# Patient Record
Sex: MALE | Race: BLACK OR AFRICAN AMERICAN | Employment: OTHER | URBAN - METROPOLITAN AREA
[De-identification: names, ages, dates, MRNs, and addresses within clinical notes are randomized per-mention and may not be internally consistent; named-entity substitution may affect disease eponyms.]

---

## 2017-03-07 ENCOUNTER — HOSPITAL ENCOUNTER (EMERGENCY)
Age: 72
Discharge: HOME | End: 2017-03-07
Attending: EMERGENCY MEDICINE
Payer: MEDICARE

## 2017-03-07 VITALS
RESPIRATION RATE: 16 BRPM | TEMPERATURE: 97.9 F | BODY MASS INDEX: 33.33 KG/M2 | WEIGHT: 225 LBS | OXYGEN SATURATION: 95 % | SYSTOLIC BLOOD PRESSURE: 181 MMHG | HEART RATE: 82 BPM | DIASTOLIC BLOOD PRESSURE: 108 MMHG | HEIGHT: 69 IN

## 2017-03-07 DIAGNOSIS — G89.29 CHRONIC PAIN OF RIGHT LOWER EXTREMITY: Primary | ICD-10-CM

## 2017-03-07 DIAGNOSIS — M79.604 CHRONIC PAIN OF RIGHT LOWER EXTREMITY: Primary | ICD-10-CM

## 2017-03-07 PROCEDURE — 99282 EMERGENCY DEPT VISIT SF MDM: CPT | Performed by: PHYSICIAN ASSISTANT

## 2017-03-07 RX ORDER — HYDROCODONE BITARTRATE AND ACETAMINOPHEN 7.5; 325 MG/1; MG/1
1 TABLET ORAL
Qty: 12 TAB | Refills: 0 | Status: SHIPPED | OUTPATIENT
Start: 2017-03-07 | End: 2017-04-04

## 2017-03-08 NOTE — ED PROVIDER NOTES
HPI Comments: 74year-old [facility] American male with long-standing right upper leg pain presents to the ER complaining of the same right upper leg pain that he has had for the last 18 months. In reviewing his office note from his primary care provider Dr. Madison Titus, on 2/16/2017 he was seen and it was documented that he had this chronic right upper leg and thigh pain for 18 months. Patient has had imaging studies of his right femur which did not reveal any bony abnormality but did reveal some arthritis and his right knee. Patient has also had duplex Doppler ultrasounds of both extremities which were negative for DVT. The ultrasound was performed in December 2016. Patient denies any new injury or trauma. He is a poor historian and here with a caregiver who states that patient has dementia and does not give an accurate story and was insistent on coming to the ER to have something done about his chronic right leg pain. Patient states that the pain has not changed any over the last 18 months. Patient is a 74 y.o. male presenting with leg pain. The history is provided by the patient. Leg Pain    This is a chronic problem. The current episode started more than 1 week ago (19 MONTHS AGO). The problem occurs daily. The problem has not changed since onset. The pain is present in the right upper leg. The quality of the pain is described as sharp. The pain is at a severity of 10/10. Associated symptoms include stiffness. Pertinent negatives include no numbness, full range of motion, no tingling and no back pain. The symptoms are aggravated by activity and movement. He has tried nothing for the symptoms. There has been no history of extremity trauma.         Past Medical History:   Diagnosis Date    Acute CHF (congestive heart failure) ([address] 4/25/2015    Acute combined systolic and diastolic congestive heart failure ([address] 4/28/2015    Chronic obstructive pulmonary disease (HCC)     De Quervain's tenosynovitis, right 4/28/2015    Dyspnea on exertion 6/28/2015    Elevated serum creatinine 4/25/2015    Elevated troponin 6/28/2015    History of coronary artery disease     MI at 27 yo    History of right knee surgery     cartilage removal    History of shingles     Malignant hypertension 4/25/2015    MI [facility])        Past Surgical History:   Procedure Laterality Date    HX HEART CATHETERIZATION  6/29/2015    no intervention    HX KNEE ARTHROSCOPY Right     removal of cartilage         Family History:   Problem Relation Age of Onset    Hypertension Mother     No Known Problems Father        Social History     Social History    Marital status:      Spouse name: N/A    Number of children: N/A    Years of education: N/A     Occupational History    retired      Social History Main Topics    Smoking status: Former Smoker     Packs/day: 0.25     Years: 20.00     Types: Cigarettes     Quit date: 4/5/2015    Smokeless tobacco: Never Used    Alcohol use No    Drug use: No    Sexual activity: Not on file     Other Topics Concern     Service No    Blood Transfusions No     no issues with receiving    Caffeine Concern No    Special Diet No    Exercise No    Seat Belt Yes     Social History Narrative    Lives with his wife         ALLERGIES: Pcn [penicillins]    Review of Systems   Constitutional: Negative for chills and fever. Respiratory: Negative for chest tightness and shortness of breath. Cardiovascular: Negative for chest pain, palpitations and leg swelling. Gastrointestinal: Negative for nausea and vomiting. Genitourinary: Negative for decreased urine volume, difficulty urinating, discharge, frequency, penile pain, scrotal swelling, testicular pain and urgency. Musculoskeletal: Positive for arthralgias, myalgias and stiffness. Negative for back pain and joint swelling. Neurological: Negative for dizziness, tingling, light-headedness and numbness.        Vitals: 03/07/17 1853   BP: (!) 181/108   Pulse: 82   Resp: 16   Temp: 97.9 °F (36.6 °C)   SpO2: 95%   Weight: 102.1 kg (225 lb)   Height: 5' 9\" (1.753 m)            Physical Exam   Constitutional: He is oriented to person, place, and time. Non-toxic appearance. He does not appear ill. No distress. Patient is somewhat disheveled. He is observed to have an elevated blood pressure of 181/108. Patient's caregiver states he is compliant with taking his blood pressure medication. Cardiovascular: Normal rate, regular rhythm and normal heart sounds. Exam reveals no gallop and no friction rub. No murmur heard. Pulses:       Femoral pulses are 2+ on the right side, and 2+ on the left side. Pulmonary/Chest: Effort normal and breath sounds normal. No accessory muscle usage. No respiratory distress. He has no decreased breath sounds. He has no wheezes. He has no rhonchi. Abdominal: Soft. Bowel sounds are normal. He exhibits no distension. There is no tenderness. Musculoskeletal:        Right hip: Normal.        Right knee: Normal. He exhibits normal range of motion. No tenderness found. Lumbar back: He exhibits tenderness. Back:         Right upper leg: Normal.   No obvious abnormality or deformity of right thigh or hip. Neurological: He is alert and oriented to person, place, and time. He has normal strength. No sensory deficit. Patient is able to actively raise right leg without difficulty. There is no obvious strength or sensory deficit. Skin: Skin is warm and dry. Psychiatric: He has a normal mood and affect. His speech is normal and behavior is normal.   Nursing note and vitals reviewed.        MDM  Number of Diagnoses or Management Options  Chronic pain of right lower extremity: new and does not require workup  Diagnosis management comments: Patient with documented chronic pain to right thigh and hip area for the last 18 months with plain imaging as well as ultrasound imaging having been performed without any acute abnormalities. I discussed with patient that he would need to contact his primary care provider the following day to make them aware that he is still experiencing pain and perhaps request a referral to either an orthopedist or pain specialist.  Patient states he does not have any pain medication at home. Patient is prescribed Norco. No further diagnostics are indicated at this point in time.        Amount and/or Complexity of Data Reviewed  Review and summarize past medical records: yes    Risk of Complications, Morbidity, and/or Mortality  Presenting problems: low  Management options: low    Patient Progress  Patient progress: stable    ED Course       Procedures

## 2017-03-08 NOTE — DISCHARGE INSTRUCTIONS
You will need to contact your primary care provider Dr. Marlene Dubose tomorrow and let him know this chronic problem is still causing you a problem. Chronic Pain: Care Instructions  Your Care Instructions  Chronic pain is pain that lasts a long time (months or even years) and may or may not have a clear cause. It is different from acute pain, which usually does have a clear cause--like an injury or illness--and gets better over time. Chronic pain:  · Lasts over time but may vary from day to day. · Does not go away despite efforts to end it. · May disrupt your sleep and lead to fatigue. · May cause depression or anxiety. · May make your muscles tense, causing more pain. · Can disrupt your work, hobbies, home life, and relationships with friends and family. Chronic pain is a very real condition. It is not just in your head. Treatment can help and usually includes several methods used together, such as medicines, physical therapy, exercise, and other treatments. Learning how to relax and changing negative thought patterns can also help you cope. Chronic pain is complex. Taking an active role in your treatment will help you better manage your pain. Tell your doctor if you have trouble dealing with your pain. You may have to try several things before you find what works best for you. Follow-up care is a key part of your treatment and safety. Be sure to make and go to all appointments, and call your doctor if you are having problems. Its also a good idea to know your test results and keep a list of the medicines you take. How can you care for yourself at home? · Pace yourself. Break up large jobs into smaller tasks. Save harder tasks for days when you have less pain, or go back and forth between hard tasks and easier ones. Take rest breaks. · Relax, and reduce stress. Relaxation techniques such as deep breathing or meditation can help. · Keep moving.  Gentle, daily exercise can help reduce pain over the long run. Try low- or no-impact exercises such as walking, swimming, and stationary biking. Do stretches to stay flexible. · Try heat, cold packs, and massage. · Get enough sleep. Chronic pain can make you tired and drain your energy. Talk with your doctor if you have trouble sleeping because of pain. · Think positive. Your thoughts can affect your pain level. Do things that you enjoy to distract yourself when you have pain instead of focusing on the pain. See a movie, read a book, listen to music, or spend time with a friend. · If you think you are depressed, talk to your doctor about treatment. · Keep a daily pain diary. Record how your moods, thoughts, sleep patterns, activities, and medicine affect your pain. You may find that your pain is worse during or after certain activities or when you are feeling a certain emotion. Having a record of your pain can help you and your doctor find the best ways to treat your pain. · Take pain medicines exactly as directed. ¨ If the doctor gave you a prescription medicine for pain, take it as prescribed. ¨ If you are not taking a prescription pain medicine, ask your doctor if you can take an over-the-counter medicine. Reducing constipation caused by pain medicine  · Include fruits, vegetables, beans, and whole grains in your diet each day. These foods are high in fiber. · Drink plenty of fluids, enough so that your urine is light yellow or clear like water. If you have kidney, heart, or liver disease and have to limit fluids, talk with your doctor before you increase the amount of fluids you drink. · If your doctor recommends it, get more exercise. Walking is a good choice. Bit by bit, increase the amount you walk every day. Try for at least 30 minutes on most days of the week. · Schedule time each day for a bowel movement. A daily routine may help. Take your time and do not strain when having a bowel movement. When should you call for help?   Call your doctor now or seek immediate medical care if:  · Your pain gets worse or is out of control. · You feel down or blue, or you do not enjoy things like you once did. You may be depressed, which is common in people with chronic pain. Depression can be treated. · You have vomiting or cramps for more than 2 hours. Watch closely for changes in your health, and be sure to contact your doctor if:  · You cannot sleep because of pain. · You are very worried or anxious about your pain. · You have trouble taking your pain medicine. · You have any concerns about your pain medicine. · You have trouble with bowel movements, such as:  ¨ No bowel movement in 3 days. ¨ Blood in the anal area, in your stool, or on the toilet paper. ¨ Diarrhea for more than 24 hours. Where can you learn more? Go to http://jaime-jarrell.info/. Enter N004 in the search box to learn more about \"Chronic Pain: Care Instructions. \"  Current as of: February 19, 2016  Content Version: 11.1  © 7395-8606 [employer]. Care instructions adapted under license by [employer] (which disclaims liability or warranty for this information). If you have questions about a medical condition or this instruction, always ask your healthcare professional. [address] any warranty or liability for your use of this information. Leg Pain: Care Instructions  Your Care Instructions  Many things can cause leg pain. Too much exercise or overuse can cause a muscle cramp (or charley horse). You can get leg cramps from not eating a balanced diet that has enough potassium, calcium, and other minerals. If you do not drink enough fluids or are taking certain medicines, you may develop leg cramps. Other causes of leg pain include injuries, blood flow problems, nerve damage, and twisted and enlarged veins (varicose veins). You can usually ease pain with self-care.  Your doctor may recommend that you rest your leg and keep it elevated. Follow-up care is a key part of your treatment and safety. Be sure to make and go to all appointments, and call your doctor if you are having problems. Its also a good idea to know your test results and keep a list of the medicines you take. How can you care for yourself at home? · Take pain medicines exactly as directed. ¨ If the doctor gave you a prescription medicine for pain, take it as prescribed. ¨ If you are not taking a prescription pain medicine, ask your doctor if you can take an over-the-counter medicine. · Take any other medicines exactly as prescribed. Call your doctor if you think you are having a problem with your medicine. · Rest your leg while you have pain, and avoid standing for long periods of time. · Prop up your leg at or above the level of your heart when possible. · Make sure you are eating a balanced diet that is rich in calcium, potassium, and magnesium, especially if you are pregnant. · If directed by your doctor, put ice or a cold pack on the area for 10 to 20 minutes at a time. Put a thin cloth between the ice and your skin. · Your leg may be in a splint, a brace, or an elastic bandage, and you may have crutches to help you walk. Follow your doctor's directions about how long to wear supports and how to use the crutches. When should you call for help? Call 911 anytime you think you may need emergency care. For example, call if:  · You have sudden chest pain and shortness of breath, or you cough up blood. · Your leg is cool or pale or changes color. Call your doctor now or seek immediate medical care if:  · You have increasing or severe pain. · Your leg suddenly feels weak and you cannot move it. · You have signs of a blood clot, such as:  ¨ Pain in your calf, back of the knee, thigh, or groin. ¨ Redness and swelling in your leg or groin. · You have signs of infection, such as:  ¨ Increased pain, swelling, warmth, or redness.   ¨ Red streaks leading from the sore area. ¨ Pus draining from a place on your leg. ¨ A fever. · You cannot bear weight on your leg. Watch closely for changes in your health, and be sure to contact your doctor if:  · You do not get better as expected. Where can you learn more? Go to http://jaime-jarrell.info/. Enter Z850 in the search box to learn more about \"Leg Pain: Care Instructions. \"  Current as of: May 27, 2016  Content Version: 11.1  © 0399-6709 [employer]. Care instructions adapted under license by [employer] (which disclaims liability or warranty for this information). If you have questions about a medical condition or this instruction, always ask your healthcare professional. [address] any warranty or liability for your use of this information.

## 2017-03-30 ENCOUNTER — APPOINTMENT (OUTPATIENT)
Dept: GENERAL RADIOLOGY | Age: 72
End: 2017-03-30
Attending: EMERGENCY MEDICINE
Payer: MEDICARE

## 2017-03-30 ENCOUNTER — HOSPITAL ENCOUNTER (EMERGENCY)
Age: 72
Discharge: HOME | End: 2017-03-30
Attending: EMERGENCY MEDICINE
Payer: MEDICARE

## 2017-03-30 VITALS
SYSTOLIC BLOOD PRESSURE: 165 MMHG | BODY MASS INDEX: 31.5 KG/M2 | RESPIRATION RATE: 16 BRPM | OXYGEN SATURATION: 99 % | TEMPERATURE: 98 F | WEIGHT: 220 LBS | DIASTOLIC BLOOD PRESSURE: 106 MMHG | HEIGHT: 70 IN | HEART RATE: 91 BPM

## 2017-03-30 DIAGNOSIS — R60.0 BILATERAL EDEMA OF LOWER EXTREMITY: Primary | ICD-10-CM

## 2017-03-30 LAB
ALBUMIN SERPL BCP-MCNC: 3.4 G/DL (ref 3.2–4.6)
ALBUMIN/GLOB SERPL: 0.8 {RATIO} (ref 1.2–3.5)
ALP SERPL-CCNC: 86 U/L (ref 50–136)
ALT SERPL-CCNC: 24 U/L (ref 12–65)
ANION GAP BLD CALC-SCNC: 13 MMOL/L (ref 7–16)
AST SERPL W P-5'-P-CCNC: 25 U/L (ref 15–37)
BASOPHILS # BLD AUTO: 0 K/UL (ref 0–0.2)
BASOPHILS # BLD: 0 % (ref 0–2)
BILIRUB SERPL-MCNC: 1.2 MG/DL (ref 0.2–1.1)
BNP SERPL-MCNC: 1115 PG/ML
BUN SERPL-MCNC: 21 MG/DL (ref 8–23)
CALCIUM SERPL-MCNC: 8.7 MG/DL (ref 8.3–10.4)
CHLORIDE SERPL-SCNC: 105 MMOL/L (ref 98–107)
CO2 SERPL-SCNC: 21 MMOL/L (ref 21–32)
CREAT SERPL-MCNC: 1.96 MG/DL (ref 0.8–1.5)
DIFFERENTIAL METHOD BLD: ABNORMAL
EOSINOPHIL # BLD: 0.1 K/UL (ref 0–0.8)
EOSINOPHIL NFR BLD: 1 % (ref 0.5–7.8)
ERYTHROCYTE [DISTWIDTH] IN BLOOD BY AUTOMATED COUNT: 15.8 % (ref 11.9–14.6)
GLOBULIN SER CALC-MCNC: 4.4 G/DL (ref 2.3–3.5)
GLUCOSE SERPL-MCNC: 92 MG/DL (ref 65–100)
HCT VFR BLD AUTO: 48.6 % (ref 41.1–50.3)
HGB BLD-MCNC: 16.1 G/DL (ref 13.6–17.2)
IMM GRANULOCYTES # BLD: 0 K/UL (ref 0–0.5)
IMM GRANULOCYTES NFR BLD AUTO: 0.4 % (ref 0–5)
LYMPHOCYTES # BLD AUTO: 34 % (ref 13–44)
LYMPHOCYTES # BLD: 2.6 K/UL (ref 0.5–4.6)
MCH RBC QN AUTO: 28.2 PG (ref 26.1–32.9)
MCHC RBC AUTO-ENTMCNC: 33.1 G/DL (ref 31.4–35)
MCV RBC AUTO: 85.1 FL (ref 79.6–97.8)
MONOCYTES # BLD: 0.6 K/UL (ref 0.1–1.3)
MONOCYTES NFR BLD AUTO: 8 % (ref 4–12)
NEUTS SEG # BLD: 4.3 K/UL (ref 1.7–8.2)
NEUTS SEG NFR BLD AUTO: 57 % (ref 43–78)
PLATELET # BLD AUTO: 212 K/UL (ref 150–450)
PMV BLD AUTO: 10.7 FL (ref 10.8–14.1)
POTASSIUM SERPL-SCNC: 4.1 MMOL/L (ref 3.5–5.1)
PROT SERPL-MCNC: 7.8 G/DL (ref 6.3–8.2)
RBC # BLD AUTO: 5.71 M/UL (ref 4.23–5.67)
SODIUM SERPL-SCNC: 139 MMOL/L (ref 136–145)
URATE SERPL-MCNC: 9.8 MG/DL (ref 2.6–6)
WBC # BLD AUTO: 7.6 K/UL (ref 4.3–11.1)

## 2017-03-30 PROCEDURE — 96374 THER/PROPH/DIAG INJ IV PUSH: CPT | Performed by: EMERGENCY MEDICINE

## 2017-03-30 PROCEDURE — 84550 ASSAY OF BLOOD/URIC ACID: CPT | Performed by: EMERGENCY MEDICINE

## 2017-03-30 PROCEDURE — 83880 ASSAY OF NATRIURETIC PEPTIDE: CPT | Performed by: EMERGENCY MEDICINE

## 2017-03-30 PROCEDURE — 80053 COMPREHEN METABOLIC PANEL: CPT | Performed by: EMERGENCY MEDICINE

## 2017-03-30 PROCEDURE — 74011250636 HC RX REV CODE- 250/636: Performed by: EMERGENCY MEDICINE

## 2017-03-30 PROCEDURE — 71020 XR CHEST PA LAT: CPT

## 2017-03-30 PROCEDURE — 85025 COMPLETE CBC W/AUTO DIFF WBC: CPT | Performed by: EMERGENCY MEDICINE

## 2017-03-30 PROCEDURE — 99284 EMERGENCY DEPT VISIT MOD MDM: CPT | Performed by: EMERGENCY MEDICINE

## 2017-03-30 RX ORDER — FUROSEMIDE 10 MG/ML
60 INJECTION INTRAMUSCULAR; INTRAVENOUS
Status: COMPLETED | OUTPATIENT
Start: 2017-03-30 | End: 2017-03-30

## 2017-03-30 RX ORDER — SODIUM CHLORIDE 0.9 % (FLUSH) 0.9 %
5-10 SYRINGE (ML) INJECTION EVERY 8 HOURS
Status: DISCONTINUED | OUTPATIENT
Start: 2017-03-30 | End: 2017-03-30 | Stop reason: HOSPADM

## 2017-03-30 RX ORDER — SODIUM CHLORIDE 0.9 % (FLUSH) 0.9 %
5-10 SYRINGE (ML) INJECTION AS NEEDED
Status: DISCONTINUED | OUTPATIENT
Start: 2017-03-30 | End: 2017-03-30 | Stop reason: HOSPADM

## 2017-03-30 RX ADMIN — FUROSEMIDE 60 MG: 10 INJECTION, SOLUTION INTRAMUSCULAR; INTRAVENOUS at 17:16

## 2017-03-30 NOTE — DISCHARGE INSTRUCTIONS
Return with any worsening swelling, if the breathing, chest pain, or additional concerns. As we discussed, it is very important for you to take her medication as prescribed. Try to eliminate as much sodium from your diet as possible. Follow-up with your primary care doctor for further evaluation.

## 2017-03-30 NOTE — PROGRESS NOTES
Visited with patient. Wife Shirley Neither is at the bedside. Verified demographics on face sheet. Patient states he takes Lasix as needed and admits that it is possible that he may shy away from taking it because he has to get up so many times in the middle of the night. Discussed the use of a urinal so he did not have to get out of bed. Patient states that he has a urinal but does not use it consistently and that it is hard to break normal habits. Encouraged patient to try and make the use of the urinal part of his daily routine especially when he is taking the Lasix. Patient states sometimes he sleeps at his cousins or his grandson's and I have provided him urinals to take to those houses and use at night. Patient states that he would feel comfortable using a urinal at their houses.

## 2017-03-30 NOTE — ED TRIAGE NOTES
Pain with swelling to bilateral feet for about 1.5 weeks. Pt has a hx of gout and CHF. Denies increased SOB.

## 2017-03-30 NOTE — ED PROVIDER NOTES
HPI Comments: 64-year-old gentleman with a history of congestive heart failure who presents with concerns about bilateral lower leg pain and swelling. Patient says he does not like to take his medication, specifically his Lasix, because it makes him urinate in the middle of the night. Patient says he has had some pain that is worse in his right ankle compared to his left. He notes that he always has some baseline shortness of breath but says that that is not any worse than usual.  Overall he rates his pain as a 9 out of 10. Elements of this note were created using speech recognition software. As such, there may be errors of speech recognition present. Patient is a 74 y.o. male presenting with foot pain. The history is provided by the patient.    Foot Pain           Past Medical History:   Diagnosis Date    Acute [address] (congestive [facility]) [address] 4/25/2015    Acute combined systolic and diastolic congestive heart failure ([facility] 4/28/2015    Chronic obstructive pulmonary disease (HCC)     De Quervain's tenosynovitis, right 4/28/2015    Dyspnea on exertion 6/28/2015    Elevated serum creatinine 4/25/2015    Elevated troponin 6/28/2015    History of coronary artery disease     MI at 25 yo    History of right knee surgery     cartilage removal    History of shingles     Malignant hypertension 4/25/2015    MI [facility])        Past Surgical History:   Procedure Laterality Date    HX HEART CATHETERIZATION  6/29/2015    no intervention    HX KNEE ARTHROSCOPY Right     removal of cartilage         Family History:   Problem Relation Age of Onset    Hypertension Mother     No Known Problems Father        Social History     Social History    Marital status:      Spouse name: N/A    Number of children: N/A    Years of education: N/A     Occupational History    retired      Social History Main Topics    Smoking status: Former Smoker     Packs/day: 0.25     Years: 20.00 Types: Cigarettes     Quit date: 4/5/2015    Smokeless tobacco: Never Used    Alcohol use No    Drug use: No    Sexual activity: Not on file     Other Topics Concern     Service No    Blood Transfusions No     no issues with receiving    Caffeine Concern No    Special Diet No    Exercise No    Seat Belt Yes     Social History Narrative    Lives with his wife         ALLERGIES: Pcn [penicillins]    Review of Systems   Constitutional: Negative for chills, diaphoresis and fever. HENT: Negative for congestion, rhinorrhea and sore throat. Eyes: Negative for redness and visual disturbance. Respiratory: Negative for cough, chest tightness, shortness of breath and wheezing. Cardiovascular: Positive for leg swelling. Negative for chest pain and palpitations. Gastrointestinal: Negative for abdominal pain, blood in stool, diarrhea, nausea and vomiting. Endocrine: Negative for polydipsia and polyuria. Genitourinary: Negative for dysuria and hematuria. Musculoskeletal: Negative for arthralgias, myalgias and neck stiffness. Skin: Negative for rash. Allergic/Immunologic: Negative for environmental allergies and food allergies. Neurological: Negative for dizziness, weakness and headaches. Hematological: Negative for adenopathy. Does not bruise/bleed easily. Psychiatric/Behavioral: Negative for confusion and sleep disturbance. The patient is not nervous/anxious. Vitals:    03/30/17 1357   BP: (!) 169/111   Pulse: 91   Resp: 16   Temp: 98 °F (36.7 °C)   SpO2: 97%   Weight: 99.8 kg (220 lb)   Height: 5' 10\" (1.778 m)            Physical Exam   Constitutional: He is oriented to person, place, and time. He appears well-developed and well-nourished. HENT:   Head: Normocephalic and atraumatic. Eyes: Conjunctivae and EOM are normal. Pupils are equal, round, and reactive to light. Neck: Normal range of motion. Cardiovascular: Normal rate and regular rhythm.     Pulmonary/Chest: Effort normal and breath sounds normal. No respiratory distress. He has no wheezes. He has no rales. He exhibits no tenderness. Abdominal: Soft. Bowel sounds are normal. There is no rebound and no guarding. Musculoskeletal: Normal range of motion. He exhibits edema. He exhibits no tenderness. 4 plus pitting edema to both knees   Lymphadenopathy:     He has no cervical adenopathy. Neurological: He is alert and oriented to person, place, and time. Skin: Skin is warm and dry. Psychiatric: He has a normal mood and affect. Nursing note and vitals reviewed. MDM  Number of Diagnoses or Management Options  Diagnosis management comments: I'll give the patient a dose of some IV Lasix here I did discuss with the patient and his daughter the importance of taking his medication. The daughter is very aware that it is important that the patient himself so that he is stubborn and is hard to convince to do the right thing. Patient has urinated here. I once again reinforced the need to take his medicines. I will plan to discharge him home.     ED Course       Procedures

## 2017-04-04 ENCOUNTER — HOSPITAL ENCOUNTER (EMERGENCY)
Age: 72
Discharge: HOME | End: 2017-04-04
Attending: EMERGENCY MEDICINE
Payer: MEDICARE

## 2017-04-04 ENCOUNTER — APPOINTMENT (OUTPATIENT)
Dept: GENERAL RADIOLOGY | Age: 72
End: 2017-04-04
Attending: EMERGENCY MEDICINE
Payer: MEDICARE

## 2017-04-04 ENCOUNTER — APPOINTMENT (OUTPATIENT)
Dept: ULTRASOUND IMAGING | Age: 72
End: 2017-04-04
Attending: EMERGENCY MEDICINE
Payer: MEDICARE

## 2017-04-04 VITALS
RESPIRATION RATE: 18 BRPM | SYSTOLIC BLOOD PRESSURE: 131 MMHG | TEMPERATURE: 98.7 F | HEART RATE: 71 BPM | DIASTOLIC BLOOD PRESSURE: 83 MMHG | HEIGHT: 70 IN | BODY MASS INDEX: 31.5 KG/M2 | OXYGEN SATURATION: 96 % | WEIGHT: 220 LBS

## 2017-04-04 DIAGNOSIS — R60.9 PERIPHERAL EDEMA: Primary | ICD-10-CM

## 2017-04-04 DIAGNOSIS — M10.9 ACUTE GOUTY ARTHRITIS: ICD-10-CM

## 2017-04-04 DIAGNOSIS — Z74.09 DECREASED AMBULATION STATUS: ICD-10-CM

## 2017-04-04 LAB
ALBUMIN SERPL BCP-MCNC: 3.4 G/DL (ref 3.2–4.6)
ALBUMIN/GLOB SERPL: 0.7 {RATIO} (ref 1.2–3.5)
ALP SERPL-CCNC: 89 U/L (ref 50–136)
ALT SERPL-CCNC: 25 U/L (ref 12–65)
ANION GAP BLD CALC-SCNC: 10 MMOL/L (ref 7–16)
AST SERPL W P-5'-P-CCNC: 34 U/L (ref 15–37)
BASOPHILS # BLD AUTO: 0 K/UL (ref 0–0.2)
BASOPHILS # BLD: 0 % (ref 0–2)
BILIRUB SERPL-MCNC: 0.9 MG/DL (ref 0.2–1.1)
BNP SERPL-MCNC: 312 PG/ML
BUN SERPL-MCNC: 12 MG/DL (ref 8–23)
CALCIUM SERPL-MCNC: 8.9 MG/DL (ref 8.3–10.4)
CHLORIDE SERPL-SCNC: 104 MMOL/L (ref 98–107)
CO2 SERPL-SCNC: 28 MMOL/L (ref 21–32)
CREAT SERPL-MCNC: 1.71 MG/DL (ref 0.8–1.5)
DIFFERENTIAL METHOD BLD: ABNORMAL
EOSINOPHIL # BLD: 0.1 K/UL (ref 0–0.8)
EOSINOPHIL NFR BLD: 2 % (ref 0.5–7.8)
ERYTHROCYTE [DISTWIDTH] IN BLOOD BY AUTOMATED COUNT: 15.3 % (ref 11.9–14.6)
GLOBULIN SER CALC-MCNC: 4.9 G/DL (ref 2.3–3.5)
GLUCOSE SERPL-MCNC: 100 MG/DL (ref 65–100)
HCT VFR BLD AUTO: 49.5 % (ref 41.1–50.3)
HGB BLD-MCNC: 16.3 G/DL (ref 13.6–17.2)
IMM GRANULOCYTES # BLD: 0 K/UL (ref 0–0.5)
IMM GRANULOCYTES NFR BLD AUTO: 0.1 % (ref 0–5)
LYMPHOCYTES # BLD AUTO: 35 % (ref 13–44)
LYMPHOCYTES # BLD: 2.5 K/UL (ref 0.5–4.6)
MCH RBC QN AUTO: 28.2 PG (ref 26.1–32.9)
MCHC RBC AUTO-ENTMCNC: 32.9 G/DL (ref 31.4–35)
MCV RBC AUTO: 85.6 FL (ref 79.6–97.8)
MONOCYTES # BLD: 0.7 K/UL (ref 0.1–1.3)
MONOCYTES NFR BLD AUTO: 10 % (ref 4–12)
NEUTS SEG # BLD: 3.7 K/UL (ref 1.7–8.2)
NEUTS SEG NFR BLD AUTO: 53 % (ref 43–78)
PLATELET # BLD AUTO: 167 K/UL (ref 150–450)
PMV BLD AUTO: 10.8 FL (ref 10.8–14.1)
POTASSIUM SERPL-SCNC: 4.3 MMOL/L (ref 3.5–5.1)
PROT SERPL-MCNC: 8.3 G/DL (ref 6.3–8.2)
RBC # BLD AUTO: 5.78 M/UL (ref 4.23–5.67)
SODIUM SERPL-SCNC: 142 MMOL/L (ref 136–145)
WBC # BLD AUTO: 7 K/UL (ref 4.3–11.1)

## 2017-04-04 PROCEDURE — 83880 ASSAY OF NATRIURETIC PEPTIDE: CPT | Performed by: EMERGENCY MEDICINE

## 2017-04-04 PROCEDURE — 85025 COMPLETE CBC W/AUTO DIFF WBC: CPT | Performed by: EMERGENCY MEDICINE

## 2017-04-04 PROCEDURE — 74011250637 HC RX REV CODE- 250/637: Performed by: EMERGENCY MEDICINE

## 2017-04-04 PROCEDURE — 71010 XR CHEST PORT: CPT

## 2017-04-04 PROCEDURE — 99285 EMERGENCY DEPT VISIT HI MDM: CPT | Performed by: EMERGENCY MEDICINE

## 2017-04-04 PROCEDURE — 96374 THER/PROPH/DIAG INJ IV PUSH: CPT | Performed by: EMERGENCY MEDICINE

## 2017-04-04 PROCEDURE — 80053 COMPREHEN METABOLIC PANEL: CPT | Performed by: EMERGENCY MEDICINE

## 2017-04-04 PROCEDURE — 74011250636 HC RX REV CODE- 250/636: Performed by: EMERGENCY MEDICINE

## 2017-04-04 PROCEDURE — 93970 EXTREMITY STUDY: CPT

## 2017-04-04 RX ORDER — FUROSEMIDE 10 MG/ML
40 INJECTION INTRAMUSCULAR; INTRAVENOUS
Status: COMPLETED | OUTPATIENT
Start: 2017-04-04 | End: 2017-04-04

## 2017-04-04 RX ORDER — COLCHICINE 0.6 MG/1
0.6 CAPSULE ORAL DAILY
Qty: 30 CAP | Refills: 0 | Status: SHIPPED | OUTPATIENT
Start: 2017-04-04 | End: 2017-06-20

## 2017-04-04 RX ORDER — PREDNISONE 10 MG/1
TABLET ORAL
Qty: 45 TAB | Refills: 0 | Status: SHIPPED | OUTPATIENT
Start: 2017-04-04 | End: 2017-04-19 | Stop reason: SDUPTHER

## 2017-04-04 RX ORDER — HYDROCODONE BITARTRATE AND ACETAMINOPHEN 7.5; 325 MG/1; MG/1
1 TABLET ORAL
Status: COMPLETED | OUTPATIENT
Start: 2017-04-04 | End: 2017-04-04

## 2017-04-04 RX ORDER — HYDROCODONE BITARTRATE AND ACETAMINOPHEN 7.5; 325 MG/1; MG/1
1 TABLET ORAL
Qty: 12 TAB | Refills: 0 | Status: SHIPPED | OUTPATIENT
Start: 2017-04-04 | End: 2017-06-20

## 2017-04-04 RX ADMIN — HYDROCODONE BITARTRATE AND ACETAMINOPHEN 1 TABLET: 7.5; 325 TABLET ORAL at 17:00

## 2017-04-04 RX ADMIN — FUROSEMIDE 40 MG: 10 INJECTION, SOLUTION INTRAMUSCULAR; INTRAVENOUS at 12:14

## 2017-04-04 NOTE — ED NOTES
I have reviewed medications, follow up provider options, and discharge instructions with the patient. The patient verbalized understanding. Copy of discharge information given to patient upon discharge. Prescription(s) given to patient. Patient discharged in no distress. Patient instructed not to drive while under influence of pain medications.

## 2017-04-04 NOTE — CONSULTS
HOSPITALIST CONSULT    NAME:  Sidney Wang   Age:  74 y.o.  :   1945   MRN:   [id number]  PCP: Yehuda Hadley MD  Requesting physician: Dr. Robert Soto  Reason for consultation: LE edema and pain    Subjective:     Patient is a 74 y.o. male here with LE edema and pain in his ankles. Pt was here several days ago and told to take more lasix at home. He has been noncompliant with medications in the past and his wife is in the room today stating that pt is stubborn and difficult to get him to take certain medications. Pt has had gout in his ankles in the past and pain at that time was much worse than it is now according to him. Past Medical History:   Diagnosis Date    Acute CHF [facility]congestive heart failure) ([facility] [address] 2015    Acute combined systolic and diastolic congestive heart failure ([facility] [address] 2015    Chronic obstructive pulmonary disease (HCC)     De Quervain's tenosynovitis, right 2015    Dyspnea on exertion 2015    Elevated serum creatinine 2015    Elevated troponin 2015    History of coronary artery disease     MI at 25 yo    History of right knee surgery     cartilage removal    History of shingles     Malignant hypertension 2015    MI ([facility])       Past Surgical History:   Procedure Laterality Date    HX HEART CATHETERIZATION  2015    no intervention    HX KNEE ARTHROSCOPY Right     removal of cartilage      No current facility-administered medications for this encounter. Current Outpatient Prescriptions   Medication Sig    HYDROcodone-acetaminophen (NORCO) 7.5-325 mg per tablet Take 1 Tab by mouth every six (6) hours as needed for Pain. Max Daily Amount: 4 Tabs.  predniSONE (DELTASONE) 10 mg tablet Take 1 Tab by mouth daily. 6 tabs po for1 days, 5 tabs po for 1days, 4 tab po for  Days, 3 tabs for 1 day, 2 tabs for 1 day the 1 tab and stop    carvedilol (COREG) 25 mg tablet Take 25 mg by mouth two (2) times daily (with meals).  albuterol (VENTOLIN HFA) 90 mcg/actuation inhaler Take 2 Puffs by inhalation four (4) times daily.  furosemide (LASIX) 40 mg tablet Take 2 Tabs by mouth two (2) times a day. (Patient taking differently: Take 80 mg by mouth two (2) times daily as needed.)    losartan (COZAAR) 50 mg tablet Take 1 Tab by mouth daily.  atorvastatin (LIPITOR) 20 mg tablet Take 1 Tab by mouth nightly.  pantoprazole (PROTONIX) 40 mg tablet Take 1 Tab by mouth Daily (before breakfast).  albuterol-ipratropium (DUONEB) 2.5 mg-0.5 mg/3 ml nebulizer solution 3 mL by Nebulization route four (4) times daily. Diagnosis: COPD J44.9    dietary supplement cap Take  by mouth daily. Plant-based [employer] Plus GLA ULTIMATE EFA's    aspirin delayed-release 81 mg tablet Take 1 Tab by mouth daily. Allergies   Allergen Reactions    Pcn [Penicillins] Other (comments)     \"makes my heart stop\"      Social History   Substance Use Topics    Smoking status: Former Smoker     Packs/day: 0.25     Years: 20.00     Types: Cigarettes     Quit date: 4/5/2015    Smokeless tobacco: Never Used    Alcohol use No        Review of Systems  A comprehensive 12 point review of systems is negative other than what is listed above. Objective:     Patient Vitals for the past 24 hrs:   BP Temp Pulse Resp SpO2 Height Weight   04/04/17 1331 (!) 140/95 - - - 96 % - -   04/04/17 1305 (!) 130/92 - - - 97 % - -   04/04/17 1303 163/90 - - - 91 % - -   04/04/17 1301 (!) 160/94 - - - 94 % - -   04/04/17 1233 (!) 147/92 - - - 96 % - -   04/04/17 1212 - - - - 96 % - -   04/04/17 1211 150/86 - - - - - -   04/04/17 1132 133/84 97.3 °F (36.3 °C) 71 17 92 % 5' 10\" (1.778 m) 99.8 kg (220 lb)       Exam:  General: awake, alert, no apparent distress  Eyes: anicteric  Neck: Trachea midline  Lungs: Clear to auscultation bilaterally. No rales, wheezes, or rhonchi  Heart: Regular rate and rhythm. No appreciable murmur. Abdomen: Soft, nontender, nondistended.   Bowel sounds normal.  Extremities:  Bilateral foot and ankle edema with discomfort to palpation. Skin: no LE erythema or wounds  Neurologic: AOx3. Data Review (Labs):   Recent Results (from the past 24 hour(s))   CBC WITH AUTOMATED DIFF    Collection Time: 04/04/17 12:04 PM   Result Value Ref Range    WBC 7.0 4.3 - 11.1 K/uL    RBC 5.78 (H) 4.23 - 5.67 M/uL    HGB 16.3 13.6 - 17.2 g/dL    HCT 49.5 41.1 - 50.3 %    MCV 85.6 79.6 - 97.8 FL    MCH 28.2 26.1 - 32.9 PG    MCHC 32.9 31.4 - 35.0 g/dL    RDW 15.3 (H) 11.9 - 14.6 %    PLATELET 466 682 - 469 K/uL    MPV 10.8 10.8 - 14.1 FL    DF AUTOMATED      NEUTROPHILS 53 43 - 78 %    LYMPHOCYTES 35 13 - 44 %    MONOCYTES 10 4.0 - 12.0 %    EOSINOPHILS 2 0.5 - 7.8 %    BASOPHILS 0 0.0 - 2.0 %    IMMATURE GRANULOCYTES 0.1 0.0 - 5.0 %    ABS. NEUTROPHILS 3.7 1.7 - 8.2 K/UL    ABS. LYMPHOCYTES 2.5 0.5 - 4.6 K/UL    ABS. MONOCYTES 0.7 0.1 - 1.3 K/UL    ABS. EOSINOPHILS 0.1 0.0 - 0.8 K/UL    ABS. BASOPHILS 0.0 0.0 - 0.2 K/UL    ABS. IMM. GRANS. 0.0 0.0 - 0.5 K/UL   METABOLIC PANEL, COMPREHENSIVE    Collection Time: 04/04/17 12:04 PM   Result Value Ref Range    Sodium 142 136 - 145 mmol/L    Potassium 4.3 3.5 - 5.1 mmol/L    Chloride 104 98 - 107 mmol/L    CO2 28 21 - 32 mmol/L    Anion gap 10 7 - 16 mmol/L    Glucose 100 65 - 100 mg/dL    BUN 12 8 - 23 MG/DL    Creatinine 1.71 (H) 0.8 - 1.5 MG/DL    GFR est AA 51 (L) >60 ml/min/1.73m2    GFR est non-AA 42 (L) >60 ml/min/1.73m2    Calcium 8.9 8.3 - 10.4 MG/DL    Bilirubin, total 0.9 0.2 - 1.1 MG/DL    ALT (SGPT) 25 12 - 65 U/L    AST (SGOT) 34 15 - 37 U/L    Alk. phosphatase 89 50 - 136 U/L    Protein, total 8.3 (H) 6.3 - 8.2 g/dL    Albumin 3.4 3.2 - 4.6 g/dL    Globulin 4.9 (H) 2.3 - 3.5 g/dL    A-G Ratio 0.7 (L) 1.2 - 3.5     BNP    Collection Time: 04/04/17 12:04 PM   Result Value Ref Range     pg/mL       Assessment:   Bilateral LE edema  Gout    Plan: We presumptively treat this as gout.   Not a good idea to give NSAID due to renal function so will go with colchicine and prednisone.   Will give 3 day prescription for Norco.    Plan of care discussed with: pt/wife/Dr.Brown James Torres, DO

## 2017-04-04 NOTE — ED TRIAGE NOTES
Per EMS pt was seen here on the 30th for edema in bilateral lower extremities and CHF. Pt has had continued swelling. Pt denies chest pain or shortness of breath.        Maria Dolores Caballero RN

## 2017-04-04 NOTE — ED NOTES
Patient seen and evaluated by the hospitalist.  Patient at this time is amenable further outpatient treatment. We'll treat for peripheral edema as well as gouty arthritis.

## 2017-06-20 ENCOUNTER — HOSPITAL ENCOUNTER (EMERGENCY)
Age: 72
Discharge: HOME | End: 2017-06-20
Attending: EMERGENCY MEDICINE
Payer: MEDICARE

## 2017-06-20 VITALS
DIASTOLIC BLOOD PRESSURE: 86 MMHG | SYSTOLIC BLOOD PRESSURE: 125 MMHG | HEART RATE: 75 BPM | TEMPERATURE: 98 F | WEIGHT: 220 LBS | BODY MASS INDEX: 31.5 KG/M2 | RESPIRATION RATE: 17 BRPM | OXYGEN SATURATION: 98 % | HEIGHT: 70 IN

## 2017-06-20 DIAGNOSIS — M10.9 GOUTY ARTHRITIS: Primary | ICD-10-CM

## 2017-06-20 PROCEDURE — 99284 EMERGENCY DEPT VISIT MOD MDM: CPT | Performed by: EMERGENCY MEDICINE

## 2017-06-20 PROCEDURE — 74011250637 HC RX REV CODE- 250/637: Performed by: EMERGENCY MEDICINE

## 2017-06-20 PROCEDURE — 74011636637 HC RX REV CODE- 636/637: Performed by: EMERGENCY MEDICINE

## 2017-06-20 RX ORDER — INDOMETHACIN 50 MG/1
50 CAPSULE ORAL
Status: COMPLETED | OUTPATIENT
Start: 2017-06-20 | End: 2017-06-20

## 2017-06-20 RX ORDER — PREDNISONE 20 MG/1
60 TABLET ORAL DAILY
Qty: 15 TAB | Refills: 0 | Status: SHIPPED | OUTPATIENT
Start: 2017-06-20 | End: 2017-06-25

## 2017-06-20 RX ORDER — OXYCODONE HYDROCHLORIDE 5 MG/1
5 TABLET ORAL
Qty: 11 TAB | Refills: 0 | Status: SHIPPED | OUTPATIENT
Start: 2017-06-20 | End: 2018-01-12

## 2017-06-20 RX ORDER — PREDNISONE 20 MG/1
60 TABLET ORAL
Status: COMPLETED | OUTPATIENT
Start: 2017-06-20 | End: 2017-06-20

## 2017-06-20 RX ORDER — OXYCODONE HYDROCHLORIDE 5 MG/1
5 TABLET ORAL
Status: COMPLETED | OUTPATIENT
Start: 2017-06-20 | End: 2017-06-20

## 2017-06-20 RX ADMIN — INDOMETHACIN 50 MG: 50 CAPSULE ORAL at 09:14

## 2017-06-20 RX ADMIN — OXYCODONE HYDROCHLORIDE 5 MG: 5 TABLET ORAL at 09:14

## 2017-06-20 RX ADMIN — PREDNISONE 60 MG: 20 TABLET ORAL at 09:14

## 2017-06-20 NOTE — ED TRIAGE NOTES
Pt in via [facility] c/o bilateral foot pain and swelling since Friday. States history of gout. States he does not take allopurinol or colchicine.

## 2017-06-20 NOTE — ED PROVIDER NOTES
HPI     CDNotes Templates                               Emergency Department     Chief Complaint:  Left foot pain  HPI:  78-year-old male with several days of left foot pain and swelling. Symptoms were first noticed several days ago  Patient describes left foot  Severity of pain is moderate  Onset of symptoms was gradual  Historian:   patient  Review of Systems:  Include pertinent positives and negatives. CONST:  Denies: fever  MUSC: Left foot pain some left knee pain and right foot pain  SKIN:  No rash or lesions  NEURO: Denies: numbness, tingling  Past Medical History:  Past Medical History:   Diagnosis Date    Acute CHF (congestive heart failure) ([facility] [address] 4/25/2015    Acute combined systolic and diastolic congestive heart failure ([facility] [address] 4/28/2015    Chronic obstructive pulmonary disease (HCC)     De Quervain's tenosynovitis, right 4/28/2015    Dyspnea on exertion 6/28/2015    Elevated serum creatinine 4/25/2015    Elevated troponin 6/28/2015    History of coronary artery disease     MI at 27 yo    History of right knee surgery     cartilage removal    History of shingles     Malignant hypertension 4/25/2015    [facility])      Past Surgical History:   Procedure Laterality Date    HX HEART CATHETERIZATION  6/29/2015    no intervention    HX KNEE ARTHROSCOPY Right     removal of cartilage     Social History   Substance Use Topics    Smoking status: Former Smoker     Packs/day: 0.25     Years: 20.00     Types: Cigarettes     Quit date: 4/5/2015    Smokeless tobacco: Never Used    Alcohol use No     Family History   Problem Relation Age of Onset    Hypertension Mother     No Known Problems Father      Previous Medications    ALBUTEROL (VENTOLIN HFA) 90 MCG/ACTUATION INHALER    Take 2 Puffs by inhalation four (4) times daily. ALBUTEROL-IPRATROPIUM (DUO-NEB) 2.5 MG-0.5 MG/3 ML NEBU    USE 3ML VIAL BY NEBULIZATION ROUTE FOUR (4) TIMES DAILY.  DIAGNOSIS: COPD J44.9    ASPIRIN DELAYED-RELEASE 81 MG TABLET    Take 1 Tab by mouth daily. ATORVASTATIN (LIPITOR) 20 MG TABLET    Take 1 Tab by mouth nightly. CARVEDILOL (COREG) 25 MG TABLET    Take 25 mg by mouth two (2) times daily (with meals). FUROSEMIDE (LASIX) 40 MG TABLET    Take 2 Tabs by mouth two (2) times a day. LOSARTAN (COZAAR) 50 MG TABLET    Take 1 Tab by mouth daily. PANTOPRAZOLE (PROTONIX) 40 MG TABLET    Take 1 Tab by mouth Daily (before breakfast). Allergies as of 06/20/2017 - Review Complete 06/20/2017   Allergen Reaction Noted    Pcn [penicillins] Other (comments) 08/11/2008       Physical Exam:    Vital signs:   Visit Vitals    /76    Pulse 77    Temp 97.9 °F (36.6 °C)    Resp 17    Ht 5' 10\" (1.778 m)    Wt 99.8 kg (220 lb)    SpO2 99%    BMI 31.57 kg/m2         General Appear: no distress  Cardiovascular:        RRR, no murmur; brisk  Musculoskeletal: The left foot is swollen, warm over the ankle and the medial foot. Some swelling is noted. Skin:   dry  Neurologic:  alert    _________________________________________________________________  Progress:    Patient given steroids and pain medication. Much improved.   We'll discharge home on steroids and have him follow-up with his primary care physician this week.    _______________________________________________________________________  Assessment/Plan:    77-year-old male with history of gout presents to the emergency department with left foot pain.  _______________________________________________________________________  Condition:  improved  Disposition:  home  Diagnosis:  Left ankle pain      Shaq Flores M.D.      Hero Bland; version 2.0; revised April, 2016      Review of Systems    Vitals:    06/20/17 0825 06/20/17 0830   BP: 128/74 124/76   Pulse: 77    Resp: 17    Temp: 97.9 °F (36.6 °C)    SpO2: 95% 99%   Weight: 99.8 kg (220 lb)    Height: 5' 10\" (1.778 m)             Physical Exam     [facility]  ED Course       Procedures  goo

## 2017-06-20 NOTE — DISCHARGE INSTRUCTIONS

## 2017-09-05 ENCOUNTER — HOSPITAL ENCOUNTER (EMERGENCY)
Age: 72
Discharge: HOME | End: 2017-09-05
Attending: EMERGENCY MEDICINE
Payer: MEDICARE

## 2017-09-05 ENCOUNTER — APPOINTMENT (OUTPATIENT)
Dept: GENERAL RADIOLOGY | Age: 72
End: 2017-09-05
Attending: NURSE PRACTITIONER
Payer: MEDICARE

## 2017-09-05 VITALS
HEIGHT: 70 IN | TEMPERATURE: 98.5 F | DIASTOLIC BLOOD PRESSURE: 91 MMHG | HEART RATE: 72 BPM | RESPIRATION RATE: 16 BRPM | WEIGHT: 215 LBS | OXYGEN SATURATION: 99 % | SYSTOLIC BLOOD PRESSURE: 146 MMHG | BODY MASS INDEX: 30.78 KG/M2

## 2017-09-05 DIAGNOSIS — S39.012A BACK STRAIN, INITIAL ENCOUNTER: ICD-10-CM

## 2017-09-05 DIAGNOSIS — S16.1XXA CERVICAL STRAIN, INITIAL ENCOUNTER: Primary | ICD-10-CM

## 2017-09-05 PROCEDURE — 72040 X-RAY EXAM NECK SPINE 2-3 VW: CPT

## 2017-09-05 PROCEDURE — 99283 EMERGENCY DEPT VISIT LOW MDM: CPT | Performed by: EMERGENCY MEDICINE

## 2017-09-05 PROCEDURE — 72070 X-RAY EXAM THORAC SPINE 2VWS: CPT

## 2017-09-05 RX ORDER — DICLOFENAC SODIUM 75 MG/1
75 TABLET, DELAYED RELEASE ORAL 2 TIMES DAILY
Qty: 10 TAB | Refills: 0 | Status: SHIPPED | OUTPATIENT
Start: 2017-09-05 | End: 2017-10-13

## 2017-09-05 RX ORDER — CYCLOBENZAPRINE HCL 10 MG
10 TABLET ORAL 2 TIMES DAILY
Qty: 8 TAB | Refills: 0 | Status: SHIPPED | OUTPATIENT
Start: 2017-09-05 | End: 2017-09-27

## 2017-09-05 NOTE — ED NOTES
I have reviewed discharge instructions with the patient. The patient verbalized understanding. The patient is wheeled out by family member and is in no acute distress. The patient has discharge instructions and prescriptions x2 in hand. The patient has been provided nebulizer device by respiratory therapist, per request by MD.  The patient and family do not have any questions at this time.
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
## 2017-09-05 NOTE — DISCHARGE INSTRUCTIONS
Neck Strain: Care Instructions  Your Care Instructions  You have strained the muscles and ligaments in your neck. A sudden, awkward movement can strain the neck. This often occurs with falls or car accidents or during certain sports. Everyday activities like working on a computer or sleeping can also cause neck strain if they force you to hold your neck in an awkward position for a long time. It is common for neck pain to get worse for a day or two after an injury, but it should start to feel better after that. You may have more pain and stiffness for several days before it gets better. This is expected. It may take a few weeks or longer for it to heal completely. Good home treatment can help you get better faster and avoid future neck problems. Follow-up care is a key part of your treatment and safety. Be sure to make and go to all appointments, and call your doctor if you are having problems. It's also a good idea to know your test results and keep a list of the medicines you take. How can you care for yourself at home? · If you were given a neck brace (cervical collar) to limit neck motion, wear it as instructed for as many days as your doctor tells you to. Do not wear it longer than you were told to. Wearing a brace for too long can make neck stiffness worse and weaken the neck muscles. · You can try using heat or ice to see if it helps. ¨ Try using a heating pad on a low or medium setting for 15 to 20 minutes every 2 to 3 hours. Try a warm shower in place of one session with the heating pad. You can also buy single-use heat wraps that last up to 8 hours. ¨ You can also try an ice pack for 10 to 15 minutes every 2 to 3 hours. · Take pain medicines exactly as directed. ¨ If the doctor gave you a prescription medicine for pain, take it as prescribed. ¨ If you are not taking a prescription pain medicine, ask your doctor if you can take an over-the-counter medicine.   · Gently rub the area to relieve pain and help with blood flow. Do not massage the area if it hurts to do so. · Do not do anything that makes the pain worse. Take it easy for a couple of days. You can do your usual activities if they do not hurt your neck or put it at risk for more stress or injury. · Try sleeping on a special neck pillow. Place it under your neck, not under your head. Placing a tightly rolled-up towel under your neck while you sleep will also work. If you use a neck pillow or rolled towel, do not use your regular pillow at the same time. · To prevent future neck pain, do exercises to stretch and strengthen your neck and back. Learn how to use good posture, safe lifting techniques, and proper body mechanics. When should you call for help? Call 911 anytime you think you may need emergency care. For example, call if:  · You are unable to move an arm or a leg at all. Call your doctor now or seek immediate medical care if:  · You have new or worse symptoms in your arms, legs, chest, belly, or buttocks. Symptoms may include:  ¨ Numbness or tingling. ¨ Weakness. ¨ Pain. · You lose bladder or bowel control. Watch closely for changes in your health, and be sure to contact your doctor if:  · You are not getting better as expected. Where can you learn more? Go to http://jaime-jarrell.info/. Enter M253 in the search box to learn more about \"Neck Strain: Care Instructions. \"  Current as of: March 21, 2017  Content Version: 11.3  © 5543-0789 [employer]. Care instructions adapted under license by [employer] (which disclaims liability or warranty for this information). If you have questions about a medical condition or this instruction, always ask your healthcare professional. [place] [address] any warranty or liability for your use of this information.          Back Strain: Care Instructions  Your Care Instructions    Back strain happens when you overstretch, or pull, a muscle in your back. You may hurt your back in an accident or when you exercise or lift something. Most back pain will get better with rest and time. You can take care of yourself at home to help your back heal.  Follow-up care is a key part of your treatment and safety. Be sure to make and go to all appointments, and call your doctor if you are having problems. It's also a good idea to know your test results and keep a list of the medicines you take. How can you care for yourself at home? · Try to stay as active as you can, but stop or reduce any activity that causes pain. · Put ice or a cold pack on the sore muscle for 10 to 20 minutes at a time to stop swelling. Try this every 1 to 2 hours for 3 days (when you are awake) or until the swelling goes down. Put a thin cloth between the ice pack and your skin. · After 2 or 3 days, apply a heating pad on low or a warm cloth to your back. Some doctors suggest that you go back and forth between hot and cold treatments. · Take pain medicines exactly as directed. ¨ If the doctor gave you a prescription medicine for pain, take it as prescribed. ¨ If you are not taking a prescription pain medicine, ask your doctor if you can take an over-the-counter medicine. · Try sleeping on your side with a pillow between your legs. Or put a pillow under your knees when you lie on your back. These measures can ease pain in your lower back. · Return to your usual level of activity slowly. When should you call for help? Call 911 anytime you think you may need emergency care. For example, call if:  · You are unable to move a leg at all. Call your doctor now or seek immediate medical care if:  · You have new or worse symptoms in your legs, belly, or buttocks. Symptoms may include:  ¨ Numbness or tingling. ¨ Weakness. ¨ Pain. · You lose bladder or bowel control.   Watch closely for changes in your health, and be sure to contact your doctor if you are not getting better as expected. Where can you learn more? Go to http://jaime-jarrell.info/. Enter R924 in the search box to learn more about \"Back Strain: Care Instructions. \"  Current as of: March 21, 2017  Content Version: 11.3  © 6876-9275 [employer]. Care instructions adapted under license by [employer] (which disclaims liability or warranty for this information). If you have questions about a medical condition or this instruction, always ask your healthcare professional. [address] any warranty or liability for your use of this information.
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
## 2017-09-27 ENCOUNTER — APPOINTMENT (OUTPATIENT)
Dept: GENERAL RADIOLOGY | Age: 72
DRG: 291 | End: 2017-09-27
Attending: EMERGENCY MEDICINE
Payer: MEDICARE

## 2017-09-27 ENCOUNTER — HOSPITAL ENCOUNTER (OUTPATIENT)
Age: 72
Setting detail: OBSERVATION
Discharge: OTHER INSTITUTION | DRG: 291 | End: 2017-09-29
Attending: EMERGENCY MEDICINE | Admitting: INTERNAL MEDICINE
Payer: MEDICARE

## 2017-09-27 DIAGNOSIS — R06.02 SOB (SHORTNESS OF BREATH): ICD-10-CM

## 2017-09-27 DIAGNOSIS — I50.9 ACUTE ON CHRONIC CONGESTIVE HEART FAILURE, UNSPECIFIED CONGESTIVE HEART FAILURE TYPE: Primary | ICD-10-CM

## 2017-09-27 DIAGNOSIS — N17.9 AKI (ACUTE KIDNEY INJURY) (HCC): ICD-10-CM

## 2017-09-27 LAB
ALBUMIN SERPL-MCNC: 3.3 G/DL (ref 3.2–4.6)
ALBUMIN/GLOB SERPL: 0.8 {RATIO} (ref 1.2–3.5)
ALP SERPL-CCNC: 104 U/L (ref 50–136)
ALT SERPL-CCNC: 68 U/L (ref 12–65)
ANION GAP SERPL CALC-SCNC: 7 MMOL/L (ref 7–16)
AST SERPL-CCNC: 56 U/L (ref 15–37)
ATRIAL RATE: 66 BPM
BASOPHILS # BLD: 0 K/UL (ref 0–0.2)
BASOPHILS NFR BLD: 0 % (ref 0–2)
BILIRUB SERPL-MCNC: 1.2 MG/DL (ref 0.2–1.1)
BNP SERPL-MCNC: 1576 PG/ML
BUN SERPL-MCNC: 25 MG/DL (ref 8–23)
CALCIUM SERPL-MCNC: 8.8 MG/DL (ref 8.3–10.4)
CALCULATED P AXIS, ECG09: 54 DEGREES
CALCULATED R AXIS, ECG10: -63 DEGREES
CALCULATED T AXIS, ECG11: 86 DEGREES
CHLORIDE SERPL-SCNC: 106 MMOL/L (ref 98–107)
CK MB CFR SERPL CALC: 1.6 %
CK MB SERPL-MCNC: 2.1 NG/ML (ref 0.5–3.6)
CK SERPL-CCNC: 129 U/L (ref 21–215)
CO2 SERPL-SCNC: 27 MMOL/L (ref 21–32)
CREAT SERPL-MCNC: 2.25 MG/DL (ref 0.8–1.5)
DIAGNOSIS, 93000: NORMAL
DIFFERENTIAL METHOD BLD: ABNORMAL
EOSINOPHIL # BLD: 0.1 K/UL (ref 0–0.8)
EOSINOPHIL NFR BLD: 1 % (ref 0.5–7.8)
ERYTHROCYTE [DISTWIDTH] IN BLOOD BY AUTOMATED COUNT: 15.2 % (ref 11.9–14.6)
GLOBULIN SER CALC-MCNC: 4.1 G/DL (ref 2.3–3.5)
GLUCOSE SERPL-MCNC: 97 MG/DL (ref 65–100)
HCT VFR BLD AUTO: 45.2 % (ref 41.1–50.3)
HGB BLD-MCNC: 15 G/DL (ref 13.6–17.2)
IMM GRANULOCYTES # BLD: 0 K/UL (ref 0–0.5)
IMM GRANULOCYTES NFR BLD: 0.4 % (ref 0–5)
LYMPHOCYTES # BLD: 2.4 K/UL (ref 0.5–4.6)
LYMPHOCYTES NFR BLD: 31 % (ref 13–44)
MCH RBC QN AUTO: 28.6 PG (ref 26.1–32.9)
MCHC RBC AUTO-ENTMCNC: 33.2 G/DL (ref 31.4–35)
MCV RBC AUTO: 86.1 FL (ref 79.6–97.8)
MONOCYTES # BLD: 0.9 K/UL (ref 0.1–1.3)
MONOCYTES NFR BLD: 12 % (ref 4–12)
NEUTS SEG # BLD: 4.4 K/UL (ref 1.7–8.2)
NEUTS SEG NFR BLD: 56 % (ref 43–78)
P-R INTERVAL, ECG05: 192 MS
PLATELET # BLD AUTO: 158 K/UL (ref 150–450)
PMV BLD AUTO: 10 FL (ref 10.8–14.1)
POTASSIUM SERPL-SCNC: 3.7 MMOL/L (ref 3.5–5.1)
PROT SERPL-MCNC: 7.4 G/DL (ref 6.3–8.2)
Q-T INTERVAL, ECG07: 540 MS
QRS DURATION, ECG06: 120 MS
QTC CALCULATION (BEZET), ECG08: 566 MS
RBC # BLD AUTO: 5.25 M/UL (ref 4.23–5.67)
SODIUM SERPL-SCNC: 140 MMOL/L (ref 136–145)
TROPONIN I SERPL-MCNC: 0.23 NG/ML (ref 0.02–0.05)
VENTRICULAR RATE, ECG03: 66 BPM
WBC # BLD AUTO: 7.9 K/UL (ref 4.3–11.1)

## 2017-09-27 RX ORDER — SODIUM CHLORIDE 0.9 % (FLUSH) 0.9 %
5-10 SYRINGE (ML) INJECTION AS NEEDED
Status: DISCONTINUED | OUTPATIENT
Start: 2017-09-27 | End: 2017-09-29 | Stop reason: HOSPADM

## 2017-09-27 RX ORDER — ATORVASTATIN CALCIUM 10 MG/1
20 TABLET, FILM COATED ORAL
Status: DISCONTINUED | OUTPATIENT
Start: 2017-09-27 | End: 2017-09-29 | Stop reason: HOSPADM

## 2017-09-27 RX ORDER — IPRATROPIUM BROMIDE AND ALBUTEROL SULFATE 2.5; .5 MG/3ML; MG/3ML
3 SOLUTION RESPIRATORY (INHALATION)
Status: DISCONTINUED | OUTPATIENT
Start: 2017-09-28 | End: 2017-09-27 | Stop reason: SDUPTHER

## 2017-09-27 RX ORDER — FUROSEMIDE 10 MG/ML
40 INJECTION INTRAMUSCULAR; INTRAVENOUS 2 TIMES DAILY
Status: DISCONTINUED | OUTPATIENT
Start: 2017-09-28 | End: 2017-09-28

## 2017-09-27 RX ORDER — SODIUM CHLORIDE 0.9 % (FLUSH) 0.9 %
5-10 SYRINGE (ML) INJECTION EVERY 8 HOURS
Status: DISCONTINUED | OUTPATIENT
Start: 2017-09-27 | End: 2017-09-29 | Stop reason: HOSPADM

## 2017-09-27 RX ORDER — ALBUTEROL SULFATE 90 UG/1
2 AEROSOL, METERED RESPIRATORY (INHALATION)
Status: DISCONTINUED | OUTPATIENT
Start: 2017-09-28 | End: 2017-09-28

## 2017-09-27 RX ORDER — ONDANSETRON 2 MG/ML
4 INJECTION INTRAMUSCULAR; INTRAVENOUS
Status: COMPLETED | OUTPATIENT
Start: 2017-09-27 | End: 2017-09-27

## 2017-09-27 RX ORDER — ASPIRIN 81 MG/1
81 TABLET ORAL DAILY
Status: DISCONTINUED | OUTPATIENT
Start: 2017-09-28 | End: 2017-09-29 | Stop reason: HOSPADM

## 2017-09-27 RX ORDER — FUROSEMIDE 10 MG/ML
80 INJECTION INTRAMUSCULAR; INTRAVENOUS
Status: COMPLETED | OUTPATIENT
Start: 2017-09-27 | End: 2017-09-27

## 2017-09-27 RX ORDER — CARVEDILOL 25 MG/1
25 TABLET ORAL 2 TIMES DAILY WITH MEALS
Status: DISCONTINUED | OUTPATIENT
Start: 2017-09-28 | End: 2017-09-29 | Stop reason: HOSPADM

## 2017-09-27 RX ORDER — GUAIFENESIN 100 MG/5ML
324 LIQUID (ML) ORAL
Status: COMPLETED | OUTPATIENT
Start: 2017-09-27 | End: 2017-09-27

## 2017-09-27 RX ORDER — PANTOPRAZOLE SODIUM 40 MG/1
40 TABLET, DELAYED RELEASE ORAL
Status: DISCONTINUED | OUTPATIENT
Start: 2017-09-28 | End: 2017-09-29 | Stop reason: HOSPADM

## 2017-09-27 RX ORDER — HEPARIN SODIUM 5000 [USP'U]/ML
5000 INJECTION, SOLUTION INTRAVENOUS; SUBCUTANEOUS EVERY 12 HOURS
Status: DISCONTINUED | OUTPATIENT
Start: 2017-09-27 | End: 2017-09-29 | Stop reason: HOSPADM

## 2017-09-27 RX ORDER — ALBUTEROL SULFATE 90 UG/1
2 AEROSOL, METERED RESPIRATORY (INHALATION) 4 TIMES DAILY
Status: DISCONTINUED | OUTPATIENT
Start: 2017-09-27 | End: 2017-09-27

## 2017-09-27 RX ADMIN — NITROGLYCERIN 0.5 INCH: 20 OINTMENT TOPICAL at 20:59

## 2017-09-27 RX ADMIN — FUROSEMIDE 80 MG: 10 INJECTION, SOLUTION INTRAMUSCULAR; INTRAVENOUS at 20:58

## 2017-09-27 RX ADMIN — ONDANSETRON 4 MG: 2 INJECTION INTRAMUSCULAR; INTRAVENOUS at 22:40

## 2017-09-27 RX ADMIN — ATORVASTATIN CALCIUM 20 MG: 10 TABLET, FILM COATED ORAL at 22:40

## 2017-09-27 RX ADMIN — HEPARIN SODIUM 5000 UNITS: 5000 INJECTION, SOLUTION INTRAVENOUS; SUBCUTANEOUS at 22:40

## 2017-09-27 RX ADMIN — ASPIRIN 81 MG 324 MG: 81 TABLET ORAL at 20:59

## 2017-09-27 NOTE — ED TRIAGE NOTES
Pt.c/o shortness of breath X 3 days;EMS states pt. Will have moments of regular breathing, then moments where he will breathe 30 times/min. Pt.denies chest pain or hx ashtma. Pt. Has CHF/COPD and bilateral swelling in his feet.

## 2017-09-27 NOTE — ED PROVIDER NOTES
HPI Comments: 68-year-old male with history of COPD, hypertension, CAD with history of previous MI, TAZ, and CHF who presents w/ complaint of worsening shortness of breath over the past 4 days. Patient reports increased swelling to bilateral lower extremities over the past 5 days. Patient denies chest pain, nausea, vomiting, diaphoresis, dizziness, weakness, fever, chills. Patient is a 74 y.o. male presenting with shortness of breath. The history is provided by the patient. No  was used. Shortness of Breath   This is a new problem. The average episode lasts 4 days. The problem occurs continuously. Associated symptoms include orthopnea. Pertinent negatives include no fever, no headaches, no coryza, no rhinorrhea, no sore throat, no swollen glands, no ear pain, no neck pain, no cough, no sputum production, no hemoptysis, no wheezing, no chest pain, no syncope, no vomiting, no abdominal pain and no leg swelling. The treatment provided no relief. Associated medical issues include COPD and CAD.         Past Medical History:   Diagnosis Date    Acute CHF (congestive heart failure) ([address] 4/25/2015    Acute combined systolic and diastolic congestive heart failure ([address] 4/28/2015    Chronic obstructive pulmonary disease (HCC)     De Quervain's tenosynovitis, right 4/28/2015    Dyspnea on exertion 6/28/2015    Elevated serum creatinine 4/25/2015    Elevated troponin 6/28/2015    History of coronary artery disease     MI at 25 yo    History of right knee surgery     cartilage removal    History of shingles     Malignant hypertension 4/25/2015    MI ([facility] infarction[facility])        Past Surgical History:   Procedure Laterality Date    HX HEART CATHETERIZATION  6/29/2015    no intervention    HX KNEE ARTHROSCOPY Right     removal of cartilage         Family History:   Problem Relation Age of Onset    Hypertension Mother     No Known Problems Father        Social History     Social History    Marital status:      Spouse name: N/A    Number of children: N/A    Years of education: N/A     Occupational History    retired      Social History Main Topics    Smoking status: Former Smoker     Packs/day: 0.25     Years: 20.00     Types: Cigarettes     Quit date: 4/5/2015    Smokeless tobacco: Never Used    Alcohol use No    Drug use: No    Sexual activity: Not on file     Other Topics Concern     Service No    Blood Transfusions No     no issues with receiving    Caffeine Concern No    Special Diet No    Exercise No    Seat Belt Yes     Social History Narrative    Lives with his wife         ALLERGIES: Pcn [penicillins]    Review of Systems   Constitutional: Negative for chills and fever. HENT: Negative for congestion, ear pain, facial swelling, rhinorrhea and sore throat. Eyes: Negative for pain, redness and visual disturbance. Respiratory: Positive for shortness of breath. Negative for cough, hemoptysis, sputum production and wheezing. Cardiovascular: Positive for orthopnea. Negative for chest pain, palpitations, leg swelling and syncope. Gastrointestinal: Negative for abdominal pain, constipation, nausea and vomiting. Endocrine: Negative for polydipsia and polyphagia. Genitourinary: Negative for dysuria and hematuria. Musculoskeletal: Negative for back pain, joint swelling, myalgias and neck pain. Skin: Negative for pallor and wound. Neurological: Negative for dizziness, weakness, numbness and headaches. Psychiatric/Behavioral: Negative for agitation and confusion. Vitals:    09/27/17 1911 09/27/17 2058   BP: (!) 147/95 133/82   Pulse: 63 67   Resp: 20    Temp: 97.5 °F (36.4 °C)    SpO2: 93%    Weight: 99.8 kg (220 lb)    Height: 5' 10\" (1.778 m)             Physical Exam   Constitutional: He is oriented to person, place, and time. He appears well-developed and well-nourished. HENT:   Head: Normocephalic.    Mouth/Throat: Oropharynx is clear and moist. No oropharyngeal exudate. Eyes: Conjunctivae and EOM are normal. Pupils are equal, round, and reactive to light. Neck: Normal range of motion. No JVD present. No tracheal deviation present. Cardiovascular: Normal rate, regular rhythm and intact distal pulses. No murmur heard. Radial pulses 2+ and equal bilaterally   Pulmonary/Chest: Effort normal and breath sounds normal. No respiratory distress. He has no wheezes. He exhibits no tenderness. Coarse breath sounds present to bilateral lung bases    Abdominal: Soft. Bowel sounds are normal. He exhibits no distension. There is no tenderness. There is no rebound. Musculoskeletal: Normal range of motion. He exhibits no edema, tenderness or deformity. 2+ pitting edema to bilateral  LEs   Neurological: He is alert and oriented to person, place, and time. No cranial nerve deficit. Coordination normal.   Skin: Skin is warm and dry. Nursing note and vitals reviewed. MDM  Number of Diagnoses or Management Options  Acute on chronic congestive heart failure, unspecified congestive heart failure type [facility]): new and requires workup  NINO (acute kidney injury) [facility]): new and requires workup  SOB (shortness of breath): new and requires workup  Diagnosis management comments:  EF 40-50%  Patient with evidence of volume overload. Patient given Lasix 80 mg IV and 1 inch Nitropaste. Additionally given aspirin 324mg po. EKG with no acute findings. Cards consulted. Recommend hospitalist admission.        Amount and/or Complexity of Data Reviewed  Clinical lab tests: ordered and reviewed  Tests in the radiology section of CPT®: ordered and reviewed  Tests in the medicine section of CPT®: ordered and reviewed  Review and summarize past medical records: yes  Independent visualization of images, tracings, or specimens: yes    Risk of Complications, Morbidity, and/or Mortality  Presenting problems: moderate  Diagnostic procedures: moderate  Management options: moderate    Patient Progress  Patient progress: stable    ED Course   Comment By Time   CXR IMPRESSION: No consolidation. Morro Luciano MD 09/27 2036   Cards consulted. Requests Hospitalist admission.  Jeremy Valdivia MD 09/27 2102       EKG  Date/Time: 9/27/2017 8:09 PM  Performed by: Gm Ramires, [address] [place] [address]  Authorized by: Steive Zhang     ECG reviewed by ED Physician in the absence of a cardiologist: yes    Rate:     ECG rate:  66    ECG rate assessment: normal    Rhythm:     Rhythm: sinus rhythm    QRS:     QRS axis:  Left  ST segments:     ST segments:  Normal  T waves:     T waves: normal    Other findings:     Other findings: LAE

## 2017-09-28 LAB
ANION GAP SERPL CALC-SCNC: 7 MMOL/L (ref 7–16)
ARTERIAL PATENCY WRIST A: POSITIVE
BASE DEFICIT BLDA-SCNC: 2.9 MMOL/L (ref 0–2)
BDY SITE: ABNORMAL
BUN SERPL-MCNC: 34 MG/DL (ref 8–23)
CALCIUM SERPL-MCNC: 8.7 MG/DL (ref 8.3–10.4)
CHLORIDE SERPL-SCNC: 107 MMOL/L (ref 98–107)
CO2 SERPL-SCNC: 27 MMOL/L (ref 21–32)
CREAT SERPL-MCNC: 3.28 MG/DL (ref 0.8–1.5)
GAS FLOW.O2 O2 DELIVERY SYS: 2 L/MIN
GLUCOSE BLD STRIP.AUTO-MCNC: 87 MG/DL (ref 65–100)
GLUCOSE SERPL-MCNC: 70 MG/DL (ref 65–100)
HCO3 BLDA-SCNC: 22 MMOL/L (ref 22–26)
PCO2 BLDA: 37 MMHG (ref 35–45)
PH BLDA: 7.39 [PH] (ref 7.35–7.45)
PO2 BLDA: 86 MMHG (ref 80–105)
POTASSIUM SERPL-SCNC: 3.7 MMOL/L (ref 3.5–5.1)
SERVICE CMNT-IMP: ABNORMAL
SODIUM SERPL-SCNC: 141 MMOL/L (ref 136–145)
TROPONIN I SERPL-MCNC: 0.18 NG/ML (ref 0.02–0.05)
VENTILATION MODE VENT: ABNORMAL

## 2017-09-28 RX ORDER — FUROSEMIDE 10 MG/ML
40 INJECTION INTRAMUSCULAR; INTRAVENOUS 2 TIMES DAILY
Status: DISCONTINUED | OUTPATIENT
Start: 2017-09-28 | End: 2017-09-28

## 2017-09-28 RX ORDER — ONDANSETRON 2 MG/ML
4 INJECTION INTRAMUSCULAR; INTRAVENOUS
Status: DISCONTINUED | OUTPATIENT
Start: 2017-09-28 | End: 2017-09-29 | Stop reason: HOSPADM

## 2017-09-28 RX ADMIN — ONDANSETRON 4 MG: 2 INJECTION INTRAMUSCULAR; INTRAVENOUS at 05:12

## 2017-09-28 RX ADMIN — Medication 10 ML: at 21:59

## 2017-09-28 RX ADMIN — CARVEDILOL 25 MG: 25 TABLET, FILM COATED ORAL at 08:52

## 2017-09-28 RX ADMIN — FUROSEMIDE 40 MG: 10 INJECTION, SOLUTION INTRAMUSCULAR; INTRAVENOUS at 09:08

## 2017-09-28 RX ADMIN — ATORVASTATIN CALCIUM 20 MG: 10 TABLET, FILM COATED ORAL at 21:54

## 2017-09-28 RX ADMIN — FUROSEMIDE 40 MG: 10 INJECTION, SOLUTION INTRAMUSCULAR; INTRAVENOUS at 17:26

## 2017-09-28 RX ADMIN — Medication 10 ML: at 05:12

## 2017-09-28 RX ADMIN — HEPARIN SODIUM 5000 UNITS: 5000 INJECTION, SOLUTION INTRAVENOUS; SUBCUTANEOUS at 21:54

## 2017-09-28 RX ADMIN — HEPARIN SODIUM 5000 UNITS: 5000 INJECTION, SOLUTION INTRAVENOUS; SUBCUTANEOUS at 08:53

## 2017-09-28 RX ADMIN — NITROGLYCERIN 1 INCH: 20 OINTMENT TOPICAL at 08:50

## 2017-09-28 RX ADMIN — PERFLUTREN 1 ML: 6.52 INJECTION, SUSPENSION INTRAVENOUS at 16:18

## 2017-09-28 RX ADMIN — ASPIRIN 81 MG: 81 TABLET, COATED ORAL at 08:52

## 2017-09-28 RX ADMIN — PANTOPRAZOLE SODIUM 40 MG: 40 TABLET, DELAYED RELEASE ORAL at 08:52

## 2017-09-28 NOTE — PROGRESS NOTES
MD came to see patient. She says he looks pretty good right now and that he says he is not short of breath. She ordered an ABG. I asked her if this respiratory rate would be normal for him and she stated that until he got the excess fluid removed that it probably would. I will continue to monitor patient.

## 2017-09-28 NOTE — PROGRESS NOTES
Patient received from ED. Assessment completed and documented. Skin assessment completed. MD orders reviewed. Patient provided with a food tray and is currently resting comfortably with family at bedside. No complaints of pain. Will continue to monitor.

## 2017-09-28 NOTE — PROGRESS NOTES
Hospitalist Progress Note    2017  Admit Date: 2017  7:21 PM   NAME: Renee Chen   :  1945   MRN:  [id number]   Attending: Deni Barrera MD  PCP:  Aron Hussein MD    SUBJECTIVE:     Pt is a 75 yo male with chronic systolic and diastolic HF who presented to ER  with progressive CLAUDIO. Last known EF 40-50% and pt follows with [facility], reports compliance with meds and for the most part compliance with lifestyle modifications. Noted to have BNP > 1500 in ER. Pt started on IV Lasix. Also noted with NINO. This afternoon pt reports already feeling somewhat improved. He denies CP but continues to endorse some CLAUDIO. He is on 1.5 L NC and denies any previous need for 02. Wife at bedside. Review of Systems negative with exception of pertinent positives noted above  PHYSICAL EXAM     Visit Vitals    BP (!) 82/57 (BP 1 Location: Right arm, BP Patient Position: At rest)  Comment: RN notified    Pulse 61    Temp 96 °F (35.6 °C)    Resp 24    Ht 5' 10\" (1.778 m)    Wt 99.8 kg (220 lb)    SpO2 97%    BMI 31.57 kg/m2      Temp (24hrs), Av.2 °F (36.2 °C), Min:96 °F (35.6 °C), Max:97.9 °F (36.6 °C)    Oxygen Therapy  O2 Sat (%): 97 % (17 1159)  Pulse via Oximetry: 72 beats per minute (17 0034)  O2 Device: Room air (17 2231)  O2 Flow Rate (L/min): 2 l/min (17 1911)    Intake/Output Summary (Last 24 hours) at 17 1354  Last data filed at 17 0852   Gross per 24 hour   Intake              350 ml   Output                0 ml   Net              350 ml      General: No acute distress    Lungs:  CTA Bilaterally.    Heart:  Regular rate and rhythm,  No murmur, rub, or gallop  Abdomen: Soft, obese, Non distended, Non tender, Positive bowel sounds  Extremities: No cyanosis, clubbing 1+ pitting edema bilat LEs  Neurologic:  No focal deficits    ASSESSMENT      Active Hospital Problems    Diagnosis Date Noted    CHF (congestive heart failure) ([facility] 2017  COPD (chronic obstructive pulmonary disease) ([facility] 12/27/2015     Complete PFTs: 7/20/15:  Spirometry is consistent with a moderate obstructive/restrictive defect. . The residual volume is increased relative to other lung volumes suggesting air-trapping. The diffusion capacity corrected for alveolar volume was normal suggesting no loss of alveolo-capillary units.  Hypertension     Chronic systolic heart failure (HCC) 12/04/2015     EF 40%      CKD (chronic kidney disease) stage 3, GFR 30-59 ml/min 04/26/2015     A/P:    CHF exacerbation- Cont 40 mg IV Lasix BID, strict I&Os with fluid and salt restriction, daily weights. Wean 02 for sats > 90. Consult CHF educator. Repeat echo (last available to view > 9 months ago). Acute on chronic kidney disease- Cr 2.25, baseline 1.70. Monitor closely. Hypotension- Hold Losartan, monitor. Elevated trop- No CP, EKG without acute findings. Trend x 3.     DC planning- [facility] tomorrow with transition to PO Lasix.       DVT Prophylaxis: Heparin     Signed By: Richard Richardson NP     September 28, 2017      Attending:  Reassess Volume status  Bun/Cr worsening Cr now 3  Hold nephrotoxic agent  DC Lasix for now  Consider

## 2017-09-28 NOTE — PROGRESS NOTES
Patient's breathing noted to be labored and still at 30/min. MD paged to let them know. Dr. Jose Medina said she will come assess the patient and the charge nurse is aware.

## 2017-09-28 NOTE — PROGRESS NOTES
TRANSFER - IN REPORT:    Verbal report received from [facility] on [employer]  being received from ER (unit) for routine progression of care      Report consisted of patients Situation, Background, Assessment and   Recommendations(SBAR). Information from the following report(s) SBAR, Kardex, [facility] and Recent Results was reviewed with the receiving nurse. Opportunity for questions and clarification was provided. Assessment will be completed upon patients arrival to unit and care will be assumed.

## 2017-09-28 NOTE — PROGRESS NOTES
Patient with a MEWS of 3. Patient resting in bed, oxygen in place at 2 LPM via NC. Respirations labored, RR 30. Primary RN to notify MD and monitor.

## 2017-09-28 NOTE — DISCHARGE INSTRUCTIONS
Heart Failure: Care Instructions  Your Care Instructions    Heart failure occurs when your heart does not pump as much blood as the body needs. Failure does not mean that the heart has stopped pumping but rather that it is not pumping as well as it should. Over time, this causes fluid buildup in your lungs and other parts of your body. Fluid buildup can cause shortness of breath, fatigue, swollen ankles, and other problems. By taking medicines regularly, reducing sodium (salt) in your diet, checking your weight every day, and making lifestyle changes, you can feel better and live longer. Follow-up care is a key part of your treatment and safety. Be sure to make and go to all appointments, and call your doctor if you are having problems. It's also a good idea to know your test results and keep a list of the medicines you take. How can you care for yourself at home? Medicines  · Be safe with medicines. Take your medicines exactly as prescribed. Call your doctor if you think you are having a problem with your medicine. · Do not take any vitamins, over-the-counter medicine, or herbal products without talking to your doctor first. Dilshad Alatorre not take ibuprofen (Advil or Motrin) and naproxen (Aleve) without talking to your doctor first. They could make your heart failure worse. · You may be taking some of the following medicine. ¨ Beta-blockers can slow heart rate, decrease blood pressure, and improve your condition. Taking a beta-blocker may lower your chance of needing to be hospitalized. ¨ Angiotensin-converting enzyme inhibitors (ACEIs) reduce the heart's workload, lower blood pressure, and reduce swelling. Taking an ACEI may lower your chance of needing to be hospitalized again. ¨ Angiotensin II receptor blockers (ARBs) work like ACEIs. Your doctor may prescribe them instead of ACEIs. ¨ Diuretics, also called water pills, reduce swelling.   ¨ Potassium supplements replace this important mineral, which is sometimes lost with diuretics. ¨ Aspirin and other blood thinners prevent blood clots, which can cause a stroke or heart attack. You will get more details on the specific medicines your doctor prescribes. Diet  · Your doctor may suggest that you limit sodium to 2,000 milligrams (mg) a day or less. That is less than 1 teaspoon of salt a day, including all the salt you eat in cooking or in packaged foods. People get most of their sodium from processed foods. Fast food and restaurant meals also tend to be very high in sodium. · Ask your doctor how much liquid you can drink each day. You may have to limit liquids. Weight  · Weigh yourself without clothing at the same time each day. Record your weight. Call your doctor if you have a sudden weight gain, such as more than 2 to 3 pounds in a day or 5 pounds in a week. (Your doctor may suggest a different range of weight gain.) A sudden weight gain may mean that your heart failure is getting worse. Activity level  · Start light exercise (if your doctor says it is okay). Even if you can only do a small amount, exercise will help you get stronger, have more energy, and manage your weight and your stress. Walking is an easy way to get exercise. Start out by walking a little more than you did before. Bit by bit, increase the amount you walk. · When you exercise, watch for signs that your heart is working too hard. You are pushing yourself too hard if you cannot talk while you are exercising. If you become short of breath or dizzy or have chest pain, stop, sit down, and rest.  · If you feel \"wiped out\" the day after you exercise, walk slower or for a shorter distance until you can work up to a better pace. · Get enough rest at night. Sleeping with 1 or 2 pillows under your upper body and head may help you breathe easier. Lifestyle changes  · Do not smoke. Smoking can make a heart condition worse.  If you need help quitting, talk to your doctor about stop-smoking programs and medicines. These can increase your chances of quitting for good. Quitting smoking may be the most important step you can take to protect your heart. · Limit alcohol to 2 drinks a day for men and 1 drink a day for women. Too much alcohol can cause health problems. · Avoid getting sick from colds and the flu. Get a pneumococcal vaccine shot. If you have had one before, ask your doctor whether you need another dose. Get a flu shot each year. If you must be around people with colds or the flu, wash your hands often. When should you call for help? Call 911 if you have symptoms of sudden heart failure such as:  · You have severe trouble breathing. · You cough up pink, foamy mucus. · You have a new irregular or rapid heartbeat. Call your doctor now or seek immediate medical care if:  · You have new or increased shortness of breath. · You are dizzy or lightheaded, or you feel like you may faint. · You have sudden weight gain, such as more than 2 to 3 pounds in a day or 5 pounds in a week. (Your doctor may suggest a different range of weight gain.)  · You have increased swelling in your legs, ankles, or feet. · You are suddenly so tired or weak that you cannot do your usual activities. Watch closely for changes in your health, and be sure to contact your doctor if:  · You develop new symptoms. Where can you learn more? Go to http://jaime-jarrell.info/. Enter T658 in the search box to learn more about \"Heart Failure: Care Instructions. \"  Current as of: April 3, 2017  Content Version: 11.3  © 7405-2190 [employer]. Care instructions adapted under license by [employer] (which disclaims liability or warranty for this information). If you have questions about a medical condition or this instruction, always ask your healthcare professional. [address] any warranty or liability for your use of this information.

## 2017-09-28 NOTE — PROGRESS NOTES
Problem: Patient Education: Go to Patient Education Activity  Goal: Patient/Family Education  Nutrition  Reason for assessment: Consult received for CHF nutrition education (9/28)   Assessment:   Diet order(s): Cardiac, 2 L fluid restriction/day  Food/Nutrition Patient History:  Pt presented with dyspnea. Past medical history notable for CHF, COPD, HTN and CKD. Pt states he does not follow any dietary restrictions at home; generally skips breakfast and eats when he wants; eats at fast foods ~ 5 X week. Wife does meal preparation and does not cook with salt. Noted patient and wife had received CHF dietary education on 4/2015. Anthropometrics:Height: 5' 10\" (177.8 cm),  Weight: 99.8 kg (220 lb), Weight Source: Estimated, Body mass index is 31.57 kg/(m^2). BMI class of overweight. Extremities:  Clubbing 1+ pitting edema to bilateral LE's. On lasix  Macronutrient needs:  EER:  6575-7658 kcal /day (18-22 kcal/kg BW)  EPR:  ~ 60 grams protein/day (0.8 grams/kg IBW) GFR 24  Intake/Comparative Standards: Per RD meal rounds: lunch meal ~ 50%-still eating. No recorded intake; insufficient data to determine % estimated kcal and protein needs met. Nutrition Diagnosis: Food and nutrition related knowledge deficit r/t possible lack of understanding of previous diet education as evidenced by verbalizes inaccurate information; follows no dietary modifications at home. Intervention:  1. Meals and snacks: Continue current diet. 2.  Education:  Provided patient and wife verbal and written information \"Heart Failure and Sodium\", \"Heart Failure and Fluids\",  \"Acceptable Seasonings\" and \"Dash Diet Meal Plan\". Discussed food purchasing, food preparation, and avoidance of convenience items high in sodium. Encouraged patient to eat 3 meals / day; discouraged eating at fast food restaurants and reviewed how to count fluids. Wife verbalized understanding; pt states he's not sure he likes what I'm saying.   Anticipate fair compliance.   3.  Coordination of Nutrition Care:  Interdisciplinary Rounds     Meghana Guajardo, [address] [place] [address], [address] [place] [address], MPH  [phone]

## 2017-09-28 NOTE — ED NOTES
Pt.has 2+ pitting edema in bilateral feet. Pt. C/o needing pain medication to\" put him to sleep. \"Pt. Advised that we do not give medication, to sedate one to sleep.

## 2017-09-28 NOTE — PROGRESS NOTES
Problem: Falls - Risk of  Goal: *Absence of Falls  Document Sue Fall Risk and appropriate interventions in the flowsheet.   Outcome: Progressing Towards Goal  Fall Risk Interventions:  Mobility Interventions: Patient to call before getting OOB                 Elimination Interventions: Call light in reach     History of Falls Interventions: Bed/chair exit alarm

## 2017-09-28 NOTE — ED NOTES
Verbal report given to Tai Paige RN for continuation of care. Opportunity for questions and clarifications given. Pt in no acute distress. VS stable. Family at bedside.

## 2017-09-28 NOTE — H&P
Hospitalist H&P Note     Admit Date:  2017  7:21 PM   Name:  Nader Langston   Age:  74 y.o.  :  1945   MRN:  [id number]   PCP:  Madison Herrera MD  Treatment Team: Attending Provider: Bhavin Colvin MD; Primary Nurse: Lindsey Jacques RN    HPI:     Chandan Denson is a 75 yo male who presented with dyspnea on a background of systolic and diastolic CHF and HTN. He reports a 4 day history of dyspnea (present on both rest and exertion). He denies any chest pain. He reports a cough (dry cough). He has a history of heart failure. He denies missing any of his home BP medications. He does not weigh himself daily at home (wife suspects weight gain of 5-10 pounds). He has fast food about 5 times/week. He denies the following: fever, chills, nausea or vomiting. 10 systems reviewed and negative except as noted in HPI.   Past Medical History:   Diagnosis Date    Acute CHF (congestive heart failure) ([address] 2015    Acute combined systolic and diastolic congestive heart failure ([facility] 2015    Chronic obstructive pulmonary disease (HCC)     De Quervain's tenosynovitis, right 2015    Dyspnea on exertion 2015    Elevated serum creatinine 2015    Elevated troponin 2015    History of coronary artery disease     MI at 27 yo    History of right knee surgery     cartilage removal    History of shingles     Malignant hypertension 2015    MI [facility] infarction[facility])       Past Surgical History:   Procedure Laterality Date    HX HEART CATHETERIZATION  2015    no intervention    HX KNEE ARTHROSCOPY Right     removal of cartilage      Allergies   Allergen Reactions    Pcn [Penicillins] Other (comments)     \"makes my heart stop\"      Social History   Substance Use Topics    Smoking status: Former Smoker     Packs/day: 0.25     Years: 20.00     Types: Cigarettes     Quit date: 2015    Smokeless tobacco: Never Used    Alcohol use No      Family History Problem Relation Age of Onset    Hypertension Mother     No Known Problems Father       Immunization History   Administered Date(s) Administered    Influenza Vaccine 09/28/2016    Pneumococcal Polysaccharide (PPSV-23) 09/28/2016     PTA Medications:  Prior to Admission Medications   Prescriptions Last Dose Informant Patient Reported? Taking? albuterol (VENTOLIN HFA) 90 mcg/actuation inhaler   No No   Sig: Take 2 Puffs by inhalation four (4) times daily. albuterol-ipratropium (DUO-NEB) 2.5 mg-0.5 mg/3 ml nebu   No No   Sig: 3 mL by Nebulization route four (4) times daily. allopurinol (ZYLOPRIM) 100 mg tablet   No No   Sig: Take 1 Tab by mouth daily. aspirin delayed-release 81 mg tablet   No No   Sig: Take 1 Tab by mouth daily. atorvastatin (LIPITOR) 20 mg tablet   No No   Sig: Take 1 Tab by mouth nightly. carvedilol (COREG) 25 mg tablet   Yes No   Sig: Take 25 mg by mouth two (2) times daily (with meals). diclofenac EC (VOLTAREN) 75 mg EC tablet   No No   Sig: Take 1 Tab by mouth two (2) times a day. furosemide (LASIX) 40 mg tablet   No No   Sig: Take 2 Tabs by mouth two (2) times a day. Patient taking differently: Take 80 mg by mouth two (2) times daily as needed. losartan (COZAAR) 50 mg tablet   No No   Sig: Take 1 Tab by mouth daily. oxyCODONE IR (ROXICODONE) 5 mg immediate release tablet   No No   Sig: Take 1 Tab by mouth every six (6) hours as needed for Pain. Max Daily Amount: 20 mg.   pantoprazole (PROTONIX) 40 mg tablet   No No   Sig: Take 1 Tab by mouth Daily (before breakfast).       Facility-Administered Medications: None       Objective:   Patient Vitals for the past 24 hrs:   Temp Pulse Resp BP SpO2   09/27/17 2058 - 67 - 133/82 -   09/27/17 1911 97.5 °F (36.4 °C) 63 20 (!) 147/95 93 %     Oxygen Therapy  O2 Sat (%): 93 % (09/27/17 1911)  O2 Device: Nasal cannula (09/27/17 1911)  O2 Flow Rate (L/min): 2 l/min (09/27/17 1911)  No intake or output data in the 24 hours ending 09/27/17 2145    Physical Exam:  General:    Well nourished. Alert. Eyes:   Normal sclera. Extraocular movements intact. ENT:  Normocephalic, atraumatic. Moist mucous membranes  CV:   RRR. No murmur, rub, or gallop. Lungs:  CTAB. No wheezing, rhonchi, or rales. Abdomen: Soft, nontender, nondistended. Bowel sounds normal.   Extremities: Warm and dry. No cyanosis, +1 pitting edema bilaterally  Neurologic: CN II-XII grossly intact. Sensation intact. Skin:     No rashes or jaundice. Psych:  Normal mood and affect. I reviewed the labs, imaging, EKGs, telemetry, and other studies done this admission. Data Review:   Recent Results (from the past 24 hour(s))   CK-MB,QT    Collection Time: 09/27/17  7:19 PM   Result Value Ref Range    CK - MB 2.1 0.5 - 3.6 ng/ml    CK-MB Index 1.6 %   CK    Collection Time: 09/27/17  7:19 PM   Result Value Ref Range     21 - 215 U/L   CBC WITH AUTOMATED DIFF    Collection Time: 09/27/17  7:20 PM   Result Value Ref Range    WBC 7.9 4.3 - 11.1 K/uL    RBC 5.25 4.23 - 5.67 M/uL    HGB 15.0 13.6 - 17.2 g/dL    HCT 45.2 41.1 - 50.3 %    MCV 86.1 79.6 - 97.8 FL    MCH 28.6 26.1 - 32.9 PG    MCHC 33.2 31.4 - 35.0 g/dL    RDW 15.2 (H) 11.9 - 14.6 %    PLATELET 745 868 - 172 K/uL    MPV 10.0 (L) 10.8 - 14.1 FL    DF AUTOMATED      NEUTROPHILS 56 43 - 78 %    LYMPHOCYTES 31 13 - 44 %    MONOCYTES 12 4.0 - 12.0 %    EOSINOPHILS 1 0.5 - 7.8 %    BASOPHILS 0 0.0 - 2.0 %    IMMATURE GRANULOCYTES 0.4 0.0 - 5.0 %    ABS. NEUTROPHILS 4.4 1.7 - 8.2 K/UL    ABS. LYMPHOCYTES 2.4 0.5 - 4.6 K/UL    ABS. MONOCYTES 0.9 0.1 - 1.3 K/UL    ABS. EOSINOPHILS 0.1 0.0 - 0.8 K/UL    ABS. BASOPHILS 0.0 0.0 - 0.2 K/UL    ABS. IMM.  GRANS. 0.0 0.0 - 0.5 K/UL   METABOLIC PANEL, COMPREHENSIVE    Collection Time: 09/27/17  7:20 PM   Result Value Ref Range    Sodium 140 136 - 145 mmol/L    Potassium 3.7 3.5 - 5.1 mmol/L    Chloride 106 98 - 107 mmol/L    CO2 27 21 - 32 mmol/L    Anion gap 7 7 - 16 mmol/L    Glucose 97 65 - 100 mg/dL    BUN 25 (H) 8 - 23 MG/DL    Creatinine 2.25 (H) 0.8 - 1.5 MG/DL    GFR est AA 37 (L) >60 ml/min/1.73m2    GFR est non-AA 30 (L) >60 ml/min/1.73m2    Calcium 8.8 8.3 - 10.4 MG/DL    Bilirubin, total 1.2 (H) 0.2 - 1.1 MG/DL    ALT (SGPT) 68 (H) 12 - 65 U/L    AST (SGOT) 56 (H) 15 - 37 U/L    Alk. phosphatase 104 50 - 136 U/L    Protein, total 7.4 6.3 - 8.2 g/dL    Albumin 3.3 3.2 - 4.6 g/dL    Globulin 4.1 (H) 2.3 - 3.5 g/dL    A-G Ratio 0.8 (L) 1.2 - 3.5     TROPONIN I    Collection Time: 09/27/17  7:20 PM   Result Value Ref Range    Troponin-I, Qt. 0.23 (HH) 0.02 - 0.05 NG/ML   BNP    Collection Time: 09/27/17  7:20 PM   Result Value Ref Range    BNP 1576 pg/mL   EKG, 12 LEAD, INITIAL    Collection Time: 09/27/17  8:03 PM   Result Value Ref Range    Ventricular Rate 66 BPM    Atrial Rate 66 BPM    P-R Interval 192 ms    QRS Duration 120 ms    Q-T Interval 540 ms    QTC Calculation (Bezet) 566 ms    Calculated P Axis 54 degrees    Calculated R Axis -63 degrees    Calculated T Axis 86 degrees    Diagnosis       Normal sinus rhythm  Possible Left atrial enlargement  Left anterior fascicular block  Non-specific intra-ventricular conduction delay  Abnormal ECG  When compared with ECG of 28-DEC-2016 16:38,  Questionable change in QRS duration  Confirmed by STEPHANIE NELSON (), Alexis Marques ([id number]) on 9/27/2017 8:48:08 PM         All Micro Results     None          Other Studies:  Xr Chest Port    Result Date: 9/27/2017  PORTABLE CHEST X-RAY HISTORY: Shortness of breath x4 days. COPD. COMPARISON: April 4, 2017 FINDINGS: EKG leads are present. The cardiac silhouette is prominent. There is no lobar consolidation or large pleural effusions. IMPRESSION: No consolidation.       Assessment and Plan:     Hospital Problems as of 9/27/2017  Date Reviewed: 7/11/2017    None          PLAN:  Acute CHF exacerbation  ECHO: EF 40-50%  BNP 1576  Plan  Continue lasix 80 IV BID  Daily weights  Dietary consult    HTN  Home regimen: coreg 25 bid, losartan 50mg daily  Plan  Continue home regimen     DVT ppx:  heparin  Anticipated DC needs:  Home in 48 hours  Code status:  Full  Estimated LOS:  Greater than 2 midnights  Risk:  high    Signed:  Brain Rounds, MD

## 2017-09-28 NOTE — ED NOTES
Report received from Erica Torres, [address] [place] [address] for continuation of care. Assumed pt care at this time.

## 2017-09-28 NOTE — PROGRESS NOTES
AM Assessment. Crackles and diminished breath sounds noted. Respirations even and unlabored. Pt. Reports dyspneic with exertion. Wife at bedside, call light within reach.

## 2017-09-28 NOTE — PROGRESS NOTES
Primary Nurse Aleisha Baig RN and Sandrita Marcus RN performed a dual skin assessment on this patient Impairment noted- see assessment.   Jg score is 21

## 2017-09-29 ENCOUNTER — APPOINTMENT (OUTPATIENT)
Dept: ULTRASOUND IMAGING | Age: 72
DRG: 291 | End: 2017-09-29
Payer: MEDICARE

## 2017-09-29 ENCOUNTER — HOSPITAL ENCOUNTER (INPATIENT)
Age: 72
LOS: 4 days | Discharge: HOME HEALTH | DRG: 291 | End: 2017-10-03
Attending: INTERNAL MEDICINE | Admitting: INTERNAL MEDICINE
Payer: MEDICARE

## 2017-09-29 VITALS
HEIGHT: 70 IN | OXYGEN SATURATION: 98 % | RESPIRATION RATE: 18 BRPM | SYSTOLIC BLOOD PRESSURE: 114 MMHG | HEART RATE: 62 BPM | TEMPERATURE: 97.6 F | DIASTOLIC BLOOD PRESSURE: 75 MMHG | WEIGHT: 225.2 LBS | BODY MASS INDEX: 32.24 KG/M2

## 2017-09-29 DIAGNOSIS — J44.9 CHRONIC OBSTRUCTIVE PULMONARY DISEASE, UNSPECIFIED COPD TYPE (HCC): ICD-10-CM

## 2017-09-29 DIAGNOSIS — Z72.0 TOBACCO ABUSE: Chronic | ICD-10-CM

## 2017-09-29 DIAGNOSIS — G47.33 OSA (OBSTRUCTIVE SLEEP APNEA): ICD-10-CM

## 2017-09-29 DIAGNOSIS — I50.23 ACUTE ON CHRONIC SYSTOLIC CONGESTIVE HEART FAILURE (HCC): ICD-10-CM

## 2017-09-29 DIAGNOSIS — N17.9 AKI (ACUTE KIDNEY INJURY) (HCC): ICD-10-CM

## 2017-09-29 PROBLEM — R11.10 VOMITING: Status: ACTIVE | Noted: 2017-09-29

## 2017-09-29 LAB
ANION GAP SERPL CALC-SCNC: 10 MMOL/L (ref 7–16)
BNP SERPL-MCNC: 1283 PG/ML
BUN SERPL-MCNC: 34 MG/DL (ref 8–23)
CALCIUM SERPL-MCNC: 8.4 MG/DL (ref 8.3–10.4)
CHLORIDE SERPL-SCNC: 106 MMOL/L (ref 98–107)
CO2 SERPL-SCNC: 26 MMOL/L (ref 21–32)
CREAT SERPL-MCNC: 3.51 MG/DL (ref 0.8–1.5)
GLUCOSE SERPL-MCNC: 80 MG/DL (ref 65–100)
MAGNESIUM SERPL-MCNC: 1.8 MG/DL (ref 1.8–2.4)
POTASSIUM SERPL-SCNC: 3.4 MMOL/L (ref 3.5–5.1)
SODIUM SERPL-SCNC: 142 MMOL/L (ref 136–145)

## 2017-09-29 PROCEDURE — 94760 N-INVAS EAR/PLS OXIMETRY 1: CPT

## 2017-09-29 PROCEDURE — 74011250636 HC RX REV CODE- 250/636: Performed by: NURSE PRACTITIONER

## 2017-09-29 PROCEDURE — 94640 AIRWAY INHALATION TREATMENT: CPT

## 2017-09-29 PROCEDURE — 74011250637 HC RX REV CODE- 250/637: Performed by: PHYSICIAN ASSISTANT

## 2017-09-29 PROCEDURE — 36415 COLL VENOUS BLD VENIPUNCTURE: CPT | Performed by: INTERNAL MEDICINE

## 2017-09-29 PROCEDURE — 74011000250 HC RX REV CODE- 250: Performed by: PHYSICIAN ASSISTANT

## 2017-09-29 PROCEDURE — 83883 ASSAY NEPHELOMETRY NOT SPEC: CPT | Performed by: INTERNAL MEDICINE

## 2017-09-29 PROCEDURE — 65660000000 HC RM CCU STEPDOWN

## 2017-09-29 PROCEDURE — 84165 PROTEIN E-PHORESIS SERUM: CPT | Performed by: INTERNAL MEDICINE

## 2017-09-29 PROCEDURE — 76775 US EXAM ABDO BACK WALL LIM: CPT

## 2017-09-29 PROCEDURE — 86334 IMMUNOFIX E-PHORESIS SERUM: CPT | Performed by: INTERNAL MEDICINE

## 2017-09-29 RX ORDER — ATORVASTATIN CALCIUM 10 MG/1
20 TABLET, FILM COATED ORAL
Status: DISCONTINUED | OUTPATIENT
Start: 2017-09-29 | End: 2017-10-03 | Stop reason: HOSPADM

## 2017-09-29 RX ORDER — ALLOPURINOL 100 MG/1
100 TABLET ORAL DAILY
Status: DISCONTINUED | OUTPATIENT
Start: 2017-09-30 | End: 2017-10-03 | Stop reason: HOSPADM

## 2017-09-29 RX ORDER — ONDANSETRON 2 MG/ML
4 INJECTION INTRAMUSCULAR; INTRAVENOUS
Status: CANCELLED | OUTPATIENT
Start: 2017-09-29

## 2017-09-29 RX ORDER — HEPARIN SODIUM 5000 [USP'U]/ML
5000 INJECTION, SOLUTION INTRAVENOUS; SUBCUTANEOUS EVERY 12 HOURS
Status: CANCELLED | OUTPATIENT
Start: 2017-09-29

## 2017-09-29 RX ORDER — PANTOPRAZOLE SODIUM 40 MG/1
40 TABLET, DELAYED RELEASE ORAL
Status: DISCONTINUED | OUTPATIENT
Start: 2017-09-30 | End: 2017-10-03 | Stop reason: HOSPADM

## 2017-09-29 RX ORDER — HYDROCODONE BITARTRATE AND ACETAMINOPHEN 5; 325 MG/1; MG/1
1 TABLET ORAL
Status: DISCONTINUED | OUTPATIENT
Start: 2017-09-29 | End: 2017-10-03 | Stop reason: HOSPADM

## 2017-09-29 RX ORDER — PANTOPRAZOLE SODIUM 40 MG/1
40 TABLET, DELAYED RELEASE ORAL
Status: CANCELLED | OUTPATIENT
Start: 2017-09-30

## 2017-09-29 RX ORDER — SODIUM CHLORIDE 0.9 % (FLUSH) 0.9 %
5-10 SYRINGE (ML) INJECTION AS NEEDED
Status: CANCELLED | OUTPATIENT
Start: 2017-09-29

## 2017-09-29 RX ORDER — ASPIRIN 81 MG/1
81 TABLET ORAL DAILY
Status: DISCONTINUED | OUTPATIENT
Start: 2017-09-30 | End: 2017-10-03 | Stop reason: HOSPADM

## 2017-09-29 RX ORDER — SODIUM CHLORIDE 0.9 % (FLUSH) 0.9 %
5-10 SYRINGE (ML) INJECTION EVERY 8 HOURS
Status: CANCELLED | OUTPATIENT
Start: 2017-09-29

## 2017-09-29 RX ORDER — MORPHINE SULFATE 2 MG/ML
2 INJECTION, SOLUTION INTRAMUSCULAR; INTRAVENOUS
Status: DISCONTINUED | OUTPATIENT
Start: 2017-09-29 | End: 2017-10-03 | Stop reason: HOSPADM

## 2017-09-29 RX ORDER — IPRATROPIUM BROMIDE AND ALBUTEROL SULFATE 2.5; .5 MG/3ML; MG/3ML
3 SOLUTION RESPIRATORY (INHALATION)
Status: DISCONTINUED | OUTPATIENT
Start: 2017-09-29 | End: 2017-10-01

## 2017-09-29 RX ORDER — CARVEDILOL 12.5 MG/1
12.5 TABLET ORAL 2 TIMES DAILY WITH MEALS
Status: DISCONTINUED | OUTPATIENT
Start: 2017-09-29 | End: 2017-10-03 | Stop reason: HOSPADM

## 2017-09-29 RX ORDER — ATORVASTATIN CALCIUM 10 MG/1
20 TABLET, FILM COATED ORAL
Status: CANCELLED | OUTPATIENT
Start: 2017-09-29

## 2017-09-29 RX ORDER — CARVEDILOL 25 MG/1
25 TABLET ORAL 2 TIMES DAILY WITH MEALS
Status: CANCELLED | OUTPATIENT
Start: 2017-09-29

## 2017-09-29 RX ORDER — ONDANSETRON 2 MG/ML
4 INJECTION INTRAMUSCULAR; INTRAVENOUS
Status: DISCONTINUED | OUTPATIENT
Start: 2017-09-29 | End: 2017-10-03 | Stop reason: HOSPADM

## 2017-09-29 RX ORDER — ACETAMINOPHEN 325 MG/1
650 TABLET ORAL
Status: DISCONTINUED | OUTPATIENT
Start: 2017-09-29 | End: 2017-10-03 | Stop reason: HOSPADM

## 2017-09-29 RX ORDER — ALBUTEROL SULFATE 90 UG/1
2 AEROSOL, METERED RESPIRATORY (INHALATION)
Status: DISCONTINUED | OUTPATIENT
Start: 2017-09-29 | End: 2017-10-03 | Stop reason: HOSPADM

## 2017-09-29 RX ORDER — NITROGLYCERIN 0.4 MG/1
0.4 TABLET SUBLINGUAL
Status: DISCONTINUED | OUTPATIENT
Start: 2017-09-29 | End: 2017-10-03 | Stop reason: HOSPADM

## 2017-09-29 RX ORDER — ASPIRIN 81 MG/1
81 TABLET ORAL DAILY
Status: CANCELLED | OUTPATIENT
Start: 2017-09-30

## 2017-09-29 RX ORDER — FUROSEMIDE 10 MG/ML
40 INJECTION INTRAMUSCULAR; INTRAVENOUS 2 TIMES DAILY
Status: DISCONTINUED | OUTPATIENT
Start: 2017-09-29 | End: 2017-10-01

## 2017-09-29 RX ADMIN — FUROSEMIDE 40 MG: 10 INJECTION, SOLUTION INTRAMUSCULAR; INTRAVENOUS at 19:13

## 2017-09-29 RX ADMIN — IPRATROPIUM BROMIDE AND ALBUTEROL SULFATE 3 ML: .5; 3 SOLUTION RESPIRATORY (INHALATION) at 15:52

## 2017-09-29 RX ADMIN — ATORVASTATIN CALCIUM 20 MG: 10 TABLET, FILM COATED ORAL at 21:00

## 2017-09-29 RX ADMIN — ASPIRIN 81 MG: 81 TABLET, COATED ORAL at 08:58

## 2017-09-29 RX ADMIN — CARVEDILOL 12.5 MG: 12.5 TABLET, FILM COATED ORAL at 16:28

## 2017-09-29 RX ADMIN — IPRATROPIUM BROMIDE AND ALBUTEROL SULFATE 3 ML: .5; 3 SOLUTION RESPIRATORY (INHALATION) at 22:45

## 2017-09-29 RX ADMIN — Medication 10 ML: at 05:42

## 2017-09-29 RX ADMIN — CARVEDILOL 25 MG: 25 TABLET, FILM COATED ORAL at 08:58

## 2017-09-29 RX ADMIN — HEPARIN SODIUM 5000 UNITS: 5000 INJECTION, SOLUTION INTRAVENOUS; SUBCUTANEOUS at 09:00

## 2017-09-29 RX ADMIN — PANTOPRAZOLE SODIUM 40 MG: 40 TABLET, DELAYED RELEASE ORAL at 08:58

## 2017-09-29 RX ADMIN — ONDANSETRON 4 MG: 2 INJECTION INTRAMUSCULAR; INTRAVENOUS at 08:56

## 2017-09-29 NOTE — IP AVS SNAPSHOT
[address] 
[phone] Patient: Miriam Molina MRN: [id number] SACHI:[id number] Current Discharge Medication List  
  
START taking these medications Dose & Instructions Dispensing Information Comments Morning Noon Evening Bedtime  
 fluticasone-salmeterol 250-50 mcg/dose diskus inhaler Commonly known as:  ADVAIR Notes to Patient:  As directed Dose:  1 Puff Take 1 Puff by inhalation every twelve (12) hours. Quantity:  1 Inhaler Refills:  3  
     
   
   
   
  
 isosorbide-hydrALAZINE 20-37.5 mg per tablet Commonly known as:  BIDIL Your next dose is: Today at dinner Dose:  1 Tab Take 1 Tab by mouth two (2) times a day. Quantity:  60 Tab Refills:  6  
     
   
   
   
  
 tiotropium 18 mcg inhalation capsule Commonly known as:  Luis Nettles Notes to Patient:  As directed Dose:  1 Cap Take 1 Cap by inhalation daily. Quantity:  30 Cap Refills:  6 CONTINUE these medications which have CHANGED Dose & Instructions Dispensing Information Comments Morning Noon Evening Bedtime  
 furosemide 40 mg tablet Commonly known as:  LASIX What changed:   
- how much to take - when to take this Your next dose is: Today at dinner Dose:  40 mg Take 1 Tab by mouth Before breakfast and dinner. Quantity:  60 Tab Refills:  3 CONTINUE these medications which have NOT CHANGED Dose & Instructions Dispensing Information Comments Morning Noon Evening Bedtime  
 albuterol 90 mcg/actuation inhaler Commonly known as:  VENTOLIN HFA Notes to Patient:  As directed Dose:  2 Puff Take 2 Puffs by inhalation four (4) times daily. Quantity:  1 Inhaler Refills:  11  
     
   
   
   
  
 albuterol-ipratropium 2.5 mg-0.5 mg/3 ml Nebu Commonly known as:  Bouchra Abbasi Notes to Patient:  As directed Dose:  3 mL  
3 mL by Nebulization route four (4) times daily. Quantity:  360 mL Refills:  0 Patient must make follow appointment with our office if they want further refills. allopurinol 100 mg tablet Commonly known as:  Imtiaz Alexander Your next dose is: Wednesday morning Dose:  100 mg Take 1 Tab by mouth daily. Quantity:  30 Tab Refills:  11  
     
   
   
   
  
 aspirin delayed-release 81 mg tablet Your next dose is: Wednesday morning Dose:  81 mg Take 1 Tab by mouth daily. Quantity:  30 Tab Refills:  0  
     
   
   
   
  
 atorvastatin 20 mg tablet Commonly known as:  LIPITOR Your next dose is: Tonight before bedtime Dose:  20 mg Take 1 Tab by mouth nightly. Quantity:  90 Tab Refills:  3  
     
   
   
   
  
  
 carvedilol 25 mg tablet Commonly known as:  Amanda Asher Your next dose is: Tonight at dinner Dose:  25 mg Take 25 mg by mouth two (2) times daily (with meals). Refills:  0  
     
   
   
  
   
  
 diclofenac EC 75 mg EC tablet Commonly known as:  VOLTAREN Your next dose is: Today at dinner Dose:  75 mg Take 1 Tab by mouth two (2) times a day. Quantity:  10 Tab Refills:  0  
     
   
   
  
   
  
 oxyCODONE IR 5 mg immediate release tablet Commonly known as:  Lisandra Weaver Notes to Patient:  As needed for pain Dose:  5 mg Take 1 Tab by mouth every six (6) hours as needed for Pain. Max Daily Amount: 20 mg.  
 Quantity:  11 Tab Refills:  0  
     
   
   
   
  
 pantoprazole 40 mg tablet Commonly known as:  PROTONIX Your next dose is: Wednesday morning Dose:  40 mg Take 1 Tab by mouth Daily (before breakfast). Quantity:  90 Tab Refills:  3 STOP taking these medications   
 losartan 50 mg tablet Commonly known as:  COZAAR Notes to Patient:  STOP TAKING THIS MEDICATION ! ! !  
   
  
  
  
Where to Get Your Medications Information on where to get these meds will be given to you by the nurse or doctor. ! Ask your nurse or doctor about these medications  
  fluticasone-salmeterol 250-50 mcg/dose diskus inhaler  
 furosemide 40 mg tablet  
 isosorbide-hydrALAZINE 20-37.5 mg per tablet  
 tiotropium 18 mcg inhalation capsule

## 2017-09-29 NOTE — PROGRESS NOTES
Bedside and Verbal shift change report given to self (oncoming nurse) by Vipul Aleman RN (offgoing nurse). Report included the following information SBAR, Kardex, MAR and Recent Results.

## 2017-09-29 NOTE — PROGRESS NOTES
AM Assessment. Pt. A/O X4. Crackles and diminished breath sounds noted. Respirations even and unlabored. Pt. Reports dyspneic with exertion. Wife at bedside, call light within reach.

## 2017-09-29 NOTE — PROGRESS NOTES
Patient admitted to floor. Assessed. Placed on monitor. Discussed POC. Verbalized understanding. Oriented to room. Call light within reach. Instructed to call for assist.  Family at bedside. Dual admission skin assessment completed. Small healing cut noted to left big toe. Sacrum intact. No other abnormalities noted.

## 2017-09-29 NOTE — CONSULTS
[facility] CONSULT [facility]    We were asked to see the patient at the request of Dr. Betty Aaron for evaluation of NINO on CKD    Admission Date:  9/29/2017    Admission Diagnosis:  chronic systolic and diastolic heart failure, nino  Acute on chronic systolic (congestive) heart failure (HCC)    History of Present Illness:    Patient is a 74year old male patient admitted with shortness of breath. He has known CHF with EF around 40% and was admitted to [facility]OS[facility] [address] on 9/27 for diuresis. He received IV Lasix and Cr increased from 2.25 on admission to 3.5 today. He reports great UOP. Currently, he denies CP or SOB. He is on room air. He reports knowledge of CKD, but no prior visit with Nephrology. He reports being told that his CKD was due to his heart function. No urine studies or prior renal ultrasound. He denies NSAID use, but BID Diclofenac is listed on his PTA medications. He was on ARB PTA. He became hypotensive at HOSP[facility] [address] and his ARB held. Currently, he denies complaints.     Past Medical History:   Diagnosis Date    Acute CHF (congestive heart failure) ([address] 4/25/2015    Acute combined systolic and diastolic congestive heart failure ([address] 4/28/2015    Chronic obstructive pulmonary disease (HCC)     De Quervain's tenosynovitis, right 4/28/2015    Dyspnea on exertion 6/28/2015    Elevated serum creatinine 4/25/2015    Elevated troponin 6/28/2015    History of coronary artery disease     MI at 25 yo    History of right knee surgery     cartilage removal    History of shingles     Malignant hypertension 4/25/2015    [facility])       Past Surgical History:   Procedure Laterality Date    HX HEART CATHETERIZATION  6/29/2015    no intervention    HX KNEE ARTHROSCOPY Right     removal of cartilage      Current Facility-Administered Medications   Medication Dose Route Frequency    albuterol (PROVENTIL HFA, VENTOLIN HFA, PROAIR HFA) inhaler 2 Puff  2 Puff Inhalation Q6H PRN    [START ON 9/30/2017] allopurinol (ZYLOPRIM) tablet 100 mg  100 mg Oral DAILY    [START ON 9/30/2017] aspirin delayed-release tablet 81 mg  81 mg Oral DAILY    atorvastatin (LIPITOR) tablet 20 mg  20 mg Oral QHS    carvedilol (COREG) tablet 12.5 mg  12.5 mg Oral BID WITH MEALS    [START ON 9/30/2017] pantoprazole (PROTONIX) tablet 40 mg  40 mg Oral ACB    albuterol-ipratropium (DUO-NEB) 2.5 MG-0.5 MG/3 ML  3 mL Nebulization Q6H RT    nitroglycerin (NITROSTAT) tablet 0.4 mg  0.4 mg SubLINGual Q5MIN PRN    HYDROcodone-acetaminophen (NORCO) 5-325 mg per tablet 1 Tab  1 Tab Oral Q6H PRN    morphine injection 2 mg  2 mg IntraVENous Q4H PRN    acetaminophen (TYLENOL) tablet 650 mg  650 mg Oral Q4H PRN    ondansetron (ZOFRAN) injection 4 mg  4 mg IntraVENous Q4H PRN    furosemide (LASIX) injection 40 mg  40 mg IntraVENous BID     Allergies   Allergen Reactions    Pcn [Penicillins] Other (comments)     \"makes my heart stop\"      Social History   Substance Use Topics    Smoking status: Former Smoker     Packs/day: 0.25     Years: 20.00     Types: Cigarettes     Quit date: 4/5/2015    Smokeless tobacco: Never Used    Alcohol use No      Family History   Problem Relation Age of Onset    Hypertension Mother     No Known Problems Father         Review of Systems:  ROS as outlined in HPI. Improved SOB. No CP. Minimal edema. Good UOP.     Objective:  Vitals:    09/29/17 1532 09/29/17 1557   BP: 154/75    Pulse: 61    Resp: 18    Temp: 97.1 °F (36.2 °C)    SpO2: 100% 96%   Weight: 102.1 kg (225 lb)    Height: 5' 10\" (1.778 m)      No intake or output data in the 24 hours ending 09/29/17 1808  Wt Readings from Last 3 Encounters:   09/29/17 102.1 kg (225 lb)   09/29/17 102.2 kg (225 lb 3.2 oz)   09/05/17 97.5 kg (215 lb)       GEN - in no distress, alert and oriented  Neck - no JVD  CV - S1, S2, no rub  Lung - coarse  Chest wall - normal appearance  Abd - soft, nontender  Ext - 1+BLE edema  Neurologic - nonfocal; poor historian  Genitourinary - bladder nonpalpable      Data Review:     Recent Labs      09/27/17 1920   WBC  7.9   HGB  15.0   HCT  45.2   PLT  158        Recent Labs      09/29/17   0559  09/28/17   1442  09/27/17 1920   NA  142  141  140   K  3.4*  3.7  3.7   CL  106  107  106   CO2  26  27  27   BUN  34*  34*  25*   CREA  3.51*  3.28*  2.25*   CA  8.4  8.7  8.8   GLU  80  70  97   MG  1.8   --    --          Assessment/Plan:     Principal Problem:    Acute on chronic systolic (congestive) heart failure (HCC) (9/29/2017)    Active Problems:    CKD (chronic kidney disease) stage 3, GFR 30-59 ml/min (4/26/2015)      Coronary atherosclerosis of native coronary vessel (12/4/2015)      Hypertension ()      COPD (chronic obstructive pulmonary disease) ([facility] (12/27/2015)      Overview: Complete PFTs: 7/20/15:      Spirometry is consistent with a moderate obstructive/restrictive defect. .       The residual volume is increased relative to other lung volumes suggesting       air-trapping. The diffusion capacity corrected for alveolar volume was       normal suggesting no loss of alveolo-capillary units. High triglycerides (1/19/2016)      Gastroesophageal reflux disease without esophagitis (8/4/2016)      Mixed hyperlipidemia (9/28/2016)      Vomiting (9/29/2017)      NINO (acute kidney injury) ([facility] [facility] (9/29/2017)      74year old male patient with CHF, COPD, CAD, HTN, and likely CKD presents with shortness of breath and now with NINO. -- NINO on CKD: Acute component likely 2/2 pre-renal/ dehydration. Agree with reduced Lasix (40mg IV BID). Continue to hold ARB. His CKD has not been worked up: so will check renal ultrasound, UA, UCPR, SPEP, light chains. Consider expanding work up pending findings.    -- CHF: per Cardiology-- he seems to have poor insight with his diet  -- HTN  -- COPD    [address] [facility], NP-c

## 2017-09-29 NOTE — H&P
[facility] Cardiology History & Physical      Date of  Admission: 9/29/2017  3:29 PM     Primary Care Physician: Dr. Nena Roberson  Primary Cardiologist: Dr. Riley Meade  Admitting Physician: Dr. Billy Shook    CC: Acute on chronic systolic HF    HPI:  Renee Chen is a 74 y.o.  male who is accepted in transfer from [facility] due to acute systolic heart failure and NINO. He presented to [facility] on 9/27/17 with reports of severe SOB and BLE edema (wt up approx 5-10lb) worsening over the previous 4 days. He was also having chanell urine. He was admitted by the Hospitalist and started on IV lasix. BNP was 1576 and Cr 2.25. Cr has continued to increase -- now 3.51 and his IV lasix was discontinued. Nephrology has been consulted to see him here at [facility]. He reports some vomiting today. He reports that he has been compliant with all medications. He states that he is not on a \"special diet\" but reports that his sodium intake has been high to the point that he was very thirsty and began to consume [place] lot\" of water. He reports that he often takes additional lasix for a day or so when he feels SOB or noted increased edema. He reports that he did not take any additional lasix during this time. He is apparently followed by Dr. Riley Meade -- but last visit with Dr. Riley Meade 9/2016. He was seen by Dr. Carlo Boyer in 12/2016 by Dr. Carlo Boyer for LLE pain that was felt to be cellulitis or gout related. He was instructed to continue Coreg and Hydralazine -- not on ACE due to CKD (baseline 1.76). Previous ECHO 12/2016 with EF 40-50%.       Past Medical History:   Diagnosis Date    Acute CHF (congestive heart failure) ([facility] [address] 4/25/2015    Acute combined systolic and diastolic congestive heart failure ([facility] [address] 4/28/2015    Chronic obstructive pulmonary disease (HCC)     De Quervain's tenosynovitis, right 4/28/2015    Dyspnea on exertion 6/28/2015    Elevated serum creatinine 4/25/2015    Elevated troponin 6/28/2015    History of coronary artery disease     MI at 27 yo    History of right knee surgery     cartilage removal    History of shingles     Malignant hypertension 4/25/2015    MI [facility]myocardial infarction[facility])       Past Surgical History:   Procedure Laterality Date    HX HEART CATHETERIZATION  6/29/2015    no intervention    HX KNEE ARTHROSCOPY Right     removal of cartilage       Allergies   Allergen Reactions    Pcn [Penicillins] Other (comments)     \"makes my heart stop\"      Social History     Social History    Marital status:      Spouse name: N/A    Number of children: N/A    Years of education: N/A     Occupational History    retired      Social History Main Topics    Smoking status: Former Smoker     Packs/day: 0.25     Years: 20.00     Types: Cigarettes     Quit date: 4/5/2015    Smokeless tobacco: Never Used    Alcohol use No    Drug use: No    Sexual activity: Not on file     Other Topics Concern     Service No    Blood Transfusions No     no issues with receiving    Caffeine Concern No    Special Diet No    Exercise No    Seat Belt Yes     Social History Narrative    Lives with his wife     Family History   Problem Relation Age of Onset    Hypertension Mother     No Known Problems Father         Current Facility-Administered Medications   Medication Dose Route Frequency    albuterol (PROVENTIL HFA, VENTOLIN HFA, PROAIR HFA) inhaler 2 Puff  2 Puff Inhalation Q6H PRN    [START ON 9/30/2017] allopurinol (ZYLOPRIM) tablet 100 mg  100 mg Oral DAILY    [START ON 9/30/2017] aspirin delayed-release tablet 81 mg  81 mg Oral DAILY    atorvastatin (LIPITOR) tablet 20 mg  20 mg Oral QHS    carvedilol (COREG) tablet 12.5 mg  12.5 mg Oral BID WITH MEALS    [START ON 9/30/2017] pantoprazole (PROTONIX) tablet 40 mg  40 mg Oral ACB    albuterol-ipratropium (DUO-NEB) 2.5 MG-0.5 MG/3 ML  3 mL Nebulization Q6H RT    nitroglycerin (NITROSTAT) tablet 0.4 mg  0.4 mg SubLINGual Q5MIN PRN    HYDROcodone-acetaminophen (NORCO) 5-325 mg per tablet 1 Tab  1 Tab Oral Q6H PRN    morphine injection 2 mg  2 mg IntraVENous Q4H PRN    acetaminophen (TYLENOL) tablet 650 mg  650 mg Oral Q4H PRN    ondansetron (ZOFRAN) injection 4 mg  4 mg IntraVENous Q4H PRN       Review of Systems    Review of Systems   Constitution: Positive for malaise/fatigue and weight gain. Negative for chills, diaphoresis, fever and weakness. HENT: Negative. Eyes: Negative for blurred vision, double vision and visual disturbance. Cardiovascular: Positive for dyspnea on exertion and leg swelling. Negative for chest pain, irregular heartbeat, near-syncope, orthopnea, palpitations, paroxysmal nocturnal dyspnea and syncope. Respiratory: Positive for shortness of breath. Negative for cough, hemoptysis, sputum production and wheezing. Endocrine: Positive for polydipsia. Negative for polyuria. Hematologic/Lymphatic: Negative. Skin: Negative. Musculoskeletal: Negative. Gastrointestinal: Positive for nausea and vomiting. Negative for abdominal pain, change in bowel habit, constipation and diarrhea. Genitourinary: Negative for dysuria, frequency and urgency. Neurological: Negative. Negative for dizziness, headaches, light-headedness, loss of balance, numbness, paresthesias and tremors. Psychiatric/Behavioral: Negative. Subjective:     Visit Vitals    /75 (BP 1 Location: Left arm, BP Patient Position: At rest)    Pulse 61    Temp 97.1 °F (36.2 °C)    Resp 18    Ht 5' 10\" (1.778 m)    Wt 102.1 kg (225 lb)    SpO2 100%    BMI 32.28 kg/m2     Physical Exam   Constitutional: He is oriented to person, place, and time and well-developed, well-nourished, and in no distress. HENT:   Head: Normocephalic and atraumatic. Mouth/Throat: Oropharynx is clear and moist.   Eyes: Conjunctivae and EOM are normal. Pupils are equal, round, and reactive to light. No scleral icterus. Neck: Normal range of motion. Neck supple. No JVD present. No tracheal deviation present. Cardiovascular: Normal rate, regular rhythm, S1 normal, S2 normal and intact distal pulses. Exam reveals distant heart sounds. Exam reveals no gallop and no friction rub. No murmur heard. Pulmonary/Chest: Effort normal and breath sounds normal. No stridor. No respiratory distress. He has no wheezes. He has no rales. Overall coarse, on RA   Abdominal: Soft. Bowel sounds are normal. He exhibits no distension. There is no tenderness. Musculoskeletal: Normal range of motion. He exhibits edema. BLE edema   Neurological: He is alert and oriented to person, place, and time. No cranial nerve deficit. Skin: Skin is warm and dry. No rash noted. No erythema. Psychiatric: Mood, memory, affect and judgment normal.       Cardiographics  Telemetry:   ECG:   Echocardiogram: 9/57/24 -- LV systolic function moderately reduced -- EF 30-35%, moderate diffuse hypokinesis, severe concentric hypertrophy, RV mildly dilated with peak pressure 30-35, LA and RA mildly dilated, IVC mildly dilated, mild MR, mild -> mod TR.      Labs:   Recent Results (from the past 24 hour(s))   GLUCOSE, POC    Collection Time: 09/28/17 10:18 PM   Result Value Ref Range    Glucose (POC) 87 65 - 720 mg/dL   METABOLIC PANEL, BASIC    Collection Time: 09/29/17  5:59 AM   Result Value Ref Range    Sodium 142 136 - 145 mmol/L    Potassium 3.4 (L) 3.5 - 5.1 mmol/L    Chloride 106 98 - 107 mmol/L    CO2 26 21 - 32 mmol/L    Anion gap 10 7 - 16 mmol/L    Glucose 80 65 - 100 mg/dL    BUN 34 (H) 8 - 23 MG/DL    Creatinine 3.51 (H) 0.8 - 1.5 MG/DL    GFR est AA 22 (L) >60 ml/min/1.73m2    GFR est non-AA 18 (L) >60 ml/min/1.73m2    Calcium 8.4 8.3 - 10.4 MG/DL   MAGNESIUM    Collection Time: 09/29/17  5:59 AM   Result Value Ref Range    Magnesium 1.8 1.8 - 2.4 mg/dL   BNP    Collection Time: 09/29/17  5:59 AM   Result Value Ref Range    BNP 1283 pg/mL       Patient has been seen and examined by  Dominik and he agrees with the following assessment and plan:     Assessment/Plan:       Principal Problem:    Acute on chronic systolic (congestive) heart failure -- cont IV lasix for now, cont Coreg, holding losartan in setting of NINO, Nephro consult pending, strict I&O, cardiac diet and FR, daily weights    Active Problems:    CKD (chronic kidney disease) stage 3, GFR 30-59 ml/min -- renal consult pending, monitor I&O      Coronary atherosclerosis of native coronary vessel -- cont BB, statin and ASA      Hypertension -- some hypotension at ES, monitor BP closely, adjust meds as tolerated      COPD (chronic obstructive pulmonary disease) -- cont nebs       Gastroesophageal reflux disease without esophagitis  -- cont PPI      Mixed hyperlipidemia -- on statin      Vomiting -- unsure etiology, follow labs/lytes, antiemetics PRN      NINO (acute kidney injury) -- Nephro consult pending, follow daily labs, watch I&O        NICHOLE Gifford  9/29/2017 3:49 PM    ATTENDING ADDENDUM:    Patient seen and examined by me. Agree with above note by physician extender. Key findings are:  No CP or CHF/SOB at rest in bed, lying nearly flat. Lungs clear, no JVD at 60 deg, and comfortable on room air with climbing creatinine and BUN today after IV lasix past couple of days. May be getting dry. Still CLAUDIO with ambulating in room but otherwise comfortable at rest.  EF down compared to prior echo but no current CP. CV- RRR without murmur, no JVD at 60 deg  Lungs- Clear bilaterally, mildly decreased bibasilar but no crackles or wheezing  Abd- soft, nontender, nondistended  Ext- trace LE edema    Plan: As above.   Recheck labs in AM, continue lasix one more day, await recs from renal.      Bernard Thomas MD  [facility] Cardiology  Pager [phone]

## 2017-09-29 NOTE — PROGRESS NOTES
Shift assessment complete. Patient alert and oriented. Respirations diminished bilaterally with crackles. Bowel sounds active in all 4 quadrants. Abdomen soft and non-tender. Denies pain at this time. Bed in low position, wheels locked in place, gripper socks on, and family at the bedside. Will monitor.

## 2017-09-29 NOTE — PROGRESS NOTES
TRANSFER - OUT REPORT:    Verbal report given to MCKENZIE Blank(name) on Zhou Costa  being transferred to 3rd floor [place](unit) for routine progression of care       Report consisted of patients Situation, Background, Assessment and   Recommendations(SBAR). Information from the following report(s) SBAR, Kardex, Procedure Summary, Intake/Output and MAR was reviewed with the receiving nurse. Lines:   Peripheral IV 09/27/17 Left Antecubital (Active)   Site Assessment Clean, dry, & intact 9/29/2017  8:58 AM   Phlebitis Assessment 0 9/29/2017  8:58 AM   Infiltration Assessment 0 9/29/2017  8:58 AM   Dressing Status Clean, dry, & intact 9/29/2017  8:58 AM   Dressing Type Transparent 9/29/2017  8:58 AM   Hub Color/Line Status Pink;Patent 9/29/2017  8:58 AM        Opportunity for questions and clarification was provided.       Patient transported with:   O2 @ 2 liters

## 2017-09-29 NOTE — IP AVS SNAPSHOT
[address] 
[phone] Patient: Karie Current MRN: [id number] You are allergic to the following Allergen Reactions Pcn (Penicillins) Other (comments) \"makes my heart stop\" Recent Documentation Height Weight BMI Smoking Status 1.778 m 98.1 kg 31.04 kg/m2 Former Smoker Emergency Contacts Name Discharge Info Relation Home Work Mobile Carolyn Metz  Daughter [21]   [phone] Kamila Tran  Spouse [3] [phone] Capo Nassar  Brother [24] [phone] About your hospitalization You were admitted on:  September 29, 2017 You last received care in the:  [facility] 3 TELEMETRY You were discharged on:  October 3, 2017 Unit phone number:  [phone] Why you were hospitalized Your primary diagnosis was:  Acute On Chronic Systolic (Congestive) Heart Failure Your diagnoses also included:  Mixed Hyperlipidemia, Hypertension, High Triglycerides, Gastroesophageal Reflux Disease Without Esophagitis, Coronary Atherosclerosis Of Native Coronary Vessel, Copd (Chronic Obstructive Pulmonary Disease) ([facility]), Ckd (Chronic Kidney Disease) Stage 3, Gfr 30-59 Ml/Min, Vomiting, Adam (Acute Kidney Injury) ([facility]), Raul (Obstructive Sleep Apnea) Providers Seen During Your Hospitalizations Provider Role Specialty Primary office phone Wally Rocha MD Attending Provider Cardiology [phone] Your Primary Care Physician (PCP) Primary Care Physician Office Phone Office Fax Aba Nelson [phone] Follow-up Information Follow up With Details Comments Contact Info Julisa Mathis MD   [address]  
[address] [facility] [address] 
[phone] Salo Mcneill NP On 10/17/2017 at 1:30pm Moustapha Chairez [address] [facility] [address] 
[phone] April On 10/13/2017 Follow up with Dr. Gabriel Singh on October 13th at 8:30am (Chanda Lopez 587)  [address] 
[phone] Marlen Huynh NP On 10/12/2017 at 3:45pm [facility] 315 [facility], [address] 
[phone] Your Appointments Friday October 13, 2017  8:30 AM EDT TRANSITIONAL CARE MANAGEMENT with Elli Officer, MD  
1516 [facility] ([facility]) [address]  
[phone] Tuesday October 17, 2017  2:00 PM EDT  
(Arrive by 1:30 PM) HOSPITAL with [facility] ([facility]) [address] [facility] [address] [place] [address] [facility]  
[phone] Current Discharge Medication List  
  
START taking these medications Dose & Instructions Dispensing Information Comments Morning Noon Evening Bedtime  
 fluticasone-salmeterol 250-50 mcg/dose diskus inhaler Commonly known as:  ADVAIR Notes to Patient:  As directed Dose:  1 Puff Take 1 Puff by inhalation every twelve (12) hours. Quantity:  1 Inhaler Refills:  3  
     
   
   
   
  
 isosorbide-hydrALAZINE 20-37.5 mg per tablet Commonly known as:  BIDIL Your next dose is: Today at dinner Dose:  1 Tab Take 1 Tab by mouth two (2) times a day. Quantity:  60 Tab Refills:  6  
     
   
   
   
  
 tiotropium 18 mcg inhalation capsule Commonly known as:  Alka Perkins Notes to Patient:  As directed Dose:  1 Cap Take 1 Cap by inhalation daily. Quantity:  30 Cap Refills:  6 CONTINUE these medications which have CHANGED Dose & Instructions Dispensing Information Comments Morning Noon Evening Bedtime  
 furosemide 40 mg tablet Commonly known as:  LASIX What changed:   
- how much to take - when to take this Your next dose is: Today at dinner Dose:  40 mg Take 1 Tab by mouth Before breakfast and dinner. Quantity:  60 Tab Refills:  3 CONTINUE these medications which have NOT CHANGED Dose & Instructions Dispensing Information Comments Morning Noon Evening Bedtime  
 albuterol 90 mcg/actuation inhaler Commonly known as:  VENTOLIN HFA Notes to Patient:  As directed Dose:  2 Puff Take 2 Puffs by inhalation four (4) times daily. Quantity:  1 Inhaler Refills:  11  
     
   
   
   
  
 albuterol-ipratropium 2.5 mg-0.5 mg/3 ml Nebu Commonly known as:  Isaiah Anand Notes to Patient:  As directed Dose:  3 mL  
3 mL by Nebulization route four (4) times daily. Quantity:  360 mL Refills:  0 Patient must make follow appointment with our office if they want further refills. allopurinol 100 mg tablet Commonly known as:  Tawny Davis Your next dose is: Wednesday morning Dose:  100 mg Take 1 Tab by mouth daily. Quantity:  30 Tab Refills:  11  
     
   
   
   
  
 aspirin delayed-release 81 mg tablet Your next dose is: Wednesday morning Dose:  81 mg Take 1 Tab by mouth daily. Quantity:  30 Tab Refills:  0  
     
   
   
   
  
 atorvastatin 20 mg tablet Commonly known as:  LIPITOR Your next dose is: Tonight before bedtime Dose:  20 mg Take 1 Tab by mouth nightly. Quantity:  90 Tab Refills:  3  
     
   
   
   
  
  
 carvedilol 25 mg tablet Commonly known as:  Corazon Dome Your next dose is: Tonight at dinner Dose:  25 mg Take 25 mg by mouth two (2) times daily (with meals). Refills:  0  
     
   
   
  
   
  
 diclofenac EC 75 mg EC tablet Commonly known as:  VOLTAREN Your next dose is: Today at dinner Dose:  75 mg Take 1 Tab by mouth two (2) times a day. Quantity:  10 Tab Refills:  0 oxyCODONE IR 5 mg immediate release tablet Commonly known as:  Carla uGaman Notes to Patient:  As needed for pain Dose:  5 mg Take 1 Tab by mouth every six (6) hours as needed for Pain. Max Daily Amount: 20 mg.  
 Quantity:  11 Tab Refills:  0  
     
   
   
   
  
 pantoprazole 40 mg tablet Commonly known as:  PROTONIX Your next dose is: Wednesday morning Dose:  40 mg Take 1 Tab by mouth Daily (before breakfast). Quantity:  90 Tab Refills:  3 STOP taking these medications   
 losartan 50 mg tablet Commonly known as:  COZAAR Notes to Patient:  STOP TAKING THIS MEDICATION ! ! !  
   
  
  
  
Where to Get Your Medications Information on where to get these meds will be given to you by the nurse or doctor. ! Ask your nurse or doctor about these medications  
  fluticasone-salmeterol 250-50 mcg/dose diskus inhaler  
 furosemide 40 mg tablet  
 isosorbide-hydrALAZINE 20-37.5 mg per tablet  
 tiotropium 18 mcg inhalation capsule Discharge Instructions Avoiding Triggers With Heart Failure: Care Instructions Your Care Instructions Triggers are anything that make your heart failure flare up. A flare-up is also called \"sudden heart failure\" or \"acute heart failure. \" When you have a flare-up, fluid builds up in your lungs, and you have problems breathing. You might need to go to the hospital. By watching for changes in your condition and avoiding triggers, you can prevent heart failure flare-ups. Follow-up care is a key part of your treatment and safety. Be sure to make and go to all appointments, and call your doctor if you are having problems. It's also a good idea to know your test results and keep a list of the medicines you take. How can you care for yourself at home? Watch for changes in your weight and condition · Weigh yourself without clothing at the same time each day.  Record your weight. Call your doctor if you have sudden weight gain, such as more than 2 to 3 pounds in a day or 5 pounds in a week. (Your doctor may suggest a different range of weight gain.) A sudden weight gain may mean that your heart failure is getting worse. · Keep a daily record of your symptoms. Write down any changes in how you feel, such as new shortness of breath, cough, or problems eating. Also record if your ankles are more swollen than usual and if you feel more tired than usual. Note anything that you ate or did that could have triggered these changes. Limit sodium Sodium causes your body to hold on to extra water. This may cause your heart failure symptoms to get worse. People get most of their sodium from processed foods. Fast food and restaurant meals also tend to be very high in sodium. · Your doctor may suggest that you limit sodium to 2,000 milligrams (mg) a day or less. That is less than 1 teaspoon of salt a day, including all the salt you eat in cooking or in packaged foods. · Read food labels on cans and food packages. They tell you how much sodium you get in one serving. Check the serving size. If you eat more than one serving, you are getting more sodium. · Be aware that sodium can come in forms other than salt, including monosodium glutamate (MSG), sodium citrate, and sodium bicarbonate (baking soda). MSG is often added to Asian food. You can sometimes ask for food without MSG or salt. · Slowly reducing salt will help you adjust to the taste. Take the salt shaker off the table. · Flavor your food with garlic, lemon juice, onion, vinegar, herbs, and spices instead of salt. Do not use soy sauce, steak sauce, onion salt, garlic salt, mustard, or ketchup on your food, unless it is labeled \"low-sodium\" or \"low-salt. \" 
· Make your own salad dressings, sauces, and ketchup without adding salt.  
· Use fresh or frozen ingredients, instead of canned ones, whenever you can. Choose low-sodium canned goods. · Eat less processed food and food from restaurants, including fast food. Exercise as directed Moderate, regular exercise is very good for your heart. It improves your blood flow and helps control your weight. But too much exercise can stress your heart and cause a heart failure flare-up. · Check with your doctor before you start an exercise program. 
· Walking is an easy way to get exercise. Start out slowly. Gradually increase the length and pace of your walk. Swimming, riding a bike, and using a treadmill are also good forms of exercise. · When you exercise, watch for signs that your heart is working too hard. You are pushing yourself too hard if you cannot talk while you are exercising. If you become short of breath or dizzy or have chest pain, stop, sit down, and rest. 
· Do not exercise when you do not feel well. Take medicines correctly · Take your medicines exactly as prescribed. Call your doctor if you think you are having a problem with your medicine. · Make a list of all the medicines you take. Include those prescribed to you by other doctors and any over-the-counter medicines, vitamins, or supplements you take. Take this list with you when you go to any doctor. · Take your medicines at the same time every day. It may help you to post a list of all the medicines you take every day and what time of day you take them. · Make taking your medicine as simple as you can. Plan times to take your medicines when you are doing other things, such as eating a meal or getting ready for bed. This will make it easier to remember to take your medicines. · Get organized. Use helpful tools, such as daily or weekly pill containers. When should you call for help? Call 911 if you have symptoms of sudden heart failure such as: 
· You have severe trouble breathing. · You cough up pink, foamy mucus. · You have a new irregular or rapid heartbeat. Call your doctor now or seek immediate medical care if: 
· You have new or increased shortness of breath. · You are dizzy or lightheaded, or you feel like you may faint. · You have sudden weight gain, such as more than 2 to 3 pounds in a day or 5 pounds in a week. (Your doctor may suggest a different range of weight gain.) · You have increased swelling in your legs, ankles, or feet. · You are suddenly so tired or weak that you cannot do your usual activities. Watch closely for changes in your health, and be sure to contact your doctor if you develop new symptoms. Where can you learn more? Go to http://jaime-jarrell.info/. Enter Q843 in the search box to learn more about \"Avoiding Triggers With Heart Failure: Care Instructions. \" Current as of: February 23, 2017 Content Version: 11.3 © 7483-8236 [employer]. Care instructions adapted under license by [employer] (which disclaims liability or warranty for this information). If you have questions about a medical condition or this instruction, always ask your healthcare professional. [place] [address] any warranty or liability for your use of this information. Learning About Chronic Kidney Disease What is chronic kidney disease? Chronic kidney disease means your kidneys have not worked right for a while. Your kidneys have an important job. They remove waste and extra fluid from your blood. This waste and fluid goes out of your body in your urine. When your kidneys don't work as they should, wastes build up in your blood. This makes you sick. High blood pressure and diabetes can cause kidney damage. Other causes include kidney infections and some medicines. Chronic kidney disease is also called chronic renal failure. Or it may be called chronic renal insufficiency. How is chronic kidney disease diagnosed? · Your doctor will do blood and urine tests to check your kidney function. This will help your doctor see how well your kidneys filter your blood. · Your doctor will ask you about past kidney problems. He or she will ask if you have a family history of kidney disease. Your doctor will also want to know what medicines you take. This includes prescription and over-the-counter medicines. · You may have a test, such as an ultrasound or CT scan. These tests let your doctor look at a picture of your kidneys. This can help your doctor measure the size of your kidneys and see if anything is blocking your urine flow. · In some cases, your doctor may take a tiny sample of kidney tissue. This is called a biopsy. It helps the doctor find out what caused the kidney disease. What are the symptoms? You may not have symptoms if your disease is mild. If you lose more kidney function, you may: 
· Have swelling and weight gain. This is from the extra fluid in your tissues. It is called edema (say \"ih-NATHALIE-muh\"). · Often feel sick to your stomach (nauseated) or vomit. · Have trouble sleeping. · Urinate less than normal. 
· Have trouble thinking clearly. · Feel very tired. What can you expect when you have chronic kidney disease? · In the early stages of the disease, your kidneys are still able to regulate the fluids, salts, and waste products in your body. But if you keep losing kidney function, you may start to have problems, or complications. · How long it takes for the kidney disease to get worse depends on your condition. Sometimes it gets worse very slowly over many years. Or it may get worse more quickly. · When kidney function falls below a certain point, it is called kidney failure. Kidney failure affects your whole body. It can cause serious heart, bone, and brain problems and make you feel very ill. Untreated kidney failure can cause death. How is it treated? Manage your health problems · If you have diabetes, it's important to control your blood sugar level with diet, exercise, and medicines. If your blood sugar level is too high for too long, it can damage your kidneys. · If you have high blood pressure, it's important to control it with diet, exercise, and any medicines your doctor prescribes. · Avoid long-term use of medicines that can damage the kidneys. These medicines include nonsteroidal anti-inflammatory drugs. Examples of these are ibuprofen (Advil) and celecoxib (Celebrex). Treat kidney complications · If your doctor put you on a special diet, stay on the diet. · If you have kidney failure, you'll probably have two treatment choices. Your doctor may recommend that you start kidney dialysis to filter wastes and extra fluid from your blood. Or it may be better to get a new kidney (transplant). Both treatments have risks and benefits. Talk with your doctor to decide which is best for you. Practice healthy habits · If your doctor recommends it, get more exercise. Walking is a good choice. Bit by bit, increase the amount you walk every day. Try for at least 30 minutes on most days of the week. · Don't smoke. Smoking can make chronic kidney disease worse. If you need help quitting, talk to your doctor about stop-smoking programs and medicines. These can increase your chances of quitting for good. · Don't drink alcohol. Follow-up care is a key part of your treatment and safety. Be sure to make and go to all appointments, and call your doctor if you are having problems. It's also a good idea to know your test results and keep a list of the medicines you take. Where can you learn more? Go to http://jaime-jarrell.info/. Enter 0650 [id number] in the search box to learn more about \"Learning About Chronic Kidney Disease. \" Current as of: April 3, 2017 Content Version: 11.3 © 6220-2194 [employer], [employer].  Care instructions adapted under license by [employer] (which disclaims liability or warranty for this information). If you have questions about a medical condition or this instruction, always ask your healthcare professional. [place] [address] any warranty or liability for your use of this information. Discharge Orders Procedure Order Date Status Priority Quantity Spec Type Associated Dx METABOLIC PANEL, BASIC 30/65/28 1104 Future Routine 1 Blood METABOLIC PANEL, BASIC 78/29/06 1115 Future Routine 1 Blood [employer] Announcement We are excited to announce that we are making your provider's discharge notes available to you in ShopKeep POS. You will see these notes when they are completed and signed by the physician that discharged you from your recent hospital stay. If you have any questions or concerns about any information you see in ShopKeep POS, please call the Health Information Department where you were seen or reach out to your Primary Care Provider for more information about your plan of care. Introducing [facility]! Gian De Los Santos introduces ShopKeep POS patient portal. Now you can access parts of your medical record, email your doctor's office, and request medication refills online. 1. In your internet browser, go to [email or web address] 2. Click on the First Time User? Click Here link in the Sign In box. You will see the New Member Sign Up page. 3. Enter your ShopKeep POS Access Code exactly as it appears below. You will not need to use this code after youve completed the sign-up process. If you do not sign up before the expiration date, you must request a new code. · ShopKeep POS Access Code: [email or web address] Expires: 12/27/2017  6:55 AM 
 
4. Enter the last four digits of your Social Security Number (xxxx) and Date of Birth (mm/dd/yyyy) as indicated and click Submit. You will be taken to the next sign-up page. 5. Create a ShopKeep POS ID.  This will be your ShopKeep POS login ID and cannot be changed, so think of one that is secure and easy to remember. 6. Create a DecisionView password. You can change your password at any time. 7. Enter your Password Reset Question and Answer. This can be used at a later time if you forget your password. 8. Enter your e-mail address. You will receive e-mail notification when new information is available in [address] 9. Click Sign Up. You can now view and download portions of your medical record. 10. Click the Download Summary menu link to download a portable copy of your medical information. If you have questions, please visit the Frequently Asked Questions section of the DecisionView website. Remember, DecisionView is NOT to be used for urgent needs. For medical emergencies, dial 911. Now available from your iPhone and Android! General Information Please provide this summary of care documentation to your next provider. Patient Signature:  ____________________________________________________________ Date:  ____________________________________________________________  
  
Elliott Mems Provider Signature:  ____________________________________________________________ Date:  ____________________________________________________________

## 2017-09-29 NOTE — PROGRESS NOTES
Oxygen Qualifier       Room air: SpO2 with O2 and liter flow   Resting SpO2  100% R/A   Ambulating SpO2  97% R/A     Patient's sat while resting was 100% RA before ambulation, while ambulating patient's sat was 97% on RA. Patient denied SOB, and no distress noted while ambulating.      Completed by:    Jared Mayo RT

## 2017-09-29 NOTE — DISCHARGE SUMMARY
Hospitalist Discharge Summary     Patient ID:  Abby Ellsworth  [id number]  89 y.o.  1945  Admit date: 9/27/2017  7:21 PM  Discharge date and time: 9/29/2017  Attending: Kat Houston MD  PCP:  Sveta Haney MD  Treatment Team: Attending Provider: Kat Houston MD; Utilization Review: Hilary Zhang RN; Consulting Provider: Nicole Walden MD    Principal Diagnosis CHF (congestive heart failure) [facility])   Principal Problem:    CHF (congestive heart failure) ([facility] (9/27/2017)    Active Problems:    CKD (chronic kidney disease) stage 3, GFR 30-59 ml/min (4/26/2015)      Chronic systolic heart failure ([facility] (12/4/2015)      Overview: EF 40%      Hypertension ()      COPD (chronic obstructive pulmonary disease) ([facility] (12/27/2015)      Overview: Complete PFTs: 7/20/15:      Spirometry is consistent with a moderate obstructive/restrictive defect. .       The residual volume is increased relative to other lung volumes suggesting       air-trapping. The diffusion capacity corrected for alveolar volume was       normal suggesting no loss of alveolo-capillary units. HPI: Erick Jiménez is a 73 yo male who presented with dyspnea on a background of systolic and diastolic CHF and HTN. He reports a 4 day history of dyspnea (present on both rest and exertion). He denies any chest pain. He reports a cough (dry cough). He has a history of heart failure. He denies missing any of his home BP medications. He does not weigh himself daily at home (wife suspects weight gain of 5-10 pounds). He has fast food about 5 times/week. He denies the following: fever, chills, nausea or vomiting. Hospital Course:  Please refer to the admission H&P for details of presentation. In summary, the patient presented with progressive dyspnea with rest and exertion over 4 days related to acute systolic heart failure. Started on lasix 40 mg iv bid on admission. Echo noted EF of 30-35% which is decreased from 40-50% on 12/2016.   Dyspnea at rest has improved however pt still symptomatic with ambulation. Lasix stopped today with NINO. Discussed case with cardiology. Will transfer to [facility] on cardio service for further care        Labs: Results:       Chemistry Recent Labs      09/29/17   0559  09/28/17   1442  09/27/17 1920   GLU  80  70  97   NA  142  141  140   K  3.4*  3.7  3.7   CL  106  107  106   CO2  26  27  27   BUN  34*  34*  25*   CREA  3.51*  3.28*  2.25*   CA  8.4  8.7  8.8   AGAP  10  7  7   AP   --    --   104   TP   --    --   7.4   ALB   --    --   3.3   GLOB   --    --   4.1*   AGRAT   --    --   0.8*      CBC w/Diff Recent Labs      09/27/17 1920   WBC  7.9   RBC  5.25   HGB  15.0   HCT  45.2   PLT  158   GRANS  56   LYMPH  31   EOS  1      Cardiac Enzymes Recent Labs      09/27/17 1919   CPK  129   CKND1  1.6      Coagulation No results for input(s): PTP, INR, APTT in the last 72 hours. No lab exists for component: INREXT    Lipid Panel Lab Results   Component Value Date/Time    Cholesterol, total 113 12/29/2016 03:45 AM    HDL Cholesterol 22 12/29/2016 03:45 AM    LDL,Direct 94 06/29/2015 06:00 AM    LDL, calculated 22.8 12/29/2016 03:45 AM    VLDL, calculated 68.2 12/29/2016 03:45 AM    Triglyceride 341 12/29/2016 03:45 AM    CHOL/HDL Ratio 5.1 12/29/2016 03:45 AM      BNP No results for input(s): BNPP in the last 72 hours.    Liver Enzymes Recent Labs      09/27/17 1920   TP  7.4   ALB  3.3   AP  104   SGOT  56*      Thyroid Studies Lab Results   Component Value Date/Time    TSH 1.165 04/26/2015 04:11 AM            Discharge Exam:  Visit Vitals    /79 (BP 1 Location: Left arm, BP Patient Position: At rest)    Pulse 74    Temp 97.6 °F (36.4 °C)    Resp 20    Ht 5' 10\" (1.778 m)    Wt 102.2 kg (225 lb 3.2 oz)    SpO2 100%    BMI 32.31 kg/m2     General appearance: alert, cooperative, no distress, appears stated age  Lungs: clear to auscultation bilaterally  Heart: regular rate and rhythm, S1, S2 normal, no murmur, click, rub or gallop  Abdomen: soft, non-tender.  Bowel sounds normal. No masses,  no organomegaly  Extremities: 2+ edema  Neurologic: Grossly normal    [facility]  Discharge Condition: stable  Patient Instructions:   Current Discharge Medication List            Follow-up  ·   With [place] facility physicians  Time spent to discharge patient 35 minutes  Signed:  Amado Mejia MD  9/29/2017  10:45 AM

## 2017-09-30 LAB
ANION GAP SERPL CALC-SCNC: 10 MMOL/L (ref 7–16)
BUN SERPL-MCNC: 37 MG/DL (ref 8–23)
CALCIUM SERPL-MCNC: 8.4 MG/DL (ref 8.3–10.4)
CHLORIDE SERPL-SCNC: 108 MMOL/L (ref 98–107)
CO2 SERPL-SCNC: 25 MMOL/L (ref 21–32)
CREAT SERPL-MCNC: 3.26 MG/DL (ref 0.8–1.5)
ERYTHROCYTE [DISTWIDTH] IN BLOOD BY AUTOMATED COUNT: 15.2 % (ref 11.9–14.6)
GLUCOSE SERPL-MCNC: 83 MG/DL (ref 65–100)
HCT VFR BLD AUTO: 40.7 % (ref 41.1–50.3)
HGB BLD-MCNC: 13.4 G/DL (ref 13.6–17.2)
MCH RBC QN AUTO: 27.8 PG (ref 26.1–32.9)
MCHC RBC AUTO-ENTMCNC: 32.9 G/DL (ref 31.4–35)
MCV RBC AUTO: 84.4 FL (ref 79.6–97.8)
PLATELET # BLD AUTO: 168 K/UL (ref 150–450)
PMV BLD AUTO: 10.7 FL (ref 10.8–14.1)
POTASSIUM SERPL-SCNC: 3.7 MMOL/L (ref 3.5–5.1)
RBC # BLD AUTO: 4.82 M/UL (ref 4.23–5.67)
SODIUM SERPL-SCNC: 143 MMOL/L (ref 136–145)
WBC # BLD AUTO: 6.7 K/UL (ref 4.3–11.1)

## 2017-09-30 PROCEDURE — 65660000000 HC RM CCU STEPDOWN

## 2017-09-30 PROCEDURE — 80048 BASIC METABOLIC PNL TOTAL CA: CPT | Performed by: PHYSICIAN ASSISTANT

## 2017-09-30 PROCEDURE — 36415 COLL VENOUS BLD VENIPUNCTURE: CPT | Performed by: PHYSICIAN ASSISTANT

## 2017-09-30 PROCEDURE — 94640 AIRWAY INHALATION TREATMENT: CPT

## 2017-09-30 PROCEDURE — 77010033678 HC OXYGEN DAILY

## 2017-09-30 PROCEDURE — 74011000250 HC RX REV CODE- 250: Performed by: PHYSICIAN ASSISTANT

## 2017-09-30 PROCEDURE — 74011250636 HC RX REV CODE- 250/636: Performed by: NURSE PRACTITIONER

## 2017-09-30 PROCEDURE — 94760 N-INVAS EAR/PLS OXIMETRY 1: CPT

## 2017-09-30 PROCEDURE — 85027 COMPLETE CBC AUTOMATED: CPT | Performed by: PHYSICIAN ASSISTANT

## 2017-09-30 PROCEDURE — 74011250637 HC RX REV CODE- 250/637: Performed by: PHYSICIAN ASSISTANT

## 2017-09-30 RX ADMIN — ALLOPURINOL 100 MG: 100 TABLET ORAL at 08:42

## 2017-09-30 RX ADMIN — IPRATROPIUM BROMIDE AND ALBUTEROL SULFATE 3 ML: .5; 3 SOLUTION RESPIRATORY (INHALATION) at 13:22

## 2017-09-30 RX ADMIN — ASPIRIN 81 MG: 81 TABLET, COATED ORAL at 08:42

## 2017-09-30 RX ADMIN — CARVEDILOL 12.5 MG: 12.5 TABLET, FILM COATED ORAL at 08:41

## 2017-09-30 RX ADMIN — IPRATROPIUM BROMIDE AND ALBUTEROL SULFATE 3 ML: .5; 3 SOLUTION RESPIRATORY (INHALATION) at 19:31

## 2017-09-30 RX ADMIN — IPRATROPIUM BROMIDE AND ALBUTEROL SULFATE 3 ML: .5; 3 SOLUTION RESPIRATORY (INHALATION) at 07:48

## 2017-09-30 RX ADMIN — FUROSEMIDE 40 MG: 10 INJECTION, SOLUTION INTRAMUSCULAR; INTRAVENOUS at 17:56

## 2017-09-30 RX ADMIN — PANTOPRAZOLE SODIUM 40 MG: 40 TABLET, DELAYED RELEASE ORAL at 08:41

## 2017-09-30 RX ADMIN — ATORVASTATIN CALCIUM 20 MG: 10 TABLET, FILM COATED ORAL at 20:43

## 2017-09-30 RX ADMIN — CARVEDILOL 12.5 MG: 12.5 TABLET, FILM COATED ORAL at 17:56

## 2017-09-30 NOTE — PROGRESS NOTES
Bedside and Verbal shift change report given to self (oncoming nurse) by Kelvin Ellsworth RN (offgoing nurse). Report included the following information SBAR, Kardex, MAR and Recent Results.

## 2017-09-30 NOTE — ROUTINE PROCESS
CHF teaching started post introduction to pt/family; aware of diagnosis. Planner/scale @ BS and will follow. Smoking/ ETOH/Illicit drug use cessation covered. Pt/family aware that I can not prescribe nor adjust  medications: 15mins  Start 2 L/ D FR  Palliative Care score; RATT, ACP on file  CHF teaching continues to pt/family. Emphasis on taking prescription meds as ordered, to keep F/U appts and to call MD STAT if any of the following occur:   If you gain 2 lbs in one day or 5 lbs in a week, and short of breath.  If you can not lay flat without developing short of breath or rapid breathing at night; or if it wakes you up. Develop a cough or wheezing.     Will follow

## 2017-09-30 NOTE — PROGRESS NOTES
[facility] PROGRESS NOTE           9/30/2017 10:32 AM    Admit Date: 9/29/2017      Subjective:   Patient feels as if breathing and selling have improved continues to make urine     Review of Systems   Constitutional: Negative for fever. HENT: Negative for hearing loss. Respiratory: Negative for cough. Cardiovascular: Negative for chest pain. Genitourinary: Negative for dysuria. Musculoskeletal: Negative for myalgias. Skin: Negative for rash. Neurological: Negative for dizziness. Psychiatric/Behavioral: Negative for depression. Objective:      Vitals:    09/30/17 0526 09/30/17 0530 09/30/17 0751 09/30/17 0944   BP: 113/70   145/88   Pulse: (!) 50   62   Resp: 20   20   Temp: 97.6 °F (36.4 °C)   97 °F (36.1 °C)   SpO2:   98% 95%   Weight:  102 kg (224 lb 12.8 oz)     Height:             Physical Exam   Constitutional: He is oriented to person, place, and time. He appears well-developed. HENT:   Head: Normocephalic and atraumatic. Eyes: Pupils are equal, round, and reactive to light. Neck: Normal range of motion. Cardiovascular: Normal rate. Pulmonary/Chest: Effort normal.   Abdominal: Soft. He exhibits distension. There is no tenderness. Musculoskeletal: He exhibits no edema. Neurological: He is alert and oriented to person, place, and time. No cranial nerve deficit. Skin: Skin is warm and dry. No rash noted. No erythema. Psychiatric: He has a normal mood and affect.  His behavior is normal.       Data Review:   Recent Labs      09/30/17   0601  09/29/17   0559  09/28/17   1442  09/27/17   1920   NA  143  142  141  140   K  3.7  3.4*  3.7  3.7   MG   --   1.8   --    --    BUN  37*  34*  34*  25*   CREA  3.26*  3.51*  3.28*  2.25*   GLU  83  80  70  97   WBC  6.7   --    --   7.9   HGB  13.4*   --    --   15.0   HCT  40.7*   --    --   45.2   PLT  168   --    --   158   TROIQ   --    --   0.18*  0.23*         Intake/Output Summary (Last 24 hours) at 09/30/17 1032  Last data filed at 09/30/17 0529   Gross per 24 hour   Intake              640 ml   Output             1600 ml   Net             -960 ml     Current Facility-Administered Medications   Medication Dose Route Frequency    albuterol (PROVENTIL HFA, VENTOLIN HFA, PROAIR HFA) inhaler 2 Puff  2 Puff Inhalation Q6H PRN    allopurinol (ZYLOPRIM) tablet 100 mg  100 mg Oral DAILY    aspirin delayed-release tablet 81 mg  81 mg Oral DAILY    atorvastatin (LIPITOR) tablet 20 mg  20 mg Oral QHS    carvedilol (COREG) tablet 12.5 mg  12.5 mg Oral BID WITH MEALS    pantoprazole (PROTONIX) tablet 40 mg  40 mg Oral ACB    albuterol-ipratropium (DUO-NEB) 2.5 MG-0.5 MG/3 ML  3 mL Nebulization Q6H RT    nitroglycerin (NITROSTAT) tablet 0.4 mg  0.4 mg SubLINGual Q5MIN PRN    HYDROcodone-acetaminophen (NORCO) 5-325 mg per tablet 1 Tab  1 Tab Oral Q6H PRN    morphine injection 2 mg  2 mg IntraVENous Q4H PRN    acetaminophen (TYLENOL) tablet 650 mg  650 mg Oral Q4H PRN    ondansetron (ZOFRAN) injection 4 mg  4 mg IntraVENous Q4H PRN    furosemide (LASIX) injection 40 mg  40 mg IntraVENous BID           Assessment/Plan:     1. Acute on chronic systolic heart failure worsening left ventricular systolic function previous cardiac catheterization with no obstructive coronary disease due to renal dysfunction and ischemic evaluation to be delayed. 2. Demand ischemia elevated troponin not in a pattern of plaque rupture treatment with heparin 48 hour period aspirin Plavix. 3. Atherosclerotic coronary disease previous cardiac catheterization report reviewed nonobstructive disease due to worsening cardiomyopathy disease may progress ischemic evaluation deferred  4. Acute kidney injury most likely due to overdiuresis non-oliguric renal failure creatinine improving renal workup for intrinsic renal disease initiated. Diuretic dose decreased will hold a.m. Lasix dose 9/30/2017 resume Lasix 9/30/2017 40 mg every 12.  Consider inoproic supprot if low cardaic output state is suspected.        Arely Louis MD  9/30/2017 10:32 AM

## 2017-09-30 NOTE — PROGRESS NOTES
Reviewed notes of patient for spiritual concerns.       Juan Hadley,  Staff   C: [phone] /  [email or web address]

## 2017-09-30 NOTE — PROGRESS NOTES
Problem: Falls - Risk of  Goal: *Absence of Falls  Document Sue Fall Risk and appropriate interventions in the flowsheet. Outcome: Progressing Towards Goal  Fall Risk Interventions:  Mobility Interventions: Bed/chair exit alarm, Communicate number of staff needed for ambulation/transfer, Patient to call before getting OOB           Medication Interventions: Bed/chair exit alarm, Patient to call before getting OOB, Teach patient to arise slowly     Elimination Interventions: Bed/chair exit alarm, Call light in reach, Toilet paper/wipes in reach, Patient to call for help with toileting needs     History of Falls Interventions: Door open when patient unattended     Pt progressing towards goal. No falls since admission. Bed low and locked. Call light within reach. Side rails x 2. Gripper socks applied. Personal belongings within reach. Pt verbalizes understanding to call for assistance.    Bed alarm on

## 2017-09-30 NOTE — PROGRESS NOTES
[facility]    Follow-Up on: NINO on CKD    HPI: Pt with CLAUDIO and SOB with  Minimal activity    ROS:  Denies CP, SOB.     Current Facility-Administered Medications   Medication Dose Route Frequency    albuterol (PROVENTIL HFA, VENTOLIN HFA, PROAIR HFA) inhaler 2 Puff  2 Puff Inhalation Q6H PRN    allopurinol (ZYLOPRIM) tablet 100 mg  100 mg Oral DAILY    aspirin delayed-release tablet 81 mg  81 mg Oral DAILY    atorvastatin (LIPITOR) tablet 20 mg  20 mg Oral QHS    carvedilol (COREG) tablet 12.5 mg  12.5 mg Oral BID WITH MEALS    pantoprazole (PROTONIX) tablet 40 mg  40 mg Oral ACB    albuterol-ipratropium (DUO-NEB) 2.5 MG-0.5 MG/3 ML  3 mL Nebulization Q6H RT    nitroglycerin (NITROSTAT) tablet 0.4 mg  0.4 mg SubLINGual Q5MIN PRN    HYDROcodone-acetaminophen (NORCO) 5-325 mg per tablet 1 Tab  1 Tab Oral Q6H PRN    morphine injection 2 mg  2 mg IntraVENous Q4H PRN    acetaminophen (TYLENOL) tablet 650 mg  650 mg Oral Q4H PRN    ondansetron (ZOFRAN) injection 4 mg  4 mg IntraVENous Q4H PRN    furosemide (LASIX) injection 40 mg  40 mg IntraVENous BID       Exam:  Vitals:    09/30/17 0526 09/30/17 0530 09/30/17 0751 09/30/17 0944   BP: 113/70   145/88   Pulse: (!) 50   62   Resp: 20   20   Temp: 97.6 °F (36.4 °C)   97 °F (36.1 °C)   SpO2:   98% 95%   Weight:  102 kg (224 lb 12.8 oz)     Height:             Intake/Output Summary (Last 24 hours) at 09/30/17 1124  Last data filed at 09/30/17 0529   Gross per 24 hour   Intake              640 ml   Output             1600 ml   Net             -960 ml     PE:  GEN - in no distress  CV - regular, no murmur, no rub  Lung - clear bilaterally  Abd - soft, nontender  Ext - no edema    Labs  Recent Labs      09/30/17   0601  09/27/17   1920   WBC  6.7  7.9   HGB  13.4*  15.0   HCT  40.7*  45.2   PLT  168  158     Recent Labs      09/30/17   0601  09/29/17   0559  09/28/17   1442   NA  143  142  141   K  3.7  3.4*  3.7   CL  108*  106  107   CO2  25  26  27   BUN 37*  34*  34*   CREA  3.26*  3.51*  3.28*   GLU  83  80  70   CA  8.4  8.4  8.7   MG   --   1.8   --      Recent Labs      09/28/17   0435   PH  7.39   PCO2  37   PO2  86       Problem List:  Patient Active Problem List    Diagnosis Date Noted    Acute on chronic systolic (congestive) heart failure (HCC) 09/29/2017    Vomiting 09/29/2017    NINO (acute kidney injury) ([facility] 09/29/2017    CHF (congestive heart failure) ([facility] 09/27/2017    Mixed hyperlipidemia 09/28/2016    Essential hypertension 09/28/2016    Chronic obstructive pulmonary disease ([facility] 08/04/2016    Gastroesophageal reflux disease without esophagitis 08/04/2016    High triglycerides 01/19/2016    COPD (chronic obstructive pulmonary disease) ([facility] 12/27/2015    Arthritis     Hypertension     Chronic systolic heart failure ([facility] 12/04/2015    Coronary atherosclerosis of native coronary vessel 12/04/2015    Tobacco abuse 07/02/2015    Dyspnea on exertion 06/28/2015    CKD (chronic kidney disease) stage 3, GFR 30-59 ml/min 04/26/2015       Issues Addressed By Nephrology:    Plan:  1. CHF exacerbation  2. COPD exacerbation  3.  NINO on CKD 2/2 cardiorenal syndrome  Needs Pulmonary toilet and may need home O2  At this point lungs clear and no edema agree with decreased dose lasix 40 mg bid  TANA normal (not c/w chronic NSAID use)  From renal stand point can follow up in office for results of tests ordered  Case d/w Dr. Lucy Richards

## 2017-09-30 NOTE — PROGRESS NOTES
Bedside and Verbal shift change report given to Berwyn Severs, RN (oncoming nurse) by self Sherrell Valencia nurse). Report included the following information SBAR, Kardex, MAR and Recent Results.

## 2017-09-30 NOTE — ROUTINE PROCESS
CHF teaching started post introduction to pt.; aware of diagnosis. Planner/scale(home) @ BS and will follow. Smoking/ ETOH/Illicit drug use cessation covered. Pt. aware that I can not prescribe nor adjust  medications: 15mins  Start 2 L/ D FR  Palliative Care score;ACP on file  CHF teaching continues to pt. . Emphasis on taking prescription meds as ordered, to keep F/U appts and to call MD STAT I.      CHF teaching completed to pt/ spouse, verbalize emphasis on monitoring self and report to MD:  · If you gain 2 lbs in one day or 5 lbs in a week, and short of breath. · If you can not lay flat without developing short of breath or rapid breathing at night; or if it wakes you up. Develop a cough or wheezing. · If you notice swollen hands/feet/ankles or stomach with a bloated/ full feeling. · If you become confused or mentally fuzzy or dizzy. · If you notice a rapid or change in your heart rate. · If you become more exhausted all the time and unable to do the same level of activity without stopping to catch your breath. Drink no more than 8 cups a day in 8 oz. cups. Limit Cola Drinks. Your Heart can not handle any more. Stay away from salt (limit anything with salt or sodium in it). Limit to 250mg per serving. Exercise needs to be started with your Doctors approval.  Reduce stress  Pass post test will make self available post DC ,if an questions arise. Diabetic teaching completed.  Planner/scale(home) @ BS: 60 mins total

## 2017-10-01 LAB
ANION GAP SERPL CALC-SCNC: 9 MMOL/L (ref 7–16)
BUN SERPL-MCNC: 32 MG/DL (ref 8–23)
CALCIUM SERPL-MCNC: 8.4 MG/DL (ref 8.3–10.4)
CHLORIDE SERPL-SCNC: 108 MMOL/L (ref 98–107)
CO2 SERPL-SCNC: 28 MMOL/L (ref 21–32)
CREAT SERPL-MCNC: 2.8 MG/DL (ref 0.8–1.5)
GLUCOSE SERPL-MCNC: 89 MG/DL (ref 65–100)
POTASSIUM SERPL-SCNC: 3.6 MMOL/L (ref 3.5–5.1)
SODIUM SERPL-SCNC: 145 MMOL/L (ref 136–145)

## 2017-10-01 PROCEDURE — 65660000000 HC RM CCU STEPDOWN

## 2017-10-01 PROCEDURE — 94760 N-INVAS EAR/PLS OXIMETRY 1: CPT

## 2017-10-01 PROCEDURE — 74011250637 HC RX REV CODE- 250/637: Performed by: INTERNAL MEDICINE

## 2017-10-01 PROCEDURE — 94640 AIRWAY INHALATION TREATMENT: CPT

## 2017-10-01 PROCEDURE — 80048 BASIC METABOLIC PNL TOTAL CA: CPT | Performed by: PHYSICIAN ASSISTANT

## 2017-10-01 PROCEDURE — 74011250637 HC RX REV CODE- 250/637: Performed by: PHYSICIAN ASSISTANT

## 2017-10-01 PROCEDURE — 74011250636 HC RX REV CODE- 250/636: Performed by: NURSE PRACTITIONER

## 2017-10-01 PROCEDURE — 36415 COLL VENOUS BLD VENIPUNCTURE: CPT | Performed by: PHYSICIAN ASSISTANT

## 2017-10-01 PROCEDURE — 74011000250 HC RX REV CODE- 250: Performed by: PHYSICIAN ASSISTANT

## 2017-10-01 RX ORDER — FUROSEMIDE 40 MG/1
40 TABLET ORAL 2 TIMES DAILY
Status: DISCONTINUED | OUTPATIENT
Start: 2017-10-01 | End: 2017-10-02

## 2017-10-01 RX ORDER — ALBUTEROL SULFATE 90 UG/1
1 AEROSOL, METERED RESPIRATORY (INHALATION)
Status: DISCONTINUED | OUTPATIENT
Start: 2017-10-01 | End: 2017-10-03 | Stop reason: HOSPADM

## 2017-10-01 RX ORDER — FUROSEMIDE 40 MG/1
40 TABLET ORAL
Qty: 60 TAB | Refills: 3 | Status: SHIPPED | OUTPATIENT
Start: 2017-10-01 | End: 2017-10-31

## 2017-10-01 RX ORDER — CLOPIDOGREL BISULFATE 75 MG/1
75 TABLET ORAL DAILY
Status: DISCONTINUED | OUTPATIENT
Start: 2017-10-02 | End: 2017-10-03 | Stop reason: HOSPADM

## 2017-10-01 RX ORDER — FLUTICASONE PROPIONATE AND SALMETEROL 250; 50 UG/1; UG/1
1 POWDER RESPIRATORY (INHALATION) EVERY 12 HOURS
Qty: 1 INHALER | Refills: 3 | Status: SHIPPED | OUTPATIENT
Start: 2017-10-01 | End: 2017-12-26

## 2017-10-01 RX ORDER — POTASSIUM CHLORIDE 20 MEQ/1
40 TABLET, EXTENDED RELEASE ORAL
Status: COMPLETED | OUTPATIENT
Start: 2017-10-01 | End: 2017-10-01

## 2017-10-01 RX ADMIN — ALBUTEROL SULFATE 1 PUFF: 90 AEROSOL, METERED RESPIRATORY (INHALATION) at 23:11

## 2017-10-01 RX ADMIN — ATORVASTATIN CALCIUM 20 MG: 10 TABLET, FILM COATED ORAL at 21:32

## 2017-10-01 RX ADMIN — FUROSEMIDE 40 MG: 10 INJECTION, SOLUTION INTRAMUSCULAR; INTRAVENOUS at 08:29

## 2017-10-01 RX ADMIN — IPRATROPIUM BROMIDE AND ALBUTEROL SULFATE 3 ML: .5; 3 SOLUTION RESPIRATORY (INHALATION) at 02:19

## 2017-10-01 RX ADMIN — FUROSEMIDE 40 MG: 40 TABLET ORAL at 17:42

## 2017-10-01 RX ADMIN — ALBUTEROL SULFATE 1 PUFF: 90 AEROSOL, METERED RESPIRATORY (INHALATION) at 19:44

## 2017-10-01 RX ADMIN — TIOTROPIUM BROMIDE 18 MCG: 18 CAPSULE ORAL; RESPIRATORY (INHALATION) at 11:00

## 2017-10-01 RX ADMIN — PANTOPRAZOLE SODIUM 40 MG: 40 TABLET, DELAYED RELEASE ORAL at 08:28

## 2017-10-01 RX ADMIN — CARVEDILOL 12.5 MG: 12.5 TABLET, FILM COATED ORAL at 08:28

## 2017-10-01 RX ADMIN — ASPIRIN 81 MG: 81 TABLET, COATED ORAL at 08:28

## 2017-10-01 RX ADMIN — POTASSIUM CHLORIDE 40 MEQ: 20 TABLET, EXTENDED RELEASE ORAL at 17:41

## 2017-10-01 RX ADMIN — ALBUTEROL SULFATE 1 PUFF: 90 AEROSOL, METERED RESPIRATORY (INHALATION) at 11:00

## 2017-10-01 RX ADMIN — ALLOPURINOL 100 MG: 100 TABLET ORAL at 08:28

## 2017-10-01 RX ADMIN — CARVEDILOL 12.5 MG: 12.5 TABLET, FILM COATED ORAL at 17:41

## 2017-10-01 RX ADMIN — IPRATROPIUM BROMIDE AND ALBUTEROL SULFATE 3 ML: .5; 3 SOLUTION RESPIRATORY (INHALATION) at 07:14

## 2017-10-01 NOTE — PROGRESS NOTES
Pt's heart rate decreased to 37 for a moment and then sustained in the 40s-50s. Pt asymptomatic. Heart monitor strip placed on chart. Will continue to monitor.

## 2017-10-01 NOTE — DISCHARGE SUMMARY
[address] Cardiology Discharge Summary     Patient ID:  Aline Humphrey  [id number]  89 y.o.  1945    Admit date: 9/29/2017    Discharge date:  10/3/2017    Admitting Physician: Norman Ny MD     Discharge Physician: KWESI Bajwa/Dr. Damien Rios    Admission Diagnoses: chronic systolic and diastolic heart failure, nino  Acute on chronic systolic (congestive) heart failure [facility])    Discharge Diagnoses:   Patient Active Problem List    Diagnosis Date Noted    Acute on chronic systolic (congestive) [facility] failure ([facility] 09/29/2017    Vomiting 09/29/2017    NINO (acute kidney injury) [facility] 09/29/2017    CHF (congestive heart failure) ([facility] 09/27/2017    Mixed hyperlipidemia 09/28/2016    Essential hypertension 09/28/2016    Chronic obstructive pulmonary disease ([facility] 08/04/2016    Gastroesophageal reflux disease without esophagitis 08/04/2016    High triglycerides 01/19/2016    COPD (chronic obstructive pulmonary disease) ([facility] 12/27/2015    Arthritis     Hypertension     Chronic systolic heart failure ([facility] 12/04/2015    Coronary atherosclerosis of native coronary vessel 12/04/2015    Tobacco abuse 07/02/2015    Dyspnea on exertion 06/28/2015    CKD (chronic kidney disease) stage 3, GFR 30-59 ml/min 04/26/2015       Cardiology Procedures this admission:  EchoCardiogram  Consults: Cardiology, Nephrology, Pulmonary     Hospital Course: Patient [facility] with complaints of severe shortness of breath, bilateral LE edema, and weight gain. Symptoms had been worse for 4 days. He noted dark urine as well. He was admitted by the hospitalist and was started on IV lasix. His renal function worsened with diuresis. He was transferred to [facility] on cardiology service for further monitoring. Lasix was decreased. He was seen by nephrology in consultation. Renal ultrasound was unremarkable. Some of his dyspnea was felt due to bronchospasms.  He continued to complain of CLAUDIO with ambulating a few steps to the bathroom. He was given more IV lasix on 10/2 and monitored overnight. He diuresed another 2.6 liters with 7.9 liters of output since transfer from [address] [place] [address]. He was seen by pulmonary with recommendations to continue Albuterol and Spiriva. The morning of 10/3/17, he continued to feel weak but was feeling better with less dyspnea. He was determined stable and ready for discharge. Patient was instructed on the importance of medication compliance, low sodium diet, 2 liter per day fluid restriction and daily weights. For maximized medical therapy of congestive heart failure, patient will continue use of BB and Bidil. ARB was stopped. The patient will have close transitional care follow up with [address] [place] [address] [address] Dr. Valerie De La Garza on October 13th at 8:30am ([facility] He will follow up with [facility] [address] [facility] as directed. **He is discharged with a [employer].     DISPOSITION: The patient is being discharged home in stable condition on a low saturated fat, low cholesterol and low salt diet. The patient is instructed to advance activities as tolerated to the limit of fatigue or shortness of breath. The patient is informed to monitor daily weights and maintain a 2 liter per day fluid restriction. The patient is instructed to call the office for any shortness of breath, weight gain, or increased peripheral edema. Discharge Exam:   Visit Vitals    /73    Pulse 61    Temp 97.8 °F (36.6 °C)    Resp 20    Ht 5' 10\" (1.778 m)    Wt 98.1 kg (216 lb 4.8 oz)    SpO2 100%    BMI 31.04 kg/m2       Patient has been seen by Dr. Valerie De La Garza: see his progress note for exam details.     Recent Results (from the past 24 hour(s))   METABOLIC PANEL, BASIC    Collection Time: 10/03/17  6:12 AM   Result Value Ref Range    Sodium 144 136 - 145 mmol/L    Potassium 3.4 (L) 3.5 - 5.1 mmol/L    Chloride 104 98 - 107 mmol/L    CO2 31 21 - 32 mmol/L    Anion gap 9 7 - 16 mmol/L    Glucose 97 65 - 100 mg/dL    BUN 27 (H) 8 - 23 MG/DL    Creatinine 2.10 (H) 0.8 - 1.5 MG/DL    GFR est AA 40 (L) >60 ml/min/1.73m2    GFR est non-AA 33 (L) >60 ml/min/1.73m2    Calcium 8.6 8.3 - 10.4 MG/DL         Patient Instructions:   Current Discharge Medication List      START taking these medications    Details   isosorbide-hydrALAZINE (BIDIL) 20-37.5 mg per tablet Take 1 Tab by mouth two (2) times a day. Qty: 60 Tab, Refills: 6      tiotropium (SPIRIVA) 18 mcg inhalation capsule Take 1 Cap by inhalation daily. Qty: 30 Cap, Refills: 6      fluticasone-salmeterol (ADVAIR) 250-50 mcg/dose diskus inhaler Take 1 Puff by inhalation every twelve (12) hours. Qty: 1 Inhaler, Refills: 3         CONTINUE these medications which have CHANGED    Details   furosemide (LASIX) 40 mg tablet Take 1 Tab by mouth Before breakfast and dinner. Qty: 60 Tab, Refills: 3         CONTINUE these medications which have NOT CHANGED    Details   diclofenac EC (VOLTAREN) 75 mg EC tablet Take 1 Tab by mouth two (2) times a day. Qty: 10 Tab, Refills: 0      albuterol-ipratropium (DUO-NEB) 2.5 mg-0.5 mg/3 ml nebu 3 mL by Nebulization route four (4) times daily. Qty: 360 mL, Refills: 0    Comments: Patient must make follow appointment with our office if they want further refills. allopurinol (ZYLOPRIM) 100 mg tablet Take 1 Tab by mouth daily. Qty: 30 Tab, Refills: 11      oxyCODONE IR (ROXICODONE) 5 mg immediate release tablet Take 1 Tab by mouth every six (6) hours as needed for Pain. Max Daily Amount: 20 mg.  Qty: 11 Tab, Refills: 0      carvedilol (COREG) 25 mg tablet Take 25 mg by mouth two (2) times daily (with meals). albuterol (VENTOLIN HFA) 90 mcg/actuation inhaler Take 2 Puffs by inhalation four (4) times daily. Qty: 1 Inhaler, Refills: 11      atorvastatin (LIPITOR) 20 mg tablet Take 1 Tab by mouth nightly.   Qty: 90 Tab, Refills: 3    Associated Diagnoses: High triglycerides pantoprazole (PROTONIX) 40 mg tablet Take 1 Tab by mouth Daily (before breakfast). Qty: 90 Tab, Refills: 3    Associated Diagnoses: Gastroesophageal reflux disease without esophagitis      aspirin delayed-release 81 mg tablet Take 1 Tab by mouth daily. Qty: 30 Tab, Refills: 0         STOP taking these medications       losartan (COZAAR) 50 mg tablet Comments:   Reason for Stopping:                 Signed:  Reyes Gomes PA-C  10/3/2017  11:17 AM    Agree with above note. Patient seen and examined.   May go home with early OP f/U.      Judy Pena MD

## 2017-10-01 NOTE — PROGRESS NOTES
[facility]    Follow-Up on: NINO on CKD    HPI: Pt with CLAUDIO and SOB with  Minimal activity    ROS:  Denies CP, SOB.     Current Facility-Administered Medications   Medication Dose Route Frequency    tiotropium (SPIRIVA) inhalation capsule 18 mcg  1 Cap Inhalation DAILY    albuterol (PROVENTIL HFA, VENTOLIN HFA, PROAIR HFA) inhaler 1 Puff  1 Puff Inhalation Q4H RT    albuterol (PROVENTIL HFA, VENTOLIN HFA, PROAIR HFA) inhaler 2 Puff  2 Puff Inhalation Q6H PRN    allopurinol (ZYLOPRIM) tablet 100 mg  100 mg Oral DAILY    aspirin delayed-release tablet 81 mg  81 mg Oral DAILY    atorvastatin (LIPITOR) tablet 20 mg  20 mg Oral QHS    carvedilol (COREG) tablet 12.5 mg  12.5 mg Oral BID WITH MEALS    pantoprazole (PROTONIX) tablet 40 mg  40 mg Oral ACB    nitroglycerin (NITROSTAT) tablet 0.4 mg  0.4 mg SubLINGual Q5MIN PRN    HYDROcodone-acetaminophen (NORCO) 5-325 mg per tablet 1 Tab  1 Tab Oral Q6H PRN    morphine injection 2 mg  2 mg IntraVENous Q4H PRN    acetaminophen (TYLENOL) tablet 650 mg  650 mg Oral Q4H PRN    ondansetron (ZOFRAN) injection 4 mg  4 mg IntraVENous Q4H PRN    furosemide (LASIX) injection 40 mg  40 mg IntraVENous BID       Exam:  Vitals:    10/01/17 0115 10/01/17 0219 10/01/17 0448 10/01/17 0714   BP: 115/90  120/87    Pulse: 61  64    Resp: 20  18    Temp: 97.3 °F (36.3 °C)  97.5 °F (36.4 °C)    SpO2: 99% 97% 97% 95%   Weight:   99.6 kg (219 lb 9.6 oz)    Height:             Intake/Output Summary (Last 24 hours) at 10/01/17 0940  Last data filed at 10/01/17 0403   Gross per 24 hour   Intake              740 ml   Output             1775 ml   Net            -1035 ml     PE:  GEN - in no distress  CV - regular, no murmur, no rub  Lung - clear bilaterally  Abd - soft, nontender  Ext - no edema    Labs  Recent Labs      09/30/17   0601   WBC  6.7   HGB  13.4*   HCT  40.7*   PLT  168     Recent Labs      10/01/17   0557  09/30/17   0601  09/29/17   0559   NA  145  143  142   K  3.6 3.7  3.4*   CL  108*  108*  106   CO2  28  25  26   BUN  32*  37*  34*   CREA  2.80*  3.26*  3.51*   GLU  89  83  80   CA  8.4  8.4  8.4   MG   --    --   1.8     No results for input(s): PH, PCO2, PO2, PCO2 in the last 72 hours. Problem List:  Patient Active Problem List    Diagnosis Date Noted    Acute on chronic systolic [facility]congestive[facility] heart failure [facility] 09/29/2017    Vomiting 09/29/2017    NINO (acute kidney injury) ([facility] 09/29/2017    CHF (congestive heart failure) ([facility] 09/27/2017    Mixed hyperlipidemia 09/28/2016    Essential hypertension 09/28/2016    Chronic obstructive pulmonary disease ([facility] 08/04/2016    Gastroesophageal reflux disease without esophagitis 08/04/2016    High triglycerides 01/19/2016    COPD (chronic obstructive pulmonary disease) ([facility] 12/27/2015    Arthritis     Hypertension     Chronic systolic heart failure ([facility] 12/04/2015    Coronary atherosclerosis of native coronary vessel 12/04/2015    Tobacco abuse 07/02/2015    Dyspnea on exertion 06/28/2015    CKD (chronic kidney disease) stage 3, GFR 30-59 ml/min 04/26/2015       Issues Addressed By Nephrology:    Plan:  1. CHF exacerbation  2. COPD exacerbation  3.  NINO on CKD 2/2 cardiorenal syndrome  Needs Pulmonary toilet and may need home O2  Dose lasix 40 mg po bid  TANA normal (not c/w chronic NSAID use)  From renal stand point can follow up in office for results of tests ordered

## 2017-10-01 NOTE — PROGRESS NOTES
Bedside and Verbal shift change report given to Monica Alexander RN (oncoming nurse) by self Olga cao). Report included the following information SBAR, Kardex, MAR and Recent Results.

## 2017-10-01 NOTE — DISCHARGE INSTRUCTIONS
Avoiding Triggers With Heart Failure: Care Instructions  Your Care Instructions  Triggers are anything that make your heart failure flare up. A flare-up is also called \"sudden heart failure\" or \"acute heart failure. \" When you have a flare-up, fluid builds up in your lungs, and you have problems breathing. You might need to go to the hospital. By watching for changes in your condition and avoiding triggers, you can prevent heart failure flare-ups. Follow-up care is a key part of your treatment and safety. Be sure to make and go to all appointments, and call your doctor if you are having problems. It's also a good idea to know your test results and keep a list of the medicines you take. How can you care for yourself at home? Watch for changes in your weight and condition  · Weigh yourself without clothing at the same time each day. Record your weight. Call your doctor if you have sudden weight gain, such as more than 2 to 3 pounds in a day or 5 pounds in a week. (Your doctor may suggest a different range of weight gain.) A sudden weight gain may mean that your heart failure is getting worse. · Keep a daily record of your symptoms. Write down any changes in how you feel, such as new shortness of breath, cough, or problems eating. Also record if your ankles are more swollen than usual and if you feel more tired than usual. Note anything that you ate or did that could have triggered these changes. Limit sodium  Sodium causes your body to hold on to extra water. This may cause your heart failure symptoms to get worse. People get most of their sodium from processed foods. Fast food and restaurant meals also tend to be very high in sodium. · Your doctor may suggest that you limit sodium to 2,000 milligrams (mg) a day or less. That is less than 1 teaspoon of salt a day, including all the salt you eat in cooking or in packaged foods. · Read food labels on cans and food packages.  They tell you how much sodium you get in one serving. Check the serving size. If you eat more than one serving, you are getting more sodium. · Be aware that sodium can come in forms other than salt, including monosodium glutamate (MSG), sodium citrate, and sodium bicarbonate (baking soda). MSG is often added to Asian food. You can sometimes ask for food without MSG or salt. · Slowly reducing salt will help you adjust to the taste. Take the salt shaker off the table. · Flavor your food with garlic, lemon juice, onion, vinegar, herbs, and spices instead of salt. Do not use soy sauce, steak sauce, onion salt, garlic salt, mustard, or ketchup on your food, unless it is labeled \"low-sodium\" or \"low-salt. \"  · Make your own salad dressings, sauces, and ketchup without adding salt. · Use fresh or frozen ingredients, instead of canned ones, whenever you can. Choose low-sodium canned goods. · Eat less processed food and food from restaurants, including fast food. Exercise as directed  Moderate, regular exercise is very good for your heart. It improves your blood flow and helps control your weight. But too much exercise can stress your heart and cause a heart failure flare-up. · Check with your doctor before you start an exercise program.  · Walking is an easy way to get exercise. Start out slowly. Gradually increase the length and pace of your walk. Swimming, riding a bike, and using a treadmill are also good forms of exercise. · When you exercise, watch for signs that your heart is working too hard. You are pushing yourself too hard if you cannot talk while you are exercising. If you become short of breath or dizzy or have chest pain, stop, sit down, and rest.  · Do not exercise when you do not feel well. Take medicines correctly  · Take your medicines exactly as prescribed. Call your doctor if you think you are having a problem with your medicine. · Make a list of all the medicines you take.  Include those prescribed to you by other doctors and any over-the-counter medicines, vitamins, or supplements you take. Take this list with you when you go to any doctor. · Take your medicines at the same time every day. It may help you to post a list of all the medicines you take every day and what time of day you take them. · Make taking your medicine as simple as you can. Plan times to take your medicines when you are doing other things, such as eating a meal or getting ready for bed. This will make it easier to remember to take your medicines. · Get organized. Use helpful tools, such as daily or weekly pill containers. When should you call for help? Call 911 if you have symptoms of sudden heart failure such as:  · You have severe trouble breathing. · You cough up pink, foamy mucus. · You have a new irregular or rapid heartbeat. Call your doctor now or seek immediate medical care if:  · You have new or increased shortness of breath. · You are dizzy or lightheaded, or you feel like you may faint. · You have sudden weight gain, such as more than 2 to 3 pounds in a day or 5 pounds in a week. (Your doctor may suggest a different range of weight gain.)  · You have increased swelling in your legs, ankles, or feet. · You are suddenly so tired or weak that you cannot do your usual activities. Watch closely for changes in your health, and be sure to contact your doctor if you develop new symptoms. Where can you learn more? Go to http://jaime-jarrell.info/. Enter Q234 in the search box to learn more about \"Avoiding Triggers With Heart Failure: Care Instructions. \"  Current as of: February 23, 2017  Content Version: 11.3  © 7242-4774 [employer]. Care instructions adapted under license by [employer] (which disclaims liability or warranty for this information).  If you have questions about a medical condition or this instruction, always ask your healthcare professional. Ying Sánchez disclaims any warranty or liability for your use of this information. Learning About Chronic Kidney Disease  What is chronic kidney disease? Chronic kidney disease means your kidneys have not worked right for a while. Your kidneys have an important job. They remove waste and extra fluid from your blood. This waste and fluid goes out of your body in your urine. When your kidneys don't work as they should, wastes build up in your blood. This makes you sick. High blood pressure and diabetes can cause kidney damage. Other causes include kidney infections and some medicines. Chronic kidney disease is also called chronic renal failure. Or it may be called chronic renal insufficiency. How is chronic kidney disease diagnosed? · Your doctor will do blood and urine tests to check your kidney function. This will help your doctor see how well your kidneys filter your blood. · Your doctor will ask you about past kidney problems. He or she will ask if you have a family history of kidney disease. Your doctor will also want to know what medicines you take. This includes prescription and over-the-counter medicines. · You may have a test, such as an ultrasound or CT scan. These tests let your doctor look at a picture of your kidneys. This can help your doctor measure the size of your kidneys and see if anything is blocking your urine flow. · In some cases, your doctor may take a tiny sample of kidney tissue. This is called a biopsy. It helps the doctor find out what caused the kidney disease. What are the symptoms? You may not have symptoms if your disease is mild. If you lose more kidney function, you may:  · Have swelling and weight gain. This is from the extra fluid in your tissues. It is called edema (say \"ih-NATHALIE-muh\"). · Often feel sick to your stomach (nauseated) or vomit. · Have trouble sleeping. · Urinate less than normal.  · Have trouble thinking clearly. · Feel very tired.   What can you expect when you have chronic kidney disease? · In the early stages of the disease, your kidneys are still able to regulate the fluids, salts, and waste products in your body. But if you keep losing kidney function, you may start to have problems, or complications. · How long it takes for the kidney disease to get worse depends on your condition. Sometimes it gets worse very slowly over many years. Or it may get worse more quickly. · When kidney function falls below a certain point, it is called kidney failure. Kidney failure affects your whole body. It can cause serious heart, bone, and brain problems and make you feel very ill. Untreated kidney failure can cause death. How is it treated? Manage your health problems  · If you have diabetes, it's important to control your blood sugar level with diet, exercise, and medicines. If your blood sugar level is too high for too long, it can damage your kidneys. · If you have high blood pressure, it's important to control it with diet, exercise, and any medicines your doctor prescribes. · Avoid long-term use of medicines that can damage the kidneys. These medicines include nonsteroidal anti-inflammatory drugs. Examples of these are ibuprofen (Advil) and celecoxib (Celebrex). Treat kidney complications  · If your doctor put you on a special diet, stay on the diet. · If you have kidney failure, you'll probably have two treatment choices. Your doctor may recommend that you start kidney dialysis to filter wastes and extra fluid from your blood. Or it may be better to get a new kidney (transplant). Both treatments have risks and benefits. Talk with your doctor to decide which is best for you. Practice healthy habits  · If your doctor recommends it, get more exercise. Walking is a good choice. Bit by bit, increase the amount you walk every day. Try for at least 30 minutes on most days of the week. · Don't smoke. Smoking can make chronic kidney disease worse.  If you need help quitting, talk to your doctor about stop-smoking programs and medicines. These can increase your chances of quitting for good. · Don't drink alcohol. Follow-up care is a key part of your treatment and safety. Be sure to make and go to all appointments, and call your doctor if you are having problems. It's also a good idea to know your test results and keep a list of the medicines you take. Where can you learn more? Go to http://jaime-jarrell.info/. Enter 0650 [id number] in the search box to learn more about \"Learning About Chronic Kidney Disease. \"  Current as of: April 3, 2017  Content Version: 11.3  © 5932-1702 [employer]. Care instructions adapted under license by [employer] (which disclaims liability or warranty for this information). If you have questions about a medical condition or this instruction, always ask your healthcare professional. [address] any warranty or liability for your use of this information.

## 2017-10-01 NOTE — PROGRESS NOTES
Problem: Falls - Risk of  Goal: *Absence of Falls  Document Sue Fall Risk and appropriate interventions in the flowsheet. Outcome: Progressing Towards Goal  Fall Risk Interventions:  Mobility Interventions: Bed/chair exit alarm, Communicate number of staff needed for ambulation/transfer, Patient to call before getting OOB           Medication Interventions: Bed/chair exit alarm, Patient to call before getting OOB, Teach patient to arise slowly     Elimination Interventions: Bed/chair exit alarm     History of Falls Interventions: Bed/chair exit alarm     Pt progressing towards goal. No falls since admission. Bed low and locked. Call light within reach. Side rails x 2. Gripper socks applied. Personal belongings within reach. Pt verbalizes understanding to call for assistance.

## 2017-10-01 NOTE — PROGRESS NOTES
Pt with continued CLAUDIO and noted desaturations with ambulation. Discharge is postponed.  He will be reassessed in AM. He may need home Javon Purdy PA-C

## 2017-10-01 NOTE — PROGRESS NOTES
[facility] CARDIOLOGY PROGRESS NOTE           10/1/2017 10:32 AM    Admit Date: 9/29/2017      Subjective:       Breathing improved but some issues. Review of Systems   Constitutional: Negative for fever. HENT: Negative for hearing loss. Respiratory: Negative for cough. Cardiovascular: Negative for chest pain. Genitourinary: Negative for dysuria. Musculoskeletal: Negative for myalgias. Skin: Negative for rash. Neurological: Negative for dizziness. Psychiatric/Behavioral: Negative for depression. Objective:      Vitals:    10/01/17 0714 10/01/17 0920 10/01/17 1100 10/01/17 1339   BP:  117/78  134/76   Pulse:  64  90   Resp:  18  18   Temp:  96.8 °F (36 °C)  96.8 °F (36 °C)   SpO2: 95% 96% 97% 99%   Weight:       Height:             Physical Exam   Constitutional: He is oriented to person, place, and time. He appears well-developed. HENT:   Head: Normocephalic and atraumatic. Eyes: Pupils are equal, round, and reactive to light. Neck: Normal range of motion. Cardiovascular: Normal rate. Pulmonary/Chest: Effort normal.   Abdominal: Soft. He exhibits distension. There is no tenderness. Musculoskeletal: He exhibits no edema. Neurological: He is alert and oriented to person, place, and time. No cranial nerve deficit. Skin: Skin is warm and dry. No rash noted. No erythema. Psychiatric: He has a normal mood and affect.  His behavior is normal.       Data Review:   Recent Labs      10/01/17   0557  09/30/17   0601  09/29/17   0559   NA  145  143  142   K  3.6  3.7  3.4*   MG   --    --   1.8   BUN  32*  37*  34*   CREA  2.80*  3.26*  3.51*   GLU  89  83  80   WBC   --   6.7   --    HGB   --   13.4*   --    HCT   --   40.7*   --    PLT   --   168   --          Intake/Output Summary (Last 24 hours) at 10/01/17 1547  Last data filed at 10/01/17 1138   Gross per 24 hour   Intake              740 ml   Output             3675 ml   Net            -2935 ml     Current Facility-Administered Medications   Medication Dose Route Frequency    tiotropium (SPIRIVA) inhalation capsule 18 mcg  1 Cap Inhalation DAILY    albuterol (PROVENTIL HFA, VENTOLIN HFA, PROAIR HFA) inhaler 1 Puff  1 Puff Inhalation Q4H RT    furosemide (LASIX) tablet 40 mg  40 mg Oral BID    albuterol (PROVENTIL HFA, VENTOLIN HFA, PROAIR HFA) inhaler 2 Puff  2 Puff Inhalation Q6H PRN    allopurinol (ZYLOPRIM) tablet 100 mg  100 mg Oral DAILY    aspirin delayed-release tablet 81 mg  81 mg Oral DAILY    atorvastatin (LIPITOR) tablet 20 mg  20 mg Oral QHS    carvedilol (COREG) tablet 12.5 mg  12.5 mg Oral BID WITH MEALS    pantoprazole (PROTONIX) tablet 40 mg  40 mg Oral ACB    nitroglycerin (NITROSTAT) tablet 0.4 mg  0.4 mg SubLINGual Q5MIN PRN    HYDROcodone-acetaminophen (NORCO) 5-325 mg per tablet 1 Tab  1 Tab Oral Q6H PRN    morphine injection 2 mg  2 mg IntraVENous Q4H PRN    acetaminophen (TYLENOL) tablet 650 mg  650 mg Oral Q4H PRN    ondansetron (ZOFRAN) injection 4 mg  4 mg IntraVENous Q4H PRN           Assessment/Plan:     1. Acute on chronic systolic heart failure worsening left ventricular systolic function previous cardiac catheterization with no obstructive coronary disease due to renal dysfunction and ischemic evaluation to be delayed. 2. Demand ischemia elevated troponin not in a pattern of plaque rupture treatment with heparin 48 hour period aspirin Plavix. 3. Atherosclerotic coronary disease previous cardiac catheterization report reviewed nonobstructive disease. 4. Acute kidney injury most likely due to overdiuresis non-oliguric renal failure creatinine improving renal workup for intrinsic renal disease initiated. Diuretics held now on oral therapy. Renal fnction improving. Hypokalemia will replete. 5. COPD ? undiagnosed pulm meds started patient having issues and will obtain pulm consult tomorrow.        Ezio Andrade MD  10/1/2017 10:32 AM

## 2017-10-01 NOTE — PROGRESS NOTES
O2 saturation sitting on RA 96%.     Ambulatory Saturation on RA 82% -88%  Post Ambulation Saturation sitting on RA 95%

## 2017-10-02 ENCOUNTER — APPOINTMENT (OUTPATIENT)
Dept: GENERAL RADIOLOGY | Age: 72
DRG: 291 | End: 2017-10-02
Attending: INTERNAL MEDICINE
Payer: MEDICARE

## 2017-10-02 PROBLEM — E78.1 HIGH TRIGLYCERIDES: Chronic | Status: ACTIVE | Noted: 2017-09-29

## 2017-10-02 PROBLEM — N18.30 CKD (CHRONIC KIDNEY DISEASE) STAGE 3, GFR 30-59 ML/MIN (HCC): Chronic | Status: ACTIVE | Noted: 2017-09-29

## 2017-10-02 PROBLEM — I10 HYPERTENSION: Chronic | Status: ACTIVE | Noted: 2017-09-29

## 2017-10-02 PROBLEM — J44.9 COPD (CHRONIC OBSTRUCTIVE PULMONARY DISEASE) (HCC): Chronic | Status: ACTIVE | Noted: 2017-09-29

## 2017-10-02 PROBLEM — I25.10 CORONARY ATHEROSCLEROSIS OF NATIVE CORONARY VESSEL: Chronic | Status: ACTIVE | Noted: 2017-09-29

## 2017-10-02 PROBLEM — K21.9 GASTROESOPHAGEAL REFLUX DISEASE WITHOUT ESOPHAGITIS: Chronic | Status: ACTIVE | Noted: 2017-09-29

## 2017-10-02 PROBLEM — G47.33 OSA (OBSTRUCTIVE SLEEP APNEA): Status: ACTIVE | Noted: 2017-10-02

## 2017-10-02 LAB
ALBUMIN SERPL ELPH-MCNC: 3.32 G/DL (ref 3.2–5.6)
ALBUMIN/GLOB SERPL: 1 {RATIO}
ALPHA1 GLOB SERPL ELPH-MCNC: 0.25 G/DL (ref 0.1–0.4)
ALPHA2 GLOB SERPL ELPH-MCNC: 0.73 G/DL (ref 0.4–1.2)
ANION GAP SERPL CALC-SCNC: 8 MMOL/L (ref 7–16)
B-GLOBULIN SERPL QL ELPH: 1.01 G/DL (ref 0.6–1.3)
BNP SERPL-MCNC: 1073 PG/ML
BUN SERPL-MCNC: 28 MG/DL (ref 8–23)
CALCIUM SERPL-MCNC: 8.5 MG/DL (ref 8.3–10.4)
CHLORIDE SERPL-SCNC: 110 MMOL/L (ref 98–107)
CO2 SERPL-SCNC: 27 MMOL/L (ref 21–32)
CREAT SERPL-MCNC: 2.22 MG/DL (ref 0.8–1.5)
GAMMA GLOB MFR SERPL ELPH: 1.19 G/DL (ref 0.5–1.6)
GLUCOSE SERPL-MCNC: 124 MG/DL (ref 65–100)
IGA SERPL-MCNC: 369 MG/DL (ref 85–499)
IGG SERPL-MCNC: 1124 MG/DL (ref 610–1616)
IGM SERPL-MCNC: 63 MG/DL (ref 35–242)
KAPPA LC FREE SER-MCNC: 55.13 MG/L (ref 3.3–19.4)
KAPPA LC FREE/LAMBDA FREE SER: 1.69 {RATIO} (ref 0.26–1.65)
LAMBDA LC FREE SERPL-MCNC: 32.7 MG/L (ref 5.71–26.3)
M PROTEIN SERPL ELPH-MCNC: NORMAL G/DL
POTASSIUM SERPL-SCNC: 3.9 MMOL/L (ref 3.5–5.1)
PROT PATTERN SERPL ELPH-IMP: NORMAL
PROT PATTERN SPEC IFE-IMP: NORMAL
PROT SERPL-MCNC: 6.5 G/DL (ref 6.3–8.2)
SODIUM SERPL-SCNC: 145 MMOL/L (ref 136–145)

## 2017-10-02 PROCEDURE — 74011250637 HC RX REV CODE- 250/637: Performed by: INTERNAL MEDICINE

## 2017-10-02 PROCEDURE — 99223 1ST HOSP IP/OBS HIGH 75: CPT | Performed by: INTERNAL MEDICINE

## 2017-10-02 PROCEDURE — 94640 AIRWAY INHALATION TREATMENT: CPT

## 2017-10-02 PROCEDURE — 94760 N-INVAS EAR/PLS OXIMETRY 1: CPT

## 2017-10-02 PROCEDURE — 65660000000 HC RM CCU STEPDOWN

## 2017-10-02 PROCEDURE — 80048 BASIC METABOLIC PNL TOTAL CA: CPT | Performed by: PHYSICIAN ASSISTANT

## 2017-10-02 PROCEDURE — 74011250637 HC RX REV CODE- 250/637: Performed by: PHYSICIAN ASSISTANT

## 2017-10-02 PROCEDURE — 74011250636 HC RX REV CODE- 250/636: Performed by: PHYSICIAN ASSISTANT

## 2017-10-02 PROCEDURE — 71020 XR CHEST PA LAT: CPT

## 2017-10-02 PROCEDURE — 36415 COLL VENOUS BLD VENIPUNCTURE: CPT | Performed by: PHYSICIAN ASSISTANT

## 2017-10-02 PROCEDURE — 83880 ASSAY OF NATRIURETIC PEPTIDE: CPT | Performed by: INTERNAL MEDICINE

## 2017-10-02 RX ORDER — HYDRALAZINE HYDROCHLORIDE 25 MG/1
25 TABLET, FILM COATED ORAL 3 TIMES DAILY
Status: DISCONTINUED | OUTPATIENT
Start: 2017-10-02 | End: 2017-10-02

## 2017-10-02 RX ORDER — FUROSEMIDE 10 MG/ML
40 INJECTION INTRAMUSCULAR; INTRAVENOUS 2 TIMES DAILY
Status: COMPLETED | OUTPATIENT
Start: 2017-10-02 | End: 2017-10-02

## 2017-10-02 RX ORDER — FUROSEMIDE 40 MG/1
40 TABLET ORAL 2 TIMES DAILY
Status: DISCONTINUED | OUTPATIENT
Start: 2017-10-03 | End: 2017-10-03 | Stop reason: HOSPADM

## 2017-10-02 RX ADMIN — ATORVASTATIN CALCIUM 20 MG: 10 TABLET, FILM COATED ORAL at 21:53

## 2017-10-02 RX ADMIN — CARVEDILOL 12.5 MG: 12.5 TABLET, FILM COATED ORAL at 07:33

## 2017-10-02 RX ADMIN — ONDANSETRON 4 MG: 2 INJECTION INTRAMUSCULAR; INTRAVENOUS at 11:26

## 2017-10-02 RX ADMIN — FUROSEMIDE 40 MG: 10 INJECTION, SOLUTION INTRAMUSCULAR; INTRAVENOUS at 11:26

## 2017-10-02 RX ADMIN — CLOPIDOGREL BISULFATE 75 MG: 75 TABLET ORAL at 08:59

## 2017-10-02 RX ADMIN — TIOTROPIUM BROMIDE 18 MCG: 18 CAPSULE ORAL; RESPIRATORY (INHALATION) at 07:14

## 2017-10-02 RX ADMIN — ALBUTEROL SULFATE 1 PUFF: 90 AEROSOL, METERED RESPIRATORY (INHALATION) at 07:13

## 2017-10-02 RX ADMIN — FUROSEMIDE 40 MG: 10 INJECTION, SOLUTION INTRAMUSCULAR; INTRAVENOUS at 18:05

## 2017-10-02 RX ADMIN — FUROSEMIDE 40 MG: 40 TABLET ORAL at 08:59

## 2017-10-02 RX ADMIN — ALBUTEROL SULFATE 1 PUFF: 90 AEROSOL, METERED RESPIRATORY (INHALATION) at 15:20

## 2017-10-02 RX ADMIN — ASPIRIN 81 MG: 81 TABLET, COATED ORAL at 08:59

## 2017-10-02 RX ADMIN — PANTOPRAZOLE SODIUM 40 MG: 40 TABLET, DELAYED RELEASE ORAL at 07:33

## 2017-10-02 RX ADMIN — ACETAMINOPHEN 650 MG: 325 TABLET ORAL at 21:53

## 2017-10-02 RX ADMIN — CARVEDILOL 12.5 MG: 12.5 TABLET, FILM COATED ORAL at 16:59

## 2017-10-02 RX ADMIN — ALBUTEROL SULFATE 1 PUFF: 90 AEROSOL, METERED RESPIRATORY (INHALATION) at 11:06

## 2017-10-02 RX ADMIN — ALLOPURINOL 100 MG: 100 TABLET ORAL at 08:59

## 2017-10-02 RX ADMIN — ALBUTEROL SULFATE 1 PUFF: 90 AEROSOL, METERED RESPIRATORY (INHALATION) at 19:27

## 2017-10-02 NOTE — CONSULTS
CONSULT NOTE    Alize Corey    10/2/2017    Date of Admission:  9/29/2017    The patient's chart is reviewed and the patient is discussed with the staff. Subjective:     Patient is a 74 y.o.  male seen and evaluated at the request of Dr. Priyank Herrera, cardiology. He presented to [facility] 9/27, admitted by hospitalist service and transferred downtown for acute systolic heart failure with BNP 1576 and creatinine 2.25. He had shortness of breath and peripheral edema. Was given Lasix for diuresis by creatinine went up to 3.51. Nephrology consulted for worsening renal function. We have been asked to see for COPD and TAZ. Sleep study 10/10/16 with severe TAZ and CPAP 9 cm recommended but he states he has never received a machine. He is followed by PCP and prescribed Duoneb that he uses 3-4 time daily. Has rare wheezing and no significant sputum production. He smoked for about 8-10 years less than 1/2 PPD. Was last seen in our office 9/30/2016. Has medical problems:  HLD, HTN, COPD, TAZ, systolic heart failure, CAD/MI, CKD.     Review of Systems  A comprehensive review of systems was negative except for: Constitutional: positive for fatigue and malaise  Respiratory: positive for wheezing or dyspnea on exertion  Cardiovascular: positive for fatigue, lower extremity edema, dyspnea on exertion  Gastrointestinal: positive for reflux symptoms    Patient Active Problem List   Diagnosis Code    CKD (chronic kidney disease) stage 3, GFR 30-59 ml/min N18.3    Dyspnea on exertion R06.09    Tobacco abuse Z72.0    Chronic systolic heart failure (HCC) I50.22    Coronary atherosclerosis of native coronary vessel I25.10    Arthritis M19.90    Hypertension I10    COPD (chronic obstructive pulmonary disease) (HCC) J44.9    High triglycerides E78.1    Chronic obstructive pulmonary disease (HCC) J44.9    Gastroesophageal reflux disease without esophagitis K21.9    Mixed hyperlipidemia E78.2    Essential hypertension I10    CHF (congestive heart failure) ([facility]) I50.9    Acute on chronic systolic (congestive) heart failure I50.23    Vomiting R11.10    NINO (acute kidney injury) ([facility] N17.9       Home DME company none. Prior to Admission Medications   Prescriptions Last Dose Informant Patient Reported? Taking? albuterol (VENTOLIN HFA) 90 mcg/actuation inhaler   No No   Sig: Take 2 Puffs by inhalation four (4) times daily. albuterol-ipratropium (DUO-NEB) 2.5 mg-0.5 mg/3 ml nebu   No No   Sig: 3 mL by Nebulization route four (4) times daily. allopurinol (ZYLOPRIM) 100 mg tablet   No No   Sig: Take 1 Tab by mouth daily. aspirin delayed-release 81 mg tablet   No No   Sig: Take 1 Tab by mouth daily. atorvastatin (LIPITOR) 20 mg tablet   No No   Sig: Take 1 Tab by mouth nightly. carvedilol (COREG) 25 mg tablet   Yes No   Sig: Take 25 mg by mouth two (2) times daily (with meals). diclofenac EC (VOLTAREN) 75 mg EC tablet   No No   Sig: Take 1 Tab by mouth two (2) times a day. losartan (COZAAR) 50 mg tablet   No No   Sig: Take 1 Tab by mouth daily. oxyCODONE IR (ROXICODONE) 5 mg immediate release tablet   No No   Sig: Take 1 Tab by mouth every six (6) hours as needed for Pain. Max Daily Amount: 20 mg.   pantoprazole (PROTONIX) 40 mg tablet   No No   Sig: Take 1 Tab by mouth Daily (before breakfast).       Facility-Administered Medications: None       Past Medical History:   Diagnosis Date    [facility] 4/25/2015    Acute combined systolic and diastolic congestive heart failure ([facility] 4/28/2015    Chronic obstructive pulmonary disease (HCC)     De Quervain's tenosynovitis, right 4/28/2015    Dyspnea on exertion 6/28/2015    Elevated serum creatinine 4/25/2015    Elevated troponin 6/28/2015    History of coronary artery disease     MI at 27 yo    History of right knee surgery     cartilage removal    History of shingles     Malignant hypertension 4/25/2015    MI ([facility])      Past Surgical History:   Procedure Laterality Date    HX HEART CATHETERIZATION  6/29/2015    no intervention    HX KNEE ARTHROSCOPY Right     removal of cartilage     Social History     Social History    Marital status:      Spouse name: N/A    Number of children: N/A    Years of education: N/A     Occupational History    retired      Social History Main Topics    Smoking status: Former Smoker     Packs/day: 0.25     Years: 20.00     Types: Cigarettes     Quit date: 4/5/2015    Smokeless tobacco: Never Used    Alcohol use No    Drug use: No    Sexual activity: Not on file     Other Topics Concern     Service No    Blood Transfusions No     no issues with receiving    Caffeine Concern No    Special Diet No    Exercise No    Seat Belt Yes     Social History Narrative    Lives with his wife     Family History   Problem Relation Age of Onset    Hypertension Mother     No Known Problems Father      Allergies   Allergen Reactions    Pcn [Penicillins] Other (comments)     \"makes my heart stop\"       Current Facility-Administered Medications   Medication Dose Route Frequency    [START ON 10/3/2017] furosemide (LASIX) tablet 40 mg  40 mg Oral BID    furosemide (LASIX) injection 40 mg  40 mg IntraVENous BID    tiotropium (SPIRIVA) inhalation capsule 18 mcg  1 Cap Inhalation DAILY    albuterol (PROVENTIL HFA, VENTOLIN HFA, PROAIR HFA) inhaler 1 Puff  1 Puff Inhalation Q4H RT    clopidogrel (PLAVIX) tablet 75 mg  75 mg Oral DAILY    albuterol (PROVENTIL HFA, VENTOLIN HFA, PROAIR HFA) inhaler 2 Puff  2 Puff Inhalation Q6H PRN    allopurinol (ZYLOPRIM) tablet 100 mg  100 mg Oral DAILY    aspirin delayed-release tablet 81 mg  81 mg Oral DAILY    atorvastatin (LIPITOR) tablet 20 mg  20 mg Oral QHS    carvedilol (COREG) tablet 12.5 mg  12.5 mg Oral BID WITH MEALS    pantoprazole (PROTONIX) tablet 40 mg  40 mg Oral ACB    nitroglycerin (NITROSTAT) tablet 0.4 mg  0.4 mg SubLINGual Q5MIN PRN    HYDROcodone-acetaminophen (NORCO) 5-325 mg per tablet 1 Tab  1 Tab Oral Q6H PRN    morphine injection 2 mg  2 mg IntraVENous Q4H PRN    acetaminophen (TYLENOL) tablet 650 mg  650 mg Oral Q4H PRN    ondansetron (ZOFRAN) injection 4 mg  4 mg IntraVENous Q4H PRN         Objective:     Vitals:    10/02/17 0110 10/02/17 0545 10/02/17 0714 10/02/17 1057   BP: 122/77 115/78  129/85   Pulse: 66 67  69   Resp: 18 18  18   Temp: 97.3 °F (36.3 °C) 97.6 °F (36.4 °C)  98.3 °F (36.8 °C)   SpO2: 100% 100% 97% 99%   Weight:  220 lb 4.8 oz (99.9 kg)     Height:           PHYSICAL EXAM     Constitutional:  the patient is well developed and in no acute distress, NC 2l, sat 94%  EENMT:  Sclera clear, pupils equal, oral mucosa moist  Respiratory: clear anterior and posterior, no wheezing   Cardiovascular:  RRR without M,G,R  Gastrointestinal: soft and non-tender; with positive bowel sounds. Musculoskeletal: warm without cyanosis. There is trace lower leg edema. Skin:  no jaundice or rashes, no wounds   Neurologic: no gross neuro deficits     Psychiatric:  alert and oriented x 3    ECHO:    -  Left ventricle: Systolic function was moderately reduced. EF 30 % to 35 %. There was moderate diffuse hypokinesis. There was severe concentric hypertrophy. -  Right ventricle: The ventricle was mildly dilated. Systolic function was reduced. -  Left atrium: The atrium was moderately dilated. -  Right atrium: The atrium was moderately dilated. -  Inferior vena cava, hepatic veins: The inferior vena cava was mildly dilated. -  Mitral valve: There was mild regurgitation. -  Tricuspid valve: There was mild to moderate regurgitation.     CXR 10/2/17:        Recent Labs      09/30/17   0601   WBC  6.7   HGB  13.4*   HCT  40.7*   PLT  168     Recent Labs      10/02/17   0516  10/01/17   0557  09/30/17   0601   NA  145  145  143   K  3.9  3.6  3.7   CL  110*  108*  108*   GLU  124*  89  83 CO2  27  28  25   BUN  28*  32*  37*   CREA  2.22*  2.80*  3.26*   CA  8.5  8.4  8.4     No results for input(s): PH, PCO2, PO2, HCO3 in the last 72 hours. No results for input(s): LCAD, LAC in the last 72 hours. Assessment:  (Medical Decision Making)     Hospital Problems  Date Reviewed: 10/2/2017          Codes Class Noted POA    CKD (chronic kidney disease) stage 3, GFR 30-59 ml/min (Chronic) ICD-10-CM: N18.3  ICD-9-CM: 585.3  9/29/2017 Yes    chronic    Coronary atherosclerosis of native coronary vessel (Chronic) ICD-10-CM: I25.10  ICD-9-CM: 414.01  9/29/2017 Yes    Chronic--per cardiology    Hypertension (Chronic) ICD-10-CM: I10  ICD-9-CM: 401.9  9/29/2017 Yes    controlled     COPD (chronic obstructive pulmonary disease) ([facility] (Chronic) ICD-10-CM: J44.9  ICD-9-CM: 496  9/29/2017 Yes     Complete PFTs: 7/20/15:  Spirometry is consistent with a moderate obstructive/restrictive defect. . The residual volume is increased relative to other lung volumes suggesting air-trapping. The diffusion capacity corrected for alveolar volume was normal suggesting no loss of alveolo-capillary units.           chronic    High triglycerides (Chronic) ICD-10-CM: E78.1  ICD-9-CM: 272.1  9/29/2017 Yes    chronic    Gastroesophageal reflux disease without esophagitis (Chronic) ICD-10-CM: K21.9  ICD-9-CM: 530.81  9/29/2017 Yes    Chronic-No complaints    * (Principal)Acute on chronic systolic (congestive) heart failure ICD-10-CM: I50.23  ICD-9-CM: 428.23, 428.0  9/29/2017 Yes    Per cardiology    Vomiting ICD-10-CM: R11.10  ICD-9-CM: 787.03  9/29/2017 Yes    Nausea today but no voimiting    NINO (acute kidney injury) ([facility] ICD-10-CM: N17.9  ICD-9-CM: 584.9  9/29/2017 Yes    Per nephrology    Mixed hyperlipidemia (Chronic) ICD-10-CM: C12.6  ICD-9-CM: 272.2  9/28/2016 Yes    chronic          Plan:  (Medical Decision Making)     --Has never gotten a CPAP machine--contacting sleep lab  --Albuterol, Spiriva--continue  --Lasix 40mg IV BID--diuresing  --Nephrology following--creatinine 2.22  --Will need to determine O2 requirements prior to discharge     More than 50% of the time documented was spent in face-to-face contact with the patient and in the care of the patient on the floor/unit where the patient is located. Thank you very much for this referral.  We appreciate the opportunity to participate in this patient's care. Will follow along with above stated plan. Neisha Perdomo NP  Lungs:  clear  Heart:  RRR with no Murmur/Rubs/Gallops    Additional Comments:  pfts from 2015 appear more restrictive to me but he has had wheezing when seen in the office  Sleep study 2016 showed severe yolanda but he has never had  cpap machine-will try to arrange    I have spoken with and examined the patient. I agree with the above assessment and plan as documented.     Katrin Pena MD

## 2017-10-02 NOTE — PROGRESS NOTES
Patient assessed for sleep disordered breathing. Patient had sleep study in October 2016 AHI 36.7. CPAP of 9cmH2O was ordered through [employer] on 10/12/2016. Per patient and family in room patient never received CPAP, never began treatment and did not have follow-up due to cost.  Rhys has [employer]. Might want to try [facility] [employer] due to insurance.

## 2017-10-02 NOTE — PROGRESS NOTES
Problem: Falls - Risk of  Goal: *Absence of Falls  Document Sue Fall Risk and appropriate interventions in the flowsheet.    Outcome: Progressing Towards Goal  Fall Risk Interventions:  Mobility Interventions: Patient to call before getting OOB           Medication Interventions: Teach patient to arise slowly     Elimination Interventions: Call light in reach     History of Falls Interventions: Bed/chair exit alarm

## 2017-10-02 NOTE — PROGRESS NOTES
[facility] CARDIOLOGY PROGRESS NOTE           10/2/2017 7:21 AM    Admit Date: 9/29/2017      Subjective:   He complains of intermittent dyspnea. ROS:  GEN:  No fever or chills  Cardiovascular:  As noted above:no CP or palpitations. Pulmonary:  As noted above  Neuro:  No new focal motor or sensory loss    Objective:      Vitals:    10/01/17 2313 10/02/17 0110 10/02/17 0545 10/02/17 0714   BP:  122/77 115/78    Pulse:  66 67    Resp:  18 18    Temp:  97.3 °F (36.3 °C) 97.6 °F (36.4 °C)    SpO2: 94% 100% 100% 97%   Weight:   99.9 kg (220 lb 4.8 oz)    Height:           Physical Exam:  General-no distress  Neck- supple, no JVD  CV- regular rate and rhythm no MRG  Lung- minimal crackles at bases bilaterally  Abd- soft, nontender, nondistended  Ext- no edema bilaterally. Skin- warm and dry  Psychiatric:  Normal mood and affect. Neurologic:  Alert and oriented X 3      Data Review:   Recent Labs      10/02/17   0516  10/01/17   0557  09/30/17   0601   NA  145  145  143   K  3.9  3.6  3.7   BUN  28*  32*  37*   CREA  2.22*  2.80*  3.26*   GLU  124*  89  83   WBC   --    --   6.7   HGB   --    --   13.4*   HCT   --    --   40.7*   PLT   --    --   168       TELEMETRY:  NSR    Assessment/Plan:     Principal Problem:    Acute on chronic systolic (congestive) heart failure (9/29/2017):He continues to complain of intermittent dyspnea. Renal failure is improving. Avoiding ACEI,ARB,Entresto due to renal failure. Continue po Lasix and Coreg. Check BNP and CXR today. ?Future ICD . Life vest at discharge. Active Problems:    CKD (chronic kidney disease) stage 3, GFR 30-59 ml/min (4/26/2015): Improving. Continue po Lasix. Coronary atherosclerosis of native coronary vessel (12/4/2015):Continue ASA ans Plavix. Hx of mild nonobstructive CAD. Consider an outpatient [place] [facility] if additional ischemic work up is required. Hypertension ():Continuje Coreg.       COPD (chronic obstructive pulmonary disease) ([facility] (12/27/2015)      Overview: Complete PFTs: 7/20/15:      Spirometry is consistent with a moderate obstructive/restrictive defect. .       The residual volume is increased relative to other lung volumes suggesting       air-trapping. The diffusion capacity corrected for alveolar volume was       normal suggesting no loss of alveolo-capillary units. Gastroesophageal reflux disease without esophagitis (8/4/2016)      Mixed hyperlipidemia (9/28/2016)      Vomiting (9/29/2017):resolved. NINO (acute kidney injury) ([facility] (9/29/2017): Improving. BMP in am.                Chelita Sebastian MD  10/2/2017 7:21 AM

## 2017-10-02 NOTE — PROGRESS NOTES
Subjective:   Daily Progress Note: 10/2/2017 12:34 PM    No complaints    Current Facility-Administered Medications   Medication Dose Route Frequency    [START ON 10/3/2017] furosemide (LASIX) tablet 40 mg  40 mg Oral BID    furosemide (LASIX) injection 40 mg  40 mg IntraVENous BID    tiotropium (SPIRIVA) inhalation capsule 18 mcg  1 Cap Inhalation DAILY    albuterol (PROVENTIL HFA, VENTOLIN HFA, PROAIR HFA) inhaler 1 Puff  1 Puff Inhalation Q4H RT    clopidogrel (PLAVIX) tablet 75 mg  75 mg Oral DAILY    albuterol (PROVENTIL HFA, VENTOLIN HFA, PROAIR HFA) inhaler 2 Puff  2 Puff Inhalation Q6H PRN    allopurinol (ZYLOPRIM) tablet 100 mg  100 mg Oral DAILY    aspirin delayed-release tablet 81 mg  81 mg Oral DAILY    atorvastatin (LIPITOR) tablet 20 mg  20 mg Oral QHS    carvedilol (COREG) tablet 12.5 mg  12.5 mg Oral BID WITH MEALS    pantoprazole (PROTONIX) tablet 40 mg  40 mg Oral ACB    nitroglycerin (NITROSTAT) tablet 0.4 mg  0.4 mg SubLINGual Q5MIN PRN    HYDROcodone-acetaminophen (NORCO) 5-325 mg per tablet 1 Tab  1 Tab Oral Q6H PRN    morphine injection 2 mg  2 mg IntraVENous Q4H PRN    acetaminophen (TYLENOL) tablet 650 mg  650 mg Oral Q4H PRN    ondansetron (ZOFRAN) injection 4 mg  4 mg IntraVENous Q4H PRN               Objective:     Visit Vitals    /85    Pulse 69    Temp 98.3 °F (36.8 °C)    Resp 18    Ht 5' 10\" (1.778 m)    Wt 99.9 kg (220 lb 4.8 oz)    SpO2 99%    BMI 31.61 kg/m2    O2 Flow Rate (L/min): 0 l/min O2 Device: Room air    Temp (24hrs), Av.5 °F (36.4 °C), Min:96.7 °F (35.9 °C), Max:98.3 °F (36.8 °C)      10/02 0701 - 10/02 190  In: -   Out: 700 [Urine:700]  1901 - 10/02 0700  In: 740 [P.O.:740]  Out: 4775 [Urine:4775]      Visit Vitals    /85    Pulse 69    Temp 98.3 °F (36.8 °C)    Resp 18    Ht 5' 10\" (1.778 m)    Wt 99.9 kg (220 lb 4.8 oz)    SpO2 99%    BMI 31.61 kg/m2     Head: Normocephalic, without obvious abnormality  Neck: no JVD  Lungs: clear to auscultation bilaterally  Heart: regular rate and rhythm  Abdomen: soft, non-tender.   Extremities: no edema          Data Review    Recent Results (from the past 48 hour(s))   METABOLIC PANEL, BASIC    Collection Time: 10/01/17  5:57 AM   Result Value Ref Range    Sodium 145 136 - 145 mmol/L    Potassium 3.6 3.5 - 5.1 mmol/L    Chloride 108 (H) 98 - 107 mmol/L    CO2 28 21 - 32 mmol/L    Anion gap 9 7 - 16 mmol/L    Glucose 89 65 - 100 mg/dL    BUN 32 (H) 8 - 23 MG/DL    Creatinine 2.80 (H) 0.8 - 1.5 MG/DL    GFR est AA 29 (L) >60 ml/min/1.73m2    GFR est non-AA 24 (L) >60 ml/min/1.73m2    Calcium 8.4 8.3 - 34.8 MG/DL   METABOLIC PANEL, BASIC    Collection Time: 10/02/17  5:16 AM   Result Value Ref Range    Sodium 145 136 - 145 mmol/L    Potassium 3.9 3.5 - 5.1 mmol/L    Chloride 110 (H) 98 - 107 mmol/L    CO2 27 21 - 32 mmol/L    Anion gap 8 7 - 16 mmol/L    Glucose 124 (H) 65 - 100 mg/dL    BUN 28 (H) 8 - 23 MG/DL    Creatinine 2.22 (H) 0.8 - 1.5 MG/DL    GFR est AA 37 (L) >60 ml/min/1.73m2    GFR est non-AA 31 (L) >60 ml/min/1.73m2    Calcium 8.5 8.3 - 10.4 MG/DL   BNP    Collection Time: 10/02/17  5:16 AM   Result Value Ref Range    BNP 1073 pg/mL       Assessment     Patient Active Problem List    Diagnosis Date Noted    TAZ (obstructive sleep apnea) 10/02/2017    CKD (chronic kidney disease) stage 3, GFR 30-59 ml/min 09/29/2017    Coronary atherosclerosis of native coronary vessel 09/29/2017    Hypertension 09/29/2017    COPD (chronic obstructive pulmonary disease) (HCC) 09/29/2017    High triglycerides 09/29/2017    Gastroesophageal reflux disease without esophagitis 09/29/2017    Acute on chronic systolic (congestive) heart failure 09/29/2017    Vomiting 09/29/2017    NINO (acute kidney injury) ([facility] 09/29/2017    CH[facility]est[facility]) [facility] 09/27/2017    Mixed hyperlipidemia 09/28/2016    Essential hypertension 09/28/2016    Chronic obstructive pulmonary disease ([facility] 08/04/2016    Arthritis     Chronic systolic heart failure ([facility] 12/04/2015    Tobacco abuse 07/02/2015    Dyspnea on exertion 06/28/2015           Problems Addressed by Nephrology     CKD 3  NINO - resolving    Plan     Renal function continues to improve. Will schedule outpatient f/u in 10-14 days. Will s/o. Please recall as needed, thanks.

## 2017-10-03 ENCOUNTER — HOME HEALTH ADMISSION (OUTPATIENT)
Dept: HOME HEALTH SERVICES | Facility: HOME HEALTH | Age: 72
End: 2017-10-03
Payer: MEDICARE

## 2017-10-03 VITALS
WEIGHT: 216.3 LBS | RESPIRATION RATE: 18 BRPM | TEMPERATURE: 97.5 F | HEART RATE: 54 BPM | BODY MASS INDEX: 30.97 KG/M2 | DIASTOLIC BLOOD PRESSURE: 76 MMHG | SYSTOLIC BLOOD PRESSURE: 125 MMHG | OXYGEN SATURATION: 96 % | HEIGHT: 70 IN

## 2017-10-03 LAB
ANION GAP SERPL CALC-SCNC: 9 MMOL/L (ref 7–16)
BUN SERPL-MCNC: 27 MG/DL (ref 8–23)
CALCIUM SERPL-MCNC: 8.6 MG/DL (ref 8.3–10.4)
CHLORIDE SERPL-SCNC: 104 MMOL/L (ref 98–107)
CO2 SERPL-SCNC: 31 MMOL/L (ref 21–32)
CREAT SERPL-MCNC: 2.1 MG/DL (ref 0.8–1.5)
GLUCOSE SERPL-MCNC: 97 MG/DL (ref 65–100)
POTASSIUM SERPL-SCNC: 3.4 MMOL/L (ref 3.5–5.1)
SODIUM SERPL-SCNC: 144 MMOL/L (ref 136–145)

## 2017-10-03 PROCEDURE — 99232 SBSQ HOSP IP/OBS MODERATE 35: CPT | Performed by: INTERNAL MEDICINE

## 2017-10-03 PROCEDURE — 74011250637 HC RX REV CODE- 250/637: Performed by: PHYSICIAN ASSISTANT

## 2017-10-03 PROCEDURE — 97110 THERAPEUTIC EXERCISES: CPT

## 2017-10-03 PROCEDURE — 94640 AIRWAY INHALATION TREATMENT: CPT

## 2017-10-03 PROCEDURE — 97161 PT EVAL LOW COMPLEX 20 MIN: CPT

## 2017-10-03 PROCEDURE — 36415 COLL VENOUS BLD VENIPUNCTURE: CPT | Performed by: PHYSICIAN ASSISTANT

## 2017-10-03 PROCEDURE — 74011250637 HC RX REV CODE- 250/637: Performed by: INTERNAL MEDICINE

## 2017-10-03 PROCEDURE — 80048 BASIC METABOLIC PNL TOTAL CA: CPT | Performed by: PHYSICIAN ASSISTANT

## 2017-10-03 PROCEDURE — 94760 N-INVAS EAR/PLS OXIMETRY 1: CPT

## 2017-10-03 RX ORDER — ISOSORBIDE DINITRATE AND HYDRALAZINE HYDROCHLORIDE 37.5; 2 MG/1; MG/1
1 TABLET ORAL 2 TIMES DAILY
Qty: 60 TAB | Refills: 6 | Status: SHIPPED | OUTPATIENT
Start: 2017-10-03 | End: 2017-10-13

## 2017-10-03 RX ORDER — ISOSORBIDE DINITRATE AND HYDRALAZINE HYDROCHLORIDE 37.5; 2 MG/1; MG/1
1 TABLET ORAL 2 TIMES DAILY
Status: DISCONTINUED | OUTPATIENT
Start: 2017-10-03 | End: 2017-10-03 | Stop reason: HOSPADM

## 2017-10-03 RX ORDER — POTASSIUM CHLORIDE 20 MEQ/1
40 TABLET, EXTENDED RELEASE ORAL
Status: COMPLETED | OUTPATIENT
Start: 2017-10-03 | End: 2017-10-03

## 2017-10-03 RX ADMIN — ALBUTEROL SULFATE 1 PUFF: 90 AEROSOL, METERED RESPIRATORY (INHALATION) at 07:29

## 2017-10-03 RX ADMIN — CARVEDILOL 12.5 MG: 12.5 TABLET, FILM COATED ORAL at 07:48

## 2017-10-03 RX ADMIN — CLOPIDOGREL BISULFATE 75 MG: 75 TABLET ORAL at 09:56

## 2017-10-03 RX ADMIN — POTASSIUM CHLORIDE 40 MEQ: 20 TABLET, EXTENDED RELEASE ORAL at 09:56

## 2017-10-03 RX ADMIN — HYDRALAZINE HYDROCHLORIDE AND ISOSORBIDE DINITRATE 1 TABLET: 37.5; 2 TABLET, FILM COATED ORAL at 09:56

## 2017-10-03 RX ADMIN — TIOTROPIUM BROMIDE 18 MCG: 18 CAPSULE ORAL; RESPIRATORY (INHALATION) at 07:30

## 2017-10-03 RX ADMIN — ASPIRIN 81 MG: 81 TABLET, COATED ORAL at 09:56

## 2017-10-03 RX ADMIN — ALLOPURINOL 100 MG: 100 TABLET ORAL at 09:56

## 2017-10-03 RX ADMIN — ALBUTEROL SULFATE 1 PUFF: 90 AEROSOL, METERED RESPIRATORY (INHALATION) at 12:00

## 2017-10-03 RX ADMIN — FUROSEMIDE 40 MG: 40 TABLET ORAL at 09:56

## 2017-10-03 RX ADMIN — PANTOPRAZOLE SODIUM 40 MG: 40 TABLET, DELAYED RELEASE ORAL at 07:48

## 2017-10-03 NOTE — PROGRESS NOTES
Nader Langston  Admission Date: 9/29/2017             Daily Progress Note: 10/3/2017    The patient's chart is reviewed and the patient is discussed with the staff. 74 y.o. AAM seen and evaluated at the request of Dr. Isha Homl, cardiology. He presented to [facility] 9/27, admitted by hospitalist and transferred to [facility] for acute systolic heart failure with BNP 1576 and creatinine 2.25. Has had shortness of breath and peripheral edema. Was given Lasix for diuresis but creatinine went up to 3.51 and nephrology consulted. We were asked to see for COPD and TZA. Sleep study 10/10/16 with severe TAZ and CPAP 9 cm recommended but he states he has never received a machine. He is followed by PCP and prescribed Duoneb that he uses 3-4 time daily. Has rare wheezing and no significant sputum production. He smoked for about 8-10 years less than 1/2 PPD.     Was last seen in our office 9/30/2016. Has medical problems:  HLD, HTN, COPD, TAZ, systolic heart failure, CAD/MI, CKD. Subjective:     Resting in bed and states [facility] did not bring home CPAP machine. Has previously qualified for home CPAP but could not afford equipment. Is now motivated and will to proceed with DME equipment.     Current Facility-Administered Medications   Medication Dose Route Frequency    isosorbide-hydrALAZINE (BIDIL) 20-37.5 mg tablet 1 Tab  1 Tab Oral BID    furosemide (LASIX) tablet 40 mg  40 mg Oral BID    tiotropium (SPIRIVA) inhalation capsule 18 mcg  1 Cap Inhalation DAILY    albuterol (PROVENTIL HFA, VENTOLIN HFA, PROAIR HFA) inhaler 1 Puff  1 Puff Inhalation Q4H RT    clopidogrel (PLAVIX) tablet 75 mg  75 mg Oral DAILY    albuterol (PROVENTIL HFA, VENTOLIN HFA, PROAIR HFA) inhaler 2 Puff  2 Puff Inhalation Q6H PRN    allopurinol (ZYLOPRIM) tablet 100 mg  100 mg Oral DAILY    aspirin delayed-release tablet 81 mg  81 mg Oral DAILY    atorvastatin (LIPITOR) tablet 20 mg  20 mg Oral QHS    carvedilol (COREG) tablet 12.5 mg  12.5 mg Oral BID WITH MEALS    pantoprazole (PROTONIX) tablet 40 mg  40 mg Oral ACB    nitroglycerin (NITROSTAT) tablet 0.4 mg  0.4 mg SubLINGual Q5MIN PRN    HYDROcodone-acetaminophen (NORCO) 5-325 mg per tablet 1 Tab  1 Tab Oral Q6H PRN    morphine injection 2 mg  2 mg IntraVENous Q4H PRN    acetaminophen (TYLENOL) tablet 650 mg  650 mg Oral Q4H PRN    ondansetron (ZOFRAN) injection 4 mg  4 mg IntraVENous Q4H PRN       Review of Systems  Constitutional: negative for fever, chills, sweats  Cardiovascular: negative for chest pain, palpitations, syncope, edema  Gastrointestinal:  negative for dysphagia, reflux, vomiting, diarrhea, abdominal pain, or melena  Neurologic:  negative for focal weakness, numbness, headache    Objective:     Vitals:    10/02/17 2045 10/03/17 0115 10/03/17 0513 10/03/17 0730   BP: 137/75 119/73 125/73    Pulse: 69 72 61    Resp: 20 20 20    Temp: 97.5 °F (36.4 °C) 98.4 °F (36.9 °C) 97.8 °F (36.6 °C)    SpO2: 97% 100% 99% 100%   Weight:   216 lb 4.8 oz (98.1 kg)    Height:         Intake and Output:   10/01 1901 - 10/03 0700  In: 1080 [P.O.:1080]  Out: 4800 [Urine:4800]  10/03 0701 - 10/03 1900  In: -   Out: 300 [Urine:300]    Physical Exam:   Constitution:  the patient is well developed and in no acute distress, room air  EENMT:  Sclera clear, pupils equal, oral mucosa moist  Respiratory: few basilar crackles, no wheezing  Cardiovascular:  RRR without M,G,R  Gastrointestinal: soft and non-tender; with positive bowel sounds. Musculoskeletal: warm without cyanosis. There is trace lower leg edema. Skin:  no jaundice or rashes, no wounds   Neurologic: no gross neuro deficits     Psychiatric:  alert and oriented x 3    CXR: none today      LAB  No results for input(s): GLUCPOC in the last 72 hours.     No lab exists for component: GLPOC   No results for input(s): WBC, HGB, HCT, PLT, INR, HGBEXT, HCTEXT, PLTEXT, HGBEXT, HCTEXT, PLTEXT in the last 72 hours.    No lab exists for component: Jacqueline Valente  Recent Labs      10/03/17   0612  10/02/17   0516  10/01/17   0557   NA  144  145  145   K  3.4*  3.9  3.6   CL  104  110*  108*   CO2  31  27  28   GLU  97  124*  89   BUN  27*  28*  32*   CREA  2.10*  2.22*  2.80*   CA  8.6  8.5  8.4     No results for input(s): PH, PCO2, PO2, HCO3 in the last 72 hours. No results for input(s): LCAD, LAC in the last 72 hours. Assessment:  (Medical Decision Making)     Hospital Problems  Date Reviewed: 10/3/2017          Codes Class Noted POA    TAZ (obstructive sleep apnea) ICD-10-CM: P44.48  ICD-9-CM: 327.23  10/2/2017 Yes    obtaining CPAP    CKD (chronic kidney disease) stage 3, GFR 30-59 ml/min (Chronic) ICD-10-CM: N18.3  ICD-9-CM: 585.3  9/29/2017 Yes    Creatinine down to 2.1    Coronary atherosclerosis of native coronary vessel (Chronic) ICD-10-CM: I25.10  ICD-9-CM: 414.01  9/29/2017 Yes    Chronic--per cardiology    Hypertension (Chronic) ICD-10-CM: I10  ICD-9-CM: 401.9  9/29/2017 Yes    controlled    COPD (chronic obstructive pulmonary disease) [facility] (Chronic) ICD-10-CM: J44.9  ICD-9-CM: 496  9/29/2017 Yes     Complete PFTs: 7/20/15:  Spirometry is consistent with a moderate obstructive/restrictive defect. . The residual volume is increased relative to other lung volumes suggesting air-trapping. The diffusion capacity corrected for alveolar volume was normal suggesting no loss of alveolo-capillary units.           chronic    High triglycerides (Chronic) ICD-10-CM: E78.1  ICD-9-CM: 272.1  9/29/2017 Yes    chronic    Gastroesophageal reflux disease without esophagitis (Chronic) ICD-10-CM: K21.9  ICD-9-CM: 530.81  9/29/2017 Yes    No complaints    * (Principal)Acute on chronic systolic (congestive) heart failure ICD-10-CM: I50.23  ICD-9-CM: 428.23, 428.0  9/29/2017 Yes    Chronic--continuing diuresis    Vomiting ICD-10-CM: R11.10  ICD-9-CM: 787.03  9/29/2017 Yes    resolved    NINO (acute kidney injury) ([facility] ICD-10-CM: N17.9  ICD-9-CM: 584.9  9/29/2017 Yes    Creatinine trending down    Mixed hyperlipidemia (Chronic) ICD-10-CM: O08.5  ICD-9-CM: 272.2  9/28/2016 Yes    chronic          Plan:  (Medical Decision Making)     --Arranging CPAP machine with [employer]. Has had a previous sleep study and needs CPAP 9. Had not been able to afford machine in the past but is now agreeable to proceed with arranging home equipment. --Albuterol, Spiriva--continue  --Lasix 40mg po BID--diuresing, 3700ml urine output last 24 hours  --Nephrology signed off--creatinine down to 2.10--outpt follow up   --Consult respiratory to determine O2 requirements   --Will need follow up in our office    More than 50% of the time documented was spent in face-to-face contact with the patient and in the care of the patient on the floor/unit where the patient is located. Eb Macdonald NP    Lungs:  clear  Heart:  RRR with no Murmur/Rubs/Gallops    Additional Comments:  Home today, cpap being arranged    I have spoken with and examined the patient. I agree with the above assessment and plan as documented.     Esteban Mario MD

## 2017-10-03 NOTE — PROGRESS NOTES
Discharge instructions reviewed with patient, no questions at this time. Peripheral IV's removed, cardiac monitor returned to nurses station. Patient ready for discharge at home.

## 2017-10-03 NOTE — PROGRESS NOTES
Problem: Mobility Impaired (Adult and Pediatric)  Goal: *Acute Goals and Plan of Care (Insert Text)  LTG:  (1.)Mr. Darryl Tena will move from supine to sit and sit to supine , scoot up and down and roll side to side with INDEPENDENT within 7 day(s). (2.)Mr. Darryl Tena will transfer from bed to chair and chair to bed with MODIFIED INDEPENDENT using the least restrictive device within 7 day(s). (3.)Mr. Darryl Tena will ambulate with MODIFIED INDEPENDENCE for 50 feet with the least restrictive device within 7 day(s), O2 stats >90%. (4.)Mr. Darryl Tena will perform 5 steps with HR and MODIFIED INDEPENDENCE within 7 days for safety ascending and descending stairs for home.   _____________________________________________________________________________________________      PHYSICAL THERAPY: INITIAL ASSESSMENT, AM 10/3/2017  INPATIENT: Hospital Day: 5  Payor: Rita Das / Plan: [address] [place] [address] HMO / Product Type: [employer] Medicare /      NAME/AGE/GENDER: Vaishnavi Sultana is a 74 y.o. male         PRIMARY DIAGNOSIS: chronic systolic and diastolic heart failure, justice  Acute on chronic systolic (congestive) heart failure (HCC) Acute on chronic systolic (congestive) heart failure Acute on chronic systolic (congestive) heart failure        ICD-10: Treatment Diagnosis:       · Difficulty in walking, Not elsewhere classified (R26.2)   Precaution/Allergies:  Pcn [penicillins]       ASSESSMENT:      Mr. Darryl Tena presents with decreased transfers, balance, ambulation, activity tolerance and overall general functional mobility s/p hospital admission with CHF. Pt on room air, O2 stats 96%. Pt A & O x 4, spouse present during assessment. Pt states uses RW for ambulation, at baseline only able to ambulate 50' or so, dyspneic with activity at home. Pt relates requires some assist with ADLs due to fatigue/SOB; has been performing sponge baths at home recently. Pt today required CGA for transfers and ambulation.  Pt with SOB during activity, frequent rest breaks required. Pt B knees buckling slightly with ambulation. Pt performed there ex in standing to assess O2 levels, oxygen on room air >95% with activity, pt did required frequent seated breaks. PT to follow for acute care needs. Pt may benefit from [facility] for home due to decreased ambulation, dyspnea with activity. This section established at most recent assessment   PROBLEM LIST (Impairments causing functional limitations):  1. Decreased ADL/Functional Activities  2. Decreased Transfer Abilities  3. Decreased Ambulation Ability/Technique  4. Decreased Balance  5. Decreased Activity Tolerance  6. Increased Fatigue  7. Increased Shortness of Breath    INTERVENTIONS PLANNED: (Benefits and precautions of physical therapy have been discussed with the patient.)  1. Balance Exercise  2. Bed Mobility  3. Family Education  4. Gait Training  5. Home Exercise Program (HEP)  6. Therapeutic Activites  7. Therapeutic Exercise/Strengthening  8. Transfer Training  9. Group Therapy      TREATMENT PLAN: Frequency/Duration: 3 times a week for duration of hospital stay  Rehabilitation Potential For Stated Goals: GOOD      RECOMMENDED REHABILITATION/EQUIPMENT: (at time of discharge pending progress): Due to the probability of continued deficits (see above) this patient will likely need continued skilled physical therapy after discharge. Equipment:   · [facility] [place]                   HISTORY:   History of Present Injury/Illness (Reason for Referral): Tomas Cortez is a 74 y.o.  male who is accepted in transfer from [facility] due to acute systolic heart failure and NINO. He presented to [facility] on 9/27/17 with reports of severe SOB and BLE edema (wt up approx 5-10lb) worsening over the previous 4 days. He was also having chanell urine. He was admitted by the Hospitalist and started on IV lasix. BNP was 1576 and Cr 2.25. Cr has continued to increase -- now 3.51 and his IV lasix was discontinued.  Nephrology has been consulted to see him here at [facility]. He reports some vomiting today. He reports that he has been compliant with all medications. He states that he is not on a \"special diet\" but reports that his sodium intake has been high to the point that he was very thirsty and began to consume [place] lot\" of water. He reports that he often takes additional lasix for a day or so when he feels SOB or noted increased edema. He reports that he did not take any additional lasix during this time. He is apparently followed by Dr. Shelly Wen -- but last visit with Dr. Shelly Wen 9/2016. He was seen by Dr. Ulysses Waite in 12/2016 by Dr. Ulysses Waite for LLE pain that was felt to be cellulitis or gout related. He was instructed to continue Coreg and Hydralazine -- not on ACE due to CKD (baseline 1.76). Previous ECHO 12/2016 with EF 40-50%. Past Medical History/Comorbidities:   Mr. Marily Shannon  has a past medical history of Acute CHF (congestive heart failure) ([address] (4/25/2015); Acute combined systolic and diastolic congestive heart failure ([address] (4/28/2015); Chronic obstructive pulmonary disease ([address] De Quervain's tenosynovitis, right (4/28/2015); Dyspnea on exertion (6/28/2015); Elevated serum creatinine (4/25/2015); Elevated troponin (6/28/2015); History of coronary artery disease; History of right knee surgery; History of shingles; Malignant hypertension (4/25/2015); and MI (myocardial infarction) ([address] Mr. Marily Shannon  has a past surgical history that includes knee arthroscopy (Right) and heart catheterization (6/29/2015).   Social History/Living Environment:   Home Environment: Private residence  One/Two Story Residence: One story  Living Alone: No  Support Systems: Family member(s), Spouse/Significant Other/Partner  Patient Expects to be Discharged to[de-identified] Private residence  Current DME Used/Available at Home: Rexene Arreaga, straight, Walker, rollator, Walker, rolling  Tub or Shower Type: Tub/Shower combination  Prior Level of Function/Work/Activity:  Lives with spouse and multiple other family members; uses rollator walker for ambulation; additional time and some assist for ADLs, recently sponge bathing  Personal Factors:          Sex:  male        Age:  74 y.o. Overall Behavior:  Agreeable to PT assessment   Number of Personal Factors/Comorbidities that affect the Plan of Care:  CHF, COPD 1-2: MODERATE COMPLEXITY   EXAMINATION:   Most Recent Physical Functioning:   Gross Assessment:  AROM: Within functional limits (B LE)  Strength: Within functional limits (B LE grossly 5/5)  Coordination: Generally decreased, functional  Sensation: Intact (B LE intact to light touch)               Posture:  Posture (WDL): Exceptions to WDL  Posture Assessment: Rounded shoulders  Balance:  Sitting: Intact  Standing: Impaired  Standing - Static: Constant support; Fair  Standing - Dynamic : Fair Bed Mobility:  Supine to Sit:  (up with RT in room on arrival)  Sit to Supine:  (left up in chair)  Scooting: Independent  Wheelchair Mobility:     Transfers:  Sit to Stand: Contact guard assistance  Stand to Sit: Contact guard assistance  Bed to Chair: Contact guard assistance  Gait:     Base of Support: Widened  Speed/Lu: Slow  Step Length: Left shortened;Right shortened  Swing Pattern: Left symmetrical;Right symmetrical  Gait Abnormalities: Decreased step clearance  Distance (ft): 40 Feet (ft) (room air, stats 98%; unsteady at times)  Assistive Device: Gait belt;Walker, rolling  Ambulation - Level of Assistance: Contact guard assistance  Interventions: Safety awareness training;Verbal cues       Body Structures Involved:  1. Lungs  2. Muscles Body Functions Affected:  1. Cardio  2. Respiratory  3. Movement Related Activities and Participation Affected:  1. General Tasks and Demands  2. Mobility  3.  Self Care   Number of elements that affect the Plan of Care: 4+: HIGH COMPLEXITY   CLINICAL PRESENTATION:   Presentation: Evolving clinical presentation with changing clinical characteristics: MODERATE COMPLEXITY   CLINICAL DECISION MAKIN[address] [place] [id number] AM-PAC 6 Clicks   Basic Mobility Inpatient Short Form  How much difficulty does the patient currently have. .. Unable A Lot A Little None   1. Turning over in bed (including adjusting bedclothes, sheets and blankets)? [ ] 1   [ ] 2   [ ] 3   [X] 4   2. Sitting down on and standing up from a chair with arms ( e.g., wheelchair, bedside commode, etc.)   [ ] 1   [ ] 2   [X] 3   [ ] 4   3. Moving from lying on back to sitting on the side of the bed? [ ] 1   [ ] 2   [ ] 3   [X] 4   How much help from another person does the patient currently need. .. Total A Lot A Little None   4. Moving to and from a bed to a chair (including a wheelchair)? [ ] 1   [ ] 2   [X] 3   [ ] 4   5. Need to walk in hospital room? [ ] 1   [ ] 2   [X] 3   [ ] 4   6. Climbing 3-5 steps with a railing? [ ] 1   [ ] 2   [X] 3   [ ] 4   © , Trustees of [address] [place] [id number], under license to [employer]. All rights reserved    Score:  Initial: 20 Most Recent: X (Date: -- )     Interpretation of Tool:  Represents activities that are increasingly more difficult (i.e. Bed mobility, Transfers, Gait). Score 24 23 22-20 19-15 14-10 9-7 6       Modifier CH CI CJ CK CL CM CN         · Mobility - Walking and Moving Around:               - CURRENT STATUS:    CJ - 20%-39% impaired, limited or restricted               - GOAL STATUS:           CI - 1%-19% impaired, limited or restricted               - D/C STATUS:                       ---------------To be determined---------------  Payor: Stephenie Barnes / Plan: [address] [place] [address] HMO / Product Type: [email or web address] Medicare /       Medical Necessity:     · Patient is expected to demonstrate progress in strength, range of motion, balance and coordination to decrease assistance required with overall functional mobility, transfers, ambulation.   · Patient demonstrates good rehab potential due to higher previous functional level. Reason for Services/Other Comments:  · Patient continues to require present interventions due to patient's inability to perform transfers and ambulation safely and effectively. Use of outcome tool(s) and clinical judgement create a POC that gives a: Clear prediction of patient's progress: LOW COMPLEXITY                 TREATMENT:   (In addition to Assessment/Re-Assessment sessions the following treatments were rendered)   Pre-treatment Symptoms/Complaints:  \"I just can't catch my breath\"  Pain: Initial:   Pain Intensity 1: 0  Post Session:  0/10 post activity      Therapeutic Exercise: (  10 min):  Exercises per grid below to improve mobility, strength, balance and coordination. Required minimal verbal cues to promote proper body alignment, promote proper body posture and promote proper body mechanics. Progressed repetitions as indicated. O2 stats >95% with activity, HR 70-75. Date:  10/3/17 Date:    Date:      Activity/Exercise Parameters Parameters Parameters   Standing marching X 30 sec       Standing hip abd X 15 B (rest break required)       Standing toe raise X 15 B                                                  Braces/Orthotics/Lines/Etc:   · O2 Device: Room air  Treatment/Session Assessment:    · Response to Treatment:  Tolerated well; O2 stats >90% throughout activity; dyspneic with activity  · Interdisciplinary Collaboration:  · Physical Therapist  · Registered Nurse  · After treatment position/precautions:  · Up in chair  · Bed/Chair-wheels locked  · Bed in low position  · Call light within reach  · RN notified  · Family at bedside  · Compliance with Program/Exercises: Will assess as treatment progresses. · Recommendations/Intent for next treatment session: \"Next visit will focus on advancements to more challenging activities\".   Total Treatment Duration:  PT Patient Time In/Time Out  Time In: 1116  Time Out: Niall 77, PT

## 2017-10-03 NOTE — PROGRESS NOTES
Oxygen Qualifier       Room air: SpO2 with O2 and liter flow   Resting SpO2  100%     Ambulating SpO2  98%        Completed by:    Cayden Esposito, RT

## 2017-10-03 NOTE — PROGRESS NOTES
[facility] - INPATIENT  Face to Face Encounter    Patients Name: Tomas Cortez    YOB: 1945    Primary Diagnosis: CHF and NINO    Date of Face to Face:   10/3/2017                                   Face to Face Encounter findings are related to primary reason for home care:   yes    1. I certify that the patient needs intermittent skilled nursing care, physical therapy and/or speech therapy. I will be following this patient in the Community and will be responsible for reviewing and signing the [address] 2. Initial Orders for Care: Must be completed only if Face to Face MD will not be signing the [address] Skilled Nursing and Physical Therapy    3. I certify that this patient is homebound for the following reason(s): Only leaves the home for medical reasons or [employer] services and are infrequent and of short duration for other reasons     4. I certify that this patient is under my care and that I, or a nurse practitioner or [phone] working with me, had a Face-to-Face Encounter that meets the physician Face-to-Face Encounter requirements.   Document the physical findings from the Face-to-Face Encounter that support the need for skilled services: Has new medications that requires skilled nursing teaching and monitoring for understanding and compliance     See Discharge Summary     Karie Perez RN  10/3/2017

## 2017-10-03 NOTE — PROGRESS NOTES
Care Management Interventions  PCP Verified by CM: Yes  Mode of Transport at Discharge: Other (see comment) (wife will transport home)  Transition of Care Consult (CM Consult): [address]: Yes  Current Support Network: Lives with Spouse  Confirm Follow Up Transport: Family  Plan discussed with Pt/Family/Caregiver: Yes  Freedom of Choice Offered: Yes  Discharge Location  Discharge Placement: Home with home health  Met with patient for discharge planning. Patient alert and oriented and wife present in the room. Patient lives with his wife, independent of ADL's and if requires assistance wife helps. Patient has walker with a seat and is interested in bedside commode and hospital bed(difficuly with laying flat in the bed). Spoke with Ryaa OROSCO about DME and HHC and she stated to please set up. Family would like to go with [address] [place] [address] and I notified Chip the liason of the referral. Sent referral for DME to [facility] and they will see if equipment is covered by patients insurance. Patient is to have a life vest which was ordered yesterday and Christina Zapien is coming today to fit patient prior to discharge. Patient has a PCP Dr. Caroline Pickett that he follows and states is able to obtain medication with his prescription plan with [facility]. Discharge plan is home with wife and [facility] [facility] and nursing.

## 2017-10-03 NOTE — PROGRESS NOTES
Problem: Falls - Risk of  Goal: *Absence of Falls  Document Sue Fall Risk and appropriate interventions in the flowsheet.    Outcome: Progressing Towards Goal  Fall Risk Interventions:  Mobility Interventions: Bed/chair exit alarm, Patient to call before getting OOB           Medication Interventions: Bed/chair exit alarm, Patient to call before getting OOB     Elimination Interventions: Bed/chair exit alarm, Patient to call for help with toileting needs     History of Falls Interventions: Bed/chair exit alarm, Evaluate medications/consider consulting pharmacy

## 2017-10-03 NOTE — PROGRESS NOTES
Oxygen Qualifier       Room air: SpO2 with O2 and liter flow   Resting SpO2  100%    Ambulating SpO2  98% Patient did not need oxygen for ambulation         Completed by:    Arcadio Kohli, RT

## 2017-10-05 ENCOUNTER — HOME CARE VISIT (OUTPATIENT)
Dept: SCHEDULING | Facility: HOME HEALTH | Age: 72
End: 2017-10-05
Payer: MEDICARE

## 2017-10-05 VITALS
DIASTOLIC BLOOD PRESSURE: 80 MMHG | HEART RATE: 89 BPM | BODY MASS INDEX: 30.35 KG/M2 | WEIGHT: 212 LBS | RESPIRATION RATE: 22 BRPM | SYSTOLIC BLOOD PRESSURE: 142 MMHG | HEIGHT: 70 IN | OXYGEN SATURATION: 98 % | TEMPERATURE: 97.1 F

## 2017-10-05 PROCEDURE — 3331090002 HH PPS REVENUE DEBIT

## 2017-10-05 PROCEDURE — G0299 HHS/HOSPICE OF RN EA 15 MIN: HCPCS

## 2017-10-05 PROCEDURE — 3331090001 HH PPS REVENUE CREDIT

## 2017-10-05 PROCEDURE — 400013 HH SOC

## 2017-10-06 ENCOUNTER — HOME CARE VISIT (OUTPATIENT)
Dept: HOME HEALTH SERVICES | Facility: HOME HEALTH | Age: 72
End: 2017-10-06
Payer: MEDICARE

## 2017-10-06 PROCEDURE — 3331090002 HH PPS REVENUE DEBIT

## 2017-10-06 PROCEDURE — 3331090001 HH PPS REVENUE CREDIT

## 2017-10-07 PROCEDURE — 3331090002 HH PPS REVENUE DEBIT

## 2017-10-07 PROCEDURE — 3331090001 HH PPS REVENUE CREDIT

## 2017-10-08 PROCEDURE — 3331090002 HH PPS REVENUE DEBIT

## 2017-10-08 PROCEDURE — 3331090001 HH PPS REVENUE CREDIT

## 2017-10-09 ENCOUNTER — HOSPITAL ENCOUNTER (OUTPATIENT)
Dept: LAB | Age: 72
Discharge: HOME | End: 2017-10-09
Payer: MEDICARE

## 2017-10-09 ENCOUNTER — HOME CARE VISIT (OUTPATIENT)
Dept: SCHEDULING | Facility: HOME HEALTH | Age: 72
End: 2017-10-09
Payer: MEDICARE

## 2017-10-09 VITALS
HEART RATE: 64 BPM | RESPIRATION RATE: 18 BRPM | DIASTOLIC BLOOD PRESSURE: 78 MMHG | TEMPERATURE: 95.6 F | OXYGEN SATURATION: 92 % | SYSTOLIC BLOOD PRESSURE: 114 MMHG

## 2017-10-09 LAB
ANION GAP SERPL CALC-SCNC: 10 MMOL/L
BUN SERPL-MCNC: 27 MG/DL (ref 8–23)
CALCIUM SERPL-MCNC: 9.2 MG/DL (ref 8.3–10.4)
CHLORIDE SERPL-SCNC: 104 MMOL/L (ref 98–107)
CO2 SERPL-SCNC: 27 MMOL/L (ref 21–32)
CREAT SERPL-MCNC: 1.9 MG/DL (ref 0.8–1.5)
GLUCOSE SERPL-MCNC: 105 MG/DL (ref 65–100)
POTASSIUM SERPL-SCNC: 4.5 MMOL/L (ref 3.5–5.1)
SODIUM SERPL-SCNC: 141 MMOL/L (ref 136–145)

## 2017-10-09 PROCEDURE — 3331090001 HH PPS REVENUE CREDIT

## 2017-10-09 PROCEDURE — 3331090002 HH PPS REVENUE DEBIT

## 2017-10-09 PROCEDURE — 80048 BASIC METABOLIC PNL TOTAL CA: CPT | Performed by: PHYSICIAN ASSISTANT

## 2017-10-09 PROCEDURE — 36415 COLL VENOUS BLD VENIPUNCTURE: CPT | Performed by: PHYSICIAN ASSISTANT

## 2017-10-09 PROCEDURE — G0151 HHCP-SERV OF PT,EA 15 MIN: HCPCS

## 2017-10-10 ENCOUNTER — HOME CARE VISIT (OUTPATIENT)
Dept: SCHEDULING | Facility: HOME HEALTH | Age: 72
End: 2017-10-10
Payer: MEDICARE

## 2017-10-10 VITALS
OXYGEN SATURATION: 99 % | RESPIRATION RATE: 19 BRPM | SYSTOLIC BLOOD PRESSURE: 122 MMHG | HEART RATE: 87 BPM | DIASTOLIC BLOOD PRESSURE: 72 MMHG | TEMPERATURE: 96.6 F | WEIGHT: 209 LBS | BODY MASS INDEX: 29.99 KG/M2

## 2017-10-10 PROCEDURE — 3331090001 HH PPS REVENUE CREDIT

## 2017-10-10 PROCEDURE — G0299 HHS/HOSPICE OF RN EA 15 MIN: HCPCS

## 2017-10-10 PROCEDURE — 3331090002 HH PPS REVENUE DEBIT

## 2017-10-11 PROCEDURE — 3331090001 HH PPS REVENUE CREDIT

## 2017-10-11 PROCEDURE — 3331090002 HH PPS REVENUE DEBIT

## 2017-10-12 ENCOUNTER — HOME CARE VISIT (OUTPATIENT)
Dept: SCHEDULING | Facility: HOME HEALTH | Age: 72
End: 2017-10-12
Payer: MEDICARE

## 2017-10-12 VITALS
HEART RATE: 80 BPM | DIASTOLIC BLOOD PRESSURE: 78 MMHG | BODY MASS INDEX: 30.42 KG/M2 | WEIGHT: 212 LBS | OXYGEN SATURATION: 97 % | SYSTOLIC BLOOD PRESSURE: 132 MMHG | RESPIRATION RATE: 17 BRPM | TEMPERATURE: 97.5 F

## 2017-10-12 PROCEDURE — 3331090002 HH PPS REVENUE DEBIT

## 2017-10-12 PROCEDURE — 3331090001 HH PPS REVENUE CREDIT

## 2017-10-12 PROCEDURE — G0299 HHS/HOSPICE OF RN EA 15 MIN: HCPCS

## 2017-10-13 PROBLEM — I25.118 ATHEROSCLEROSIS OF NATIVE CORONARY ARTERY OF NATIVE HEART WITH STABLE ANGINA PECTORIS (HCC): Status: ACTIVE | Noted: 2017-10-13

## 2017-10-13 PROCEDURE — 3331090002 HH PPS REVENUE DEBIT

## 2017-10-13 PROCEDURE — 3331090001 HH PPS REVENUE CREDIT

## 2017-10-14 PROCEDURE — 3331090002 HH PPS REVENUE DEBIT

## 2017-10-14 PROCEDURE — 3331090001 HH PPS REVENUE CREDIT

## 2017-10-15 PROCEDURE — 3331090002 HH PPS REVENUE DEBIT

## 2017-10-15 PROCEDURE — 3331090001 HH PPS REVENUE CREDIT

## 2017-10-16 ENCOUNTER — HOME CARE VISIT (OUTPATIENT)
Dept: SCHEDULING | Facility: HOME HEALTH | Age: 72
End: 2017-10-16
Payer: MEDICARE

## 2017-10-16 VITALS
RESPIRATION RATE: 18 BRPM | TEMPERATURE: 94.7 F | OXYGEN SATURATION: 98 % | DIASTOLIC BLOOD PRESSURE: 80 MMHG | HEART RATE: 84 BPM | SYSTOLIC BLOOD PRESSURE: 126 MMHG

## 2017-10-16 PROCEDURE — 3331090002 HH PPS REVENUE DEBIT

## 2017-10-16 PROCEDURE — G0151 HHCP-SERV OF PT,EA 15 MIN: HCPCS

## 2017-10-16 PROCEDURE — 3331090001 HH PPS REVENUE CREDIT

## 2017-10-17 PROCEDURE — 3331090001 HH PPS REVENUE CREDIT

## 2017-10-17 PROCEDURE — 3331090002 HH PPS REVENUE DEBIT

## 2017-10-18 ENCOUNTER — HOME CARE VISIT (OUTPATIENT)
Dept: SCHEDULING | Facility: HOME HEALTH | Age: 72
End: 2017-10-18
Payer: MEDICARE

## 2017-10-18 VITALS
TEMPERATURE: 95.9 F | RESPIRATION RATE: 19 BRPM | DIASTOLIC BLOOD PRESSURE: 88 MMHG | OXYGEN SATURATION: 96 % | SYSTOLIC BLOOD PRESSURE: 148 MMHG | HEART RATE: 80 BPM

## 2017-10-18 PROCEDURE — 3331090002 HH PPS REVENUE DEBIT

## 2017-10-18 PROCEDURE — 3331090001 HH PPS REVENUE CREDIT

## 2017-10-18 PROCEDURE — G0151 HHCP-SERV OF PT,EA 15 MIN: HCPCS

## 2017-10-19 ENCOUNTER — HOME CARE VISIT (OUTPATIENT)
Dept: SCHEDULING | Facility: HOME HEALTH | Age: 72
End: 2017-10-19
Payer: MEDICARE

## 2017-10-19 ENCOUNTER — HOME CARE VISIT (OUTPATIENT)
Dept: HOME HEALTH SERVICES | Facility: HOME HEALTH | Age: 72
End: 2017-10-19
Payer: MEDICARE

## 2017-10-19 VITALS
SYSTOLIC BLOOD PRESSURE: 114 MMHG | WEIGHT: 211 LBS | TEMPERATURE: 96.2 F | OXYGEN SATURATION: 96 % | HEART RATE: 68 BPM | BODY MASS INDEX: 30.28 KG/M2 | RESPIRATION RATE: 15 BRPM | DIASTOLIC BLOOD PRESSURE: 64 MMHG

## 2017-10-19 PROCEDURE — 3331090001 HH PPS REVENUE CREDIT

## 2017-10-19 PROCEDURE — G0299 HHS/HOSPICE OF RN EA 15 MIN: HCPCS

## 2017-10-19 PROCEDURE — 3331090002 HH PPS REVENUE DEBIT

## 2017-10-20 PROCEDURE — 3331090002 HH PPS REVENUE DEBIT

## 2017-10-20 PROCEDURE — 3331090001 HH PPS REVENUE CREDIT

## 2017-10-21 PROCEDURE — 3331090001 HH PPS REVENUE CREDIT

## 2017-10-21 PROCEDURE — 3331090002 HH PPS REVENUE DEBIT

## 2017-10-22 PROCEDURE — 3331090002 HH PPS REVENUE DEBIT

## 2017-10-22 PROCEDURE — 3331090001 HH PPS REVENUE CREDIT

## 2017-10-23 PROCEDURE — 3331090001 HH PPS REVENUE CREDIT

## 2017-10-23 PROCEDURE — 3331090002 HH PPS REVENUE DEBIT

## 2017-10-24 ENCOUNTER — HOME CARE VISIT (OUTPATIENT)
Dept: SCHEDULING | Facility: HOME HEALTH | Age: 72
End: 2017-10-24
Payer: MEDICARE

## 2017-10-24 VITALS
HEART RATE: 80 BPM | TEMPERATURE: 96.6 F | RESPIRATION RATE: 19 BRPM | SYSTOLIC BLOOD PRESSURE: 120 MMHG | OXYGEN SATURATION: 95 % | DIASTOLIC BLOOD PRESSURE: 68 MMHG

## 2017-10-24 PROCEDURE — G0151 HHCP-SERV OF PT,EA 15 MIN: HCPCS

## 2017-10-24 PROCEDURE — 3331090002 HH PPS REVENUE DEBIT

## 2017-10-24 PROCEDURE — 3331090001 HH PPS REVENUE CREDIT

## 2017-10-25 ENCOUNTER — HOME CARE VISIT (OUTPATIENT)
Dept: SCHEDULING | Facility: HOME HEALTH | Age: 72
End: 2017-10-25
Payer: MEDICARE

## 2017-10-25 ENCOUNTER — PATIENT OUTREACH (OUTPATIENT)
Dept: CASE MANAGEMENT | Age: 72
End: 2017-10-25

## 2017-10-25 VITALS
HEART RATE: 80 BPM | SYSTOLIC BLOOD PRESSURE: 132 MMHG | DIASTOLIC BLOOD PRESSURE: 88 MMHG | TEMPERATURE: 96.3 F | OXYGEN SATURATION: 98 % | RESPIRATION RATE: 18 BRPM

## 2017-10-25 PROCEDURE — G0151 HHCP-SERV OF PT,EA 15 MIN: HCPCS

## 2017-10-25 PROCEDURE — 3331090002 HH PPS REVENUE DEBIT

## 2017-10-25 PROCEDURE — 3331090001 HH PPS REVENUE CREDIT

## 2017-10-25 NOTE — PROGRESS NOTES
10/25/2017 POC discussed with dtr. Pt lives with her. Currently refusing to use Pill box. Is accustome to taking medication right out of the bottle, nurse encourage pill box. Pt cannot afford spiriva, nurse give Presription life line Phone # and encourage to call and see if they qualify for help, understanding verbalized. will make Dr Bc Vazquez. 24hr on call # given, nurse encourage to call when unable to contact pcp or CC. call 911 in case of emergency. nurse will continue to monitor and provide care as needed. Encourage to contact  immediately with s/sx of CHF. Encourage timely administration of meds.

## 2017-10-26 ENCOUNTER — HOME CARE VISIT (OUTPATIENT)
Dept: SCHEDULING | Facility: HOME HEALTH | Age: 72
End: 2017-10-26
Payer: MEDICARE

## 2017-10-26 VITALS
HEART RATE: 67 BPM | OXYGEN SATURATION: 95 % | RESPIRATION RATE: 15 BRPM | WEIGHT: 212 LBS | TEMPERATURE: 96.2 F | BODY MASS INDEX: 30.42 KG/M2 | SYSTOLIC BLOOD PRESSURE: 128 MMHG | DIASTOLIC BLOOD PRESSURE: 68 MMHG

## 2017-10-26 PROCEDURE — G0299 HHS/HOSPICE OF RN EA 15 MIN: HCPCS

## 2017-10-26 PROCEDURE — 3331090002 HH PPS REVENUE DEBIT

## 2017-10-26 PROCEDURE — 3331090001 HH PPS REVENUE CREDIT

## 2017-10-27 PROCEDURE — 3331090001 HH PPS REVENUE CREDIT

## 2017-10-27 PROCEDURE — 3331090002 HH PPS REVENUE DEBIT

## 2017-10-28 PROCEDURE — 3331090002 HH PPS REVENUE DEBIT

## 2017-10-28 PROCEDURE — 3331090001 HH PPS REVENUE CREDIT

## 2017-10-29 PROCEDURE — 3331090002 HH PPS REVENUE DEBIT

## 2017-10-29 PROCEDURE — 3331090001 HH PPS REVENUE CREDIT

## 2017-10-30 ENCOUNTER — APPOINTMENT (OUTPATIENT)
Dept: GENERAL RADIOLOGY | Age: 72
End: 2017-10-30
Attending: EMERGENCY MEDICINE
Payer: MEDICARE

## 2017-10-30 ENCOUNTER — HOSPITAL ENCOUNTER (EMERGENCY)
Age: 72
Discharge: HOME | End: 2017-10-31
Payer: MEDICARE

## 2017-10-30 DIAGNOSIS — J20.9 ACUTE BRONCHITIS, UNSPECIFIED ORGANISM: Primary | ICD-10-CM

## 2017-10-30 DIAGNOSIS — I50.9 CONGESTIVE HEART FAILURE, UNSPECIFIED CONGESTIVE HEART FAILURE CHRONICITY, UNSPECIFIED CONGESTIVE HEART FAILURE TYPE: ICD-10-CM

## 2017-10-30 LAB
ALBUMIN SERPL-MCNC: 3 G/DL (ref 3.2–4.6)
ALBUMIN/GLOB SERPL: 0.6 {RATIO} (ref 1.2–3.5)
ALP SERPL-CCNC: 119 U/L (ref 50–136)
ALT SERPL-CCNC: 36 U/L (ref 12–65)
ANION GAP SERPL CALC-SCNC: 10 MMOL/L (ref 7–16)
AST SERPL-CCNC: 46 U/L (ref 15–37)
BASOPHILS # BLD: 0 K/UL (ref 0–0.2)
BASOPHILS NFR BLD: 0 % (ref 0–2)
BILIRUB SERPL-MCNC: 0.6 MG/DL (ref 0.2–1.1)
BUN SERPL-MCNC: 16 MG/DL (ref 8–23)
CALCIUM SERPL-MCNC: 8.8 MG/DL (ref 8.3–10.4)
CHLORIDE SERPL-SCNC: 106 MMOL/L (ref 98–107)
CO2 SERPL-SCNC: 26 MMOL/L (ref 21–32)
CREAT SERPL-MCNC: 1.43 MG/DL (ref 0.8–1.5)
DIFFERENTIAL METHOD BLD: ABNORMAL
EOSINOPHIL # BLD: 0.1 K/UL (ref 0–0.8)
EOSINOPHIL NFR BLD: 2 % (ref 0.5–7.8)
ERYTHROCYTE [DISTWIDTH] IN BLOOD BY AUTOMATED COUNT: 14.4 % (ref 11.9–14.6)
GLOBULIN SER CALC-MCNC: 4.8 G/DL (ref 2.3–3.5)
GLUCOSE SERPL-MCNC: 90 MG/DL (ref 65–100)
HCT VFR BLD AUTO: 44.9 % (ref 41.1–50.3)
HGB BLD-MCNC: 15.2 G/DL (ref 13.6–17.2)
IMM GRANULOCYTES # BLD: 0 K/UL (ref 0–0.5)
IMM GRANULOCYTES NFR BLD: 0 % (ref 0–5)
LACTATE BLD-SCNC: 1 MMOL/L (ref 0.5–1.9)
LYMPHOCYTES # BLD: 2.3 K/UL (ref 0.5–4.6)
LYMPHOCYTES NFR BLD: 27 % (ref 13–44)
MCH RBC QN AUTO: 27.9 PG (ref 26.1–32.9)
MCHC RBC AUTO-ENTMCNC: 33.9 G/DL (ref 31.4–35)
MCV RBC AUTO: 82.4 FL (ref 79.6–97.8)
MONOCYTES # BLD: 0.5 K/UL (ref 0.1–1.3)
MONOCYTES NFR BLD: 6 % (ref 4–12)
NEUTS SEG # BLD: 5.5 K/UL (ref 1.7–8.2)
NEUTS SEG NFR BLD: 65 % (ref 43–78)
PLATELET # BLD AUTO: 193 K/UL (ref 150–450)
PMV BLD AUTO: 10.5 FL (ref 10.8–14.1)
POTASSIUM SERPL-SCNC: 4.5 MMOL/L (ref 3.5–5.1)
PROT SERPL-MCNC: 7.8 G/DL (ref 6.3–8.2)
RBC # BLD AUTO: 5.45 M/UL (ref 4.23–5.67)
SODIUM SERPL-SCNC: 142 MMOL/L (ref 136–145)
TROPONIN I BLD-MCNC: 0.01 NG/ML (ref 0.02–0.05)
WBC # BLD AUTO: 8.4 K/UL (ref 4.3–11.1)

## 2017-10-30 PROCEDURE — 83605 ASSAY OF LACTIC ACID: CPT

## 2017-10-30 PROCEDURE — 71010 XR CHEST PORT: CPT

## 2017-10-30 PROCEDURE — 80053 COMPREHEN METABOLIC PANEL: CPT | Performed by: EMERGENCY MEDICINE

## 2017-10-30 PROCEDURE — 96374 THER/PROPH/DIAG INJ IV PUSH: CPT

## 2017-10-30 PROCEDURE — 99285 EMERGENCY DEPT VISIT HI MDM: CPT

## 2017-10-30 PROCEDURE — 85025 COMPLETE CBC W/AUTO DIFF WBC: CPT | Performed by: EMERGENCY MEDICINE

## 2017-10-30 PROCEDURE — 3331090001 HH PPS REVENUE CREDIT

## 2017-10-30 PROCEDURE — 93005 ELECTROCARDIOGRAM TRACING: CPT | Performed by: EMERGENCY MEDICINE

## 2017-10-30 PROCEDURE — 84484 ASSAY OF TROPONIN QUANT: CPT

## 2017-10-30 PROCEDURE — 96375 TX/PRO/DX INJ NEW DRUG ADDON: CPT

## 2017-10-30 PROCEDURE — 3331090002 HH PPS REVENUE DEBIT

## 2017-10-31 VITALS
SYSTOLIC BLOOD PRESSURE: 138 MMHG | TEMPERATURE: 98 F | DIASTOLIC BLOOD PRESSURE: 98 MMHG | RESPIRATION RATE: 20 BRPM | WEIGHT: 215 LBS | HEIGHT: 70 IN | BODY MASS INDEX: 30.78 KG/M2 | HEART RATE: 85 BPM | OXYGEN SATURATION: 95 %

## 2017-10-31 LAB
ATRIAL RATE: 93 BPM
CALCULATED P AXIS, ECG09: 62 DEGREES
CALCULATED R AXIS, ECG10: -80 DEGREES
CALCULATED T AXIS, ECG11: 63 DEGREES
DIAGNOSIS, 93000: NORMAL
P-R INTERVAL, ECG05: 212 MS
Q-T INTERVAL, ECG07: 398 MS
QRS DURATION, ECG06: 116 MS
QTC CALCULATION (BEZET), ECG08: 494 MS
VENTRICULAR RATE, ECG03: 93 BPM

## 2017-10-31 PROCEDURE — 3331090001 HH PPS REVENUE CREDIT

## 2017-10-31 PROCEDURE — 96374 THER/PROPH/DIAG INJ IV PUSH: CPT

## 2017-10-31 PROCEDURE — 74011250637 HC RX REV CODE- 250/637

## 2017-10-31 PROCEDURE — 3331090002 HH PPS REVENUE DEBIT

## 2017-10-31 PROCEDURE — 74011250636 HC RX REV CODE- 250/636

## 2017-10-31 PROCEDURE — 96375 TX/PRO/DX INJ NEW DRUG ADDON: CPT

## 2017-10-31 RX ORDER — FUROSEMIDE 40 MG/1
40 TABLET ORAL
Qty: 60 TAB | Refills: 3 | Status: SHIPPED | OUTPATIENT
Start: 2017-10-31 | End: 2018-01-12

## 2017-10-31 RX ORDER — HYDROCODONE BITARTRATE AND HOMATROPINE METHYLBROMIDE 1.5; 5 MG/5ML; MG/5ML
5 SYRUP ORAL
Status: DISCONTINUED | OUTPATIENT
Start: 2017-10-31 | End: 2017-10-31 | Stop reason: HOSPADM

## 2017-10-31 RX ORDER — PREDNISONE 50 MG/1
50 TABLET ORAL DAILY
Qty: 3 TAB | Refills: 0 | Status: SHIPPED | OUTPATIENT
Start: 2017-10-31 | End: 2017-11-03

## 2017-10-31 RX ORDER — FUROSEMIDE 10 MG/ML
40 INJECTION INTRAMUSCULAR; INTRAVENOUS ONCE
Status: COMPLETED | OUTPATIENT
Start: 2017-10-31 | End: 2017-10-31

## 2017-10-31 RX ADMIN — FUROSEMIDE 40 MG: 10 INJECTION, SOLUTION INTRAMUSCULAR; INTRAVENOUS at 01:43

## 2017-10-31 RX ADMIN — HYDROCODONE BITARTRATE AND HOMATROPINE METHYLBROMIDE 5 ML: 5; 1.5 SOLUTION ORAL at 01:41

## 2017-10-31 RX ADMIN — METHYLPREDNISOLONE SODIUM SUCCINATE 125 MG: 125 INJECTION, POWDER, FOR SOLUTION INTRAMUSCULAR; INTRAVENOUS at 01:42

## 2017-10-31 NOTE — ED PROVIDER NOTES
HPI Comments: Cough congestion brown sputum production. Onset several days ago. No orthopnea. He does states he been retaining more fluid in his ankles and legs. He says history of CHF. Patient states he is compliant with low salt diet. Patient has not missed any medications yet. Patient is a 74 y.o. male presenting with respiratory complaint. The history is provided by the patient. Breathing Problem   This is a recurrent problem. The problem occurs continuously. The current episode started more than 2 days ago. The problem has been gradually improving. Pertinent negatives include no fever and no abdominal pain.         Past Medical History:   Diagnosis Date    Acute CHF (congestive heart failure) ([facility] [address] 4/25/2015    Acute combined systolic and diastolic congestive heart failure ([facility] [address] 4/28/2015    Chronic obstructive pulmonary disease (HCC)     De Quervain's tenosynovitis, right 4/28/2015    Dyspnea on exertion 6/28/2015    Elevated serum creatinine 4/25/2015    Elevated troponin 6/28/2015    History of coronary artery disease     MI at 25 yo    History of right knee surgery     cartilage removal    History of shingles     Malignant hypertension 4/25/2015    MI (myocardial infarction)        Past Surgical History:   Procedure Laterality Date    HX HEART CATHETERIZATION  6/29/2015    no intervention    HX KNEE ARTHROSCOPY Right     removal of cartilage         Family History:   Problem Relation Age of Onset    Hypertension Mother     No Known Problems Father        Social History     Social History    Marital status:      Spouse name: N/A    Number of children: N/A    Years of education: N/A     Occupational History    retired      Social History Main Topics    Smoking status: Former Smoker     Packs/day: 0.25     Years: 20.00     Types: Cigarettes     Quit date: 4/5/2015    Smokeless tobacco: Never Used    Alcohol use No    Drug use: No    Sexual activity: Not on file Other Topics Concern     Service No    Blood Transfusions No     no issues with receiving    Caffeine Concern No    Special Diet No    Exercise No    Seat Belt Yes     Social History Narrative    Lives with his wife         ALLERGIES: Pcn [penicillins]    Review of Systems   Constitutional: Negative for fever. Gastrointestinal: Negative for abdominal pain. Vitals:    10/30/17 2235 10/30/17 2246   BP: (!) 150/96    Pulse: 93    Resp: 18    Temp: 98.2 °F (36.8 °C)    SpO2: 97% 97%   Weight: 97.5 kg (215 lb)    Height: 5' 10\" (1.778 m)             Physical Exam     MDM  Number of Diagnoses or Management Options  Acute bronchitis, unspecified organism:   Congestive heart failure, unspecified congestive heart failure chronicity, unspecified congestive heart failure type [facility]):   Diagnosis management comments: Patient has some peripheral edema but is not orthopneic    Assessment upper respiratory illness and CHF. Plan we'll start antibiotics and increase Lasix dosage.        Amount and/or Complexity of Data Reviewed  Clinical lab tests: ordered and reviewed  Tests in the radiology section of CPT®: ordered and reviewed  Tests in the medicine section of CPT®: ordered and reviewed      ED Course       Procedures

## 2017-10-31 NOTE — ED TRIAGE NOTES
PT arrived to ED c/o SOB. PT states he has a Hx of breathing problems. PT states he has had a productive cough, coughing up white mucus.

## 2017-10-31 NOTE — DISCHARGE INSTRUCTIONS
Bronchitis: Care Instructions  Your Care Instructions    Bronchitis is inflammation of the bronchial tubes, which carry air to the lungs. The tubes swell and produce mucus, or phlegm. The mucus and inflamed bronchial tubes make you cough. You may have trouble breathing. Most cases of bronchitis are caused by viruses like those that cause colds. Antibiotics usually do not help and they may be harmful. Bronchitis usually develops rapidly and lasts about 2 to 3 weeks in otherwise healthy people. Follow-up care is a key part of your treatment and safety. Be sure to make and go to all appointments, and call your doctor if you are having problems. It's also a good idea to know your test results and keep a list of the medicines you take. How can you care for yourself at home? · Take all medicines exactly as prescribed. Call your doctor if you think you are having a problem with your medicine. · Get some extra rest.  · Take an over-the-counter pain medicine, such as acetaminophen (Tylenol), ibuprofen (Advil, Motrin), or naproxen (Aleve) to reduce fever and relieve body aches. Read and follow all instructions on the label. · Do not take two or more pain medicines at the same time unless the doctor told you to. Many pain medicines have acetaminophen, which is Tylenol. Too much acetaminophen (Tylenol) can be harmful. · Take an over-the-counter cough medicine that contains dextromethorphan to help quiet a dry, hacking cough so that you can sleep. Avoid cough medicines that have more than one active ingredient. Read and follow all instructions on the label. · Breathe moist air from a humidifier, hot shower, or sink filled with hot water. The heat and moisture will thin mucus so you can cough it out. · Do not smoke. Smoking can make bronchitis worse. If you need help quitting, talk to your doctor about stop-smoking programs and medicines. These can increase your chances of quitting for good.   When should you call for help? Call 911 anytime you think you may need emergency care. For example, call if:  ? · You have severe trouble breathing. ?Call your doctor now or seek immediate medical care if:  ? · You have new or worse trouble breathing. ? · You cough up dark brown or bloody mucus (sputum). ? · You have a new or higher fever. ? · You have a new rash. ? Watch closely for changes in your health, and be sure to contact your doctor if:  ? · You cough more deeply or more often, especially if you notice more mucus or a change in the color of your mucus. ? · You are not getting better as expected. Where can you learn more? Go to http://jaime-jrarell.info/. Enter H333 in the search box to learn more about \"Bronchitis: Care Instructions. \"  Current as of: May 12, 2017  Content Version: 11.4  © 4918-3286 [employer]. Care instructions adapted under license by [employer] (which disclaims liability or warranty for this information). If you have questions about a medical condition or this instruction, always ask your healthcare professional. [place] [address] any warranty or liability for your use of this information. Heart Failure: Care Instructions  Your Care Instructions    Heart failure occurs when your heart does not pump as much blood as the body needs. Failure does not mean that the heart has stopped pumping but rather that it is not pumping as well as it should. Over time, this causes fluid buildup in your lungs and other parts of your body. Fluid buildup can cause shortness of breath, fatigue, swollen ankles, and other problems. By taking medicines regularly, reducing sodium (salt) in your diet, checking your weight every day, and making lifestyle changes, you can feel better and live longer. Follow-up care is a key part of your treatment and safety. Be sure to make and go to all appointments, and call your doctor if you are having problems. It's also a good idea to know your test results and keep a list of the medicines you take. How can you care for yourself at home? Medicines  ? · Be safe with medicines. Take your medicines exactly as prescribed. Call your doctor if you think you are having a problem with your medicine. ? · Do not take any vitamins, over-the-counter medicine, or herbal products without talking to your doctor first. Samantha Pun not take ibuprofen (Advil or Motrin) and naproxen (Aleve) without talking to your doctor first. They could make your heart failure worse. ? · You may be taking some of the following medicine. ¨ Beta-blockers can slow heart rate, decrease blood pressure, and improve your condition. Taking a beta-blocker may lower your chance of needing to be hospitalized. ¨ Angiotensin-converting enzyme inhibitors (ACEIs) reduce the heart's workload, lower blood pressure, and reduce swelling. Taking an ACEI may lower your chance of needing to be hospitalized again. ¨ Angiotensin II receptor blockers (ARBs) work like ACEIs. Your doctor may prescribe them instead of ACEIs. ¨ Diuretics, also called water pills, reduce swelling. ¨ Potassium supplements replace this important mineral, which is sometimes lost with diuretics. ¨ Aspirin and other blood thinners prevent blood clots, which can cause a stroke or heart attack. ? You will get more details on the specific medicines your doctor prescribes. Diet  ? · Your doctor may suggest that you limit sodium to 2,000 milligrams (mg) a day or less. That is less than 1 teaspoon of salt a day, including all the salt you eat in cooking or in packaged foods. People get most of their sodium from processed foods. Fast food and restaurant meals also tend to be very high in sodium. ? · Ask your doctor how much liquid you can drink each day. You may have to limit liquids. ?Weight  ? · Weigh yourself without clothing at the same time each day. Record your weight.  Call your doctor if you have a sudden weight gain, such as more than 2 to 3 pounds in a day or 5 pounds in a week. (Your doctor may suggest a different range of weight gain.) A sudden weight gain may mean that your heart failure is getting worse. ? Activity level  ? · Start light exercise (if your doctor says it is okay). Even if you can only do a small amount, exercise will help you get stronger, have more energy, and manage your weight and your stress. Walking is an easy way to get exercise. Start out by walking a little more than you did before. Bit by bit, increase the amount you walk. ? · When you exercise, watch for signs that your heart is working too hard. You are pushing yourself too hard if you cannot talk while you are exercising. If you become short of breath or dizzy or have chest pain, stop, sit down, and rest.   ? · If you feel \"wiped out\" the day after you exercise, walk slower or for a shorter distance until you can work up to a better pace. ? · Get enough rest at night. Sleeping with 1 or 2 pillows under your upper body and head may help you breathe easier. ? Lifestyle changes  ? · Do not smoke. Smoking can make a heart condition worse. If you need help quitting, talk to your doctor about stop-smoking programs and medicines. These can increase your chances of quitting for good. Quitting smoking may be the most important step you can take to protect your heart. ? · Limit alcohol to 2 drinks a day for men and 1 drink a day for women. Too much alcohol can cause health problems. ? · Avoid getting sick from colds and the flu. Get a pneumococcal vaccine shot. If you have had one before, ask your doctor whether you need another dose. Get a flu shot each year. If you must be around people with colds or the flu, wash your hands often. When should you call for help? Call 911 if you have symptoms of sudden heart failure such as:  ? · You have severe trouble breathing. ? · You cough up pink, foamy mucus.    ? · You have a new irregular or rapid heartbeat. ?Call your doctor now or seek immediate medical care if:  ? · You have new or increased shortness of breath. ? · You are dizzy or lightheaded, or you feel like you may faint. ? · You have sudden weight gain, such as more than 2 to 3 pounds in a day or 5 pounds in a week. (Your doctor may suggest a different range of weight gain.)   ? · You have increased swelling in your legs, ankles, or feet. ? · You are suddenly so tired or weak that you cannot do your usual activities. ? Watch closely for changes in your health, and be sure to contact your doctor if you develop new symptoms. Where can you learn more? Go to http://jaime-jarrell.info/. Enter Q018 in the search box to learn more about \"Heart Failure: Care Instructions. \"  Current as of: September 21, 2016  Content Version: 11.4  © 0219-5008 [employer]. Care instructions adapted under license by [employer] (which disclaims liability or warranty for this information). If you have questions about a medical condition or this instruction, always ask your healthcare professional. [address] any warranty or liability for your use of this information. Learning About Heart Failure Zones  What are heart failure zones? Heart failure zones give you an easy way to see changes in your heart failure symptoms. They also tell you when you need to get help. Check every day to see which zone you are in. Green zone. You are doing well. This is where you want to be. · Your weight is stable. This means it is not going up or down. · You breathe easily. · You are sleeping well. You are able to lie flat without shortness of breath. · You can do your usual activities. Yellow zone. Be careful. Your symptoms are changing. Call your doctor. · You have new or increased shortness of breath. · You are dizzy or lightheaded, or you feel like you may faint.   · You have sudden weight gain, such as more than 2 to 3 pounds in a day or 5 pounds in a week. (Your doctor may suggest a different range of weight gain.)  · You have increased swelling in your legs, ankles, or feet. · You are so tired or weak that you cannot do your usual activities. · You are not sleeping well. Shortness of breath wakes you up at night. You need extra pillows. Your doctor's name: ____________________________________________________________  Your doctor's contact information: _________________________________________________  Red zone. This is an emergency. Call 911. You have symptoms of sudden heart failure, such as:  · You have severe trouble breathing. · You cough up pink, foamy mucus. · You have a new irregular or fast heartbeat. You have symptoms of a heart attack. These may include:  · Chest pain or pressure, or a strange feeling in the chest.  · Sweating. · Shortness of breath. · Nausea or vomiting. · Pain, pressure, or a strange feeling in the back, neck, jaw, or upper belly or in one or both shoulders or arms. · Lightheadedness or sudden weakness. · A fast or irregular heartbeat. If you have symptoms of a heart attack: After you call 911, the  may tell you to chew 1 adult-strength or 2 to 4 low-dose aspirin. Wait for an ambulance. Do not try to drive yourself. Follow-up care is a key part of your treatment and safety. Be sure to make and go to all appointments, and call your doctor if you are having problems. It's also a good idea to know your test results and keep a list of the medicines you take. Where can you learn more? Go to http://jaime-jarrell.info/. Enter T174 in the search box to learn more about \"Learning About Heart Failure Zones. \"  Current as of: September 21, 2016  Content Version: 11.4  © 0634-1953 [employer].  Care instructions adapted under license by [employer] (which disclaims liability or warranty for this information). If you have questions about a medical condition or this instruction, always ask your healthcare professional. [place] [address] any warranty or liability for your use of this information.

## 2017-11-01 PROCEDURE — 3331090002 HH PPS REVENUE DEBIT

## 2017-11-01 PROCEDURE — 3331090001 HH PPS REVENUE CREDIT

## 2017-11-02 ENCOUNTER — HOME CARE VISIT (OUTPATIENT)
Dept: SCHEDULING | Facility: HOME HEALTH | Age: 72
End: 2017-11-02
Payer: MEDICARE

## 2017-11-02 VITALS
TEMPERATURE: 96.2 F | OXYGEN SATURATION: 93 % | WEIGHT: 212 LBS | RESPIRATION RATE: 18 BRPM | HEART RATE: 80 BPM | BODY MASS INDEX: 30.42 KG/M2 | DIASTOLIC BLOOD PRESSURE: 82 MMHG | SYSTOLIC BLOOD PRESSURE: 118 MMHG

## 2017-11-02 VITALS
OXYGEN SATURATION: 93 % | DIASTOLIC BLOOD PRESSURE: 82 MMHG | HEART RATE: 80 BPM | RESPIRATION RATE: 18 BRPM | TEMPERATURE: 96.2 F | SYSTOLIC BLOOD PRESSURE: 118 MMHG

## 2017-11-02 PROCEDURE — 3331090002 HH PPS REVENUE DEBIT

## 2017-11-02 PROCEDURE — 3331090001 HH PPS REVENUE CREDIT

## 2017-11-02 PROCEDURE — G0151 HHCP-SERV OF PT,EA 15 MIN: HCPCS

## 2017-11-02 PROCEDURE — G0299 HHS/HOSPICE OF RN EA 15 MIN: HCPCS

## 2017-11-03 PROCEDURE — 3331090002 HH PPS REVENUE DEBIT

## 2017-11-03 PROCEDURE — 3331090001 HH PPS REVENUE CREDIT

## 2017-11-04 PROCEDURE — 3331090002 HH PPS REVENUE DEBIT

## 2017-11-04 PROCEDURE — 3331090001 HH PPS REVENUE CREDIT

## 2017-11-05 PROCEDURE — 3331090001 HH PPS REVENUE CREDIT

## 2017-11-05 PROCEDURE — 3331090002 HH PPS REVENUE DEBIT

## 2017-11-06 PROCEDURE — 3331090001 HH PPS REVENUE CREDIT

## 2017-11-06 PROCEDURE — 3331090002 HH PPS REVENUE DEBIT

## 2017-11-07 ENCOUNTER — HOME CARE VISIT (OUTPATIENT)
Dept: SCHEDULING | Facility: HOME HEALTH | Age: 72
End: 2017-11-07
Payer: MEDICARE

## 2017-11-07 VITALS
SYSTOLIC BLOOD PRESSURE: 122 MMHG | WEIGHT: 211 LBS | DIASTOLIC BLOOD PRESSURE: 82 MMHG | OXYGEN SATURATION: 99 % | TEMPERATURE: 96.8 F | HEART RATE: 80 BPM | RESPIRATION RATE: 18 BRPM | BODY MASS INDEX: 30.28 KG/M2

## 2017-11-07 PROCEDURE — 3331090002 HH PPS REVENUE DEBIT

## 2017-11-07 PROCEDURE — 3331090003 HH PPS REVENUE ADJ

## 2017-11-07 PROCEDURE — 3331090001 HH PPS REVENUE CREDIT

## 2017-11-07 PROCEDURE — G0299 HHS/HOSPICE OF RN EA 15 MIN: HCPCS

## 2017-11-08 ENCOUNTER — PATIENT OUTREACH (OUTPATIENT)
Dept: CASE MANAGEMENT | Age: 72
End: 2017-11-08

## 2017-11-08 PROCEDURE — 3331090001 HH PPS REVENUE CREDIT

## 2017-11-08 PROCEDURE — 3331090002 HH PPS REVENUE DEBIT

## 2017-11-09 PROCEDURE — 3331090001 HH PPS REVENUE CREDIT

## 2017-11-09 PROCEDURE — 3331090002 HH PPS REVENUE DEBIT

## 2017-11-10 PROCEDURE — 3331090001 HH PPS REVENUE CREDIT

## 2017-11-10 PROCEDURE — 3331090002 HH PPS REVENUE DEBIT

## 2017-11-11 PROCEDURE — 3331090002 HH PPS REVENUE DEBIT

## 2017-11-11 PROCEDURE — 3331090001 HH PPS REVENUE CREDIT

## 2017-11-12 PROCEDURE — 3331090002 HH PPS REVENUE DEBIT

## 2017-11-12 PROCEDURE — 3331090001 HH PPS REVENUE CREDIT

## 2017-11-13 PROCEDURE — 3331090001 HH PPS REVENUE CREDIT

## 2017-11-13 PROCEDURE — 3331090002 HH PPS REVENUE DEBIT

## 2017-11-14 PROCEDURE — 3331090001 HH PPS REVENUE CREDIT

## 2017-11-14 PROCEDURE — 3331090002 HH PPS REVENUE DEBIT

## 2017-11-15 PROCEDURE — 3331090001 HH PPS REVENUE CREDIT

## 2017-11-15 PROCEDURE — 3331090002 HH PPS REVENUE DEBIT

## 2017-11-16 PROCEDURE — 3331090001 HH PPS REVENUE CREDIT

## 2017-11-16 PROCEDURE — 3331090002 HH PPS REVENUE DEBIT

## 2017-11-17 PROCEDURE — 3331090002 HH PPS REVENUE DEBIT

## 2017-11-17 PROCEDURE — 3331090001 HH PPS REVENUE CREDIT

## 2017-12-13 PROBLEM — J44.9 COPD, MODERATE (HCC): Status: ACTIVE | Noted: 2017-09-29

## 2017-12-13 PROBLEM — I50.23 ACUTE ON CHRONIC SYSTOLIC (CONGESTIVE) HEART FAILURE (HCC): Status: RESOLVED | Noted: 2017-09-29 | Resolved: 2017-12-13

## 2017-12-26 ENCOUNTER — APPOINTMENT (OUTPATIENT)
Dept: GENERAL RADIOLOGY | Age: 72
DRG: 286 | End: 2017-12-26
Attending: EMERGENCY MEDICINE
Payer: MEDICARE

## 2017-12-26 ENCOUNTER — HOSPITAL ENCOUNTER (INPATIENT)
Age: 72
LOS: 10 days | Discharge: NURSING, REHAB OR LONG-TERM CARE | DRG: 286 | End: 2018-01-12
Attending: EMERGENCY MEDICINE | Admitting: INTERNAL MEDICINE
Payer: MEDICARE

## 2017-12-26 DIAGNOSIS — N17.9 AKI (ACUTE KIDNEY INJURY) (HCC): ICD-10-CM

## 2017-12-26 DIAGNOSIS — R07.9 CHEST PAIN, UNSPECIFIED TYPE: ICD-10-CM

## 2017-12-26 DIAGNOSIS — Z72.0 TOBACCO ABUSE: Chronic | ICD-10-CM

## 2017-12-26 DIAGNOSIS — I50.21 ACUTE SYSTOLIC CHF (CONGESTIVE HEART FAILURE) (HCC): Primary | ICD-10-CM

## 2017-12-26 DIAGNOSIS — R76.8 ELEVATED SERUM IMMUNOGLOBULIN FREE LIGHT CHAINS: ICD-10-CM

## 2017-12-26 DIAGNOSIS — G47.33 OSA (OBSTRUCTIVE SLEEP APNEA): ICD-10-CM

## 2017-12-26 DIAGNOSIS — I25.118 ATHEROSCLEROSIS OF NATIVE CORONARY ARTERY OF NATIVE HEART WITH STABLE ANGINA PECTORIS (HCC): ICD-10-CM

## 2017-12-26 DIAGNOSIS — I50.22 CHRONIC SYSTOLIC HEART FAILURE (HCC): Chronic | ICD-10-CM

## 2017-12-26 DIAGNOSIS — J44.9 COPD, MODERATE (HCC): ICD-10-CM

## 2017-12-26 DIAGNOSIS — R06.09 DYSPNEA ON EXERTION: Chronic | ICD-10-CM

## 2017-12-26 DIAGNOSIS — J81.0 ACUTE PULMONARY EDEMA (HCC): ICD-10-CM

## 2017-12-26 DIAGNOSIS — R06.00 DYSPNEA, UNSPECIFIED TYPE: ICD-10-CM

## 2017-12-26 LAB
ALBUMIN SERPL-MCNC: 3.3 G/DL (ref 3.2–4.6)
ALBUMIN/GLOB SERPL: 0.8 {RATIO} (ref 1.2–3.5)
ALP SERPL-CCNC: 112 U/L (ref 50–136)
ALT SERPL-CCNC: 20 U/L (ref 12–65)
ANION GAP SERPL CALC-SCNC: 8 MMOL/L (ref 7–16)
ARTERIAL PATENCY WRIST A: POSITIVE
AST SERPL-CCNC: 22 U/L (ref 15–37)
ATRIAL RATE: 75 BPM
ATRIAL RATE: 78 BPM
BASE DEFICIT BLDA-SCNC: 2.2 MMOL/L (ref 0–2)
BASOPHILS # BLD: 0 K/UL (ref 0–0.2)
BASOPHILS NFR BLD: 0 % (ref 0–2)
BDY SITE: ABNORMAL
BILIRUB SERPL-MCNC: 0.8 MG/DL (ref 0.2–1.1)
BNP SERPL-MCNC: 1688 PG/ML
BUN SERPL-MCNC: 18 MG/DL (ref 8–23)
CALCIUM SERPL-MCNC: 8.8 MG/DL (ref 8.3–10.4)
CALCULATED P AXIS, ECG09: 60 DEGREES
CALCULATED P AXIS, ECG09: 60 DEGREES
CALCULATED R AXIS, ECG10: -73 DEGREES
CALCULATED R AXIS, ECG10: -74 DEGREES
CALCULATED T AXIS, ECG11: 59 DEGREES
CALCULATED T AXIS, ECG11: 80 DEGREES
CHLORIDE SERPL-SCNC: 109 MMOL/L (ref 98–107)
CO2 SERPL-SCNC: 26 MMOL/L (ref 21–32)
COHGB MFR BLD: 0.3 % (ref 0.5–1.5)
CREAT SERPL-MCNC: 1.52 MG/DL (ref 0.8–1.5)
D DIMER PPP FEU-MCNC: 0.5 UG/ML(FEU)
DIAGNOSIS, 93000: NORMAL
DIAGNOSIS, 93000: NORMAL
DIFFERENTIAL METHOD BLD: ABNORMAL
DO-HGB BLD-MCNC: 2 % (ref 0–5)
EOSINOPHIL # BLD: 0.1 K/UL (ref 0–0.8)
EOSINOPHIL NFR BLD: 2 % (ref 0.5–7.8)
ERYTHROCYTE [DISTWIDTH] IN BLOOD BY AUTOMATED COUNT: 18 % (ref 11.9–14.6)
FLUAV AG NPH QL IA: NEGATIVE
FLUBV AG NPH QL IA: NEGATIVE
GAS FLOW.O2 O2 DELIVERY SYS: 2 L/MIN
GLOBULIN SER CALC-MCNC: 4.2 G/DL (ref 2.3–3.5)
GLUCOSE SERPL-MCNC: 109 MG/DL (ref 65–100)
HCO3 BLDA-SCNC: 21 MMOL/L (ref 22–26)
HCT VFR BLD AUTO: 41.9 % (ref 41.1–50.3)
HGB BLD-MCNC: 13.8 G/DL (ref 13.6–17.2)
HGB BLDMV-MCNC: 13.7 GM/DL (ref 11.7–15)
IMM GRANULOCYTES # BLD: 0 K/UL (ref 0–0.5)
IMM GRANULOCYTES NFR BLD AUTO: 0 % (ref 0–5)
LACTATE BLD-SCNC: 1.1 MMOL/L (ref 0.5–1.9)
LYMPHOCYTES # BLD: 1.5 K/UL (ref 0.5–4.6)
LYMPHOCYTES NFR BLD: 22 % (ref 13–44)
MCH RBC QN AUTO: 27.2 PG (ref 26.1–32.9)
MCHC RBC AUTO-ENTMCNC: 32.9 G/DL (ref 31.4–35)
MCV RBC AUTO: 82.5 FL (ref 79.6–97.8)
METHGB MFR BLD: 0.2 % (ref 0–1.5)
MONOCYTES # BLD: 0.5 K/UL (ref 0.1–1.3)
MONOCYTES NFR BLD: 8 % (ref 4–12)
NEUTS SEG # BLD: 4.7 K/UL (ref 1.7–8.2)
NEUTS SEG NFR BLD: 68 % (ref 43–78)
OXYHGB MFR BLDA: 98 % (ref 94–97)
P-R INTERVAL, ECG05: 200 MS
P-R INTERVAL, ECG05: 204 MS
PCO2 BLDA: 33 MMHG (ref 35–45)
PH BLDA: 7.43 [PH] (ref 7.35–7.45)
PLATELET # BLD AUTO: 156 K/UL (ref 150–450)
PMV BLD AUTO: 10.6 FL (ref 10.8–14.1)
PO2 BLDA: 159 MMHG (ref 80–105)
POTASSIUM SERPL-SCNC: 4.1 MMOL/L (ref 3.5–5.1)
PROT SERPL-MCNC: 7.5 G/DL (ref 6.3–8.2)
Q-T INTERVAL, ECG07: 396 MS
Q-T INTERVAL, ECG07: 460 MS
QRS DURATION, ECG06: 118 MS
QRS DURATION, ECG06: 120 MS
QTC CALCULATION (BEZET), ECG08: 451 MS
QTC CALCULATION (BEZET), ECG08: 513 MS
RBC # BLD AUTO: 5.08 M/UL (ref 4.23–5.67)
SAO2 % BLD: 99 % (ref 92–98.5)
SODIUM SERPL-SCNC: 143 MMOL/L (ref 136–145)
TROPONIN I BLD-MCNC: 0.01 NG/ML (ref 0.02–0.05)
TROPONIN I SERPL-MCNC: 0.44 NG/ML (ref 0.02–0.05)
VENTILATION MODE VENT: ABNORMAL
VENTRICULAR RATE, ECG03: 75 BPM
VENTRICULAR RATE, ECG03: 78 BPM
WBC # BLD AUTO: 6.9 K/UL (ref 4.3–11.1)

## 2017-12-26 PROCEDURE — 84484 ASSAY OF TROPONIN QUANT: CPT

## 2017-12-26 PROCEDURE — 74011250637 HC RX REV CODE- 250/637: Performed by: PHYSICIAN ASSISTANT

## 2017-12-26 PROCEDURE — 74011000250 HC RX REV CODE- 250: Performed by: EMERGENCY MEDICINE

## 2017-12-26 PROCEDURE — 36600 WITHDRAWAL OF ARTERIAL BLOOD: CPT

## 2017-12-26 PROCEDURE — 94640 AIRWAY INHALATION TREATMENT: CPT

## 2017-12-26 PROCEDURE — 71020 XR CHEST PA LAT: CPT

## 2017-12-26 PROCEDURE — 96374 THER/PROPH/DIAG INJ IV PUSH: CPT | Performed by: EMERGENCY MEDICINE

## 2017-12-26 PROCEDURE — 83605 ASSAY OF LACTIC ACID: CPT

## 2017-12-26 PROCEDURE — 82803 BLOOD GASES ANY COMBINATION: CPT

## 2017-12-26 PROCEDURE — 85025 COMPLETE CBC W/AUTO DIFF WBC: CPT | Performed by: EMERGENCY MEDICINE

## 2017-12-26 PROCEDURE — 94760 N-INVAS EAR/PLS OXIMETRY 1: CPT

## 2017-12-26 PROCEDURE — 99285 EMERGENCY DEPT VISIT HI MDM: CPT | Performed by: EMERGENCY MEDICINE

## 2017-12-26 PROCEDURE — 99223 1ST HOSP IP/OBS HIGH 75: CPT | Performed by: INTERNAL MEDICINE

## 2017-12-26 PROCEDURE — 74011000250 HC RX REV CODE- 250: Performed by: PHYSICIAN ASSISTANT

## 2017-12-26 PROCEDURE — 93005 ELECTROCARDIOGRAM TRACING: CPT | Performed by: EMERGENCY MEDICINE

## 2017-12-26 PROCEDURE — 99218 HC RM OBSERVATION: CPT

## 2017-12-26 PROCEDURE — 74011250636 HC RX REV CODE- 250/636: Performed by: EMERGENCY MEDICINE

## 2017-12-26 PROCEDURE — 74011250636 HC RX REV CODE- 250/636: Performed by: PHYSICIAN ASSISTANT

## 2017-12-26 PROCEDURE — 36415 COLL VENOUS BLD VENIPUNCTURE: CPT | Performed by: PHYSICIAN ASSISTANT

## 2017-12-26 PROCEDURE — 83880 ASSAY OF NATRIURETIC PEPTIDE: CPT | Performed by: EMERGENCY MEDICINE

## 2017-12-26 PROCEDURE — 74011250636 HC RX REV CODE- 250/636: Performed by: INTERNAL MEDICINE

## 2017-12-26 PROCEDURE — 87804 INFLUENZA ASSAY W/OPTIC: CPT | Performed by: EMERGENCY MEDICINE

## 2017-12-26 PROCEDURE — 85379 FIBRIN DEGRADATION QUANT: CPT | Performed by: EMERGENCY MEDICINE

## 2017-12-26 PROCEDURE — 74011250637 HC RX REV CODE- 250/637: Performed by: EMERGENCY MEDICINE

## 2017-12-26 PROCEDURE — 80053 COMPREHEN METABOLIC PANEL: CPT | Performed by: EMERGENCY MEDICINE

## 2017-12-26 PROCEDURE — 96375 TX/PRO/DX INJ NEW DRUG ADDON: CPT | Performed by: EMERGENCY MEDICINE

## 2017-12-26 RX ORDER — IPRATROPIUM BROMIDE AND ALBUTEROL SULFATE 2.5; .5 MG/3ML; MG/3ML
3 SOLUTION RESPIRATORY (INHALATION)
Status: DISCONTINUED | OUTPATIENT
Start: 2017-12-26 | End: 2017-12-29

## 2017-12-26 RX ORDER — ALLOPURINOL 100 MG/1
100 TABLET ORAL DAILY
Status: DISCONTINUED | OUTPATIENT
Start: 2017-12-27 | End: 2018-01-12 | Stop reason: HOSPADM

## 2017-12-26 RX ORDER — ATORVASTATIN CALCIUM 10 MG/1
20 TABLET, FILM COATED ORAL
Status: DISCONTINUED | OUTPATIENT
Start: 2017-12-26 | End: 2018-01-12 | Stop reason: HOSPADM

## 2017-12-26 RX ORDER — PANTOPRAZOLE SODIUM 40 MG/1
40 TABLET, DELAYED RELEASE ORAL
Status: DISCONTINUED | OUTPATIENT
Start: 2017-12-27 | End: 2017-12-29

## 2017-12-26 RX ORDER — HYDRALAZINE HYDROCHLORIDE 50 MG/1
50 TABLET, FILM COATED ORAL 2 TIMES DAILY
Status: DISCONTINUED | OUTPATIENT
Start: 2017-12-26 | End: 2017-12-27

## 2017-12-26 RX ORDER — ALBUTEROL SULFATE 0.83 MG/ML
5 SOLUTION RESPIRATORY (INHALATION)
Status: COMPLETED | OUTPATIENT
Start: 2017-12-26 | End: 2017-12-26

## 2017-12-26 RX ORDER — FUROSEMIDE 10 MG/ML
40 INJECTION INTRAMUSCULAR; INTRAVENOUS
Status: COMPLETED | OUTPATIENT
Start: 2017-12-26 | End: 2017-12-26

## 2017-12-26 RX ORDER — OXYCODONE HYDROCHLORIDE 5 MG/1
5 TABLET ORAL
Status: DISCONTINUED | OUTPATIENT
Start: 2017-12-26 | End: 2018-01-12 | Stop reason: HOSPADM

## 2017-12-26 RX ORDER — ISOSORBIDE MONONITRATE 30 MG/1
30 TABLET, EXTENDED RELEASE ORAL
Status: DISCONTINUED | OUTPATIENT
Start: 2017-12-27 | End: 2018-01-03

## 2017-12-26 RX ORDER — IPRATROPIUM BROMIDE AND ALBUTEROL SULFATE 2.5; .5 MG/3ML; MG/3ML
3 SOLUTION RESPIRATORY (INHALATION)
Status: COMPLETED | OUTPATIENT
Start: 2017-12-26 | End: 2017-12-26

## 2017-12-26 RX ORDER — CARVEDILOL 25 MG/1
25 TABLET ORAL 2 TIMES DAILY WITH MEALS
Status: DISCONTINUED | OUTPATIENT
Start: 2017-12-26 | End: 2018-01-12 | Stop reason: HOSPADM

## 2017-12-26 RX ORDER — PROMETHAZINE HYDROCHLORIDE 25 MG/1
25 TABLET ORAL
Status: DISCONTINUED | OUTPATIENT
Start: 2017-12-26 | End: 2018-01-12 | Stop reason: HOSPADM

## 2017-12-26 RX ORDER — SODIUM CHLORIDE 0.9 % (FLUSH) 0.9 %
5-10 SYRINGE (ML) INJECTION AS NEEDED
Status: DISCONTINUED | OUTPATIENT
Start: 2017-12-26 | End: 2018-01-12 | Stop reason: HOSPADM

## 2017-12-26 RX ORDER — ASPIRIN 81 MG/1
81 TABLET ORAL DAILY
Status: DISCONTINUED | OUTPATIENT
Start: 2017-12-27 | End: 2018-01-12 | Stop reason: HOSPADM

## 2017-12-26 RX ORDER — GUAIFENESIN 100 MG/5ML
324 LIQUID (ML) ORAL
Status: COMPLETED | OUTPATIENT
Start: 2017-12-26 | End: 2017-12-26

## 2017-12-26 RX ORDER — MORPHINE SULFATE 2 MG/ML
2 INJECTION, SOLUTION INTRAMUSCULAR; INTRAVENOUS
Status: COMPLETED | OUTPATIENT
Start: 2017-12-26 | End: 2017-12-26

## 2017-12-26 RX ORDER — FUROSEMIDE 10 MG/ML
40 INJECTION INTRAMUSCULAR; INTRAVENOUS 2 TIMES DAILY
Status: DISCONTINUED | OUTPATIENT
Start: 2017-12-26 | End: 2017-12-30

## 2017-12-26 RX ORDER — HEPARIN SODIUM 5000 [USP'U]/ML
4000 INJECTION, SOLUTION INTRAVENOUS; SUBCUTANEOUS ONCE
Status: COMPLETED | OUTPATIENT
Start: 2017-12-26 | End: 2017-12-26

## 2017-12-26 RX ORDER — HEPARIN SODIUM 5000 [USP'U]/100ML
12-25 INJECTION, SOLUTION INTRAVENOUS
Status: DISCONTINUED | OUTPATIENT
Start: 2017-12-26 | End: 2017-12-28

## 2017-12-26 RX ORDER — ACETAMINOPHEN 325 MG/1
650 TABLET ORAL
Status: DISCONTINUED | OUTPATIENT
Start: 2017-12-26 | End: 2018-01-12 | Stop reason: HOSPADM

## 2017-12-26 RX ORDER — ALBUTEROL SULFATE 90 UG/1
2 AEROSOL, METERED RESPIRATORY (INHALATION)
Status: DISCONTINUED | OUTPATIENT
Start: 2017-12-26 | End: 2018-01-12 | Stop reason: HOSPADM

## 2017-12-26 RX ORDER — AMLODIPINE BESYLATE 5 MG/1
5 TABLET ORAL DAILY
Status: DISCONTINUED | OUTPATIENT
Start: 2017-12-27 | End: 2017-12-28

## 2017-12-26 RX ORDER — NITROGLYCERIN 0.4 MG/1
0.4 TABLET SUBLINGUAL
Status: DISCONTINUED | OUTPATIENT
Start: 2017-12-26 | End: 2018-01-12 | Stop reason: HOSPADM

## 2017-12-26 RX ADMIN — ATORVASTATIN CALCIUM 20 MG: 10 TABLET, FILM COATED ORAL at 21:45

## 2017-12-26 RX ADMIN — ALBUTEROL SULFATE 5 MG: 2.5 SOLUTION RESPIRATORY (INHALATION) at 12:37

## 2017-12-26 RX ADMIN — IPRATROPIUM BROMIDE AND ALBUTEROL SULFATE 3 ML: .5; 3 SOLUTION RESPIRATORY (INHALATION) at 16:50

## 2017-12-26 RX ADMIN — IPRATROPIUM BROMIDE AND ALBUTEROL SULFATE 3 ML: .5; 3 SOLUTION RESPIRATORY (INHALATION) at 11:27

## 2017-12-26 RX ADMIN — ASPIRIN 81 MG 324 MG: 81 TABLET ORAL at 13:08

## 2017-12-26 RX ADMIN — FUROSEMIDE 40 MG: 10 INJECTION, SOLUTION INTRAMUSCULAR; INTRAVENOUS at 20:13

## 2017-12-26 RX ADMIN — HEPARIN SODIUM AND DEXTROSE 12 UNITS/KG/HR: 5000; 5 INJECTION INTRAVENOUS at 20:10

## 2017-12-26 RX ADMIN — IPRATROPIUM BROMIDE AND ALBUTEROL SULFATE 3 ML: .5; 3 SOLUTION RESPIRATORY (INHALATION) at 20:00

## 2017-12-26 RX ADMIN — MORPHINE SULFATE 2 MG: 2 INJECTION, SOLUTION INTRAMUSCULAR; INTRAVENOUS at 13:09

## 2017-12-26 RX ADMIN — NITROGLYCERIN 1 INCH: 20 OINTMENT TOPICAL at 13:09

## 2017-12-26 RX ADMIN — CARVEDILOL 25 MG: 25 TABLET, FILM COATED ORAL at 17:59

## 2017-12-26 RX ADMIN — HYDRALAZINE HYDROCHLORIDE 50 MG: 50 TABLET, FILM COATED ORAL at 21:45

## 2017-12-26 RX ADMIN — IPRATROPIUM BROMIDE AND ALBUTEROL SULFATE 3 ML: .5; 3 SOLUTION RESPIRATORY (INHALATION) at 23:47

## 2017-12-26 RX ADMIN — HEPARIN SODIUM 4000 UNITS: 5000 INJECTION, SOLUTION INTRAVENOUS; SUBCUTANEOUS at 20:11

## 2017-12-26 RX ADMIN — FUROSEMIDE 40 MG: 10 INJECTION, SOLUTION INTRAMUSCULAR; INTRAVENOUS at 13:09

## 2017-12-26 NOTE — PROGRESS NOTES
TRANSFER - IN REPORT:    Verbal report received from Ghassan Walden RN (name) on Tiffani Bruno  being received from ER (unit) for routine progression of care      Report consisted of patients Situation, Background, Assessment and   Recommendations(SBAR). Information from the following report(s) SBAR, Kardex, ED Summary and MAR was reviewed with the receiving nurse. Opportunity for questions and clarification was provided. Assessment completed upon patients arrival to unit and care assumed.

## 2017-12-26 NOTE — ED NOTES
Pt states pressure across chest pressure. Pt tachypnic and having more trouble breathing, despite oxygen levels being within normal range. Pt states he did not take his BP meds today. Dr. Kari Garza notified.

## 2017-12-26 NOTE — ED NOTES
After deliberation, pt and family states that pt did take BP medication. Pt states chest pain feels like pressure that comes and goes in waves. Spoke with cardiology after her consult with the pt and family and she states to hold morphine.

## 2017-12-26 NOTE — H&P
[address] Cardiology H&P                Date of  Admission: 12/26/2017 11:27 AM     Primary Care Physician: Dr Jerardo Hassan  Primary Cardiologist: Dr Julien Wiggins  Referring Physician: Dr Rhiannon Bell  Admitting Physician: Dr Marcus Nino    CC/Reason for consult: CHF       Anastacio Holliday is a 74 y.o. male admitted for dyspnea. He has a h/o CAD w LHC 6-2015 showing mild-mod CAD, sHF w echo 9-2017 showing EF 30-35% with KATIE, mild MR, mild-mod TR. He had a life vest but did not wear it and gave it back. He has COPD (PFTs w moderate obstructive/restrictive defect) but can't afford his new medications he was given by pulmonary, severe TAZ and does not use CPAP (does not have cord), and CKD. He has had increased SOB x 2 days with a sore throat and productive cough w yellow sputum. He has not had fevers but several family members who have been sick with flu like symptoms. He has used his albuterol that he has at home (not new medicine that was prescribed by pulmonary) which gave him temporary relief of SOB. His SOB got gradually worse until he had dyspnea at rest and came to the ER. In ER he was given one albuterol treatment but was still extremely SOB, more labored breathing, and developed acute chest tightness, severe, epigastric region radiating to L breast without nausea or diaphoresis. CP lasted about 10 minutes. Breathing improved with second albuterol treatment. BP elevated 157/108 and he was given nitro and ASA and symptoms improved. At this point his breathing is better, chest pain eased off. In ER troponin negative, WBC 6.9, hgb 13.8, , K 4.1, BUn 18, cr 1.52, BNP 1688, CXR no acute process, EKG NSR w rate 75 without ST/T wave changes. SOB is much improved. LE edema is unchanged, weight is stable, he adheres to a low NA diet. No dizziness, palpitations or syncope. He has been compliant with his cardiac meds.      Patient Active Problem List   Diagnosis Code    CKD (chronic kidney disease) stage 3, GFR 30-59 ml/min N18.3    Dyspnea on exertion R06.09    Tobacco abuse Z72.0    Chronic systolic heart failure ([facility]) I50.22    Coronary atherosclerosis of native coronary vessel I25.10    Arthritis M19.90    Hypertension I10    COPD, moderate ([facility]) J44.9    High triglycerides E78.1    Gastroesophageal reflux disease without esophagitis K21.9    Mixed hyperlipidemia E78.2    Essential hypertension I10    CHF (congestive heart failure) ([facility]) I50.9    Vomiting R11.10    NINO (acute kidney injury) ([facility]) N17.9    TAZ (obstructive sleep apnea) G47.33    Atherosclerosis of native coronary artery of native heart with stable angina pectoris ([facility]) I25.118       Past Medical History:   Diagnosis Date    Acute CHF (congestive heart failure) ([facility]) 4/25/2015    Acute combined systolic and diastolic congestive heart failure ([facility]) 4/28/2015    Chronic obstructive pulmonary disease ([facility])     De Quervain's tenosynovitis, right 4/28/2015    Dyspnea on exertion 6/28/2015    Elevated serum creatinine 4/25/2015    Elevated troponin 6/28/2015    History of coronary artery disease     MI at 25 yo    History of right knee surgery     cartilage removal    History of shingles     Malignant hypertension 4/25/2015    MI (myocardial infarction)       Past Surgical History:   Procedure Laterality Date    HX HEART CATHETERIZATION  6/29/2015    no intervention    HX KNEE ARTHROSCOPY Right     removal of cartilage     Allergies   Allergen Reactions    Pcn [Penicillins] Other (comments)     \"makes my heart stop\"      Family History   Problem Relation Age of Onset    Hypertension Mother     No Known Problems Father       Social History   Substance Use Topics    Smoking status: Former Smoker     Packs/day: 0.25     Years: 20.00     Types: Cigarettes     Quit date: 4/5/2015    Smokeless tobacco: Never Used    Alcohol use No        No current facility-administered medications for this encounter.       Current Outpatient Prescriptions   Medication Sig    albuterol-ipratropium (DUO-NEB) 2.5 mg-0.5 mg/3 ml nebu 3 mL by Nebulization route four (4) times daily. Diaignosis--J44.9    furosemide (LASIX) 40 mg tablet Take 1 Tab by mouth Before breakfast and dinner.  hydrALAZINE (APRESOLINE) 50 mg tablet Take 1 Tab by mouth two (2) times a day.  isosorbide mononitrate ER (IMDUR) 30 mg tablet Take 1 Tab by mouth every morning.  fluticasone-salmeterol (ADVAIR) 250-50 mcg/dose diskus inhaler Take 1 Puff by inhalation every twelve (12) hours.  allopurinol (ZYLOPRIM) 100 mg tablet Take 1 Tab by mouth daily.  oxyCODONE IR (ROXICODONE) 5 mg immediate release tablet Take 1 Tab by mouth every six (6) hours as needed for Pain. Max Daily Amount: 20 mg.    carvedilol (COREG) 25 mg tablet Take 25 mg by mouth two (2) times daily (with meals).  albuterol (VENTOLIN HFA) 90 mcg/actuation inhaler Take 2 Puffs by inhalation four (4) times daily.  atorvastatin (LIPITOR) 20 mg tablet Take 1 Tab by mouth nightly.  pantoprazole (PROTONIX) 40 mg tablet Take 1 Tab by mouth Daily (before breakfast).  aspirin delayed-release 81 mg tablet Take 1 Tab by mouth daily.        Review of Symptoms:  General: no weight change,  no weakness, fever or chills  Skin: no rashes, lumps, or other skin changes  HEENT: no headache, + sore throat  Neck: no swollen glands, goiter, pain or stiffness  Respiratory: + cough, sputum, hemoptysis, + dyspnea, wheezing  Cardiovascular: + as per HPI  Gastrointestinal: no GERD, constipation, diarrhea, liver problems, or h/o GI bleed  Urinary: no frequency, urgency , hematuria, burning/pain with urination, recent flank pain, polyuria, nocturia, or difficulty urinating  Peripheral Vascular: no claudication, leg cramps, prior DVTs, swelling of calves, legs, or feet, color change, or swelling with redness or tenderness  Musculoskeletal: + gout  Psychiatric: no depression or excessive stress  Neurological: no sensory or motor loss, seizures, syncope, tremors, numbness, no dementia  Hematologic: no anemia, easy bruising or bleeding  Endocrine: no thyroid problems, heat or cold intolerance, excessive sweating, polyuria, polydipsia, no diabetes. Physical Exam  Vitals:    12/26/17 1215 12/26/17 1234 12/26/17 1237 12/26/17 1247   BP: (!) 143/105 (!) 157/108     Pulse: 73 75     Resp:  24     Temp:       SpO2: 96% 100% 100% 96%       Physical Exam:  General: Well Developed, Well Nourished, No Acute Distress  HEENT: pupils equal and round, no abnormalities noted  Neck: supple, no JVD, no carotid bruits  Heart: S1S2 with RRR without murmurs or gallops  Lungs: Few distant wheezes, few crackles B at bases   Abd: soft, nontender, nondistended, with good bowel sounds  Ext: warm, 1+ B edema  Skin: warm and dry  Psychiatric: Normal mood and affect  Neurologic: Alert and oriented X 3      Cardiographics    Telemetry: NSR w rate around 70's       Labs:   Recent Labs      12/26/17   1132   NA  143   K  4.1   BUN  18   CREA  1.52*   GLU  109*   WBC  6.9   HGB  13.8   HCT  41.9   PLT  156        Assessment/Plan:     Assessment:   Dyspnea- Probably multifactorial secondary to COPD, untreated TAZ, possibly CHF. Will admit, diurese, ask pulmonary to consult to maximize COPD meds, needs cord for CPAP. CKD (chronic kidney disease) stage 3, GFR 30-59 ml/min- Monitor. Chronic systolic heart failure- EF 30-35%, diuresis w IV lasix, cont hydralazine/imdur, Coreg, no ACE/ARB due to CKD. CAD- Mild- mod on [facility] 6-2015. Hypertension - Hydralazine, Coreg, add norvasc. COPD, moderate- Albuterol. TAZ (obstructive sleep apnea) - Needs CPAP per pulmonary, does not have cord at this time. Thank you very much for this referral. We appreciate the opportunity to participate in this patient's care. We will follow along with above stated plan.     Branden Saunders PA-C  Consulting MD: Mary Kate Buck

## 2017-12-26 NOTE — ED NOTES
TRANSFER - OUT REPORT:    Verbal report given to Rosamaria RN(name) on Alex Falk  being transferred to 336(unit) for routine progression of care       Report consisted of patients Situation, Background, Assessment and   Recommendations(SBAR). Information from the following report(s) SBAR, Kardex, ED Summary and Recent Results was reviewed with the receiving nurse. Lines:   Peripheral IV 12/26/17 Left Antecubital (Active)   Site Assessment Clean, dry, & intact 12/26/2017 12:43 PM   Phlebitis Assessment 0 12/26/2017 12:43 PM   Infiltration Assessment 0 12/26/2017 12:43 PM   Hub Color/Line Status Green 12/26/2017 12:43 PM   Action Taken Blood drawn 12/26/2017 12:43 PM   Alcohol Cap Used No 12/26/2017 12:43 PM        Opportunity for questions and clarification was provided.       Patient transported with:   O2 @ 3 liters

## 2017-12-26 NOTE — ED PROVIDER NOTES
HPI Comments: Presents with complaint of shortness of breath, dyspnea on exertion, increased leg swelling for 2 days. He denies any chest pain. He reports some epigastric pain. He reports nausea and vomiting today. He states some minimal epigastric pain. He does not smoke but has a history of smoking for 35 years. His right leg is more swollen than his left reports it is not uncommon for him to have leg swelling greater on one side than the other but it switches back and forth. Patient is a 74 y.o. male presenting with shortness of breath. The history is provided by the patient and the spouse. Shortness of Breath   This is a new problem. The problem occurs continuously. The current episode started 2 days ago. The problem has been gradually worsening. Associated symptoms include vomiting and leg swelling. Pertinent negatives include no fever, no cough and no chest pain. He has had prior hospitalizations. He has had prior ED visits. Associated medical issues include COPD, CAD and heart failure.         Past Medical History:   Diagnosis Date    Acute CHF (congestive heart failure) ([facility] [address] 4/25/2015    Acute combined systolic and diastolic congestive heart failure ([facility] [address] 4/28/2015    Chronic obstructive pulmonary disease (HCC)     De Quervain's tenosynovitis, right 4/28/2015    Dyspnea on exertion 6/28/2015    Elevated serum creatinine 4/25/2015    Elevated troponin 6/28/2015    History of coronary artery disease     MI at 27 yo    History of right knee surgery     cartilage removal    History of shingles     Malignant hypertension 4/25/2015    MI (myocardial infarction)        Past Surgical History:   Procedure Laterality Date    HX HEART CATHETERIZATION  6/29/2015    no intervention    HX KNEE ARTHROSCOPY Right     removal of cartilage         Family History:   Problem Relation Age of Onset    Hypertension Mother     No Known Problems Father        Social History     Social History    Marital status:      Spouse name: N/A    Number of children: N/A    Years of education: N/A     Occupational History    retired      Social History Main Topics    Smoking status: Former Smoker     Packs/day: 0.25     Years: 20.00     Types: Cigarettes     Quit date: 4/5/2015    Smokeless tobacco: Never Used    Alcohol use No    Drug use: No    Sexual activity: Not on file     Other Topics Concern     Service No    Blood Transfusions No     no issues with receiving    Caffeine Concern No    Special Diet No    Exercise No    Seat Belt Yes     Social History Narrative    Lives with his wife         ALLERGIES: Pcn [penicillins]    Review of Systems   Constitutional: Negative for fever. Respiratory: Positive for shortness of breath. Negative for cough. Cardiovascular: Positive for leg swelling. Negative for chest pain. Gastrointestinal: Positive for vomiting. All other systems reviewed and are negative. Vitals:    12/26/17 1215 12/26/17 1234 12/26/17 1237 12/26/17 1247   BP: (!) 143/105 (!) 157/108     Pulse: 73 75     Resp:  24     Temp:       SpO2: 96% 100% 100% 96%            Physical Exam   Constitutional: He is oriented to person, place, and time. He appears well-developed and well-nourished. No distress. HENT:   Head: Normocephalic and atraumatic. Neck: Normal range of motion. Neck supple. Cardiovascular: Normal rate and regular rhythm. Pulmonary/Chest: He is in respiratory distress (mild, especially with exertion). He has wheezes (R>L). He has rales. Abdominal: Soft. He exhibits no distension. There is no tenderness. There is no rebound and no guarding. Musculoskeletal: He exhibits edema. Neurological: He is alert and oriented to person, place, and time. No cranial nerve deficit. Skin: Skin is warm and dry. No rash noted. He is not diaphoretic. No erythema. Nursing note and vitals reviewed.        MDM  Number of Diagnoses or Management Options  Acute systolic CHF (congestive heart failure) [facility]):   Chest pain, unspecified type:   Diagnosis management comments: Called to room for chest pain. Repeat EKG unchanged. Pt looked much worse. Tn neg and BNP elevated. EF 35%. Given asa and ntg paste. D/w cards. Pt told me after I talked to him 3rd time that his family members had \"the flu. \"  Want flu test.         Amount and/or Complexity of Data Reviewed  Clinical lab tests: ordered and reviewed  Decide to obtain previous medical records or to obtain history from someone other than the patient: yes  Review and summarize past medical records: yes  Discuss the patient with other providers: yes  Independent visualization of images, tracings, or specimens: yes (NSR no ST elevation not significantly changed from previous)    Risk of Complications, Morbidity, and/or Mortality  Presenting problems: high  Diagnostic procedures: high  Management options: high    Critical Care  Total time providing critical care: 30-74 minutes    Patient Progress  Patient progress: stable    ED Course       Procedures

## 2017-12-26 NOTE — ED TRIAGE NOTES
Pt arrives to ER complaining of shob for 3 days. Pt has hx of COPD and CHF. Pt states a productive cough with light green sputum. Pt uses CPAP at night and nebulizer at home that he states is not helping. Upon auscultation in triage, stridor on right, coarse on left lung sounds.

## 2017-12-26 NOTE — IP AVS SNAPSHOT
[address] [facility] [address] 
 
 
 [address] [facility] [address] [place] [facility] 
[phone] Patient: Darrick Jimenez MRN: [id number] YSO:[id number] About your hospitalization You were admitted on:  December 26, 2017 You last received care in the:  [facility] 3 TELEMETRY You were discharged on:  January 12, 2018 Why you were hospitalized Your primary diagnosis was:  Dyspnea Your diagnoses also included:  Chronic Systolic Heart Failure (Hcc), Copd, Moderate (Hcc), Coronary Atherosclerosis Of Native Coronary Vessel, Hypertension, Raul (Obstructive Sleep Apnea), Ckd (Chronic Kidney Disease) Stage 3, Gfr 30-59 Ml/Min Follow-up Information Follow up With Details Comments Contact Info Ron Guaman MD On 1/24/2018 145 pm in [place] office [address] [facility] [address] 
[phone] Ankur Gonzalez MD  As needed [address]  
[address] [facility] [address] 
[phone] Prateek Pitts MD  Wednesday 1/31/18 at 3:45 [place] [address] [facility] [address] 
[phone] [facility]  Tuesday 2/6/18 at 1:30pm [facility] [address] [facility] [address] [address] 
[phone] Your Scheduled Appointments Wednesday January 24, 2018  1:45 PM [facility] HOSPITAL FOLLOW-UP with Ron Guaman MD  
[address] ([address] [place] [address]) [address]  
[address] [place] [address]  
[phone] Tuesday February 06, 2018  2:00 PM [facility]  
(Arrive by 1:30 PM) HOSPITAL with Armando Eldridge NP [facility] ([facility]) [address] [place] [address] [place] [address]  
[phone] Discharge Orders None A check megan indicates which time of day the medication should be taken. My Medications START taking these medications  Instructions Each Dose to Equal  
 Morning Noon Evening Bedtime  
 guaiFENesin 1,200 mg Ta12 ER tablet Commonly known as:  Pete & Pete Your next dose is:  1-12-18 Take 1 Tab by mouth every twelve (12) hours. 1200 mg  
    
   
   
  
   
  
 lisinopril 5 mg tablet Commonly known as:  Virgen Dominguez Start taking on:  1/13/2018 Take 1 Tab by mouth daily. 5 mg  
    
  
   
   
   
  
 polyethylene glycol 17 gram packet Commonly known as:  Ju Mak Start taking on:  1/13/2018 Take 1 Packet by mouth daily. 17 g CHANGE how you take these medications Instructions Each Dose to Equal  
 Morning Noon Evening Bedtime  
 furosemide 40 mg tablet Commonly known as:  LASIX What changed:  when to take this Your next dose is:  1- Take 1 Tab by mouth daily. 40 mg CONTINUE taking these medications Instructions Each Dose to Equal  
 Morning Noon Evening Bedtime  
 albuterol 90 mcg/actuation inhaler Commonly known as:  VENTOLIN HFA Take 2 Puffs by inhalation four (4) times daily. 2 Puff  
    
   
   
   
  
 albuterol-ipratropium 2.5 mg-0.5 mg/3 ml Nebu Commonly known as:  DUO-NEB  
   
 3 mL by Nebulization route four (4) times daily. Diaignosis--J44.9  
 3 mL  
    
  
   
  
   
  
   
  
  
 allopurinol 100 mg tablet Commonly known as:  Viri Spears Your next dose is:  1-13-18 Take 1 Tab by mouth daily. 100 mg  
    
  
   
   
   
  
 aspirin delayed-release 81 mg tablet Your next dose is:  1- Take 1 Tab by mouth daily. 81 mg  
    
  
   
   
   
  
 atorvastatin 20 mg tablet Commonly known as:  LIPITOR Your next dose is:  1-12-18 Take 1 Tab by mouth nightly. 20 mg  
    
   
   
   
  
  
 carvedilol 25 mg tablet Commonly known as:  Angelica Rahman Your next dose is:  1- Take 25 mg by mouth two (2) times daily (with meals).   
 25 mg  
    
   
   
  
   
 pantoprazole 40 mg tablet Commonly known as:  PROTONIX Your next dose is:  1- Take 1 Tab by mouth Daily (before breakfast). 40 mg  
    
  
   
   
   
  
  
STOP taking these medications   
 oxyCODONE IR 5 mg immediate release tablet Commonly known as:  Edwina Jules Where to Get Your Medications Information on where to get these meds will be given to you by the nurse or doctor. ! Ask your nurse or doctor about these medications  
  atorvastatin 20 mg tablet  
 furosemide 40 mg tablet  
 guaiFENesin 1,200 mg Ta12 ER tablet  
 lisinopril 5 mg tablet  
 polyethylene glycol 17 gram packet Discharge Instructions Heart Failure: Care Instructions Your Care Instructions Heart failure occurs when your heart does not pump as much blood as the body needs. Failure does not mean that the heart has stopped pumping but rather that it is not pumping as well as it should. Over time, this causes fluid buildup in your lungs and other parts of your body. Fluid buildup can cause shortness of breath, fatigue, swollen ankles, and other problems. By taking medicines regularly, reducing sodium (salt) in your diet, checking your weight every day, and making lifestyle changes, you can feel better and live longer. Follow-up care is a key part of your treatment and safety. Be sure to make and go to all appointments, and call your doctor if you are having problems. It's also a good idea to know your test results and keep a list of the medicines you take. How can you care for yourself at home? Medicines ? · Be safe with medicines. Take your medicines exactly as prescribed. Call your doctor if you think you are having a problem with your medicine.   
? · Do not take any vitamins, over-the-counter medicine, or herbal products without talking to your doctor first. Laure Oas not take ibuprofen (Advil or Motrin) and naproxen (Aleve) without talking to your doctor first. They could make your heart failure worse. ? · You may be taking some of the following medicine. ¨ Beta-blockers can slow heart rate, decrease blood pressure, and improve your condition. Taking a beta-blocker may lower your chance of needing to be hospitalized. ¨ Angiotensin-converting enzyme inhibitors (ACEIs) reduce the heart's workload, lower blood pressure, and reduce swelling. Taking an ACEI may lower your chance of needing to be hospitalized again. ¨ Angiotensin II receptor blockers (ARBs) work like ACEIs. Your doctor may prescribe them instead of ACEIs. ¨ Diuretics, also called water pills, reduce swelling. ¨ Potassium supplements replace this important mineral, which is sometimes lost with diuretics. ¨ Aspirin and other blood thinners prevent blood clots, which can cause a stroke or heart attack. ? You will get more details on the specific medicines your doctor prescribes. Diet ? · Your doctor may suggest that you limit sodium to 2,000 milligrams (mg) a day or less. That is less than 1 teaspoon of salt a day, including all the salt you eat in cooking or in packaged foods. People get most of their sodium from processed foods. Fast food and restaurant meals also tend to be very high in sodium. ? · Ask your doctor how much liquid you can drink each day. You may have to limit liquids. ?Weight ? · Weigh yourself without clothing at the same time each day. Record your weight. Call your doctor if you have a sudden weight gain, such as more than 2 to 3 pounds in a day or 5 pounds in a week. (Your doctor may suggest a different range of weight gain.) A sudden weight gain may mean that your heart failure is getting worse. ? Activity level ? · Start light exercise (if your doctor says it is okay). Even if you can only do a small amount, exercise will help you get stronger, have more energy, and manage your weight and your stress.  Walking is an easy way to get exercise. Start out by walking a little more than you did before. Bit by bit, increase the amount you walk. ? · When you exercise, watch for signs that your heart is working too hard. You are pushing yourself too hard if you cannot talk while you are exercising. If you become short of breath or dizzy or have chest pain, stop, sit down, and rest.  
? · If you feel \"wiped out\" the day after you exercise, walk slower or for a shorter distance until you can work up to a better pace. ? · Get enough rest at night. Sleeping with 1 or 2 pillows under your upper body and head may help you breathe easier. ? Lifestyle changes ? · Do not smoke. Smoking can make a heart condition worse. If you need help quitting, talk to your doctor about stop-smoking programs and medicines. These can increase your chances of quitting for good. Quitting smoking may be the most important step you can take to protect your heart. ? · Limit alcohol to 2 drinks a day for men and 1 drink a day for women. Too much alcohol can cause health problems. ? · Avoid getting sick from colds and the flu. Get a pneumococcal vaccine shot. If you have had one before, ask your doctor whether you need another dose. Get a flu shot each year. If you must be around people with colds or the flu, wash your hands often. When should you call for help? Call 911 if you have symptoms of sudden heart failure such as: 
? · You have severe trouble breathing. ? · You cough up pink, foamy mucus. ? · You have a new irregular or rapid heartbeat. ?Call your doctor now or seek immediate medical care if: 
? · You have new or increased shortness of breath. ? · You are dizzy or lightheaded, or you feel like you may faint. ? · You have sudden weight gain, such as more than 2 to 3 pounds in a day or 5 pounds in a week. (Your doctor may suggest a different range of weight gain.) ? · You have increased swelling in your legs, ankles, or feet. ? · You are suddenly so tired or weak that you cannot do your usual activities. ? Watch closely for changes in your health, and be sure to contact your doctor if you develop new symptoms. Where can you learn more? Go to http://jaime-jarrell.info/. Enter C220 in the search box to learn more about \"Heart Failure: Care Instructions. \" Current as of: September 21, 2016 Content Version: 11.4 © 6859-8731 [employer] [place]. Care instructions adapted under license by [employer] (which disclaims liability or warranty for this information). If you have questions about a medical condition or this instruction, always ask your healthcare professional. [address] any warranty or liability for your use of this information. [employer] Announcement We are excited to announce that we are making your provider's discharge notes available to you in [employer]. You will see these notes when they are completed and signed by the physician that discharged you from your recent hospital stay. If you have any questions or concerns about any information you see in [employer], please call the Health Information Department where you were seen or reach out to your Primary Care Provider for more information about your plan of care. Introducing [facility]! [facility] introduces [employer] patient portal. Now you can access parts of your medical record, email your doctor's office, and request medication refills online. 1. In your internet browser, go to [email or web address] 2. Click on the First Time User? Click Here link in the Sign In box. You will see the New Member Sign Up page. 3. Enter your [employer] Access Code exactly as it appears below. You will not need to use this code after youve completed the sign-up process. If you do not sign up before the expiration date, you must request a new code. · [employer] Access Code: [email or web address] Expires: 3/27/2018  6:51 AM 
 
4. Enter the last four digits of your Social Security Number (xxxx) and Date of Birth (mm/dd/yyyy) as indicated and click Submit. You will be taken to the next sign-up page. 5. Create a Medabilt ID. This will be your [employer] login ID and cannot be changed, so think of one that is secure and easy to remember. 6. Create a [employer] password. You can change your password at any time. 7. Enter your Password Reset Question and Answer. This can be used at a later time if you forget your password. 8. Enter your e-mail address. You will receive e-mail notification when new information is available in [address] 9. Click Sign Up. You can now view and download portions of your medical record. 10. Click the Download Summary menu link to download a portable copy of your medical information. If you have questions, please visit the Frequently Asked Questions section of the [employer] website. Remember, [employer] is NOT to be used for urgent needs. For medical emergencies, dial 911. Now available from your iPhone and Android! Providers Seen During Your Hospitalization Provider Specialty Primary office phone Alcides Templeton MD Emergency Medicine [phone] Sanaz Powell MD Cardiology [phone] Terrie Yanes MD Cardiology [phone] Immunizations Administered for This Admission Name Date Influenza Vaccine (Quad) PF  Deferred () TB Skin Test (PPD) Intradermal 1/5/2018 Your Primary Care Physician (PCP) Primary Care Physician Office Phone Office Fax Joseakin Ty [phone] You are allergic to the following Allergen Reactions Pcn (Penicillins) Other (comments) \"makes my heart stop\" Recent Documentation Weight BMI Smoking Status 93.9 kg 30.57 kg/m2 Former Smoker Emergency Contacts Name Discharge Info Relation Home Work Mobile Ashley Brito  Daughter [21]   [phone] Cintia Ochoa  Spouse [3] [phone] Eliane Medley  Brother [24] [phone] Patient Belongings The following personal items are in your possession at time of discharge: 
  Dental Appliances: None  Visual Aid: Glasses, With patient, At bedside      Home Medications: Kept at bedside (primary nurse notified)   Jewelry: None  Clothing: At bedside    Other Valuables: Cell Phone Please provide this summary of care documentation to your next provider. Signatures-by signing, you are acknowledging that this After Visit Summary has been reviewed with you and you have received a copy. Patient Signature:  ____________________________________________________________ Date:  ____________________________________________________________  
  
[facility] Provider Signature:  ____________________________________________________________ Date:  ____________________________________________________________

## 2017-12-26 NOTE — CONSULTS
CONSULT NOTE    Melissa Lake    12/26/2017    Date of Admission:  12/26/2017    The patient's chart is reviewed and the patient is discussed with the staff. Subjective:     Patient is a 74 y.o.  male seen and evaluated at the request of Dr. Bear Dent. The patient carries a diagnosis of COPD. However his cPFTs from 7/20/15 show restrictive and no obstructive lung disease. In addition, he denies having any issues with chronic wheeze or cough. He states that yesterday he developed chest pain across his sternum associated with increased dyspnea. He states he hurts worse when he tries to take a deep breath and states he has never had pain like that before. He has some LE edema but reports it is only mildly worse than his baseline level. He denies any unilateral leg swelling, redness, or pain. He denies any increased cough, wheeze, fever, or chills. He states he used albuterol yesterday for these symptoms without improvement, though H&P mentions improvement. He was found to have BNP of 1688 and was admitted to the cardiology service. CXR was clear and he is currently on room air. He also has a history of TAZ but not using CPAP due to missing equipment. Review of Systems  A comprehensive review of systems was negative except for that written in the HPI.     Patient Active Problem List   Diagnosis Code    CKD (chronic kidney disease) stage 3, GFR 30-59 ml/min N18.3    Dyspnea on exertion R06.09    Tobacco abuse Z72.0    Chronic systolic heart failure (HCC) I50.22    Coronary atherosclerosis of native coronary vessel I25.10    Arthritis M19.90    Hypertension I10    COPD, moderate (HCC) J44.9    High triglycerides E78.1    Gastroesophageal reflux disease without esophagitis K21.9    Mixed hyperlipidemia E78.2    Essential hypertension I10    CHF (congestive heart failure) ([facility]) I50.9    Vomiting R11.10    NINO (acute kidney injury) ([facility] [address] N17.9    TAZ (obstructive sleep apnea) G47.33    Atherosclerosis of native coronary artery of native heart with stable angina pectoris ([facility] I25.118    Dyspnea R06.00         Prior to Admission Medications   Prescriptions Last Dose Informant Patient Reported? Taking? albuterol (VENTOLIN HFA) 90 mcg/actuation inhaler   No No   Sig: Take 2 Puffs by inhalation four (4) times daily. albuterol-ipratropium (DUO-NEB) 2.5 mg-0.5 mg/3 ml nebu   No No   Sig: 3 mL by Nebulization route four (4) times daily. Diaignosis--J44.9   allopurinol (ZYLOPRIM) 100 mg tablet   No No   Sig: Take 1 Tab by mouth daily. aspirin delayed-release 81 mg tablet   No No   Sig: Take 1 Tab by mouth daily. carvedilol (COREG) 25 mg tablet   Yes No   Sig: Take 25 mg by mouth two (2) times daily (with meals). furosemide (LASIX) 40 mg tablet   No No   Sig: Take 1 Tab by mouth Before breakfast and dinner. oxyCODONE IR (ROXICODONE) 5 mg immediate release tablet   No No   Sig: Take 1 Tab by mouth every six (6) hours as needed for Pain. Max Daily Amount: 20 mg.   pantoprazole (PROTONIX) 40 mg tablet   No No   Sig: Take 1 Tab by mouth Daily (before breakfast).       Facility-Administered Medications: None       Past Medical History:   Diagnosis Date    Acute CHF (congestive heart failure) ([facility] 4/25/2015    Acute combined systolic and diastolic congestive heart failure ([facility] 4/28/2015    Chronic obstructive pulmonary disease (HCC)     De Quervain's tenosynovitis, right 4/28/2015    Dyspnea on exertion 6/28/2015    Elevated serum creatinine 4/25/2015    Elevated troponin 6/28/2015    History of coronary artery disease     MI at 25 yo    History of right knee surgery     cartilage removal    History of shingles     Malignant hypertension 4/25/2015    MI (myocardial infarction)      Past Surgical History:   Procedure Laterality Date    HX HEART CATHETERIZATION  6/29/2015    no intervention    HX KNEE ARTHROSCOPY Right     removal of cartilage Social History     Social History    Marital status:      Spouse name: N/A    Number of children: N/A    Years of education: N/A     Occupational History    retired      Social History Main Topics    Smoking status: Former Smoker     Packs/day: 0.25     Years: 20.00     Types: Cigarettes     Quit date: 4/5/2015    Smokeless tobacco: Never Used    Alcohol use No    Drug use: No    Sexual activity: Not on file     Other Topics Concern     Service No    Blood Transfusions No     no issues with receiving    Caffeine Concern No    Special Diet No    Exercise No    Seat Belt Yes     Social History Narrative    Lives with his wife     Family History   Problem Relation Age of Onset    Hypertension Mother     No Known Problems Father      Allergies   Allergen Reactions    Pcn [Penicillins] Other (comments)     \"makes my heart stop\"       Current Facility-Administered Medications   Medication Dose Route Frequency    albuterol (PROVENTIL HFA, VENTOLIN HFA, PROAIR HFA) inhaler 2 Puff  2 Puff Inhalation QID PRN    albuterol-ipratropium (DUO-NEB) 2.5 MG-0.5 MG/3 ML  3 mL Nebulization Q4H RT    [START ON 12/27/2017] allopurinol (ZYLOPRIM) tablet 100 mg (Patient Supplied)  100 mg Oral DAILY    [START ON 12/27/2017] aspirin delayed-release tablet 81 mg (Patient Supplied)  81 mg Oral DAILY    atorvastatin (LIPITOR) tablet 20 mg  20 mg Oral QHS    carvedilol (COREG) tablet 25 mg (Patient Supplied)  25 mg Oral BID WITH MEALS    hydrALAZINE (APRESOLINE) tablet 50 mg  50 mg Oral BID    [START ON 12/27/2017] isosorbide mononitrate ER (IMDUR) tablet 30 mg  30 mg Oral 7am    oxyCODONE IR (ROXICODONE) tablet 5 mg  5 mg Oral Q4H PRN    [START ON 12/27/2017] pantoprazole (PROTONIX) tablet 40 mg (Patient Supplied)  40 mg Oral ACB    sodium chloride (NS) flush 5-10 mL  5-10 mL IntraVENous PRN    nitroglycerin (NITROSTAT) tablet 0.4 mg  0.4 mg SubLINGual Q5MIN PRN    acetaminophen (TYLENOL) tablet 650 mg  650 mg Oral Q4H PRN    promethazine (PHENERGAN) tablet 25 mg  25 mg Oral Q6H PRN    furosemide (LASIX) injection 40 mg  40 mg IntraVENous BID    [START ON 12/27/2017] amLODIPine (NORVASC) tablet 5 mg  5 mg Oral DAILY    influenza vaccine 2017-18 (3 yrs+)(PF) (FLUZONE QUAD/FLUARIX QUAD) injection 0.5 mL  0.5 mL IntraMUSCular PRIOR TO DISCHARGE         Objective:     Vitals:    12/26/17 1347 12/26/17 1454 12/26/17 1535 12/26/17 1650   BP: (!) 160/101 117/78 (!) 138/95    Pulse: 81 69 75    Resp: (!) 50 14 19    Temp:   97.7 °F (36.5 °C)    SpO2: 98% 100% 96% 95%   Weight:   217 lb 6.4 oz (98.6 kg)        PHYSICAL EXAM     Constitutional:  the patient is well developed and in no acute distress  EENMT:  Sclera clear, pupils equal, oral mucosa moist  Respiratory: CTA B, no w/r/r  Cardiovascular:  RRR without M,G,R  Gastrointestinal: soft and non-tender; with positive bowel sounds. Musculoskeletal: warm without cyanosis. There is trace B lower leg edema. Skin:  no jaundice or rashes, no wounds   Neurologic: no gross neuro deficits     Psychiatric:  alert and oriented x ppt    CXR:    12/26/17  No acute cardiopulmonary process. Recent Labs      12/26/17   1132   WBC  6.9   HGB  13.8   HCT  41.9   PLT  156     Recent Labs      12/26/17   1132   NA  143   K  4.1   CL  109*   GLU  109*   CO2  26   BUN  18   CREA  1.52*   CA  8.8   ALB  3.3   TBILI  0.8   ALT  20   SGOT  22     Recent Labs      12/26/17   1246   PH  7.43   PCO2  33*   PO2  159*   HCO3  21*     No results for input(s): LCAD, LAC in the last 72 hours.     Assessment:  (Medical Decision Making)     Hospital Problems  Date Reviewed: 12/13/2017          Codes Class Noted POA    * (Principal)Dyspnea ICD-10-CM: R06.00  ICD-9-CM: 786.09  12/26/2017 Unknown        TAZ (obstructive sleep apnea) ICD-10-CM: N11.49  ICD-9-CM: 327.23  10/2/2017 Yes        CKD (chronic kidney disease) stage 3, GFR 30-59 ml/min (Chronic) ICD-10-CM: N18.3  ICD-9-CM: 585.3 9/29/2017 Yes        Coronary atherosclerosis of native coronary vessel (Chronic) ICD-10-CM: I25.10  ICD-9-CM: 414.01  9/29/2017 Yes        Hypertension (Chronic) ICD-10-CM: I10  ICD-9-CM: 401.9  9/29/2017 Yes        COPD, moderate ([address] ICD-10-CM: J44.9  ICD-9-CM: 372  9/29/2017 Yes    Overview Signed 12/27/2015 12:38 PM by Jenise Herrera NP     Complete PFTs: 7/20/15:  Spirometry is consistent with a moderate obstructive/restrictive defect. . The residual volume is increased relative to other lung volumes suggesting air-trapping. The diffusion capacity corrected for alveolar volume was normal suggesting no loss of alveolo-capillary units. Chronic systolic heart failure (HCC) (Chronic) ICD-10-CM: I50.22  ICD-9-CM: 428.22  12/4/2015 Yes    Overview Signed 12/4/2015  9:28 AM by Umesh Dubois     EF 40%                 Questionable diagnosis of COPD. No obstruction on cPFT's in the past - only restrictive process. Denies typical COPD symptoms. Even if this was an accurate diagnosis he is unable to afford anything other than albuterol. TAZ - needs equipment for his CPAP. Plan:  (Medical Decision Making)     --ok to continue duoneb. -will need to repeat outpatient cPFT's to see if any signs of obstruction that his previous test did not reveal.   -consult case management to help get him missing cpap equipment. More than 50% of the time documented was spent in face-to-face contact with the patient and in the care of the patient on the floor/unit where the patient is located. Thank you very much for this referral.  We appreciate the opportunity to participate in this patient's care. Will follow along with above stated plan.     Jason Hammond MD

## 2017-12-26 NOTE — IP AVS SNAPSHOT
[address] 
[phone] Patient: Martha West MRN: [id number] IFP:6/90/1844 A check megan indicates which time of day the medication should be taken. My Medications START taking these medications Instructions Each Dose to Equal  
 Morning Noon Evening Bedtime  
 guaiFENesin 1,200 mg Ta12 ER tablet Commonly known as:  Pete & Pete Your next dose is:  1-12-18 Take 1 Tab by mouth every twelve (12) hours. 1200 mg  
    
   
   
  
   
  
 lisinopril 5 mg tablet Commonly known as:  Elmira Ohms Start taking on:  1/13/2018 Take 1 Tab by mouth daily. 5 mg  
    
  
   
   
   
  
 polyethylene glycol 17 gram packet Commonly known as:  Liddie Moron Start taking on:  1/13/2018 Take 1 Packet by mouth daily. 17 g CHANGE how you take these medications Instructions Each Dose to Equal  
 Morning Noon Evening Bedtime  
 furosemide 40 mg tablet Commonly known as:  LASIX What changed:  when to take this Your next dose is:  1- Take 1 Tab by mouth daily. 40 mg CONTINUE taking these medications Instructions Each Dose to Equal  
 Morning Noon Evening Bedtime  
 albuterol 90 mcg/actuation inhaler Commonly known as:  VENTOLIN HFA Take 2 Puffs by inhalation four (4) times daily. 2 Puff  
    
   
   
   
  
 albuterol-ipratropium 2.5 mg-0.5 mg/3 ml Nebu Commonly known as:  DUO-NEB  
   
 3 mL by Nebulization route four (4) times daily. Diaignosis--J44.9  
 3 mL  
    
  
   
  
   
  
   
  
  
 allopurinol 100 mg tablet Commonly known as:  Sallye Lye Your next dose is:  1-13-18 Take 1 Tab by mouth daily. 100 mg  
    
  
   
   
   
  
 aspirin delayed-release 81 mg tablet Your next dose is:  1- Take 1 Tab by mouth daily.   
 81 mg  
    
  
   
   
   
  
 atorvastatin 20 mg tablet Commonly known as:  LIPITOR Your next dose is:  1-12-18 Take 1 Tab by mouth nightly. 20 mg  
    
   
   
   
  
  
 carvedilol 25 mg tablet Commonly known as:  Delwin Fines Your next dose is:  1- Take 25 mg by mouth two (2) times daily (with meals). 25 mg  
    
   
   
  
   
  
 pantoprazole 40 mg tablet Commonly known as:  PROTONIX Your next dose is:  1- Take 1 Tab by mouth Daily (before breakfast). 40 mg  
    
  
   
   
   
  
  
STOP taking these medications   
 oxyCODONE IR 5 mg immediate release tablet Commonly known as:  Tristen Valencia Where to Get Your Medications Information on where to get these meds will be given to you by the nurse or doctor. ! Ask your nurse or doctor about these medications  
  atorvastatin 20 mg tablet  
 furosemide 40 mg tablet  
 guaiFENesin 1,200 mg Ta12 ER tablet  
 lisinopril 5 mg tablet  
 polyethylene glycol 17 gram packet

## 2017-12-26 NOTE — PROGRESS NOTES
Met with patient and family in the ER. Patient lives at home with wife (Silvia May) and daughter Austen Hills). Patient drives / patient is not on Home O2 but does use a nebulizer at home (notes in chart show [facility]). Patient sees Dr. Xochilt Hamilton (PCP), [facility] Pul[facility] and [facility]). Patient states he has also seen a nephrologist (?name) but is not on any dialysis. Patient uses [facility] ([address]) for medications. Patient states no recent HH and no hx of rehab.

## 2017-12-27 ENCOUNTER — APPOINTMENT (OUTPATIENT)
Dept: CT IMAGING | Age: 72
DRG: 286 | End: 2017-12-27
Attending: PHYSICIAN ASSISTANT
Payer: MEDICARE

## 2017-12-27 LAB
ANION GAP SERPL CALC-SCNC: 9 MMOL/L (ref 7–16)
APTT PPP: 67.7 SEC (ref 23.2–35.3)
APTT PPP: 68.4 SEC (ref 23.2–35.3)
APTT PPP: 72.6 SEC (ref 23.2–35.3)
BUN SERPL-MCNC: 16 MG/DL (ref 8–23)
CALCIUM SERPL-MCNC: 8.4 MG/DL (ref 8.3–10.4)
CHLORIDE SERPL-SCNC: 106 MMOL/L (ref 98–107)
CHOLEST SERPL-MCNC: 102 MG/DL
CO2 SERPL-SCNC: 26 MMOL/L (ref 21–32)
CREAT SERPL-MCNC: 1.76 MG/DL (ref 0.8–1.5)
GLUCOSE SERPL-MCNC: 112 MG/DL (ref 65–100)
HDLC SERPL-MCNC: 24 MG/DL (ref 40–60)
HDLC SERPL: 4.3 {RATIO}
LDLC SERPL CALC-MCNC: 59 MG/DL
LIPID PROFILE,FLP: ABNORMAL
MAGNESIUM SERPL-MCNC: 1.7 MG/DL (ref 1.8–2.4)
POTASSIUM SERPL-SCNC: 3.7 MMOL/L (ref 3.5–5.1)
SODIUM SERPL-SCNC: 141 MMOL/L (ref 136–145)
TRIGL SERPL-MCNC: 95 MG/DL (ref 35–150)
TROPONIN I SERPL-MCNC: 0.45 NG/ML (ref 0.02–0.05)
TROPONIN I SERPL-MCNC: 0.46 NG/ML (ref 0.02–0.05)
VLDLC SERPL CALC-MCNC: 19 MG/DL (ref 6–23)

## 2017-12-27 PROCEDURE — 94760 N-INVAS EAR/PLS OXIMETRY 1: CPT

## 2017-12-27 PROCEDURE — 94640 AIRWAY INHALATION TREATMENT: CPT

## 2017-12-27 PROCEDURE — 74011250637 HC RX REV CODE- 250/637: Performed by: PHYSICIAN ASSISTANT

## 2017-12-27 PROCEDURE — 83735 ASSAY OF MAGNESIUM: CPT | Performed by: PHYSICIAN ASSISTANT

## 2017-12-27 PROCEDURE — 77010033678 HC OXYGEN DAILY

## 2017-12-27 PROCEDURE — 74011250637 HC RX REV CODE- 250/637: Performed by: INTERNAL MEDICINE

## 2017-12-27 PROCEDURE — 36415 COLL VENOUS BLD VENIPUNCTURE: CPT | Performed by: PHYSICIAN ASSISTANT

## 2017-12-27 PROCEDURE — 70450 CT HEAD/BRAIN W/O DYE: CPT

## 2017-12-27 PROCEDURE — 74011000250 HC RX REV CODE- 250: Performed by: PHYSICIAN ASSISTANT

## 2017-12-27 PROCEDURE — 80048 BASIC METABOLIC PNL TOTAL CA: CPT | Performed by: PHYSICIAN ASSISTANT

## 2017-12-27 PROCEDURE — 99232 SBSQ HOSP IP/OBS MODERATE 35: CPT | Performed by: INTERNAL MEDICINE

## 2017-12-27 PROCEDURE — 85730 THROMBOPLASTIN TIME PARTIAL: CPT | Performed by: INTERNAL MEDICINE

## 2017-12-27 PROCEDURE — 80061 LIPID PANEL: CPT | Performed by: PHYSICIAN ASSISTANT

## 2017-12-27 PROCEDURE — 84484 ASSAY OF TROPONIN QUANT: CPT | Performed by: PHYSICIAN ASSISTANT

## 2017-12-27 PROCEDURE — 74011250636 HC RX REV CODE- 250/636: Performed by: INTERNAL MEDICINE

## 2017-12-27 PROCEDURE — 99218 HC RM OBSERVATION: CPT

## 2017-12-27 PROCEDURE — 74011250636 HC RX REV CODE- 250/636: Performed by: PHYSICIAN ASSISTANT

## 2017-12-27 RX ORDER — HEPARIN SODIUM 5000 [USP'U]/ML
35 INJECTION, SOLUTION INTRAVENOUS; SUBCUTANEOUS ONCE
Status: COMPLETED | OUTPATIENT
Start: 2017-12-27 | End: 2017-12-27

## 2017-12-27 RX ORDER — HYDRALAZINE HYDROCHLORIDE 25 MG/1
25 TABLET, FILM COATED ORAL 2 TIMES DAILY
Status: DISCONTINUED | OUTPATIENT
Start: 2017-12-27 | End: 2018-01-03

## 2017-12-27 RX ADMIN — ALLOPURINOL 100 MG: 100 TABLET ORAL at 09:18

## 2017-12-27 RX ADMIN — FUROSEMIDE 40 MG: 10 INJECTION, SOLUTION INTRAMUSCULAR; INTRAVENOUS at 17:50

## 2017-12-27 RX ADMIN — IPRATROPIUM BROMIDE AND ALBUTEROL SULFATE 3 ML: .5; 3 SOLUTION RESPIRATORY (INHALATION) at 12:16

## 2017-12-27 RX ADMIN — OXYCODONE HYDROCHLORIDE 5 MG: 5 TABLET ORAL at 20:42

## 2017-12-27 RX ADMIN — HEPARIN SODIUM 3450 UNITS: 5000 INJECTION, SOLUTION INTRAVENOUS; SUBCUTANEOUS at 05:08

## 2017-12-27 RX ADMIN — ASPIRIN 81 MG: 81 TABLET ORAL at 09:18

## 2017-12-27 RX ADMIN — HYDRALAZINE HYDROCHLORIDE 25 MG: 25 TABLET, FILM COATED ORAL at 20:45

## 2017-12-27 RX ADMIN — OXYCODONE HYDROCHLORIDE 5 MG: 5 TABLET ORAL at 11:48

## 2017-12-27 RX ADMIN — IPRATROPIUM BROMIDE AND ALBUTEROL SULFATE 3 ML: .5; 3 SOLUTION RESPIRATORY (INHALATION) at 15:55

## 2017-12-27 RX ADMIN — HEPARIN SODIUM AND DEXTROSE 16 UNITS/KG/HR: 5000; 5 INJECTION INTRAVENOUS at 17:54

## 2017-12-27 RX ADMIN — IPRATROPIUM BROMIDE AND ALBUTEROL SULFATE 3 ML: .5; 3 SOLUTION RESPIRATORY (INHALATION) at 05:07

## 2017-12-27 RX ADMIN — HYDRALAZINE HYDROCHLORIDE 25 MG: 25 TABLET, FILM COATED ORAL at 09:17

## 2017-12-27 RX ADMIN — HEPARIN SODIUM 3450 UNITS: 5000 INJECTION, SOLUTION INTRAVENOUS; SUBCUTANEOUS at 20:48

## 2017-12-27 RX ADMIN — CARVEDILOL 25 MG: 25 TABLET, FILM COATED ORAL at 16:31

## 2017-12-27 RX ADMIN — PANTOPRAZOLE SODIUM 40 MG: 40 TABLET, DELAYED RELEASE ORAL at 06:20

## 2017-12-27 RX ADMIN — IPRATROPIUM BROMIDE AND ALBUTEROL SULFATE 3 ML: .5; 3 SOLUTION RESPIRATORY (INHALATION) at 23:44

## 2017-12-27 RX ADMIN — IPRATROPIUM BROMIDE AND ALBUTEROL SULFATE 3 ML: .5; 3 SOLUTION RESPIRATORY (INHALATION) at 21:25

## 2017-12-27 RX ADMIN — HEPARIN SODIUM 3450 UNITS: 5000 INJECTION, SOLUTION INTRAVENOUS; SUBCUTANEOUS at 12:07

## 2017-12-27 RX ADMIN — FUROSEMIDE 40 MG: 10 INJECTION, SOLUTION INTRAMUSCULAR; INTRAVENOUS at 09:17

## 2017-12-27 RX ADMIN — AMLODIPINE BESYLATE 5 MG: 5 TABLET ORAL at 09:17

## 2017-12-27 RX ADMIN — IPRATROPIUM BROMIDE AND ALBUTEROL SULFATE 3 ML: .5; 3 SOLUTION RESPIRATORY (INHALATION) at 09:23

## 2017-12-27 RX ADMIN — ISOSORBIDE MONONITRATE 30 MG: 30 TABLET, EXTENDED RELEASE ORAL at 06:20

## 2017-12-27 RX ADMIN — ATORVASTATIN CALCIUM 20 MG: 10 TABLET, FILM COATED ORAL at 20:45

## 2017-12-27 RX ADMIN — CARVEDILOL 25 MG: 25 TABLET, FILM COATED ORAL at 09:17

## 2017-12-27 NOTE — PROGRESS NOTES
Called lab regarding PTT needing to be drawn. Spoke with Jared. Stated he would send someone to draw.

## 2017-12-27 NOTE — PROGRESS NOTES
CM Specialist Jennifer Camara met with patient and discussed admission status. Medicare Outpatient Observation Notice provided to the patient. Oral explanation was provided and all questions answered. Signed document placed in the medical chart. Copy provided to patient.

## 2017-12-27 NOTE — PROGRESS NOTES
Critical troponin had resulted. Called and spoke with Tiki Rodriguez  who verified result. Cardiology paged. [phone]-Spoke with Dr. Lela Bell regarding troponin 0.44 and received telephone orders with read-back to start Heparin gtt per chest pain protocol with a 4000unit Heparin IV bolus.

## 2017-12-27 NOTE — PROGRESS NOTES
Problem: Falls - Risk of  Goal: *Absence of Falls  Document Sue Fall Risk and appropriate interventions in the flowsheet. Outcome: Progressing Towards Goal  Fall Risk Interventions:            Medication Interventions: Patient to call before getting OOB       Patient progressing towards goal with no falls on current admission. Patient without confusion, agitation, or sensory perception deficits. Patient has not ambulated on current shift, but denies issues with ambulation. Personal belongings are within reach. Bed is in the low and locked position with side rails up x2. Yellow gripper socks to bilateral feet. Call light within reach and patient verbalizes understanding of use. Problem: Heart Failure: Day 1  Goal: *Oxygen saturation within defined limits  Outcome: Resolved/Met Date Met: 12/26/17  Patient's O2 saturation has been % on Room air to 2 L/min via NC. Goal: *Hemodynamically stable  Outcome: Resolved/Met Date Met: 12/26/17  Patient has been hemodynamically stable, but hypertensive with BP 120s-160s/80s-100s and HR 60s-80s. Goal: *Optimal pain control at patient's stated goal  Outcome: Progressing Towards Goal  Patient has denied pain on current shift.

## 2017-12-27 NOTE — PROGRESS NOTES
Problem: Breathing Pattern - Ineffective  Goal: *Absence of hypoxia  Outcome: Progressing Towards Goal  Pt on 2 L NC, SAT 97%

## 2017-12-27 NOTE — PROGRESS NOTES
Care Management Interventions  PCP Verified by CM: Yes (2 months ago)  Transition of Care Consult (CM Consult): Discharge Planning  Current Support Network: Lives with Spouse  Confirm Follow Up Transport: Family  Plan discussed with Pt/Family/Caregiver: Yes  Freedom of Choice Offered: Yes  Met with patient for d/c planning. Patient alert and oriented x 3, states independent of ADL's and lives with his wife. Patient has a walker and nebulizer which he obtained from [address] according to notes and he states has a CPAP machine that needs a cord but does not know who provided him with the equipment. CM called several DME companies and found that patient provider for CPAP is [facility] [employer] [phone] and spoke with Gustavo De about patient CPAP. She states that they supplied patient with supplies and mask 12/18/17. Asked about the CPAP cord and she stated that patient would need to purchase a new one and cost approximately $50.00 and will take a little while to obtain one or he could go to [facility] [employer] and order from that website for the cord. [facility] has a clinic on Saturday 12/30 that if he will call them he could bring in the CPAP machine and they will look at it and see what he needs and order needed items. Patient will need to pay for the cord as insurance will not cover. Current d/c plan is home with wife. CM following.

## 2017-12-27 NOTE — PROGRESS NOTES
Ptt was ordered as timed for 0215 on 12/27/17, but at 0400 had still not been drawn. Lab had been called at 0335, but no one answered. Called lab again at 0400 and this time spoke with John Caceres who stated she will call the tech that is on the floors to come draw the ptt. John Caceres also stated that the call at 0335 was unable to be answered as no one was available.

## 2017-12-27 NOTE — DISCHARGE INSTRUCTIONS
Heart Failure: Care Instructions  Your Care Instructions    Heart failure occurs when your heart does not pump as much blood as the body needs. Failure does not mean that the heart has stopped pumping but rather that it is not pumping as well as it should. Over time, this causes fluid buildup in your lungs and other parts of your body. Fluid buildup can cause shortness of breath, fatigue, swollen ankles, and other problems. By taking medicines regularly, reducing sodium (salt) in your diet, checking your weight every day, and making lifestyle changes, you can feel better and live longer. Follow-up care is a key part of your treatment and safety. Be sure to make and go to all appointments, and call your doctor if you are having problems. It's also a good idea to know your test results and keep a list of the medicines you take. How can you care for yourself at home? Medicines  ? · Be safe with medicines. Take your medicines exactly as prescribed. Call your doctor if you think you are having a problem with your medicine. ? · Do not take any vitamins, over-the-counter medicine, or herbal products without talking to your doctor first. Michael Platt not take ibuprofen (Advil or Motrin) and naproxen (Aleve) without talking to your doctor first. They could make your heart failure worse. ? · You may be taking some of the following medicine. ¨ Beta-blockers can slow heart rate, decrease blood pressure, and improve your condition. Taking a beta-blocker may lower your chance of needing to be hospitalized. ¨ Angiotensin-converting enzyme inhibitors (ACEIs) reduce the heart's workload, lower blood pressure, and reduce swelling. Taking an ACEI may lower your chance of needing to be hospitalized again. ¨ Angiotensin II receptor blockers (ARBs) work like ACEIs. Your doctor may prescribe them instead of ACEIs. ¨ Diuretics, also called water pills, reduce swelling.   ¨ Potassium supplements replace this important mineral, which is sometimes lost with diuretics. ¨ Aspirin and other blood thinners prevent blood clots, which can cause a stroke or heart attack. ? You will get more details on the specific medicines your doctor prescribes. Diet  ? · Your doctor may suggest that you limit sodium to 2,000 milligrams (mg) a day or less. That is less than 1 teaspoon of salt a day, including all the salt you eat in cooking or in packaged foods. People get most of their sodium from processed foods. Fast food and restaurant meals also tend to be very high in sodium. ? · Ask your doctor how much liquid you can drink each day. You may have to limit liquids. ?Weight  ? · Weigh yourself without clothing at the same time each day. Record your weight. Call your doctor if you have a sudden weight gain, such as more than 2 to 3 pounds in a day or 5 pounds in a week. (Your doctor may suggest a different range of weight gain.) A sudden weight gain may mean that your heart failure is getting worse. ? Activity level  ? · Start light exercise (if your doctor says it is okay). Even if you can only do a small amount, exercise will help you get stronger, have more energy, and manage your weight and your stress. Walking is an easy way to get exercise. Start out by walking a little more than you did before. Bit by bit, increase the amount you walk. ? · When you exercise, watch for signs that your heart is working too hard. You are pushing yourself too hard if you cannot talk while you are exercising. If you become short of breath or dizzy or have chest pain, stop, sit down, and rest.   ? · If you feel \"wiped out\" the day after you exercise, walk slower or for a shorter distance until you can work up to a better pace. ? · Get enough rest at night. Sleeping with 1 or 2 pillows under your upper body and head may help you breathe easier. ? Lifestyle changes  ? · Do not smoke. Smoking can make a heart condition worse.  If you need help quitting, talk to your doctor about stop-smoking programs and medicines. These can increase your chances of quitting for good. Quitting smoking may be the most important step you can take to protect your heart. ? · Limit alcohol to 2 drinks a day for men and 1 drink a day for women. Too much alcohol can cause health problems. ? · Avoid getting sick from colds and the flu. Get a pneumococcal vaccine shot. If you have had one before, ask your doctor whether you need another dose. Get a flu shot each year. If you must be around people with colds or the flu, wash your hands often. When should you call for help? Call 911 if you have symptoms of sudden heart failure such as:  ? · You have severe trouble breathing. ? · You cough up pink, foamy mucus. ? · You have a new irregular or rapid heartbeat. ?Call your doctor now or seek immediate medical care if:  ? · You have new or increased shortness of breath. ? · You are dizzy or lightheaded, or you feel like you may faint. ? · You have sudden weight gain, such as more than 2 to 3 pounds in a day or 5 pounds in a week. (Your doctor may suggest a different range of weight gain.)   ? · You have increased swelling in your legs, ankles, or feet. ? · You are suddenly so tired or weak that you cannot do your usual activities. ? Watch closely for changes in your health, and be sure to contact your doctor if you develop new symptoms. Where can you learn more? Go to http://jaime-jarrell.info/. Enter N168 in the search box to learn more about \"Heart Failure: Care Instructions. \"  Current as of: September 21, 2016  Content Version: 11.4  © 9304-1267 [email or web address]. Care instructions adapted under license by [employer] (which disclaims liability or warranty for this information).  If you have questions about a medical condition or this instruction, always ask your healthcare professional. [address]  any warranty or liability for your use of this information.

## 2017-12-27 NOTE — PROGRESS NOTES
Patient found on floor. Patient and family unsure of what happened. Notified KWESI Guardado of event. Head CT ordered.

## 2017-12-27 NOTE — PROGRESS NOTES
Bedside and Verbal shift change report given to self (oncoming nurse) by Tammie Ortiz RN (offgoing nurse).  Report included the following information SBAR, Kardex, ED Summary, Intake/Output, MAR, Recent Results and Cardiac Rhythm SR.

## 2017-12-27 NOTE — PROGRESS NOTES
12/27/2017 1:21 PM    Admit Date: 12/26/2017    Admit Diagnosis: Dyspnea      Subjective:   No cp- sob better      Objective:      Visit Vitals    /53 (BP 1 Location: Right arm, BP Patient Position: At rest)    Pulse 74    Temp 99.5 °F (37.5 °C)    Resp 24    Wt 98.6 kg (217 lb 6.4 oz)    SpO2 96%    BMI 32.1 kg/m2       Physical Exam:  Roma Salmeron, Well Nourished, No Acute Distress, Alert & Oriented x 3, appropriate mood. Neck- supple, no JVD  CV- regular rate and rhythm no MRG  Lung- clear bilaterally  Abd- soft, nontender, nondistended  Ext- no edema bilaterally. Skin- warm and dry        Data Review:   Recent Labs      12/27/17   0413  12/26/17   1132   NA  141  143   K  3.7  4.1   BUN  16  18   CREA  1.76*  1.52*   WBC   --   6.9   HGB   --   13.8   HCT   --   41.9   PLT   --   156   HDL  24*   --        Assessment/Plan:     Principal Problem:    Dyspnea (12/26/2017)    Active Problems:    CKD (chronic kidney disease) stage 3, GFR 30-59 ml/min (9/29/2017)     Acute Chronic systolic heart failure ([address] (12/4/2015)Improved with current therapy. Will continue medications  Continue diuresis- cr mildly elevated but still volume overloaded- will repeat bnp in am      Overview: EF 40%      Coronary atherosclerosis of native coronary vessel (9/29/2017)- elevated trop- demand ischemia- medical therapy unless recurrent angina      Hypertension (9/29/2017)      COPD, moderate ([address] (9/29/2017)      Overview: Complete PFTs: 7/20/15:      Spirometry is consistent with a moderate obstructive/restrictive defect. .       The residual volume is increased relative to other lung volumes suggesting       air-trapping. The diffusion capacity corrected for alveolar volume was       normal suggesting no loss of alveolo-capillary units.        TAZ (obstructive sleep apnea) (10/2/2017)

## 2017-12-27 NOTE — PROGRESS NOTES
Bedside and Verbal shift change report given to Kim Alarcon RN (oncoming nurse) by self Sean Munson nurse). Report included the following information SBAR, Kardex, ED Summary, MAR, Recent Results and Cardiac Rhythm SR. Heparin gtt verified on MAR at bedside with on-coming RN.

## 2017-12-27 NOTE — CONSULTS
CONSULT NOTE    Ena Corbett    12/27/2017    Date of Admission:  12/26/2017    The patient's chart is reviewed and the patient is discussed with the staff. Patient is a 74 y.o.  male seen and evaluated at the request of Dr. Lotus Valente. The patient carries a diagnosis of COPD. However his cPFTs from 7/20/15 show restrictive and no obstructive lung disease. In addition, he denies having any issues with chronic wheeze or cough. He states that yesterday he developed chest pain across his sternum associated with increased dyspnea. He states he hurts worse when he tries to take a deep breath and states he has never had pain like that before. He has some LE edema but reports it is only mildly worse than his baseline level. He denies any unilateral leg swelling, redness, or pain. He denies any increased cough, wheeze, fever, or chills. He states he used albuterol yesterday for these symptoms without improvement, though H&P mentions improvement. He was found to have BNP of 1688 and was admitted to the cardiology service. CXR was clear and he is currently on room air. He also has a history of TAZ but not using CPAP due to missing equipment. Subjective:   Staff reporting cheyne morales breathing pattern- not uncommon in heart disease. No events overnight. Review of Systems  A comprehensive review of systems was negative except for that written in the HPI.     Patient Active Problem List   Diagnosis Code    CKD (chronic kidney disease) stage 3, GFR 30-59 ml/min N18.3    Dyspnea on exertion R06.09    Tobacco abuse Z72.0    Chronic systolic heart failure (HCC) I50.22    Coronary atherosclerosis of native coronary vessel I25.10    Arthritis M19.90    Hypertension I10    COPD, moderate (HCC) J44.9    High triglycerides E78.1    Gastroesophageal reflux disease without esophagitis K21.9    Mixed hyperlipidemia E78.2    Essential hypertension I10    CHF (congestive heart failure) ([facility]) I50.9    Vomiting R11.10    [facility] N17.9    TAZ (obstructive sleep apnea) G47.33    Atherosclerosis of native coronary artery of native heart with stable angina pectoris ([facility]) I25.118    Dyspnea R06.00         Prior to Admission Medications   Prescriptions Last Dose Informant Patient Reported? Taking? albuterol (VENTOLIN HFA) 90 mcg/actuation inhaler   No No   Sig: Take 2 Puffs by inhalation four (4) times daily. albuterol-ipratropium (DUO-NEB) 2.5 mg-0.5 mg/3 ml nebu   No No   Sig: 3 mL by Nebulization route four (4) times daily. Diaignosis--J44.9   allopurinol (ZYLOPRIM) 100 mg tablet   No No   Sig: Take 1 Tab by mouth daily. aspirin delayed-release 81 mg tablet   No No   Sig: Take 1 Tab by mouth daily. carvedilol (COREG) 25 mg tablet   Yes No   Sig: Take 25 mg by mouth two (2) times daily (with meals). furosemide (LASIX) 40 mg tablet   No No   Sig: Take 1 Tab by mouth Before breakfast and dinner. oxyCODONE IR (ROXICODONE) 5 mg immediate release tablet   No No   Sig: Take 1 Tab by mouth every six (6) hours as needed for Pain. Max Daily Amount: 20 mg.   pantoprazole (PROTONIX) 40 mg tablet   No No   Sig: Take 1 Tab by mouth Daily (before breakfast).       Facility-Administered Medications: None       Past Medical History:   Diagnosis Date    Acute CHF (congestive heart failure) ([facility] 4/25/2015    Acute combined systolic and diastolic congestive heart failure ([facility] 4/28/2015    Chronic obstructive pulmonary disease (HCC)     De Quervain's tenosynovitis, right 4/28/2015    Dyspnea on exertion 6/28/2015    Elevated serum creatinine 4/25/2015    Elevated troponin 6/28/2015    History of coronary artery disease     MI at 25 yo    History of right knee surgery     cartilage removal    History of shingles     Malignant hypertension 4/25/2015    MI (myocardial infarction)      Past Surgical History:   Procedure Laterality Date    HX HEART CATHETERIZATION  6/29/2015    no intervention    HX KNEE ARTHROSCOPY Right     removal of cartilage     Social History     Social History    Marital status:      Spouse name: N/A    Number of children: N/A    Years of education: N/A     Occupational History    retired      Social History Main Topics    Smoking status: Former Smoker     Packs/day: 0.25     Years: 20.00     Types: Cigarettes     Quit date: 4/5/2015    Smokeless tobacco: Never Used    Alcohol use No    Drug use: No    Sexual activity: Not on file     Other Topics Concern     Service No    Blood Transfusions No     no issues with receiving    Caffeine Concern No    Special Diet No    Exercise No    Seat Belt Yes     Social History Narrative    Lives with his wife     Family History   Problem Relation Age of Onset    Hypertension Mother     No Known Problems Father      Allergies   Allergen Reactions    Pcn [Penicillins] Other (comments)     \"makes my heart stop\"       Current Facility-Administered Medications   Medication Dose Route Frequency    hydrALAZINE (APRESOLINE) tablet 25 mg  25 mg Oral BID    albuterol (PROVENTIL HFA, VENTOLIN HFA, PROAIR HFA) inhaler 2 Puff  2 Puff Inhalation QID PRN    albuterol-ipratropium (DUO-NEB) 2.5 MG-0.5 MG/3 ML  3 mL Nebulization Q4H RT    allopurinol (ZYLOPRIM) tablet 100 mg (Patient Supplied)  100 mg Oral DAILY    aspirin delayed-release tablet 81 mg (Patient Supplied)  81 mg Oral DAILY    atorvastatin (LIPITOR) tablet 20 mg  20 mg Oral QHS    carvedilol (COREG) tablet 25 mg (Patient Supplied)  25 mg Oral BID WITH MEALS    isosorbide mononitrate ER (IMDUR) tablet 30 mg  30 mg Oral 7am    oxyCODONE IR (ROXICODONE) tablet 5 mg  5 mg Oral Q4H PRN    pantoprazole (PROTONIX) tablet 40 mg (Patient Supplied)  40 mg Oral ACB    sodium chloride (NS) flush 5-10 mL  5-10 mL IntraVENous PRN    nitroglycerin (NITROSTAT) tablet 0.4 mg  0.4 mg SubLINGual Q5MIN PRN    acetaminophen (TYLENOL) tablet 650 mg  650 mg Oral Q4H PRN    promethazine (PHENERGAN) tablet 25 mg  25 mg Oral Q6H PRN    furosemide (LASIX) injection 40 mg  40 mg IntraVENous BID    amLODIPine (NORVASC) tablet 5 mg  5 mg Oral DAILY    influenza vaccine 2017-18 (3 yrs+)(PF) (FLUZONE QUAD/FLUARIX QUAD) injection 0.5 mL  0.5 mL IntraMUSCular PRIOR TO DISCHARGE    heparin 25,000 units in dextrose 500 mL infusion  12-25 Units/kg/hr IntraVENous TITRATE         Objective:     Vitals:    12/27/17 0605 12/27/17 0923 12/27/17 0928 12/27/17 1216   BP: 97/64  108/53    Pulse: 72  74    Resp: 22  24    Temp: 99.2 °F (37.3 °C)  99.5 °F (37.5 °C)    SpO2: 98% 97% 95% 96%   Weight: 217 lb 6.4 oz (98.6 kg)          PHYSICAL EXAM     Constitutional:  the patient is well developed and in no acute distress  EENMT:  Sclera clear, pupils equal, oral mucosa moist  Respiratory: CTA B, no w/r/r  Cardiovascular:  RRR without M,G,R  Gastrointestinal: soft and non-tender; with positive bowel sounds. Musculoskeletal: warm without cyanosis. There is trace B lower leg edema. Skin:  no jaundice or rashes, no wounds   Neurologic: no gross neuro deficits     Psychiatric:  alert and oriented x ppt    CXR:    12/26/17  No acute cardiopulmonary process.       Recent Labs      12/26/17   1132   WBC  6.9   HGB  13.8   HCT  41.9   PLT  156     Recent Labs      12/27/17   0413  12/27/17   0012  12/26/17   1820  12/26/17   1132   NA  141   --    --   143   K  3.7   --    --   4.1   CL  106   --    --   109*   GLU  112*   --    --   109*   CO2  26   --    --   26   BUN  16   --    --   18   CREA  1.76*   --    --   1.52*   MG  1.7*   --    --    --    CA  8.4   --    --   8.8   TROIQ  0.46*  0.45*  0.44*   --    ALB   --    --    --   3.3   TBILI   --    --    --   0.8   ALT   --    --    --   20   SGOT   --    --    --   22     Recent Labs      12/26/17   1246   PH  7.43   PCO2  33*   PO2  159*   HCO3  21*     No results for input(s): LCAD, LAC in the last 72 hours.    Assessment:  (Medical Decision Making)     Patient Active Problem List   Diagnosis Code    CKD (chronic kidney disease) stage 3, GFR 30-59 ml/min N18.3    Dyspnea on exertion R06.09    Tobacco abuse Z72.0    Chronic systolic heart failure (HCC) I50.22    Coronary atherosclerosis of native coronary vessel I25.10    Arthritis M19.90    Hypertension I10    COPD, moderate ([facility]) J44.9    High triglycerides E78.1    Gastroesophageal reflux disease without esophagitis K21.9    Mixed hyperlipidemia E78.2    Essential hypertension I10    CHF (congestive heart failure) ([facility]) I50.9    Vomiting R11.10    NINO (acute kidney injury) ([facility] N17.9    TAZ (obstructive sleep apnea) G47.33    Atherosclerosis of native coronary artery of native heart with stable angina pectoris ([facility]) I25.118    Dyspnea R06.00           Plan:  (Medical Decision Making)     Hospital Problems  Date Reviewed: 12/13/2017          Codes Class Noted POA    * (Principal)Dyspnea ICD-10-CM: R06.00  ICD-9-CM: 786.09  12/26/2017 Unknown        TAZ (obstructive sleep apnea) ICD-10-CM: T92.03  ICD-9-CM: 327.23  10/2/2017 Yes        CKD (chronic kidney disease) stage 3, GFR 30-59 ml/min (Chronic) ICD-10-CM: N18.3  ICD-9-CM: 585.3  9/29/2017 Yes        Coronary atherosclerosis of native coronary vessel (Chronic) ICD-10-CM: I25.10  ICD-9-CM: 414.01  9/29/2017 Yes        Hypertension (Chronic) ICD-10-CM: I10  ICD-9-CM: 401.9  9/29/2017 Yes        COPD, moderate ([facility] ICD-10-CM: J44.9  ICD-9-CM: 496  9/29/2017 Yes    Overview Signed 12/27/2015 12:38 PM by Tracie Flores NP     Complete PFTs: 7/20/15:  Spirometry is consistent with a moderate obstructive/restrictive defect. . The residual volume is increased relative to other lung volumes suggesting air-trapping. The diffusion capacity corrected for alveolar volume was normal suggesting no loss of alveolo-capillary units.           continue duoneb.   will need to repeat outpatient cPFT's to see if any signs of obstruction that his previous test did not reveal.   consult case management to help get him missing cpap equipment. Questionable diagnosis of COPD. No obstruction on cPFT's in the past - only restrictive process. Denies typical COPD symptoms. Even if this was an accurate diagnosis he is unable to afford anything other than albuterol    Chronic systolic heart failure (HCC) (Chronic) ICD-10-CM: I50.22  ICD-9-CM: 428.22  12/4/2015 Yes    Overview Signed 12/4/2015  9:28 AM by Amber Portillo     EF 40%         With Dino Winstoncher Breathing pattern. More than 50% of the time documented was spent in face-to-face contact with the patient and in the care of the patient on the floor/unit where the patient is located.         Yury Long MD

## 2017-12-28 LAB
ANION GAP SERPL CALC-SCNC: 12 MMOL/L (ref 7–16)
APTT PPP: 32.5 SEC (ref 23.2–35.3)
APTT PPP: >200 SEC (ref 23.2–35.3)
BASOPHILS # BLD: 0 K/UL (ref 0–0.2)
BASOPHILS NFR BLD: 0 % (ref 0–2)
BUN SERPL-MCNC: 20 MG/DL (ref 8–23)
CALCIUM SERPL-MCNC: 8 MG/DL (ref 8.3–10.4)
CHLORIDE SERPL-SCNC: 104 MMOL/L (ref 98–107)
CO2 SERPL-SCNC: 24 MMOL/L (ref 21–32)
CREAT SERPL-MCNC: 1.67 MG/DL (ref 0.8–1.5)
DIFFERENTIAL METHOD BLD: ABNORMAL
EOSINOPHIL # BLD: 0.1 K/UL (ref 0–0.8)
EOSINOPHIL NFR BLD: 2 % (ref 0.5–7.8)
ERYTHROCYTE [DISTWIDTH] IN BLOOD BY AUTOMATED COUNT: 18.2 % (ref 11.9–14.6)
GLUCOSE SERPL-MCNC: 114 MG/DL (ref 65–100)
HCT VFR BLD AUTO: 36.1 % (ref 41.1–50.3)
HGB BLD-MCNC: 11.8 G/DL (ref 13.6–17.2)
IMM GRANULOCYTES # BLD: 0 K/UL (ref 0–0.5)
IMM GRANULOCYTES NFR BLD AUTO: 1 % (ref 0–5)
LYMPHOCYTES # BLD: 1.1 K/UL (ref 0.5–4.6)
LYMPHOCYTES NFR BLD: 22 % (ref 13–44)
MAGNESIUM SERPL-MCNC: 1.8 MG/DL (ref 1.8–2.4)
MCH RBC QN AUTO: 26.8 PG (ref 26.1–32.9)
MCHC RBC AUTO-ENTMCNC: 32.7 G/DL (ref 31.4–35)
MCV RBC AUTO: 81.9 FL (ref 79.6–97.8)
MONOCYTES # BLD: 0.6 K/UL (ref 0.1–1.3)
MONOCYTES NFR BLD: 12 % (ref 4–12)
NEUTS SEG # BLD: 3.2 K/UL (ref 1.7–8.2)
NEUTS SEG NFR BLD: 63 % (ref 43–78)
PLATELET # BLD AUTO: 138 K/UL (ref 150–450)
PMV BLD AUTO: 11.1 FL (ref 10.8–14.1)
POTASSIUM SERPL-SCNC: 3.7 MMOL/L (ref 3.5–5.1)
RBC # BLD AUTO: 4.41 M/UL (ref 4.23–5.67)
SODIUM SERPL-SCNC: 140 MMOL/L (ref 136–145)
WBC # BLD AUTO: 5.1 K/UL (ref 4.3–11.1)

## 2017-12-28 PROCEDURE — 74011250636 HC RX REV CODE- 250/636: Performed by: PHYSICIAN ASSISTANT

## 2017-12-28 PROCEDURE — 77010033678 HC OXYGEN DAILY

## 2017-12-28 PROCEDURE — 85025 COMPLETE CBC W/AUTO DIFF WBC: CPT | Performed by: PHYSICIAN ASSISTANT

## 2017-12-28 PROCEDURE — 99218 HC RM OBSERVATION: CPT

## 2017-12-28 PROCEDURE — 36415 COLL VENOUS BLD VENIPUNCTURE: CPT | Performed by: PHYSICIAN ASSISTANT

## 2017-12-28 PROCEDURE — 94760 N-INVAS EAR/PLS OXIMETRY 1: CPT

## 2017-12-28 PROCEDURE — 94640 AIRWAY INHALATION TREATMENT: CPT

## 2017-12-28 PROCEDURE — 85730 THROMBOPLASTIN TIME PARTIAL: CPT | Performed by: INTERNAL MEDICINE

## 2017-12-28 PROCEDURE — 74011250637 HC RX REV CODE- 250/637: Performed by: INTERNAL MEDICINE

## 2017-12-28 PROCEDURE — 99232 SBSQ HOSP IP/OBS MODERATE 35: CPT | Performed by: INTERNAL MEDICINE

## 2017-12-28 PROCEDURE — 74011000250 HC RX REV CODE- 250: Performed by: PHYSICIAN ASSISTANT

## 2017-12-28 PROCEDURE — 83735 ASSAY OF MAGNESIUM: CPT | Performed by: PHYSICIAN ASSISTANT

## 2017-12-28 PROCEDURE — 80048 BASIC METABOLIC PNL TOTAL CA: CPT | Performed by: PHYSICIAN ASSISTANT

## 2017-12-28 PROCEDURE — 74011250637 HC RX REV CODE- 250/637: Performed by: PHYSICIAN ASSISTANT

## 2017-12-28 RX ORDER — AMLODIPINE BESYLATE 5 MG/1
2.5 TABLET ORAL DAILY
Status: DISCONTINUED | OUTPATIENT
Start: 2017-12-28 | End: 2017-12-28

## 2017-12-28 RX ADMIN — IPRATROPIUM BROMIDE AND ALBUTEROL SULFATE 3 ML: .5; 3 SOLUTION RESPIRATORY (INHALATION) at 20:17

## 2017-12-28 RX ADMIN — Medication 10 ML: at 21:31

## 2017-12-28 RX ADMIN — HYDRALAZINE HYDROCHLORIDE 25 MG: 25 TABLET, FILM COATED ORAL at 21:30

## 2017-12-28 RX ADMIN — CARVEDILOL 25 MG: 25 TABLET, FILM COATED ORAL at 08:44

## 2017-12-28 RX ADMIN — IPRATROPIUM BROMIDE AND ALBUTEROL SULFATE 3 ML: .5; 3 SOLUTION RESPIRATORY (INHALATION) at 16:00

## 2017-12-28 RX ADMIN — IPRATROPIUM BROMIDE AND ALBUTEROL SULFATE 3 ML: .5; 3 SOLUTION RESPIRATORY (INHALATION) at 07:54

## 2017-12-28 RX ADMIN — ALLOPURINOL 100 MG: 100 TABLET ORAL at 08:44

## 2017-12-28 RX ADMIN — IPRATROPIUM BROMIDE AND ALBUTEROL SULFATE 3 ML: .5; 3 SOLUTION RESPIRATORY (INHALATION) at 11:00

## 2017-12-28 RX ADMIN — HYDRALAZINE HYDROCHLORIDE 25 MG: 25 TABLET, FILM COATED ORAL at 08:40

## 2017-12-28 RX ADMIN — ATORVASTATIN CALCIUM 20 MG: 10 TABLET, FILM COATED ORAL at 21:30

## 2017-12-28 RX ADMIN — IPRATROPIUM BROMIDE AND ALBUTEROL SULFATE 3 ML: .5; 3 SOLUTION RESPIRATORY (INHALATION) at 04:24

## 2017-12-28 RX ADMIN — IPRATROPIUM BROMIDE AND ALBUTEROL SULFATE 3 ML: .5; 3 SOLUTION RESPIRATORY (INHALATION) at 23:09

## 2017-12-28 RX ADMIN — CARVEDILOL 25 MG: 25 TABLET, FILM COATED ORAL at 17:33

## 2017-12-28 RX ADMIN — OXYCODONE HYDROCHLORIDE 5 MG: 5 TABLET ORAL at 12:30

## 2017-12-28 RX ADMIN — ISOSORBIDE MONONITRATE 30 MG: 30 TABLET, EXTENDED RELEASE ORAL at 06:11

## 2017-12-28 RX ADMIN — OXYCODONE HYDROCHLORIDE 5 MG: 5 TABLET ORAL at 21:31

## 2017-12-28 RX ADMIN — ASPIRIN 81 MG: 81 TABLET ORAL at 08:43

## 2017-12-28 RX ADMIN — FUROSEMIDE 40 MG: 10 INJECTION, SOLUTION INTRAMUSCULAR; INTRAVENOUS at 08:40

## 2017-12-28 RX ADMIN — FUROSEMIDE 40 MG: 10 INJECTION, SOLUTION INTRAMUSCULAR; INTRAVENOUS at 17:31

## 2017-12-28 NOTE — PHYSICIAN ADVISORY
Letter of Determination:  Outpatient states receiving Observation Services    This case was reviewed, and I concur with Outpatient status receiving observation services. This determination may change depending on further medical information, condition changes of the patient, or treatment requirements.       Hanny Chavarria MD, KENNETH,   Physician Josue Horvath.

## 2017-12-28 NOTE — PROGRESS NOTES
Bedside and Verbal shift change report given to self (oncoming nurse) by Delisa Fu RN (offgoing nurse). Report included the following information SBAR, Kardex, MAR and Recent Results.  Heparin gtt verified at bedside

## 2017-12-28 NOTE — PROGRESS NOTES
Spoke to Maxim  regarding PTT still in process since 0300. States equipment problems and it is running now.

## 2017-12-28 NOTE — PROGRESS NOTES
Bedside and Verbal shift change report given to Kim Alarcon RN (oncoming nurse) by self Gracelyn Fothergill nurse). Report included the following information SBAR, Kardex, MAR and Recent Results.  Heparin gtt restarted and verified

## 2017-12-28 NOTE — PROGRESS NOTES
PROGRESS NOTE    Keyona Dimas    12/28/2017    Date of Admission:  12/26/2017    The patient's chart is reviewed and the patient is discussed with the staff. Patient is a 74 y.o.  male seen and evaluated at the request of Dr. Jessica Rothman. The patient carries a diagnosis of COPD. However his cPFTs from 7/20/15 show restrictive and no obstructive lung disease. In addition, he denies having any issues with chronic wheeze or cough. He states that yesterday he developed chest pain across his sternum associated with increased dyspnea. He states he hurts worse when he tries to take a deep breath and states he has never had pain like that before. He has some LE edema but reports it is only mildly worse than his baseline level. He denies any unilateral leg swelling, redness, or pain. He denies any increased cough, wheeze, fever, or chills. He states he used albuterol yesterday for these symptoms without improvement, though H&P mentions improvement. He was found to have BNP of 1688 and was admitted to the cardiology service. CXR was clear and he is currently on room air. He also has a history of TAZ but not using CPAP due to missing equipment. Subjective:   No events overnight, feels better    Review of Systems  A comprehensive review of systems was negative except for that written in the HPI.     Patient Active Problem List   Diagnosis Code    CKD (chronic kidney disease) stage 3, GFR 30-59 ml/min N18.3    Dyspnea on exertion R06.09    Tobacco abuse Z72.0    Chronic systolic heart failure (HCC) I50.22    Coronary atherosclerosis of native coronary vessel I25.10    Arthritis M19.90    Hypertension I10    COPD, moderate (HCC) J44.9    High triglycerides E78.1    Gastroesophageal reflux disease without esophagitis K21.9    Mixed hyperlipidemia E78.2    Essential hypertension I10    CHF (congestive heart failure) (HCC) I50.9    Vomiting R11.10    NINO (acute kidney injury) ([facility] N17.9    TAZ (obstructive sleep apnea) G47.33    Atherosclerosis of native coronary artery of native heart with stable angina pectoris ([facility] I25.118    Dyspnea R06.00         Prior to Admission Medications   Prescriptions Last Dose Informant Patient Reported? Taking? albuterol (VENTOLIN HFA) 90 mcg/actuation inhaler   No No   Sig: Take 2 Puffs by inhalation four (4) times daily. albuterol-ipratropium (DUO-NEB) 2.5 mg-0.5 mg/3 ml nebu   No No   Sig: 3 mL by Nebulization route four (4) times daily. Diaignosis--J44.9   allopurinol (ZYLOPRIM) 100 mg tablet   No No   Sig: Take 1 Tab by mouth daily. aspirin delayed-release 81 mg tablet   No No   Sig: Take 1 Tab by mouth daily. carvedilol (COREG) 25 mg tablet   Yes No   Sig: Take 25 mg by mouth two (2) times daily (with meals). furosemide (LASIX) 40 mg tablet   No No   Sig: Take 1 Tab by mouth Before breakfast and dinner. oxyCODONE IR (ROXICODONE) 5 mg immediate release tablet   No No   Sig: Take 1 Tab by mouth every six (6) hours as needed for Pain. Max Daily Amount: 20 mg.   pantoprazole (PROTONIX) 40 mg tablet   No No   Sig: Take 1 Tab by mouth Daily (before breakfast).       Facility-Administered Medications: None       Past Medical History:   Diagnosis Date    Acute CHF (congestive heart failure) ([facility] 4/25/2015    Acute combined systolic and diastolic congestive heart failure ([facility] 4/28/2015    Chronic obstructive pulmonary disease (HCC)     De Quervain's tenosynovitis, right 4/28/2015    Dyspnea on exertion 6/28/2015    Elevated serum creatinine 4/25/2015    Elevated troponin 6/28/2015    History of coronary artery disease     MI at 27 yo    History of right knee surgery     cartilage removal    History of shingles     Malignant hypertension 4/25/2015    MI (myocardial infarction)      Past Surgical History:   Procedure Laterality Date    HX HEART CATHETERIZATION  6/29/2015    no intervention    HX KNEE ARTHROSCOPY Right removal of cartilage     Social History     Social History    Marital status:      Spouse name: N/A    Number of children: N/A    Years of education: N/A     Occupational History    retired      Social History Main Topics    Smoking status: Former Smoker     Packs/day: 0.25     Years: 20.00     Types: Cigarettes     Quit date: 4/5/2015    Smokeless tobacco: Never Used    Alcohol use No    Drug use: No    Sexual activity: Not on file     Other Topics Concern     Service No    Blood Transfusions No     no issues with receiving    Caffeine Concern No    Special Diet No    Exercise No    Seat Belt Yes     Social History Narrative    Lives with his wife     Family History   Problem Relation Age of Onset    Hypertension Mother     No Known Problems Father      Allergies   Allergen Reactions    Pcn [Penicillins] Other (comments)     \"makes my heart stop\"       Current Facility-Administered Medications   Medication Dose Route Frequency    hydrALAZINE (APRESOLINE) tablet 25 mg  25 mg Oral BID    albuterol (PROVENTIL HFA, VENTOLIN HFA, PROAIR HFA) inhaler 2 Puff  2 Puff Inhalation QID PRN    albuterol-ipratropium (DUO-NEB) 2.5 MG-0.5 MG/3 ML  3 mL Nebulization Q4H RT    allopurinol (ZYLOPRIM) tablet 100 mg (Patient Supplied)  100 mg Oral DAILY    aspirin delayed-release tablet 81 mg (Patient Supplied)  81 mg Oral DAILY    atorvastatin (LIPITOR) tablet 20 mg  20 mg Oral QHS    carvedilol (COREG) tablet 25 mg (Patient Supplied)  25 mg Oral BID WITH MEALS    isosorbide mononitrate ER (IMDUR) tablet 30 mg  30 mg Oral 7am    oxyCODONE IR (ROXICODONE) tablet 5 mg  5 mg Oral Q4H PRN    pantoprazole (PROTONIX) tablet 40 mg (Patient Supplied)  40 mg Oral ACB    sodium chloride (NS) flush 5-10 mL  5-10 mL IntraVENous PRN    nitroglycerin (NITROSTAT) tablet 0.4 mg  0.4 mg SubLINGual Q5MIN PRN    acetaminophen (TYLENOL) tablet 650 mg  650 mg Oral Q4H PRN    promethazine (PHENERGAN) tablet 25 mg  25 mg Oral Q6H PRN    furosemide (LASIX) injection 40 mg  40 mg IntraVENous BID    influenza vaccine 2017-18 (3 yrs+)(PF) (FLUZONE QUAD/FLUARIX QUAD) injection 0.5 mL  0.5 mL IntraMUSCular PRIOR TO DISCHARGE         Objective:     Vitals:    12/28/17 0427 12/28/17 0550 12/28/17 0758 12/28/17 0923   BP:  101/53  112/68   Pulse:  73  77   Resp:  18  17   Temp:  98.8 °F (37.1 °C)  98.4 °F (36.9 °C)   SpO2: 94% 98% 95% 98%   Weight:  217 lb (98.4 kg)         PHYSICAL EXAM     Constitutional:  the patient is well developed and in no acute distress  EENMT:  Sclera clear, pupils equal, oral mucosa moist  Respiratory: CTA B, no w/r/r  Cardiovascular:  RRR without M,G,R  Gastrointestinal: soft and non-tender; with positive bowel sounds. Musculoskeletal: warm without cyanosis. There is trace B lower leg edema. Skin:  no jaundice or rashes, no wounds   Neurologic: no gross neuro deficits     Psychiatric:  alert and oriented x ppt    CXR:    12/26/17  No acute cardiopulmonary process. Recent Labs      12/28/17   0300  12/26/17   1132   WBC  5.1  6.9   HGB  11.8*  13.8   HCT  36.1*  41.9   PLT  138*  156     Recent Labs      12/28/17   0300  12/27/17   0413  12/27/17   0012  12/26/17   1820  12/26/17   1132   NA  140  141   --    --   143   K  3.7  3.7   --    --   4.1   CL  104  106   --    --   109*   GLU  114*  112*   --    --   109*   CO2  24  26   --    --   26   BUN  20  16   --    --   18   CREA  1.67*  1.76*   --    --   1.52*   MG  1.8  1.7*   --    --    --    CA  8.0*  8.4   --    --   8.8   TROIQ   --   0.46*  0.45*  0.44*   --    ALB   --    --    --    --   3.3   TBILI   --    --    --    --   0.8   ALT   --    --    --    --   20   SGOT   --    --    --    --   22     Recent Labs      12/26/17   1246   PH  7.43   PCO2  33*   PO2  159*   HCO3  21*     No results for input(s): LCAD, LAC in the last 72 hours.     Assessment:  (Medical Decision Making)     Patient Active Problem List   Diagnosis Code    CKD (chronic kidney disease) stage 3, GFR 30-59 ml/min N18.3    Dyspnea on exertion R06.09    Tobacco abuse Z72.0    Chronic systolic heart failure (HCC) I50.22    Coronary atherosclerosis of native coronary vessel I25.10    Arthritis M19.90    Hypertension I10    COPD, moderate [facility] J44.9    High triglycerides E78.1    Gastroesophageal reflux disease without esophagitis K21.9    Mixed hyperlipidemia E78.2    Essential hypertension I10    CHF (congestive heart failure) ([facility] I50.9    Vomiting R11.10    NINO (acute kidney injury) ([facility] N17.9    TAZ (obstructive sleep apnea) G47.33    Atherosclerosis of native coronary artery of native heart with stable angina pectoris ([facility]) I25.118    Dyspnea R06.00           Plan:  (Medical Decision Making)     Hospital Problems  Date Reviewed: 12/13/2017          Codes Class Noted POA    * (Principal)Dyspnea ICD-10-CM: R06.00  ICD-9-CM: 786.09  12/26/2017 Unknown        TAZ (obstructive sleep apnea) ICD-10-CM: K21.05  ICD-9-CM: 327.23  10/2/2017 Yes    Start autoset CPAP 5-20 cm H20 with heated humidifiew with sleep while in house    CKD (chronic kidney disease) stage 3, GFR 30-59 ml/min (Chronic) ICD-10-CM: N18.3  ICD-9-CM: 585.3  9/29/2017 Yes        Coronary atherosclerosis of native coronary vessel (Chronic) ICD-10-CM: I25.10  ICD-9-CM: 414.01  9/29/2017 Yes        Hypertension (Chronic) ICD-10-CM: I10  ICD-9-CM: 401.9  9/29/2017 Yes        COPD[facility] ([facility] ICD-10-CM: J44.9  ICD-9-CM: 496  9/29/2017 Yes    Overview Signed 12/27/2015 12:38 PM by Gian Garcia NP     Complete PFTs: 7/20/15:  Spirometry is consistent with a moderate obstructive/restrictive defect. . The residual volume is increased relative to other lung volumes suggesting air-trapping. The diffusion capacity corrected for alveolar volume was normal suggesting no loss of alveolo-capillary units.           continue duoneb.   will need to repeat outpatient cPFT's to see if any signs of obstruction that his previous test did not reveal.   consult case management to help get him missing cpap equipment. Questionable diagnosis of COPD. No obstruction on cPFT's in the past - only restrictive process. Denies typical COPD symptoms. Even if this was an accurate diagnosis he is unable to afford anything other than albuterol    Chronic systolic heart failure (HCC) (Chronic) ICD-10-CM: I50.22  ICD-9-CM: 428.22  12/4/2015 Yes    Overview Signed 12/4/2015  9:28 AM by Barbara Booth     EF 40%         With Dequ[place] Breathing pattern. More than 50% of the time documented was spent in face-to-face contact with the patient and in the care of the patient on the floor/unit where the patient is located.         Holly Vasquez MD

## 2017-12-28 NOTE — PROGRESS NOTES
Problem: Falls - Risk of  Goal: *Absence of Falls  Document Sue Fall Risk and appropriate interventions in the flowsheet. Outcome: Progressing Towards Goal  Pt progressing towards goal. Bed low and locked. Call light within reach. Side rails x 2. Gripper socks applied. Personal belongings within reach. Pt verbalizes understanding to call for assistance.      Fall Risk Interventions:  Medication Interventions: Bed/chair exit alarm, Patient to call before getting OOB    History of Falls Interventions: Bed/chair exit alarm, Investigate reason for fall    BED ALARM ON FOR SAFETY, 1 fall (12/27/17) during admission

## 2017-12-28 NOTE — PROGRESS NOTES
Spoke to Rayne Rodriguez, , regarding PTT drawn at 0300. States lab is running and will result soon.

## 2017-12-28 NOTE — PROGRESS NOTES
PROGRESS NOTE    Duncan Liriano    12/28/2017    Date of Admission:  12/26/2017    The patient's chart is reviewed and the patient is discussed with the staff. Patient is a 74 y.o.  male seen and evaluated at the request of Dr. Fifi Paredes. The patient carries a diagnosis of COPD. However his cPFTs from 7/20/15 show restrictive and no obstructive lung disease. In addition, he denies having any issues with chronic wheeze or cough. He states that yesterday he developed chest pain across his sternum associated with increased dyspnea. He states he hurts worse when he tries to take a deep breath and states he has never had pain like that before. He has some LE edema but reports it is only mildly worse than his baseline level. He denies any unilateral leg swelling, redness, or pain. He denies any increased cough, wheeze, fever, or chills. He states he used albuterol yesterday for these symptoms without improvement, though H&P mentions improvement. He was found to have BNP of 1688 and was admitted to the cardiology service. CXR was clear and he is currently on room air. He also has a history of TAZ but not using CPAP due to missing equipment. Subjective:   No events overnight, feels better    Review of Systems  A comprehensive review of systems was negative except for that written in the HPI.     Patient Active Problem List   Diagnosis Code    CKD (chronic kidney disease) stage 3, GFR 30-59 ml/min N18.3    Dyspnea on exertion R06.09    Tobacco abuse Z72.0    Chronic systolic heart failure (HCC) I50.22    Coronary atherosclerosis of native coronary vessel I25.10    Arthritis M19.90    Hypertension I10    COPD, moderate (HCC) J44.9    High triglycerides E78.1    Gastroesophageal reflux disease without esophagitis K21.9    Mixed hyperlipidemia E78.2    Essential hypertension I10    CHF (congestive heart failure) (HCC) I50.9    Vomiting R11.10    NNIO (acute kidney injury) ([facility] N17.9    TAZ (obstructive sleep apnea) G47.33    Atherosclerosis of native coronary artery of native heart with stable angina pectoris ([facility] I25.118    Dyspnea R06.00         Prior to Admission Medications   Prescriptions Last Dose Informant Patient Reported? Taking? albuterol (VENTOLIN HFA) 90 mcg/actuation inhaler   No No   Sig: Take 2 Puffs by inhalation four (4) times daily. albuterol-ipratropium (DUO-NEB) 2.5 mg-0.5 mg/3 ml nebu   No No   Sig: 3 mL by Nebulization route four (4) times daily. Diaignosis--J44.9   allopurinol (ZYLOPRIM) 100 mg tablet   No No   Sig: Take 1 Tab by mouth daily. aspirin delayed-release 81 mg tablet   No No   Sig: Take 1 Tab by mouth daily. carvedilol (COREG) 25 mg tablet   Yes No   Sig: Take 25 mg by mouth two (2) times daily (with meals). furosemide (LASIX) 40 mg tablet   No No   Sig: Take 1 Tab by mouth Before breakfast and dinner. oxyCODONE IR (ROXICODONE) 5 mg immediate release tablet   No No   Sig: Take 1 Tab by mouth every six (6) hours as needed for Pain. Max Daily Amount: 20 mg.   pantoprazole (PROTONIX) 40 mg tablet   No No   Sig: Take 1 Tab by mouth Daily (before breakfast).       Facility-Administered Medications: None       Past Medical History:   Diagnosis Date    Acute CHF (congestive heart failure) ([facility] 4/25/2015    Acute combined systolic and diastolic congestive heart failure ([facility] 4/28/2015    Chronic obstructive pulmonary disease (HCC)     De Quervain's tenosynovitis, right 4/28/2015    Dyspnea on exertion 6/28/2015    Elevated serum creatinine 4/25/2015    Elevated troponin 6/28/2015    History of coronary artery disease     MI at 25 yo    History of right knee surgery     cartilage removal    History of shingles     Malignant hypertension 4/25/2015    MI (myocardial infarction)      Past Surgical History:   Procedure Laterality Date    HX HEART CATHETERIZATION  6/29/2015    no intervention    HX KNEE ARTHROSCOPY Right removal of cartilage     Social History     Social History    Marital status:      Spouse name: N/A    Number of children: N/A    Years of education: N/A     Occupational History    retired      Social History Main Topics    Smoking status: Former Smoker     Packs/day: 0.25     Years: 20.00     Types: Cigarettes     Quit date: 4/5/2015    Smokeless tobacco: Never Used    Alcohol use No    Drug use: No    Sexual activity: Not on file     Other Topics Concern     Service No    Blood Transfusions No     no issues with receiving    Caffeine Concern No    Special Diet No    Exercise No    Seat Belt Yes     Social History Narrative    Lives with his wife     Family History   Problem Relation Age of Onset    Hypertension Mother     No Known Problems Father      Allergies   Allergen Reactions    Pcn [Penicillins] Other (comments)     \"makes my heart stop\"       Current Facility-Administered Medications   Medication Dose Route Frequency    hydrALAZINE (APRESOLINE) tablet 25 mg  25 mg Oral BID    albuterol (PROVENTIL HFA, VENTOLIN HFA, PROAIR HFA) inhaler 2 Puff  2 Puff Inhalation QID PRN    albuterol-ipratropium (DUO-NEB) 2.5 MG-0.5 MG/3 ML  3 mL Nebulization Q4H RT    allopurinol (ZYLOPRIM) tablet 100 mg (Patient Supplied)  100 mg Oral DAILY    aspirin delayed-release tablet 81 mg (Patient Supplied)  81 mg Oral DAILY    atorvastatin (LIPITOR) tablet 20 mg  20 mg Oral QHS    carvedilol (COREG) tablet 25 mg (Patient Supplied)  25 mg Oral BID WITH MEALS    isosorbide mononitrate ER (IMDUR) tablet 30 mg  30 mg Oral 7am    oxyCODONE IR (ROXICODONE) tablet 5 mg  5 mg Oral Q4H PRN    pantoprazole (PROTONIX) tablet 40 mg (Patient Supplied)  40 mg Oral ACB    sodium chloride (NS) flush 5-10 mL  5-10 mL IntraVENous PRN    nitroglycerin (NITROSTAT) tablet 0.4 mg  0.4 mg SubLINGual Q5MIN PRN    acetaminophen (TYLENOL) tablet 650 mg  650 mg Oral Q4H PRN    promethazine (PHENERGAN) tablet 25 mg  25 mg Oral Q6H PRN    furosemide (LASIX) injection 40 mg  40 mg IntraVENous BID    influenza vaccine 2017-18 (3 yrs+)(PF) (FLUZONE QUAD/FLUARIX QUAD) injection 0.5 mL  0.5 mL IntraMUSCular PRIOR TO DISCHARGE         Objective:     Vitals:    12/28/17 0427 12/28/17 0550 12/28/17 0758 12/28/17 0923   BP:  101/53  112/68   Pulse:  73  77   Resp:  18  17   Temp:  98.8 °F (37.1 °C)  98.4 °F (36.9 °C)   SpO2: 94% 98% 95% 98%   Weight:  217 lb (98.4 kg)         PHYSICAL EXAM     Constitutional:  the patient is well developed and in no acute distress  EENMT:  Sclera clear, pupils equal, oral mucosa moist  Respiratory: CTA B, no w/r/r  Cardiovascular:  RRR without M,G,R  Gastrointestinal: soft and non-tender; with positive bowel sounds. Musculoskeletal: warm without cyanosis. There is trace B lower leg edema. Skin:  no jaundice or rashes, no wounds   Neurologic: no gross neuro deficits     Psychiatric:  alert and oriented x ppt    CXR:    12/26/17  No acute cardiopulmonary process. Recent Labs      12/28/17   0300  12/26/17   1132   WBC  5.1  6.9   HGB  11.8*  13.8   HCT  36.1*  41.9   PLT  138*  156     Recent Labs      12/28/17   0300  12/27/17   0413  12/27/17   0012  12/26/17   1820  12/26/17   1132   NA  140  141   --    --   143   K  3.7  3.7   --    --   4.1   CL  104  106   --    --   109*   GLU  114*  112*   --    --   109*   CO2  24  26   --    --   26   BUN  20  16   --    --   18   CREA  1.67*  1.76*   --    --   1.52*   MG  1.8  1.7*   --    --    --    CA  8.0*  8.4   --    --   8.8   TROIQ   --   0.46*  0.45*  0.44*   --    ALB   --    --    --    --   3.3   TBILI   --    --    --    --   0.8   ALT   --    --    --    --   20   SGOT   --    --    --    --   22     Recent Labs      12/26/17   1246   PH  7.43   PCO2  33*   PO2  159*   HCO3  21*     No results for input(s): LCAD, LAC in the last 72 hours.     Assessment:  (Medical Decision Making)     Patient Active Problem List   Diagnosis Code    CKD (chronic kidney disease) stage 3, GFR 30-59 ml/min N18.3    Dyspnea on exertion R06.09    Tobacco abuse Z72.0    Chronic systolic heart failure (HCC) I50.22    Coronary atherosclerosis of native coronary vessel I25.10    Arthritis M19.90    Hypertension I10    COPD, moderate ([facility]) J44.9    High triglycerides E78.1    Gastroesophageal reflux disease without esophagitis K21.9    Mixed hyperlipidemia E78.2    Essential hypertension I10    CHF (congestive heart failure) ([facility]) I50.9    Vomiting R11.10    NINO (acute kidney injury) ([facility]) N17.9    TAZ (obstructive sleep apnea) G47.33    Atherosclerosis of native coronary artery of native heart with stable angina pectoris ([facility]) I25.118    Dyspnea R06.00           Plan:  (Medical Decision Making)     Hospital Problems  Date Reviewed: 12/13/2017          Codes Class Noted POA    * (Principal)Dyspnea ICD-10-CM: R06.00  ICD-9-CM: 786.09  12/26/2017 Unknown        TAZ (obstructive sleep apnea) ICD-10-CM: T38.89  ICD-9-CM: 327.23  10/2/2017 Yes    Start autoset CPAP 5-20 cm H20 with heated humidifiew with sleep while in house    CKD (chronic kidney disease) stage 3, GFR 30-59 ml/min (Chronic) ICD-10-CM: N18.3  ICD-9-CM: 585.3  9/29/2017 Yes        Coronary atherosclerosis of native coronary vessel (Chronic) ICD-10-CM: I25.10  ICD-9-CM: 414.01  9/29/2017 Yes        Hypertension (Chronic) ICD-10-CM: I10  ICD-9-CM: 401.9  9/29/2017 Yes        COPD, moderate ([facility] ICD-10-CM: J44.9  ICD-9-CM: 496  9/29/2017 Yes    Overview Signed 12/27/2015 12:38 PM by Lotus Dover NP     Complete PFTs: 7/20/15:  Spirometry is consistent with a moderate obstructive/restrictive defect. . The residual volume is increased relative to other lung volumes suggesting air-trapping. The diffusion capacity corrected for alveolar volume was normal suggesting no loss of alveolo-capillary units.           continue duoneb.   will need to repeat outpatient cPFT's to see if any signs of obstruction that his previous test did not reveal.   consult case management to help get him missing cpap equipment. Questionable diagnosis of COPD. No obstruction on cPFT's in the past - only restrictive process. Denies typical COPD symptoms. Even if this was an accurate diagnosis he is unable to afford anything other than albuterol    Chronic systolic heart failure (HCC) (Chronic) ICD-10-CM: I50.22  ICD-9-CM: 428.22  12/4/2015 Yes    Overview Signed 12/4/2015  9:28 AM by Louise Zee     EF 40%         With Louis Cork Breathing pattern. More than 50% of the time documented was spent in face-to-face contact with the patient and in the care of the patient on the floor/unit where the patient is located.         Tristen De Leon MD

## 2017-12-28 NOTE — PROGRESS NOTES
[facility] CARDIOLOGY PROGRESS NOTE           12/28/2017 7:33 AM    Admit Date: 12/26/2017      Subjective:   Pt denies CP, SOB improved but still with CLAUDIO when up and intermittent audible wheezing    ROS:  Cardiovascular:  As noted above    Objective:      Vitals:    12/27/17 2347 12/28/17 0119 12/28/17 0427 12/28/17 0550   BP:  97/51  101/53   Pulse:  73  73   Resp:  20  18   Temp:  98.2 °F (36.8 °C)  98.8 °F (37.1 °C)   SpO2: 98% 96% 94% 98%   Weight:    98.4 kg (217 lb)       Physical Exam:  General-No Acute Distress  Neck- supple, JVD  CV- regular rate and rhythm no MRG  Lung- less wheezing today, crackles bilaterally  Abd- soft, nontender, nondistended  Ext- +1 edema bilaterally. Skin- warm and dry    Data Review:   Recent Labs      12/28/17   0300  12/27/17   0413  12/27/17   0012   12/26/17   1132   NA  140  141   --    --   143   K  3.7  3.7   --    --   4.1   MG  1.8  1.7*   --    --    --    BUN  20  16   --    --   18   CREA  1.67*  1.76*   --    --   1.52*   GLU  114*  112*   --    --   109*   WBC   --    --    --    --   6.9   HGB   --    --    --    --   13.8   HCT   --    --    --    --   41.9   PLT   --    --    --    --   156   TROIQ   --   0.46*  0.45*   < >   --    CHOL   --   102   --    --    --    LDLC   --   59   --    --    --    HDL   --   24*   --    --    --     < > = values in this interval not displayed.        Assessment/Plan:     Dyspnea- Probably multifactorial secondary to questionable COPD, untreated TAZ, S-CHF- on IV lasix with some improvement     CKD (chronic kidney disease) stage 3, GFR 30-59 ml/min- no ACE-I/ARB, stable on IV lasix     Chronic systolic heart failure- EF 30-35%, diuresis w IV lasix, cont hydralazine/imdur, Coreg, no ACE/ARB due to CKD.      Elevated troponin- demand ischemia, on heparin will stop    CAD- Mild- mod on [facility] 6-2015.      Hypertension - controlled, Hydralazine, Coreg, SBP 97 will stop Norvasc     Questionable COPD- per pulmonary on Albuterol      TAZ (obstructive sleep apnea) - needs CPAP per pulmonary     Rosamaria Akins PA-C  12/28/2017 7:33 AM

## 2017-12-28 NOTE — PROGRESS NOTES
Spoke to [employer], , regarding ptt due at 200.  States Stefania Rice, , should be on the floor drawing labs now

## 2017-12-29 LAB
ANION GAP SERPL CALC-SCNC: 9 MMOL/L (ref 7–16)
BUN SERPL-MCNC: 26 MG/DL (ref 8–23)
CALCIUM SERPL-MCNC: 8.6 MG/DL (ref 8.3–10.4)
CHLORIDE SERPL-SCNC: 107 MMOL/L (ref 98–107)
CO2 SERPL-SCNC: 26 MMOL/L (ref 21–32)
CREAT SERPL-MCNC: 1.69 MG/DL (ref 0.8–1.5)
GLUCOSE SERPL-MCNC: 92 MG/DL (ref 65–100)
MAGNESIUM SERPL-MCNC: 1.9 MG/DL (ref 1.8–2.4)
POTASSIUM SERPL-SCNC: 4 MMOL/L (ref 3.5–5.1)
SODIUM SERPL-SCNC: 142 MMOL/L (ref 136–145)

## 2017-12-29 PROCEDURE — 83735 ASSAY OF MAGNESIUM: CPT | Performed by: PHYSICIAN ASSISTANT

## 2017-12-29 PROCEDURE — 94760 N-INVAS EAR/PLS OXIMETRY 1: CPT

## 2017-12-29 PROCEDURE — 74011250637 HC RX REV CODE- 250/637: Performed by: INTERNAL MEDICINE

## 2017-12-29 PROCEDURE — 36415 COLL VENOUS BLD VENIPUNCTURE: CPT | Performed by: PHYSICIAN ASSISTANT

## 2017-12-29 PROCEDURE — 94640 AIRWAY INHALATION TREATMENT: CPT

## 2017-12-29 PROCEDURE — 80048 BASIC METABOLIC PNL TOTAL CA: CPT | Performed by: PHYSICIAN ASSISTANT

## 2017-12-29 PROCEDURE — 74011250636 HC RX REV CODE- 250/636: Performed by: PHYSICIAN ASSISTANT

## 2017-12-29 PROCEDURE — 74011000250 HC RX REV CODE- 250: Performed by: INTERNAL MEDICINE

## 2017-12-29 PROCEDURE — 74011000250 HC RX REV CODE- 250: Performed by: PHYSICIAN ASSISTANT

## 2017-12-29 PROCEDURE — 99232 SBSQ HOSP IP/OBS MODERATE 35: CPT | Performed by: INTERNAL MEDICINE

## 2017-12-29 PROCEDURE — 74011250637 HC RX REV CODE- 250/637: Performed by: PHYSICIAN ASSISTANT

## 2017-12-29 PROCEDURE — 99218 HC RM OBSERVATION: CPT

## 2017-12-29 RX ORDER — PANTOPRAZOLE SODIUM 40 MG/1
40 TABLET, DELAYED RELEASE ORAL
Status: DISCONTINUED | OUTPATIENT
Start: 2017-12-29 | End: 2018-01-12 | Stop reason: HOSPADM

## 2017-12-29 RX ORDER — GUAIFENESIN 600 MG/1
1200 TABLET, EXTENDED RELEASE ORAL EVERY 12 HOURS
Status: DISCONTINUED | OUTPATIENT
Start: 2017-12-29 | End: 2018-01-12 | Stop reason: HOSPADM

## 2017-12-29 RX ORDER — IPRATROPIUM BROMIDE AND ALBUTEROL SULFATE 2.5; .5 MG/3ML; MG/3ML
3 SOLUTION RESPIRATORY (INHALATION)
Status: DISCONTINUED | OUTPATIENT
Start: 2017-12-29 | End: 2018-01-01

## 2017-12-29 RX ADMIN — IPRATROPIUM BROMIDE AND ALBUTEROL SULFATE 3 ML: .5; 3 SOLUTION RESPIRATORY (INHALATION) at 20:46

## 2017-12-29 RX ADMIN — GUAIFENESIN 1200 MG: 600 TABLET, EXTENDED RELEASE ORAL at 21:02

## 2017-12-29 RX ADMIN — ISOSORBIDE MONONITRATE 30 MG: 30 TABLET, EXTENDED RELEASE ORAL at 08:19

## 2017-12-29 RX ADMIN — CARVEDILOL 25 MG: 25 TABLET, FILM COATED ORAL at 08:19

## 2017-12-29 RX ADMIN — OXYCODONE HYDROCHLORIDE 5 MG: 5 TABLET ORAL at 09:49

## 2017-12-29 RX ADMIN — FUROSEMIDE 40 MG: 10 INJECTION, SOLUTION INTRAMUSCULAR; INTRAVENOUS at 18:10

## 2017-12-29 RX ADMIN — FUROSEMIDE 40 MG: 10 INJECTION, SOLUTION INTRAMUSCULAR; INTRAVENOUS at 08:20

## 2017-12-29 RX ADMIN — IPRATROPIUM BROMIDE AND ALBUTEROL SULFATE 3 ML: .5; 3 SOLUTION RESPIRATORY (INHALATION) at 09:00

## 2017-12-29 RX ADMIN — CARVEDILOL 25 MG: 25 TABLET, FILM COATED ORAL at 17:00

## 2017-12-29 RX ADMIN — ASPIRIN 81 MG: 81 TABLET ORAL at 08:18

## 2017-12-29 RX ADMIN — PANTOPRAZOLE SODIUM 40 MG: 40 TABLET, DELAYED RELEASE ORAL at 09:14

## 2017-12-29 RX ADMIN — GUAIFENESIN 1200 MG: 600 TABLET, EXTENDED RELEASE ORAL at 18:01

## 2017-12-29 RX ADMIN — HYDRALAZINE HYDROCHLORIDE 25 MG: 25 TABLET, FILM COATED ORAL at 08:19

## 2017-12-29 RX ADMIN — ATORVASTATIN CALCIUM 20 MG: 10 TABLET, FILM COATED ORAL at 21:02

## 2017-12-29 RX ADMIN — IPRATROPIUM BROMIDE AND ALBUTEROL SULFATE 3 ML: .5; 3 SOLUTION RESPIRATORY (INHALATION) at 12:32

## 2017-12-29 RX ADMIN — ALLOPURINOL 100 MG: 100 TABLET ORAL at 08:18

## 2017-12-29 RX ADMIN — HYDRALAZINE HYDROCHLORIDE 25 MG: 25 TABLET, FILM COATED ORAL at 21:02

## 2017-12-29 RX ADMIN — IPRATROPIUM BROMIDE AND ALBUTEROL SULFATE 3 ML: .5; 3 SOLUTION RESPIRATORY (INHALATION) at 17:11

## 2017-12-29 NOTE — PROGRESS NOTES
Sammi Suresh  Admission Date: 12/26/2017             Daily Progress Note: 12/29/2017    The patient's chart is reviewed and the patient is discussed with the staff. 73 yo male with a history of TAZ not compliant with CPAP, COPD (cPFTs from 7/20/15 show restrictive and no obstructive lung disease), CKD, HTN, HL, chronic sCHF (EF 30-35%), and CAD. Presented with chest pain and sob. BNP elevated at 1688 and he was admitted by Cardiology. CXR without infiltrate and not hypoxic. We were consulted for increased wheezing. He was started on auto-set CPAP while hospitalized    Subjective:     Currently on RA with o2 sat 95%. C/O minimally productive cough, ongoing wheezing, and sob. Does not feel he is getting any relief with nebulizer treatments. Denies chest pain or palpitations. He is unsure if he wore CPAP overnight (machine at bedside and straps in place around his neck).         Current Facility-Administered Medications   Medication Dose Route Frequency    hydrALAZINE (APRESOLINE) tablet 25 mg  25 mg Oral BID    albuterol (PROVENTIL HFA, VENTOLIN HFA, PROAIR HFA) inhaler 2 Puff  2 Puff Inhalation QID PRN    albuterol-ipratropium (DUO-NEB) 2.5 MG-0.5 MG/3 ML  3 mL Nebulization Q4H RT    allopurinol (ZYLOPRIM) tablet 100 mg (Patient Supplied)  100 mg Oral DAILY    aspirin delayed-release tablet 81 mg (Patient Supplied)  81 mg Oral DAILY    atorvastatin (LIPITOR) tablet 20 mg  20 mg Oral QHS    carvedilol (COREG) tablet 25 mg (Patient Supplied)  25 mg Oral BID WITH MEALS    isosorbide mononitrate ER (IMDUR) tablet 30 mg  30 mg Oral 7am    oxyCODONE IR (ROXICODONE) tablet 5 mg  5 mg Oral Q4H PRN    pantoprazole (PROTONIX) tablet 40 mg  40 mg Oral ACB    sodium chloride (NS) flush 5-10 mL  5-10 mL IntraVENous PRN    nitroglycerin (NITROSTAT) tablet 0.4 mg  0.4 mg SubLINGual Q5MIN PRN    acetaminophen (TYLENOL) tablet 650 mg  650 mg Oral Q4H PRN    promethazine (PHENERGAN) tablet 25 mg  25 mg Oral Q6H PRN    furosemide (LASIX) injection 40 mg  40 mg IntraVENous BID    influenza vaccine 2017-18 (3 yrs+)(PF) (FLUZONE QUAD/FLUARIX QUAD) injection 0.5 mL  0.5 mL IntraMUSCular PRIOR TO DISCHARGE       Review of Systems  Constitutional: negative for fever, chills, sweats  Cardiovascular: negative for chest pain, palpitations, syncope, edema  Gastrointestinal:  negative for dysphagia, reflux, vomiting, diarrhea, abdominal pain, or melena  Neurologic:  negative for focal weakness, numbness, headache    Objective:     Vitals:    12/29/17 0528 12/29/17 0818 12/29/17 0846 12/29/17 0902   BP: 112/77 111/66 110/65    Pulse: 98 76 77    Resp: 18  19    Temp: 97.8 °F (36.6 °C)  97.8 °F (36.6 °C)    SpO2: 94%  97% 95%   Weight: 216 lb 12.8 oz (98.3 kg)        Intake and Output:   12/27 1901 - 12/29 0700  In: 1370.5 [P.O.:490; I.V.:880.5]  Out: 1675 [Urine:1675]       Physical Exam:   Constitution:  the patient is well developed and in no acute distress  EENMT:  Sclera clear, pupils equal, oral mucosa moist  Respiratory: scattered wheezing,   Cardiovascular:  RRR without M,G,R  Gastrointestinal: soft and non-tender; with positive bowel sounds. Musculoskeletal: warm without cyanosis. There is 1+ lower leg edema.   Skin:  no jaundice or rashes, no open wounds   Neurologic: no gross neuro deficits     Psychiatric:  alert and oriented x 3    CXR:   12/26          LAB   Recent Labs      12/28/17   0300   WBC  5.1   HGB  11.8*   HCT  36.1*   PLT  138*     Recent Labs      12/29/17   0514  12/28/17   0300  12/27/17   0413  12/27/17   0012  12/26/17   1820   NA  142  140  141   --    --    K  4.0  3.7  3.7   --    --    CL  107  104  106   --    --    CO2  26  24  26   --    --    GLU  92  114*  112*   --    --    BUN  26*  20  16   --    --    CREA  1.69*  1.67*  1.76*   --    --    MG  1.9  1.8  1.7*   --    --    CA  8.6  8.0*  8.4   --    --    TROIQ   --    --   0.46*  0.45*  0.44*     Recent Labs 12/26/17   1246   PH  7.43   PCO2  33*   PO2  159*   HCO3  21*     No results for input(s): LCAD, LAC in the last 72 hours. Assessment:  (Medical Decision Making)     Hospital Problems  Date Reviewed: 12/29/2017          Codes Class Noted POA    * (Principal)Dyspnea ICD-10-CM: R06.00  ICD-9-CM: 786.09  12/26/2017 Unknown    Ongoing  Not improved with nebs      TAZ (obstructive sleep apnea) ICD-10-CM: Q44.82  ICD-9-CM: 327.23  10/2/2017 Yes    CPAP at bedside  Patient unsure if he wore face mask all night      CKD (chronic kidney disease) stage 3, GFR 30-59 ml/min (Chronic) ICD-10-CM: N18.3  ICD-9-CM: 585.3  9/29/2017 Yes    Creat 1.69      Coronary atherosclerosis of native coronary vessel (Chronic) ICD-10-CM: I25.10  ICD-9-CM: 414.01  9/29/2017 Yes        Hypertension (Chronic) ICD-10-CM: I10  ICD-9-CM: 401.9  9/29/2017 Yes        COPD, moderate (HCC) (Chronic) ICD-10-CM: J44.9  ICD-9-CM: 496  9/29/2017 Yes     Complete PFTs: 7/20/15:  Spirometry is consistent with a moderate obstructive/restrictive defect. . The residual volume is increased relative to other lung volumes suggesting air-trapping. The diffusion capacity corrected for alveolar volume was normal suggesting no loss of alveolo-capillary units. + wheezing - ? Cardiac wheeze - not improved with nebs  Needs outpatient follow up. Unclear if he would be able to afford medications other than albuterol as an outpatient         Chronic systolic heart failure (HCC) (Chronic) ICD-10-CM: I50.22  ICD-9-CM: 428.22  12/4/2015 Yes     EF 40%  BNP 1688 on admission  Diuresis is limited by renal function            Plan:  (Medical Decision Making)     --Duo-neb - does not feel that nebs are helping with the wheezing / sob.   ??? Cardiac wheeze  --lasix 40 mg IV BID   Diuresed 1.4 liters yesterday  --has Oxycodone for pain / dyspnea  --continue empiric CPAP      More than 50% of the time documented was spent in face-to-face contact with the patient and in the care of the patient on the floor/unit where the patient is located. Wilfrid Villanueva NP      Lungs:  Fine wheezes most prominent over glottis. Heart:  RRR with no Murmur/Rubs/Gallops    Additional Comments:  Still having significant orthopnea. Wheezes over glottis suggest possible uncontrolled GERD possibly from uncontrolled TAZ. Will try to have RT apply CPAP while awake to improve tolerance. Will also increase protonix to BID. Agree with ongoing diuresis and adding mucinex. I have spoken with and examined the patient. I agree with the above assessment and plan as documented.     Tj Presctot MD

## 2017-12-29 NOTE — PROGRESS NOTES
Verbal bedside report received from [place] [id number] [facility]. Assumed care of patient.

## 2017-12-29 NOTE — PROGRESS NOTES
Problem: Falls - Risk of  Goal: *Absence of Falls  Document Sue Fall Risk and appropriate interventions in the flowsheet. Outcome: Progressing Towards Goal  Fall Risk Interventions:  Medication Interventions: Patient to call before getting OOB, Teach patient to arise slowly    History of Falls Interventions: Bed/chair exit alarm, Room close to nurse's station        Problem: Heart Failure: Day 3  Goal: *Optimal pain control at patient's stated goal  Outcome: Resolved/Met Date Met: 12/29/17  Patient complaining of pain, 10 on a scale of 0-10 due to arthritis. Stated goal pain is 5 out of 10.

## 2017-12-29 NOTE — PROGRESS NOTES
Patient complaining of shortness of breath. Patient has expiratory wheezing in all lobes upon auscultation. Per respiratory therapist, it is too soon for patient to have another breathing treatment. O2 sat's 95%. Spoke with Danne Cheadle, NP for [facility]. NP to see patient.

## 2017-12-29 NOTE — PROGRESS NOTES
[facility] has agreed for patient to bring his CPAP machine to the office on Tuesday for them to look at and they will see what they can do to help the patient with cord or provide new machine per [facility]. Patient and wife informed of this information.

## 2017-12-29 NOTE — PROGRESS NOTES
12/29/2017 2:48 PM    Admit Date: 12/26/2017    Admit Diagnosis: Dyspnea      Subjective:   No cp- still sob      Objective:      Visit Vitals    BP 95/59 (BP Patient Position: Head of bed elevated (Comment degrees))    Pulse 65    Temp 97.8 °F (36.6 °C)    Resp 20    Wt 98.3 kg (216 lb 12.8 oz)    SpO2 96%    BMI 32.02 kg/m2       Physical Exam:  [address] [facility], Well Nourished, No Acute Distress, Alert & Oriented x 3, appropriate mood. Neck- supple, no JVD  CV- regular rate and rhythm no MRG  Lung- wheezes bilaterally  Abd- soft, nontender, nondistended  Ext- no edema bilaterally. Skin- warm and dry        Data Review:   Recent Labs      12/29/17   0514  12/28/17   0300  12/27/17   0413   NA  142  140  141   K  4.0  3.7  3.7   BUN  26*  20  16   CREA  1.69*  1.67*  1.76*   WBC   --   5.1   --    HGB   --   11.8*   --    HCT   --   36.1*   --    PLT   --   138*   --    HDL   --    --   24*       Assessment/Plan:     Principal Problem:    Dyspnea (12/26/2017)    Active Problems:    CKD (chronic kidney disease) stage 3, GFR 30-59 ml/min (9/29/2017)      Chronic systolic heart failure ([address] (12/4/2015)Stable. Continue current medical therapy. continue lasix iv as cr stable        Overview: EF 40%      Coronary atherosclerosis of native coronary vessel (9/29/2017)Stable. Continue current medical therapy. Hypertension (9/29/2017)Stable. Continue current medical therapy. COPD, moderate ([address] (9/29/2017)- still wheezing- per pulm      Overview: Complete PFTs: 7/20/15:      Spirometry is consistent with a moderate obstructive/restrictive defect. .       The residual volume is increased relative to other lung volumes suggesting       air-trapping. The diffusion capacity corrected for alveolar volume was       normal suggesting no loss of alveolo-capillary units.        TAZ (obstructive sleep apnea) (10/2/2017)

## 2017-12-29 NOTE — PROGRESS NOTES
Bedside and Verbal shift change report given to Dylan Ramos RN (oncoming nurse) by Renée Nolasco RN (offgoing nurse). Report included the following information SBAR, Kardex, Accordion and Recent Results.

## 2017-12-30 LAB — MAGNESIUM SERPL-MCNC: 2.1 MG/DL (ref 1.8–2.4)

## 2017-12-30 PROCEDURE — 99232 SBSQ HOSP IP/OBS MODERATE 35: CPT | Performed by: INTERNAL MEDICINE

## 2017-12-30 PROCEDURE — 74011250637 HC RX REV CODE- 250/637: Performed by: INTERNAL MEDICINE

## 2017-12-30 PROCEDURE — 36415 COLL VENOUS BLD VENIPUNCTURE: CPT | Performed by: PHYSICIAN ASSISTANT

## 2017-12-30 PROCEDURE — 94760 N-INVAS EAR/PLS OXIMETRY 1: CPT

## 2017-12-30 PROCEDURE — 74011250637 HC RX REV CODE- 250/637: Performed by: PHYSICIAN ASSISTANT

## 2017-12-30 PROCEDURE — 99218 HC RM OBSERVATION: CPT

## 2017-12-30 PROCEDURE — 74011000250 HC RX REV CODE- 250: Performed by: INTERNAL MEDICINE

## 2017-12-30 PROCEDURE — 74011000250 HC RX REV CODE- 250: Performed by: NURSE PRACTITIONER

## 2017-12-30 PROCEDURE — 94640 AIRWAY INHALATION TREATMENT: CPT

## 2017-12-30 PROCEDURE — 74011250636 HC RX REV CODE- 250/636: Performed by: INTERNAL MEDICINE

## 2017-12-30 PROCEDURE — 83735 ASSAY OF MAGNESIUM: CPT | Performed by: PHYSICIAN ASSISTANT

## 2017-12-30 PROCEDURE — 74011250636 HC RX REV CODE- 250/636: Performed by: PHYSICIAN ASSISTANT

## 2017-12-30 PROCEDURE — 77010033678 HC OXYGEN DAILY

## 2017-12-30 RX ORDER — FUROSEMIDE 40 MG/1
40 TABLET ORAL
Status: DISCONTINUED | OUTPATIENT
Start: 2017-12-30 | End: 2017-12-31

## 2017-12-30 RX ORDER — BUDESONIDE 0.5 MG/2ML
500 INHALANT ORAL
Status: DISCONTINUED | OUTPATIENT
Start: 2017-12-30 | End: 2018-01-12 | Stop reason: HOSPADM

## 2017-12-30 RX ADMIN — IPRATROPIUM BROMIDE AND ALBUTEROL SULFATE 3 ML: .5; 3 SOLUTION RESPIRATORY (INHALATION) at 07:44

## 2017-12-30 RX ADMIN — CARVEDILOL 25 MG: 25 TABLET, FILM COATED ORAL at 17:06

## 2017-12-30 RX ADMIN — HYDRALAZINE HYDROCHLORIDE 25 MG: 25 TABLET, FILM COATED ORAL at 22:05

## 2017-12-30 RX ADMIN — IPRATROPIUM BROMIDE AND ALBUTEROL SULFATE 3 ML: .5; 3 SOLUTION RESPIRATORY (INHALATION) at 21:58

## 2017-12-30 RX ADMIN — GUAIFENESIN 1200 MG: 600 TABLET, EXTENDED RELEASE ORAL at 22:05

## 2017-12-30 RX ADMIN — ALLOPURINOL 100 MG: 100 TABLET ORAL at 08:26

## 2017-12-30 RX ADMIN — ISOSORBIDE MONONITRATE 30 MG: 30 TABLET, EXTENDED RELEASE ORAL at 06:35

## 2017-12-30 RX ADMIN — HYDRALAZINE HYDROCHLORIDE 25 MG: 25 TABLET, FILM COATED ORAL at 08:24

## 2017-12-30 RX ADMIN — ATORVASTATIN CALCIUM 20 MG: 10 TABLET, FILM COATED ORAL at 22:05

## 2017-12-30 RX ADMIN — IPRATROPIUM BROMIDE AND ALBUTEROL SULFATE 3 ML: .5; 3 SOLUTION RESPIRATORY (INHALATION) at 15:22

## 2017-12-30 RX ADMIN — METHYLPREDNISOLONE SODIUM SUCCINATE 60 MG: 40 INJECTION, POWDER, FOR SOLUTION INTRAMUSCULAR; INTRAVENOUS at 10:24

## 2017-12-30 RX ADMIN — FUROSEMIDE 40 MG: 40 TABLET ORAL at 17:05

## 2017-12-30 RX ADMIN — PANTOPRAZOLE SODIUM 40 MG: 40 TABLET, DELAYED RELEASE ORAL at 17:05

## 2017-12-30 RX ADMIN — GUAIFENESIN 1200 MG: 600 TABLET, EXTENDED RELEASE ORAL at 08:24

## 2017-12-30 RX ADMIN — IPRATROPIUM BROMIDE AND ALBUTEROL SULFATE 3 ML: .5; 3 SOLUTION RESPIRATORY (INHALATION) at 11:35

## 2017-12-30 RX ADMIN — PANTOPRAZOLE SODIUM 40 MG: 40 TABLET, DELAYED RELEASE ORAL at 06:35

## 2017-12-30 RX ADMIN — BUDESONIDE 500 MCG: 0.5 INHALANT RESPIRATORY (INHALATION) at 21:58

## 2017-12-30 RX ADMIN — CARVEDILOL 25 MG: 25 TABLET, FILM COATED ORAL at 08:27

## 2017-12-30 RX ADMIN — ASPIRIN 81 MG: 81 TABLET ORAL at 08:26

## 2017-12-30 RX ADMIN — FUROSEMIDE 40 MG: 10 INJECTION, SOLUTION INTRAMUSCULAR; INTRAVENOUS at 08:24

## 2017-12-30 NOTE — PROGRESS NOTES
Bedside shift change report given to April Castro RN. Report included the following information SBAR, Kardex, MAR and Recent Results.

## 2017-12-30 NOTE — PROGRESS NOTES
Lula Ackerman  Admission Date: 12/26/2017             Daily Progress Note: 12/30/2017    The patient's chart is reviewed and the patient is discussed with the staff. 75 yo male with a history of TAZ not compliant with CPAP, COPD (cPFTs from 7/20/15 show restrictive and no obstructive lung disease), CKD, HTN, HL, chronic sCHF (EF 30-35%), and CAD. Presented with chest pain and sob. BNP elevated at 1688 and he was admitted by Cardiology. CXR without infiltrate and not hypoxic. We were consulted for increased wheezing. He was started on auto-set CPAP while hospitalized    Subjective:     Currently on RA with o2 sat 96%. C/O cough productive of moderate amounts of gray mucus, ongoing wheezing, and sob. Denies chest pain or palpitations. He reports he wore CPAP for about an hour and a half last night.       Current Facility-Administered Medications   Medication Dose Route Frequency    methylPREDNISolone (PF) (SOLU-MEDROL) injection 60 mg  60 mg IntraVENous Q6H    furosemide (LASIX) tablet 40 mg  40 mg Oral ACB&D    albuterol-ipratropium (DUO-NEB) 2.5 MG-0.5 MG/3 ML  3 mL Nebulization QID RT    guaiFENesin ER (MUCINEX) tablet 1,200 mg  1,200 mg Oral Q12H    pantoprazole (PROTONIX) tablet 40 mg  40 mg Oral ACB&D    hydrALAZINE (APRESOLINE) tablet 25 mg  25 mg Oral BID    albuterol (PROVENTIL HFA, VENTOLIN HFA, PROAIR HFA) inhaler 2 Puff  2 Puff Inhalation QID PRN    allopurinol (ZYLOPRIM) tablet 100 mg (Patient Supplied)  100 mg Oral DAILY    aspirin delayed-release tablet 81 mg (Patient Supplied)  81 mg Oral DAILY    atorvastatin (LIPITOR) tablet 20 mg  20 mg Oral QHS    carvedilol (COREG) tablet 25 mg (Patient Supplied)  25 mg Oral BID WITH MEALS    isosorbide mononitrate ER (IMDUR) tablet 30 mg  30 mg Oral 7am    oxyCODONE IR (ROXICODONE) tablet 5 mg  5 mg Oral Q4H PRN    sodium chloride (NS) flush 5-10 mL  5-10 mL IntraVENous PRN    nitroglycerin (NITROSTAT) tablet 0.4 mg 0.4 mg SubLINGual Q5MIN PRN    acetaminophen (TYLENOL) tablet 650 mg  650 mg Oral Q4H PRN    promethazine (PHENERGAN) tablet 25 mg  25 mg Oral Q6H PRN    influenza vaccine 2017-18 (3 yrs+)(PF) (FLUZONE QUAD/FLUARIX QUAD) injection 0.5 mL  0.5 mL IntraMUSCular PRIOR TO DISCHARGE       Review of Systems  Constitutional: negative for fever, chills, sweats  Cardiovascular: negative for chest pain, palpitations, syncope, edema  Gastrointestinal:  negative for dysphagia, reflux, vomiting, diarrhea, abdominal pain, or melena  Neurologic:  negative for focal weakness, numbness, headache    Objective:     Vitals:    12/30/17 0824 12/30/17 0925 12/30/17 1132 12/30/17 1322   BP: 130/69 108/51  123/74   Pulse: 87 80  77   Resp:  22  20   Temp:  98.3 °F (36.8 °C)  97 °F (36.1 °C)   SpO2:  94% 92% 96%   Weight:         Intake and Output:   12/28 1901 - 12/30 0700  In: 1110 [P.O.:1110]  Out: 4594 [Urine:1650]  12/30 0701 - 12/30 1900  In: 1080 [P.O.:1080]  Out: 625 [Urine:625]    Physical Exam:   Constitution:  the patient is well developed and in no acute distress  EENMT:  Sclera clear, pupils equal, oral mucosa moist  Respiratory: scattered wheezing, RA  Cardiovascular:  RRR without M,G,R  Gastrointestinal: soft and non-tender; with positive bowel sounds. Musculoskeletal: warm without cyanosis. There is 1+ lower leg edema. Skin:  no jaundice or rashes, no open wounds   Neurologic: no gross neuro deficits     Psychiatric:  alert and oriented x 3    CXR:   12/26          LAB   Recent Labs      12/28/17   0300   WBC  5.1   HGB  11.8*   HCT  36.1*   PLT  138*     Recent Labs      12/30/17   0613  12/29/17   0514  12/28/17   0300   NA   --   142  140   K   --   4.0  3.7   CL   --   107  104   CO2   --   26  24   GLU   --   92  114*   BUN   --   26*  20   CREA   --   1.69*  1.67*   MG  2.1  1.9  1.8   CA   --   8.6  8.0*     No results for input(s): PH, PCO2, PO2, HCO3 in the last 72 hours.   No results for input(s): LCAD, LAC in the last 72 hours. Assessment:  (Medical Decision Making)     Hospital Problems  Date Reviewed: 12/29/2017          Codes Class Noted POA    * (Principal)Dyspnea ICD-10-CM: R06.00  ICD-9-CM: 786.09  12/26/2017 Unknown    Ongoing  Not improved with nebs      TAZ (obstructive sleep apnea) ICD-10-CM: A62.09  ICD-9-CM: 327.23  10/2/2017 Yes    CPAP at bedside  Patient unsure if he wore face mask all night      CKD (chronic kidney disease) stage 3, GFR 30-59 ml/min (Chronic) ICD-10-CM: N18.3  ICD-9-CM: 585.3  9/29/2017 Yes    Creat 1.69      Coronary atherosclerosis of native coronary vessel (Chronic) ICD-10-CM: I25.10  ICD-9-CM: 414.01  9/29/2017 Yes        Hypertension (Chronic) ICD-10-CM: I10  ICD-9-CM: 401.9  9/29/2017 Yes        COPD, moderate (HCC) (Chronic) ICD-10-CM: J44.9  ICD-9-CM: 496  9/29/2017 Yes     Complete PFTs: 7/20/15:  Spirometry is consistent with a moderate obstructive/restrictive defect. . The residual volume is increased relative to other lung volumes suggesting air-trapping. The diffusion capacity corrected for alveolar volume was normal suggesting no loss of alveolo-capillary units. + wheezing - ? Cardiac wheeze - not improved with nebs  Needs outpatient follow up. Unclear if he would be able to afford medications other than albuterol as an outpatient         Chronic systolic heart failure (HCC) (Chronic) ICD-10-CM: I50.22  ICD-9-CM: 428.22  12/4/2015 Yes     EF 40%  BNP 1688 on admission  Diuresis is limited by renal function          Wheezes over glottis suggest possible uncontrolled GERD possibly from uncontrolled TAZ. Plan:  (Medical Decision Making)     --Duo-neb - does not feel that nebs are helping with the wheezing / sob. ??? Cardiac wheeze  --lasix 40 mg po BID   Diuresed 1.4 liters yesterday  --has Oxycodone for pain / dyspnea  --BID PPI  --mucinex  --continue empiric CPAP  --solumedrol 60 mg IV q 6 hours - added by Cardiology this am for continued wheezing.         More than 50% of the time documented was spent in face-to-face contact with the patient and in the care of the patient on the floor/unit where the patient is located. [facility]     The patient has been seen and examined by me personally, the chart,labs, and radiographic studies have been reviewed. Chest: Central airway wheezing that dissapates with pursed lip breathing   Extremities:1+ edema    I agree with the above assessment and plan.   Dynamic airway collapse steroids will not help- will stop steroids, cont BD     Nino Sevilla MD.

## 2017-12-30 NOTE — PROGRESS NOTES
12/30/2017 10:20 AM    Admit Date: 12/26/2017    Admit Diagnosis: Dyspnea      Subjective:   Still intermittently sob- no cp      Objective:      Visit Vitals    /51 (BP 1 Location: Right arm, BP Patient Position: At rest)    Pulse 80    Temp 98.3 °F (36.8 °C)    Resp 22    Wt 98 kg (216 lb)    SpO2 94%    BMI 31.9 kg/m2       Physical Exam:  Ilona Caras, Well Nourished, No Acute Distress, Alert & Oriented x 3, appropriate mood. Neck- supple, no JVD  CV- regular rate and rhythm no MRG  Lung- clear bilaterally  Abd- soft, nontender, nondistended  Ext- no edema bilaterally. Skin- warm and dry        Data Review:   Recent Labs      12/29/17   0514  12/28/17   0300   NA  142  140   K  4.0  3.7   BUN  26*  20   CREA  1.69*  1.67*   WBC   --   5.1   HGB   --   11.8*   HCT   --   36.1*   PLT   --   138*       Assessment/Plan:     Principal Problem:    Dyspnea (12/26/2017)    Active Problems:    CKD (chronic kidney disease) stage 3, GFR 30-59 ml/min (9/29/2017)      Chronic systolic heart failure ([address] (12/4/2015)Improved with current therapy. Will continue medications  Change to po lasix      Overview: EF 40%      Coronary atherosclerosis of native coronary vessel (9/29/2017)Stable. Continue current medical therapy. Hypertension (9/29/2017)      COPD, moderate ([address] (9/29/2017)- still wheezing- will start iv solumederol today      Overview: Complete PFTs: 7/20/15:      Spirometry is consistent with a moderate obstructive/restrictive defect. .       The residual volume is increased relative to other lung volumes suggesting       air-trapping. The diffusion capacity corrected for alveolar volume was       normal suggesting no loss of alveolo-capillary units.        TAZ (obstructive sleep apnea) (10/2/2017)

## 2017-12-30 NOTE — PROGRESS NOTES
Problem: Falls - Risk of  Goal: *Absence of Falls  Document Sue Fall Risk and appropriate interventions in the flowsheet.    Fall Risk Interventions:     Patient using call light to call for assistance when oob to prevent falls       Medication Interventions: Patient to call before getting OOB         History of Falls Interventions: Bed/chair exit alarm

## 2017-12-30 NOTE — PROGRESS NOTES
Verbal bedside report received from Wadell Boast, [facility]. Assumed care of patient.

## 2017-12-31 LAB
ANION GAP SERPL CALC-SCNC: 10 MMOL/L (ref 7–16)
BNP SERPL-MCNC: 1691 PG/ML
BUN SERPL-MCNC: 32 MG/DL (ref 8–23)
CALCIUM SERPL-MCNC: 8.9 MG/DL (ref 8.3–10.4)
CHLORIDE SERPL-SCNC: 106 MMOL/L (ref 98–107)
CO2 SERPL-SCNC: 24 MMOL/L (ref 21–32)
CREAT SERPL-MCNC: 1.67 MG/DL (ref 0.8–1.5)
GLUCOSE SERPL-MCNC: 138 MG/DL (ref 65–100)
MAGNESIUM SERPL-MCNC: 2.2 MG/DL (ref 1.8–2.4)
POTASSIUM SERPL-SCNC: 4 MMOL/L (ref 3.5–5.1)
SODIUM SERPL-SCNC: 140 MMOL/L (ref 136–145)

## 2017-12-31 PROCEDURE — 74011000250 HC RX REV CODE- 250: Performed by: INTERNAL MEDICINE

## 2017-12-31 PROCEDURE — 94640 AIRWAY INHALATION TREATMENT: CPT

## 2017-12-31 PROCEDURE — 94660 CPAP INITIATION&MGMT: CPT

## 2017-12-31 PROCEDURE — 99232 SBSQ HOSP IP/OBS MODERATE 35: CPT | Performed by: INTERNAL MEDICINE

## 2017-12-31 PROCEDURE — 99218 HC RM OBSERVATION: CPT

## 2017-12-31 PROCEDURE — 74011250637 HC RX REV CODE- 250/637: Performed by: INTERNAL MEDICINE

## 2017-12-31 PROCEDURE — 83735 ASSAY OF MAGNESIUM: CPT | Performed by: PHYSICIAN ASSISTANT

## 2017-12-31 PROCEDURE — 36415 COLL VENOUS BLD VENIPUNCTURE: CPT | Performed by: PHYSICIAN ASSISTANT

## 2017-12-31 PROCEDURE — 74011000258 HC RX REV CODE- 258: Performed by: INTERNAL MEDICINE

## 2017-12-31 PROCEDURE — 94760 N-INVAS EAR/PLS OXIMETRY 1: CPT

## 2017-12-31 PROCEDURE — 74011250636 HC RX REV CODE- 250/636: Performed by: INTERNAL MEDICINE

## 2017-12-31 PROCEDURE — 74011250637 HC RX REV CODE- 250/637: Performed by: PHYSICIAN ASSISTANT

## 2017-12-31 PROCEDURE — 74011000250 HC RX REV CODE- 250: Performed by: NURSE PRACTITIONER

## 2017-12-31 PROCEDURE — 83880 ASSAY OF NATRIURETIC PEPTIDE: CPT | Performed by: INTERNAL MEDICINE

## 2017-12-31 PROCEDURE — 80048 BASIC METABOLIC PNL TOTAL CA: CPT | Performed by: PHYSICIAN ASSISTANT

## 2017-12-31 RX ORDER — FUROSEMIDE 10 MG/ML
40 INJECTION INTRAMUSCULAR; INTRAVENOUS ONCE
Status: COMPLETED | OUTPATIENT
Start: 2017-12-31 | End: 2017-12-31

## 2017-12-31 RX ADMIN — FUROSEMIDE 40 MG: 10 INJECTION, SOLUTION INTRAMUSCULAR; INTRAVENOUS at 11:39

## 2017-12-31 RX ADMIN — HYDRALAZINE HYDROCHLORIDE 25 MG: 25 TABLET, FILM COATED ORAL at 21:28

## 2017-12-31 RX ADMIN — BUDESONIDE 500 MCG: 0.5 INHALANT RESPIRATORY (INHALATION) at 07:55

## 2017-12-31 RX ADMIN — GUAIFENESIN 1200 MG: 600 TABLET, EXTENDED RELEASE ORAL at 21:25

## 2017-12-31 RX ADMIN — CARVEDILOL 25 MG: 25 TABLET, FILM COATED ORAL at 17:37

## 2017-12-31 RX ADMIN — IPRATROPIUM BROMIDE AND ALBUTEROL SULFATE 3 ML: .5; 3 SOLUTION RESPIRATORY (INHALATION) at 07:55

## 2017-12-31 RX ADMIN — ATORVASTATIN CALCIUM 20 MG: 10 TABLET, FILM COATED ORAL at 21:26

## 2017-12-31 RX ADMIN — PANTOPRAZOLE SODIUM 40 MG: 40 TABLET, DELAYED RELEASE ORAL at 08:43

## 2017-12-31 RX ADMIN — IPRATROPIUM BROMIDE AND ALBUTEROL SULFATE 3 ML: .5; 3 SOLUTION RESPIRATORY (INHALATION) at 11:39

## 2017-12-31 RX ADMIN — OXYCODONE HYDROCHLORIDE 5 MG: 5 TABLET ORAL at 17:39

## 2017-12-31 RX ADMIN — IPRATROPIUM BROMIDE AND ALBUTEROL SULFATE 3 ML: .5; 3 SOLUTION RESPIRATORY (INHALATION) at 17:58

## 2017-12-31 RX ADMIN — FUROSEMIDE 40 MG: 40 TABLET ORAL at 08:44

## 2017-12-31 RX ADMIN — FUROSEMIDE 10 MG/HR: 10 INJECTION, SOLUTION INTRAMUSCULAR; INTRAVENOUS at 11:39

## 2017-12-31 RX ADMIN — CARVEDILOL 25 MG: 25 TABLET, FILM COATED ORAL at 08:47

## 2017-12-31 RX ADMIN — GUAIFENESIN 1200 MG: 600 TABLET, EXTENDED RELEASE ORAL at 08:43

## 2017-12-31 RX ADMIN — FUROSEMIDE 10 MG/HR: 10 INJECTION, SOLUTION INTRAMUSCULAR; INTRAVENOUS at 21:26

## 2017-12-31 RX ADMIN — HYDRALAZINE HYDROCHLORIDE 25 MG: 25 TABLET, FILM COATED ORAL at 08:44

## 2017-12-31 RX ADMIN — IPRATROPIUM BROMIDE AND ALBUTEROL SULFATE 3 ML: .5; 3 SOLUTION RESPIRATORY (INHALATION) at 15:44

## 2017-12-31 RX ADMIN — ALLOPURINOL 100 MG: 100 TABLET ORAL at 08:47

## 2017-12-31 RX ADMIN — ASPIRIN 81 MG: 81 TABLET ORAL at 08:47

## 2017-12-31 RX ADMIN — PANTOPRAZOLE SODIUM 40 MG: 40 TABLET, DELAYED RELEASE ORAL at 17:36

## 2017-12-31 RX ADMIN — ISOSORBIDE MONONITRATE 30 MG: 30 TABLET, EXTENDED RELEASE ORAL at 08:43

## 2017-12-31 RX ADMIN — BUDESONIDE 500 MCG: 0.5 INHALANT RESPIRATORY (INHALATION) at 17:58

## 2017-12-31 NOTE — PROGRESS NOTES
Bedside and Verbal shift change report given to Carlene ANDERSON (oncoming nurse) by self (offgoing nurse). Report included the following information SBAR, Kardex, MAR and Recent Results.

## 2017-12-31 NOTE — PROGRESS NOTES
Bedside and Verbal shift change report given to self (oncoming nurse) by Kelsie Rooney (offgoing nurse). Report included the following information SBAR, Kardex, MAR and Recent Results.

## 2017-12-31 NOTE — PROGRESS NOTES
Bedside and Verbal shift change report given to self (oncoming nurse) by Geovanny Weems RN (offgoing nurse). Report included the following information SBAR, Kardex, MAR and Recent Results.

## 2017-12-31 NOTE — PROGRESS NOTES
Problem: Falls - Risk of  Goal: *Absence of Falls  Document Sue Fall Risk and appropriate interventions in the flowsheet.    Outcome: Progressing Towards Goal  Fall Risk Interventions:            Medication Interventions: Bed/chair exit alarm, Patient to call before getting OOB, Teach patient to arise slowly         History of Falls Interventions: Bed/chair exit alarm, Door open when patient unattended, Room close to nurse's station

## 2017-12-31 NOTE — PROGRESS NOTES
12/31/2017 11:17 AM    Admit Date: 12/26/2017    Admit Diagnosis: Dyspnea      Subjective:   No cp- states he is breathing better      Objective:      Visit Vitals    /81    Pulse 78    Temp 97.5 °F (36.4 °C)    Resp 19    Wt 98 kg (216 lb)    SpO2 95%    BMI 31.9 kg/m2       Physical Exam:  Kyle Earlene, Well Nourished, No Acute Distress, Alert & Oriented x 3, appropriate mood. Neck- supple, no JVD  CV- regular rate and rhythm no MRG  Lung- wheezes bilaterally  Abd- soft, nontender, nondistended  Ext- no edema bilaterally. Skin- warm and dry        Data Review:   Recent Labs      12/31/17   0515   NA  140   K  4.0   BUN  32*   CREA  1.67*       Assessment/Plan:     Principal Problem:    Dyspnea (12/26/2017)    Active Problems:    CKD (chronic kidney disease) stage 3, GFR 30-59 ml/min (9/29/2017)    Acute on  Chronic systolic heart failure ([address] (12/4/2015)- bnp worse- change to lasix gtt and continue pulm treatments      Overview: EF 40%      Coronary atherosclerosis of native coronary vessel (9/29/2017)Stable. Continue current medical therapy. Hypertension (9/29/2017)      COPD, moderate ([address] (9/29/2017)Improved with current therapy. Will continue medications        Overview: Complete PFTs: 7/20/15:      Spirometry is consistent with a moderate obstructive/restrictive defect. .       The residual volume is increased relative to other lung volumes suggesting       air-trapping. The diffusion capacity corrected for alveolar volume was       normal suggesting no loss of alveolo-capillary units.        TAZ (obstructive sleep apnea) (10/2/2017)

## 2017-12-31 NOTE — PROGRESS NOTES
Bedside and Verbal shift change report given to Anastasia Han RN (oncoming nurse) by self Lilly cao). Report included the following information SBAR, Kardex, MAR and Recent Results.

## 2017-12-31 NOTE — PROGRESS NOTES
Bedside and Verbal shift change report given to self (oncoming nurse) by Ambar Godwin (offgoing nurse). Report included the following information SBAR, Kardex, MAR and Recent Results.

## 2017-12-31 NOTE — PROGRESS NOTES
Sammi Suresh  Admission Date: 12/26/2017             Daily Progress Note: 12/31/2017    The patient's chart is reviewed and the patient is discussed with the staff. 75 yo male with a history of TAZ not compliant with CPAP, COPD (cPFTs from 7/20/15 show restrictive and no obstructive lung disease), CKD, HTN, HL, chronic sCHF (EF 30-35%), and CAD. Presented with chest pain and sob. BNP elevated at 1688 and he was admitted by Cardiology. CXR without infiltrate and not hypoxic. We were consulted for increased wheezing. He was started on auto-set CPAP while hospitalized    Subjective:     Currently on RA with o2 sat 96%. C/O cough productive of moderate amounts of gray mucus, ongoing wheezing, and sob. Denies chest pain or palpitations. He reports he wore CPAP for about an hour and a half last night.       Current Facility-Administered Medications   Medication Dose Route Frequency    furosemide (LASIX) 100 mg in 0.9% sodium chloride 100 mL infusion  10 mg/hr IntraVENous CONTINUOUS    budesonide (PULMICORT) 500 mcg/2 ml nebulizer suspension  500 mcg Nebulization BID RT    albuterol-ipratropium (DUO-NEB) 2.5 MG-0.5 MG/3 ML  3 mL Nebulization QID RT    guaiFENesin ER (MUCINEX) tablet 1,200 mg  1,200 mg Oral Q12H    pantoprazole (PROTONIX) tablet 40 mg  40 mg Oral ACB&D    hydrALAZINE (APRESOLINE) tablet 25 mg  25 mg Oral BID    albuterol (PROVENTIL HFA, VENTOLIN HFA, PROAIR HFA) inhaler 2 Puff  2 Puff Inhalation QID PRN    allopurinol (ZYLOPRIM) tablet 100 mg (Patient Supplied)  100 mg Oral DAILY    aspirin delayed-release tablet 81 mg (Patient Supplied)  81 mg Oral DAILY    atorvastatin (LIPITOR) tablet 20 mg  20 mg Oral QHS    carvedilol (COREG) tablet 25 mg (Patient Supplied)  25 mg Oral BID WITH MEALS    isosorbide mononitrate ER (IMDUR) tablet 30 mg  30 mg Oral 7am    oxyCODONE IR (ROXICODONE) tablet 5 mg  5 mg Oral Q4H PRN    sodium chloride (NS) flush 5-10 mL  5-10 mL IntraVENous PRN    nitroglycerin (NITROSTAT) tablet 0.4 mg  0.4 mg SubLINGual Q5MIN PRN    acetaminophen (TYLENOL) tablet 650 mg  650 mg Oral Q4H PRN    promethazine (PHENERGAN) tablet 25 mg  25 mg Oral Q6H PRN    influenza vaccine 2017-18 (3 yrs+)(PF) (FLUZONE QUAD/FLUARIX QUAD) injection 0.5 mL  0.5 mL IntraMUSCular PRIOR TO DISCHARGE       Review of Systems  Constitutional: negative for fever, chills, sweats  Cardiovascular: negative for chest pain, palpitations, syncope, edema  Gastrointestinal:  negative for dysphagia, reflux, vomiting, diarrhea, abdominal pain, or melena  Neurologic:  negative for focal weakness, numbness, headache    Objective:     Vitals:    12/31/17 0755 12/31/17 0843 12/31/17 1139 12/31/17 1315   BP:  132/81  131/82   Pulse:  78  77   Resp:  19  18   Temp:  97.5 °F (36.4 °C)  97.6 °F (36.4 °C)   SpO2: 98% 95% 96% 97%   Weight:         Intake and Output:   12/29 1901 - 12/31 0700  In: 2400 [P.O.:2400]  Out: 2876 [Urine:2875]  12/31 0701 - 12/31 1900  In: 480 [P.O.:480]  Out: 900 [Urine:900]    Physical Exam:   Constitution:  the patient is well developed and in no acute distress  EENMT:  Sclera clear, pupils equal, oral mucosa moist  Respiratory: scattered central wheezing wheezing that disappears with pursed lip breathig , RA  Cardiovascular:  RRR without M,G,R  Gastrointestinal: soft and non-tender; with positive bowel sounds. Musculoskeletal: warm without cyanosis. There is 1+ lower leg edema. Skin:  no jaundice or rashes, no open wounds   Neurologic: no gross neuro deficits     Psychiatric:  alert and oriented x 3    CXR:   12/26          LAB   No results for input(s): WBC, HGB, HCT, PLT, INR, HGBEXT, HCTEXT, PLTEXT, HGBEXT, HCTEXT, PLTEXT in the last 72 hours.     No lab exists for component: Ashlyn Quinones  Recent Labs      12/31/17   0515  12/30/17   0613  12/29/17   0514   NA  140   --   142   K  4.0   --   4.0   CL  106   --   107   CO2  24   --   26   GLU  138*   --   92 BUN  32*   --   26*   CREA  1.67*   --   1.69*   MG  2.2  2.1  1.9   CA  8.9   --   8.6     No results for input(s): PH, PCO2, PO2, HCO3 in the last 72 hours. No results for input(s): LCAD, LAC in the last 72 hours. Assessment:  (Medical Decision Making)     Patient Active Problem List   Diagnosis Code    CKD (chronic kidney disease) stage 3, GFR 30-59 ml/min N18.3    Dyspnea on exertion R06.09    Tobacco abuse Z72.0    Chronic systolic heart failure (HCC) I50.22    Coronary atherosclerosis of native coronary vessel I25.10    Arthritis M19.90    Hypertension I10    COPD, moderate (HCC) J44.9    High triglycerides E78.1    Gastroesophageal reflux disease without esophagitis K21.9    Mixed hyperlipidemia E78.2    Essential hypertension I10    CHF (congestive heart failure) ([facility]) I50.9    Vomiting R11.10    NINO (acute kidney injury) ([facility]) N17.9    TAZ (obstructive sleep apnea) G47.33    Atherosclerosis of native coronary artery of native heart with stable angina pectoris ([facility]) I25.118    Dyspnea R06.00           Plan:  (Medical Decision Making)     Hospital Problems  Date Reviewed: 12/29/2017          Codes Class Noted POA    * (Principal)Dyspnea ICD-10-CM: R06.00  ICD-9-CM: 786.09  12/26/2017 Unknown    Ongoing  Not improved with nebs      TAZ (obstructive sleep apnea) ICD-10-CM: F99.77  ICD-9-CM: 327.23  10/2/2017 Yes    CPAP at bedside  Patient unsure if he wore face mask all night      CKD (chronic kidney disease) stage 3, GFR 30-59 ml/min (Chronic) ICD-10-CM: N18.3  ICD-9-CM: 585.3  9/29/2017 Yes    Creat 1.69      Coronary atherosclerosis of native coronary vessel (Chronic) ICD-10-CM: I25.10  ICD-9-CM: 414.01  9/29/2017 Yes        Hypertension (Chronic) ICD-10-CM: I10  ICD-9-CM: 401.9  9/29/2017 Yes        COPD, moderate (HCC) (Chronic) ICD-10-CM: J44.9  ICD-9-CM: 496  9/29/2017 Yes     Complete PFTs: 7/20/15:  Spirometry is consistent with a moderate obstructive/restrictive defect. . The residual volume is increased relative to other lung volumes suggesting air-trapping. The diffusion capacity corrected for alveolar volume was normal suggesting no loss of alveolo-capillary units. + wheezing - see below  Needs outpatient follow up. Unclear if he would be able to afford medications other than albuterol as an outpatient         Chronic systolic heart failure (HCC) (Chronic) ICD-10-CM: I50.22  ICD-9-CM: 428.22  12/4/2015 Yes     EF 40%  BNP 1688 on admission  Diuresis is limited by renal function          Wheezes over glottis suggest possible uncontrolled GERD possibly from uncontrolled TAZ these completely dissapate with pursed lip breathing confirming dynamic airway collapse as a most likely  cause - discussed with patient    --Duo-neb - does not feel that nebs are helping with the wheezing / sob. ??? Cardiac wheeze  --lasix 40 mg po BID   Diuresed 1.965 liters yesterday  --has Oxycodone for pain / dyspnea    --continue empiric CPAP- resume home CPAP patient responsible for buying missing equipement to make it functional      More than 50% of the time documented was spent in face-to-face contact with the patient and in the care of the patient on the floor/unit where the patient is located.       Samantha Landers MD

## 2017-12-31 NOTE — PROGRESS NOTES
Bedside shift change report given to [facility], [facility]. Report included the following information SBAR, Kardex, MAR and Recent Results.

## 2018-01-01 LAB
ANION GAP SERPL CALC-SCNC: 8 MMOL/L (ref 7–16)
BNP SERPL-MCNC: 597 PG/ML
BUN SERPL-MCNC: 36 MG/DL (ref 8–23)
CALCIUM SERPL-MCNC: 9.1 MG/DL (ref 8.3–10.4)
CHLORIDE SERPL-SCNC: 103 MMOL/L (ref 98–107)
CO2 SERPL-SCNC: 32 MMOL/L (ref 21–32)
CREAT SERPL-MCNC: 1.81 MG/DL (ref 0.8–1.5)
GLUCOSE SERPL-MCNC: 106 MG/DL (ref 65–100)
MAGNESIUM SERPL-MCNC: 2.4 MG/DL (ref 1.8–2.4)
POTASSIUM SERPL-SCNC: 3.7 MMOL/L (ref 3.5–5.1)
SODIUM SERPL-SCNC: 143 MMOL/L (ref 136–145)

## 2018-01-01 PROCEDURE — 99232 SBSQ HOSP IP/OBS MODERATE 35: CPT | Performed by: INTERNAL MEDICINE

## 2018-01-01 PROCEDURE — 74011250636 HC RX REV CODE- 250/636: Performed by: INTERNAL MEDICINE

## 2018-01-01 PROCEDURE — 74011000250 HC RX REV CODE- 250: Performed by: INTERNAL MEDICINE

## 2018-01-01 PROCEDURE — 94640 AIRWAY INHALATION TREATMENT: CPT

## 2018-01-01 PROCEDURE — 74011250637 HC RX REV CODE- 250/637: Performed by: INTERNAL MEDICINE

## 2018-01-01 PROCEDURE — 83880 ASSAY OF NATRIURETIC PEPTIDE: CPT | Performed by: INTERNAL MEDICINE

## 2018-01-01 PROCEDURE — 99218 HC RM OBSERVATION: CPT

## 2018-01-01 PROCEDURE — 94760 N-INVAS EAR/PLS OXIMETRY 1: CPT

## 2018-01-01 PROCEDURE — 80048 BASIC METABOLIC PNL TOTAL CA: CPT | Performed by: PHYSICIAN ASSISTANT

## 2018-01-01 PROCEDURE — 74011000250 HC RX REV CODE- 250: Performed by: NURSE PRACTITIONER

## 2018-01-01 PROCEDURE — 36415 COLL VENOUS BLD VENIPUNCTURE: CPT | Performed by: PHYSICIAN ASSISTANT

## 2018-01-01 PROCEDURE — 74011000258 HC RX REV CODE- 258: Performed by: INTERNAL MEDICINE

## 2018-01-01 PROCEDURE — 74011250637 HC RX REV CODE- 250/637: Performed by: PHYSICIAN ASSISTANT

## 2018-01-01 PROCEDURE — 83735 ASSAY OF MAGNESIUM: CPT | Performed by: PHYSICIAN ASSISTANT

## 2018-01-01 RX ORDER — IPRATROPIUM BROMIDE AND ALBUTEROL SULFATE 2.5; .5 MG/3ML; MG/3ML
3 SOLUTION RESPIRATORY (INHALATION)
Status: DISCONTINUED | OUTPATIENT
Start: 2018-01-01 | End: 2018-01-02

## 2018-01-01 RX ORDER — FUROSEMIDE 10 MG/ML
80 INJECTION INTRAMUSCULAR; INTRAVENOUS 2 TIMES DAILY
Status: DISCONTINUED | OUTPATIENT
Start: 2018-01-01 | End: 2018-01-02

## 2018-01-01 RX ADMIN — BUDESONIDE 500 MCG: 0.5 INHALANT RESPIRATORY (INHALATION) at 08:02

## 2018-01-01 RX ADMIN — GUAIFENESIN 1200 MG: 600 TABLET, EXTENDED RELEASE ORAL at 21:27

## 2018-01-01 RX ADMIN — GUAIFENESIN 1200 MG: 600 TABLET, EXTENDED RELEASE ORAL at 08:23

## 2018-01-01 RX ADMIN — ISOSORBIDE MONONITRATE 30 MG: 30 TABLET, EXTENDED RELEASE ORAL at 08:23

## 2018-01-01 RX ADMIN — HYDRALAZINE HYDROCHLORIDE 25 MG: 25 TABLET, FILM COATED ORAL at 21:27

## 2018-01-01 RX ADMIN — BUDESONIDE 500 MCG: 0.5 INHALANT RESPIRATORY (INHALATION) at 19:13

## 2018-01-01 RX ADMIN — CARVEDILOL 25 MG: 25 TABLET, FILM COATED ORAL at 08:24

## 2018-01-01 RX ADMIN — FUROSEMIDE 80 MG: 10 INJECTION, SOLUTION INTRAMUSCULAR; INTRAVENOUS at 17:09

## 2018-01-01 RX ADMIN — FUROSEMIDE 80 MG: 10 INJECTION, SOLUTION INTRAMUSCULAR; INTRAVENOUS at 11:32

## 2018-01-01 RX ADMIN — ASPIRIN 81 MG: 81 TABLET ORAL at 08:23

## 2018-01-01 RX ADMIN — ATORVASTATIN CALCIUM 20 MG: 10 TABLET, FILM COATED ORAL at 21:27

## 2018-01-01 RX ADMIN — OXYCODONE HYDROCHLORIDE 5 MG: 5 TABLET ORAL at 21:33

## 2018-01-01 RX ADMIN — PANTOPRAZOLE SODIUM 40 MG: 40 TABLET, DELAYED RELEASE ORAL at 17:09

## 2018-01-01 RX ADMIN — PANTOPRAZOLE SODIUM 40 MG: 40 TABLET, DELAYED RELEASE ORAL at 08:23

## 2018-01-01 RX ADMIN — IPRATROPIUM BROMIDE AND ALBUTEROL SULFATE 3 ML: .5; 3 SOLUTION RESPIRATORY (INHALATION) at 08:02

## 2018-01-01 RX ADMIN — CARVEDILOL 25 MG: 25 TABLET, FILM COATED ORAL at 17:09

## 2018-01-01 RX ADMIN — IPRATROPIUM BROMIDE AND ALBUTEROL SULFATE 3 ML: .5; 3 SOLUTION RESPIRATORY (INHALATION) at 19:13

## 2018-01-01 RX ADMIN — ALLOPURINOL 100 MG: 100 TABLET ORAL at 08:23

## 2018-01-01 RX ADMIN — HYDRALAZINE HYDROCHLORIDE 25 MG: 25 TABLET, FILM COATED ORAL at 08:23

## 2018-01-01 RX ADMIN — FUROSEMIDE 10 MG/HR: 10 INJECTION, SOLUTION INTRAMUSCULAR; INTRAVENOUS at 07:44

## 2018-01-01 NOTE — PROGRESS NOTES
Verbal bedside report received from [facility]. Assumed care of patient. Lasix IV drip verified at bedside with outgoing RN.

## 2018-01-01 NOTE — PROGRESS NOTES
Problem: Falls - Risk of  Goal: *Absence of Falls  Document Sue Fall Risk and appropriate interventions in the flowsheet.    Outcome: Progressing Towards Goal  Fall Risk Interventions:            Medication Interventions: Bed/chair exit alarm, Patient to call before getting OOB, Teach patient to arise slowly         History of Falls Interventions: Bed/chair exit alarm, Door open when patient unattended

## 2018-01-01 NOTE — PROGRESS NOTES
Martha West  Admission Date: 12/26/2017             Daily Progress Note: 1/1/2018    The patient's chart is reviewed and the patient is discussed with the staff. 75 yo male with a history of TAZ not compliant with CPAP, COPD (cPFTs from 7/20/15 show restrictive and no obstructive lung disease), CKD, HTN, HL, chronic sCHF (EF 30-35%), and CAD. Presented with chest pain and sob. BNP elevated at 1688 and he was admitted by Cardiology. CXR without infiltrate and not hypoxic. We were consulted for increased wheezing.   He was started on auto-set CPAP while hospitalized    Subjective:     Negative 4L yesterday by I/Os and he is now 99% on room air  BNP is down to 597 today from 1691 yesterday but creatinine is up to 1.81    Current Facility-Administered Medications   Medication Dose Route Frequency    furosemide (LASIX) injection 80 mg  80 mg IntraVENous BID    budesonide (PULMICORT) 500 mcg/2 ml nebulizer suspension  500 mcg Nebulization BID RT    albuterol-ipratropium (DUO-NEB) 2.5 MG-0.5 MG/3 ML  3 mL Nebulization QID RT    guaiFENesin ER (MUCINEX) tablet 1,200 mg  1,200 mg Oral Q12H    pantoprazole (PROTONIX) tablet 40 mg  40 mg Oral ACB&D    hydrALAZINE (APRESOLINE) tablet 25 mg  25 mg Oral BID    albuterol (PROVENTIL HFA, VENTOLIN HFA, PROAIR HFA) inhaler 2 Puff  2 Puff Inhalation QID PRN    allopurinol (ZYLOPRIM) tablet 100 mg (Patient Supplied)  100 mg Oral DAILY    aspirin delayed-release tablet 81 mg (Patient Supplied)  81 mg Oral DAILY    atorvastatin (LIPITOR) tablet 20 mg  20 mg Oral QHS    carvedilol (COREG) tablet 25 mg (Patient Supplied)  25 mg Oral BID WITH MEALS    isosorbide mononitrate ER (IMDUR) tablet 30 mg  30 mg Oral 7am    oxyCODONE IR (ROXICODONE) tablet 5 mg  5 mg Oral Q4H PRN    sodium chloride (NS) flush 5-10 mL  5-10 mL IntraVENous PRN    nitroglycerin (NITROSTAT) tablet 0.4 mg  0.4 mg SubLINGual Q5MIN PRN    acetaminophen (TYLENOL) tablet 650 mg 650 mg Oral Q4H PRN    promethazine (PHENERGAN) tablet 25 mg  25 mg Oral Q6H PRN    influenza vaccine 2017-18 (3 yrs+)(PF) (FLUZONE QUAD/FLUARIX QUAD) injection 0.5 mL  0.5 mL IntraMUSCular PRIOR TO DISCHARGE       Review of Systems  Constitutional: negative for fever, chills, sweats  Cardiovascular: negative for chest pain, palpitations, syncope, edema  Gastrointestinal:  negative for dysphagia, reflux, vomiting, diarrhea, abdominal pain, or melena  Neurologic:  negative for focal weakness, numbness, headache    Objective:     Vitals:    01/01/18 0804 01/01/18 0823 01/01/18 0859 01/01/18 1132   BP:  122/64 93/61 102/61   Pulse:  70 72 70   Resp:   18    Temp:   97.6 °F (36.4 °C)    SpO2: 97%  99%    Weight:         Intake and Output:   12/30 1901 - 01/01 0700  In: 840 [P.O.:840]  Out: 5650 [Urine:5650]  01/01 0701 - 01/01 1900  In: -   Out: 1000 [Urine:1000]    Physical Exam:   Constitution:  the patient is well developed and in no acute distress  EENMT:  Sclera clear, pupils equal, oral mucosa moist  Respiratory: scattered central wheezing wheezing that disappears with pursed lip breathig , RA  Cardiovascular:  RRR without M,G,R  Gastrointestinal: soft and non-tender; with positive bowel sounds. Musculoskeletal: warm without cyanosis. There is 1+ lower leg edema. Skin:  no jaundice or rashes, no open wounds   Neurologic: no gross neuro deficits     Psychiatric:  alert and oriented x 3    CXR:   12/26          LAB   No results for input(s): WBC, HGB, HCT, PLT, INR, HGBEXT, HCTEXT, PLTEXT, HGBEXT, HCTEXT, PLTEXT in the last 72 hours. No lab exists for component: Dave Saver  Recent Labs      01/01/18   0516  12/31/17   0515  12/30/17   0613   NA  143  140   --    K  3.7  4.0   --    CL  103  106   --    CO2  32  24   --    GLU  106*  138*   --    BUN  36*  32*   --    CREA  1.81*  1.67*   --    MG  2.4  2.2  2.1   CA  9.1  8.9   --      No results for input(s): PH, PCO2, PO2, HCO3 in the last 72 hours.   No results for input(s): LCAD, LAC in the last 72 hours. Assessment:  (Medical Decision Making)     Patient Active Problem List   Diagnosis Code    CKD (chronic kidney disease) stage 3, GFR 30-59 ml/min N18.3    Dyspnea on exertion R06.09    Tobacco abuse Z72.0    Chronic systolic heart failure (HCC) I50.22    Coronary atherosclerosis of native coronary vessel I25.10    Arthritis M19.90    Hypertension I10    COPD, moderate (HCC) J44.9    High triglycerides E78.1    Gastroesophageal reflux disease without esophagitis K21.9    Mixed hyperlipidemia E78.2    Essential hypertension I10    CHF (congestive heart failure) ([facility]) I50.9    Vomiting R11.10    NINO (acute kidney injury) ([facility]) N17.9    TAZ (obstructive sleep apnea) G47.33    Atherosclerosis of native coronary artery of native heart with stable angina pectoris ([facility]) I25.118    Dyspnea R06.00           Plan:  (Medical Decision Making)     Hospital Problems  Date Reviewed: 12/29/2017          Codes Class Noted POA    * (Principal)Dyspnea ICD-10-CM: R06.00  ICD-9-CM: 786.09  12/26/2017 Unknown    Ongoing  Not improved with nebs      TAZ (obstructive sleep apnea) ICD-10-CM: Q77.59  ICD-9-CM: 327.23  10/2/2017 Yes    CPAP at bedside  Patient unsure if he wore face mask all night      CKD (chronic kidney disease) stage 3, GFR 30-59 ml/min (Chronic) ICD-10-CM: N18.3  ICD-9-CM: 585.3  9/29/2017 Yes    Creat 1.69      Coronary atherosclerosis of native coronary vessel (Chronic) ICD-10-CM: I25.10  ICD-9-CM: 414.01  9/29/2017 Yes        Hypertension (Chronic) ICD-10-CM: I10  ICD-9-CM: 401.9  9/29/2017 Yes        COPD, moderate (HCC) (Chronic) ICD-10-CM: J44.9  ICD-9-CM: 496  9/29/2017 Yes     Complete PFTs: 7/20/15:  Spirometry is consistent with a moderate obstructive/restrictive defect. . The residual volume is increased relative to other lung volumes suggesting air-trapping.  The diffusion capacity corrected for alveolar volume was normal suggesting no loss of alveolo-capillary units. + wheezing - see below  Needs outpatient follow up. Unclear if he would be able to afford medications other than albuterol as an outpatient         Chronic systolic heart failure (HCC) (Chronic) ICD-10-CM: I50.22  ICD-9-CM: 428.22  12/4/2015 Yes     EF 40%  BNP 1688 on admission  Diuresis is limited by renal function            --agree with decreased but ongoing lasix  --continue bronchodilators, decrease frequency  --continue empiric CPAP- resume home CPAP patient responsible for buying missing equipment to make it functional.  His family will bring in machine today      More than 50% of the time documented was spent in face-to-face contact with the patient and in the care of the patient on the floor/unit where the patient is located.       Natasha Mascorro MD

## 2018-01-01 NOTE — PROGRESS NOTES
1/1/2018 11:08 AM    Admit Date: 12/26/2017    Admit Diagnosis: Dyspnea      Subjective:   No cp- breathing better      Objective:      Visit Vitals    BP 93/61 (BP 1 Location: Right arm, BP Patient Position: At rest)    Pulse 72    Temp 97.6 °F (36.4 °C)    Resp 18    Wt 94.3 kg (208 lb)    SpO2 99%    BMI 30.72 kg/m2       Physical Exam:  Marizol Nae, Well Nourished, No Acute Distress, Alert & Oriented x 3, appropriate mood. Neck- supple, no JVD  CV- regular rate and rhythm no MRG  Lung- wheezes bilaterally  Abd- soft, nontender, nondistended  Ext- no edema bilaterally. Skin- warm and dry        Data Review:   Recent Labs      01/01/18   0516   NA  143   K  3.7   BUN  36*   CREA  1.81*       Assessment/Plan:     Principal Problem:    Dyspnea (12/26/2017)Improved with current therapy. Will continue medications      Active Problems:    CKD (chronic kidney disease) stage 3, GFR 30-59 ml/min (9/29/2017)      Chronic systolic heart failure ([address] (12/4/2015)Improved with current therapy. Will continue medications  Change from lasix gtt to bolus lasix as cr increasing      Overview: EF 40%      Coronary atherosclerosis of native coronary vessel (9/29/2017)      Hypertension (9/29/2017)      COPD, moderate (HCC) (9/29/2017)= still wheezing in spite of increased diuresis and steroids      Overview: Complete PFTs: 7/20/15:      Spirometry is consistent with a moderate obstructive/restrictive defect. .       The residual volume is increased relative to other lung volumes suggesting       air-trapping. The diffusion capacity corrected for alveolar volume was       normal suggesting no loss of alveolo-capillary units.        TAZ (obstructive sleep apnea) (10/2/2017)

## 2018-01-01 NOTE — PROGRESS NOTES
Notified by monitor room pt had a run of [address]. Pt is asymptomatic. Will continue to monitor.

## 2018-01-01 NOTE — PROGRESS NOTES
Bedside and Verbal shift change report given to Wei Li (oncoming nurse) by self (offgoing nurse). Report included the following information SBAR, Kardex, MAR and Recent Results.

## 2018-01-02 LAB
ALBUMIN SERPL-MCNC: 3 G/DL (ref 3.2–4.6)
ALBUMIN/GLOB SERPL: 0.7 {RATIO} (ref 1.2–3.5)
ALP SERPL-CCNC: 102 U/L (ref 50–136)
ALT SERPL-CCNC: 24 U/L (ref 12–65)
ANION GAP SERPL CALC-SCNC: 7 MMOL/L (ref 7–16)
AST SERPL-CCNC: 21 U/L (ref 15–37)
BILIRUB DIRECT SERPL-MCNC: 0.2 MG/DL
BILIRUB SERPL-MCNC: 0.7 MG/DL (ref 0.2–1.1)
BNP SERPL-MCNC: 443 PG/ML
BUN SERPL-MCNC: 38 MG/DL (ref 8–23)
CALCIUM SERPL-MCNC: 7.6 MG/DL (ref 8.3–10.4)
CHLORIDE SERPL-SCNC: 104 MMOL/L (ref 98–107)
CO2 SERPL-SCNC: 31 MMOL/L (ref 21–32)
CREAT SERPL-MCNC: 1.88 MG/DL (ref 0.8–1.5)
ERYTHROCYTE [SEDIMENTATION RATE] IN BLOOD: 66 MM/HR (ref 0–20)
GLOBULIN SER CALC-MCNC: 4.3 G/DL (ref 2.3–3.5)
GLUCOSE SERPL-MCNC: 109 MG/DL (ref 65–100)
MAGNESIUM SERPL-MCNC: 2.3 MG/DL (ref 1.8–2.4)
POTASSIUM SERPL-SCNC: 3.5 MMOL/L (ref 3.5–5.1)
PROT SERPL-MCNC: 7.3 G/DL (ref 6.3–8.2)
SODIUM SERPL-SCNC: 142 MMOL/L (ref 136–145)

## 2018-01-02 PROCEDURE — 94760 N-INVAS EAR/PLS OXIMETRY 1: CPT

## 2018-01-02 PROCEDURE — 94660 CPAP INITIATION&MGMT: CPT

## 2018-01-02 PROCEDURE — 80076 HEPATIC FUNCTION PANEL: CPT | Performed by: INTERNAL MEDICINE

## 2018-01-02 PROCEDURE — 65660000000 HC RM CCU STEPDOWN

## 2018-01-02 PROCEDURE — 94640 AIRWAY INHALATION TREATMENT: CPT

## 2018-01-02 PROCEDURE — 74011250636 HC RX REV CODE- 250/636

## 2018-01-02 PROCEDURE — 80048 BASIC METABOLIC PNL TOTAL CA: CPT | Performed by: PHYSICIAN ASSISTANT

## 2018-01-02 PROCEDURE — 99232 SBSQ HOSP IP/OBS MODERATE 35: CPT | Performed by: INTERNAL MEDICINE

## 2018-01-02 PROCEDURE — 74011250637 HC RX REV CODE- 250/637: Performed by: PHYSICIAN ASSISTANT

## 2018-01-02 PROCEDURE — 36415 COLL VENOUS BLD VENIPUNCTURE: CPT | Performed by: PHYSICIAN ASSISTANT

## 2018-01-02 PROCEDURE — 86334 IMMUNOFIX E-PHORESIS SERUM: CPT | Performed by: INTERNAL MEDICINE

## 2018-01-02 PROCEDURE — C1894 INTRO/SHEATH, NON-LASER: HCPCS

## 2018-01-02 PROCEDURE — 74011250637 HC RX REV CODE- 250/637: Performed by: INTERNAL MEDICINE

## 2018-01-02 PROCEDURE — 83880 ASSAY OF NATRIURETIC PEPTIDE: CPT | Performed by: INTERNAL MEDICINE

## 2018-01-02 PROCEDURE — 93458 L HRT ARTERY/VENTRICLE ANGIO: CPT

## 2018-01-02 PROCEDURE — 77030004534 HC CATH ANGI DX INFN CARD -A

## 2018-01-02 PROCEDURE — 74011000250 HC RX REV CODE- 250: Performed by: INTERNAL MEDICINE

## 2018-01-02 PROCEDURE — 83735 ASSAY OF MAGNESIUM: CPT | Performed by: PHYSICIAN ASSISTANT

## 2018-01-02 PROCEDURE — 85652 RBC SED RATE AUTOMATED: CPT | Performed by: INTERNAL MEDICINE

## 2018-01-02 PROCEDURE — 74011250636 HC RX REV CODE- 250/636: Performed by: INTERNAL MEDICINE

## 2018-01-02 PROCEDURE — 77030019569 HC BND COMPR RAD TERU -B

## 2018-01-02 PROCEDURE — 99218 HC RM OBSERVATION: CPT

## 2018-01-02 PROCEDURE — 74011000250 HC RX REV CODE- 250: Performed by: NURSE PRACTITIONER

## 2018-01-02 PROCEDURE — 74011636320 HC RX REV CODE- 636/320: Performed by: INTERNAL MEDICINE

## 2018-01-02 PROCEDURE — C1769 GUIDE WIRE: HCPCS

## 2018-01-02 PROCEDURE — 84165 PROTEIN E-PHORESIS SERUM: CPT | Performed by: INTERNAL MEDICINE

## 2018-01-02 RX ORDER — ONDANSETRON 2 MG/ML
2-4 INJECTION INTRAMUSCULAR; INTRAVENOUS AS NEEDED
Status: DISCONTINUED | OUTPATIENT
Start: 2018-01-02 | End: 2018-01-02 | Stop reason: HOSPADM

## 2018-01-02 RX ORDER — FENTANYL CITRATE 50 UG/ML
25-200 INJECTION, SOLUTION INTRAMUSCULAR; INTRAVENOUS
Status: DISCONTINUED | OUTPATIENT
Start: 2018-01-02 | End: 2018-01-02 | Stop reason: HOSPADM

## 2018-01-02 RX ORDER — LIDOCAINE HYDROCHLORIDE 20 MG/ML
1-40 INJECTION, SOLUTION INFILTRATION; PERINEURAL
Status: DISCONTINUED | OUTPATIENT
Start: 2018-01-02 | End: 2018-01-02 | Stop reason: HOSPADM

## 2018-01-02 RX ORDER — GUAIFENESIN 100 MG/5ML
81-324 LIQUID (ML) ORAL ONCE
Status: COMPLETED | OUTPATIENT
Start: 2018-01-02 | End: 2018-01-02

## 2018-01-02 RX ORDER — ENOXAPARIN SODIUM 100 MG/ML
40 INJECTION SUBCUTANEOUS EVERY 24 HOURS
Status: DISCONTINUED | OUTPATIENT
Start: 2018-01-02 | End: 2018-01-12 | Stop reason: HOSPADM

## 2018-01-02 RX ORDER — FUROSEMIDE 40 MG/1
40 TABLET ORAL 2 TIMES DAILY
Status: DISCONTINUED | OUTPATIENT
Start: 2018-01-02 | End: 2018-01-06

## 2018-01-02 RX ORDER — HEPARIN SODIUM 200 [USP'U]/100ML
3 INJECTION, SOLUTION INTRAVENOUS CONTINUOUS
Status: DISCONTINUED | OUTPATIENT
Start: 2018-01-02 | End: 2018-01-12 | Stop reason: HOSPADM

## 2018-01-02 RX ORDER — IPRATROPIUM BROMIDE AND ALBUTEROL SULFATE 2.5; .5 MG/3ML; MG/3ML
3 SOLUTION RESPIRATORY (INHALATION)
Status: DISCONTINUED | OUTPATIENT
Start: 2018-01-02 | End: 2018-01-12 | Stop reason: HOSPADM

## 2018-01-02 RX ADMIN — CARVEDILOL 25 MG: 25 TABLET, FILM COATED ORAL at 10:36

## 2018-01-02 RX ADMIN — CARVEDILOL 25 MG: 25 TABLET, FILM COATED ORAL at 17:19

## 2018-01-02 RX ADMIN — FUROSEMIDE 40 MG: 40 TABLET ORAL at 10:00

## 2018-01-02 RX ADMIN — HEPARIN 3 ML/HR: 100 SYRINGE at 08:34

## 2018-01-02 RX ADMIN — PANTOPRAZOLE SODIUM 40 MG: 40 TABLET, DELAYED RELEASE ORAL at 05:35

## 2018-01-02 RX ADMIN — ISOSORBIDE MONONITRATE 30 MG: 30 TABLET, EXTENDED RELEASE ORAL at 05:35

## 2018-01-02 RX ADMIN — FUROSEMIDE 40 MG: 40 TABLET ORAL at 17:19

## 2018-01-02 RX ADMIN — OXYCODONE HYDROCHLORIDE 5 MG: 5 TABLET ORAL at 15:21

## 2018-01-02 RX ADMIN — BUDESONIDE 500 MCG: 0.5 INHALANT RESPIRATORY (INHALATION) at 21:01

## 2018-01-02 RX ADMIN — BUDESONIDE 500 MCG: 0.5 INHALANT RESPIRATORY (INHALATION) at 07:45

## 2018-01-02 RX ADMIN — HEPARIN SODIUM 2 ML: 10000 INJECTION, SOLUTION INTRAVENOUS; SUBCUTANEOUS at 08:35

## 2018-01-02 RX ADMIN — ONDANSETRON 4 MG: 2 INJECTION INTRAMUSCULAR; INTRAVENOUS at 09:08

## 2018-01-02 RX ADMIN — IOPAMIDOL 35 ML: 755 INJECTION, SOLUTION INTRAVENOUS at 09:08

## 2018-01-02 RX ADMIN — ISOSORBIDE MONONITRATE 30 MG: 30 TABLET, EXTENDED RELEASE ORAL at 10:35

## 2018-01-02 RX ADMIN — PANTOPRAZOLE SODIUM 40 MG: 40 TABLET, DELAYED RELEASE ORAL at 17:19

## 2018-01-02 RX ADMIN — GUAIFENESIN 1200 MG: 600 TABLET, EXTENDED RELEASE ORAL at 10:35

## 2018-01-02 RX ADMIN — IPRATROPIUM BROMIDE AND ALBUTEROL SULFATE 3 ML: .5; 3 SOLUTION RESPIRATORY (INHALATION) at 21:01

## 2018-01-02 RX ADMIN — ALLOPURINOL 100 MG: 100 TABLET ORAL at 10:36

## 2018-01-02 RX ADMIN — HYDRALAZINE HYDROCHLORIDE 25 MG: 25 TABLET, FILM COATED ORAL at 10:35

## 2018-01-02 RX ADMIN — ASPIRIN 81 MG CHEWABLE TABLET 81 MG: 81 TABLET CHEWABLE at 08:34

## 2018-01-02 RX ADMIN — IPRATROPIUM BROMIDE AND ALBUTEROL SULFATE 3 ML: .5; 3 SOLUTION RESPIRATORY (INHALATION) at 07:45

## 2018-01-02 RX ADMIN — LIDOCAINE HYDROCHLORIDE 100 MG: 20 INJECTION, SOLUTION INFILTRATION; PERINEURAL at 08:34

## 2018-01-02 RX ADMIN — ATORVASTATIN CALCIUM 20 MG: 10 TABLET, FILM COATED ORAL at 21:46

## 2018-01-02 RX ADMIN — ENOXAPARIN SODIUM 40 MG: 40 INJECTION SUBCUTANEOUS at 10:00

## 2018-01-02 RX ADMIN — Medication 5 ML: at 21:46

## 2018-01-02 RX ADMIN — GUAIFENESIN 1200 MG: 600 TABLET, EXTENDED RELEASE ORAL at 21:46

## 2018-01-02 NOTE — PROGRESS NOTES
Bre Mustafa  Admission Date: 12/26/2017             Daily Progress Note: 1/2/2018    The patient's chart is reviewed and the patient is discussed with the staff. 73 yo male with a history of TAZ not compliant with CPAP, COPD (cPFTs from 7/20/15 show restrictive and no obstructive lung disease), CKD, HTN, HL, chronic sCHF (EF 30-35%), and CAD. Presented with chest pain and sob. BNP elevated at 1688 and he was admitted by Cardiology. CXR without infiltrate and not hypoxic. We were consulted for increased wheezing.   He was started on auto-set CPAP while hospitalized    Subjective:     Remains 96% on room air  Creatinine 1.88    Current Facility-Administered Medications   Medication Dose Route Frequency    heparin (PF) 2 units/ml in NS infusion  3 mL/hr IntraVENous CONTINUOUS    furosemide (LASIX) tablet 40 mg  40 mg Oral BID    enoxaparin (LOVENOX) injection 40 mg  40 mg SubCUTAneous Q24H    albuterol-ipratropium (DUO-NEB) 2.5 MG-0.5 MG/3 ML  3 mL Nebulization BID RT    budesonide (PULMICORT) 500 mcg/2 ml nebulizer suspension  500 mcg Nebulization BID RT    guaiFENesin ER (MUCINEX) tablet 1,200 mg  1,200 mg Oral Q12H    pantoprazole (PROTONIX) tablet 40 mg  40 mg Oral ACB&D    hydrALAZINE (APRESOLINE) tablet 25 mg  25 mg Oral BID    albuterol (PROVENTIL HFA, VENTOLIN HFA, PROAIR HFA) inhaler 2 Puff  2 Puff Inhalation QID PRN    allopurinol (ZYLOPRIM) tablet 100 mg (Patient Supplied)  100 mg Oral DAILY    aspirin delayed-release tablet 81 mg (Patient Supplied)  81 mg Oral DAILY    atorvastatin (LIPITOR) tablet 20 mg  20 mg Oral QHS    carvedilol (COREG) tablet 25 mg (Patient Supplied)  25 mg Oral BID WITH MEALS    isosorbide mononitrate ER (IMDUR) tablet 30 mg  30 mg Oral 7am    oxyCODONE IR (ROXICODONE) tablet 5 mg  5 mg Oral Q4H PRN    sodium chloride (NS) flush 5-10 mL  5-10 mL IntraVENous PRN    nitroglycerin (NITROSTAT) tablet 0.4 mg  0.4 mg SubLINGual Q5MIN PRN    acetaminophen (TYLENOL) tablet 650 mg  650 mg Oral Q4H PRN    promethazine (PHENERGAN) tablet 25 mg  25 mg Oral Q6H PRN    influenza vaccine 2017-18 (3 yrs+)(PF) (FLUZONE QUAD/FLUARIX QUAD) injection 0.5 mL  0.5 mL IntraMUSCular PRIOR TO DISCHARGE       Review of Systems  Constitutional: negative for fever, chills, sweats  Cardiovascular: negative for chest pain, palpitations, syncope, edema  Gastrointestinal:  negative for dysphagia, reflux, vomiting, diarrhea, abdominal pain, or melena  Neurologic:  negative for focal weakness, numbness, headache    Objective:     Vitals:    01/02/18 0410 01/02/18 0747 01/02/18 0910 01/02/18 1032   BP: 125/66  109/54 118/60   Pulse: 66  (!) 56 62   Resp: 20  20 18   Temp: 98 °F (36.7 °C)   97.8 °F (36.6 °C)   SpO2: 93% 96% 96% 96%   Weight: 201 lb 12.8 oz (91.5 kg)        Intake and Output:   12/31 1901 - 01/02 0700  In: 600 [P.O.:600]  Out: 5475 [Urine:5475]       Physical Exam:   Constitution:  the patient is well developed and in no acute distress  EENMT:  Sclera clear, pupils equal, oral mucosa moist  Respiratory:CTA with no wheezing , RA  Cardiovascular:  RRR without M,G,R  Gastrointestinal: soft and non-tender; with positive bowel sounds. Musculoskeletal: warm without cyanosis. There is no lower leg edema. Skin:  no jaundice or rashes, no open wounds   Neurologic: no gross neuro deficits     Psychiatric:  alert and oriented x 3    CXR:   12/26          LAB   No results for input(s): WBC, HGB, HCT, PLT, INR, HGBEXT, HCTEXT, PLTEXT, HGBEXT, HCTEXT, PLTEXT in the last 72 hours. No lab exists for component: Ashlyn Quinones  Recent Labs      01/02/18   0550  01/01/18   0516  12/31/17   0515   NA  142  143  140   K  3.5  3.7  4.0   CL  104  103  106   CO2  31  32  24   GLU  109*  106*  138*   BUN  38*  36*  32*   CREA  1.88*  1.81*  1.67*   MG  2.3  2.4  2.2   CA  7.6*  9.1  8.9     No results for input(s): PH, PCO2, PO2, HCO3 in the last 72 hours.   No results for input(s): LCAD, LAC in the last 72 hours. Assessment:  (Medical Decision Making)     Patient Active Problem List   Diagnosis Code    CKD (chronic kidney disease) stage 3, GFR 30-59 ml/min N18.3    Dyspnea on exertion R06.09    Tobacco abuse Z72.0    Chronic systolic heart failure (HCC) I50.22    Coronary atherosclerosis of native coronary vessel I25.10    Arthritis M19.90    Hypertension I10    COPD, moderate (HCC) J44.9    High triglycerides E78.1    Gastroesophageal reflux disease without esophagitis K21.9    Mixed hyperlipidemia E78.2    Essential hypertension I10    CHF (congestive heart failure) ([facility]) I50.9    Vomiting R11.10    NINO (acute kidney injury) ([facility] N17.9    TAZ (obstructive sleep apnea) G47.33    Atherosclerosis of native coronary artery of native heart with stable angina pectoris ([facility]) I25.118    Dyspnea R06.00   Pulmonary edema: s/p diuretics, improved. Plan:  (Medical Decision Making)     Hospital Problems  Date Reviewed: 12/29/2017          Codes Class Noted POA    * (Principal)Dyspnea ICD-10-CM: R06.00  ICD-9-CM: 786.09  12/26/2017 Unknown    Improved s/p diuretics      TAZ (obstructive sleep apnea) ICD-10-CM: G47.33  ICD-9-CM: 327.23  10/2/2017 Yes    CPAP at bedside- needs a new power cord      CKD (chronic kidney disease) stage 3, GFR 30-59 ml/min (Chronic) ICD-10-CM: N18.3  ICD-9-CM: 585.3  9/29/2017 Yes    Creat 1.88      Coronary atherosclerosis of native coronary vessel (Chronic) ICD-10-CM: I25.10  ICD-9-CM: 414.01  9/29/2017 Yes        Hypertension (Chronic) ICD-10-CM: I10  ICD-9-CM: 401.9  9/29/2017 Yes        COPD, moderate (HCC) (Chronic) ICD-10-CM: J44.9  ICD-9-CM: 496  9/29/2017 Yes     Complete PFTs: 7/20/15:  Spirometry is consistent with a moderate obstructive/restrictive defect. . The residual volume is increased relative to other lung volumes suggesting air-trapping.  The diffusion capacity corrected for alveolar volume was normal suggesting no loss of alveolo-capillary units. + wheezing - see below  Needs outpatient follow up. Unclear if he would be able to afford medications other than albuterol as an outpatient         Chronic systolic heart failure (HCC) (Chronic) ICD-10-CM: I50.22  ICD-9-CM: 428.22  12/4/2015 Yes     EF 40%  BNP 1688 on admission  Diuresis is limited by renal function            --diuretics per cardiology; respiratory status is stable  --ambulating oximetry today. --continue bronchodilators on a prn basis as he does at home  --will work with patient on CPAP equipment, and his [employer] agreed to work on this as well. --[facility] will be available as needed. More than 50% of the time documented was spent in face-to-face contact with the patient and in the care of the patient on the floor/unit where the patient is located.       Caro Sarmiento MD

## 2018-01-02 NOTE — PROCEDURES
Brief Cardiac Procedure Note    Patient: Rema Luna MRN: [id number]  SSN: [id number]    YOB: 1945  Age: 74 y.o. Sex: male      Date of Procedure: 1/2/2018     Pre-procedure Diagnosis: Congestive Heart Failure    Post-procedure Diagnosis: Congestive Heart Failure    Procedure: coronary angio    Brief Description of Procedure: via rra    Performed By: Alonso Jenkins MD     Assistants:     Anesthesia: Moderate Sedation    Estimated Blood Loss: Less than 10 mL      Specimens: None    Implants: None    Findings:   lvedp 20 mmHg  Lm ok  Lad 50% prox  lcx ok  rca ok      Complications: None    Recommendations: Continue medical therapy.     Signed By: Alonso Jnekins MD     January 2, 2018

## 2018-01-02 NOTE — PROGRESS NOTES
Problem: Gas Exchange - Impaired  Goal: *Absence of hypoxia  Outcome: Resolved/Met Date Met: 01/02/18  No oxygen required.

## 2018-01-02 NOTE — PROGRESS NOTES
Verbal bedside report given to Clara Jennings oncoming RN. Patient's situation, background, assessment and recommendations provided. Opportunity for questions provided. Oncoming RN assumed care of patient.

## 2018-01-02 NOTE — PROGRESS NOTES
[facility] CARDIOLOGY PROGRESS NOTE           1/2/2018 7:43 AM    Admit Date: 12/26/2017      Subjective:   No cp or inc sob    ROS:  Cardiovascular:  As noted above    Objective:      Vitals:    01/01/18 2056 01/02/18 0034 01/02/18 0123 01/02/18 0410   BP: 132/73  100/67 125/66   Pulse: 73  65 66   Resp: 20  20 20   Temp: 98 °F (36.7 °C)  98 °F (36.7 °C) 98 °F (36.7 °C)   SpO2: 97% 94% 97% 93%   Weight:    91.5 kg (201 lb 12.8 oz)       Physical Exam:  General-No Acute Distress  Neck- supple, no JVD  CV- regular rate and rhythm no MRG  Lung- + wheezing w/ expiration  Abd- soft, nontender, nondistended  Ext- no edema bilaterally. Skin- warm and dry    Data Review:   Recent Labs      01/02/18   0550  01/01/18   0516   NA  142  143   K  3.5  3.7   MG  2.3  2.4   BUN  38*  36*   CREA  1.88*  1.81*   GLU  109*  106*       Assessment/Plan:     Principal Problem:    Dyspnea (12/26/2017)        Active Problems:    CKD (chronic kidney disease) stage 3, GFR 30-59 ml/min (9/29/2017)          Chronic systolic heart failure ([address] (12/4/2015)          Coronary atherosclerosis of native coronary vessel (9/29/2017)          Hypertension (9/29/2017)          COPD, moderate ([address] (9/29/2017)          TAZ (obstructive sleep apnea) (10/2/2017)      ///  With his new HFrEF, a heart cath is indicated. Pt. Understands and has consented.      Add Lasix, Lovenox 40    Check sed rate, lft's and spep    Ask renal to review          Thang Miguel MD  1/2/2018 7:43 AM

## 2018-01-02 NOTE — PROCEDURES
[facility] [address]    [facility]  MR[facility] [id number]  : 1945  ACCOUNT #: [de-identified]   DATE OF SERVICE: 2018    PROCEDURES PERFORMED:  1. Coronary angiography. 2.  Measurement of left ventricular pressure. HISTORY:  This is a 68-year-old gentleman who has systolic heart failure. He has had a new decline in left ventricular function. He has a prior history of coronary artery disease. A repeat coronary angiography is recommended. He has chronic kidney disease, so a left ventriculogram will not be performed. PROCEDURE:  Coronary angiography was performed via the right radial artery with a 5-Romansh multipurpose catheter. This catheter was then used to measure left ventricular end diastolic pressure. He tolerated the procedure well. FINDINGS:  The left ventricular end diastolic pressure is 20 mmHg. There is no gradient on pullback across the aortic valve. CORONARY ANGIOGRAPHY:  Reveals:    1. Normal left main. It divides into LAD and left circumflex. 2.  The LAD has a smooth, eccentric 50% proximal segment stenosis. The middle and distal LAD segments look normal.  3.  Left circumflex is normal.  4.  The right coronary artery has minor atherosclerotic irregularity. IMPRESSION:  1. Elevated left ventricular end diastolic pressure. 2.  Mild coronary artery disease that is unchanged angiographically from his last heart catheterization in . RECOMMENDATIONS:  Continue medical therapy.       MD KAYLA Blake / Wei Thompson  D: 2018 09:11     T: 2018 09:33  JOB #: [id number]

## 2018-01-02 NOTE — PROGRESS NOTES
TRANSFER - OUT REPORT:    Verbal report given to Jeff Franco RN(name) on Viri Waters  being transferred to [facility] with Issac Sinclair RN(unit) for routine progression of care       Report consisted of patients Situation, Background, Assessment and   Recommendations(SBAR). Information from the following report(s) SBAR and Procedure Summary was reviewed with the receiving nurse. Opportunity for questions and clarification was provided. Procedure: [facility]   Finding Summary: diagnostic(cath/pci/pacer settings)  Location: RRA    Closure Device: R band with 10 ml of air at 0911(yes/no/description)  Post Site Assessment: no oozing or hematoma, verified with Shirley Haddad     Pre Procedure Meds:(if any)    ASA: 81 mg  When Received:  9775  Antiplatelet: na    Intra Procedure Meds:    Heparin: 2000 units  4 mg Zofran IVP             Peripheral IV 12/26/17 Left Antecubital (Active)   Site Assessment Clean, dry, & intact 1/1/2018  8:15 PM   Phlebitis Assessment 0 1/1/2018  8:15 PM   Infiltration Assessment 0 1/1/2018  8:15 PM   Dressing Status Clean, dry, & intact 1/1/2018  8:15 PM   Dressing Type Tape;Transparent 1/1/2018  8:15 PM   Hub Color/Line Status Patent; Flushed 1/1/2018  8:15 PM   Action Taken Blood drawn 12/26/2017 12:43 PM   Alcohol Cap Used No 1/1/2018  8:15 PM        Post-Procedure Site Assessment (1)  Wound Type: Catheter entry/exit  Location: Radial  Orientation : Right  Closure Device:  (R band with 10 ml of air at 0911)  Site Assessment: Dry/intact                       is allergic to pcn [penicillins].     Past Medical History:   Diagnosis Date    Acute CHF (congestive heart failure) ([address] 4/25/2015    Acute combined systolic and diastolic congestive heart failure ([address] 4/28/2015    Chronic obstructive pulmonary disease (HCC)     De Quervain's tenosynovitis, right 4/28/2015    Dyspnea on exertion 6/28/2015    Elevated serum creatinine 4/25/2015    Elevated troponin 6/28/2015    History of coronary artery disease     MI at 25 yo    History of right knee surgery     cartilage removal    History of shingles     Malignant hypertension 4/25/2015    MI (myocardial infarction)      Visit Vitals    /54    Pulse (!) 56    Temp 98 °F (36.7 °C)    Resp 20    Wt 91.5 kg (201 lb 12.8 oz)    SpO2 96%    BMI 29.8 kg/m2

## 2018-01-02 NOTE — PROGRESS NOTES
Bedside and Verbal shift change report given to self (oncoming nurse) by Dagmar Sever, RN (offgoing nurse). Report included the following information SBAR, Kardex, MAR and Recent Results.

## 2018-01-02 NOTE — PROGRESS NOTES
Bedside and Verbal shift change report given to July Michel (oncoming nurse) by self (offgoing nurse). Report included the following information SBAR, Kardex, MAR and Recent Results.

## 2018-01-02 NOTE — PROGRESS NOTES
Physical Therapy Note:    PT screen received, chart reviewed. Per chart patient is experiencing decline in functional status from baseline. Patient would benefit from skilled acute therapy to increase strength, endurance, and functional mobility. Recommend PT consult when medically stable and MD agrees.     Thank you for your consideration,  Cece Nam DPT

## 2018-01-02 NOTE — PROGRESS NOTES
Oxygen Qualifier       Room air: SpO2 with O2 and liter flow   Resting SpO2  96%  no oxygen required   Ambulating SpO2  90%  no oxygen required     Patient ambulates with walker 25 yards, had to sit down and rest, oxygen saturation 89-92 while walking. Patient has requested PT to help with reconditioning.     Completed by:    Kia Wallace

## 2018-01-02 NOTE — PROGRESS NOTES
Nephrology consult    Admission Date:  12/26/2017    Admission Diagnosis  Dyspnea  Dyspnea    We are asked by     History of Present Illness:this is a pt with known CHF and CKD stage III. He has had worsening SOB, Orthopnea, CLAUDIO for the last several weeks. He feels that he is drowning. He has been watching his salt intake and limiting his fluid intake since his discharge October 2017 for CHF. His B/l creatinine 1.5-1.8 mg/dl. He has had a cardiac cath which is negative for significant CAD. He has TAZ which prior to this hospital he was not using. He also has restrictive lung disease. His renal function is now 1.8 and I have been consulted. Pt states he is still orthopneic.      Past Medical History:   Diagnosis Date    Acute CHF (congestive heart failure) ([facility] 4/25/2015    Acute combined systolic and diastolic congestive heart failure ([facility] 4/28/2015    Chronic obstructive pulmonary disease (HCC)     De Quervain's tenosynovitis, right 4/28/2015    Dyspnea on exertion 6/28/2015    Elevated serum creatinine 4/25/2015    Elevated troponin 6/28/2015    History of coronary artery disease     MI at 27 yo    History of right knee surgery     cartilage removal    History of shingles     Malignant hypertension 4/25/2015    MI (myocardial infarction)       Past Surgical History:   Procedure Laterality Date    HX HEART CATHETERIZATION  6/29/2015    no intervention    HX KNEE ARTHROSCOPY Right     removal of cartilage      Current Facility-Administered Medications   Medication Dose Route Frequency    heparin (PF) 2 units/ml in NS infusion  3 mL/hr IntraVENous CONTINUOUS    furosemide (LASIX) tablet 40 mg  40 mg Oral BID    enoxaparin (LOVENOX) injection 40 mg  40 mg SubCUTAneous Q24H    albuterol-ipratropium (DUO-NEB) 2.5 MG-0.5 MG/3 ML  3 mL Nebulization Q6H PRN    budesonide (PULMICORT) 500 mcg/2 ml nebulizer suspension  500 mcg Nebulization BID RT    guaiFENesin ER (MUCINEX) tablet 1,200 mg  1,200 mg Oral Q12H    pantoprazole (PROTONIX) tablet 40 mg  40 mg Oral ACB&D    hydrALAZINE (APRESOLINE) tablet 25 mg  25 mg Oral BID    albuterol (PROVENTIL HFA, VENTOLIN HFA, PROAIR HFA) inhaler 2 Puff  2 Puff Inhalation QID PRN    allopurinol (ZYLOPRIM) tablet 100 mg (Patient Supplied)  100 mg Oral DAILY    aspirin delayed-release tablet 81 mg (Patient Supplied)  81 mg Oral DAILY    atorvastatin (LIPITOR) tablet 20 mg  20 mg Oral QHS    carvedilol (COREG) tablet 25 mg (Patient Supplied)  25 mg Oral BID WITH MEALS    isosorbide mononitrate ER (IMDUR) tablet 30 mg  30 mg Oral 7am    oxyCODONE IR (ROXICODONE) tablet 5 mg  5 mg Oral Q4H PRN    sodium chloride (NS) flush 5-10 mL  5-10 mL IntraVENous PRN    nitroglycerin (NITROSTAT) tablet 0.4 mg  0.4 mg SubLINGual Q5MIN PRN    acetaminophen (TYLENOL) tablet 650 mg  650 mg Oral Q4H PRN    promethazine (PHENERGAN) tablet 25 mg  25 mg Oral Q6H PRN    influenza vaccine 2017-18 (3 yrs+)(PF) (FLUZONE QUAD/FLUARIX QUAD) injection 0.5 mL  0.5 mL IntraMUSCular PRIOR TO DISCHARGE     Allergies   Allergen Reactions    Pcn [Penicillins] Other (comments)     \"makes my heart stop\"      Social History   Substance Use Topics    Smoking status: Former Smoker     Packs/day: 0.25     Years: 20.00     Types: Cigarettes     Quit date: 4/5/2015    Smokeless tobacco: Never Used    Alcohol use No      Family History   Problem Relation Age of Onset    Hypertension Mother     No Known Problems Father         Review of Systems  Gen - no fever, no chills, appetite okay  HEENT - no sore throat, no decreased vision, no hearing loss  Neck - no neck mass  CV -  chest pain, no palpitation,  orthopnea  Lung -  shortness of breath, no cough, no hemoptysis  Abd - no tenderness, no nausea/vomiting, no bloody stool  Ext - no edema, no clubbing, no cyanosis  Musculoskeletal - no joint pain, no back pain  Neurologic - no headaches, no dizziness, no seizures  Psychiatric - no anxiety, no depression  Skin - no rashes, no pupura  Genitourinary - no decreased urine output, no hematuria, no foamy urine    Objective:     Vitals:    01/02/18 0747 01/02/18 0910 01/02/18 1032 01/02/18 1246   BP:  109/54 118/60 97/59   Pulse:  (!) 56 62 69   Resp:  20 18 19   Temp:   97.8 °F (36.6 °C) 97.6 °F (36.4 °C)   SpO2: 96% 96% 96% 97%   Weight:           Intake/Output Summary (Last 24 hours) at 01/02/18 1527  Last data filed at 01/01/18 2056   Gross per 24 hour   Intake              360 ml   Output             1600 ml   Net            -1240 ml       Physical Exam  GEN :in no distress, alert and oriented  HEENT: anicteric sclerae, eomi. Oropharynx without lesions. Mucous membranes are moist.  Neck - supple without JVD, no thyromegaly. No lymphadenopathy. CV - regular rate and rhythm, no murmur, no rub  Lung -  Bilaterally decreased breath sounds, lungs expand symmetrically  Chest wall - normal appearance  Abd - soft, nontender, bowel sounds present, no hepatosplenomegaly  Ext - no clubbing, no cyanosis, no edema  Neurologic - nonfocal  Genitourinary - bladder nonpalpable  Skin - no rashes, no purpura, no ecchymoses  Psychiatric: Normal mood and affect. Data Review:   No results for input(s): WBC, HGB, HCT, PLT, HGBEXT, HCTEXT, PLTEXT in the last 72 hours. Recent Labs      01/02/18   0550  01/01/18   0516  12/31/17   0515   NA  142  143  140   K  3.5  3.7  4.0   CL  104  103  106   CO2  31  32  24   BUN  38*  36*  32*   CREA  1.88*  1.81*  1.67*   GLU  109*  106*  138*   CA  7.6*  9.1  8.9   MG  2.3  2.4  2.2     No results for input(s): PH, PCO2, PO2, PCO2 in the last 72 hours.     Problem List:     Patient Active Problem List    Diagnosis Date Noted    Dyspnea 12/26/2017    Atherosclerosis of native coronary artery of native heart with stable angina pectoris ([facility] [address] 10/13/2017    TAZ (obstructive sleep apnea) 10/02/2017    CKD (chronic kidney disease) stage 3, GFR 30-59 ml/min 09/29/2017    Coronary atherosclerosis of native coronary vessel 09/29/2017    Hypertension 09/29/2017    COPD, moderate ([facility] [address] 09/29/2017    High triglycerides 09/29/2017    Gastroesophageal reflux disease without esophagitis 09/29/2017    Vomiting 09/29/2017    [facility] 09/29/2017    CHF [facility]congest[facility] failure) [facility] 09/27/2017    Mixed hyperlipidemia 09/28/2016    Essential hypertension 09/28/2016    Arthritis     Chronic systolic heart failure ([facility] 12/04/2015    Tobacco abuse 07/02/2015    Dyspnea on exertion 06/28/2015       Impression:    Plan:   1. CHF   2. NINO on CKD stage III s/p contrast from cardiac cath  3. Pulmonary edema  4. TAZ  5. Restrictive lung disease  Overall pt educated on need to adhere to recommendations of low salt, fluid restrictions, diuretics and CPAP. He probably would benefit from cardiac rehab, defer this decision to Cardiology team.  Continue current care, I agree with it. I Thank you for allowing me to evaluate this pt.

## 2018-01-03 LAB
ALBUMIN SERPL ELPH-MCNC: 3.47 G/DL (ref 3.2–5.6)
ALBUMIN/GLOB SERPL: 1 {RATIO}
ALPHA1 GLOB SERPL ELPH-MCNC: 0.25 G/DL (ref 0.1–0.4)
ALPHA2 GLOB SERPL ELPH-MCNC: 0.91 G/DL (ref 0.4–1.2)
ANION GAP SERPL CALC-SCNC: 5 MMOL/L (ref 7–16)
B-GLOBULIN SERPL QL ELPH: 1.04 G/DL (ref 0.6–1.3)
BUN SERPL-MCNC: 36 MG/DL (ref 8–23)
CALCIUM SERPL-MCNC: 8.4 MG/DL (ref 8.3–10.4)
CHLORIDE SERPL-SCNC: 105 MMOL/L (ref 98–107)
CO2 SERPL-SCNC: 30 MMOL/L (ref 21–32)
CREAT SERPL-MCNC: 1.73 MG/DL (ref 0.8–1.5)
GAMMA GLOB MFR SERPL ELPH: 1.14 G/DL (ref 0.5–1.6)
GLUCOSE SERPL-MCNC: 110 MG/DL (ref 65–100)
IGA SERPL-MCNC: 384 MG/DL (ref 85–499)
IGG SERPL-MCNC: 1241 MG/DL (ref 610–1616)
IGM SERPL-MCNC: 60 MG/DL (ref 35–242)
M PROTEIN SERPL ELPH-MCNC: NORMAL G/DL
POTASSIUM SERPL-SCNC: 3.5 MMOL/L (ref 3.5–5.1)
PROT PATTERN SERPL ELPH-IMP: NORMAL
PROT PATTERN SPEC IFE-IMP: NORMAL
PROT SERPL-MCNC: 6.8 G/DL (ref 6.3–8.2)
SODIUM SERPL-SCNC: 140 MMOL/L (ref 136–145)

## 2018-01-03 PROCEDURE — 74011000250 HC RX REV CODE- 250: Performed by: NURSE PRACTITIONER

## 2018-01-03 PROCEDURE — 36415 COLL VENOUS BLD VENIPUNCTURE: CPT | Performed by: NURSE PRACTITIONER

## 2018-01-03 PROCEDURE — 94760 N-INVAS EAR/PLS OXIMETRY 1: CPT

## 2018-01-03 PROCEDURE — 74011250637 HC RX REV CODE- 250/637: Performed by: INTERNAL MEDICINE

## 2018-01-03 PROCEDURE — 84156 ASSAY OF PROTEIN URINE: CPT | Performed by: NURSE PRACTITIONER

## 2018-01-03 PROCEDURE — 80048 BASIC METABOLIC PNL TOTAL CA: CPT | Performed by: NURSE PRACTITIONER

## 2018-01-03 PROCEDURE — 74011250637 HC RX REV CODE- 250/637: Performed by: PHYSICIAN ASSISTANT

## 2018-01-03 PROCEDURE — 65660000000 HC RM CCU STEPDOWN

## 2018-01-03 PROCEDURE — 74011250636 HC RX REV CODE- 250/636: Performed by: INTERNAL MEDICINE

## 2018-01-03 PROCEDURE — 94640 AIRWAY INHALATION TREATMENT: CPT

## 2018-01-03 PROCEDURE — 74011000250 HC RX REV CODE- 250: Performed by: INTERNAL MEDICINE

## 2018-01-03 PROCEDURE — 4A023N7 MEASUREMENT OF CARDIAC SAMPLING AND PRESSURE, LEFT HEART, PERCUTANEOUS APPROACH: ICD-10-PCS | Performed by: INTERNAL MEDICINE

## 2018-01-03 PROCEDURE — 86335 IMMUNFIX E-PHORSIS/URINE/CSF: CPT | Performed by: NURSE PRACTITIONER

## 2018-01-03 PROCEDURE — B2111ZZ FLUOROSCOPY OF MULTIPLE CORONARY ARTERIES USING LOW OSMOLAR CONTRAST: ICD-10-PCS | Performed by: INTERNAL MEDICINE

## 2018-01-03 RX ORDER — ISOSORBIDE MONONITRATE 30 MG/1
30 TABLET, EXTENDED RELEASE ORAL
Status: DISCONTINUED | OUTPATIENT
Start: 2018-01-03 | End: 2018-01-03

## 2018-01-03 RX ADMIN — BUDESONIDE 500 MCG: 0.5 INHALANT RESPIRATORY (INHALATION) at 20:10

## 2018-01-03 RX ADMIN — PANTOPRAZOLE SODIUM 40 MG: 40 TABLET, DELAYED RELEASE ORAL at 17:37

## 2018-01-03 RX ADMIN — GUAIFENESIN 1200 MG: 600 TABLET, EXTENDED RELEASE ORAL at 22:01

## 2018-01-03 RX ADMIN — FUROSEMIDE 40 MG: 40 TABLET ORAL at 17:37

## 2018-01-03 RX ADMIN — ENOXAPARIN SODIUM 40 MG: 40 INJECTION SUBCUTANEOUS at 11:55

## 2018-01-03 RX ADMIN — Medication 10 ML: at 22:01

## 2018-01-03 RX ADMIN — FUROSEMIDE 40 MG: 40 TABLET ORAL at 08:38

## 2018-01-03 RX ADMIN — ATORVASTATIN CALCIUM 20 MG: 10 TABLET, FILM COATED ORAL at 22:01

## 2018-01-03 RX ADMIN — ALLOPURINOL 100 MG: 100 TABLET ORAL at 08:50

## 2018-01-03 RX ADMIN — PANTOPRAZOLE SODIUM 40 MG: 40 TABLET, DELAYED RELEASE ORAL at 05:40

## 2018-01-03 RX ADMIN — IPRATROPIUM BROMIDE AND ALBUTEROL SULFATE 3 ML: .5; 3 SOLUTION RESPIRATORY (INHALATION) at 20:10

## 2018-01-03 RX ADMIN — BUDESONIDE 500 MCG: 0.5 INHALANT RESPIRATORY (INHALATION) at 09:13

## 2018-01-03 RX ADMIN — CARVEDILOL 25 MG: 25 TABLET, FILM COATED ORAL at 17:37

## 2018-01-03 RX ADMIN — ASPIRIN 81 MG: 81 TABLET ORAL at 08:50

## 2018-01-03 RX ADMIN — CARVEDILOL 25 MG: 25 TABLET, FILM COATED ORAL at 08:38

## 2018-01-03 RX ADMIN — GUAIFENESIN 1200 MG: 600 TABLET, EXTENDED RELEASE ORAL at 08:38

## 2018-01-03 NOTE — PROGRESS NOTES
Verbal bedside report given to Olaf Gallo oncoming RN. Patient's situation, background, assessment and recommendations provided. Opportunity for questions provided. Oncoming RN assumed care of patient.

## 2018-01-03 NOTE — CONSULTS
[facility]        Inpatient Hematology / Oncology Consult Note    Reason for Consult:  Dyspnea  Dyspnea  Referring Physician:  Nilam Madrid MD    History of Present Illness:  Mr. Meggan Gilliam is a 74 y.o. male admitted on 12/26/2017 with a primary diagnosis of The primary encounter diagnosis was Acute systolic CHF (congestive heart failure) ([address] Diagnoses of Chest pain, unspecified type, COPD, moderate ([address] TAZ (obstructive sleep apnea), Tobacco abuse, Chronic systolic heart failure ([address] Dyspnea, unspecified type, Atherosclerosis of native coronary artery of native heart with stable angina pectoris ([address] Dyspnea on exertion, NINO (acute kidney injury) ([address] and Acute pulmonary edema ([address] were also pertinent to this visit. Miracle Jernigan His PMH includes CAD, COPD, severe TAZ, and CKD. He presented to ED with c/o shortness of breath x 2 days with sore throat and productive cough with yellow sputum. While in the ED, he developed chest tightness with severe epigastric pain x 10 min. Pain resolved with nitro and ASA. Trop neg. EKG NSR. BNP 1688. CXR neg. ECHO 9/28/17 EF 30-35%. Elevated light chains on lab work noted from 9/2017 work-up. SPEP WNL, but was noted \"cannot r/o a poorly defined free lambda band\". We were consulted for evaluation of possible amyloidosis. Review of Systems:  Constitutional +fatigue. Denies fever, chills, weight loss, appetite changes, night sweats. HEENT Denies trauma, blurry vision, hearing loss, ear pain, nosebleeds, sore throat, neck pain and ear discharge. Skin Denies lesions or rashes. Lungs Denies dyspnea, cough, sputum production or hemoptysis. Cardiovascular Denies chest pain, palpitations, or lower extremity edema. Gastrointestinal Denies nausea, vomiting, changes in bowel habits, bloody or black stools, abdominal pain.  Denies dysuria, frequency or hesitancy of urination. Neuro Denies headaches, visual changes or ataxia.  Denies dizziness, tingling, tremors, sensory change, speech change, focal weakness or headaches. Hematology Denies easy bruising or bleeding, denies gingival bleeding or epistaxis. Endo Denies heat/cold intolerance, denies diabetes or thyroid abnormalities. MSK Denies back pain, arthralgias, myalgias or frequent falls. Psychiatric/Behavioral Denies depression and substance abuse. The patient is not nervous/anxious.          Allergies   Allergen Reactions    Pcn [Penicillins] Other (comments)     \"makes my heart stop\"     Past Medical History:   Diagnosis Date    Acute CHF (congestive heart failure) ([facility] [address] 4/25/2015    Acute combined systolic and diastolic congestive heart failure ([facility] [address] 4/28/2015    Chronic obstructive pulmonary disease (HCC)     De Quervain's tenosynovitis, right 4/28/2015    Dyspnea on exertion 6/28/2015    Elevated serum creatinine 4/25/2015    Elevated troponin 6/28/2015    History of coronary artery disease     MI at 25 yo    History of right knee surgery     cartilage removal    History of shingles     Malignant hypertension 4/25/2015    MI (myocardial infarction)      Past Surgical History:   Procedure Laterality Date    HX HEART CATHETERIZATION  6/29/2015    no intervention    HX KNEE ARTHROSCOPY Right     removal of cartilage     Family History   Problem Relation Age of Onset    Hypertension Mother     No Known Problems Father      Social History     Social History    Marital status:      Spouse name: N/A    Number of children: N/A    Years of education: N/A     Occupational History    retired      Social History Main Topics    Smoking status: Former Smoker     Packs/day: 0.25     Years: 20.00     Types: Cigarettes     Quit date: 4/5/2015    Smokeless tobacco: Never Used    Alcohol use No    Drug use: No    Sexual activity: Not on file     Other Topics Concern     Service No    Blood Transfusions No     no issues with receiving    Caffeine Concern No    Special Diet No    Exercise No    Seat Belt Yes     Social History Narrative    Lives with his wife     Current Facility-Administered Medications   Medication Dose Route Frequency Provider Last Rate Last Dose    heparin (PF) 2 units/ml in NS infusion  3 mL/hr IntraVENous CONTINUOUS Rudolph Dancer, MD 3 mL/hr at 01/02/18 0834 3 mL/hr at 01/02/18 0834    furosemide (LASIX) tablet 40 mg  40 mg Oral BID Rudolph Dancer, MD   40 mg at 01/03/18 5623    enoxaparin (LOVENOX) injection 40 mg  40 mg SubCUTAneous Q24H Rudolph Dancer, MD   40 mg at 01/03/18 1155    albuterol-ipratropium (DUO-NEB) 2.5 MG-0.5 MG/3 ML  3 mL Nebulization Q6H PRN Lionel Urban MD   3 mL at 01/02/18 2101    budesonide (PULMICORT) 500 mcg/2 ml nebulizer suspension  500 mcg Nebulization BID RT Hugh Barnhart NP   500 mcg at 01/03/18 0913    guaiFENesin ER (MUCINEX) tablet 1,200 mg  1,200 mg Oral Q12H Warden Naman MD   1,200 mg at 01/03/18 0838    pantoprazole (PROTONIX) tablet 40 mg  40 mg Oral ACB&D Warden Naman MD   40 mg at 01/03/18 0540    albuterol (PROVENTIL HFA, VENTOLIN HFA, PROAIR HFA) inhaler 2 Puff  2 Puff Inhalation QID PRN KWESI Reeves        allopurinol (ZYLOPRIM) tablet 100 mg (Patient Supplied)  100 mg Oral DAILY KWESI Reeves   100 mg at 01/03/18 0850    aspirin delayed-release tablet 81 mg (Patient Supplied)  81 mg Oral DAILY KWESI Reeves   81 mg at 01/03/18 0850    atorvastatin (LIPITOR) tablet 20 mg  20 mg Oral QHS KWESI Reeves   20 mg at 01/02/18 2146    carvedilol (COREG) tablet 25 mg  25 mg Oral BID WITH MEALS KWESI Reeves   25 mg at 01/03/18 2879    oxyCODONE IR (ROXICODONE) tablet 5 mg  5 mg Oral Q4H PRN KWESI Reeves   5 mg at 01/02/18 1521    sodium chloride (NS) flush 5-10 mL  5-10 mL IntraVENous PRN KWESI Reeves   5 mL at 01/02/18 2146    nitroglycerin (NITROSTAT) tablet 0.4 mg  0.4 mg SubLINGual Q5MIN PRN KWESI Reeves       [address] [place] [address] acetaminophen (TYLENOL) tablet 650 mg  650 mg Oral Q4H PRN KWESI Aguilar        promethazine (PHENERGAN) tablet 25 mg  25 mg Oral Q6H PRN KWESI Aguilar        influenza vaccine 2017- (3 yrs+)(PF) (FLUZONE QUAD/FLUARIX QUAD) injection 0.5 mL  0.5 mL IntraMUSCular PRIOR TO DISCHARGE Kelly Youssef MD           OBJECTIVE:  Patient Vitals for the past 8 hrs:   BP Temp Pulse Resp SpO2 Weight   18 0920 99/55 97.8 °F (36.6 °C) 80 18 95 % -   18 0914 - - - - 96 % -   18 0831 111/67 - 75 - - -   18 0543 97/64 98.6 °F (37 °C) 67 17 95 % 207 lb 4.8 oz (94 kg)     Temp (24hrs), Av °F (36.7 °C), Min:97.6 °F (36.4 °C), Max:98.6 °F (37 °C)     07 -  1900  In: 180 [P.O.:180]  Out: 400 [Urine:400]    Physical Exam:  Constitutional: Well developed, well nourished male in no acute distress, sitting comfortably in the hospital bed. HEENT: Normocephalic and atraumatic. Oropharynx is clear, mucous membranes are moist.  Extraocular muscles are intact. Sclerae anicteric. Neck supple without JVD. No thyromegaly present. Lymph node   Deferred   Skin Warm and dry. No bruising and no rash noted. No erythema. No pallor. Respiratory Lungs are clear to auscultation bilaterally without wheezes, rales or rhonchi, normal air exchange without accessory muscle use. CVS Normal rate, regular rhythm and normal S1 and S2. No murmurs, gallops, or rubs. Abdomen Soft, nontender and nondistended, normoactive bowel sounds. No palpable mass. No hepatosplenomegaly. Neuro Grossly nonfocal with no obvious sensory or motor deficits. MSK Normal range of motion in general.  No edema and no tenderness. Psych Appropriate mood and affect.         Labs:    Recent Results (from the past 24 hour(s))   SED RATE, AUTOMATED    Collection Time: 18  5:50 PM   Result Value Ref Range    Sed rate, automated 66 (H) 0 - 20 mm/hr   HEPATIC FUNCTION PANEL    Collection Time: 18  5:50 PM Result Value Ref Range    Protein, total 7.3 6.3 - 8.2 g/dL    Albumin 3.0 (L) 3.2 - 4.6 g/dL    Globulin 4.3 (H) 2.3 - 3.5 g/dL    A-G Ratio 0.7 (L) 1.2 - 3.5      Bilirubin, total 0.7 0.2 - 1.1 MG/DL    Bilirubin, direct 0.2 <0.4 MG/DL    Alk. phosphatase 102 50 - 136 U/L    AST (SGOT) 21 15 - 37 U/L    ALT (SGPT) 24 12 - 65 U/L   PROTEIN ELEC WITH MISA, SERUM    Collection Time: 01/02/18  5:50 PM   Result Value Ref Range    Protein, total 6.8 6.3 - 8.2 g/dL    A-G Ratio 1.0      ALBUMIN 3.47 3.20 - 5.60 g/dL    ALPHA 1 0.25 0.10 - 0.40 g/dL    ALPHA 2 0.91 0.40 - 1.20 g/dL    BETA 1.04 0.60 - 1.30 g/dL    GAMMA 1.14 0.50 - 1.60 g/dL    M-Parish Not Observed 0 g/dL    Immunoglobulin G 1241 610 - 1616 mg/dL    Immunoglobulin A 384 85 - 499 mg/dL    Immunoglobulin M 60 35 - 242 mg/dL    PEP Interpretation        No definite bands are seen, suggest repeat if clinically indicated. MISA Interpretation        No definite bands are seen, suggest repeat if clinically indicated.    METABOLIC PANEL, BASIC    Collection Time: 01/03/18 10:32 AM   Result Value Ref Range    Sodium 140 136 - 145 mmol/L    Potassium 3.5 3.5 - 5.1 mmol/L    Chloride 105 98 - 107 mmol/L    CO2 30 21 - 32 mmol/L    Anion gap 5 (L) 7 - 16 mmol/L    Glucose 110 (H) 65 - 100 mg/dL    BUN 36 (H) 8 - 23 MG/DL    Creatinine 1.73 (H) 0.8 - 1.5 MG/DL    GFR est AA 50 (L) >60 ml/min/1.73m2    GFR est non-AA 41 (L) >60 ml/min/1.73m2    Calcium 8.4 8.3 - 10.4 MG/DL       Imaging:  CT HEAD WO CONT [[id number]] Collected: 12/27/17 6091      Order Status: Completed Updated: 12/27/17 4474     Narrative:       Examination: CT scan of the brain without contrast.    History: Syncope/fainting; s/p fall heparin gtt, 74 years Male     Technique: 5 mm axial imaging of the brain from the posterior fossa to the  vertex.  Radiation dose reduction techniques were used for this study:  Our CT  scanners use one or all of the following: Automated exposure control, adjustment  of the mA and/or kVp according to patient's size, iterative reconstruction. Comparison:  CT brain December 07, 2015    Findings:  The brain parenchyma appears within normal limits for age.  The  ventricles, sulci are age-appropriate. No intracranial hemorrhage or extra-axial  collection is identified.  No evidence of acute infarct.  No mass effect or  midline shift is present. Basal cisterns are intact.  The visualized paranasal  sinuses and mastoid air cells are clear. The orbits, bones, and soft tissues are  normal in appearance.       Impression:       IMPRESSION:  Normal unenhanced CT of the brain for age.  No acute intracranial  abnormality.     CT HEAD WO CONT [[id number]]      Order Status: Canceled      XR CHEST PA LAT [[id number]] Collected: 12/26/17 1221     Order Status: Completed Updated: 12/26/17 1223     Narrative:       Frontal and lateral views of the chest     Comparison: 10/30/2017    Indication: Shortness of breath    FINDINGS: Cardiac enlargement. There is no focal pulmonary infiltrate, nodule,  effusion, or pneumothorax.  No discrete acute osseous lesion seen.       Impression:       IMPRESSION:  No acute cardiopulmonary process.            ASSESSMENT:  Problem List  Date Reviewed: 12/29/2017          Codes Class Noted    * (Principal)Dyspnea ICD-10-CM: R06.00  ICD-9-CM: 786.09  12/26/2017        Atherosclerosis of native coronary artery of native heart with stable angina pectoris ([facility] [address] ICD-10-CM: I25.118  ICD-9-CM: 414.01, 413.9  10/13/2017        TAZ (obstructive sleep apnea) ICD-10-CM: T16.39  ICD-9-CM: 327.23  10/2/2017        CKD (chronic kidney disease) stage 3, GFR 30-59 ml/min (Chronic) ICD-10-CM: N18.3  ICD-9-CM: 585.3  9/29/2017        Coronary atherosclerosis of native coronary vessel (Chronic) ICD-10-CM: I25.10  ICD-9-CM: 414.01  9/29/2017        Hypertension (Chronic) ICD-10-CM: I10  ICD-9-CM: 401.9  9/29/2017        COPD, moderate (HCC) (Chronic) ICD-10-CM: J44.9  ICD-9-CM: 496  9/29/2017    Overview Signed 12/27/2015 12:38 PM by Cindy Paul NP     Complete PFTs: 7/20/15:  Spirometry is consistent with a moderate obstructive/restrictive defect. . The residual volume is increased relative to other lung volumes suggesting air-trapping. The diffusion capacity corrected for alveolar volume was normal suggesting no loss of alveolo-capillary units. High triglycerides (Chronic) ICD-10-CM: E78.1  ICD-9-CM: 272.1  9/29/2017        Gastroesophageal reflux disease without esophagitis (Chronic) ICD-10-CM: K21.9  ICD-9-CM: 530.81  9/29/2017        Vomiting ICD-10-CM: R11.10  ICD-9-CM: 787.03  9/29/2017        NINO (acute kidney injury) ([facility] ICD-10-CM: N17.9  ICD-9-CM: 584.9  9/29/2017        CHF (congestive heart failure) ([facility] ICD-10-CM: I50.9  ICD-9-CM: 428.0  9/27/2017        Mixed hyperlipidemia (Chronic) ICD-10-CM: Y68.1  ICD-9-CM: 272.2  9/28/2016        Essential hypertension ICD-10-CM: I10  ICD-9-CM: 401.9  9/28/2016        Arthritis (Chronic) ICD-10-CM: M19.90  ICD-9-CM: 716.90  Unknown        Chronic systolic heart failure (HCC) (Chronic) ICD-10-CM: I50.22  ICD-9-CM: 428.22  12/4/2015    Overview Signed 12/4/2015  9:28 AM by Ahmet Tee     EF 40%             Tobacco abuse (Chronic) ICD-10-CM: Z72.0  ICD-9-CM: 305.1  7/2/2015        Dyspnea on exertion (Chronic) ICD-10-CM: R06.09  ICD-9-CM: 786.09  6/28/2015                RECOMMENDATIONS:  Evaluation for Amyloidosis  - Elevated light chains on lab work noted from 9/2017 work-up. SPEP was WNL, but was noted \"cannot r/o a poorly defined free lambda band\". Repeat SPEP pending.  - Check UPEP  - Can consider fat pad biopsy    CHF Exacerbation  - Cardiology managing      Lab studies and imaging studies were personally reviewed. Thank you for allowing us to participate in the care of Mr. Poon Ganesh.          Fredy Mendoza NP   [facility]  [address] [place] [address]  Office : [phone]  Fax : [phone]   Patient seen, examed and discussed with NP, agree with above history/assessment/plan. 74 y.o.male admitted from ER for increased SOB, productive coughing, CP negative workup and resolved, CRI relatively stable for at least last 2 years, consulted evaluation for amyloidosis, last year SPEP showed no M spike, low level free light chains, very unlikely to have AL amyloidosis, check UPEP to complete the evaluation, if other amyloidosis if suspected, would consult surgery for fat pad biopsy. Gustavo Chauhan M.D.   [address]  Office : [phone]  Fax : [phone]

## 2018-01-03 NOTE — PROGRESS NOTES
Subjective:   Daily Progress Note: 1/3/2018 1:12 PM    No complaints    Current Facility-Administered Medications   Medication Dose Route Frequency    heparin (PF) 2 units/ml in NS infusion  3 mL/hr IntraVENous CONTINUOUS    furosemide (LASIX) tablet 40 mg  40 mg Oral BID    enoxaparin (LOVENOX) injection 40 mg  40 mg SubCUTAneous Q24H    albuterol-ipratropium (DUO-NEB) 2.5 MG-0.5 MG/3 ML  3 mL Nebulization Q6H PRN    budesonide (PULMICORT) 500 mcg/2 ml nebulizer suspension  500 mcg Nebulization BID RT    guaiFENesin ER (MUCINEX) tablet 1,200 mg  1,200 mg Oral Q12H    pantoprazole (PROTONIX) tablet 40 mg  40 mg Oral ACB&D    albuterol (PROVENTIL HFA, VENTOLIN HFA, PROAIR HFA) inhaler 2 Puff  2 Puff Inhalation QID PRN    allopurinol (ZYLOPRIM) tablet 100 mg (Patient Supplied)  100 mg Oral DAILY    aspirin delayed-release tablet 81 mg (Patient Supplied)  81 mg Oral DAILY    atorvastatin (LIPITOR) tablet 20 mg  20 mg Oral QHS    carvedilol (COREG) tablet 25 mg  25 mg Oral BID WITH MEALS    oxyCODONE IR (ROXICODONE) tablet 5 mg  5 mg Oral Q4H PRN    sodium chloride (NS) flush 5-10 mL  5-10 mL IntraVENous PRN    nitroglycerin (NITROSTAT) tablet 0.4 mg  0.4 mg SubLINGual Q5MIN PRN    acetaminophen (TYLENOL) tablet 650 mg  650 mg Oral Q4H PRN    promethazine (PHENERGAN) tablet 25 mg  25 mg Oral Q6H PRN    influenza vaccine - (3 yrs+)(PF) (FLUZONE QUAD/FLUARIX QUAD) injection 0.5 mL  0.5 mL IntraMUSCular PRIOR TO DISCHARGE               Objective:     Visit Vitals    BP 99/55 (BP 1 Location: Left arm, BP Patient Position: At rest)    Pulse 80    Temp 97.8 °F (36.6 °C)    Resp 18    Wt 94 kg (207 lb 4.8 oz)    SpO2 95%    BMI 30.61 kg/m2    O2 Flow Rate (L/min): 0 l/min O2 Device: Room air    Temp (24hrs), Av °F (36.7 °C), Min:97.6 °F (36.4 °C), Max:98.6 °F (37 °C)      701 - 1900  In: 180 [P.O.:180]  Out: 400 [Urine:400]  1901 - 700  In: -   Out: 600 [Urine:600]      Visit Vitals    BP 99/55 (BP 1 Location: Left arm, BP Patient Position: At rest)    Pulse 80    Temp 97.8 °F (36.6 °C)    Resp 18    Wt 94 kg (207 lb 4.8 oz)    SpO2 95%    BMI 30.61 kg/m2      Lungs: clear to auscultation bilaterally  Heart: regular rate and rhythm  Abdomen: soft, non-tender. Extremities:no edema          Data Review    Recent Results (from the past 48 hour(s))   MAGNESIUM    Collection Time: 01/02/18  5:50 AM   Result Value Ref Range    Magnesium 2.3 1.8 - 2.4 mg/dL   METABOLIC PANEL, BASIC    Collection Time: 01/02/18  5:50 AM   Result Value Ref Range    Sodium 142 136 - 145 mmol/L    Potassium 3.5 3.5 - 5.1 mmol/L    Chloride 104 98 - 107 mmol/L    CO2 31 21 - 32 mmol/L    Anion gap 7 7 - 16 mmol/L    Glucose 109 (H) 65 - 100 mg/dL    BUN 38 (H) 8 - 23 MG/DL    Creatinine 1.88 (H) 0.8 - 1.5 MG/DL    GFR est AA 45 (L) >60 ml/min/1.73m2    GFR est non-AA 37 (L) >60 ml/min/1.73m2    Calcium 7.6 (L) 8.3 - 10.4 MG/DL   BNP    Collection Time: 01/02/18  5:50 AM   Result Value Ref Range     pg/mL   SED RATE, AUTOMATED    Collection Time: 01/02/18  5:50 PM   Result Value Ref Range    Sed rate, automated 66 (H) 0 - 20 mm/hr   HEPATIC FUNCTION PANEL    Collection Time: 01/02/18  5:50 PM   Result Value Ref Range    Protein, total 7.3 6.3 - 8.2 g/dL    Albumin 3.0 (L) 3.2 - 4.6 g/dL    Globulin 4.3 (H) 2.3 - 3.5 g/dL    A-G Ratio 0.7 (L) 1.2 - 3.5      Bilirubin, total 0.7 0.2 - 1.1 MG/DL    Bilirubin, direct 0.2 <0.4 MG/DL    Alk.  phosphatase 102 50 - 136 U/L    AST (SGOT) 21 15 - 37 U/L    ALT (SGPT) 24 12 - 65 U/L   PROTEIN ELEC WITH MISA, SERUM    Collection Time: 01/02/18  5:50 PM   Result Value Ref Range    Protein, total 6.8 6.3 - 8.2 g/dL    A-G Ratio 1.0      ALBUMIN 3.47 3.20 - 5.60 g/dL    ALPHA 1 0.25 0.10 - 0.40 g/dL    ALPHA 2 0.91 0.40 - 1.20 g/dL    BETA 1.04 0.60 - 1.30 g/dL    GAMMA 1.14 0.50 - 1.60 g/dL    M-Parish PENDING 0 g/dL    Immunoglobulin G 1241 610 - 1616 mg/dL    Immunoglobulin A 384 85 - 499 mg/dL    Immunoglobulin M 60 35 - 242 mg/dL    PEP Interpretation PENDING     MISA Interpretation PENDING    METABOLIC PANEL, BASIC    Collection Time: 01/03/18 10:32 AM   Result Value Ref Range    Sodium 140 136 - 145 mmol/L    Potassium 3.5 3.5 - 5.1 mmol/L    Chloride 105 98 - 107 mmol/L    CO2 30 21 - 32 mmol/L    Anion gap 5 (L) 7 - 16 mmol/L    Glucose 110 (H) 65 - 100 mg/dL    BUN 36 (H) 8 - 23 MG/DL    Creatinine 1.73 (H) 0.8 - 1.5 MG/DL    GFR est AA 50 (L) >60 ml/min/1.73m2    GFR est non-AA 41 (L) >60 ml/min/1.73m2    Calcium 8.4 8.3 - 10.4 MG/DL       Assessment     Patient Active Problem List    Diagnosis Date Noted    Dyspnea 12/26/2017    Atherosclerosis of native coronary artery of native heart with stable angina pectoris ([facility] [address] 10/13/2017    TAZ (obstructive sleep apnea) 10/02/2017    CKD (chronic kidney disease) stage 3, GFR 30-59 ml/min 09/29/2017    Coronary atherosclerosis of native coronary vessel 09/29/2017    Hypertension 09/29/2017    COPD, moderate ([address] 09/29/2017    High triglycerides 09/29/2017    Gastroesophageal reflux disease without esophagitis 09/29/2017    Vomiting 09/29/2017    NINO (acute kidney injury) ([address] 09/29/2017    CHF (congestive heart failure) ([facility] [address] 09/27/2017    Mixed hyperlipidemia 09/28/2016    Essential hypertension 09/28/2016    Arthritis     Chronic systolic heart failure ([address] 12/04/2015    Tobacco abuse 07/02/2015    Dyspnea on exertion 06/28/2015           Problems Addressed by Nephrology     CKD 3  CHF    Plan     Stable renal function. Breathing better, more comfortable. Continue current dose of diuretics. F/U in our office in 10-12 days.

## 2018-01-03 NOTE — PROGRESS NOTES
[facility] PROGRESS NOTE           1/3/2018 8:56 AM    Admit Date: 12/26/2017      Subjective:   New HFrEF and now S/P LHC showing mild nonobstructive CAD. AM labs pending. Denies a ny chest pain or SOB. No active wheezing. ROS:  Cardiovascular:  As noted above    Objective:      Vitals:    01/02/18 2132 01/03/18 0132 01/03/18 0543 01/03/18 0831   BP: 96/56 (!) 88/52 97/64 111/67   Pulse: 69 68 67 75   Resp: 18 18 17    Temp: 97.6 °F (36.4 °C) 98.1 °F (36.7 °C) 98.6 °F (37 °C)    SpO2: 95% 95% 95%    Weight:   94 kg (207 lb 4.8 oz)        Physical Exam:  General-No Acute Distress  Neck- supple, no JVD  CV- regular rate and rhythm no MRG  Lung- clear bilaterally  Abd- soft, nontender, nondistended  Ext- no edema bilaterally. Skin- warm and dry    Data Review:   Recent Labs      01/02/18   0550  01/01/18   0516   NA  142  143   K  3.5  3.7   MG  2.3  2.4   BUN  38*  36*   CREA  1.88*  1.81*   GLU  109*  106*       Assessment/Plan:     Principal Problem:    Dyspnea (12/26/2017)    Likely multifactorial including COPD, untreated TAZ, LV dysfunction. Diuresed 7.9 liters since admission on 12/26/17. Active Problems:    CKD (chronic kidney disease) stage 3, GFR 30-59 ml/min (9/29/2017)    Nephrology saw patient, am labs pending. Chronic systolic heart failure ([address] (12/4/2015)    Diuresing well on IV/po lasix. Continue hydralazine, Imdur, coreg, lasix but no ACE-I/ARB with CKD. Am labs pending.       Coronary atherosclerosis of native coronary vessel (9/29/2017)    S/p C showing mild nonobstructive CAD      Hypertension (9/29/2017)    continue meds      COPD, moderate ([address] (9/29/2017)    Per pulmonary       TAZ (obstructive sleep apnea) (10/2/2017)  Per pulmonary working with DME company to help with CPAP equipment            Michael Walton NP  1/3/2018 8:56 AM

## 2018-01-03 NOTE — PROGRESS NOTES
Problem: Falls - Risk of  Goal: *Absence of Falls  Document Sue Fall Risk and appropriate interventions in the flowsheet. Outcome: Progressing Towards Goal  Fall Risk Interventions:    Patient Alert, oriented x3,  proper use of call light, pages for assistance before getting  OOB. Nonskid socks on feet prior to getting OOB. Staff compliant with keeping bed in low, locked position; with both left and right upper       side rails raised/ elevated. Bedside table and personal items within reach.           Medication Interventions: Patient to call before getting OOB         History of Falls Interventions: Bed/chair exit alarm

## 2018-01-03 NOTE — PROGRESS NOTES
Bedside and Verbal shift change report given to Christina Oshea RN (offgoing nurse) by Self (oncoming nurse). Report included the following information Kardex, MAR, Recent Results and Cardiac Rhythm NSR. Miracle Jernigan

## 2018-01-03 NOTE — DISCHARGE SUMMARY
[facility] Discharge Summary     Patient ID:  Flavia Barnes  [id number]  31 y.o.  1945    Admit date: 12/26/2017    Discharge date:  1/12/18    Admitting Physician: Hannah Taylor MD     Discharge Physician: Alejo Bolaños NP/Dr. Hilario Mccloud    Admission Diagnoses: Dyspnea  Dyspnea    Discharge Diagnoses:   Patient Active Problem List    Diagnosis Date Noted    Dyspnea 12/26/2017    Atherosclerosis of native coronary artery of native heart with stable angina pectoris ([address] 10/13/2017    TAZ (obstructive sleep apnea) 10/02/2017    CKD (chronic kidney disease) stage 3, GFR 30-59 ml/min 09/29/2017    Coronary atherosclerosis of native coronary vessel 09/29/2017    Hypertension 09/29/2017    COPD, moderate ([address] 09/29/2017    High triglycerides 09/29/2017    Gastroesophageal reflux disease without esophagitis 09/29/2017    Vomiting 09/29/2017    NINO (acute kidney injury) ([facility] [address] 09/29/2017    CHF (congestive heart failure) ([facility] [address] 09/27/2017    Mixed hyperlipidemia 09/28/2016    Essential hypertension 09/28/2016    Arthritis     Chronic systolic heart failure ([address] 12/04/2015    Tobacco abuse 07/02/2015    Dyspnea on exertion 06/28/2015       Cardiology Procedures this admission:  Diagnostic left heart catheterization  Consults: Pulmonary/Intensive care and Nephrology    Hospital Course: Patient was seen in ED at [facility] with complaints of dyspnea. His BNP in ED was 1688. He was admitted for CHF with likely needing ischemic workup with new LV dysfunction/CHF (prior echo 9/17 with EF 30-35%). The patient had life vest after last hospital stay and opt to give it back. The patient was started on IV lasix then transitioned to po lasix and diuresed well for total of 8.2 liters during his hospital stay. [facility] was consulted for wheezing and likely cardiac wheezes. The patient will need outpatient PFTs per their notes.   The patient's CHF improved and he could lay flat and underwent [facility] on 1/2/18 with Dr. Kwasi Ricketts. Patient was found to have mild coronary artery disease that is unchanged angiographically from his last heart catheterization in 2015 but elevated left ventricular diastolic pressure. Patient tolerated the procedure well and was taken to the telemetry floor for recovery. Nephrology saw patient and no change in home med regimen. The patient continued  to improved but had elevated SPEP in past. He was seen by Hem/Onc for evaluation for amyloid. Hem/Onc noted 9/2017 SPEP was nl however poorly defined free lambda band therefore repeat SPEP and UPEP ordered and recommended fat pad biopsy. General surgery was consulted for biopsy but reported IR can do a FNA for diagnosis. IR did Biopsy on 1/5 with results non-diagnotic for amyloid. Pt had a PPD placed which was positive with >10 mm induration on 1/6. He was placed in isolation, sputum culture for AFB and ID consulted. ID saw Pt on 1/7 for positive PPD staing patient incidental converter; with PPD placed in anticipation of SNF placement. His admission CXR 12/26 reveals no evidence suggestive of TB and previous CXRs and CT scans also reveal no evidence of TB. In addition patient without any symptoms suggestive of active TB. His risk factors include travel to the [facility] 20 years ago as part of his job and his wife states she has had a positive PPD years ago but was never treated. He does not require respiratory isolation. Even given his age of 74 he would be a candidate for INH but if he is going to a SNF would get sputum if possible. He does not have active TB. ID recommended to check HIV, DC isolation, and sputum culture for AFB. His sputum culture for AFB was negative. The patient continues to refused CPAP. The afternoon of 1/12/18, the patient was  feeling better without any complaints of chest pain or shortness of breath.  Patient's right radial cath site was clean, dry and intact without hematoma or bruit. Patient's labs were WNL. Patient was seen and examined by Dr. Lissy Bee and determined stable and ready for discharge to [facility]. Patient was instructed on the importance of medication compliance and outpatient follow up  The patient will continue ASA, BB, ACE-I, and statin as well for his mild CAD. The patient will also continue BB, ACE-I and lasix for his CHF. The patient will follow up with [address] [facility] [address] Lissy Bee on 1/24/18 at 145 pm in [facility]. DISPOSITION: The patient is being discharged home in stable condition on a low saturated fat, low cholesterol and low salt diet. The patient is instructed to advance activities as tolerated to the limit of fatigue or shortness of breath. The patient is instructed to avoid all heavy lifting for 5 days. The patient is instructed to watch the cath site for bleeding/oozing; if seen, the patient is instructed to apply firm pressure with a clean cloth and call [address] [place] [address] 121 Cardiology at [phone]. The patient is instructed to watch for signs of infection which include: increasing area of redness, fever/hot to touch or purulent drainage at the catheterization site. The patient is instructed not to soak in a bathtub for 7-10 days, but is cleared to shower. The patient is instructed to call the office or return to the ER for immediate evaluation for any shortness of breath or chest pain not relieved by NTG. Discharge Exam:   Visit Vitals    BP 97/65 (BP 1 Location: Left arm, BP Patient Position: At rest)    Pulse 66    Temp 97.6 °F (36.4 °C)    Resp 18    Wt 93.9 kg (207 lb)    SpO2 97%    BMI 30.57 kg/m2     Patient has been seen by Dr. Lissy Bee: see his progress note for exam details.     Recent Results (from the past 24 hour(s))   BNP    Collection Time: 01/12/18  6:07 AM   Result Value Ref Range     pg/mL   METABOLIC PANEL, BASIC    Collection Time: 01/12/18  6:07 AM   Result Value Ref Range    Sodium 143 136 - 145 mmol/L    Potassium 4.6 3.5 - 5.1 mmol/L    Chloride 108 (H) 98 - 107 mmol/L    CO2 26 21 - 32 mmol/L    Anion gap 9 7 - 16 mmol/L    Glucose 97 65 - 100 mg/dL    BUN 28 (H) 8 - 23 MG/DL    Creatinine 1.64 (H) 0.8 - 1.5 MG/DL    GFR est AA 53 (L) >60 ml/min/1.73m2    GFR est non-AA 44 (L) >60 ml/min/1.73m2    Calcium 8.7 8.3 - 10.4 MG/DL         Patient Instructions:     Current Discharge Medication List      START taking these medications    Details   lisinopril (PRINIVIL, ZESTRIL) 5 mg tablet Take 1 Tab by mouth daily. Qty: 30 Tab, Refills: 11      guaiFENesin ER (MUCINEX) 1,200 mg Ta12 ER tablet Take 1 Tab by mouth every twelve (12) hours. Qty: 60 Tab, Refills: 0      polyethylene glycol (MIRALAX) 17 gram packet Take 1 Packet by mouth daily. Qty: 1 Packet, Refills: 11         CONTINUE these medications which have CHANGED    Details   atorvastatin (LIPITOR) 20 mg tablet Take 1 Tab by mouth nightly. Qty: 30 Tab, Refills: 11      furosemide (LASIX) 40 mg tablet Take 1 Tab by mouth daily. Qty: 30 Tab, Refills: 11         CONTINUE these medications which have NOT CHANGED    Details   albuterol-ipratropium (DUO-NEB) 2.5 mg-0.5 mg/3 ml nebu 3 mL by Nebulization route four (4) times daily. Diaignosis--J44.9  Qty: 120 Nebule, Refills: 11      allopurinol (ZYLOPRIM) 100 mg tablet Take 1 Tab by mouth daily. Qty: 30 Tab, Refills: 11      carvedilol (COREG) 25 mg tablet Take 25 mg by mouth two (2) times daily (with meals). albuterol (VENTOLIN HFA) 90 mcg/actuation inhaler Take 2 Puffs by inhalation four (4) times daily. Qty: 1 Inhaler, Refills: 11      pantoprazole (PROTONIX) 40 mg tablet Take 1 Tab by mouth Daily (before breakfast). Qty: 90 Tab, Refills: 3    Associated Diagnoses: Gastroesophageal reflux disease without esophagitis      aspirin delayed-release 81 mg tablet Take 1 Tab by mouth daily.   Qty: 30 Tab, Refills: 0         STOP taking these medications       oxyCODONE IR (ROXICODONE) 5 mg immediate release tablet Comments: Reason for Stopping:  Did not require during hospital stay                  Signed:  NICHOLE Werner  1/12/2018  9:16 AM

## 2018-01-04 PROCEDURE — 94760 N-INVAS EAR/PLS OXIMETRY 1: CPT

## 2018-01-04 PROCEDURE — 65660000000 HC RM CCU STEPDOWN

## 2018-01-04 PROCEDURE — 74011000250 HC RX REV CODE- 250: Performed by: NURSE PRACTITIONER

## 2018-01-04 PROCEDURE — 74011250637 HC RX REV CODE- 250/637: Performed by: INTERNAL MEDICINE

## 2018-01-04 PROCEDURE — 74011250637 HC RX REV CODE- 250/637: Performed by: PHYSICIAN ASSISTANT

## 2018-01-04 PROCEDURE — 74011250636 HC RX REV CODE- 250/636: Performed by: INTERNAL MEDICINE

## 2018-01-04 PROCEDURE — 84166 PROTEIN E-PHORESIS/URINE/CSF: CPT | Performed by: NURSE PRACTITIONER

## 2018-01-04 PROCEDURE — 99223 1ST HOSP IP/OBS HIGH 75: CPT | Performed by: INTERNAL MEDICINE

## 2018-01-04 PROCEDURE — 94640 AIRWAY INHALATION TREATMENT: CPT

## 2018-01-04 RX ADMIN — CARVEDILOL 25 MG: 25 TABLET, FILM COATED ORAL at 08:29

## 2018-01-04 RX ADMIN — FUROSEMIDE 40 MG: 40 TABLET ORAL at 18:00

## 2018-01-04 RX ADMIN — BUDESONIDE 500 MCG: 0.5 INHALANT RESPIRATORY (INHALATION) at 08:54

## 2018-01-04 RX ADMIN — PANTOPRAZOLE SODIUM 40 MG: 40 TABLET, DELAYED RELEASE ORAL at 16:59

## 2018-01-04 RX ADMIN — GUAIFENESIN 1200 MG: 600 TABLET, EXTENDED RELEASE ORAL at 21:17

## 2018-01-04 RX ADMIN — BUDESONIDE 500 MCG: 0.5 INHALANT RESPIRATORY (INHALATION) at 20:21

## 2018-01-04 RX ADMIN — FUROSEMIDE 40 MG: 40 TABLET ORAL at 08:29

## 2018-01-04 RX ADMIN — ENOXAPARIN SODIUM 40 MG: 40 INJECTION SUBCUTANEOUS at 12:07

## 2018-01-04 RX ADMIN — ALLOPURINOL 100 MG: 100 TABLET ORAL at 08:29

## 2018-01-04 RX ADMIN — CARVEDILOL 25 MG: 25 TABLET, FILM COATED ORAL at 16:59

## 2018-01-04 RX ADMIN — Medication 5 ML: at 21:17

## 2018-01-04 RX ADMIN — PANTOPRAZOLE SODIUM 40 MG: 40 TABLET, DELAYED RELEASE ORAL at 06:46

## 2018-01-04 RX ADMIN — Medication 10 ML: at 06:47

## 2018-01-04 RX ADMIN — ASPIRIN 81 MG: 81 TABLET ORAL at 08:29

## 2018-01-04 RX ADMIN — GUAIFENESIN 1200 MG: 600 TABLET, EXTENDED RELEASE ORAL at 08:29

## 2018-01-04 RX ADMIN — ATORVASTATIN CALCIUM 20 MG: 10 TABLET, FILM COATED ORAL at 21:17

## 2018-01-04 NOTE — PROGRESS NOTES
Problem: Heart Failure: Day 2  Goal: Activity/Safety  Outcome: Progressing Towards Goal  Pt up in room. Occasional SOB.   Unable to  when or what causes it

## 2018-01-04 NOTE — PROGRESS NOTES
Verbal bedside report received from Armand Calero RN. Assumed care of patient. Pt in room with family member.

## 2018-01-04 NOTE — PROGRESS NOTES
Shift change, with bedside reporting completed. Reinforced Safety precautions: Call for assistance prior to activity, and use of nonskid socks before getting out of bed (OOB). Verbalized understanding of safety instructions. Hand held call-light within reach. Wife at bedside.

## 2018-01-04 NOTE — PROGRESS NOTES
Subjective:   Daily Progress Note: 2018 1:12 PM    No complaints.  Denies cp, sob    Current Facility-Administered Medications   Medication Dose Route Frequency    heparin (PF) 2 units/ml in NS infusion  3 mL/hr IntraVENous CONTINUOUS    furosemide (LASIX) tablet 40 mg  40 mg Oral BID    enoxaparin (LOVENOX) injection 40 mg  40 mg SubCUTAneous Q24H    albuterol-ipratropium (DUO-NEB) 2.5 MG-0.5 MG/3 ML  3 mL Nebulization Q6H PRN    budesonide (PULMICORT) 500 mcg/2 ml nebulizer suspension  500 mcg Nebulization BID RT    guaiFENesin ER (MUCINEX) tablet 1,200 mg  1,200 mg Oral Q12H    pantoprazole (PROTONIX) tablet 40 mg  40 mg Oral ACB&D    albuterol (PROVENTIL HFA, VENTOLIN HFA, PROAIR HFA) inhaler 2 Puff  2 Puff Inhalation QID PRN    allopurinol (ZYLOPRIM) tablet 100 mg (Patient Supplied)  100 mg Oral DAILY    aspirin delayed-release tablet 81 mg (Patient Supplied)  81 mg Oral DAILY    atorvastatin (LIPITOR) tablet 20 mg  20 mg Oral QHS    carvedilol (COREG) tablet 25 mg  25 mg Oral BID WITH MEALS    oxyCODONE IR (ROXICODONE) tablet 5 mg  5 mg Oral Q4H PRN    sodium chloride (NS) flush 5-10 mL  5-10 mL IntraVENous PRN    nitroglycerin (NITROSTAT) tablet 0.4 mg  0.4 mg SubLINGual Q5MIN PRN    acetaminophen (TYLENOL) tablet 650 mg  650 mg Oral Q4H PRN    promethazine (PHENERGAN) tablet 25 mg  25 mg Oral Q6H PRN    influenza vaccine - (3 yrs+)(PF) (FLUZONE QUAD/FLUARIX QUAD) injection 0.5 mL  0.5 mL IntraMUSCular PRIOR TO DISCHARGE               Objective:     Visit Vitals    BP 97/61 (BP 1 Location: Right arm, BP Patient Position: At rest)    Pulse 62    Temp 97.9 °F (36.6 °C)    Resp 22    Wt 93.9 kg (207 lb)    SpO2 98%    BMI 30.57 kg/m2    O2 Flow Rate (L/min): 0 l/min O2 Device: Room air    Temp (24hrs), Av.9 °F (36.6 °C), Min:97.3 °F (36.3 °C), Max:98.6 °F (37 °C)          190 -  0700  In: 560 [P.O.:560]  Out: 500 [Urine:500]      Visit Vitals    BP 97/61 (BP 1 Location: Right arm, BP Patient Position: At rest)    Pulse 62    Temp 97.9 °F (36.6 °C)    Resp 22    Wt 93.9 kg (207 lb)    SpO2 98%    BMI 30.57 kg/m2      Lungs: clear to auscultation bilaterally  Heart: regular rate and rhythm  Abdomen: soft, non-tender. Extremities:no edema          Data Review    Recent Results (from the past 48 hour(s))   SED RATE, AUTOMATED    Collection Time: 01/02/18  5:50 PM   Result Value Ref Range    Sed rate, automated 66 (H) 0 - 20 mm/hr   HEPATIC FUNCTION PANEL    Collection Time: 01/02/18  5:50 PM   Result Value Ref Range    Protein, total 7.3 6.3 - 8.2 g/dL    Albumin 3.0 (L) 3.2 - 4.6 g/dL    Globulin 4.3 (H) 2.3 - 3.5 g/dL    A-G Ratio 0.7 (L) 1.2 - 3.5      Bilirubin, total 0.7 0.2 - 1.1 MG/DL    Bilirubin, direct 0.2 <0.4 MG/DL    Alk. phosphatase 102 50 - 136 U/L    AST (SGOT) 21 15 - 37 U/L    ALT (SGPT) 24 12 - 65 U/L   PROTEIN ELEC WITH MISA, SERUM    Collection Time: 01/02/18  5:50 PM   Result Value Ref Range    Protein, total 6.8 6.3 - 8.2 g/dL    A-G Ratio 1.0      ALBUMIN 3.47 3.20 - 5.60 g/dL    ALPHA 1 0.25 0.10 - 0.40 g/dL    ALPHA 2 0.91 0.40 - 1.20 g/dL    BETA 1.04 0.60 - 1.30 g/dL    GAMMA 1.14 0.50 - 1.60 g/dL    M-Parish Not Observed 0 g/dL    Immunoglobulin G 1241 610 - 1616 mg/dL    Immunoglobulin A 384 85 - 499 mg/dL    Immunoglobulin M 60 35 - 242 mg/dL    PEP Interpretation        No definite bands are seen, suggest repeat if clinically indicated. MISA Interpretation        No definite bands are seen, suggest repeat if clinically indicated.    METABOLIC PANEL, BASIC    Collection Time: 01/03/18 10:32 AM   Result Value Ref Range    Sodium 140 136 - 145 mmol/L    Potassium 3.5 3.5 - 5.1 mmol/L    Chloride 105 98 - 107 mmol/L    CO2 30 21 - 32 mmol/L    Anion gap 5 (L) 7 - 16 mmol/L    Glucose 110 (H) 65 - 100 mg/dL    BUN 36 (H) 8 - 23 MG/DL    Creatinine 1.73 (H) 0.8 - 1.5 MG/DL    GFR est AA 50 (L) >60 ml/min/1.73m2    GFR est non-AA 41 (L) >60 ml/min/1.73m2    Calcium 8.4 8.3 - 10.4 MG/DL   PROTEIN ELEC WITH MISA, URINE RANDOM    Collection Time: 01/03/18  4:00 PM   Result Value Ref Range    Protein, urine random 6 <11.9 mg/dL    A-G Ratio PENDING      Albumin, urine PENDING mg/dL    ALPHA 1 PENDING mg/dL    ALPHA 2 PENDING mg/dL    BETA PENDING mg/dL    GAMMA PENDING mg/dL    M-Parish, mg/dL PENDING 0 mg/dL    PEP Interpretation PENDING     MISA Interpretation PENDING        Assessment     Patient Active Problem List    Diagnosis Date Noted    Dyspnea 12/26/2017    Atherosclerosis of native coronary artery of native heart with stable angina pectoris ([facility] [address] 10/13/2017    TAZ (obstructive sleep apnea) 10/02/2017    CKD (chronic kidney disease) stage 3, GFR 30-59 ml/min 09/29/2017    Coronary atherosclerosis of native coronary vessel 09/29/2017    Hypertension 09/29/2017    COPD, moderate ([address] 09/29/2017    High triglycerides 09/29/2017    Gastroesophageal reflux disease without esophagitis 09/29/2017    Vomiting 09/29/2017    NINO (acute kidney injury) ([address] 09/29/2017    CHF (congestive heart failure) ([facility] [address] 09/27/2017    Mixed hyperlipidemia 09/28/2016    Essential hypertension 09/28/2016    Arthritis     Chronic systolic heart failure ([address] 12/04/2015    Tobacco abuse 07/02/2015    Dyspnea on exertion 06/28/2015           Assessment/Plan:     CKD 3 - stable renal function. Breathing better and more comfortable. Continue current dose of diuretics. F/U in our office in 10-12 days. New systolic CHF - clinically improved s/p diuresis    COPD    TAZ    Elevated light chains on labs 9/17 -.hematology following. Repeat SPEP and UPEP pending.  Surgery consulted for fat pad biopsy to evaluate for amyloid

## 2018-01-04 NOTE — PROGRESS NOTES
[facility] CARDIOLOGY PROGRESS NOTE           1/4/2018 8:56 AM    Admit Date: 12/26/2017      Subjective:   New sCHF and LHC showed mild nonobstructive CAD. Denies any chest pain or SOB. No active wheezing. ROS:  Cardiovascular:  As noted above    Objective:      Vitals:    01/03/18 2010 01/03/18 2132 01/03/18 2355 01/04/18 0641   BP:  112/64 110/60 115/72   Pulse:  72 69 72   Resp:  18 17 18   Temp:  98.6 °F (37 °C) 98.5 °F (36.9 °C) 97.7 °F (36.5 °C)   SpO2: 92% 95% 95% 96%   Weight:    93.9 kg (207 lb)       Physical Exam:  General-No Acute Distress  Neck- supple, no JVD  CV- regular rate and rhythm no MRG  Lung- clear bilaterally  Abd- soft, nontender, nondistended  Ext- no edema bilaterally. Skin- warm and dry    Data Review:   Recent Labs      01/03/18   1032  01/02/18   0550   NA  140  142   K  3.5  3.5   MG   --   2.3   BUN  36*  38*   CREA  1.73*  1.88*   GLU  110*  109*       Assessment/Plan:     Principal Problem:    Dyspnea (12/26/2017)    Likely multifactorial including COPD, untreated TAZ, LV dysfunction. Diuresed well since admission. Active Problems:    CKD (chronic kidney disease) stage 3, GFR 30-59 ml/min (9/29/2017)    Nephrology saw patient, labs are stable       Chronic systolic heart failure ([address] (12/4/2015)    Diuresing well on po lasix. Continue hydralazine, Imdur, coreg, lasix but no ACE-I/ARB with CKD. SPEP aned UPEP are pending and will ask surgery to consider fat pad biopsy for amyloidosis.       Coronary atherosclerosis of native coronary vessel (9/29/2017)    S/p LHC showing mild nonobstructive CAD      Hypertension (9/29/2017)    continue meds      COPD, moderate ([address] (9/29/2017)    Per pulmonary       TAZ (obstructive sleep apnea) (10/2/2017)  Per pulmonary working with DME company to help with CPAP equipment            Aurelia Donnelly MD  1/4/2018 8:56 AM

## 2018-01-04 NOTE — PROGRESS NOTES
Problem: Falls - Risk of  Goal: *Absence of Falls  Document Sue Fall Risk and appropriate interventions in the flowsheet. Outcome: Progressing Towards Goal  Fall Risk Interventions:    Patient Alert, oriented x3,  proper use of call light, pages for assistance before getting  OOB. Nonskid socks on feet prior to getting OOB. Staff compliant with keeping bed in low, locked position; with both left and right upper side rails raised/ elevated. Bedside table and personal items within reach.           Medication Interventions: Patient to call before getting OOB, Evaluate medications/consider consulting pharmacy, Bed/chair exit alarm         History of Falls Interventions: Door open when patient unattended, Evaluate medications/consider consulting pharmacy, Room close to nurse's station

## 2018-01-04 NOTE — PROGRESS NOTES
Patient lying on bed moaning, reports constipation. [place] Recipe for Constipation: Mix equal parts 4 oz. Each, of Apple Juice, Prune Juice and 2% Milk; warm in microwave, then served at medium temp. Patient drank 100% and tolerated well.

## 2018-01-04 NOTE — CONSULTS
H&P/Consult Note/Progress Note/Office Note:   Sathish Sosa  MRN: [id number]  :[birth year]  Age:72 y.o.    HPI: Sathish Sosa is a 74 y.o. male who came to the ER 12/26/17 with complaints of chest pain and shortness of breath. He was admitted for CHF exacerbation and was diuresed. He continues to have episodes of chest pain and shortness of breath. On lab work from 9/29/17, he was noted to have elevated light chains along with a serum protein electrophoresis that was unremarkable except for noting \"cannot rule out a poorly defined free lambda band\". Based on these results, oncology was consulted to evaluate for possible amyloidosis and recommended urine protein electrophoresis and surgical fat pad biopsy to rule out other amyloidosis. General surgery is consulted for biopsy.          Past Medical History:   Diagnosis Date    Acute CHF (congestive heart failure) ([facility] 4/25/2015    Acute combined systolic and diastolic congestive heart failure ([facility] 4/28/2015    Chronic obstructive pulmonary disease (HCC)     De Quervain's tenosynovitis, right 4/28/2015    Dyspnea on exertion 6/28/2015    Elevated serum creatinine 4/25/2015    Elevated troponin 6/28/2015    History of coronary artery disease     MI at 27 yo    History of right knee surgery     cartilage removal    History of shingles     Malignant hypertension 4/25/2015    MI (myocardial infarction)      Past Surgical History:   Procedure Laterality Date    HX HEART CATHETERIZATION  6/29/2015    no intervention    HX KNEE ARTHROSCOPY Right     removal of cartilage     Current Facility-Administered Medications   Medication Dose Route Frequency    heparin (PF) 2 units/ml in NS infusion  3 mL/hr IntraVENous CONTINUOUS    furosemide (LASIX) tablet 40 mg  40 mg Oral BID    enoxaparin (LOVENOX) injection 40 mg  40 mg SubCUTAneous Q24H    albuterol-ipratropium (DUO-NEB) 2.5 MG-0.5 MG/3 ML  3 mL Nebulization Q6H PRN    budesonide (PULMICORT) 500 mcg/2 ml nebulizer suspension  500 mcg Nebulization BID RT    guaiFENesin ER (MUCINEX) tablet 1,200 mg  1,200 mg Oral Q12H    pantoprazole (PROTONIX) tablet 40 mg  40 mg Oral ACB&D    albuterol (PROVENTIL HFA, VENTOLIN HFA, PROAIR HFA) inhaler 2 Puff  2 Puff Inhalation QID PRN    allopurinol (ZYLOPRIM) tablet 100 mg (Patient Supplied)  100 mg Oral DAILY    aspirin delayed-release tablet 81 mg (Patient Supplied)  81 mg Oral DAILY    atorvastatin (LIPITOR) tablet 20 mg  20 mg Oral QHS    carvedilol (COREG) tablet 25 mg  25 mg Oral BID WITH MEALS    oxyCODONE IR (ROXICODONE) tablet 5 mg  5 mg Oral Q4H PRN    sodium chloride (NS) flush 5-10 mL  5-10 mL IntraVENous PRN    nitroglycerin (NITROSTAT) tablet 0.4 mg  0.4 mg SubLINGual Q5MIN PRN    acetaminophen (TYLENOL) tablet 650 mg  650 mg Oral Q4H PRN    promethazine (PHENERGAN) tablet 25 mg  25 mg Oral Q6H PRN    influenza vaccine 2017-18 (3 yrs+)(PF) (FLUZONE QUAD/FLUARIX QUAD) injection 0.5 mL  0.5 mL IntraMUSCular PRIOR TO DISCHARGE     Pcn [penicillins]  Social History     Social History    Marital status:      Spouse name: N/A    Number of children: N/A    Years of education: N/A     Occupational History    retired      Social History Main Topics    Smoking status: Former Smoker     Packs/day: 0.25     Years: 20.00     Types: Cigarettes     Quit date: 4/5/2015    Smokeless tobacco: Never Used    Alcohol use No    Drug use: No    Sexual activity: Not Asked     Other Topics Concern     Service No    Blood Transfusions No     no issues with receiving    Caffeine Concern No    Special Diet No    Exercise No    Seat Belt Yes     Social History Narrative    Lives with his wife     History   Smoking Status    Former Smoker    Packs/day: 0.25    Years: 20.00    Types: Cigarettes    Quit date: 4/5/2015   Smokeless Tobacco    Never Used     Family History   Problem Relation Age of Onset    Hypertension Mother     No Known Problems Father      ROS: The patient has no difficulty with chest pain or shortness of breath. Reports last episode of chest pain was last night. No fever or chills. Comprehensive review of systems was otherwise unremarkable except as noted above. Physical Exam:   Visit Vitals    BP 97/61 (BP 1 Location: Right arm, BP Patient Position: At rest)    Pulse 62    Temp 97.9 °F (36.6 °C)    Resp 22    Wt 207 lb (93.9 kg)    SpO2 98%    BMI 30.57 kg/m2     Constitutional: Alert, oriented, cooperative patient in no acute distress; appears stated age    Eyes:Sclera are clear. EOMs intact  ENMT: no external lesions gross hearing normal; no obvious neck masses, no ear or lip lesions, nares normal  CV: RRR. Normal perfusion  Resp: No JVD. Breathing is  non-labored; no audible wheezing. Coarse BS. GI: soft and non-distended, some ecchymosis from sub-q injections noted. Musculoskeletal: unremarkable with normal function. No embolic signs or cyanosis.    Neuro:  Oriented; moves all 4; no focal deficits  Psychiatric: normal affect and mood, no memory impairment    Recent vitals (if inpt):  Patient Vitals for the past 24 hrs:   BP Temp Pulse Resp SpO2 Weight   01/04/18 0946 97/61 97.9 °F (36.6 °C) 62 22 98 % -   01/04/18 0854 - - - - 95 % -   01/04/18 0829 108/70 - 70 - - -   01/04/18 0641 115/72 97.7 °F (36.5 °C) 72 18 96 % 207 lb (93.9 kg)   01/03/18 2355 110/60 98.5 °F (36.9 °C) 69 17 95 % -   01/03/18 2132 112/64 98.6 °F (37 °C) 72 18 95 % -   01/03/18 2010 - - - - 92 % -   01/03/18 1950 - - - - 95 % -   01/03/18 1724 119/70 97.3 °F (36.3 °C) 70 20 96 % -   01/03/18 1320 100/62 97.3 °F (36.3 °C) 76 16 90 % -       Labs:  Recent Labs      01/03/18   1032  01/02/18   1750   NA  140   --    K  3.5   --    CL  105   --    CO2  30   --    BUN  36*   --    CREA  1.73*   --    GLU  110*   --    TBILI   --   0.7   CBIL   --   0.2   SGOT   --   21   ALT   --   24   AP   --   102       Lab Results   Component Value Date/Time WBC 5.1 12/28/2017 03:00 AM    HGB 11.8 12/28/2017 03:00 AM    PLATELET 470 28/81/0411 03:00 AM    Sodium 140 01/03/2018 10:32 AM    Potassium 3.5 01/03/2018 10:32 AM    Chloride 105 01/03/2018 10:32 AM    CO2 30 01/03/2018 10:32 AM    BUN 36 01/03/2018 10:32 AM    Creatinine 1.73 01/03/2018 10:32 AM    Glucose 110 01/03/2018 10:32 AM    aPTT 32.5 12/28/2017 12:16 PM    Bilirubin, total 0.7 01/02/2018 05:50 PM    Bilirubin, direct 0.2 01/02/2018 05:50 PM    AST (SGOT) 21 01/02/2018 05:50 PM    ALT (SGPT) 24 01/02/2018 05:50 PM    Alk. phosphatase 102 01/02/2018 05:50 PM    Lipase 112 12/28/2016 03:53 PM    Troponin-I, Qt. 0.46 12/27/2017 04:13 AM       I reviewed recent labs and recent radiologic studies. I independently reviewed radiology images for studies I described above or studies I have ordered. Admission date (for inpatients): 12/26/2017   * No surgery found *  * No surgery found *    ASSESSMENT/PLAN:  Problem List  Date Reviewed: 12/29/2017          Codes Class Noted    * (Principal)Dyspnea ICD-10-CM: R06.00  ICD-9-CM: 786.09  12/26/2017        Atherosclerosis of native coronary artery of native heart with stable angina pectoris ([facility] ICD-10-CM: I25.118  ICD-9-CM: 414.01, 413.9  10/13/2017        TAZ (obstructive sleep apnea) ICD-10-CM: U65.58  ICD-9-CM: 327.23  10/2/2017        CKD (chronic kidney disease) stage 3, GFR 30-59 ml/min (Chronic) ICD-10-CM: N18.3  ICD-9-CM: 585.3  9/29/2017        Coronary atherosclerosis of native coronary vessel (Chronic) ICD-10-CM: I25.10  ICD-9-CM: 414.01  9/29/2017        Hypertension (Chronic) ICD-10-CM: I10  ICD-9-CM: 401.9  9/29/2017        COPD, moderate (HCC) (Chronic) ICD-10-CM: J44.9  ICD-9-CM: 496  9/29/2017    Overview Signed 12/27/2015 12:38 PM by Tato Cesar NP     Complete PFTs: 7/20/15:  Spirometry is consistent with a moderate obstructive/restrictive defect. . The residual volume is increased relative to other lung volumes suggesting air-trapping.  The diffusion capacity corrected for alveolar volume was normal suggesting no loss of alveolo-capillary units. High triglycerides (Chronic) ICD-10-CM: E78.1  ICD-9-CM: 272.1  9/29/2017        Gastroesophageal reflux disease without esophagitis (Chronic) ICD-10-CM: K21.9  ICD-9-CM: 530.81  9/29/2017        Vomiting ICD-10-CM: R11.10  ICD-9-CM: 787.03  9/29/2017        NINO (acute kidney injury) ([facility] ICD-10-CM: N17.9  ICD-9-CM: 584.9  9/29/2017        CHF (congestive heart failure) ([facility] ICD-10-CM: I50.9  ICD-9-CM: 428.0  9/27/2017        Mixed hyperlipidemia (Chronic) ICD-10-CM: I79.4  ICD-9-CM: 272.2  9/28/2016        Essential hypertension ICD-10-CM: I10  ICD-9-CM: 401.9  9/28/2016        Arthritis (Chronic) ICD-10-CM: M19.90  ICD-9-CM: 716.90  Unknown        Chronic systolic heart failure (HCC) (Chronic) ICD-10-CM: I50.22  ICD-9-CM: 428.22  12/4/2015    Overview Signed 12/4/2015  9:28 AM by Eva Bro     EF 40%             Tobacco abuse (Chronic) ICD-10-CM: Z72.0  ICD-9-CM: 305.1  7/2/2015        Dyspnea on exertion (Chronic) ICD-10-CM: R06.09  ICD-9-CM: 786.09  6/28/2015            Principal Problem:    Dyspnea (12/26/2017)    Active Problems:    CKD (chronic kidney disease) stage 3, GFR 30-59 ml/min (9/29/2017)      Chronic systolic heart failure ([facility] [address] (12/4/2015)      Overview: EF 40%      Coronary atherosclerosis of native coronary vessel (9/29/2017)      Hypertension (9/29/2017)      COPD, moderate ([facility] (9/29/2017)      Overview: Complete PFTs: 7/20/15:      Spirometry is consistent with a moderate obstructive/restrictive defect. .       The residual volume is increased relative to other lung volumes suggesting       air-trapping. The diffusion capacity corrected for alveolar volume was       normal suggesting no loss of alveolo-capillary units.        TAZ (obstructive sleep apnea) (10/2/2017)       Plan:  Surgical decision and timing per Dr. Miguel Nicole      Signed:  Raissa Allen NP   I have seen and independently examined this patient in addition to the Nurse Practitioner and I formulated the assessment and plan and communicated the plan to her for the completion of the above note. The biopsy is usually a FNA. I would defer that to IR. He doesn't need me to make an incision for the same diagnosis that can be done with a needle. Thank you for the consult. I have discussed the plan with the patient and they are in agreement. If they have any additional questions in the interim I have asked them to notify the nurse to call me. Sammie Ng MD, FACS  General and Bariatric Surgery  [facility].

## 2018-01-04 NOTE — PROGRESS NOTES
Pt started on 24 hour urine collection at 1400 today. Collection bottle gotten from Lab. Ice in bucket and will be used to keep urine iced.

## 2018-01-04 NOTE — PROGRESS NOTES
Patient up to bathroom, reported \"relieved, feeling better\" good results, large BM, much relief. No further needs.

## 2018-01-05 ENCOUNTER — APPOINTMENT (OUTPATIENT)
Dept: INTERVENTIONAL RADIOLOGY/VASCULAR | Age: 73
DRG: 286 | End: 2018-01-05
Attending: NURSE PRACTITIONER
Payer: MEDICARE

## 2018-01-05 LAB
ALBUMIN UR ELPH-MCNC: 1.4 MG/DL
ALBUMIN/GLOB SERPL: 0.3 {RATIO}
ALPHA1 GLOB 24H UR ELPH-MCNC: 0.1 MG/DL
ALPHA2 GLOB SERPL ELPH-MCNC: 0.7 MG/DL
B-GLOBULIN UR QL ELPH: 2 MG/DL
BASOPHILS # BLD: 0 K/UL (ref 0–0.2)
BASOPHILS NFR BLD: 0 % (ref 0–2)
DIFFERENTIAL METHOD BLD: ABNORMAL
EOSINOPHIL # BLD: 0.2 K/UL (ref 0–0.8)
EOSINOPHIL NFR BLD: 2 % (ref 0.5–7.8)
ERYTHROCYTE [DISTWIDTH] IN BLOOD BY AUTOMATED COUNT: 17.6 % (ref 11.9–14.6)
GAMMA GLOB MFR UR ELPH: 1.9 MG/DL
HCT VFR BLD AUTO: 41.1 % (ref 41.1–50.3)
HGB BLD-MCNC: 13.3 G/DL (ref 13.6–17.2)
IMM GRANULOCYTES # BLD: 0.1 K/UL (ref 0–0.5)
IMM GRANULOCYTES NFR BLD AUTO: 1 % (ref 0–5)
LYMPHOCYTES # BLD: 3 K/UL (ref 0.5–4.6)
LYMPHOCYTES NFR BLD: 29 % (ref 13–44)
M PROTEIN UR-MCNC: NORMAL MG/DL
MCH RBC QN AUTO: 27 PG (ref 26.1–32.9)
MCHC RBC AUTO-ENTMCNC: 32.4 G/DL (ref 31.4–35)
MCV RBC AUTO: 83.4 FL (ref 79.6–97.8)
MONOCYTES # BLD: 0.9 K/UL (ref 0.1–1.3)
MONOCYTES NFR BLD: 9 % (ref 4–12)
NEUTS SEG # BLD: 6.1 K/UL (ref 1.7–8.2)
NEUTS SEG NFR BLD: 59 % (ref 43–78)
PLATELET # BLD AUTO: 212 K/UL (ref 150–450)
PMV BLD AUTO: 10.2 FL (ref 10.8–14.1)
PROT PATTERN SPEC IFE-IMP: NORMAL
PROT PATTERN UR ELPH-IMP: NORMAL
PROT UR-MCNC: 6 MG/DL
RBC # BLD AUTO: 4.93 M/UL (ref 4.23–5.67)
WBC # BLD AUTO: 10.2 K/UL (ref 4.3–11.1)

## 2018-01-05 PROCEDURE — 74011000302 HC RX REV CODE- 302: Performed by: INTERNAL MEDICINE

## 2018-01-05 PROCEDURE — 36415 COLL VENOUS BLD VENIPUNCTURE: CPT | Performed by: INTERNAL MEDICINE

## 2018-01-05 PROCEDURE — 74011250637 HC RX REV CODE- 250/637: Performed by: INTERNAL MEDICINE

## 2018-01-05 PROCEDURE — 74011250637 HC RX REV CODE- 250/637: Performed by: PHYSICIAN ASSISTANT

## 2018-01-05 PROCEDURE — 74011250636 HC RX REV CODE- 250/636: Performed by: PHYSICIAN ASSISTANT

## 2018-01-05 PROCEDURE — 65660000000 HC RM CCU STEPDOWN

## 2018-01-05 PROCEDURE — 86580 TB INTRADERMAL TEST: CPT | Performed by: INTERNAL MEDICINE

## 2018-01-05 PROCEDURE — 74011250636 HC RX REV CODE- 250/636

## 2018-01-05 PROCEDURE — 88313 SPECIAL STAINS GROUP 2: CPT | Performed by: INTERNAL MEDICINE

## 2018-01-05 PROCEDURE — 76942 ECHO GUIDE FOR BIOPSY: CPT

## 2018-01-05 PROCEDURE — 74011000250 HC RX REV CODE- 250: Performed by: NURSE PRACTITIONER

## 2018-01-05 PROCEDURE — 88304 TISSUE EXAM BY PATHOLOGIST: CPT | Performed by: INTERNAL MEDICINE

## 2018-01-05 PROCEDURE — 94640 AIRWAY INHALATION TREATMENT: CPT

## 2018-01-05 PROCEDURE — 85025 COMPLETE CBC W/AUTO DIFF WBC: CPT | Performed by: INTERNAL MEDICINE

## 2018-01-05 PROCEDURE — 94760 N-INVAS EAR/PLS OXIMETRY 1: CPT

## 2018-01-05 RX ORDER — MORPHINE SULFATE 4 MG/ML
4 INJECTION, SOLUTION INTRAMUSCULAR; INTRAVENOUS ONCE
Status: COMPLETED | OUTPATIENT
Start: 2018-01-05 | End: 2018-01-05

## 2018-01-05 RX ORDER — LIDOCAINE HYDROCHLORIDE 20 MG/ML
1-10 INJECTION, SOLUTION INFILTRATION; PERINEURAL ONCE
Status: ACTIVE | OUTPATIENT
Start: 2018-01-05 | End: 2018-01-05

## 2018-01-05 RX ORDER — MORPHINE SULFATE 2 MG/ML
4 INJECTION, SOLUTION INTRAMUSCULAR; INTRAVENOUS ONCE
Status: ACTIVE | OUTPATIENT
Start: 2018-01-05 | End: 2018-01-06

## 2018-01-05 RX ORDER — ONDANSETRON 2 MG/ML
4 INJECTION INTRAMUSCULAR; INTRAVENOUS ONCE
Status: COMPLETED | OUTPATIENT
Start: 2018-01-05 | End: 2018-01-05

## 2018-01-05 RX ADMIN — BUDESONIDE 500 MCG: 0.5 INHALANT RESPIRATORY (INHALATION) at 22:48

## 2018-01-05 RX ADMIN — Medication 10 ML: at 04:46

## 2018-01-05 RX ADMIN — CARVEDILOL 25 MG: 25 TABLET, FILM COATED ORAL at 17:11

## 2018-01-05 RX ADMIN — GUAIFENESIN 1200 MG: 600 TABLET, EXTENDED RELEASE ORAL at 20:01

## 2018-01-05 RX ADMIN — CARVEDILOL 25 MG: 25 TABLET, FILM COATED ORAL at 09:21

## 2018-01-05 RX ADMIN — BUDESONIDE 500 MCG: 0.5 INHALANT RESPIRATORY (INHALATION) at 09:29

## 2018-01-05 RX ADMIN — FUROSEMIDE 40 MG: 40 TABLET ORAL at 17:11

## 2018-01-05 RX ADMIN — PANTOPRAZOLE SODIUM 40 MG: 40 TABLET, DELAYED RELEASE ORAL at 09:21

## 2018-01-05 RX ADMIN — GUAIFENESIN 1200 MG: 600 TABLET, EXTENDED RELEASE ORAL at 09:21

## 2018-01-05 RX ADMIN — ALLOPURINOL 100 MG: 100 TABLET ORAL at 09:22

## 2018-01-05 RX ADMIN — FUROSEMIDE 40 MG: 40 TABLET ORAL at 09:21

## 2018-01-05 RX ADMIN — ONDANSETRON 4 MG: 2 INJECTION INTRAMUSCULAR; INTRAVENOUS at 13:04

## 2018-01-05 RX ADMIN — MORPHINE SULFATE 2 MG: 4 INJECTION, SOLUTION INTRAMUSCULAR; INTRAVENOUS at 12:55

## 2018-01-05 RX ADMIN — OXYCODONE HYDROCHLORIDE 5 MG: 5 TABLET ORAL at 20:00

## 2018-01-05 RX ADMIN — TUBERCULIN PURIFIED PROTEIN DERIVATIVE 5 UNITS: 5 INJECTION, SOLUTION INTRADERMAL at 17:12

## 2018-01-05 RX ADMIN — ATORVASTATIN CALCIUM 20 MG: 10 TABLET, FILM COATED ORAL at 20:01

## 2018-01-05 RX ADMIN — PANTOPRAZOLE SODIUM 40 MG: 40 TABLET, DELAYED RELEASE ORAL at 17:11

## 2018-01-05 NOTE — PROGRESS NOTES
TRANSFER - OUT REPORT:    Verbal report given to Faraz Souza RN on Ena Corbett  being transferred to [address] [place] [address] for routine post - op       Report consisted of patients Situation, Background, Assessment and   Recommendations(SBAR). Information from the following report(s) SBAR, Kardex, Procedure Summary and MAR was reviewed with the receiving nurse. Lines:   Peripheral IV 12/26/17 Left Antecubital (Active)   Site Assessment Clean, dry, & intact 1/5/2018  8:00 AM   Phlebitis Assessment 0 1/5/2018  8:00 AM   Infiltration Assessment 0 1/5/2018  8:00 AM   Dressing Status Clean, dry, & intact 1/5/2018  8:00 AM   Dressing Type Tape;Transparent 1/5/2018  8:00 AM   Hub Color/Line Status Flushed;Patent 1/5/2018  8:00 AM   Action Taken Blood drawn 12/26/2017 12:43 PM   Alcohol Cap Used No 1/5/2018  4:46 AM        Opportunity for questions and clarification was provided.

## 2018-01-05 NOTE — ADT AUTH CERT NOTES
Utilization Review           Cardiology [address] Day 1 (1/2/2018) by Mary Anne Gold RN        Review Status Review Entered       Completed 1/3/2018       Details              Care Day: 1 Care Date: 1/2/2018 Level of Care: Inpatient Floor       Guideline Day 1        Clinical Status       ( ) * Clinical Indications met [I]              Interventions       (X) Inpatient interventions as needed       1/3/2018 11:19 AM EST by Jayda Myers         RESP CARE CONSULT, CPAP, DAILY WGT, STRICT I/O, CARD MONITORING, CARDIAC DIET, NEPH CONSULT  DUONEB X 1, PULMICORT NEB BID, HEPARIN DRIP, ROXICODONE 5 MG PO X 1, LASIX IV CHANGED TO 40 MG BID PO, ZOFRAN 4 MG IV X 1                                          * Milestone              Additional Notes       With his new HFrEF, a heart cath is indicated.  Pt. Understands and has consented.                Add Lasix, Lovenox 40               Check sed rate, lft's and spep               Ask renal to review                       HEART CATH       PROCEDURES PERFORMED:       1.  Coronary angiography.       2.  Measurement of left ventricular pressure.               HISTORY:  This is a 29-year-old gentleman who has systolic heart failure.  He has had a new decline in left ventricular function. [facility] has a prior history of coronary artery disease.  A repeat coronary angiography is recommended.  He has chronic kidney disease, so a left ventriculogram will not be performed.       IMPRESSION:       1.  Elevated left ventricular end diastolic pressure.       2.  Mild coronary artery disease that is unchanged angiographically from his last heart catheterization in 2015.               RECOMMENDATIONS:  Continue medical therapy.                      PULM NOTE       --diuretics per cardiology; respiratory status is stable       --ambulating oximetry today.       --continue bronchodilators on a prn basis as he does at home       --will work with patient on CPAP equipment, and his DME company agreed to work on this as well. [facility] will be available as needed.                             NEPH CONSULT       1. CHF        2. NINO on CKD stage III s/p contrast from cardiac cath       3. Pulmonary edema       4. TAZ       5. Restrictive lung disease       Overall pt educated on need to adhere to recommendations of low salt, fluid restrictions, diuretics and CPAP. He probably would benefit from cardiac rehab, defer this decision to Cardiology team.                     BP 97/59, TEMP 97.6, HR 69, RR 20, O2 96 ON RA              SED RATE 66, ALB 3.0, , GLUC 109, BUN 38, CREAT 1.88, CA 7.6              12/31: BNP 1691       1/1:        1/2:               IMMUNOGLOB G, A, M: RESULT PENDING       NO IMAGING           Cardiology GRG - Clinical Indications for Admission to Inpatient Care by Tiago Jessica RN        Review Status Review Entered       Completed 1/3/2018       Details              Clinical Indications for Admission to Inpatient Care       Most Recent : Renate Leone Most Recent Date: 1/3/2018 11:18 AM EST       (X) Hospital admission is needed for appropriate care of the patient because of 1 or more of the        following  (1) (2) (3):          (X) Cardiology condition, symptom, or finding for which emergency and observation care have failed           or are not considered appropriate.  See General Criteria: Observation Care, General Admission           Criteria, or Pediatric General Admission Criteria guideline as appropriate.          1/3/2018 11:18 AM EST by Enrrique Frazier      PHYSICIAN ADVISOR (R1) RECOMMENDS INPATIENT

## 2018-01-05 NOTE — PROGRESS NOTES
Spoke with Skip Jolly from [employer]. Pt is allowed to eat this morning. Will hold Lovenox.   Pt to have Bx this morning

## 2018-01-05 NOTE — PROGRESS NOTES
Bedside and verbal report given to Georges Freeman RN by Rossy Montoya RN. Report include: SBAR/Kardex, Recent results, I/O's (24 hour urine), MAR, Rhythm (SR with PAC's).

## 2018-01-05 NOTE — PROGRESS NOTES
TRANSFER - IN REPORT:    Verbal report received from Juani Montano on [employer] being received from IR  for routine progression of care. Report consisted of patients Situation, Background, Assessment and Recommendations(SBAR). Information from the following report(s) Procedure Summary was reviewed. Opportunity for questions and clarification was provided. Assessment completed upon patients arrival to unit and care assumed. Patient received to room 336. Patient connected to monitor and assessment completed. Plan of care reviewed. Patient oriented to room and call light. Patient aware to use call light to communicate any chest pain or needs.

## 2018-01-05 NOTE — PROGRESS NOTES
Bedside and verbal shift report given to Ben Storm RN by Jason Sheppard RN. Report included: SBAR/Kardex, MAR, Recent results, I/O's (24 hour urine collection), and Cardiac rhythm (SR first degree block).

## 2018-01-05 NOTE — PROGRESS NOTES
Problem: Heart Failure: Day 4  Goal: Activity/Safety  Outcome: Progressing Towards Goal  Pt in room. Breathing easily. Expiratory wheezes.

## 2018-01-05 NOTE — PROGRESS NOTES
[facility] CARDIOLOGY PROGRESS NOTE           1/5/2018 8:56 AM    Admit Date: 12/26/2017      Subjective:   New sCHF and LHC showed mild nonobstructive CAD. Denies any chest pain or SOB. Mild active wheezing. ROS:  Cardiovascular:  As noted above    Objective:      Vitals:    01/05/18 0859 01/05/18 0930 01/05/18 1141 01/05/18 1516   BP: 127/80  113/75 106/69   Pulse: 68  68 66   Resp: 18  18 17   Temp: 97 °F (36.1 °C)   97.9 °F (36.6 °C)   SpO2: 96% 95%  99%   Weight:           Physical Exam:  General-No Acute Distress  Neck- supple, no JVD  CV- regular rate and rhythm no MRG  Lung- clear bilaterally  Abd- soft, nontender, nondistended  Ext- no edema bilaterally. Skin- warm and dry    Data Review:   Recent Labs      01/03/18   1032   NA  140   K  3.5   BUN  36*   CREA  1.73*   GLU  110*       Assessment/Plan:     Principal Problem:    Dyspnea (12/26/2017)    Likely multifactorial including COPD, untreated TAZ, LV dysfunction. Diuresed well since admission. Active Problems:    CKD (chronic kidney disease) stage 3, GFR 30-59 ml/min (9/29/2017)    Nephrology saw patient, labs are stable       Chronic systolic heart failure ([address] (12/4/2015)    Diuresing well on po lasix. Continue hydralazine, Imdur, coreg, lasix but no ACE-I/ARB with CKD. SPEP and UPEP pending. S/P fat pad biopsy today.       Coronary atherosclerosis of native coronary vessel (9/29/2017)    S/p LHC showing mild nonobstructive CAD      Hypertension (9/29/2017)    continue meds      COPD, moderate ([address] (9/29/2017)    Per pulmonary       TAZ (obstructive sleep apnea) (10/2/2017)  Per pulmonary working with DME company to help with CPAP equipment            Raya Manriquez MD  1/5/2018 8:56 AM

## 2018-01-06 LAB
ANION GAP SERPL CALC-SCNC: 9 MMOL/L (ref 7–16)
BASOPHILS # BLD: 0 K/UL (ref 0–0.2)
BASOPHILS NFR BLD: 0 % (ref 0–2)
BNP SERPL-MCNC: 595 PG/ML
BUN SERPL-MCNC: 24 MG/DL (ref 8–23)
CALCIUM SERPL-MCNC: 8 MG/DL (ref 8.3–10.4)
CHLORIDE SERPL-SCNC: 108 MMOL/L (ref 98–107)
CO2 SERPL-SCNC: 27 MMOL/L (ref 21–32)
CREAT SERPL-MCNC: 1.56 MG/DL (ref 0.8–1.5)
DIFFERENTIAL METHOD BLD: ABNORMAL
EOSINOPHIL # BLD: 0.2 K/UL (ref 0–0.8)
EOSINOPHIL NFR BLD: 2 % (ref 0.5–7.8)
ERYTHROCYTE [DISTWIDTH] IN BLOOD BY AUTOMATED COUNT: 17.5 % (ref 11.9–14.6)
GLUCOSE SERPL-MCNC: 99 MG/DL (ref 65–100)
HCT VFR BLD AUTO: 39.4 % (ref 41.1–50.3)
HGB BLD-MCNC: 12.6 G/DL (ref 13.6–17.2)
IMM GRANULOCYTES # BLD: 0.1 K/UL (ref 0–0.5)
IMM GRANULOCYTES NFR BLD AUTO: 1 % (ref 0–5)
LYMPHOCYTES # BLD: 3 K/UL (ref 0.5–4.6)
LYMPHOCYTES NFR BLD: 29 % (ref 13–44)
MAGNESIUM SERPL-MCNC: 2.2 MG/DL (ref 1.8–2.4)
MCH RBC QN AUTO: 26.8 PG (ref 26.1–32.9)
MCHC RBC AUTO-ENTMCNC: 32 G/DL (ref 31.4–35)
MCV RBC AUTO: 83.8 FL (ref 79.6–97.8)
MM INDURATION POC: NORMAL MM (ref 0–5)
MM INDURATION POC: NORMAL MM (ref 0–5)
MONOCYTES # BLD: 1.2 K/UL (ref 0.1–1.3)
MONOCYTES NFR BLD: 12 % (ref 4–12)
NEUTS SEG # BLD: 6 K/UL (ref 1.7–8.2)
NEUTS SEG NFR BLD: 56 % (ref 43–78)
PLATELET # BLD AUTO: 230 K/UL (ref 150–450)
PMV BLD AUTO: 10.4 FL (ref 10.8–14.1)
POTASSIUM SERPL-SCNC: 4.2 MMOL/L (ref 3.5–5.1)
PPD POC: POSITIVE NEGATIVE
PPD POC: POSITIVE NEGATIVE
RBC # BLD AUTO: 4.7 M/UL (ref 4.23–5.67)
SODIUM SERPL-SCNC: 144 MMOL/L (ref 136–145)
WBC # BLD AUTO: 10.5 K/UL (ref 4.3–11.1)

## 2018-01-06 PROCEDURE — 74011250637 HC RX REV CODE- 250/637: Performed by: PHYSICIAN ASSISTANT

## 2018-01-06 PROCEDURE — 74011250637 HC RX REV CODE- 250/637: Performed by: INTERNAL MEDICINE

## 2018-01-06 PROCEDURE — 83880 ASSAY OF NATRIURETIC PEPTIDE: CPT | Performed by: INTERNAL MEDICINE

## 2018-01-06 PROCEDURE — 36415 COLL VENOUS BLD VENIPUNCTURE: CPT | Performed by: INTERNAL MEDICINE

## 2018-01-06 PROCEDURE — 83735 ASSAY OF MAGNESIUM: CPT | Performed by: INTERNAL MEDICINE

## 2018-01-06 PROCEDURE — 74011250636 HC RX REV CODE- 250/636: Performed by: INTERNAL MEDICINE

## 2018-01-06 PROCEDURE — 85025 COMPLETE CBC W/AUTO DIFF WBC: CPT | Performed by: INTERNAL MEDICINE

## 2018-01-06 PROCEDURE — 65660000000 HC RM CCU STEPDOWN

## 2018-01-06 PROCEDURE — 80048 BASIC METABOLIC PNL TOTAL CA: CPT | Performed by: INTERNAL MEDICINE

## 2018-01-06 PROCEDURE — 94640 AIRWAY INHALATION TREATMENT: CPT

## 2018-01-06 PROCEDURE — 74011000250 HC RX REV CODE- 250: Performed by: NURSE PRACTITIONER

## 2018-01-06 PROCEDURE — 97161 PT EVAL LOW COMPLEX 20 MIN: CPT

## 2018-01-06 PROCEDURE — 94760 N-INVAS EAR/PLS OXIMETRY 1: CPT

## 2018-01-06 RX ADMIN — FUROSEMIDE 60 MG: 20 TABLET ORAL at 17:38

## 2018-01-06 RX ADMIN — ENOXAPARIN SODIUM 40 MG: 40 INJECTION SUBCUTANEOUS at 12:28

## 2018-01-06 RX ADMIN — PANTOPRAZOLE SODIUM 40 MG: 40 TABLET, DELAYED RELEASE ORAL at 05:27

## 2018-01-06 RX ADMIN — BUDESONIDE 500 MCG: 0.5 INHALANT RESPIRATORY (INHALATION) at 08:05

## 2018-01-06 RX ADMIN — FUROSEMIDE 60 MG: 20 TABLET ORAL at 09:06

## 2018-01-06 RX ADMIN — PANTOPRAZOLE SODIUM 40 MG: 40 TABLET, DELAYED RELEASE ORAL at 17:38

## 2018-01-06 RX ADMIN — ALLOPURINOL 100 MG: 100 TABLET ORAL at 09:05

## 2018-01-06 RX ADMIN — CARVEDILOL 25 MG: 25 TABLET, FILM COATED ORAL at 17:38

## 2018-01-06 RX ADMIN — GUAIFENESIN 1200 MG: 600 TABLET, EXTENDED RELEASE ORAL at 09:06

## 2018-01-06 RX ADMIN — ASPIRIN 81 MG: 81 TABLET ORAL at 09:05

## 2018-01-06 RX ADMIN — BUDESONIDE 500 MCG: 0.5 INHALANT RESPIRATORY (INHALATION) at 20:30

## 2018-01-06 RX ADMIN — GUAIFENESIN 1200 MG: 600 TABLET, EXTENDED RELEASE ORAL at 21:31

## 2018-01-06 RX ADMIN — CARVEDILOL 25 MG: 25 TABLET, FILM COATED ORAL at 09:05

## 2018-01-06 RX ADMIN — ATORVASTATIN CALCIUM 20 MG: 10 TABLET, FILM COATED ORAL at 21:31

## 2018-01-06 NOTE — PROGRESS NOTES
Verbal bedside report given to Terry Mar oncoming RN. Patient's situation, background, assessment and recommendations provided. Opportunity for questions provided. Oncoming RN assumed care of patient.

## 2018-01-06 NOTE — PROGRESS NOTES
Patient with PPD >10 mm induration. Granuloma on CXR. Sx of cough started today per patient but denied this earlier. No fevers.     Plan  Rule out reactivated on latent TB  Move to negative pressure room  Sputum for AFB x 3   ID consult in am

## 2018-01-06 NOTE — PROGRESS NOTES
Problem: Falls - Risk of  Goal: *Absence of Falls  Document Sue Fall Risk and appropriate interventions in the flowsheet.    Outcome: Progressing Towards Goal  Fall Risk Interventions:  Mobility Interventions: Communicate number of staff needed for ambulation/transfer, Patient to call before getting OOB         Medication Interventions: Teach patient to arise slowly, Patient to call before getting OOB         History of Falls Interventions: Bed/chair exit alarm, Door open when patient unattended, Room close to nurse's station

## 2018-01-06 NOTE — PROGRESS NOTES
Problem: Mobility Impaired (Adult and Pediatric)  Goal: *Acute Goals and Plan of Care (Insert Text)  LTG:  (1.)Mr. Derek Mcgowan will move from supine to sit and sit to supine , scoot up and down and roll side to side in bed with INDEPENDENCE within 7 day(s). (2.)Mr. Derek Mcgowan will transfer from bed to chair and chair to bed with MODIFIED INDEPENDENCE using the least restrictive device within 7 day(s). (3.)Mr. Derek Mcgowan will ambulate with MODIFIED INDEPENDENCE for >or 150 feet with the least restrictive device, appropriate gait pattern and good dynamic standing balance within 7 day(s). (4.)Mr. Derek Mcgowan will perform B LE therapeutic exercises x 20 reps with INDEPENDENCE within 7 days to increase strength for improved safety and independence in transfers and gait. (5.)Mr. Derek Mcgowan will ascend/descend 4 steps with 1-2 handrail(s) and CGA within 7 day(s) for safe entry/exit of home.      ________________________________________________________________________________________________      PHYSICAL THERAPY: Initial Assessment 1/6/2018  INPATIENT: Hospital Day: 12  Payor: [employer] / Plan: [address] [place] [address] HMO / Product Type: Managed Care Medicare /      NAME/AGE/GENDER: Bre Mustafa is a 74 y.o. male   PRIMARY DIAGNOSIS: Dyspnea  Dyspnea Dyspnea Dyspnea        ICD-10: Treatment Diagnosis:   · Difficulty in walking, Not elsewhere classified (R26.2)  · History of falling (Z91.81)   Precaution/Allergies:  Pcn [penicillins]      ASSESSMENT:     Mr. Derek Mcgowan presents supine in bed, agreeable to treatment. Very pleasant gentleman admitted for dyspnea. Pt lives with wife and was independent with all functional mobility prior to admission. Pt now requiring CGA to supervision for all mobility. Pt has decreased tolerance for physical activity and demonstrates decreased dynamic standing balance. Pt would benefit from skilled PT to maximize functional potential and reduce the risk of falls.      This section established at most recent assessment   PROBLEM LIST (Impairments causing functional limitations):  1. Decreased ADL/Functional Activities  2. Decreased Transfer Abilities  3. Decreased Ambulation Ability/Technique  4. Decreased Balance  5. Decreased Activity Tolerance  6. Decreased [place] with Home Exercise Program   INTERVENTIONS PLANNED: (Benefits and precautions of physical therapy have been discussed with the patient.)  1. Balance Exercise  2. Bed Mobility  3. Gait Training  4. Home Exercise Program (HEP)  5. Therapeutic Activites  6. Therapeutic Exercise/Strengthening  7. Transfer Training     TREATMENT PLAN: Frequency/Duration: 3 times a week for duration of hospital stay  Rehabilitation Potential For Stated Goals: Good     RECOMMENDED REHABILITATION/EQUIPMENT: (at time of discharge pending progress): Due to the probability of continued deficits (see above) this patient will likely need continued skilled physical therapy after discharge. Equipment:    to be determined              HISTORY:   History of Present Injury/Illness (Reason for Referral):  Per chart: Lissy Murphy is a 74 y.o. male admitted for dyspnea. He has a h/o CAD w LHC 6-2015 showing mild-mod CAD, sHF w echo 9-2017 showing EF 30-35% with KATIE, mild MR, mild-mod TR. He had a life vest but did not wear it and gave it back. He has COPD (PFTs w moderate obstructive/restrictive defect) but can't afford his new medications he was given by pulmonary, severe TAZ and does not use CPAP (does not have cord), and CKD. He has had increased SOB x 2 days with a sore throat and productive cough w yellow sputum. He has not had fevers but several family members who have been sick with flu like symptoms. He has used his albuterol that he has at home (not new medicine that was prescribed by pulmonary) which gave him temporary relief of SOB. His SOB got gradually worse until he had dyspnea at rest and came to the ER.   In ER he was given one albuterol treatment but was still extremely SOB, more labored breathing, and developed acute chest tightness, severe, epigastric region radiating to L breast without nausea or diaphoresis. CP lasted about 10 minutes. Breathing improved with second albuterol treatment. BP elevated 157/108 and he was given nitro and ASA and symptoms improved. At this point his breathing is better, chest pain eased off. In ER troponin negative, WBC 6.9, hgb 13.8, , K 4.1, BUn 18, cr 1.52, BNP 1688, CXR no acute process, EKG NSR w rate 75 without ST/T wave changes. SOB is much improved. LE edema is unchanged, weight is stable, he adheres to a low NA diet. No dizziness, palpitations or syncope. He has been compliant with his cardiac meds. Past Medical History/Comorbidities:   Mr. Carlos Enrique Mi  has a past medical history of Acute CHF (congestive heart failure) ([facility] [address] (4/25/2015); Acute combined systolic and diastolic congestive heart failure ([facility] [address] (4/28/2015); Chronic obstructive pulmonary disease ([facility] [address] De Quervain's tenosynovitis, right (4/28/2015); Dyspnea on exertion (6/28/2015); Elevated serum creatinine (4/25/2015); Elevated troponin (6/28/2015); History of coronary artery disease; History of right knee surgery; History of shingles; Malignant hypertension (4/25/2015); and MI (myocardial infarction). Mr. Carlos Enrique Mi  has a past surgical history that includes hx knee arthroscopy (Right) and hx heart catheterization (6/29/2015).   Social History/Living Environment:   Home Environment: Private residence  # Steps to Enter: 4  Rails to Enter: Yes  Hand Rails : Bilateral  Wheelchair Ramp: No  One/Two Story Residence: One story  Living Alone: No  Support Systems: Family member(s), Friends \ neighbors  Patient Expects to be Discharged to[de-identified] Private residence  Current DME Used/Available at Home: Walker, rolling, Walker, rollator  Tub or Shower Type: Tub/Shower combination  Prior Level of Function/Work/Activity:  Pt was independent with all functional mobility and gait without assistive device. Pt lives with wife who can assist upon discharge. One level home, 4 steps to enter. One recent fall. Drives. Does not work. Personal Factors:          Sex:  male        Age:  74 y.o. Number of Personal Factors/Comorbidities that affect the Plan of Care: 3+: HIGH COMPLEXITY   EXAMINATION:   Most Recent Physical Functioning:   Gross Assessment:  AROM: Within functional limits  Strength: Within functional limits  Sensation: Intact               Posture:  Posture (WDL): Exceptions to WDL  Posture Assessment: Rounded shoulders  Balance:  Sitting: Intact  Standing: Impaired  Standing - Static: Good  Standing - Dynamic : Fair Bed Mobility:  Rolling: Supervision  Supine to Sit: Supervision  Sit to Supine: Supervision  Scooting: Supervision  Wheelchair Mobility:     Transfers:  Sit to Stand: Contact guard assistance  Stand to Sit: Contact guard assistance  Bed to Chair: Contact guard assistance  Gait:     Speed/Lu: Pace decreased (<100 feet/min)  Step Length: Right shortened;Left shortened  Gait Abnormalities: Decreased step clearance  Distance (ft): 20 Feet (ft)  Assistive Device:  (none)  Ambulation - Level of Assistance: Contact guard assistance      Body Structures Involved:  1. Heart  2. Lungs  3. Muscles Body Functions Affected:  1. Cardio  2. Respiratory  3. Movement Related Activities and Participation Affected:  1. General Tasks and Demands  2. Mobility  3. Self Care   Number of elements that affect the Plan of Care: 4+: HIGH COMPLEXITY   CLINICAL PRESENTATION:   Presentation: Stable and uncomplicated: LOW COMPLEXITY   CLINICAL DECISION MAKIN [facility] [address] [id number] AM-PAC 6 Clicks   Basic Mobility Inpatient Short Form  How much difficulty does the patient currently have. .. Unable A Lot A Little None   1. Turning over in bed (including adjusting bedclothes, sheets and blankets)? [] 1   [] 2   [] 3   [x] 4   2.   Sitting down on and standing up from a chair with arms ( e.g., wheelchair, bedside commode, etc.)   [] 1   [] 2   [x] 3   [] 4   3. Moving from lying on back to sitting on the side of the bed? [] 1   [] 2   [] 3   [x] 4   How much help from another person does the patient currently need. .. Total A Lot A Little None   4. Moving to and from a bed to a chair (including a wheelchair)? [] 1   [] 2   [x] 3   [] 4   5. Need to walk in hospital room? [] 1   [] 2   [x] 3   [] 4   6. Climbing 3-5 steps with a railing? [] 1   [] 2   [x] 3   [] 4   © 2007, Trustees of [address] [place] [address] [address] [address], under license to [employer]. All rights reserved      Score:  Initial: 20 Most Recent: X (Date: -- )    Interpretation of Tool:  Represents activities that are increasingly more difficult (i.e. Bed mobility, Transfers, Gait). Score 24 23 22-20 19-15 14-10 9-7 6     Modifier CH CI CJ CK CL CM CN      ? Mobility - Walking and Moving Around:     - CURRENT STATUS: CJ - 20%-39% impaired, limited or restricted    - GOAL STATUS: CJ - 20%-39% impaired, limited or restricted    - D/C STATUS:  ---------------To be determined---------------  Payor: Leander Lay / Plan: [address] [place] [address] / Product Type: Managed Care Medicare /      Medical Necessity:     · Patient demonstrates good rehab potential due to higher previous functional level. Reason for Services/Other Comments:  · Patient continues to require present interventions due to patient's inability to function at baseline. Use of outcome tool(s) and clinical judgement create a POC that gives a: Clear prediction of patient's progress: LOW COMPLEXITY            TREATMENT:   (In addition to Assessment/Re-Assessment sessions the following treatments were rendered)   Pre-treatment Symptoms/Complaints:  \"Ya'll have kept in this bed like a halfway. \"  Pain: Initial:   Pain Intensity 1: 0  Post Session:  0/10     Assessment/Reassessment only, no treatment provided today    Braces/Orthotics/Lines/Etc:   · O2 Device: Room air  Treatment/Session Assessment:    · Response to Treatment:  Tolerated without difficulty. Refused further ambulation however as dinner tray arrived. · Interdisciplinary Collaboration:   o Physical Therapist  o Registered Nurse  · After treatment position/precautions:   o Up in chair  o Bed/Chair-wheels locked  o Bed in low position  o Call light within reach  o RN notified  o deroyal pad in place and alarm ON   · Compliance with Program/Exercises: Will assess as treatment progresses. · Recommendations/Intent for next treatment session: \"Next visit will focus on advancements to more challenging activities and reduction in assistance provided\".   Total Treatment Duration:  PT Patient Time In/Time Out  Time In: 9951  Time Out: [address] [address],

## 2018-01-06 NOTE — PROGRESS NOTES
Bedside and Verbal shift change report given to self (oncoming nurse) by Bryan Ma RN (offgoing nurse).  Report included the following information SBAR, Kardex, ED Summary, Procedure Summary, MAR, Recent Results and Cardiac Rhythm SR.

## 2018-01-06 NOTE — PROGRESS NOTES
[facility] CARDIOLOGY PROGRESS NOTE           1/6/2018 8:56 AM    Admit Date: 12/26/2017      Subjective:   New sCHF and LHC showed mild nonobstructive CAD. Denies any chest pain or SOB. Mild active wheezing. ROS:  Cardiovascular:  As noted above    Objective:      Vitals:    01/05/18 2107 01/05/18 2250 01/06/18 0111 01/06/18 0540   BP: 106/68  98/53 97/50   Pulse: (!) 55  60 60   Resp: 18 18 18   Temp: 97.4 °F (36.3 °C)  97.5 °F (36.4 °C) 97.6 °F (36.4 °C)   SpO2: 97% 98% 99% 92%   Weight:    93.6 kg (206 lb 6.4 oz)       Physical Exam:  General-No Acute Distress  Neck- supple, no JVD  CV- regular rate and rhythm no MRG  Lung- clear bilaterally  Abd- soft, nontender, nondistended  Ext- no edema bilaterally. Skin- warm and dry    Data Review:   Recent Labs      01/06/18   0526  01/05/18   1706  01/03/18   1032   NA  144   --   140   K  4.2   --   3.5   MG  2.2   --    --    BUN  24*   --   36*   CREA  1.56*   --   1.73*   GLU  99   --   110*   WBC  10.5  10.2   --    HGB  12.6*  13.3*   --    HCT  39.4*  41.1   --    PLT  230  212   --        Assessment/Plan:     Principal Problem:    Dyspnea (12/26/2017)    Likely multifactorial including COPD, untreated TAZ, LV dysfunction. Diuresed well since admission. Active Problems:    CKD (chronic kidney disease) stage 3, GFR 30-59 ml/min (9/29/2017)    Nephrology saw patient, labs are stable       Chronic systolic heart failure ([facility] [address] (12/4/2015)    Diuresing well on po lasix. Continue hydralazine, Imdur, coreg, lasix but no ACE-I/ARB with CKD. SPEP and UPEP inconclusive.   S/P fat pad biopsy and results are pending      Coronary atherosclerosis of native coronary vessel (9/29/2017)    S/p LHC showing mild nonobstructive CAD      Hypertension (9/29/2017)    continue meds      COPD, moderate ([facility] [address] (9/29/2017)    Per pulmonary       TAZ (obstructive sleep apnea) (10/2/2017)  Per pulmonary working with [employer] to help with CPAP equipment Patrizia Dean MD  1/6/2018 8:56 AM

## 2018-01-06 NOTE — PROGRESS NOTES
Problem: Falls - Risk of  Goal: *Absence of Falls  Document Sue Fall Risk and appropriate interventions in the flowsheet.    Outcome: Progressing Towards Goal  Fall Risk Interventions:  Mobility Interventions: Bed/chair exit alarm, Communicate number of staff needed for ambulation/transfer, Patient to call before getting OOB         Medication Interventions: Bed/chair exit alarm         History of Falls Interventions: Bed/chair exit alarm

## 2018-01-06 NOTE — PROGRESS NOTES
LMSW received D/C planning consult. Noted that PT/OT is not following pt. Will clarify with physician that pt can participate in therapies. If rehab needed pt has [employer] which will require PT/OT evals and a precert to cover any in pt rehab. Will follow up and assist further as appropriate.

## 2018-01-06 NOTE — PROGRESS NOTES
Bedside and Verbal shift change report given to Adrianna Chen RN (oncoming nurse) by self [facility] ).  Report included the following information SBAR, Kardex, ED Summary, Procedure Summary, Intake/Output, MAR, Recent Results and Cardiac Rhythm SR.

## 2018-01-07 LAB
ANION GAP SERPL CALC-SCNC: 7 MMOL/L (ref 7–16)
BASOPHILS # BLD: 0 K/UL (ref 0–0.2)
BASOPHILS NFR BLD: 0 % (ref 0–2)
BNP SERPL-MCNC: 461 PG/ML
BUN SERPL-MCNC: 26 MG/DL (ref 8–23)
CALCIUM SERPL-MCNC: 8.6 MG/DL (ref 8.3–10.4)
CHLORIDE SERPL-SCNC: 103 MMOL/L (ref 98–107)
CO2 SERPL-SCNC: 31 MMOL/L (ref 21–32)
CREAT SERPL-MCNC: 1.67 MG/DL (ref 0.8–1.5)
DIFFERENTIAL METHOD BLD: ABNORMAL
EOSINOPHIL # BLD: 0.3 K/UL (ref 0–0.8)
EOSINOPHIL NFR BLD: 3 % (ref 0.5–7.8)
ERYTHROCYTE [DISTWIDTH] IN BLOOD BY AUTOMATED COUNT: 17.5 % (ref 11.9–14.6)
GLUCOSE SERPL-MCNC: 85 MG/DL (ref 65–100)
HCT VFR BLD AUTO: 41.6 % (ref 41.1–50.3)
HGB BLD-MCNC: 13.5 G/DL (ref 13.6–17.2)
IMM GRANULOCYTES # BLD: 0.1 K/UL (ref 0–0.5)
IMM GRANULOCYTES NFR BLD AUTO: 1 % (ref 0–5)
LYMPHOCYTES # BLD: 2.9 K/UL (ref 0.5–4.6)
LYMPHOCYTES NFR BLD: 30 % (ref 13–44)
MCH RBC QN AUTO: 26.9 PG (ref 26.1–32.9)
MCHC RBC AUTO-ENTMCNC: 32.5 G/DL (ref 31.4–35)
MCV RBC AUTO: 82.9 FL (ref 79.6–97.8)
MM INDURATION POC: NORMAL MM (ref 0–5)
MONOCYTES # BLD: 0.9 K/UL (ref 0.1–1.3)
MONOCYTES NFR BLD: 9 % (ref 4–12)
NEUTS SEG # BLD: 5.5 K/UL (ref 1.7–8.2)
NEUTS SEG NFR BLD: 57 % (ref 43–78)
PLATELET # BLD AUTO: 240 K/UL (ref 150–450)
PMV BLD AUTO: 10.2 FL (ref 10.8–14.1)
POTASSIUM SERPL-SCNC: 4 MMOL/L (ref 3.5–5.1)
PPD POC: POSITIVE NEGATIVE
RBC # BLD AUTO: 5.02 M/UL (ref 4.23–5.67)
SODIUM SERPL-SCNC: 141 MMOL/L (ref 136–145)
WBC # BLD AUTO: 9.6 K/UL (ref 4.3–11.1)

## 2018-01-07 PROCEDURE — 74011250637 HC RX REV CODE- 250/637: Performed by: PHYSICIAN ASSISTANT

## 2018-01-07 PROCEDURE — 87116 MYCOBACTERIA CULTURE: CPT | Performed by: INTERNAL MEDICINE

## 2018-01-07 PROCEDURE — 65660000000 HC RM CCU STEPDOWN

## 2018-01-07 PROCEDURE — 80048 BASIC METABOLIC PNL TOTAL CA: CPT | Performed by: INTERNAL MEDICINE

## 2018-01-07 PROCEDURE — 94640 AIRWAY INHALATION TREATMENT: CPT

## 2018-01-07 PROCEDURE — 74011250637 HC RX REV CODE- 250/637: Performed by: INTERNAL MEDICINE

## 2018-01-07 PROCEDURE — 76450000000

## 2018-01-07 PROCEDURE — 85025 COMPLETE CBC W/AUTO DIFF WBC: CPT | Performed by: INTERNAL MEDICINE

## 2018-01-07 PROCEDURE — 74011000250 HC RX REV CODE- 250: Performed by: NURSE PRACTITIONER

## 2018-01-07 PROCEDURE — 99232 SBSQ HOSP IP/OBS MODERATE 35: CPT | Performed by: INTERNAL MEDICINE

## 2018-01-07 PROCEDURE — 94760 N-INVAS EAR/PLS OXIMETRY 1: CPT

## 2018-01-07 PROCEDURE — 36415 COLL VENOUS BLD VENIPUNCTURE: CPT | Performed by: INTERNAL MEDICINE

## 2018-01-07 PROCEDURE — 74011250636 HC RX REV CODE- 250/636: Performed by: INTERNAL MEDICINE

## 2018-01-07 PROCEDURE — 83880 ASSAY OF NATRIURETIC PEPTIDE: CPT | Performed by: INTERNAL MEDICINE

## 2018-01-07 RX ORDER — FUROSEMIDE 40 MG/1
40 TABLET ORAL 2 TIMES DAILY
Status: DISCONTINUED | OUTPATIENT
Start: 2018-01-07 | End: 2018-01-09

## 2018-01-07 RX ADMIN — ATORVASTATIN CALCIUM 20 MG: 10 TABLET, FILM COATED ORAL at 22:13

## 2018-01-07 RX ADMIN — FUROSEMIDE 60 MG: 20 TABLET ORAL at 08:51

## 2018-01-07 RX ADMIN — BUDESONIDE 500 MCG: 0.5 INHALANT RESPIRATORY (INHALATION) at 11:15

## 2018-01-07 RX ADMIN — BUDESONIDE 500 MCG: 0.5 INHALANT RESPIRATORY (INHALATION) at 20:49

## 2018-01-07 RX ADMIN — FUROSEMIDE 40 MG: 40 TABLET ORAL at 17:21

## 2018-01-07 RX ADMIN — GUAIFENESIN 1200 MG: 600 TABLET, EXTENDED RELEASE ORAL at 22:13

## 2018-01-07 RX ADMIN — ASPIRIN 81 MG: 81 TABLET ORAL at 08:51

## 2018-01-07 RX ADMIN — GUAIFENESIN 1200 MG: 600 TABLET, EXTENDED RELEASE ORAL at 08:51

## 2018-01-07 RX ADMIN — PANTOPRAZOLE SODIUM 40 MG: 40 TABLET, DELAYED RELEASE ORAL at 08:51

## 2018-01-07 RX ADMIN — ENOXAPARIN SODIUM 40 MG: 40 INJECTION SUBCUTANEOUS at 11:42

## 2018-01-07 RX ADMIN — Medication 10 ML: at 22:12

## 2018-01-07 RX ADMIN — PANTOPRAZOLE SODIUM 40 MG: 40 TABLET, DELAYED RELEASE ORAL at 17:21

## 2018-01-07 RX ADMIN — ALLOPURINOL 100 MG: 100 TABLET ORAL at 08:51

## 2018-01-07 RX ADMIN — CARVEDILOL 25 MG: 25 TABLET, FILM COATED ORAL at 08:51

## 2018-01-07 RX ADMIN — CARVEDILOL 25 MG: 25 TABLET, FILM COATED ORAL at 17:21

## 2018-01-07 NOTE — PROGRESS NOTES
Bedside and Verbal shift change report given to Petty Norman RN (oncoming nurse) by self [facility]). Report included the following information SBAR, Kardex and MAR.

## 2018-01-07 NOTE — PROGRESS NOTES
Problem: Falls - Risk of  Goal: *Absence of Falls  Document Sue Fall Risk and appropriate interventions in the flowsheet.    Outcome: Progressing Towards Goal  Fall Risk Interventions:  Mobility Interventions: Bed/chair exit alarm, Patient to call before getting OOB    Mentation Interventions: Bed/chair exit alarm, Evaluate medications/consider consulting pharmacy    Medication Interventions: Bed/chair exit alarm         History of Falls Interventions: Bed/chair exit alarm

## 2018-01-07 NOTE — CONSULTS
Infectious Disease Consult    Today's Date: 1/7/2018   Admit Date: 12/26/2017    Impression:   · Positive PPD: patient incidental converter; with PPD placed in anticipation of SNF placement. His admission CXR 12/26 reveals no evidence suggestive of TB and previous CXRs and CT scans also reveal no evidence of TB. In addition patient without any symptoms suggestive of active TB. His risk factors include travel to the [facility] and [place]) 20 years ago as part of his job and his wife states she has had a positive PPD years ago but was never treated. He does not require respiratory isolation. Even given his age of 74 he would be a candidate for INH but if he is going to a SNF would get sputum if possible. He does not have active TB  · HFrEF  · Restrictive Pulmonary Disease by PFTs  · CKD    Plan:   ·  Check HIV  · Discontinue airborne precautions  · Okay to try to get sputum for AFB as objective evidence if SNF requested  · Once sputum obtained; or if unable to obtain would begin INH 300mg po Q24h and B6 50mg po every day for a planned 9 months with monitoring LFTs at week 1,2,4,8,12,16 etc.      Anti-infectives:     Inpat Anti-Infectives     None          Subjective:   Date of Consultation:  January 7, 2018  Referring Physician: Renae/Cardiology    Patient is a 74 y.o. male admitted 12/26/17 with [address] who had a PPD placed in anticipation of going to a SNF and was read as positive 01/06/18. History obtained from chart review and in speaking to patient, patient's wife and nurse. Patient admitted 12/26/17 with a 2 day  History of SOB and productive cough. He has a history of TAZ, moderate restrictive/obstructive pulmonary disease by PFTs, HFrEF (TTE with EF 30-35%). On admission patient treated with albuterol nebs, developed chest tightness and then treated with ASA and nitro and another albuterol neb with improvement. He was admitted by cardiology and treated for heart failure with diuresis.  He underwent cath with the finding of nonobstructing CAD. He has gradually improved however continues to complain of some CLAUDIO. He has stage 3 CKD and was evaluated by nephrology and has been evaluated for amyloidosis and has undergone a fat pad biopsy. Patient had PPD placed earlier this week in anticipation of SNF rehab placement and was read yesterday as being positive. Patient place in airborne isolation, and sputum ordered and TB quant ordered. Patient states he's never had a PPD placed before. He never know anyone with active TB, although his wife states she also had a positive PPD many years ago but was never treated. Patient also traveled to [facility] and [facility] more then 20 years ago as part of his employment; he does not recall any exposure. He was never incarcerated. He denied history of chronic fever, night sweats, weight loss, chronic cough or hemoptysis.       Patient Active Problem List   Diagnosis Code    CKD (chronic kidney disease) stage 3, GFR 30-59 ml/min N18.3    Dyspnea on exertion R06.09    Tobacco abuse Z72.0    Chronic systolic heart failure (HCC) I50.22    Coronary atherosclerosis of native coronary vessel I25.10    Arthritis M19.90    Hypertension I10    COPD, moderate ([facility]) J44.9    High triglycerides E78.1    Gastroesophageal reflux disease without esophagitis K21.9    Mixed hyperlipidemia E78.2    Essential hypertension I10    CHF (congestive heart failure) (formerly [facility]) I50.9    Vomiting R11.10    NINO (acute kidney injury) ([address] N17.9    TAZ (obstructive sleep apnea) G47.33    Atherosclerosis of native coronary artery of native heart with stable angina pectoris (formerly [facility]) I25.118    Dyspnea R06.00     Past Medical History:   Diagnosis Date    Acute CHF (congestive heart failure) ([address] 4/25/2015    Acute combined systolic and diastolic congestive heart failure ([address] 4/28/2015    Chronic obstructive pulmonary disease ([address]     De Quervain's tenosynovitis, right 4/28/2015  Dyspnea on exertion 6/28/2015    Elevated serum creatinine 4/25/2015    Elevated troponin 6/28/2015    History of coronary artery disease     MI at 27 yo    History of right knee surgery     cartilage removal    History of shingles     Malignant hypertension 4/25/2015    MI (myocardial infarction)       Family History   Problem Relation Age of Onset    Hypertension Mother     No Known Problems Father       Social History   Substance Use Topics    Smoking status: Former Smoker     Packs/day: 0.25     Years: 20.00     Types: Cigarettes     Quit date: 4/5/2015    Smokeless tobacco: Never Used    Alcohol use No     Past Surgical History:   Procedure Laterality Date    HX HEART CATHETERIZATION  6/29/2015    no intervention    HX KNEE ARTHROSCOPY Right     removal of cartilage      Prior to Admission medications    Medication Sig Start Date End Date Taking? Authorizing Provider   albuterol-ipratropium (DUO-NEB) 2.5 mg-0.5 mg/3 ml nebu 3 mL by Nebulization route four (4) times daily. Diaignosis--J44.9 12/13/17   River Munoz NP   furosemide (LASIX) 40 mg tablet Take 1 Tab by mouth Before breakfast and dinner. 10/31/17   Titus Odom MD   allopurinol (ZYLOPRIM) 100 mg tablet Take 1 Tab by mouth daily. 7/11/17   Derick Manuel MD   oxyCODONE IR (ROXICODONE) 5 mg immediate release tablet Take 1 Tab by mouth every six (6) hours as needed for Pain. Max Daily Amount: 20 mg. 6/20/17   Wendie Smith MD   carvedilol (COREG) 25 mg tablet Take 25 mg by mouth two (2) times daily (with meals). Historical Provider   albuterol (VENTOLIN HFA) 90 mcg/actuation inhaler Take 2 Puffs by inhalation four (4) times daily. 9/30/16   Kristopher Templeton NP   pantoprazole (PROTONIX) 40 mg tablet Take 1 Tab by mouth Daily (before breakfast). 8/4/16   Derick Manuel MD   aspirin delayed-release 81 mg tablet Take 1 Tab by mouth daily.  4/29/15   Yevonne Kanner, MD       Allergies   Allergen Reactions    Pcn [Penicillins] Other (comments)     \"makes my heart stop\"        Review of Systems:  A comprehensive review of systems was negative except for that written in the History of Present Illness. Objective:     Visit Vitals    BP 97/57 (BP 1 Location: Right arm, BP Patient Position: At rest)    Pulse 65    Temp 96.6 °F (35.9 °C)    Resp 18    Wt 94.3 kg (208 lb)    SpO2 95%    BMI 30.72 kg/m2     Temp (24hrs), Av.8 °F (36 °C), Min:96.4 °F (35.8 °C), Max:97.2 °F (36.2 °C)       Lines:  Peripheral IV:            Physical Exam:    General:  Alert, cooperative, well noursished, well developed, appears stated age   Eyes:  Sclera anicteric. Pupils equally round and reactive to light. Mouth/Throat: Mucous membranes normal, oral pharynx clear, edentulous   Neck: Supple   Lungs:   Decreased BS at bases, no rales   CV:  Regular rate and rhythm,no murmur, click, rub or gallop   Abdomen:   Soft, non-tender. bowel sounds normal. non-distended   Extremities: No cyanosis or edema   Skin: Skin color, texture, turgor normal. no acute rash or lesions   Lymph nodes: Cervical and supraclavicular normal   Musculoskeletal: No swelling or deformity   Lines/Devices:  Intact, no erythema, drainage or tenderness   Psych: Alert and oriented, normal mood affect given the setting       Data Review:     CBC:  Recent Labs      18   0520  18   0526  18   1706   WBC  9.6  10.5  10.2   GRANS  57  56  59   MONOS  9  12  9   EOS  3  2  2   ANEU  5.5  6.0  6.1   ABL  2.9  3.0  3.0   HGB  13.5*  12.6*  13.3*   HCT  41.6  39.4*  41.1   PLT  240  230  212       BMP:  Recent Labs      18   0520  18   0526   CREA  1.67*  1.56*   BUN  26*  24*   NA  141  144   K  4.0  4.2   CL  103  108*   CO2  31  27   AGAP  7  9   GLU  85  99       LFTS:  No results for input(s): TBILI, ALT, SGOT, AP, TP, ALB in the last 72 hours.     Microbiology:     All Micro Results     Procedure Component Value Units Date/Time    AFB CULTURE + SMEAR W/RFLX ID FROM CULTURE [[id number]]     Order Status:  Wojciech Cavazos TB GOLD [[id number]]     Order Status:  Sent Specimen:  Whole Blood from Blood     INFLUENZA A & B AG (RAPID TEST) [[id number]] Collected:  12/26/17 1344    Order Status:  Completed Specimen:  Nasopharyngeal from Nasal washing Updated:  12/26/17 1404     Influenza A Ag NEGATIVE          NEGATIVE FOR THE PRESENCE OF INFLUENZA A ANTIGEN  INFECTION DUE TO INFLUENZA A CANNOT BE RULED OUT. BECAUSE THE ANTIGEN PRESENT IN THE SAMPLE MAY BE BELOW  THE DETECTION LIMIT OF THE TEST. A NEGATIVE TEST IS PRESUMPTIVE AND IT IS RECOMMENDED THAT THESE RESULTS BE CONFIRMED BY VIRAL CULTURE OR AN FDA-CLEARED INFLUENZA A AND B MOLECULAR ASSAY. Influenza B Ag NEGATIVE          NEGATIVE FOR THE PRESENCE OF INFLUENZA B ANTIGEN  INFECTION DUE TO INFLUENZA B CANNOT BE RULED OUT. BECAUSE THE ANTIGEN PRESENT IN THE SAMPLE MAY BE BELOW  THE DETECTION LIMIT OF THE TEST. A NEGATIVE TEST IS PRESUMPTIVE AND IT IS RECOMMENDED THAT THESE RESULTS BE CONFIRMED BY VIRAL CULTURE OR AN FDA-CLEARED INFLUENZA A AND B MOLECULAR ASSAY. Imaging:   PCXR (12/26/17): FINDINGS: Cardiac enlargement. There is no focal pulmonary infiltrate, nodule,  effusion, or pneumothorax. No discrete acute osseous lesion seen.     IMPRESSION  IMPRESSION:  No acute cardiopulmonary process.     Signed By: Hernán Allison MD     January 7, 2018

## 2018-01-07 NOTE — PROGRESS NOTES
Bedside and Verbal shift change report given to self (oncoming nurse) by Wei Li RN (offgoing nurse). Report included the following information SBAR, Kardex and MAR.

## 2018-01-07 NOTE — PROGRESS NOTES
Verbal bedside report given to Rubi Sebastian oncoming RN. Patient's situation, background, assessment and recommendations provided. Opportunity for questions provided. Oncoming RN assumed care of patient.

## 2018-01-07 NOTE — PROGRESS NOTES
Problem: Falls - Risk of  Goal: *Absence of Falls  Document Sue Fall Risk and appropriate interventions in the flowsheet.    Outcome: Progressing Towards Goal  Fall Risk Interventions:  Mobility Interventions: Bed/chair exit alarm, Communicate number of staff needed for ambulation/transfer, Patient to call before getting OOB         Medication Interventions: Bed/chair exit alarm, Evaluate medications/consider consulting pharmacy, Patient to call before getting OOB, Teach patient to arise slowly         History of Falls Interventions: Bed/chair exit alarm, Evaluate medications/consider consulting pharmacy

## 2018-01-07 NOTE — PROGRESS NOTES
[facility] CARDIOLOGY PROGRESS NOTE           1/7/2018 8:56 AM    Admit Date: 12/26/2017      Subjective:   New sCHF and LHC showed mild nonobstructive CAD. Denies any chest pain or SOB. No wheezing. Now with (+) PPD. Normal CXR    ROS:  Cardiovascular:  As noted above    Objective:      Vitals:    01/06/18 2031 01/07/18 0100 01/07/18 0547 01/07/18 0851   BP: 106/55 112/69 105/70 117/79   Pulse: 60 70 67 72   Resp: 18 18 18    Temp: 96.7 °F (35.9 °C) 96.9 °F (36.1 °C) 96.8 °F (36 °C)    SpO2: 98% 97% 95%    Weight:   94.3 kg (208 lb)        Physical Exam:  General-No Acute Distress  Neck- supple, no JVD  CV- regular rate and rhythm no MRG  Lung- clear bilaterally  Abd- soft, nontender, nondistended  Ext- no edema bilaterally. Skin- warm and dry    Data Review:   Recent Labs      01/07/18   0520  01/06/18   0526   NA  141  144   K  4.0  4.2   MG   --   2.2   BUN  26*  24*   CREA  1.67*  1.56*   GLU  85  99   WBC  9.6  10.5   HGB  13.5*  12.6*   HCT  41.6  39.4*   PLT  240  230       Assessment/Plan:     Principal Problem:    Dyspnea (12/26/2017)    Likely multifactorial including COPD, untreated TAZ, LV dysfunction. Diuresed well since admission. Active Problems:    CKD (chronic kidney disease) stage 3, GFR 30-59 ml/min (9/29/2017)    Nephrology saw patient, labs are stable       Chronic systolic heart failure ([address] (12/4/2015)    Diuresing well on po lasix. Continue hydralazine, Imdur, coreg, lasix but no ACE-I/ARB with CKD. Reduced lasix to 40mg BID. SPEP and UPEP inconclusive.   S/P fat pad biopsy and results are pending      Coronary atherosclerosis of native coronary vessel (9/29/2017)    S/p LHC showing mild nonobstructive CAD      Hypertension (9/29/2017)    continue meds      COPD, moderate ([address] (9/29/2017)    Per pulmonary       TAZ (obstructive sleep apnea) (10/2/2017)  Per pulmonary working with DME company to help with CPAP equipment            Patrizia Jermaine, MD  1/7/2018 8:56 AM

## 2018-01-07 NOTE — PROGRESS NOTES
Verbal bedside report received from [facility]. Assumed care of patient.

## 2018-01-07 NOTE — PROGRESS NOTES
[facility] Hematology & Oncology        Inpatient Hematology / Oncology Progress Note    Reason for Consult:  Dyspnea;Dyspnea  Referring Physician:  Hernandez Lovelace MD    History of Present Illness:  Mr. Simeon Jimenez is a 74 y.o. male admitted on 12/26/2017 with a primary diagnosis of The primary encounter diagnosis was Acute systolic CHF (congestive heart failure) ([address] Diagnoses of Chest pain, unspecified type, COPD, moderate ([address] TAZ (obstructive sleep apnea), Tobacco abuse, Chronic systolic heart failure ([address] Dyspnea, unspecified type, Atherosclerosis of native coronary artery of native heart with stable angina pectoris ([address] Dyspnea on exertion, NINO (acute kidney injury) ([address] and Acute pulmonary edema ([address] were also pertinent to this visit. Yue Dallas His PMH includes CAD, COPD, severe TAZ, and CKD. He presented to ED with c/o shortness of breath x 2 days with sore throat and productive cough with yellow sputum. While in the ED, he developed chest tightness with severe epigastric pain x 10 min. Pain resolved with nitro and ASA. Trop neg. EKG NSR. BNP 1688. CXR neg. ECHO 9/28/17 EF 30-35%. Elevated light chains on lab work noted from 9/2017 work-up. SPEP WNL, but was noted \"cannot r/o a poorly defined free lambda band\". We were consulted for evaluation of possible amyloidosis. Interim: negative SPEP/UPEP, s/p fat pad biopsy. Review of Systems:  Constitutional +fatigue. Denies fever, chills, weight loss, appetite changes, night sweats. HEENT Denies trauma, blurry vision, hearing loss, ear pain, nosebleeds, sore throat, neck pain and ear discharge. Skin Denies lesions or rashes. Lungs Denies dyspnea, cough, sputum production or hemoptysis. Cardiovascular Denies chest pain, palpitations, or lower extremity edema. Gastrointestinal Denies nausea, vomiting, changes in bowel habits, bloody or black stools, abdominal pain.  Denies dysuria, frequency or hesitancy of urination.    Neuro Denies headaches, visual changes or ataxia. Denies dizziness, tingling, tremors, sensory change, speech change, focal weakness or headaches. Hematology Denies easy bruising or bleeding, denies gingival bleeding or epistaxis. Endo Denies heat/cold intolerance, denies diabetes or thyroid abnormalities. MSK Denies back pain, arthralgias, myalgias or frequent falls. Psychiatric/Behavioral Denies depression and substance abuse. The patient is not nervous/anxious.          Allergies   Allergen Reactions    Pcn [Penicillins] Other (comments)     \"makes my heart stop\"     Past Medical History:   Diagnosis Date    Acute CHF (congestive heart failure) ([facility] 4/25/2015    Acute combined systolic and diastolic congestive heart failure ([facility] 4/28/2015    Chronic obstructive pulmonary disease (HCC)     De Quervain's tenosynovitis, right 4/28/2015    Dyspnea on exertion 6/28/2015    Elevated serum creatinine 4/25/2015    Elevated troponin 6/28/2015    History of coronary artery disease     MI at 25 yo    History of right knee surgery     cartilage removal    History of shingles     Malignant hypertension 4/25/2015    MI (myocardial infarction)      Past Surgical History:   Procedure Laterality Date    HX HEART CATHETERIZATION  6/29/2015    no intervention    HX KNEE ARTHROSCOPY Right     removal of cartilage     Family History   Problem Relation Age of Onset    Hypertension Mother     No Known Problems Father      Social History     Social History    Marital status:      Spouse name: N/A    Number of children: N/A    Years of education: N/A     Occupational History    retired      Social History Main Topics    Smoking status: Former Smoker     Packs/day: 0.25     Years: 20.00     Types: Cigarettes     Quit date: 4/5/2015    Smokeless tobacco: Never Used    Alcohol use No    Drug use: No    Sexual activity: Not on file     Other Topics Concern     Service No    Blood Transfusions No     no issues with receiving    Caffeine Concern No    Special Diet No    Exercise No    Seat Belt Yes     Social History Narrative    Lives with his wife     Current Facility-Administered Medications   Medication Dose Route Frequency Provider Last Rate Last Dose    furosemide (LASIX) tablet 40 mg  40 mg Oral BID Jaison Roque MD        heparin (PF) 2 units/ml in NS infusion  3 mL/hr IntraVENous CONTINUOUS Shruthi Lazar MD 3 mL/hr at 01/02/18 0834 3 mL/hr at 01/02/18 0834    enoxaparin (LOVENOX) injection 40 mg  40 mg SubCUTAneous Q24H Shruthi Lazar MD   40 mg at 01/07/18 1142    albuterol-ipratropium (DUO-NEB) 2.5 MG-0.5 MG/3 ML  3 mL Nebulization Q6H PRN Jessica Boswell MD   3 mL at 01/03/18 2010    budesonide (PULMICORT) 500 mcg/2 ml nebulizer suspension  500 mcg Nebulization BID RT Tahira Vance NP   500 mcg at 01/07/18 1115    guaiFENesin ER (MUCINEX) tablet 1,200 mg  1,200 mg Oral Q12H José Miguel Minaya MD   1,200 mg at 01/07/18 0851    pantoprazole (PROTONIX) tablet 40 mg  40 mg Oral ACB&D José Miguel Minaya MD   40 mg at 01/07/18 0851    albuterol (PROVENTIL HFA, VENTOLIN HFA, PROAIR HFA) inhaler 2 Puff  2 Puff Inhalation QID PRN KWESI Otero Junior        allopurinol (ZYLOPRIM) tablet 100 mg (Patient Supplied)  100 mg Oral DAILY KWESI Otero Junior   100 mg at 01/07/18 0231    aspirin delayed-release tablet 81 mg (Patient Supplied)  81 mg Oral DAILY KWESI Otero Junior   81 mg at 01/07/18 0851    atorvastatin (LIPITOR) tablet 20 mg  20 mg Oral QHS KWESI Otero Junior   20 mg at 01/06/18 2131    carvedilol (COREG) tablet 25 mg  25 mg Oral BID WITH MEALS KWESI Otero Junior   25 mg at 01/07/18 0851    oxyCODONE IR (ROXICODONE) tablet 5 mg  5 mg Oral Q4H PRN KWESI Otero Junior   5 mg at 01/05/18 2000    sodium chloride (NS) flush 5-10 mL  5-10 mL IntraVENous PRN KWESI Otero Junior   10 mL at 01/05/18 0446    nitroglycerin (NITROSTAT) tablet 0.4 mg  0.4 mg SubLINGual Q5MIN PRN Maria Alejandra Malik KWESI Crook        acetaminophen (TYLENOL) tablet 650 mg  650 mg Oral Q4H PRN Nolberto Favre, PA        promethazine (PHENERGAN) tablet 25 mg  25 mg Oral Q6H PRN Nolberto Favre, PA        influenza vaccine 2017- (3 yrs+)(PF) (FLUZONE QUAD/FLUARIX QUAD) injection 0.5 mL  0.5 mL IntraMUSCular PRIOR TO DISCHARGE Ester Kohli MD           OBJECTIVE:  Patient Vitals for the past 8 hrs:   BP Temp Pulse Resp SpO2 Weight   18 0915 118/77 96.4 °F (35.8 °C) 66 17 95 % -   18 0851 117/79 - 72 - - -   18 0547 105/70 96.8 °F (36 °C) 67 18 95 % 208 lb (94.3 kg)     Temp (24hrs), Av.8 °F (36 °C), Min:96.4 °F (35.8 °C), Max:97.2 °F (36.2 °C)     07 -  1900  In: 120 [P.O.:120]  Out: 400 [Urine:400]    Physical Exam:  Constitutional: Well developed, well nourished male in no acute distress, sitting comfortably in the hospital bed. HEENT: Normocephalic and atraumatic. Oropharynx is clear, mucous membranes are moist.  Extraocular muscles are intact. Sclerae anicteric. Neck supple without JVD. No thyromegaly present. Lymph node   Deferred   Skin Warm and dry. No bruising and no rash noted. No erythema. No pallor. Respiratory Lungs are clear to auscultation bilaterally without wheezes, rales or rhonchi, normal air exchange without accessory muscle use. CVS Normal rate, regular rhythm and normal S1 and S2. No murmurs, gallops, or rubs. Abdomen Soft, nontender and nondistended, normoactive bowel sounds. No palpable mass. No hepatosplenomegaly. Neuro Grossly nonfocal with no obvious sensory or motor deficits. MSK Normal range of motion in general.  No edema and no tenderness. Psych Appropriate mood and affect.         Labs:    Recent Results (from the past 24 hour(s))   PLEASE READ & DOCUMENT PPD TEST IN 24 HRS    Collection Time: 18  4:15 PM   Result Value Ref Range    PPD positive Negative    mm Induration 15mm mm   PLEASE READ & DOCUMENT PPD TEST IN 24 HRS Collection Time: 01/06/18  5:48 PM   Result Value Ref Range    PPD positive Negative    mm Induration 8.0YA mm   METABOLIC PANEL, BASIC    Collection Time: 01/07/18  5:20 AM   Result Value Ref Range    Sodium 141 136 - 145 mmol/L    Potassium 4.0 3.5 - 5.1 mmol/L    Chloride 103 98 - 107 mmol/L    CO2 31 21 - 32 mmol/L    Anion gap 7 7 - 16 mmol/L    Glucose 85 65 - 100 mg/dL    BUN 26 (H) 8 - 23 MG/DL    Creatinine 1.67 (H) 0.8 - 1.5 MG/DL    GFR est AA 52 (L) >60 ml/min/1.73m2    GFR est non-AA 43 (L) >60 ml/min/1.73m2    Calcium 8.6 8.3 - 10.4 MG/DL   BNP    Collection Time: 01/07/18  5:20 AM   Result Value Ref Range     pg/mL   CBC WITH AUTOMATED DIFF    Collection Time: 01/07/18  5:20 AM   Result Value Ref Range    WBC 9.6 4.3 - 11.1 K/uL    RBC 5.02 4.23 - 5.67 M/uL    HGB 13.5 (L) 13.6 - 17.2 g/dL    HCT 41.6 41.1 - 50.3 %    MCV 82.9 79.6 - 97.8 FL    MCH 26.9 26.1 - 32.9 PG    MCHC 32.5 31.4 - 35.0 g/dL    RDW 17.5 (H) 11.9 - 14.6 %    PLATELET 050 143 - 213 K/uL    MPV 10.2 (L) 10.8 - 14.1 FL    DF AUTOMATED      NEUTROPHILS 57 43 - 78 %    LYMPHOCYTES 30 13 - 44 %    MONOCYTES 9 4.0 - 12.0 %    EOSINOPHILS 3 0.5 - 7.8 %    BASOPHILS 0 0.0 - 2.0 %    IMMATURE GRANULOCYTES 1 0.0 - 5.0 %    ABS. NEUTROPHILS 5.5 1.7 - 8.2 K/UL    ABS. LYMPHOCYTES 2.9 0.5 - 4.6 K/UL    ABS. MONOCYTES 0.9 0.1 - 1.3 K/UL    ABS. EOSINOPHILS 0.3 0.0 - 0.8 K/UL    ABS. BASOPHILS 0.0 0.0 - 0.2 K/UL    ABS. IMM.  GRANS. 0.1 0.0 - 0.5 K/UL       Imaging:  CT HEAD WO CONT [[id number]] Collected: 12/27/17 1728      Order Status: Completed Updated: 12/27/17 1731     Narrative:       Examination: CT scan of the brain without contrast.    History: Syncope/fainting; s/p fall heparin gtt, 74 years Male     Technique: 5 mm axial imaging of the brain from the posterior fossa to the  vertex.  Radiation dose reduction techniques were used for this study:  Our CT  scanners use one or all of the following: Automated exposure control, adjustment  of the mA and/or kVp according to patient's size, iterative reconstruction. Comparison:  CT brain December 07, 2015    Findings:  The brain parenchyma appears within normal limits for age.  The  ventricles, sulci are age-appropriate. No intracranial hemorrhage or extra-axial  collection is identified.  No evidence of acute infarct.  No mass effect or  midline shift is present. Basal cisterns are intact.  The visualized paranasal  sinuses and mastoid air cells are clear. The orbits, bones, and soft tissues are  normal in appearance.       Impression:       IMPRESSION:  Normal unenhanced CT of the brain for age.  No acute intracranial  abnormality.     CT HEAD WO CONT [[id number]]      Order Status: Canceled      XR CHEST PA LAT [[id number]] Collected: 12/26/17 1221     Order Status: Completed Updated: 12/26/17 1223     Narrative:       Frontal and lateral views of the chest     Comparison: 10/30/2017    Indication: Shortness of breath    FINDINGS: Cardiac enlargement. There is no focal pulmonary infiltrate, nodule,  effusion, or pneumothorax.  No discrete acute osseous lesion seen.       Impression:       IMPRESSION:  No acute cardiopulmonary process.            ASSESSMENT:  Problem List  Date Reviewed: 12/29/2017          Codes Class Noted    * (Principal)Dyspnea ICD-10-CM: R06.00  ICD-9-CM: 786.09  12/26/2017        Atherosclerosis of native coronary artery of native heart with stable angina pectoris ([facility] ICD-10-CM: I25.118  ICD-9-CM: 414.01, 413.9  10/13/2017        TAZ (obstructive sleep apnea) ICD-10-CM: E52.82  ICD-9-CM: 327.23  10/2/2017        CKD (chronic kidney disease) stage 3, GFR 30-59 ml/min (Chronic) ICD-10-CM: N18.3  ICD-9-CM: 585.3  9/29/2017        Coronary atherosclerosis of native coronary vessel (Chronic) ICD-10-CM: I25.10  ICD-9-CM: 414.01  9/29/2017        Hypertension (Chronic) ICD-10-CM: I10  ICD-9-CM: 401.9  9/29/2017        COPD, moderate (HCC) (Chronic) ICD-10-CM: J44.9  ICD-9-CM: 496  9/29/2017    Overview Signed 12/27/2015 12:38 PM by Jannie Dolan NP     Complete PFTs: 7/20/15:  Spirometry is consistent with a moderate obstructive/restrictive defect. . The residual volume is increased relative to other lung volumes suggesting air-trapping. The diffusion capacity corrected for alveolar volume was normal suggesting no loss of alveolo-capillary units. High triglycerides (Chronic) ICD-10-CM: E78.1  ICD-9-CM: 272.1  9/29/2017        Gastroesophageal reflux disease without esophagitis (Chronic) ICD-10-CM: K21.9  ICD-9-CM: 530.81  9/29/2017        Vomiting ICD-10-CM: R11.10  ICD-9-CM: 787.03  9/29/2017        NINO (acute kidney injury) ([facility] ICD-10-CM: N17.9  ICD-9-CM: 584.9  9/29/2017        CHF (congestive heart failure) ([facility] ICD-10-CM: I50.9  ICD-9-CM: 428.0  9/27/2017        Mixed hyperlipidemia (Chronic) ICD-10-CM: Y21.8  ICD-9-CM: 272.2  9/28/2016        Essential hypertension ICD-10-CM: I10  ICD-9-CM: 401.9  9/28/2016        Arthritis (Chronic) ICD-10-CM: M19.90  ICD-9-CM: 716.90  Unknown        Chronic systolic heart failure (HCC) (Chronic) ICD-10-CM: I50.22  ICD-9-CM: 428.22  12/4/2015    Overview Signed 12/4/2015  9:28 AM by Mattie Waite     EF 40%             Tobacco abuse (Chronic) ICD-10-CM: Z72.0  ICD-9-CM: 305.1  7/2/2015        Dyspnea on exertion (Chronic) ICD-10-CM: R06.09  ICD-9-CM: 786.09  6/28/2015                RECOMMENDATIONS:  Evaluation for Amyloidosis  - Elevated light chains on lab work noted from 9/2017 work-up. SPEP was WNL, but was noted \"cannot r/o a poorly defined free lambda band\". Repeat SPEP pending.  - Check UPEP  - Can consider fat pad biopsy    CHF Exacerbation  - Cardiology managing      Patient seen, examed and discussed with NP, agree with above history/assessment/plan.  72 y.o.male admitted from ER for increased SOB, productive coughing, CP negative workup and resolved, CRI relatively stable for at least last 2 years, consulted evaluation for amyloidosis, last year SPEP showed no M spike, low level free light chains, very unlikely to have AL amyloidosis, check SPEPUPEP negative for M spike, s/p fat pad biopsy pending pathology to eval other types of amyloidosis. Wynelle Spatz, M.D.   [address]  Office : [phone]  Fax : [phone]

## 2018-01-07 NOTE — ROUTINE PROCESS
CHF teaching started post introduction to pt.; aware of diagnosis. Planner/needs scale @ BS and will follow. Smoking/ ETOH/Illicit drug use cessation and maintain a healthy weight covered. Pt/family aware that I can not prescribe nor adjust  medications: 15mins  Palliative Care score:  28, entered  Start 2L/D Fluid restriction  CHF teaching continues to pt. . Emphasis on taking prescription meds as ordered, to keep F/U appts and to call MD STAT.    reinforce

## 2018-01-07 NOTE — PROGRESS NOTES
Verbal bedside report given to [facility], oncoming RN. Patient's situation, background, assessment and recommendations provided. Opportunity for questions provided. Oncoming RN assumed care of patient.

## 2018-01-08 LAB
ANION GAP SERPL CALC-SCNC: 9 MMOL/L (ref 7–16)
BASOPHILS # BLD: 0.1 K/UL (ref 0–0.2)
BASOPHILS NFR BLD: 1 % (ref 0–2)
BNP SERPL-MCNC: 658 PG/ML
BUN SERPL-MCNC: 26 MG/DL (ref 8–23)
CALCIUM SERPL-MCNC: 9 MG/DL (ref 8.3–10.4)
CHLORIDE SERPL-SCNC: 102 MMOL/L (ref 98–107)
CO2 SERPL-SCNC: 30 MMOL/L (ref 21–32)
CREAT SERPL-MCNC: 1.64 MG/DL (ref 0.8–1.5)
DIFFERENTIAL METHOD BLD: ABNORMAL
EOSINOPHIL # BLD: 0.2 K/UL (ref 0–0.8)
EOSINOPHIL NFR BLD: 2 % (ref 0.5–7.8)
ERYTHROCYTE [DISTWIDTH] IN BLOOD BY AUTOMATED COUNT: 17.2 % (ref 11.9–14.6)
GLUCOSE SERPL-MCNC: 98 MG/DL (ref 65–100)
HCT VFR BLD AUTO: 42.3 % (ref 41.1–50.3)
HGB BLD-MCNC: 14 G/DL (ref 13.6–17.2)
IMM GRANULOCYTES # BLD: 0.1 K/UL (ref 0–0.5)
IMM GRANULOCYTES NFR BLD AUTO: 1 % (ref 0–5)
LYMPHOCYTES # BLD: 2.3 K/UL (ref 0.5–4.6)
LYMPHOCYTES NFR BLD: 22 % (ref 13–44)
MCH RBC QN AUTO: 27.3 PG (ref 26.1–32.9)
MCHC RBC AUTO-ENTMCNC: 33.1 G/DL (ref 31.4–35)
MCV RBC AUTO: 82.6 FL (ref 79.6–97.8)
MM INDURATION POC: 15 MM (ref 0–5)
MONOCYTES # BLD: 0.8 K/UL (ref 0.1–1.3)
MONOCYTES NFR BLD: 8 % (ref 4–12)
NEUTS SEG # BLD: 6.9 K/UL (ref 1.7–8.2)
NEUTS SEG NFR BLD: 66 % (ref 43–78)
PLATELET # BLD AUTO: 244 K/UL (ref 150–450)
PMV BLD AUTO: 9.9 FL (ref 10.8–14.1)
POTASSIUM SERPL-SCNC: 4.8 MMOL/L (ref 3.5–5.1)
PPD POC: POSITIVE NEGATIVE
RBC # BLD AUTO: 5.12 M/UL (ref 4.23–5.67)
SODIUM SERPL-SCNC: 141 MMOL/L (ref 136–145)
WBC # BLD AUTO: 10.2 K/UL (ref 4.3–11.1)

## 2018-01-08 PROCEDURE — 85025 COMPLETE CBC W/AUTO DIFF WBC: CPT | Performed by: INTERNAL MEDICINE

## 2018-01-08 PROCEDURE — 97165 OT EVAL LOW COMPLEX 30 MIN: CPT

## 2018-01-08 PROCEDURE — 74011000250 HC RX REV CODE- 250: Performed by: NURSE PRACTITIONER

## 2018-01-08 PROCEDURE — 74011250637 HC RX REV CODE- 250/637: Performed by: INTERNAL MEDICINE

## 2018-01-08 PROCEDURE — 80048 BASIC METABOLIC PNL TOTAL CA: CPT | Performed by: INTERNAL MEDICINE

## 2018-01-08 PROCEDURE — 86480 TB TEST CELL IMMUN MEASURE: CPT | Performed by: INTERNAL MEDICINE

## 2018-01-08 PROCEDURE — 83880 ASSAY OF NATRIURETIC PEPTIDE: CPT | Performed by: INTERNAL MEDICINE

## 2018-01-08 PROCEDURE — 36415 COLL VENOUS BLD VENIPUNCTURE: CPT | Performed by: INTERNAL MEDICINE

## 2018-01-08 PROCEDURE — 74011250637 HC RX REV CODE- 250/637: Performed by: PHYSICIAN ASSISTANT

## 2018-01-08 PROCEDURE — 94640 AIRWAY INHALATION TREATMENT: CPT

## 2018-01-08 PROCEDURE — 94760 N-INVAS EAR/PLS OXIMETRY 1: CPT

## 2018-01-08 PROCEDURE — 74011250637 HC RX REV CODE- 250/637: Performed by: NURSE PRACTITIONER

## 2018-01-08 PROCEDURE — 65660000000 HC RM CCU STEPDOWN

## 2018-01-08 PROCEDURE — 74011250636 HC RX REV CODE- 250/636: Performed by: INTERNAL MEDICINE

## 2018-01-08 RX ORDER — POLYETHYLENE GLYCOL 3350 17 G/17G
17 POWDER, FOR SOLUTION ORAL DAILY
Status: DISCONTINUED | OUTPATIENT
Start: 2018-01-08 | End: 2018-01-12 | Stop reason: HOSPADM

## 2018-01-08 RX ORDER — LISINOPRIL 5 MG/1
5 TABLET ORAL DAILY
Status: DISCONTINUED | OUTPATIENT
Start: 2018-01-08 | End: 2018-01-12 | Stop reason: HOSPADM

## 2018-01-08 RX ADMIN — CARVEDILOL 25 MG: 25 TABLET, FILM COATED ORAL at 16:12

## 2018-01-08 RX ADMIN — POLYETHYLENE GLYCOL 3350 17 G: 17 POWDER, FOR SOLUTION ORAL at 08:49

## 2018-01-08 RX ADMIN — ASPIRIN 81 MG: 81 TABLET ORAL at 08:54

## 2018-01-08 RX ADMIN — ENOXAPARIN SODIUM 40 MG: 40 INJECTION SUBCUTANEOUS at 12:23

## 2018-01-08 RX ADMIN — LISINOPRIL 5 MG: 5 TABLET ORAL at 08:48

## 2018-01-08 RX ADMIN — FUROSEMIDE 40 MG: 40 TABLET ORAL at 08:48

## 2018-01-08 RX ADMIN — FUROSEMIDE 40 MG: 40 TABLET ORAL at 17:22

## 2018-01-08 RX ADMIN — ATORVASTATIN CALCIUM 20 MG: 10 TABLET, FILM COATED ORAL at 20:57

## 2018-01-08 RX ADMIN — BUDESONIDE 500 MCG: 0.5 INHALANT RESPIRATORY (INHALATION) at 07:26

## 2018-01-08 RX ADMIN — GUAIFENESIN 1200 MG: 600 TABLET, EXTENDED RELEASE ORAL at 20:57

## 2018-01-08 RX ADMIN — ALLOPURINOL 100 MG: 100 TABLET ORAL at 08:54

## 2018-01-08 RX ADMIN — BUDESONIDE 500 MCG: 0.5 INHALANT RESPIRATORY (INHALATION) at 19:18

## 2018-01-08 RX ADMIN — Medication 5 ML: at 20:57

## 2018-01-08 RX ADMIN — PANTOPRAZOLE SODIUM 40 MG: 40 TABLET, DELAYED RELEASE ORAL at 08:48

## 2018-01-08 RX ADMIN — PANTOPRAZOLE SODIUM 40 MG: 40 TABLET, DELAYED RELEASE ORAL at 16:12

## 2018-01-08 RX ADMIN — GUAIFENESIN 1200 MG: 600 TABLET, EXTENDED RELEASE ORAL at 08:48

## 2018-01-08 RX ADMIN — CARVEDILOL 25 MG: 25 TABLET, FILM COATED ORAL at 08:48

## 2018-01-08 NOTE — PROGRESS NOTES
Shift change verbal and bedside report, both reports given to oncoming Primary nurse Destiny Hassan RN by this RN.

## 2018-01-08 NOTE — PROGRESS NOTES
Reports constipated. Served Venus Concoction: Mix together 4 oz each, Prune juice, Apple juice and 2% Milk, then warm on low temperature in Microwave, served warm. No results at this time.

## 2018-01-08 NOTE — ROUTINE PROCESS
CHF teaching started post introduction to pt/family; aware of diagnosis. Planner/scale @ BS and will follow. Smoking/ ETOH/Illicit drug use cessation and maintain a healthy weight covered. Pt/family aware that I can not prescribe nor adjust  medications: 15mins  Palliative Care score:ACP on file  Start 2L/D Fluid restriction  CHF teaching continues to pt/family. Emphasis on taking prescription meds as ordered, to keep F/U appts and to call MD STAT. CHF teaching completed, verbalize emphasis on monitoring self and report to MD:   If you gain 2 lbs in one day or 5 lbs in a week, and short of breath.  If you can not lay flat without developing short of breath or rapid breathing at night; or if it wakes you up. Develop a cough or wheezing.  If you notice swollen hands/feet/ankles or stomach with a bloated/ full feeling.  If you be  ome confused or mentally fuzzy or dizzy.  If you notice a rapid or change in your heart rate.  If you become more exhausted all the time and unable to do the same level of activity without stopping to catch your breath. Drink no more than 8 cups a day in 8 oz. cups. Limit Cola Drinks. Your Heart can not handle any more. Stay away from salt (limit anything with salt or sodium in it). Limit to 250mg per serving. Exercise needs to be started with your Doctors approval.  Reduce stress; Call myself or Provider if assistance is needed. Pass post test via teach back, will make self available post DC ,if an questions arise. Diabetic teaching completed.  Planner/scale @ BS:  60 mins total

## 2018-01-08 NOTE — PROGRESS NOTES
Bedside and Verbal shift change report given to self (oncoming nurse) by Lucia Bardales RN (offgoing nurse). Report included the following information SBAR, Kardex, Intake/Output, MAR and Recent Results.

## 2018-01-08 NOTE — PROGRESS NOTES
Problem: Falls - Risk of  Goal: *Absence of Falls  Document Sue Fall Risk and appropriate interventions in the flowsheet.    Outcome: Progressing Towards Goal  Fall Risk Interventions:  Mobility Interventions: Patient to call before getting OOB    Mentation Interventions: Bed/chair exit alarm    Medication Interventions: Patient to call before getting OOB, Teach patient to arise slowly         History of Falls Interventions: Door open when patient unattended, Investigate reason for fall, Evaluate medications/consider consulting pharmacy, Bed/chair exit alarm

## 2018-01-08 NOTE — PROGRESS NOTES
Bedside and Verbal shift change report given to Eduardo Saldivar RN (oncoming nurse) by self [place] nurse). Report included the following information SBAR, Kardex, Intake/Output, MAR and Recent Results.

## 2018-01-08 NOTE — CONSULTS
Palliative Care    Patient: Bre Mustafa MRN: [id number]  SSN: [id number]    YOB: 1945  Age: 74 y.o. Sex: male       Date of Request: 1/7/2018  Date of Consult:  1/8/2018  Reason for Consult:  CHF  Requesting Physician: Dr Shaji Sotelo     Assessment/Plan:     Principal Diagnosis:    Debility, Unspecified  R53.81    Additional Diagnoses:   · Dyspnea  R06.00  · Fatigue, Lethargy  R53.83  · Counseling, Encounter for Medical Advice  Z71.9  · Encounter for Palliative Care  Z51.5    Palliative Performance Scale (PPS):  PPS: 60    Medical Decision Making:   Reviewed and summarized notes from admission to present   Discussed case with appropriate providers- JAYNA Armando  Reviewed laboratory and x-ray data from admission to present     Pt resting in bed, no distress noted. Wife at bedside. Introduced role of PC and reviewed events of hospitalization. Pt and wife have fair understanding of pt's condition. They question whether he really has COPD. Counseled that he does have COPD, as well as the chronic and progressive nature of COPD and CHF. Pt lived at home prior to hospitalization, and was independent in ADLs/IADLs. He is receptive to STR post discharge, as well as home health support. He does not have an Advance Directive, and is receptive to completing HCPOA documents- consult placed with Spiritual Care for completion. He is going to think about his decision regarding resuscitation and tube feedings. No further PC needs identified- we will not plan to follow. Will discuss findings with members of the interdisciplinary team.      Thank you for this referral.          .    Subjective:     History obtained from:  Patient, Family, Care Provider and Chart    Chief Complaint: CHF  History of Present Illness:  Mr Derek Mcgowan is a 75 yo AA male with PMH of CHF, COPD, CAD, CKD, and HTN, who presented to the ER from home on 12/26/2017 with c/o increasing dyspnea, productive cough and sore throat for several days.   He used his albuterol at home with little relief of symptoms. Pt denied n/v/d, fevers. Work up in the ER revealed BNP of 1688. While in the ER, pt also developed chest pain. EKG without ST/T wave changes. Pt was admitted for further management. He underwent LHC on 1/2/2018, with findings of mild CAD and elevated left ventricular end diastolic pressures. There was some suspicion of amyloidosis, and he underwent a fat pad biopsy on 1/5/2018, and oncology is following. At present, pt reports he is feeling better. Advance Directive: No       Code Status:  Full Code            Health Care Power of : No - Patient does not have a [address] Past Medical History:   Diagnosis Date    Acute CHF (congestive heart failure) ([facility] 4/25/2015    Acute combined systolic and diastolic congestive heart failure ([facility] 4/28/2015    Chronic obstructive pulmonary disease (HCC)     De Quervain's tenosynovitis, right 4/28/2015    Dyspnea on exertion 6/28/2015    Elevated serum creatinine 4/25/2015    Elevated troponin 6/28/2015    History of coronary artery disease     MI at 27 yo    History of right knee surgery     cartilage removal    History of shingles     Malignant hypertension 4/25/2015    MI (myocardial infarction)       Past Surgical History:   Procedure Laterality Date    HX HEART CATHETERIZATION  6/29/2015    no intervention    HX KNEE ARTHROSCOPY Right     removal of cartilage     Family History   Problem Relation Age of Onset    Hypertension Mother     No Known Problems Father       Social History   Substance Use Topics    Smoking status: Former Smoker     Packs/day: 0.25     Years: 20.00     Types: Cigarettes     Quit date: 4/5/2015    Smokeless tobacco: Never Used    Alcohol use No     Prior to Admission medications    Medication Sig Start Date End Date Taking?  Authorizing Provider   albuterol-ipratropium (DUO-NEB) 2.5 mg-0.5 mg/3 ml nebu 3 mL by Nebulization route four (4) times daily. Diaignosis--J44.9 12/13/17   Anant Saldaña NP   furosemide (LASIX) 40 mg tablet Take 1 Tab by mouth Before breakfast and dinner. 10/31/17   Antonette Maradiaga MD   allopurinol (ZYLOPRIM) 100 mg tablet Take 1 Tab by mouth daily. 7/11/17   Aida Baxter MD   oxyCODONE IR (ROXICODONE) 5 mg immediate release tablet Take 1 Tab by mouth every six (6) hours as needed for Pain. Max Daily Amount: 20 mg. 6/20/17   Claudetta Mcgill, MD   carvedilol (COREG) 25 mg tablet Take 25 mg by mouth two (2) times daily (with meals). Historical Provider   albuterol (VENTOLIN HFA) 90 mcg/actuation inhaler Take 2 Puffs by inhalation four (4) times daily. 9/30/16   Catalino Herrera NP   pantoprazole (PROTONIX) 40 mg tablet Take 1 Tab by mouth Daily (before breakfast). 8/4/16   Aida Baxter MD   aspirin delayed-release 81 mg tablet Take 1 Tab by mouth daily. 4/29/15   Abhijit Sanches MD       Allergies   Allergen Reactions    Pcn [Penicillins] Other (comments)     \"makes my heart stop\"        Review of Systems:  A comprehensive review of systems was negative except for:   Constitutional: Positive for fatigue. Respiratory: Positive for improving dyspnea      Objective:     Visit Vitals    /66    Pulse 68    Temp 96.8 °F (36 °C)    Resp 20    Wt 95.2 kg (209 lb 12.8 oz)    SpO2 95%    BMI 30.98 kg/m2        Physical Exam:    General:  Cooperative. Debilitated. No acute distress. Eyes:  Conjunctivae/corneas clear    Nose: Nares normal. Septum midline.    Neck: Supple, symmetrical, trachea midline   Lungs:   Clear to auscultation bilaterally, unlabored   Heart:  Regular rate and rhythm   Abdomen:   Soft, non-tender, non-distended   Extremities: Normal, atraumatic, no cyanosis or edema   Skin: Skin color, texture, turgor normal.   Neurologic: Nonfocal   Psych: Alert and oriented      Assessment:     Hospital Problems  Date Reviewed: 12/29/2017          Codes Class Noted POA    * (Principal)Dyspnea ICD-10-CM: R06.00  ICD-9-CM: 786.09  12/26/2017 Unknown        TAZ (obstructive sleep apnea) ICD-10-CM: G32.39  ICD-9-CM: 327.23  10/2/2017 Yes        CKD (chronic kidney disease) stage 3, GFR 30-59 ml/min (Chronic) ICD-10-CM: N18.3  ICD-9-CM: 585.3  9/29/2017 Yes        Coronary atherosclerosis of native coronary vessel (Chronic) ICD-10-CM: I25.10  ICD-9-CM: 414.01  9/29/2017 Yes        Hypertension (Chronic) ICD-10-CM: I10  ICD-9-CM: 401.9  9/29/2017 Yes        COPD, moderate (HCC) (Chronic) ICD-10-CM: J44.9  ICD-9-CM: 496  9/29/2017 Yes    Overview Signed 12/27/2015 12:38 PM by Kenneth Carmona NP     Complete PFTs: 7/20/15:  Spirometry is consistent with a moderate obstructive/restrictive defect. . The residual volume is increased relative to other lung volumes suggesting air-trapping. The diffusion capacity corrected for alveolar volume was normal suggesting no loss of alveolo-capillary units.               Chronic systolic heart failure (HCC) (Chronic) ICD-10-CM: I50.22  ICD-9-CM: 428.22  12/4/2015 Yes    Overview Signed 12/4/2015  9:28 AM by Lauren Valdivia     EF 40%                   Signed By: Eric Nowak NP     January 8, 2018

## 2018-01-08 NOTE — PROGRESS NOTES
Problem: Self Care Deficits Care Plan (Adult)  Goal: *Acute Goals and Plan of Care (Insert Text)        OCCUPATIONAL THERAPY: Initial Assessment and Discharge 1/8/2018  INPATIENT: Hospital Day: 14  Payor: Eric Fajardo / Plan: [address] [place] [address] HMO / Product Type: Managed Care Medicare /      NAME/AGE/GENDER: Keyona Dimas is a 74 y.o. male   PRIMARY DIAGNOSIS:  Dyspnea  Dyspnea Dyspnea Dyspnea        ICD-10: Treatment Diagnosis:    · Generalized Muscle Weakness (M62.81)   Precautions/Allergies:     Pcn [penicillins]      ASSESSMENT:     Mr. Sherly Tariq presents for the above with complaints of dyspnea upon exertion currently on room air with oxygen saturations % range and HR in the 50-70's. He reports he never did before get as dysnpneic as he is now. Complaining of a smothering feeling when wearing CPAP - states he told respiratory about it. He donned socks with setup and completed functional mobility with rolling walker in room/lance with standby assistance/contact guard assistance - forward flexed posture due to chronic back problems. He essentially is at a supervision level for ADL. Briefly reviewed energy conservation and fall prevention strategies. Keyona Dimas presents with the following problems and would benefit from occupational therapy to maximize independence with self-care,instrumental activities of daily living, and functional transfers/mobility for activities of daily living/instrumental activities of daily living via the stated goals. This section established at most recent assessment   PROBLEM LIST (Impairments causing functional limitations):  1. Decreased Strength  2. Decreased Activity Tolerance   INTERVENTIONS PLANNED: (Benefits and precautions of occupational therapy have been discussed with the patient.)  1. Activities of daily living training     TREATMENT PLAN: Frequency/Duration: Follow patient for today's visit only.   Rehabilitation Potential For Stated Goals: N/A. RECOMMENDED REHABILITATION/EQUIPMENT: (at time of discharge pending progress): Due to the probability of continued deficits (see above) this patient will not likely need continued skilled occupational therapy after discharge. Equipment:    None at this time              OCCUPATIONAL PROFILE AND HISTORY:   History of Present Injury/Illness (Reason for Referral):  Per MD H& P: Gill Shah is a 74 y.o. male admitted for dyspnea. He has a h/o CAD w LHC 6-2015 showing mild-mod CAD, sHF w echo 9-2017 showing EF 30-35% with AKTIE, mild MR, mild-mod TR. He had a life vest but did not wear it and gave it back. He has COPD (PFTs w moderate obstructive/restrictive defect) but can't afford his new medications he was given by pulmonary, severe TAZ and does not use CPAP (does not have cord), and CKD. He has had increased SOB x 2 days with a sore throat and productive cough w yellow sputum. He has not had fevers but several family members who have been sick with flu like symptoms. He has used his albuterol that he has at home (not new medicine that was prescribed by pulmonary) which gave him temporary relief of SOB. His SOB got gradually worse until he had dyspnea at rest and came to the ER. In ER he was given one albuterol treatment but was still extremely SOB, more labored breathing, and developed acute chest tightness, severe, epigastric region radiating to L breast without nausea or diaphoresis. CP lasted about 10 minutes. Breathing improved with second albuterol treatment. BP elevated 157/108 and he was given nitro and ASA and symptoms improved. At this point his breathing is better, chest pain eased off. In ER troponin negative, WBC 6.9, hgb 13.8, , K 4.1, BUn 18, cr 1.52, BNP 1688, CXR no acute process, EKG NSR w rate 75 without ST/T wave changes. SOB is much improved. LE edema is unchanged, weight is stable, he adheres to a low NA diet. No dizziness, palpitations or syncope.  He has been compliant with his cardiac meds. Past Medical History/Comorbidities:   Mr. Mildred Plaza  has a past medical history of Acute CHF (congestive heart failure) ([facility] [address] (4/25/2015); Acute combined systolic and diastolic congestive heart failure ([facility] [address] (4/28/2015); Chronic obstructive pulmonary disease ([facility] [address] De Quervain's tenosynovitis, right (4/28/2015); Dyspnea on exertion (6/28/2015); Elevated serum creatinine (4/25/2015); Elevated troponin (6/28/2015); History of coronary artery disease; History of right knee surgery; History of shingles; Malignant hypertension (4/25/2015); and MI (myocardial infarction). Mr. Mildred Plaza  has a past surgical history that includes hx knee arthroscopy (Right) and hx heart catheterization (6/29/2015). Social History/Living Environment: Lives with wife. Daughter is a nurse. Home Environment: Private residence  # Steps to Enter: 4  Rails to Enter: Yes  Hand Rails : Bilateral  Wheelchair Ramp: No  One/Two Story Residence: One story  Living Alone: No  Support Systems: Family member(s), Friends \ neighbors  Patient Expects to be Discharged to[de-identified] Private residence  Current DME Used/Available at Home: [place] [employer], rolling, Walker, rollator  Tub or Shower Type: Tub/Shower combination  Prior Level of Function/Work/Activity:  Typically independent with ADL. Uses a rollator as needed. Number of Personal Factors/Comorbidities that affect the Plan of Care: Brief history (0):  LOW COMPLEXITY   ASSESSMENT OF OCCUPATIONAL PERFORMANCE[de-identified]   Activities of Daily Living:           Basic ADLs (From Assessment) Complex ADLs (From Assessment)   Basic ADL  Feeding: Supervision  Oral Facial Hygiene/Grooming: Supervision  Bathing: Stand-by assistance  Upper Body Dressing: Setup  Lower Body Dressing: Supervision  Toileting: Supervision Instrumental ADL  Meal Preparation: Moderate assistance  Homemaking:  Moderate assistance   Grooming/Bathing/Dressing Activities of Daily Living     Cognitive Retraining  Safety/Judgement: Awareness of environment                     Lower Body Dressing Assistance  Socks: Stand-by assistance Bed/Mat Mobility  Rolling: Supervision  Supine to Sit: Supervision  Sit to Supine: Supervision  Sit to Stand: Supervision  Scooting: Supervision       Most Recent Physical Functioning:   Gross Assessment:  AROM: Within functional limits  Strength: Within functional limits               Posture:  Posture (WDL): Exceptions to WDL  Posture Assessment: Rounded shoulders  Balance:  Sitting: Intact  Standing: Impaired  Standing - Static: Good  Standing - Dynamic : Fair Bed Mobility:  Rolling: Supervision  Supine to Sit: Supervision  Sit to Supine: Supervision  Scooting: Supervision  Wheelchair Mobility:     Transfers:  Sit to Stand: Supervision  Stand to Sit: Supervision                Patient Vitals for the past 6 hrs:   BP SpO2 O2 Flow Rate (L/min) Pulse   18 0729 - 97 % 0 l/min -   18 0954 113/66 95 % - 68       Mental Status  Neurologic State: Alert  Orientation Level: Oriented X4  Cognition: Appropriate decision making  Perception: Appears intact  Perseveration: No perseveration noted  Safety/Judgement: Awareness of environment                          Physical Skills Involved:  1. Range of Motion  2. Balance  3. Strength  4. Activity Tolerance Cognitive Skills Affected (resulting in the inability to perform in a timely and safe manner):  1. None noted Psychosocial Skills Affected:  1. Habits/Routines  2. Environmental Adaptation   Number of elements that affect the Plan of Care: 1-3:  LOW COMPLEXITY   CLINICAL DECISION MAKIN [facility] [address] [address] AM-PAC 6 Clicks   Daily Activity Inpatient Short Form  How much help from another person does the patient currently need. .. Total A Lot A Little None   1. Putting on and taking off regular lower body clothing? [] 1   [] 2   [x] 3   [] 4   2. Bathing (including washing, rinsing, drying)? [] 1   [] 2   [x] 3   [] 4   3.   Toileting, which includes using toilet, bedpan or urinal?   [] 1   [] 2   [x] 3   [] 4   4. Putting on and taking off regular upper body clothing? [] 1   [] 2   [x] 3   [] 4   5. Taking care of personal grooming such as brushing teeth? [] 1   [] 2   [x] 3   [] 4   6. Eating meals? [] 1   [] 2   [x] 3   [] 4   © 2007, Trustees of [address] [place] [address] [address] [id number], under license to [employer] All rights reserved      Score:  Initial: 18 Most Recent: X (Date: -- )    Interpretation of Tool:  Represents activities that are increasingly more difficult (i.e. Bed mobility, Transfers, Gait). Score 24 23 22-20 19-15 14-10 9-7 6     Modifier CH CI CJ CK CL CM CN      ? Self Care:     - CURRENT STATUS: CK - 40%-59% impaired, limited or restricted    - GOAL STATUS: CK - 40%-59% impaired, limited or restricted    - D/C STATUS:  CK - 40%-59% impaired, limited or restricted  Payor: Daiana Arguelles / Plan: [address] [place] [address] HMO / Product Type: [employer] Medicare /      Medical Necessity:     · Today's assessment only. Reason for Services/Other Comments:  · N/A. Use of outcome tool(s) and clinical judgement create a POC that gives a: LOW COMPLEXITY         TREATMENT:   (In addition to Assessment/Re-Assessment sessions the following treatments were rendered)     Pre-treatment Symptoms/Complaints:    Pain: Initial:   Pain Intensity 1: 0  Post Session:  same     Assessment/Reassessment only, no treatment provided today    Braces/Orthotics/Lines/Etc:   · IV  · O2 Device: Room air  Treatment/Session Assessment:    Response to Treatment:  No treatment rendered. Assessment only. · Interdisciplinary Collaboration:   o Occupational Therapist  o Registered Nurse  o Certified Nursing Assistant/Patient Care Technician  · After treatment position/precautions:   o Up in chair  o Bed/Chair-wheels locked  o Call light within reach  o RN notified  o Family at bedside   · Compliance with Program/Exercises:  Will assess as treatment progresses. · Recommendations/Intent for next treatment session: \"Next visit will focus on advancements to more challenging activities\".   Total Treatment Duration:  OT Patient Time In/Time Out  Time In: 1120  Time Out: [address] [place], OTD, OTR/L

## 2018-01-08 NOTE — PROGRESS NOTES
Bedside and Verbal shift change report given to Self (oncoming nurse) by Lynette Sol RN (offgoing nurse). Report included the following information Kardex and Recent Results.

## 2018-01-08 NOTE — PROGRESS NOTES
[facility] CARDIOLOGY PROGRESS NOTE           1/8/2018 8:56 AM    Admit Date: 12/26/2017      Subjective:   New sCHF and LHC showed mild nonobstructive CAD. Denies any chest pain or SOB. No wheezing. Now with (+) PPD and ID has cleared from active TB. Normal CXR    ROS:  Cardiovascular:  As noted above    Objective:      Vitals:    01/07/18 1721 01/07/18 2035 01/08/18 0034 01/08/18 0511   BP: 103/61 112/61 120/75 121/71   Pulse: 60 83 67 68   Resp:  17 18 18   Temp:  97 °F (36.1 °C) 98 °F (36.7 °C) 97.6 °F (36.4 °C)   SpO2:  98% 98% 99%   Weight:    95.2 kg (209 lb 12.8 oz)       Physical Exam:  General-No Acute Distress  Neck- supple, no JVD  CV- regular rate and rhythm no MRG  Lung- clear bilaterally  Abd- soft, nontender, nondistended  Ext- no edema bilaterally. Skin- warm and dry    Data Review:   Recent Labs      01/08/18   0500  01/07/18   0520  01/06/18   0526   NA  141  141  144   K  4.8  4.0  4.2   MG   --    --   2.2   BUN  26*  26*  24*   CREA  1.64*  1.67*  1.56*   GLU  98  85  99   WBC  10.2  9.6  10.5   HGB  14.0  13.5*  12.6*   HCT  42.3  41.6  39.4*   PLT  244  240  230       Assessment/Plan:     Principal Problem:    Dyspnea (12/26/2017)    Likely multifactorial including COPD, untreated TAZ, LV dysfunction. Diuresed well since admission. Active Problems:    CKD (chronic kidney disease) stage 3, GFR 30-59 ml/min (9/29/2017)    Nephrology saw patient, labs are stable       Chronic systolic heart failure ([address] (12/4/2015)    Diuresing well on po lasix. DC hydralazine and start lisinopril, Imdur, coreg, lasix but no ACE-I/ARB with CKD. Reduced lasix to 40mg BID. SPEP and UPEP (-).   S/P fat pad biopsy and results are pending      Coronary atherosclerosis of native coronary vessel (9/29/2017)    S/p LHC showing mild nonobstructive CAD      Hypertension (9/29/2017)    continue meds      COPD, moderate ([address] (9/29/2017)    Per pulmonary       TAZ (obstructive sleep apnea) (10/2/2017)  Per pulmonary working with DME company to help with CPAP equipment        Stable to DC home or STR if needed    Guillermo Anderson MD  1/8/2018 8:56 AM

## 2018-01-09 LAB
ANION GAP SERPL CALC-SCNC: 8 MMOL/L (ref 7–16)
BASOPHILS # BLD: 0 K/UL (ref 0–0.2)
BASOPHILS NFR BLD: 0 % (ref 0–2)
BNP SERPL-MCNC: 387 PG/ML
BUN SERPL-MCNC: 29 MG/DL (ref 8–23)
CALCIUM SERPL-MCNC: 8.3 MG/DL (ref 8.3–10.4)
CHLORIDE SERPL-SCNC: 107 MMOL/L (ref 98–107)
CO2 SERPL-SCNC: 27 MMOL/L (ref 21–32)
CREAT SERPL-MCNC: 1.72 MG/DL (ref 0.8–1.5)
DIFFERENTIAL METHOD BLD: ABNORMAL
EOSINOPHIL # BLD: 0.2 K/UL (ref 0–0.8)
EOSINOPHIL NFR BLD: 2 % (ref 0.5–7.8)
ERYTHROCYTE [DISTWIDTH] IN BLOOD BY AUTOMATED COUNT: 17.3 % (ref 11.9–14.6)
GLUCOSE SERPL-MCNC: 91 MG/DL (ref 65–100)
HCT VFR BLD AUTO: 38.3 % (ref 41.1–50.3)
HGB BLD-MCNC: 12.5 G/DL (ref 13.6–17.2)
IMM GRANULOCYTES # BLD: 0.1 K/UL (ref 0–0.5)
IMM GRANULOCYTES NFR BLD AUTO: 1 % (ref 0–5)
LYMPHOCYTES # BLD: 3.2 K/UL (ref 0.5–4.6)
LYMPHOCYTES NFR BLD: 33 % (ref 13–44)
MCH RBC QN AUTO: 27.1 PG (ref 26.1–32.9)
MCHC RBC AUTO-ENTMCNC: 32.6 G/DL (ref 31.4–35)
MCV RBC AUTO: 82.9 FL (ref 79.6–97.8)
MONOCYTES # BLD: 0.7 K/UL (ref 0.1–1.3)
MONOCYTES NFR BLD: 8 % (ref 4–12)
NEUTS SEG # BLD: 5.5 K/UL (ref 1.7–8.2)
NEUTS SEG NFR BLD: 56 % (ref 43–78)
PLATELET # BLD AUTO: 231 K/UL (ref 150–450)
PMV BLD AUTO: 10.1 FL (ref 10.8–14.1)
POTASSIUM SERPL-SCNC: 3.9 MMOL/L (ref 3.5–5.1)
RBC # BLD AUTO: 4.62 M/UL (ref 4.23–5.67)
SODIUM SERPL-SCNC: 142 MMOL/L (ref 136–145)
WBC # BLD AUTO: 9.7 K/UL (ref 4.3–11.1)

## 2018-01-09 PROCEDURE — 65660000000 HC RM CCU STEPDOWN

## 2018-01-09 PROCEDURE — 77010033678 HC OXYGEN DAILY

## 2018-01-09 PROCEDURE — 74011250637 HC RX REV CODE- 250/637: Performed by: NURSE PRACTITIONER

## 2018-01-09 PROCEDURE — 97530 THERAPEUTIC ACTIVITIES: CPT

## 2018-01-09 PROCEDURE — 83880 ASSAY OF NATRIURETIC PEPTIDE: CPT | Performed by: INTERNAL MEDICINE

## 2018-01-09 PROCEDURE — 74011000250 HC RX REV CODE- 250: Performed by: NURSE PRACTITIONER

## 2018-01-09 PROCEDURE — 85025 COMPLETE CBC W/AUTO DIFF WBC: CPT | Performed by: INTERNAL MEDICINE

## 2018-01-09 PROCEDURE — 94660 CPAP INITIATION&MGMT: CPT

## 2018-01-09 PROCEDURE — 94760 N-INVAS EAR/PLS OXIMETRY 1: CPT

## 2018-01-09 PROCEDURE — 74011250636 HC RX REV CODE- 250/636: Performed by: INTERNAL MEDICINE

## 2018-01-09 PROCEDURE — 36415 COLL VENOUS BLD VENIPUNCTURE: CPT | Performed by: INTERNAL MEDICINE

## 2018-01-09 PROCEDURE — 74011250637 HC RX REV CODE- 250/637: Performed by: INTERNAL MEDICINE

## 2018-01-09 PROCEDURE — 74011250637 HC RX REV CODE- 250/637: Performed by: PHYSICIAN ASSISTANT

## 2018-01-09 PROCEDURE — 94640 AIRWAY INHALATION TREATMENT: CPT

## 2018-01-09 PROCEDURE — 80048 BASIC METABOLIC PNL TOTAL CA: CPT | Performed by: INTERNAL MEDICINE

## 2018-01-09 RX ORDER — FUROSEMIDE 40 MG/1
40 TABLET ORAL DAILY
Status: DISCONTINUED | OUTPATIENT
Start: 2018-01-09 | End: 2018-01-12 | Stop reason: HOSPADM

## 2018-01-09 RX ADMIN — Medication 5 ML: at 20:43

## 2018-01-09 RX ADMIN — ASPIRIN 81 MG: 81 TABLET ORAL at 08:13

## 2018-01-09 RX ADMIN — FUROSEMIDE 40 MG: 40 TABLET ORAL at 08:13

## 2018-01-09 RX ADMIN — CARVEDILOL 25 MG: 25 TABLET, FILM COATED ORAL at 08:14

## 2018-01-09 RX ADMIN — PANTOPRAZOLE SODIUM 40 MG: 40 TABLET, DELAYED RELEASE ORAL at 08:13

## 2018-01-09 RX ADMIN — ATORVASTATIN CALCIUM 20 MG: 10 TABLET, FILM COATED ORAL at 20:43

## 2018-01-09 RX ADMIN — CARVEDILOL 25 MG: 25 TABLET, FILM COATED ORAL at 16:06

## 2018-01-09 RX ADMIN — GUAIFENESIN 1200 MG: 600 TABLET, EXTENDED RELEASE ORAL at 08:14

## 2018-01-09 RX ADMIN — POLYETHYLENE GLYCOL 3350 17 G: 17 POWDER, FOR SOLUTION ORAL at 08:13

## 2018-01-09 RX ADMIN — ALLOPURINOL 100 MG: 100 TABLET ORAL at 08:13

## 2018-01-09 RX ADMIN — PANTOPRAZOLE SODIUM 40 MG: 40 TABLET, DELAYED RELEASE ORAL at 16:06

## 2018-01-09 RX ADMIN — ENOXAPARIN SODIUM 40 MG: 40 INJECTION SUBCUTANEOUS at 11:17

## 2018-01-09 RX ADMIN — OXYCODONE HYDROCHLORIDE 5 MG: 5 TABLET ORAL at 02:01

## 2018-01-09 RX ADMIN — GUAIFENESIN 1200 MG: 600 TABLET, EXTENDED RELEASE ORAL at 20:43

## 2018-01-09 RX ADMIN — BUDESONIDE 500 MCG: 0.5 INHALANT RESPIRATORY (INHALATION) at 07:20

## 2018-01-09 RX ADMIN — OXYCODONE HYDROCHLORIDE 5 MG: 5 TABLET ORAL at 11:17

## 2018-01-09 RX ADMIN — Medication 5 ML: at 20:44

## 2018-01-09 RX ADMIN — LISINOPRIL 5 MG: 5 TABLET ORAL at 08:13

## 2018-01-09 RX ADMIN — BUDESONIDE 500 MCG: 0.5 INHALANT RESPIRATORY (INHALATION) at 20:52

## 2018-01-09 NOTE — PROGRESS NOTES
Care Management Interventions  PCP Verified by CM: Yes (2 months ago)  Transition of Care Consult (CM Consult): SNF  Physical Therapy Consult: Yes  Occupational Therapy Consult: Yes  Current Support Network: Lives with Spouse  Confirm Follow Up Transport: Family  Plan discussed with Pt/Family/Caregiver: Yes  Freedom of Choice Offered: Yes  Patient needs rehab bed and request private room if possible. [facility] has no bed, [facility] [place] [facility] [place] message with [facility]ba and C[facility] Hernandez see if beds available. Went ahead and [facility] and [facility] CM following for d/c.

## 2018-01-09 NOTE — PROGRESS NOTES
Problem: Falls - Risk of  Goal: *Absence of Falls  Document Sue Fall Risk and appropriate interventions in the flowsheet.    Outcome: Progressing Towards Goal  Fall Risk Interventions:  Mobility Interventions: Patient to call before getting OOB    Mentation Interventions: Bed/chair exit alarm    Medication Interventions: Patient to call before getting OOB, Teach patient to arise slowly         History of Falls Interventions: Door open when patient unattended, Bed/chair exit alarm

## 2018-01-09 NOTE — PROGRESS NOTES
Bedside and Verbal shift change report given to self (oncoming nurse) by Som Rodriguez RN (offgoing nurse). Report included the following information SBAR, Kardex, MAR and Recent Results.

## 2018-01-09 NOTE — PROGRESS NOTES
Received notice that [place], [place] declined patient. Checked with [facility] Sorto and they only have 1 bed at [address] but will not allow wife to stay with patient at the SNF and wife states needs to stay with him. Sent referrals to [facility] [place] [facility] is considering but are concerned with positive PPD even though ID notes provided. Called lab and will take 4 weeks for AFB results to return. CM following. Addendum Received call from [facility] and they have no beds and [facility] can offer a bed but wife cannot stay overnight and it is semi private. Patient has bed offer at [facility] with private room and wife can stay with patient. They will start pre-cert with Lita.

## 2018-01-09 NOTE — PROGRESS NOTES
Problem: Falls - Risk of  Goal: *Absence of Falls  Document Sue Fall Risk and appropriate interventions in the flowsheet.    Outcome: Progressing Towards Goal  Fall Risk Interventions:  Mobility Interventions: Patient to call before getting OOB    Mentation Interventions: Bed/chair exit alarm    Medication Interventions: Evaluate medications/consider consulting pharmacy         History of Falls Interventions: Bed/chair exit alarm

## 2018-01-09 NOTE — PROGRESS NOTES
Problem: Mobility Impaired (Adult and Pediatric)  Goal: *Acute Goals and Plan of Care (Insert Text)  LTG:  (1.)Mr. Zachery Wang will move from supine to sit and sit to supine , scoot up and down and roll side to side in bed with INDEPENDENCE within 7 day(s). (2.)Mr. Zachery Wang will transfer from bed to chair and chair to bed with MODIFIED INDEPENDENCE using the least restrictive device within 7 day(s). (3.)Mr. Zachery Wang will ambulate with MODIFIED INDEPENDENCE for >or 150 feet with the least restrictive device, appropriate gait pattern and good dynamic standing balance within 7 day(s). (4.)Mr. Zachery Wang will perform B LE therapeutic exercises x 20 reps with INDEPENDENCE within 7 days to increase strength for improved safety and independence in transfers and gait. (5.)Mr. Zachery Wang will ascend/descend 4 steps with 1-2 handrail(s) and CGA within 7 day(s) for safe entry/exit of home.      ________________________________________________________________________________________________      PHYSICAL THERAPY: Daily Note, Treatment Day: 1st, PM 1/9/2018  INPATIENT: Hospital Day: 15  Payor: Ling Finney / Plan: [address] [place] [address] HMO / Product Type: Managed Care Medicare /      NAME/AGE/GENDER: Fariba Winter is a 74 y.o. male   PRIMARY DIAGNOSIS: Dyspnea  Dyspnea Dyspnea Dyspnea        ICD-10: Treatment Diagnosis:   · Difficulty in walking, Not elsewhere classified (R26.2)  · History of falling (Z91.81)   Precaution/Allergies:  Pcn [penicillins]      ASSESSMENT:     Mr. Zachery Wang presents supine in bed, agreeable to treatment. Able to tolerate SUP assist with getting to EOB. Expressed main c/o is 'feeling bad' possibly about 10ft after starting to walk. Pt stood x3, 1st after 20sec, leaned back landing on bed, 2nd attempt fatigued after 2min. Ambulating, he was flexed fwd with emphasis on focusing ahead and cues to stand straight, but only lasting seconds before returning to flexed position.   Pt remarked he did not feel as bad walking when looking ahead. Improved with gait distance and decreasing risk for falls by changing posture and focus. Will cont to work with pt. Pt revealed he does have rollator, but did not express when he uses it. This section established at most recent assessment   PROBLEM LIST (Impairments causing functional limitations):  1. Decreased ADL/Functional Activities  2. Decreased Transfer Abilities  3. Decreased Ambulation Ability/Technique  4. Decreased Balance  5. Decreased Activity Tolerance  6. Decreased [place] with Home Exercise Program   INTERVENTIONS PLANNED: (Benefits and precautions of physical therapy have been discussed with the patient.)  1. Balance Exercise  2. Bed Mobility  3. Gait Training  4. Home Exercise Program (HEP)  5. Therapeutic Activites  6. Therapeutic Exercise/Strengthening  7. Transfer Training     TREATMENT PLAN: Frequency/Duration: 3 times a week for duration of hospital stay  Rehabilitation Potential For Stated Goals: Good     RECOMMENDED REHABILITATION/EQUIPMENT: (at time of discharge pending progress): Due to the probability of continued deficits (see above) this patient will likely need continued skilled physical therapy after discharge. Equipment:    to be determined              HISTORY:   History of Present Injury/Illness (Reason for Referral):  Per chart: Vidhya Acuña is a 74 y.o. male admitted for dyspnea. He has a h/o CAD w LHC 6-2015 showing mild-mod CAD, sHF w echo 9-2017 showing EF 30-35% with KATIE, mild MR, mild-mod TR. He had a life vest but did not wear it and gave it back. He has COPD (PFTs w moderate obstructive/restrictive defect) but can't afford his new medications he was given by pulmonary, severe TAZ and does not use CPAP (does not have cord), and CKD. He has had increased SOB x 2 days with a sore throat and productive cough w yellow sputum. He has not had fevers but several family members who have been sick with flu like symptoms.   He has used his albuterol that he has at home (not new medicine that was prescribed by pulmonary) which gave him temporary relief of SOB. His SOB got gradually worse until he had dyspnea at rest and came to the ER. In ER he was given one albuterol treatment but was still extremely SOB, more labored breathing, and developed acute chest tightness, severe, epigastric region radiating to L breast without nausea or diaphoresis. CP lasted about 10 minutes. Breathing improved with second albuterol treatment. BP elevated 157/108 and he was given nitro and ASA and symptoms improved. At this point his breathing is better, chest pain eased off. In ER troponin negative, WBC 6.9, hgb 13.8, , K 4.1, BUn 18, cr 1.52, BNP 1688, CXR no acute process, EKG NSR w rate 75 without ST/T wave changes. SOB is much improved. LE edema is unchanged, weight is stable, he adheres to a low NA diet. No dizziness, palpitations or syncope. He has been compliant with his cardiac meds. Past Medical History/Comorbidities:   Mr. Cornell Ambrose  has a past medical history of Acute CHF (congestive heart failure) ([facility] [address] (4/25/2015); Acute combined systolic and diastolic congestive heart failure ([facility] [address] (4/28/2015); Chronic obstructive pulmonary disease ([facility] [address] De Quervain's tenosynovitis, right (4/28/2015); Dyspnea on exertion (6/28/2015); Elevated serum creatinine (4/25/2015); Elevated troponin (6/28/2015); History of coronary artery disease; History of right knee surgery; History of shingles; Malignant hypertension (4/25/2015); and MI (myocardial infarction). Mr. Cornell Ambrose  has a past surgical history that includes hx knee arthroscopy (Right) and hx heart catheterization (6/29/2015).   Social History/Living Environment:   Home Environment: Private residence  # Steps to Enter: 4  Rails to Enter: Yes  Hand Rails : Bilateral  Wheelchair Ramp: No  One/Two Story Residence: One story  Living Alone: No  Support Systems: Family member(s), Friends \ neighbors  Patient Expects to be Discharged to[de-identified] Private residence  Current DME Used/Available at Home: Yolette Loser, rolling, Walker, rollator  Tub or Shower Type: Tub/Shower combination  Prior Level of Function/Work/Activity:  Pt was independent with all functional mobility and gait without assistive device. Pt lives with wife who can assist upon discharge. One level home, 4 steps to enter. One recent fall. Drives. Does not work. Personal Factors:          Sex:  male        Age:  74 y.o. Number of Personal Factors/Comorbidities that affect the Plan of Care: 3+: HIGH COMPLEXITY   EXAMINATION:   Most Recent Physical Functioning:   Gross Assessment:                  Posture:     Balance:  Sitting: Intact  Standing: Impaired  Standing - Static: Good  Standing - Dynamic : Fair Bed Mobility:  Rolling: Supervision  Supine to Sit: Supervision  Wheelchair Mobility:     Transfers:  Sit to Stand: Contact guard assistance  Gait:     Speed/Lu: Shuffled; Slow  Gait Abnormalities: Decreased step clearance  Distance (ft): 100 Feet (ft)  Assistive Device: Walker, rolling  Ambulation - Level of Assistance: Minimal assistance      Body Structures Involved:  1. Heart  2. Lungs  3. Muscles Body Functions Affected:  1. Cardio  2. Respiratory  3. Movement Related Activities and Participation Affected:  1. General Tasks and Demands  2. Mobility  3. Self Care   Number of elements that affect the Plan of Care: 4+: HIGH COMPLEXITY   CLINICAL PRESENTATION:   Presentation: Stable and uncomplicated: LOW COMPLEXITY   CLINICAL DECISION MAKIN [facility] [address] [id number] AM-PAC 6 Clicks   Basic Mobility Inpatient Short Form  How much difficulty does the patient currently have. .. Unable A Lot A Little None   1. Turning over in bed (including adjusting bedclothes, sheets and blankets)? [] 1   [] 2   [] 3   [x] 4   2. Sitting down on and standing up from a chair with arms ( e.g., wheelchair, bedside commode, etc.)   [] 1   [] 2   [x] 3   [] 4   3.   Moving from lying on back to sitting on the side of the bed? [] 1   [] 2   [] 3   [x] 4   How much help from another person does the patient currently need. .. Total A Lot A Little None   4. Moving to and from a bed to a chair (including a wheelchair)? [] 1   [] 2   [x] 3   [] 4   5. Need to walk in hospital room? [] 1   [] 2   [x] 3   [] 4   6. Climbing 3-5 steps with a railing? [] 1   [] 2   [x] 3   [] 4   © 2007, Trustees of [facility], under license to [employer]. All rights reserved      Score:  Initial: 20 Most Recent: X (Date: -- )    Interpretation of Tool:  Represents activities that are increasingly more difficult (i.e. Bed mobility, Transfers, Gait). Score 24 23 22-20 19-15 14-10 9-7 6     Modifier CH CI CJ CK CL CM CN      ? Mobility - Walking and Moving Around:     - CURRENT STATUS: CJ - 20%-39% impaired, limited or restricted    - GOAL STATUS: CJ - 20%-39% impaired, limited or restricted    - D/C STATUS:  ---------------To be determined---------------  Payor: Brody Lopez / Plan: [address] [place] [address] / Product Type: Managed Care Medicare /      Medical Necessity:     · Patient demonstrates good rehab potential due to higher previous functional level. Reason for Services/Other Comments:  · Patient continues to require present interventions due to patient's inability to function at baseline. Use of outcome tool(s) and clinical judgement create a POC that gives a: Clear prediction of patient's progress: LOW COMPLEXITY            TREATMENT:   (In addition to Assessment/Re-Assessment sessions the following treatments were rendered)   Pre-treatment Symptoms/Complaints:  \"Ya'll have kept in this bed like a detention. \"  Pain: Initial:   Pain Intensity 1: 0  Post Session:  0/10     Assessment/Reassessment only, no treatment provided today    Braces/Orthotics/Lines/Etc:   · O2 Device: Room air  Treatment/Session Assessment:    · Response to Treatment:  Tolerated without difficulty.  Refused further ambulation however as dinner tray arrived. · Interdisciplinary Collaboration:   o Physical Therapist  o Registered Nurse  · After treatment position/precautions:   o Up in chair  o Bed/Chair-wheels locked  o Bed in low position  o Call light within reach  o RN notified  o deroyal pad in place and alarm ON   · Compliance with Program/Exercises: Will assess as treatment progresses. · Recommendations/Intent for next treatment session: \"Next visit will focus on advancements to more challenging activities and reduction in assistance provided\".   Total Treatment Duration:  PT Patient Time In/Time Out  Time In: 1315  Time Out: [place] [address], DPT

## 2018-01-09 NOTE — PROGRESS NOTES
Change in night shift assignment. Bedside and Verbal shift change report given to Tiffany Campa RN (oncoming nurse) by Glendy Moore RN (offgoing nurse). Report included the following information Kardex and Recent Results.

## 2018-01-09 NOTE — PROGRESS NOTES
Infectious Disease Progress note    Today's Date: 2018   Admit Date: 2017    Impression:   · LTBI/Positive PPD: patient incidental converter; with PPD placed in anticipation of SNF placement. His admission CXR  reveals no evidence suggestive of TB and previous CXRs and CT scans also reveal no evidence of TB. In addition patient without any symptoms suggestive of active TB. His risk factors include travel to the [facility] and [place]) 20 years ago as part of his job and his wife states she has had a positive PPD years ago but was never treated. He does not require respiratory isolation. He does not have active TB  · Restrictive Pulmonary Disease by PFTs  · CKD    Plan:   · Screen for HIV  · Dispo: SNF is planned. Would recommend referral to [facility] TB clinic for treatment of LTBI: [phone]. · ID will sign off, please call with questions or concerns      Anti-infectives:     Inpat Anti-Infectives     None          Subjective:   Resting in bed with family at bedside. No acute issues reported. No fever, cough, chills, sweats. Allergies   Allergen Reactions    Pcn [Penicillins] Other (comments)     \"makes my heart stop\"        Review of Systems:  A comprehensive review of systems was negative except for that written in the History of Present Illness. Objective:     Visit Vitals    /60    Pulse (!) 58    Temp 97.5 °F (36.4 °C)    Resp 20    Wt 93.5 kg (206 lb 3.2 oz)    SpO2 100%    BMI 30.45 kg/m2     Temp (24hrs), Av.8 °F (36.6 °C), Min:97.5 °F (36.4 °C), Max:98.3 °F (36.8 °C)       Lines:  Peripheral IV:            Physical Exam:    General:  Alert, cooperative, well noursished, well developed, appears stated age   Eyes:  Sclera anicteric. Pupils equally round and reactive to light.    Mouth/Throat: Mucous membranes normal, oral pharynx clear, edentulous   Lungs:   Bases with decreased air movement, otherwise clear   CV:  Regular rate and rhythm,no murmur, click, rub or gallop   Abdomen:   Soft, non-tender. bowel sounds normal. non-distended   Extremities: No cyanosis or edema   Skin: Skin color, texture, turgor normal. no acute rash or lesions   Musculoskeletal: No swelling or deformity   Lines/Devices:  Intact, no erythema, drainage or tenderness   Psych: Alert and oriented, normal mood affect given the setting       Data Review:     CBC:  Recent Labs      01/09/18   0552  01/08/18   0500  01/07/18   0520   WBC  9.7  10.2  9.6   GRANS  56  66  57   MONOS  8  8  9   EOS  2  2  3   ANEU  5.5  6.9  5.5   ABL  3.2  2.3  2.9   HGB  12.5*  14.0  13.5*   HCT  38.3*  42.3  41.6   PLT  231  244  240       BMP:  Recent Labs      01/09/18   0552  01/08/18   0500  01/07/18   0520   CREA  1.72*  1.64*  1.67*   BUN  29*  26*  26*   NA  142  141  141   K  3.9  4.8  4.0   CL  107  102  103   CO2  27  30  31   AGAP  8  9  7   GLU  91  98  85       LFTS:  No results for input(s): TBILI, ALT, SGOT, AP, TP, ALB in the last 72 hours. Microbiology:     All Micro Results     Procedure Component Value Units Date/Time    QUANTIFERON TB GOLD [[id number]] Collected:  01/08/18 1511    Order Status:  Completed Specimen:  Whole Blood from Blood Updated:  01/08/18 0831    AFB CULTURE + SMEAR W/RFLX ID FROM CULTURE [[id number]] Collected:  01/07/18 1744    Order Status:  Completed Updated:  01/07/18 1833    INFLUENZA A & B AG (RAPID TEST) [[id number]] Collected:  12/26/17 1344    Order Status:  Completed Specimen:  Nasopharyngeal from Nasal washing Updated:  12/26/17 1404     Influenza A Ag NEGATIVE          NEGATIVE FOR THE PRESENCE OF INFLUENZA A ANTIGEN  INFECTION DUE TO INFLUENZA A CANNOT BE RULED OUT. BECAUSE THE ANTIGEN PRESENT IN THE SAMPLE MAY BE BELOW  THE DETECTION LIMIT OF THE TEST. A NEGATIVE TEST IS PRESUMPTIVE AND IT IS RECOMMENDED THAT THESE RESULTS BE CONFIRMED BY VIRAL CULTURE OR AN FDA-CLEARED INFLUENZA A AND B MOLECULAR ASSAY.           Influenza B Ag NEGATIVE          NEGATIVE FOR THE PRESENCE OF INFLUENZA B ANTIGEN  INFECTION DUE TO INFLUENZA B CANNOT BE RULED OUT. BECAUSE THE ANTIGEN PRESENT IN THE SAMPLE MAY BE BELOW  THE DETECTION LIMIT OF THE TEST. A NEGATIVE TEST IS PRESUMPTIVE AND IT IS RECOMMENDED THAT THESE RESULTS BE CONFIRMED BY VIRAL CULTURE OR AN FDA-CLEARED INFLUENZA A AND B MOLECULAR ASSAY. Imaging:   PCXR (12/26/17): FINDINGS: Cardiac enlargement. There is no focal pulmonary infiltrate, nodule,  effusion, or pneumothorax. No discrete acute osseous lesion seen.     IMPRESSION  IMPRESSION:  No acute cardiopulmonary process.     Signed By: Abena Corcoran NP     January 9, 2018

## 2018-01-09 NOTE — PROGRESS NOTES
Bedside and Verbal shift change report given to Allison Freire RN (oncoming nurse) by self Liza cao). Report included the following information SBAR, Kardex, MAR and Recent Results.

## 2018-01-10 LAB
ANION GAP SERPL CALC-SCNC: 9 MMOL/L (ref 7–16)
ANNOTATION COMMENT IMP: ABNORMAL
BASOPHILS # BLD: 0 K/UL (ref 0–0.2)
BASOPHILS NFR BLD: 0 % (ref 0–2)
BNP SERPL-MCNC: 411 PG/ML
BUN SERPL-MCNC: 29 MG/DL (ref 8–23)
CALCIUM SERPL-MCNC: 8.8 MG/DL (ref 8.3–10.4)
CHLORIDE SERPL-SCNC: 107 MMOL/L (ref 98–107)
CO2 SERPL-SCNC: 27 MMOL/L (ref 21–32)
CREAT SERPL-MCNC: 1.64 MG/DL (ref 0.8–1.5)
DIFFERENTIAL METHOD BLD: ABNORMAL
EOSINOPHIL # BLD: 0.2 K/UL (ref 0–0.8)
EOSINOPHIL NFR BLD: 2 % (ref 0.5–7.8)
ERYTHROCYTE [DISTWIDTH] IN BLOOD BY AUTOMATED COUNT: 17.4 % (ref 11.9–14.6)
GLUCOSE SERPL-MCNC: 88 MG/DL (ref 65–100)
HCT VFR BLD AUTO: 38.2 % (ref 41.1–50.3)
HGB BLD-MCNC: 12.5 G/DL (ref 13.6–17.2)
HIV1 P24 AG SERPL QL IA: NONREACTIVE
HIV1+2 AB SERPL QL IA: NONREACTIVE
IMM GRANULOCYTES # BLD: 0 K/UL (ref 0–0.5)
IMM GRANULOCYTES NFR BLD AUTO: 0 % (ref 0–5)
LYMPHOCYTES # BLD: 2.9 K/UL (ref 0.5–4.6)
LYMPHOCYTES NFR BLD: 36 % (ref 13–44)
M TB IFN-G CD4+ BCKGRND COR BLD-ACNC: 2.82 IU/ML
M TB IFN-G CD4+ T-CELLS BLD-ACNC: 2.85 IU/ML
M TB TUBERC IGNF/MITOGEN IGNF CONTROL: >10 IU/ML
MCH RBC QN AUTO: 27.2 PG (ref 26.1–32.9)
MCHC RBC AUTO-ENTMCNC: 32.7 G/DL (ref 31.4–35)
MCV RBC AUTO: 83 FL (ref 79.6–97.8)
MONOCYTES # BLD: 0.7 K/UL (ref 0.1–1.3)
MONOCYTES NFR BLD: 8 % (ref 4–12)
NEUTS SEG # BLD: 4.3 K/UL (ref 1.7–8.2)
NEUTS SEG NFR BLD: 54 % (ref 43–78)
PLATELET # BLD AUTO: 240 K/UL (ref 150–450)
PMV BLD AUTO: 10.2 FL (ref 10.8–14.1)
POTASSIUM SERPL-SCNC: 4.3 MMOL/L (ref 3.5–5.1)
QUANTIFERON INCUBATION: NORMAL
QUANTIFERON NIL VALUE: 0.03 IU/ML
QUANTIFERON TB GOLD, 182875: POSITIVE
RBC # BLD AUTO: 4.6 M/UL (ref 4.23–5.67)
SERVICE CMNT-IMP: ABNORMAL
SODIUM SERPL-SCNC: 143 MMOL/L (ref 136–145)
WBC # BLD AUTO: 8.1 K/UL (ref 4.3–11.1)

## 2018-01-10 PROCEDURE — 36415 COLL VENOUS BLD VENIPUNCTURE: CPT | Performed by: INTERNAL MEDICINE

## 2018-01-10 PROCEDURE — 80048 BASIC METABOLIC PNL TOTAL CA: CPT | Performed by: INTERNAL MEDICINE

## 2018-01-10 PROCEDURE — 94760 N-INVAS EAR/PLS OXIMETRY 1: CPT

## 2018-01-10 PROCEDURE — 85025 COMPLETE CBC W/AUTO DIFF WBC: CPT | Performed by: INTERNAL MEDICINE

## 2018-01-10 PROCEDURE — 99232 SBSQ HOSP IP/OBS MODERATE 35: CPT | Performed by: INTERNAL MEDICINE

## 2018-01-10 PROCEDURE — 74011250637 HC RX REV CODE- 250/637: Performed by: PHYSICIAN ASSISTANT

## 2018-01-10 PROCEDURE — 83880 ASSAY OF NATRIURETIC PEPTIDE: CPT | Performed by: INTERNAL MEDICINE

## 2018-01-10 PROCEDURE — 74011250636 HC RX REV CODE- 250/636: Performed by: INTERNAL MEDICINE

## 2018-01-10 PROCEDURE — 65660000000 HC RM CCU STEPDOWN

## 2018-01-10 PROCEDURE — 74011250637 HC RX REV CODE- 250/637: Performed by: INTERNAL MEDICINE

## 2018-01-10 PROCEDURE — 74011000250 HC RX REV CODE- 250: Performed by: NURSE PRACTITIONER

## 2018-01-10 PROCEDURE — 74011250637 HC RX REV CODE- 250/637: Performed by: NURSE PRACTITIONER

## 2018-01-10 PROCEDURE — 97530 THERAPEUTIC ACTIVITIES: CPT

## 2018-01-10 PROCEDURE — 94640 AIRWAY INHALATION TREATMENT: CPT

## 2018-01-10 PROCEDURE — 74011000250 HC RX REV CODE- 250: Performed by: INTERNAL MEDICINE

## 2018-01-10 PROCEDURE — 87389 HIV-1 AG W/HIV-1&-2 AB AG IA: CPT | Performed by: INTERNAL MEDICINE

## 2018-01-10 RX ADMIN — ATORVASTATIN CALCIUM 20 MG: 10 TABLET, FILM COATED ORAL at 21:26

## 2018-01-10 RX ADMIN — BUDESONIDE 500 MCG: 0.5 INHALANT RESPIRATORY (INHALATION) at 08:48

## 2018-01-10 RX ADMIN — GUAIFENESIN 1200 MG: 600 TABLET, EXTENDED RELEASE ORAL at 09:16

## 2018-01-10 RX ADMIN — CARVEDILOL 25 MG: 25 TABLET, FILM COATED ORAL at 09:15

## 2018-01-10 RX ADMIN — LISINOPRIL 5 MG: 5 TABLET ORAL at 09:16

## 2018-01-10 RX ADMIN — BUDESONIDE 500 MCG: 0.5 INHALANT RESPIRATORY (INHALATION) at 20:56

## 2018-01-10 RX ADMIN — FUROSEMIDE 40 MG: 40 TABLET ORAL at 09:16

## 2018-01-10 RX ADMIN — CARVEDILOL 25 MG: 25 TABLET, FILM COATED ORAL at 17:51

## 2018-01-10 RX ADMIN — ASPIRIN 81 MG: 81 TABLET ORAL at 09:18

## 2018-01-10 RX ADMIN — ENOXAPARIN SODIUM 40 MG: 40 INJECTION SUBCUTANEOUS at 11:53

## 2018-01-10 RX ADMIN — GUAIFENESIN 1200 MG: 600 TABLET, EXTENDED RELEASE ORAL at 21:26

## 2018-01-10 RX ADMIN — POLYETHYLENE GLYCOL 3350 17 G: 17 POWDER, FOR SOLUTION ORAL at 09:15

## 2018-01-10 RX ADMIN — PANTOPRAZOLE SODIUM 40 MG: 40 TABLET, DELAYED RELEASE ORAL at 17:51

## 2018-01-10 RX ADMIN — ALLOPURINOL 100 MG: 100 TABLET ORAL at 09:18

## 2018-01-10 RX ADMIN — PANTOPRAZOLE SODIUM 40 MG: 40 TABLET, DELAYED RELEASE ORAL at 09:15

## 2018-01-10 NOTE — PROGRESS NOTES
Bedside and Verbal shift change report given to Carmel Lawrence RN (oncoming nurse) by self Zuleima Rey nurse). Report included the following information SBAR, Kardex, Intake/Output, MAR and Recent Results.

## 2018-01-10 NOTE — PROGRESS NOTES
Problem: Falls - Risk of  Goal: *Absence of Falls  Document Sue Fall Risk and appropriate interventions in the flowsheet.    Outcome: Progressing Towards Goal  Fall Risk Interventions:  Mobility Interventions: Patient to call before getting OOB    Mentation Interventions: Bed/chair exit alarm    Medication Interventions: Patient to call before getting OOB         History of Falls Interventions: Room close to nurse's station

## 2018-01-10 NOTE — PROGRESS NOTES
Bedside and Verbal shift change report received from John Hinojosa RN (offgoing nurse). Report included the following information SBAR, Kardex, MAR and Recent Results.

## 2018-01-10 NOTE — PROGRESS NOTES
Problem: Mobility Impaired (Adult and Pediatric)  Goal: *Acute Goals and Plan of Care (Insert Text)  LTG:  (1.)Mr. Carmen Lyn will move from supine to sit and sit to supine , scoot up and down and roll side to side in bed with INDEPENDENCE within 7 day(s). (2.)Mr. Carmen Lyn will transfer from bed to chair and chair to bed with MODIFIED INDEPENDENCE using the least restrictive device within 7 day(s). (3.)Mr. Carmen Lyn will ambulate with MODIFIED INDEPENDENCE for >or 150 feet with the least restrictive device, appropriate gait pattern and good dynamic standing balance within 7 day(s). (4.)Mr. Carmen Lyn will perform B LE therapeutic exercises x 20 reps with INDEPENDENCE within 7 days to increase strength for improved safety and independence in transfers and gait. (5.)Mr. Carmen Lyn will ascend/descend 4 steps with 1-2 handrail(s) and CGA within 7 day(s) for safe entry/exit of home.      ________________________________________________________________________________________________      PHYSICAL THERAPY: Daily Note, Treatment Day: 2nd, AM 1/10/2018  INPATIENT: Hospital Day: 16  Payor: Camille Lam / Plan: [address] [place] [address] HMO / Product Type: Managed Care Medicare /      NAME/AGE/GENDER: Clarisa Howell is a 74 y.o. male   PRIMARY DIAGNOSIS: Dyspnea  Dyspnea Dyspnea Dyspnea        ICD-10: Treatment Diagnosis:   · Difficulty in walking, Not elsewhere classified (R26.2)  · History of falling (Z91.81)   Precaution/Allergies:  Pcn [penicillins]      ASSESSMENT:     Mr. Carmen Lyn presents supine in bed, agreeable to treatment. Able to tolerate SUP assist with getting to EOB. Expressed that he needed to go to toilet first.  Pt was able to move around bed, into bathroom, sit at toilet, wash hands and exit bathroom with SBA. Emphasis on keeping head up looking forward. Ambulating, he was flexed fwd but improved since yesterday with emphasis on focusing ahead and cues to stand straight, he did not use any AD.    Improved with gait distance, and without using AD and decreasing risk for falls by changing posture and focus. Pt improved and instructed to be slow with motions and would be safe to go home at discharge when medically cleared. This section established at most recent assessment   PROBLEM LIST (Impairments causing functional limitations):  1. Decreased ADL/Functional Activities  2. Decreased Transfer Abilities  3. Decreased Ambulation Ability/Technique  4. Decreased Balance  5. Decreased Activity Tolerance  6. Decreased [place] with Home Exercise Program   INTERVENTIONS PLANNED: (Benefits and precautions of physical therapy have been discussed with the patient.)  1. Balance Exercise  2. Bed Mobility  3. Gait Training  4. Home Exercise Program (HEP)  5. Therapeutic Activites  6. Therapeutic Exercise/Strengthening  7. Transfer Training     TREATMENT PLAN: Frequency/Duration: 3 times a week for duration of hospital stay  Rehabilitation Potential For Stated Goals: Good     RECOMMENDED REHABILITATION/EQUIPMENT: (at time of discharge pending progress): Due to the probability of continued deficits (see above) this patient will likely need continued skilled physical therapy after discharge. Equipment:    to be determined              HISTORY:   History of Present Injury/Illness (Reason for Referral):  Per chart: Rema Luna is a 74 y.o. male admitted for dyspnea. He has a h/o CAD w LHC 6-2015 showing mild-mod CAD, sHF w echo 9-2017 showing EF 30-35% with KATIE, mild MR, mild-mod TR. He had a life vest but did not wear it and gave it back. He has COPD (PFTs w moderate obstructive/restrictive defect) but can't afford his new medications he was given by pulmonary, severe TAZ and does not use CPAP (does not have cord), and CKD. He has had increased SOB x 2 days with a sore throat and productive cough w yellow sputum. He has not had fevers but several family members who have been sick with flu like symptoms.   He has used his albuterol that he has at home (not new medicine that was prescribed by pulmonary) which gave him temporary relief of SOB. His SOB got gradually worse until he had dyspnea at rest and came to the ER. In ER he was given one albuterol treatment but was still extremely SOB, more labored breathing, and developed acute chest tightness, severe, epigastric region radiating to L breast without nausea or diaphoresis. CP lasted about 10 minutes. Breathing improved with second albuterol treatment. BP elevated 157/108 and he was given nitro and ASA and symptoms improved. At this point his breathing is better, chest pain eased off. In ER troponin negative, WBC 6.9, hgb 13.8, , K 4.1, BUn 18, cr 1.52, BNP 1688, CXR no acute process, EKG NSR w rate 75 without ST/T wave changes. SOB is much improved. LE edema is unchanged, weight is stable, he adheres to a low NA diet. No dizziness, palpitations or syncope. He has been compliant with his cardiac meds. Past Medical History/Comorbidities:   Mr. Sherly Tariq  has a past medical history of Acute CHF (congestive heart failure) ([facility] [address] (4/25/2015); Acute combined systolic and diastolic congestive heart failure ([facility] [address] (4/28/2015); Chronic obstructive pulmonary disease ([facility] [address] De Quervain's tenosynovitis, right (4/28/2015); Dyspnea on exertion (6/28/2015); Elevated serum creatinine (4/25/2015); Elevated troponin (6/28/2015); History of coronary artery disease; History of right knee surgery; History of shingles; Malignant hypertension (4/25/2015); and MI (myocardial infarction). Mr. Sherly Tariq  has a past surgical history that includes hx knee arthroscopy (Right) and hx heart catheterization (6/29/2015).   Social History/Living Environment:   Home Environment: Private residence  # Steps to Enter: 4  Rails to Enter: Yes  Hand Rails : Bilateral  Wheelchair Ramp: No  One/Two Story Residence: One story  Living Alone: No  Support Systems: Family member(s), Friends \ neighbors  Patient Expects to be Discharged to[de-identified] Private residence  Current DME Used/Available at Home: Shanice Ryan, rolling, Walker, rollator  Tub or Shower Type: Tub/Shower combination  Prior Level of Function/Work/Activity:  Pt was independent with all functional mobility and gait without assistive device. Pt lives with wife who can assist upon discharge. One level home, 4 steps to enter. One recent fall. Drives. Does not work. Personal Factors:          Sex:  male        Age:  74 y.o. Number of Personal Factors/Comorbidities that affect the Plan of Care: 3+: HIGH COMPLEXITY   EXAMINATION:   Most Recent Physical Functioning:   Gross Assessment:                  Posture:     Balance:  Sitting: Intact  Standing: Impaired  Standing - Static: Good  Standing - Dynamic : Fair Bed Mobility:  Rolling: Supervision  Supine to Sit: Supervision  Scooting: Supervision  Wheelchair Mobility:     Transfers:  Sit to Stand: Stand-by asssistance  Stand to Sit: Stand-by asssistance  Gait:     Speed/Lu: Fluctuations; Shuffled  Gait Abnormalities: Decreased step clearance;Shuffling gait  Distance (ft): 100 Feet (ft)  Assistive Device:  (none)  Ambulation - Level of Assistance: Stand-by asssistance      Body Structures Involved:  1. Heart  2. Lungs  3. Muscles Body Functions Affected:  1. Cardio  2. Respiratory  3. Movement Related Activities and Participation Affected:  1. General Tasks and Demands  2. Mobility  3. Self Care   Number of elements that affect the Plan of Care: 4+: HIGH COMPLEXITY   CLINICAL PRESENTATION:   Presentation: Stable and uncomplicated: LOW COMPLEXITY   CLINICAL DECISION MAKIN [facility] [address] [id number] AM-PAC 6 Clicks   Basic Mobility Inpatient Short Form  How much difficulty does the patient currently have. .. Unable A Lot A Little None   1. Turning over in bed (including adjusting bedclothes, sheets and blankets)? [] 1   [] 2   [] 3   [x] 4   2.   Sitting down on and standing up from a chair with arms ( e.g., wheelchair, bedside commode, etc.)   [] 1   [] 2   [x] 3   [] 4   3. Moving from lying on back to sitting on the side of the bed? [] 1   [] 2   [] 3   [x] 4   How much help from another person does the patient currently need. .. Total A Lot A Little None   4. Moving to and from a bed to a chair (including a wheelchair)? [] 1   [] 2   [x] 3   [] 4   5. Need to walk in hospital room? [] 1   [] 2   [x] 3   [] 4   6. Climbing 3-5 steps with a railing? [] 1   [] 2   [x] 3   [] 4   © 2007, Trustees of [facility] [employer], under license to [employer]. All rights reserved      Score:  Initial: 20 Most Recent: X (Date: -- )    Interpretation of Tool:  Represents activities that are increasingly more difficult (i.e. Bed mobility, Transfers, Gait). Score 24 23 22-20 19-15 14-10 9-7 6     Modifier CH CI CJ CK CL CM CN      ? Mobility - Walking and Moving Around:     - CURRENT STATUS: CJ - 20%-39% impaired, limited or restricted    - GOAL STATUS: CJ - 20%-39% impaired, limited or restricted    - D/C STATUS:  ---------------To be determined---------------  Payor: Gregor Morse / Plan: [address] [place] [address] / Product Type: Managed Care Medicare /      Medical Necessity:     · Patient demonstrates good rehab potential due to higher previous functional level. Reason for Services/Other Comments:  · Patient continues to require present interventions due to patient's inability to function at baseline. Use of outcome tool(s) and clinical judgement create a POC that gives a: Clear prediction of patient's progress: LOW COMPLEXITY            TREATMENT:   (In addition to Assessment/Re-Assessment sessions the following treatments were rendered)   Pre-treatment Symptoms/Complaints:  \"Ya'll have kept in this bed like a skilled nursing. \"  Pain: Initial:   Pain Intensity 1: 0  Post Session:  0/10     Therapeutic Activity: (    25min):  Therapeutic activities including Bed transfers, Chair transfers, Toilet transfers and Ambulation on level ground to improve mobility, strength, balance and coordination. Required minimal   to promote static and dynamic balance in standing, promote coordination of bilateral, lower extremity(s) and promote motor control of bilateral, lower extremity(s). Braces/Orthotics/Lines/Etc:   · O2 Device: Room air  Treatment/Session Assessment:    · Response to Treatment:  Tolerated without difficulty. Refused further ambulation however as dinner tray arrived. · Interdisciplinary Collaboration:   o Physical Therapist  o Registered Nurse  · After treatment position/precautions:   o Up in chair  o Bed/Chair-wheels locked  o Bed in low position  o Call light within reach  o RN notified  o deroyal pad in place and alarm ON   · Compliance with Program/Exercises: Will assess as treatment progresses. · Recommendations/Intent for next treatment session: \"Next visit will focus on advancements to more challenging activities and reduction in assistance provided\".   Total Treatment Duration:  PT Patient Time In/Time Out  Time In: 3554  Time Out: 350 [facility], [facility]

## 2018-01-10 NOTE — PROGRESS NOTES
[facility] CARDIOLOGY PROGRESS NOTE           1/10/2018 8:56 AM    Admit Date: 12/26/2017      Subjective:   New sCHF and LHC showed mild nonobstructive CAD. Denies any chest pain or SOB. No wheezing. Now with (+) PPD and ID has cleared from active TB. Normal CXR    ROS:  Cardiovascular:  As noted above    Objective:      Vitals:    01/09/18 2107 01/09/18 2343 01/10/18 0102 01/10/18 0512   BP: 103/68  90/57 102/68   Pulse: 76  86 63   Resp: 18 20 18   Temp: 97.4 °F (36.3 °C)  97.9 °F (36.6 °C) 97.6 °F (36.4 °C)   SpO2: 98% 99% 91% 96%   Weight:    93.4 kg (205 lb 12.8 oz)       Physical Exam:  General-No Acute Distress  Neck- supple, no JVD  CV- regular rate and rhythm no MRG  Lung- clear bilaterally  Abd- soft, nontender, nondistended  Ext- no edema bilaterally. Skin- warm and dry    Data Review:   Recent Labs      01/10/18   0503  01/09/18   0552   NA  143  142   K  4.3  3.9   BUN  29*  29*   CREA  1.64*  1.72*   GLU  88  91   WBC  8.1  9.7   HGB  12.5*  12.5*   HCT  38.2*  38.3*   PLT  240  231       Assessment/Plan:     Principal Problem:    Dyspnea (12/26/2017)    Likely multifactorial including COPD, untreated TAZ, LV dysfunction. Diuresed well since admission. Stable on RA    Active Problems:    CKD (chronic kidney disease) stage 3, GFR 30-59 ml/min (9/29/2017)    Nephrology saw patient, labs are stable       Chronic systolic heart failure ([facility] [address] (12/4/2015)    Diuresing well on po lasix. DC hydralazine and start lisinopril, Imdur, coreg, lasix but no ACE-I/ARB with CKD. Reduced lasix to 40mg BID. SPEP and UPEP (-).   S/P fat pad biopsy (-)      Coronary atherosclerosis of native coronary vessel (9/29/2017)    S/p LHC showing mild nonobstructive CAD      Hypertension (9/29/2017)    continue meds      COPD, moderate ([address] (9/29/2017)    Per pulmonary       TAZ (obstructive sleep apnea) (10/2/2017)  Per pulmonary working with [employer] to help with CPAP equipment        Stable to DC home or STR if needed    Esteban Holman MD  1/10/2018 8:56 AM

## 2018-01-10 NOTE — PROGRESS NOTES
Bedside and Verbal shift change report given to Teri Torres RN (oncoming nurse) by self Merlene [place] ). Report included the following information SBAR, Kardex, MAR and Recent Results.

## 2018-01-10 NOTE — PROGRESS NOTES
Bedside and Verbal shift change report given to self (oncoming nurse) by Leonarda Koyanagi, RN (offgoing nurse). Report included the following information SBAR, Kardex, MAR and Recent Results.

## 2018-01-10 NOTE — PROGRESS NOTES
[facility] Hematology & Oncology        Inpatient Hematology / Oncology Progress Note    Reason for Consult:  Dyspnea  Dyspnea  Referring Physician:  Shwetha Nazario MD    History of Present Illness:  Mr. Sherly Tariq is a 74 y.o. male admitted on 12/26/2017 with a primary diagnosis of The primary encounter diagnosis was Acute systolic CHF (congestive heart failure) ([address] Diagnoses of Chest pain, unspecified type, COPD, moderate ([address] TAZ (obstructive sleep apnea), Tobacco abuse, Chronic systolic heart failure ([address] Dyspnea, unspecified type, Atherosclerosis of native coronary artery of native heart with stable angina pectoris ([address] Dyspnea on exertion, NINO (acute kidney injury) ([address] and Acute pulmonary edema ([address] were also pertinent to this visit. Celeste Jose Manuel His PMH includes CAD, COPD, severe TAZ, and CKD. He presented to ED with c/o shortness of breath x 2 days with sore throat and productive cough with yellow sputum. While in the ED, he developed chest tightness with severe epigastric pain x 10 min. Pain resolved with nitro and ASA. Trop neg. EKG NSR. BNP 1688. CXR neg. ECHO 9/28/17 EF 30-35%. Elevated light chains on lab work noted from 9/2017 work-up. SPEP WNL, but was noted \"cannot r/o a poorly defined free lambda band\". We were consulted for evaluation of possible amyloidosis. Interim: negative SPEP/UPEP, s/p fat pad biopsy - negative. Feeling well. Kervin Monteiro for discharge      Review of Systems:  Constitutional +fatigue. Denies fever, chills, weight loss, appetite changes, night sweats. HEENT Denies trauma, blurry vision, hearing loss, ear pain, nosebleeds, sore throat, neck pain    Skin Denies lesions or rashes. Lungs Denies dyspnea, cough, sputum production or hemoptysis. Cardiovascular Denies chest pain, palpitations, or lower extremity edema. Gastrointestinal Denies nausea, vomiting, changes in bowel habits, bloody or black stools, abdominal pain.     Denies dysuria, frequency or hesitancy of urination. Neuro Denies headaches, visual changes or ataxia. Denies dizziness, tingling, tremors, sensory change, speech change, focal weakness . Hematology Denies easy bruising or bleeding, denies gingival bleeding or epistaxis. Endo Denies heat/cold intolerance, denies diabetes or thyroid abnormalities. MSK Denies back pain, arthralgias, myalgias or frequent falls. Psychiatric/Behavioral Denies depression and substance abuse. The patient is not nervous/anxious.          Allergies   Allergen Reactions    Pcn [Penicillins] Other (comments)     \"makes my heart stop\"     Past Medical History:   Diagnosis Date    Acute CHF (congestive heart failure) ([facility] 4/25/2015    Acute combined systolic and diastolic congestive heart failure ([facility] 4/28/2015    Chronic obstructive pulmonary disease (HCC)     De Quervain's tenosynovitis, right 4/28/2015    Dyspnea on exertion 6/28/2015    Elevated serum creatinine 4/25/2015    Elevated troponin 6/28/2015    History of coronary artery disease     MI at 25 yo    History of right knee surgery     cartilage removal    History of shingles     Malignant hypertension 4/25/2015    MI (myocardial infarction)      Past Surgical History:   Procedure Laterality Date    HX HEART CATHETERIZATION  6/29/2015    no intervention    HX KNEE ARTHROSCOPY Right     removal of cartilage     Family History   Problem Relation Age of Onset    Hypertension Mother     No Known Problems Father      Social History     Social History    Marital status:      Spouse name: N/A    Number of children: N/A    Years of education: N/A     Occupational History    retired      Social History Main Topics    Smoking status: Former Smoker     Packs/day: 0.25     Years: 20.00     Types: Cigarettes     Quit date: 4/5/2015    Smokeless tobacco: Never Used    Alcohol use No    Drug use: No    Sexual activity: Not on file     Other Topics Concern     Service No    Blood Transfusions No     no issues with receiving    Caffeine Concern No    Special Diet No    Exercise No    Seat Belt Yes     Social History Narrative    Lives with his wife     Current Facility-Administered Medications   Medication Dose Route Frequency Provider Last Rate Last Dose    furosemide (LASIX) tablet 40 mg  40 mg Oral DAILY Pedro Rios MD   40 mg at 01/10/18 0916    polyethylene glycol (MIRALAX) packet 17 g  17 g Oral DAILY Shahram Sepulveda NP   17 g at 01/10/18 0915    lisinopril (PRINIVIL, ZESTRIL) tablet 5 mg  5 mg Oral DAILY Pedro Rios MD   5 mg at 01/10/18 0916    heparin (PF) 2 units/ml in NS infusion  3 mL/hr IntraVENous CONTINUOUS Jose M Fritz MD 3 mL/hr at 01/02/18 0834 3 mL/hr at 01/02/18 0834    enoxaparin (LOVENOX) injection 40 mg  40 mg SubCUTAneous Q24H Jose M Fritz MD   40 mg at 01/10/18 1153    albuterol-ipratropium (DUO-NEB) 2.5 MG-0.5 MG/3 ML  3 mL Nebulization Q6H PRN Mckinley Gimenez MD   3 mL at 01/03/18 2010    budesonide (PULMICORT) 500 mcg/2 ml nebulizer suspension  500 mcg Nebulization BID RT Darryl Barahona MD   500 mcg at 01/10/18 0848    guaiFENesin ER (MUCINEX) tablet 1,200 mg  1,200 mg Oral Q12H Lakisha Light MD   1,200 mg at 01/10/18 0916    pantoprazole (PROTONIX) tablet 40 mg  40 mg Oral ACB&D Lakisha Light MD   40 mg at 01/10/18 0915    albuterol (PROVENTIL HFA, VENTOLIN HFA, PROAIR HFA) inhaler 2 Puff  2 Puff Inhalation QID PRN KWESI Sky        allopurinol (ZYLOPRIM) tablet 100 mg (Patient Supplied)  100 mg Oral DAILY KWESI Sky   100 mg at 01/10/18 9537    aspirin delayed-release tablet 81 mg (Patient Supplied)  81 mg Oral DAILY KWESI Sky   81 mg at 01/10/18 9789    atorvastatin (LIPITOR) tablet 20 mg  20 mg Oral QHS KWESI Sky   20 mg at 01/09/18 2043    carvedilol (COREG) tablet 25 mg  25 mg Oral BID WITH MEALS KWESI Sky   25 mg at 01/10/18 0915    oxyCODONE IR (ROXICODONE) tablet 5 mg  5 mg Oral Q4H PRN KWESI Otero Junior   5 mg at 18 1117    sodium chloride (NS) flush 5-10 mL  5-10 mL IntraVENous PRN KWESI Otero Junior   5 mL at 184    nitroglycerin (NITROSTAT) tablet 0.4 mg  0.4 mg SubLINGual Q5MIN PRN KWESI Otero Junior        acetaminophen (TYLENOL) tablet 650 mg  650 mg Oral Q4H PRN KWESI Otero Junior        promethazine (PHENERGAN) tablet 25 mg  25 mg Oral Q6H PRN KWESI Otero Junior        influenza vaccine  (3 yrs+)(PF) (FLUZONE QUAD/FLUARIX QUAD) injection 0.5 mL  0.5 mL IntraMUSCular PRIOR TO DISCHARGE Erica Lobo MD           OBJECTIVE:  Patient Vitals for the past 8 hrs:   BP Temp Pulse Resp SpO2   01/10/18 1037 105/63 98.3 °F (36.8 °C) 69 20 95 %   01/10/18 0915 103/58 - 73 - -   01/10/18 0848 - - - - 98 %     Temp (24hrs), Av.8 °F (36.6 °C), Min:97.4 °F (36.3 °C), Max:98.3 °F (36.8 °C)    01/10 0701 - 01/10 1900  In: 120 [P.O.:120]  Out: -     Physical Exam:  Constitutional: Well developed, well nourished male in no acute distress, sitting comfortably in the hospital bed. HEENT: Normocephalic and atraumatic. Oropharynx is clear, mucous membranes are moist.  Extraocular muscles are intact. Sclerae anicteric. Neck supple without JVD. No thyromegaly present. Lymph node   Deferred   Skin Warm and dry. No bruising and no rash noted. No erythema. No pallor. Respiratory Lungs are clear to auscultation bilaterally without wheezes, rales or rhonchi, normal air exchange without accessory muscle use. CVS Normal rate, regular rhythm and normal S1 and S2. No murmurs, gallops, or rubs. Abdomen Soft, nontender and nondistended, normoactive bowel sounds. No palpable mass. No hepatosplenomegaly. Neuro Grossly nonfocal with no obvious sensory or motor deficits. MSK Normal range of motion in general.  No edema and no tenderness. Psych Appropriate mood and affect.         Labs:    Recent Results (from the past 24 hour(s))   BNP Collection Time: 01/10/18  5:03 AM   Result Value Ref Range     pg/mL   CBC WITH AUTOMATED DIFF    Collection Time: 01/10/18  5:03 AM   Result Value Ref Range    WBC 8.1 4.3 - 11.1 K/uL    RBC 4.60 4.23 - 5.67 M/uL    HGB 12.5 (L) 13.6 - 17.2 g/dL    HCT 38.2 (L) 41.1 - 50.3 %    MCV 83.0 79.6 - 97.8 FL    MCH 27.2 26.1 - 32.9 PG    MCHC 32.7 31.4 - 35.0 g/dL    RDW 17.4 (H) 11.9 - 14.6 %    PLATELET 579 100 - 686 K/uL    MPV 10.2 (L) 10.8 - 14.1 FL    DF AUTOMATED      NEUTROPHILS 54 43 - 78 %    LYMPHOCYTES 36 13 - 44 %    MONOCYTES 8 4.0 - 12.0 %    EOSINOPHILS 2 0.5 - 7.8 %    BASOPHILS 0 0.0 - 2.0 %    IMMATURE GRANULOCYTES 0 0.0 - 5.0 %    ABS. NEUTROPHILS 4.3 1.7 - 8.2 K/UL    ABS. LYMPHOCYTES 2.9 0.5 - 4.6 K/UL    ABS. MONOCYTES 0.7 0.1 - 1.3 K/UL    ABS. EOSINOPHILS 0.2 0.0 - 0.8 K/UL    ABS. BASOPHILS 0.0 0.0 - 0.2 K/UL    ABS. IMM.  GRANS. 0.0 0.0 - 0.5 K/UL   METABOLIC PANEL, BASIC    Collection Time: 01/10/18  5:03 AM   Result Value Ref Range    Sodium 143 136 - 145 mmol/L    Potassium 4.3 3.5 - 5.1 mmol/L    Chloride 107 98 - 107 mmol/L    CO2 27 21 - 32 mmol/L    Anion gap 9 7 - 16 mmol/L    Glucose 88 65 - 100 mg/dL    BUN 29 (H) 8 - 23 MG/DL    Creatinine 1.64 (H) 0.8 - 1.5 MG/DL    GFR est AA 53 (L) >60 ml/min/1.73m2    GFR est non-AA 44 (L) >60 ml/min/1.73m2    Calcium 8.8 8.3 - 10.4 MG/DL   HIV-1,2 P24 AG/AB SCREEN    Collection Time: 01/10/18  5:03 AM   Result Value Ref Range    p24 Antigen NONREACTIVE NR      HIV-1,2 Ab NONREACTIVE NR         Imaging:  CT HEAD WO CONT [[id number]] Collected: 12/27/17 1728      Order Status: Completed Updated: 12/27/17 1731     Narrative:       Examination: CT scan of the brain without contrast.    History: Syncope/fainting; s/p fall heparin gtt, 74 years Male     Technique: 5 mm axial imaging of the brain from the posterior fossa to the  vertex.  Radiation dose reduction techniques were used for this study:  Our CT  scanners use one or all of the following: Automated exposure control, adjustment  of the mA and/or kVp according to patient's size, iterative reconstruction. Comparison:  CT brain December 07, 2015    Findings:  The brain parenchyma appears within normal limits for age.  The  ventricles, sulci are age-appropriate. No intracranial hemorrhage or extra-axial  collection is identified.  No evidence of acute infarct.  No mass effect or  midline shift is present. Basal cisterns are intact.  The visualized paranasal  sinuses and mastoid air cells are clear. The orbits, bones, and soft tissues are  normal in appearance.       Impression:       IMPRESSION:  Normal unenhanced CT of the brain for age.  No acute intracranial  abnormality.     CT HEAD WO CONT [[id number]]      Order Status: Canceled      XR CHEST PA LAT [[id number]] Collected: 12/26/17 1221     Order Status: Completed Updated: 12/26/17 1223     Narrative:       Frontal and lateral views of the chest     Comparison: 10/30/2017    Indication: Shortness of breath    FINDINGS: Cardiac enlargement. There is no focal pulmonary infiltrate, nodule,  effusion, or pneumothorax.  No discrete acute osseous lesion seen.       Impression:       IMPRESSION:  No acute cardiopulmonary process.            ASSESSMENT:  Problem List  Date Reviewed: 12/29/2017          Codes Class Noted    * (Principal)Dyspnea ICD-10-CM: R06.00  ICD-9-CM: 786.09  12/26/2017        Atherosclerosis of native coronary artery of native heart with stable angina pectoris ([facility] [address] ICD-10-CM: I25.118  ICD-9-CM: 414.01, 413.9  10/13/2017        TAZ (obstructive sleep apnea) ICD-10-CM: B61.23  ICD-9-CM: 327.23  10/2/2017        CKD (chronic kidney disease) stage 3, GFR 30-59 ml/min (Chronic) ICD-10-CM: N18.3  ICD-9-CM: 585.3  9/29/2017        Coronary atherosclerosis of native coronary vessel (Chronic) ICD-10-CM: I25.10  ICD-9-CM: 414.01  9/29/2017        Hypertension (Chronic) ICD-10-CM: I10  ICD-9-CM: 401.9  9/29/2017        COPD, moderate [facility]) (Chronic) ICD-10-CM: J44.9  ICD-9-CM: 123  9/29/2017    Overview Signed 12/27/2015 12:38 PM by Lucrecia Lake NP     Complete PFTs: 7/20/15:  Spirometry is consistent with a moderate obstructive/restrictive defect. . The residual volume is increased relative to other lung volumes suggesting air-trapping. The diffusion capacity corrected for alveolar volume was normal suggesting no loss of alveolo-capillary units. High triglycerides (Chronic) ICD-10-CM: E78.1  ICD-9-CM: 272.1  9/29/2017        Gastroesophageal reflux disease without esophagitis (Chronic) ICD-10-CM: K21.9  ICD-9-CM: 530.81  9/29/2017        Vomiting ICD-10-CM: R11.10  ICD-9-CM: 787.03  9/29/2017        NINO (acute kidney injury) ([facility] ICD-10-CM: N17.9  ICD-9-CM: 584.9  9/29/2017        CHF (congestive heart failure) ([facility] ICD-10-CM: I50.9  ICD-9-CM: 428.0  9/27/2017        Mixed hyperlipidemia (Chronic) ICD-10-CM: Q14.3  ICD-9-CM: 272.2  9/28/2016        Essential hypertension ICD-10-CM: I10  ICD-9-CM: 401.9  9/28/2016        Arthritis (Chronic) ICD-10-CM: M19.90  ICD-9-CM: 716.90  Unknown        Chronic systolic heart failure (HCC) (Chronic) ICD-10-CM: I50.22  ICD-9-CM: 428.22  12/4/2015    Overview Signed 12/4/2015  9:28 AM by Yajaira Craig     EF 40%             Tobacco abuse (Chronic) ICD-10-CM: Z72.0  ICD-9-CM: 305.1  7/2/2015        Dyspnea on exertion (Chronic) ICD-10-CM: R06.09  ICD-9-CM: 786.09  6/28/2015                RECOMMENDATIONS:  Evaluation for Amyloidosis  - Elevated light chains on lab work noted from 9/2017 work-up. SPEP was WNL, but was noted \"cannot r/o a poorly defined free lambda band\". Repeat SPEP pending.  - Check UPEP  - Can consider fat pad biopsy  1/10 Followed up fat pad biopsy and UPEP - no evidence of amyloid. Will sign off.  Please call if we can be of service    CHF Exacerbation  - Cardiology managing       MARCI Wolf [address] [address]  Office : [phone]  Fax : [phone]    Attending Addendum:  I personally evaluated the patient with Itz Edwards NP, and agree with the assessment, findings and plan as documented. Appears well, heart regular, lungs clear, abdomen benign. Fat pad biopsy results reviewed and no evidence of amyloidosis. Thank you for the courtesy of this consultation.           Shreya Mckenzie MD  [facility] Hematology and Oncology  [address] [place] [address]  Office : [phone]  Fax : [phone]

## 2018-01-10 NOTE — PROGRESS NOTES
Follow up visit to discuss d/c. Still have not heard from [facility] [employer] [facility] if they have approved patient for admission to rehab. Spoke with Cherry Macias at [facility] and they are waiting for approval. CM following. Addendum: Received call from [facility] and she states patient was denied rehab admission. Notified patient and wife of the denial by insurance and have notified Juan Mooney NP for Cardiology of denial. Peer to Peer can be completed or patient can go home with home health PT/OT.    Juan Mooney NP given Peer to Peer number is [phone] ext [phone] for appeal.

## 2018-01-11 LAB
ANION GAP SERPL CALC-SCNC: 7 MMOL/L (ref 7–16)
BASOPHILS # BLD: 0 K/UL (ref 0–0.2)
BASOPHILS NFR BLD: 1 % (ref 0–2)
BNP SERPL-MCNC: 635 PG/ML
BUN SERPL-MCNC: 27 MG/DL (ref 8–23)
CALCIUM SERPL-MCNC: 8.7 MG/DL (ref 8.3–10.4)
CHLORIDE SERPL-SCNC: 107 MMOL/L (ref 98–107)
CO2 SERPL-SCNC: 29 MMOL/L (ref 21–32)
CREAT SERPL-MCNC: 1.67 MG/DL (ref 0.8–1.5)
DIFFERENTIAL METHOD BLD: ABNORMAL
EOSINOPHIL # BLD: 0.2 K/UL (ref 0–0.8)
EOSINOPHIL NFR BLD: 2 % (ref 0.5–7.8)
ERYTHROCYTE [DISTWIDTH] IN BLOOD BY AUTOMATED COUNT: 17.4 % (ref 11.9–14.6)
GLUCOSE SERPL-MCNC: 87 MG/DL (ref 65–100)
HCT VFR BLD AUTO: 38.8 % (ref 41.1–50.3)
HGB BLD-MCNC: 12.6 G/DL (ref 13.6–17.2)
IMM GRANULOCYTES # BLD: 0 K/UL (ref 0–0.5)
IMM GRANULOCYTES NFR BLD AUTO: 0 % (ref 0–5)
LYMPHOCYTES # BLD: 2.8 K/UL (ref 0.5–4.6)
LYMPHOCYTES NFR BLD: 34 % (ref 13–44)
MCH RBC QN AUTO: 27.2 PG (ref 26.1–32.9)
MCHC RBC AUTO-ENTMCNC: 32.5 G/DL (ref 31.4–35)
MCV RBC AUTO: 83.6 FL (ref 79.6–97.8)
MONOCYTES # BLD: 0.6 K/UL (ref 0.1–1.3)
MONOCYTES NFR BLD: 8 % (ref 4–12)
NEUTS SEG # BLD: 4.4 K/UL (ref 1.7–8.2)
NEUTS SEG NFR BLD: 55 % (ref 43–78)
PLATELET # BLD AUTO: 226 K/UL (ref 150–450)
PMV BLD AUTO: 10.1 FL (ref 10.8–14.1)
POTASSIUM SERPL-SCNC: 4.7 MMOL/L (ref 3.5–5.1)
RBC # BLD AUTO: 4.64 M/UL (ref 4.23–5.67)
SODIUM SERPL-SCNC: 143 MMOL/L (ref 136–145)
WBC # BLD AUTO: 8.1 K/UL (ref 4.3–11.1)

## 2018-01-11 PROCEDURE — 80048 BASIC METABOLIC PNL TOTAL CA: CPT | Performed by: INTERNAL MEDICINE

## 2018-01-11 PROCEDURE — 97530 THERAPEUTIC ACTIVITIES: CPT

## 2018-01-11 PROCEDURE — 85025 COMPLETE CBC W/AUTO DIFF WBC: CPT | Performed by: INTERNAL MEDICINE

## 2018-01-11 PROCEDURE — 74011250637 HC RX REV CODE- 250/637: Performed by: INTERNAL MEDICINE

## 2018-01-11 PROCEDURE — 83880 ASSAY OF NATRIURETIC PEPTIDE: CPT | Performed by: INTERNAL MEDICINE

## 2018-01-11 PROCEDURE — 74011000250 HC RX REV CODE- 250: Performed by: INTERNAL MEDICINE

## 2018-01-11 PROCEDURE — 94640 AIRWAY INHALATION TREATMENT: CPT

## 2018-01-11 PROCEDURE — 74011250636 HC RX REV CODE- 250/636: Performed by: INTERNAL MEDICINE

## 2018-01-11 PROCEDURE — 36415 COLL VENOUS BLD VENIPUNCTURE: CPT | Performed by: INTERNAL MEDICINE

## 2018-01-11 PROCEDURE — 74011250637 HC RX REV CODE- 250/637: Performed by: NURSE PRACTITIONER

## 2018-01-11 PROCEDURE — 65660000000 HC RM CCU STEPDOWN

## 2018-01-11 PROCEDURE — 74011250637 HC RX REV CODE- 250/637: Performed by: PHYSICIAN ASSISTANT

## 2018-01-11 PROCEDURE — 94760 N-INVAS EAR/PLS OXIMETRY 1: CPT

## 2018-01-11 RX ADMIN — ENOXAPARIN SODIUM 40 MG: 40 INJECTION SUBCUTANEOUS at 11:44

## 2018-01-11 RX ADMIN — GUAIFENESIN 1200 MG: 600 TABLET, EXTENDED RELEASE ORAL at 20:31

## 2018-01-11 RX ADMIN — FUROSEMIDE 40 MG: 40 TABLET ORAL at 08:41

## 2018-01-11 RX ADMIN — CARVEDILOL 25 MG: 25 TABLET, FILM COATED ORAL at 17:18

## 2018-01-11 RX ADMIN — LISINOPRIL 5 MG: 5 TABLET ORAL at 08:40

## 2018-01-11 RX ADMIN — BUDESONIDE 500 MCG: 0.5 INHALANT RESPIRATORY (INHALATION) at 07:33

## 2018-01-11 RX ADMIN — BUDESONIDE 500 MCG: 0.5 INHALANT RESPIRATORY (INHALATION) at 21:26

## 2018-01-11 RX ADMIN — OXYCODONE HYDROCHLORIDE 5 MG: 5 TABLET ORAL at 05:00

## 2018-01-11 RX ADMIN — POLYETHYLENE GLYCOL 3350 17 G: 17 POWDER, FOR SOLUTION ORAL at 08:43

## 2018-01-11 RX ADMIN — ATORVASTATIN CALCIUM 20 MG: 10 TABLET, FILM COATED ORAL at 20:31

## 2018-01-11 RX ADMIN — CARVEDILOL 25 MG: 25 TABLET, FILM COATED ORAL at 07:18

## 2018-01-11 RX ADMIN — ASPIRIN 81 MG: 81 TABLET ORAL at 08:46

## 2018-01-11 RX ADMIN — PANTOPRAZOLE SODIUM 40 MG: 40 TABLET, DELAYED RELEASE ORAL at 07:18

## 2018-01-11 RX ADMIN — GUAIFENESIN 1200 MG: 600 TABLET, EXTENDED RELEASE ORAL at 08:42

## 2018-01-11 RX ADMIN — PANTOPRAZOLE SODIUM 40 MG: 40 TABLET, DELAYED RELEASE ORAL at 15:52

## 2018-01-11 RX ADMIN — ALLOPURINOL 100 MG: 100 TABLET ORAL at 08:45

## 2018-01-11 NOTE — PROGRESS NOTES
Bedside and Verbal shift change report given to Sukhwinder Greene RN (oncoming nurse) by self (offgoing nurse). Report included the following information SBAR, Kardex, MAR and Recent Results.

## 2018-01-11 NOTE — PROGRESS NOTES
Problem: Mobility Impaired (Adult and Pediatric)  Goal: *Acute Goals and Plan of Care (Insert Text)  LTG:  (1.)Mr. Jarrell Packer will move from supine to sit and sit to supine , scoot up and down and roll side to side in bed with INDEPENDENCE within 7 day(s). (2.)Mr. Jarrell Packer will transfer from bed to chair and chair to bed with MODIFIED INDEPENDENCE using the least restrictive device within 7 day(s). (3.)Mr. Jarrell Packer will ambulate with MODIFIED INDEPENDENCE for >or 150 feet with the least restrictive device, appropriate gait pattern and good dynamic standing balance within 7 day(s). (4.)Mr. Jarrell Packer will perform B LE therapeutic exercises x 20 reps with INDEPENDENCE within 7 days to increase strength for improved safety and independence in transfers and gait. (5.)Mr. Jarrell Packer will ascend/descend 4 steps with 1-2 handrail(s) and CGA within 7 day(s) for safe entry/exit of home.      ________________________________________________________________________________________________      PHYSICAL THERAPY: Daily Note, Treatment Day: 2nd, AM 1/11/2018  INPATIENT: Hospital Day: 17  Payor: Sal Gaytan / Plan: [address] [place] [address] HMO / Product Type: Managed Care Medicare /      NAME/AGE/GENDER: Rema Luna is a 74 y.o. male   PRIMARY DIAGNOSIS: Dyspnea  Dyspnea Dyspnea Dyspnea        ICD-10: Treatment Diagnosis:   · Difficulty in walking, Not elsewhere classified (R26.2)  · History of falling (Z91.81)   Precaution/Allergies:  Pcn [penicillins]      ASSESSMENT:     Mr. Jarrell Packer demonstrated a slight decline in functional mobility today, demonstrating decreased activity tolerance, ambulation distance, and balance. He required SBA with consistent use of the wall railing for one UE support during ambulation and demonstrated very slow danish with mild path deviations and occasional scissoring, though no LOB. His stability improves with use of the RW and he was instructed to use the RW during ambulation for decreased fall risk.  He will benefit from continued skilled IP PT to address his functional deficits during his acute stay, and continued PT services following discharge. This section established at most recent assessment   PROBLEM LIST (Impairments causing functional limitations):  1. Decreased ADL/Functional Activities  2. Decreased Transfer Abilities  3. Decreased Ambulation Ability/Technique  4. Decreased Balance  5. Decreased Activity Tolerance  6. Decreased [place] with Home Exercise Program   INTERVENTIONS PLANNED: (Benefits and precautions of physical therapy have been discussed with the patient.)  1. Balance Exercise  2. Bed Mobility  3. Gait Training  4. Home Exercise Program (HEP)  5. Therapeutic Activites  6. Therapeutic Exercise/Strengthening  7. Transfer Training     TREATMENT PLAN: Frequency/Duration: 3 times a week for duration of hospital stay  Rehabilitation Potential For Stated Goals: Good     RECOMMENDED REHABILITATION/EQUIPMENT: (at time of discharge pending progress): Due to the probability of continued deficits (see above) this patient will likely need continued skilled physical therapy after discharge. Equipment:    Pt owns a RW              HISTORY:   History of Present Injury/Illness (Reason for Referral):  Per chart: Vidhya Acuña is a 74 y.o. male admitted for dyspnea. He has a h/o CAD w LHC 6-2015 showing mild-mod CAD, sHF w echo 9-2017 showing EF 30-35% with KATIE, mild MR, mild-mod TR. He had a life vest but did not wear it and gave it back. He has COPD (PFTs w moderate obstructive/restrictive defect) but can't afford his new medications he was given by pulmonary, severe TAZ and does not use CPAP (does not have cord), and CKD. He has had increased SOB x 2 days with a sore throat and productive cough w yellow sputum. He has not had fevers but several family members who have been sick with flu like symptoms.   He has used his albuterol that he has at home (not new medicine that was prescribed by pulmonary) which gave him temporary relief of SOB. His SOB got gradually worse until he had dyspnea at rest and came to the ER. In ER he was given one albuterol treatment but was still extremely SOB, more labored breathing, and developed acute chest tightness, severe, epigastric region radiating to L breast without nausea or diaphoresis. CP lasted about 10 minutes. Breathing improved with second albuterol treatment. BP elevated 157/108 and he was given nitro and ASA and symptoms improved. At this point his breathing is better, chest pain eased off. In ER troponin negative, WBC 6.9, hgb 13.8, , K 4.1, BUn 18, cr 1.52, BNP 1688, CXR no acute process, EKG NSR w rate 75 without ST/T wave changes. SOB is much improved. LE edema is unchanged, weight is stable, he adheres to a low NA diet. No dizziness, palpitations or syncope. He has been compliant with his cardiac meds. Past Medical History/Comorbidities:   Mr. Chanda Kawasaki  has a past medical history of Acute CHF (congestive heart failure) ([facility] [facility] (4/25/2015); Acute combined systolic and diastolic congestive heart failure ([facility] [address] [facility] (4/28/2015); Chronic obstructive pulmonary disease ([facility] [address] [facility] De Quervain's tenosynovitis, right (4/28/2015); Dyspnea on exertion (6/28/2015); Elevated serum creatinine (4/25/2015); Elevated troponin (6/28/2015); History of coronary artery disease; History of right knee surgery; History of shingles; Malignant hypertension (4/25/2015); and MI (myocardial infarction). Mr. Chanda Kawasaki  has a past surgical history that includes hx knee arthroscopy (Right) and hx heart catheterization (6/29/2015).   Social History/Living Environment:   Home Environment: Private residence  # Steps to Enter: 4  Rails to Enter: Yes  Hand Rails : Bilateral  Wheelchair Ramp: No  One/Two Story Residence: One story  Living Alone: No  Support Systems: Family member(s), Friends \ neighbors  Patient Expects to be Discharged to[de-identified] Private residence  Current DME Used/Available at Home: Walker, rolling, Walker, rollator  Tub or Shower Type: Tub/Shower combination  Prior Level of Function/Work/Activity:  Pt was independent with all functional mobility and gait without assistive device. Pt lives with wife who can assist upon discharge. One level home, 4 steps to enter. One recent fall. Drives. Does not work. Personal Factors:          Sex:  male        Age:  74 y.o. Number of Personal Factors/Comorbidities that affect the Plan of Care: 3+: HIGH COMPLEXITY   EXAMINATION:   Most Recent Physical Functioning:   Gross Assessment:                  Posture:     Balance:    Bed Mobility:  Supine to Sit: Supervision  Wheelchair Mobility:     Transfers:  Sit to Stand: Stand-by asssistance  Stand to Sit: Stand-by asssistance  Gait:     Speed/Lu: Shuffled; Slow  Gait Abnormalities: Decreased step clearance;Shuffling gait; Path deviations  Distance (ft): 75 Feet (ft) (75 ft x 2)  Assistive Device: Gait belt (used wall rail for one UE stability)  Ambulation - Level of Assistance: Stand-by asssistance      Body Structures Involved:  1. Heart  2. Lungs  3. Muscles Body Functions Affected:  1. Cardio  2. Respiratory  3. Movement Related Activities and Participation Affected:  1. General Tasks and Demands  2. Mobility  3. Self Care   Number of elements that affect the Plan of Care: 4+: HIGH COMPLEXITY   CLINICAL PRESENTATION:   Presentation: Stable and uncomplicated: LOW COMPLEXITY   CLINICAL DECISION MAKIN[address] [place] [address] [address] [id number] AM-PAC 6 Clicks   Basic Mobility Inpatient Short Form  How much difficulty does the patient currently have. .. Unable A Lot A Little None   1. Turning over in bed (including adjusting bedclothes, sheets and blankets)? [] 1   [] 2   [] 3   [x] 4   2. Sitting down on and standing up from a chair with arms ( e.g., wheelchair, bedside commode, etc.)   [] 1   [] 2   [x] 3   [] 4   3. Moving from lying on back to sitting on the side of the bed?    [] 1   [] 2   [] 3   [x] 4   How much help from another person does the patient currently need. .. Total A Lot A Little None   4. Moving to and from a bed to a chair (including a wheelchair)? [] 1   [] 2   [x] 3   [] 4   5. Need to walk in hospital room? [] 1   [] 2   [x] 3   [] 4   6. Climbing 3-5 steps with a railing? [] 1   [] 2   [x] 3   [] 4   © 2007, Trustees of [address] [place] [address] [address] [address], under license to [employer]. All rights reserved      Score:  Initial: 20 Most Recent: X (Date: -- )    Interpretation of Tool:  Represents activities that are increasingly more difficult (i.e. Bed mobility, Transfers, Gait). Score 24 23 22-20 19-15 14-10 9-7 6     Modifier CH CI CJ CK CL CM CN      ? Mobility - Walking and Moving Around:     - CURRENT STATUS: CJ - 20%-39% impaired, limited or restricted    - GOAL STATUS: CJ - 20%-39% impaired, limited or restricted    - D/C STATUS:  ---------------To be determined---------------  Payor: Daiana Arguelles / Plan: [address] [place] [address] / Product Type: Managed Care Medicare /      Medical Necessity:     · Patient demonstrates good rehab potential due to higher previous functional level. Reason for Services/Other Comments:  · Patient continues to require present interventions due to patient's inability to function at baseline. Use of outcome tool(s) and clinical judgement create a POC that gives a: Clear prediction of patient's progress: LOW COMPLEXITY            TREATMENT:   (In addition to Assessment/Re-Assessment sessions the following treatments were rendered)   Pre-treatment Symptoms/Complaints:  Pt very pleasant and agreeable to participate in PT  Pain: Initial:   Pain Intensity 1: 0  Post Session:  0/10     Therapeutic Activity: (    27min): Therapeutic activities including Bed transfers, Chair transfers, and Ambulation on level ground to improve mobility, strength, balance and coordination.   Required minimal   to promote static and dynamic balance in standing, promote coordination of bilateral, lower extremity(s) and promote motor control of bilateral, lower extremity(s). Braces/Orthotics/Lines/Etc:   · O2 Device: Room air  Treatment/Session Assessment:    · Response to Treatment:  Demonstrates fatigue, SOB  · Interdisciplinary Collaboration:   o Physical Therapist  o Registered Nurse  o   · After treatment position/precautions:   o Supine in bed  o Bed/Chair-wheels locked  o Bed in low position  o Call light within reach  o Family at bedside   · Compliance with Program/Exercises: Will assess as treatment progresses. · Recommendations/Intent for next treatment session: \"Next visit will focus on advancements to more challenging activities and reduction in assistance provided\".   Total Treatment Duration:  PT Patient Time In/Time Out  Time In: 1337  Time Out: Perlita 29, PT

## 2018-01-11 NOTE — PROGRESS NOTES
[facility] CARDIOLOGY PROGRESS NOTE           1/11/2018 9:36 AM    Admit Date: 12/26/2017      Subjective:   Currently improved on therapy for chronic systolic CHF. He is ready for discharge to a rehab facility to   Treat debility. ROS:  Cardiovascular:  As noted above    Objective:      Vitals:    01/10/18 2058 01/11/18 0100 01/11/18 0430 01/11/18 0733   BP:  100/63 98/64    Pulse:  64 (!) 59    Resp:  18 20    Temp:  97.8 °F (36.6 °C) 98.5 °F (36.9 °C)    SpO2: 96% 97% 99% 92%   Weight:   93 kg (205 lb)        Physical Exam:  General-No Acute Distress  Neck- supple, no JVD  CV- regular rate and rhythm no MRG  Lung- clear bilaterally  Abd- soft, nontender, nondistended  Ext- no edema bilaterally. Skin- warm and dry    Data Review:   Recent Labs      01/11/18   0547  01/10/18   0503   NA  143  143   K  4.7  4.3   BUN  27*  29*   CREA  1.67*  1.64*   GLU  87  88   WBC  8.1  8.1   HGB  12.6*  12.5*   HCT  38.8*  38.2*   PLT  226  240       Assessment/Plan:     Principal Problem:    Dyspnea (12/26/2017)      Active Problems:    CKD (chronic kidney disease) stage 3, GFR 30-59 ml/min (9/29/2017)        Chronic systolic heart failure (HCC) (12/4/2015)          Coronary atherosclerosis of native coronary vessel (9/29/2017)          Hypertension (9/29/2017)          COPD, moderate ([address] (9/29/2017)          TAZ (obstructive sleep apnea) (10/2/2017)      Comment:  He is on a good medical regimen. Will discharge when rehab bed is available. Waiting for [facility] approval. Needs early F/U in the office.            Timo Almonte MD  1/11/2018 9:36 AM

## 2018-01-11 NOTE — PROGRESS NOTES
Follow-up visit requested by staff. Continued spiritual interventions, including affirmation of emotions and exploration of coping skills.     Moustapha Cervantes 66  Board Certified

## 2018-01-11 NOTE — PROGRESS NOTES
Bedside and Verbal shift change report given to self (oncoming nurse) by Chayito Arzola (offgoing nurse). Report included the following information SBAR, Kardex, MAR and Recent Results.

## 2018-01-11 NOTE — PROGRESS NOTES
's visit requested by staff. Offered spiritual interventions, including guided imagery.      Moustapha Hill 66  Board Certified

## 2018-01-11 NOTE — PROGRESS NOTES
Problem: Falls - Risk of  Goal: *Absence of Falls  Document Sue Fall Risk and appropriate interventions in the flowsheet.    Outcome: Progressing Towards Goal  Fall Risk Interventions:  Mobility Interventions: Bed/chair exit alarm    Mentation Interventions: Bed/chair exit alarm    Medication Interventions: Bed/chair exit alarm, Patient to call before getting OOB         History of Falls Interventions: Bed/chair exit alarm

## 2018-01-11 NOTE — PROGRESS NOTES
Bedside and Verbal shift change report given to Fadumo Bowles RN (oncoming nurse) by self (offgoing nurse). Report included the following information SBAR, Kardex, MAR and Recent Results.

## 2018-01-11 NOTE — ROUTINE PROCESS
CHF teaching completed, verbalize emphasis on monitoring self and report to MD:  · If you gain 2 lbs in one day or 5 lbs in a week, and short of breath. · If you can not lay flat without developing short of breath or rapid breathing at night; or if it wakes you up. Develop a cough or wheezing. · If you notice swollen hands/feet/ankles or stomach with a bloated/ full feeling. · If you be  ome confused or mentally fuzzy or dizzy. · If you notice a rapid or change in your heart rate. · If you become more exhausted all the time and unable to do the same level of activity without stopping to catch your breath. Drink no more than 8 cups a day in 8 oz. cups. Limit Cola Drinks. Your Heart can not handle any more. Stay away from salt (limit anything with salt or sodium in it). Limit to 250mg per serving. Exercise needs to be started with your Doctors approval.  Reduce stress; Call myself or Provider if assistance is needed. Pass post test via teach back, will make self available post DC ,if an questions arise. Diabetic teaching completed. Planner/scale @ BS:  60 mins total    Planner for [facility] @ BS.

## 2018-01-11 NOTE — PROGRESS NOTES
Bedside and Verbal shift change report given to self (oncoming nurse) by Valentino Harms, RN (offgoing nurse). Report included the following information SBAR, Kardex, MAR and Recent Results.

## 2018-01-11 NOTE — PROGRESS NOTES
Verbal bedside report given to oncoming RN, Jenny 'MARV' . Patient's situation, background, assessment and recommendations provided. Opportunity for questions provided. Oncoming RN assumed care of patient.

## 2018-01-12 VITALS
HEART RATE: 67 BPM | DIASTOLIC BLOOD PRESSURE: 62 MMHG | BODY MASS INDEX: 30.57 KG/M2 | OXYGEN SATURATION: 98 % | SYSTOLIC BLOOD PRESSURE: 108 MMHG | RESPIRATION RATE: 18 BRPM | TEMPERATURE: 97.7 F | WEIGHT: 207 LBS

## 2018-01-12 LAB
ANION GAP SERPL CALC-SCNC: 9 MMOL/L (ref 7–16)
BNP SERPL-MCNC: 491 PG/ML
BUN SERPL-MCNC: 28 MG/DL (ref 8–23)
CALCIUM SERPL-MCNC: 8.7 MG/DL (ref 8.3–10.4)
CHLORIDE SERPL-SCNC: 108 MMOL/L (ref 98–107)
CO2 SERPL-SCNC: 26 MMOL/L (ref 21–32)
CREAT SERPL-MCNC: 1.64 MG/DL (ref 0.8–1.5)
GLUCOSE SERPL-MCNC: 97 MG/DL (ref 65–100)
POTASSIUM SERPL-SCNC: 4.6 MMOL/L (ref 3.5–5.1)
SODIUM SERPL-SCNC: 143 MMOL/L (ref 136–145)

## 2018-01-12 PROCEDURE — 74011250637 HC RX REV CODE- 250/637: Performed by: PHYSICIAN ASSISTANT

## 2018-01-12 PROCEDURE — 83880 ASSAY OF NATRIURETIC PEPTIDE: CPT | Performed by: INTERNAL MEDICINE

## 2018-01-12 PROCEDURE — 74011250637 HC RX REV CODE- 250/637: Performed by: NURSE PRACTITIONER

## 2018-01-12 PROCEDURE — 74011250637 HC RX REV CODE- 250/637: Performed by: INTERNAL MEDICINE

## 2018-01-12 PROCEDURE — 74011250636 HC RX REV CODE- 250/636: Performed by: INTERNAL MEDICINE

## 2018-01-12 PROCEDURE — 80048 BASIC METABOLIC PNL TOTAL CA: CPT | Performed by: INTERNAL MEDICINE

## 2018-01-12 PROCEDURE — 36415 COLL VENOUS BLD VENIPUNCTURE: CPT | Performed by: INTERNAL MEDICINE

## 2018-01-12 RX ORDER — FUROSEMIDE 40 MG/1
40 TABLET ORAL DAILY
Qty: 30 TAB | Refills: 11 | Status: SHIPPED
Start: 2018-01-12 | End: 2018-05-30

## 2018-01-12 RX ORDER — POLYETHYLENE GLYCOL 3350 17 G/17G
17 POWDER, FOR SOLUTION ORAL DAILY
Qty: 1 PACKET | Refills: 11 | Status: SHIPPED
Start: 2018-01-13

## 2018-01-12 RX ORDER — LISINOPRIL 5 MG/1
5 TABLET ORAL DAILY
Qty: 30 TAB | Refills: 11 | Status: SHIPPED
Start: 2018-01-13 | End: 2018-01-29 | Stop reason: SDUPTHER

## 2018-01-12 RX ORDER — ATORVASTATIN CALCIUM 20 MG/1
20 TABLET, FILM COATED ORAL
Qty: 30 TAB | Refills: 11 | Status: SHIPPED
Start: 2018-01-12 | End: 2018-09-05 | Stop reason: SDUPTHER

## 2018-01-12 RX ADMIN — FUROSEMIDE 40 MG: 40 TABLET ORAL at 09:11

## 2018-01-12 RX ADMIN — ASPIRIN 81 MG: 81 TABLET ORAL at 09:12

## 2018-01-12 RX ADMIN — ENOXAPARIN SODIUM 40 MG: 40 INJECTION SUBCUTANEOUS at 11:52

## 2018-01-12 RX ADMIN — PANTOPRAZOLE SODIUM 40 MG: 40 TABLET, DELAYED RELEASE ORAL at 07:30

## 2018-01-12 RX ADMIN — GUAIFENESIN 1200 MG: 600 TABLET, EXTENDED RELEASE ORAL at 09:10

## 2018-01-12 RX ADMIN — CARVEDILOL 25 MG: 25 TABLET, FILM COATED ORAL at 07:30

## 2018-01-12 RX ADMIN — LISINOPRIL 5 MG: 5 TABLET ORAL at 09:10

## 2018-01-12 RX ADMIN — POLYETHYLENE GLYCOL 3350 17 G: 17 POWDER, FOR SOLUTION ORAL at 09:13

## 2018-01-12 RX ADMIN — ALLOPURINOL 100 MG: 100 TABLET ORAL at 09:11

## 2018-01-12 NOTE — PROGRESS NOTES
Bedside and Verbal shift change report given to Kavin Juares RN (oncoming nurse) by self Berna cao).  Report included the following information SBAR, Kardex, ED Summary, MAR, Recent Results and Cardiac Rhythm SB.

## 2018-01-12 NOTE — PROGRESS NOTES
TRANSFER - OUT REPORT:    Verbal report given to MCKENZIE Curry on Jose Martinez being transferred to [facility] for routine progression of care     Report consisted of patients Situation, Background, Assessment and Recommendations (SBAR). Informed of transport scheduled for 1500. Provided callback number [phone]. Information from the following report(s) SBAR, Kardex, [facility] and Recent Results was reviewed with the receiving nurse. Opportunity for questions and clarification was provided. Patient's IV and heart monitor removed prior to discharge.

## 2018-01-12 NOTE — PROGRESS NOTES
[facility] CARDIOLOGY PROGRESS NOTE           1/12/2018 8:34 AM    Admit Date: 12/26/2017      Subjective:   He is comfortable at rest but very dyspneic with minimal activity. Needs rehab. ROS:  Cardiovascular:  As noted above    Objective:      Vitals:    01/11/18 2039 01/11/18 2127 01/12/18 0053 01/12/18 0504   BP: 106/66  116/66 122/71   Pulse: (!) 57  61 63   Resp: 18 18 18   Temp: 97.8 °F (36.6 °C)  97.9 °F (36.6 °C) 97.7 °F (36.5 °C)   SpO2: 99% 99% 99% 100%   Weight:    93.9 kg (207 lb)       Physical Exam:  General-No Acute Distress  Neck- supple, no JVD  CV- regular rate and rhythm no MRG  Lung- clear bilaterally  Abd- soft, nontender, nondistended  Ext- no edema bilaterally. Skin- warm and dry    Data Review:   Recent Labs      01/12/18   0607  01/11/18   0547  01/10/18   0503   NA  143  143  143   K  4.6  4.7  4.3   BUN  28*  27*  29*   CREA  1.64*  1.67*  1.64*   GLU  97  87  88   WBC   --   8.1  8.1   HGB   --   12.6*  12.5*   HCT   --   38.8*  38.2*   PLT   --   226  240       Assessment/Plan:     Principal Problem:    Dyspnea (12/26/2017)    Active Problems:    CKD (chronic kidney disease) stage 3, GFR 30-59 ml/min (9/29/2017)         Chronic systolic heart failure (HCC) (12/4/2015)          Coronary atherosclerosis of native coronary vessel (9/29/2017)          Hypertension (9/29/2017)          COPD, moderate ([address] (9/29/2017)          TAZ (obstructive sleep apnea) (10/2/2017)      Chronic systolic CHF. Continue aggressive medications. Cardiac rehab.           Anitra Mcclain MD  1/12/2018 8:34 AM

## 2018-01-12 NOTE — PROGRESS NOTES
Problem: Falls - Risk of  Goal: *Absence of Falls  Document Sue Fall Risk and appropriate interventions in the flowsheet. Outcome: Not Progressing Towards Goal  Fall Risk Interventions:  Mobility Interventions: Bed/chair exit alarm, Communicate number of staff needed for ambulation/transfer, PT Consult for mobility concerns    Mentation Interventions: Bed/chair exit alarm, Door open when patient unattended, Family/sitter at bedside    Medication Interventions: Bed/chair exit alarm, Patient to call before getting OOB, Teach patient to arise slowly       Patient not progressing towards goal as he had one occurrence of \"slipping out of bed. I didn't fall\" per patient report on current admission. But patient is progressing towards goal with no falls on current shift. Patient without confusion, agitation, or sensory perception deficits. Patient has somewhat unsteady gait on ambulation. Personal belongings are within reach. Bed is in the low and locked position with side rails up x2 and bed alarm active. Yellow gripper socks to bilateral feet. Call light within reach and patient verbalizes understanding of use.      History of Falls Interventions: Bed/chair exit alarm, Door open when patient unattended

## 2018-01-12 NOTE — PROGRESS NOTES
Bedside and Verbal shift change report given to self (oncoming nurse) by Laura Coleman RN (offgoing nurse).  Report included the following information SBAR, Kardex, Procedure Summary, MAR, Recent Results and Cardiac Rhythm SB-SR.

## 2018-01-12 NOTE — PROGRESS NOTES
Care Management Interventions  PCP Verified by CM: Yes  Mode of Transport at Discharge: BLS  Transition of Care Consult (CM Consult): SNF  Partner SNF: No  Reason Why Partner SNF Not Chosen: Friend/family recommendation (patient wanted private room and wife to stay with him and only [facility] allowed this)  Physical Therapy Consult: Yes  Occupational Therapy Consult: Yes  Current Support Network: Lives with Spouse  Confirm Follow Up Transport: Family  Plan discussed with Pt/Family/Caregiver: Yes  Freedom of Choice Offered: Yes  Discharge Location  Discharge Placement: Skilled nursing facility  Patient to be discharged today after peer to peer completed by Dr. Lizzie William. Notified [facility]  of this information and she can accept patient today. Patient and wife updated on d/c plan. Transport arranged with [facility] ambulance for 3 pm. D/C to [facility].

## 2018-01-12 NOTE — PROGRESS NOTES
Spoke with Dr. Khadijah Patel about [facility] denial for rehab and he was given the peer to peer number. I have left message for Jw Marti at [facility] to see if they will still have a bed available if appeal overturned. CM following.

## 2018-01-12 NOTE — PROGRESS NOTES
Home medications returned to patient and patient signed medication sheet on return. Offered flu vaccine per MD order. Patient refused - states will discuss need for flu vaccine with PCP.

## 2018-01-12 NOTE — PROGRESS NOTES
Bedside and Verbal shift change report given to self (oncoming nurse) by Brennen Rushing RN (offgoing nurse). Report included the following information SBAR, Kardex, MAR and Recent Results.

## 2018-01-12 NOTE — PROGRESS NOTES
Discharge instructions reviewed with SW and f/u with family and patient. Prescriptions given for no new medication and med info sheets provided for all new medications. Opportunity for questions provided. Patient voiced understanding of all discharge instructions. Patients AVS placed in packet for transport to rehab at 3:00pm.      Per patient, ivs removed and monitor off at discharge, home medication returned.

## 2018-01-19 ENCOUNTER — HOSPITAL ENCOUNTER (OUTPATIENT)
Dept: LAB | Age: 73
Discharge: HOME | End: 2018-01-19

## 2018-01-19 LAB
ANION GAP SERPL CALC-SCNC: 9 MMOL/L (ref 7–16)
BNP SERPL-MCNC: 721 PG/ML
BUN SERPL-MCNC: 21 MG/DL (ref 8–23)
CALCIUM SERPL-MCNC: 8.9 MG/DL (ref 8.3–10.4)
CHLORIDE SERPL-SCNC: 109 MMOL/L (ref 98–107)
CO2 SERPL-SCNC: 25 MMOL/L (ref 21–32)
CREAT SERPL-MCNC: 1.58 MG/DL (ref 0.8–1.5)
ERYTHROCYTE [DISTWIDTH] IN BLOOD BY AUTOMATED COUNT: 17.6 % (ref 11.9–14.6)
GLUCOSE SERPL-MCNC: 84 MG/DL (ref 65–100)
HCT VFR BLD AUTO: 40.5 % (ref 41.1–50.3)
HGB BLD-MCNC: 13.1 G/DL (ref 13.6–17.2)
MCH RBC QN AUTO: 27.4 PG (ref 26.1–32.9)
MCHC RBC AUTO-ENTMCNC: 32.3 G/DL (ref 31.4–35)
MCV RBC AUTO: 84.7 FL (ref 79.6–97.8)
PLATELET # BLD AUTO: 193 K/UL (ref 150–450)
PMV BLD AUTO: 11.2 FL (ref 10.8–14.1)
POTASSIUM SERPL-SCNC: 4.8 MMOL/L (ref 3.5–5.1)
RBC # BLD AUTO: 4.78 M/UL (ref 4.23–5.67)
SODIUM SERPL-SCNC: 143 MMOL/L (ref 136–145)
WBC # BLD AUTO: 6 K/UL (ref 4.3–11.1)

## 2018-01-19 PROCEDURE — 80048 BASIC METABOLIC PNL TOTAL CA: CPT

## 2018-01-19 PROCEDURE — 83880 ASSAY OF NATRIURETIC PEPTIDE: CPT

## 2018-01-19 PROCEDURE — 85027 COMPLETE CBC AUTOMATED: CPT

## 2018-01-21 ENCOUNTER — PATIENT OUTREACH (OUTPATIENT)
Dept: CASE MANAGEMENT | Age: 73
End: 2018-01-21

## 2018-01-21 NOTE — PROGRESS NOTES
Downtime progress note entry for [facility] :Deidra Willis RN  Transition of Care Discharge Follow-up Questionnaire   Date/Time of Call:  Patient name:  :  N:   1/15/18 @ [address]  41  [id number]    What was the patient hospitalized for? 1/15/18 called pt and unable to reach or leave message. Also contacted rehab at [facility] at [phone] and left message with staff Vanity to have pt or family contact upon discharge. Verbalizes an understanding and contact for care  given. Does the patient understand his/her diagnosis and/or treatment and what happened during the hospitalization? Did the patient receive discharge instructions? Review any discharge instructions (see notes in [facility]). Ask patient if they understand these. Do they have any questions? Were home services ordered (nursing, PT, OT, ST, etc.)? If so, has the first visit occurred? If not, why? (Assist with coordination of services if necessary.)          Was any DME ordered? If so, has it been received? If not, why?  (Assist with coordination of arranging DME orders if necessary.)          Complete a review of all medications (new, continued and discontinued meds per the D/C instructions and medication tab in [facility]). Were all new prescriptions filled? If not, why?  (Assist with obtainment of medications if necessary.)          Does the patient understand the purpose and dosing instructions for all medications? (If patient has questions, provide explanation and education.)    Does the patient have any problems in performing ADLs? (If patient is unable to perform ADLs - what is the limiting factor(s)? Do they have a support system that can assist? If no support system is present, discuss possible assistance that they may be able to obtain.)              Does the patient have all follow-up appointments scheduled?       7 day f/up with PCP?    7-14 day f/up with specialist?    If f/up has not been made - what actions has the care coordinator made to accomplish this? Has transportation been arranged? [facility] follow-up should be within 7 days of discharge; all others should have PCP follow-up within 7 days of discharge; follow-ups with other specialists should be within 7-14 days of discharge.)    Any other questions or concerns expressed by the patient? Schedule next appointment with GIOVANI Rivera or refer to RN Case Manager/  per the workflow guidelines. When is care coordinators next follow-up call scheduled? If referred for CCM - what RN care manager was the referral assigned? TRESSA Call Completed By:   Garrison Stack.  Eliz Lucero RN

## 2018-01-30 PROBLEM — N18.2 STAGE 2 CHRONIC KIDNEY DISEASE: Status: ACTIVE | Noted: 2018-01-30

## 2018-01-31 ENCOUNTER — PATIENT OUTREACH (OUTPATIENT)
Dept: CASE MANAGEMENT | Age: 73
End: 2018-01-31

## 2018-01-31 NOTE — PROGRESS NOTES
Downtime progress note entry for [facility] : Bryan Medel  Transition of Care Discharge Follow-up Questionnaire   Date/Time of Call: 18  Patient name: Megan Parr  : 1945  MRN: [id number]       What was the patient hospitalized for? COPD and CHF         Does the patient understand his/her diagnosis and/or treatment and what happened during the hospitalization? Yes   Did the patient receive discharge instructions? Yes     Review any discharge instructions (see notes in [facility]). Ask patient if they understand these. Do they have any questions? Yes. pt verbalized understanding in relation to teaching instruction. Pt is currently walking with walker. PT and HHN coming out. Nurse educate on early signs of CHF and encourage to notify Dr immediately. Encourage to weigh daily to assess for unusual wt gain. If more than 3-5 lbs in a 24hr period, encourage to notify PCP immediately. Encourage to elevate BLE to help reduce swelling. Were home services ordered (nursing, PT, OT, ST, etc.)? PT and HHN     If so, has the first visit occurred? If not, why? (Assist with coordination of services if necessary.) Currently working out a schedule to have PT and HHN visit andrew wife is home. Was any DME ordered? [facility] [place]     If so, has it been received? If not, why?  (Assist with coordination of arranging DME orders if necessary.) Yes     Complete a review of all medications (new, continued and discontinued meds per the D/C instructions and medication tab in [facility]). Encourage timely administration of meds to  help with effective symptom control. No needs at this time. Were all new prescriptions filled? If not, why?  (Assist with obtainment of medications if necessary.) Yes     Does the patient understand the purpose and dosing instructions for all medications?   (If patient has questions, provide explanation and education.) Yes   Does the patient have any problems in performing ADLs? (If patient is unable to perform ADLs - what is the limiting factor(s)? Do they have a support system that can assist? If no support system is present, discuss possible assistance that they may be able to obtain.) Able to ambulate with walker but require assist from wife. Nurse encourage to Dangle feet at side of bed prior to ambulation to reduce the risk for falls. wife is the POA and primary caregiver. Transport pt to all appointments. Does the patient have all follow-up appointments scheduled? 7 day f/up with PCP?    7-14 day f/up with specialist?    If f/up has not been made - what actions has the care coordinator made to accomplish this? Has transportation been arranged? [facility] follow-up should be within 7 days of discharge; all others should have PCP follow-up within 7 days of discharge; follow-ups with other specialists should be within 7-14 days of discharge.) Cardiologist on the 30th   PCP on Feb 2nd   Any other questions or concerns expressed by the patient? None. Made aware of weekly phone calls. Schedule next appointment with GIOVANI Rivera or refer to RN Case Manager/  per the workflow guidelines. When is care coordinators next follow-up call scheduled? If referred for CCM - what RN care manager was the referral assigned?  Week of the 5th   TRESSA Call Completed By: Jeanne Levy

## 2018-02-06 PROBLEM — R11.10 VOMITING: Status: RESOLVED | Noted: 2017-09-29 | Resolved: 2018-02-06

## 2018-02-06 PROBLEM — N17.9 AKI (ACUTE KIDNEY INJURY) (HCC): Status: RESOLVED | Noted: 2017-09-29 | Resolved: 2018-02-06

## 2018-02-06 PROBLEM — Z87.891 PERSONAL HISTORY OF TOBACCO USE: Chronic | Status: ACTIVE | Noted: 2018-02-06

## 2018-02-06 PROBLEM — N18.2 STAGE 2 CHRONIC KIDNEY DISEASE: Status: RESOLVED | Noted: 2018-01-30 | Resolved: 2018-02-06

## 2018-02-06 PROBLEM — R06.00 DYSPNEA: Status: RESOLVED | Noted: 2017-12-26 | Resolved: 2018-02-06

## 2018-02-20 LAB
ACID FAST STN SPEC: NEGATIVE
MYCOBACTERIUM SPEC QL CULT: NEGATIVE
SPECIMEN PREPARATION: NORMAL
SPECIMEN SOURCE: NORMAL

## 2018-02-21 ENCOUNTER — HOSPITAL ENCOUNTER (EMERGENCY)
Age: 73
Discharge: HOME | End: 2018-02-21
Attending: EMERGENCY MEDICINE
Payer: MEDICARE

## 2018-02-21 ENCOUNTER — APPOINTMENT (OUTPATIENT)
Dept: GENERAL RADIOLOGY | Age: 73
End: 2018-02-21
Attending: EMERGENCY MEDICINE
Payer: MEDICARE

## 2018-02-21 ENCOUNTER — PATIENT OUTREACH (OUTPATIENT)
Dept: CASE MANAGEMENT | Age: 73
End: 2018-02-21

## 2018-02-21 VITALS
TEMPERATURE: 97.8 F | HEART RATE: 73 BPM | RESPIRATION RATE: 18 BRPM | WEIGHT: 208 LBS | SYSTOLIC BLOOD PRESSURE: 123 MMHG | DIASTOLIC BLOOD PRESSURE: 85 MMHG | BODY MASS INDEX: 29.78 KG/M2 | OXYGEN SATURATION: 97 % | HEIGHT: 70 IN

## 2018-02-21 DIAGNOSIS — I50.33 ACUTE ON CHRONIC DIASTOLIC CHF (CONGESTIVE HEART FAILURE) (HCC): Primary | ICD-10-CM

## 2018-02-21 LAB
ANION GAP SERPL CALC-SCNC: 10 MMOL/L (ref 7–16)
ATRIAL RATE: 69 BPM
BASOPHILS # BLD: 0 K/UL (ref 0–0.2)
BASOPHILS NFR BLD: 1 % (ref 0–2)
BNP SERPL-MCNC: 945 PG/ML
BUN SERPL-MCNC: 20 MG/DL (ref 8–23)
CALCIUM SERPL-MCNC: 8.4 MG/DL (ref 8.3–10.4)
CALCULATED P AXIS, ECG09: 67 DEGREES
CALCULATED R AXIS, ECG10: -70 DEGREES
CALCULATED T AXIS, ECG11: 68 DEGREES
CHLORIDE SERPL-SCNC: 106 MMOL/L (ref 98–107)
CO2 SERPL-SCNC: 27 MMOL/L (ref 21–32)
CREAT SERPL-MCNC: 1.5 MG/DL (ref 0.8–1.5)
DIAGNOSIS, 93000: NORMAL
DIFFERENTIAL METHOD BLD: ABNORMAL
EOSINOPHIL # BLD: 0.1 K/UL (ref 0–0.8)
EOSINOPHIL NFR BLD: 2 % (ref 0.5–7.8)
ERYTHROCYTE [DISTWIDTH] IN BLOOD BY AUTOMATED COUNT: 16.1 % (ref 11.9–14.6)
GLUCOSE SERPL-MCNC: 84 MG/DL (ref 65–100)
HCT VFR BLD AUTO: 41.7 % (ref 41.1–50.3)
HGB BLD-MCNC: 13.7 G/DL (ref 13.6–17.2)
IMM GRANULOCYTES # BLD: 0 K/UL (ref 0–0.5)
IMM GRANULOCYTES NFR BLD AUTO: 0 % (ref 0–5)
LYMPHOCYTES # BLD: 2.1 K/UL (ref 0.5–4.6)
LYMPHOCYTES NFR BLD: 37 % (ref 13–44)
MCH RBC QN AUTO: 27.8 PG (ref 26.1–32.9)
MCHC RBC AUTO-ENTMCNC: 32.9 G/DL (ref 31.4–35)
MCV RBC AUTO: 84.6 FL (ref 79.6–97.8)
MONOCYTES # BLD: 0.8 K/UL (ref 0.1–1.3)
MONOCYTES NFR BLD: 13 % (ref 4–12)
NEUTS SEG # BLD: 2.7 K/UL (ref 1.7–8.2)
NEUTS SEG NFR BLD: 47 % (ref 43–78)
P-R INTERVAL, ECG05: 194 MS
PLATELET # BLD AUTO: 139 K/UL (ref 150–450)
PMV BLD AUTO: 10.7 FL (ref 10.8–14.1)
POTASSIUM SERPL-SCNC: 3.9 MMOL/L (ref 3.5–5.1)
Q-T INTERVAL, ECG07: 496 MS
QRS DURATION, ECG06: 122 MS
QTC CALCULATION (BEZET), ECG08: 531 MS
RBC # BLD AUTO: 4.93 M/UL (ref 4.23–5.67)
SODIUM SERPL-SCNC: 143 MMOL/L (ref 136–145)
VENTRICULAR RATE, ECG03: 69 BPM
WBC # BLD AUTO: 5.8 K/UL (ref 4.3–11.1)

## 2018-02-21 PROCEDURE — 99284 EMERGENCY DEPT VISIT MOD MDM: CPT | Performed by: EMERGENCY MEDICINE

## 2018-02-21 PROCEDURE — 93005 ELECTROCARDIOGRAM TRACING: CPT | Performed by: EMERGENCY MEDICINE

## 2018-02-21 PROCEDURE — 80048 BASIC METABOLIC PNL TOTAL CA: CPT | Performed by: EMERGENCY MEDICINE

## 2018-02-21 PROCEDURE — 71046 X-RAY EXAM CHEST 2 VIEWS: CPT

## 2018-02-21 PROCEDURE — 85025 COMPLETE CBC W/AUTO DIFF WBC: CPT | Performed by: EMERGENCY MEDICINE

## 2018-02-21 PROCEDURE — 83880 ASSAY OF NATRIURETIC PEPTIDE: CPT | Performed by: EMERGENCY MEDICINE

## 2018-02-21 PROCEDURE — 74011250636 HC RX REV CODE- 250/636: Performed by: EMERGENCY MEDICINE

## 2018-02-21 PROCEDURE — 96374 THER/PROPH/DIAG INJ IV PUSH: CPT | Performed by: EMERGENCY MEDICINE

## 2018-02-21 RX ORDER — FUROSEMIDE 10 MG/ML
60 INJECTION INTRAMUSCULAR; INTRAVENOUS
Status: COMPLETED | OUTPATIENT
Start: 2018-02-21 | End: 2018-02-21

## 2018-02-21 RX ADMIN — FUROSEMIDE 60 MG: 10 INJECTION, SOLUTION INTRAMUSCULAR; INTRAVENOUS at 14:15

## 2018-02-21 NOTE — ED NOTES
I have reviewed discharge instructions with the patient. The patient verbalized understanding. Patient left ED via Discharge Method: ambulatory to Home with family. Opportunity for questions and clarification provided. Patient given 0 scripts. To continue your aftercare when you leave the hospital, you may receive an automated call from our care team to check in on how you are doing. This is a free service and part of our promise to provide the best care and service to meet your aftercare needs.  If you have questions, or wish to unsubscribe from this service please call [phone]. Thank you for Choosing our [facility] Emergency Department.

## 2018-02-21 NOTE — ED PROVIDER NOTES
HPI:  41-year-old male history of CHF on Lasix, COPD, CAD is here with complaint of cough and shortness of breath. Coughing and productive yellow discharge for the past 2 days. No fever. Denies any chest pain. Denies any active shortness of breath. No excellent. No recent travel. Has a positive PPD and supposed to on prophylactic nine-month TB medication. [facility] pulmonology patient. ROS  Constitutional: No fever, no chills  Skin: no rash  Eye: No vision changes  ENMT: No sore throat, no congestion  Respiratory: No shortness of breath, + cough  Cardiovascular: No chest pain,   Gastrointestinal: No vomiting, no nausea, no diarrhea, no constipation, no rectal bleeding, no abdominal pain  : No dysuria, no hematuria  MSK: No back pain, no muscle pain, no joint pain  Neuro: No headache, no change in mental status, no numbness, no tingling, no weakness    All other review of systems positive per history of present illness and the above otherwise negative or noncontributory.     Visit Vitals    /67 (BP 1 Location: Right arm, BP Patient Position: At rest)    Pulse 68    Temp 97.8 °F (36.6 °C)    Resp 20    Ht 5' 10\" (1.778 m)    Wt 94.3 kg (208 lb)    SpO2 99%    BMI 29.84 kg/m2     Past Medical History:   Diagnosis Date    Acute CHF (congestive heart failure) (HCC) 4/25/2015    Acute combined systolic and diastolic congestive heart failure ([address] 4/28/2015    Chronic obstructive pulmonary disease (HCC)     De Quervain's tenosynovitis, right 4/28/2015    Dyspnea on exertion 6/28/2015    Elevated serum creatinine 4/25/2015    Elevated troponin 6/28/2015    History of coronary artery disease     MI at 25 yo    History of right knee surgery     cartilage removal    History of shingles     Malignant hypertension 4/25/2015    MI (myocardial infarction)      Past Surgical History:   Procedure Laterality Date    HX HEART CATHETERIZATION  6/29/2015    no intervention    HX KNEE ARTHROSCOPY Right     removal of cartilage     Prior to Admission Medications   Prescriptions Last Dose Informant Patient Reported? Taking? albuterol (VENTOLIN HFA) 90 mcg/actuation inhaler   No No   Sig: Take 2 Puffs by inhalation four (4) times daily. albuterol-ipratropium (DUO-NEB) 2.5 mg-0.5 mg/3 ml nebu   No No   Sig: 3 mL by Nebulization route four (4) times daily. Diaignosis--J44.9   allopurinol (ZYLOPRIM) 100 mg tablet   No No   Sig: Take 1 Tab by mouth daily. aspirin delayed-release 81 mg tablet   No No   Sig: Take 1 Tab by mouth daily. atorvastatin (LIPITOR) 20 mg tablet   No No   Sig: Take 1 Tab by mouth nightly. carvedilol (COREG) 25 mg tablet   Yes No   Sig: Take 25 mg by mouth two (2) times daily (with meals). furosemide (LASIX) 40 mg tablet   No No   Sig: Take 1 Tab by mouth daily. guaiFENesin ER (MUCINEX) 1,200 mg Ta12 ER tablet   No No   Sig: Take 1 Tab by mouth every twelve (12) hours. lisinopril (PRINIVIL, ZESTRIL) 5 mg tablet   No No   Sig: Take 1 Tab by mouth daily. omeprazole (PRILOSEC) 20 mg capsule   No No   Sig: Take 1 Cap by mouth daily. polyethylene glycol (MIRALAX) 17 gram packet   No No   Sig: Take 1 Packet by mouth daily. Facility-Administered Medications: None         Adult Exam   General: alert, no acute distress  Head: normocephalic, atraumatic  ENT: moist mucous membranes  Neck: supple, non-tender; full range of motion  Cardiovascular: regular rate and rhythm, normal peripheral perfusion, trace edema in bilateral ankles.  equal radial pulses   Respiratory: lungs are clear to auscultation; normal respirations; no wheezing, rales or rhonchi  Gastrointestinal: soft, non-tender; no rebound or guarding, no peritoneal signs, no distension  Back: non-tender, full range of motion  Musculoskeletal: normal range of motion, normal strength, no gross deformities  Neurological: alert and oriented x 4, no gross focal deficits; normal speech  Psychiatric: cooperative; appropriate mood and affect    MDM: lungs are relatively clear. He is coughing during exam.  However no wheezing. History of COPD. Very trace edema noted bilateral ankles. History of CHF. We'll obtain chest x-ray to evaluate for pneumonia. He has no body aches, abdominal pain, nausea vomiting diarrhea. No other flulike symptoms. Low suspicion for acute ACS. Low suspicion for pneumothorax, dissection, tamponade. No carotid bruit, JVD    Recent Results (from the past 12 hour(s))   CBC WITH AUTOMATED DIFF    Collection Time: 02/21/18  1:08 PM   Result Value Ref Range    WBC 5.8 4.3 - 11.1 K/uL    RBC 4.93 4.23 - 5.67 M/uL    HGB 13.7 13.6 - 17.2 g/dL    HCT 41.7 41.1 - 50.3 %    MCV 84.6 79.6 - 97.8 FL    MCH 27.8 26.1 - 32.9 PG    MCHC 32.9 31.4 - 35.0 g/dL    RDW 16.1 (H) 11.9 - 14.6 %    PLATELET 169 (L) 144 - 450 K/uL    MPV 10.7 (L) 10.8 - 14.1 FL    DF AUTOMATED      NEUTROPHILS 47 43 - 78 %    LYMPHOCYTES 37 13 - 44 %    MONOCYTES 13 (H) 4.0 - 12.0 %    EOSINOPHILS 2 0.5 - 7.8 %    BASOPHILS 1 0.0 - 2.0 %    IMMATURE GRANULOCYTES 0 0.0 - 5.0 %    ABS. NEUTROPHILS 2.7 1.7 - 8.2 K/UL    ABS. LYMPHOCYTES 2.1 0.5 - 4.6 K/UL    ABS. MONOCYTES 0.8 0.1 - 1.3 K/UL    ABS. EOSINOPHILS 0.1 0.0 - 0.8 K/UL    ABS. BASOPHILS 0.0 0.0 - 0.2 K/UL    ABS. IMM.  GRANS. 0.0 0.0 - 0.5 K/UL   METABOLIC PANEL, BASIC    Collection Time: 02/21/18  1:08 PM   Result Value Ref Range    Sodium 143 136 - 145 mmol/L    Potassium 3.9 3.5 - 5.1 mmol/L    Chloride 106 98 - 107 mmol/L    CO2 27 21 - 32 mmol/L    Anion gap 10 7 - 16 mmol/L    Glucose 84 65 - 100 mg/dL    BUN 20 8 - 23 MG/DL    Creatinine 1.50 0.8 - 1.5 MG/DL    GFR est AA 59 (L) >60 ml/min/1.73m2    GFR est non-AA 48 (L) >60 ml/min/1.73m2    Calcium 8.4 8.3 - 10.4 MG/DL   BNP    Collection Time: 02/21/18  1:08 PM   Result Value Ref Range     pg/mL   EKG, 12 LEAD, INITIAL    Collection Time: 02/21/18  2:13 PM   Result Value Ref Range    Ventricular Rate 69 BPM    Atrial Rate 69 BPM P-R Interval 194 ms    QRS Duration 122 ms    Q-T Interval 496 ms    QTC Calculation (Bezet) 531 ms    Calculated P Axis 67 degrees    Calculated R Axis -70 degrees    Calculated T Axis 68 degrees    Diagnosis       !! AGE AND GENDER SPECIFIC ECG ANALYSIS !! Sinus rhythm with occasional Premature ventricular complexes and Fusion   complexes  Possible Left atrial enlargement  Left axis deviation  Non-specific intra-ventricular conduction delay  Abnormal ECG  When compared with ECG of 26-DEC-2017 12:22,  Fusion complexes are now Present  Premature ventricular complexes are now Present         Xr Chest Pa Lat    Result Date: 2/21/2018  2 View Chest X-Ray 2/21/2018 12:27 PM INDICATION: Cough, wheezing. History of positive PPD, CHF and COPD COMPARISON: 12/26/2017 FINDINGS: Upright AP and Lateral views are submitted. Again the cardiac silhouette is prominent. This is similar to before and mediastinal contours normal. The lungs are normally inflated and clear, with normal pulmonary vascularity. There is no focal consolidation, nodule, or pleural effusion. Grossly, the chest wall structures are intact. IMPRESSION: Stable cardiomegaly, no acute infiltrates. No infiltrate on chest x-ray. Appeared consistent with slight fluid overload. Has had history of fluid overload in the past.  Lasix given here. He is responding to Lasix. He is able to ambulate without ataxia, hypoxia, tachycardia, shortness of breath. We'll discharge home. Recommend he double his Lasix dose and for the next 3 days and follow-up with cardiology. He and his wife agrees with the plan. I do not feel that he has anyacute pneumonia which would require antibiotic at this time. Lasix increased to 80 mg daily for the next 3 days. Follow up with cardiology. Dragon voice recognition software was used to create this note.  Although the note has been reviewed and corrected where necessary, additional errors may have been overlooked and remain in the text.

## 2018-02-21 NOTE — ED TRIAGE NOTES
C/o productive cough with gray sputum and \"wheezing\". Onset couple days pta. Denies fever. Denies n/v/d. Hx copd. Family member states recently discharged from hospital in [address].  States told at that time positive ppd and would need to start meds for tb, family denies receiving prescription

## 2018-02-21 NOTE — PROGRESS NOTES
Downtime progress note entry for [facility] : [facility] Follow up Outreach Note   Outreach type: Follow up  Patient name: Krupa Rose  : 41     MRN: [id number]       Date/Time of Outreach: 2/15/18     Reason for follow-up:   Continuation of Care   Disease specific complaints/issues: COPD/CHF       Patient progress towards goals set from last contact:   POC discussed with pts wife. Pt verbalized that they are doing well. walking without walker. taking breathing meds as ordered. no sob or coughing. nurse encourage to call PCP immediately with concerns and 911 for emergency. Has patient attended any PCP or specialist follow-up appointments since last contact? What was outcome of appointment? When is next follow-up scheduled? Pulmonologist on the  and    Review medications. Any medication changes since last outreach? Does patient have any questions or issues related to their medications? Meds taken timely. Home health active? If yes  any issue? Progress? No needs at this time. Referrals needed?  (SW, Diabetes education, HH, etc. )    Other issues/Miscellaneous? (Transportation, access to meals, ability to perform ADLs, adequate caregiver support, etc.) Wife is very involved in pts care. transport to all appointments.       Next Outreach Scheduled: Week of the      Next Steps/Goals:      Care  completing call: Destinee Holland

## 2018-03-02 ENCOUNTER — PATIENT OUTREACH (OUTPATIENT)
Dept: CASE MANAGEMENT | Age: 73
End: 2018-03-02

## 2018-03-05 NOTE — PROGRESS NOTES
Downtime progress note entry for [facility] : Darrel Hill RN    Care  Follow up Outreach Note   Outreach type: Follow up  Patient name: Delia Trejo  : 41  MRN: [id number]         Date/Time of Outreach: 3/2/18     Reason for follow-up:   Continuation of Care   Disease specific complaints/issues: COPD and CHF       Patient progress towards goals set from last contact:   Spoke with pts Dtr in law. verbalized that pt was babysitting his grandkids on the night of the  and left his phone at their house. Nurse as dtr in law to encourage pt to call CC. Left call Back #. Reached out to [facility] Sheela, and Spoke with Natasha Bone. PCP is aware of pts recent visit to the hospital on the  where his Lasix was increased to 40mg BID, due to persistent dry cough. Nurse compare #s on chart and it is correct. nurse encourage practice to reach out to pt and see if they will have better chance at establishing communication and getting pt to come in for an appointment, understanding verbalized by Natasha Bone. Nurse will continue to reach out to pt and provide care as needed. Has patient attended any PCP or specialist follow-up appointments since last contact? What was outcome of appointment? When is next follow-up scheduled? Review medications. Any medication changes since last outreach? Does patient have any questions or issues related to their medications? Home health active? If yes - any issue? Progress? Referrals needed?  (SW, Diabetes education, HH, etc. )    Other issues/Miscellaneous?  (Transportation, access to meals, ability to perform ADLs, adequate caregiver support, etc.)          Next Outreach Scheduled: Week of the 5th     Next Steps/Goals:      Care  completing call: Darrel Hill

## 2018-03-06 ENCOUNTER — PATIENT OUTREACH (OUTPATIENT)
Dept: CASE MANAGEMENT | Age: 73
End: 2018-03-06

## 2018-03-07 NOTE — PROGRESS NOTES
Downtime progress note entry for [facility] : Ulises Tapia RN    Care  Follow up Outreach Note   Outreach type: Follow up  Patient name: Juan Ramon Hernandez  : 41  MRN: [id number]    Date/Time of Outreach: 3/6/18     Reason for follow-up:   Continuation of care   Disease specific complaints/issues: COPD and CHF       Patient progress towards goals set from last contact:   Reached out to pt. phone continues to ring with no VM option. Nurse will reach one more time to pt and discharge if not successful. Has patient attended any PCP or specialist follow-up appointments since last contact? What was outcome of appointment? When is next follow-up scheduled? Review medications. Any medication changes since last outreach? Does patient have any questions or issues related to their medications? Home health active? If yes - any issue? Progress? Referrals needed?  (SW, Diabetes education, HH, etc. )    Other issues/Miscellaneous?  (Transportation, access to meals, ability to perform ADLs, adequate caregiver support, etc.)              Next Outreach Scheduled: Week of the 12th     Next Steps/Goals:      Care  completing call: Ulises Tapia

## 2018-03-12 ENCOUNTER — PATIENT OUTREACH (OUTPATIENT)
Dept: CASE MANAGEMENT | Age: 73
End: 2018-03-12

## 2018-03-13 NOTE — PROGRESS NOTES
Downtime progress note entry for [facility] : Carlos Mattson RN      Care  Follow up Outreach Note   Outreach type: Follow up  Patient name: Haritha Parks  : 40  MRN: [id number]    Date/Time of Outreach: 3/12/18     Reason for follow-up:   Continuation of care   Disease specific complaints/issues: COPD and CHF       Patient progress towards goals set from last contact:   Spoke with pt. verbalize that he is ambulating without assist. mild sob at times, but doing well. nurse encourage to keep o2 on at all times and notify Dr with uncontrolled SOB. Educate on s/sx of MI, encourage to administer nitro tabs xs 3 doses and notify 911 with uncontrolled CP. Encourage to check for proper functioning of O2 concentrator by dipping NC in glass of water and see if it bubbles. Turn concentrator on and off and listen for one beep. Pt verbalized that wife and other family members are very involved in pts care. Has patient attended any PCP or specialist follow-up appointments since last contact? What was outcome of appointment? When is next follow-up scheduled? No PCP visit since last contact. Will be seeing PCP on the  and pulmonologist and cardiologist in the month of April. Review medications. Any medication changes since last outreach? Does patient have any questions or issues related to their medications? No med changes. No needs or concerns at this time. nurse encourage timely administration of all meds. Home health active? If yes - any issue? Progress? HHN continues to come out and provide care as needed. organize meds in pill box for timely administration and preventing med error. Referrals needed?  (SW, Diabetes education, HH, etc. ) No referrals needed   Other issues/Miscellaneous? (Transportation, access to meals, ability to perform ADLs, adequate caregiver support, etc.) Wife transport to all appointments. assist with all ADLs as needed.  no caregiver needs or financial support at this time.       Next Outreach Scheduled: Discharge     Next Steps/Goals:      Care  completing call: Tera Santacruz

## 2018-03-19 ENCOUNTER — PATIENT OUTREACH (OUTPATIENT)
Dept: CASE MANAGEMENT | Age: 73
End: 2018-03-19

## 2018-03-21 NOTE — PROGRESS NOTES
Downtime progress note entry for [facility] : Nils Walker RN      Patient name: Pietro Berry   : 1945 H# [id number]        MRN# [id number]     Did you call your doctor before going to the ER? Yes or No Comments:  LVM with pt. left call back # and encourage to call CC. nurse is currently coaching this pt and will continue to do so. Do you know the urgent care locations in your area? Yes or No List locations given (ask patient to place on refrigerator door)   What were the symptoms that led you to the ER? List Emergent Symptoms Comments:   Were your symptoms relieved? Yes or No Comments:   Review of reasons to go to ER? Yes or No Comments: Instruct to place doctor office number and [employer]. 24 hour # on refrigerator door and program in phone? Yes or No   Comments:        [employer]. 24 nurse line: [phone]   ER follow up appointment made?  Yes or No Comments and Appointment Date:   Care  Completing Form: Nils Walker Date and Time 3/19/18 Comments:

## 2018-03-26 ENCOUNTER — PATIENT OUTREACH (OUTPATIENT)
Dept: CASE MANAGEMENT | Age: 73
End: 2018-03-26

## 2018-03-27 ENCOUNTER — HOSPITAL ENCOUNTER (OUTPATIENT)
Age: 73
Setting detail: OBSERVATION
Discharge: HOME HEALTH | End: 2018-03-29
Attending: EMERGENCY MEDICINE | Admitting: INTERNAL MEDICINE
Payer: MEDICARE

## 2018-03-27 ENCOUNTER — APPOINTMENT (OUTPATIENT)
Dept: GENERAL RADIOLOGY | Age: 73
End: 2018-03-27
Attending: EMERGENCY MEDICINE
Payer: MEDICARE

## 2018-03-27 DIAGNOSIS — J44.1 ACUTE EXACERBATION OF CHRONIC OBSTRUCTIVE PULMONARY DISEASE (COPD) (HCC): ICD-10-CM

## 2018-03-27 DIAGNOSIS — A46 ERYSIPELAS: Primary | ICD-10-CM

## 2018-03-27 PROBLEM — L03.213 PRESEPTAL CELLULITIS: Status: ACTIVE | Noted: 2018-03-27

## 2018-03-27 LAB
ALBUMIN SERPL-MCNC: 2.8 G/DL (ref 3.2–4.6)
ALBUMIN/GLOB SERPL: 0.5 {RATIO} (ref 1.2–3.5)
ALP SERPL-CCNC: 106 U/L (ref 50–136)
ALT SERPL-CCNC: 26 U/L (ref 12–65)
ANION GAP SERPL CALC-SCNC: 6 MMOL/L (ref 7–16)
APPEARANCE UR: CLEAR
AST SERPL-CCNC: 21 U/L (ref 15–37)
ATRIAL RATE: 85 BPM
BACTERIA URNS QL MICRO: ABNORMAL /HPF
BASOPHILS # BLD: 0 K/UL (ref 0–0.2)
BASOPHILS NFR BLD: 0 % (ref 0–2)
BILIRUB SERPL-MCNC: 0.8 MG/DL (ref 0.2–1.1)
BILIRUB UR QL: NEGATIVE
BNP SERPL-MCNC: 1796 PG/ML
BUN SERPL-MCNC: 15 MG/DL (ref 8–23)
CALCIUM SERPL-MCNC: 8.9 MG/DL (ref 8.3–10.4)
CALCULATED P AXIS, ECG09: 72 DEGREES
CALCULATED R AXIS, ECG10: -77 DEGREES
CALCULATED T AXIS, ECG11: 71 DEGREES
CASTS URNS QL MICRO: ABNORMAL /LPF
CHLORIDE SERPL-SCNC: 105 MMOL/L (ref 98–107)
CO2 SERPL-SCNC: 27 MMOL/L (ref 21–32)
COLOR UR: YELLOW
CREAT SERPL-MCNC: 1.59 MG/DL (ref 0.8–1.5)
DIAGNOSIS, 93000: NORMAL
DIFFERENTIAL METHOD BLD: ABNORMAL
EOSINOPHIL # BLD: 0 K/UL (ref 0–0.8)
EOSINOPHIL NFR BLD: 0 % (ref 0.5–7.8)
EPI CELLS #/AREA URNS HPF: ABNORMAL /HPF
ERYTHROCYTE [DISTWIDTH] IN BLOOD BY AUTOMATED COUNT: 15 % (ref 11.9–14.6)
GLOBULIN SER CALC-MCNC: 5.2 G/DL (ref 2.3–3.5)
GLUCOSE SERPL-MCNC: 93 MG/DL (ref 65–100)
GLUCOSE UR STRIP.AUTO-MCNC: NEGATIVE MG/DL
HCT VFR BLD AUTO: 40.6 % (ref 41.1–50.3)
HGB BLD-MCNC: 13.7 G/DL (ref 13.6–17.2)
HGB UR QL STRIP: NEGATIVE
IMM GRANULOCYTES # BLD: 0 K/UL (ref 0–0.5)
IMM GRANULOCYTES NFR BLD AUTO: 0 % (ref 0–5)
KETONES UR QL STRIP.AUTO: NEGATIVE MG/DL
LACTATE BLD-SCNC: 1.6 MMOL/L (ref 0.5–1.9)
LEUKOCYTE ESTERASE UR QL STRIP.AUTO: ABNORMAL
LYMPHOCYTES # BLD: 2.3 K/UL (ref 0.5–4.6)
LYMPHOCYTES NFR BLD: 22 % (ref 13–44)
MAGNESIUM SERPL-MCNC: 1.9 MG/DL (ref 1.8–2.4)
MCH RBC QN AUTO: 28.2 PG (ref 26.1–32.9)
MCHC RBC AUTO-ENTMCNC: 33.7 G/DL (ref 31.4–35)
MCV RBC AUTO: 83.7 FL (ref 79.6–97.8)
MONOCYTES # BLD: 1 K/UL (ref 0.1–1.3)
MONOCYTES NFR BLD: 10 % (ref 4–12)
NEUTS SEG # BLD: 7 K/UL (ref 1.7–8.2)
NEUTS SEG NFR BLD: 68 % (ref 43–78)
NITRITE UR QL STRIP.AUTO: NEGATIVE
P-R INTERVAL, ECG05: 202 MS
PH UR STRIP: 6 [PH] (ref 5–9)
PLATELET # BLD AUTO: 200 K/UL (ref 150–450)
PMV BLD AUTO: 9.9 FL (ref 10.8–14.1)
POTASSIUM SERPL-SCNC: 4.7 MMOL/L (ref 3.5–5.1)
PROT SERPL-MCNC: 8 G/DL (ref 6.3–8.2)
PROT UR STRIP-MCNC: 100 MG/DL
Q-T INTERVAL, ECG07: 472 MS
QRS DURATION, ECG06: 118 MS
QTC CALCULATION (BEZET), ECG08: 561 MS
RBC # BLD AUTO: 4.85 M/UL (ref 4.23–5.67)
RBC #/AREA URNS HPF: ABNORMAL /HPF
SODIUM SERPL-SCNC: 138 MMOL/L (ref 136–145)
SP GR UR REFRACTOMETRY: 1.02 (ref 1–1.02)
UROBILINOGEN UR QL STRIP.AUTO: 4 EU/DL (ref 0.2–1)
VENTRICULAR RATE, ECG03: 85 BPM
WBC # BLD AUTO: 10.3 K/UL (ref 4.3–11.1)
WBC URNS QL MICRO: ABNORMAL /HPF

## 2018-03-27 PROCEDURE — 94640 AIRWAY INHALATION TREATMENT: CPT

## 2018-03-27 PROCEDURE — 74011000258 HC RX REV CODE- 258: Performed by: INTERNAL MEDICINE

## 2018-03-27 PROCEDURE — 83880 ASSAY OF NATRIURETIC PEPTIDE: CPT | Performed by: EMERGENCY MEDICINE

## 2018-03-27 PROCEDURE — 74011000258 HC RX REV CODE- 258: Performed by: EMERGENCY MEDICINE

## 2018-03-27 PROCEDURE — 81001 URINALYSIS AUTO W/SCOPE: CPT | Performed by: INTERNAL MEDICINE

## 2018-03-27 PROCEDURE — 96366 THER/PROPH/DIAG IV INF ADDON: CPT

## 2018-03-27 PROCEDURE — 87086 URINE CULTURE/COLONY COUNT: CPT | Performed by: INTERNAL MEDICINE

## 2018-03-27 PROCEDURE — 80053 COMPREHEN METABOLIC PANEL: CPT | Performed by: EMERGENCY MEDICINE

## 2018-03-27 PROCEDURE — 87040 BLOOD CULTURE FOR BACTERIA: CPT | Performed by: EMERGENCY MEDICINE

## 2018-03-27 PROCEDURE — 71046 X-RAY EXAM CHEST 2 VIEWS: CPT

## 2018-03-27 PROCEDURE — 86580 TB INTRADERMAL TEST: CPT | Performed by: INTERNAL MEDICINE

## 2018-03-27 PROCEDURE — 85025 COMPLETE CBC W/AUTO DIFF WBC: CPT | Performed by: EMERGENCY MEDICINE

## 2018-03-27 PROCEDURE — 96365 THER/PROPH/DIAG IV INF INIT: CPT | Performed by: EMERGENCY MEDICINE

## 2018-03-27 PROCEDURE — 99285 EMERGENCY DEPT VISIT HI MDM: CPT | Performed by: EMERGENCY MEDICINE

## 2018-03-27 PROCEDURE — 83735 ASSAY OF MAGNESIUM: CPT | Performed by: EMERGENCY MEDICINE

## 2018-03-27 PROCEDURE — 83605 ASSAY OF LACTIC ACID: CPT

## 2018-03-27 PROCEDURE — 74011250636 HC RX REV CODE- 250/636: Performed by: INTERNAL MEDICINE

## 2018-03-27 PROCEDURE — 74011250637 HC RX REV CODE- 250/637: Performed by: INTERNAL MEDICINE

## 2018-03-27 PROCEDURE — 99218 HC RM OBSERVATION: CPT

## 2018-03-27 PROCEDURE — 74011000250 HC RX REV CODE- 250: Performed by: EMERGENCY MEDICINE

## 2018-03-27 PROCEDURE — 96361 HYDRATE IV INFUSION ADD-ON: CPT

## 2018-03-27 PROCEDURE — 74011000250 HC RX REV CODE- 250: Performed by: INTERNAL MEDICINE

## 2018-03-27 PROCEDURE — 96372 THER/PROPH/DIAG INJ SC/IM: CPT

## 2018-03-27 PROCEDURE — 74011000302 HC RX REV CODE- 302: Performed by: INTERNAL MEDICINE

## 2018-03-27 PROCEDURE — 93005 ELECTROCARDIOGRAM TRACING: CPT | Performed by: EMERGENCY MEDICINE

## 2018-03-27 RX ORDER — IPRATROPIUM BROMIDE AND ALBUTEROL SULFATE 2.5; .5 MG/3ML; MG/3ML
3 SOLUTION RESPIRATORY (INHALATION)
Status: DISCONTINUED | OUTPATIENT
Start: 2018-03-27 | End: 2018-03-29 | Stop reason: HOSPADM

## 2018-03-27 RX ORDER — SODIUM CHLORIDE 9 MG/ML
75 INJECTION, SOLUTION INTRAVENOUS CONTINUOUS
Status: DISCONTINUED | OUTPATIENT
Start: 2018-03-27 | End: 2018-03-28

## 2018-03-27 RX ORDER — CARVEDILOL 12.5 MG/1
12.5 TABLET ORAL 2 TIMES DAILY WITH MEALS
Status: DISCONTINUED | OUTPATIENT
Start: 2018-03-27 | End: 2018-03-29 | Stop reason: HOSPADM

## 2018-03-27 RX ORDER — IPRATROPIUM BROMIDE AND ALBUTEROL SULFATE 2.5; .5 MG/3ML; MG/3ML
3 SOLUTION RESPIRATORY (INHALATION)
Status: COMPLETED | OUTPATIENT
Start: 2018-03-27 | End: 2018-03-27

## 2018-03-27 RX ORDER — LANOLIN ALCOHOL/MO/W.PET/CERES
3 CREAM (GRAM) TOPICAL
COMMUNITY

## 2018-03-27 RX ORDER — LISINOPRIL 5 MG/1
5 TABLET ORAL DAILY
Status: DISCONTINUED | OUTPATIENT
Start: 2018-03-28 | End: 2018-03-29 | Stop reason: HOSPADM

## 2018-03-27 RX ORDER — ENOXAPARIN SODIUM 100 MG/ML
40 INJECTION SUBCUTANEOUS EVERY 24 HOURS
Status: DISCONTINUED | OUTPATIENT
Start: 2018-03-27 | End: 2018-03-29 | Stop reason: HOSPADM

## 2018-03-27 RX ORDER — CLINDAMYCIN PHOSPHATE 900 MG/50ML
900 INJECTION INTRAVENOUS
Status: DISCONTINUED | OUTPATIENT
Start: 2018-03-27 | End: 2018-03-27 | Stop reason: SDUPTHER

## 2018-03-27 RX ORDER — PANTOPRAZOLE SODIUM 40 MG/1
40 TABLET, DELAYED RELEASE ORAL
Status: DISCONTINUED | OUTPATIENT
Start: 2018-03-28 | End: 2018-03-29 | Stop reason: HOSPADM

## 2018-03-27 RX ORDER — ATORVASTATIN CALCIUM 10 MG/1
20 TABLET, FILM COATED ORAL
Status: DISCONTINUED | OUTPATIENT
Start: 2018-03-27 | End: 2018-03-29 | Stop reason: HOSPADM

## 2018-03-27 RX ORDER — ASPIRIN 81 MG/1
81 TABLET ORAL DAILY
Status: DISCONTINUED | OUTPATIENT
Start: 2018-03-28 | End: 2018-03-29 | Stop reason: HOSPADM

## 2018-03-27 RX ORDER — ACETAMINOPHEN 325 MG/1
650 TABLET ORAL
Status: DISCONTINUED | OUTPATIENT
Start: 2018-03-27 | End: 2018-03-29 | Stop reason: HOSPADM

## 2018-03-27 RX ORDER — ONDANSETRON 2 MG/ML
4 INJECTION INTRAMUSCULAR; INTRAVENOUS
Status: DISCONTINUED | OUTPATIENT
Start: 2018-03-27 | End: 2018-03-29 | Stop reason: HOSPADM

## 2018-03-27 RX ORDER — ALLOPURINOL 100 MG/1
100 TABLET ORAL DAILY
Status: DISCONTINUED | OUTPATIENT
Start: 2018-03-28 | End: 2018-03-29 | Stop reason: HOSPADM

## 2018-03-27 RX ORDER — SODIUM CHLORIDE 0.9 % (FLUSH) 0.9 %
5-10 SYRINGE (ML) INJECTION AS NEEDED
Status: DISCONTINUED | OUTPATIENT
Start: 2018-03-27 | End: 2018-03-29 | Stop reason: HOSPADM

## 2018-03-27 RX ORDER — SODIUM CHLORIDE 0.9 % (FLUSH) 0.9 %
5-10 SYRINGE (ML) INJECTION EVERY 8 HOURS
Status: DISCONTINUED | OUTPATIENT
Start: 2018-03-27 | End: 2018-03-29 | Stop reason: HOSPADM

## 2018-03-27 RX ORDER — GUAIFENESIN 600 MG/1
600 TABLET, EXTENDED RELEASE ORAL EVERY 12 HOURS
Status: DISCONTINUED | OUTPATIENT
Start: 2018-03-27 | End: 2018-03-29 | Stop reason: HOSPADM

## 2018-03-27 RX ADMIN — IPRATROPIUM BROMIDE AND ALBUTEROL SULFATE 3 ML: .5; 3 SOLUTION RESPIRATORY (INHALATION) at 10:15

## 2018-03-27 RX ADMIN — CLINDAMYCIN PHOSPHATE 600 MG: 150 INJECTION, SOLUTION INTRAVENOUS at 13:20

## 2018-03-27 RX ADMIN — TUBERCULIN PURIFIED PROTEIN DERIVATIVE 5 UNITS: 5 INJECTION, SOLUTION INTRADERMAL at 15:06

## 2018-03-27 RX ADMIN — Medication 10 ML: at 21:18

## 2018-03-27 RX ADMIN — ENOXAPARIN SODIUM 40 MG: 40 INJECTION SUBCUTANEOUS at 11:51

## 2018-03-27 RX ADMIN — GUAIFENESIN 600 MG: 600 TABLET, EXTENDED RELEASE ORAL at 21:16

## 2018-03-27 RX ADMIN — CLINDAMYCIN PHOSPHATE 600 MG: 150 INJECTION, SOLUTION INTRAVENOUS at 21:15

## 2018-03-27 RX ADMIN — GUAIFENESIN 600 MG: 600 TABLET, EXTENDED RELEASE ORAL at 11:51

## 2018-03-27 RX ADMIN — ATORVASTATIN CALCIUM 20 MG: 10 TABLET, FILM COATED ORAL at 21:16

## 2018-03-27 RX ADMIN — CLINDAMYCIN PHOSPHATE 900 MG: 150 INJECTION, SOLUTION INTRAVENOUS at 09:41

## 2018-03-27 RX ADMIN — ACETAMINOPHEN 650 MG: 325 TABLET ORAL at 13:19

## 2018-03-27 RX ADMIN — SODIUM CHLORIDE 75 ML/HR: 900 INJECTION, SOLUTION INTRAVENOUS at 11:51

## 2018-03-27 RX ADMIN — CARVEDILOL 12.5 MG: 12.5 TABLET, FILM COATED ORAL at 17:03

## 2018-03-27 NOTE — PROGRESS NOTES
TRANSFER - IN REPORT:    Verbal report received from Yanick Altman RN on [facility].  being received from ED for routine progression of care      Report consisted of patients Situation, Background, Assessment and   Recommendations(SBAR). Information from the following report(s) SBAR, Kardex and ED Summary was reviewed with the receiving nurse. Opportunity for questions and clarification was provided. Assessment completed upon patients arrival to unit and care assumed.

## 2018-03-27 NOTE — ED TRIAGE NOTES
Patient with history of COPD and CHF presents with wife and states that for the past 2-3 weeks he has had trouble breathing, needs oxygen and doesn't wear it. Wife reports edema, yellow sputum and orange urine.

## 2018-03-27 NOTE — H&P
HOSPITALIST H&P      NAME:  Elli Martinez. Age:  74 y.o.  :   1945   MRN:   [id number]    PCP: Efren Ramos MD    Attending MD: Mary Mallory MD    Treatment Team: Attending Provider: Mary Mallory MD    HPI:     Elli Martinez. is a 26NYQ with chronic sCHF, COPD, CKD, HTN who presented to [facility] ED with fever/chills and facial swelling. He reports that swelling started overnight and is present under eyes and along cheeks. He has been feeling poorly with increased congestion, chills, nausea, poor po intake for about 4 days. He denies SOB, CP, HA, vision changes. In the ED, he had a leukocytosis and low grade temp. Hospitalist asked to admit for erysipelas. Complete ROS done and is as stated in HPI or otherwise negative    Past Medical History:   Diagnosis Date    Acute CHF (congestive heart failure) ([facility] [address] 2015    Acute combined systolic and diastolic congestive heart failure ([facility] [address] 2015    Chronic obstructive pulmonary disease (HCC)     De Quervain's tenosynovitis, right 2015    Dyspnea on exertion 2015    Elevated serum creatinine 2015    Elevated troponin 2015    History of coronary artery disease     MI at 27 yo    History of right knee surgery     cartilage removal    History of shingles     Malignant hypertension 2015    MI (myocardial infarction)         Past Surgical History:   Procedure Laterality Date    HX HEART CATHETERIZATION  2015    no intervention    HX KNEE ARTHROSCOPY Right     removal of cartilage        Prior to Admission Medications   Prescriptions Last Dose Informant Patient Reported? Taking? albuterol (VENTOLIN HFA) 90 mcg/actuation inhaler   No No   Sig: Take 2 Puffs by inhalation four (4) times daily. albuterol-ipratropium (DUO-NEB) 2.5 mg-0.5 mg/3 ml nebu   No No   Sig: 3 mL by Nebulization route four (4) times daily. Diaignosis--J44.9   allopurinol (ZYLOPRIM) 100 mg tablet   No No   Sig: Take 1 Tab by mouth daily. aspirin delayed-release 81 mg tablet   No No   Sig: Take 1 Tab by mouth daily. atorvastatin (LIPITOR) 20 mg tablet   No No   Sig: Take 1 Tab by mouth nightly. carvedilol (COREG) 25 mg tablet   Yes No   Sig: Take 25 mg by mouth two (2) times daily (with meals). furosemide (LASIX) 40 mg tablet   No No   Sig: Take 1 Tab by mouth daily. guaiFENesin ER (MUCINEX) 1,200 mg Ta12 ER tablet   No No   Sig: Take 1 Tab by mouth every twelve (12) hours. lisinopril (PRINIVIL, ZESTRIL) 5 mg tablet   No No   Sig: Take 1 Tab by mouth daily. omeprazole (PRILOSEC) 20 mg capsule   No No   Sig: Take 1 Cap by mouth daily. polyethylene glycol (MIRALAX) 17 gram packet   No No   Sig: Take 1 Packet by mouth daily. Facility-Administered Medications: None       Allergies   Allergen Reactions    Pcn [Penicillins] Other (comments)     \"makes my heart stop\"        Social History   Substance Use Topics    Smoking status: Former Smoker     Packs/day: 0.25     Years: 20.00     Types: Cigarettes     Quit date: 2015    Smokeless tobacco: Never Used    Alcohol use No        Family History   Problem Relation Age of Onset    Hypertension Mother     No Known Problems Father         Objective:       Visit Vitals    /88    Pulse 86    Temp 97.9 °F (36.6 °C)    Resp 18    Ht 5' 10\" (1.778 m)    Wt 94.3 kg (208 lb)    SpO2 97%    BMI 29.84 kg/m2        Temp (24hrs), Av.6 °F (37 °C), Min:97.9 °F (36.6 °C), Max:99.5 °F (37.5 °C)      Oxygen Therapy  O2 Sat (%): 97 % (18 1125)  Pulse via Oximetry: 84 beats per minute (18 1015)  O2 Device: Room air (18 1015)      Physical Exam:      General:    Alert, cooperative, shaking  Eyes:   PERRL EOMI Anicteric  Head:   Normocephalic, without obvious abnormality, atraumatic.   ENT:  Swelling and erythema under eyes bilaterally  Lungs:   Clear, no wheezing, normal resp effort on RA  Heart:   RRR  Abdomen:   Soft, NTTP, +NABS  MSK:  Spontaneously moves extremities. No deformities/lesions. Skin:     Texture, turgor normal.  Not Jaundiced. Facial rash as above  Neurologic: Alert and oriented x 3, no focal deficits   Psychiatry:      No depression/anxiety. Mood congruent for context. Heme/Lymph/Immune: No petechiae, echymoses, overt signs of bleeding or lymphadenopathy. Data Review:   Recent Results (from the past 24 hour(s))   CBC WITH AUTOMATED DIFF    Collection Time: 03/27/18  9:08 AM   Result Value Ref Range    WBC 10.3 4.3 - 11.1 K/uL    RBC 4.85 4.23 - 5.67 M/uL    HGB 13.7 13.6 - 17.2 g/dL    HCT 40.6 (L) 41.1 - 50.3 %    MCV 83.7 79.6 - 97.8 FL    MCH 28.2 26.1 - 32.9 PG    MCHC 33.7 31.4 - 35.0 g/dL    RDW 15.0 (H) 11.9 - 14.6 %    PLATELET 483 164 - 197 K/uL    MPV 9.9 (L) 10.8 - 14.1 FL    DF AUTOMATED      NEUTROPHILS 68 43 - 78 %    LYMPHOCYTES 22 13 - 44 %    MONOCYTES 10 4.0 - 12.0 %    EOSINOPHILS 0 (L) 0.5 - 7.8 %    BASOPHILS 0 0.0 - 2.0 %    IMMATURE GRANULOCYTES 0 0.0 - 5.0 %    ABS. NEUTROPHILS 7.0 1.7 - 8.2 K/UL    ABS. LYMPHOCYTES 2.3 0.5 - 4.6 K/UL    ABS. MONOCYTES 1.0 0.1 - 1.3 K/UL    ABS. EOSINOPHILS 0.0 0.0 - 0.8 K/UL    ABS. BASOPHILS 0.0 0.0 - 0.2 K/UL    ABS. IMM. GRANS. 0.0 0.0 - 0.5 K/UL   METABOLIC PANEL, COMPREHENSIVE    Collection Time: 03/27/18  9:08 AM   Result Value Ref Range    Sodium 138 136 - 145 mmol/L    Potassium 4.7 3.5 - 5.1 mmol/L    Chloride 105 98 - 107 mmol/L    CO2 27 21 - 32 mmol/L    Anion gap 6 (L) 7 - 16 mmol/L    Glucose 93 65 - 100 mg/dL    BUN 15 8 - 23 MG/DL    Creatinine 1.59 (H) 0.8 - 1.5 MG/DL    GFR est AA 55 (L) >60 ml/min/1.73m2    GFR est non-AA 45 (L) >60 ml/min/1.73m2    Calcium 8.9 8.3 - 10.4 MG/DL    Bilirubin, total 0.8 0.2 - 1.1 MG/DL    ALT (SGPT) 26 12 - 65 U/L    AST (SGOT) 21 15 - 37 U/L    Alk.  phosphatase 106 50 - 136 U/L    Protein, total 8.0 6.3 - 8.2 g/dL    Albumin 2.8 (L) 3.2 - 4.6 g/dL    Globulin 5.2 (H) 2.3 - 3.5 g/dL    A-G Ratio 0.5 (L) 1.2 - 3.5     MAGNESIUM Collection Time: 03/27/18  9:08 AM   Result Value Ref Range    Magnesium 1.9 1.8 - 2.4 mg/dL   BNP    Collection Time: 03/27/18  9:08 AM   Result Value Ref Range    BNP 1796 pg/mL   POC LACTIC ACID    Collection Time: 03/27/18  9:35 AM   Result Value Ref Range    Lactic Acid (POC) 1.6 0.5 - 1.9 mmol/L   EKG, 12 LEAD, INITIAL    Collection Time: 03/27/18 10:26 AM   Result Value Ref Range    Ventricular Rate 85 BPM    Atrial Rate 85 BPM    P-R Interval 202 ms    QRS Duration 118 ms    Q-T Interval 472 ms    QTC Calculation (Bezet) 561 ms    Calculated P Axis 72 degrees    Calculated R Axis -77 degrees    Calculated T Axis 71 degrees    Diagnosis       !! AGE AND GENDER SPECIFIC ECG ANALYSIS !! Sinus rhythm with occasional Premature ventricular complexes  Possible Left atrial enlargement  Left axis deviation  Non-specific intra-ventricular conduction delay  Prolonged QT  Abnormal ECG  When compared with ECG of 21-FEB-2018 14:13,  Fusion complexes are no longer Present         Imaging /Procedures /Studies   XR CHEST PA LAT   Final Result   Impression: Cardiomegaly, otherwise no acute abnormality. Assessment and Plan: Active Hospital Problems    Diagnosis Date Noted    Erysipelas 03/27/2018    Preseptal cellulitis 03/27/2018    CKD (chronic kidney disease) stage 3, GFR 30-59 ml/min 09/29/2017    COPD, moderate (HCC) 09/29/2017     Complete PFTs: 7/20/15:  Spirometry is consistent with a moderate obstructive/restrictive defect. . The residual volume is increased relative to other lung volumes suggesting air-trapping. The diffusion capacity corrected for alveolar volume was normal suggesting no loss of alveolo-capillary units.        Essential hypertension 09/28/2016    Chronic systolic heart failure ([address] 12/04/2015     EF 40%         PLAN  - Preseptal cellulitis/erysipelas:  Continue clinda  Gentle IVF as patient with CHF   Check blood cx    - COPD:  Was wheezing in the ED, but now resolved  Nebs prn  Schedule mucinex    - chronic sCHF:  Not in exacerbation  Hold lasix  Continue lisinopril, coreg    - CKD:  Cr at baseline  Monitor while inpatient    Code Status: FULL  DVT Prophylaxis: lovenox    Anticipated discharge: 1-2 days pending improvement in symptoms      Pj Munoz MD  10:19 AM

## 2018-03-27 NOTE — PROGRESS NOTES
Downtime progress note entry for [facility] : Alfredo Lr RN    Patient name: Phuong Mo : 1945 H# [id number]    MRN# [id number]     Did you call your doctor before going to the ER? No Comments: Verbalized that he went straight to the ER. Do you know the urgent care locations in your area? No Nurse presented Pt with #s for Urgent Care, AFC on . What were the symptoms that led you to the ER? List Emergent Symptoms Comments: Educate on symptoms that would require ER visit (life threatening circumstances). Were your symptoms relieved? Yes Comments: after some Abx tx, his symptoms got better. Review of reasons to go to ER? Yes Comments: Instruct to place doctor office number and [employer]. 24 hour # on refrigerator door and program in phone? Yes   Comments: #s for [facility] urgent care, [employer] 24hr nurses line and PCP office given. Encourage to program in phone and place on refrigerator Door. [employer]. 24 nurse line: [phone]   ER follow up appointment made? Yes or No Comments and Appointment Date: Made aware that his appointments need to be within 7 days after discharge, understanding verbalized.     Care  Completing Form: Alfredo Lr Date and Time 3/26/18 Comments:

## 2018-03-27 NOTE — PROGRESS NOTES
Pt resting quietly in bed. No distress noted. Lungs sounds clear bilaterally with respirations even and unlabored. Bowel sounds active in all quadrants. Palpable pulses bilaterally in upper and lower extremities. Instructed to call for assistance. Call light in reach. Voiced understanding and identified call light.

## 2018-03-27 NOTE — IP AVS SNAPSHOT
[address] [facility] 
 
 
 [address] [facility] [address] [facility] 
[phone] Patient: Jerry Toure. MRN: [id number] NTE:[id number] About your hospitalization You were admitted on:  March 27, 2018 You last received care in the:  [facility] 6 MED SURG You were discharged on:  March 29, 2018 Why you were hospitalized Your primary diagnosis was:  Erysipelas Your diagnoses also included:  Ckd (Chronic Kidney Disease) Stage 3, Gfr 30-59 Ml/Min, Chronic Systolic Heart Failure (Hcc), Copd, Moderate (Hcc), Essential Hypertension, Preseptal Cellulitis Follow-up Information Follow up With Details Comments Contact Info Eladio Damon MD On 3/30/2018 at 10:30 as scheduled [address] [address] [facility] [address] 
[phone] Your Scheduled Appointments Friday March 30, 2018 10:40 AM EDT Follow Up with Eladio Damon MD  
[address] ([address] [facility]) [address] [facility] [address]  
[phone] Tuesday May 08, 2018  8:20 AM EDT  
(Arrive by 8:00 AM) Return appointment with Leatha Cisneros NP [facility]) [address] [facility] [address] [place] [address] [place] [address]  
[phone] Discharge Orders None A check megan indicates which time of day the medication should be taken. My Medications START taking these medications Instructions Each Dose to Equal  
 Morning Noon Evening Bedtime  
 clindamycin 300 mg capsule Commonly known as:  CLEOCIN Your next dose is: This afternoon Take 1 Cap by mouth three (3) times daily for 5 days. 300 mg  
    
  
   
  
   
  
   
  
 fluticasone 50 mcg/actuation nasal spray Commonly known as:  Celestyoav Toen Your next dose is:  Tomorrow Morning 2 Sprays by Both Nostrils route daily. 2 Spray CONTINUE taking these medications Instructions Each Dose to Equal  
 Morning Noon Evening Bedtime  
 albuterol 90 mcg/actuation inhaler Commonly known as:  VENTOLIN HFA Your next dose is: This afternoon Take 2 Puffs by inhalation four (4) times daily. 2 Puff  
    
  
   
  
   
  
   
  
  
 albuterol-ipratropium 2.5 mg-0.5 mg/3 ml Nebu Commonly known as:  Tyler Alken Your next dose is: This afternoon 3 mL by Nebulization route four (4) times daily. Diaignosis--J44.9  
 3 mL  
    
  
   
  
   
  
   
  
  
 allopurinol 100 mg tablet Commonly known as:  Giseladel Jim Your next dose is:  Tomorrow Morning Take 1 Tab by mouth daily. 100 mg  
    
  
   
   
   
  
 aspirin delayed-release 81 mg tablet Your next dose is:  Tomorrow Morning Take 1 Tab by mouth daily. 81 mg  
    
  
   
   
   
  
 atorvastatin 20 mg tablet Commonly known as:  LIPITOR Your next dose is: This evening Take 1 Tab by mouth nightly. 20 mg  
    
   
   
  
   
  
 carvedilol 25 mg tablet Commonly known as:  Ilsa Putt Your next dose is: This evening Take 12.5 mg by mouth two (2) times daily (with meals). 12.5 mg  
    
  
   
   
  
   
  
 furosemide 40 mg tablet Commonly known as:  LASIX Your next dose is:  Tomorrow Morning Take 1 Tab by mouth daily. 40 mg  
    
  
   
   
   
  
 guaiFENesin 1,200 mg Ta12 ER tablet Commonly known as:  [employer] Your next dose is: This evening Take 1 Tab by mouth every twelve (12) hours. 1200 mg  
    
  
   
   
  
   
  
 lisinopril 5 mg tablet Commonly known as:  Nelly Alexanders Your next dose is:  Tomorrow Morning Take 1 Tab by mouth daily. 5 mg  
    
  
   
   
   
  
 melatonin 3 mg tablet Your next dose is: This evening Take 3 mg by mouth nightly. 3 mg  
    
   
   
  
   
  
 omeprazole 20 mg capsule Commonly known as:  PRILOSEC Your next dose is:  Tomorrow Morning Take 1 Cap by mouth daily. 20 mg  
    
  
   
   
   
  
 polyethylene glycol 17 gram packet Commonly known as:  Audi Goodpasture Your next dose is:  Tomorrow Morning Take 1 Packet by mouth daily. 17 g Where to Get Your Medications Information on where to get these meds will be given to you by the nurse or doctor. ! Ask your nurse or doctor about these medications  
  clindamycin 300 mg capsule  
 fluticasone 50 mcg/actuation nasal spray Discharge Instructions DISCHARGE SUMMARY from Nurse PATIENT INSTRUCTIONS: 
 
 
F-face looks uneven A-arms unable to move or move unevenly S-speech slurred or non-existent T-time-call 911 as soon as signs and symptoms begin-DO NOT go Back to bed or wait to see if you get better-TIME IS BRAIN. Warning Signs of HEART ATTACK Call 911 if you have these symptoms: 
? Chest discomfort. Most heart attacks involve discomfort in the center of the chest that lasts more than a few minutes, or that goes away and comes back. It can feel like uncomfortable pressure, squeezing, fullness, or pain. ? Discomfort in other areas of the upper body. Symptoms can include pain or discomfort in one or both arms, the back, neck, jaw, or stomach. ? Shortness of breath with or without chest discomfort. ? Other signs may include breaking out in a cold sweat, nausea, or lightheadedness. Don't wait more than five minutes to call [address]! Fast action can save your life. Calling 911 is almost always the fastest way to get lifesaving treatment.  Emergency Medical Services staff can begin treatment when they arrive  up to an hour sooner than if someone gets to the hospital by car. The discharge information has been reviewed with the patient. The patient verbalized understanding. Discharge medications reviewed with the patient and appropriate educational materials and side effects teaching were provided. ___________________________________________________________________________________________________________________________________ [employer] Announcement We are excited to announce that we are making your provider's discharge notes available to you in [employer]. You will see these notes when they are completed and signed by the physician that discharged you from your recent hospital stay. If you have any questions or concerns about any information you see in [employer], please call the Health Information Department where you were seen or reach out to your Primary Care Provider for more information about your plan of care. [facility]! [facility] introduces [employer] patient portal. Now you can access parts of your medical record, email your doctor's office, and request medication refills online. 1. In your internet browser, go to [email or web address] 2. Click on the First Time User? Click Here link in the Sign In box. You will see the New Member Sign Up page. 3. Enter your [employer] Access Code exactly as it appears below. You will not need to use this code after youve completed the sign-up process. If you do not sign up before the expiration date, you must request a new code. · [employer] Access Code: [email or web address] Expires: 6/27/2018 10:51 AM 
 
4. Enter the last four digits of your Social Security Number (xxxx) and Date of Birth (mm/dd/yyyy) as indicated and click Submit. You will be taken to the next sign-up page. 5. Create a My[employer] Doctorst ID.  This will be your [employer]Work[employer] login ID and cannot be changed, so think of one that is secure and easy to remember. 6. Create a M[employer] password. You can change your password at any time. 7. Enter your Password Reset Question and Answer. This can be used at a later time if you forget your password. 8. Enter your e-mail address. You will receive e-mail notification when new information is available in [address] 9. Click Sign Up. You can now view and download portions of your medical record. 10. Click the Download Summary menu link to download a portable copy of your medical information. If you have questions, please visit the Frequently Asked Questions section of the [employer] website. Remember, [employer] is NOT to be used for urgent needs. For medical emergencies, dial 911. Now available from your iPhone and Android! Introducing Shaji Aponte As a [employer] patient, I wanted to make you aware of our electronic visit tool called Shaji Aponte. [employer] 24/7 allows you to connect within minutes with a medical provider 24 hours a day, seven days a week via a mobile device or tablet or logging into a secure website from your computer. You can access Shaji Aponte from anywhere in the [place]. A virtual visit might be right for you when you have a simple condition and feel like you just dont want to get out of bed, or cant get away from work for an appointment, when your regular [employer] provider is not available (evenings, weekends or holidays), or when youre out of town and need minor care. Electronic visits cost only $49 and if the [employer] 24/7 provider determines a prescription is needed to treat your condition, one can be electronically transmitted to a nearby pharmacy*. Please take a moment to enroll today if you have not already done so. The enrollment process is free and takes just a few minutes.   To enroll, please download the [employer] 24/7 cisco to your tablet or phone, or visit [email or web address] to enroll on your computer. And, as an [address] [place] [address] patient with a [employer] account, the results of your visits will be scanned into your electronic medical record and your primary care provider will be able to view the scanned results. We urge you to continue to see your [facility] provider for your ongoing medical care. And while your primary care provider may not be the one available when you seek a Shaji Sharely.Uslisafin virtual visit, the peace of mind you get from getting a real diagnosis real time can be priceless. For more information on [employer], view our Frequently Asked Questions (FAQs) at [email or web address]. Sincerely, 
 
Asa Huynh MD 
Chief Medical Officer Arminda Arely Gilmore *:  certain medications cannot be prescribed via [employer] Unresulted Labs-Please follow up with your PCP about these lab tests Order Current Status CULTURE, BLOOD Preliminary result CULTURE, BLOOD Preliminary result Providers Seen During Your Hospitalization Provider Specialty Primary office phone Alfonzo Dillon MD Emergency Medicine [phone] Shae Feldman MD Internal Medicine [phone] Immunizations Administered for This Admission Name Date  
 TB Skin Test (PPD) Intradermal 3/27/2018 Your Primary Care Physician (PCP) Primary Care Physician Office Phone Office Fax Jorge Ly [phone] You are allergic to the following Allergen Reactions Pcn (Penicillins) Other (comments) \"makes my heart stop\" Recent Documentation Height Weight BMI Smoking Status 1.778 m 94.3 kg 29.84 kg/m2 Former Smoker Emergency Contacts Name Discharge Info Relation Home Work Mobile Tonya Sue  Spouse [3] [phone] Wilmar Elmore  Daughter [21] [phone] Patient Belongings The following personal items are in your possession at time of discharge: 
  Dental Appliances: None         Home Medications: Sent home   Jewelry: None  Clothing: At bedside    Other Valuables: Ernesto Georgette Please provide this summary of care documentation to your next provider. Signatures-by signing, you are acknowledging that this After Visit Summary has been reviewed with you and you have received a copy. Patient Signature:  ____________________________________________________________ Date:  ____________________________________________________________  
  
[facility] Provider Signature:  ____________________________________________________________ Date:  ____________________________________________________________

## 2018-03-27 NOTE — ED PROVIDER NOTES
HPI Comments: Patient presents to emergency department complaining of shortness of breath and body aches over the last 3 weeks, slowly progressive, with facial discomfort and swelling starting approximately 3 AM this morning. Patient is a 74 y.o. male presenting with general illness. The history is provided by the patient and a relative. Generalized Body Aches   This is a new problem. Episode onset: 3 weeks ago. The problem occurs daily. The problem has been gradually worsening. Associated symptoms include shortness of breath. Pertinent negatives include no chest pain, no abdominal pain and no headaches. Nothing aggravates the symptoms. Nothing relieves the symptoms. He has tried nothing for the symptoms. The treatment provided no relief.         Past Medical History:   Diagnosis Date    Acute CHF (congestive heart failure) ([facility] 4/25/2015    Acute combined systolic and diastolic congestive heart failure ([facility] 4/28/2015    Chronic obstructive pulmonary disease (HCC)     De Quervain's tenosynovitis, right 4/28/2015    Dyspnea on exertion 6/28/2015    Elevated serum creatinine 4/25/2015    Elevated troponin 6/28/2015    History of coronary artery disease     MI at 25 yo    History of right knee surgery     cartilage removal    History of shingles     Malignant hypertension 4/25/2015    MI (myocardial infarction)        Past Surgical History:   Procedure Laterality Date    HX HEART CATHETERIZATION  6/29/2015    no intervention    HX KNEE ARTHROSCOPY Right     removal of cartilage         Family History:   Problem Relation Age of Onset    Hypertension Mother     No Known Problems Father        Social History     Social History    Marital status:      Spouse name: N/A    Number of children: N/A    Years of education: N/A     Occupational History    retired      Social History Main Topics    Smoking status: Former Smoker     Packs/day: 0.25     Years: 20.00     Types: Cigarettes     Quit date: 4/5/2015    Smokeless tobacco: Never Used    Alcohol use No    Drug use: No    Sexual activity: Not on file     Other Topics Concern     Service No    Blood Transfusions No     no issues with receiving    Caffeine Concern No    Special Diet No    Exercise No    Seat Belt Yes     Social History Narrative    Lives with his wife         ALLERGIES: Pcn [penicillins]    Review of Systems   Constitutional: Positive for activity change, appetite change, chills and fatigue. Negative for fever. HENT: Positive for facial swelling (with redness, started approximately 3 AM this morning. ). Respiratory: Positive for cough, shortness of breath and wheezing. Negative for choking. Cardiovascular: Negative for chest pain. Gastrointestinal: Negative for abdominal pain. Neurological: Negative for headaches. All other systems reviewed and are negative. Vitals:    03/27/18 0859   BP: 138/90   Pulse: 98   Resp: 18   Temp: 98.4 °F (36.9 °C)   SpO2: 96%   Weight: 94.3 kg (208 lb)   Height: 5' 10\" (1.778 m)            Physical Exam   Constitutional: He is oriented to person, place, and time. He appears well-developed and well-nourished. He appears distressed (mild). HENT:   Head: Normocephalic and atraumatic. Right Ear: Tympanic membrane and external ear normal.   Left Ear: Tympanic membrane and external ear normal.   Nose: Nose normal.   Mouth/Throat: Oropharynx is clear and moist.   Eyes: Conjunctivae and EOM are normal. Pupils are equal, round, and reactive to light. Neck: Normal range of motion. Neck supple. No tracheal deviation present. Cardiovascular: Normal rate, regular rhythm, normal heart sounds and intact distal pulses. Exam reveals no gallop and no friction rub. No murmur heard. Pulmonary/Chest: Effort normal. No tachypnea. No respiratory distress. He has wheezes (all fields, mild to moderate. ). He has no rhonchi. He has no rales. Abdominal: Soft.  Bowel sounds are normal. He exhibits no distension and no mass. There is no hepatosplenomegaly. There is no tenderness. There is no rebound and no guarding. Musculoskeletal: Normal range of motion. He exhibits no edema. Lymphadenopathy:     He has no cervical adenopathy. Neurological: He is alert and oriented to person, place, and time. He displays normal reflexes. No cranial nerve deficit. Skin: Skin is warm and dry. No rash noted. He is not diaphoretic. There is erythema. Psychiatric: He has a normal mood and affect. Nursing note and vitals reviewed.        MDM  Number of Diagnoses or Management Options  Acute exacerbation of chronic obstructive pulmonary disease (COPD) ([facility]): new and requires workup  Erysipelas: new and requires workup     Amount and/or Complexity of Data Reviewed  Clinical lab tests: ordered and reviewed  Tests in the radiology section of CPT®: ordered and reviewed  Decide to obtain previous medical records or to obtain history from someone other than the patient: yes  Obtain history from someone other than the patient: yes  Review and summarize past medical records: yes (Discharge Summaries  Date of Service: 01/12/18 0916  Jalil Almaguer NP   Nurse Practitioner   Cosigned by: Jagruti Tejada MD at 01/29/18 1555  Expand All Collapse All      Hide copied text                      [facility] Cardiology Discharge Summary     Patient ID:  Krupa Rose  [id number]  74 y.o.  1945     Admit date: 12/26/2017     Discharge date:  1/12/18     Admitting Physician: Nella Trinidad MD      Discharge Physician: Jalil Almaguer NP/Dr. Shadia Ch     Admission Diagnoses: Dyspnea  Dyspnea     Discharge Diagnoses:     Patient Active Problem List    Diagnosis Date Noted   Dyspnea 12/26/2017   Atherosclerosis of native coronary artery of native heart with stable angina pectoris ([facility] 10/13/2017   TAZ (obstructive sleep apnea) 10/02/2017   CKD (chronic kidney disease) stage 3, GFR 30-59 ml/min 09/29/2017   Coronary atherosclerosis of native coronary vessel 09/29/2017   Hypertension 09/29/2017   [facility], [facility] 09/29/2017   High triglycerides 09/29/2017   Gastroesophageal reflux disease without esophagitis 09/29/2017   Vomiting 09/29/2017   NINO (acute kidney injury) ([facility] 09/29/2017   CHF (congestive heart failure) ([facility] 09/27/2017   Mixed hyperlipidemia 09/28/2016   Essential hypertension 09/28/2016   Arthritis     Chronic systolic heart failure ([facility] 12/04/2015   Tobacco abuse 07/02/2015   Dyspnea on exertion 06/28/2015        Cardiology Procedures this admission:  Diagnostic left heart catheterization  Consults: Pulmonary/Intensive care and Nephrology     Hospital Course: Patient was seen in ED at [facility] with complaints of dyspnea. His BNP in ED was 1688. He was admitted for CHF with likely needing ischemic workup with new LV dysfunction/CHF (prior echo 9/17 with EF 30-35%). The patient had life vest after last hospital stay and opt to give it back. The patient was started on IV lasix then transitioned to po lasix and diuresed well for total of 8.2 liters during his hospital stay. [place] [facility] was consulted for wheezing and likely cardiac wheezes. The patient will need outpatient PFTs per their notes. The patient's CHF improved and he could lay flat and underwent LHC on 1/2/18 with Dr. Rao. Patient was found to have mild coronary artery disease that is unchanged angiographically from his last heart catheterization in 2015 but elevated left ventricular diastolic pressure. Patient tolerated the procedure well and was taken to the telemetry floor for recovery. Nephrology saw patient and no change in home med regimen. The patient continued  to improved but had elevated SPEP in past. He was seen by Hem/Onc for evaluation for amyloid. Hem/Onc noted 9/2017 SPEP was nl however poorly defined free lambda band therefore repeat SPEP and UPEP ordered and recommended fat pad biopsy.  General surgery was consulted for biopsy but reported IR can do a FNA for diagnosis. IR did Biopsy on 1/5 with results non-diagnotic for amyloid. Pt had a PPD placed which was positive with >10 mm induration on 1/6. He was placed in isolation, sputum culture for AFB and ID consulted. ID saw Pt on 1/7 for positive PPD staing patient incidental converter; with PPD placed in anticipation of SNF placement.  His admission CXR 12/26 reveals no evidence suggestive of TB and previous CXRs and CT scans also reveal no evidence of TB.  In addition patient without any symptoms suggestive of active TB.  His risk factors include travel to the [facility] and [place]) 20 years ago as part of his job and his wife states she has had a positive PPD years ago but was never treated. Gladis Mcdonough does not require respiratory isolation. Rea Russell given his age of 74 he would be a candidate for INH but if he is going to a SNF would get sputum if possible.  He does not have active TB. ID recommended to check HIV, DC isolation, and sputum culture for AFB. His sputum culture for AFB was negative. The patient continues to refused CPAP. The afternoon of 1/12/18, the patient was  feeling better without any complaints of chest pain or shortness of breath. Patient's right radial cath site was clean, dry and intact without hematoma or bruit. Patient's labs were WNL. Patient was seen and examined by Dr. Carol Hooper and determined stable and ready for discharge to [facility]. Patient was instructed on the importance of medication compliance and outpatient follow up  The patient will continue ASA, BB, ACE-I, and statin as well for his mild CAD. The patient will also continue BB, ACE-I and lasix for his CHF. The patient will follow up with [facility] Dr. Carol Hooper on 1/24/18 at 145 pm in [place] office.     DISPOSITION: The patient is being discharged home in stable condition on a low saturated fat, low cholesterol and low salt diet.  The patient is instructed to advance activities as tolerated to the limit of fatigue or shortness of breath. The patient is instructed to avoid all heavy lifting for 5 days. The patient is instructed to watch the cath site for bleeding/oozing; if seen, the patient is instructed to apply firm pressure with a clean cloth and call [facility] at [phone]. The patient is instructed to watch for signs of infection which include: increasing area of redness, fever/hot to touch or purulent drainage at the catheterization site. The patient is instructed not to soak in a bathtub for 7-10 days, but is cleared to shower. The patient is instructed to call the office or return to the ER for immediate evaluation for any shortness of breath or chest pain not relieved by NTG.        )  Discuss the patient with other providers: yes  Independent visualization of images, tracings, or specimens: yes    Risk of Complications, Morbidity, and/or Mortality  Presenting problems: high  Diagnostic procedures: moderate  Management options: high    Patient Progress  Patient progress: improved        ED Course       Procedures      Results Reviewed:      Recent Results (from the past 24 hour(s))   CBC WITH AUTOMATED DIFF    Collection Time: 03/27/18  9:08 AM   Result Value Ref Range    WBC 10.3 4.3 - 11.1 K/uL    RBC 4.85 4.23 - 5.67 M/uL    HGB 13.7 13.6 - 17.2 g/dL    HCT 40.6 (L) 41.1 - 50.3 %    MCV 83.7 79.6 - 97.8 FL    MCH 28.2 26.1 - 32.9 PG    MCHC 33.7 31.4 - 35.0 g/dL    RDW 15.0 (H) 11.9 - 14.6 %    PLATELET 560 459 - 750 K/uL    MPV 9.9 (L) 10.8 - 14.1 FL    DF AUTOMATED      NEUTROPHILS 68 43 - 78 %    LYMPHOCYTES 22 13 - 44 %    MONOCYTES 10 4.0 - 12.0 %    EOSINOPHILS 0 (L) 0.5 - 7.8 %    BASOPHILS 0 0.0 - 2.0 %    IMMATURE GRANULOCYTES 0 0.0 - 5.0 %    ABS. NEUTROPHILS 7.0 1.7 - 8.2 K/UL    ABS. LYMPHOCYTES 2.3 0.5 - 4.6 K/UL    ABS. MONOCYTES 1.0 0.1 - 1.3 K/UL    ABS. EOSINOPHILS 0.0 0.0 - 0.8 K/UL    ABS. BASOPHILS 0.0 0.0 - 0.2 K/UL    ABS. IMM.  GRANS. 0.0 0.0 - 0.5 K/UL   METABOLIC PANEL, COMPREHENSIVE    Collection Time: 03/27/18  9:08 AM   Result Value Ref Range    Sodium 138 136 - 145 mmol/L    Potassium 4.7 3.5 - 5.1 mmol/L    Chloride 105 98 - 107 mmol/L    CO2 27 21 - 32 mmol/L    Anion gap 6 (L) 7 - 16 mmol/L    Glucose 93 65 - 100 mg/dL    BUN 15 8 - 23 MG/DL    Creatinine 1.59 (H) 0.8 - 1.5 MG/DL    GFR est AA 55 (L) >60 ml/min/1.73m2    GFR est non-AA 45 (L) >60 ml/min/1.73m2    Calcium 8.9 8.3 - 10.4 MG/DL    Bilirubin, total 0.8 0.2 - 1.1 MG/DL    ALT (SGPT) 26 12 - 65 U/L    AST (SGOT) 21 15 - 37 U/L    Alk. phosphatase 106 50 - 136 U/L    Protein, total 8.0 6.3 - 8.2 g/dL    Albumin 2.8 (L) 3.2 - 4.6 g/dL    Globulin 5.2 (H) 2.3 - 3.5 g/dL    A-G Ratio 0.5 (L) 1.2 - 3.5     POC LACTIC ACID    Collection Time: 03/27/18  9:35 AM   Result Value Ref Range    Lactic Acid (POC) 1.6 0.5 - 1.9 mmol/L       XR CHEST PA LAT    (Results Pending)     Preliminary: No acute process. Cardiomegaly.

## 2018-03-27 NOTE — ED NOTES
TRANSFER - OUT REPORT:    Verbal report given to Keisha Gregorio on Radhakaren Odalis.  being transferred to [address] [place] [address] for routine progression of care       Report consisted of patients Situation, Background, Assessment and   Recommendations(SBAR). Information from the following report(s) SBAR, Kardex, ED Summary, [facility] and Recent Results was reviewed with the receiving nurse. Lines:   Peripheral IV 03/27/18 Right Antecubital (Active)        Opportunity for questions and clarification was provided.       Patient transported with:   [employer]

## 2018-03-28 LAB
ANION GAP SERPL CALC-SCNC: 9 MMOL/L (ref 7–16)
BASOPHILS # BLD: 0 K/UL (ref 0–0.2)
BASOPHILS NFR BLD: 0 % (ref 0–2)
BUN SERPL-MCNC: 22 MG/DL (ref 8–23)
CALCIUM SERPL-MCNC: 8.3 MG/DL (ref 8.3–10.4)
CHLORIDE SERPL-SCNC: 106 MMOL/L (ref 98–107)
CO2 SERPL-SCNC: 25 MMOL/L (ref 21–32)
CREAT SERPL-MCNC: 1.53 MG/DL (ref 0.8–1.5)
DIFFERENTIAL METHOD BLD: ABNORMAL
EOSINOPHIL # BLD: 0 K/UL (ref 0–0.8)
EOSINOPHIL NFR BLD: 0 % (ref 0.5–7.8)
ERYTHROCYTE [DISTWIDTH] IN BLOOD BY AUTOMATED COUNT: 15 % (ref 11.9–14.6)
GLUCOSE SERPL-MCNC: 93 MG/DL (ref 65–100)
HCT VFR BLD AUTO: 36.8 % (ref 41.1–50.3)
HGB BLD-MCNC: 12.1 G/DL (ref 13.6–17.2)
IMM GRANULOCYTES # BLD: 0 K/UL (ref 0–0.5)
IMM GRANULOCYTES NFR BLD AUTO: 0 % (ref 0–5)
LYMPHOCYTES # BLD: 2.8 K/UL (ref 0.5–4.6)
LYMPHOCYTES NFR BLD: 38 % (ref 13–44)
MCH RBC QN AUTO: 27.5 PG (ref 26.1–32.9)
MCHC RBC AUTO-ENTMCNC: 32.9 G/DL (ref 31.4–35)
MCV RBC AUTO: 83.6 FL (ref 79.6–97.8)
MM INDURATION POC: NORMAL 0 MM (ref 0–5)
MONOCYTES # BLD: 0.8 K/UL (ref 0.1–1.3)
MONOCYTES NFR BLD: 10 % (ref 4–12)
NEUTS SEG # BLD: 3.8 K/UL (ref 1.7–8.2)
NEUTS SEG NFR BLD: 52 % (ref 43–78)
PLATELET # BLD AUTO: 164 K/UL (ref 150–450)
PMV BLD AUTO: 10.1 FL (ref 10.8–14.1)
POTASSIUM SERPL-SCNC: 4.4 MMOL/L (ref 3.5–5.1)
PPD POC: NORMAL NEGATIVE
RBC # BLD AUTO: 4.4 M/UL (ref 4.23–5.67)
RPR SER QL: NONREACTIVE
SODIUM SERPL-SCNC: 140 MMOL/L (ref 136–145)
WBC # BLD AUTO: 7.4 K/UL (ref 4.3–11.1)

## 2018-03-28 PROCEDURE — 99218 HC RM OBSERVATION: CPT

## 2018-03-28 PROCEDURE — 85025 COMPLETE CBC W/AUTO DIFF WBC: CPT | Performed by: INTERNAL MEDICINE

## 2018-03-28 PROCEDURE — 74011000258 HC RX REV CODE- 258: Performed by: INTERNAL MEDICINE

## 2018-03-28 PROCEDURE — G8979 MOBILITY GOAL STATUS: HCPCS

## 2018-03-28 PROCEDURE — 36415 COLL VENOUS BLD VENIPUNCTURE: CPT | Performed by: INTERNAL MEDICINE

## 2018-03-28 PROCEDURE — 74011000250 HC RX REV CODE- 250: Performed by: INTERNAL MEDICINE

## 2018-03-28 PROCEDURE — G8988 SELF CARE GOAL STATUS: HCPCS

## 2018-03-28 PROCEDURE — 74011250637 HC RX REV CODE- 250/637: Performed by: INTERNAL MEDICINE

## 2018-03-28 PROCEDURE — G8980 MOBILITY D/C STATUS: HCPCS

## 2018-03-28 PROCEDURE — 97165 OT EVAL LOW COMPLEX 30 MIN: CPT

## 2018-03-28 PROCEDURE — 97161 PT EVAL LOW COMPLEX 20 MIN: CPT

## 2018-03-28 PROCEDURE — 96372 THER/PROPH/DIAG INJ SC/IM: CPT

## 2018-03-28 PROCEDURE — 80048 BASIC METABOLIC PNL TOTAL CA: CPT | Performed by: INTERNAL MEDICINE

## 2018-03-28 PROCEDURE — G8978 MOBILITY CURRENT STATUS: HCPCS

## 2018-03-28 PROCEDURE — 96366 THER/PROPH/DIAG IV INF ADDON: CPT

## 2018-03-28 PROCEDURE — 74011250636 HC RX REV CODE- 250/636: Performed by: INTERNAL MEDICINE

## 2018-03-28 PROCEDURE — G8987 SELF CARE CURRENT STATUS: HCPCS

## 2018-03-28 PROCEDURE — 86592 SYPHILIS TEST NON-TREP QUAL: CPT | Performed by: INTERNAL MEDICINE

## 2018-03-28 RX ADMIN — CARVEDILOL 12.5 MG: 12.5 TABLET, FILM COATED ORAL at 16:58

## 2018-03-28 RX ADMIN — ASPIRIN 81 MG: 81 TABLET, COATED ORAL at 09:24

## 2018-03-28 RX ADMIN — GUAIFENESIN 600 MG: 600 TABLET, EXTENDED RELEASE ORAL at 21:51

## 2018-03-28 RX ADMIN — ATORVASTATIN CALCIUM 20 MG: 10 TABLET, FILM COATED ORAL at 21:51

## 2018-03-28 RX ADMIN — Medication 10 ML: at 06:05

## 2018-03-28 RX ADMIN — CLINDAMYCIN PHOSPHATE 600 MG: 150 INJECTION, SOLUTION INTRAVENOUS at 06:00

## 2018-03-28 RX ADMIN — PANTOPRAZOLE SODIUM 40 MG: 40 TABLET, DELAYED RELEASE ORAL at 09:24

## 2018-03-28 RX ADMIN — CLINDAMYCIN PHOSPHATE 600 MG: 150 INJECTION, SOLUTION INTRAVENOUS at 13:10

## 2018-03-28 RX ADMIN — CLINDAMYCIN PHOSPHATE 600 MG: 150 INJECTION, SOLUTION INTRAVENOUS at 21:50

## 2018-03-28 RX ADMIN — ACETAMINOPHEN 650 MG: 325 TABLET ORAL at 06:03

## 2018-03-28 RX ADMIN — ALLOPURINOL 100 MG: 100 TABLET ORAL at 09:23

## 2018-03-28 RX ADMIN — CARVEDILOL 12.5 MG: 12.5 TABLET, FILM COATED ORAL at 09:24

## 2018-03-28 RX ADMIN — Medication 10 ML: at 21:51

## 2018-03-28 RX ADMIN — GUAIFENESIN 600 MG: 600 TABLET, EXTENDED RELEASE ORAL at 09:24

## 2018-03-28 RX ADMIN — LISINOPRIL 5 MG: 5 TABLET ORAL at 09:23

## 2018-03-28 RX ADMIN — ENOXAPARIN SODIUM 40 MG: 40 INJECTION SUBCUTANEOUS at 13:10

## 2018-03-28 NOTE — PROGRESS NOTES
Patient has requested [facility] services at this time. Requested referral be sent to [facility] as he has used them in the past. Referral faxed to [facility] at [phone].

## 2018-03-28 NOTE — PROGRESS NOTES
Problem: Self Care Deficits Care Plan (Adult)  Goal: *Acute Goals and Plan of Care (Insert Text)  1. Patient will complete lower body bathing and dressing with supervision and adaptive equipment as needed. 2. Patient will complete toileting with supervision. 3. Patient will tolerate 25 minutes of OT treatment with 3-4 rest breaks to increase activity tolerance for ADLs. 4. Patient will complete functional transfers with modified independence and adaptive equipment as needed. 5. Patient will verbalize with independence 3 ways to complete energy conservation with daily activity. Timeframe: 7 visits       OCCUPATIONAL THERAPY: Initial Assessment and AM 3/28/2018  OBSERVATION: Hospital Day: 2  Payor: [employer] / Plan: [address] HMO / Product Type: Managed Care Medicare /      NAME/AGE/GENDER: Toni Snellen. is a 74 y.o. male   PRIMARY DIAGNOSIS:  Erysipelas Erysipelas Erysipelas        ICD-10: Treatment Diagnosis:    · Generalized Muscle Weakness (M62.81)  · Other lack of cordination (R27.8)  · Localized edema (R60.1)   Precautions/Allergies:     Pcn [penicillins]      ASSESSMENT:     Mr. Shaji Hoffman presents to the hospital with erysipelas/facial swelling. Pt has hx of COPD/CHF with past hospital admissions. Pt was supine in bed upon arrival with wife also in bed. Pt providing hx when noted that pt's wife moaning and stating she felt like she needed to go to the ER. Pt's wife transported to ER but pt still agreeable to working with therapy. Pt demonstrated shortness of breath with attempts to complete bed mobility. Pt able to complete functional mobility into the hallway with rolling walker and no loss of balance but did need 2-3 standing rest breaks (last break pt leaning over walker with cues for upright posture). Pt does well when cued to complete pursed lip breathing. Pt reports he is functioning below his baseline.  Pt demonstrates poor activity tolerance levels and could benefit from OT to improve energy conservation techniques with daily activity. Pt will be seen by OT services to address stated goals and plan of care. This section established at most recent assessment   PROBLEM LIST (Impairments causing functional limitations):  1. Decreased Strength  2. Decreased ADL/Functional Activities  3. Decreased Transfer Abilities  4. Decreased Ambulation Ability/Technique  5. Decreased Balance  6. Increased Pain  7. Decreased Activity Tolerance  8. Decreased Pacing Skills  9. Decreased Work Simplification/Energy Conservation Techniques  10. Increased Fatigue  11. Increased Shortness of Breath  12. Decreased Flexibility/Joint Mobility  13. Edema/Girth  14. Decreased [place] with Home Exercise Program   INTERVENTIONS PLANNED: (Benefits and precautions of occupational therapy have been discussed with the patient.)  1. Activities of daily living training  2. Adaptive equipment training  3. Balance training  4. Clothing management  5. Neuromuscular re-eduation  6. Therapeutic activity  7. Therapeutic exercise     TREATMENT PLAN: Frequency/Duration: Follow patient 3 times per week to address above goals. Rehabilitation Potential For Stated Goals: Excellent     RECOMMENDED REHABILITATION/EQUIPMENT: (at time of discharge pending progress): Due to the probability of continued deficits (see above) this patient will likely need continued skilled occupational therapy after discharge. Equipment:    TBD               OCCUPATIONAL PROFILE AND HISTORY:   History of Present Injury/Illness (Reason for Referral):  See H&P  Past Medical History/Comorbidities:   Mr. Dajuan Rodriguez  has a past medical history of Acute CHF (congestive heart failure) ([facility] [address] (4/25/2015); Acute combined systolic and diastolic congestive heart failure ([facility] [address] (4/28/2015); Chronic obstructive pulmonary disease ([facility] [address] De Quervain's tenosynovitis, right (4/28/2015); Dyspnea on exertion (6/28/2015); Elevated serum creatinine (4/25/2015);  Elevated troponin (6/28/2015); History of coronary artery disease; History of right knee surgery; History of shingles; Malignant hypertension (4/25/2015); and MI (myocardial infarction). Mr. Tom Feng  has a past surgical history that includes hx knee arthroscopy (Right) and hx heart catheterization (6/29/2015). Social History/Living Environment:   Home Environment: Private residence  # Steps to Enter: 5  Rails to Enter: Yes  Hand Rails : Right  One/Two Story Residence: One story  Living Alone: No  Support Systems: Spouse/Significant Other/Partner  Patient Expects to be Discharged to[de-identified] Private residence  Current DME Used/Available at Home: [employer], rollator  Tub or Shower Type: Tub/Shower combination  Prior Level of Function/Work/Activity:  Pt lives at home with his wife. Pt states he uses a rollator occasionally for functional mobility. Pt is mostly independent with ADL, but gets occasional assistance from wife. Pt reports no recent falls. Personal Factors:          Past/Current Experience:  Multiple hospital admissions        Other factors that influence how disability is experienced by the patient:  Multiple co-morbidities    Number of Personal Factors/Comorbidities that affect the Plan of Care: Expanded review of therapy/medical records (1-2):  MODERATE COMPLEXITY   ASSESSMENT OF OCCUPATIONAL PERFORMANCE[de-identified]   Activities of Daily Living:           Basic ADLs (From Assessment) Complex ADLs (From Assessment)   Basic ADL  Feeding: Setup  Oral Facial Hygiene/Grooming: Stand-by assistance  Bathing: Moderate assistance  Upper Body Dressing: Minimum assistance  Lower Body Dressing: Moderate assistance  Toileting: Minimum assistance Instrumental ADL  Meal Preparation: Maximum assistance  Homemaking:  Total assistance   Grooming/Bathing/Dressing Activities of Daily Living     Cognitive Retraining  Safety/Judgement: Awareness of environment                 Functional Transfers  Toilet Transfer : Contact guard assistance  Tub Transfer: Minimum assistance  Shower Transfer: Contact guard assistance     Bed/Mat Mobility  Supine to Sit: Supervision  Sit to Supine:  (left up in chair)  Sit to Stand: Stand-by assistance  Bed to Chair: Contact guard assistance  Scooting: Supervision       Most Recent Physical Functioning:   Gross Assessment:  AROM: Generally decreased, functional  Strength: Generally decreased, functional  Coordination: Within functional limits               Posture:  Posture (WDL): Exceptions to WDL  Posture Assessment: Rounded shoulders  Balance:  Sitting: Intact  Standing: Impaired  Standing - Static: Good  Standing - Dynamic : Fair Bed Mobility:  Supine to Sit: Supervision  Sit to Supine:  (left up in chair)  Scooting: Supervision  Wheelchair Mobility:     Transfers:  Sit to Stand: Stand-by assistance  Stand to Sit: Stand-by assistance  Bed to Chair: Contact guard assistance              Patient Vitals for the past 6 hrs:   BP BP Patient Position SpO2 Pulse   18 1033 122/80 At rest 98 % 72   18 1043 - - 100 % -       Mental Status  Neurologic State: Alert  Orientation Level: Appropriate for age  Cognition: Follows commands  Perception: Appears intact  Perseveration: No perseveration noted  Safety/Judgement: Awareness of environment                          Physical Skills Involved:  1. Range of Motion  2. Balance  3. Strength  4. Activity Tolerance  5. Pain (acute)  6. Edema Cognitive Skills Affected (resulting in the inability to perform in a timely and safe manner):  1. [facility] Psychosocial Skills Affected:  1. Habits/Routines   Number of elements that affect the Plan of Care: 5+:  HIGH COMPLEXITY   CLINICAL DECISION MAKIN [facility] [address] [id number] AM-PAC 6 Clicks   Daily Activity Inpatient Short Form  How much help from another person does the patient currently need. .. Total A Lot A Little None   1. Putting on and taking off regular lower body clothing? [] 1   [x] 2   [] 3   [] 4   2.   Bathing (including washing, rinsing, drying)? [] 1   [x] 2   [] 3   [] 4   3. Toileting, which includes using toilet, bedpan or urinal?   [] 1   [] 2   [x] 3   [] 4   4. Putting on and taking off regular upper body clothing? [] 1   [] 2   [x] 3   [] 4   5. Taking care of personal grooming such as brushing teeth? [] 1   [] 2   [x] 3   [] 4   6. Eating meals? [] 1   [] 2   [x] 3   [] 4   © 2007, Trustees of [address] [place] [address] [address] [address], under license to [employer]. All rights reserved      Score:  Initial: 16 Most Recent: X (Date: -- )    Interpretation of Tool:  Represents activities that are increasingly more difficult (i.e. Bed mobility, Transfers, Gait). Score 24 23 22-20 19-15 14-10 9-7 6     Modifier CH CI CJ CK CL CM CN      ? Self Care:     - CURRENT STATUS: CK - 40%-59% impaired, limited or restricted    - GOAL STATUS: CJ - 20%-39% impaired, limited or restricted    - D/C STATUS:  ---------------To be determined---------------  Payor: Tu Becerra / Plan: [address] [place] [address] HMO / Product Type: [employer] Medicare /      Medical Necessity:     · Patient demonstrates good rehab potential due to higher previous functional level. Reason for Services/Other Comments:  · Patient continues to require skilled intervention due to decreased independence with ADL/functional mobility. Use of outcome tool(s) and clinical judgement create a POC that gives a: LOW COMPLEXITY         TREATMENT:   (In addition to Assessment/Re-Assessment sessions the following treatments were rendered)     Pre-treatment Symptoms/Complaints:    Pain: Initial:   Pain Intensity 1: 6  Pain Location 1: Face  Pain Intervention(s) 1: Ambulation/Increased Activity  Post Session:  same     Assessment/Reassessment only, no treatment provided today    Braces/Orthotics/Lines/Etc:   · IV  · O2 Device: Room air  Treatment/Session Assessment:    · Response to Treatment:  Evaluation only.   · Interdisciplinary Collaboration:   o Occupational Therapist  o Registered Nurse  · After treatment position/precautions:   o Up in chair  o Bed/Chair-wheels locked  o Call light within reach  o RN notified   · Compliance with Program/Exercises: Will assess as treatment progresses. · Recommendations/Intent for next treatment session: \"Next visit will focus on advancements to more challenging activities and reduction in assistance provided\".   Total Treatment Duration:  OT Patient Time In/Time Out  Time In: 1531  Time Out: [address] [facility],

## 2018-03-28 NOTE — PROGRESS NOTES
Hospitalist Progress Note    3/28/2018  Admit Date: 3/27/2018  9:06 AM   NAME: Miko Munson. :  1945   MRN:  [id number]   Attending: Amado Davis MD  PCP:  Marisel Domingo MD    SUBJECTIVE:     Miko Munson. is a 70RRS with chronic sCHF, COPD, CKD, HTN who was admitted on 3/27 with fever/chills and facial swelling. He was started on clinda and has had some improvement in swelling overnight. Still not back to baseline and reports that he feels quite fatigued and generally weak. Review of Systems negative with exception of pertinent positives noted above      PHYSICAL EXAM       Visit Vitals    /83 (BP 1 Location: Right arm, BP Patient Position: At rest)    Pulse 68    Temp 97.4 °F (36.3 °C)    Resp 18    Ht 5' 10\" (1.778 m)    Wt 94.3 kg (208 lb)    SpO2 99%    BMI 29.84 kg/m2      Temp (24hrs), Av.1 °F (36.7 °C), Min:97.4 °F (36.3 °C), Max:99.5 °F (37.5 °C)    Oxygen Therapy  O2 Sat (%): 99 % (18 0712)  Pulse via Oximetry: 84 beats per minute (18 1015)  O2 Device: Room air (18 1015)    Intake/Output Summary (Last 24 hours) at 18 0859  Last data filed at 18 1809   Gross per 24 hour   Intake              240 ml   Output              200 ml   Net               40 ml          General: No acute distress. Head:  Atraumatic Normocephalic. ENT:  Swelling and redness of face under eyes bilaterally  Lungs:  Clear, normal resp effort on RA  CVS:  RRR  Abdomen: Soft, NTTP  MSK:  Spontaneously moves extremities.   Neurologic:  No focal deficits      Recent Results (from the past 24 hour(s))   CBC WITH AUTOMATED DIFF    Collection Time: 18  9:08 AM   Result Value Ref Range    WBC 10.3 4.3 - 11.1 K/uL    RBC 4.85 4.23 - 5.67 M/uL    HGB 13.7 13.6 - 17.2 g/dL    HCT 40.6 (L) 41.1 - 50.3 %    MCV 83.7 79.6 - 97.8 FL    MCH 28.2 26.1 - 32.9 PG    MCHC 33.7 31.4 - 35.0 g/dL    RDW 15.0 (H) 11.9 - 14.6 %    PLATELET 404 013 - 974 K/uL    MPV 9.9 (L) 10.8 - 14.1 FL    DF AUTOMATED      NEUTROPHILS 68 43 - 78 %    LYMPHOCYTES 22 13 - 44 %    MONOCYTES 10 4.0 - 12.0 %    EOSINOPHILS 0 (L) 0.5 - 7.8 %    BASOPHILS 0 0.0 - 2.0 %    IMMATURE GRANULOCYTES 0 0.0 - 5.0 %    ABS. NEUTROPHILS 7.0 1.7 - 8.2 K/UL    ABS. LYMPHOCYTES 2.3 0.5 - 4.6 K/UL    ABS. MONOCYTES 1.0 0.1 - 1.3 K/UL    ABS. EOSINOPHILS 0.0 0.0 - 0.8 K/UL    ABS. BASOPHILS 0.0 0.0 - 0.2 K/UL    ABS. IMM. GRANS. 0.0 0.0 - 0.5 K/UL   METABOLIC PANEL, COMPREHENSIVE    Collection Time: 03/27/18  9:08 AM   Result Value Ref Range    Sodium 138 136 - 145 mmol/L    Potassium 4.7 3.5 - 5.1 mmol/L    Chloride 105 98 - 107 mmol/L    CO2 27 21 - 32 mmol/L    Anion gap 6 (L) 7 - 16 mmol/L    Glucose 93 65 - 100 mg/dL    BUN 15 8 - 23 MG/DL    Creatinine 1.59 (H) 0.8 - 1.5 MG/DL    GFR est AA 55 (L) >60 ml/min/1.73m2    GFR est non-AA 45 (L) >60 ml/min/1.73m2    Calcium 8.9 8.3 - 10.4 MG/DL    Bilirubin, total 0.8 0.2 - 1.1 MG/DL    ALT (SGPT) 26 12 - 65 U/L    AST (SGOT) 21 15 - 37 U/L    Alk.  phosphatase 106 50 - 136 U/L    Protein, total 8.0 6.3 - 8.2 g/dL    Albumin 2.8 (L) 3.2 - 4.6 g/dL    Globulin 5.2 (H) 2.3 - 3.5 g/dL    A-G Ratio 0.5 (L) 1.2 - 3.5     MAGNESIUM    Collection Time: 03/27/18  9:08 AM   Result Value Ref Range    Magnesium 1.9 1.8 - 2.4 mg/dL   BNP    Collection Time: 03/27/18  9:08 AM   Result Value Ref Range    BNP 1796 pg/mL   CULTURE, BLOOD    Collection Time: 03/27/18  9:29 AM   Result Value Ref Range    Special Requests: RIGHT  Antecubital        Culture result: NO GROWTH AFTER 21 HOURS     CULTURE, BLOOD    Collection Time: 03/27/18  9:30 AM   Result Value Ref Range    Special Requests: RIGHT  HAND        Culture result: NO GROWTH AFTER 21 HOURS     POC LACTIC ACID    Collection Time: 03/27/18  9:35 AM   Result Value Ref Range    Lactic Acid (POC) 1.6 0.5 - 1.9 mmol/L   EKG, 12 LEAD, INITIAL    Collection Time: 03/27/18 10:26 AM   Result Value Ref Range    Ventricular Rate 85 BPM    Atrial Rate 85 BPM    P-R Interval 202 ms    QRS Duration 118 ms    Q-T Interval 472 ms    QTC Calculation (Bezet) 561 ms    Calculated P Axis 72 degrees    Calculated R Axis -77 degrees    Calculated T Axis 71 degrees    Diagnosis       !! AGE AND GENDER SPECIFIC ECG ANALYSIS !!   Sinus rhythm with occasional Premature ventricular complexes  Possible Left atrial enlargement  Left axis deviation  Non-specific intra-ventricular conduction delay  Prolonged QT  Abnormal ECG    Confirmed by ST ROBERTO PATRICIA MD (), RICCARDO LOPEZ ([id number]) on 3/27/2018 11:57:05 AM     URINALYSIS W/ RFLX MICROSCOPIC    Collection Time: 03/27/18  1:38 PM   Result Value Ref Range    Color YELLOW      Appearance CLEAR      Specific gravity 1.020 1.001 - 1.023      pH (UA) 6.0 5.0 - 9.0      Protein 100 (A) NEG mg/dL    Glucose NEGATIVE  mg/dL    Ketone NEGATIVE  NEG mg/dL    Bilirubin NEGATIVE  NEG      Blood NEGATIVE  NEG      Urobilinogen 4.0 (H) 0.2 - 1.0 EU/dL    Nitrites NEGATIVE  NEG      Leukocyte Esterase TRACE (A) NEG      WBC 5-10 0 /hpf    RBC 0-3 0 /hpf    Epithelial cells 0-3 0 /hpf    Bacteria TRACE 0 /hpf    Casts 0-3 0 /lpf   CULTURE, URINE    Collection Time: 03/27/18  1:38 PM   Result Value Ref Range    Special Requests: NO SPECIAL REQUESTS      Culture result: <10,000  COLONIES/mL  NORMAL SKIN DARREL ISOLATED       METABOLIC PANEL, BASIC    Collection Time: 03/28/18  6:08 AM   Result Value Ref Range    Sodium 140 136 - 145 mmol/L    Potassium 4.4 3.5 - 5.1 mmol/L    Chloride 106 98 - 107 mmol/L    CO2 25 21 - 32 mmol/L    Anion gap 9 7 - 16 mmol/L    Glucose 93 65 - 100 mg/dL    BUN 22 8 - 23 MG/DL    Creatinine 1.53 (H) 0.8 - 1.5 MG/DL    GFR est AA 57 (L) >60 ml/min/1.73m2    GFR est non-AA 47 (L) >60 ml/min/1.73m2    Calcium 8.3 8.3 - 10.4 MG/DL   CBC WITH AUTOMATED DIFF    Collection Time: 03/28/18  6:08 AM   Result Value Ref Range    WBC 7.4 4.3 - 11.1 K/uL    RBC 4.40 4.23 - 5.67 M/uL    HGB 12.1 (L) 13.6 - 17.2 g/dL    HCT 36.8 (L) 41.1 - 50.3 %    MCV 83.6 79.6 - 97.8 FL    MCH 27.5 26.1 - 32.9 PG    MCHC 32.9 31.4 - 35.0 g/dL    RDW 15.0 (H) 11.9 - 14.6 %    PLATELET 222 346 - 182 K/uL    MPV 10.1 (L) 10.8 - 14.1 FL    DF AUTOMATED      NEUTROPHILS 52 43 - 78 %    LYMPHOCYTES 38 13 - 44 %    MONOCYTES 10 4.0 - 12.0 %    EOSINOPHILS 0 (L) 0.5 - 7.8 %    BASOPHILS 0 0.0 - 2.0 %    IMMATURE GRANULOCYTES 0 0.0 - 5.0 %    ABS. NEUTROPHILS 3.8 1.7 - 8.2 K/UL    ABS. LYMPHOCYTES 2.8 0.5 - 4.6 K/UL    ABS. MONOCYTES 0.8 0.1 - 1.3 K/UL    ABS. EOSINOPHILS 0.0 0.0 - 0.8 K/UL    ABS. BASOPHILS 0.0 0.0 - 0.2 K/UL    ABS. IMM. GRANS. 0.0 0.0 - 0.5 K/UL         Imaging /Procedures /Studies   XR CHEST PA LAT   Final Result   Impression: Cardiomegaly, otherwise no acute abnormality. ASSESSMENT      Hospital Problems as of 3/28/2018  Date Reviewed: 2/6/2018          Codes Class Noted - Resolved POA    * (Principal)Erysipelas ICD-10-CM: K84  ICD-9-CM: 035  3/27/2018 - Present Unknown        Preseptal cellulitis ICD-10-CM: D13.035  ICD-9-CM: 373.13  3/27/2018 - Present Yes        CKD (chronic kidney disease) stage 3, GFR 30-59 ml/min (Chronic) ICD-10-CM: N18.3  ICD-9-CM: 585.3  9/29/2017 - Present Yes        COPD, moderate ([address] (Chronic) ICD-10-CM: J44.9  ICD-9-CM: 723  9/29/2017 - Present Yes    Overview Signed 12/27/2015 12:38 PM by Khadijah Wild NP     Complete PFTs: 7/20/15:  Spirometry is consistent with a moderate obstructive/restrictive defect. . The residual volume is increased relative to other lung volumes suggesting air-trapping. The diffusion capacity corrected for alveolar volume was normal suggesting no loss of alveolo-capillary units.               Essential hypertension ICD-10-CM: I10  ICD-9-CM: 401.9  9/28/2016 - Present Yes        Chronic systolic heart failure (HCC) (Chronic) ICD-10-CM: I50.22  ICD-9-CM: 428.22  12/4/2015 - Present Yes    Overview Signed 12/4/2015  9:28 AM by Gena Mcfadden     EF 40% Plan:  - Preseptal cellulitis/erysipelas:  Continue clinda, day 2  Can stop IVF  Blood cx neg to date     - COPD:  Was wheezing in the ED, but now resolved  Nebs prn  Continue scheduled mucinex     - chronic sCHF:  Not in exacerbation  Hold lasix  Continue lisinopril, coreg     - CKD:  Cr at baseline  Monitor while inpatient      DVT Prophylaxis: lovenox  Dispo: PT/OT eval pending, CM consulted and PPD placed in case needs STR; will likely be medically stable for discharge in next 1-2 days    Verito Zimmerman MD

## 2018-03-28 NOTE — PROGRESS NOTES
Problem: Falls - Risk of  Goal: *Absence of Falls  Document Sue Fall Risk and appropriate interventions in the flowsheet.    Outcome: Progressing Towards Goal  Fall Risk Interventions:  Mobility Interventions: Bed/chair exit alarm, Patient to call before getting OOB, Strengthening exercises (ROM-active/passive)    Mentation Interventions: Bed/chair exit alarm, Door open when patient unattended, Evaluate medications/consider consulting pharmacy, Adequate sleep, hydration, pain control    Medication Interventions: Bed/chair exit alarm, Evaluate medications/consider consulting pharmacy, Patient to call before getting OOB    Elimination Interventions: Call light in reach, Patient to call for help with toileting needs

## 2018-03-28 NOTE — PROGRESS NOTES
Specialist Melissa Ramirez met with patient and discussed admission status. Medicare Outpatient Observation Notice provided to the patient. Oral explanation was provided and all questions answered. Signed document placed in the medical chart. Copy provided to patient.

## 2018-03-28 NOTE — PROGRESS NOTES
Met with patient at bedside. Patient is alert and oriented. Home has about 5 steps to enter. Prior to admission, patient states was independent with adls, currently still drives. Has no issues taking medications. Currently has a walker and bedside commode. Patient does have home oxygen through [employer]. Patient is unsure of discharge plan at this time. Awaiting PT to eval. Will meet again with patient once pt eval received. No needs voiced at this time. CM will continue to follow. Care Management Interventions  PCP Verified by CM: Yes  Mode of Transport at Discharge: Other (see comment)  Transition of Care Consult (CM Consult): Discharge Planning  Physical Therapy Consult: Yes  Current Support Network: Own Home (lives with his daughters)  Confirm Follow Up Transport: Self  Plan discussed with Pt/Family/Caregiver: Yes  Freedom of Choice Offered:  Yes

## 2018-03-28 NOTE — PROGRESS NOTES
Initial visit by  to convey care and concern and encourage patient that  services are available if desired. Offered prayer during the visit as requested. Provided business card for future reference.      Moustapha Bynum 66  Board Certified

## 2018-03-28 NOTE — INTERDISCIPLINARY ROUNDS
Interdisciplinary team rounds were held 3/28/2018 with the following team members:Care Management, Nursing, Physical Therapy and Physician. Plan of care discussed. See clinical pathway and/or care plan for interventions and desired outcomes. Anticipating discharge to home with home health tomorrow.

## 2018-03-28 NOTE — PROGRESS NOTES
Problem: Mobility Impaired (Adult and Pediatric)  Goal: *Acute Goals and Plan of Care (Insert Text)  LTG:  (1.)Mr. Sunday Sargent will move from supine to sit and sit to supine , scoot up and down and roll side to side with INDEPENDENT within 7 treatment day(s) from flat surface without handrail. (2.)Mr. Sunday Sargent will transfer from bed to chair and chair to bed with INDEPENDENT using the least restrictive device within 7 treatment day(s). (3.)Mr. Sunday Sargent will ambulate with MODIFIED INDEPENDENCE for 250+ feet with the least restrictive device within 7 treatment day(s), O2 stats >90%. (4.)Mr. Sunday Sargent will be independent in HEP for B LE strengthening and overall mobility within 7 days. __________________________________________________________________________________________    PHYSICAL THERAPY: Initial Assessment, AM 3/28/2018  OBSERVATION: Hospital Day: 2  Payor: Tu Becerra / Plan: [address] [place] [address] HMO / Product Type: [employer] Medicare /      NAME/AGE/GENDER: Negro Andersen is a 74 y.o. male   PRIMARY DIAGNOSIS: Erysipelas Erysipelas Erysipelas        ICD-10: Treatment Diagnosis:    · Generalized Muscle Weakness (M62.81)  · Difficulty in walking, Not elsewhere classified (R26.2)   Precaution/Allergies:  Pcn [penicillins]      ASSESSMENT:     Mr. Sunday Sargent presents with decreased ambulation, balance, activity tolerance and overall general functional mobility s/p hospital admission with above. Pt A & O x 3, on room air, O2 stats 100% at rest. Pt states no pain presently, spouse present but sleeping during assessment. Pt states normally independent with ADLs, ambulation, no home O2. Pt states no falls. Pt sitting in chair on arrival, SBA for transfers, CGA for ambulation in hallway. Pt holding onto railing along wall, demo fluctuations in danish, unsteady gait. Pt declined to use RW at this time, educated on increased safety with possible assistive device.  Pt required 3-4 standing rest breaks with ambulation of 100'. Pt appeared fatigued post ambulation, SOB, O2 stats >95%. Pt overall functioning well with mobility. PT to follow to for acute care to address transfers and ambulation due to unsteady gait, decreased balance presently. Pt may benefit from HHPT at discharge if willing to participate. Mr. Susanna Ritter was discharged from our facility before further treatment could be provided in this setting. This section established at most recent assessment   PROBLEM LIST (Impairments causing functional limitations):  1. Decreased ADL/Functional Activities  2. Decreased Transfer Abilities  3. Decreased Ambulation Ability/Technique  4. Decreased Balance  5. Decreased Activity Tolerance  6. Increased Fatigue  7. Decreased [place] with Home Exercise Program   INTERVENTIONS PLANNED: (Benefits and precautions of physical therapy have been discussed with the patient.)  1. Balance Exercise  2. Bed Mobility  3. Family Education  4. Gait Training  5. Home Exercise Program (HEP)  6. Therapeutic Activites  7. Therapeutic Exercise/Strengthening  8. Transfer Training  9. Group Therapy     TREATMENT PLAN: Frequency/Duration: 3 times a week for duration of hospital stay  Rehabilitation Potential For Stated Goals: Good     RECOMMENDED REHABILITATION/EQUIPMENT: (at time of discharge pending progress): Due to the probability of continued deficits (see above) this patient will likely need continued skilled physical therapy after discharge. Equipment:    to be determined              HISTORY:   History of Present Injury/Illness (Reason for Referral): Jodee Mantilla. is a 70VVW with chronic sCHF, COPD, CKD, HTN who presented to [facility] ED with fever/chills and facial swelling. He reports that swelling started overnight and is present under eyes and along cheeks. He has been feeling poorly with increased congestion, chills, nausea, poor po intake for about 4 days. He denies SOB, CP, HA, vision changes.  In the ED, he had a leukocytosis and low grade temp. Hospitalist asked to admit for erysipelas  Past Medical History/Comorbidities:   Mr. Dajuan Rodriguez  has a past medical history of Acute CHF (congestive heart failure) ([facility] (4/25/2015); Acute combined systolic and diastolic congestive heart failure ([facility] [address] (4/28/2015); Chronic obstructive pulmonary disease ([facility] De Quervain's tenosynovitis, right (4/28/2015); Dyspnea on exertion (6/28/2015); Elevated serum creatinine (4/25/2015); Elevated troponin (6/28/2015); History of coronary artery disease; History of right knee surgery; History of shingles; Malignant hypertension (4/25/2015); and MI (myocardial infarction). Mr. Dajuan Rodriguez  has a past surgical history that includes hx knee arthroscopy (Right) and hx heart catheterization (6/29/2015). Social History/Living Environment:   Home Environment: Private residence  # Steps to Enter: 5  Rails to Enter: Yes  Hand Rails : Right  One/Two Story Residence: One story  Living Alone: No  Support Systems: Spouse/Significant Other/Partner  Patient Expects to be Discharged to[de-identified] Private residence  Current DME Used/Available at Home: Commode, bedside, Walker, rollator  Tub or Shower Type: Tub/Shower combination  Prior Level of Function/Work/Activity:  Lives with spouse; independent at baseline, room air  Personal Factors:          Sex:  male        Age:  74 y.o. Overall Behavior:  agreeable   Number of Personal Factors/Comorbidities that affect the Plan of Care:  Age, CHF, COPD 3+: HIGH COMPLEXITY   EXAMINATION:   Most Recent Physical Functioning:   Gross Assessment:  AROM: Within functional limits (B LE)  Strength:  Within functional limits (B LE grossly 5/5, hip flex B 4+/5)  Coordination: Within functional limits  Sensation: Intact (B LE to light touch)               Posture:  Posture (WDL): Exceptions to WDL  Posture Assessment: Rounded shoulders  Balance:  Sitting: Intact  Standing: Impaired  Standing - Static: Good;Fair  Standing - Dynamic : Fair Bed Mobility:  Supine to Sit:  (up in chair on arrival)  Sit to Supine:  (left up in chair)  Scooting: Supervision  Wheelchair Mobility:     Transfers:  Sit to Stand: Stand-by assistance  Stand to Sit: Stand-by assistance  Gait:     Base of Support: Widened  Speed/Lu: Fluctuations  Step Length: Left shortened;Right shortened  Swing Pattern: Left symmetrical;Right symmetrical  Gait Abnormalities: Decreased step clearance (slightly forward flexed posture)  Distance (ft): 100 Feet (ft) (3-4 standing rest breaks, O2 room air >95%)  Assistive Device:  (holding hand railing along wall)  Ambulation - Level of Assistance: Stand-by assistance;Contact guard assistance  Interventions: Safety awareness training;Verbal cues (pt appears fatigued)      Body Structures Involved:  1. Muscles Body Functions Affected:  1. Movement Related Activities and Participation Affected:  1. General Tasks and Demands  2. Mobility  3. Self Care   Number of elements that affect the Plan of Care: 4+: HIGH COMPLEXITY   CLINICAL PRESENTATION:   Presentation: Stable and uncomplicated: LOW COMPLEXITY   CLINICAL DECISION MAKIN [facility] [address] [address] AM-PAC 6 Clicks   Basic Mobility Inpatient Short Form  How much difficulty does the patient currently have. .. Unable A Lot A Little None   1. Turning over in bed (including adjusting bedclothes, sheets and blankets)? [] 1   [] 2   [] 3   [x] 4   2. Sitting down on and standing up from a chair with arms ( e.g., wheelchair, bedside commode, etc.)   [] 1   [] 2   [] 3   [x] 4   3. Moving from lying on back to sitting on the side of the bed? [] 1   [] 2   [] 3   [x] 4   How much help from another person does the patient currently need. .. Total A Lot A Little None   4. Moving to and from a bed to a chair (including a wheelchair)? [] 1   [] 2   [x] 3   [] 4   5. Need to walk in hospital room? [] 1   [] 2   [x] 3   [] 4   6. Climbing 3-5 steps with a railing?    [] 1   [] 2   [x] 3   [] 4   © , Trustees of [employer], under license to [employer]. All rights reserved      Score:  Initial: 21 Most Recent: X (Date: -- )    Interpretation of Tool:  Represents activities that are increasingly more difficult (i.e. Bed mobility, Transfers, Gait). Score 24 23 22-20 19-15 14-10 9-7 6     Modifier CH CI CJ CK CL CM CN      ? Mobility - Walking and Moving Around:     - CURRENT STATUS: CJ - 20%-39% impaired, limited or restricted    - GOAL STATUS: CI - 1%-19% impaired, limited or restricted    - D/C STATUS:  CJ - 20%-39% impaired, limited or restricted  Payor: Pankaj Bre / Plan: [address] [place] [address] HMO / Product Type: [id number] Care Medicare /      Medical Necessity:     · Patient is expected to demonstrate progress in strength, range of motion, balance and coordination to decrease assistance required with overall functional mobility, transfers, ambulation. · Patient demonstrates good rehab potential due to higher previous functional level. Reason for Services/Other Comments:  · Patient continues to require present interventions due to patient's inability to perform transfers and ambulation safely and effectively at prior level of function of independent.    Use of outcome tool(s) and clinical judgement create a POC that gives a: Clear prediction of patient's progress: LOW COMPLEXITY            TREATMENT:   (In addition to Assessment/Re-Assessment sessions the following treatments were rendered)   Pre-treatment Symptoms/Complaints:  \"I am doing ok\"  Pain: Initial:   Pain Intensity 1: 0  Post Session:  0/10     Assessment/Reassessment only, no treatment provided today    Braces/Orthotics/Lines/Etc:   · O2 Device: Room air  Treatment/Session Assessment:    · Response to Treatment:  Tolerated well; appears fatigued with mobility, SOB, unsteady gait  · Interdisciplinary Collaboration:   o Physical Therapist  o Registered Nurse  o   · After treatment position/precautions:   o Up in chair  o Bed/Chair-wheels locked  o Bed in low position  o Call light within reach  o RN notified  o Family at bedside   · Compliance with Program/Exercises: Will assess as treatment progresses. · Recommendations/Intent for next treatment session: \"Next visit will focus on advancements to more challenging activities\".   Total Treatment Duration:  PT Patient Time In/Time Out  Time In: 1043  Time Out: [address] Mat Rojo PT

## 2018-03-29 VITALS
OXYGEN SATURATION: 98 % | HEART RATE: 71 BPM | HEIGHT: 70 IN | SYSTOLIC BLOOD PRESSURE: 125 MMHG | RESPIRATION RATE: 18 BRPM | WEIGHT: 208 LBS | BODY MASS INDEX: 29.78 KG/M2 | DIASTOLIC BLOOD PRESSURE: 71 MMHG | TEMPERATURE: 97.8 F

## 2018-03-29 LAB
BACTERIA SPEC CULT: NORMAL
SERVICE CMNT-IMP: NORMAL

## 2018-03-29 PROCEDURE — 74011250637 HC RX REV CODE- 250/637: Performed by: INTERNAL MEDICINE

## 2018-03-29 PROCEDURE — 74011000250 HC RX REV CODE- 250: Performed by: INTERNAL MEDICINE

## 2018-03-29 PROCEDURE — 96372 THER/PROPH/DIAG INJ SC/IM: CPT

## 2018-03-29 PROCEDURE — 96366 THER/PROPH/DIAG IV INF ADDON: CPT

## 2018-03-29 PROCEDURE — 74011250636 HC RX REV CODE- 250/636: Performed by: INTERNAL MEDICINE

## 2018-03-29 PROCEDURE — 74011000258 HC RX REV CODE- 258: Performed by: INTERNAL MEDICINE

## 2018-03-29 PROCEDURE — 99218 HC RM OBSERVATION: CPT

## 2018-03-29 RX ORDER — CLINDAMYCIN HYDROCHLORIDE 300 MG/1
300 CAPSULE ORAL 3 TIMES DAILY
Qty: 15 CAP | Refills: 0 | Status: SHIPPED | OUTPATIENT
Start: 2018-03-29 | End: 2018-04-03

## 2018-03-29 RX ORDER — FLUTICASONE PROPIONATE 50 MCG
2 SPRAY, SUSPENSION (ML) NASAL DAILY
Qty: 1 BOTTLE | Refills: 11 | Status: SHIPPED | OUTPATIENT
Start: 2018-03-29

## 2018-03-29 RX ADMIN — ENOXAPARIN SODIUM 40 MG: 40 INJECTION SUBCUTANEOUS at 11:17

## 2018-03-29 RX ADMIN — GUAIFENESIN 600 MG: 600 TABLET, EXTENDED RELEASE ORAL at 08:20

## 2018-03-29 RX ADMIN — ACETAMINOPHEN 650 MG: 325 TABLET ORAL at 08:21

## 2018-03-29 RX ADMIN — CARVEDILOL 12.5 MG: 12.5 TABLET, FILM COATED ORAL at 08:20

## 2018-03-29 RX ADMIN — CLINDAMYCIN PHOSPHATE 600 MG: 150 INJECTION, SOLUTION INTRAVENOUS at 05:25

## 2018-03-29 RX ADMIN — Medication 10 ML: at 05:32

## 2018-03-29 RX ADMIN — LISINOPRIL 5 MG: 5 TABLET ORAL at 08:21

## 2018-03-29 RX ADMIN — ASPIRIN 81 MG: 81 TABLET, COATED ORAL at 08:19

## 2018-03-29 RX ADMIN — PANTOPRAZOLE SODIUM 40 MG: 40 TABLET, DELAYED RELEASE ORAL at 05:25

## 2018-03-29 RX ADMIN — ALLOPURINOL 100 MG: 100 TABLET ORAL at 08:19

## 2018-03-29 NOTE — DISCHARGE SUMMARY
Hospitalist Discharge Summary     Patient ID:  Neo Hayden  [id number]  74 y.o.  1945  Admit date: 3/27/2018  9:06 AM  Discharge date and time: 3/29/2018  Attending: Kayleen Holland MD  PCP:  Lady Fritz MD  Treatment Team: Attending Provider: Kayleen Holland MD; Care Manager: Tilton Holstein, RN; Utilization Review: Vickey Thayer RN    Principal Diagnosis Erysipelas   Hospital Problems as of 3/29/2018  Date Reviewed: 2/6/2018          Codes Class Noted - Resolved POA    * (Principal)Erysipelas ICD-10-CM: L37  ICD-9-CM: 035  3/27/2018 - Present Unknown        Preseptal cellulitis ICD-10-CM: S07.706  ICD-9-CM: 373.13  3/27/2018 - Present Yes        CKD (chronic kidney disease) stage 3, GFR 30-59 ml/min (Chronic) ICD-10-CM: N18.3  ICD-9-CM: 585.3  9/29/2017 - Present Yes        COPD, moderate ([address] (Chronic) ICD-10-CM: J44.9  ICD-9-CM: 756  9/29/2017 - Present Yes    Overview Signed 12/27/2015 12:38 PM by Reid Sanchez NP     Complete PFTs: 7/20/15:  Spirometry is consistent with a moderate obstructive/restrictive defect. . The residual volume is increased relative to other lung volumes suggesting air-trapping. The diffusion capacity corrected for alveolar volume was normal suggesting no loss of alveolo-capillary units. Essential hypertension ICD-10-CM: I10  ICD-9-CM: 401.9  9/28/2016 - Present Yes        Chronic systolic heart failure (HCC) (Chronic) ICD-10-CM: I50.22  ICD-9-CM: 428.22  12/4/2015 - Present Yes    Overview Signed 12/4/2015  9:28 AM by Jalil Yeboah     EF 40%                   HPI: 35WOI with chronic sCHF, COPD, CKD, HTN who presented to [facility] ED with fever/chills and facial swelling. He reports that swelling started overnight and is present under eyes and along cheeks. He has been feeling poorly with increased congestion, chills, nausea, poor po intake for about 4 days. He denies SOB, CP, HA, vision changes. In the ED, he had a leukocytosis and low grade temp. Hospitalist asked to admit for erysipelas. Hospital Course: Admitted on 3/27 with facial preseptal cellulitis. Has had improvement with IV clinda. Now resolved. Will continue po clinda at discharge. Still having significant weakness and fatigue, so will be discharging home with [facility] PT/OT and nursing. Complaining of pain and pressure in facial sinuses, so will discharge with rx for flonase as well. Needs follow up with PCP next week. Significant Diagnostic Studies:   Labs: Results:       Chemistry Recent Labs      03/28/18   0608  03/27/18   0908   GLU  93  93   NA  140  138   K  4.4  4.7   CL  106  105   CO2  25  27   BUN  22  15   CREA  1.53*  1.59*   CA  8.3  8.9   AGAP  9  6*   AP   --   106   TP   --   8.0   ALB   --   2.8*   GLOB   --   5.2*   AGRAT   --   0.5*      CBC w/Diff Recent Labs      03/28/18   0608  03/27/18   0908   WBC  7.4  10.3   RBC  4.40  4.85   HGB  12.1*  13.7   HCT  36.8*  40.6*   PLT  164  200   GRANS  52  68   LYMPH  38  22   EOS  0*  0*      Cardiac Enzymes No results for input(s): CPK, CKND1, RAHUL in the last 72 hours. No lab exists for component: CKRMB, TROIP   Coagulation No results for input(s): PTP, INR, APTT in the last 72 hours. No lab exists for component: INREXT, INREXT    Lipid Panel Lab Results   Component Value Date/Time    Cholesterol, total 102 12/27/2017 04:13 AM    HDL Cholesterol 24 (L) 12/27/2017 04:13 AM    LDL,Direct 94 06/29/2015 06:00 AM    LDL, calculated 59 12/27/2017 04:13 AM    VLDL, calculated 19 12/27/2017 04:13 AM    Triglyceride 95 12/27/2017 04:13 AM    CHOL/HDL Ratio 4.3 12/27/2017 04:13 AM      BNP No results for input(s): BNPP in the last 72 hours.    Liver Enzymes Recent Labs      03/27/18   0908   TP  8.0   ALB  2.8*   AP  106   SGOT  21      Thyroid Studies Lab Results   Component Value Date/Time    TSH 1.165 04/26/2015 04:11 AM            Discharge Exam:  Visit Vitals    /75 (BP 1 Location: Left arm, BP Patient Position: At rest)    Pulse 65    Temp 97.8 °F (36.6 °C)    Resp 16    Ht 5' 10\" (1.778 m)    Wt 94.3 kg (208 lb)    SpO2 98%    BMI 29.84 kg/m2     General appearance: alert, cooperative, NAD  HEENT: swelling under eyes/cheeks has resolved, still some sinus tenderness  Lungs: clear  Heart: regular rate and rhythm  Abdomen: soft, NTTP  Extremities: no cyanosis or edema  Neurologic: Grossly normal    Disposition: home with [facility]  Discharge Condition: stable  Patient Instructions:   Current Discharge Medication List      START taking these medications    Details   fluticasone (FLONASE) 50 mcg/actuation nasal spray 2 Sprays by Both Nostrils route daily. Qty: 1 Bottle, Refills: 11      clindamycin (CLEOCIN) 300 mg capsule Take 1 Cap by mouth three (3) times daily for 5 days. Qty: 15 Cap, Refills: 0         CONTINUE these medications which have NOT CHANGED    Details   melatonin 3 mg tablet Take 3 mg by mouth nightly. lisinopril (PRINIVIL, ZESTRIL) 5 mg tablet Take 1 Tab by mouth daily. Qty: 30 Tab, Refills: 11      omeprazole (PRILOSEC) 20 mg capsule Take 1 Cap by mouth daily. Qty: 30 Cap, Refills: 11      atorvastatin (LIPITOR) 20 mg tablet Take 1 Tab by mouth nightly. Qty: 30 Tab, Refills: 11      furosemide (LASIX) 40 mg tablet Take 1 Tab by mouth daily. Qty: 30 Tab, Refills: 11      albuterol-ipratropium (DUO-NEB) 2.5 mg-0.5 mg/3 ml nebu 3 mL by Nebulization route four (4) times daily. Diaignosis--J44.9  Qty: 120 Nebule, Refills: 11      allopurinol (ZYLOPRIM) 100 mg tablet Take 1 Tab by mouth daily. Qty: 30 Tab, Refills: 11      carvedilol (COREG) 25 mg tablet Take 12.5 mg by mouth two (2) times daily (with meals). aspirin delayed-release 81 mg tablet Take 1 Tab by mouth daily. Qty: 30 Tab, Refills: 0      albuterol (VENTOLIN HFA) 90 mcg/actuation inhaler Take 2 Puffs by inhalation four (4) times daily.   Qty: 1 Inhaler, Refills: 11      guaiFENesin ER (MUCINEX) 1,200 mg Ta12 ER tablet Take 1 Tab by mouth every twelve (12) hours. Qty: 60 Tab, Refills: 0      polyethylene glycol (MIRALAX) 17 gram packet Take 1 Packet by mouth daily.   Qty: 1 Packet, Refills: 11             Activity: PT/OT per Home Health  Diet: Cardiac Diet    Follow-up  -   PCP next week to assure resolution of symptoms    Signed:  Lola Barrera MD  3/29/2018  10:27 AM

## 2018-03-29 NOTE — DISCHARGE INSTRUCTIONS
DISCHARGE SUMMARY from Nurse    PATIENT INSTRUCTIONS:    After general anesthesia or intravenous sedation, for 24 hours or while taking prescription Narcotics:  · Limit your activities  · Do not drive and operate hazardous machinery  · Do not make important personal or business decisions  · Do  not drink alcoholic beverages  · If you have not urinated within 8 hours after discharge, please contact your surgeon on call. Report the following to your surgeon:  · Excessive pain, swelling, redness or odor of or around the surgical area  · Temperature over 100.5  · Nausea and vomiting lasting longer than 4 hours or if unable to take medications  · Any signs of decreased circulation or nerve impairment to extremity: change in color, persistent  numbness, tingling, coldness or increase pain  · Any questions    What to do at Home:  Recommended activity: Activity as tolerated,     If you experience any of the following symptoms fever,chills, new or unrelieved pain, nausea or vomiting, or worsening of swelling, please follow up with PCP. *  Please give a list of your current medications to your Primary Care Provider. *  Please update this list whenever your medications are discontinued, doses are      changed, or new medications (including over-the-counter products) are added. *  Please carry medication information at all times in case of emergency situations. These are general instructions for a healthy lifestyle:    No smoking/ No tobacco products/ Avoid exposure to second hand smoke  Surgeon General's Warning:  Quitting smoking now greatly reduces serious risk to your health.     Obesity, smoking, and sedentary lifestyle greatly increases your risk for illness    A healthy diet, regular physical exercise & weight monitoring are important for maintaining a healthy lifestyle    You may be retaining fluid if you have a history of heart failure or if you experience any of the following symptoms:  Weight gain of 3 pounds or more overnight or 5 pounds in a week, increased swelling in our hands or feet or shortness of breath while lying flat in bed. Please call your doctor as soon as you notice any of these symptoms; do not wait until your next office visit. Recognize signs and symptoms of STROKE:    F-face looks uneven    A-arms unable to move or move unevenly    S-speech slurred or non-existent    T-time-call 911 as soon as signs and symptoms begin-DO NOT go       Back to bed or wait to see if you get better-TIME IS BRAIN. Warning Signs of HEART ATTACK     Call 911 if you have these symptoms:   Chest discomfort. Most heart attacks involve discomfort in the center of the chest that lasts more than a few minutes, or that goes away and comes back. It can feel like uncomfortable pressure, squeezing, fullness, or pain.  Discomfort in other areas of the upper body. Symptoms can include pain or discomfort in one or both arms, the back, neck, jaw, or stomach.  Shortness of breath with or without chest discomfort.  Other signs may include breaking out in a cold sweat, nausea, or lightheadedness. Don't wait more than five minutes to call 911 - MINUTES MATTER! Fast action can save your life. Calling 911 is almost always the fastest way to get lifesaving treatment. Emergency Medical Services staff can begin treatment when they arrive -- up to an hour sooner than if someone gets to the hospital by car. The discharge information has been reviewed with the patient. The patient verbalized understanding. Discharge medications reviewed with the patient and appropriate educational materials and side effects teaching were provided.   ___________________________________________________________________________________________________________________________________

## 2018-03-29 NOTE — PROGRESS NOTES
Hourly rounds performed during this shift; all needs met at this time. Bed in low/locked position and call light, personal items within reach. Will continue to monitor and give bedside shift report to oncoming day shift nurse.

## 2018-03-29 NOTE — PROGRESS NOTES
Discharge instructions and prescriptions provided and explained to patient, patient voiced understanding. Medication side effect sheet reviewed with pt. No home meds or valuables to return. Opportunity for questions provided. Pt requesting to be wheeled to the ER to see his wife (who is currently a patient in ER). Pt to call once items are packed and will be escorted to ER.

## 2018-04-01 LAB
BACTERIA SPEC CULT: NORMAL
BACTERIA SPEC CULT: NORMAL
SERVICE CMNT-IMP: NORMAL
SERVICE CMNT-IMP: NORMAL

## 2018-05-04 ENCOUNTER — APPOINTMENT (OUTPATIENT)
Dept: GENERAL RADIOLOGY | Age: 73
End: 2018-05-04
Attending: EMERGENCY MEDICINE
Payer: MEDICARE

## 2018-05-04 ENCOUNTER — HOSPITAL ENCOUNTER (EMERGENCY)
Age: 73
Discharge: HOME | End: 2018-05-04
Attending: EMERGENCY MEDICINE
Payer: MEDICARE

## 2018-05-04 VITALS
RESPIRATION RATE: 16 BRPM | OXYGEN SATURATION: 94 % | SYSTOLIC BLOOD PRESSURE: 140 MMHG | HEIGHT: 69 IN | BODY MASS INDEX: 30.51 KG/M2 | DIASTOLIC BLOOD PRESSURE: 94 MMHG | HEART RATE: 80 BPM | WEIGHT: 206 LBS | TEMPERATURE: 97.7 F

## 2018-05-04 DIAGNOSIS — J18.9 PNEUMONIA OF RIGHT LOWER LOBE DUE TO INFECTIOUS ORGANISM: Primary | ICD-10-CM

## 2018-05-04 LAB
ALBUMIN SERPL-MCNC: 2.8 G/DL (ref 3.2–4.6)
ALBUMIN/GLOB SERPL: 0.5 {RATIO} (ref 1.2–3.5)
ALP SERPL-CCNC: 125 U/L (ref 50–136)
ALT SERPL-CCNC: 90 U/L (ref 12–65)
ANION GAP SERPL CALC-SCNC: 8 MMOL/L (ref 7–16)
AST SERPL-CCNC: 59 U/L (ref 15–37)
ATRIAL RATE: 90 BPM
BASOPHILS # BLD: 0 K/UL (ref 0–0.2)
BASOPHILS NFR BLD: 0 % (ref 0–2)
BILIRUB DIRECT SERPL-MCNC: 0.5 MG/DL
BILIRUB SERPL-MCNC: 1.9 MG/DL (ref 0.2–1.1)
BNP SERPL-MCNC: 1669 PG/ML
BUN SERPL-MCNC: 22 MG/DL (ref 8–23)
CALCIUM SERPL-MCNC: 8.5 MG/DL (ref 8.3–10.4)
CALCULATED P AXIS, ECG09: 73 DEGREES
CALCULATED R AXIS, ECG10: -72 DEGREES
CALCULATED T AXIS, ECG11: 85 DEGREES
CHLORIDE SERPL-SCNC: 109 MMOL/L (ref 98–107)
CO2 SERPL-SCNC: 25 MMOL/L (ref 21–32)
CREAT SERPL-MCNC: 1.52 MG/DL (ref 0.8–1.5)
DIAGNOSIS, 93000: NORMAL
DIFFERENTIAL METHOD BLD: ABNORMAL
EOSINOPHIL # BLD: 0.1 K/UL (ref 0–0.8)
EOSINOPHIL NFR BLD: 1 % (ref 0.5–7.8)
ERYTHROCYTE [DISTWIDTH] IN BLOOD BY AUTOMATED COUNT: 18.7 % (ref 11.9–14.6)
GLOBULIN SER CALC-MCNC: 5.2 G/DL (ref 2.3–3.5)
GLUCOSE SERPL-MCNC: 95 MG/DL (ref 65–100)
HCT VFR BLD AUTO: 42.5 % (ref 41.1–50.3)
HGB BLD-MCNC: 13.8 G/DL (ref 13.6–17.2)
IMM GRANULOCYTES # BLD: 0 K/UL (ref 0–0.5)
IMM GRANULOCYTES NFR BLD AUTO: 0 % (ref 0–5)
LACTATE BLD-SCNC: 1.9 MMOL/L (ref 0.5–1.9)
LYMPHOCYTES # BLD: 3.2 K/UL (ref 0.5–4.6)
LYMPHOCYTES NFR BLD: 38 % (ref 13–44)
MCH RBC QN AUTO: 28 PG (ref 26.1–32.9)
MCHC RBC AUTO-ENTMCNC: 32.5 G/DL (ref 31.4–35)
MCV RBC AUTO: 86.4 FL (ref 79.6–97.8)
MONOCYTES # BLD: 0.8 K/UL (ref 0.1–1.3)
MONOCYTES NFR BLD: 10 % (ref 4–12)
NEUTS SEG # BLD: 4.3 K/UL (ref 1.7–8.2)
NEUTS SEG NFR BLD: 51 % (ref 43–78)
P-R INTERVAL, ECG05: 190 MS
PLATELET # BLD AUTO: 214 K/UL (ref 150–450)
PMV BLD AUTO: 9.9 FL (ref 10.8–14.1)
POTASSIUM SERPL-SCNC: 5 MMOL/L (ref 3.5–5.1)
PROT SERPL-MCNC: 8 G/DL (ref 6.3–8.2)
Q-T INTERVAL, ECG07: 410 MS
QRS DURATION, ECG06: 110 MS
QTC CALCULATION (BEZET), ECG08: 501 MS
RBC # BLD AUTO: 4.92 M/UL (ref 4.23–5.67)
SODIUM SERPL-SCNC: 142 MMOL/L (ref 136–145)
TROPONIN I BLD-MCNC: 0.05 NG/ML (ref 0.02–0.05)
TROPONIN I BLD-MCNC: 0.07 NG/ML (ref 0.02–0.05)
VENTRICULAR RATE, ECG03: 90 BPM
WBC # BLD AUTO: 8.4 K/UL (ref 4.3–11.1)

## 2018-05-04 PROCEDURE — 96375 TX/PRO/DX INJ NEW DRUG ADDON: CPT | Performed by: EMERGENCY MEDICINE

## 2018-05-04 PROCEDURE — 80048 BASIC METABOLIC PNL TOTAL CA: CPT | Performed by: EMERGENCY MEDICINE

## 2018-05-04 PROCEDURE — 83605 ASSAY OF LACTIC ACID: CPT

## 2018-05-04 PROCEDURE — 74011250637 HC RX REV CODE- 250/637: Performed by: EMERGENCY MEDICINE

## 2018-05-04 PROCEDURE — 71045 X-RAY EXAM CHEST 1 VIEW: CPT

## 2018-05-04 PROCEDURE — 74011000258 HC RX REV CODE- 258: Performed by: EMERGENCY MEDICINE

## 2018-05-04 PROCEDURE — 80076 HEPATIC FUNCTION PANEL: CPT | Performed by: EMERGENCY MEDICINE

## 2018-05-04 PROCEDURE — 99285 EMERGENCY DEPT VISIT HI MDM: CPT | Performed by: EMERGENCY MEDICINE

## 2018-05-04 PROCEDURE — 93005 ELECTROCARDIOGRAM TRACING: CPT | Performed by: EMERGENCY MEDICINE

## 2018-05-04 PROCEDURE — 85025 COMPLETE CBC W/AUTO DIFF WBC: CPT | Performed by: EMERGENCY MEDICINE

## 2018-05-04 PROCEDURE — 84484 ASSAY OF TROPONIN QUANT: CPT

## 2018-05-04 PROCEDURE — 96365 THER/PROPH/DIAG IV INF INIT: CPT | Performed by: EMERGENCY MEDICINE

## 2018-05-04 PROCEDURE — 83880 ASSAY OF NATRIURETIC PEPTIDE: CPT | Performed by: EMERGENCY MEDICINE

## 2018-05-04 PROCEDURE — 74011250636 HC RX REV CODE- 250/636: Performed by: EMERGENCY MEDICINE

## 2018-05-04 PROCEDURE — 74011000250 HC RX REV CODE- 250: Performed by: EMERGENCY MEDICINE

## 2018-05-04 RX ORDER — GUAIFENESIN 100 MG/5ML
324 LIQUID (ML) ORAL
Status: COMPLETED | OUTPATIENT
Start: 2018-05-04 | End: 2018-05-04

## 2018-05-04 RX ORDER — DOXYCYCLINE HYCLATE 100 MG
100 TABLET ORAL 2 TIMES DAILY
Qty: 14 TAB | Refills: 0 | Status: SHIPPED | OUTPATIENT
Start: 2018-05-04 | End: 2018-05-11

## 2018-05-04 RX ORDER — SODIUM CHLORIDE 0.9 % (FLUSH) 0.9 %
5-10 SYRINGE (ML) INJECTION AS NEEDED
Status: DISCONTINUED | OUTPATIENT
Start: 2018-05-04 | End: 2018-05-04 | Stop reason: HOSPADM

## 2018-05-04 RX ORDER — FUROSEMIDE 10 MG/ML
40 INJECTION INTRAMUSCULAR; INTRAVENOUS
Status: COMPLETED | OUTPATIENT
Start: 2018-05-04 | End: 2018-05-04

## 2018-05-04 RX ORDER — SODIUM CHLORIDE 0.9 % (FLUSH) 0.9 %
5-10 SYRINGE (ML) INJECTION EVERY 8 HOURS
Status: DISCONTINUED | OUTPATIENT
Start: 2018-05-04 | End: 2018-05-04 | Stop reason: HOSPADM

## 2018-05-04 RX ADMIN — FUROSEMIDE 40 MG: 10 INJECTION, SOLUTION INTRAMUSCULAR; INTRAVENOUS at 07:23

## 2018-05-04 RX ADMIN — NITROGLYCERIN 1 INCH: 20 OINTMENT TOPICAL at 05:48

## 2018-05-04 RX ADMIN — DOXYCYCLINE 100 MG: 100 INJECTION, POWDER, LYOPHILIZED, FOR SOLUTION INTRAVENOUS at 07:21

## 2018-05-04 RX ADMIN — ASPIRIN 81 MG 324 MG: 81 TABLET ORAL at 05:48

## 2018-05-04 NOTE — ED NOTES
I have reviewed discharge instructions with the patient. The patient verbalized understanding. Patient left ED via Discharge Method: ambulatory to Home with wife    Opportunity for questions and clarification provided. Patient given 1 scripts. To continue your aftercare when you leave the hospital, you may receive an automated call from our care team to check in on how you are doing. This is a free service and part of our promise to provide the best care and service to meet your aftercare needs.  If you have questions, or wish to unsubscribe from this service please call [phone]. Thank you for Choosing our [facility] Emergency Department.

## 2018-05-04 NOTE — DISCHARGE INSTRUCTIONS

## 2018-05-04 NOTE — ED PROVIDER NOTES
HPI Comments: 66-year-old -American male presents with shortness of breath which began approximately an hour prior to arrival.  He does report cough productive of a \"purple\" sputum. No fever. He does report chest soreness with coughing. No abdominal pain or vomiting. Was admitted approximately a month ago for COPD exacerbation. He also underwent heart catheterization approximately 4 months ago which showed mild coronary artery disease unchanged from previous catheter done in 2015. Patient is a 74 y.o. male presenting with shortness of breath. The history is provided by the patient. Shortness of Breath   Associated symptoms include cough and chest pain. Pertinent negatives include no fever, no headaches, no neck pain, no vomiting, no rash and no leg swelling.         Past Medical History:   Diagnosis Date    Acute CHF (congestive heart failure) ([facility] [address] 4/25/2015    Acute combined systolic and diastolic congestive heart failure ([facility] [address] 4/28/2015    Chronic obstructive pulmonary disease (HCC)     De Quervain's tenosynovitis, right 4/28/2015    Dyspnea on exertion 6/28/2015    Elevated serum creatinine 4/25/2015    Elevated troponin 6/28/2015    History of coronary artery disease     MI at 27 yo    History of right knee surgery     cartilage removal    History of shingles     Malignant hypertension 4/25/2015    MI (myocardial infarction) [facility])        Past Surgical History:   Procedure Laterality Date    HX HEART CATHETERIZATION  6/29/2015    no intervention    HX KNEE ARTHROSCOPY Right     removal of cartilage         Family History:   Problem Relation Age of Onset    Hypertension Mother     No Known Problems Father        Social History     Social History    Marital status:      Spouse name: N/A    Number of children: N/A    Years of education: N/A     Occupational History    retired      Social History Main Topics    Smoking status: Former Smoker     Packs/day: 0.25     Years: 20. 00     Types: Cigarettes     Quit date: 4/5/2015    Smokeless tobacco: Never Used    Alcohol use No    Drug use: No    Sexual activity: Not on file     Other Topics Concern     Service No    Blood Transfusions No     no issues with receiving    Caffeine Concern No    Special Diet No    Exercise No    Seat Belt Yes     Social History Narrative    Lives with his wife         ALLERGIES: Pcn [penicillins]    Review of Systems   Constitutional: Negative for fever. HENT: Negative for congestion. Respiratory: Positive for cough and shortness of breath. Cardiovascular: Positive for chest pain. Negative for leg swelling. Gastrointestinal: Negative for nausea and vomiting. Genitourinary: Negative for dysuria. Musculoskeletal: Negative for back pain and neck pain. Skin: Negative for rash. Neurological: Negative for headaches. Vitals:    05/04/18 0513 05/04/18 0544 05/04/18 0548   BP: (!) 164/106 (!) 159/97 (!) 160/101   Pulse: 89 78 70   Resp: 24 19    Temp: 97.7 °F (36.5 °C)     SpO2: 91% 100%    Weight: 93.4 kg (206 lb)     Height: 5' 9\" (1.753 m)              Physical Exam   Constitutional: He is oriented to person, place, and time. He appears well-developed and well-nourished. No distress. HENT:   Head: Normocephalic and atraumatic. Mouth/Throat: Oropharynx is clear and moist.   Eyes: Conjunctivae are normal. Pupils are equal, round, and reactive to light. Neck: Normal range of motion. Neck supple. Cardiovascular: Normal rate and regular rhythm. No murmur heard. Pulmonary/Chest: Effort normal.   Trace crackles right base   Abdominal: Soft. He exhibits no distension. There is no tenderness. Musculoskeletal: Normal range of motion. He exhibits no edema. Neurological: He is alert and oriented to person, place, and time. Skin: Skin is warm and dry. Psychiatric: He has a normal mood and affect. Nursing note and vitals reviewed.        MDM  Number of Diagnoses or Management Options  Diagnosis management comments: Lab work reveals a normal lactic acid and white blood count. Mild renal insufficiency. Troponin 0.07. EKG no diagnostic ST changes. BNP is elevated. Chest x-ray right pleural effusion and right lower lobe atelectasis or pneumonia. Patient was treated with aspirin, Nitropaste with improvement in blood pressure. Following the finding of suspected pneumonia on chest x-ray he was treated with doxycycline and Lasix. Second troponin 0.05. As he had an unremarkable heart catheterization chest  4 months ago I do not suspect acute cardiac event. We will discharge home on doxycycline.        Amount and/or Complexity of Data Reviewed  Clinical lab tests: ordered and reviewed  Tests in the radiology section of CPT®: ordered and reviewed  Tests in the medicine section of CPT®: ordered and reviewed  Independent visualization of images, tracings, or specimens: yes    Risk of Complications, Morbidity, and/or Mortality  Presenting problems: moderate  Diagnostic procedures: moderate  Management options: moderate          ED Course       Procedures

## 2018-05-04 NOTE — ED TRIAGE NOTES
Pt presents with complaints of increased shortness of breath, worse with exertion, and nonproductive cough to which he reports has increased over the past 3 days. Pt endorses nausea en route with EMS. Per medic, BGL 70 en route to ED.

## 2018-05-17 ENCOUNTER — APPOINTMENT (OUTPATIENT)
Dept: GENERAL RADIOLOGY | Age: 73
End: 2018-05-17
Attending: EMERGENCY MEDICINE
Payer: MEDICARE

## 2018-05-17 ENCOUNTER — HOSPITAL ENCOUNTER (EMERGENCY)
Age: 73
Discharge: HOME | End: 2018-05-17
Attending: EMERGENCY MEDICINE
Payer: MEDICARE

## 2018-05-17 VITALS
BODY MASS INDEX: 39.37 KG/M2 | TEMPERATURE: 98.7 F | DIASTOLIC BLOOD PRESSURE: 88 MMHG | HEART RATE: 69 BPM | RESPIRATION RATE: 18 BRPM | WEIGHT: 275 LBS | HEIGHT: 70 IN | SYSTOLIC BLOOD PRESSURE: 121 MMHG | OXYGEN SATURATION: 100 %

## 2018-05-17 DIAGNOSIS — R60.9 DEPENDENT EDEMA: Primary | ICD-10-CM

## 2018-05-17 DIAGNOSIS — I50.22 CHRONIC SYSTOLIC CONGESTIVE HEART FAILURE (HCC): ICD-10-CM

## 2018-05-17 LAB
ALBUMIN SERPL-MCNC: 2.7 G/DL (ref 3.2–4.6)
ALBUMIN/GLOB SERPL: 0.5 {RATIO} (ref 1.2–3.5)
ALP SERPL-CCNC: 133 U/L (ref 50–136)
ALT SERPL-CCNC: 38 U/L (ref 12–65)
ANION GAP SERPL CALC-SCNC: 8 MMOL/L (ref 7–16)
AST SERPL-CCNC: 49 U/L (ref 15–37)
BASOPHILS # BLD: 0 K/UL (ref 0–0.2)
BASOPHILS NFR BLD: 0 % (ref 0–2)
BILIRUB SERPL-MCNC: 1.1 MG/DL (ref 0.2–1.1)
BNP SERPL-MCNC: 784 PG/ML
BUN SERPL-MCNC: 16 MG/DL (ref 8–23)
CALCIUM SERPL-MCNC: 8.4 MG/DL (ref 8.3–10.4)
CHLORIDE SERPL-SCNC: 111 MMOL/L (ref 98–107)
CO2 SERPL-SCNC: 26 MMOL/L (ref 21–32)
CREAT SERPL-MCNC: 1.65 MG/DL (ref 0.8–1.5)
DIFFERENTIAL METHOD BLD: ABNORMAL
EOSINOPHIL # BLD: 0.1 K/UL (ref 0–0.8)
EOSINOPHIL NFR BLD: 2 % (ref 0.5–7.8)
ERYTHROCYTE [DISTWIDTH] IN BLOOD BY AUTOMATED COUNT: 18.5 % (ref 11.9–14.6)
GLOBULIN SER CALC-MCNC: 5.1 G/DL (ref 2.3–3.5)
GLUCOSE SERPL-MCNC: 70 MG/DL (ref 65–100)
HCT VFR BLD AUTO: 41.9 % (ref 41.1–50.3)
HGB BLD-MCNC: 13.7 G/DL (ref 13.6–17.2)
IMM GRANULOCYTES # BLD: 0 K/UL (ref 0–0.5)
IMM GRANULOCYTES NFR BLD AUTO: 0 % (ref 0–5)
LYMPHOCYTES # BLD: 2.2 K/UL (ref 0.5–4.6)
LYMPHOCYTES NFR BLD: 36 % (ref 13–44)
MCH RBC QN AUTO: 28.5 PG (ref 26.1–32.9)
MCHC RBC AUTO-ENTMCNC: 32.7 G/DL (ref 31.4–35)
MCV RBC AUTO: 87.1 FL (ref 79.6–97.8)
MONOCYTES # BLD: 0.5 K/UL (ref 0.1–1.3)
MONOCYTES NFR BLD: 9 % (ref 4–12)
NEUTS SEG # BLD: 3.4 K/UL (ref 1.7–8.2)
NEUTS SEG NFR BLD: 53 % (ref 43–78)
PLATELET # BLD AUTO: 211 K/UL (ref 150–450)
PMV BLD AUTO: 10.3 FL (ref 10.8–14.1)
POTASSIUM SERPL-SCNC: 4.5 MMOL/L (ref 3.5–5.1)
PROT SERPL-MCNC: 7.8 G/DL (ref 6.3–8.2)
RBC # BLD AUTO: 4.81 M/UL (ref 4.23–5.67)
SODIUM SERPL-SCNC: 145 MMOL/L (ref 136–145)
TROPONIN I SERPL-MCNC: 0.74 NG/ML (ref 0.02–0.05)
TROPONIN I SERPL-MCNC: 0.83 NG/ML (ref 0.02–0.05)
WBC # BLD AUTO: 6.3 K/UL (ref 4.3–11.1)

## 2018-05-17 PROCEDURE — 80053 COMPREHEN METABOLIC PANEL: CPT | Performed by: EMERGENCY MEDICINE

## 2018-05-17 PROCEDURE — 96374 THER/PROPH/DIAG INJ IV PUSH: CPT | Performed by: EMERGENCY MEDICINE

## 2018-05-17 PROCEDURE — 74011250636 HC RX REV CODE- 250/636: Performed by: EMERGENCY MEDICINE

## 2018-05-17 PROCEDURE — 85025 COMPLETE CBC W/AUTO DIFF WBC: CPT | Performed by: EMERGENCY MEDICINE

## 2018-05-17 PROCEDURE — 84484 ASSAY OF TROPONIN QUANT: CPT | Performed by: EMERGENCY MEDICINE

## 2018-05-17 PROCEDURE — 93005 ELECTROCARDIOGRAM TRACING: CPT | Performed by: EMERGENCY MEDICINE

## 2018-05-17 PROCEDURE — 74011250637 HC RX REV CODE- 250/637: Performed by: EMERGENCY MEDICINE

## 2018-05-17 PROCEDURE — 83880 ASSAY OF NATRIURETIC PEPTIDE: CPT | Performed by: EMERGENCY MEDICINE

## 2018-05-17 PROCEDURE — 71046 X-RAY EXAM CHEST 2 VIEWS: CPT

## 2018-05-17 PROCEDURE — 99285 EMERGENCY DEPT VISIT HI MDM: CPT | Performed by: EMERGENCY MEDICINE

## 2018-05-17 RX ORDER — GUAIFENESIN 100 MG/5ML
324 LIQUID (ML) ORAL
Status: COMPLETED | OUTPATIENT
Start: 2018-05-17 | End: 2018-05-17

## 2018-05-17 RX ORDER — FUROSEMIDE 10 MG/ML
80 INJECTION INTRAMUSCULAR; INTRAVENOUS
Status: COMPLETED | OUTPATIENT
Start: 2018-05-17 | End: 2018-05-17

## 2018-05-17 RX ADMIN — ASPIRIN 81 MG 324 MG: 81 TABLET ORAL at 21:01

## 2018-05-17 RX ADMIN — FUROSEMIDE 80 MG: 10 INJECTION, SOLUTION INTRAMUSCULAR; INTRAVENOUS at 23:34

## 2018-05-18 LAB
ATRIAL RATE: 68 BPM
CALCULATED P AXIS, ECG09: 64 DEGREES
CALCULATED R AXIS, ECG10: -72 DEGREES
CALCULATED T AXIS, ECG11: 74 DEGREES
DIAGNOSIS, 93000: NORMAL
P-R INTERVAL, ECG05: 202 MS
Q-T INTERVAL, ECG07: 484 MS
QRS DURATION, ECG06: 116 MS
QTC CALCULATION (BEZET), ECG08: 514 MS
VENTRICULAR RATE, ECG03: 68 BPM

## 2018-05-18 NOTE — DISCHARGE INSTRUCTIONS
Heart Failure: Care Instructions  Your Care Instructions    Heart failure occurs when your heart does not pump as much blood as the body needs. Failure does not mean that the heart has stopped pumping but rather that it is not pumping as well as it should. Over time, this causes fluid buildup in your lungs and other parts of your body. Fluid buildup can cause shortness of breath, fatigue, swollen ankles, and other problems. By taking medicines regularly, reducing sodium (salt) in your diet, checking your weight every day, and making lifestyle changes, you can feel better and live longer. Follow-up care is a key part of your treatment and safety. Be sure to make and go to all appointments, and call your doctor if you are having problems. It's also a good idea to know your test results and keep a list of the medicines you take. How can you care for yourself at home? Medicines  ? · Be safe with medicines. Take your medicines exactly as prescribed. Call your doctor if you think you are having a problem with your medicine. ? · Do not take any vitamins, over-the-counter medicine, or herbal products without talking to your doctor first. Sara Hodge not take ibuprofen (Advil or Motrin) and naproxen (Aleve) without talking to your doctor first. They could make your heart failure worse. ? · You may be taking some of the following medicine. ¨ Beta-blockers can slow heart rate, decrease blood pressure, and improve your condition. Taking a beta-blocker may lower your chance of needing to be hospitalized. ¨ Angiotensin-converting enzyme inhibitors (ACEIs) reduce the heart's workload, lower blood pressure, and reduce swelling. Taking an ACEI may lower your chance of needing to be hospitalized again. ¨ Angiotensin II receptor blockers (ARBs) work like ACEIs. Your doctor may prescribe them instead of ACEIs. ¨ Diuretics, also called water pills, reduce swelling.   ¨ Potassium supplements replace this important mineral, which is sometimes lost with diuretics. ¨ Aspirin and other blood thinners prevent blood clots, which can cause a stroke or heart attack. ? You will get more details on the specific medicines your doctor prescribes. Diet  ? · Your doctor may suggest that you limit sodium to 2,000 milligrams (mg) a day or less. That is less than 1 teaspoon of salt a day, including all the salt you eat in cooking or in packaged foods. People get most of their sodium from processed foods. Fast food and restaurant meals also tend to be very high in sodium. ? · Ask your doctor how much liquid you can drink each day. You may have to limit liquids. ?Weight  ? · Weigh yourself without clothing at the same time each day. Record your weight. Call your doctor if you have a sudden weight gain, such as more than 2 to 3 pounds in a day or 5 pounds in a week. (Your doctor may suggest a different range of weight gain.) A sudden weight gain may mean that your heart failure is getting worse. ? Activity level  ? · Start light exercise (if your doctor says it is okay). Even if you can only do a small amount, exercise will help you get stronger, have more energy, and manage your weight and your stress. Walking is an easy way to get exercise. Start out by walking a little more than you did before. Bit by bit, increase the amount you walk. ? · When you exercise, watch for signs that your heart is working too hard. You are pushing yourself too hard if you cannot talk while you are exercising. If you become short of breath or dizzy or have chest pain, stop, sit down, and rest.   ? · If you feel \"wiped out\" the day after you exercise, walk slower or for a shorter distance until you can work up to a better pace. ? · Get enough rest at night. Sleeping with 1 or 2 pillows under your upper body and head may help you breathe easier. ? Lifestyle changes  ? · Do not smoke. Smoking can make a heart condition worse.  If you need help quitting, talk to your doctor about stop-smoking programs and medicines. These can increase your chances of quitting for good. Quitting smoking may be the most important step you can take to protect your heart. ? · Limit alcohol to 2 drinks a day for men and 1 drink a day for women. Too much alcohol can cause health problems. ? · Avoid getting sick from colds and the flu. Get a pneumococcal vaccine shot. If you have had one before, ask your doctor whether you need another dose. Get a flu shot each year. If you must be around people with colds or the flu, wash your hands often. When should you call for help? Call 911 if you have symptoms of sudden heart failure such as:  ? · You have severe trouble breathing. ? · You cough up pink, foamy mucus. ? · You have a new irregular or rapid heartbeat. ?Call your doctor now or seek immediate medical care if:  ? · You have new or increased shortness of breath. ? · You are dizzy or lightheaded, or you feel like you may faint. ? · You have sudden weight gain, such as more than 2 to 3 pounds in a day or 5 pounds in a week. (Your doctor may suggest a different range of weight gain.)   ? · You have increased swelling in your legs, ankles, or feet. ? · You are suddenly so tired or weak that you cannot do your usual activities. ? Watch closely for changes in your health, and be sure to contact your doctor if you develop new symptoms. Where can you learn more? Go to http://jaime-jarrell.info/. Enter B142 in the search box to learn more about \"Heart Failure: Care Instructions. \"  Current as of: September 21, 2016  Content Version: 11.4  © 5206-0267 [employer]. Care instructions adapted under license by [employer] (which disclaims liability or warranty for this information).  If you have questions about a medical condition or this instruction, always ask your healthcare professional. [address]  any warranty or liability for your use of this information.

## 2018-05-18 NOTE — ED NOTES
I have reviewed discharge instructions with the patient. The patient verbalized understanding. Patient left ED via Discharge Method: ambulatory to Home with family member. Opportunity for questions and clarification provided. Patient given 0 scripts. To continue your aftercare when you leave the hospital, you may receive an automated call from our care team to check in on how you are doing. This is a free service and part of our promise to provide the best care and service to meet your aftercare needs.  If you have questions, or wish to unsubscribe from this service please call [phone]. Thank you for Choosing our [facility] Emergency Department.

## 2018-05-18 NOTE — ED PROVIDER NOTES
HPI Comments: Patient was seen approximately 2 weeks ago in the emergency department for shortness of breath and cough and diagnosed with pneumonia. Given IV antibiotic care and discharged home on antibiotics. Since that time he complains of progressive dyspnea on exertion and increased lower extremity edema despite increased doses of Lasix. He denies any chest pain at present, though it does state occasionally it is tight. He denies any fever or chills, nausea or vomiting. He does complain of severe fatigue. The patient is supposed to wear CPAP at night but due to claustrophobic sensation does not wear it at all but doesn't wear 4 L of oxygen at night. Patient is a 74 y.o. male presenting with shortness of breath. The history is provided by the patient and a relative. Shortness of Breath   This is a new problem. The average episode lasts 3 weeks. The problem occurs continuously. The current episode started more than 1 week ago. The problem has been gradually worsening. Associated symptoms include cough and leg swelling (social the last 2 weeks despite doubling the Lasix dosing). Pertinent negatives include no fever, no headaches, no coryza, no rhinorrhea, no sore throat, no swollen glands, no ear pain, no neck pain, no sputum production, no hemoptysis, no wheezing, no PND, no orthopnea, no chest pain, no syncope, no vomiting, no abdominal pain, no rash, no leg pain and no claudication.         Past Medical History:   Diagnosis Date    Acute CHF (congestive heart failure) ([facility] [address] 4/25/2015    Acute combined systolic and diastolic congestive heart failure ([facility] [address] 4/28/2015    Chronic obstructive pulmonary disease (HCC)     De Quervain's tenosynovitis, right 4/28/2015    Dyspnea on exertion 6/28/2015    Elevated serum creatinine 4/25/2015    Elevated troponin 6/28/2015    History of coronary artery disease     MI at 27 yo    History of right knee surgery     cartilage removal    History of shingles     Malignant hypertension 4/25/2015    MI [facility]myocardial infarction[facility])        Past Surgical History:   Procedure Laterality Date    HX HEART CATHETERIZATION  6/29/2015    no intervention    HX KNEE ARTHROSCOPY Right     removal of cartilage         Family History:   Problem Relation Age of Onset    Hypertension Mother     No Known Problems Father        Social History     Social History    Marital status:      Spouse name: N/A    Number of children: N/A    Years of education: N/A     Occupational History    retired      Social History Main Topics    Smoking status: Former Smoker     Packs/day: 0.25     Years: 20.00     Types: Cigarettes     Quit date: 4/5/2015    Smokeless tobacco: Never Used    Alcohol use No    Drug use: No    Sexual activity: Not on file     Other Topics Concern     Service No    Blood Transfusions No     no issues with receiving    Caffeine Concern No    Special Diet No    Exercise No    Seat Belt Yes     Social History Narrative    Lives with his wife         ALLERGIES: Pcn [penicillins]    Review of Systems   Constitutional: Negative for chills and fever. HENT: Negative for ear pain, rhinorrhea and sore throat. Respiratory: Positive for cough and shortness of breath. Negative for hemoptysis, sputum production and wheezing. Cardiovascular: Positive for leg swelling (social the last 2 weeks despite doubling the Lasix dosing). Negative for chest pain, orthopnea, claudication, syncope and PND. Gastrointestinal: Negative for abdominal pain and vomiting. Musculoskeletal: Negative for neck pain. Skin: Negative for rash. Neurological: Negative for headaches. All other systems reviewed and are negative.       Vitals:    05/17/18 1850 05/17/18 2015 05/17/18 2026   BP: 124/75  128/89   Pulse: 74     Resp: 22     Temp: 98.9 °F (37.2 °C)     SpO2: 98% 100% 100%   Weight: 124.7 kg (275 lb)     Height: 5' 10\" (1.778 m)              Physical Exam Constitutional: He is oriented to person, place, and time. He appears well-developed and well-nourished. He appears distressed (mild). HENT:   Head: Normocephalic and atraumatic. Right Ear: Tympanic membrane and external ear normal.   Left Ear: Tympanic membrane and external ear normal.   Mouth/Throat: Oropharynx is clear and moist.   Eyes: Conjunctivae and EOM are normal. Pupils are equal, round, and reactive to light. Neck: Normal range of motion. Neck supple. No tracheal deviation present. Cardiovascular: Normal rate, regular rhythm, normal heart sounds and intact distal pulses. Exam reveals no gallop and no friction rub. No murmur heard. Pulmonary/Chest: Effort normal and breath sounds normal. No respiratory distress. He has no wheezes. He has no rales. He exhibits no tenderness. Abdominal: Soft. Bowel sounds are normal. He exhibits no distension and no mass. There is no hepatosplenomegaly. There is no tenderness. There is no rebound and no guarding. Musculoskeletal: Normal range of motion. He exhibits edema (3+ edema bilat LE). Lymphadenopathy:     He has no cervical adenopathy. Neurological: He is alert and oriented to person, place, and time. No cranial nerve deficit. Skin: Skin is warm and dry. No rash noted. He is not diaphoretic. No erythema. Psychiatric: He has a normal mood and affect. Nursing note and vitals reviewed. MDM  Number of Diagnoses or Management Options     Amount and/or Complexity of Data Reviewed  Clinical lab tests: ordered and reviewed  Tests in the radiology section of CPT®: reviewed and ordered  Obtain history from someone other than the patient: yes  Review and summarize past medical records: yes ([id number] [facility] CATH     Landry Arguello  MR#: [id number]  : 1945  ACCOUNT #: [de-identified]   DATE OF SERVICE: 2018     PROCEDURES PERFORMED:  1. Coronary angiography.   2.  Measurement of left ventricular pressure.     HISTORY:  This is a 75-year-old gentleman who has systolic heart failure. He has had a new decline in left ventricular function. He has a prior history of coronary artery disease. A repeat coronary angiography is recommended. He has chronic kidney disease, so a left ventriculogram will not be performed.     PROCEDURE:  Coronary angiography was performed via the right radial artery with a 5-Faroese multipurpose catheter. This catheter was then used to measure left ventricular end diastolic pressure. He tolerated the procedure well.     FINDINGS:  The left ventricular end diastolic pressure is 20 mmHg. There is no gradient on pullback across the aortic valve.     CORONARY ANGIOGRAPHY:  Reveals:    1. Normal left main. It divides into LAD and left circumflex. 2.  The LAD has a smooth, eccentric 50% proximal segment stenosis. The middle and distal LAD segments look normal.  3.  Left circumflex is normal.  4.  The right coronary artery has minor atherosclerotic irregularity.     IMPRESSION:  1. Elevated left ventricular end diastolic pressure. 2.  Mild coronary artery disease that is unchanged angiographically from his last heart catheterization in 2015.     RECOMMENDATIONS:  Continue medical therapy.        Kenzie Kiran MD)  Independent visualization of images, tracings, or specimens: yes    Risk of Complications, Morbidity, and/or Mortality  Presenting problems: high  Diagnostic procedures: moderate  Management options: moderate    Patient Progress  Patient progress: stable        ED Course       Procedures    The patient was observed in the ED.     Results Reviewed:      Recent Results (from the past 24 hour(s))   CBC WITH AUTOMATED DIFF    Collection Time: 05/17/18  6:54 PM   Result Value Ref Range    WBC 6.3 4.3 - 11.1 K/uL    RBC 4.81 4.23 - 5.67 M/uL    HGB 13.7 13.6 - 17.2 g/dL    HCT 41.9 41.1 - 50.3 %    MCV 87.1 79.6 - 97.8 FL    MCH 28.5 26.1 - 32.9 PG    MCHC 32.7 31.4 - 35.0 g/dL RDW 18.5 (H) 11.9 - 14.6 %    PLATELET 469 252 - 686 K/uL    MPV 10.3 (L) 10.8 - 14.1 FL    DF AUTOMATED      NEUTROPHILS 53 43 - 78 %    LYMPHOCYTES 36 13 - 44 %    MONOCYTES 9 4.0 - 12.0 %    EOSINOPHILS 2 0.5 - 7.8 %    BASOPHILS 0 0.0 - 2.0 %    IMMATURE GRANULOCYTES 0 0.0 - 5.0 %    ABS. NEUTROPHILS 3.4 1.7 - 8.2 K/UL    ABS. LYMPHOCYTES 2.2 0.5 - 4.6 K/UL    ABS. MONOCYTES 0.5 0.1 - 1.3 K/UL    ABS. EOSINOPHILS 0.1 0.0 - 0.8 K/UL    ABS. BASOPHILS 0.0 0.0 - 0.2 K/UL    ABS. IMM. GRANS. 0.0 0.0 - 0.5 K/UL   METABOLIC PANEL, COMPREHENSIVE    Collection Time: 05/17/18  6:54 PM   Result Value Ref Range    Sodium 145 136 - 145 mmol/L    Potassium 4.5 3.5 - 5.1 mmol/L    Chloride 111 (H) 98 - 107 mmol/L    CO2 26 21 - 32 mmol/L    Anion gap 8 7 - 16 mmol/L    Glucose 70 65 - 100 mg/dL    BUN 16 8 - 23 MG/DL    Creatinine 1.65 (H) 0.8 - 1.5 MG/DL    GFR est AA 53 (L) >60 ml/min/1.73m2    GFR est non-AA 43 (L) >60 ml/min/1.73m2    Calcium 8.4 8.3 - 10.4 MG/DL    Bilirubin, total 1.1 0.2 - 1.1 MG/DL    ALT (SGPT) 38 12 - 65 U/L    AST (SGOT) 49 (H) 15 - 37 U/L    Alk. phosphatase 133 50 - 136 U/L    Protein, total 7.8 6.3 - 8.2 g/dL    Albumin 2.7 (L) 3.2 - 4.6 g/dL    Globulin 5.1 (H) 2.3 - 3.5 g/dL    A-G Ratio 0.5 (L) 1.2 - 3.5     BNP    Collection Time: 05/17/18  6:54 PM   Result Value Ref Range     pg/mL   TROPONIN I    Collection Time: 05/17/18  6:54 PM   Result Value Ref Range    Troponin-I, Qt. 0.83 (HH) 0.02 - 0.05 NG/ML   EKG, 12 LEAD, INITIAL    Collection Time: 05/17/18  8:29 PM   Result Value Ref Range    Ventricular Rate 68 BPM    Atrial Rate 68 BPM    P-R Interval 202 ms    QRS Duration 116 ms    Q-T Interval 484 ms    QTC Calculation (Bezet) 514 ms    Calculated P Axis 64 degrees    Calculated R Axis -72 degrees    Calculated T Axis 74 degrees    Diagnosis       !! AGE AND GENDER SPECIFIC ECG ANALYSIS !!   Normal sinus rhythm  Left axis deviation  Possible Inferior infarct (cited on or before 04-MAY-2018)  Possible Anterior infarct , age undetermined  Prolonged QT  Abnormal ECG  When compared with ECG of 04-MAY-2018 05:10,  No significant change was found       XR CHEST PA LAT   Final Result   IMPRESSION:    1.  Stable moderate to severe cardiomegaly without evolving acute changes   otherwise seen. I discussed the results of all labs, procedures, radiographs, and treatments with the patient and available family. Treatment plan is agreed upon and the patient is ready for discharge. All voiced understanding of the discharge plan and medication instructions or changes as appropriate. Questions about treatment in the ED were answered. All were encouraged to return should symptoms worsen or new problems develop.

## 2018-05-23 ENCOUNTER — HOSPITAL ENCOUNTER (INPATIENT)
Age: 73
LOS: 6 days | Discharge: HOME HEALTH | DRG: 264 | End: 2018-05-30
Attending: EMERGENCY MEDICINE | Admitting: INTERNAL MEDICINE
Payer: MEDICARE

## 2018-05-23 ENCOUNTER — APPOINTMENT (OUTPATIENT)
Dept: GENERAL RADIOLOGY | Age: 73
DRG: 264 | End: 2018-05-23
Attending: EMERGENCY MEDICINE
Payer: MEDICARE

## 2018-05-23 DIAGNOSIS — J44.1 COPD EXACERBATION (HCC): Primary | ICD-10-CM

## 2018-05-23 DIAGNOSIS — I50.9 ACUTE ON CHRONIC CONGESTIVE HEART FAILURE, UNSPECIFIED HEART FAILURE TYPE (HCC): ICD-10-CM

## 2018-05-23 DIAGNOSIS — I50.23 ACUTE ON CHRONIC SYSTOLIC HEART FAILURE (HCC): ICD-10-CM

## 2018-05-23 DIAGNOSIS — G47.33 OSA (OBSTRUCTIVE SLEEP APNEA): ICD-10-CM

## 2018-05-23 DIAGNOSIS — R93.1 ABNORMAL ECHOCARDIOGRAM: ICD-10-CM

## 2018-05-23 DIAGNOSIS — I25.118 ATHEROSCLEROSIS OF NATIVE CORONARY ARTERY OF NATIVE HEART WITH STABLE ANGINA PECTORIS (HCC): ICD-10-CM

## 2018-05-23 LAB
ALBUMIN SERPL-MCNC: 2.4 G/DL (ref 3.2–4.6)
ALBUMIN/GLOB SERPL: 0.5 {RATIO} (ref 1.2–3.5)
ALP SERPL-CCNC: 109 U/L (ref 50–136)
ALT SERPL-CCNC: 28 U/L (ref 12–65)
ANION GAP SERPL CALC-SCNC: 8 MMOL/L (ref 7–16)
AST SERPL-CCNC: 36 U/L (ref 15–37)
BASOPHILS # BLD: 0 K/UL (ref 0–0.2)
BASOPHILS NFR BLD: 0 % (ref 0–2)
BILIRUB SERPL-MCNC: 0.8 MG/DL (ref 0.2–1.1)
BUN SERPL-MCNC: 19 MG/DL (ref 8–23)
CALCIUM SERPL-MCNC: 7.6 MG/DL (ref 8.3–10.4)
CHLORIDE SERPL-SCNC: 112 MMOL/L (ref 98–107)
CO2 SERPL-SCNC: 24 MMOL/L (ref 21–32)
CREAT SERPL-MCNC: 1.4 MG/DL (ref 0.8–1.5)
DIFFERENTIAL METHOD BLD: ABNORMAL
EOSINOPHIL # BLD: 0.1 K/UL (ref 0–0.8)
EOSINOPHIL NFR BLD: 2 % (ref 0.5–7.8)
ERYTHROCYTE [DISTWIDTH] IN BLOOD BY AUTOMATED COUNT: 18.2 % (ref 11.9–14.6)
GLOBULIN SER CALC-MCNC: 4.4 G/DL (ref 2.3–3.5)
GLUCOSE SERPL-MCNC: 78 MG/DL (ref 65–100)
HCT VFR BLD AUTO: 38.7 % (ref 41.1–50.3)
HGB BLD-MCNC: 12.6 G/DL (ref 13.6–17.2)
IMM GRANULOCYTES # BLD: 0 K/UL (ref 0–0.5)
IMM GRANULOCYTES NFR BLD AUTO: 0 % (ref 0–5)
LYMPHOCYTES # BLD: 2.6 K/UL (ref 0.5–4.6)
LYMPHOCYTES NFR BLD: 38 % (ref 13–44)
MCH RBC QN AUTO: 27.9 PG (ref 26.1–32.9)
MCHC RBC AUTO-ENTMCNC: 32.6 G/DL (ref 31.4–35)
MCV RBC AUTO: 85.6 FL (ref 79.6–97.8)
MONOCYTES # BLD: 0.6 K/UL (ref 0.1–1.3)
MONOCYTES NFR BLD: 8 % (ref 4–12)
NEUTS SEG # BLD: 3.5 K/UL (ref 1.7–8.2)
NEUTS SEG NFR BLD: 52 % (ref 43–78)
PLATELET # BLD AUTO: 176 K/UL (ref 150–450)
PMV BLD AUTO: 9.8 FL (ref 10.8–14.1)
POTASSIUM SERPL-SCNC: 3.7 MMOL/L (ref 3.5–5.1)
PROT SERPL-MCNC: 6.8 G/DL (ref 6.3–8.2)
RBC # BLD AUTO: 4.52 M/UL (ref 4.23–5.67)
SODIUM SERPL-SCNC: 144 MMOL/L (ref 136–145)
TROPONIN I SERPL-MCNC: 0.69 NG/ML (ref 0.02–0.05)
WBC # BLD AUTO: 6.8 K/UL (ref 4.3–11.1)

## 2018-05-23 PROCEDURE — 99285 EMERGENCY DEPT VISIT HI MDM: CPT | Performed by: EMERGENCY MEDICINE

## 2018-05-23 PROCEDURE — 74011000250 HC RX REV CODE- 250: Performed by: EMERGENCY MEDICINE

## 2018-05-23 PROCEDURE — 84484 ASSAY OF TROPONIN QUANT: CPT | Performed by: EMERGENCY MEDICINE

## 2018-05-23 PROCEDURE — 94640 AIRWAY INHALATION TREATMENT: CPT

## 2018-05-23 PROCEDURE — 93005 ELECTROCARDIOGRAM TRACING: CPT | Performed by: EMERGENCY MEDICINE

## 2018-05-23 PROCEDURE — 71046 X-RAY EXAM CHEST 2 VIEWS: CPT

## 2018-05-23 PROCEDURE — 83880 ASSAY OF NATRIURETIC PEPTIDE: CPT | Performed by: EMERGENCY MEDICINE

## 2018-05-23 PROCEDURE — 96365 THER/PROPH/DIAG IV INF INIT: CPT | Performed by: EMERGENCY MEDICINE

## 2018-05-23 PROCEDURE — 81003 URINALYSIS AUTO W/O SCOPE: CPT | Performed by: EMERGENCY MEDICINE

## 2018-05-23 PROCEDURE — 77030027138 HC INCENT SPIROMETER -A

## 2018-05-23 PROCEDURE — 96375 TX/PRO/DX INJ NEW DRUG ADDON: CPT | Performed by: EMERGENCY MEDICINE

## 2018-05-23 PROCEDURE — 74011250636 HC RX REV CODE- 250/636: Performed by: EMERGENCY MEDICINE

## 2018-05-23 PROCEDURE — 85025 COMPLETE CBC W/AUTO DIFF WBC: CPT | Performed by: EMERGENCY MEDICINE

## 2018-05-23 PROCEDURE — 96367 TX/PROPH/DG ADDL SEQ IV INF: CPT | Performed by: EMERGENCY MEDICINE

## 2018-05-23 PROCEDURE — 80053 COMPREHEN METABOLIC PANEL: CPT | Performed by: EMERGENCY MEDICINE

## 2018-05-23 RX ORDER — IPRATROPIUM BROMIDE AND ALBUTEROL SULFATE 2.5; .5 MG/3ML; MG/3ML
3 SOLUTION RESPIRATORY (INHALATION)
Status: COMPLETED | OUTPATIENT
Start: 2018-05-23 | End: 2018-05-23

## 2018-05-23 RX ORDER — FUROSEMIDE 10 MG/ML
60 INJECTION INTRAMUSCULAR; INTRAVENOUS
Status: COMPLETED | OUTPATIENT
Start: 2018-05-23 | End: 2018-05-23

## 2018-05-23 RX ADMIN — IPRATROPIUM BROMIDE AND ALBUTEROL SULFATE 3 ML: .5; 3 SOLUTION RESPIRATORY (INHALATION) at 23:20

## 2018-05-23 RX ADMIN — FUROSEMIDE 60 MG: 10 INJECTION, SOLUTION INTRAMUSCULAR; INTRAVENOUS at 23:12

## 2018-05-23 NOTE — IP AVS SNAPSHOT
Ragini Goff 
 
 
 [address] 
[phone] Patient: Nohelia Hughes. MRN: [id number] CCK:5/40/1707 About your hospitalization You were admitted on:  May 24, 2018 You last received care in the:  [facility] 2 SURGICAL You were discharged on:  May 30, 2018 Why you were hospitalized Your primary diagnosis was:  Acute On Chronic Systolic Heart Failure (Hcc) Your diagnoses also included:  Acute Respiratory Failure With Hypoxemia (Hcc), Ckd (Chronic Kidney Disease) Stage 3, Gfr 30-59 Ml/Min, Copd, Moderate (Hcc), Coronary Atherosclerosis Of Native Coronary Vessel, Chf (Congestive Heart Failure) (Hcc) Follow-up Information Follow up With Details Comments Contact Info Oskar Pagan MD On 6/25/2018 CKD office at 11:45 am  [address] [place] [facility] [address] 
[phone] Tahira Wong MD   [address] [place] [address] 
[address] [facility] [address] 
[phone] Discharge Orders None A check megan indicates which time of day the medication should be taken. My Medications START taking these medications Instructions Each Dose to Equal  
 Morning Noon Evening Bedtime  
 spironolactone 25 mg tablet Commonly known as:  ALDACTONE Start taking on:  5/31/2018 Take 1 Tab by mouth daily. 25 mg CHANGE how you take these medications Instructions Each Dose to Equal  
 Morning Noon Evening Bedtime  
 furosemide 40 mg tablet Commonly known as:  LASIX What changed:  how much to take Take 2 Tabs by mouth daily. 80 mg  
    
  
   
   
   
  
 polyethylene glycol 17 gram packet Commonly known as:  Orysia Aba What changed:   
- when to take this 
- reasons to take this Take 1 Packet by mouth daily. 17 g CONTINUE taking these medications  Instructions Each Dose to Equal  
 Morning Noon Evening Bedtime  
 albuterol 90 mcg/actuation inhaler Commonly known as:  VENTOLIN HFA Take 2 Puffs by inhalation four (4) times daily. 2 Puff  
    
  
   
  
   
  
   
  
  
 albuterol-ipratropium 2.5 mg-0.5 mg/3 ml Nebu Commonly known as:  DUO-NEB  
   
 3 mL by Nebulization route four (4) times daily. Diaignosis--J44.9  
 3 mL  
    
  
   
  
   
  
   
  
  
 allopurinol 100 mg tablet Commonly known as:  Beth Chapito Take 1 Tab by mouth daily. 100 mg  
    
  
   
   
   
  
 aspirin delayed-release 81 mg tablet Take 1 Tab by mouth daily. 81 mg  
    
  
   
   
   
  
 atorvastatin 20 mg tablet Commonly known as:  LIPITOR Take 1 Tab by mouth nightly. 20 mg  
    
   
   
   
  
  
 carvedilol 25 mg tablet Commonly known as:  Terence Lacrosse Take 12.5 mg by mouth two (2) times daily (with meals). 12.5 mg  
    
  
   
   
  
   
  
 fluticasone 50 mcg/actuation nasal spray Commonly known as:  Ruthe Para 2 Sprays by Both Nostrils route daily. 2 Spray  
    
  
   
   
   
  
 guaiFENesin 1,200 mg Ta12 ER tablet Commonly known as:  P[employer] Pete Take 1 Tab by mouth every twelve (12) hours. 1200 mg  
    
  
   
   
   
  
  
 lisinopril 5 mg tablet Commonly known as:  Velton Coronel Take 1 Tab by mouth daily. 5 mg  
    
  
   
   
   
  
 melatonin 3 mg tablet Take 3 mg by mouth nightly. 3 mg  
    
   
   
   
  
  
 omeprazole 20 mg capsule Commonly known as:  PRILOSEC Take 1 Cap by mouth daily. 20 mg Where to Get Your Medications Information on where to get these meds will be given to you by the nurse or doctor. ! Ask your nurse or doctor about these medications  
  furosemide 40 mg tablet  
 spironolactone 25 mg tablet Discharge Instructions DISCHARGE SUMMARY from Nurse PATIENT INSTRUCTIONS: 
 
 After general anesthesia or intravenous sedation, for 24 hours or while taking prescription Narcotics: · Limit your activities · Do not drive and operate hazardous machinery · Do not make important personal or business decisions · Do  not drink alcoholic beverages · If you have not urinated within 8 hours after discharge, please contact your surgeon on call. Report the following to your surgeon: 
· Excessive pain, swelling, redness or odor of or around the surgical area · Temperature over 100.5 · Nausea and vomiting lasting longer than 4 hours or if unable to take medications · Any signs of decreased circulation or nerve impairment to extremity: change in color, persistent  numbness, tingling, coldness or increase pain · Any questions What to do at Home: 
Recommended activity: Activity as tolerated, per MD instructions If you experience any of the following symptoms fever > 100.5, nausea, vomiting, pain, chest pain and/or shortness of breath please follow up with MD. 
 
*  Please give a list of your current medications to your Primary Care Provider. *  Please update this list whenever your medications are discontinued, doses are 
    changed, or new medications (including over-the-counter products) are added. *  Please carry medication information at all times in case of emergency situations. These are general instructions for a healthy lifestyle: No smoking/ No tobacco products/ Avoid exposure to second hand smoke Surgeon General's Warning:  Quitting smoking now greatly reduces serious risk to your health. Obesity, smoking, and sedentary lifestyle greatly increases your risk for illness A healthy diet, regular physical exercise & weight monitoring are important for maintaining a healthy lifestyle You may be retaining fluid if you have a history of heart failure or if you experience any of the following symptoms:  Weight gain of 3 pounds or more overnight or 5 pounds in a week, increased swelling in our hands or feet or shortness of breath while lying flat in bed. Please call your doctor as soon as you notice any of these symptoms; do not wait until your next office visit. Recognize signs and symptoms of STROKE: 
 
F-face looks uneven A-arms unable to move or move unevenly S-speech slurred or non-existent T-time-call 911 as soon as signs and symptoms begin-DO NOT go Back to bed or wait to see if you get better-TIME IS BRAIN. Warning Signs of HEART ATTACK Call 911 if you have these symptoms: 
? Chest discomfort. Most heart attacks involve discomfort in the center of the chest that lasts more than a few minutes, or that goes away and comes back. It can feel like uncomfortable pressure, squeezing, fullness, or pain. ? Discomfort in other areas of the upper body. Symptoms can include pain or discomfort in one or both arms, the back, neck, jaw, or stomach. ? Shortness of breath with or without chest discomfort. ? Other signs may include breaking out in a cold sweat, nausea, or lightheadedness. Don't wait more than five minutes to call [address]! Fast action can save your life. Calling 911 is almost always the fastest way to get lifesaving treatment. Emergency Medical Services staff can begin treatment when they arrive  up to an hour sooner than if someone gets to the hospital by car. The discharge information has been reviewed with the patient. The patient verbalized understanding. Discharge medications reviewed with the patient and appropriate educational materials and side effects teaching were provided. ___________________________________________________________________________________________________________________________________ Heart Failure: Care Instructions Your Care Instructions Heart failure occurs when your heart does not pump as much blood as the body needs. Failure does not mean that the heart has stopped pumping but rather that it is not pumping as well as it should. Over time, this causes fluid buildup in your lungs and other parts of your body. Fluid buildup can cause shortness of breath, fatigue, swollen ankles, and other problems. By taking medicines regularly, reducing sodium (salt) in your diet, checking your weight every day, and making lifestyle changes, you can feel better and live longer. Follow-up care is a key part of your treatment and safety. Be sure to make and go to all appointments, and call your doctor if you are having problems. It's also a good idea to know your test results and keep a list of the medicines you take. How can you care for yourself at home? Medicines ? · Be safe with medicines. Take your medicines exactly as prescribed. Call your doctor if you think you are having a problem with your medicine. ? · Do not take any vitamins, over-the-counter medicine, or herbal products without talking to your doctor first. Brendon Archuletaer not take ibuprofen (Advil or Motrin) and naproxen (Aleve) without talking to your doctor first. They could make your heart failure worse. ? · You may be taking some of the following medicine. ¨ Beta-blockers can slow heart rate, decrease blood pressure, and improve your condition. Taking a beta-blocker may lower your chance of needing to be hospitalized. ¨ Angiotensin-converting enzyme inhibitors (ACEIs) reduce the heart's workload, lower blood pressure, and reduce swelling. Taking an ACEI may lower your chance of needing to be hospitalized again. ¨ Angiotensin II receptor blockers (ARBs) work like ACEIs. Your doctor may prescribe them instead of ACEIs. ¨ Diuretics, also called water pills, reduce swelling. ¨ Potassium supplements replace this important mineral, which is sometimes lost with diuretics. ¨ Aspirin and other blood thinners prevent blood clots, which can cause a stroke or heart attack. ?You will get more details on the specific medicines your doctor prescribes. Diet ? · Your doctor may suggest that you limit sodium to 2,000 milligrams (mg) a day or less. That is less than 1 teaspoon of salt a day, including all the salt you eat in cooking or in packaged foods. People get most of their sodium from processed foods. Fast food and restaurant meals also tend to be very high in sodium. ? · Ask your doctor how much liquid you can drink each day. You may have to limit liquids. ?Weight ? · Weigh yourself without clothing at the same time each day. Record your weight. Call your doctor if you have a sudden weight gain, such as more than 2 to 3 pounds in a day or 5 pounds in a week. (Your doctor may suggest a different range of weight gain.) A sudden weight gain may mean that your heart failure is getting worse. ? Activity level ? · Start light exercise (if your doctor says it is okay). Even if you can only do a small amount, exercise will help you get stronger, have more energy, and manage your weight and your stress. Walking is an easy way to get exercise. Start out by walking a little more than you did before. Bit by bit, increase the amount you walk. ? · When you exercise, watch for signs that your heart is working too hard. You are pushing yourself too hard if you cannot talk while you are exercising. If you become short of breath or dizzy or have chest pain, stop, sit down, and rest.  
? · If you feel \"wiped out\" the day after you exercise, walk slower or for a shorter distance until you can work up to a better pace. ? · Get enough rest at night. Sleeping with 1 or 2 pillows under your upper body and head may help you breathe easier. ? Lifestyle changes ? · Do not smoke. Smoking can make a heart condition worse. If you need help quitting, talk to your doctor about stop-smoking programs and medicines. These can increase your chances of quitting for good.  Quitting smoking may be the most important step you can take to protect your heart. ? · Limit alcohol to 2 drinks a day for men and 1 drink a day for women. Too much alcohol can cause health problems. ? · Avoid getting sick from colds and the flu. Get a pneumococcal vaccine shot. If you have had one before, ask your doctor whether you need another dose. Get a flu shot each year. If you must be around people with colds or the flu, wash your hands often. When should you call for help? Call 911 if you have symptoms of sudden heart failure such as: 
? · You have severe trouble breathing. ? · You cough up pink, foamy mucus. ? · You have a new irregular or rapid heartbeat. ?Call your doctor now or seek immediate medical care if: 
? · You have new or increased shortness of breath. ? · You are dizzy or lightheaded, or you feel like you may faint. ? · You have sudden weight gain, such as more than 2 to 3 pounds in a day or 5 pounds in a week. (Your doctor may suggest a different range of weight gain.) ? · You have increased swelling in your legs, ankles, or feet. ? · You are suddenly so tired or weak that you cannot do your usual activities. ? Watch closely for changes in your health, and be sure to contact your doctor if you develop new symptoms. Where can you learn more? Go to http://jaime-jarrell.info/. Enter Z539 in the search box to learn more about \"Heart Failure: Care Instructions. \" Current as of: September 21, 2016 Content Version: 11.4 © 2565-5812 [employer]. Care instructions adapted under license by [employer] (which disclaims liability or warranty for this information). If you have questions about a medical condition or this instruction, always ask your healthcare professional. [place] [address] any warranty or liability for your use of this information. FAD ? IO Announcement We are excited to announce that we are making your provider's discharge notes available to you in [facility]. You will see these notes when they are completed and signed by the physician that discharged you from your recent hospital stay. If you have any questions or concerns about any information you see in [employer] [facility], please call the Health Information Department where you were seen or reach out to your Primary Care Provider for more information about your plan of care. Introducing \[facility] [place] [facility]\"" & HEALTH SERVICES! [employer] introduces [employer] [facility] patient portal. Now you can access parts of your medical record, email your doctor's office, and request medication refills online. 1. In your internet browser, go to [email or web address] 2. Click on the First Time User? Click Here link in the Sign In box. You will see the New Member Sign Up page. 3. Enter your [facility] Access Code exactly as it appears below. You will not need to use this code after youve completed the sign-up process. If you do not sign up before the expiration date, you must request a new code. · [employer] [facility] Access Code: [email or web address] Expires: 6/27/2018 10:51 AM 
 
4. Enter the last four digits of your Social Security Number (xxxx) and Date of Birth (mm/dd/yyyy) as indicated and click Submit. You will be taken to the next sign-up page. 5. Create a [facility] ID. This will be your [facility] login ID and cannot be changed, so think of one that is secure and easy to remember. 6. Create a [facility] password. You can change your password at any time. 7. Enter your Password Reset Question and Answer. This can be used at a later time if you forget your password. 8. Enter your e-mail address. You will receive e-mail notification when new information is available in [address] 9. Click Sign Up. You can now view and download portions of your medical record.  
10. Click the Download Summary menu link to download a portable copy of your medical information. If you have questions, please visit the Frequently Asked Questions section of the MyChart website. Remember, [employer] is NOT to be used for urgent needs. For medical emergencies, dial 911. Now available from your iPhone and Android! Introducing Shaji Aponte As a [address] [place] [address] patient, I wanted to make you aware of our electronic visit tool called Shaji Aponte. [facility] [address] 24/7 allows you to connect within minutes with a medical provider 24 hours a day, seven days a week via a mobile device or tablet or logging into a secure website from your computer. You can access Shaji Aponte from anywhere in the [place]. A virtual visit might be right for you when you have a simple condition and feel like you just dont want to get out of bed, or cant get away from work for an appointment, when your regular [address] [place] [address] provider is not available (evenings, weekends or holidays), or when youre out of town and need minor care. Electronic visits cost only $49 and if the [address] [place] [address] 24/7 provider determines a prescription is needed to treat your condition, one can be electronically transmitted to a nearby pharmacy*. Please take a moment to enroll today if you have not already done so. The enrollment process is free and takes just a few minutes. To enroll, please download the [employer] ePrint 24/7 cisco to your tablet or phone, or visit [email or web address] to enroll on your computer. And, as an [address] [place] [address] patient with a [employer] account, the results of your visits will be scanned into your electronic medical record and your primary care provider will be able to view the scanned results. We urge you to continue to see your regular [address] [place] [address] provider for your ongoing medical care.   And while your primary care provider may not be the one available when you seek a Shaji Aponte virtual visit, the peace of mind you get from getting a real diagnosis real time can be priceless. For more information on Shaji Aponte, view our Frequently Asked Questions (FAQs) at [email or web address]. Sincerely, 
 
Colleen Pisano MD 
Chief Medical Officer [address] *:  certain medications cannot be prescribed via Shaji Scottiona Unresulted Labs-Please follow up with your PCP about these lab tests Order Current Status PROTEIN ELEC WITH MISA, SERUM Preliminary result Providers Seen During Your Hospitalization Provider Specialty Primary office phone Starr Roth MD Emergency Medicine [phone] Marlene Cardenas MD Internal Medicine [phone] Immunizations Administered for This Admission Name Date  
 TB Skin Test (PPD) Intradermal  Deferred (), 5/27/2018 Your Primary Care Physician (PCP) Primary Care Physician Office Phone Office Fax [place] [facility] [phone] You are allergic to the following Allergen Reactions Pcn (Penicillins) Other (comments) \"makes my heart stop\" Recent Documentation Height Weight BMI Smoking Status 1.753 m 102.5 kg 33.37 kg/m2 Former Smoker Emergency Contacts Name Discharge Info Relation Home Work Mobile Fracisco Porter  Spouse [3] [phone] Ladi Meyer  Daughter [21] [phone] Patient Belongings The following personal items are in your possession at time of discharge: 
  Dental Appliances: None  Visual Aid: Glasses, At home      Home Medications: None   Jewelry: None  Clothing: Pants    Other Valuables: None  Personal Items Sent to Safe: none Please provide this summary of care documentation to your next provider. Signatures-by signing, you are acknowledging that this After Visit Summary has been reviewed with you and you have received a copy.   
  
 
  
    
    
 Patient Signature: ____________________________________________________________ Date:  ____________________________________________________________  
  
Sissy Lapine Provider Signature:  ____________________________________________________________ Date:  ____________________________________________________________

## 2018-05-24 PROBLEM — I50.23 ACUTE ON CHRONIC SYSTOLIC HEART FAILURE (HCC): Status: ACTIVE | Noted: 2018-05-24

## 2018-05-24 PROBLEM — J96.01 ACUTE RESPIRATORY FAILURE WITH HYPOXEMIA (HCC): Status: ACTIVE | Noted: 2018-05-24

## 2018-05-24 LAB
ATRIAL RATE: 68 BPM
BASOPHILS # BLD: 0 K/UL (ref 0–0.2)
BASOPHILS NFR BLD: 0 % (ref 0–2)
BNP SERPL-MCNC: 894 PG/ML
CALCULATED P AXIS, ECG09: 69 DEGREES
CALCULATED R AXIS, ECG10: -79 DEGREES
CALCULATED T AXIS, ECG11: 72 DEGREES
DIAGNOSIS, 93000: NORMAL
DIFFERENTIAL METHOD BLD: ABNORMAL
EOSINOPHIL # BLD: 0 K/UL (ref 0–0.8)
EOSINOPHIL NFR BLD: 0 % (ref 0.5–7.8)
ERYTHROCYTE [DISTWIDTH] IN BLOOD BY AUTOMATED COUNT: 18.4 % (ref 11.9–14.6)
HCT VFR BLD AUTO: 44 % (ref 41.1–50.3)
HGB BLD-MCNC: 14 G/DL (ref 13.6–17.2)
IMM GRANULOCYTES # BLD: 0 K/UL (ref 0–0.5)
IMM GRANULOCYTES NFR BLD AUTO: 0 % (ref 0–5)
LYMPHOCYTES # BLD: 1.6 K/UL (ref 0.5–4.6)
LYMPHOCYTES NFR BLD: 28 % (ref 13–44)
MCH RBC QN AUTO: 27.3 PG (ref 26.1–32.9)
MCHC RBC AUTO-ENTMCNC: 31.8 G/DL (ref 31.4–35)
MCV RBC AUTO: 85.9 FL (ref 79.6–97.8)
MONOCYTES # BLD: 0.2 K/UL (ref 0.1–1.3)
MONOCYTES NFR BLD: 4 % (ref 4–12)
NEUTS SEG # BLD: 3.8 K/UL (ref 1.7–8.2)
NEUTS SEG NFR BLD: 68 % (ref 43–78)
P-R INTERVAL, ECG05: 192 MS
PLATELET # BLD AUTO: 190 K/UL (ref 150–450)
PMV BLD AUTO: 10.2 FL (ref 10.8–14.1)
Q-T INTERVAL, ECG07: 480 MS
QRS DURATION, ECG06: 112 MS
QTC CALCULATION (BEZET), ECG08: 510 MS
RBC # BLD AUTO: 5.12 M/UL (ref 4.23–5.67)
TROPONIN I SERPL-MCNC: 0.46 NG/ML (ref 0.02–0.05)
TROPONIN I SERPL-MCNC: 0.73 NG/ML (ref 0.02–0.05)
VENTRICULAR RATE, ECG03: 68 BPM
WBC # BLD AUTO: 5.7 K/UL (ref 4.3–11.1)

## 2018-05-24 PROCEDURE — 74011250636 HC RX REV CODE- 250/636: Performed by: EMERGENCY MEDICINE

## 2018-05-24 PROCEDURE — 84484 ASSAY OF TROPONIN QUANT: CPT | Performed by: EMERGENCY MEDICINE

## 2018-05-24 PROCEDURE — G8987 SELF CARE CURRENT STATUS: HCPCS

## 2018-05-24 PROCEDURE — C8929 TTE W OR WO FOL WCON,DOPPLER: HCPCS

## 2018-05-24 PROCEDURE — 97165 OT EVAL LOW COMPLEX 30 MIN: CPT

## 2018-05-24 PROCEDURE — G8988 SELF CARE GOAL STATUS: HCPCS

## 2018-05-24 PROCEDURE — 36415 COLL VENOUS BLD VENIPUNCTURE: CPT | Performed by: INTERNAL MEDICINE

## 2018-05-24 PROCEDURE — G8979 MOBILITY GOAL STATUS: HCPCS

## 2018-05-24 PROCEDURE — 77030021668 HC NEB PREFIL KT VYRM -A

## 2018-05-24 PROCEDURE — 94640 AIRWAY INHALATION TREATMENT: CPT

## 2018-05-24 PROCEDURE — G8978 MOBILITY CURRENT STATUS: HCPCS

## 2018-05-24 PROCEDURE — 74011250637 HC RX REV CODE- 250/637: Performed by: INTERNAL MEDICINE

## 2018-05-24 PROCEDURE — 74011000250 HC RX REV CODE- 250: Performed by: INTERNAL MEDICINE

## 2018-05-24 PROCEDURE — 77030012341 HC CHMB SPCR OPTC MDI VYRM -A

## 2018-05-24 PROCEDURE — 74011250636 HC RX REV CODE- 250/636: Performed by: INTERNAL MEDICINE

## 2018-05-24 PROCEDURE — 97161 PT EVAL LOW COMPLEX 20 MIN: CPT

## 2018-05-24 PROCEDURE — 74011250637 HC RX REV CODE- 250/637: Performed by: PHYSICIAN ASSISTANT

## 2018-05-24 PROCEDURE — 65660000000 HC RM CCU STEPDOWN

## 2018-05-24 PROCEDURE — 85025 COMPLETE CBC W/AUTO DIFF WBC: CPT | Performed by: INTERNAL MEDICINE

## 2018-05-24 PROCEDURE — 94760 N-INVAS EAR/PLS OXIMETRY 1: CPT

## 2018-05-24 PROCEDURE — 77010033678 HC OXYGEN DAILY

## 2018-05-24 PROCEDURE — 93308 TTE F-UP OR LMTD: CPT

## 2018-05-24 RX ORDER — GUAIFENESIN 600 MG/1
1200 TABLET, EXTENDED RELEASE ORAL EVERY 12 HOURS
Status: DISCONTINUED | OUTPATIENT
Start: 2018-05-24 | End: 2018-05-24 | Stop reason: SDUPTHER

## 2018-05-24 RX ORDER — LANOLIN ALCOHOL/MO/W.PET/CERES
3 CREAM (GRAM) TOPICAL
Status: DISCONTINUED | OUTPATIENT
Start: 2018-05-24 | End: 2018-05-30 | Stop reason: HOSPADM

## 2018-05-24 RX ORDER — ASPIRIN 81 MG/1
81 TABLET ORAL DAILY
Status: DISCONTINUED | OUTPATIENT
Start: 2018-05-24 | End: 2018-05-30 | Stop reason: HOSPADM

## 2018-05-24 RX ORDER — ALBUTEROL SULFATE 90 UG/1
2 AEROSOL, METERED RESPIRATORY (INHALATION) 4 TIMES DAILY
Status: DISCONTINUED | OUTPATIENT
Start: 2018-05-24 | End: 2018-05-24 | Stop reason: SDUPTHER

## 2018-05-24 RX ORDER — ATORVASTATIN CALCIUM 10 MG/1
20 TABLET, FILM COATED ORAL
Status: DISCONTINUED | OUTPATIENT
Start: 2018-05-24 | End: 2018-05-30 | Stop reason: HOSPADM

## 2018-05-24 RX ORDER — PANTOPRAZOLE SODIUM 40 MG/1
40 TABLET, DELAYED RELEASE ORAL
Status: DISCONTINUED | OUTPATIENT
Start: 2018-05-24 | End: 2018-05-30 | Stop reason: HOSPADM

## 2018-05-24 RX ORDER — POLYETHYLENE GLYCOL 3350 17 G/17G
17 POWDER, FOR SOLUTION ORAL DAILY
Status: DISCONTINUED | OUTPATIENT
Start: 2018-05-24 | End: 2018-05-30 | Stop reason: HOSPADM

## 2018-05-24 RX ORDER — LISINOPRIL 5 MG/1
5 TABLET ORAL DAILY
Status: DISCONTINUED | OUTPATIENT
Start: 2018-05-24 | End: 2018-05-27

## 2018-05-24 RX ORDER — CARVEDILOL 25 MG/1
25 TABLET ORAL 2 TIMES DAILY WITH MEALS
Status: DISCONTINUED | OUTPATIENT
Start: 2018-05-24 | End: 2018-05-27

## 2018-05-24 RX ORDER — ALLOPURINOL 100 MG/1
100 TABLET ORAL DAILY
Status: DISCONTINUED | OUTPATIENT
Start: 2018-05-24 | End: 2018-05-30 | Stop reason: HOSPADM

## 2018-05-24 RX ORDER — FLUTICASONE PROPIONATE 50 MCG
2 SPRAY, SUSPENSION (ML) NASAL DAILY
Status: DISCONTINUED | OUTPATIENT
Start: 2018-05-24 | End: 2018-05-30 | Stop reason: HOSPADM

## 2018-05-24 RX ORDER — FUROSEMIDE 10 MG/ML
60 INJECTION INTRAMUSCULAR; INTRAVENOUS DAILY
Status: DISCONTINUED | OUTPATIENT
Start: 2018-05-24 | End: 2018-05-28

## 2018-05-24 RX ORDER — GUAIFENESIN 600 MG/1
1200 TABLET, EXTENDED RELEASE ORAL EVERY 12 HOURS
Status: DISCONTINUED | OUTPATIENT
Start: 2018-05-24 | End: 2018-05-30 | Stop reason: HOSPADM

## 2018-05-24 RX ORDER — HEPARIN SODIUM 5000 [USP'U]/ML
5000 INJECTION, SOLUTION INTRAVENOUS; SUBCUTANEOUS EVERY 8 HOURS
Status: DISCONTINUED | OUTPATIENT
Start: 2018-05-24 | End: 2018-05-30 | Stop reason: HOSPADM

## 2018-05-24 RX ORDER — ALBUTEROL SULFATE 90 UG/1
2 AEROSOL, METERED RESPIRATORY (INHALATION)
Status: DISCONTINUED | OUTPATIENT
Start: 2018-05-24 | End: 2018-05-30 | Stop reason: HOSPADM

## 2018-05-24 RX ORDER — CARVEDILOL 12.5 MG/1
12.5 TABLET ORAL 2 TIMES DAILY WITH MEALS
Status: DISCONTINUED | OUTPATIENT
Start: 2018-05-24 | End: 2018-05-24

## 2018-05-24 RX ADMIN — CARVEDILOL 25 MG: 25 TABLET, FILM COATED ORAL at 16:13

## 2018-05-24 RX ADMIN — ALBUTEROL SULFATE 2 PUFF: 90 AEROSOL, METERED RESPIRATORY (INHALATION) at 19:18

## 2018-05-24 RX ADMIN — ALLOPURINOL 100 MG: 100 TABLET ORAL at 08:48

## 2018-05-24 RX ADMIN — ATORVASTATIN CALCIUM 20 MG: 10 TABLET, FILM COATED ORAL at 08:53

## 2018-05-24 RX ADMIN — HEPARIN SODIUM 5000 UNITS: 5000 INJECTION, SOLUTION INTRAVENOUS; SUBCUTANEOUS at 06:04

## 2018-05-24 RX ADMIN — GUAIFENESIN 1200 MG: 600 TABLET, EXTENDED RELEASE ORAL at 21:41

## 2018-05-24 RX ADMIN — POLYETHYLENE GLYCOL (3350) 17 G: 17 POWDER, FOR SOLUTION ORAL at 08:49

## 2018-05-24 RX ADMIN — HEPARIN SODIUM 5000 UNITS: 5000 INJECTION, SOLUTION INTRAVENOUS; SUBCUTANEOUS at 21:41

## 2018-05-24 RX ADMIN — CARVEDILOL 12.5 MG: 12.5 TABLET, FILM COATED ORAL at 08:48

## 2018-05-24 RX ADMIN — METHYLPREDNISOLONE SODIUM SUCCINATE 125 MG: 125 INJECTION, POWDER, FOR SOLUTION INTRAMUSCULAR; INTRAVENOUS at 00:50

## 2018-05-24 RX ADMIN — ASPIRIN 81 MG: 81 TABLET, COATED ORAL at 08:48

## 2018-05-24 RX ADMIN — HEPARIN SODIUM 5000 UNITS: 5000 INJECTION, SOLUTION INTRAVENOUS; SUBCUTANEOUS at 13:49

## 2018-05-24 RX ADMIN — PERFLUTREN 1 ML: 6.52 INJECTION, SUSPENSION INTRAVENOUS at 09:00

## 2018-05-24 RX ADMIN — ALBUTEROL SULFATE 2 PUFF: 90 AEROSOL, METERED RESPIRATORY (INHALATION) at 07:45

## 2018-05-24 RX ADMIN — GUAIFENESIN 1200 MG: 600 TABLET, EXTENDED RELEASE ORAL at 08:48

## 2018-05-24 RX ADMIN — LISINOPRIL 5 MG: 5 TABLET ORAL at 08:48

## 2018-05-24 RX ADMIN — PANTOPRAZOLE SODIUM 40 MG: 40 TABLET, DELAYED RELEASE ORAL at 08:48

## 2018-05-24 RX ADMIN — FLUTICASONE PROPIONATE 2 SPRAY: 50 SPRAY, METERED NASAL at 08:52

## 2018-05-24 RX ADMIN — FUROSEMIDE 60 MG: 10 INJECTION, SOLUTION INTRAMUSCULAR; INTRAVENOUS at 08:48

## 2018-05-24 NOTE — ED TRIAGE NOTES
EMS reports that the patient has been getting increasingly short of breath. Patient has home O2 PRN and has been using it more often. EMS reports that the patient has had his lasix increased by PCP but has not been helping.

## 2018-05-24 NOTE — PROGRESS NOTES
Problem: Mobility Impaired (Adult and Pediatric)  Goal: *Acute Goals and Plan of Care (Insert Text)  LTG:  (1.)Mr. Clarisa Nicole will move from supine to sit and sit to supine , scoot up and down and roll side to side with INDEPENDENT within 7 treatment day(s) from flat surface without handrail. (2.)Mr. Clarisa Nicole will transfer from bed to chair and chair to bed with INDEPENDENT using the least restrictive device within 7 treatment day(s). (3.)Mr. Clarisa Nicole will ambulate with MODIFIED INDEPENDENCE for 250+ feet with the least restrictive device within 7 treatment day(s), O2 stats >90%. (4.)Mr. Clarisa Nicole will be independent in HEP for B LE strengthening within 7 treatment days for increased strength with mobility. (5.)Mr. Clarisa Nicole will verbalize energy conservation techniques within 7 treatment days with independence.   ______________________________________________________________________________________________     PHYSICAL THERAPY: Initial Assessment, AM 5/24/2018  INPATIENT: Hospital Day: 2  Payor: Burak De La Torre / Plan: [address] [place] [address] HMO / Product Type: Managed Care Medicare /      NAME/AGE/GENDER: Colette Zavaleta is a 74 y.o. male   PRIMARY DIAGNOSIS: Acute respiratory failure with hypoxemia ([facility] Acute on chronic systolic heart failure (HCC) Acute on chronic systolic heart failure ([facility]        ICD-10: Treatment Diagnosis:    · Generalized Muscle Weakness (M62.81)  · Difficulty in walking, Not elsewhere classified (R26.2)   Precaution/Allergies:  Pcn [penicillins]      ASSESSMENT:     Mr. Clarisa Nicole presents with decreased bed mobility, transfers, ambulation, activity tolerance and overall general functional mobility s/p hospital admission with ARF. Pt A & O x 4, on 4 L/min O2 via n.c, relates no pain. Pt reports independent prior to admission with ambulation, ADLs, drives. Pt states has required increased assist in past 2 weeks from spouse or daughter for lower body dressing, additional time required for activity.  Pt today required CGA for bed mobility, once seated EOB demo good static sitting balance. Pt MMT grossly 4/5, slightly weaker B hip flexors 3+/5. Pt CGA for transfers and ambulation. Pt ambulated 10' total; 5' to bathroom, independent in toileting needs, 5' back to chair in room. Pt SOB and fatigued with activity, O2 stats 98% with all mobility. PT to follow for acute care needs to address deficits noted above due to functioning below baseline level of independent. Pt may benefit from HHPT at discharge pending progress. This section established at most recent assessment   PROBLEM LIST (Impairments causing functional limitations):  1. Decreased ADL/Functional Activities  2. Decreased Transfer Abilities  3. Decreased Ambulation Ability/Technique  4. Decreased Balance  5. Decreased Activity Tolerance  6. Increased Fatigue  7. Increased Shortness of Breath  8. Decreased [place] with Home Exercise Program   INTERVENTIONS PLANNED: (Benefits and precautions of physical therapy have been discussed with the patient.)  1. Balance Exercise  2. Bed Mobility  3. Family Education  4. Gait Training  5. Home Exercise Program (HEP)  6. Therapeutic Activites  7. Therapeutic Exercise/Strengthening  8. Transfer Training  9. Group Therapy     TREATMENT PLAN: Frequency/Duration: 3 times a week for duration of hospital stay  Rehabilitation Potential For Stated Goals: Good     RECOMMENDED REHABILITATION/EQUIPMENT: (at time of discharge pending progress): Due to the probability of continued deficits (see above) this patient will likely need continued skilled physical therapy after discharge. Equipment:    None at this time              HISTORY:   History of Present Injury/Illness (Reason for Referral):  74years old M with PMH of systolic HF, CKD, COPD on 4L O2 at home presented to the hospital complaining of worsening SOB for the last 2 weeks.  Patient stated SOB was mostly on exertion but now is also at rest. Patient reported lasix was helping him but for the last 2 days is not working. Patient also reported having worsening LE edema for the last week. Patient's family reported he is unable to afford  Inhalers for COPD. Patient reported he is taking all his \" heart medications\" everyday. Patient denies chest pain, abdominal pain, diarrhea, fever, cough or sick contacts at home.      Past Medical History/Comorbidities:   Mr. Fior Parks  has a past medical history of Acute CHF (congestive heart failure) ([facility] (4/25/2015); Acute combined systolic and diastolic congestive heart failure ([facility] (4/28/2015); Chronic obstructive pulmonary disease ([facility] De Quervain's tenosynovitis, right (4/28/2015); Dyspnea on exertion (6/28/2015); Elevated serum creatinine (4/25/2015); Elevated troponin (6/28/2015); History of coronary artery disease; History of right knee surgery; History of shingles; Malignant hypertension (4/25/2015); and [facility]) [facility] Mr. Fior Parks  has a past surgical history that includes hx knee arthroscopy (Right) and hx heart catheterization (6/29/2015). Social History/Living Environment:   Home Environment: Apartment  # Steps to Enter: 0  One/Two Story Residence: One story  Living Alone: No  Support Systems: Spouse/Significant Other/Partner, Child(isatu)  Patient Expects to be Discharged to[de-identified] Apartment  Current DME Used/Available at Home: Oxygen, portable, Nebulizer  Tub or Shower Type: Tub/Shower combination  Prior Level of Function/Work/Activity:  Lives with spouse and daughter; independent in ADLs, ambulation, driving; on 4 L home O2  Personal Factors:          Sex:  male        Age:  74 y.o.         Overall Behavior:  agreeable   Number of Personal Factors/Comorbidities that affect the Plan of Care:  COPD, ARF, CHF 3+: HIGH COMPLEXITY   EXAMINATION:   Most Recent Physical Functioning:   Gross Assessment:  AROM: Within functional limits  Strength: Generally decreased, functional (B LE grossly 4/5, hip flex 3+/5)  Coordination: Generally decreased, functional  Sensation: Intact (B LE to light touch)               Posture:  Posture (WDL): Exceptions to WDL  Posture Assessment: Forward head, Rounded shoulders  Balance:  Sitting: Intact  Standing: Impaired  Standing - Static: Fair  Standing - Dynamic : Fair Bed Mobility:  Rolling: Supervision  Supine to Sit: Contact guard assistance  Sit to Supine:  (left up in chair)  Scooting: Supervision;Stand-by assistance  Wheelchair Mobility:     Transfers:  Sit to Stand: Contact guard assistance  Stand to Sit: Contact guard assistance  Bed to Chair: Contact guard assistance  Gait:     Base of Support: Widened  Speed/Lu: Slow  Step Length: Left shortened;Right shortened  Swing Pattern: Left symmetrical;Right symmetrical  Gait Abnormalities: Decreased step clearance;Shuffling gait  Distance (ft): 10 Feet (ft) (5' to bathroom, 5' back to chair in room)  Assistive Device:  (none, holding onto wall and furniture for support)  Ambulation - Level of Assistance: Contact guard assistance  Interventions: Safety awareness training;Verbal cues (pursed lip breathing)      Body Structures Involved:  1. Muscles Body Functions Affected:  1. Movement Related Activities and Participation Affected:  1. General Tasks and Demands  2. Mobility  3. Self Care   Number of elements that affect the Plan of Care: 4+: HIGH COMPLEXITY   CLINICAL PRESENTATION:   Presentation: Stable and uncomplicated: LOW COMPLEXITY   CLINICAL DECISION MAKIN[address] [place], [place] [id number] AM-PAC 6 Clicks   Basic Mobility Inpatient Short Form  How much difficulty does the patient currently have. .. Unable A Lot A Little None   1. Turning over in bed (including adjusting bedclothes, sheets and blankets)? [] 1   [] 2   [] 3   [x] 4   2. Sitting down on and standing up from a chair with arms ( e.g., wheelchair, bedside commode, etc.)   [] 1   [] 2   [x] 3   [] 4   3. Moving from lying on back to sitting on the side of the bed? [] 1   [] 2   [x] 3   [] 4   How much help from another person does the patient currently need. .. Total A Lot A Little None   4. Moving to and from a bed to a chair (including a wheelchair)? [] 1   [] 2   [x] 3   [] 4   5. Need to walk in hospital room? [] 1   [] 2   [x] 3   [] 4   6. Climbing 3-5 steps with a railing? [] 1   [] 2   [x] 3   [] 4   © 2007, Trustees of [address] [place] [address] [address] [id number], under license to [employer]. All rights reserved      Score:  Initial: 19 Most Recent: X (Date: -- )    Interpretation of Tool:  Represents activities that are increasingly more difficult (i.e. Bed mobility, Transfers, Gait). Score 24 23 22-20 19-15 14-10 9-7 6     Modifier CH CI CJ CK CL CM CN      ? Mobility - Walking and Moving Around:     - CURRENT STATUS: CK - 40%-59% impaired, limited or restricted    - GOAL STATUS: CJ - 20%-39% impaired, limited or restricted    - D/C STATUS:  ---------------To be determined---------------  Payor: Kole Barney / Plan: [address] [place] [address] HMO / Product Type: Managed Care Medicare /      Medical Necessity:     · Patient is expected to demonstrate progress in strength, range of motion, balance and coordination to decrease assistance required with overall functional mobility, transfers, ambulation. · Patient demonstrates good rehab potential due to higher previous functional level. Reason for Services/Other Comments:  · Patient continues to require present interventions due to patient's inability to perform bed mobility, transfers, ambulation safely and effectively.    Use of outcome tool(s) and clinical judgement create a POC that gives a: Clear prediction of patient's progress: LOW COMPLEXITY            TREATMENT:   (In addition to Assessment/Re-Assessment sessions the following treatments were rendered)   Pre-treatment Symptoms/Complaints:  \" I am ok I guess\"  Pain: Initial:   Pain Intensity 1: 0  Post Session:  0/10 post session     Assessment/Reassessment only, no treatment provided today    Braces/Orthotics/Lines/Etc:   · O2 Device: Nasal cannula  Treatment/Session Assessment:    · Response to Treatment:  Tolerated well  · Interdisciplinary Collaboration:   o Physical Therapist  o Registered Nurse  · After treatment position/precautions:   o Up in chair  o Bed/Chair-wheels locked  o Bed in low position  o Call light within reach  o RN notified   · Compliance with Program/Exercises: Will assess as treatment progresses. · Recommendations/Intent for next treatment session: \"Next visit will focus on advancements to more challenging activities\".   Total Treatment Duration:  PT Patient Time In/Time Out  Time In: 1038  Time Out: [id number] [facility],

## 2018-05-24 NOTE — CONSULTS
[address] Cardiology Consult                Date of  Admission: 5/23/2018  9:32 PM     Primary Care Physician: Dr. Ashley Harp  Primary Cardiologist: Dr. Shawn Hester  Referring Physician: Dr. Ajay Quezada  Consulting Physician: Dr. Nigel Pompa    CC/Reason for consult: systolic CHF      Michael Noonan is a 74 y.o. male admitted for Acute respiratory failure with hypoxemia ([facility] He has noted increased dyspnea for at least 1 week. He reports increased LE edema for the past 3-4 days. He states he has been compliant with his cardiac medications. He has had trouble affording his inhalers. He denies any recent chest pain. Last cath in 1/18 showed stable CAD. Echo 9/17 showed EF 30-35%, mild to moderate TR. He states he is feeling much better than yesterday and has noted improvement in LE edema.        Patient Active Problem List   Diagnosis Code    CKD (chronic kidney disease) stage 3, GFR 30-59 ml/min N18.3    Chronic systolic heart failure ([facility] I50.22    Coronary atherosclerosis of native coronary vessel I25.10    Arthritis M19.90    Hypertension I10    COPD, moderate ([facility]) J44.9    High triglycerides E78.1    Gastroesophageal reflux disease without esophagitis K21.9    Mixed hyperlipidemia E78.2    Essential hypertension I10    CHF (congestive heart failure) ([facility]) I50.9    TAZ (obstructive sleep apnea) G47.33    Atherosclerosis of native coronary artery of native heart with stable angina pectoris ([facility]) I25.118    Personal history of tobacco use Z87.891    Erysipelas A46    Preseptal cellulitis L03.213    Acute respiratory failure with hypoxemia ([facility]) J96.01       Past Medical History:   Diagnosis Date    Acute CHF (congestive heart failure) ([facility] 4/25/2015    Acute combined systolic and diastolic congestive heart failure ([facility] 4/28/2015    Chronic obstructive pulmonary disease (HCC)     De Quervain's tenosynovitis, right 4/28/2015    Dyspnea on exertion 6/28/2015    Elevated serum creatinine 4/25/2015    Elevated troponin 6/28/2015    History of coronary artery disease     MI at 27 yo    History of right knee surgery     cartilage removal    History of shingles     Malignant hypertension 4/25/2015    MI ([facility])       Past Surgical History:   Procedure Laterality Date    HX HEART CATHETERIZATION  6/29/2015    no intervention    HX KNEE ARTHROSCOPY Right     removal of cartilage     Allergies   Allergen Reactions    Pcn [Penicillins] Other (comments)     \"makes my heart stop\"      Family History   Problem Relation Age of Onset    Hypertension Mother     No Known Problems Father         Current Facility-Administered Medications   Medication Dose Route Frequency    furosemide (LASIX) injection 60 mg  60 mg IntraVENous DAILY    heparin (porcine) injection 5,000 Units  5,000 Units SubCUTAneous Q8H    allopurinol (ZYLOPRIM) tablet 100 mg  100 mg Oral DAILY    aspirin delayed-release tablet 81 mg  81 mg Oral DAILY    atorvastatin (LIPITOR) tablet 20 mg  20 mg Oral QHS    fluticasone (FLONASE) 50 mcg/actuation nasal spray 2 Spray  2 Spray Both Nostrils DAILY    lisinopril (PRINIVIL, ZESTRIL) tablet 5 mg  5 mg Oral DAILY    melatonin tablet 3 mg (Patient Supplied)  3 mg Oral QHS PRN    pantoprazole (PROTONIX) tablet 40 mg  40 mg Oral ACB    polyethylene glycol (MIRALAX) packet 17 g  17 g Oral DAILY    albuterol (PROVENTIL HFA, VENTOLIN HFA, PROAIR HFA) inhaler 2 Puff  2 Puff Inhalation QID RT    guaiFENesin ER (MUCINEX) tablet 1,200 mg  1,200 mg Oral Q12H    carvedilol (COREG) tablet 25 mg  25 mg Oral BID WITH MEALS       Review of Systems   Constitution: Negative for chills, fever, weakness, malaise/fatigue, weight gain and weight loss. HENT: Negative for ear pain, hearing loss, nosebleeds, sore throat and tinnitus. Eyes: Negative for blurred vision, vision loss in left eye and vision loss in right eye.    Cardiovascular: Positive for dyspnea on exertion and leg swelling. Negative for chest pain, near-syncope, orthopnea, palpitations, paroxysmal nocturnal dyspnea and syncope. Respiratory: Positive for shortness of breath. Negative for cough, hemoptysis, sputum production and wheezing. Endocrine: Negative for cold intolerance, heat intolerance and polydipsia. Hematologic/Lymphatic: Does not bruise/bleed easily. Skin: Negative for color change and rash. Musculoskeletal: Negative for back pain, joint pain, joint swelling and myalgias. Gastrointestinal: Negative for abdominal pain, constipation, diarrhea, dysphagia, heartburn, hematemesis, melena, nausea and vomiting. Genitourinary: Negative for dysuria, frequency, hematuria and urgency. Neurological: Negative for difficulty with concentration, dizziness, headaches, light-headedness, numbness, paresthesias, seizures and vertigo. Psychiatric/Behavioral: Negative for altered mental status and depression.           Physical Exam  Vitals:    05/24/18 0145 05/24/18 0312 05/24/18 0527 05/24/18 0720   BP: 116/77 116/81  119/80   Pulse: 67 72  85   Resp: (!) 36 25  19   Temp:  98.4 °F (36.9 °C)  97.9 °F (36.6 °C)   SpO2:    98%   Weight:   103.9 kg (229 lb)    Height:           Physical Exam:  General: Well Developed, Well Nourished, No Acute Distress  HEENT: pupils equal and round, no abnormalities noted  Neck: supple, no JVD  Heart: S1S2 with RRR   Lungs: clear throughout auscultation   Abd: soft, nontender, nondistended, with good bowel sounds  Ext: warm, 2+ edema  Skin: warm and dry  Psychiatric: Normal mood and affect  Neurologic: Alert and oriented X 3      Cardiographics    ECG: sinus rhythm   Echocardiogram: pending    Labs:   Recent Labs      05/24/18   0343  05/24/18   0010  05/23/18   2141   NA   --    --   144   K   --    --   3.7   BUN   --    --   19   CREA   --    --   1.40   GLU   --    --   78   WBC  5.7   --   6.8   HGB  14.0   --   12.6*   HCT  44.0   --   38.7*   PLT  190   --   176   TROIQ  0.73* 0.46*  0.69*       All Cardiac Markers in the last 24 hours:    Lab Results   Component Value Date/Time    TROIQ 0.73 () 05/24/2018 03:43 AM    TROIQ 0.46 () 05/24/2018 12:10 AM    TROIQ 0.69 ([facility]) 05/23/2018 09:41 PM         Assessment/Plan:      Acute on chronic systolic CHF  Continue diuresis with IV lasix, increase Coreg to home dose, continue lisinopril, echo pending     Active Problems:    CKD (chronic kidney disease) stage 3, GFR 30-59 ml/min (9/29/2017)  Monitor BMP with diuresis      Coronary atherosclerosis of native coronary vessel (9/29/2017)  Pt denies any chest pain, recent cath with stable CAD, suspect troponin mildly elevated due to demand ischemia in setting of acute CHF      COPD, moderate ([address] (9/29/2017)  No wheezing on exam      Acute respiratory failure with hypoxemia ([address] (5/24/2018)  On O2 by NC      Thank you very much for this referral. We appreciate the opportunity to participate in this patient's care. We will follow along with above stated plan.     Boogie Dodd PA-C  Consulting MD: Dr. Juan Ramon Lancaster

## 2018-05-24 NOTE — ED PROVIDER NOTES
HPI Comments: patient is a 43-year-old male with a history of congestive heart failure and COPD who presents to the ER today complaining of several weeks of shortness of breath. He was evaluated here one week ago for similar complaints. He has been doubling up on his Lasix but still having increased leg edema and increasing shortness of breath. He told family earlier these had some chest pain and he denies any chest pain to me at this time. He denies any fever. Patient is a 74 y.o. male presenting with shortness of breath. The history is provided by the patient and a relative. Shortness of Breath   This is a chronic problem. The problem occurs continuously. The current episode started more than 1 week ago. The problem has been gradually worsening. Associated symptoms include leg swelling. Pertinent negatives include no fever, no headaches, no sore throat, no cough, no sputum production, no hemoptysis, no syncope, no vomiting and no abdominal pain. He has had prior hospitalizations. He has had prior ED visits. He has had no prior ICU admissions. Associated medical issues include COPD, CAD and heart failure. Associated medical issues do not include DVT.         Past Medical History:   Diagnosis Date    Acute CHF (congestive heart failure) ([facility] 4/25/2015    Acute combined systolic and diastolic congestive heart failure ([facility] 4/28/2015    Chronic obstructive pulmonary disease (HCC)     De Quervain's tenosynovitis, right 4/28/2015    Dyspnea on exertion 6/28/2015    Elevated serum creatinine 4/25/2015    Elevated troponin 6/28/2015    History of coronary artery disease     MI at 25 yo    History of right knee surgery     cartilage removal    History of shingles     Malignant hypertension 4/25/2015    MI (myocardial infarction) [facility])        Past Surgical History:   Procedure Laterality Date    HX HEART CATHETERIZATION  6/29/2015    no intervention    HX KNEE ARTHROSCOPY Right     removal of cartilage Family History:   Problem Relation Age of Onset    Hypertension Mother     No Known Problems Father        Social History     Social History    Marital status:      Spouse name: N/A    Number of children: N/A    Years of education: N/A     Occupational History    retired      Social History Main Topics    Smoking status: Former Smoker     Packs/day: 0.25     Years: 20.00     Types: Cigarettes     Quit date: 4/5/2015    Smokeless tobacco: Never Used    Alcohol use No    Drug use: No    Sexual activity: Not on file     Other Topics Concern     Service No    Blood Transfusions No     no issues with receiving    Caffeine Concern No    Special Diet No    Exercise No    Seat Belt Yes     Social History Narrative    Lives with his wife         ALLERGIES: Pcn [penicillins]    Review of Systems   Constitutional: Negative for chills and fever. HENT: Negative. Negative for sore throat. Eyes: Negative. Respiratory: Positive for shortness of breath. Negative for cough, hemoptysis and sputum production. Cardiovascular: Positive for leg swelling. Negative for syncope. Gastrointestinal: Negative for abdominal pain and vomiting. Genitourinary: Negative. Musculoskeletal: Negative. Skin: Negative. Neurological: Negative for headaches. Vitals:    05/23/18 2137 05/23/18 2145   BP: 111/74    Pulse: 65    Resp: 24    Temp: 98.3 °F (36.8 °C)    SpO2: 90% 95%   Weight: 93.9 kg (207 lb)    Height: 5' 9\" (1.753 m)             Physical Exam   Constitutional: He is oriented to person, place, and time. He appears well-developed and well-nourished. Chronically ill-appearing   HENT:   Head: Normocephalic and atraumatic. Eyes: EOM are normal. Pupils are equal, round, and reactive to light. Neck: Normal range of motion. Neck supple. Cardiovascular: Normal rate and regular rhythm. Pulmonary/Chest: Effort normal and breath sounds normal.   Abdominal: Soft.  There is no tenderness. Musculoskeletal: He exhibits edema. He exhibits no tenderness or deformity. Neurological: He is alert and oriented to person, place, and time. A cranial nerve deficit is present. Skin: Skin is warm and dry. No rash noted. Nursing note and vitals reviewed. MDM  Number of Diagnoses or Management Options  Diagnosis management comments: Differential diagnosis includes COPD exacerbation, pneumonia, bronchitis, congestive heart failure, volume overload, renal failure    EKG shows sinus rhythm with a rate of 68but no change compared to previous morphology and no acute ischemia       Amount and/or Complexity of Data Reviewed  Clinical lab tests: ordered and reviewed  Tests in the radiology section of CPT®: ordered and reviewed  Review and summarize past medical records: yes  Independent visualization of images, tracings, or specimens: yes    Risk of Complications, Morbidity, and/or Mortality  Presenting problems: moderate  Diagnostic procedures: moderate  Management options: moderate    Patient Progress  Patient progress: stable        ED Course   1:05 AM  Troponin is slightly elevated however this appears to be chronic and is actually lower than use one week ago. EKG is nonischemic. Chest x-ray is negative. BNP level is elevated at 894 which is higher than his evaluation last week. He is breathing more comfortably after nebulizer treatment and 60 mg Lasix IV. He is also diuresed significantly. I feel this is a mixed picture between his COPD and some volume overload. I will consult with the hospitalist for admission and further management. Voice dictation software was used during the making of this note. This software is not perfect and grammatical and other typographical errors may be present. This note has been proofread, but may still contain errors.   Williams Pham MD; 5/24/2018 @1:06 AM ===================================================================        Procedures

## 2018-05-24 NOTE — PROGRESS NOTES
Initial visit by  to convey care and concern and encourage patient that  services are available if desired. No needs were voiced during the visit. Provided business card for future reference.      Moustapha Billings 66  Board Certified

## 2018-05-24 NOTE — H&P
Hospitalist H&P/Consult Note     Admit Date:  2018  9:32 PM   Name:  Melissa Anne. Age:  74 y.o.  :  1945   MRN:  [id number]   PCP:  Israel Arreola MD  Treatment Team: Primary Nurse: Vijay Cortez    HPI:   74years old M with PMH of systolic HF, CKD, COPD on 4L O2 at home presented to the hospital complaining of worsening SOB for the last 2 weeks. Patient stated SOB was mostly on exertion but now is also at rest. Patient reported lasix was helping him but for the last 2 days is not working. Patient also reported having worsening LE edema for the last week. Patient's family reported he is unable to afford  Inhalers for COPD. Patient reported he is taking all his \" heart medications\" everyday. Patient denies chest pain, abdominal pain, diarrhea, fever, cough or sick contacts at home. In the ED labs revealed elevated BNP to 800s. CXR with no acute disease reported. 10 systems reviewed and negative except as noted in HPI. Past Medical History:   Diagnosis Date    Acute [facility]congestive [facility] failure) [facility] 2015    Acute combined systolic and diastolic congestive heart failure ([facility] 2015    Chronic obstructive pulmonary disease (HCC)     De Quervain's tenosynovitis, right 2015    Dyspnea on exertion 2015    Elevated serum creatinine 2015    Elevated troponin 2015    History of coronary artery disease     MI at 27 yo    History of right knee surgery     cartilage removal    History of shingles     Malignant hypertension 2015    MI ([facility]ocardial infarction[facility])       Past Surgical History:   Procedure Laterality Date    HX HEART CATHETERIZATION  2015    no intervention    HX KNEE ARTHROSCOPY Right     removal of cartilage      Prior to Admission Medications   Prescriptions Last Dose Informant Patient Reported? Taking? albuterol (VENTOLIN HFA) 90 mcg/actuation inhaler   No No   Sig: Take 2 Puffs by inhalation four (4) times daily. albuterol-ipratropium (DUO-NEB) 2.5 mg-0.5 mg/3 ml nebu   No No   Sig: 3 mL by Nebulization route four (4) times daily. Diaignosis--J44.9   allopurinol (ZYLOPRIM) 100 mg tablet   No No   Sig: Take 1 Tab by mouth daily. aspirin delayed-release 81 mg tablet   No No   Sig: Take 1 Tab by mouth daily. atorvastatin (LIPITOR) 20 mg tablet   No No   Sig: Take 1 Tab by mouth nightly. carvedilol (COREG) 25 mg tablet   Yes No   Sig: Take 12.5 mg by mouth two (2) times daily (with meals). fluticasone (FLONASE) 50 mcg/actuation nasal spray   No No   Si Sprays by Both Nostrils route daily. furosemide (LASIX) 40 mg tablet   No No   Sig: Take 1 Tab by mouth daily. guaiFENesin ER (MUCINEX) 1,200 mg Ta12 ER tablet   No No   Sig: Take 1 Tab by mouth every twelve (12) hours. lisinopril (PRINIVIL, ZESTRIL) 5 mg tablet   No No   Sig: Take 1 Tab by mouth daily. melatonin 3 mg tablet   Yes No   Sig: Take 3 mg by mouth nightly. omeprazole (PRILOSEC) 20 mg capsule   No No   Sig: Take 1 Cap by mouth daily. polyethylene glycol (MIRALAX) 17 gram packet   No No   Sig: Take 1 Packet by mouth daily.       Facility-Administered Medications: None     Allergies   Allergen Reactions    Pcn [Penicillins] Other (comments)     \"makes my heart stop\"      Social History   Substance Use Topics    Smoking status: Former Smoker     Packs/day: 0.25     Years: 20.00     Types: Cigarettes     Quit date: 2015    Smokeless tobacco: Never Used    Alcohol use No      Family History   Problem Relation Age of Onset    Hypertension Mother     No Known Problems Father       Immunization History   Administered Date(s) Administered    Influenza Vaccine 2016    Pneumococcal Polysaccharide (PPSV-23) 2016    TB Skin Test (PPD) Intradermal 2018, 2018       Objective:     Patient Vitals for the past 24 hrs:   Temp Pulse Resp BP SpO2   18 0030 - 70 (!) 39 113/83 -   18 0016 - 67 28 - 100 %   18 0015 - 63 25 109/78 -   05/24/18 0004 - 73 (!) 36 120/83 (!) 71 %   05/23/18 2345 - 69 (!) 35 117/83 96 %   05/23/18 2330 - 60 14 110/79 98 %   05/23/18 2318 - 61 12 107/81 100 %   05/23/18 2145 - - - - 95 %   05/23/18 2137 98.3 °F (36.8 °C) 65 24 111/74 90 %     Oxygen Therapy  O2 Sat (%): 100 % (05/24/18 0016)  Pulse via Oximetry: 65 beats per minute (05/24/18 0016)  O2 Device: Nasal cannula (05/23/18 2320)  O2 Flow Rate (L/min): 4 l/min (05/23/18 2320)  No intake or output data in the 24 hours ending 05/24/18 0136    Physical Exam:  General:    Well nourished. Alert. Eyes:   Normal sclera. Extraocular movements intact. ENT:  Normocephalic, atraumatic. Moist mucous membranes  CV:   RRR. No murmur, rub, or gallop. Lungs:  Few crackles at lung bases. No wheezing or ronchi  Abdomen: Soft, nontender, nondistended. Bowel sounds normal.   Extremities: Warm and dry. 2+ pitting edema b/l  Neurologic: CN II-XII grossly intact. Sensation intact. Skin:     No rashes or jaundice. No wounds. Psych:  Normal mood and affect. I reviewed the labs, imaging and other studies done this admission. Data Review:   Recent Results (from the past 24 hour(s))   TROPONIN I    Collection Time: 05/23/18  9:41 PM   Result Value Ref Range    Troponin-I, Qt. 0.69 (HH) 0.02 - 0.05 NG/ML   CBC WITH AUTOMATED DIFF    Collection Time: 05/23/18  9:41 PM   Result Value Ref Range    WBC 6.8 4.3 - 11.1 K/uL    RBC 4.52 4.23 - 5.67 M/uL    HGB 12.6 (L) 13.6 - 17.2 g/dL    HCT 38.7 (L) 41.1 - 50.3 %    MCV 85.6 79.6 - 97.8 FL    MCH 27.9 26.1 - 32.9 PG    MCHC 32.6 31.4 - 35.0 g/dL    RDW 18.2 (H) 11.9 - 14.6 %    PLATELET 755 596 - 353 K/uL    MPV 9.8 (L) 10.8 - 14.1 FL    DF AUTOMATED      NEUTROPHILS 52 43 - 78 %    LYMPHOCYTES 38 13 - 44 %    MONOCYTES 8 4.0 - 12.0 %    EOSINOPHILS 2 0.5 - 7.8 %    BASOPHILS 0 0.0 - 2.0 %    IMMATURE GRANULOCYTES 0 0.0 - 5.0 %    ABS. NEUTROPHILS 3.5 1.7 - 8.2 K/UL    ABS.  LYMPHOCYTES 2.6 0.5 - 4.6 K/UL    ABS. MONOCYTES 0.6 0.1 - 1.3 K/UL    ABS. EOSINOPHILS 0.1 0.0 - 0.8 K/UL    ABS. BASOPHILS 0.0 0.0 - 0.2 K/UL    ABS. IMM. GRANS. 0.0 0.0 - 0.5 K/UL   METABOLIC PANEL, COMPREHENSIVE    Collection Time: 05/23/18  9:41 PM   Result Value Ref Range    Sodium 144 136 - 145 mmol/L    Potassium 3.7 3.5 - 5.1 mmol/L    Chloride 112 (H) 98 - 107 mmol/L    CO2 24 21 - 32 mmol/L    Anion gap 8 7 - 16 mmol/L    Glucose 78 65 - 100 mg/dL    BUN 19 8 - 23 MG/DL    Creatinine 1.40 0.8 - 1.5 MG/DL    GFR est AA >60 >60 ml/min/1.73m2    GFR est non-AA 53 (L) >60 ml/min/1.73m2    Calcium 7.6 (L) 8.3 - 10.4 MG/DL    Bilirubin, total 0.8 0.2 - 1.1 MG/DL    ALT (SGPT) 28 12 - 65 U/L    AST (SGOT) 36 15 - 37 U/L    Alk. phosphatase 109 50 - 136 U/L    Protein, total 6.8 6.3 - 8.2 g/dL    Albumin 2.4 (L) 3.2 - 4.6 g/dL    Globulin 4.4 (H) 2.3 - 3.5 g/dL    A-G Ratio 0.5 (L) 1.2 - 3.5     BNP    Collection Time: 05/23/18  9:41 PM   Result Value Ref Range     pg/mL   EKG, 12 LEAD, INITIAL    Collection Time: 05/23/18  9:41 PM   Result Value Ref Range    Ventricular Rate 68 BPM    Atrial Rate 68 BPM    P-R Interval 192 ms    QRS Duration 112 ms    Q-T Interval 480 ms    QTC Calculation (Bezet) 510 ms    Calculated P Axis 69 degrees    Calculated R Axis -79 degrees    Calculated T Axis 72 degrees    Diagnosis       !! AGE AND GENDER SPECIFIC ECG ANALYSIS !!   Sinus rhythm with sinus arrhythmia  Left axis deviation  Anterior infarct (cited on or before 04-MAY-2018)  Prolonged QT  Abnormal ECG  When compared with ECG of 17-MAY-2018 20:29,  No significant change was found     TROPONIN I    Collection Time: 05/24/18 12:10 AM   Result Value Ref Range    Troponin-I, Qt. 0.46 (HH) 0.02 - 0.05 NG/ML       Imaging Studies:  CXR Results  (Last 48 hours)               05/23/18 2202  XR CHEST PA LAT Final result    Impression:  IMPRESSION: Normal chest.                       Narrative:  EXAM:  XR CHEST PA LAT       INDICATION:   Shortness of Breath       COMPARISON: 5/17/2018. FINDINGS: PA and lateral radiographs of the chest demonstrate clear lungs. The   cardiac and mediastinal contours and pulmonary vascularity are normal.  The   bones and soft tissues are within normal limits. CT Results  (Last 48 hours)    None          Assessment and Plan:     Hospital Problems as of 5/24/2018  Date Reviewed: 2/6/2018          Codes Class Noted - Resolved POA    Acute respiratory failure with hypoxemia [facility]) ICD-10-CM: J96.01  ICD-9-CM: 518.81  5/24/2018 - Present Unknown        CKD (chronic kidney disease) stage 3, GFR 30-59 ml/min (Chronic) ICD-10-CM: N18.3  ICD-9-CM: 585.3  9/29/2017 - Present Yes        Coronary atherosclerosis of native coronary vessel (Chronic) ICD-10-CM: I25.10  ICD-9-CM: 414.01  9/29/2017 - Present Yes        COPD, moderate (HCC) (Chronic) ICD-10-CM: J44.9  ICD-9-CM: 496  9/29/2017 - Present Yes    Overview Signed 12/27/2015 12:38 PM by Carlos Felton NP     Complete PFTs: 7/20/15:  Spirometry is consistent with a moderate obstructive/restrictive defect. . The residual volume is increased relative to other lung volumes suggesting air-trapping. The diffusion capacity corrected for alveolar volume was normal suggesting no loss of alveolo-capillary units. CHF (congestive heart failure) (HCC) ICD-10-CM: I50.9  ICD-9-CM: 428.0  9/27/2017 - Present Yes        Chronic systolic heart failure (HCC) (Chronic) ICD-10-CM: I50.22  ICD-9-CM: 428.22  12/4/2015 - Present Yes    Overview Signed 12/4/2015  9:28 AM by Yani Corey     EF 40%                   A/P:    -Acute hypoxic respiratory failure  Likely secondary to acute systolic HF  BNP elevated from 784 to 894  Echo done last year showing EF of  30-35 %    Plan  Start lasix IV and monitor stric I&O  Restart BB and acei  Cardiology evaluation  Monitor in telemetry    -Elevated troponin-  ?  Due to CKD and chronic CHF  Chronically elevated around baseline  EKG with no ST elevation or depression to my reading  Denies chest pain  Trend amosopnin  Get echocardiogram    -COPD  Maybe mild exacerbation on admission. Likely resolved after nebs at ED  Restart home inhalers  Cont O2 therapy  SW evaluation for home inhalers    -CKD  Cr around baseline      DVT ppx: heparin  Code status: full  Case discussed with patient and family. Estimated LOS:  2-3 nights    Signed:   Bibi Newman MD

## 2018-05-24 NOTE — PROGRESS NOTES
TRANSFER - IN REPORT:    Verbal report received from Patria(dante) on Rain Herrera.  being received from ED(unit) for routine progression of care      Report consisted of patients Situation, Background, Assessment and   Recommendations(SBAR). Information from the following report(s) SBAR was reviewed with the receiving nurse. Opportunity for questions and clarification was provided. Assessment completed upon patients arrival to unit and care assumed. Received Pt 0300- VSS, Pt on 4L O2 N/C, Pt c/o SOB, denies Pain. Will  Continue to monitor.

## 2018-05-24 NOTE — PROGRESS NOTES
IP consult to Cardiac Consult. Chart review completed. Echo pending at this time. I will follow results for a qualifying EF.

## 2018-05-24 NOTE — ED NOTES
TRANSFER - OUT REPORT:    Verbal report given to Meenakshi Brizuela RN on [facility].  being transferred to 0 for routine progression of care       Report consisted of patients Situation, Background, Assessment and   Recommendations(SBAR). Information from the following report(s) SBAR, ED Summary, Intake/Output and Med Rec Status was reviewed with the receiving nurse. Lines:   Peripheral IV 05/23/18 Left Antecubital (Active)   Site Assessment Clean, dry, & intact 5/23/2018  9:35 PM   Phlebitis Assessment 0 5/23/2018  9:35 PM   Dressing Status Clean, dry, & intact 5/23/2018  9:35 PM        Opportunity for questions and clarification was provided.       Patient transported with:   O2 @ 4 liters, Transport

## 2018-05-24 NOTE — PROGRESS NOTES
Problem: Falls - Risk of  Goal: *Absence of Falls  Document Sue Fall Risk and appropriate interventions in the flowsheet.    Outcome: Progressing Towards Goal  Fall Risk Interventions:  Mobility Interventions: Communicate number of staff needed for ambulation/transfer, Patient to call before getting OOB, PT Consult for mobility concerns         Medication Interventions: Evaluate medications/consider consulting pharmacy, Patient to call before getting OOB, Teach patient to arise slowly

## 2018-05-24 NOTE — ROUTINE PROCESS
CHF teaching held, ECHO @ BS, will follow Planner @ BS.     Continue FR  Pallaitive care: ACP on hold

## 2018-05-24 NOTE — PHYSICIAN ADVISORY
Letter of Determination:  Outpatient status receiving Observation Services    This patient was originally hospitalized as Inpatient Status on 5/23/2018 for acute hypoxic respiratory failure. At this time this patient does not appear to meet the medical necessity requirements to support an inpatient level of care. It is our recommendation that this patient's hospitalization status should be changed from INPATIENT to [place] receiving OBSERVATION services via Condition Code 44.      This may change due to the medical condition of the patient and new clinical evidence as the patients care progreses. The final decision regarding the patient's hospitalization status depends on the attending physician's judgement.     Leland Schneider MD, KENNETH,   Physician [facility].

## 2018-05-24 NOTE — PROGRESS NOTES
Hospitalist Progress Note    2018  Admit Date: 2018  9:32 PM   NAME: Chacha Barrios. :  1945   MRN:  [id number]   Attending: Asaf Cochran MD  PCP:  Ari Thomson MD    SUBJECTIVE:     Pt is a 73 yo male with pmh chronic systolic HF followed by [facility] Cards, COPD on 4 L NC, CKD who presented to ER earlier today with progressive dyspnea and LE edema  x 1-2 weeks. Pt reports increased his Lasix dose from 40 mg to 60 mg x 2 days without relief of symptoms. In ER BNP elevated to 800s and pt admitted for acute CHF exacerbation. Cards now following. Echo completed, results pending. This morning pt is sitting up int he chair. States lots of urine output since admission. On 4 L NC, states breathing is improved from yesterday. Review of Systems negative with exception of pertinent positives noted above  PHYSICAL EXAM     Visit Vitals    /80    Pulse 85    Temp 97.9 °F (36.6 °C)    Resp 19    Ht 5' 9\" (1.753 m)    Wt 103.9 kg (229 lb)    SpO2 98%    BMI 33.82 kg/m2      Temp (24hrs), Av.2 °F (36.8 °C), Min:97.9 °F (36.6 °C), Max:98.4 °F (36.9 °C)    Oxygen Therapy  O2 Sat (%): 98 % (18 1038)  Pulse via Oximetry: 64 beats per minute (18 0745)  O2 Device: Nasal cannula (18 1038)  O2 Flow Rate (L/min): 4 l/min (18 1038)    Intake/Output Summary (Last 24 hours) at 18 1140  Last data filed at 18 1107   Gross per 24 hour   Intake              570 ml   Output             1125 ml   Net             -555 ml      General: No acute distress    Lungs:  CTA Bilaterally.    Heart:  Regular rate and rhythm,  No murmur, rub, or gallop  Abdomen: Soft, Non distended, Non tender, Positive bowel sounds  Extremities: No cyanosis, clubbing, bilat LE non pitting edema  Neurologic:  No focal deficits    ASSESSMENT      Active Hospital Problems    Diagnosis Date Noted    Acute respiratory failure with hypoxemia ([address] 2018    Acute on chronic systolic heart failure ([facility] 05/24/2018     EF 40%      CKD (chronic kidney disease) stage 3, GFR 30-59 ml/min 09/29/2017    COPD, moderate (HCC) 09/29/2017     Complete PFTs: 7/20/15:  Spirometry is consistent with a moderate obstructive/restrictive defect. . The residual volume is increased relative to other lung volumes suggesting air-trapping. The diffusion capacity corrected for alveolar volume was normal suggesting no loss of alveolo-capillary units.  Coronary atherosclerosis of native coronary vessel 09/29/2017     A/P:    Acute hypoxic resp failure, CHF exacerbation-  Cards following. Continue diuresis with IV lasix, increase Coreg to home dose, continue lisinopril, echo completed but results pending. CKD-  Cr stable, monitor daily with diuretics. COPD- Stable. PRN duonebs     DC planning- [facility] tomorrow with transition to PO Lasix. Consult SW for dc needs.      DVT Prophylaxis: Heparin     Signed By: Connie Roberts NP     May 24, 2018

## 2018-05-25 LAB
ANION GAP SERPL CALC-SCNC: 9 MMOL/L (ref 7–16)
BNP SERPL-MCNC: 1132 PG/ML
BUN SERPL-MCNC: 25 MG/DL (ref 8–23)
CALCIUM SERPL-MCNC: 8.4 MG/DL (ref 8.3–10.4)
CHLORIDE SERPL-SCNC: 107 MMOL/L (ref 98–107)
CO2 SERPL-SCNC: 25 MMOL/L (ref 21–32)
CREAT SERPL-MCNC: 1.63 MG/DL (ref 0.8–1.5)
GLUCOSE SERPL-MCNC: 138 MG/DL (ref 65–100)
MAGNESIUM SERPL-MCNC: 2.1 MG/DL (ref 1.8–2.4)
POTASSIUM SERPL-SCNC: 4.2 MMOL/L (ref 3.5–5.1)
SODIUM SERPL-SCNC: 141 MMOL/L (ref 136–145)
TSH SERPL DL<=0.005 MIU/L-ACNC: 0.6 UIU/ML (ref 0.36–3.74)

## 2018-05-25 PROCEDURE — 74011250637 HC RX REV CODE- 250/637: Performed by: INTERNAL MEDICINE

## 2018-05-25 PROCEDURE — 83735 ASSAY OF MAGNESIUM: CPT | Performed by: INTERNAL MEDICINE

## 2018-05-25 PROCEDURE — 84165 PROTEIN E-PHORESIS SERUM: CPT | Performed by: INTERNAL MEDICINE

## 2018-05-25 PROCEDURE — 74011250636 HC RX REV CODE- 250/636: Performed by: INTERNAL MEDICINE

## 2018-05-25 PROCEDURE — 36415 COLL VENOUS BLD VENIPUNCTURE: CPT | Performed by: INTERNAL MEDICINE

## 2018-05-25 PROCEDURE — 77010033678 HC OXYGEN DAILY

## 2018-05-25 PROCEDURE — 99222 1ST HOSP IP/OBS MODERATE 55: CPT | Performed by: INTERNAL MEDICINE

## 2018-05-25 PROCEDURE — 86334 IMMUNOFIX E-PHORESIS SERUM: CPT | Performed by: INTERNAL MEDICINE

## 2018-05-25 PROCEDURE — 83880 ASSAY OF NATRIURETIC PEPTIDE: CPT | Performed by: INTERNAL MEDICINE

## 2018-05-25 PROCEDURE — 97530 THERAPEUTIC ACTIVITIES: CPT

## 2018-05-25 PROCEDURE — 83883 ASSAY NEPHELOMETRY NOT SPEC: CPT | Performed by: INTERNAL MEDICINE

## 2018-05-25 PROCEDURE — 97162 PT EVAL MOD COMPLEX 30 MIN: CPT

## 2018-05-25 PROCEDURE — 77030012341 HC CHMB SPCR OPTC MDI VYRM -A

## 2018-05-25 PROCEDURE — 65660000000 HC RM CCU STEPDOWN

## 2018-05-25 PROCEDURE — 74011250637 HC RX REV CODE- 250/637: Performed by: PHYSICIAN ASSISTANT

## 2018-05-25 PROCEDURE — 80048 BASIC METABOLIC PNL TOTAL CA: CPT | Performed by: INTERNAL MEDICINE

## 2018-05-25 PROCEDURE — 94640 AIRWAY INHALATION TREATMENT: CPT

## 2018-05-25 PROCEDURE — 84443 ASSAY THYROID STIM HORMONE: CPT | Performed by: INTERNAL MEDICINE

## 2018-05-25 PROCEDURE — 94760 N-INVAS EAR/PLS OXIMETRY 1: CPT

## 2018-05-25 RX ORDER — SPIRONOLACTONE 25 MG/1
25 TABLET ORAL DAILY
Status: DISCONTINUED | OUTPATIENT
Start: 2018-05-25 | End: 2018-05-30 | Stop reason: HOSPADM

## 2018-05-25 RX ADMIN — FUROSEMIDE 60 MG: 10 INJECTION, SOLUTION INTRAMUSCULAR; INTRAVENOUS at 08:56

## 2018-05-25 RX ADMIN — CARVEDILOL 25 MG: 25 TABLET, FILM COATED ORAL at 08:56

## 2018-05-25 RX ADMIN — HEPARIN SODIUM 5000 UNITS: 5000 INJECTION, SOLUTION INTRAVENOUS; SUBCUTANEOUS at 14:02

## 2018-05-25 RX ADMIN — ASPIRIN 81 MG: 81 TABLET, COATED ORAL at 08:57

## 2018-05-25 RX ADMIN — HEPARIN SODIUM 5000 UNITS: 5000 INJECTION, SOLUTION INTRAVENOUS; SUBCUTANEOUS at 21:28

## 2018-05-25 RX ADMIN — ALBUTEROL SULFATE 2 PUFF: 90 AEROSOL, METERED RESPIRATORY (INHALATION) at 08:18

## 2018-05-25 RX ADMIN — ALLOPURINOL 100 MG: 100 TABLET ORAL at 08:57

## 2018-05-25 RX ADMIN — ALBUTEROL SULFATE 2 PUFF: 90 AEROSOL, METERED RESPIRATORY (INHALATION) at 19:15

## 2018-05-25 RX ADMIN — ATORVASTATIN CALCIUM 20 MG: 10 TABLET, FILM COATED ORAL at 08:56

## 2018-05-25 RX ADMIN — PANTOPRAZOLE SODIUM 40 MG: 40 TABLET, DELAYED RELEASE ORAL at 08:57

## 2018-05-25 RX ADMIN — HEPARIN SODIUM 5000 UNITS: 5000 INJECTION, SOLUTION INTRAVENOUS; SUBCUTANEOUS at 05:48

## 2018-05-25 RX ADMIN — GUAIFENESIN 1200 MG: 600 TABLET, EXTENDED RELEASE ORAL at 08:57

## 2018-05-25 RX ADMIN — CARVEDILOL 25 MG: 25 TABLET, FILM COATED ORAL at 18:18

## 2018-05-25 RX ADMIN — FLUTICASONE PROPIONATE 2 SPRAY: 50 SPRAY, METERED NASAL at 09:00

## 2018-05-25 RX ADMIN — GUAIFENESIN 1200 MG: 600 TABLET, EXTENDED RELEASE ORAL at 21:26

## 2018-05-25 RX ADMIN — LISINOPRIL 5 MG: 5 TABLET ORAL at 08:56

## 2018-05-25 RX ADMIN — ALBUTEROL SULFATE 2 PUFF: 90 AEROSOL, METERED RESPIRATORY (INHALATION) at 11:10

## 2018-05-25 RX ADMIN — SPIRONOLACTONE 25 MG: 25 TABLET, FILM COATED ORAL at 08:57

## 2018-05-25 NOTE — CONSULTS
[facility] CONSULT NOTE    Admission Date:  5/23/2018    Admission Diagnosis:  Acute respiratory failure with hypoxemia [facility])    Consulting physician: Sabine Figueroa. Pat Real DO    Reason for consult: CKD stage III    Subjective:   History of Present Illness: Mr. Jose Velazquez is a very pleasant 74year old  male with known systolic and diastolic CHF and stage 3 CKD, COPD, CAD, HTN, nonishcemic CM. He has had several admissions for worsening edema and sob. He was again admitted for the same with increased dyspnea for about 1 week with worsening LE edema. He is not using any medications for his COPD due to cost and inability to afford his medications. He has an EF 30-35% on echo from 9/2017. His baseline creatinine is typically 1.5-2.0 mg/dL depending on his volume status. CXR showed clear lungs on admission. BNP was 894 and today 1132. He was recently seen in the ED on 5/17/18 with the same complaints. He was seen in January 2018 by Heme/Onc for question of amyloid for low level of free light chains. Fat pad biopsy was negative for amyloid. Cardiology concerned with echo showing likely amyloidosis. Nephrology is consulted for CKD stage 3. Creatinine was 1.4 on admission which is lower than his baseline and 1.63 today. He has been receiving IV lasix 60 mg daily with good diuresis. His sob is slightly better but still feels sob especially with exertion. He is on O2 4L NC at home and here. LE edema is improving per patient.      Past Medical History:   Diagnosis Date    Acute CHF (congestive heart failure) ([address] 4/25/2015    Acute combined systolic and diastolic congestive heart failure ([address] 4/28/2015    Chronic obstructive pulmonary disease (HCC)     De Quervain's tenosynovitis, right 4/28/2015    Dyspnea on exertion 6/28/2015    Elevated serum creatinine 4/25/2015    Elevated troponin 6/28/2015    History of coronary artery disease     MI at 25 yo    History of right knee surgery     cartilage removal    History of shingles     Malignant hypertension 4/25/2015    [facility])       Past Surgical History:   Procedure Laterality Date    HX HEART CATHETERIZATION  6/29/2015    no intervention    HX KNEE ARTHROSCOPY Right     removal of cartilage      Current Facility-Administered Medications   Medication Dose Route Frequency    spironolactone (ALDACTONE) tablet 25 mg  25 mg Oral DAILY    furosemide (LASIX) injection 60 mg  60 mg IntraVENous DAILY    heparin (porcine) injection 5,000 Units  5,000 Units SubCUTAneous Q8H    allopurinol (ZYLOPRIM) tablet 100 mg  100 mg Oral DAILY    aspirin delayed-release tablet 81 mg  81 mg Oral DAILY    atorvastatin (LIPITOR) tablet 20 mg  20 mg Oral QHS    fluticasone (FLONASE) 50 mcg/actuation nasal spray 2 Spray  2 Spray Both Nostrils DAILY    lisinopril (PRINIVIL, ZESTRIL) tablet 5 mg  5 mg Oral DAILY    melatonin tablet 3 mg (Patient Supplied)  3 mg Oral QHS PRN    pantoprazole (PROTONIX) tablet 40 mg  40 mg Oral ACB    polyethylene glycol (MIRALAX) packet 17 g  17 g Oral DAILY    albuterol (PROVENTIL HFA, VENTOLIN HFA, PROAIR HFA) inhaler 2 Puff  2 Puff Inhalation QID RT    guaiFENesin ER (MUCINEX) tablet 1,200 mg  1,200 mg Oral Q12H    carvedilol (COREG) tablet 25 mg  25 mg Oral BID WITH MEALS     Allergies   Allergen Reactions    Pcn [Penicillins] Other (comments)     \"makes my heart stop\"      Social History   Substance Use Topics    Smoking status: Former Smoker     Packs/day: 0.25     Years: 20.00     Types: Cigarettes     Quit date: 4/5/2015    Smokeless tobacco: Never Used    Alcohol use No      Family History   Problem Relation Age of Onset    Hypertension Mother     No Known Problems Father         Review of Systems  Gen - no fever, no chills, appetite okay  HEENT - no sore throat, no decreased vision, no hearing loss  Neck - no neck mass  CV - no chest pain, no palpitation, no orthopnea  Lung - +shortness of breath, +cough, no hemoptysis  Abd - no tenderness, no nausea/vomiting, no bloody stool  Ext - + edema, no clubbing, no cyanosis  Musculoskeletal - no joint pain, no back pain  Neurologic - no headaches, no dizziness, no seizures  Psychiatric - no anxiety, no depression  Skin - no rashes, no pupura  Genitourinary - no decreased urine output, no hematuria, no foamy urine    Objective:   Vitals:    05/25/18 0403 05/25/18 0611 05/25/18 0716 05/25/18 0820   BP: 108/70  122/87    Pulse: 75  87    Resp: 18  19    Temp: 97.7 °F (36.5 °C)  97.4 °F (36.3 °C)    SpO2: 97%  99% 98%   Weight:  104.8 kg (231 lb)     Height:           Intake/Output Summary (Last 24 hours) at 05/25/18 1009  Last data filed at 05/25/18 0800   Gross per 24 hour   Intake              640 ml   Output             1700 ml   Net            -1060 ml       Physical Exam  GEN :in no distress, alert and oriented  HEENT: anicteric sclerae, eomi. Oropharynx without lesions. Mucous membranes are moist.  Neck - supple without JVD, no thyromegaly. No lymphadenopathy. CV - regular rate and rhythm, no murmur, no rub  Lung - clear bilaterally, lungs expand symmetrically  Chest wall - normal appearance  Abd - soft, nontender, bowel sounds present, no hepatosplenomegaly  Ext - no clubbing, no cyanosis, 2+ LE edema  Neurologic - nonfocal  Genitourinary - bladder nonpalpable  Skin - no rashes, no purpura, no ecchymoses  Psychiatric: Normal mood and affect. Data Review:   Recent Labs      05/24/18   0343  05/23/18   2141   WBC  5.7  6.8   HGB  14.0  12.6*   HCT  44.0  38.7*   PLT  190  176     Recent Labs      05/25/18   0354  05/23/18   2141   NA  141  144   K  4.2  3.7   CL  107  112*   CO2  25  24   BUN  25*  19   CREA  1.63*  1.40   GLU  138*  78   CA  8.4  7.6*   MG  2.1   --      No results for input(s): PH, PCO2, PO2, PCO2 in the last 72 hours.     Problem List:     Patient Active Problem List    Diagnosis Date Noted    Acute on chronic systolic heart failure ([facility] 05/24/2018    Acute respiratory failure with hypoxemia ([facility] 05/24/2018    Erysipelas 03/27/2018    Preseptal cellulitis 03/27/2018    Personal history of tobacco use 02/06/2018    Atherosclerosis of native coronary artery of native heart with stable angina pectoris ([facility] 10/13/2017    TAZ (obstructive sleep apnea) 10/02/2017    CKD (chronic kidney disease) stage 3, GFR 30-59 ml/min 09/29/2017    Coronary atherosclerosis of native coronary vessel 09/29/2017    Hypertension 09/29/2017    COPD, moderate (HCC) 09/29/2017    High triglycerides 09/29/2017    Gastroesophageal reflux disease without esophagitis 09/29/2017    CHF (congestive heart failure) ([facility] 09/27/2017    Mixed hyperlipidemia 09/28/2016    Essential hypertension 09/28/2016    Arthritis        Assessment and Plan:  CKD stage 3  - baseline creatinine 1.5-2.0 depending on volume status  - renal function at baseline  - agree with continued diuretics  - check urine prot/creat ratio to quantify any proteinuria  - check another SPEP, K/L    Acute/chronic systolic CHF  - improving with IV diuretics  - on lasix and aldactone now added  - echo showing likely amyloidosis with restrictive cardiomyopathy- heme/onc consulted for further eval. Previous fat pad tissue biopsy negative for amyloid - ?may need myocardial biopsy    COPD    Delisa Chakraborty, ACNP

## 2018-05-25 NOTE — PROGRESS NOTES
CM met with patient and discussed d/c plan. Patient states him/spouse just moved in with daughter Janay Weathers) about two weeks ago into her apartment. Patient states he still drives a little and when unable spouse / children make sure he get to his appointments. Patient states he do have DME's in the home such as walker, cane, rollator and do have home O2, patient unsure who his provider for home O2. Patient identified his PCP and insurance that is listed. Patient states that he had [facility] but it was out in April. CM made contact with [facility] and spoke with Joe Huertas and she stated that patient home health was completed as of 4/19/18. CM discussed medication with patient. Patient stated he was unable to afford his inhalers. Patient stated that his inhalers cost him $80.00 and he's in a fixed income and unable to afford the inhaler. Patient have been provided a inhaler (Ventolin) HFA 60 Metered Inhalations currently at bedside. Care Management Interventions  PCP Verified by CM: Yes (Dr. Ashleigh Guzman)  Palliative Care Criteria Met (RRAT>21 & CHF Dx)?: Yes (RRAT Score 32)  Palliative Consult Recommended?: Yes  Mode of Transport at Discharge:  Other (see comment) (SPouse / Ascencion Fishman)  Transition of Care Consult (CM Consult): Discharge Planning  Discharge Durable Medical Equipment: No  Physical Therapy Consult: Yes  Occupational Therapy Consult: Yes  Speech Therapy Consult: No  Current Support Network: Lives with Spouse, Relative's Home (Patient states he lives with his daughter Janay Weathers))  Confirm Follow Up Transport: Family (Spouse / Nga Fournier)  Plan discussed with Pt/Family/Caregiver: Yes   Resource Information Provided?: No  Discharge Location  Discharge Placement: Home with home health

## 2018-05-25 NOTE — PROGRESS NOTES
Cardiac Rehab: Referral received for diagnosis of CHF. Cardiac rehab participation is appropriate for those with stable, chronic heart failure defined as patients with left ventricular ejection fraction of 35% or less and New York Heart Association (NYHA) class II to IV symptoms despite being on optimal heart failure therapy for at least six weeks. Stable patients are defined as patients who have not had a recent ( 6 weeks or less) or planned (6 weeks or less) major cardiovascular hospitalizations or procedures. Patient's EF=45-50% on the Echo completed 5/24/18. Pt will be contacted after discharge if qualifying referral is obtained.      Thank you,  Aneesh Cortez, GONZALON, RN  Cardiac Rehab Nurse Liaison

## 2018-05-25 NOTE — PROGRESS NOTES
Hospitalist Progress Note    Subjective:   Daily Progress Note: 5/25/2018 9:34 AM    Pt is a 73 y/o male with pmhx chronic systolic HF followed by [address] Cards, COPD on 4 L NC, CKD who presented to ER with progressive dyspnea and LE edema  x 1-2 weeks. Pt reports increased  Lasix without relief of symptoms. In ER, BNP elevated to 800s and pt admitted for acute CHF exacerbation. Pt had echo which showed echo with Ef 45-50% with stippling pattern concerning for amyloidosis. Pt is being followed by cardiology and nephrology consulted. Pt laying in bed. States he feels a little better but still far off from his baseline. He still has moderate dyspnea but no increased O2 demand - on baseline 4 liters. LE edema is better. States urinating a lot. Denies other complaints. Current Facility-Administered Medications   Medication Dose Route Frequency    spironolactone (ALDACTONE) tablet 25 mg  25 mg Oral DAILY    furosemide (LASIX) injection 60 mg  60 mg IntraVENous DAILY    heparin (porcine) injection 5,000 Units  5,000 Units SubCUTAneous Q8H    allopurinol (ZYLOPRIM) tablet 100 mg  100 mg Oral DAILY    aspirin delayed-release tablet 81 mg  81 mg Oral DAILY    atorvastatin (LIPITOR) tablet 20 mg  20 mg Oral QHS    fluticasone (FLONASE) 50 mcg/actuation nasal spray 2 Spray  2 Spray Both Nostrils DAILY    lisinopril (PRINIVIL, ZESTRIL) tablet 5 mg  5 mg Oral DAILY    melatonin tablet 3 mg (Patient Supplied)  3 mg Oral QHS PRN    pantoprazole (PROTONIX) tablet 40 mg  40 mg Oral ACB    polyethylene glycol (MIRALAX) packet 17 g  17 g Oral DAILY    albuterol (PROVENTIL HFA, VENTOLIN HFA, PROAIR HFA) inhaler 2 Puff  2 Puff Inhalation QID RT    guaiFENesin ER (MUCINEX) tablet 1,200 mg  1,200 mg Oral Q12H    carvedilol (COREG) tablet 25 mg  25 mg Oral BID WITH MEALS        Review of Systems  A comprehensive review of systems was negative except for that written in the HPI.     Objective:     Visit Vitals  /87    Pulse 87    Temp 97.4 °F (36.3 °C)    Resp 19    Ht 5' 9\" (1.753 m)    Wt 104.8 kg (231 lb)    SpO2 98%    BMI 34.11 kg/m2    O2 Flow Rate (L/min): 4 l/min O2 Device: Room air    Temp (24hrs), Av.6 °F (36.4 °C), Min:97.3 °F (36.3 °C), Max:98 °F (36.7 °C)       07 - 1900  In: 280 [P.O.:280]  Out: 150 [Urine:150]  1901 -  0700  In: 930 [P.O.:930]  Out: 1675 [Urine:1675]    Visit Vitals    /87    Pulse 87    Temp 97.4 °F (36.3 °C)    Resp 19    Ht 5' 9\" (1.753 m)    Wt 104.8 kg (231 lb)    SpO2 98%    BMI 34.11 kg/m2     General appearance: alert, cooperative, mild distress when he speaks on supplemental O2, appears stated age  Lungs: clear to auscultation bilaterally - no significant crackles  Heart: regular rate and rhythm, S1, S2 normal  Abdomen: soft, non-tender. Bowel sounds normal. No masses,  no organomegaly  Extremities: extremities normal, atraumatic, no cyanosis. 1-2+ edema  Psych: A+Ox3    Additional comments:I reviewed the patient's new clinical lab test results.  CXR - clear    Data Review    Recent Results (from the past 24 hour(s))   METABOLIC PANEL, BASIC    Collection Time: 18  3:54 AM   Result Value Ref Range    Sodium 141 136 - 145 mmol/L    Potassium 4.2 3.5 - 5.1 mmol/L    Chloride 107 98 - 107 mmol/L    CO2 25 21 - 32 mmol/L    Anion gap 9 7 - 16 mmol/L    Glucose 138 (H) 65 - 100 mg/dL    BUN 25 (H) 8 - 23 MG/DL    Creatinine 1.63 (H) 0.8 - 1.5 MG/DL    GFR est AA 53 (L) >60 ml/min/1.73m2    GFR est non-AA 44 (L) >60 ml/min/1.73m2    Calcium 8.4 8.3 - 10.4 MG/DL   MAGNESIUM    Collection Time: 18  3:54 AM   Result Value Ref Range    Magnesium 2.1 1.8 - 2.4 mg/dL   BNP    Collection Time: 18  3:54 AM   Result Value Ref Range    BNP 1132 pg/mL   TSH 3RD GENERATION    Collection Time: 18  3:54 AM   Result Value Ref Range    TSH 0.604 0.358 - 3.740 uIU/mL         Assessment/Plan:     Principal Problem:    Acute on chronic systolic heart failure ([address] (5/24/2018)      Overview: EF 40%    Active Problems:    CKD (chronic kidney disease) stage 3, GFR 30-59 ml/min (9/29/2017)      Coronary atherosclerosis of native coronary vessel (9/29/2017)      COPD, moderate (HCC) (9/29/2017)      Overview: Complete PFTs: 7/20/15:      Spirometry is consistent with a moderate obstructive/restrictive defect. .       The residual volume is increased relative to other lung volumes suggesting       air-trapping. The diffusion capacity corrected for alveolar volume was       normal suggesting no loss of alveolo-capillary units. CHF (congestive heart failure) ([address] (9/27/2017)      Acute respiratory failure with hypoxemia ([address] (5/24/2018)    Plan:    Doing better and appears to be responding to IV diuretics. Despite LE edema, no significant crackles on chest.  Current presentation also suggestive of restrictive cardiomyopathy and restrictive lung disease. Await nephrology eval. Appreciate cardiology input. - continue IV lasix but increase noted in Cr (1.4-->1. 6). Monitor renal function. May need to accept higher Cr to achieve euvolemia. - continue current meds. Care Plan discussed with: Patient/Family and Nurse    Total time spent with patient: 20 minutes.     Signed By: Antonio Del Rio MD     May 25, 2018

## 2018-05-25 NOTE — ROUTINE PROCESS
CHF teaching started to pt post introduction aware of CHF dx. Planner @ BS and scale @ home.  Emphasis to report worsening dyspnea, Daily WT gain, will follow: 15mins    Continue FR  Palliative Care ACP on file, on hold

## 2018-05-25 NOTE — CONSULTS
[facility] Hematology & Oncology        Inpatient Hematology / Oncology Consult Note    Reason for Consult:  Acute respiratory failure with hypoxemia [facility])  Referring Physician:  Nichole Mckeon MD    History of Present Illness:  Mr. Willi Chen is a 74 y.o. male admitted on 5/23/2018 with a primary diagnosis of The primary encounter diagnosis was COPD exacerbation ([address] Diagnoses of Acute on chronic congestive heart failure, unspecified heart failure type ([address] and Acute on chronic systolic heart failure ([address] were also pertinent to this visit. Juan Ramon Hernadez His PMH includes CAD, hx of MI, HTN, CHF, CKD, and COPD on 4L O2. He presented with c/o worsening shortness of breath x 2 weeks. Also c/o worsening LE edema over past week. Initial , up to 1132 today. CXR with no acute findings. Echo performed and revealed sparkling appearance to myocardium, suspicious for amyloidosis. We were consulted for further evaluation. Review of Systems:  Constitutional Denies fever, chills, weight loss, appetite changes, fatigue, night sweats. HEENT Denies trauma, blurry vision, hearing loss, ear pain, nosebleeds, sore throat, neck pain and ear discharge. Skin Denies lesions or rashes. Lungs +dyspnea. Denies cough, sputum production or hemoptysis. Cardiovascular +LE edema. Denies chest pain, palpitations. Gastrointestinal Denies nausea, vomiting, changes in bowel habits, bloody or black stools, abdominal pain.  Denies dysuria, frequency or hesitancy of urination. Neuro Denies headaches, visual changes or ataxia. Denies dizziness, tingling, tremors, sensory change, speech change, focal weakness or headaches. Hematology Denies easy bruising or bleeding, denies gingival bleeding or epistaxis. Endo Denies heat/cold intolerance, denies diabetes or thyroid abnormalities. MSK Denies back pain, arthralgias, myalgias or frequent falls. Psychiatric/Behavioral Denies depression and substance abuse.  The patient is not nervous/anxious.          Allergies   Allergen Reactions    Pcn [Penicillins] Other (comments)     \"makes my heart stop\"     Past Medical History:   Diagnosis Date    Acute CHF (congestive heart failure) ([facility] 4/25/2015    Acute combined systolic and diastolic congestive heart failure ([facility] 4/28/2015    Chronic obstructive pulmonary disease (HCC)     De Quervain's tenosynovitis, right 4/28/2015    Dyspnea on exertion 6/28/2015    Elevated serum creatinine 4/25/2015    Elevated troponin 6/28/2015    History of coronary artery disease     MI at 27 yo    History of right knee surgery     cartilage removal    History of shingles     Malignant hypertension 4/25/2015    MI ([facility]ocardial [facility])      Past Surgical History:   Procedure Laterality Date    HX HEART CATHETERIZATION  6/29/2015    no intervention    HX KNEE ARTHROSCOPY Right     removal of cartilage     Family History   Problem Relation Age of Onset    Hypertension Mother     No Known Problems Father      Social History     Social History    Marital status:      Spouse name: N/A    Number of children: N/A    Years of education: N/A     Occupational History    retired      Social History Main Topics    Smoking status: Former Smoker     Packs/day: 0.25     Years: 20.00     Types: Cigarettes     Quit date: 4/5/2015    Smokeless tobacco: Never Used    Alcohol use No    Drug use: No    Sexual activity: Not on file     Other Topics Concern     Service No    Blood Transfusions No     no issues with receiving    Caffeine Concern No    Special Diet No    Exercise No    Seat Belt Yes     Social History Narrative    Lives with his wife     Current Facility-Administered Medications   Medication Dose Route Frequency Provider Last Rate Last Dose    spironolactone (ALDACTONE) tablet 25 mg  25 mg Oral DAILY Joe Manning DO   25 mg at 05/25/18 0857    furosemide (LASIX) injection 60 mg  60 mg IntraVENous DAILY [place] Siemens, DO   60 mg at 18 0856    heparin (porcine) injection 5,000 Units  5,000 Units SubCUTAneous Q8H Eric Baumann MD   5,000 Units at 18 0548    allopurinol (ZYLOPRIM) tablet 100 mg  100 mg Oral DAILY Marlene Cardenas MD   100 mg at 18 0857    aspirin delayed-release tablet 81 mg  81 mg Oral DAILY Eric Baumann MD   81 mg at 18 0857    atorvastatin (LIPITOR) tablet 20 mg  20 mg Oral QHS Marlene Cardenas MD   20 mg at 18 0856    fluticasone (FLONASE) 50 mcg/actuation nasal spray 2 Spray  2 Spray Both Nostrils DAILY Eric Baumann MD   2 [place] at 18 0900    lisinopril (PRINIVIL, ZESTRIL) tablet 5 mg  5 mg Oral DAILY Eric Baumann MD   5 mg at 18 0856    melatonin tablet 3 mg (Patient Supplied)  3 mg Oral QHS PRN Marlene Cardenas MD        pantoprazole (PROTONIX) tablet 40 mg  40 mg Oral ACB Eric Baumann MD   40 mg at 18 0857    polyethylene glycol (MIRALAX) packet 17 g  17 g Oral DAILY Eric Baumann MD   17 g at 18 0849    albuterol (PROVENTIL HFA, VENTOLIN HFA, PROAIR HFA) inhaler 2 Puff  2 Puff Inhalation QID RT Marlene Cardenas MD   2 Puff at 18 1110    guaiFENesin ER (MUCINEX) tablet 1,200 mg  1,200 mg Oral Q12H Eric Baumann MD   1,200 mg at 18 0857    carvedilol (COREG) tablet 25 mg  25 mg Oral BID WITH MEALS KWESI Shelby   25 mg at 18 0856       OBJECTIVE:  Patient Vitals for the past 8 hrs:   BP Temp Pulse Resp SpO2 Weight   18 1116 113/64 98 °F (36.7 °C) 80 17 97 % -   18 0820 - - - - 98 % -   18 0716 122/87 97.4 °F (36.3 °C) 87 19 99 % -   18 0611 - - - - - 231 lb (104.8 kg)     Temp (24hrs), Av.7 °F (36.5 °C), Min:97.3 °F (36.3 °C), Max:98 °F (36.7 °C)     07 -  1900  In: 26 [P.O.:460]  Out: 150 [Urine:150]    Physical Exam:  Constitutional: Acutely ill-appearing male in no acute distress, sitting comfortably in the hospital bed. Wife at bedside. HEENT: Normocephalic and atraumatic. Oropharynx is clear, mucous membranes are moist.  Extraocular muscles are intact. Sclerae anicteric. Neck supple without JVD. No thyromegaly present. Lymph node   Deferred   Skin Warm and dry. No bruising and no rash noted. No erythema. No pallor. Respiratory Lungs are clear to auscultation bilaterally without wheezes, rales or rhonchi, normal air exchange without accessory muscle use. CVS Normal rate, regular rhythm and normal S1 and S2. No murmurs, gallops, or rubs. Abdomen Soft, nontender and nondistended, normoactive bowel sounds. No palpable mass. No hepatosplenomegaly. Neuro Grossly nonfocal with no obvious sensory or motor deficits. MSK Normal range of motion in general.  No tenderness. 1+ BLE pitting edema, R>L   Psych Appropriate mood and affect.         Labs:    Recent Results (from the past 24 hour(s))   METABOLIC PANEL, BASIC    Collection Time: 05/25/18  3:54 AM   Result Value Ref Range    Sodium 141 136 - 145 mmol/L    Potassium 4.2 3.5 - 5.1 mmol/L    Chloride 107 98 - 107 mmol/L    CO2 25 21 - 32 mmol/L    Anion gap 9 7 - 16 mmol/L    Glucose 138 (H) 65 - 100 mg/dL    BUN 25 (H) 8 - 23 MG/DL    Creatinine 1.63 (H) 0.8 - 1.5 MG/DL    GFR est AA 53 (L) >60 ml/min/1.73m2    GFR est non-AA 44 (L) >60 ml/min/1.73m2    Calcium 8.4 8.3 - 10.4 MG/DL   MAGNESIUM    Collection Time: 05/25/18  3:54 AM   Result Value Ref Range    Magnesium 2.1 1.8 - 2.4 mg/dL   BNP    Collection Time: 05/25/18  3:54 AM   Result Value Ref Range    BNP 1132 pg/mL   TSH 3RD GENERATION    Collection Time: 05/25/18  3:54 AM   Result Value Ref Range    TSH 0.604 0.358 - 3.740 uIU/mL       Imaging:  XR CHEST PA LAT [[id number]] Collected: 05/23/18 2209      Order Status: Completed Updated: 05/23/18 2215     Narrative:       EXAM:  XR CHEST PA LAT    INDICATION:   Shortness of Breath    COMPARISON: 5/17/2018. FINDINGS: PA and lateral radiographs of the chest demonstrate clear lungs. The  cardiac and mediastinal contours and pulmonary vascularity are normal.  The  bones and soft tissues are within normal limits.        Impression:       IMPRESSION: Normal chest.         ASSESSMENT:  Problem List  Date Reviewed: 2/6/2018          Codes Class Noted    * (Principal)Acute on chronic systolic heart failure (HCC) ICD-10-CM: I50.23  ICD-9-CM: 428.23  5/24/2018    Overview Signed 12/4/2015  9:28 AM by Margarita Francis     EF 40%             Acute respiratory failure with hypoxemia ([facility] ICD-10-CM: J96.01  ICD-9-CM: 518.81  5/24/2018        Erysipelas ICD-10-CM: A46  ICD-9-CM: 035  3/27/2018        Preseptal cellulitis ICD-10-CM: T42.353  ICD-9-CM: 373.13  3/27/2018        Personal history of tobacco use (Chronic) ICD-10-CM: A98.412  ICD-9-CM: V15.82  2/6/2018        Atherosclerosis of native coronary artery of native heart with stable angina pectoris ([facility] ICD-10-CM: I25.118  ICD-9-CM: 414.01, 413.9  10/13/2017        TAZ (obstructive sleep apnea) ICD-10-CM: L12.59  ICD-9-CM: 327.23  10/2/2017        CKD (chronic kidney disease) stage 3, GFR 30-59 ml/min (Chronic) ICD-10-CM: N18.3  ICD-9-CM: 585.3  9/29/2017        Coronary atherosclerosis of native coronary vessel (Chronic) ICD-10-CM: I25.10  ICD-9-CM: 414.01  9/29/2017        Hypertension (Chronic) ICD-10-CM: I10  ICD-9-CM: 401.9  9/29/2017        COPD, moderate (HCC) (Chronic) ICD-10-CM: J44.9  ICD-9-CM: 496  9/29/2017    Overview Signed 12/27/2015 12:38 PM by Gee Winslow NP     Complete PFTs: 7/20/15:  Spirometry is consistent with a moderate obstructive/restrictive defect. . The residual volume is increased relative to other lung volumes suggesting air-trapping. The diffusion capacity corrected for alveolar volume was normal suggesting no loss of alveolo-capillary units.               High triglycerides (Chronic) ICD-10-CM: E78.1  ICD-9-CM: 272.1  9/29/2017 Gastroesophageal reflux disease without esophagitis (Chronic) ICD-10-CM: K21.9  ICD-9-CM: 530.81  9/29/2017        CHF (congestive heart failure) (HCC) ICD-10-CM: I50.9  ICD-9-CM: 428.0  9/27/2017        Mixed hyperlipidemia (Chronic) ICD-10-CM: W59.1  ICD-9-CM: 272.2  9/28/2016        Essential hypertension ICD-10-CM: I10  ICD-9-CM: 401.9  9/28/2016        Arthritis (Chronic) ICD-10-CM: M19.90  ICD-9-CM: 716.90  Unknown                RECOMMENDATIONS:  ?Amyloidosis  - Echo revealed sparkling appearance to myocardium, suspicious for amyloidosis  - IR consulted for BMbx. Surgery consulted for abdominal fat pad biopsy - needs congo red staining.  - Check SPEP, UPEP, FLC, and UA    CHF  - Cardiology managing    Lab studies and imaging studies were personally reviewed. Thank you for allowing us to participate in the care of Mr. Charli Ryder. Ann Mishra NP   [facility] Hematology & Oncology  [address] [place] [address]  Office : [phone]  Fax : [phone]     Attending Addendum:  I personally evaluated the patient with NP Brianna Mora, and agree with the assessment, findings and plan as documented. Appears short of breath when speaking, heart regular, lungs with bibasilar mild crackles, abdomen benign. Briefly, Mr Charli Ryder was previously evaluated for diagnosis of amyloidosis. He had a fat pad biopsy on 1/5/18 which was not diagnostic for amyloid. Cardiac workup from this admission included an echocardiogram that revealed severe concentric hypertrophy with sparkling appearance to myocardium. At this time, it would be reasonable to proceed with further evaluation including a repeat surgical fat pad biopsy and bone marrow biopsy, blood work including SPEP/UPEP/FLC and 24 hour urinalysis to rule out amyloidosis. Bone marrow biopsy procedure and risks discussed with the pt and his wife. He agrees to proceed. Thank you for the opportunity to take care of this patient.         Sharonda MD [facility] Hematology and Oncology  [address] [place] [address]  Office : [phone]  Fax : [phone]

## 2018-05-25 NOTE — CONSULTS
H&P/Consult Note/Progress Note/Office Note:   Jaswant Francis MRN: [id number]  RKR:6/90/4007  Age:73 y.o.    HPI: Jaswant Francis is a 74 y.o. male who is seen for fat pad biopsy to rule out amyloidosis. He had fat pad biopsy this January, which appeared being done by IR, and was not diagnostic. He is now admitted for exacerbation of COPD, acute on chronic CHF, he is quite short of breath.            Past Medical History:   Diagnosis Date    Acute CHF (congestive heart failure) ([facility] 4/25/2015    Acute combined systolic and diastolic congestive heart failure ([facility] 4/28/2015    Chronic obstructive pulmonary disease (HCC)     De Quervain's tenosynovitis, right 4/28/2015    Dyspnea on exertion 6/28/2015    Elevated serum creatinine 4/25/2015    Elevated troponin 6/28/2015    History of coronary artery disease     MI at 25 yo    History of right knee surgery     cartilage removal    History of shingles     Malignant hypertension 4/25/2015    MI (myocardial infarction[facility])      Past Surgical History:   Procedure Laterality Date    HX HEART CATHETERIZATION  6/29/2015    no intervention    HX KNEE ARTHROSCOPY Right     removal of cartilage     Current Facility-Administered Medications   Medication Dose Route Frequency    spironolactone (ALDACTONE) tablet 25 mg  25 mg Oral DAILY    furosemide (LASIX) injection 60 mg  60 mg IntraVENous DAILY    heparin (porcine) injection 5,000 Units  5,000 Units SubCUTAneous Q8H    allopurinol (ZYLOPRIM) tablet 100 mg  100 mg Oral DAILY    aspirin delayed-release tablet 81 mg  81 mg Oral DAILY    atorvastatin (LIPITOR) tablet 20 mg  20 mg Oral QHS    fluticasone (FLONASE) 50 mcg/actuation nasal spray 2 Spray  2 Spray Both Nostrils DAILY    lisinopril (PRINIVIL, ZESTRIL) tablet 5 mg  5 mg Oral DAILY    melatonin tablet 3 mg (Patient Supplied)  3 mg Oral QHS PRN    pantoprazole (PROTONIX) tablet 40 mg  40 mg Oral ACB    polyethylene glycol (MIRALAX) packet 17 g 17 g Oral DAILY    albuterol (PROVENTIL HFA, VENTOLIN HFA, PROAIR HFA) inhaler 2 Puff  2 Puff Inhalation QID RT    guaiFENesin ER (MUCINEX) tablet 1,200 mg  1,200 mg Oral Q12H    carvedilol (COREG) tablet 25 mg  25 mg Oral BID WITH MEALS     Pcn [penicillins]  Social History     Social History    Marital status:      Spouse name: N/A    Number of children: N/A    Years of education: N/A     Occupational History    retired      Social History Main Topics    Smoking status: Former Smoker     Packs/day: 0.25     Years: 20.00     Types: Cigarettes     Quit date: 4/5/2015    Smokeless tobacco: Never Used    Alcohol use No    Drug use: No    Sexual activity: Not on file     Other Topics Concern     Service No    Blood Transfusions No     no issues with receiving    Caffeine Concern No    Special Diet No    Exercise No    Seat Belt Yes     Social History Narrative    Lives with his wife     History   Smoking Status    Former Smoker    Packs/day: 0.25    Years: 20.00    Types: Cigarettes    Quit date: 4/5/2015   Smokeless Tobacco    Never Used     Family History   Problem Relation Age of Onset    Hypertension Mother     No Known Problems Father      ROS: The patient has no difficulty with chest pain or shortness of breath. No fever or chills. Comprehensive review of systems was otherwise unremarkable except as noted above. Physical Exam:   Visit Vitals    /63    Pulse 71    Temp 97.7 °F (36.5 °C)    Resp 19    Ht 5' 9\" (1.753 m)    Wt 231 lb (104.8 kg)    SpO2 98%    BMI 34.11 kg/m2     Constitutional: Alert, oriented, cooperative patient in no acute distress; appears stated age    Eyes:Sclera are clear. EOMs intact  ENMT: no external lesions gross hearing normal; no obvious neck masses, no ear or lip lesions, nares normal  CV: RRR. Normal perfusion  Resp: No JVD. Breathing is  non-labored; no audible wheezing.     GI: soft and non-distended     Musculoskeletal: unremarkable with normal function. No embolic signs or cyanosis. Neuro:  Oriented; moves all 4; no focal deficits  Psychiatric: normal affect and mood, no memory impairment    Recent vitals (if inpt):  Patient Vitals for the past 24 hrs:   BP Temp Pulse Resp SpO2 Weight   05/25/18 1459 110/63 97.7 °F (36.5 °C) 71 19 98 % -   05/25/18 1116 113/64 98 °F (36.7 °C) 80 17 97 % -   05/25/18 0820 - - - - 98 % -   05/25/18 0716 122/87 97.4 °F (36.3 °C) 87 19 99 % -   05/25/18 0611 - - - - - 231 lb (104.8 kg)   05/25/18 0403 108/70 97.7 °F (36.5 °C) 75 18 97 % -   05/24/18 2309 117/76 98 °F (36.7 °C) 76 18 98 % -   05/24/18 1950 138/78 97.5 °F (36.4 °C) 73 18 99 % -   05/24/18 1918 - - - - 99 % -       Labs:  Recent Labs      05/25/18   0354  05/24/18   0343   05/23/18   2141   WBC   --   5.7   --   6.8   HGB   --   14.0   --   12.6*   PLT   --   190   --   176   NA  141   --    --   144   K  4.2   --    --   3.7   CL  107   --    --   112*   CO2  25   --    --   24   BUN  25*   --    --   19   CREA  1.63*   --    --   1.40   GLU  138*   --    --   78   TBILI   --    --    --   0.8   SGOT   --    --    --   36   ALT   --    --    --   28   AP   --    --    --   109   TROIQ   --   0.73*   < >  0.69*    < > = values in this interval not displayed. Lab Results   Component Value Date/Time    WBC 5.7 05/24/2018 03:43 AM    HGB 14.0 05/24/2018 03:43 AM    PLATELET 374 29/89/1814 03:43 AM    Sodium 141 05/25/2018 03:54 AM    Potassium 4.2 05/25/2018 03:54 AM    Chloride 107 05/25/2018 03:54 AM    CO2 25 05/25/2018 03:54 AM    BUN 25 (H) 05/25/2018 03:54 AM    Creatinine 1.63 (H) 05/25/2018 03:54 AM    Glucose 138 (H) 05/25/2018 03:54 AM    aPTT 32.5 12/28/2017 12:16 PM    Bilirubin, total 0.8 05/23/2018 09:41 PM    Bilirubin, direct 0.5 (H) 05/04/2018 05:30 AM    AST (SGOT) 36 05/23/2018 09:41 PM    ALT (SGPT) 28 05/23/2018 09:41 PM    Alk.  phosphatase 109 05/23/2018 09:41 PM    Lipase 112 12/28/2016 03:53 PM    Troponin-I, Qt. 0.73 () 05/24/2018 03:43 AM       I reviewed recent labs and recent radiologic studies. I independently reviewed radiology images for studies I described above or studies I have ordered. Admission date (for inpatients): 5/23/2018   * No surgery found *  * No surgery found *    ASSESSMENT/PLAN:  Problem List  Date Reviewed: 2/6/2018          Codes Class Noted    * (Principal)Acute on chronic systolic heart failure ([facility] ICD-10-CM: I50.23  ICD-9-CM: 428.23  5/24/2018    Overview Signed 12/4/2015  9:28 AM by Margo Lestre     EF 40%             Acute respiratory failure with hypoxemia [facility]) ICD-10-CM: J96.01  ICD-9-CM: 518.81  5/24/2018        Erysipelas ICD-10-CM: A46  ICD-9-CM: 035  3/27/2018        Preseptal cellulitis ICD-10-CM: V35.482  ICD-9-CM: 373.13  3/27/2018        Personal history of tobacco use (Chronic) ICD-10-CM: Z13.940  ICD-9-CM: V15.82  2/6/2018        Atherosclerosis of native coronary artery of native heart with stable angina pectoris ([facility] ICD-10-CM: I25.118  ICD-9-CM: 414.01, 413.9  10/13/2017        TAZ (obstructive sleep apnea) ICD-10-CM: I14.28  ICD-9-CM: 327.23  10/2/2017        CKD (chronic kidney disease) stage 3, GFR 30-59 ml/min (Chronic) ICD-10-CM: N18.3  ICD-9-CM: 585.3  9/29/2017        Coronary atherosclerosis of native coronary vessel (Chronic) ICD-10-CM: I25.10  ICD-9-CM: 414.01  9/29/2017        Hypertension (Chronic) ICD-10-CM: I10  ICD-9-CM: 401.9  9/29/2017        COPD, moderate (HCC) (Chronic) ICD-10-CM: J44.9  ICD-9-CM: 496  9/29/2017    Overview Signed 12/27/2015 12:38 PM by Dasha Abbott NP     Complete PFTs: 7/20/15:  Spirometry is consistent with a moderate obstructive/restrictive defect. . The residual volume is increased relative to other lung volumes suggesting air-trapping. The diffusion capacity corrected for alveolar volume was normal suggesting no loss of alveolo-capillary units.               High triglycerides (Chronic) ICD-10-CM: E78.1  ICD-9-CM: 272.1 9/29/2017        Gastroesophageal reflux disease without esophagitis (Chronic) ICD-10-CM: K21.9  ICD-9-CM: 530.81  9/29/2017        CHF (congestive heart failure) ([facility] ICD-10-CM: I50.9  ICD-9-CM: 428.0  9/27/2017        Mixed hyperlipidemia (Chronic) ICD-10-CM: X30.3  ICD-9-CM: 272.2  9/28/2016        Essential hypertension ICD-10-CM: I10  ICD-9-CM: 401.9  9/28/2016        Arthritis (Chronic) ICD-10-CM: M19.90  ICD-9-CM: 716.90  Unknown            Principal Problem:    Acute on chronic systolic heart failure ([facility] (5/24/2018)      Overview: EF 40%    Active Problems:    CKD (chronic kidney disease) stage 3, GFR 30-59 ml/min (9/29/2017)      Coronary atherosclerosis of native coronary vessel (9/29/2017)      COPD, moderate ([address] (9/29/2017)      Overview: Complete PFTs: 7/20/15:      Spirometry is consistent with a moderate obstructive/restrictive defect. .       The residual volume is increased relative to other lung volumes suggesting       air-trapping. The diffusion capacity corrected for alveolar volume was       normal suggesting no loss of alveolo-capillary units. CHF (congestive heart failure) ([facility] (9/27/2017)      Acute respiratory failure with hypoxemia ([address] (5/24/2018)       To rule out amyloidosis, plan abdominal fat pad biopsy. Tentatively schedule for earlier next week if he is still here. If he is discharged this weekend, biopsy could be arranged as outpatient.        Signed:  Gena Rasheed MD,  FACS

## 2018-05-25 NOTE — PROGRESS NOTES
Problem: Mobility Impaired (Adult and Pediatric)  Goal: *Acute Goals and Plan of Care (Insert Text)  LTG:  (1.)Mr. Emeka Bennett will move from supine to sit and sit to supine , scoot up and down and roll side to side with INDEPENDENT within 7 treatment day(s) from flat surface without handrail. (2.)Mr. Emeka Bennett will transfer from bed to chair and chair to bed with INDEPENDENT using the least restrictive device within 7 treatment day(s). (3.)Mr. Emeka Bennett will ambulate with MODIFIED INDEPENDENCE for 250+ feet with the least restrictive device within 7 treatment day(s), O2 stats >90%. (4.)Mr. Emeka Bennett will be independent in HEP for B LE strengthening within 7 treatment days for increased strength with mobility. (5.)Mr. Emeka Bennett will verbalize energy conservation techniques within 7 treatment days with independence.   ______________________________________________________________________________________________     PHYSICAL THERAPY: Daily Note, AM 5/25/2018  INPATIENT: Hospital Day: 3  Payor: Macho Guajardo / Plan: [address] [place] [address] HMO / Product Type: Managed Care Medicare /      NAME/AGE/GENDER: Jose Manuel oJyce is a 74 y.o. male   PRIMARY DIAGNOSIS: Acute respiratory failure with hypoxemia ([facility] Acute on chronic systolic heart failure (HCC) Acute on chronic systolic heart failure ([facility]        ICD-10: Treatment Diagnosis:    · Generalized Muscle Weakness (M62.81)  · Difficulty in walking, Not elsewhere classified (R26.2)   Precaution/Allergies:  Pcn [penicillins]      ASSESSMENT:     Mr. Emeka Bennett sitting up in chair upon arrival.  He required CGx1 for transfers and ambulation. A RW was used once pt. was on his feet for balance and safety purposes. He was fatigued following short bout of ambulation. He will benefit from continued PT while in the hospital to improve activity tolerance. He will benefit from continued PT services following hospital D/C to facilitate a return to baseline level of function.     This section established at most recent assessment   PROBLEM LIST (Impairments causing functional limitations):  1. Decreased ADL/Functional Activities  2. Decreased Transfer Abilities  3. Decreased Ambulation Ability/Technique  4. Decreased Balance  5. Decreased Activity Tolerance  6. Increased Fatigue  7. Increased Shortness of Breath  8. Decreased [place] with Home Exercise Program   INTERVENTIONS PLANNED: (Benefits and precautions of physical therapy have been discussed with the patient.)  1. Balance Exercise  2. Bed Mobility  3. Family Education  4. Gait Training  5. Home Exercise Program (HEP)  6. Therapeutic Activites  7. Therapeutic Exercise/Strengthening  8. Transfer Training  9. Group Therapy     TREATMENT PLAN: Frequency/Duration: 3 times a week for duration of hospital stay  Rehabilitation Potential For Stated Goals: Good     RECOMMENDED REHABILITATION/EQUIPMENT: (at time of discharge pending progress): Due to the probability of continued deficits (see above) this patient will likely need continued skilled physical therapy after discharge. Equipment:    None at this time              HISTORY:   History of Present Injury/Illness (Reason for Referral):  74years old M with PMH of systolic HF, CKD, COPD on 4L O2 at home presented to the hospital complaining of worsening SOB for the last 2 weeks. Patient stated SOB was mostly on exertion but now is also at rest. Patient reported lasix was helping him but for the last 2 days is not working. Patient also reported having worsening LE edema for the last week. Patient's family reported he is unable to afford  Inhalers for COPD. Patient reported he is taking all his \" heart medications\" everyday. Patient denies chest pain, abdominal pain, diarrhea, fever, cough or sick contacts at home.      Past Medical History/Comorbidities:   Mr. Joel Reyes  has a past medical history of Acute CHF (congestive heart failure) ([facility] (4/25/2015);  Acute combined systolic and diastolic congestive heart failure ([facility] (4/28/2015); Chronic obstructive pulmonary disease ([facility] De Quervain's tenosynovitis, right (4/28/2015); Dyspnea on exertion (6/28/2015); Elevated serum creatinine (4/25/2015); Elevated troponin (6/28/2015); History of coronary artery disease; History of right knee surgery; History of shingles; Malignant hypertension (4/25/2015); and MI (myocardial infarction) ([facility] Mr. Charli Ryder  has a past surgical history that includes hx knee arthroscopy (Right) and hx heart catheterization (6/29/2015). Social History/Living Environment:   Home Environment: Apartment  # Steps to Enter: 0  One/Two Story Residence: One story  Living Alone: No  Support Systems: Spouse/Significant Other/Partner, Child(isatu)  Patient Expects to be Discharged to[de-identified] Apartment  Current DME Used/Available at Home: Oxygen, portable, Nebulizer  Tub or Shower Type: Tub/Shower combination  Prior Level of Function/Work/Activity:  Lives with spouse and daughter; independent in ADLs, ambulation, driving; on 4 L home O2  Personal Factors:          Sex:  male        Age:  74 y.o. Overall Behavior:  agreeable   Number of Personal Factors/Comorbidities that affect the Plan of Care:  COPD, ARF, CHF 3+: HIGH COMPLEXITY   EXAMINATION:   Most Recent Physical Functioning:   Gross Assessment:  AROM: Within functional limits               Posture:  Posture Assessment: Forward head  Balance:  Sitting: Impaired  Standing: Impaired Bed Mobility:  Supine to Sit: Contact guard assistance  Scooting: Contact guard assistance  Wheelchair Mobility:     Transfers:  Sit to Stand: Contact guard assistance  Stand to Sit: Contact guard assistance  Bed to Chair: Contact guard assistance  Gait:     Distance (ft): 35 Feet (ft)  Assistive Device: Walker, rolling  Ambulation - Level of Assistance: Contact guard assistance      Body Structures Involved:  1. Muscles Body Functions Affected:  1.  Movement Related Activities and Participation Affected:  1. General Tasks and Demands  2. Mobility  3. Self Care   Number of elements that affect the Plan of Care: 4+: HIGH COMPLEXITY   CLINICAL PRESENTATION:   Presentation: Stable and uncomplicated: LOW COMPLEXITY   CLINICAL DECISION MAKIN [facility] [address] [id number] AM-PAC 6 Clicks   Basic Mobility Inpatient Short Form  How much difficulty does the patient currently have. .. Unable A Lot A Little None   1. Turning over in bed (including adjusting bedclothes, sheets and blankets)? [] 1   [] 2   [] 3   [x] 4   2. Sitting down on and standing up from a chair with arms ( e.g., wheelchair, bedside commode, etc.)   [] 1   [] 2   [x] 3   [] 4   3. Moving from lying on back to sitting on the side of the bed? [] 1   [] 2   [x] 3   [] 4   How much help from another person does the patient currently need. .. Total A Lot A Little None   4. Moving to and from a bed to a chair (including a wheelchair)? [] 1   [] 2   [x] 3   [] 4   5. Need to walk in hospital room? [] 1   [] 2   [x] 3   [] 4   6. Climbing 3-5 steps with a railing? [] 1   [] 2   [x] 3   [] 4   © , Trustees of [address] [place] [address] [address] [id number], under license to [email or web address]. All rights reserved      Score:  Initial: 19 Most Recent: X (Date: -- )    Interpretation of Tool:  Represents activities that are increasingly more difficult (i.e. Bed mobility, Transfers, Gait). Score 24 23 22-20 19-15 14-10 9-7 6     Modifier CH CI CJ CK CL CM CN      ?  Mobility - Walking and Moving Around:     - CURRENT STATUS: CK - 40%-59% impaired, limited or restricted    - GOAL STATUS: CJ - 20%-39% impaired, limited or restricted    - D/C STATUS:  ---------------To be determined---------------  Payor: Rosalba Bone / Plan: [address] [place] [address] HMO / Product Type: Managed Care Medicare /      Medical Necessity:     · Patient is expected to demonstrate progress in strength, range of motion, balance and coordination to decrease assistance required with overall functional mobility, transfers, ambulation. · Patient demonstrates good rehab potential due to higher previous functional level. Reason for Services/Other Comments:  · Patient continues to require present interventions due to patient's inability to perform bed mobility, transfers, ambulation safely and effectively. Use of outcome tool(s) and clinical judgement create a POC that gives a: Clear prediction of patient's progress: LOW COMPLEXITY            TREATMENT:   (In addition to Assessment/Re-Assessment sessions the following treatments were rendered)   Pre-treatment Symptoms/Complaints:  \" I am ok I guess\"  Pain: Initial:   Pain Intensity 1: 0  Post Session:  0/10 post session     Therapeutic Activity: (    23 minutes): Therapeutic activities including Bed transfers and Ambulation on level ground to improve mobility, strength, balance and coordination. Required minimal A   to promote motor control of bilateral, lower extremity(s). Braces/Orthotics/Lines/Etc:   · O2 Device: Nasal cannula  Treatment/Session Assessment:    · Response to Treatment:  Tolerated well  · Interdisciplinary Collaboration:   o Physical Therapist  o Registered Nurse  · After treatment position/precautions:   o Up in chair  o Bed/Chair-wheels locked  o Bed in low position  o Call light within reach  o RN notified   · Compliance with Program/Exercises: Will assess as treatment progresses. · Recommendations/Intent for next treatment session: \"Next visit will focus on advancements to more challenging activities\".   Total Treatment Duration:  PT Patient Time In/Time Out  Time In: 1406  Time Out: 130 [facility],

## 2018-05-25 NOTE — PROGRESS NOTES
[facility] CARDIOLOGY PROGRESS NOTE           5/25/2018 7:44 AM    Admit Date: 5/23/2018    Subjective:   He has ongoing NYHA Class III sx now. Echo showing likely amyloidosis. No CP, edema better. Weak overall. Sinus on tele. ROS:  GEN:  No fever or chills  Cardiovascular:  As noted above  Pulmonary:  As noted above  Neuro:  No new focal motor or sensory loss    Objective:      Vitals:    05/24/18 2309 05/25/18 0403 05/25/18 0611 05/25/18 0716   BP: 117/76 108/70  122/87   Pulse: 76 75  87   Resp: 18 18 19   Temp: 98 °F (36.7 °C) 97.7 °F (36.5 °C)  97.4 °F (36.3 °C)   SpO2: 98% 97%  99%   Weight:   104.8 kg (231 lb)    Height:           Physical Exam:  General-A and O x 3  Neck- supple, no JVD  CV- regular rate and rhythm no MRG  Lung- clear bilaterally with dec BS in the bases  Abd- soft, nontender, nondistended  Ext- mod pitting edema bilaterally. Skin- warm and dry  Psychiatric:  Normal mood and affect. 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 He has severe restrictive diastolic HF as well by echo. Consult renal and onc for more assessment and recs. Renal fxn worsening. Diuresis will be difficult. Remain on IV lasix for another day. Remain on coreg and Ace. Follow AM labs. Options may be limited given age. May ultimately need transfer for tertiary care facility. Consider biopsy tough may be more academic for diagnosis purposes. Left ventricle: LV strian imaging shows pattern consistent with amyloid. Relatively well preserved apical strain with significant basal impairment. Systolic function was mildly reduced. Ejection fraction was estimated in the range of 45 % to 50 %. Active Problems:    CKD (chronic kidney disease) stage 3, GFR 30-59 ml/min (9/29/2017)    Consult renal, amyloidosis. Check AM labs. Follow K and Cr on aldactone as well. Coronary atherosclerosis of native coronary vessel (9/29/2017)  Mild CAD 1/2018 and 2015 LHCs. Remain on BB, ASA and statin. Trop low lying, not a MI. No LHC needed. COPD, moderate ([facility] (9/29/2017)  follow      CHF (congestive heart failure) ([facility] (9/27/2017)  As above      Acute respiratory failure with hypoxemia ([address] (5/24/2018)  Continue oxygen, diuresis. Follow. CXR ok.           Jessica Guzman DO  5/25/2018 7:44 AM

## 2018-05-25 NOTE — PROGRESS NOTES
Problem: Self Care Deficits Care Plan (Adult)  Goal: *Acute Goals and Plan of Care (Insert Text)  1. Patient will complete lower body bathing and dressing with MOD I and adaptive equipment as needed. 2. Patient will complete toileting with MOD I.   3. Patient will tolerate 23 minutes of OT treatment with less than 4 rest breaks to increase activity tolerance for ADLs. 4. Patient will complete functional transfers with MOD I and adaptive equipment as needed. Timeframe: 7 visits     Comments:     OCCUPATIONAL THERAPY: Daily Note, Treatment Day: 1st and PM 5/25/2018  INPATIENT: Hospital Day: 3  Payor: Burak De La Torre / Plan: [address] [place] [address] HMO / Product Type: Managed Care Medicare /      NAME/AGE/GENDER: Colette Zavaleta is a 74 y.o. male   PRIMARY DIAGNOSIS:  Acute respiratory failure with hypoxemia ([address] Acute on chronic systolic heart failure (HCC) Acute on chronic systolic heart failure ([address]        ICD-10: Treatment Diagnosis:    · Generalized Muscle Weakness (M62.81)   Precautions/Allergies:     Pcn [penicillins]      ASSESSMENT:   Mr. Clarisa Nicole was admitted for the above diagnosis. Pt presents supine upon arrival. Pt was able to complete bed mobility with CGA. He demonstrates fair sitting balance at edge of bed. Pt transferred over to recliner to complete UE exercises (in grid below) to increase activity tolerance and strength. Pt did well with exercises. Left in chair with all needs in reach. Will continue to benefit from skilled OT during stay. This section established at most recent assessment   PROBLEM LIST (Impairments causing functional limitations):  1. Decreased Strength  2. Decreased ADL/Functional Activities  3. Decreased Transfer Abilities  4. Decreased Balance  5. Decreased Activity Tolerance   INTERVENTIONS PLANNED: (Benefits and precautions of occupational therapy have been discussed with the patient.)  1. Activities of daily living training  2. Adaptive equipment training  3.  Balance training  4. Clothing management  5. Donning&doffing training  6. Group therapy  7. Therapeutic activity  8. Therapeutic exercise     TREATMENT PLAN: Frequency/Duration: Follow patient 3x/week to address above goals. Rehabilitation Potential For Stated Goals: Good     RECOMMENDED REHABILITATION/EQUIPMENT: (at time of discharge pending progress): Due to the probability of continued deficits (see above) this patient will likely need continued skilled occupational therapy after discharge. Equipment:    None at this time              OCCUPATIONAL PROFILE AND HISTORY:   History of Present Injury/Illness (Reason for Referral):  See H&P  Past Medical History/Comorbidities:   Mr. Sofie Felder  has a past medical history of Acute CHF (congestive heart failure) ([facility] (4/25/2015); Acute combined systolic and diastolic congestive heart failure ([facility] (4/28/2015); Chronic obstructive pulmonary disease ([facility] De Quervain's tenosynovitis, right (4/28/2015); Dyspnea on exertion (6/28/2015); Elevated serum creatinine (4/25/2015); Elevated troponin (6/28/2015); History of coronary artery disease; History of right knee surgery; History of shingles; Malignant hypertension (4/25/2015); and MI (myocardial infarction) ([facility] Mr. Sofie Felder  has a past surgical history that includes hx knee arthroscopy (Right) and hx heart catheterization (6/29/2015). Social History/Living Environment:   Home Environment: Apartment  # Steps to Enter: 0  One/Two Story Residence: One story  Living Alone: No  Support Systems: Spouse/Significant Other/Partner, Child(isatu)  Patient Expects to be Discharged to[de-identified] Apartment  Current DME Used/Available at Home: Oxygen, portable, Nebulizer  Tub or Shower Type: Tub/Shower combination  Prior Level of Function/Work/Activity:  [employer] with ADLs. Decline x 2 weeks. Personal Factors:          Sex:  male        Age:  74 y.o.    Number of Personal Factors/Comorbidities that affect the Plan of Care: Expanded review of therapy/medical records (1-2):  MODERATE COMPLEXITY   ASSESSMENT OF OCCUPATIONAL PERFORMANCE[de-identified]   Activities of Daily Living:           Basic ADLs (From Assessment) Complex ADLs (From Assessment)   Feeding: Independent  Oral Facial Hygiene/Grooming: Independent  Bathing: Contact guard assistance  Upper Body Dressing: Independent  Lower Body Dressing: Minimum assistance  Toileting: Contact guard assistance     Grooming/Bathing/Dressing Activities of Daily Living     Cognitive Retraining  Safety/Judgement: Awareness of environment                       Bed/Mat Mobility  Supine to Sit: Contact guard assistance  Sit to Stand: Contact guard assistance  Bed to Chair: Contact guard assistance  Scooting: Contact guard assistance       Most Recent Physical Functioning:   Gross Assessment:                  Posture:  Posture (WDL): Exceptions to WDL  Posture Assessment: Forward head  Balance:  Sitting: Impaired  Sitting - Static: Good (unsupported)  Sitting - Dynamic: Fair (occasional)  Standing: Impaired  Standing - Static: Good  Standing - Dynamic : Fair Bed Mobility:  Supine to Sit: Contact guard assistance  Scooting: Contact guard assistance  Wheelchair Mobility:     Transfers:  Sit to Stand: Contact guard assistance  Stand to Sit: Contact guard assistance  Bed to Chair: Contact guard assistance                Patient Vitals for the past 6 hrs:   BP SpO2 Pulse   05/25/18 1116 113/64 97 % 80   05/25/18 1459 110/63 98 % 71       Mental Status  Neurologic State: Alert  Orientation Level: Oriented X4  Cognition: Appropriate decision making, Follows commands  Perception: Appears intact  Perseveration: No perseveration noted  Safety/Judgement: Awareness of environment                          Physical Skills Involved:  1. Balance  2. Strength  3. Activity Tolerance  4.  Edema Cognitive Skills Affected (resulting in the inability to perform in a timely and safe manner):  1. n/a Psychosocial Skills Affected:  1. Habits/Routines  2. Environmental Adaptation  3. Social Roles   Number of elements that affect the Plan of Care: 5+:  HIGH COMPLEXITY   CLINICAL DECISION MAKIN[address] [place] [address] AM-PAC 6 Clicks   Daily Activity Inpatient Short Form  How much help from another person does the patient currently need. .. Total A Lot A Little None   1. Putting on and taking off regular lower body clothing? [] 1   [] 2   [x] 3   [] 4   2. Bathing (including washing, rinsing, drying)? [] 1   [] 2   [x] 3   [] 4   3. Toileting, which includes using toilet, bedpan or urinal?   [] 1   [] 2   [x] 3   [] 4   4. Putting on and taking off regular upper body clothing? [] 1   [] 2   [] 3   [x] 4   5. Taking care of personal grooming such as brushing teeth? [] 1   [] 2   [] 3   [x] 4   6. Eating meals? [] 1   [] 2   [] 3   [x] 4   © , Trustees of [address] [place] [address], under license to [employer]. All rights reserved      Score:  Initial: 21 Most Recent: X (Date: -- )    Interpretation of Tool:  Represents activities that are increasingly more difficult (i.e. Bed mobility, Transfers, Gait). Score 24 23 22-20 19-15 14-10 9-7 6     Modifier CH CI CJ CK CL CM CN      ? Self Care:     - CURRENT STATUS: CJ - 20%-39% impaired, limited or restricted    - GOAL STATUS: CI - 1%-19% impaired, limited or restricted    - D/C STATUS:  ---------------To be determined---------------  Payor: Chen Wright / Plan: [address] [place] [address] HMO / Product Type: Managed Care Medicare /      Medical Necessity:     · Patient is expected to demonstrate progress in strength, balance and functional technique to improve safety during ADLs. Reason for Services/Other Comments:  · Patient continues to require modification of therapeutic interventions to increase complexity of exercises.    Use of outcome tool(s) and clinical judgement create a POC that gives a: LOW COMPLEXITY         TREATMENT:   (In addition to Assessment/Re-Assessment sessions the following treatments were rendered)     Pre-treatment Symptoms/Complaints:    Pain: Initial:   Pain Intensity 1: 0  Post Session:  same     Therapeutic Activity: (15 minutes): Therapeutic activities including Bed transfers, Chair transfers and UE Exercises to improve mobility, strength and activity tolerance. Required CGA to promote static and dynamic balance in standing. Braces/Orthotics/Lines/Etc:   · O2 Device: Nasal cannula  Treatment/Session Assessment:    · Response to Treatment:  Tolerated Well  · Interdisciplinary Collaboration:   o Certified Occupational Therapy Assistant  o Registered Nurse  · After treatment position/precautions:   o Up in chair  o Bed/Chair-wheels locked  o Call light within reach  o Family at bedside   · Compliance with Program/Exercises: compliant all of the time. · Recommendations/Intent for next treatment session: \"Next visit will focus on advancements to more challenging activities and reduction in assistance provided\".   Total Treatment Duration:  OT Patient Time In/Time Out  Time In: 1345  Time Out: 200 [facility] Khadijah Bustillos

## 2018-05-26 LAB
ANION GAP SERPL CALC-SCNC: 9 MMOL/L (ref 7–16)
BNP SERPL-MCNC: 961 PG/ML
BUN SERPL-MCNC: 28 MG/DL (ref 8–23)
CALCIUM SERPL-MCNC: 8.3 MG/DL (ref 8.3–10.4)
CHLORIDE SERPL-SCNC: 108 MMOL/L (ref 98–107)
CO2 SERPL-SCNC: 25 MMOL/L (ref 21–32)
CREAT SERPL-MCNC: 1.7 MG/DL (ref 0.8–1.5)
GLUCOSE SERPL-MCNC: 107 MG/DL (ref 65–100)
MAGNESIUM SERPL-MCNC: 2.1 MG/DL (ref 1.8–2.4)
POTASSIUM SERPL-SCNC: 3.9 MMOL/L (ref 3.5–5.1)
SODIUM SERPL-SCNC: 142 MMOL/L (ref 136–145)

## 2018-05-26 PROCEDURE — 83735 ASSAY OF MAGNESIUM: CPT | Performed by: INTERNAL MEDICINE

## 2018-05-26 PROCEDURE — 94760 N-INVAS EAR/PLS OXIMETRY 1: CPT

## 2018-05-26 PROCEDURE — 74011250636 HC RX REV CODE- 250/636: Performed by: INTERNAL MEDICINE

## 2018-05-26 PROCEDURE — 77010033678 HC OXYGEN DAILY

## 2018-05-26 PROCEDURE — 74011250637 HC RX REV CODE- 250/637: Performed by: PHYSICIAN ASSISTANT

## 2018-05-26 PROCEDURE — 80048 BASIC METABOLIC PNL TOTAL CA: CPT | Performed by: INTERNAL MEDICINE

## 2018-05-26 PROCEDURE — 74011250637 HC RX REV CODE- 250/637: Performed by: INTERNAL MEDICINE

## 2018-05-26 PROCEDURE — 83880 ASSAY OF NATRIURETIC PEPTIDE: CPT | Performed by: INTERNAL MEDICINE

## 2018-05-26 PROCEDURE — 94640 AIRWAY INHALATION TREATMENT: CPT

## 2018-05-26 PROCEDURE — 36415 COLL VENOUS BLD VENIPUNCTURE: CPT | Performed by: INTERNAL MEDICINE

## 2018-05-26 PROCEDURE — 65660000000 HC RM CCU STEPDOWN

## 2018-05-26 RX ORDER — ACETAMINOPHEN 325 MG/1
650 TABLET ORAL
Status: DISCONTINUED | OUTPATIENT
Start: 2018-05-26 | End: 2018-05-30 | Stop reason: HOSPADM

## 2018-05-26 RX ORDER — ONDANSETRON 2 MG/ML
4 INJECTION INTRAMUSCULAR; INTRAVENOUS
Status: DISCONTINUED | OUTPATIENT
Start: 2018-05-26 | End: 2018-05-30 | Stop reason: HOSPADM

## 2018-05-26 RX ORDER — HYDROCODONE BITARTRATE AND ACETAMINOPHEN 5; 325 MG/1; MG/1
1 TABLET ORAL
Status: DISCONTINUED | OUTPATIENT
Start: 2018-05-26 | End: 2018-05-30 | Stop reason: HOSPADM

## 2018-05-26 RX ADMIN — ALBUTEROL SULFATE 2 PUFF: 90 AEROSOL, METERED RESPIRATORY (INHALATION) at 11:24

## 2018-05-26 RX ADMIN — FLUTICASONE PROPIONATE 2 SPRAY: 50 SPRAY, METERED NASAL at 09:00

## 2018-05-26 RX ADMIN — GUAIFENESIN 1200 MG: 600 TABLET, EXTENDED RELEASE ORAL at 10:12

## 2018-05-26 RX ADMIN — CARVEDILOL 25 MG: 25 TABLET, FILM COATED ORAL at 10:14

## 2018-05-26 RX ADMIN — HEPARIN SODIUM 5000 UNITS: 5000 INJECTION, SOLUTION INTRAVENOUS; SUBCUTANEOUS at 17:37

## 2018-05-26 RX ADMIN — ALBUTEROL SULFATE 2 PUFF: 90 AEROSOL, METERED RESPIRATORY (INHALATION) at 20:22

## 2018-05-26 RX ADMIN — HEPARIN SODIUM 5000 UNITS: 5000 INJECTION, SOLUTION INTRAVENOUS; SUBCUTANEOUS at 06:01

## 2018-05-26 RX ADMIN — LISINOPRIL 5 MG: 5 TABLET ORAL at 10:12

## 2018-05-26 RX ADMIN — ASPIRIN 81 MG: 81 TABLET, COATED ORAL at 10:13

## 2018-05-26 RX ADMIN — SPIRONOLACTONE 25 MG: 25 TABLET, FILM COATED ORAL at 10:14

## 2018-05-26 RX ADMIN — ALBUTEROL SULFATE 2 PUFF: 90 AEROSOL, METERED RESPIRATORY (INHALATION) at 15:47

## 2018-05-26 RX ADMIN — HEPARIN SODIUM 5000 UNITS: 5000 INJECTION, SOLUTION INTRAVENOUS; SUBCUTANEOUS at 21:35

## 2018-05-26 RX ADMIN — ATORVASTATIN CALCIUM 20 MG: 10 TABLET, FILM COATED ORAL at 10:12

## 2018-05-26 RX ADMIN — PANTOPRAZOLE SODIUM 40 MG: 40 TABLET, DELAYED RELEASE ORAL at 06:00

## 2018-05-26 RX ADMIN — GUAIFENESIN 1200 MG: 600 TABLET, EXTENDED RELEASE ORAL at 21:33

## 2018-05-26 RX ADMIN — ALLOPURINOL 100 MG: 100 TABLET ORAL at 10:13

## 2018-05-26 RX ADMIN — ALBUTEROL SULFATE 2 PUFF: 90 AEROSOL, METERED RESPIRATORY (INHALATION) at 07:48

## 2018-05-26 RX ADMIN — FUROSEMIDE 60 MG: 10 INJECTION, SOLUTION INTRAMUSCULAR; INTRAVENOUS at 10:14

## 2018-05-26 NOTE — PROGRESS NOTES
Hospitalist Progress Note    Subjective:   Daily Progress Note: 5/26/2018 9:34 AM    Pt is a 73 y/o male with pmhx chronic systolic HF followed by [facility], COPD on 4 L NC, CKD who presented to ER with progressive dyspnea and LE edema  x 1-2 weeks. Pt reports increased  Lasix without relief of symptoms. In ER, BNP elevated to 800s and pt admitted for acute CHF exacerbation. Pt had echo which showed echo with Ef 45-50% with stippling pattern concerning for amyloidosis. Pt is being followed by cardiology and nephrology consulted. 5/26: Pt laying in bed. States he feels a little better,  He still has moderate dyspnea but no increased O2 demand, LE edema is better. States urinating a lot. Denies other complaints. Current Facility-Administered Medications   Medication Dose Route Frequency    spironolactone (ALDACTONE) tablet 25 mg  25 mg Oral DAILY    furosemide (LASIX) injection 60 mg  60 mg IntraVENous DAILY    heparin (porcine) injection 5,000 Units  5,000 Units SubCUTAneous Q8H    allopurinol (ZYLOPRIM) tablet 100 mg  100 mg Oral DAILY    aspirin delayed-release tablet 81 mg  81 mg Oral DAILY    atorvastatin (LIPITOR) tablet 20 mg  20 mg Oral QHS    fluticasone (FLONASE) 50 mcg/actuation nasal spray 2 Spray  2 Spray Both Nostrils DAILY    lisinopril (PRINIVIL, ZESTRIL) tablet 5 mg  5 mg Oral DAILY    melatonin tablet 3 mg (Patient Supplied)  3 mg Oral QHS PRN    pantoprazole (PROTONIX) tablet 40 mg  40 mg Oral ACB    polyethylene glycol (MIRALAX) packet 17 g  17 g Oral DAILY    albuterol (PROVENTIL HFA, VENTOLIN HFA, PROAIR HFA) inhaler 2 Puff  2 Puff Inhalation QID RT    guaiFENesin ER (MUCINEX) tablet 1,200 mg  1,200 mg Oral Q12H    carvedilol (COREG) tablet 25 mg  25 mg Oral BID WITH MEALS        Review of Systems  A comprehensive review of systems was negative except for that written in the HPI.     Objective:     Visit Vitals    /66    Pulse 70    Temp 97.3 °F (36.3 °C)  Resp 18    Ht 5' 9\" (1.753 m)    Wt 103.7 kg (228 lb 9.6 oz)    SpO2 97%    BMI 33.76 kg/m2    O2 Flow Rate (L/min): 4 l/min O2 Device: Nasal cannula    Temp (24hrs), Av.6 °F (36.4 °C), Min:97.3 °F (36.3 °C), Max:98 °F (36.7 °C)          190 -  0700  In: 1020 [P.O.:1020]  Out: 1400 [Urine:1400]    Visit Vitals    /66    Pulse 70    Temp 97.3 °F (36.3 °C)    Resp 18    Ht 5' 9\" (1.753 m)    Wt 103.7 kg (228 lb 9.6 oz)    SpO2 97%    BMI 33.76 kg/m2     General appearance: alert, cooperative, mild distress when he speaks on supplemental O2, appears stated age  Head: AT/NC  Lungs: clear to auscultation bilaterally - no significant crackles  CVS: regular rate and rhythm, S1, S2 normal, 2+ BLLE edema   Abdomen: soft, non-tender. Bowel sounds normal. No masses,  no organomegaly  Extremities: extremities normal, atraumatic, no cyanosis. Neuro: Non focal exam, follows commands  Psych: A+Ox3  Skin: No Rash      Additional comments:I reviewed the patient's new clinical lab test results.  CXR - clear    Data Review    Recent Results (from the past 24 hour(s))   PROTEIN ELEC WITH MISA, SERUM    Collection Time: 18 11:22 AM   Result Value Ref Range    Protein, total 7.4 6.3 - 8.2 g/dL    A-G Ratio PENDING      ALBUMIN PENDING g/dL    Alpha-1 globulin PENDING g/dL    ALPHA 2 PENDING g/dL    Beta-globulin PENDING g/dL    Gamma-globulin PENDING 10 - 12 g/dL    M-Parish PENDING 0 g/dL    Immunoglobulin G PENDING mg/dL    Immunoglobulin A PENDING mg/dL    Immunoglobulin M PENDING mg/dL    PEP Interpretation PENDING     MISA Interpretation PENDING    METABOLIC PANEL, BASIC    Collection Time: 18  3:36 AM   Result Value Ref Range    Sodium 142 136 - 145 mmol/L    Potassium 3.9 3.5 - 5.1 mmol/L    Chloride 108 (H) 98 - 107 mmol/L    CO2 25 21 - 32 mmol/L    Anion gap 9 7 - 16 mmol/L    Glucose 107 (H) 65 - 100 mg/dL    BUN 28 (H) 8 - 23 MG/DL    Creatinine 1.70 (H) 0.8 - 1.5 MG/DL    GFR est AA 51 (L) >60 ml/min/1.73m2    GFR est non-AA 42 (L) >60 ml/min/1.73m2    Calcium 8.3 8.3 - 10.4 MG/DL   MAGNESIUM    Collection Time: 05/26/18  3:36 AM   Result Value Ref Range    Magnesium 2.1 1.8 - 2.4 mg/dL   BNP    Collection Time: 05/26/18  3:36 AM   Result Value Ref Range     pg/mL         Assessment/Plan:     Principal Problem:    Acute on chronic systolic heart failure (HCC) (5/24/2018)      Overview: EF 40%    Active Problems:    CKD (chronic kidney disease) stage 3, GFR 30-59 ml/min (9/29/2017)      Coronary atherosclerosis of native coronary vessel (9/29/2017)      COPD, moderate ([address] (9/29/2017)      Overview: Complete PFTs: 7/20/15:      Spirometry is consistent with a moderate obstructive/restrictive defect. .       The residual volume is increased relative to other lung volumes suggesting       air-trapping. The diffusion capacity corrected for alveolar volume was       normal suggesting no loss of alveolo-capillary units. CHF (congestive heart failure) ([address] (9/27/2017)      Acute respiratory failure with hypoxemia ([address] (5/24/2018)    Plan:    Doing better and appears to be responding to IV diuretics. Despite LE edema, no significant crackles on chest.  Current presentation also suggestive of restrictive cardiomyopathy and restrictive lung disease. Appreciate cardiology input.  -Concern for Amyloid, Surgery plans Abd Fat pad Biopsy. Heme plans BM biopsy. - continue IV lasix but increase noted in Cr (1.4-->1. 6). Monitor renal function. May need to accept higher Cr to achieve euvolemia. - continue current meds. Care Plan discussed with: Patient/Family and Nurse   dvt ppx: hsq    Total time spent with patient: 20 minutes.     Signed By: Jimmy Centeno MD     May 26, 2018

## 2018-05-26 NOTE — PROGRESS NOTES
[facility]    Follow-Up on: NINO    HPI: Pt still feels SOB. Wife at bedside. Edema is stable. ROS:  Denies CP,+SOB.     Current Facility-Administered Medications   Medication Dose Route Frequency    spironolactone (ALDACTONE) tablet 25 mg  25 mg Oral DAILY    furosemide (LASIX) injection 60 mg  60 mg IntraVENous DAILY    heparin (porcine) injection 5,000 Units  5,000 Units SubCUTAneous Q8H    allopurinol (ZYLOPRIM) tablet 100 mg  100 mg Oral DAILY    aspirin delayed-release tablet 81 mg  81 mg Oral DAILY    atorvastatin (LIPITOR) tablet 20 mg  20 mg Oral QHS    fluticasone (FLONASE) 50 mcg/actuation nasal spray 2 Spray  2 Spray Both Nostrils DAILY    lisinopril (PRINIVIL, ZESTRIL) tablet 5 mg  5 mg Oral DAILY    melatonin tablet 3 mg (Patient Supplied)  3 mg Oral QHS PRN    pantoprazole (PROTONIX) tablet 40 mg  40 mg Oral ACB    polyethylene glycol (MIRALAX) packet 17 g  17 g Oral DAILY    albuterol (PROVENTIL HFA, VENTOLIN HFA, PROAIR HFA) inhaler 2 Puff  2 Puff Inhalation QID RT    guaiFENesin ER (MUCINEX) tablet 1,200 mg  1,200 mg Oral Q12H    carvedilol (COREG) tablet 25 mg  25 mg Oral BID WITH MEALS       Exam:  Vitals:    05/26/18 0330 05/26/18 0558 05/26/18 0718 05/26/18 0751   BP: 105/66  111/76    Pulse: 70  68    Resp: 18  17    Temp: 97.3 °F (36.3 °C)  97.3 °F (36.3 °C)    SpO2: 98%  100% 97%   Weight:  103.7 kg (228 lb 9.6 oz)     Height:             Intake/Output Summary (Last 24 hours) at 05/26/18 1012  Last data filed at 05/26/18 0558   Gross per 24 hour   Intake              380 ml   Output              950 ml   Net             -570 ml     PE:  GEN - in no distress  CV - regular, no murmur, no rub  Lung - decreased at bases bilaterally  Abd - soft, nontender  Ext - 1+ edema    Labs  Recent Labs      05/24/18   0343  05/23/18   2141   WBC  5.7  6.8   HGB  14.0  12.6*   HCT  44.0  38.7*   PLT  190  176     Recent Labs      05/26/18   0336  05/25/18   0354  05/23/18   2141   NA 142  141  144   K  3.9  4.2  3.7   CL  108*  107  112*   CO2  25  25  24   BUN  28*  25*  19   CREA  1.70*  1.63*  1.40   GLU  107*  138*  78   CA  8.3  8.4  7.6*   MG  2.1  2.1   --      No results for input(s): PH, PCO2, PO2, PCO2 in the last 72 hours. Problem List:  Patient Active Problem List    Diagnosis Date Noted    Acute on chronic systolic heart failure ([facility] 05/24/2018    [facility]emia [facility] 05/24/2018    Erysipelas 03/27/2018    Preseptal cellulitis 03/27/2018    Personal history of tobacco use 02/06/2018    Atherosclerosis of native coronary artery of native heart with stable angina pectoris ([facility] 10/13/2017    TAZ (obstructive sleep apnea) 10/02/2017    CKD (chronic kidney disease) stage 3, GFR 30-59 ml/min 09/29/2017    Coronary atherosclerosis of native coronary vessel 09/29/2017    Hypertension 09/29/2017    COPD, moderate ([facility] 09/29/2017    High triglycerides 09/29/2017    Gastroesophageal reflux disease without esophagitis 09/29/2017    CHF (congestive heart failure) ([facility] 09/27/2017    Mixed hyperlipidemia 09/28/2016    Essential hypertension 09/28/2016    Arthritis        Issues Addressed By Nephrology:  1. NINO on CKD 3: Relatively stable today. Likely has some underlying cardiorenal physiology  2. Restrictive cardiomyopathy: Concern for amyloid. Doubt renal amyloid. 24 hr urine pending. 3. Acute/Chronic CHF  4. COPD    Plan:  -Continue lasix and aldactone  -Following labs  -I'm not concerned about rising creatinine if it is needed to improve his functional status and breathing. May need to increase diuretics further.

## 2018-05-26 NOTE — PROGRESS NOTES
END OF SHIFT NOTE:    INTAKE/OUTPUT  05/25 0701 - 05/26 0700  In: 660 [P.O.:660]  Out: 1100 [Urine:1100]  Voiding: YES  Catheter: NO  Drain:              Flatus: Patient does have flatus present. Stool:  0 occurrences. Characteristics:  Stool Assessment  Stool Appearance: Formed    Emesis: 0 occurrences. Characteristics:        VITAL SIGNS  Patient Vitals for the past 12 hrs:   Temp Pulse Resp BP SpO2   05/26/18 1737 - 66 - 98/71 -   05/26/18 1547 - - - - 100 %   05/26/18 1500 97.4 °F (36.3 °C) 63 17 (!) 88/59 99 %   05/26/18 1126 - - - - 96 %   05/26/18 0751 - - - - 97 %   05/26/18 0718 97.3 °F (36.3 °C) 68 17 111/76 100 %       Pain Assessment  Pain Intensity 1: 0 (05/25/18 1935)        Patient Stated Pain Goal: 0    Ambulating  Yes, short distances to bathroom and chair. Shift report given to oncoming nurse at the bedside.     Martina Puente RN

## 2018-05-26 NOTE — PROGRESS NOTES
END OF SHIFT NOTE:    INTAKE/OUTPUT  05/25 0701 - 05/26 0700  In: 660 [P.O.:660]  Out: 1100 [Urine:1100]  Voiding: YES  Catheter: NO  Drain:              Flatus: Patient does have flatus present. Stool:  0 occurrences. Characteristics:  Stool Assessment  Stool Appearance: Formed    Emesis: 0 occurrences. Characteristics:        VITAL SIGNS  Patient Vitals for the past 12 hrs:   Temp Pulse Resp BP SpO2   05/26/18 0330 97.3 °F (36.3 °C) 70 18 105/66 98 %   05/25/18 2345 97.4 °F (36.3 °C) 75 18 117/80 100 %   05/25/18 2229 - 73 - - -   05/25/18 1935 97.4 °F (36.3 °C) 71 18 129/84 96 %   05/25/18 1915 - - - - 99 %       Pain Assessment  Pain Intensity 1: 0 (05/25/18 1935)        Patient Stated Pain Goal: 0    Ambulating  Yes    Shift report given to oncoming nurse at the bedside.     Joby Saldana RN

## 2018-05-26 NOTE — PROGRESS NOTES
[facility] CARDIOLOGY PROGRESS NOTE           5/26/2018 7:21 AM    Admit Date: 5/23/2018      Subjective:   Feels better    ROS:  GEN:  No fever or chills  Cardiovascular:  As noted above:no CP or palpitations. Pulmonary:  As noted above:SOB improved. Neuro:  No new focal motor or sensory loss    Objective:      Vitals:    05/25/18 2229 05/25/18 2345 05/26/18 0330 05/26/18 0558   BP:  117/80 105/66    Pulse: 73 75 70    Resp:  18 18    Temp:  97.4 °F (36.3 °C) 97.3 °F (36.3 °C)    SpO2:  100% 98%    Weight:    103.7 kg (228 lb 9.6 oz)   Height:           Physical Exam:  General-no distress  Neck- supple, no JVD  CV- regular rate and rhythm no MRG  Lung- clear bilaterally  Abd- soft, nontender, nondistended  Ext- 2+ edema bilaterally. Skin- warm and dry  Psychiatric:  Normal mood and affect. Neurologic:  Alert and oriented X 3      Data Review:   Recent Labs      05/26/18   0336  05/25/18   0354  05/24/18   0343  05/23/18   2141   NA  142  141   --   144   K  3.9  4.2   --   3.7   MG  2.1  2.1   --    --    BUN  28*  25*   --   19   CREA  1.70*  1.63*   --   1.40   GLU  107*  138*   --   78   WBC   --    --   5.7  6.8   HGB   --    --   14.0  12.6*   HCT   --    --   44.0  38.7*   PLT   --    --   190  176       TELEMETRY:  NSR    Assessment/Plan:     Principal Problem:    Acute on chronic systolic heart failure (HCC) (5/24/2018):Edema responded to iv Lasix. Patient is Coreg & Lisinopril. Amyloid is suspected based on echo findings. Combination of mild systolic impairment and severe diastolic impairment contributing to CHF. No plans for left heart cath. Overview: EF 40%    Active Problems:    CKD (chronic kidney disease) stage 3, GFR 30-59 ml/min (9/29/2017):Gradually worsening with iv Lasix. Consider transitioning to po Lasix in 1-2 days. Consider Nephrology consult. Coronary atherosclerosis of native coronary vessel (9/29/2017):Hx of mild nonobstructive CAD per previous cath. No angina is present. COPD, moderate ([address] (9/29/2017)      Overview: Complete PFTs: 7/20/15:      Spirometry is consistent with a moderate obstructive/restrictive defect. .       The residual volume is increased relative to other lung volumes suggesting       air-trapping. The diffusion capacity corrected for alveolar volume was       normal suggesting no loss of alveolo-capillary units. Acute respiratory failure with hypoxemia (HCC) (5/24/2018):improving.                 Surekha Bolivar MD  5/26/2018 7:21 AM

## 2018-05-27 LAB
ANION GAP SERPL CALC-SCNC: 9 MMOL/L (ref 7–16)
BUN SERPL-MCNC: 32 MG/DL (ref 8–23)
CALCIUM SERPL-MCNC: 8.1 MG/DL (ref 8.3–10.4)
CHLORIDE SERPL-SCNC: 107 MMOL/L (ref 98–107)
CO2 SERPL-SCNC: 27 MMOL/L (ref 21–32)
CREAT SERPL-MCNC: 1.68 MG/DL (ref 0.8–1.5)
ERYTHROCYTE [DISTWIDTH] IN BLOOD BY AUTOMATED COUNT: 18.1 % (ref 11.9–14.6)
GLUCOSE SERPL-MCNC: 93 MG/DL (ref 65–100)
HCT VFR BLD AUTO: 41.2 % (ref 41.1–50.3)
HGB BLD-MCNC: 13.5 G/DL (ref 13.6–17.2)
MAGNESIUM SERPL-MCNC: 2.2 MG/DL (ref 1.8–2.4)
MCH RBC QN AUTO: 28 PG (ref 26.1–32.9)
MCHC RBC AUTO-ENTMCNC: 32.8 G/DL (ref 31.4–35)
MCV RBC AUTO: 85.3 FL (ref 79.6–97.8)
PLATELET # BLD AUTO: 177 K/UL (ref 150–450)
PMV BLD AUTO: 10.5 FL (ref 10.8–14.1)
POTASSIUM SERPL-SCNC: 4 MMOL/L (ref 3.5–5.1)
RBC # BLD AUTO: 4.83 M/UL (ref 4.23–5.67)
SODIUM SERPL-SCNC: 143 MMOL/L (ref 136–145)
WBC # BLD AUTO: 9.1 K/UL (ref 4.3–11.1)

## 2018-05-27 PROCEDURE — 74011250637 HC RX REV CODE- 250/637: Performed by: INTERNAL MEDICINE

## 2018-05-27 PROCEDURE — 36415 COLL VENOUS BLD VENIPUNCTURE: CPT | Performed by: INTERNAL MEDICINE

## 2018-05-27 PROCEDURE — 80048 BASIC METABOLIC PNL TOTAL CA: CPT | Performed by: INTERNAL MEDICINE

## 2018-05-27 PROCEDURE — 65660000000 HC RM CCU STEPDOWN

## 2018-05-27 PROCEDURE — 86580 TB INTRADERMAL TEST: CPT | Performed by: INTERNAL MEDICINE

## 2018-05-27 PROCEDURE — 94640 AIRWAY INHALATION TREATMENT: CPT

## 2018-05-27 PROCEDURE — 77010033678 HC OXYGEN DAILY

## 2018-05-27 PROCEDURE — 85027 COMPLETE CBC AUTOMATED: CPT | Performed by: INTERNAL MEDICINE

## 2018-05-27 PROCEDURE — 74011250636 HC RX REV CODE- 250/636: Performed by: INTERNAL MEDICINE

## 2018-05-27 PROCEDURE — 83735 ASSAY OF MAGNESIUM: CPT | Performed by: INTERNAL MEDICINE

## 2018-05-27 PROCEDURE — 97530 THERAPEUTIC ACTIVITIES: CPT

## 2018-05-27 PROCEDURE — 74011000302 HC RX REV CODE- 302: Performed by: INTERNAL MEDICINE

## 2018-05-27 PROCEDURE — 94760 N-INVAS EAR/PLS OXIMETRY 1: CPT

## 2018-05-27 RX ORDER — LISINOPRIL 5 MG/1
2.5 TABLET ORAL DAILY
Status: DISCONTINUED | OUTPATIENT
Start: 2018-05-27 | End: 2018-05-30 | Stop reason: HOSPADM

## 2018-05-27 RX ORDER — CARVEDILOL 6.25 MG/1
6.25 TABLET ORAL 2 TIMES DAILY WITH MEALS
Status: DISCONTINUED | OUTPATIENT
Start: 2018-05-27 | End: 2018-05-30 | Stop reason: HOSPADM

## 2018-05-27 RX ADMIN — HEPARIN SODIUM 5000 UNITS: 5000 INJECTION, SOLUTION INTRAVENOUS; SUBCUTANEOUS at 15:49

## 2018-05-27 RX ADMIN — ASPIRIN 81 MG: 81 TABLET, COATED ORAL at 09:10

## 2018-05-27 RX ADMIN — ALBUTEROL SULFATE 2 PUFF: 90 AEROSOL, METERED RESPIRATORY (INHALATION) at 19:10

## 2018-05-27 RX ADMIN — PANTOPRAZOLE SODIUM 40 MG: 40 TABLET, DELAYED RELEASE ORAL at 05:54

## 2018-05-27 RX ADMIN — SPIRONOLACTONE 25 MG: 25 TABLET, FILM COATED ORAL at 09:10

## 2018-05-27 RX ADMIN — LISINOPRIL 2.5 MG: 5 TABLET ORAL at 09:10

## 2018-05-27 RX ADMIN — HEPARIN SODIUM 5000 UNITS: 5000 INJECTION, SOLUTION INTRAVENOUS; SUBCUTANEOUS at 05:53

## 2018-05-27 RX ADMIN — ALBUTEROL SULFATE 2 PUFF: 90 AEROSOL, METERED RESPIRATORY (INHALATION) at 11:40

## 2018-05-27 RX ADMIN — ATORVASTATIN CALCIUM 20 MG: 10 TABLET, FILM COATED ORAL at 09:10

## 2018-05-27 RX ADMIN — FUROSEMIDE 60 MG: 10 INJECTION, SOLUTION INTRAMUSCULAR; INTRAVENOUS at 09:11

## 2018-05-27 RX ADMIN — CARVEDILOL 6.25 MG: 6.25 TABLET, FILM COATED ORAL at 17:32

## 2018-05-27 RX ADMIN — ALLOPURINOL 100 MG: 100 TABLET ORAL at 09:10

## 2018-05-27 RX ADMIN — HEPARIN SODIUM 5000 UNITS: 5000 INJECTION, SOLUTION INTRAVENOUS; SUBCUTANEOUS at 21:50

## 2018-05-27 RX ADMIN — CARVEDILOL 6.25 MG: 6.25 TABLET, FILM COATED ORAL at 09:10

## 2018-05-27 RX ADMIN — ALBUTEROL SULFATE 2 PUFF: 90 AEROSOL, METERED RESPIRATORY (INHALATION) at 15:28

## 2018-05-27 RX ADMIN — GUAIFENESIN 1200 MG: 600 TABLET, EXTENDED RELEASE ORAL at 09:10

## 2018-05-27 RX ADMIN — TUBERCULIN PURIFIED PROTEIN DERIVATIVE 5 UNITS: 5 INJECTION, SOLUTION INTRADERMAL at 09:21

## 2018-05-27 RX ADMIN — ALBUTEROL SULFATE 2 PUFF: 90 AEROSOL, METERED RESPIRATORY (INHALATION) at 08:00

## 2018-05-27 RX ADMIN — GUAIFENESIN 1200 MG: 600 TABLET, EXTENDED RELEASE ORAL at 21:49

## 2018-05-27 RX ADMIN — POLYETHYLENE GLYCOL (3350) 17 G: 17 POWDER, FOR SOLUTION ORAL at 09:12

## 2018-05-27 NOTE — PROGRESS NOTES
[facility]    Follow-Up on: NINO    HPI: Pt still feels SOB. Maybe a little bit better today. Urinating well per patient. ROS:  Denies CP,+SOB.     Current Facility-Administered Medications   Medication Dose Route Frequency    lisinopril (PRINIVIL, ZESTRIL) tablet 2.5 mg  2.5 mg Oral DAILY    carvedilol (COREG) tablet 6.25 mg  6.25 mg Oral BID WITH MEALS    tuberculin injection 5 Units  5 Units IntraDERMal ONCE    HYDROcodone-acetaminophen (NORCO) 5-325 mg per tablet 1 Tab  1 Tab Oral Q6H PRN    ondansetron (ZOFRAN) injection 4 mg  4 mg IntraVENous Q4H PRN    acetaminophen (TYLENOL) tablet 650 mg  650 mg Oral Q6H PRN    spironolactone (ALDACTONE) tablet 25 mg  25 mg Oral DAILY    furosemide (LASIX) injection 60 mg  60 mg IntraVENous DAILY    heparin (porcine) injection 5,000 Units  5,000 Units SubCUTAneous Q8H    allopurinol (ZYLOPRIM) tablet 100 mg  100 mg Oral DAILY    aspirin delayed-release tablet 81 mg  81 mg Oral DAILY    atorvastatin (LIPITOR) tablet 20 mg  20 mg Oral QHS    fluticasone (FLONASE) 50 mcg/actuation nasal spray 2 Spray  2 Spray Both Nostrils DAILY    melatonin tablet 3 mg (Patient Supplied)  3 mg Oral QHS PRN    pantoprazole (PROTONIX) tablet 40 mg  40 mg Oral ACB    polyethylene glycol (MIRALAX) packet 17 g  17 g Oral DAILY    albuterol (PROVENTIL HFA, VENTOLIN HFA, PROAIR HFA) inhaler 2 Puff  2 Puff Inhalation QID RT    guaiFENesin ER (MUCINEX) tablet 1,200 mg  1,200 mg Oral Q12H       Exam:  Vitals:    05/27/18 0300 05/27/18 0720 05/27/18 0807 05/27/18 0854   BP: 91/67 131/88     Pulse: 66 79     Resp: 22 18     Temp: 98 °F (36.7 °C) 97.4 °F (36.3 °C)     SpO2: 94% 100% 98% 99%   Weight:       Height:             Intake/Output Summary (Last 24 hours) at 05/27/18 1026  Last data filed at 05/27/18 0300   Gross per 24 hour   Intake                0 ml   Output              900 ml   Net             -900 ml     PE:  GEN - in no distress  CV - regular, no murmur, no rub  Lung - decreased at bases bilaterally  Abd - soft, nontender  Ext - 1+ edema    Labs  Recent Labs      05/27/18   0610   WBC  9.1   HGB  13.5*   HCT  41.2   PLT  177     Recent Labs      05/27/18   0610  05/26/18   0336  05/25/18   0354   NA  143  142  141   K  4.0  3.9  4.2   CL  107  108*  107   CO2  27  25  25   BUN  32*  28*  25*   CREA  1.68*  1.70*  1.63*   GLU  93  107*  138*   CA  8.1*  8.3  8.4   MG  2.2  2.1  2.1     No results for input(s): PH, PCO2, PO2, PCO2 in the last 72 hours. Problem List:  Patient Active Problem List    Diagnosis Date Noted    Acute on chronic systolic heart failure ([facility] 05/24/2018    Acute respiratory failure with hypoxemia ([facility] 05/24/2018    Erysipelas 03/27/2018    Preseptal cellulitis 03/27/2018    Personal history of tobacco use 02/06/2018    Atherosclerosis of native coronary artery of native heart with stable angina pectoris ([facility] 10/13/2017    TAZ (obstructive sleep apnea) 10/02/2017    CKD (chronic kidney disease) stage 3, GFR 30-59 ml/min 09/29/2017    Coronary atherosclerosis of native coronary vessel 09/29/2017    Hypertension 09/29/2017    COPD, moderate ([facility] 09/29/2017    High triglycerides 09/29/2017    Gastroesophageal reflux disease without esophagitis 09/29/2017    CHF (congestive heart failure) ([facility] 09/27/2017    Mixed hyperlipidemia 09/28/2016    Essential hypertension 09/28/2016    Arthritis        Issues Addressed By Nephrology:  1. NINO on CKD 3: Stable again today. Likely has some underlying cardiorenal physiology  2. Restrictive cardiomyopathy: Concern for amyloid. Doubt renal amyloid. 24 hr UPEP pending. 3. Acute/Chronic CHF: Some progress  4.  COPD    Plan:  -Continue lasix and aldactone  -Consider transition to oral diuretics tomorrow.   -Following labs  -I'm not concerned about rising creatinine if it is needed to improve his functional status and breathing.  -Repeat fat pad biopsy pending

## 2018-05-27 NOTE — PROGRESS NOTES
[facility] CARDIOLOGY PROGRESS NOTE           5/27/2018 8:09 AM    Admit Date: 5/23/2018      Subjective:   Feels ok. ROS:  GEN:  No fever or chills  Cardiovascular:  As noted above:no CP or palpitations. Pulmonary:  As noted above:SOB slowly improving. Neuro:  No new focal motor or sensory loss    Objective:      Vitals:    05/27/18 0158 05/27/18 0300 05/27/18 0720 05/27/18 0807   BP:  91/67 131/88    Pulse:  66 79    Resp:  22 18    Temp:  98 °F (36.7 °C) 97.4 °F (36.3 °C)    SpO2:  94% 100% 98%   Weight: 104.1 kg (229 lb 9.6 oz)      Height:           Physical Exam:  General-no distress. Sitting in the chair. Appears dyspneic. Neck- supple, no JVD  CV- regular rate and rhythm no MRG  Lung- clear bilaterally  Abd- soft, nontender, nondistended  Ext- 1+ edema bilaterally. Skin- warm and dry  Psychiatric:  Normal mood and affect. Neurologic:  Alert and oriented X 3      Data Review:   Recent Labs      05/27/18   0610  05/26/18   0336   NA  143  142   K  4.0  3.9   MG  2.2  2.1   BUN  32*  28*   CREA  1.68*  1.70*   GLU  93  107*   WBC  9.1   --    HGB  13.5*   --    HCT  41.2   --    PLT  177   --        TELEMETRY:  NSR    Assessment/Plan:     Principal Problem:    Acute on chronic systolic heart failure (HCC) (5/24/2018):Edema and BNP continue to slowl improve. Tolerating iv Lasix,Coreg,and Lisinopril. CHF appears to be due to combination mild LV systolic dysfunction & severe LV diastolic. Echo findings are suspicious for amyloid. Amyloid evaluation is no going. Overview: EF 40%    Active Problems:    CKD (chronic kidney disease) stage 3, GFR 30-59 ml/min (9/29/2017):stable. Nephrology following. Coronary atherosclerosis of native coronary vessel (9/29/2017):Hx of mild nonobstructive CAD per previous cath. He denies angina. No plans for repeat cardiac cath at present time.       COPD, moderate ([address] (9/29/2017)      Overview: Complete PFTs: 7/20/15:      Spirometry is consistent with a moderate obstructive/restrictive defect. Catarino Cartagena MD  5/27/2018 8:09 AM

## 2018-05-27 NOTE — PROGRESS NOTES
END OF SHIFT NOTE:    INTAKE/OUTPUT  05/26 0701 - 05/27 0700  In: -   Out: 900 [Urine:900]  Voiding: YES  Catheter: NO  Drain:              Flatus: Patient does have flatus present. Stool:  0 occurrences. Characteristics:  Stool Assessment  Stool Appearance: Formed    Emesis: 0 occurrences. Characteristics:        VITAL SIGNS  Patient Vitals for the past 12 hrs:   Temp Pulse Resp BP SpO2   05/27/18 0300 98 °F (36.7 °C) 66 22 91/67 94 %   05/27/18 0001 98 °F (36.7 °C) 69 22 114/78 98 %   05/26/18 2146 - 61 - - -   05/26/18 2022 97.5 °F (36.4 °C) 63 17 99/74 98 %       Pain Assessment  Pain Intensity 1: 0 (05/26/18 1910)        Patient Stated Pain Goal: 0    Ambulating  Yes    Shift report given to oncoming nurse at the bedside.     Medardo Gagnon RN

## 2018-05-27 NOTE — PROGRESS NOTES
Hospitalist Progress Note    Subjective:   Daily Progress Note: 5/27/2018 9:34 AM    Pt is a 73 y/o male with pmhx chronic systolic HF followed by [facility], COPD on 4 L NC, CKD who presented to ER with progressive dyspnea and LE edema  x 1-2 weeks. Pt reports increased  Lasix without relief of symptoms. In ER, BNP elevated to 800s and pt admitted for acute CHF exacerbation. Pt had echo which showed echo with Ef 45-50% with stippling pattern concerning for amyloidosis. Pt is being followed by cardiology and nephrology consulted. 5/27:  States he feels a little better,  He still has some SOB but no CP, LE edema is better. States urinating a lot. Denies other complaints.     Current Facility-Administered Medications   Medication Dose Route Frequency    lisinopril (PRINIVIL, ZESTRIL) tablet 2.5 mg  2.5 mg Oral DAILY    carvedilol (COREG) tablet 6.25 mg  6.25 mg Oral BID WITH MEALS    HYDROcodone-acetaminophen (NORCO) 5-325 mg per tablet 1 Tab  1 Tab Oral Q6H PRN    ondansetron (ZOFRAN) injection 4 mg  4 mg IntraVENous Q4H PRN    acetaminophen (TYLENOL) tablet 650 mg  650 mg Oral Q6H PRN    spironolactone (ALDACTONE) tablet 25 mg  25 mg Oral DAILY    furosemide (LASIX) injection 60 mg  60 mg IntraVENous DAILY    heparin (porcine) injection 5,000 Units  5,000 Units SubCUTAneous Q8H    allopurinol (ZYLOPRIM) tablet 100 mg  100 mg Oral DAILY    aspirin delayed-release tablet 81 mg  81 mg Oral DAILY    atorvastatin (LIPITOR) tablet 20 mg  20 mg Oral QHS    fluticasone (FLONASE) 50 mcg/actuation nasal spray 2 Spray  2 Spray Both Nostrils DAILY    melatonin tablet 3 mg (Patient Supplied)  3 mg Oral QHS PRN    pantoprazole (PROTONIX) tablet 40 mg  40 mg Oral ACB    polyethylene glycol (MIRALAX) packet 17 g  17 g Oral DAILY    albuterol (PROVENTIL HFA, VENTOLIN HFA, PROAIR HFA) inhaler 2 Puff  2 Puff Inhalation QID RT    guaiFENesin ER (MUCINEX) tablet 1,200 mg  1,200 mg Oral Q12H        Review of Systems  A comprehensive review of systems was negative except for that written in the HPI. Objective:     Visit Vitals    BP 91/67    Pulse 66    Temp 98 °F (36.7 °C)    Resp 22    Ht 5' 9\" (1.753 m)    Wt 104.1 kg (229 lb 9.6 oz)    SpO2 94%    BMI 33.91 kg/m2    O2 Flow Rate (L/min): 3 l/min O2 Device: Nasal cannula    Temp (24hrs), Av.7 °F (36.5 °C), Min:97.4 °F (36.3 °C), Max:98 °F (36.7 °C)         1901 -  0700  In: 200 [P.O.:200]  Out: 1350 [Urine:1350]    Visit Vitals    BP 91/67    Pulse 66    Temp 98 °F (36.7 °C)    Resp 22    Ht 5' 9\" (1.753 m)    Wt 104.1 kg (229 lb 9.6 oz)    SpO2 94%    BMI 33.91 kg/m2     General appearance: alert, cooperative, mild distress when he speaks on supplemental O2, appears stated age  Head: AT/NC  Lungs: clear to auscultation bilaterally - no significant crackles  CVS: regular rate and rhythm, S1, S2 normal, 2+ BLLE edema   Abdomen: soft, non-tender. Bowel sounds normal. No masses,  no organomegaly  Extremities: extremities normal, atraumatic, no cyanosis. Neuro: Non focal exam, follows commands  Psych: A+Ox3  Skin: No Rash      Additional comments:I reviewed the patient's new clinical lab test results.  CXR - clear    Data Review    Recent Results (from the past 24 hour(s))   CBC W/O DIFF    Collection Time: 18  6:10 AM   Result Value Ref Range    WBC 9.1 4.3 - 11.1 K/uL    RBC 4.83 4.23 - 5.67 M/uL    HGB 13.5 (L) 13.6 - 17.2 g/dL    HCT 41.2 41.1 - 50.3 %    MCV 85.3 79.6 - 97.8 FL    MCH 28.0 26.1 - 32.9 PG    MCHC 32.8 31.4 - 35.0 g/dL    RDW 18.1 (H) 11.9 - 14.6 %    PLATELET 391 894 - 562 K/uL    MPV 10.5 (L) 10.8 - 14.1 FL         Assessment/Plan:     Principal Problem:    Acute on chronic systolic heart failure (HCC) (2018)      Overview: EF 40%    Active Problems:    CKD (chronic kidney disease) stage 3, GFR 30-59 ml/min (2017)      Coronary atherosclerosis of native coronary vessel (2017)      COPD, moderate ([facility] (9/29/2017)      Overview: Complete PFTs: 7/20/15:      Spirometry is consistent with a moderate obstructive/restrictive defect. .       The residual volume is increased relative to other lung volumes suggesting       air-trapping. The diffusion capacity corrected for alveolar volume was       normal suggesting no loss of alveolo-capillary units. CHF (congestive heart failure) ([facility] (9/27/2017)      Acute respiratory failure with hypoxemia ([facility] (5/24/2018)    Plan:    Doing better and appears to be responding to IV diuretics. Despite LE edema, no significant crackles on chest.  Current presentation also suggestive of restrictive cardiomyopathy and restrictive lung disease. Appreciate cardiology input.  - Concern for Amyloid, Surgery plans Abd Fat pad Biopsy. Heme plans BM biopsy.  - Continue IV lasix but increase noted in Cr. Monitor renal function. May need to accept higher Cr to achieve euvolemia. - BP was low overnight, Will decrease Lisinopril and Coreg doses with holding parameters. Care Plan discussed with: Patient/Family and Nurse   dvt ppx: hsq  Dispo: Home vs Rehab. PT eval. ppd.     Signed By: Leonard Abbasi MD     May 27, 2018

## 2018-05-27 NOTE — PROGRESS NOTES
Problem: Chronic Obstructive Pulmonary Disease (COPD)  Goal: *Absence of hypoxia  Outcome: Progressing Towards Goal  Saturation levels remaining between 94-97% on oxygen 4L NC

## 2018-05-27 NOTE — PROGRESS NOTES
END OF SHIFT NOTE:    INTAKE/OUTPUT  05/26 0701 - 05/27 0700  In: -   Out: 900 [Urine:900]  Voiding: YES  Catheter: NO  Drain:              Flatus: Patient does have flatus present. Stool:  0 occurrences. Characteristics:  Stool Assessment  Stool Appearance: Formed    Emesis: 0 occurrences. Characteristics:        VITAL SIGNS  Patient Vitals for the past 12 hrs:   Temp Pulse Resp BP SpO2   05/27/18 1528 - - - - 97 %   05/27/18 1520 97.6 °F (36.4 °C) (!) 105 18 123/78 96 %   05/27/18 1140 - - - - 99 %   05/27/18 1130 97.4 °F (36.3 °C) 71 18 (!) 141/91 100 %   05/27/18 0854 - - - - 99 %   05/27/18 0807 - - - - 98 %   05/27/18 0720 97.4 °F (36.3 °C) 79 18 131/88 100 %       Pain Assessment  Pain Intensity 1: 0 (05/27/18 1100)        Patient Stated Pain Goal: 0    Ambulating  Yes. Ambulated the halls with therapy today. Shift report given to oncoming nurse at the bedside.     Tommie Waite RN

## 2018-05-27 NOTE — PROGRESS NOTES
Problem: Mobility Impaired (Adult and Pediatric)  Goal: *Acute Goals and Plan of Care (Insert Text)  LTG:  (1.)Mr. Willi Chen will move from supine to sit and sit to supine , scoot up and down and roll side to side with INDEPENDENT within 7 treatment day(s) from flat surface without handrail. (2.)Mr. Willi Chen will transfer from bed to chair and chair to bed with INDEPENDENT using the least restrictive device within 7 treatment day(s). (3.)Mr. Willi Chen will ambulate with MODIFIED INDEPENDENCE for 250+ feet with the least restrictive device within 7 treatment day(s), O2 stats >90%. (4.)Mr. Willi Chen will be independent in HEP for B LE strengthening within 7 treatment days for increased strength with mobility. (5.)Mr. Willi Chen will verbalize energy conservation techniques within 7 treatment days with independence.   ______________________________________________________________________________________________     PHYSICAL THERAPY: Daily Note, Treatment Day: 2nd, AM 5/27/2018  INPATIENT: Hospital Day: 5  Payor: Jose Luis Birch / Plan: [address] [place] [address] HMO / Product Type: Managed Care Medicare /      NAME/AGE/GENDER: Destinee Saravia is a 74 y.o. male   PRIMARY DIAGNOSIS: Acute respiratory failure with hypoxemia ([facility]  RULE OUT ADENOMYOSIS Acute on chronic systolic heart failure (HCC) Acute on chronic systolic heart failure (HCC)  Procedure(s) (LRB):  ABDOMINAL FAT PAD BIOPSY/ 203 (N/A)     ICD-10: Treatment Diagnosis:    · Generalized Muscle Weakness (M62.81)  · Difficulty in walking, Not elsewhere classified (R26.2)   Precaution/Allergies:  Pcn [penicillins]      ASSESSMENT:     Mr. Willi Chen sitting up in chair upon arrival.  He is moving around the room independently but reporting not able to walk far due to SOB. He is on 4L chronic but sats were in the high 90's on 3L while walking. He was able to walk the [place] (250 ft) with the walker and 2 seated rests. He needed cues for upright posture only.   Good progress and working toward [employer] being met. This section established at most recent assessment   PROBLEM LIST (Impairments causing functional limitations):  1. Decreased ADL/Functional Activities  2. Decreased Transfer Abilities  3. Decreased Ambulation Ability/Technique  4. Decreased Balance  5. Decreased Activity Tolerance  6. Increased Fatigue  7. Increased Shortness of Breath  8. Decreased Ray with Home Exercise Program   INTERVENTIONS PLANNED: (Benefits and precautions of physical therapy have been discussed with the patient.)  1. Balance Exercise  2. Bed Mobility  3. Family Education  4. Gait Training  5. Home Exercise Program (HEP)  6. Therapeutic Activites  7. Therapeutic Exercise/Strengthening  8. Transfer Training  9. Group Therapy     TREATMENT PLAN: Frequency/Duration: 3 times a week for duration of hospital stay  Rehabilitation Potential For Stated Goals: Good     RECOMMENDED REHABILITATION/EQUIPMENT: (at time of discharge pending progress): Due to the probability of continued deficits (see above) this patient will likely need continued skilled physical therapy after discharge. Equipment:    None at this time              HISTORY:   History of Present Injury/Illness (Reason for Referral):  74years old M with PMH of systolic HF, CKD, COPD on 4L O2 at home presented to the hospital complaining of worsening SOB for the last 2 weeks. Patient stated SOB was mostly on exertion but now is also at rest. Patient reported lasix was helping him but for the last 2 days is not working. Patient also reported having worsening LE edema for the last week. Patient's family reported he is unable to afford  Inhalers for COPD. Patient reported he is taking all his \" heart medications\" everyday.  Patient denies chest pain, abdominal pain, diarrhea, fever, cough or sick contacts at home.      Past Medical History/Comorbidities:   Mr. Frida Arita  has a past medical history of Acute CHF (congestive heart failure) ([facility] (4/25/2015); Acute combined systolic and diastolic congestive heart failure ([facility] (4/28/2015); Chronic obstructive pulmonary disease ([facility] De Quervain's tenosynovitis, right (4/28/2015); Dyspnea on exertion (6/28/2015); Elevated serum creatinine (4/25/2015); Elevated troponin (6/28/2015); History of coronary artery disease; History of right knee surgery; History of shingles; Malignant hypertension (4/25/2015); and MI (myocardial infarction) ([facility] Mr. Rina Dodd  has a past surgical history that includes hx knee arthroscopy (Right) and hx heart catheterization (6/29/2015). Social History/Living Environment:   Home Environment: Apartment  # Steps to Enter: 0  One/Two Story Residence: One story  Living Alone: No  Support Systems: Spouse/Significant Other/Partner, Child(isatu)  Patient Expects to be Discharged to[de-identified] Apartment  Current DME Used/Available at Home: Oxygen, portable, Nebulizer  Tub or Shower Type: Tub/Shower combination  Prior Level of Function/Work/Activity:  Lives with spouse and daughter; independent in ADLs, ambulation, driving; on 4 L home O2  Personal Factors:          Sex:  male        Age:  74 y.o. Overall Behavior:  agreeable   Number of Personal Factors/Comorbidities that affect the Plan of Care:  COPD, ARF, CHF 3+: HIGH COMPLEXITY   EXAMINATION:   Most Recent Physical Functioning:   Gross Assessment:                  Posture:     Balance:    Bed Mobility:     Wheelchair Mobility:     Transfers:  Sit to Stand: Independent  Stand to Sit: Independent  Gait:     Distance (ft): 250 Feet (ft) (2 seated rests)  Assistive Device: Walker, rolling  Ambulation - Level of Assistance: Stand-by assistance      Body Structures Involved:  1. Muscles Body Functions Affected:  1. Movement Related Activities and Participation Affected:  1. General Tasks and Demands  2. Mobility  3.  Self Care   Number of elements that affect the Plan of Care: 4+: HIGH COMPLEXITY   CLINICAL PRESENTATION:   Presentation: Stable and uncomplicated: LOW COMPLEXITY   CLINICAL DECISION MAKING:   [facility] [place] [facility] AM-PAC 6 Clicks   Basic Mobility Inpatient Short Form  How much difficulty does the patient currently have. .. Unable A Lot A Little None   1. Turning over in bed (including adjusting bedclothes, sheets and blankets)? [] 1   [] 2   [] 3   [x] 4   2. Sitting down on and standing up from a chair with arms ( e.g., wheelchair, bedside commode, etc.)   [] 1   [] 2   [x] 3   [] 4   3. Moving from lying on back to sitting on the side of the bed? [] 1   [] 2   [x] 3   [] 4   How much help from another person does the patient currently need. .. Total A Lot A Little None   4. Moving to and from a bed to a chair (including a wheelchair)? [] 1   [] 2   [x] 3   [] 4   5. Need to walk in hospital room? [] 1   [] 2   [x] 3   [] 4   6. Climbing 3-5 steps with a railing? [] 1   [] 2   [x] 3   [] 4   © 2007, Trustees of [facility] [place] [facility], under license to [employer]. All rights reserved      Score:  Initial: 19 Most Recent: X (Date: -- )    Interpretation of Tool:  Represents activities that are increasingly more difficult (i.e. Bed mobility, Transfers, Gait). Score 24 23 22-20 19-15 14-10 9-7 6     Modifier CH CI CJ CK CL CM CN      ? Mobility - Walking and Moving Around:     - CURRENT STATUS: CK - 40%-59% impaired, limited or restricted    - GOAL STATUS: CJ - 20%-39% impaired, limited or restricted    - D/C STATUS:  ---------------To be determined---------------  Payor: Sejal Edward / Plan: [address] [place] [address] HMO / Product Type: [employer] Care Medicare /      Medical Necessity:     · Patient is expected to demonstrate progress in strength, range of motion, balance and coordination to decrease assistance required with overall functional mobility, transfers, ambulation. · Patient demonstrates good rehab potential due to higher previous functional level.   Reason for Services/Other Comments:  · Patient continues to require present interventions due to patient's inability to perform bed mobility, transfers, ambulation safely and effectively. Use of outcome tool(s) and clinical judgement create a POC that gives a: Clear prediction of patient's progress: LOW COMPLEXITY            TREATMENT:   (In addition to Assessment/Re-Assessment sessions the following treatments were rendered)   Pre-treatment Symptoms/Complaints:  \" I am ok I guess\"  Pain: Initial:   Pain Intensity 1: 0  Post Session:  0/10 post session     Therapeutic Activity: (    25 minutes): Therapeutic activities including chair transfers and Ambulation on level ground to improve mobility, strength, balance and coordination. Required minimal A   to conserve energy with use of the walker to lean on. Braces/Orthotics/Lines/Etc:   · O2 Device: Nasal cannula  Treatment/Session Assessment:    · Response to Treatment:  Tolerated well  · Interdisciplinary Collaboration:   o Physical Therapy Assistant  o Registered Nurse  · After treatment position/precautions:   o Up in chair  o Bed/Chair-wheels locked  o Bed in low position  o Call light within reach  o RN notified   · Compliance with Program/Exercises: good  · Recommendations/Intent for next treatment session: \"Next visit will focus on advancements to more challenging activities\".   Total Treatment Duration:  PT Patient Time In/Time Out  Time In: 1100  Time Out: 1125    Israel Moreno, PTA

## 2018-05-28 LAB
ANION GAP SERPL CALC-SCNC: 8 MMOL/L (ref 7–16)
BUN SERPL-MCNC: 28 MG/DL (ref 8–23)
CALCIUM SERPL-MCNC: 8.5 MG/DL (ref 8.3–10.4)
CHLORIDE SERPL-SCNC: 106 MMOL/L (ref 98–107)
CO2 SERPL-SCNC: 29 MMOL/L (ref 21–32)
CREAT SERPL-MCNC: 1.65 MG/DL (ref 0.8–1.5)
GLUCOSE SERPL-MCNC: 114 MG/DL (ref 65–100)
MAGNESIUM SERPL-MCNC: 2.2 MG/DL (ref 1.8–2.4)
MM INDURATION POC: NORMAL MM (ref 0–5)
POTASSIUM SERPL-SCNC: 3.9 MMOL/L (ref 3.5–5.1)
PPD POC: NORMAL NEGATIVE
SODIUM SERPL-SCNC: 143 MMOL/L (ref 136–145)

## 2018-05-28 PROCEDURE — 94640 AIRWAY INHALATION TREATMENT: CPT

## 2018-05-28 PROCEDURE — 94760 N-INVAS EAR/PLS OXIMETRY 1: CPT

## 2018-05-28 PROCEDURE — 77010033678 HC OXYGEN DAILY

## 2018-05-28 PROCEDURE — 83735 ASSAY OF MAGNESIUM: CPT | Performed by: INTERNAL MEDICINE

## 2018-05-28 PROCEDURE — 80048 BASIC METABOLIC PNL TOTAL CA: CPT | Performed by: INTERNAL MEDICINE

## 2018-05-28 PROCEDURE — 74011250637 HC RX REV CODE- 250/637: Performed by: INTERNAL MEDICINE

## 2018-05-28 PROCEDURE — 97530 THERAPEUTIC ACTIVITIES: CPT

## 2018-05-28 PROCEDURE — 36415 COLL VENOUS BLD VENIPUNCTURE: CPT | Performed by: INTERNAL MEDICINE

## 2018-05-28 PROCEDURE — 74011250636 HC RX REV CODE- 250/636: Performed by: INTERNAL MEDICINE

## 2018-05-28 PROCEDURE — 65660000000 HC RM CCU STEPDOWN

## 2018-05-28 RX ORDER — CLINDAMYCIN PHOSPHATE 900 MG/50ML
900 INJECTION INTRAVENOUS
Status: DISCONTINUED | OUTPATIENT
Start: 2018-05-28 | End: 2018-05-28 | Stop reason: SDUPTHER

## 2018-05-28 RX ORDER — FUROSEMIDE 40 MG/1
80 TABLET ORAL DAILY
Status: DISCONTINUED | OUTPATIENT
Start: 2018-05-28 | End: 2018-05-30 | Stop reason: HOSPADM

## 2018-05-28 RX ADMIN — GUAIFENESIN 1200 MG: 600 TABLET, EXTENDED RELEASE ORAL at 08:34

## 2018-05-28 RX ADMIN — HEPARIN SODIUM 5000 UNITS: 5000 INJECTION, SOLUTION INTRAVENOUS; SUBCUTANEOUS at 22:01

## 2018-05-28 RX ADMIN — ALBUTEROL SULFATE 2 PUFF: 90 AEROSOL, METERED RESPIRATORY (INHALATION) at 19:12

## 2018-05-28 RX ADMIN — ATORVASTATIN CALCIUM 20 MG: 10 TABLET, FILM COATED ORAL at 08:34

## 2018-05-28 RX ADMIN — HEPARIN SODIUM 5000 UNITS: 5000 INJECTION, SOLUTION INTRAVENOUS; SUBCUTANEOUS at 14:00

## 2018-05-28 RX ADMIN — SPIRONOLACTONE 25 MG: 25 TABLET, FILM COATED ORAL at 08:34

## 2018-05-28 RX ADMIN — ALLOPURINOL 100 MG: 100 TABLET ORAL at 08:34

## 2018-05-28 RX ADMIN — LISINOPRIL 2.5 MG: 5 TABLET ORAL at 08:34

## 2018-05-28 RX ADMIN — ALBUTEROL SULFATE 2 PUFF: 90 AEROSOL, METERED RESPIRATORY (INHALATION) at 07:07

## 2018-05-28 RX ADMIN — HEPARIN SODIUM 5000 UNITS: 5000 INJECTION, SOLUTION INTRAVENOUS; SUBCUTANEOUS at 05:30

## 2018-05-28 RX ADMIN — GUAIFENESIN 1200 MG: 600 TABLET, EXTENDED RELEASE ORAL at 20:27

## 2018-05-28 RX ADMIN — CARVEDILOL 6.25 MG: 6.25 TABLET, FILM COATED ORAL at 16:23

## 2018-05-28 RX ADMIN — ASPIRIN 81 MG: 81 TABLET, COATED ORAL at 08:34

## 2018-05-28 RX ADMIN — FUROSEMIDE 80 MG: 40 TABLET ORAL at 08:34

## 2018-05-28 RX ADMIN — CARVEDILOL 6.25 MG: 6.25 TABLET, FILM COATED ORAL at 08:34

## 2018-05-28 RX ADMIN — PANTOPRAZOLE SODIUM 40 MG: 40 TABLET, DELAYED RELEASE ORAL at 05:31

## 2018-05-28 RX ADMIN — FLUTICASONE PROPIONATE 2 SPRAY: 50 SPRAY, METERED NASAL at 09:00

## 2018-05-28 NOTE — PROGRESS NOTES
[facility]    Follow-Up on: NINO    HPI: Pt still feels SOB but better. No acute events overnight. Sitting up in a chair. ROS:  Denies CP,+SOB.     Current Facility-Administered Medications   Medication Dose Route Frequency    furosemide (LASIX) tablet 80 mg  80 mg Oral DAILY    lisinopril (PRINIVIL, ZESTRIL) tablet 2.5 mg  2.5 mg Oral DAILY    carvedilol (COREG) tablet 6.25 mg  6.25 mg Oral BID WITH MEALS    HYDROcodone-acetaminophen (NORCO) 5-325 mg per tablet 1 Tab  1 Tab Oral Q6H PRN    ondansetron (ZOFRAN) injection 4 mg  4 mg IntraVENous Q4H PRN    acetaminophen (TYLENOL) tablet 650 mg  650 mg Oral Q6H PRN    spironolactone (ALDACTONE) tablet 25 mg  25 mg Oral DAILY    heparin (porcine) injection 5,000 Units  5,000 Units SubCUTAneous Q8H    allopurinol (ZYLOPRIM) tablet 100 mg  100 mg Oral DAILY    aspirin delayed-release tablet 81 mg  81 mg Oral DAILY    atorvastatin (LIPITOR) tablet 20 mg  20 mg Oral QHS    fluticasone (FLONASE) 50 mcg/actuation nasal spray 2 Spray  2 Spray Both Nostrils DAILY    melatonin tablet 3 mg (Patient Supplied)  3 mg Oral QHS PRN    pantoprazole (PROTONIX) tablet 40 mg  40 mg Oral ACB    polyethylene glycol (MIRALAX) packet 17 g  17 g Oral DAILY    albuterol (PROVENTIL HFA, VENTOLIN HFA, PROAIR HFA) inhaler 2 Puff  2 Puff Inhalation QID RT    guaiFENesin ER (MUCINEX) tablet 1,200 mg  1,200 mg Oral Q12H       Exam:  Vitals:    05/28/18 0419 05/28/18 0628 05/28/18 0708 05/28/18 0721   BP: 103/64   (!) 135/94   Pulse: 69   71   Resp: 18   19   Temp: 98.3 °F (36.8 °C)   98.1 °F (36.7 °C)   SpO2: 97%  98% 98%   Weight:  102.4 kg (225 lb 11.2 oz)     Height:             Intake/Output Summary (Last 24 hours) at 05/28/18 1015  Last data filed at 05/28/18 0721   Gross per 24 hour   Intake                0 ml   Output              400 ml   Net             -400 ml     PE:  GEN - in no distress  CV - regular, no murmur, no rub  Lung - decreased at bases bilaterally  Abd - soft, nontender  Ext - 1+ edema    Labs  Recent Labs      05/27/18   0610   WBC  9.1   HGB  13.5*   HCT  41.2   PLT  177     Recent Labs      05/28/18   0430  05/27/18   0610  05/26/18   0336   NA  143  143  142   K  3.9  4.0  3.9   CL  106  107  108*   CO2  29  27  25   BUN  28*  32*  28*   CREA  1.65*  1.68*  1.70*   GLU  114*  93  107*   CA  8.5  8.1*  8.3   MG  2.2  2.2  2.1     No results for input(s): PH, PCO2, PO2, PCO2 in the last 72 hours. Problem List:  Patient Active Problem List    Diagnosis Date Noted    Acute on chronic systolic heart failure ([facility] 05/24/2018    Acute respiratory failure with hypoxemia ([facility] 05/24/2018    Erysipelas 03/27/2018    Preseptal cellulitis 03/27/2018    Personal history of tobacco use 02/06/2018    Atherosclerosis of native coronary artery of native heart with stable angina pectoris ([facility] 10/13/2017    TAZ (obstructive sleep apnea) 10/02/2017    CKD (chronic kidney disease) stage 3, GFR 30-59 ml/min 09/29/2017    Coronary atherosclerosis of native coronary vessel 09/29/2017    Hypertension 09/29/2017    COPD, moderate ([facility] 09/29/2017    High triglycerides 09/29/2017    Gastroesophageal reflux disease without esophagitis 09/29/2017    CHF (congestive heart failure) ([facility] 09/27/2017    Mixed hyperlipidemia 09/28/2016    Essential hypertension 09/28/2016    Arthritis        Issues Addressed By Nephrology:  1. NINO on CKD 3: Stable again today. Likely has some underlying cardiorenal physiology  2. Restrictive cardiomyopathy: Concern for amyloid. Doubt renal amyloid. 24 hr UPEP pending. 3. Acute/Chronic CHF: Some progress  4.  COPD    Plan:  -Continue lasix and aldactone  -Now on oral lasix per Dr. Inez Ugalde  -Following labs  -I'm not concerned about rising creatinine if it is needed to improve his functional status and breathing.  -Repeat fat pad biopsy pending

## 2018-05-28 NOTE — PROGRESS NOTES
Hospitalist Progress Note    Subjective:   Daily Progress Note: 5/28/2018 9:34 AM     is a 75 y/o male with pmhx chronic systolic HF followed by [facility], COPD on 4 L NC, CKD who presented with dyspnea and LL edema. He was diagnosed with CHF exacerbation and started on diuresis. He had an echo which showed an EF of 45-50% with stippling pattern concerning for amyloidosis. This morning, he continued to report dyspnea and cough. He denied any chest pain.     Current Facility-Administered Medications   Medication Dose Route Frequency    furosemide (LASIX) tablet 80 mg  80 mg Oral DAILY    [START ON 5/29/2018] clindamycin phosphate 900 mg in 0.9% sodium chloride (MBP/ADV) 100 mL ADV  900 mg IntraVENous ON CALL TO OR    lisinopril (PRINIVIL, ZESTRIL) tablet 2.5 mg  2.5 mg Oral DAILY    carvedilol (COREG) tablet 6.25 mg  6.25 mg Oral BID WITH MEALS    HYDROcodone-acetaminophen (NORCO) 5-325 mg per tablet 1 Tab  1 Tab Oral Q6H PRN    ondansetron (ZOFRAN) injection 4 mg  4 mg IntraVENous Q4H PRN    acetaminophen (TYLENOL) tablet 650 mg  650 mg Oral Q6H PRN    spironolactone (ALDACTONE) tablet 25 mg  25 mg Oral DAILY    heparin (porcine) injection 5,000 Units  5,000 Units SubCUTAneous Q8H    allopurinol (ZYLOPRIM) tablet 100 mg  100 mg Oral DAILY    aspirin delayed-release tablet 81 mg  81 mg Oral DAILY    atorvastatin (LIPITOR) tablet 20 mg  20 mg Oral QHS    fluticasone (FLONASE) 50 mcg/actuation nasal spray 2 Spray  2 Spray Both Nostrils DAILY    melatonin tablet 3 mg (Patient Supplied)  3 mg Oral QHS PRN    pantoprazole (PROTONIX) tablet 40 mg  40 mg Oral ACB    polyethylene glycol (MIRALAX) packet 17 g  17 g Oral DAILY    albuterol (PROVENTIL HFA, VENTOLIN HFA, PROAIR HFA) inhaler 2 Puff  2 Puff Inhalation QID RT    guaiFENesin ER (MUCINEX) tablet 1,200 mg  1,200 mg Oral Q12H        Review of Systems  A comprehensive review of systems was negative except for that written in the HPI.    Objective:     Visit Vitals    BP (!) 135/94    Pulse 71    Temp 98.1 °F (36.7 °C)    Resp 19    Ht 5' 9\" (1.753 m)    Wt 102.4 kg (225 lb 11.2 oz)    SpO2 98%    BMI 33.33 kg/m2    O2 Flow Rate (L/min): 0 l/min O2 Device: Room air    Temp (24hrs), Av °F (36.7 °C), Min:97.6 °F (36.4 °C), Max:98.3 °F (36.8 °C)      701 - 1900  In: -   Out: 100 [Urine:100]  1901 -  07  In: -   Out: 400 [Urine:400]    Visit Vitals    BP (!) 135/94    Pulse 71    Temp 98.1 °F (36.7 °C)    Resp 19    Ht 5' 9\" (1.753 m)    Wt 102.4 kg (225 lb 11.2 oz)    SpO2 98%    BMI 33.33 kg/m2     General appearance: alert, cooperative, mild distress when he speaks on supplemental O2, appears stated age  Head: AT/NC  Lungs: clear to auscultation bilaterally   CVS: regular rate and rhythm, S1, S2 normal, 2+ BLLE edema   Abdomen: soft, non-tender. Bowel sounds normal. No masses,  no organomegaly  Extremities: extremities normal, atraumatic, no cyanosis. Neuro: Non focal exam, follows commands  Psych: A+Ox3  Skin: No Rash      Additional comments:I reviewed the patient's new clinical lab test results.  CXR - clear    Data Review    Recent Results (from the past 24 hour(s))   MAGNESIUM    Collection Time: 18  4:30 AM   Result Value Ref Range    Magnesium 2.2 1.8 - 2.4 mg/dL   METABOLIC PANEL, BASIC    Collection Time: 18  4:30 AM   Result Value Ref Range    Sodium 143 136 - 145 mmol/L    Potassium 3.9 3.5 - 5.1 mmol/L    Chloride 106 98 - 107 mmol/L    CO2 29 21 - 32 mmol/L    Anion gap 8 7 - 16 mmol/L    Glucose 114 (H) 65 - 100 mg/dL    BUN 28 (H) 8 - 23 MG/DL    Creatinine 1.65 (H) 0.8 - 1.5 MG/DL    GFR est AA 53 (L) >60 ml/min/1.73m2    GFR est non-AA 43 (L) >60 ml/min/1.73m2    Calcium 8.5 8.3 - 10.4 MG/DL         Assessment/Plan:     Principal Problem:    Acute on chronic systolic heart failure (HCC) (2018)      Overview: EF 40%    Active Problems:    CKD (chronic kidney disease) stage 3, GFR 30-59 ml/min (9/29/2017)      Coronary atherosclerosis of native coronary vessel (9/29/2017)      COPD, moderate ([address] (9/29/2017)      Overview: Complete PFTs: 7/20/15:      Spirometry is consistent with a moderate obstructive/restrictive defect. .       The residual volume is increased relative to other lung volumes suggesting       air-trapping. The diffusion capacity corrected for alveolar volume was       normal suggesting no loss of alveolo-capillary units. CHF (congestive heart failure) ([address] (9/27/2017)      Acute respiratory failure with hypoxemia ([address] (5/24/2018)    Plan:    Continue diuresis. Current presentation also suggestive of restrictive cardiomyopathy and restrictive lung disease. Appreciate cardiology input. Strict Is and Os  - Concern for Amyloid, Surgery plans Abd Fat pad Biopsy in the AM, NPO after midnight. Heme plans BM biopsy. - CKD, Cr now on baseline    Care Plan discussed with: Patient/Family and Nurse   dvt ppx: hsq  Dispo: Home vs Rehab. PT eval. ppd.     Signed By: Faustino Ramirez MD     May 28, 2018

## 2018-05-28 NOTE — PROGRESS NOTES
[facility] CARDIOLOGY PROGRESS NOTE           5/28/2018 7:31 AM    Admit Date: 5/23/2018      Subjective:   No complaints. ROS:  GEN:  No fever or chills  Cardiovascular:  As noted above:no CP or palpitations. Pulmonary:  As noted above:no SOB  Neuro:  No new focal motor or sensory loss    Objective:      Vitals:    05/28/18 0419 05/28/18 0628 05/28/18 0708 05/28/18 0721   BP: 103/64   (!) 135/94   Pulse: 69   71   Resp: 18   19   Temp: 98.3 °F (36.8 °C)   98.1 °F (36.7 °C)   SpO2: 97%  98% 98%   Weight:  102.4 kg (225 lb 11.2 oz)     Height:           Physical Exam:  General-no distress  Neck- supple, no JVD  CV- regular rate and rhythm no MRG  Lung- clear bilaterally  Abd- soft, nontender, nondistended  Ext- trace  edema bilaterally. Skin- warm and dry  Psychiatric:  Normal mood and affect. Neurologic:  Alert and oriented X 3      Data Review:   Recent Labs      05/28/18   0430  05/27/18   0610   NA  143  143   K  3.9  4.0   MG  2.2  2.2   BUN  28*  32*   CREA  1.65*  1.68*   GLU  114*  93   WBC   --   9.1   HGB   --   13.5*   HCT   --   41.2   PLT   --   177       TELEMETRY:NSR    Assessment/Plan:     Principal Problem:    Acute on chronic systolic heart failure ([facility] (5/24/2018): Mixed systolic & diastolic CHF. Echo findings are suspicious for cardiac amyloid. Amyloid work up on going. Transition iv to po Lasix. Overview: EF 40%    Active Problems:    CKD (chronic kidney disease) stage 3, GFR 30-59 ml/min (9/29/2017): Nephrology following. Coronary atherosclerosis of native coronary vessel (9/29/2017): Mild CAD per previous cath. COPD, moderate ([facility] (9/29/2017)      Overview: Complete PFTs: 7/20/15:      Spirometry is consistent with a moderate obstructive/restrictive defect. Maurizio Zavaleta MD  5/28/2018 7:31 AM

## 2018-05-28 NOTE — PROGRESS NOTES
Problem: Mobility Impaired (Adult and Pediatric)  Goal: *Acute Goals and Plan of Care (Insert Text)  LTG:  (1.)Mr. Rakan Alberto will move from supine to sit and sit to supine , scoot up and down and roll side to side with INDEPENDENT within 7 treatment day(s) from flat surface without handrail. (2.)Mr. Rakan Alberto will transfer from bed to chair and chair to bed with INDEPENDENT using the least restrictive device within 7 treatment day(s). (3.)Mr. Rakan Alberto will ambulate with MODIFIED INDEPENDENCE for 250+ feet with the least restrictive device within 7 treatment day(s), O2 stats >90%. (4.)Mr. Rakan Alberto will be independent in HEP for B LE strengthening within 7 treatment days for increased strength with mobility. (5.)Mr. Rakan Alberto will verbalize energy conservation techniques within 7 treatment days with independence.   ______________________________________________________________________________________________     PHYSICAL THERAPY: Daily Note, Treatment Day: 3rd, AM 5/28/2018  INPATIENT: Hospital Day: 6  Payor: Son Davila / Plan: [address] [place] [address] HMO / Product Type: Managed Care Medicare /      NAME/AGE/GENDER: Diane Bowden is a 74 y.o. male   PRIMARY DIAGNOSIS: Acute respiratory failure with hypoxemia ([facility]  RULE OUT ADENOMYOSIS Acute on chronic systolic heart failure (HCC) Acute on chronic systolic heart failure (HCC)  Procedure(s) (LRB):  ABDOMINAL FAT PAD BIOPSY/ 203 (N/A)     ICD-10: Treatment Diagnosis:    · Generalized Muscle Weakness (M62.81)  · Difficulty in walking, Not elsewhere classified (R26.2)   Precaution/Allergies:  Pcn [penicillins]      ASSESSMENT:     Mr. Rakan Alberto presents lying supine in bed. He is agreeable to treatment today. He ambulated with rolling walker. He ambulated around the loop and needed verbal cues for safety with walker. He needed 2 seated rest breaks during ambulation. Instructed patient in energy conservation techniques. He verbalized understanding. Good progress toward goals. Will continue PT efforts. This section established at most recent assessment   PROBLEM LIST (Impairments causing functional limitations):  1. Decreased ADL/Functional Activities  2. Decreased Transfer Abilities  3. Decreased Ambulation Ability/Technique  4. Decreased Balance  5. Decreased Activity Tolerance  6. Increased Fatigue  7. Increased Shortness of Breath  8. Decreased Lewis with Home Exercise Program   INTERVENTIONS PLANNED: (Benefits and precautions of physical therapy have been discussed with the patient.)  1. Balance Exercise  2. Bed Mobility  3. Family Education  4. Gait Training  5. Home Exercise Program (HEP)  6. Therapeutic Activites  7. Therapeutic Exercise/Strengthening  8. Transfer Training  9. Group Therapy     TREATMENT PLAN: Frequency/Duration: 3 times a week for duration of hospital stay  Rehabilitation Potential For Stated Goals: Good     RECOMMENDED REHABILITATION/EQUIPMENT: (at time of discharge pending progress): Due to the probability of continued deficits (see above) this patient will likely need continued skilled physical therapy after discharge. Equipment:    None at this time              HISTORY:   History of Present Injury/Illness (Reason for Referral):  74years old M with PMH of systolic HF, CKD, COPD on 4L O2 at home presented to the hospital complaining of worsening SOB for the last 2 weeks. Patient stated SOB was mostly on exertion but now is also at rest. Patient reported lasix was helping him but for the last 2 days is not working. Patient also reported having worsening LE edema for the last week. Patient's family reported he is unable to afford  Inhalers for COPD. Patient reported he is taking all his \" heart medications\" everyday.  Patient denies chest pain, abdominal pain, diarrhea, fever, cough or sick contacts at home.      Past Medical History/Comorbidities:   Mr. Rina Dodd  has a past medical history of Acute CHF (congestive heart failure) ([facility] (4/25/2015); Acute combined systolic and diastolic congestive heart failure ([facility] (4/28/2015); Chronic obstructive pulmonary disease ([facility] De Quervain's tenosynovitis, right (4/28/2015); Dyspnea on exertion (6/28/2015); Elevated serum creatinine (4/25/2015); Elevated troponin (6/28/2015); History of coronary artery disease; History of right knee surgery; History of shingles; Malignant hypertension (4/25/2015); and MI (myocardial infarction) ([facility] Mr. Charli Ryder  has a past surgical history that includes hx knee arthroscopy (Right) and hx heart catheterization (6/29/2015). Social History/Living Environment:   Home Environment: Apartment  # Steps to Enter: 0  One/Two Story Residence: One story  Living Alone: No  Support Systems: Spouse/Significant Other/Partner, Child(isatu)  Patient Expects to be Discharged to[de-identified] Apartment  Current DME Used/Available at Home: Oxygen, portable, Nebulizer  Tub or Shower Type: Tub/Shower combination  Prior Level of Function/Work/Activity:  Lives with spouse and daughter; independent in ADLs, ambulation, driving; on 4 L home O2  Personal Factors:          Sex:  male        Age:  74 y.o. Overall Behavior:  agreeable   Number of Personal Factors/Comorbidities that affect the Plan of Care:  COPD, ARF, CHF 3+: HIGH COMPLEXITY   EXAMINATION:   Most Recent Physical Functioning:   Gross Assessment:                  Posture:  Posture (WDL): Exceptions to WDL  Posture Assessment:  Forward head, Rounded shoulders  Balance:  Sitting: Intact  Sitting - Static: Good (unsupported)  Sitting - Dynamic: Good (unsupported)  Standing: Impaired  Standing - Static: Good  Standing - Dynamic : Fair Bed Mobility:  Rolling: Modified independent  Supine to Sit: Modified independent  Wheelchair Mobility:     Transfers:  Sit to Stand: Independent  Stand to Sit: Independent  Gait:     Distance (ft): 250 Feet (ft) (2 seated rest breaks)  Assistive Device: Walker, rolling  Ambulation - Level of Assistance: Stand-by assistance Body Structures Involved:  1. Muscles Body Functions Affected:  1. Movement Related Activities and Participation Affected:  1. General Tasks and Demands  2. Mobility  3. Self Care   Number of elements that affect the Plan of Care: 4+: HIGH COMPLEXITY   CLINICAL PRESENTATION:   Presentation: Stable and uncomplicated: LOW COMPLEXITY   CLINICAL DECISION MAKING:   [facility] AM-PAC 6 Clicks   Basic Mobility Inpatient Short Form  How much difficulty does the patient currently have. .. Unable A Lot A Little None   1. Turning over in bed (including adjusting bedclothes, sheets and blankets)? [] 1   [] 2   [] 3   [x] 4   2. Sitting down on and standing up from a chair with arms ( e.g., wheelchair, bedside commode, etc.)   [] 1   [] 2   [x] 3   [] 4   3. Moving from lying on back to sitting on the side of the bed? [] 1   [] 2   [x] 3   [] 4   How much help from another person does the patient currently need. .. Total A Lot A Little None   4. Moving to and from a bed to a chair (including a wheelchair)? [] 1   [] 2   [x] 3   [] 4   5. Need to walk in hospital room? [] 1   [] 2   [x] 3   [] 4   6. Climbing 3-5 steps with a railing? [] 1   [] 2   [x] 3   [] 4   © 2007, Trustees of [facility], under license to [employer]. All rights reserved      Score:  Initial: 19 Most Recent: X (Date: -- )    Interpretation of Tool:  Represents activities that are increasingly more difficult (i.e. Bed mobility, Transfers, Gait). Score 24 23 22-20 19-15 14-10 9-7 6     Modifier CH CI CJ CK CL CM CN      ?  Mobility - Walking and Moving Around:     - CURRENT STATUS: CK - 40%-59% impaired, limited or restricted    - GOAL STATUS: CJ - 20%-39% impaired, limited or restricted    - D/C STATUS:  ---------------To be determined---------------  Payor: Milena Weathers / Plan: [address] [place] [address] HMO / Product Type: [employer] Medicare /      Medical Necessity:     · Patient is expected to demonstrate progress in strength, range of motion, balance and coordination to decrease assistance required with overall functional mobility, transfers, ambulation. · Patient demonstrates good rehab potential due to higher previous functional level. Reason for Services/Other Comments:  · Patient continues to require present interventions due to patient's inability to perform bed mobility, transfers, ambulation safely and effectively. Use of outcome tool(s) and clinical judgement create a POC that gives a: Clear prediction of patient's progress: LOW COMPLEXITY            TREATMENT:   (In addition to Assessment/Re-Assessment sessions the following treatments were rendered)   Pre-treatment Symptoms/Complaints:  \" If my breathing was better I would be great. \"  Pain: Initial:   Pain Intensity 1: 0  Post Session:  0/10 post session     Therapeutic Activity: (    25 minutes): Therapeutic activities including chair transfers and Ambulation on level ground to improve mobility, strength, balance and coordination. Required minimal A   to conserve energy with use of the walker to lean on. Braces/Orthotics/Lines/Etc:   · None  Treatment/Session Assessment:    · Response to Treatment:  Tolerated well  · Interdisciplinary Collaboration:   o Physical Therapy Assistant  o Registered Nurse  · After treatment position/precautions:   o Up in chair  o Bed/Chair-wheels locked  o Bed in low position  o Call light within reach  o RN notified   · Compliance with Program/Exercises: good  · Recommendations/Intent for next treatment session: \"Next visit will focus on advancements to more challenging activities\".   Total Treatment Duration:  PT Patient Time In/Time Out  Time In: 1100  Time Out: [address] PTA

## 2018-05-28 NOTE — ROUTINE PROCESS
CHF teaching continues; emphasis  To report worsening dyspnea, will follow to reinforce    Very weak  No dyspnea on RA.

## 2018-05-28 NOTE — PROGRESS NOTES
END OF SHIFT NOTE:    INTAKE/OUTPUT  05/27 0701 - 05/28 0700  In: -   Out: 300 [Urine:300]  Voiding: YES  Catheter: NO  Drain:              Flatus: Patient does have flatus present. Stool:  0 occurrences. Characteristics:  Stool Assessment  Stool Appearance: Formed    Emesis: 0 occurrences. Characteristics:        VITAL SIGNS  Patient Vitals for the past 12 hrs:   Temp Pulse Resp BP SpO2   05/28/18 0419 98.3 °F (36.8 °C) 69 18 103/64 97 %   05/27/18 2306 97.7 °F (36.5 °C) 78 18 99/77 100 %   05/27/18 2207 - 63 - - -   05/27/18 1949 98.3 °F (36.8 °C) 78 20 134/67 94 %   05/27/18 1910 - - - - 96 %       Pain Assessment  Pain Intensity 1: 0 (05/27/18 1100)        Patient Stated Pain Goal: 0    Ambulating  Yes    Shift report given to oncoming nurse at the bedside.     Gina Clements RN

## 2018-05-29 ENCOUNTER — APPOINTMENT (OUTPATIENT)
Dept: CT IMAGING | Age: 73
DRG: 264 | End: 2018-05-29
Attending: NURSE PRACTITIONER
Payer: MEDICARE

## 2018-05-29 LAB
ANION GAP SERPL CALC-SCNC: 10 MMOL/L (ref 7–16)
BASOPHILS # BLD: 0 K/UL (ref 0–0.2)
BASOPHILS NFR BLD: 0 % (ref 0–2)
BONE MARROW PREP & W,BMA: NORMAL
BUN SERPL-MCNC: 22 MG/DL (ref 8–23)
CALCIUM SERPL-MCNC: 8.4 MG/DL (ref 8.3–10.4)
CHLORIDE SERPL-SCNC: 109 MMOL/L (ref 98–107)
CO2 SERPL-SCNC: 24 MMOL/L (ref 21–32)
CREAT SERPL-MCNC: 1.57 MG/DL (ref 0.8–1.5)
CREAT UR-MCNC: 53.6 MG/DL
DIFFERENTIAL METHOD BLD: ABNORMAL
EOSINOPHIL # BLD: 0.1 K/UL (ref 0–0.8)
EOSINOPHIL NFR BLD: 1 % (ref 0.5–7.8)
ERYTHROCYTE [DISTWIDTH] IN BLOOD BY AUTOMATED COUNT: 18.1 % (ref 11.9–14.6)
GLUCOSE SERPL-MCNC: 83 MG/DL (ref 65–100)
HCT VFR BLD AUTO: 41.8 % (ref 41.1–50.3)
HGB BLD-MCNC: 13.5 G/DL (ref 13.6–17.2)
IMM GRANULOCYTES # BLD: 0.1 K/UL (ref 0–0.5)
IMM GRANULOCYTES NFR BLD AUTO: 1 % (ref 0–5)
KAPPA LC FREE SER-MCNC: 66.59 MG/L (ref 3.3–19.4)
KAPPA LC FREE/LAMBDA FREE SER: 3.02 {RATIO} (ref 0.26–1.65)
LAMBDA LC FREE SERPL-MCNC: 22.05 MG/L (ref 5.71–26.3)
LYMPHOCYTES # BLD: 2.7 K/UL (ref 0.5–4.6)
LYMPHOCYTES NFR BLD: 30 % (ref 13–44)
MAGNESIUM SERPL-MCNC: 2.1 MG/DL (ref 1.8–2.4)
MCH RBC QN AUTO: 27.3 PG (ref 26.1–32.9)
MCHC RBC AUTO-ENTMCNC: 32.3 G/DL (ref 31.4–35)
MCV RBC AUTO: 84.6 FL (ref 79.6–97.8)
MM INDURATION POC: NORMAL MM (ref 0–5)
MONOCYTES # BLD: 1 K/UL (ref 0.1–1.3)
MONOCYTES NFR BLD: 11 % (ref 4–12)
NEUTS SEG # BLD: 5.2 K/UL (ref 1.7–8.2)
NEUTS SEG NFR BLD: 57 % (ref 43–78)
PLATELET # BLD AUTO: 142 K/UL (ref 150–450)
PMV BLD AUTO: 10.6 FL (ref 10.8–14.1)
POTASSIUM SERPL-SCNC: 3.9 MMOL/L (ref 3.5–5.1)
PPD POC: NORMAL NEGATIVE
PROT UR-MCNC: 9 MG/DL
PROT/CREAT UR-RTO: 0.2
RBC # BLD AUTO: 4.94 M/UL (ref 4.23–5.67)
SODIUM SERPL-SCNC: 143 MMOL/L (ref 136–145)
WBC # BLD AUTO: 9.1 K/UL (ref 4.3–11.1)

## 2018-05-29 PROCEDURE — 88313 SPECIAL STAINS GROUP 2: CPT | Performed by: SURGERY

## 2018-05-29 PROCEDURE — 94640 AIRWAY INHALATION TREATMENT: CPT

## 2018-05-29 PROCEDURE — 88264 CHROMOSOME ANALYSIS 20-25: CPT

## 2018-05-29 PROCEDURE — 88342 IMHCHEM/IMCYTCHM 1ST ANTB: CPT | Performed by: INTERNAL MEDICINE

## 2018-05-29 PROCEDURE — 74011250636 HC RX REV CODE- 250/636

## 2018-05-29 PROCEDURE — 74011000250 HC RX REV CODE- 250: Performed by: RADIOLOGY

## 2018-05-29 PROCEDURE — 88112 CYTOPATH CELL ENHANCE TECH: CPT

## 2018-05-29 PROCEDURE — 65270000029 HC RM PRIVATE

## 2018-05-29 PROCEDURE — 88184 FLOWCYTOMETRY/ TC 1 MARKER: CPT

## 2018-05-29 PROCEDURE — 38222 DX BONE MARROW BX & ASPIR: CPT

## 2018-05-29 PROCEDURE — 88304 TISSUE EXAM BY PATHOLOGIST: CPT | Performed by: SURGERY

## 2018-05-29 PROCEDURE — 07DR3ZX EXTRACTION OF ILIAC BONE MARROW, PERCUTANEOUS APPROACH, DIAGNOSTIC: ICD-10-PCS | Performed by: RADIOLOGY

## 2018-05-29 PROCEDURE — 74011250636 HC RX REV CODE- 250/636: Performed by: INTERNAL MEDICINE

## 2018-05-29 PROCEDURE — 94760 N-INVAS EAR/PLS OXIMETRY 1: CPT

## 2018-05-29 PROCEDURE — 74011250637 HC RX REV CODE- 250/637: Performed by: INTERNAL MEDICINE

## 2018-05-29 PROCEDURE — 85025 COMPLETE CBC W/AUTO DIFF WBC: CPT | Performed by: INTERNAL MEDICINE

## 2018-05-29 PROCEDURE — 88280 CHROMOSOME KARYOTYPE STUDY: CPT

## 2018-05-29 PROCEDURE — 74011250636 HC RX REV CODE- 250/636: Performed by: RADIOLOGY

## 2018-05-29 PROCEDURE — 88312 SPECIAL STAINS GROUP 1: CPT | Performed by: INTERNAL MEDICINE

## 2018-05-29 PROCEDURE — 88305 TISSUE EXAM BY PATHOLOGIST: CPT | Performed by: INTERNAL MEDICINE

## 2018-05-29 PROCEDURE — 77030011640 HC PAD GRND REM COVD -A: Performed by: SURGERY

## 2018-05-29 PROCEDURE — 36415 COLL VENOUS BLD VENIPUNCTURE: CPT | Performed by: INTERNAL MEDICINE

## 2018-05-29 PROCEDURE — 0QB23ZX EXCISION OF RIGHT PELVIC BONE, PERCUTANEOUS APPROACH, DIAGNOSTIC: ICD-10-PCS | Performed by: RADIOLOGY

## 2018-05-29 PROCEDURE — 77030018846 HC SOL IRR STRL H20 ICUM -A: Performed by: SURGERY

## 2018-05-29 PROCEDURE — 83735 ASSAY OF MAGNESIUM: CPT | Performed by: INTERNAL MEDICINE

## 2018-05-29 PROCEDURE — 88185 FLOWCYTOMETRY/TC ADD-ON: CPT

## 2018-05-29 PROCEDURE — 77010033678 HC OXYGEN DAILY

## 2018-05-29 PROCEDURE — 0JB80ZZ EXCISION OF ABDOMEN SUBCUTANEOUS TISSUE AND FASCIA, OPEN APPROACH: ICD-10-PCS | Performed by: SURGERY

## 2018-05-29 PROCEDURE — 84156 ASSAY OF PROTEIN URINE: CPT | Performed by: INTERNAL MEDICINE

## 2018-05-29 PROCEDURE — 76210000063 HC OR PH I REC FIRST 0.5 HR: Performed by: SURGERY

## 2018-05-29 PROCEDURE — 74011250636 HC RX REV CODE- 250/636: Performed by: SURGERY

## 2018-05-29 PROCEDURE — 80048 BASIC METABOLIC PNL TOTAL CA: CPT | Performed by: INTERNAL MEDICINE

## 2018-05-29 PROCEDURE — 88185 FLOWCYTOMETRY/TC ADD-ON: CPT | Performed by: INTERNAL MEDICINE

## 2018-05-29 PROCEDURE — 88184 FLOWCYTOMETRY/ TC 1 MARKER: CPT | Performed by: INTERNAL MEDICINE

## 2018-05-29 PROCEDURE — 88377 M/PHMTRC ALYS ISHQUANT/SEMIQ: CPT

## 2018-05-29 PROCEDURE — 88237 TISSUE CULTURE BONE MARROW: CPT

## 2018-05-29 PROCEDURE — 88313 SPECIAL STAINS GROUP 2: CPT | Performed by: INTERNAL MEDICINE

## 2018-05-29 PROCEDURE — 76010000154 HC OR TIME FIRST 0.5 HR: Performed by: SURGERY

## 2018-05-29 PROCEDURE — 88311 DECALCIFY TISSUE: CPT | Performed by: INTERNAL MEDICINE

## 2018-05-29 RX ORDER — SODIUM CHLORIDE, SODIUM LACTATE, POTASSIUM CHLORIDE, CALCIUM CHLORIDE 600; 310; 30; 20 MG/100ML; MG/100ML; MG/100ML; MG/100ML
25 INJECTION, SOLUTION INTRAVENOUS CONTINUOUS
Status: DISCONTINUED | OUTPATIENT
Start: 2018-05-29 | End: 2018-05-30 | Stop reason: HOSPADM

## 2018-05-29 RX ORDER — SODIUM CHLORIDE 9 MG/ML
25 INJECTION, SOLUTION INTRAVENOUS ONCE
Status: COMPLETED | OUTPATIENT
Start: 2018-05-29 | End: 2018-05-29

## 2018-05-29 RX ORDER — LIDOCAINE HYDROCHLORIDE 20 MG/ML
20-200 INJECTION, SOLUTION INFILTRATION; PERINEURAL
Status: DISCONTINUED | OUTPATIENT
Start: 2018-05-29 | End: 2018-05-29 | Stop reason: ALTCHOICE

## 2018-05-29 RX ORDER — FENTANYL CITRATE 50 UG/ML
25-100 INJECTION, SOLUTION INTRAMUSCULAR; INTRAVENOUS
Status: DISCONTINUED | OUTPATIENT
Start: 2018-05-29 | End: 2018-05-29 | Stop reason: ALTCHOICE

## 2018-05-29 RX ADMIN — HEPARIN SODIUM 5000 UNITS: 5000 INJECTION, SOLUTION INTRAVENOUS; SUBCUTANEOUS at 19:58

## 2018-05-29 RX ADMIN — CARVEDILOL 6.25 MG: 6.25 TABLET, FILM COATED ORAL at 16:54

## 2018-05-29 RX ADMIN — SPIRONOLACTONE 25 MG: 25 TABLET, FILM COATED ORAL at 08:06

## 2018-05-29 RX ADMIN — LIDOCAINE HYDROCHLORIDE 100 MG: 20 INJECTION, SOLUTION INFILTRATION; PERINEURAL at 11:22

## 2018-05-29 RX ADMIN — ALBUTEROL SULFATE 2 PUFF: 90 AEROSOL, METERED RESPIRATORY (INHALATION) at 15:42

## 2018-05-29 RX ADMIN — SODIUM BICARBONATE 2 ML: 0.2 INJECTION, SOLUTION INTRAVENOUS at 11:22

## 2018-05-29 RX ADMIN — ATORVASTATIN CALCIUM 20 MG: 10 TABLET, FILM COATED ORAL at 08:05

## 2018-05-29 RX ADMIN — ALBUTEROL SULFATE 2 PUFF: 90 AEROSOL, METERED RESPIRATORY (INHALATION) at 07:39

## 2018-05-29 RX ADMIN — GUAIFENESIN 1200 MG: 600 TABLET, EXTENDED RELEASE ORAL at 08:05

## 2018-05-29 RX ADMIN — LIDOCAINE HYDROCHLORIDE 100 MG: 20 INJECTION, SOLUTION INFILTRATION; PERINEURAL at 11:25

## 2018-05-29 RX ADMIN — FENTANYL CITRATE 25 MCG: 50 INJECTION, SOLUTION INTRAMUSCULAR; INTRAVENOUS at 11:27

## 2018-05-29 RX ADMIN — ALLOPURINOL 100 MG: 100 TABLET ORAL at 08:05

## 2018-05-29 RX ADMIN — SODIUM CHLORIDE, SODIUM LACTATE, POTASSIUM CHLORIDE, AND CALCIUM CHLORIDE 25 ML/HR: 600; 310; 30; 20 INJECTION, SOLUTION INTRAVENOUS at 12:52

## 2018-05-29 RX ADMIN — ALBUTEROL SULFATE 2 PUFF: 90 AEROSOL, METERED RESPIRATORY (INHALATION) at 20:00

## 2018-05-29 RX ADMIN — CARVEDILOL 6.25 MG: 6.25 TABLET, FILM COATED ORAL at 08:05

## 2018-05-29 RX ADMIN — GUAIFENESIN 1200 MG: 600 TABLET, EXTENDED RELEASE ORAL at 19:57

## 2018-05-29 RX ADMIN — SODIUM CHLORIDE 25 ML/HR: 900 INJECTION, SOLUTION INTRAVENOUS at 11:14

## 2018-05-29 RX ADMIN — LISINOPRIL 2.5 MG: 5 TABLET ORAL at 08:05

## 2018-05-29 RX ADMIN — ASPIRIN 81 MG: 81 TABLET, COATED ORAL at 08:05

## 2018-05-29 RX ADMIN — FUROSEMIDE 80 MG: 40 TABLET ORAL at 08:05

## 2018-05-29 RX ADMIN — PANTOPRAZOLE SODIUM 40 MG: 40 TABLET, DELAYED RELEASE ORAL at 07:09

## 2018-05-29 RX ADMIN — FLUTICASONE PROPIONATE 2 SPRAY: 50 SPRAY, METERED NASAL at 08:11

## 2018-05-29 RX ADMIN — ACETAMINOPHEN 650 MG: 325 TABLET ORAL at 17:00

## 2018-05-29 NOTE — PROCEDURES
Interventional Radiology Brief Procedure Note    Patient: Chacha Patel MRN: [id number]  SSN: [id number]    YOB: 1945  Age: 74 y.o.   Sex: male      Date of Procedure: 5/29/2018    Pre-Procedure Diagnosis: myelodysplasia    Post-Procedure Diagnosis: SAME    Procedure(s): Image Guided Biopsy    Brief Description of Procedure: right iliac bone    Performed By: Malou Cai MD     Assistants: None    Anesthesia: Lidocaine    Estimated Blood Loss: Less than 10ml    Specimens: pathology    Implants: None    Findings: Unremarkable    Complications: None    Recommendations: 1 hourbedrest.      Follow Up: None    Signed By: Malou Cai MD     May 29, 2018

## 2018-05-29 NOTE — PROGRESS NOTES
TRANSFER - IN REPORT:    Verbal report received from Zhane Guzmán [facility] on [facility].  being received from PACU for routine post - op      Report consisted of patients Situation, Background, Assessment and   Recommendations(SBAR). Information from the following report(s) SBAR, Kardex, OR Summary, Procedure Summary, Intake/Output, MAR and Recent Results was reviewed with the receiving nurse. Opportunity for questions and clarification was provided. Assessment completed upon patients arrival to unit and care assumed.

## 2018-05-29 NOTE — ROUTINE PROCESS
TRANSFER - OUT REPORT:    Verbal report given to Zillow on [facility].  being transferred to  for routine post - op       Report consisted of patients Situation, Background, Assessment and   Recommendations(SBAR). Information from the following report(s) SBAR, Kardex, OR Summary, Procedure Summary, Intake/Output and MAR was reviewed with the receiving nurse. Lines:   Peripheral IV 05/29/18 Right; Lower Arm (Active)   Site Assessment Clean, dry, & intact 5/29/2018  1:43 PM   Phlebitis Assessment 0 5/29/2018  1:43 PM   Infiltration Assessment 0 5/29/2018  1:43 PM   Dressing Status Clean, dry, & intact 5/29/2018  1:43 PM   Dressing Type Transparent;Tape 5/29/2018  1:43 PM   Hub Color/Line Status Infusing 5/29/2018  1:43 PM   Alcohol Cap Used No 5/29/2018  1:43 PM        Opportunity for questions and clarification was provided. Patient transported with:   O2 @ 4 liters    VTE prophylaxis orders have been written for [facility]. .    Patient and family given floor number and nurses name. Family updated re: pt status after security code verified.

## 2018-05-29 NOTE — PROGRESS NOTES
Problem: Mobility Impaired (Adult and Pediatric)  Goal: *Acute Goals and Plan of Care (Insert Text)  LTG:  (1.)Mr. Gian Ordza will move from supine to sit and sit to supine , scoot up and down and roll side to side with INDEPENDENT within 7 treatment day(s) from flat surface without handrail. (2.)Mr. Gian Ordaz will transfer from bed to chair and chair to bed with INDEPENDENT using the least restrictive device within 7 treatment day(s). (3.)Mr. Gian Ordaz will ambulate with MODIFIED INDEPENDENCE for 250+ feet with the least restrictive device within 7 treatment day(s), O2 stats >90%. (4.)Mr. Gian Ordaz will be independent in [facility] for B LE strengthening within 7 treatment days for increased strength with mobility. (5.)Mr. Gian Ordaz will verbalize energy conservation techniques within 7 treatment days with independence.   ______________________________________________________________________________________________     Physical Therapy Note:    Patient in Interventional Radiology this am. Patient not seen. Will attempt to see patient later today as time and schedule permits. Thank you.      Yumiko Gold, JM

## 2018-05-29 NOTE — PROGRESS NOTES
OT attempted to see pt in am. Pt was off floor for a procedure. Will attempt at another time/date as schedule permits.      Thank you   RIYA Lorenzo/LUIS CARLOS

## 2018-05-29 NOTE — PROGRESS NOTES
Hospitalist Progress Note    Subjective:   Daily Progress Note: 5/29/2018 9:34 AM     is a 75 y/o male with pmhx chronic systolic HF followed by [facility], COPD on 4 L NC, CKD who presented with dyspnea and LL edema. He was diagnosed with CHF exacerbation and started on diuresis. He had an echo which showed an EF of 45-50% with stippling pattern concerning for amyloidosis. This morning, he denies any dyspnea.     Current Facility-Administered Medications   Medication Dose Route Frequency    lactated Ringers infusion  25 mL/hr IntraVENous CONTINUOUS    furosemide (LASIX) tablet 80 mg  80 mg Oral DAILY    clindamycin phosphate 900 mg in 0.9% sodium chloride (MBP/ADV) 100 mL ADV  900 mg IntraVENous ON CALL TO OR    lisinopril (PRINIVIL, ZESTRIL) tablet 2.5 mg  2.5 mg Oral DAILY    carvedilol (COREG) tablet 6.25 mg  6.25 mg Oral BID WITH MEALS    HYDROcodone-acetaminophen (NORCO) 5-325 mg per tablet 1 Tab  1 Tab Oral Q6H PRN    ondansetron (ZOFRAN) injection 4 mg  4 mg IntraVENous Q4H PRN    acetaminophen (TYLENOL) tablet 650 mg  650 mg Oral Q6H PRN    spironolactone (ALDACTONE) tablet 25 mg  25 mg Oral DAILY    heparin (porcine) injection 5,000 Units  5,000 Units SubCUTAneous Q8H    allopurinol (ZYLOPRIM) tablet 100 mg  100 mg Oral DAILY    aspirin delayed-release tablet 81 mg  81 mg Oral DAILY    atorvastatin (LIPITOR) tablet 20 mg  20 mg Oral QHS    fluticasone (FLONASE) 50 mcg/actuation nasal spray 2 Spray  2 Spray Both Nostrils DAILY    melatonin tablet 3 mg (Patient Supplied)  3 mg Oral QHS PRN    pantoprazole (PROTONIX) tablet 40 mg  40 mg Oral ACB    polyethylene glycol (MIRALAX) packet 17 g  17 g Oral DAILY    albuterol (PROVENTIL HFA, VENTOLIN HFA, PROAIR HFA) inhaler 2 Puff  2 Puff Inhalation QID RT    guaiFENesin ER (MUCINEX) tablet 1,200 mg  1,200 mg Oral Q12H        Review of Systems  A comprehensive review of systems was negative except for that written in the HPI.    Objective:     Visit Vitals    /70 (BP 1 Location: Right arm, BP Patient Position: At rest)    Pulse 67    Temp 97.6 °F (36.4 °C)    Resp 18    Ht 5' 9\" (1.753 m)    Wt 86.2 kg (190 lb)    SpO2 97%    BMI 28.06 kg/m2    O2 Flow Rate (L/min): 4 l/min O2 Device: Nasal cannula    Temp (24hrs), Av.8 °F (36.6 °C), Min:97.4 °F (36.3 °C), Max:98.3 °F (36.8 °C)       07 - 1900  In: 50 [I.V.:50]  Out: 9140 [Urine:1475]  1901 -  0700  In: 300 [P.O.:300]  Out: 800 [Urine:800]    Visit Vitals    /70 (BP 1 Location: Right arm, BP Patient Position: At rest)    Pulse 67    Temp 97.6 °F (36.4 °C)    Resp 18    Ht 5' 9\" (1.753 m)    Wt 86.2 kg (190 lb)    SpO2 97%    BMI 28.06 kg/m2     General appearance: alert, cooperative, mild distress when he speaks on supplemental O2, appears stated age  Head: AT/NC  Lungs: clear to auscultation bilaterally   CVS: regular rate and rhythm, S1, S2 normal, 1+ BLLE edema   Abdomen: soft, non-tender. Bowel sounds normal. No masses,  no organomegaly  Extremities: extremities normal, atraumatic, no cyanosis. Neuro: Non focal exam, follows commands  Psych: A+Ox3  Skin: No Rash      Additional comments:I reviewed the patient's new clinical lab test results.  CXR - clear    Data Review    Recent Results (from the past 24 hour(s))   MAGNESIUM    Collection Time: 18  5:00 AM   Result Value Ref Range    Magnesium 2.1 1.8 - 2.4 mg/dL   METABOLIC PANEL, BASIC    Collection Time: 18  5:00 AM   Result Value Ref Range    Sodium 143 136 - 145 mmol/L    Potassium 3.9 3.5 - 5.1 mmol/L    Chloride 109 (H) 98 - 107 mmol/L    CO2 24 21 - 32 mmol/L    Anion gap 10 7 - 16 mmol/L    Glucose 83 65 - 100 mg/dL    BUN 22 8 - 23 MG/DL    Creatinine 1.57 (H) 0.8 - 1.5 MG/DL    GFR est AA 56 (L) >60 ml/min/1.73m2    GFR est non-AA 46 (L) >60 ml/min/1.73m2    Calcium 8.4 8.3 - 10.4 MG/DL   CBC WITH AUTOMATED DIFF    Collection Time: 18  5:00 AM Result Value Ref Range    WBC 9.1 4.3 - 11.1 K/uL    RBC 4.94 4.23 - 5.67 M/uL    HGB 13.5 (L) 13.6 - 17.2 g/dL    HCT 41.8 41.1 - 50.3 %    MCV 84.6 79.6 - 97.8 FL    MCH 27.3 26.1 - 32.9 PG    MCHC 32.3 31.4 - 35.0 g/dL    RDW 18.1 (H) 11.9 - 14.6 %    PLATELET 973 (L) 974 - 450 K/uL    MPV 10.6 (L) 10.8 - 14.1 FL    DF AUTOMATED      NEUTROPHILS 57 43 - 78 %    LYMPHOCYTES 30 13 - 44 %    MONOCYTES 11 4.0 - 12.0 %    EOSINOPHILS 1 0.5 - 7.8 %    BASOPHILS 0 0.0 - 2.0 %    IMMATURE GRANULOCYTES 1 0.0 - 5.0 %    ABS. NEUTROPHILS 5.2 1.7 - 8.2 K/UL    ABS. LYMPHOCYTES 2.7 0.5 - 4.6 K/UL    ABS. MONOCYTES 1.0 0.1 - 1.3 K/UL    ABS. EOSINOPHILS 0.1 0.0 - 0.8 K/UL    ABS. BASOPHILS 0.0 0.0 - 0.2 K/UL    ABS. IMM. GRANS. 0.1 0.0 - 0.5 K/UL   PLEASE READ & DOCUMENT PPD TEST IN 48 HRS    Collection Time: 05/29/18  9:21 AM   Result Value Ref Range    PPD  Negative    mm Induration  0 mm   BONE MARROW PREP & OLIVARES STAIN    Collection Time: 05/29/18 11:20 AM   Result Value Ref Range    Bone Marrow Prep & Evelene Mura Stain FOR HEMATOLOGY SERVICES RENDERED           Assessment/Plan:     Principal Problem:    Acute on chronic systolic heart failure ([facility] (5/24/2018)      Overview: EF 40%    Active Problems:    CKD (chronic kidney disease) stage 3, GFR 30-59 ml/min (9/29/2017)      Coronary atherosclerosis of native coronary vessel (9/29/2017)      COPD, moderate ([facility] (9/29/2017)      Overview: Complete PFTs: 7/20/15:      Spirometry is consistent with a moderate obstructive/restrictive defect. .       The residual volume is increased relative to other lung volumes suggesting       air-trapping. The diffusion capacity corrected for alveolar volume was       normal suggesting no loss of alveolo-capillary units. CHF (congestive heart failure) ([facility] (9/27/2017)      Acute respiratory failure with hypoxemia ([facility] (5/24/2018)    Plan:    Continue diuresis.  Current presentation also suggestive of restrictive cardiomyopathy and restrictive lung disease. Appreciate cardiology input. Strict Is and Os  - Concern for Amyloid s/p fat pad biopsy today   - CKD, Cr now on baseline    Care Plan discussed with: Patient/Family and Nurse   dvt ppx: hsq  Dispo: Home vs Rehab. PT eval. ppd.     Signed By: Bishop Alan MD     May 29, 2018

## 2018-05-29 NOTE — PROGRESS NOTES
Attempted to see pt but off floor for bone marrow biopsy/fat pad biopsy. Labs pending. Await pathology from above.  We will follow along

## 2018-05-29 NOTE — PROGRESS NOTES
[facility]        Subjective: CKD  No new complaints    Review of Systems -   General ROS: negative for - fever, chills  Respiratory ROS: no SOB, cough, CLAUDIO  Cardiovascular ROS: no CP, palpitations  Gastrointestinal ROS: no N&V, abdominal pain, diarrhea  Genito-Urinary ROS: no difficulty voiding, dysuria  Neurological ROS: no seizures, focal weekness        Objective:    Vitals:    05/29/18 1358 05/29/18 1402 05/29/18 1407 05/29/18 1412   BP: 127/89 126/83 (!) 136/91 (!) 135/97   Pulse: 68 75 70 65   Resp: 18 18 18 18   Temp:   97.9 °F (36.6 °C)    SpO2: 100% 98% 100% 100%   Weight:       Height:           PE  Gen: in no acute distress  CV:reg rate  Chest:clear  Abd: soft  Ext/Access: no edema       . LAB  Recent Labs      05/29/18   0500  05/27/18   0610   WBC  9.1  9.1   HGB  13.5*  13.5*   HCT  41.8  41.2   PLT  142*  177     Recent Labs      05/29/18   0500  05/28/18   0430  05/27/18   0610   NA  143  143  143   K  3.9  3.9  4.0   CL  109*  106  107   CO2  24  29  27   GLU  83  114*  93   BUN  22  28*  32*   CREA  1.57*  1.65*  1.68*   MG  2.1  2.2  2.2   CA  8.4  8.5  8.1*           Radiology    A/P:   Patient Active Problem List   Diagnosis Code    CKD (chronic kidney disease) stage 3, GFR 30-59 ml/min N18.3    Acute on chronic systolic heart failure ([facility]) I50.23    Coronary atherosclerosis of native coronary vessel I25.10    Arthritis M19.90    Hypertension I10    COPD, moderate ([facility]) J44.9    High triglycerides E78.1    Gastroesophageal reflux disease without esophagitis K21.9    Mixed hyperlipidemia E78.2    Essential hypertension I10    CHF (congestive heart failure) ([facility]) I50.9    TAZ (obstructive sleep apnea) G47.33    Atherosclerosis of native coronary artery of native heart with stable angina pectoris ([facility]) I25.118    Personal history of tobacco use Z87.891    Erysipelas A46    Preseptal cellulitis L03.213    Acute respiratory failure with hypoxemia ([facility]) J96.01     CKD - creatinine is stable  CHF - had fat pad biopsy for amyloid today  COPD -         Tien Escudero MD

## 2018-05-29 NOTE — PERIOP NOTES
Patient voicing concerns and frustrations; unable to calm patient with multiple attempts to explain plan of care and answer questions. Patient upset with staff for asking questions regarding preop bathing, surgical site hair removal, and general assessment. Patient states \"I'm not stupid, do you think I'm not intelligent, I know how to bathe myself. \"

## 2018-05-29 NOTE — PROGRESS NOTES
TRANSFER - OUT REPORT:    Verbal report given to Allie Perez RN on [facility].  being transferred to IR for ordered procedure       Report consisted of patients Situation, Background, Assessment and   Recommendations(SBAR). Information from the following report(s) SBAR, Kardex, Procedure Summary, Intake/Output, MAR and Recent Results was reviewed with the receiving nurse. Lines:   Peripheral IV 05/29/18 Right; Lower Arm (Active)        Opportunity for questions and clarification was provided.       Patient transported with:   O2 @ 3 liters

## 2018-05-29 NOTE — PROGRESS NOTES
[facility] CARDIOLOGY PROGRESS NOTE           5/29/2018 4:03 PM    Admit Date: 5/23/2018      Subjective:     No o/e; underwent fat pad/rt iliac biopsy today. On 4L (at baseline)    ROS:  Cardiovascular:  As noted above    Objective:      Vitals:    05/29/18 1402 05/29/18 1407 05/29/18 1412 05/29/18 1530   BP: 126/83 (!) 136/91 (!) 135/97 120/70   Pulse: 75 70 65 67   Resp: 18 18 18 18   Temp:  97.9 °F (36.6 °C)  97.6 °F (36.4 °C)   SpO2: 98% 100% 100% 100%   Weight:       Height:           Physical Exam:  General-No Acute Distress  Neck- supple, no JVD  CV- regular rate and rhythm no MRG  Lung- clear bilaterally  Abd- soft, nontender, nondistended  Ext- tr to 1+ edema bilaterally. Skin- warm and dry    Data Review:   Recent Labs      05/29/18   0500  05/28/18   0430  05/27/18   0610   NA  143  143  143   K  3.9  3.9  4.0   MG  2.1  2.2  2.2   BUN  22  28*  32*   CREA  1.57*  1.65*  1.68*   GLU  83  114*  93   WBC  9.1   --   9.1   HGB  13.5*   --   13.5*   HCT  41.8   --   41.2   PLT  142*   --   177       Assessment/Plan:     Principal Problem:    Acute on chronic systolic heart failure (HCC) (5/24/2018)    Active Problems:    CKD (chronic kidney disease) stage 3, GFR 30-59 ml/min (9/29/2017)      Coronary atherosclerosis of native coronary vessel (9/29/2017)      COPD, moderate (HCC) (9/29/2017)      CHF (congestive heart failure) ([facility] (9/27/2017)      Acute respiratory failure with hypoxemia ([address] (5/24/2018)    Undergoing workup for amyloid; echo and strain pattern suggestive of amyloid. Continue LV dysfunction meds and titrate as tolerated. Check urine pr/Cr ratio. Stable renal function on po lasix.        Chandan Bales MD  5/29/2018 4:03 PM

## 2018-05-29 NOTE — OP NOTES
[facility] REPORT    Name:DIMA Deng  MR#: [id number]  : 1945  ACCOUNT #: [de-identified]   DATE OF SERVICE: 2018    SURGEON:  Josselyn Otero MD     PREOPERATIVE DIAGNOSIS:  Rule out amyloidosis. POSTOPERATIVE DIAGNOSIS:  Rule out amyloidosis. PROCEDURE PERFORMED:  Abdominal fat pad biopsy. ANESTHESIA: Local    ESTIMATED BLOOD LOSS: Minimal.    SPECIMENS REMOVED: as above    COMPLICATIONS: None. INDICATION:  This is a 68-year-old gentleman who presented with marked complex medical issues, multiorgan failures and there was question of whether this is the cause of amyloidosis and abdominal fat pad biopsy was requested. The patient agreed to proceed with the biopsy. He understood the risks and benefits. FINDING:  There is no acute finding in the abdomen and the abdominal wall. PROCEDURE:  After being brought into the operating room, laparoscopic supine position, his abdomen was prepped and draped in the usual routine fashion. I used 0.5% Marcaine mixed with 1% lidocaine and right upper abdomen skin was infiltrated with local anesthetic and then an elliptical skin incision was made. Dissection was carried down all the way through the full thickness of fat and a piece of skin also included and hemostasis obtained. The dermal layer closed with 3-0 Vicryl stitch on dermal layer, 4-0 Vicryl stitch was used to close the skin in subcuticular fashion. The patient tolerated the procedure well, transferred to recovery room in stable condition. All instrument and lap counts correct.       Memo Stewart MD       BY / HN  D: 2018 13:50     T: 2018 14:22  JOB #: [id number]

## 2018-05-29 NOTE — BRIEF OP NOTE
BRIEF OPERATIVE NOTE    Date of Procedure: 5/29/2018   Preoperative Diagnosis: RULE OUT amyloidosis  Postoperative Diagnosis: RULE OUT amyloidosis  Procedure(s):  ABDOMINAL FAT PAD BIOPSY  Surgeon(s) and Role:     * Chao Ambrose MD - Primary         Surgical Assistant: none    Surgical Staff:  Circ-1: Igor Fuentes RN  Circ-2: Shalonda Huang RN  Scrub Tech-1: Rachael Kelley  Event Time In   Incision Start  1:23 PM   Incision Close  1:32 PM     Anesthesia: Local   Estimated Blood Loss: minimal  Specimens:   ID Type Source Tests Collected by Time Destination   1 : ABDOMINAL FAT PAD BIOPSY Preservative Abdomen  Chao Ambrose MD 5/29/2018  1:31 PM Pathology   2 :     Chao Ambrose MD 5/29/2018  1:31 PM Pathology      Findings: no acute finding  Complications: none  Implants: * No implants in log *

## 2018-05-30 ENCOUNTER — HOME HEALTH ADMISSION (OUTPATIENT)
Dept: HOME HEALTH SERVICES | Facility: HOME HEALTH | Age: 73
End: 2018-05-30

## 2018-05-30 VITALS
RESPIRATION RATE: 18 BRPM | HEIGHT: 69 IN | DIASTOLIC BLOOD PRESSURE: 83 MMHG | TEMPERATURE: 98 F | HEART RATE: 64 BPM | SYSTOLIC BLOOD PRESSURE: 124 MMHG | BODY MASS INDEX: 33.47 KG/M2 | OXYGEN SATURATION: 99 % | WEIGHT: 226 LBS

## 2018-05-30 LAB
ALBUMIN SERPL ELPH-MCNC: 3.01 G/DL (ref 3.2–5.6)
ALBUMIN SERPL-MCNC: 2.7 G/DL (ref 3.2–4.6)
ALBUMIN/GLOB SERPL: 0.7 {RATIO}
ALPHA1 GLOB SERPL ELPH-MCNC: 0.28 G/DL (ref 0.1–0.4)
ALPHA2 GLOB SERPL ELPH-MCNC: 0.81 G/DL (ref 0.4–1.2)
ANION GAP SERPL CALC-SCNC: 11 MMOL/L (ref 7–16)
B-GLOBULIN SERPL QL ELPH: 1.01 G/DL (ref 0.6–1.3)
BASOPHILS # BLD: 0 K/UL (ref 0–0.2)
BASOPHILS NFR BLD: 0 % (ref 0–2)
BUN SERPL-MCNC: 25 MG/DL (ref 8–23)
CALCIUM SERPL-MCNC: 8.5 MG/DL (ref 8.3–10.4)
CHLORIDE SERPL-SCNC: 107 MMOL/L (ref 98–107)
CO2 SERPL-SCNC: 25 MMOL/L (ref 21–32)
CREAT SERPL-MCNC: 1.61 MG/DL (ref 0.8–1.5)
DIFFERENTIAL METHOD BLD: ABNORMAL
EOSINOPHIL # BLD: 0 K/UL (ref 0–0.8)
EOSINOPHIL NFR BLD: 1 % (ref 0.5–7.8)
ERYTHROCYTE [DISTWIDTH] IN BLOOD BY AUTOMATED COUNT: 18.1 % (ref 11.9–14.6)
GAMMA GLOB MFR SERPL ELPH: 2.29 G/DL (ref 0.5–1.6)
GLUCOSE SERPL-MCNC: 108 MG/DL (ref 65–100)
HCT VFR BLD AUTO: 41.1 % (ref 41.1–50.3)
HGB BLD-MCNC: 13 G/DL (ref 13.6–17.2)
IGA SERPL-MCNC: 508 MG/DL (ref 85–499)
IGG SERPL-MCNC: 2202 MG/DL (ref 610–1616)
IGM SERPL-MCNC: 43 MG/DL (ref 35–242)
IMM GRANULOCYTES # BLD: 0 K/UL (ref 0–0.5)
IMM GRANULOCYTES NFR BLD AUTO: 0 % (ref 0–5)
LYMPHOCYTES # BLD: 2.7 K/UL (ref 0.5–4.6)
LYMPHOCYTES NFR BLD: 40 % (ref 13–44)
M PROTEIN SERPL ELPH-MCNC: ABNORMAL G/DL
MAGNESIUM SERPL-MCNC: 2.2 MG/DL (ref 1.8–2.4)
MCH RBC QN AUTO: 27.1 PG (ref 26.1–32.9)
MCHC RBC AUTO-ENTMCNC: 31.6 G/DL (ref 31.4–35)
MCV RBC AUTO: 85.6 FL (ref 79.6–97.8)
MONOCYTES # BLD: 0.9 K/UL (ref 0.1–1.3)
MONOCYTES NFR BLD: 13 % (ref 4–12)
NEUTS SEG # BLD: 3.1 K/UL (ref 1.7–8.2)
NEUTS SEG NFR BLD: 46 % (ref 43–78)
PHOSPHATE SERPL-MCNC: 3.2 MG/DL (ref 2.3–3.7)
PLATELET # BLD AUTO: 156 K/UL (ref 150–450)
PMV BLD AUTO: 10.4 FL (ref 10.8–14.1)
POTASSIUM SERPL-SCNC: 3.8 MMOL/L (ref 3.5–5.1)
PROT PATTERN SERPL ELPH-IMP: ABNORMAL
PROT PATTERN SPEC IFE-IMP: ABNORMAL
PROT SERPL-MCNC: 7.4 G/DL (ref 6.3–8.2)
RBC # BLD AUTO: 4.8 M/UL (ref 4.23–5.67)
SODIUM SERPL-SCNC: 143 MMOL/L (ref 136–145)
WBC # BLD AUTO: 6.7 K/UL (ref 4.3–11.1)

## 2018-05-30 PROCEDURE — 74011250637 HC RX REV CODE- 250/637: Performed by: INTERNAL MEDICINE

## 2018-05-30 PROCEDURE — 94640 AIRWAY INHALATION TREATMENT: CPT

## 2018-05-30 PROCEDURE — 85025 COMPLETE CBC W/AUTO DIFF WBC: CPT | Performed by: INTERNAL MEDICINE

## 2018-05-30 PROCEDURE — 94760 N-INVAS EAR/PLS OXIMETRY 1: CPT

## 2018-05-30 PROCEDURE — 97530 THERAPEUTIC ACTIVITIES: CPT

## 2018-05-30 PROCEDURE — 74011250636 HC RX REV CODE- 250/636: Performed by: INTERNAL MEDICINE

## 2018-05-30 PROCEDURE — 36415 COLL VENOUS BLD VENIPUNCTURE: CPT | Performed by: INTERNAL MEDICINE

## 2018-05-30 PROCEDURE — 83735 ASSAY OF MAGNESIUM: CPT | Performed by: INTERNAL MEDICINE

## 2018-05-30 PROCEDURE — 80069 RENAL FUNCTION PANEL: CPT | Performed by: INTERNAL MEDICINE

## 2018-05-30 RX ORDER — SPIRONOLACTONE 25 MG/1
25 TABLET ORAL DAILY
Qty: 30 TAB | Refills: 0 | Status: SHIPPED | OUTPATIENT
Start: 2018-05-31 | End: 2018-10-04 | Stop reason: SDUPTHER

## 2018-05-30 RX ORDER — FUROSEMIDE 40 MG/1
80 TABLET ORAL DAILY
Qty: 30 TAB | Refills: 0 | Status: ON HOLD | OUTPATIENT
Start: 2018-05-30 | End: 2018-06-08

## 2018-05-30 RX ADMIN — FLUTICASONE PROPIONATE 2 SPRAY: 50 SPRAY, METERED NASAL at 08:27

## 2018-05-30 RX ADMIN — FUROSEMIDE 80 MG: 40 TABLET ORAL at 08:26

## 2018-05-30 RX ADMIN — PANTOPRAZOLE SODIUM 40 MG: 40 TABLET, DELAYED RELEASE ORAL at 08:26

## 2018-05-30 RX ADMIN — LISINOPRIL 2.5 MG: 5 TABLET ORAL at 08:26

## 2018-05-30 RX ADMIN — ASPIRIN 81 MG: 81 TABLET, COATED ORAL at 08:27

## 2018-05-30 RX ADMIN — HEPARIN SODIUM 5000 UNITS: 5000 INJECTION, SOLUTION INTRAVENOUS; SUBCUTANEOUS at 05:04

## 2018-05-30 RX ADMIN — ALBUTEROL SULFATE 2 PUFF: 90 AEROSOL, METERED RESPIRATORY (INHALATION) at 11:16

## 2018-05-30 RX ADMIN — ALBUTEROL SULFATE 2 PUFF: 90 AEROSOL, METERED RESPIRATORY (INHALATION) at 07:15

## 2018-05-30 RX ADMIN — ALLOPURINOL 100 MG: 100 TABLET ORAL at 08:26

## 2018-05-30 RX ADMIN — SPIRONOLACTONE 25 MG: 25 TABLET, FILM COATED ORAL at 08:27

## 2018-05-30 RX ADMIN — ATORVASTATIN CALCIUM 20 MG: 10 TABLET, FILM COATED ORAL at 08:26

## 2018-05-30 RX ADMIN — CARVEDILOL 6.25 MG: 6.25 TABLET, FILM COATED ORAL at 08:26

## 2018-05-30 RX ADMIN — GUAIFENESIN 1200 MG: 600 TABLET, EXTENDED RELEASE ORAL at 08:26

## 2018-05-30 RX ADMIN — ALBUTEROL SULFATE 2 PUFF: 90 AEROSOL, METERED RESPIRATORY (INHALATION) at 16:00

## 2018-05-30 NOTE — DISCHARGE SUMMARY
Hospitalist Discharge Summary     Admit Date:  2018  9:32 PM   Name:  Yesy Bryan. Age:  74 y.o.  :  1945   MRN:  [id number]   PCP:  Silvano Mansfield MD  Treatment Team: Attending Provider: Kade Yates MD; Consulting Provider: Quinn Lesches, MD; Utilization Review: Michelle Vizcarra RN; Care Manager: Jorge L Galaviz RN; Consulting Provider: Maryse Loya MD; Consulting Provider: Nanda Rayo MD; Consulting Provider: Jorge Harris MD    Problem List for this Hospitalization:  Hospital Problems as of 2018  Date Reviewed: 2018          Codes Class Noted - Resolved POA    * (Principal)Acute on chronic systolic heart failure [facility]) ICD-10-CM: I50.23  ICD-9-CM: 428.23  2018 - Present Yes    Overview Signed 2015  9:28 AM by Arleen Philip     EF 40%             Acute respiratory failure with hypoxemia [facility]) ICD-10-CM: J96.01  ICD-9-CM: 518.81  2018 - Present Unknown        CKD (chronic kidney disease) stage 3, GFR 30-59 ml/min (Chronic) ICD-10-CM: N18.3  ICD-9-CM: 585.3  2017 - Present Yes        Coronary atherosclerosis of native coronary vessel (Chronic) ICD-10-CM: I25.10  ICD-9-CM: 414.01  2017 - Present Yes        COPD, moderate ([address] (Chronic) ICD-10-CM: J44.9  ICD-9-CM: 044  2017 - Present Yes    Overview Signed 2015 12:38 PM by Lorri Christensen NP     Complete PFTs: 7/20/15:  Spirometry is consistent with a moderate obstructive/restrictive defect. . The residual volume is increased relative to other lung volumes suggesting air-trapping. The diffusion capacity corrected for alveolar volume was normal suggesting no loss of alveolo-capillary units.               CHF (congestive heart failure) (HCC) ICD-10-CM: I50.9  ICD-9-CM: 428.0  2017 - Present Yes                Admission HPI from 2018:    \"63 years old M with PMH of systolic HF, CKD, COPD on 4L O2 at home presented to the hospital complaining of worsening SOB for the last 2 weeks. Patient stated SOB was mostly on exertion but now is also at rest. Patient reported lasix was helping him but for the last 2 days is not working. Patient also reported having worsening LE edema for the last week. Patient's family reported he is unable to afford  Inhalers for COPD. Patient reported he is taking all his \" heart medications\" everyday. Patient denies chest pain, abdominal pain, diarrhea, fever, cough or sick contacts at home. \"    Hospital Course:  Nestor Watson is a 74years old M with PMH of systolic HF, CKD, COPD on 4L O2 at home who presents with dyspnea and was diagnosed with diastolic and systolic CHF exacerbation and diuresed inpatient. ECHO done showed concern for amyloidosis (+also severe restrictive diastolic HF). He is s/p fat pad and BM biopsy inpatient. He was discharged home on 2018 with PCP follow up and Cardiology follow up outpatient. Follow up instructions below. Plan was discussed with the patient. All questions answered. Patient was stable at time of discharge and was instructed to call or return if there are any concerns or recurrence of symptoms. Diagnostic Imaging/Tests:   Xr Chest Pa Lat    Result Date: 2018  EXAM:  XR CHEST PA LAT INDICATION:   Shortness of Breath COMPARISON: 2018. FINDINGS: PA and lateral radiographs of the chest demonstrate clear lungs. The cardiac and mediastinal contours and pulmonary vascularity are normal.  The bones and soft tissues are within normal limits.      IMPRESSION: Normal chest.       Echocardiogram results:  Results for orders placed or performed during the hospital encounter of 18   2D ECHO COMPLETE ADULT (TTE) W OR [address] [facility] [address] [facility], [facility] [address] [facility]  [phone]    Transthoracic Echocardiogram  2D, M-mode, Doppler, and Color Doppler    Patient: Breana Nelson  MR #: [id number]  : [id number]  Age: 74 years  Gender: Male  Study date: 24-May-2018  Account #: [de-identified]  Height: 68.9 in  Weight: 228.6 lb  BSA: 2.19 mï¾²  Status:Routine  Location: 203  BP: 116/ 81    Allergies: PENICILLINS    Sonographer:  Abhilash Benedict RDCS  Group:  [facility]  Referring Physician:  Morgan Sanchez MD  Reading Physician:  Christiano Macario. MD Seth [facility]    INDICATIONS: Heart Failure. PROCEDURE: This was a routine study. A transthoracic echocardiogram was  performed. The study included complete 2D imaging, M-mode, complete spectral  Doppler, and color Doppler. Intravenous contrast (Definity) was administered. Image quality was adequate. LEFT VENTRICLE: Sparkling appearance to myocardium. Consider Amyloid. Size   was  normal. Systolic function was mildly reduced. Ejection fraction was estimated  in the range of 45 % to 50 %. Although no diagnostic regional wall motion  abnormality was identified, this possibility cannot be completely excluded on  the basis of this study. There was severe concentric hypertrophy. The E/e'  ratio was 19.55. There was Grade III Diastolic Dysfunction. Doppler   parameters  were consistent with an irreversible restrictive pattern, indicative of  decreased left ventricular diastolic compliance and/or increased left atrial  pressure (grade 4 diastolic dysfunction). RIGHT VENTRICLE: The ventricle was mildly dilated. Systolic function was   mildly  reduced. There was moderate pulmonary artery hypertension. Estimated peak  pressure was in the range of 50-55 mmHg. LEFT ATRIUM: The atrium was markedly dilated. RIGHT ATRIUM: The atrium was moderately to markedly dilated. SYSTEMIC VEINS: IVC: The inferior vena cava was dilated. The respirophasic  change in diameter was less than 50%. AORTIC VALVE: The valve was trileaflet. Leaflets exhibited mild   calcification. There was no evidence for stenosis. There was mild regurgitation. MITRAL VALVE: There was mild-moderate thickening of the anterior leaflet. There  was no evidence for stenosis. There was mild to moderate regurgitation. TRICUSPID VALVE: The valve structure was normal. There was no evidence for  stenosis. There was mild to moderate regurgitation. PULMONIC VALVE: Not well visualized. There was no evidence for stenosis. There  was mild regurgitation. PERICARDIUM: There was no pericardial effusion. AORTA: The root exhibited normal size. SUMMARY:    -  Left ventricle: Sparkling appearance to myocardium. Consider Amyloid. Systolic function was mildly reduced. Ejection fraction was estimated in the  range of 45 % to 50 %. Although no diagnostic regional wall motion   abnormality  was identified, this possibility cannot be completely excluded on the basis   of  this study. There was severe concentric hypertrophy. The E/e' ratio was   19.55. There was Grade III Diastolic Dysfunction. Doppler parameters were consistent  with an irreversible restrictive pattern, indicative of decreased left  ventricular diastolic compliance and/or increased left atrial pressure (grade   4  diastolic dysfunction). -  Right ventricle: The ventricle was mildly dilated. Systolic function was  mildly reduced. There was moderate pulmonary artery hypertension.    -  Left atrium: The atrium was markedly dilated. -  Right atrium: The atrium was moderately to markedly dilated. -  Inferior vena cava, hepatic veins: The inferior vena cava was dilated. The  respirophasic change in diameter was less than 50%. -  Mitral valve: There was mild to moderate regurgitation.    -  Tricuspid valve: There was mild to moderate regurgitation.     SYSTEM MEASUREMENT TABLES    2D mode  AoR Diam (2D): 3.1 cm  LA Dimension (2D): 5 cm  Left Atrium Systolic Volume Index; Method of Disks, Biplane; 2D mode;: 53   ml/m2  IVS/LVPW (2D): 0.9  IVSd (2D): 1.9 cm  LVIDd (2D): 3.9 cm  LVIDs (2D): 3.3 cm  LVOT Area (2D): 3.1 cm2  LVPWd (2D): 2.1 cm  RVIDd (2D): 3.1 cm    Tissue Doppler Imaging  LV Peak Early Morales Tissue Gualberto; Lateral MA (TDI): 4 cm/s  LV Peak Early Morales Tissue Gualberto; Medial MA (TDI): 3.4 cm/s    Unspecified Scan Mode  Peak Grad; Mean; Antegrade Flow: 13 mm[Hg]  Vmax; Antegrade Flow: 189 cm/s  LVOT Diam: 2 cm  MV Peak Gualberto/LV Peak Tissue Gualberto E-Wave; Lateral MA: 18.1  MV Peak Gualberto/LV Peak Tissue Gualberto E-Wave; Medial MA: 21  MV E/A: 2.7  MV Peak E Gualberto; Mean; Antegrade Flow: 71.5 cm/s  RVSP: 48 mm[Hg]  Vmax; Regurgitant Flow: 296 cm/s    Prepared and signed by    Jun Ann MD [facility]  Signed [id number] 12:29:43     2D ECHO LIMTED ADULT W OR WO CONTR    Narrative    [place] [address] [place], [address] [facility]  [phone]    Transthoracic Echocardiogram  2D and Doppler    Patient: Michael Ambrose  MR #: [id number]  : [id number]  Age: 74 years  Gender: Male  Study date: 24-May-2018  Account #: [de-identified]  Height: 68.9 in  Weight: 228.1 lb  BSA: 2.18 mï¾²  Status:Routine  Location: 203  BP: 116/ 81    Allergies: PENICILLINS    Sonographer:  Antonio Willson [facility]  Group:  [id number] Cardiology  Referring Physician:  Jun Ann MD [facility]  Reading Physician:  TITO Hopson MD [facility]    INDICATIONS: Assess LV with Strain for Amyloidosis    PROCEDURE: This was a routine study. A transthoracic echocardiogram was  performed. The study included limited 2D imaging and limited spectral   Doppler. Image quality was adequate. LEFT VENTRICLE: LV strian imaging shows pattern consistent with amyloid. Relatively well preserved apical strain with significant basal impairment. Systolic function was mildly reduced. Ejection fraction was estimated in the  range of 45 % to 50 %. SUMMARY:    -  Left ventricle: LV strian imaging shows pattern consistent with amyloid. Relatively well preserved apical strain with significant basal impairment. Systolic function was mildly reduced.  Ejection fraction was estimated in the  range of 45 % to 50 %.    Prepared and signed by    Jun Ann MD [facility]  Signed [id number] 17:18:11           All Micro Results     None          Labs: Results:       BMP, Mg, Phos Recent Labs      05/30/18   0453  05/29/18   0500  05/28/18   0430   NA  143  143  143   K  3.8  3.9  3.9   CL  107  109*  106   CO2  25  24  29   AGAP  11  10  8   BUN  25*  22  28*   CREA  1.61*  1.57*  1.65*   CA  8.5  8.4  8.5   GLU  108*  83  114*   MG  2.2  2.1  2.2   PHOS  3.2   --    --       CBC Recent Labs      05/30/18   0453  05/29/18   0500   WBC  6.7  9.1   RBC  4.80  4.94   HGB  13.0*  13.5*   HCT  41.1  41.8   PLT  156  142*   GRANS  46  57   LYMPH  40  30   EOS  1  1   MONOS  13*  11   BASOS  0  0   IG  0  1   ANEU  3.1  5.2   ABL  2.7  2.7   REAGAN  0.0  0.1   ABM  0.9  1.0   ABB  0.0  0.0   AIG  0.0  0.1      LFT Recent Labs      05/30/18   0453   ALB  2.7*      Cardiac Testing Lab Results   Component Value Date/Time     05/26/2018 03:36 AM    BNP 1132 05/25/2018 03:54 AM     05/23/2018 09:41 PM     12/28/2016 03:53 PM     12/01/2016 03:18 PM    BNP 6 11/21/2016 09:50 AM     09/27/2017 07:19 PM     11/13/2015 05:23 AM    CK - MB 2.1 09/27/2017 07:19 PM    CK-MB Index 1.6 09/27/2017 07:19 PM    Troponin-I, Qt. 0.73 () 05/24/2018 03:43 AM    Troponin-I, Qt. 0.46 () 05/24/2018 12:10 AM    Troponin-I, Qt. 0.69 () 05/23/2018 09:41 PM      Coagulation Tests Lab Results   Component Value Date/Time    aPTT 32.5 12/28/2017 12:16 PM    aPTT >200.0 (HH) 12/28/2017 03:00 AM    aPTT 72.6 (H) 12/27/2017 07:32 PM      A1c No results found for: HBA1C, HGBE8, MNX8UMAC   Lipid Panel Lab Results   Component Value Date/Time    Cholesterol, total 102 12/27/2017 04:13 AM    HDL Cholesterol 24 (L) 12/27/2017 04:13 AM    LDL,Direct 94 06/29/2015 06:00 AM    LDL, calculated 59 12/27/2017 04:13 AM    VLDL, calculated 19 12/27/2017 04:13 AM    Triglyceride 95 12/27/2017 04:13 AM    CHOL/HDL Ratio 4.3 12/27/2017 04:13 AM      Thyroid Panel Lab Results   Component Value Date/Time    TSH 0.604 05/25/2018 03:54 AM    TSH 1.165 04/26/2015 04:11 AM        Most Recent UA Lab Results   Component Value Date/Time    Color YELLOW 03/27/2018 01:38 PM    Appearance CLEAR 03/27/2018 01:38 PM    Specific gravity 1.020 03/27/2018 01:38 PM    pH (UA) 6.0 03/27/2018 01:38 PM    Protein 100 (A) 03/27/2018 01:38 PM    Glucose NEGATIVE  03/27/2018 01:38 PM    Ketone NEGATIVE  03/27/2018 01:38 PM    Bilirubin NEGATIVE  03/27/2018 01:38 PM    Blood NEGATIVE  03/27/2018 01:38 PM    Urobilinogen 4.0 (H) 03/27/2018 01:38 PM    Nitrites NEGATIVE  03/27/2018 01:38 PM    Leukocyte Esterase TRACE (A) 03/27/2018 01:38 PM        Allergies   Allergen Reactions    Pcn [Penicillins] Other (comments)     \"makes my heart stop\"     Immunization History   Administered Date(s) Administered    Influenza Vaccine 09/28/2016    Pneumococcal Polysaccharide (PPSV-23) 09/28/2016    TB Skin Test (PPD) Intradermal 01/05/2018, 03/27/2018, 05/27/2018       All Labs from Last 24 Hrs:  Recent Results (from the past 24 hour(s))   BONE MARROW PREP & OLIVARES STAIN    Collection Time: 05/29/18 11:20 AM   Result Value Ref Range    Bone Marrow Prep & Saloni Lions Stain FOR HEMATOLOGY SERVICES RENDERED     PROTEIN/CREATININE RATIO, URINE    Collection Time: 05/29/18  4:28 PM   Result Value Ref Range    Protein, urine random 9 <11.9 mg/dL    Creatinine, urine 53.60 mg/dL    Protein/Creat.  urine Ratio 0.2     MAGNESIUM    Collection Time: 05/30/18  4:53 AM   Result Value Ref Range    Magnesium 2.2 1.8 - 2.4 mg/dL   RENAL FUNCTION PANEL    Collection Time: 05/30/18  4:53 AM   Result Value Ref Range    Sodium 143 136 - 145 mmol/L    Potassium 3.8 3.5 - 5.1 mmol/L    Chloride 107 98 - 107 mmol/L    CO2 25 21 - 32 mmol/L    Anion gap 11 7 - 16 mmol/L    Glucose 108 (H) 65 - 100 mg/dL    BUN 25 (H) 8 - 23 MG/DL    Creatinine 1.61 (H) 0.8 - 1.5 MG/DL    GFR est AA 54 (L) >60 ml/min/1.73m2 GFR est non-AA 45 (L) >60 ml/min/1.73m2    Calcium 8.5 8.3 - 10.4 MG/DL    Phosphorus 3.2 2.3 - 3.7 MG/DL    Albumin 2.7 (L) 3.2 - 4.6 g/dL   CBC WITH AUTOMATED DIFF    Collection Time: 05/30/18  4:53 AM   Result Value Ref Range    WBC 6.7 4.3 - 11.1 K/uL    RBC 4.80 4.23 - 5.67 M/uL    HGB 13.0 (L) 13.6 - 17.2 g/dL    HCT 41.1 41.1 - 50.3 %    MCV 85.6 79.6 - 97.8 FL    MCH 27.1 26.1 - 32.9 PG    MCHC 31.6 31.4 - 35.0 g/dL    RDW 18.1 (H) 11.9 - 14.6 %    PLATELET 303 959 - 357 K/uL    MPV 10.4 (L) 10.8 - 14.1 FL    DF AUTOMATED      NEUTROPHILS 46 43 - 78 %    LYMPHOCYTES 40 13 - 44 %    MONOCYTES 13 (H) 4.0 - 12.0 %    EOSINOPHILS 1 0.5 - 7.8 %    BASOPHILS 0 0.0 - 2.0 %    IMMATURE GRANULOCYTES 0 0.0 - 5.0 %    ABS. NEUTROPHILS 3.1 1.7 - 8.2 K/UL    ABS. LYMPHOCYTES 2.7 0.5 - 4.6 K/UL    ABS. MONOCYTES 0.9 0.1 - 1.3 K/UL    ABS. EOSINOPHILS 0.0 0.0 - 0.8 K/UL    ABS. BASOPHILS 0.0 0.0 - 0.2 K/UL    ABS. IMM.  GRANS. 0.0 0.0 - 0.5 K/UL       Discharge Exam:  Patient Vitals for the past 24 hrs:   Temp Pulse Resp BP SpO2   05/30/18 0716 - - - - 97 %   05/30/18 0715 98.4 °F (36.9 °C) 82 18 (!) 150/98 99 %   05/30/18 0300 98.3 °F (36.8 °C) 77 18 131/88 99 %   05/29/18 2300 98.1 °F (36.7 °C) 71 18 122/79 96 %   05/29/18 2059 - - - - 98 %   05/29/18 1900 98 °F (36.7 °C) 67 19 125/87 100 %   05/29/18 1542 - - - - 97 %   05/29/18 1530 97.6 °F (36.4 °C) 67 18 120/70 100 %   05/29/18 1412 - 65 18 (!) 135/97 100 %   05/29/18 1407 97.9 °F (36.6 °C) 70 18 (!) 136/91 100 %   05/29/18 1402 - 75 18 126/83 98 %   05/29/18 1358 - 68 18 127/89 100 %   05/29/18 1353 - 72 18 138/88 100 %   05/29/18 1348 - 70 18 123/86 99 %   05/29/18 1343 97.9 °F (36.6 °C) 75 18 127/90 98 %   05/29/18 1327 - 99 18 (!) 151/106 99 %   05/29/18 1144 97.6 °F (36.4 °C) 68 18 135/88 96 %   05/29/18 1130 - 70 18 135/84 100 %     Oxygen Therapy  O2 Sat (%): 97 % (05/30/18 0716)  Pulse via Oximetry: 70 beats per minute (05/30/18 0716)  O2 Device: Room air (05/30/18 0716)  O2 Flow Rate (L/min): 4 l/min (05/29/18 1542)    Intake/Output Summary (Last 24 hours) at 05/30/18 1053  Last data filed at 05/30/18 1018   Gross per 24 hour   Intake              300 ml   Output             1450 ml   Net            -1150 ml       General appearance: alert, cooperative,   Head: AT/NC  Lungs: clear to auscultation bilaterally   CVS: regular rate and rhythm, S1, S2 normal, 1+ BLLE edema   Abdomen: soft, non-tender. Bowel sounds normal. No masses,  no organomegaly  Extremities: extremities normal, atraumatic, no cyanosis. Neuro:           Non focal exam, follows commands  Psych: A+Ox3  Skin: No Rash      Discharge Info:   Current Discharge Medication List      START taking these medications    Details   spironolactone (ALDACTONE) 25 mg tablet Take 1 Tab by mouth daily. Qty: 30 Tab, Refills: 0    Associated Diagnoses: Acute on chronic systolic heart failure ([facility] [address]         CONTINUE these medications which have CHANGED    Details   furosemide (LASIX) 40 mg tablet Take 2 Tabs by mouth daily. Qty: 30 Tab, Refills: 0         CONTINUE these medications which have NOT CHANGED    Details   fluticasone (FLONASE) 50 mcg/actuation nasal spray 2 Sprays by Both Nostrils route daily. Qty: 1 Bottle, Refills: 11      albuterol (VENTOLIN HFA) 90 mcg/actuation inhaler Take 2 Puffs by inhalation four (4) times daily. Qty: 1 Inhaler, Refills: 11      lisinopril (PRINIVIL, ZESTRIL) 5 mg tablet Take 1 Tab by mouth daily. Qty: 30 Tab, Refills: 11      atorvastatin (LIPITOR) 20 mg tablet Take 1 Tab by mouth nightly. Qty: 30 Tab, Refills: 11      guaiFENesin ER (MUCINEX) 1,200 mg Ta12 ER tablet Take 1 Tab by mouth every twelve (12) hours. Qty: 60 Tab, Refills: 0      polyethylene glycol (MIRALAX) 17 gram packet Take 1 Packet by mouth daily. Qty: 1 Packet, Refills: 11      albuterol-ipratropium (DUO-NEB) 2.5 mg-0.5 mg/3 ml nebu 3 mL by Nebulization route four (4) times daily.  Diaignosis--J44.9  Qty: 120 Nebule, Refills: 11      allopurinol (ZYLOPRIM) 100 mg tablet Take 1 Tab by mouth daily. Qty: 30 Tab, Refills: 11      carvedilol (COREG) 25 mg tablet Take 12.5 mg by mouth two (2) times daily (with meals). aspirin delayed-release 81 mg tablet Take 1 Tab by mouth daily. Qty: 30 Tab, Refills: 0      melatonin 3 mg tablet Take 3 mg by mouth nightly. omeprazole (PRILOSEC) 20 mg capsule Take 1 Cap by mouth daily. Qty: 30 Cap, Refills: 11               Disposition: home    Activity: Activity as tolerated  Diet: DIET CARDIAC Regular    Follow-up Information     Follow up With Details Comments [address], [place]   [address] [facility] [address]      John Barney MD   [address] [place] [address]  [phone]              Time spent in patient discharge planning and coordination 45 minutes.     Signed:  Shayan Cunha MD

## 2018-05-30 NOTE — PROGRESS NOTES
Spoke to  Liss Elias in room 203 about discharge planning. Plan is home today with his wife, and with [facility] RN and PT. Referral, face to face, and order placed into [facility]/CC.

## 2018-05-30 NOTE — DISCHARGE INSTRUCTIONS
DISCHARGE SUMMARY from Nurse    PATIENT INSTRUCTIONS:    After general anesthesia or intravenous sedation, for 24 hours or while taking prescription Narcotics:  · Limit your activities  · Do not drive and operate hazardous machinery  · Do not make important personal or business decisions  · Do  not drink alcoholic beverages  · If you have not urinated within 8 hours after discharge, please contact your surgeon on call. Report the following to your surgeon:  · Excessive pain, swelling, redness or odor of or around the surgical area  · Temperature over 100.5  · Nausea and vomiting lasting longer than 4 hours or if unable to take medications  · Any signs of decreased circulation or nerve impairment to extremity: change in color, persistent  numbness, tingling, coldness or increase pain  · Any questions    What to do at Home:  Recommended activity: Activity as tolerated, per MD instructions    If you experience any of the following symptoms fever > 100.5, nausea, vomiting, pain, chest pain and/or shortness of breath please follow up with MD.    *  Please give a list of your current medications to your Primary Care Provider. *  Please update this list whenever your medications are discontinued, doses are      changed, or new medications (including over-the-counter products) are added. *  Please carry medication information at all times in case of emergency situations. These are general instructions for a healthy lifestyle:    No smoking/ No tobacco products/ Avoid exposure to second hand smoke  Surgeon General's Warning:  Quitting smoking now greatly reduces serious risk to your health.     Obesity, smoking, and sedentary lifestyle greatly increases your risk for illness    A healthy diet, regular physical exercise & weight monitoring are important for maintaining a healthy lifestyle    You may be retaining fluid if you have a history of heart failure or if you experience any of the following symptoms: Weight gain of 3 pounds or more overnight or 5 pounds in a week, increased swelling in our hands or feet or shortness of breath while lying flat in bed. Please call your doctor as soon as you notice any of these symptoms; do not wait until your next office visit. Recognize signs and symptoms of STROKE:    F-face looks uneven    A-arms unable to move or move unevenly    S-speech slurred or non-existent    T-time-call 911 as soon as signs and symptoms begin-DO NOT go       Back to bed or wait to see if you get better-TIME IS BRAIN. Warning Signs of HEART ATTACK     Call 911 if you have these symptoms:   Chest discomfort. Most heart attacks involve discomfort in the center of the chest that lasts more than a few minutes, or that goes away and comes back. It can feel like uncomfortable pressure, squeezing, fullness, or pain.  Discomfort in other areas of the upper body. Symptoms can include pain or discomfort in one or both arms, the back, neck, jaw, or stomach.  Shortness of breath with or without chest discomfort.  Other signs may include breaking out in a cold sweat, nausea, or lightheadedness. Don't wait more than five minutes to call 911 - MINUTES MATTER! Fast action can save your life. Calling 911 is almost always the fastest way to get lifesaving treatment. Emergency Medical Services staff can begin treatment when they arrive -- up to an hour sooner than if someone gets to the hospital by car. The discharge information has been reviewed with the patient. The patient verbalized understanding. Discharge medications reviewed with the patient and appropriate educational materials and side effects teaching were provided. ___________________________________________________________________________________________________________________________________     Heart Failure: Care Instructions  Your Care Instructions    Heart failure occurs when your heart does not pump as much blood as the body needs. Failure does not mean that the heart has stopped pumping but rather that it is not pumping as well as it should. Over time, this causes fluid buildup in your lungs and other parts of your body. Fluid buildup can cause shortness of breath, fatigue, swollen ankles, and other problems. By taking medicines regularly, reducing sodium (salt) in your diet, checking your weight every day, and making lifestyle changes, you can feel better and live longer. Follow-up care is a key part of your treatment and safety. Be sure to make and go to all appointments, and call your doctor if you are having problems. It's also a good idea to know your test results and keep a list of the medicines you take. How can you care for yourself at home? Medicines  ? · Be safe with medicines. Take your medicines exactly as prescribed. Call your doctor if you think you are having a problem with your medicine. ? · Do not take any vitamins, over-the-counter medicine, or herbal products without talking to your doctor first. Reche Baseman not take ibuprofen (Advil or Motrin) and naproxen (Aleve) without talking to your doctor first. They could make your heart failure worse. ? · You may be taking some of the following medicine. ¨ Beta-blockers can slow heart rate, decrease blood pressure, and improve your condition. Taking a beta-blocker may lower your chance of needing to be hospitalized. ¨ Angiotensin-converting enzyme inhibitors (ACEIs) reduce the heart's workload, lower blood pressure, and reduce swelling. Taking an ACEI may lower your chance of needing to be hospitalized again. ¨ Angiotensin II receptor blockers (ARBs) work like ACEIs. Your doctor may prescribe them instead of ACEIs. ¨ Diuretics, also called water pills, reduce swelling. ¨ Potassium supplements replace this important mineral, which is sometimes lost with diuretics. ¨ Aspirin and other blood thinners prevent blood clots, which can cause a stroke or heart attack. ? You will get more details on the specific medicines your doctor prescribes. Diet  ? · Your doctor may suggest that you limit sodium to 2,000 milligrams (mg) a day or less. That is less than 1 teaspoon of salt a day, including all the salt you eat in cooking or in packaged foods. People get most of their sodium from processed foods. Fast food and restaurant meals also tend to be very high in sodium. ? · Ask your doctor how much liquid you can drink each day. You may have to limit liquids. ?Weight  ? · Weigh yourself without clothing at the same time each day. Record your weight. Call your doctor if you have a sudden weight gain, such as more than 2 to 3 pounds in a day or 5 pounds in a week. (Your doctor may suggest a different range of weight gain.) A sudden weight gain may mean that your heart failure is getting worse. ? Activity level  ? · Start light exercise (if your doctor says it is okay). Even if you can only do a small amount, exercise will help you get stronger, have more energy, and manage your weight and your stress. Walking is an easy way to get exercise. Start out by walking a little more than you did before. Bit by bit, increase the amount you walk. ? · When you exercise, watch for signs that your heart is working too hard. You are pushing yourself too hard if you cannot talk while you are exercising. If you become short of breath or dizzy or have chest pain, stop, sit down, and rest.   ? · If you feel \"wiped out\" the day after you exercise, walk slower or for a shorter distance until you can work up to a better pace. ? · Get enough rest at night. Sleeping with 1 or 2 pillows under your upper body and head may help you breathe easier. ? Lifestyle changes  ? · Do not smoke. Smoking can make a heart condition worse. If you need help quitting, talk to your doctor about stop-smoking programs and medicines. These can increase your chances of quitting for good.  Quitting smoking may be the most important step you can take to protect your heart. ? · Limit alcohol to 2 drinks a day for men and 1 drink a day for women. Too much alcohol can cause health problems. ? · Avoid getting sick from colds and the flu. Get a pneumococcal vaccine shot. If you have had one before, ask your doctor whether you need another dose. Get a flu shot each year. If you must be around people with colds or the flu, wash your hands often. When should you call for help? Call 911 if you have symptoms of sudden heart failure such as:  ? · You have severe trouble breathing. ? · You cough up pink, foamy mucus. ? · You have a new irregular or rapid heartbeat. ?Call your doctor now or seek immediate medical care if:  ? · You have new or increased shortness of breath. ? · You are dizzy or lightheaded, or you feel like you may faint. ? · You have sudden weight gain, such as more than 2 to 3 pounds in a day or 5 pounds in a week. (Your doctor may suggest a different range of weight gain.)   ? · You have increased swelling in your legs, ankles, or feet. ? · You are suddenly so tired or weak that you cannot do your usual activities. ? Watch closely for changes in your health, and be sure to contact your doctor if you develop new symptoms. Where can you learn more? Go to http://jaime-jarrell.info/. Enter L963 in the search box to learn more about \"Heart Failure: Care Instructions. \"  Current as of: September 21, 2016  Content Version: 11.4  © 8357-9490 [employer]. Care instructions adapted under license by [employer] (which disclaims liability or warranty for this information). If you have questions about a medical condition or this instruction, always ask your healthcare professional. [place] [address] any warranty or liability for your use of this information.

## 2018-05-30 NOTE — PROGRESS NOTES
Problem: Mobility Impaired (Adult and Pediatric)  Goal: *Acute Goals and Plan of Care (Insert Text)  LTG:  (1.)Mr. Fior Parks will move from supine to sit and sit to supine , scoot up and down and roll side to side with INDEPENDENT within 7 treatment day(s) from flat surface without handrail. (2.)Mr. Fior Parks will transfer from bed to chair and chair to bed with INDEPENDENT using the least restrictive device within 7 treatment day(s). (3.)Mr. Fior Parks will ambulate with MODIFIED INDEPENDENCE for 250+ feet with the least restrictive device within 7 treatment day(s), O2 stats >90%. (4.)Mr. Fior Parks will be independent in HEP for B LE strengthening within 7 treatment days for increased strength with mobility. (5.)Mr. Fior Parks will verbalize energy conservation techniques within 7 treatment days with independence.   ______________________________________________________________________________________________     PHYSICAL THERAPY: Daily Note, Treatment Day: 4th, AM 5/30/2018  INPATIENT: Hospital Day: 8  Payor: Lauren Shannon / Plan: [address] [place] [address] HMO / Product Type: Managed Care Medicare /      NAME/AGE/GENDER: Eliezer Zaldivar is a 74 y.o. male   PRIMARY DIAGNOSIS: Acute respiratory failure with hypoxemia ([facility] [place] [address]  RULE OUT ADENOMYOSIS Acute on chronic systolic heart failure (HCC) Acute on chronic systolic heart failure (HCC)  Procedure(s) (LRB):  ABDOMINAL FAT PAD BIOPSY (N/A)  1 Day Post-Op  ICD-10: Treatment Diagnosis:    · Generalized Muscle Weakness (M62.81)  · Difficulty in walking, Not elsewhere classified (R26.2)   Precaution/Allergies:  Pcn [penicillins]      ASSESSMENT:     Mr. Fior Parks presents sitting up in bedside chair. He ambulated around unit and needed 1 seated rest break and 1 standing rest break. He needed verbal and visual reminders to purse lip breath throughout treatment. He continues to fatigue with minimal activity. Fair progress noted with activity tolerance. Will continue PT efforts.      This section established at most recent assessment   PROBLEM LIST (Impairments causing functional limitations):  1. Decreased ADL/Functional Activities  2. Decreased Transfer Abilities  3. Decreased Ambulation Ability/Technique  4. Decreased Balance  5. Decreased Activity Tolerance  6. Increased Fatigue  7. Increased Shortness of Breath  8. Decreased [place] with Home Exercise Program   INTERVENTIONS PLANNED: (Benefits and precautions of physical therapy have been discussed with the patient.)  1. Balance Exercise  2. Bed Mobility  3. Family Education  4. Gait Training  5. Home Exercise Program (HEP)  6. Therapeutic Activites  7. Therapeutic Exercise/Strengthening  8. Transfer Training  9. Group Therapy     TREATMENT PLAN: Frequency/Duration: 3 times a week for duration of hospital stay  Rehabilitation Potential For Stated Goals: Good     RECOMMENDED REHABILITATION/EQUIPMENT: (at time of discharge pending progress): Due to the probability of continued deficits (see above) this patient will likely need continued skilled physical therapy after discharge. Equipment:    None at this time              HISTORY:   History of Present Injury/Illness (Reason for Referral):  74years old M with PMH of systolic HF, CKD, COPD on 4L O2 at home presented to the hospital complaining of worsening SOB for the last 2 weeks. Patient stated SOB was mostly on exertion but now is also at rest. Patient reported lasix was helping him but for the last 2 days is not working. Patient also reported having worsening LE edema for the last week. Patient's family reported he is unable to afford  Inhalers for COPD. Patient reported he is taking all his \" heart medications\" everyday. Patient denies chest pain, abdominal pain, diarrhea, fever, cough or sick contacts at home.      Past Medical History/Comorbidities:   Mr. Andrew Avery  has a past medical history of Acute CHF (congestive heart failure) ([facility] [address] (4/25/2015);  Acute combined systolic and diastolic congestive heart failure ([facility] (4/28/2015); Chronic obstructive pulmonary disease ([facility] De Quervain's tenosynovitis, right (4/28/2015); Dyspnea on exertion (6/28/2015); Elevated serum creatinine (4/25/2015); Elevated troponin (6/28/2015); History of coronary artery disease; History of right knee surgery; History of shingles; Malignant hypertension (4/25/2015); and MI (myocardial infarction) ([facility] Mr. Neita Nissen  has a past surgical history that includes hx knee arthroscopy (Right) and hx heart catheterization (6/29/2015). Social History/Living Environment:   Home Environment: Apartment  # Steps to Enter: 0  One/Two Story Residence: One story  Living Alone: No  Support Systems: Spouse/Significant Other/Partner, Child(isatu)  Patient Expects to be Discharged to[de-identified] Apartment  Current DME Used/Available at Home: Oxygen, portable, Nebulizer  Tub or Shower Type: Tub/Shower combination  Prior Level of Function/Work/Activity:  Lives with spouse and daughter; independent in ADLs, ambulation, driving; on 4 L home O2  Personal Factors:          Sex:  male        Age:  74 y.o. Overall Behavior:  agreeable   Number of Personal Factors/Comorbidities that affect the Plan of Care:  COPD, ARF, CHF 3+: HIGH COMPLEXITY   EXAMINATION:   Most Recent Physical Functioning:   Gross Assessment:                  Posture:  Posture (WDL): Exceptions to WDL  Posture Assessment: Forward head, Rounded shoulders  Balance:  Sitting: Intact  Sitting - Static: Good (unsupported)  Sitting - Dynamic: Good (unsupported)  Standing: Impaired  Standing - Static: Good  Standing - Dynamic : Fair Bed Mobility:     Wheelchair Mobility:     Transfers:  Sit to Stand: Independent  Stand to Sit: Independent  Gait:     Distance (ft): 250 Feet (ft) (wiht 1 standing rest break)  Assistive Device: Walker, rolling  Ambulation - Level of Assistance: Stand-by assistance      Body Structures Involved:  1. Muscles Body Functions Affected:  1.  Movement Related Activities and Participation Affected:  1. General Tasks and Demands  2. Mobility  3. Self Care   Number of elements that affect the Plan of Care: 4+: HIGH COMPLEXITY   CLINICAL PRESENTATION:   Presentation: Stable and uncomplicated: LOW COMPLEXITY   CLINICAL DECISION MAKING:   [facility] AM-PAC 6 Clicks   Basic Mobility Inpatient Short Form  How much difficulty does the patient currently have. .. Unable A Lot A Little None   1. Turning over in bed (including adjusting bedclothes, sheets and blankets)? [] 1   [] 2   [] 3   [x] 4   2. Sitting down on and standing up from a chair with arms ( e.g., wheelchair, bedside commode, etc.)   [] 1   [] 2   [x] 3   [] 4   3. Moving from lying on back to sitting on the side of the bed? [] 1   [] 2   [x] 3   [] 4   How much help from another person does the patient currently need. .. Total A Lot A Little None   4. Moving to and from a bed to a chair (including a wheelchair)? [] 1   [] 2   [x] 3   [] 4   5. Need to walk in hospital room? [] 1   [] 2   [x] 3   [] 4   6. Climbing 3-5 steps with a railing? [] 1   [] 2   [x] 3   [] 4   © 2007, Trustees of [facility], under license to [employer]. All rights reserved      Score:  Initial: 19 Most Recent: X (Date: -- )    Interpretation of Tool:  Represents activities that are increasingly more difficult (i.e. Bed mobility, Transfers, Gait). Score 24 23 22-20 19-15 14-10 9-7 6     Modifier CH CI CJ CK CL CM CN      ?  Mobility - Walking and Moving Around:     - CURRENT STATUS: CK - 40%-59% impaired, limited or restricted    - GOAL STATUS: CJ - 20%-39% impaired, limited or restricted    - D/C STATUS:  ---------------To be determined---------------  Payor: Rodney Guevara / Plan: [address] [place] [address] HMO / Product Type: Managed Care Medicare /      Medical Necessity:     · Patient is expected to demonstrate progress in strength, range of motion, balance and coordination to decrease assistance required with overall functional mobility, transfers, ambulation. · Patient demonstrates good rehab potential due to higher previous functional level. Reason for Services/Other Comments:  · Patient continues to require present interventions due to patient's inability to perform bed mobility, transfers, ambulation safely and effectively. Use of outcome tool(s) and clinical judgement create a POC that gives a: Clear prediction of patient's progress: LOW COMPLEXITY            TREATMENT:   (In addition to Assessment/Re-Assessment sessions the following treatments were rendered)   Pre-treatment Symptoms/Complaints:  \" I slept through it. \"  Pain: Initial:   Pain Intensity 1: 0  Post Session:  0/10 post session     Therapeutic Activity: (    25 minutes): Therapeutic activities including chair transfers and Ambulation on level ground to improve mobility, strength, balance and coordination. Required minimal verbal cuaes   to conserve energy with use of the walker to lean on. Braces/Orthotics/Lines/Etc:   · None  Treatment/Session Assessment:    · Response to Treatment:  Tolerated well  · Interdisciplinary Collaboration:   o Physical Therapy Assistant  o Registered Nurse  · After treatment position/precautions:   o Up in chair  o Bed/Chair-wheels locked  o Bed in low position  o Call light within reach  o RN notified   · Compliance with Program/Exercises: good  · Recommendations/Intent for next treatment session: \"Next visit will focus on advancements to more challenging activities\".   Total Treatment Duration:  PT Patient Time In/Time Out  Time In: 1050  Time Out: [address] [facility]

## 2018-05-30 NOTE — PROGRESS NOTES
[facility]        Subjective: CKD  No new complaints             Objective:    Vitals:    05/29/18 2300 05/30/18 0300 05/30/18 0715 05/30/18 0716   BP: 122/79 131/88 (!) 150/98    Pulse: 71 77 82    Resp: 18 18 18    Temp: 98.1 °F (36.7 °C) 98.3 °F (36.8 °C) 98.4 °F (36.9 °C)    SpO2: 96% 99% 99% 97%   Weight:  102.5 kg (226 lb)     Height:           PE  Gen: in no acute distress  CV:reg rate  Chest:clear, decreased BS  Abd: soft  Ext/Access: tr edema       . LAB  Recent Labs      05/30/18   0453  05/29/18   0500   WBC  6.7  9.1   HGB  13.0*  13.5*   HCT  41.1  41.8   PLT  156  142*     Recent Labs      05/30/18   0453  05/29/18   0500  05/28/18   0430   NA  143  143  143   K  3.8  3.9  3.9   CL  107  109*  106   CO2  25  24  29   GLU  108*  83  114*   BUN  25*  22  28*   CREA  1.61*  1.57*  1.65*   MG  2.2  2.1  2.2   PHOS  3.2   --    --    CA  8.5  8.4  8.5   ALB  2.7*   --    --            Radiology    A/P:   Patient Active Problem List   Diagnosis Code    CKD (chronic kidney disease) stage 3, GFR 30-59 ml/min N18.3    Acute on chronic systolic heart failure ([facility]) I50.23    Coronary atherosclerosis of native coronary vessel I25.10    Arthritis M19.90    Hypertension I10    COPD, moderate ([facility]) J44.9    High triglycerides E78.1    Gastroesophageal reflux disease without esophagitis K21.9    Mixed hyperlipidemia E78.2    Essential hypertension I10    CHF (congestive heart failure) ([facility]) I50.9    TAZ (obstructive sleep apnea) G47.33    Atherosclerosis of native coronary artery of native heart with stable angina pectoris ([facility]) I25.118    Personal history of tobacco use Z87.891    Erysipelas A46    Preseptal cellulitis L03.213    Acute respiratory failure with hypoxemia ([facility]) J96.01     CKD - creatinine is stable. Will arrange for outpatient f/u and sign off. Please recall as needed. CHF - had fat pad biopsy for amyloid yesterday. Balance remains negative with improved edema.    COPD - better          Elayne Washburn MD

## 2018-05-30 NOTE — PROGRESS NOTES
Hospitalist Progress Note    Subjective:   Daily Progress Note: 5/30/2018 9:34 AM     is a 75 y/o male with pmhx chronic systolic HF followed by [facility] Cards, COPD on 4 L NC, CKD who presented with dyspnea and LL edema. He was diagnosed with CHF exacerbation and started on diuresis. He had an echo which showed an EF of 45-50% with stippling pattern concerning for amyloidosis. He is s/p fat pad biopsy. Current Facility-Administered Medications   Medication Dose Route Frequency    lactated Ringers infusion  25 mL/hr IntraVENous CONTINUOUS    furosemide (LASIX) tablet 80 mg  80 mg Oral DAILY    lisinopril (PRINIVIL, ZESTRIL) tablet 2.5 mg  2.5 mg Oral DAILY    carvedilol (COREG) tablet 6.25 mg  6.25 mg Oral BID WITH MEALS    HYDROcodone-acetaminophen (NORCO) 5-325 mg per tablet 1 Tab  1 Tab Oral Q6H PRN    ondansetron (ZOFRAN) injection 4 mg  4 mg IntraVENous Q4H PRN    acetaminophen (TYLENOL) tablet 650 mg  650 mg Oral Q6H PRN    spironolactone (ALDACTONE) tablet 25 mg  25 mg Oral DAILY    heparin (porcine) injection 5,000 Units  5,000 Units SubCUTAneous Q8H    allopurinol (ZYLOPRIM) tablet 100 mg  100 mg Oral DAILY    aspirin delayed-release tablet 81 mg  81 mg Oral DAILY    atorvastatin (LIPITOR) tablet 20 mg  20 mg Oral QHS    fluticasone (FLONASE) 50 mcg/actuation nasal spray 2 Spray  2 Spray Both Nostrils DAILY    melatonin tablet 3 mg (Patient Supplied)  3 mg Oral QHS PRN    pantoprazole (PROTONIX) tablet 40 mg  40 mg Oral ACB    polyethylene glycol (MIRALAX) packet 17 g  17 g Oral DAILY    albuterol (PROVENTIL HFA, VENTOLIN HFA, PROAIR HFA) inhaler 2 Puff  2 Puff Inhalation QID RT    guaiFENesin ER (MUCINEX) tablet 1,200 mg  1,200 mg Oral Q12H        Review of Systems  A comprehensive review of systems was negative except for that written in the HPI.     Objective:     Visit Vitals    BP (!) 150/98    Pulse 82    Temp 98.4 °F (36.9 °C)    Resp 18    Ht 5' 9\" (1.753 m)    Wt 102.5 kg (226 lb)    SpO2 97%    BMI 33.37 kg/m2    O2 Flow Rate (L/min): 4 l/min O2 Device: Room air    Temp (24hrs), Av °F (36.7 °C), Min:97.6 °F (36.4 °C), Max:98.4 °F (36.9 °C)      701 - 1900  In: 250 [P.O.:250]  Out: 0   1901 - 700  In: 50 [I.V.:50]  Out:  [Urine:]    Visit Vitals    BP (!) 150/98    Pulse 82    Temp 98.4 °F (36.9 °C)    Resp 18    Ht 5' 9\" (1.753 m)    Wt 102.5 kg (226 lb)    SpO2 97%    BMI 33.37 kg/m2     General appearance: alert, cooperative,   Head: AT/NC  Lungs: clear to auscultation bilaterally   CVS: regular rate and rhythm, S1, S2 normal, 1+ BLLE edema   Abdomen: soft, non-tender. Bowel sounds normal. No masses,  no organomegaly  Extremities: extremities normal, atraumatic, no cyanosis. Neuro: Non focal exam, follows commands  Psych: A+Ox3  Skin: No Rash      Additional comments:I reviewed the patient's new clinical lab test results. CXR - clear    Data Review    Recent Results (from the past 24 hour(s))   BONE MARROW PREP & [facility]IVARES STAIN    Collection Time: 18 11:20 AM   Result Value Ref Range    Bone Marrow Prep & [facility] Stain FOR HEMATOLOGY SERVICES RENDERED     PROTEIN/CREATININE RATIO, URINE    Collection Time: 18  4:28 PM   Result Value Ref Range    Protein, urine random 9 <11.9 mg/dL    Creatinine, urine 53.60 mg/dL    Protein/Creat.  urine Ratio 0.2     MAGNESIUM    Collection Time: 18  4:53 AM   Result Value Ref Range    Magnesium 2.2 1.8 - 2.4 mg/dL   RENAL FUNCTION PANEL    Collection Time: 18  4:53 AM   Result Value Ref Range    Sodium 143 136 - 145 mmol/L    Potassium 3.8 3.5 - 5.1 mmol/L    Chloride 107 98 - 107 mmol/L    CO2 25 21 - 32 mmol/L    Anion gap 11 7 - 16 mmol/L    Glucose 108 (H) 65 - 100 mg/dL    BUN 25 (H) 8 - 23 MG/DL    Creatinine 1.61 (H) 0.8 - 1.5 MG/DL    GFR est AA 54 (L) >60 ml/min/1.73m2    GFR est non-AA 45 (L) >60 ml/min/1.73m2    Calcium 8.5 8.3 - 10.4 MG/DL    Phosphorus 3.2 2.3 - 3.7 MG/DL    Albumin 2.7 (L) 3.2 - 4.6 g/dL   CBC WITH AUTOMATED DIFF    Collection Time: 05/30/18  4:53 AM   Result Value Ref Range    WBC 6.7 4.3 - 11.1 K/uL    RBC 4.80 4.23 - 5.67 M/uL    HGB 13.0 (L) 13.6 - 17.2 g/dL    HCT 41.1 41.1 - 50.3 %    MCV 85.6 79.6 - 97.8 FL    MCH 27.1 26.1 - 32.9 PG    MCHC 31.6 31.4 - 35.0 g/dL    RDW 18.1 (H) 11.9 - 14.6 %    PLATELET 912 757 - 448 K/uL    MPV 10.4 (L) 10.8 - 14.1 FL    DF AUTOMATED      NEUTROPHILS 46 43 - 78 %    LYMPHOCYTES 40 13 - 44 %    MONOCYTES 13 (H) 4.0 - 12.0 %    EOSINOPHILS 1 0.5 - 7.8 %    BASOPHILS 0 0.0 - 2.0 %    IMMATURE GRANULOCYTES 0 0.0 - 5.0 %    ABS. NEUTROPHILS 3.1 1.7 - 8.2 K/UL    ABS. LYMPHOCYTES 2.7 0.5 - 4.6 K/UL    ABS. MONOCYTES 0.9 0.1 - 1.3 K/UL    ABS. EOSINOPHILS 0.0 0.0 - 0.8 K/UL    ABS. BASOPHILS 0.0 0.0 - 0.2 K/UL    ABS. IMM. GRANS. 0.0 0.0 - 0.5 K/UL         Assessment/Plan:     Principal Problem:    Acute on chronic systolic heart failure (HCC) (5/24/2018)      Overview: EF 40%    Active Problems:    CKD (chronic kidney disease) stage 3, GFR 30-59 ml/min (9/29/2017)      Coronary atherosclerosis of native coronary vessel (9/29/2017)      COPD, moderate (HCC) (9/29/2017)      Overview: Complete PFTs: 7/20/15:      Spirometry is consistent with a moderate obstructive/restrictive defect. .       The residual volume is increased relative to other lung volumes suggesting       air-trapping. The diffusion capacity corrected for alveolar volume was       normal suggesting no loss of alveolo-capillary units. CHF (congestive heart failure) ([facility] [address] (9/27/2017)      Acute respiratory failure with hypoxemia ([facility] [address] (5/24/2018)    Plan:    CHF Continue diuresis. Current presentation also suggestive of restrictive cardiomyopathy and restrictive lung disease. Appreciate cardiology input.  Strict Is and Os  - Concern for Amyloid s/p fat pad biopsy today   - CKD, Cr now on baseline, discharge home today    Care Plan discussed with: Patient/Family and Nurse   dvt ppx: hsq  Dispo: Home today    Signed By: Nathaniel Carranza MD     May 30, 2018

## 2018-05-30 NOTE — PROGRESS NOTES
[facility]  Face to Face Encounter    Patients Name: Ayan Sebastian YOB: 1945    Ordering Physician: Dr. Ghanshyam Claros MD     Primary Diagnosis: Acute respiratory failure with hypoxemia [facility])  RULE OUT ADENOMYOSIS    Date of Face to Face:   5/30/2018                                  Face to Face Encounter findings are related to primary reason for home care:   yes. 1. I certify that the patient needs intermittent care as follows: skilled nursing care:  teaching/training of new medications, mgt of CHF  physical therapy: strengthening, stretching/ROM, transfer training, gait/stair training, balance training and pt/caregiver education    2. I certify that this patient is homebound, that is: 1) patient requires the use of a walker device, special transportation, or assistance of another to leave the home; or 2) patient's condition makes leaving the home medically contraindicated; and 3) patient has a normal inability to leave the home and leaving the home requires considerable and taxing effort. Patient may leave the home for infrequent and short duration for medical reasons, and occasional absences for non-medical reasons. Homebound status is due to the following functional limitations: Patient with increased shortness of breath and elevated heart rate with ambulation greater than 20 feet limiting patient's ability to ambulate safely within the community. 3. I certify that this patient is under my care and that I, or a nurse practitioner or  [id number], or clinical nurse specialist, or certified nurse midwife, working with me, had a Face-to-Face Encounter that meets the physician Face-to-Face Encounter requirements.   The following are the clinical findings from the [address] [place], [place] [address] Street encounter that support the need for skilled services and is a summary of the encounter: see hospital chart    See hospital chart      Ramona Mariscal RN  5/30/2018      THE FOLLOWING TO BE COMPLETED BY THE COMMUNITY PHYSICIAN:    I concur with the findings described above from the F2F encounter that this patient is homebound and in need of a skilled service.     Certifying Physician: _____________________________________      Printed Certifying Physician Name: _____________________________________    Date: _________________

## 2018-05-30 NOTE — PROGRESS NOTES
Tato Ky changed his mind:  He now wants [facility] RN and PT. Faxed referral to [facility] fax [phone].

## 2018-05-31 NOTE — ROUTINE PROCESS
Late entry; CHF teaching completed, verbalize emphasis on monitoring self and report to MD:   If you gain 2 lbs in one day or 5 lbs in a week, and short of breath.  If you can not lay flat without developing short of breath or rapid breathing at night; or if it wakes you up. Develop a cough or wheezing.  If you notice swollen hands/feet/ankles or stomach with a bloated/ full feeling.  If you become confused or mentally fuzzy or dizzy.  If you notice a rapid or change in your heart rate.  If you become more exhausted all the time and unable to do the same level of activity without stopping to catch your breath. Drink no more than 8 cups a day in 8 oz. cups. Limit Cola Drinks. Your Heart can not handle any more. Stay away from salt (limit anything with salt or sodium in it). Limit to 250mg per serving. Exercise needs to be started with your Doctors approval.  Reduce stress, call your Doctor or myself if concerned  Pass post test via teach back, will make self available post DC ,if an questions arise. Diabetic teaching completed.  Planner/scale @ BS:  60 mins total

## 2018-05-31 NOTE — ADT AUTH CERT NOTES
Utilization Review           5/29/2018 Interventional Note by Liliana Ross RN        Review Status Review Entered       In Primary 5/29/2018       Details         Interventional Radiology Brief Procedure Note  Date of Procedure: 5/29/2018  Pre-Procedure Diagnosis: myelodysplasia  Post-Procedure Diagnosis: SAME  Procedure(s): Image Guided Biopsy  Brief Description of Procedure: right iliac bone,Findings: Unremarkable                  Respiratory Failure GRG - Care Day 5 (5/28/2018) by Liliana Ross RN        Review Status Review Entered       Completed 5/29/2018       Details              Care Day: 5 Care Date: 5/28/2018 Level of Care: Inpatient Floor       Guideline Day 3        Level Of Care       ( ) * Activity level acceptable              Clinical Status       (X) * Ventilation status appropriate       (X) * Airway status acceptable       ( ) * Respiratory status acceptable       5/29/2018 2:18 PM EDT by Edvin Martin         restrictive lung disease              ( ) * Stable chest findings       (X) * Neurologic status acceptable       (X) * Temperature status acceptable       (X) * No infection, or status acceptable       ( ) * General Discharge Criteria met              Interventions       ( ) * No chest tube, or status acceptable       ( ) * Intake acceptable       ( ) * No inpatient interventions needed                                   * Milestone              Additional Notes       INPATIENT REMOTE TELEMETRY 98.1,71,19,135/94,98% 4 L NC       ALBUTEROL NEB QID, ASA 81 MG PO QD, LASIX 80 MG PO QD, MUCINEX 1200 MG PO Q12H, HEPARIN 5000U SQ Q8H, ALDACTONE 25 MG PO QD       BUN 28 CRT 1.65, GFR 42/53, NPO AFTER MN, CONSENT FOR ABDOMINAL FAT BIOPSY       CARDIOLOGY - No complaints. Acute on chronic systolic heart failure Mixed systolic & diastolic CHF. Echo findings are suspicious for cardiac amyloid. Amyloid work up on going. Transition iv to po Lasix.  Overview: EF 40%       NEPHROLOGY- Pt still feels SOB but better. No acute events overnight. Sitting up in a chair. NINO on CKD 3: Stable again today. Likely has some underlying cardiorenal physiology. Restrictive cardiomyopathy: Concern for amyloid. Doubt renal amyloid. 24 hr UPEP pending.       GENERAL SURG - He appears better clinically. Plan abd fat pad biopsy tomorrow under local anesthesia       IM - This morning, he continued to report dyspnea and cough. Continue diuresis. Current presentation also suggestive of restrictive cardiomyopathy and restrictive lung disease. Appreciate cardiology input. Strict Is and Os - Concern for Amyloid, Surgery plans Abd Fat pad Biopsy in the AM, NPO after midnight. Heme plans BM biopsy.  - CKD, Cr now on baseline           Respiratory Failure GRG - Care Day 2 (5/25/2018) by Jessica Guerrero RN        Review Status Review Entered       Completed 5/25/2018       Details              Care Day: 2 Care Date: 5/25/2018 Level of Care: Inpatient Floor       Guideline Day 2        Clinical Status       (X) * Weaning assessment performed              Interventions       (X) Inpatient interventions continue       5/25/2018 2:16 PM EDT by Cliff Pettit         /64, TEMP 98, HR 80, RR 17, O2 97 ON RA  BUN 25, CREAT 1.63, BNP INCREASED FROM 894 TO 1132  PT, OT, ONCOLOGY CONSULT, NEPH CONSULT, IR CONSULT, STRICT I/O, CARDIAC DIET, CARD MONITORING  LASIX 60 MG QD IV              (X) Weaning trials attempted                                   * Milestone              Additional Notes       CARD NOTE       He has ongoing NYHA Class III sx now.  Echo showing likely amyloidosis.  No CP, edema better.  Weak overall.  Sinus on tele.         Echo showing likely amyloidosis, known NICM by prior [facility].  He has severe restrictive diastolic HF as well by echo.   Consult renal and onc for more assessment and recs.  Renal fxn worsening.  Diuresis will be difficult.        Remain on IV lasix for another day.  Remain on coreg and Ace.  Follow AM labs.  Options may be limited given age. Matty Ross ultimately need transfer for tertiary care facility. [place] Stain biopsy tough may be more academic for diagnosis purposes.                 Left ventricle: LV strian imaging shows pattern consistent with amyloid.  Relatively well preserved apical strain with significant basal impairment.  Systolic function was mildly reduced. Ejection fraction was estimated in the range of 45 % to 50 %.                       Active Problems:         CKD (chronic kidney disease) stage 3, GFR 30-59 ml/min (9/29/2017)         Consult renal, amyloidosis.  Check AM labs.   Follow K and Cr on aldactone as well.                  Coronary atherosclerosis of native coronary vessel (9/29/2017)       Mild CAD 1/2018 and 2015 LHCs.    Remain on BB, ASA and statin.        Trop low lying, not a MI.  No LHC needed.                   COPD, moderate ([facility]) (9/29/2017)       follow                 CHF (congestive heart failure) ([facility] [address] (9/27/2017)       As above                 Acute respiratory failure with hypoxemia ([facility]) (5/24/2018)       Continue oxygen, diuresis.  Follow.   CXR ok.                      IM NOTE       Pt laying in bed.  States he feels a little better but still far off from his baseline. Yi Pool still has moderate dyspnea but no increased O2 demand - on baseline 4 liters.  LE edema is better.  States urinating a lot.  Denies other complaints.       Doing better and appears to be responding to IV diuretics.  Despite LE edema, no significant crackles on chest.  Current presentation also suggestive of restrictive cardiomyopathy and restrictive lung disease.  Await nephrology eval. Appreciate cardiology input.       - continue IV lasix but increase noted in Cr (1.4-->1. 6).  Monitor renal function.  May need to accept higher Cr to achieve euvolemia.         - continue current meds.                     NEPH CONSULT       Mr. Rina Lovett has CKD 3 in the setting of volume overload and restrictive cardiomyopathy.  He has had evaluation in the past for amyloidosis that has been negative including fat pad biopsy.               I think it is unlikely that he has significant renal amyloidosis unless his UA now demonstrates significant proteinuria.       Assessment and Plan:       CKD stage 3       - baseline creatinine 1.5-2.0 depending on volume status       - renal function at baseline       - agree with continued diuretics       - check urine prot/creat ratio to quantify any proteinuria       - check another SPEP, K/L               Acute/chronic systolic CHF       - improving with IV diuretics       - on lasix and aldactone now added       - echo showing likely amyloidosis with restrictive cardiomyopathy- heme/onc consulted for further eval. Previous fat pad tissue biopsy negative for amyloid - ?may need myocardial biopsy               COPD                      HEM/ONC CONSULT       ?Amyloidosis       - Echo revealed sparkling appearance to myocardium, suspicious for amyloidosis       - IR consulted for BMbx and abdominal fat pad biopsy with congo red staining       - Check SPEP, UPEP, FLC, and UA               CHF       - Cardiology managing

## 2018-06-06 ENCOUNTER — HOSPITAL ENCOUNTER (OUTPATIENT)
Age: 73
Setting detail: OBSERVATION
Discharge: HOME | End: 2018-06-08
Attending: EMERGENCY MEDICINE | Admitting: INTERNAL MEDICINE
Payer: MEDICARE

## 2018-06-06 ENCOUNTER — APPOINTMENT (OUTPATIENT)
Dept: GENERAL RADIOLOGY | Age: 73
End: 2018-06-06
Attending: EMERGENCY MEDICINE
Payer: MEDICARE

## 2018-06-06 DIAGNOSIS — I50.9 ACUTE ON CHRONIC CONGESTIVE HEART FAILURE, UNSPECIFIED HEART FAILURE TYPE (HCC): Primary | ICD-10-CM

## 2018-06-06 DIAGNOSIS — J44.1 ACUTE EXACERBATION OF CHRONIC OBSTRUCTIVE PULMONARY DISEASE (COPD) (HCC): ICD-10-CM

## 2018-06-06 LAB
ALBUMIN SERPL-MCNC: 2.9 G/DL (ref 3.2–4.6)
ALBUMIN/GLOB SERPL: 0.6 {RATIO} (ref 1.2–3.5)
ALP SERPL-CCNC: 115 U/L (ref 50–136)
ALT SERPL-CCNC: 28 U/L (ref 12–65)
ANION GAP SERPL CALC-SCNC: 10 MMOL/L (ref 7–16)
AST SERPL-CCNC: 27 U/L (ref 15–37)
ATRIAL RATE: 86 BPM
BASOPHILS # BLD: 0 K/UL (ref 0–0.2)
BASOPHILS NFR BLD: 0 % (ref 0–2)
BILIRUB SERPL-MCNC: 1.7 MG/DL (ref 0.2–1.1)
BNP SERPL-MCNC: 1248 PG/ML
BUN SERPL-MCNC: 20 MG/DL (ref 8–23)
CALCIUM SERPL-MCNC: 8.4 MG/DL (ref 8.3–10.4)
CALCULATED P AXIS, ECG09: 60 DEGREES
CALCULATED R AXIS, ECG10: -95 DEGREES
CALCULATED T AXIS, ECG11: 69 DEGREES
CHLORIDE SERPL-SCNC: 109 MMOL/L (ref 98–107)
CO2 SERPL-SCNC: 24 MMOL/L (ref 21–32)
CREAT SERPL-MCNC: 1.59 MG/DL (ref 0.8–1.5)
DIAGNOSIS, 93000: NORMAL
DIFFERENTIAL METHOD BLD: ABNORMAL
EOSINOPHIL # BLD: 0.1 K/UL (ref 0–0.8)
EOSINOPHIL NFR BLD: 1 % (ref 0.5–7.8)
ERYTHROCYTE [DISTWIDTH] IN BLOOD BY AUTOMATED COUNT: 18.4 % (ref 11.9–14.6)
GLOBULIN SER CALC-MCNC: 4.5 G/DL (ref 2.3–3.5)
GLUCOSE SERPL-MCNC: 94 MG/DL (ref 65–100)
HCT VFR BLD AUTO: 41.9 % (ref 41.1–50.3)
HGB BLD-MCNC: 13.4 G/DL (ref 13.6–17.2)
IMM GRANULOCYTES # BLD: 0 K/UL (ref 0–0.5)
IMM GRANULOCYTES NFR BLD AUTO: 0 % (ref 0–5)
LYMPHOCYTES # BLD: 2.4 K/UL (ref 0.5–4.6)
LYMPHOCYTES NFR BLD: 29 % (ref 13–44)
MAGNESIUM SERPL-MCNC: 2.2 MG/DL (ref 1.8–2.4)
MCH RBC QN AUTO: 27.2 PG (ref 26.1–32.9)
MCHC RBC AUTO-ENTMCNC: 32 G/DL (ref 31.4–35)
MCV RBC AUTO: 85 FL (ref 79.6–97.8)
MONOCYTES # BLD: 0.7 K/UL (ref 0.1–1.3)
MONOCYTES NFR BLD: 9 % (ref 4–12)
NEUTS SEG # BLD: 4.8 K/UL (ref 1.7–8.2)
NEUTS SEG NFR BLD: 61 % (ref 43–78)
P-R INTERVAL, ECG05: 198 MS
PLATELET # BLD AUTO: 192 K/UL (ref 150–450)
PMV BLD AUTO: 10.2 FL (ref 10.8–14.1)
POTASSIUM SERPL-SCNC: 4.2 MMOL/L (ref 3.5–5.1)
PROT SERPL-MCNC: 7.4 G/DL (ref 6.3–8.2)
Q-T INTERVAL, ECG07: 438 MS
QRS DURATION, ECG06: 112 MS
QTC CALCULATION (BEZET), ECG08: 524 MS
RBC # BLD AUTO: 4.93 M/UL (ref 4.23–5.67)
SODIUM SERPL-SCNC: 143 MMOL/L (ref 136–145)
TROPONIN I BLD-MCNC: 0.06 NG/ML (ref 0.02–0.05)
VENTRICULAR RATE, ECG03: 86 BPM
WBC # BLD AUTO: 8 K/UL (ref 4.3–11.1)

## 2018-06-06 PROCEDURE — 74011000250 HC RX REV CODE- 250: Performed by: INTERNAL MEDICINE

## 2018-06-06 PROCEDURE — 94640 AIRWAY INHALATION TREATMENT: CPT

## 2018-06-06 PROCEDURE — 96375 TX/PRO/DX INJ NEW DRUG ADDON: CPT | Performed by: EMERGENCY MEDICINE

## 2018-06-06 PROCEDURE — 83735 ASSAY OF MAGNESIUM: CPT | Performed by: EMERGENCY MEDICINE

## 2018-06-06 PROCEDURE — 74011000250 HC RX REV CODE- 250: Performed by: EMERGENCY MEDICINE

## 2018-06-06 PROCEDURE — 85025 COMPLETE CBC W/AUTO DIFF WBC: CPT | Performed by: EMERGENCY MEDICINE

## 2018-06-06 PROCEDURE — 94664 DEMO&/EVAL PT USE INHALER: CPT

## 2018-06-06 PROCEDURE — 99285 EMERGENCY DEPT VISIT HI MDM: CPT | Performed by: EMERGENCY MEDICINE

## 2018-06-06 PROCEDURE — 74011250636 HC RX REV CODE- 250/636: Performed by: INTERNAL MEDICINE

## 2018-06-06 PROCEDURE — 99218 HC RM OBSERVATION: CPT

## 2018-06-06 PROCEDURE — 83880 ASSAY OF NATRIURETIC PEPTIDE: CPT | Performed by: EMERGENCY MEDICINE

## 2018-06-06 PROCEDURE — 94760 N-INVAS EAR/PLS OXIMETRY 1: CPT

## 2018-06-06 PROCEDURE — 74011250636 HC RX REV CODE- 250/636: Performed by: EMERGENCY MEDICINE

## 2018-06-06 PROCEDURE — 74011250637 HC RX REV CODE- 250/637: Performed by: INTERNAL MEDICINE

## 2018-06-06 PROCEDURE — 71046 X-RAY EXAM CHEST 2 VIEWS: CPT

## 2018-06-06 PROCEDURE — 80053 COMPREHEN METABOLIC PANEL: CPT | Performed by: EMERGENCY MEDICINE

## 2018-06-06 PROCEDURE — 96376 TX/PRO/DX INJ SAME DRUG ADON: CPT

## 2018-06-06 PROCEDURE — 96374 THER/PROPH/DIAG INJ IV PUSH: CPT | Performed by: EMERGENCY MEDICINE

## 2018-06-06 PROCEDURE — 84484 ASSAY OF TROPONIN QUANT: CPT

## 2018-06-06 PROCEDURE — 74011636637 HC RX REV CODE- 636/637: Performed by: INTERNAL MEDICINE

## 2018-06-06 PROCEDURE — 96372 THER/PROPH/DIAG INJ SC/IM: CPT

## 2018-06-06 PROCEDURE — 93005 ELECTROCARDIOGRAM TRACING: CPT | Performed by: EMERGENCY MEDICINE

## 2018-06-06 RX ORDER — IPRATROPIUM BROMIDE AND ALBUTEROL SULFATE 2.5; .5 MG/3ML; MG/3ML
3 SOLUTION RESPIRATORY (INHALATION)
Status: DISCONTINUED | OUTPATIENT
Start: 2018-06-06 | End: 2018-06-09 | Stop reason: HOSPADM

## 2018-06-06 RX ORDER — IPRATROPIUM BROMIDE AND ALBUTEROL SULFATE 2.5; .5 MG/3ML; MG/3ML
3 SOLUTION RESPIRATORY (INHALATION)
Status: COMPLETED | OUTPATIENT
Start: 2018-06-06 | End: 2018-06-06

## 2018-06-06 RX ORDER — LORAZEPAM 2 MG/ML
1 INJECTION INTRAMUSCULAR
Status: COMPLETED | OUTPATIENT
Start: 2018-06-06 | End: 2018-06-06

## 2018-06-06 RX ORDER — ASPIRIN 81 MG/1
81 TABLET ORAL DAILY
Status: DISCONTINUED | OUTPATIENT
Start: 2018-06-07 | End: 2018-06-09 | Stop reason: HOSPADM

## 2018-06-06 RX ORDER — FUROSEMIDE 10 MG/ML
40 INJECTION INTRAMUSCULAR; INTRAVENOUS
Status: COMPLETED | OUTPATIENT
Start: 2018-06-06 | End: 2018-06-06

## 2018-06-06 RX ORDER — ATORVASTATIN CALCIUM 10 MG/1
20 TABLET, FILM COATED ORAL
Status: DISCONTINUED | OUTPATIENT
Start: 2018-06-06 | End: 2018-06-09 | Stop reason: HOSPADM

## 2018-06-06 RX ORDER — SODIUM CHLORIDE 0.9 % (FLUSH) 0.9 %
5-10 SYRINGE (ML) INJECTION EVERY 8 HOURS
Status: DISCONTINUED | OUTPATIENT
Start: 2018-06-06 | End: 2018-06-09 | Stop reason: HOSPADM

## 2018-06-06 RX ORDER — FUROSEMIDE 10 MG/ML
40 INJECTION INTRAMUSCULAR; INTRAVENOUS 2 TIMES DAILY
Status: DISCONTINUED | OUTPATIENT
Start: 2018-06-06 | End: 2018-06-09 | Stop reason: HOSPADM

## 2018-06-06 RX ORDER — ALLOPURINOL 100 MG/1
100 TABLET ORAL DAILY
Status: DISCONTINUED | OUTPATIENT
Start: 2018-06-07 | End: 2018-06-09 | Stop reason: HOSPADM

## 2018-06-06 RX ORDER — PREDNISONE 20 MG/1
40 TABLET ORAL
Status: DISCONTINUED | OUTPATIENT
Start: 2018-06-06 | End: 2018-06-09 | Stop reason: HOSPADM

## 2018-06-06 RX ORDER — CARVEDILOL 12.5 MG/1
12.5 TABLET ORAL 2 TIMES DAILY WITH MEALS
Status: DISCONTINUED | OUTPATIENT
Start: 2018-06-06 | End: 2018-06-09 | Stop reason: HOSPADM

## 2018-06-06 RX ORDER — SODIUM CHLORIDE 0.9 % (FLUSH) 0.9 %
5-10 SYRINGE (ML) INJECTION AS NEEDED
Status: DISCONTINUED | OUTPATIENT
Start: 2018-06-06 | End: 2018-06-09 | Stop reason: HOSPADM

## 2018-06-06 RX ORDER — HEPARIN SODIUM 5000 [USP'U]/ML
5000 INJECTION, SOLUTION INTRAVENOUS; SUBCUTANEOUS EVERY 12 HOURS
Status: DISCONTINUED | OUTPATIENT
Start: 2018-06-06 | End: 2018-06-09 | Stop reason: HOSPADM

## 2018-06-06 RX ORDER — FUROSEMIDE 40 MG/1
80 TABLET ORAL DAILY
Status: CANCELLED | OUTPATIENT
Start: 2018-06-07

## 2018-06-06 RX ORDER — SPIRONOLACTONE 25 MG/1
25 TABLET ORAL DAILY
Status: DISCONTINUED | OUTPATIENT
Start: 2018-06-07 | End: 2018-06-09 | Stop reason: HOSPADM

## 2018-06-06 RX ORDER — LISINOPRIL 5 MG/1
5 TABLET ORAL DAILY
Status: DISCONTINUED | OUTPATIENT
Start: 2018-06-07 | End: 2018-06-09 | Stop reason: HOSPADM

## 2018-06-06 RX ORDER — ACETAMINOPHEN 325 MG/1
650 TABLET ORAL
Status: DISCONTINUED | OUTPATIENT
Start: 2018-06-06 | End: 2018-06-09 | Stop reason: HOSPADM

## 2018-06-06 RX ADMIN — Medication 10 ML: at 16:41

## 2018-06-06 RX ADMIN — FUROSEMIDE 40 MG: 10 INJECTION, SOLUTION INTRAMUSCULAR; INTRAVENOUS at 16:40

## 2018-06-06 RX ADMIN — IPRATROPIUM BROMIDE AND ALBUTEROL SULFATE 3 ML: .5; 3 SOLUTION RESPIRATORY (INHALATION) at 23:00

## 2018-06-06 RX ADMIN — Medication 10 ML: at 21:28

## 2018-06-06 RX ADMIN — ATORVASTATIN CALCIUM 20 MG: 40 TABLET, FILM COATED ORAL at 21:26

## 2018-06-06 RX ADMIN — LORAZEPAM 1 MG: 2 INJECTION INTRAMUSCULAR; INTRAVENOUS at 12:42

## 2018-06-06 RX ADMIN — ACETAMINOPHEN 650 MG: 325 TABLET ORAL at 21:26

## 2018-06-06 RX ADMIN — FUROSEMIDE 40 MG: 10 INJECTION, SOLUTION INTRAMUSCULAR; INTRAVENOUS at 12:42

## 2018-06-06 RX ADMIN — PREDNISONE 40 MG: 20 TABLET ORAL at 16:40

## 2018-06-06 RX ADMIN — IPRATROPIUM BROMIDE AND ALBUTEROL SULFATE 3 ML: .5; 3 SOLUTION RESPIRATORY (INHALATION) at 14:18

## 2018-06-06 RX ADMIN — CARVEDILOL 12.5 MG: 12.5 TABLET, FILM COATED ORAL at 16:40

## 2018-06-06 RX ADMIN — IPRATROPIUM BROMIDE AND ALBUTEROL SULFATE 3 ML: .5; 3 SOLUTION RESPIRATORY (INHALATION) at 19:35

## 2018-06-06 RX ADMIN — HEPARIN SODIUM 5000 UNITS: 5000 INJECTION, SOLUTION INTRAVENOUS; SUBCUTANEOUS at 21:26

## 2018-06-06 NOTE — IP AVS SNAPSHOT
[address] 
[phone] Patient: Aishwarya Leos. MRN: [id number] FTM:6/23/[id number] About your hospitalization You were admitted on:  June 6, 2018 You last received care in the:  [facility] 8 MED SURG You were discharged on:  June 8, 2018 Why you were hospitalized Your primary diagnosis was:  Acute On Chronic Systolic Heart Failure (Hcc) Your diagnoses also included:  Chf (Congestive Heart Failure) (Hcc), Hypertension, Copd, Moderate (Hcc), Mixed Hyperlipidemia, Raul (Obstructive Sleep Apnea), Acute Respiratory Failure With Hypoxemia (Hcc) Follow-up Information Follow up With Details Comments Contact [facility]   [address] [place] [address] [address] [facility] [address] 
[phone] Gregor Begum MD On 6/21/2018 11:45 AM at [address] [facility] [address] 
[phone] Tati Pinto MD  Office Oskar [place]) wants spouse to call to make appt due to several missed appts. -MT [address] 
[address] [facility] [address] 
[phone] Your Scheduled Appointments Thursday June 21, 2018 11:45 AM EDT TRANSITIONAL CARE MANAGEMENT with Gregor Begum MD  
[facility] ([address] [place] [address]) [address] [place] [address] Rd  
[phone] Discharge Orders None A check megan indicates which time of day the medication should be taken. My Medications CHANGE how you take these medications Instructions Each Dose to Equal  
 Morning Noon Evening Bedtime  
 furosemide 40 mg tablet Commonly known as:  LASIX What changed:   
- how much to take - when to take this Take 1 Tab by mouth two (2) times a day. 40 mg  
    
  
   
   
  
   
  
 polyethylene glycol 17 gram packet Commonly known as:  Kathrin Hodgkin What changed:   
- when to take this - reasons to take this Take 1 Packet by mouth daily. 17 g CONTINUE taking these medications Instructions Each Dose to Equal  
 Morning Noon Evening Bedtime  
 albuterol 90 mcg/actuation inhaler Commonly known as:  VENTOLIN HFA Your next dose is: Take on as needed schedule Take 2 Puffs by inhalation four (4) times daily. 2 Puff  
    
   
   
   
  
 albuterol-ipratropium 2.5 mg-0.5 mg/3 ml Nebu Commonly known as:  DUO-NEB  
   
 3 mL by Nebulization route four (4) times daily. Diaignosis--J44.9  
 3 mL  
    
  
   
  
   
  
   
  
  
 allopurinol 100 mg tablet Commonly known as:  Adry Salts Take 1 Tab by mouth daily. 100 mg  
    
  
   
   
   
  
 aspirin delayed-release 81 mg tablet Take 1 Tab by mouth daily. 81 mg  
    
  
   
   
   
  
 atorvastatin 20 mg tablet Commonly known as:  LIPITOR Take 1 Tab by mouth nightly. 20 mg  
    
   
   
   
  
  
 carvedilol 25 mg tablet Commonly known as:  Claritza Radha Take 12.5 mg by mouth two (2) times daily (with meals). 12.5 mg  
    
  
   
   
  
   
  
 fluticasone 50 mcg/actuation nasal spray Commonly known as:  Curtistine Paci 2 Sprays by Both Nostrils route daily. 2 Spray  
    
  
   
   
   
  
 guaiFENesin 1,200 mg Ta12 ER tablet Commonly known as:  Pete & Pete Take 1 Tab by mouth every twelve (12) hours. 1200 mg  
    
  
   
   
  
   
  
 lisinopril 5 mg tablet Commonly known as:  Tom Littler Take 1 Tab by mouth daily. 5 mg  
    
  
   
   
   
  
 melatonin 3 mg tablet Take 3 mg by mouth nightly. 3 mg  
    
   
   
   
  
  
 omeprazole 20 mg capsule Commonly known as:  PRILOSEC Take 1 Cap by mouth daily. 20 mg  
    
  
   
   
   
  
 spironolactone 25 mg tablet Commonly known as:  ALDACTONE Take 1 Tab by mouth daily. 25 mg Where to Get Your Medications These medications were sent to [facility]/pharmacy #[id number] Cardenas [address] [address] Phone:  [phone]  
  furosemide 40 mg tablet Discharge Instructions Chronic Obstructive Pulmonary Disease (COPD): Care Instructions Your Care Instructions Chronic obstructive pulmonary disease (COPD) is a general term for a group of lung diseases, including emphysema and chronic bronchitis. People with COPD have decreased airflow in and out of the lungs, which makes it hard to breathe. The airways also can get clogged with thick mucus. Cigarette smoking is a major cause of COPD. Although there is no cure for COPD, you can slow its progress. Following your treatment plan and taking care of yourself can help you feel better and live longer. Follow-up care is a key part of your treatment and safety. Be sure to make and go to all appointments, and call your doctor if you are having problems. It's also a good idea to know your test results and keep a list of the medicines you take. How can you care for yourself at home? ?Staying healthy ? · Do not smoke. This is the most important step you can take to prevent more damage to your lungs. If you need help quitting, talk to your doctor about stop-smoking programs and medicines. These can increase your chances of quitting for good. ? · Avoid colds and flu. Get a pneumococcal vaccine shot. If you have had one before, ask your doctor whether you need a second dose. Get the flu vaccine every fall. If you must be around people with colds or the flu, wash your hands often. ? · Avoid secondhand smoke, air pollution, and high altitudes. Also avoid cold, dry air and hot, humid air. Stay at home with your windows closed when air pollution is bad. ?Medicines and oxygen therapy ? · Take your medicines exactly as prescribed.  Call your doctor if you think you are having a problem with your medicine. ? · You may be taking medicines such as: ¨ Bronchodilators. These help open your airways and make breathing easier. Bronchodilators are either short-acting (work for 6 to 9 hours) or long-acting (work for 24 hours). You inhale most bronchodilators, so they start to act quickly. Always carry your quick-relief inhaler with you in case you need it while you are away from home. ¨ Corticosteroids (prednisone, budesonide). These reduce airway inflammation. They come in pill or inhaled form. You must take these medicines every day for them to work well. ? · A spacer may help you get more inhaled medicine to your lungs. Ask your doctor or pharmacist if a spacer is right for you. If it is, ask how to use it properly. ? · Do not take any vitamins, over-the-counter medicine, or herbal products without talking to your doctor first.  
? · If your doctor prescribed antibiotics, take them as directed. Do not stop taking them just because you feel better. You need to take the full course of antibiotics. ? · Oxygen therapy boosts the amount of oxygen in your blood and helps you breathe easier. Use the flow rate your doctor has recommended, and do not change it without talking to your doctor first.  
Activity ? · Get regular exercise. Walking is an easy way to get exercise. Start out slowly, and walk a little more each day. ? · Pay attention to your breathing. You are exercising too hard if you cannot talk while you are exercising. ? · Take short rest breaks when doing household chores and other activities. ? · Learn breathing methods-such as breathing through pursed lips-to help you become less short of breath. ? · If your doctor has not set you up with a pulmonary rehabilitation program, talk to him or her about whether rehab is right for you.  Rehab includes exercise programs, education about your disease and how to manage it, help with diet and other changes, and emotional support. Diet ? · Eat regular, healthy meals. Use bronchodilators about 1 hour before you eat to make it easier to eat. Eat several small meals instead of three large ones. Drink beverages at the end of the meal. Avoid foods that are hard to chew. ? · Eat foods that contain protein so that you do not lose muscle mass. ? · Talk with your doctor if you gain too much weight or if you lose weight without trying. ?Mental health ? · Talk to your family, friends, or a therapist about your feelings. It is normal to feel frightened, angry, hopeless, helpless, and even guilty. Talking openly about bad feelings can help you cope. If these feelings last, talk to your doctor. When should you call for help? Call 911 anytime you think you may need emergency care. For example, call if: 
? · You have severe trouble breathing. ?Call your doctor now or seek immediate medical care if: 
? · You have new or worse trouble breathing. ? · You cough up blood. ? · You have a fever. ? Watch closely for changes in your health, and be sure to contact your doctor if: 
? · You cough more deeply or more often, especially if you notice more mucus or a change in the color of your mucus. ? · You have new or worse swelling in your legs or belly. ? · You are not getting better as expected. Where can you learn more? Go to http://jaime-jarrell.info/. Samira Freed in the search box to learn more about \"Chronic Obstructive Pulmonary Disease (COPD): Care Instructions. \" Current as of: May 12, 2017 Content Version: 11.4 © 0881-8581 [employer]. Care instructions adapted under license by [employer] (which disclaims liability or warranty for this information).  If you have questions about a medical condition or this instruction, always ask your healthcare professional. [employer] disclaims any warranty or liability for your use of this information. Heart Failure: Care Instructions Your Care Instructions Heart failure occurs when your heart does not pump as much blood as the body needs. Failure does not mean that the heart has stopped pumping but rather that it is not pumping as well as it should. Over time, this causes fluid buildup in your lungs and other parts of your body. Fluid buildup can cause shortness of breath, fatigue, swollen ankles, and other problems. By taking medicines regularly, reducing sodium (salt) in your diet, checking your weight every day, and making lifestyle changes, you can feel better and live longer. Follow-up care is a key part of your treatment and safety. Be sure to make and go to all appointments, and call your doctor if you are having problems. It's also a good idea to know your test results and keep a list of the medicines you take. How can you care for yourself at home? Medicines ? · Be safe with medicines. Take your medicines exactly as prescribed. Call your doctor if you think you are having a problem with your medicine. ? · Do not take any vitamins, over-the-counter medicine, or herbal products without talking to your doctor first. Reno Burciagaude not take ibuprofen (Advil or Motrin) and naproxen (Aleve) without talking to your doctor first. They could make your heart failure worse. ? · You may be taking some of the following medicine. ¨ Beta-blockers can slow heart rate, decrease blood pressure, and improve your condition. Taking a beta-blocker may lower your chance of needing to be hospitalized. ¨ Angiotensin-converting enzyme inhibitors (ACEIs) reduce the heart's workload, lower blood pressure, and reduce swelling. Taking an ACEI may lower your chance of needing to be hospitalized again. ¨ Angiotensin II receptor blockers (ARBs) work like ACEIs. Your doctor may prescribe them instead of ACEIs. ¨ Diuretics, also called water pills, reduce swelling. ¨ Potassium supplements replace this important mineral, which is sometimes lost with diuretics. ¨ Aspirin and other blood thinners prevent blood clots, which can cause a stroke or heart attack. ? You will get more details on the specific medicines your doctor prescribes. Diet ? · Your doctor may suggest that you limit sodium to 2,000 milligrams (mg) a day or less. That is less than 1 teaspoon of salt a day, including all the salt you eat in cooking or in packaged foods. People get most of their sodium from processed foods. Fast food and restaurant meals also tend to be very high in sodium. ? · Ask your doctor how much liquid you can drink each day. You may have to limit liquids. ?Weight ? · Weigh yourself without clothing at the same time each day. Record your weight. Call your doctor if you have a sudden weight gain, such as more than 2 to 3 pounds in a day or 5 pounds in a week. (Your doctor may suggest a different range of weight gain.) A sudden weight gain may mean that your heart failure is getting worse. ? Activity level ? · Start light exercise (if your doctor says it is okay). Even if you can only do a small amount, exercise will help you get stronger, have more energy, and manage your weight and your stress. Walking is an easy way to get exercise. Start out by walking a little more than you did before. Bit by bit, increase the amount you walk. ? · When you exercise, watch for signs that your heart is working too hard. You are pushing yourself too hard if you cannot talk while you are exercising. If you become short of breath or dizzy or have chest pain, stop, sit down, and rest.  
? · If you feel \"wiped out\" the day after you exercise, walk slower or for a shorter distance until you can work up to a better pace. ? · Get enough rest at night. Sleeping with 1 or 2 pillows under your upper body and head may help you breathe easier. ?Lifestyle changes ? · Do not smoke. Smoking can make a heart condition worse. If you need help quitting, talk to your doctor about stop-smoking programs and medicines. These can increase your chances of quitting for good. Quitting smoking may be the most important step you can take to protect your heart. ? · Limit alcohol to 2 drinks a day for men and 1 drink a day for women. Too much alcohol can cause health problems. ? · Avoid getting sick from colds and the flu. Get a pneumococcal vaccine shot. If you have had one before, ask your doctor whether you need another dose. Get a flu shot each year. If you must be around people with colds or the flu, wash your hands often. When should you call for help? Call 911 if you have symptoms of sudden heart failure such as: 
? · You have severe trouble breathing. ? · You cough up pink, foamy mucus. ? · You have a new irregular or rapid heartbeat. ?Call your doctor now or seek immediate medical care if: 
? · You have new or increased shortness of breath. ? · You are dizzy or lightheaded, or you feel like you may faint. ? · You have sudden weight gain, such as more than 2 to 3 pounds in a day or 5 pounds in a week. (Your doctor may suggest a different range of weight gain.) ? · You have increased swelling in your legs, ankles, or feet. ? · You are suddenly so tired or weak that you cannot do your usual activities. ? Watch closely for changes in your health, and be sure to contact your doctor if you develop new symptoms. Where can you learn more? Go to http://jaime-jarrell.info/. Enter A122 in the search box to learn more about \"Heart Failure: Care Instructions. \" Current as of: September 21, 2016 Content Version: 11.4 © 6808-5217 [employer]. Care instructions adapted under license by [employer] (which disclaims liability or warranty for this information).  If you have questions about a medical condition or this instruction, always ask your healthcare professional. [place] [address] any warranty or liability for your use of this information. DISCHARGE SUMMARY from Nurse PATIENT INSTRUCTIONS: 
 
After general anesthesia or intravenous sedation, for 24 hours or while taking prescription Narcotics: · Limit your activities · Do not drive and operate hazardous machinery · Do not make important personal or business decisions · Do  not drink alcoholic beverages · If you have not urinated within 8 hours after discharge, please contact your surgeon on call. Report the following to your surgeon: 
· Excessive pain, swelling, redness or odor of or around the surgical area · Temperature over 100.5 · Nausea and vomiting lasting longer than 4 hours or if unable to take medications · Any signs of decreased circulation or nerve impairment to extremity: change in color, persistent  numbness, tingling, coldness or increase pain · Any questions What to do at Home: *  Please give a list of your current medications to your Primary Care Provider. *  Please update this list whenever your medications are discontinued, doses are 
    changed, or new medications (including over-the-counter products) are added. *  Please carry medication information at all times in case of emergency situations. These are general instructions for a healthy lifestyle: No smoking/ No tobacco products/ Avoid exposure to second hand smoke Surgeon General's Warning:  Quitting smoking now greatly reduces serious risk to your health. Obesity, smoking, and sedentary lifestyle greatly increases your risk for illness A healthy diet, regular physical exercise & weight monitoring are important for maintaining a healthy lifestyle You may be retaining fluid if you have a history of heart failure or if you experience any of the following symptoms:  Weight gain of 3 pounds or more overnight or 5 pounds in a week, increased swelling in our hands or feet or shortness of breath while lying flat in bed. Please call your doctor as soon as you notice any of these symptoms; do not wait until your next office visit. Recognize signs and symptoms of STROKE: 
 
F-face looks uneven A-arms unable to move or move unevenly S-speech slurred or non-existent T-time-call 911 as soon as signs and symptoms begin-DO NOT go Back to bed or wait to see if you get better-TIME IS BRAIN. Warning Signs of HEART ATTACK Call 911 if you have these symptoms: 
? Chest discomfort. Most heart attacks involve discomfort in the center of the chest that lasts more than a few minutes, or that goes away and comes back. It can feel like uncomfortable pressure, squeezing, fullness, or pain. ? Discomfort in other areas of the upper body. Symptoms can include pain or discomfort in one or both arms, the back, neck, jaw, or stomach. ? Shortness of breath with or without chest discomfort. ? Other signs may include breaking out in a cold sweat, nausea, or lightheadedness. Don't wait more than five minutes to call [address]! Fast action can save your life. Calling 911 is almost always the fastest way to get lifesaving treatment. Emergency Medical Services staff can begin treatment when they arrive  up to an hour sooner than if someone gets to the hospital by car. The discharge information has been reviewed with the patient. The patient verbalized understanding. Discharge medications reviewed with the patient and appropriate educational materials and side effects teaching were provided. ___________________________________________________________________________________________________________________________________ MyChart Announcement  We are excited to announce that we are making your provider's discharge notes available to you in [employer]. You will see these notes when they are completed and signed by the physician that discharged you from your recent hospital stay. If you have any questions or concerns about any information you see in Lucidworks, please call the Health Information Department where you were seen or reach out to your Primary Care Provider for more information about your plan of care. Introducing [facility]! Kristy Dominique introduces [employer]idworks patient portal. Now you can access parts of your medical record, email your doctor's office, and request medication refills online. 1. In your internet browser, go to [email or web address] 2. Click on the First Time User? Click Here link in the Sign In box. You will see the New Member Sign Up page. 3. Enter your Lucid[employer] Access Code exactly as it appears below. You will not need to use this code after youve completed the sign-up process. If you do not sign up before the expiration date, you must request a new code. · [employer] Access Code: [email or web address] Expires: 6/27/2018 10:51 AM 
 
4. Enter the last four digits of your Social Security Number (xxxx) and Date of Birth (mm/dd/yyyy) as indicated and click Submit. You will be taken to the next sign-up page. 5. Create a Lucidworks ID. This will be your Lucidworks login ID and cannot be changed, so think of one that is secure and easy to remember. 6. Create a Lucidworks password. You can change your password at any time. 7. Enter your Password Reset Question and Answer. This can be used at a later time if you forget your password. 8. Enter your e-mail address. You will receive e-mail notification when new information is available in [address] 9. Click Sign Up. You can now view and download portions of your medical record. 10. Click the Download Summary menu link to download a portable copy of your medical information. If you have questions, please visit the Frequently Asked Questions section of the MyChart website. Remember, [employer]Net[employer] is NOT to be used for urgent needs. For medical emergencies, dial 911. Now available from your iPhone and Android! Introducing Shaji Aponte As a [employer] patient, I wanted to make you aware of our electronic visit tool called Shaji Aponte. [employer] 24/7 allows you to connect within minutes with a medical provider 24 hours a day, seven days a week via a mobile device or tablet or logging into a secure website from your computer. You can access Shaji Aponte from anywhere in the [place]. A virtual visit might be right for you when you have a simple condition and feel like you just dont want to get out of bed, or cant get away from work for an appointment, when your regular [employer] provider is not available (evenings, weekends or holidays), or when youre out of town and need minor care. Electronic visits cost only $49 and if the [employer] 24/7 provider determines a prescription is needed to treat your condition, one can be electronically transmitted to a nearby pharmacy*. Please take a moment to enroll today if you have not already done so. The enrollment process is free and takes just a few minutes. To enroll, please download the [employer] 24/7 cisco to your tablet or phone, or visit [email or web address] to enroll on your computer. And, as an [address] [place] [address] patient with a [employer] account, the results of your visits will be scanned into your electronic medical record and your primary care provider will be able to view the scanned results. We urge you to continue to see your regular [employer] provider for your ongoing medical care.   And while your primary care provider may not be the one available when you seek a Shaji Aponte virtual visit, the peace of mind you get from getting a real diagnosis real time can be priceless. For more information on Shaji Aponte, view our Frequently Asked Questions (FAQs) at [email or web address]. Sincerely, 
 
Tatyana Harrell MD 
Chief Medical Officer [address] *:  certain medications cannot be prescribed via Shaji Aponte Providers Seen During Your Hospitalization Provider Specialty Primary office phone Ibeth Diaz MD Emergency Medicine [phone] Ankit Berg MD Internal Medicine [phone] Your Primary Care Physician (PCP) Primary Care Physician Office Phone Office Fax Ron Lee [phone] You are allergic to the following Allergen Reactions Pcn (Penicillins) Other (comments) \"makes my heart stop\" Recent Documentation Height Weight BMI Smoking Status 1.778 m 102.2 kg 32.33 kg/m2 Former Smoker Emergency Contacts Name Discharge Info Relation Home Work Mobile Jia Sutton  Spouse [3] [phone] Justin Dress  Daughter [21] [phone] Patient Belongings The following personal items are in your possession at time of discharge: 
  Dental Appliances: None  Visual Aid: Glasses, With patient      Home Medications: None   Jewelry: None  Clothing: Pants, Sent home, Footwear    Other Valuables: None (Pt has no valuables sent home with spouse) Please provide this summary of care documentation to your next provider. Signatures-by signing, you are acknowledging that this After Visit Summary has been reviewed with you and you have received a copy. Patient Signature:  ____________________________________________________________ Date:  ____________________________________________________________  
  
Allyn Gonzalez Provider Signature:  ____________________________________________________________ Date:  ____________________________________________________________

## 2018-06-06 NOTE — PROGRESS NOTES
Patient up in bed. C/o SOB at rest , right chest pain and anxiety. Respiration even and labored, using accessory muscles. Auditory and expiratory wheezing noted. Remains on 3L O2 and O2 sat 97-98%. Elevated HOB above 30 degrees and place two pillows behind patient head. Spouse at bedside. Safety precautions in place.  Will continue to monitor

## 2018-06-06 NOTE — ED TRIAGE NOTES
Pt states being short of breath for the past 4 days. Denies any pain. Pt states some swelling in his legs.

## 2018-06-06 NOTE — ED NOTES
TRANSFER - OUT REPORT:    Verbal report given to Kenan Reyes RN(name) on Ellen Rice.  being transferred to 835(unit) for routine progression of care       Report consisted of patients Situation, Background, Assessment and   Recommendations(SBAR). Information from the following report(s) Kardex, ED summary was reviewed with the receiving nurse. Lines:   Peripheral IV 06/06/18 Left Antecubital (Active)   Site Assessment Clean, dry, & intact 6/6/2018 11:31 AM   Phlebitis Assessment 0 6/6/2018 11:31 AM   Infiltration Assessment 0 6/6/2018 11:31 AM   Dressing Status Clean, dry, & intact 6/6/2018 11:31 AM        Opportunity for questions and clarification was provided.       Patient transported with:   O2 @ 2 liters

## 2018-06-06 NOTE — ED PROVIDER NOTES
HPI Comments: Patient with a known history of COPD as well as heart failure presents to the ER complaining of progressive dyspnea. Patient states shortness of breath has worsened over the past 3-4 days. Reports minimal cough. Denies any chest pain, vomiting or diaphoresis. Reports shortness of breath is worse with exertion. Sonia Yarbrough He does report some orthopnea as well as lower extremity swelling. Patient was recently in the hospital for COPD as well as volume overload. Patient is a 74 y.o. male presenting with shortness of breath. The history is provided by the patient. Shortness of Breath   This is a recurrent problem. The problem occurs frequently. The current episode started more than 2 days ago. The problem has been gradually worsening. Associated symptoms include cough, orthopnea and leg swelling. Pertinent negatives include no fever, no rhinorrhea, no sore throat, no sputum production, no hemoptysis, no wheezing, no vomiting, no abdominal pain and no rash. He has tried nothing for the symptoms. Associated medical issues include COPD and heart failure.         Past Medical History:   Diagnosis Date    Acute CHF (congestive heart failure) ([address] 4/25/2015    Acute combined systolic and diastolic congestive heart failure ([address] 4/28/2015    Chronic obstructive pulmonary disease (HCC)     De Quervain's tenosynovitis, right 4/28/2015    Dyspnea on exertion 6/28/2015    Elevated serum creatinine 4/25/2015    Elevated troponin 6/28/2015    History of coronary artery disease     MI at 25 yo    History of right knee surgery     cartilage removal    History of shingles     Malignant hypertension 4/25/2015    [facility])        Past Surgical History:   Procedure Laterality Date    HX HEART CATHETERIZATION  6/29/2015    no intervention    HX KNEE ARTHROSCOPY Right     removal of cartilage         Family History:   Problem Relation Age of Onset    Hypertension Mother     No Known Problems Father        Social History     Social History    Marital status:      Spouse name: N/A    Number of children: N/A    Years of education: N/A     Occupational History    retired      Social History Main Topics    Smoking status: Former Smoker     Packs/day: 0.25     Years: 20.00     Types: Cigarettes     Quit date: 4/5/2015    Smokeless tobacco: Never Used    Alcohol use No    Drug use: No    Sexual activity: Not on file     Other Topics Concern     Service No    Blood Transfusions No     no issues with receiving    Caffeine Concern No    Special Diet No    Exercise No    Seat Belt Yes     Social History Narrative    Lives with his wife         ALLERGIES: Pcn [penicillins]    Review of Systems   Constitutional: Negative for diaphoresis, fatigue and fever. HENT: Negative for congestion, rhinorrhea and sore throat. Eyes: Negative for photophobia, redness and visual disturbance. Respiratory: Positive for cough, chest tightness and shortness of breath. Negative for hemoptysis, sputum production, choking and wheezing. Cardiovascular: Positive for orthopnea and leg swelling. Gastrointestinal: Negative for abdominal pain and vomiting. Endocrine: Negative for polydipsia, polyphagia and polyuria. Genitourinary: Negative for flank pain, frequency and urgency. Musculoskeletal: Negative for back pain and gait problem. Skin: Negative for pallor and rash. Allergic/Immunologic: Negative for food allergies and immunocompromised state. Neurological: Negative for light-headedness and numbness. Hematological: Negative for adenopathy. Does not bruise/bleed easily. Psychiatric/Behavioral: Negative for behavioral problems and confusion. All other systems reviewed and are negative.       Vitals:    06/06/18 1100   BP: (!) 130/91   Pulse: 91   Resp: 16   Temp: 97.5 °F (36.4 °C)   SpO2: 96%   Weight: 113.4 kg (250 lb)   Height: 5' 10\" (1.778 m)            Physical Exam Constitutional: He is oriented to person, place, and time. He appears well-developed and well-nourished. HENT:   Head: Normocephalic and atraumatic. Eyes: Conjunctivae and EOM are normal. Pupils are equal, round, and reactive to light. No scleral icterus. Neck: Normal range of motion. Neck supple. No tracheal deviation present. No thyromegaly present. Cardiovascular: Normal rate, regular rhythm and intact distal pulses. Pulmonary/Chest: Effort normal. No respiratory distress. He has wheezes. He has rales. Abdominal: Soft. Bowel sounds are normal. He exhibits no distension. There is no tenderness. Musculoskeletal: He exhibits edema. 2+ lower extremity edema   Neurological: He is alert and oriented to person, place, and time. He has normal reflexes. Skin: Skin is warm and dry. No rash noted. No erythema. Nursing note and vitals reviewed. MDM  Number of Diagnoses or Management Options  Diagnosis management comments: Concern for COPD versus CHF  Will obtain chest x-ray, basic labs as well as BNP    2:16 PM  Chest x-ray is stable. BNP is 1248  She was given 40 mg of Lasix with good diuresis here however  Upon ambulating his oxygen saturations have dropped to the 70s. Patient notes some wheezing.   Case has been discussed with hospitalist for admission       Amount and/or Complexity of Data Reviewed  Clinical lab tests: ordered and reviewed  Tests in the radiology section of CPT®: ordered and reviewed    Risk of Complications, Morbidity, and/or Mortality  Presenting problems: moderate  Diagnostic procedures: moderate  Management options: moderate    Patient Progress  Patient progress: stable        ED Course       Procedures

## 2018-06-06 NOTE — PROGRESS NOTES
TRANSFER - IN REPORT:    Verbal report received from Jordi(name) on Viet Ramirez.  being received from ER(unit) for routine progression of care      Report consisted of patients Situation, Background, Assessment and   Recommendations(SBAR). Information from the following report(s) SBAR was reviewed with the receiving nurse. Opportunity for questions and clarification was provided. Assessment completed upon patients arrival to unit and care assumed.

## 2018-06-06 NOTE — PROGRESS NOTES
Pt arrived to room 835. Alert to person and place, confused to time. Dyspnea at rest, 100% O2 on 3L nc. Soft abdomen. 2+ edema to BLE. Pt instructed to use urinal at bedside for accurate measurement of output. Denies nausea or pain. Dual skin assessment with Myles Sotelo RN completed. Skin intact. No open wounds noted. Oriented to room. Pt asking for his belongings (money and medications.) Per ER RN, all personal belongings went home with \"caregiver. \" Mr Adah Kawasaki confirms this information.

## 2018-06-06 NOTE — H&P
Hospitalist H&P Note     Admit Date:  2018 11:15 AM   Name:  Colette Segura. Age:  74 y.o.  :  1945   MRN:  [id number]   PCP:  Buck Oseguera MD  Treatment Team: Attending Provider: Bentley Mantilla MD; Primary Nurse: Kartik Hernandes, RN; Primary Nurse: Cinthya Gomez, RN    HPI:     Dixie Ferro is a 73 yo male who presented with dyspnea on a background of  systolic HF, CKD, COPD on 4L O2 at home. He reports worsening dyspnea over the past week since discharge on . He notes that the dyspnea is worse on exertion. He reports a dry cough. He denies any chest pain. He lives with his daughter and is ambulatory at baseline. He denies any dysuria. He does not smoke at this time. In the ED, he was noted to have bilateral expiratory wheezing and desaturated. He was started on Duonebs after which he reported an improvement in dyspnea. CXR showed cardiomegaly and BNP was 1248. Of note, he was recently admitted in  with  diastolic and systolic CHF exacerbation and diuresed inpatient. ECHO done showed concern for amyloidosis (+also severe restrictive diastolic HF). He is s/p fat pad and BM biopsy inpatient (negative for amyloidosis). Bone marrow biopsy showed no evidence of plasma cell neoplasm or amyloidosis on that admission. 10 systems reviewed and negative except as noted in HPI.   Past Medical History:   Diagnosis Date    [facility]estive [facility]) [facility] 2015    Acute combined systolic and diastolic congestive heart failure ([facility] 2015    Chronic obstructive pulmonary disease (HCC)     De Quervain's tenosynovitis, right 2015    Dyspnea on exertion 2015    Elevated serum creatinine 2015    Elevated troponin 2015    History of coronary artery disease     MI at 25 yo    History of right knee surgery     cartilage removal    History of shingles     Malignant hypertension 2015    MI [facility] infarction[facility])       Past Surgical History: Procedure Laterality Date    HX HEART CATHETERIZATION  2015    no intervention    HX KNEE ARTHROSCOPY Right     removal of cartilage      Allergies   Allergen Reactions    Pcn [Penicillins] Other (comments)     \"makes my heart stop\"      Social History   Substance Use Topics    Smoking status: Former Smoker     Packs/day: 0.25     Years: 20.00     Types: Cigarettes     Quit date: 2015    Smokeless tobacco: Never Used    Alcohol use No      Family History   Problem Relation Age of Onset    Hypertension Mother     No Known Problems Father       Immunization History   Administered Date(s) Administered    Influenza Vaccine 2016    Pneumococcal Polysaccharide (PPSV-23) 2016    TB Skin Test (PPD) Intradermal 2018, 2018, 2018     PTA Medications:  Prior to Admission Medications   Prescriptions Last Dose Informant Patient Reported? Taking? albuterol (VENTOLIN HFA) 90 mcg/actuation inhaler   No No   Sig: Take 2 Puffs by inhalation four (4) times daily. albuterol-ipratropium (DUO-NEB) 2.5 mg-0.5 mg/3 ml nebu   No No   Sig: 3 mL by Nebulization route four (4) times daily. Diaignosis--J44.9   allopurinol (ZYLOPRIM) 100 mg tablet   No No   Sig: Take 1 Tab by mouth daily. aspirin delayed-release 81 mg tablet   No No   Sig: Take 1 Tab by mouth daily. atorvastatin (LIPITOR) 20 mg tablet   No No   Sig: Take 1 Tab by mouth nightly. carvedilol (COREG) 25 mg tablet   Yes No   Sig: Take 12.5 mg by mouth two (2) times daily (with meals). fluticasone (FLONASE) 50 mcg/actuation nasal spray   No No   Si Sprays by Both Nostrils route daily. furosemide (LASIX) 40 mg tablet   No No   Sig: Take 2 Tabs by mouth daily. guaiFENesin ER (MUCINEX) 1,200 mg Ta12 ER tablet   No No   Sig: Take 1 Tab by mouth every twelve (12) hours. lisinopril (PRINIVIL, ZESTRIL) 5 mg tablet   No No   Sig: Take 1 Tab by mouth daily.    melatonin 3 mg tablet   Yes No   Sig: Take 3 mg by mouth nightly. omeprazole (PRILOSEC) 20 mg capsule   No No   Sig: Take 1 Cap by mouth daily. polyethylene glycol (MIRALAX) 17 gram packet   No No   Sig: Take 1 Packet by mouth daily. Patient taking differently: Take 17 g by mouth daily as needed. spironolactone (ALDACTONE) 25 mg tablet   No No   Sig: Take 1 Tab by mouth daily. Facility-Administered Medications: None       Review of Systems:  Gen: No weight loss  Eyes: no vision changes  ENT: no sore throat  Resp: +dyspnea, cough  CV: No cp  Abd:  no abd pain, no vomiting or diarrhea  : No incontinence, no hematuria or dysuria, no flank pain  MSK: +leg swelling  Neuro: No HA or dizziness, no weakness, numbness/tingling    Objective:   Patient Vitals for the past 24 hrs:   Temp Pulse Resp BP SpO2   06/06/18 1411 - 96 - - (!) 89 %   06/06/18 1244 - 86 - (!) 125/91 94 %   06/06/18 1242 - 82 - (!) 125/91 -   06/06/18 1100 97.5 °F (36.4 °C) 91 16 (!) 130/91 96 %     Oxygen Therapy  O2 Sat (%): (!) 89 % (06/06/18 1411)  Pulse via Oximetry: 96 beats per minute (06/06/18 1411)  O2 Device: Room air (06/06/18 1100)    Intake/Output Summary (Last 24 hours) at 06/06/18 1420  Last data filed at 06/06/18 1411   Gross per 24 hour   Intake                0 ml   Output              670 ml   Net             -670 ml       Physical Exam:  General:    Well nourished. Alert. Eyes:   Normal sclera. Extraocular movements intact. ENT:  Normocephalic, atraumatic. Moist mucous membranes  CV:   RRR. No murmur, rub, or gallop. Lungs:  Bilateral crackles  Abdomen: Soft, nontender, nondistended. Bowel sounds normal.   Extremities: Warm and dry.  +2 pitting edema bilaterally  Neurologic: CN II-XII grossly intact. Sensation intact. Skin:     No rashes or jaundice. Psych:  Normal mood and affect. I reviewed the labs, imaging, EKGs, telemetry, and other studies done this admission.   Data Review:   Recent Results (from the past 24 hour(s))   EKG, 12 LEAD, INITIAL Collection Time: 06/06/18 11:00 AM   Result Value Ref Range    Ventricular Rate 86 BPM    Atrial Rate 86 BPM    P-R Interval 198 ms    QRS Duration 112 ms    Q-T Interval 438 ms    QTC Calculation (Bezet) 524 ms    Calculated P Axis 60 degrees    Calculated R Axis -95 degrees    Calculated T Axis 69 degrees    Diagnosis       Normal sinus rhythm  Right superior axis deviation  Anterior infarct (cited on or before 17-MAY-2018)  Prolonged QT  Abnormal ECG  When compared with ECG of 23-MAY-2018 21:41,  No significant change was found     CBC WITH AUTOMATED DIFF    Collection Time: 06/06/18 11:08 AM   Result Value Ref Range    WBC 8.0 4.3 - 11.1 K/uL    RBC 4.93 4.23 - 5.67 M/uL    HGB 13.4 (L) 13.6 - 17.2 g/dL    HCT 41.9 41.1 - 50.3 %    MCV 85.0 79.6 - 97.8 FL    MCH 27.2 26.1 - 32.9 PG    MCHC 32.0 31.4 - 35.0 g/dL    RDW 18.4 (H) 11.9 - 14.6 %    PLATELET 773 874 - 333 K/uL    MPV 10.2 (L) 10.8 - 14.1 FL    DF AUTOMATED      NEUTROPHILS 61 43 - 78 %    LYMPHOCYTES 29 13 - 44 %    MONOCYTES 9 4.0 - 12.0 %    EOSINOPHILS 1 0.5 - 7.8 %    BASOPHILS 0 0.0 - 2.0 %    IMMATURE GRANULOCYTES 0 0.0 - 5.0 %    ABS. NEUTROPHILS 4.8 1.7 - 8.2 K/UL    ABS. LYMPHOCYTES 2.4 0.5 - 4.6 K/UL    ABS. MONOCYTES 0.7 0.1 - 1.3 K/UL    ABS. EOSINOPHILS 0.1 0.0 - 0.8 K/UL    ABS. BASOPHILS 0.0 0.0 - 0.2 K/UL    ABS. IMM. GRANS. 0.0 0.0 - 0.5 K/UL   METABOLIC PANEL, COMPREHENSIVE    Collection Time: 06/06/18 11:08 AM   Result Value Ref Range    Sodium 143 136 - 145 mmol/L    Potassium 4.2 3.5 - 5.1 mmol/L    Chloride 109 (H) 98 - 107 mmol/L    CO2 24 21 - 32 mmol/L    Anion gap 10 7 - 16 mmol/L    Glucose 94 65 - 100 mg/dL    BUN 20 8 - 23 MG/DL    Creatinine 1.59 (H) 0.8 - 1.5 MG/DL    GFR est AA 55 (L) >60 ml/min/1.73m2    GFR est non-AA 45 (L) >60 ml/min/1.73m2    Calcium 8.4 8.3 - 10.4 MG/DL    Bilirubin, total 1.7 (H) 0.2 - 1.1 MG/DL    ALT (SGPT) 28 12 - 65 U/L    AST (SGOT) 27 15 - 37 U/L    Alk.  phosphatase 115 50 - 136 U/L Protein, total 7.4 6.3 - 8.2 g/dL    Albumin 2.9 (L) 3.2 - 4.6 g/dL    Globulin 4.5 (H) 2.3 - 3.5 g/dL    A-G Ratio 0.6 (L) 1.2 - 3.5     BNP    Collection Time: 06/06/18 11:08 AM   Result Value Ref Range    BNP 1248 pg/mL   MAGNESIUM    Collection Time: 06/06/18 11:08 AM   Result Value Ref Range    Magnesium 2.2 1.8 - 2.4 mg/dL   POC TROPONIN-I    Collection Time: 06/06/18 11:11 AM   Result Value Ref Range    Troponin-I (POC) 0.06 (H) 0.02 - 0.05 ng/ml       All Micro Results     None          Other Studies:  Xr Chest Pa Lat    Result Date: 6/6/2018  PA AND LATERAL CHEST X-RAY  6/6/2018 HISTORY:  Short of breath COMPARISON:  Chest x-ray May 23, 2018 FINDINGS:  PA and lateral views demonstrate gross but stable cardiomegaly. No pulmonary edema or definite focal infiltrate. No definite pleural effusion. Details limited by patient's size. IMPRESSION:  Stable cardiomegaly. No acute changes. Assessment and Plan:     Hospital Problems as of 6/6/2018  Date Reviewed: 6/6/2018          Codes Class Noted - Resolved POA    Acute on chronic systolic heart failure (HCC) ICD-10-CM: I50.23  ICD-9-CM: 428.23  5/24/2018 - Present Yes    Overview Signed 12/4/2015  9:28 AM by Darek Lopes     EF 40%             Acute respiratory failure with hypoxemia [facility]) ICD-10-CM: J96.01  ICD-9-CM: 518.81  5/24/2018 - Present Yes        TAZ (obstructive sleep apnea) ICD-10-CM: G47.33  ICD-9-CM: 327.23  10/2/2017 - Present Yes        Hypertension (Chronic) ICD-10-CM: I10  ICD-9-CM: 401.9  9/29/2017 - Present Yes        COPD, moderate ([address] (Chronic) ICD-10-CM: J44.9  ICD-9-CM: 931  9/29/2017 - Present Yes    Overview Signed 12/27/2015 12:38 PM by Noa Crome, NP     Complete PFTs: 7/20/15:  Spirometry is consistent with a moderate obstructive/restrictive defect. . The residual volume is increased relative to other lung volumes suggesting air-trapping.  The diffusion capacity corrected for alveolar volume was normal suggesting no loss of alveolo-capillary units.               CHF (congestive heart failure) (HCC) ICD-10-CM: I50.9  ICD-9-CM: 428.0  9/27/2017 - Present Unknown        Mixed hyperlipidemia (Chronic) ICD-10-CM: E78.2  ICD-9-CM: 272.2  9/28/2016 - Present Yes              PLAN:    Acute CHF exacerbation   CXR: Cardiomegaly, elevated BNP  ECHO 05/2018: EF 45-50%, LV strain imaging shows pattern consistent with amyloid  S/p fat pad biopsy  Plan  Lasix 40 IV BID  Daily Weights  Strict Is and Os  If no improvement in dyspnea, Cardiology consult in the AM for CHF, evaluation for further amyloidosis testing   Acute on chronic respiratory failure, COPD exacerbation: continue DuoNebs q4H, prednsione 40mg daily (day 1/5), previous smoker  HTN: Well controlled at this time, Continue Cored, Lasix, Lisinopril  CKD: Cr at baseline    DVT ppx:  heparin  Anticipated DC needs:  Discharge in 24-48 hours pending improvement in respiration, at baseline lives at home with his daughter  Code status:  Full  Risk:  high    Signed:  Eva Lozano MD

## 2018-06-06 NOTE — PROGRESS NOTES
made initial visit. Pt was sitting up and eating dinner. Pt got chocked on food and coughed a lot clearing it. Pt spoke of his condition and requested prayer.  prayed. Pt appeared comfortable with no pain level expressed or observed.  welcomed pt to [facility] and shared information about  services.  provided spiritual care through presence, pastoral conversation, prayer, and assurance of prayer.

## 2018-06-07 ENCOUNTER — PATIENT OUTREACH (OUTPATIENT)
Dept: CASE MANAGEMENT | Age: 73
End: 2018-06-07

## 2018-06-07 LAB
ALBUMIN SERPL-MCNC: 2.6 G/DL (ref 3.2–4.6)
ALBUMIN/GLOB SERPL: 0.6 {RATIO} (ref 1.2–3.5)
ALP SERPL-CCNC: 109 U/L (ref 50–136)
ALT SERPL-CCNC: 26 U/L (ref 12–65)
ANION GAP SERPL CALC-SCNC: 10 MMOL/L (ref 7–16)
AST SERPL-CCNC: 21 U/L (ref 15–37)
BASOPHILS # BLD: 0 K/UL (ref 0–0.2)
BASOPHILS NFR BLD: 0 % (ref 0–2)
BILIRUB SERPL-MCNC: 0.8 MG/DL (ref 0.2–1.1)
BUN SERPL-MCNC: 23 MG/DL (ref 8–23)
CALCIUM SERPL-MCNC: 8.4 MG/DL (ref 8.3–10.4)
CHLORIDE SERPL-SCNC: 110 MMOL/L (ref 98–107)
CO2 SERPL-SCNC: 24 MMOL/L (ref 21–32)
CREAT SERPL-MCNC: 1.7 MG/DL (ref 0.8–1.5)
DIFFERENTIAL METHOD BLD: ABNORMAL
EOSINOPHIL # BLD: 0 K/UL (ref 0–0.8)
EOSINOPHIL NFR BLD: 0 % (ref 0.5–7.8)
ERYTHROCYTE [DISTWIDTH] IN BLOOD BY AUTOMATED COUNT: 18.7 % (ref 11.9–14.6)
GLOBULIN SER CALC-MCNC: 4.3 G/DL (ref 2.3–3.5)
GLUCOSE SERPL-MCNC: 118 MG/DL (ref 65–100)
HCT VFR BLD AUTO: 39.2 % (ref 41.1–50.3)
HGB BLD-MCNC: 12.5 G/DL (ref 13.6–17.2)
IMM GRANULOCYTES # BLD: 0 K/UL (ref 0–0.5)
IMM GRANULOCYTES NFR BLD AUTO: 0 % (ref 0–5)
LYMPHOCYTES # BLD: 1.1 K/UL (ref 0.5–4.6)
LYMPHOCYTES NFR BLD: 17 % (ref 13–44)
MCH RBC QN AUTO: 27.5 PG (ref 26.1–32.9)
MCHC RBC AUTO-ENTMCNC: 31.9 G/DL (ref 31.4–35)
MCV RBC AUTO: 86.2 FL (ref 79.6–97.8)
MONOCYTES # BLD: 0.3 K/UL (ref 0.1–1.3)
MONOCYTES NFR BLD: 4 % (ref 4–12)
NEUTS SEG # BLD: 5.3 K/UL (ref 1.7–8.2)
NEUTS SEG NFR BLD: 79 % (ref 43–78)
PLATELET # BLD AUTO: 184 K/UL (ref 150–450)
PMV BLD AUTO: 10.2 FL (ref 10.8–14.1)
POTASSIUM SERPL-SCNC: 4.4 MMOL/L (ref 3.5–5.1)
PROT SERPL-MCNC: 6.9 G/DL (ref 6.3–8.2)
RBC # BLD AUTO: 4.55 M/UL (ref 4.23–5.67)
SODIUM SERPL-SCNC: 144 MMOL/L (ref 136–145)
WBC # BLD AUTO: 6.7 K/UL (ref 4.3–11.1)

## 2018-06-07 PROCEDURE — 74011250637 HC RX REV CODE- 250/637: Performed by: INTERNAL MEDICINE

## 2018-06-07 PROCEDURE — 74011250636 HC RX REV CODE- 250/636: Performed by: INTERNAL MEDICINE

## 2018-06-07 PROCEDURE — 97165 OT EVAL LOW COMPLEX 30 MIN: CPT

## 2018-06-07 PROCEDURE — 97161 PT EVAL LOW COMPLEX 20 MIN: CPT

## 2018-06-07 PROCEDURE — 96372 THER/PROPH/DIAG INJ SC/IM: CPT

## 2018-06-07 PROCEDURE — G8980 MOBILITY D/C STATUS: HCPCS

## 2018-06-07 PROCEDURE — 36415 COLL VENOUS BLD VENIPUNCTURE: CPT | Performed by: INTERNAL MEDICINE

## 2018-06-07 PROCEDURE — 74011636637 HC RX REV CODE- 636/637: Performed by: INTERNAL MEDICINE

## 2018-06-07 PROCEDURE — 96376 TX/PRO/DX INJ SAME DRUG ADON: CPT

## 2018-06-07 PROCEDURE — G8989 SELF CARE D/C STATUS: HCPCS

## 2018-06-07 PROCEDURE — G8987 SELF CARE CURRENT STATUS: HCPCS

## 2018-06-07 PROCEDURE — 97110 THERAPEUTIC EXERCISES: CPT

## 2018-06-07 PROCEDURE — G8988 SELF CARE GOAL STATUS: HCPCS

## 2018-06-07 PROCEDURE — 85025 COMPLETE CBC W/AUTO DIFF WBC: CPT | Performed by: INTERNAL MEDICINE

## 2018-06-07 PROCEDURE — 94760 N-INVAS EAR/PLS OXIMETRY 1: CPT

## 2018-06-07 PROCEDURE — 94640 AIRWAY INHALATION TREATMENT: CPT

## 2018-06-07 PROCEDURE — 80053 COMPREHEN METABOLIC PANEL: CPT | Performed by: INTERNAL MEDICINE

## 2018-06-07 PROCEDURE — 99218 HC RM OBSERVATION: CPT

## 2018-06-07 PROCEDURE — 74011000250 HC RX REV CODE- 250: Performed by: INTERNAL MEDICINE

## 2018-06-07 PROCEDURE — G8979 MOBILITY GOAL STATUS: HCPCS

## 2018-06-07 PROCEDURE — G8978 MOBILITY CURRENT STATUS: HCPCS

## 2018-06-07 RX ADMIN — CARVEDILOL 12.5 MG: 12.5 TABLET, FILM COATED ORAL at 10:31

## 2018-06-07 RX ADMIN — ASPIRIN 81 MG: 81 TABLET, COATED ORAL at 10:31

## 2018-06-07 RX ADMIN — IPRATROPIUM BROMIDE AND ALBUTEROL SULFATE 3 ML: .5; 3 SOLUTION RESPIRATORY (INHALATION) at 23:25

## 2018-06-07 RX ADMIN — Medication 10 ML: at 22:22

## 2018-06-07 RX ADMIN — IPRATROPIUM BROMIDE AND ALBUTEROL SULFATE 3 ML: .5; 3 SOLUTION RESPIRATORY (INHALATION) at 07:44

## 2018-06-07 RX ADMIN — HEPARIN SODIUM 5000 UNITS: 5000 INJECTION, SOLUTION INTRAVENOUS; SUBCUTANEOUS at 10:32

## 2018-06-07 RX ADMIN — ALLOPURINOL 100 MG: 100 TABLET ORAL at 10:31

## 2018-06-07 RX ADMIN — IPRATROPIUM BROMIDE AND ALBUTEROL SULFATE 3 ML: .5; 3 SOLUTION RESPIRATORY (INHALATION) at 19:55

## 2018-06-07 RX ADMIN — Medication 10 ML: at 06:00

## 2018-06-07 RX ADMIN — SPIRONOLACTONE 25 MG: 25 TABLET, FILM COATED ORAL at 10:31

## 2018-06-07 RX ADMIN — HEPARIN SODIUM 5000 UNITS: 5000 INJECTION, SOLUTION INTRAVENOUS; SUBCUTANEOUS at 22:21

## 2018-06-07 RX ADMIN — ATORVASTATIN CALCIUM 20 MG: 40 TABLET, FILM COATED ORAL at 22:21

## 2018-06-07 RX ADMIN — LISINOPRIL 5 MG: 5 TABLET ORAL at 10:31

## 2018-06-07 RX ADMIN — FUROSEMIDE 40 MG: 10 INJECTION, SOLUTION INTRAMUSCULAR; INTRAVENOUS at 17:41

## 2018-06-07 RX ADMIN — CARVEDILOL 12.5 MG: 12.5 TABLET, FILM COATED ORAL at 17:42

## 2018-06-07 RX ADMIN — FUROSEMIDE 40 MG: 10 INJECTION, SOLUTION INTRAMUSCULAR; INTRAVENOUS at 10:32

## 2018-06-07 RX ADMIN — Medication 5 ML: at 17:42

## 2018-06-07 RX ADMIN — IPRATROPIUM BROMIDE AND ALBUTEROL SULFATE 3 ML: .5; 3 SOLUTION RESPIRATORY (INHALATION) at 11:50

## 2018-06-07 RX ADMIN — IPRATROPIUM BROMIDE AND ALBUTEROL SULFATE 3 ML: .5; 3 SOLUTION RESPIRATORY (INHALATION) at 03:05

## 2018-06-07 RX ADMIN — IPRATROPIUM BROMIDE AND ALBUTEROL SULFATE 3 ML: .5; 3 SOLUTION RESPIRATORY (INHALATION) at 16:14

## 2018-06-07 RX ADMIN — PREDNISONE 40 MG: 20 TABLET ORAL at 10:31

## 2018-06-07 NOTE — PROGRESS NOTES
Patient sitting up in chair in recline position with BLE elevated with pillows. Respiration even and unlabored. Denies any pain or respiratory distress. Saftey precaution in place.  Will continue to monitor

## 2018-06-07 NOTE — PROGRESS NOTES
Inpatient Case management referral  RRAT 29 - patient is a bounce back from hospital discharge on 5/30/2018; sent home with [facility]. Per Cardiac Transitions RN Call Notes - patient did not fill his Aldactone medication, was/is non-compliant with daily weights and cardiac/heart healthy diet - yet does not feel that hospitalization does much to help him feel better. Care Coordination call with inpatient SW - Nay Del Rio  Discussed possibility of STR to jump start patient to home environment,m particularly since patient has \"failed\" the home discharge plan. Patient is OBS, but is [employer] and the transition to [address] is a possibility  Marquis Rockwell will speak with patient re: STR.     Plan - follow with HEBERT FUNEZ  Coordinate with [facility] as needed

## 2018-06-07 NOTE — PROGRESS NOTES
Patient stable and resting in bed. Uneventful night. Respiratory  even and unlabored. Remains on 3-4L O2 NC with O2 sat above 92%. Denies any chest pain after given prn pain medication. Safety precautions remain in place.  Will provide oncoming report to nurse

## 2018-06-07 NOTE — PROGRESS NOTES
Hospitalist Progress Note     Admit Date:  2018 11:15 AM   Name:  Nohelia Hughes. Age:  74 y.o.  :  1945   MRN:  [id number]   PCP:  Tahira Wong MD  Treatment Team: Attending Provider: Gustabo Ortega MD; Utilization Review: Vijay Tejada RN; Care Manager: Tamiko Stephens    Subjective: The patient is a 73 y/o gentleman with HTN, COPD on home O2, Chronic Systolic CHF with last EF estimated at 45-50%, CKD stage 3, was admitted for Acute on chronic systolic CHF. He was placed on IV lasix and has been diuresing pretty well. Feels now better and denies any significant SOB. He might be ready for discharge home tomorrow. Objective:   Patient Vitals for the past 24 hrs:   Temp Pulse Resp BP SpO2   18 97.2 °F (36.2 °C) 82 18 (!) 144/96 99 %   18 161 - - - - 98 %   18 1151 - - - - 98 %   18 1120 97.5 °F (36.4 °C) 81 18 - 97 %   18 0744 - - - - 100 %   18 0733 97.5 °F (36.4 °C) 67 20 130/78 99 %   18 0319 97.7 °F (36.5 °C) 79 20 121/80 100 %   18 0305 - - - - 100 %   18 2312 97.9 °F (36.6 °C) 80 20 115/80 98 %   18 2300 - - - - 98 %   18 1935 - - - - 100 %   18 1933 97.5 °F (36.4 °C) 74 20 (!) 128/91 100 %     Oxygen Therapy  O2 Sat (%): 99 % (18)  Pulse via Oximetry: 82 beats per minute (18)  O2 Device: Nasal cannula (18)  O2 Flow Rate (L/min): 2 l/min (18)    Intake/Output Summary (Last 24 hours) at 18 1844  Last data filed at 18 1755   Gross per 24 hour   Intake             1080 ml   Output             1500 ml   Net             -420 ml         General:    Well nourished. Alert. CV:   RRR. No murmur, rub, or gallop. Lungs:   Clear to auscultation bilaterally, with slightly diminished breath sounds at the bases. No wheezing, rhonchi, or rales. Abdomen:   Soft, nontender, nondistended. Extremities: Warm and dry.   No cyanosis, 2+ pitting edema bilaterally in the LE. Skin:     No rashes or jaundice. Data Review:  I have reviewed all labs, meds, telemetry events, and studies from the last 24 hours. Recent Results (from the past 24 hour(s))   CBC WITH AUTOMATED DIFF    Collection Time: 06/07/18  6:05 AM   Result Value Ref Range    WBC 6.7 4.3 - 11.1 K/uL    RBC 4.55 4.23 - 5.67 M/uL    HGB 12.5 (L) 13.6 - 17.2 g/dL    HCT 39.2 (L) 41.1 - 50.3 %    MCV 86.2 79.6 - 97.8 FL    MCH 27.5 26.1 - 32.9 PG    MCHC 31.9 31.4 - 35.0 g/dL    RDW 18.7 (H) 11.9 - 14.6 %    PLATELET 099 072 - 558 K/uL    MPV 10.2 (L) 10.8 - 14.1 FL    DF AUTOMATED      NEUTROPHILS 79 (H) 43 - 78 %    LYMPHOCYTES 17 13 - 44 %    MONOCYTES 4 4.0 - 12.0 %    EOSINOPHILS 0 (L) 0.5 - 7.8 %    BASOPHILS 0 0.0 - 2.0 %    IMMATURE GRANULOCYTES 0 0.0 - 5.0 %    ABS. NEUTROPHILS 5.3 1.7 - 8.2 K/UL    ABS. LYMPHOCYTES 1.1 0.5 - 4.6 K/UL    ABS. MONOCYTES 0.3 0.1 - 1.3 K/UL    ABS. EOSINOPHILS 0.0 0.0 - 0.8 K/UL    ABS. BASOPHILS 0.0 0.0 - 0.2 K/UL    ABS. IMM. GRANS. 0.0 0.0 - 0.5 K/UL   METABOLIC PANEL, COMPREHENSIVE    Collection Time: 06/07/18  6:05 AM   Result Value Ref Range    Sodium 144 136 - 145 mmol/L    Potassium 4.4 3.5 - 5.1 mmol/L    Chloride 110 (H) 98 - 107 mmol/L    CO2 24 21 - 32 mmol/L    Anion gap 10 7 - 16 mmol/L    Glucose 118 (H) 65 - 100 mg/dL    BUN 23 8 - 23 MG/DL    Creatinine 1.70 (H) 0.8 - 1.5 MG/DL    GFR est AA 51 (L) >60 ml/min/1.73m2    GFR est non-AA 42 (L) >60 ml/min/1.73m2    Calcium 8.4 8.3 - 10.4 MG/DL    Bilirubin, total 0.8 0.2 - 1.1 MG/DL    ALT (SGPT) 26 12 - 65 U/L    AST (SGOT) 21 15 - 37 U/L    Alk.  phosphatase 109 50 - 136 U/L    Protein, total 6.9 6.3 - 8.2 g/dL    Albumin 2.6 (L) 3.2 - 4.6 g/dL    Globulin 4.3 (H) 2.3 - 3.5 g/dL    A-G Ratio 0.6 (L) 1.2 - 3.5          All Micro Results     None          Current Meds:  Current Facility-Administered Medications   Medication Dose Route Frequency    albuterol-ipratropium (DUO-NEB) 2.5 MG-0.5 MG/3 ML  3 mL Nebulization Q4H RT    allopurinol (ZYLOPRIM) tablet 100 mg  100 mg Oral DAILY    aspirin delayed-release tablet 81 mg  81 mg Oral DAILY    atorvastatin (LIPITOR) tablet 20 mg  20 mg Oral QHS    carvedilol (COREG) tablet 12.5 mg  12.5 mg Oral BID WITH MEALS    lisinopril (PRINIVIL, ZESTRIL) tablet 5 mg  5 mg Oral DAILY    spironolactone (ALDACTONE) tablet 25 mg  25 mg Oral DAILY    sodium chloride (NS) flush 5-10 mL  5-10 mL IntraVENous Q8H    sodium chloride (NS) flush 5-10 mL  5-10 mL IntraVENous PRN    heparin (porcine) injection 5,000 Units  5,000 Units SubCUTAneous Q12H    predniSONE (DELTASONE) tablet 40 mg  40 mg Oral DAILY WITH BREAKFAST    furosemide (LASIX) injection 40 mg  40 mg IntraVENous BID    acetaminophen (TYLENOL) tablet 650 mg  650 mg Oral Q6H PRN       Other Studies (last 24 hours):  No results found. Assessment and Plan:     Hospital Problems as of 6/7/2018  Date Reviewed: 6/6/2018          Codes Class Noted - Resolved POA    Acute on chronic systolic heart failure (HCC) ICD-10-CM: I50.23  ICD-9-CM: 428.23  5/24/2018 - Present Yes    Overview Signed 12/4/2015  9:28 AM by Margo Lester     EF 40%             Acute respiratory failure with hypoxemia [facility]) ICD-10-CM: J96.01  ICD-9-CM: 518.81  5/24/2018 - Present Yes        TAZ (obstructive sleep apnea) ICD-10-CM: G47.33  ICD-9-CM: 327.23  10/2/2017 - Present Yes        Hypertension (Chronic) ICD-10-CM: I10  ICD-9-CM: 401.9  9/29/2017 - Present Yes        COPD, moderate ([facility] [address] (Chronic) ICD-10-CM: J44.9  ICD-9-CM: 496  9/29/2017 - Present Yes    Overview Signed 12/27/2015 12:38 PM by Dasha Abbott NP     Complete PFTs: 7/20/15:  Spirometry is consistent with a moderate obstructive/restrictive defect. . The residual volume is increased relative to other lung volumes suggesting air-trapping. The diffusion capacity corrected for alveolar volume was normal suggesting no loss of alveolo-capillary units.               CHF (congestive heart failure) (HCC) ICD-10-CM: I50.9  ICD-9-CM: 428.0  9/27/2017 - Present Unknown        Mixed hyperlipidemia (Chronic) ICD-10-CM: J33.5  ICD-9-CM: 272.2  9/28/2016 - Present Yes            1 - Acute on Chronic Systolic CHF  2 - COPD on home O2, stable  3 - Chronic Hypoxemic Respiratory Failure  4 - HTN  5 - CKD stage 3    PLAN:    · Continue IV Lasix 40 mg BID  · The patient feels improved, no crackles heard in the lungs  · Continue breathing treatments with Duoneb  · Continue home medications for chronic conditions    DC planning/Dispo: Home possibly tomorrow   DVT ppx: with Heparin SQ     Signed:   Chencho Harrison MD

## 2018-06-07 NOTE — PROGRESS NOTES
Medicare Outpatient Observation Notice provided to the patient. Oral explanation was provided and all questions answered. Signed document placed in the medical record under media tab.  Copy to patient

## 2018-06-07 NOTE — PROGRESS NOTES
Morning bedside rounding report received from Cristal Ambrose.[email or web address]. Patient is awake, alert and oriented to person and place, spouse is at the bedside to re-orient Patient. Demonstrating no signs of dyspnea at rest on 2L oxygen via nasal cannula. Voices no concerns at this time. Will continue to monitor.

## 2018-06-07 NOTE — PROGRESS NOTES
Problem: Interdisciplinary Rounds  Goal: Interdisciplinary Rounds  Interdisciplinary team rounds were held 6/7/2018 with the following team members:Care Management, Physical Therapy, Physician and Clinical Coordinator and the patient. Plan of care discussed. See clinical pathway and/or care plan for interventions and desired outcomes.

## 2018-06-07 NOTE — PROGRESS NOTES
Problem: Self Care Deficits Care Plan (Adult)  Goal: *Acute Goals and Plan of Care (Insert Text)  1. Patient will complete lower body bathing and dressing with mod I and adaptive equipment as needed. 2. Patient will complete toileting with mod I.   3. Patient will tolerate 23 minutes of OT treatment with less than 4 rest breaks to increase activity tolerance for ADLs. 4. Patient will complete functional transfers with mod I and adaptive equipment as needed. Timeframe: 7 visits     Comments:     OCCUPATIONAL THERAPY: Initial Assessment, Daily Note, Treatment Day: Day of Assessment and 1st and PM 6/7/2018  OBSERVATION: Hospital Day: 2  Payor: Gabriel Eisenmenger / Plan: [address] [place] [address] HMO / Product Type: Managed Care Medicare /      NAME/AGE/GENDER: Chacha Patel is a 74 y.o. male   PRIMARY DIAGNOSIS:  CHF (congestive heart failure) ([facility] [address] <principal problem not specified> <principal problem not specified>        ICD-10: Treatment Diagnosis:    · Generalized Muscle Weakness (M62.81)  · Localized edema (R60.1)   Precautions/Allergies:     Pcn [penicillins]      ASSESSMENT:     Mr. Samantha Cano presents to hospital for above. Pt lives with spouse in various family members' homes as they are in between homes at present. He typically indepedent with ADLs. Pt uses a rollator PRN for functional mobility; pt endorses 0 falls in the last 6 months. Today, pt is found seated at EOB upon arrival, AOx4, and agreeable to OT evaluation. Per BUE screen, AROM and coordination are WNL while strength is generally decreased but functional. Pt completes STS with CGA and RW and completes functional transfer to toilet with CGA and RW, demonstrating fair balance in standing. Pt completes toileting/bowel care without physical assistance. Pt requires CGA when standing at sink for hand hygiene. Treatment today included BUE exercises aimed at improving strength and activity tolerance for ADLs. He gives good effort with this.  Pt left with possessions in reach and all needs met. Mr. Malcolm Benavides presents with functional limitations listed below and appears to be functioning below baseline. They will benefit from continued skilled OT services to maximize safety and independence with ADLs. Will follow during acute stay. Mr. Malcolm Benavides was discharged from our facility before further treatment could be provided in this setting. This section established at most recent assessment   PROBLEM LIST (Impairments causing functional limitations):  1. Decreased Strength  2. Decreased ADL/Functional Activities  3. Decreased Transfer Abilities  4. Decreased Ambulation Ability/Technique  5. Decreased Balance  6. Increased Pain  7. Decreased Activity Tolerance  8. Decreased Work Simplification/Energy Conservation Techniques  9. Increased Fatigue  10. Edema/Girth   INTERVENTIONS PLANNED: (Benefits and precautions of occupational therapy have been discussed with the patient.)  1. Activities of daily living training  2. Adaptive equipment training  3. Balance training  4. Clothing management  5. Donning&doffing training  6. Group therapy  7. Therapeutic activity  8. Therapeutic exercise     TREATMENT PLAN: Frequency/Duration: Follow patient 3x/week to address above goals. Rehabilitation Potential For Stated Goals: Good     RECOMMENDED REHABILITATION/EQUIPMENT: (at time of discharge pending progress): Due to the probability of continued deficits (see above) this patient will likely need continued skilled occupational therapy after discharge. Equipment:    None at this time              OCCUPATIONAL PROFILE AND HISTORY:   History of Present Injury/Illness (Reason for Referral):  See H&P  Past Medical History/Comorbidities:   Mr. Malcolm Benavides  has a past medical history of Acute CHF (congestive heart failure) ([facility] [address] (4/25/2015); Acute combined systolic and diastolic congestive heart failure ([facility] [address] (4/28/2015); Chronic obstructive pulmonary disease ([facility] [address]  Nette Grubbs tenosynovitis, right (4/28/2015); Dyspnea on exertion (6/28/2015); Elevated serum creatinine (4/25/2015); Elevated troponin (6/28/2015); History of coronary artery disease; History of right knee surgery; History of shingles; Malignant hypertension (4/25/2015); and MI (myocardial infarction) ([facility] [address] Mr. Jeferson Cortez  has a past surgical history that includes hx knee arthroscopy (Right) and hx heart catheterization (6/29/2015). Social History/Living Environment:   Home Environment: Private residence  # Steps to Enter: 1  One/Two Story Residence: One story  Living Alone: No  Support Systems: Family member(s), Spouse/Significant Other/Partner  Patient Expects to be Discharged to[de-identified] Unknown  Current DME Used/Available at Home: Walker, rollator  Prior Level of Function/Work/Activity:  [employer] with ADLs  Personal Factors:          Sex:  male        Age:  74 y.o. Number of Personal Factors/Comorbidities that affect the Plan of Care: Expanded review of therapy/medical records (1-2):  MODERATE COMPLEXITY   ASSESSMENT OF OCCUPATIONAL PERFORMANCE[de-identified]   Activities of Daily Living:   Basic ADLs (From Assessment) Complex ADLs (From Assessment)   Feeding: Setup  Oral Facial Hygiene/Grooming: Setup  Bathing: Minimum assistance  Upper Body Dressing: Setup  Lower Body Dressing: Minimum assistance  Toileting: Contact guard assistance     Grooming/Bathing/Dressing Activities of Daily Living     Cognitive Retraining  Safety/Judgement: Awareness of environment                 Functional Transfers  Toilet Transfer : Contact guard assistance     Bed/Mat Mobility  Supine to Sit: Stand-by assistance; Adaptive equipment; Additional time  Sit to Stand: Contact guard assistance  Bed to Chair: Minimum assistance  Scooting: Stand-by assistance       Most Recent Physical Functioning:   Gross Assessment:  AROM: Within functional limits  Strength: Generally decreased, functional               Posture:  Posture (WDL): Exceptions to WDL  Posture Assessment: Forward head  Balance:  Sitting: Intact  Standing: Impaired  Standing - Static: Fair  Standing - Dynamic : Fair Bed Mobility:  Supine to Sit: Stand-by assistance; Adaptive equipment; Additional time  Scooting: Stand-by assistance  Wheelchair Mobility:     Transfers:  Sit to Stand: Contact guard assistance  Stand to Sit: Contact guard assistance  Bed to Chair: Minimum assistance            Patient Vitals for the past 6 hrs:   BP BP Patient Position SpO2 O2 Flow Rate (L/min) Pulse   18 1120 - Sitting 97 % - 81   18 1151 - - 98 % 2 l/min -   18 1615 - - 98 % 2 l/min -   18 1616 (!) 144/96 - 99 % - 82       Mental Status  Neurologic State: Alert  Orientation Level: Oriented X4  Cognition: Follows commands, Decreased attention/concentration  Perception: Appears intact  Perseveration: No perseveration noted  Safety/Judgement: Awareness of environment                          Physical Skills Involved:  1. Balance  2. Strength  3. Activity Tolerance  4. Edema Cognitive Skills Affected (resulting in the inability to perform in a timely and safe manner):  1. n/a Psychosocial Skills Affected:  1. Habits/Routines  2. Environmental Adaptation  3. Social Roles   Number of elements that affect the Plan of Care: 5+:  HIGH COMPLEXITY   CLINICAL DECISION MAKIN [facility] [address] [address] AM-PAC 6 Clicks   Daily Activity Inpatient Short Form  How much help from another person does the patient currently need. .. Total A Lot A Little None   1. Putting on and taking off regular lower body clothing? [] 1   [] 2   [x] 3   [] 4   2. Bathing (including washing, rinsing, drying)? [] 1   [] 2   [x] 3   [] 4   3. Toileting, which includes using toilet, bedpan or urinal?   [] 1   [] 2   [x] 3   [] 4   4. Putting on and taking off regular upper body clothing? [] 1   [] 2   [] 3   [x] 4   5. Taking care of personal grooming such as brushing teeth? [] 1   [] 2   [] 3   [x] 4   6. Eating meals?    [] 1   [] 2   [] 3   [x] 4   © 2007, Trustees of [address] [place] [address] [address] [address], under license to [employer]. All rights reserved      Score:  Initial: 21 Most Recent: X (Date: -- )    Interpretation of Tool:  Represents activities that are increasingly more difficult (i.e. Bed mobility, Transfers, Gait). Score 24 23 22-20 19-15 14-10 9-7 6     Modifier CH CI CJ CK CL CM CN      ? Self Care:     - CURRENT STATUS: CJ - 20%-39% impaired, limited or restricted    - GOAL STATUS: CI - 1%-19% impaired, limited or restricted    - D/C STATUS:  CJ - 20%-39% impaired, limited or restricted  Payor: Caprice Arce / Plan: [address] [place] [address] HMO / Product Type: [employer] Care Medicare /      Medical Necessity:     · Patient is expected to demonstrate progress in strength, balance and functional technique to increase independence with ADLs. Reason for Services/Other Comments:  · Patient continues to require skilled intervention due to medical complications and patient unable to attend/participate in therapy as expected. Use of outcome tool(s) and clinical judgement create a POC that gives a: LOW COMPLEXITY         TREATMENT:   (In addition to Assessment/Re-Assessment sessions the following treatments were rendered)     Pre-treatment Symptoms/Complaints:    Pain: Initial:   Pain Intensity 1: 0  Post Session:  same     Therapeutic Exercise: (  12 minutes):  Exercises per grid below to improve strength and activity tolerance. Required minimal visual and tactile cues to promote proper body alignment and promote proper body breathing techniques. Progressed resistance and complexity of movement as indicated.      Date:  6/7/18 Date:   Date:     Activity/Exercise Parameters red theraband Parameters Parameters   Horizontal abduction 30 reps     ER 20 reps     Punches 30 reps                                 Braces/Orthotics/Lines/Etc:   · O2 Device: Nasal cannula  Treatment/Session Assessment:    · Response to Treatment:  Tolerated well. Gives good effort. · Interdisciplinary Collaboration:   o Occupational Therapist  o Registered Nurse  o Physician  · After treatment position/precautions:   o Up in chair  o Bed alarm/tab alert on  o Bed/Chair-wheels locked  o Caregiver at bedside  o Call light within reach   · Compliance with Program/Exercises: Will assess as treatment progresses. · Recommendations/Intent for next treatment session: \"Next visit will focus on advancements to more challenging activities and reduction in assistance provided\".   Total Treatment Duration:  OT Patient Time In/Time Out  Time In: 1500  Time Out: [facility]

## 2018-06-07 NOTE — PROGRESS NOTES
Problem: Mobility Impaired (Adult and Pediatric)  Goal: *Acute Goals and Plan of Care (Insert Text)  STG:  (1.)Mr. Nataly Feng will move from supine to sit and sit to supine , scoot up and down and roll side to side with SUPERVISION within 3 treatment day(s). (2.)Mr. Nataly Feng will transfer from bed to chair and chair to bed with STAND BY ASSIST using the least restrictive device within 3 treatment day(s). (3.)Mr. Nataly Feng will ambulate with STAND BY ASSIST for 150 feet with the least restrictive device within 3 treatment day(s). (4.)Mr. Nataly Feng will perform standing static and dynamic balance activities x 15 minutes with STAND BY ASSIST to improve safety within 3 day(s). (5.)Mr. Nataly Feng will perform LE exercises with 1 to 2 cues for form within 3 days to improve strength for functional transfers and ambulation. (6.)Mr. Nataly Feng will maintain stable vital signs throughout all functional mobility within 3 days. LTG:  (1.)Mr. Nataly Feng will move from supine to sit and sit to supine , scoot up and down and roll side to side in bed with MODIFIED INDEPENDENCE within 7 treatment day(s). (2.)Mr. Nataly Feng will transfer from bed to chair and chair to bed with SUPERVISION using the least restrictive device within 7 treatment day(s). (3.)Mr. Nataly Feng will ambulate with SUPERVISION for 250 feet with the least restrictive device within 7 treatment day(s). (4.)Mr. Nataly Feng will perform standing static and dynamic balance activities x 15 minutes with SUPERVISION to improve safety within 7 day(s).   ________________________________________________________________________________________________      PHYSICAL THERAPY: Initial Assessment, Daily Note, AM 6/7/2018  OBSERVATION: Hospital Day: 2  Payor: Rosalba Martinez / Plan: [address] HMO / Product Type: Managed Care Medicare /      NAME/AGE/GENDER: Fae Litten. is a 74 y.o. male   PRIMARY DIAGNOSIS: [facility]congestive heart failure) ([facility] <principal problem not specified> <principal problem not specified>        ICD-10: Treatment Diagnosis:    · Generalized Muscle Weakness (M62.81)  · Difficulty in walking, Not elsewhere classified (R26.2)   Precaution/Allergies:  Pcn [penicillins]      ASSESSMENT:     Mr. Laura Ferrari is a 74 y.o. male admitted with CHF. He appears confused on contact, and no family present so unsure of patient's accuracy of history. He reports he lives with spouse and at times daughters in a single story home, uses home O2 PRN and ambulates occasionally with a rollator walker. He reports no falls. He is supine on contact and agreeable to therapy, currently on 2 L/min O2 with SpO2 99%. He required SBA to transfer to sitting, very SOB upon transfer with SpO2 99% on 2 L/min. Instructed in pursed lip breathing and patient became more comfortable. Able to stand and ambulate around the room with minimal assist and rolling walker. Wide base of support noted with shuffled steps bilaterally. Exertional chest pain noted, which patient has been happening for \"the past 3 weeks. \" RN notified. SpO2 97% on 2 L/min post ambulation. After a short rest, treatment initiated to improve BLE strength for ambulation and transfers. Patient required minimal cues for form and control during exercises. No exertional chest pain with BLE exercises. He remained seated with instructions to call for assist, alarm placed and RN aware. Pt requested water during session, RN ok'ed and notified of volume, as she reports patient is on fluid restrictions. Yesy Bryan. is currently functioning below his baseline and would benefit from skilled PT during acute care stay to maximize safety and independence with functional mobility. Mr. Laura Ferrari was discharged from our facility before further treatment could be provided in this setting. This section established at most recent assessment   PROBLEM LIST (Impairments causing functional limitations):  1. Decreased Strength  2.  Decreased ADL/Functional Activities  3. Decreased Transfer Abilities  4. Decreased Ambulation Ability/Technique  5. Decreased Balance  6. Decreased Activity Tolerance  7. Decreased Pacing Skills  8. Increased Shortness of Breath  9. Decreased Knowledge of Precautions  10. Decreased [place] with Home Exercise Program  11. Decreased Cognition   INTERVENTIONS PLANNED: (Benefits and precautions of physical therapy have been discussed with the patient.)  1. Balance Exercise  2. Bed Mobility  3. Family Education  4. Gait Training  5. Home Exercise Program (HEP)  6. Therapeutic Activites  7. Therapeutic Exercise/Strengthening  8. Transfer Training  9. Patient Education  10. Group Therapy     TREATMENT PLAN: Frequency/Duration: 3 times a week for duration of hospital stay  Rehabilitation Potential For Stated Goals: Excellent     RECOMMENDED REHABILITATION/EQUIPMENT: (at time of discharge pending progress): Due to the probability of continued deficits (see above) this patient will likely need continued skilled physical therapy after discharge. Equipment:    None at this time              HISTORY:   History of Present Injury/Illness (Reason for Referral):  Per MD: Justin Dean is a 73 yo male who presented with dyspnea on a background of  systolic HF, CKD, COPD on 4L O2 at home. He reports worsening dyspnea over the past week since discharge on 5/30. He notes that the dyspnea is worse on exertion. He reports a dry cough. He denies any chest pain. He lives with his daughter and is ambulatory at baseline. He denies any dysuria. He does not smoke at this time. In the ED, he was noted to have bilateral expiratory wheezing and desaturated. He was started on Duonebs after which he reported an improvement in dyspnea. CXR showed cardiomegaly and BNP was 1248. Of note, he was recently admitted in [id number] with  diastolic and systolic CHF exacerbation and diuresed inpatient. ECHO done showed concern for amyloidosis (+also severe restrictive diastolic HF). He is s/p fat pad and BM biopsy inpatient (negative for amyloidosis). Bone marrow biopsy showed no evidence of plasma cell neoplasm or amyloidosis on that admission. Past Medical History/Comorbidities:   Mr. Ana Paula Mahan  has a past medical history of Acute CHF (congestive heart failure) ([facility] (4/25/2015); Acute combined systolic and diastolic congestive heart failure ([facility] (4/28/2015); [facility] disease [facility] De Quervain's tenosynovitis, right (4/28/2015); Dyspnea on exertion (6/28/2015); Elevated serum creatinine (4/25/2015); Elevated troponin (6/28/2015); History of coronary artery disease; History of right knee surgery; History of shingles; Malignant hypertension (4/25/2015); and MI (myocardial infarction) ([facility] Mr. Ana Paula Mahan  has a past surgical history that includes hx knee arthroscopy (Right) and hx heart catheterization (6/29/2015). Social History/Living Environment:   Home Environment: Private residence  # Steps to Enter: 1  One/Two Story Residence: One story  Living Alone: No  Support Systems: Family member(s), Spouse/Significant Other/Partner  Patient Expects to be Discharged to[de-identified] Unknown  Current DME Used/Available at Home: Walker, rollator  Prior Level of Function/Work/Activity:  Patient ambulated with rollator walker prior to admission, reports 0 falls. Number of Personal Factors/Comorbidities that affect the Plan of Care: 3+: HIGH COMPLEXITY   EXAMINATION:   Most Recent Physical Functioning:   Gross Assessment:  AROM: Generally decreased, functional  PROM: Generally decreased, functional  Strength: Generally decreased, functional  Coordination: Generally decreased, functional  Tone: Normal               Posture:  Posture (WDL): Exceptions to WDL  Posture Assessment: Forward head  Balance:  Sitting: Intact  Standing: Impaired  Standing - Static: Fair  Standing - Dynamic : Fair Bed Mobility:  Supine to Sit: Stand-by assistance; Adaptive equipment; Additional time  Scooting: Stand-by assistance  Wheelchair Mobility:     Transfers:  Sit to Stand: Contact guard assistance  Stand to Sit: Contact guard assistance  Bed to Chair: Minimum assistance  Gait:     Base of Support: Center of gravity altered; Widened  Speed/Lu: Slow  Step Length: Right shortened;Left shortened  Gait Abnormalities: Decreased step clearance; Path deviations;Trunk sway increased; Shuffling gait  Distance (ft): 15 Feet (ft)  Assistive Device: Walker, rolling  Ambulation - Level of Assistance: Contact guard assistance  Interventions: Manual cues; Safety awareness training;Verbal cues; Visual/Demos      Body Structures Involved:  1. Nerves  2. Heart  3. Lungs  4. Muscles Body Functions Affected:  1. Mental  2. Cardio  3. Respiratory  4. Neuromusculoskeletal  5. Movement Related Activities and Participation Affected:  1. Mobility  2. Self Care  3. Domestic Life  4. Interpersonal Interactions and Relationships  5. Community, Social and [place]   Number of elements that affect the Plan of Care: 4+: HIGH COMPLEXITY   CLINICAL PRESENTATION:   Presentation: Stable and uncomplicated: LOW COMPLEXITY   CLINICAL DECISION MAKIN [facility] Inpatient Short Form  How much difficulty does the patient currently have. .. Unable A Lot A Little None   1. Turning over in bed (including adjusting bedclothes, sheets and blankets)? [] 1   [] 2   [x] 3   [] 4   2. Sitting down on and standing up from a chair with arms ( e.g., wheelchair, bedside commode, etc.)   [] 1   [] 2   [x] 3   [] 4   3. Moving from lying on back to sitting on the side of the bed? [] 1   [] 2   [x] 3   [] 4   How much help from another person does the patient currently need. .. Total A Lot A Little None   4. Moving to and from a bed to a chair (including a wheelchair)? [] 1   [] 2   [x] 3   [] 4   5. Need to walk in hospital room? [] 1   [] 2   [x] 3   [] 4   6. Climbing 3-5 steps with a railing?    [] 1   [x] 2   [] 3 [] 4   © 2007, Trustees of [address] [place] [address] [address] [id number], under license to Private Practice. All rights reserved      Score:  Initial: 17 Most Recent: X (Date: -- )    Interpretation of Tool:  Represents activities that are increasingly more difficult (i.e. Bed mobility, Transfers, Gait). Score 24 23 22-20 19-15 14-10 9-7 6     Modifier CH CI CJ CK CL CM CN      ? Mobility - Walking and Moving Around:     - CURRENT STATUS: CK - 40%-59% impaired, limited or restricted    - GOAL STATUS: CJ - 20%-39% impaired, limited or restricted    - D/C STATUS:  CK - 40%-59% impaired, limited or restricted  Payor: Leo Mix / Plan: [address] [place] [address] HMO / Product Type: [employer] Medicare /      Medical Necessity:     · Patient demonstrates excellent rehab potential due to higher previous functional level. Reason for Services/Other Comments:  · Patient continues to require modification of therapeutic interventions to increase complexity of exercises. Use of outcome tool(s) and clinical judgement create a POC that gives a: Clear prediction of patient's progress: LOW COMPLEXITY            TREATMENT:   (In addition to Assessment/Re-Assessment sessions the following treatments were rendered)   Pre-treatment Symptoms/Complaints:  Patient c/o SOB. Exertional chest pain during ambulation, RN notified. Pain: Initial:   Pain Intensity 1: 0  Post Session:  0/10     Therapeutic Exercise: (  10 minutes):  Exercises per grid below to improve mobility, strength, balance and activity tolerance. Required minimal visual and verbal cues to promote proper body alignment. Progressed complexity of movement as indicated.      Date:  6/7/18 Date:   Date:     Activity/Exercise Parameters Parameters Parameters   Ankle pumps x15 BLE AROM     LAQ x15 BLE AROM     Seated marching x15 BLE AROM     Seated hip abduction x15 BLE AROM                           Braces/Orthotics/Lines/Etc:   · O2 Device: Nasal cannula  Treatment/Session Assessment: · Response to Treatment:  Patient c/o exertional chest pain and SOB, RN notified, no pain at rest. Otherwise tolerated well. · Interdisciplinary Collaboration:   o Physical Therapist  o Registered Nurse  · After treatment position/precautions:   o Up in chair  o Bed alarm/tab alert on  o Bed/Chair-wheels locked  o Bed in low position  o Call light within reach  o RN notified   · Compliance with Program/Exercises: Will assess as treatment progresses. · Recommendations/Intent for next treatment session: \"Next visit will focus on advancements to more challenging activities and reduction in assistance provided\".   Total Treatment Duration:  PT Patient Time In/Time Out  Time In: 0845  Time Out: 0811    Shahab Gandhi DPT

## 2018-06-07 NOTE — PROGRESS NOTES
Care Management Interventions  PCP Verified by CM: Yes  Transition of Care Consult (CM Consult): [address]: Yes  Physical Therapy Consult: Yes  Occupational Therapy Consult: Yes  Current Support Network: Lives with Spouse  Confirm Follow Up Transport: Self  Plan discussed with Pt/Family/Caregiver: Yes  Freedom of Choice Offered: Yes  Discharge Location  Discharge Placement: Home with home health  Patient is alert and oriented in all spheres. Lives with his wife and daughter. Independent with ADLs. Uses a rollator to ambulate. Uses home 02 prn (doesn't know provider). Drives. PT/OT consulted. CM following.

## 2018-06-08 ENCOUNTER — HOME HEALTH ADMISSION (OUTPATIENT)
Dept: HOME HEALTH SERVICES | Facility: HOME HEALTH | Age: 73
End: 2018-06-08

## 2018-06-08 ENCOUNTER — PATIENT OUTREACH (OUTPATIENT)
Dept: CASE MANAGEMENT | Age: 73
End: 2018-06-08

## 2018-06-08 VITALS
BODY MASS INDEX: 32.25 KG/M2 | OXYGEN SATURATION: 99 % | SYSTOLIC BLOOD PRESSURE: 122 MMHG | HEART RATE: 82 BPM | DIASTOLIC BLOOD PRESSURE: 88 MMHG | RESPIRATION RATE: 20 BRPM | TEMPERATURE: 97.7 F | HEIGHT: 70 IN | WEIGHT: 225.3 LBS

## 2018-06-08 PROCEDURE — 94640 AIRWAY INHALATION TREATMENT: CPT

## 2018-06-08 PROCEDURE — 96372 THER/PROPH/DIAG INJ SC/IM: CPT

## 2018-06-08 PROCEDURE — 77010033678 HC OXYGEN DAILY

## 2018-06-08 PROCEDURE — 74011000250 HC RX REV CODE- 250: Performed by: INTERNAL MEDICINE

## 2018-06-08 PROCEDURE — 96376 TX/PRO/DX INJ SAME DRUG ADON: CPT

## 2018-06-08 PROCEDURE — 74011636637 HC RX REV CODE- 636/637: Performed by: INTERNAL MEDICINE

## 2018-06-08 PROCEDURE — 74011250637 HC RX REV CODE- 250/637: Performed by: INTERNAL MEDICINE

## 2018-06-08 PROCEDURE — 74011250636 HC RX REV CODE- 250/636: Performed by: INTERNAL MEDICINE

## 2018-06-08 PROCEDURE — 99218 HC RM OBSERVATION: CPT

## 2018-06-08 PROCEDURE — 94760 N-INVAS EAR/PLS OXIMETRY 1: CPT

## 2018-06-08 RX ORDER — FUROSEMIDE 40 MG/1
40 TABLET ORAL 2 TIMES DAILY
Qty: 60 TAB | Refills: 0 | Status: SHIPPED | OUTPATIENT
Start: 2018-06-08 | End: 2018-07-20

## 2018-06-08 RX ADMIN — LISINOPRIL 5 MG: 5 TABLET ORAL at 09:22

## 2018-06-08 RX ADMIN — FUROSEMIDE 40 MG: 10 INJECTION, SOLUTION INTRAMUSCULAR; INTRAVENOUS at 00:00

## 2018-06-08 RX ADMIN — IPRATROPIUM BROMIDE AND ALBUTEROL SULFATE 3 ML: .5; 3 SOLUTION RESPIRATORY (INHALATION) at 21:11

## 2018-06-08 RX ADMIN — SPIRONOLACTONE 25 MG: 25 TABLET, FILM COATED ORAL at 09:22

## 2018-06-08 RX ADMIN — CARVEDILOL 12.5 MG: 12.5 TABLET, FILM COATED ORAL at 09:23

## 2018-06-08 RX ADMIN — ATORVASTATIN CALCIUM 20 MG: 40 TABLET, FILM COATED ORAL at 22:08

## 2018-06-08 RX ADMIN — PREDNISONE 40 MG: 20 TABLET ORAL at 09:23

## 2018-06-08 RX ADMIN — ALLOPURINOL 100 MG: 100 TABLET ORAL at 09:23

## 2018-06-08 RX ADMIN — HEPARIN SODIUM 5000 UNITS: 5000 INJECTION, SOLUTION INTRAVENOUS; SUBCUTANEOUS at 22:08

## 2018-06-08 RX ADMIN — IPRATROPIUM BROMIDE AND ALBUTEROL SULFATE 3 ML: .5; 3 SOLUTION RESPIRATORY (INHALATION) at 03:30

## 2018-06-08 RX ADMIN — Medication 10 ML: at 22:10

## 2018-06-08 RX ADMIN — HEPARIN SODIUM 5000 UNITS: 5000 INJECTION, SOLUTION INTRAVENOUS; SUBCUTANEOUS at 09:23

## 2018-06-08 RX ADMIN — IPRATROPIUM BROMIDE AND ALBUTEROL SULFATE 3 ML: .5; 3 SOLUTION RESPIRATORY (INHALATION) at 11:22

## 2018-06-08 RX ADMIN — ASPIRIN 81 MG: 81 TABLET, COATED ORAL at 09:23

## 2018-06-08 RX ADMIN — IPRATROPIUM BROMIDE AND ALBUTEROL SULFATE 3 ML: .5; 3 SOLUTION RESPIRATORY (INHALATION) at 08:11

## 2018-06-08 RX ADMIN — Medication 10 ML: at 06:00

## 2018-06-08 RX ADMIN — CARVEDILOL 12.5 MG: 12.5 TABLET, FILM COATED ORAL at 17:07

## 2018-06-08 RX ADMIN — IPRATROPIUM BROMIDE AND ALBUTEROL SULFATE 3 ML: .5; 3 SOLUTION RESPIRATORY (INHALATION) at 15:49

## 2018-06-08 RX ADMIN — Medication 5 ML: at 17:07

## 2018-06-08 RX ADMIN — FUROSEMIDE 40 MG: 10 INJECTION, SOLUTION INTRAMUSCULAR; INTRAVENOUS at 09:23

## 2018-06-08 NOTE — PROGRESS NOTES
CM met with patient. He reports that he always fills his prescriptions and takes them properly. Patient also stated that he always tells the truth and in fact is the most honest person. Patient became agitated when CM spoke with him about having returned to the hospital after only a short time. He prefers to discharge with [facility]. Initially, [facility] was ordered but liaison called and reported that patient prefers [facility]. CM faxed order for [facility] RN to [facility].

## 2018-06-08 NOTE — PROGRESS NOTES
[address]  Face to Face Encounter    Patients Name: Caty Welch YOB: 1945    Primary Diagnosis: acute on chronic systolic heart failure    Date of Face to Face:  6/8/18                                  Face to Face Encounter findings are related to primary reason for home care:   yes    1. I certify that the patient needs intermittent skilled nursing care, physical therapy and/or speech therapy. I will not be following this patient in the Community and Dr. Maria Elena Briones will be responsible for signing the [address] 2. Initial Orders for Care: Must be completed only if Face to Face MD will not be signing the [address] Skilled Nursing    3. I certify that this patient is homebound for the following reason(s): Requires considerable and taxing effort to leave the home     4. I certify that this patient is under my care and that I, or a nurse practitioner or  [id number] working with me, had a Face-to-Face Encounter that meets the physician Face-to-Face Encounter requirements. Document the physical findings from the Face-to-Face Encounter that support the need for skilled services: Has new diagnosis that requires skilled nursing teaching and intervention     Jose Jang  6/8/2018

## 2018-06-08 NOTE — PROGRESS NOTES
Problem: [employer] - Impaired  Goal: *Absence of hypoxia  Outcome: Progressing Towards Goal  Weaned to RA from 1L nc patient tolerated change well and will continue to monitor.

## 2018-06-08 NOTE — PHYSICIAN ADVISORY
Letter of Determination:  Outpatient states receiving Observation Services    This case was reviewed, and I concur with Outpatient status receiving Observation services. This determination may change depending on further medical information, condition changes of the patient, or treatment requirements.       Winter Lacey MD, KENNETH,   Physician Josue Horvath.

## 2018-06-08 NOTE — PROGRESS NOTES
spoke with pts dtr. [address] [facility] [address] dtr and ask to come and pick pt up. Dtr verbalized that pt will be staying with wife and other Dtr. Verbalized that pt don't have medicaid and family care broke down. Wife works, and both dtr works. pt will be left alone during the day. [facility]. nurse will try to establish communication with [facility] and make aware of needs and concerns. Dtr verbalized that pt is non compliant and refuse to take meds at times. nurse will follow up closely after discharge and provide care and resources to help guide pt to a better outcome.

## 2018-06-08 NOTE — PROGRESS NOTES
Patient stable and resting in bed. Continue to remind patient to keep O2 on. No acute distress noted. Safety precautions remains in place.  Will provide oncoming nurse report

## 2018-06-08 NOTE — PROGRESS NOTES
Patient sitting up in recliner, waiting for transportation to his home. IV hepwell left in, given evening dose of Lasix IV. Will continue to monitor.

## 2018-06-08 NOTE — PROGRESS NOTES
[facility] called and informed CM that patient prefers [facility]. CM faxed order for [facility] RN to [facility]. CM remains available.

## 2018-06-08 NOTE — PROGRESS NOTES
Morning bedside rounding report received from Bairon Shields RN. Patient resting in bed with closed eyes, with television on; easily arousable. Demonstrating no signs of dyspnea or distress on room air. Voices no concerns or complaints at this time. Will continue to monitor.

## 2018-06-08 NOTE — PROGRESS NOTES
Discharge instructions and prescriptions provided and explained to the pt. Med side effect sheet reviewed. Opportunity for questions provided. We have still been unable to get in touch with the spouse. Patient is unsure of the address he is staying at. Pt will wait for spouse to arrive.

## 2018-06-08 NOTE — DISCHARGE SUMMARY
Hospitalist Discharge Summary     Admit Date:  2018 11:15 AM   Name:  Shana Bush. Age:  74 y.o.  :  1945   MRN:  [id number]   PCP:  Melani Handy MD  Treatment Team: Attending Provider: Nathaniel aCrranza MD; Utilization Review: Bernie Preston RN; Care Manager: America Roman    Problem List for this Hospitalization:  Hospital Problems as of 2018  Date Reviewed: 2018          Codes Class Noted - Resolved POA    * (Principal)Acute on chronic systolic heart failure ([facility] ICD-10-CM: I50.23  ICD-9-CM: 428.23  2018 - Present Yes    Overview Signed 2015  9:28 AM by Margo Lester     EF 40%             Acute respiratory failure with hypoxemia ([facility] ICD-10-CM: J96.01  ICD-9-CM: 518.81  2018 - Present Yes        TAZ (obstructive sleep apnea) ICD-10-CM: G47.33  ICD-9-CM: 327.23  10/2/2017 - Present Yes        Hypertension (Chronic) ICD-10-CM: I10  ICD-9-CM: 401.9  2017 - Present Yes        COPD, moderate ([facility] (Chronic) ICD-10-CM: J44.9  ICD-9-CM: 496  2017 - Present Yes    Overview Signed 2015 12:38 PM by Dasha Abbott NP     Complete PFTs: 7/20/15:  Spirometry is consistent with a moderate obstructive/restrictive defect. . The residual volume is increased relative to other lung volumes suggesting air-trapping. The diffusion capacity corrected for alveolar volume was normal suggesting no loss of alveolo-capillary units. CHF (congestive heart failure) (HCC) ICD-10-CM: I50.9  ICD-9-CM: 428.0  2017 - Present Yes        Mixed hyperlipidemia (Chronic) ICD-10-CM: H02.9  ICD-9-CM: 272.2  2016 - Present Yes                  Hospital Course: The patient is a 75 y/o gentleman with HTN, COPD on home O2, Chronic Systolic CHF with last EF estimated at 45-50%, CKD stage 3, was admitted for Acute on chronic systolic CHF. He was placed on IV lasix and has been diuresing pretty well. He now feels better and denies any significant SOB.  Lungs are basically clear to auscultation. He does have however some baseline dyspnea on exertion. He also has bilateral 2 + pitting edema in his legs. He is clinically and hemodynamically stable for discharge today, but will need to f/u with Cardiology as an outpatient in 1 week. Follow up instructions below. Plan was discussed with the patient and the nursing staff. All questions answered. Patient was stable at time of discharge and was instructed to call or return if there are any concerns or recurrence of symptoms. Diagnostic Imaging/Tests:   Xr Chest Pa Lat    Result Date: 6/6/2018  PA AND LATERAL CHEST X-RAY  6/6/2018 HISTORY:  Short of breath COMPARISON:  Chest x-ray May 23, 2018 FINDINGS:  PA and lateral views demonstrate gross but stable cardiomegaly. No pulmonary edema or definite focal infiltrate. No definite pleural effusion. Details limited by patient's size. IMPRESSION:  Stable cardiomegaly. No acute changes. Echocardiogram results:  No results found for this visit on 06/06/18.       All Micro Results     None          Labs: Results:       BMP, Mg, Phos Recent Labs      06/07/18 0605 06/06/18   1108   NA  144  143   K  4.4  4.2   CL  110*  109*   CO2  24  24   AGAP  10  10   BUN  23  20   CREA  1.70*  1.59*   CA  8.4  8.4   GLU  118*  94   MG   --   2.2      CBC Recent Labs      06/07/18   0605 06/06/18   1108   WBC  6.7  8.0   RBC  4.55  4.93   HGB  12.5*  13.4*   HCT  39.2*  41.9   PLT  184  192   GRANS  79*  61   LYMPH  17  29   EOS  0*  1   MONOS  4  9   BASOS  0  0   IG  0  0   ANEU  5.3  4.8   ABL  1.1  2.4   REAGAN  0.0  0.1   ABM  0.3  0.7   ABB  0.0  0.0   AIG  0.0  0.0      LFT Recent Labs      06/07/18 0605 06/06/18   1108   SGOT  21  27   ALT  26  28   AP  109  115   TP  6.9  7.4   ALB  2.6*  2.9*   GLOB  4.3*  4.5*   AGRAT  0.6*  0.6*      Cardiac Testing Lab Results   Component Value Date/Time    BNP 1248 06/06/2018 11:08 AM     05/26/2018 03:36 AM    BNP 1132 05/25/2018 03:54 AM     12/28/2016 03:53 PM     12/01/2016 03:18 PM    BNP 6 11/21/2016 09:50 AM     09/27/2017 07:19 PM     11/13/2015 05:23 AM    CK - MB 2.1 09/27/2017 07:19 PM    CK-MB Index 1.6 09/27/2017 07:19 PM    Troponin-I, Qt. 0.73 (HH) 05/24/2018 03:43 AM    Troponin-I, Qt. 0.46 (HH) 05/24/2018 12:10 AM    Troponin-I, Qt. 0.69 (HH) 05/23/2018 09:41 PM      Coagulation Tests Lab Results   Component Value Date/Time    aPTT 32.5 12/28/2017 12:16 PM    aPTT >200.0 (HH) 12/28/2017 03:00 AM    aPTT 72.6 (H) 12/27/2017 07:32 PM      A1c No results found for: HBA1C, HGBE8, UGR8KDLD   Lipid Panel Lab Results   Component Value Date/Time    Cholesterol, total 102 12/27/2017 04:13 AM    HDL Cholesterol 24 (L) 12/27/2017 04:13 AM    LDL,Direct 94 06/29/2015 06:00 AM    LDL, calculated 59 12/27/2017 04:13 AM    VLDL, calculated 19 12/27/2017 04:13 AM    Triglyceride 95 12/27/2017 04:13 AM    CHOL/HDL Ratio 4.3 12/27/2017 04:13 AM      Thyroid Panel Lab Results   Component Value Date/Time    TSH 0.604 05/25/2018 03:54 AM    TSH 1.165 04/26/2015 04:11 AM        Most Recent UA Lab Results   Component Value Date/Time    Color YELLOW 03/27/2018 01:38 PM    Appearance CLEAR 03/27/2018 01:38 PM    Specific gravity 1.020 03/27/2018 01:38 PM    pH (UA) 6.0 03/27/2018 01:38 PM    Protein 100 (A) 03/27/2018 01:38 PM    Glucose NEGATIVE  03/27/2018 01:38 PM    Ketone NEGATIVE  03/27/2018 01:38 PM    Bilirubin NEGATIVE  03/27/2018 01:38 PM    Blood NEGATIVE  03/27/2018 01:38 PM    Urobilinogen 4.0 (H) 03/27/2018 01:38 PM    Nitrites NEGATIVE  03/27/2018 01:38 PM    Leukocyte Esterase TRACE (A) 03/27/2018 01:38 PM        Allergies   Allergen Reactions    Pcn [Penicillins] Other (comments)     \"makes my heart stop\"     Immunization History   Administered Date(s) Administered    Influenza Vaccine 09/28/2016    Pneumococcal Polysaccharide (PPSV-23) 09/28/2016    TB Skin Test (PPD) Intradermal 01/05/2018, 03/27/2018, 05/27/2018       All Labs from Last 24 Hrs:  No results found for this or any previous visit (from the past 24 hour(s)). Discharge Exam:  Patient Vitals for the past 24 hrs:   Temp Pulse Resp BP SpO2   06/08/18 0811 - - - - 96 %   06/08/18 0746 97.3 °F (36.3 °C) 92 17 (!) 157/106 99 %   06/08/18 0330 - - - - 99 %   06/08/18 0325 97.5 °F (36.4 °C) 73 20 124/73 94 %   06/07/18 2335 - - - - 98 %   06/07/18 2310 97.3 °F (36.3 °C) 78 20 140/88 100 %   06/07/18 1955 - - - - 100 %   06/07/18 1934 97.4 °F (36.3 °C) 83 20 136/78 97 %   06/07/18 1616 97.2 °F (36.2 °C) 82 18 (!) 144/96 99 %   06/07/18 1615 - - - - 98 %   06/07/18 1151 - - - - 98 %   06/07/18 1120 97.5 °F (36.4 °C) 81 18 - 97 %     Oxygen Therapy  O2 Sat (%): 96 % (06/08/18 0811)  Pulse via Oximetry: 91 beats per minute (06/08/18 0811)  O2 Device: Room air (06/08/18 0811)  O2 Flow Rate (L/min): 1 l/min (06/08/18 0330)    Intake/Output Summary (Last 24 hours) at 06/08/18 1035  Last data filed at 06/08/18 0842   Gross per 24 hour   Intake             1080 ml   Output             2900 ml   Net            -1820 ml       General:    Well nourished. Alert. No distress. Eyes:   Normal sclera. Extraocular movements intact. ENT:  Normocephalic, atraumatic. Moist mucous membranes  CV:   Regular rate and rhythm. No murmur, rub, or gallop. Lungs:  Clear to auscultation bilaterally. No wheezing, rhonchi, or rales. Abdomen: Soft, nontender, nondistended. Bowel sounds normal.   Extremities: Warm and dry. No cyanosis, 2+ pitting edema bilaterally in the LE. Neurologic: CN II-XII grossly intact. Sensation intact. Skin:     No rashes or jaundice. Psych:  Normal mood and affect. Discharge Info:   Current Discharge Medication List      CONTINUE these medications which have CHANGED    Details   furosemide (LASIX) 40 mg tablet Take 1 Tab by mouth two (2) times a day.   Qty: 60 Tab, Refills: 0         CONTINUE these medications which have NOT CHANGED    Details   spironolactone (ALDACTONE) 25 mg tablet Take 1 Tab by mouth daily. Qty: 30 Tab, Refills: 0    Associated Diagnoses: Acute on chronic systolic heart failure (HCC)      fluticasone (FLONASE) 50 mcg/actuation nasal spray 2 Sprays by Both Nostrils route daily. Qty: 1 Bottle, Refills: 11      melatonin 3 mg tablet Take 3 mg by mouth nightly. albuterol (VENTOLIN HFA) 90 mcg/actuation inhaler Take 2 Puffs by inhalation four (4) times daily. Qty: 1 Inhaler, Refills: 11      lisinopril (PRINIVIL, ZESTRIL) 5 mg tablet Take 1 Tab by mouth daily. Qty: 30 Tab, Refills: 11      omeprazole (PRILOSEC) 20 mg capsule Take 1 Cap by mouth daily. Qty: 30 Cap, Refills: 11      atorvastatin (LIPITOR) 20 mg tablet Take 1 Tab by mouth nightly. Qty: 30 Tab, Refills: 11      guaiFENesin ER (MUCINEX) 1,200 mg Ta12 ER tablet Take 1 Tab by mouth every twelve (12) hours. Qty: 60 Tab, Refills: 0      polyethylene glycol (MIRALAX) 17 gram packet Take 1 Packet by mouth daily. Qty: 1 Packet, Refills: 11      albuterol-ipratropium (DUO-NEB) 2.5 mg-0.5 mg/3 ml nebu 3 mL by Nebulization route four (4) times daily. Diaignosis--J44.9  Qty: 120 Nebule, Refills: 11      allopurinol (ZYLOPRIM) 100 mg tablet Take 1 Tab by mouth daily. Qty: 30 Tab, Refills: 11      carvedilol (COREG) 25 mg tablet Take 12.5 mg by mouth two (2) times daily (with meals). aspirin delayed-release 81 mg tablet Take 1 Tab by mouth daily. Qty: 30 Tab, Refills: 0               Disposition: home    Activity: Activity as tolerated  Diet: DIET CARDIAC Regular; FR 2000ML    Follow-up Appointments   Procedures    FOLLOW UP VISIT Appointment in: One Week With Cardiology in 1 week, With PCP in 1-2 weeks     With Cardiology in 1 week,   With PCP in 1-2 weeks     Standing Status:   Standing     Number of Occurrences:   1     Order Specific Question:   Appointment in     Answer:    One Week         Follow-up Information     Follow up With Details Comments Contact Info    [address] [address] [phone]    [facility] [place] Go to Dr. Jolanta Velasco at 11:30 AM for appointment at 11:45AM on 21June. Tonya Barber [phone] [facility]    Bria Huitron MD   [address] Lauren Maciel [address] [facility]  [phone]              Signed:   Gerardo Wilson MD

## 2018-06-08 NOTE — DISCHARGE INSTRUCTIONS
Chronic Obstructive Pulmonary Disease (COPD): Care Instructions  Your Care Instructions    Chronic obstructive pulmonary disease (COPD) is a general term for a group of lung diseases, including emphysema and chronic bronchitis. People with COPD have decreased airflow in and out of the lungs, which makes it hard to breathe. The airways also can get clogged with thick mucus. Cigarette smoking is a major cause of COPD. Although there is no cure for COPD, you can slow its progress. Following your treatment plan and taking care of yourself can help you feel better and live longer. Follow-up care is a key part of your treatment and safety. Be sure to make and go to all appointments, and call your doctor if you are having problems. It's also a good idea to know your test results and keep a list of the medicines you take. How can you care for yourself at home? ?Staying healthy  ? · Do not smoke. This is the most important step you can take to prevent more damage to your lungs. If you need help quitting, talk to your doctor about stop-smoking programs and medicines. These can increase your chances of quitting for good. ? · Avoid colds and flu. Get a pneumococcal vaccine shot. If you have had one before, ask your doctor whether you need a second dose. Get the flu vaccine every fall. If you must be around people with colds or the flu, wash your hands often. ? · Avoid secondhand smoke, air pollution, and high altitudes. Also avoid cold, dry air and hot, humid air. Stay at home with your windows closed when air pollution is bad. ?Medicines and oxygen therapy  ? · Take your medicines exactly as prescribed. Call your doctor if you think you are having a problem with your medicine. ? · You may be taking medicines such as:  ¨ Bronchodilators. These help open your airways and make breathing easier. Bronchodilators are either short-acting (work for 6 to 9 hours) or long-acting (work for 24 hours).  You inhale most bronchodilators, so they start to act quickly. Always carry your quick-relief inhaler with you in case you need it while you are away from home. ¨ Corticosteroids (prednisone, budesonide). These reduce airway inflammation. They come in pill or inhaled form. You must take these medicines every day for them to work well. ? · A spacer may help you get more inhaled medicine to your lungs. Ask your doctor or pharmacist if a spacer is right for you. If it is, ask how to use it properly. ? · Do not take any vitamins, over-the-counter medicine, or herbal products without talking to your doctor first.   ? · If your doctor prescribed antibiotics, take them as directed. Do not stop taking them just because you feel better. You need to take the full course of antibiotics. ? · Oxygen therapy boosts the amount of oxygen in your blood and helps you breathe easier. Use the flow rate your doctor has recommended, and do not change it without talking to your doctor first.   Activity  ? · Get regular exercise. Walking is an easy way to get exercise. Start out slowly, and walk a little more each day. ? · Pay attention to your breathing. You are exercising too hard if you cannot talk while you are exercising. ? · Take short rest breaks when doing household chores and other activities. ? · Learn breathing methods-such as breathing through pursed lips-to help you become less short of breath. ? · If your doctor has not set you up with a pulmonary rehabilitation program, talk to him or her about whether rehab is right for you. Rehab includes exercise programs, education about your disease and how to manage it, help with diet and other changes, and emotional support. Diet  ? · Eat regular, healthy meals. Use bronchodilators about 1 hour before you eat to make it easier to eat. Eat several small meals instead of three large ones. Drink beverages at the end of the meal. Avoid foods that are hard to chew.    ? · Eat foods that contain protein so that you do not lose muscle mass. ? · Talk with your doctor if you gain too much weight or if you lose weight without trying. ?Mental health  ? · Talk to your family, friends, or a therapist about your feelings. It is normal to feel frightened, angry, hopeless, helpless, and even guilty. Talking openly about bad feelings can help you cope. If these feelings last, talk to your doctor. When should you call for help? Call 911 anytime you think you may need emergency care. For example, call if:  ? · You have severe trouble breathing. ?Call your doctor now or seek immediate medical care if:  ? · You have new or worse trouble breathing. ? · You cough up blood. ? · You have a fever. ? Watch closely for changes in your health, and be sure to contact your doctor if:  ? · You cough more deeply or more often, especially if you notice more mucus or a change in the color of your mucus. ? · You have new or worse swelling in your legs or belly. ? · You are not getting better as expected. Where can you learn more? Go to http://jaime-jarrell.info/. Alaina Dickens in the search box to learn more about \"Chronic Obstructive Pulmonary Disease (COPD): Care Instructions. \"  Current as of: May 12, 2017  Content Version: 11.4  © 8383-5227 [employer]. Care instructions adapted under license by [employer] (which disclaims liability or warranty for this information). If you have questions about a medical condition or this instruction, always ask your healthcare professional. [place] [address] any warranty or liability for your use of this information. Heart Failure: Care Instructions  Your Care Instructions    Heart failure occurs when your heart does not pump as much blood as the body needs. Failure does not mean that the heart has stopped pumping but rather that it is not pumping as well as it should.  Over time, this causes fluid buildup in your lungs and other parts of your body. Fluid buildup can cause shortness of breath, fatigue, swollen ankles, and other problems. By taking medicines regularly, reducing sodium (salt) in your diet, checking your weight every day, and making lifestyle changes, you can feel better and live longer. Follow-up care is a key part of your treatment and safety. Be sure to make and go to all appointments, and call your doctor if you are having problems. It's also a good idea to know your test results and keep a list of the medicines you take. How can you care for yourself at home? Medicines  ? · Be safe with medicines. Take your medicines exactly as prescribed. Call your doctor if you think you are having a problem with your medicine. ? · Do not take any vitamins, over-the-counter medicine, or herbal products without talking to your doctor first. Bradly Solorzanoмарина not take ibuprofen (Advil or Motrin) and naproxen (Aleve) without talking to your doctor first. They could make your heart failure worse. ? · You may be taking some of the following medicine. ¨ Beta-blockers can slow heart rate, decrease blood pressure, and improve your condition. Taking a beta-blocker may lower your chance of needing to be hospitalized. ¨ Angiotensin-converting enzyme inhibitors (ACEIs) reduce the heart's workload, lower blood pressure, and reduce swelling. Taking an ACEI may lower your chance of needing to be hospitalized again. ¨ Angiotensin II receptor blockers (ARBs) work like ACEIs. Your doctor may prescribe them instead of ACEIs. ¨ Diuretics, also called water pills, reduce swelling. ¨ Potassium supplements replace this important mineral, which is sometimes lost with diuretics. ¨ Aspirin and other blood thinners prevent blood clots, which can cause a stroke or heart attack. ? You will get more details on the specific medicines your doctor prescribes. Diet  ?  · Your doctor may suggest that you limit sodium to 2,000 milligrams (mg) a day or less. That is less than 1 teaspoon of salt a day, including all the salt you eat in cooking or in packaged foods. People get most of their sodium from processed foods. Fast food and restaurant meals also tend to be very high in sodium. ? · Ask your doctor how much liquid you can drink each day. You may have to limit liquids. ?Weight  ? · Weigh yourself without clothing at the same time each day. Record your weight. Call your doctor if you have a sudden weight gain, such as more than 2 to 3 pounds in a day or 5 pounds in a week. (Your doctor may suggest a different range of weight gain.) A sudden weight gain may mean that your heart failure is getting worse. ? Activity level  ? · Start light exercise (if your doctor says it is okay). Even if you can only do a small amount, exercise will help you get stronger, have more energy, and manage your weight and your stress. Walking is an easy way to get exercise. Start out by walking a little more than you did before. Bit by bit, increase the amount you walk. ? · When you exercise, watch for signs that your heart is working too hard. You are pushing yourself too hard if you cannot talk while you are exercising. If you become short of breath or dizzy or have chest pain, stop, sit down, and rest.   ? · If you feel \"wiped out\" the day after you exercise, walk slower or for a shorter distance until you can work up to a better pace. ? · Get enough rest at night. Sleeping with 1 or 2 pillows under your upper body and head may help you breathe easier. ? Lifestyle changes  ? · Do not smoke. Smoking can make a heart condition worse. If you need help quitting, talk to your doctor about stop-smoking programs and medicines. These can increase your chances of quitting for good. Quitting smoking may be the most important step you can take to protect your heart. ? · Limit alcohol to 2 drinks a day for men and 1 drink a day for women.  Too much alcohol can cause health problems. ? · Avoid getting sick from colds and the flu. Get a pneumococcal vaccine shot. If you have had one before, ask your doctor whether you need another dose. Get a flu shot each year. If you must be around people with colds or the flu, wash your hands often. When should you call for help? Call 911 if you have symptoms of sudden heart failure such as:  ? · You have severe trouble breathing. ? · You cough up pink, foamy mucus. ? · You have a new irregular or rapid heartbeat. ?Call your doctor now or seek immediate medical care if:  ? · You have new or increased shortness of breath. ? · You are dizzy or lightheaded, or you feel like you may faint. ? · You have sudden weight gain, such as more than 2 to 3 pounds in a day or 5 pounds in a week. (Your doctor may suggest a different range of weight gain.)   ? · You have increased swelling in your legs, ankles, or feet. ? · You are suddenly so tired or weak that you cannot do your usual activities. ? Watch closely for changes in your health, and be sure to contact your doctor if you develop new symptoms. Where can you learn more? Go to http://jaime-jarrell.info/. Enter W024 in the search box to learn more about \"Heart Failure: Care Instructions. \"  Current as of: September 21, 2016  Content Version: 11.4  © 5113-1303 [employer]. Care instructions adapted under license by [employer] (which disclaims liability or warranty for this information). If you have questions about a medical condition or this instruction, always ask your healthcare professional. [place] [address] any warranty or liability for your use of this information.     DISCHARGE SUMMARY from Nurse    PATIENT INSTRUCTIONS:    After general anesthesia or intravenous sedation, for 24 hours or while taking prescription Narcotics:  · Limit your activities  · Do not drive and operate hazardous machinery  · Do not make important personal or business decisions  · Do  not drink alcoholic beverages  · If you have not urinated within 8 hours after discharge, please contact your surgeon on call. Report the following to your surgeon:  · Excessive pain, swelling, redness or odor of or around the surgical area  · Temperature over 100.5  · Nausea and vomiting lasting longer than 4 hours or if unable to take medications  · Any signs of decreased circulation or nerve impairment to extremity: change in color, persistent  numbness, tingling, coldness or increase pain  · Any questions    What to do at Home:      *  Please give a list of your current medications to your Primary Care Provider. *  Please update this list whenever your medications are discontinued, doses are      changed, or new medications (including over-the-counter products) are added. *  Please carry medication information at all times in case of emergency situations. These are general instructions for a healthy lifestyle:    No smoking/ No tobacco products/ Avoid exposure to second hand smoke  Surgeon General's Warning:  Quitting smoking now greatly reduces serious risk to your health. Obesity, smoking, and sedentary lifestyle greatly increases your risk for illness    A healthy diet, regular physical exercise & weight monitoring are important for maintaining a healthy lifestyle    You may be retaining fluid if you have a history of heart failure or if you experience any of the following symptoms:  Weight gain of 3 pounds or more overnight or 5 pounds in a week, increased swelling in our hands or feet or shortness of breath while lying flat in bed. Please call your doctor as soon as you notice any of these symptoms; do not wait until your next office visit.     Recognize signs and symptoms of STROKE:    F-face looks uneven    A-arms unable to move or move unevenly    S-speech slurred or non-existent    T-time-call 911 as soon as signs and symptoms begin-DO NOT go Back to bed or wait to see if you get better-TIME IS BRAIN. Warning Signs of HEART ATTACK     Call 911 if you have these symptoms:   Chest discomfort. Most heart attacks involve discomfort in the center of the chest that lasts more than a few minutes, or that goes away and comes back. It can feel like uncomfortable pressure, squeezing, fullness, or pain.  Discomfort in other areas of the upper body. Symptoms can include pain or discomfort in one or both arms, the back, neck, jaw, or stomach.  Shortness of breath with or without chest discomfort.  Other signs may include breaking out in a cold sweat, nausea, or lightheadedness. Don't wait more than five minutes to call 911 - MINUTES MATTER! Fast action can save your life. Calling 911 is almost always the fastest way to get lifesaving treatment. Emergency Medical Services staff can begin treatment when they arrive -- up to an hour sooner than if someone gets to the hospital by car. The discharge information has been reviewed with the patient. The patient verbalized understanding. Discharge medications reviewed with the patient and appropriate educational materials and side effects teaching were provided.   ___________________________________________________________________________________________________________________________________

## 2018-06-08 NOTE — PROGRESS NOTES
Spoke with daughter Annabelle Fonseca and she is in [place]. She is trying to find transportation to [place]. She will call us back and update us.

## 2018-06-08 NOTE — PROGRESS NOTES
Attempting to contact spouse to notify her of patient's discharge. Will need to review AVS with spouse. I will continue to try to contact spouse.

## 2018-06-09 NOTE — PROGRESS NOTES
Pt discharged from [address] [place] [address]. Pt taken down via wheelchair, daughter present at this time. Pt appears in no acute distress at this time. Pt states he thinks he had personal belongings here, but would check with his wife when he returned home. Admission note stated Mr. Samantha Cano confirms that all personal items were sent via \"personal caregiver. \"

## 2018-06-09 NOTE — PROGRESS NOTES
SBAR shift report received from [facility], [address] [facility]. Pt stable. Assessment complete. Pt is sitting up on side of bed, waiting to for family to  for D/C. Resp even, unlabored. Pt is alert, orient X 3. Pt appears in no acute distress at this time. Pt is on RA. Pt encouraged to call for assistance, call light in reach. Safety measures in place.  Will continue to monitor

## 2018-06-13 ENCOUNTER — PATIENT OUTREACH (OUTPATIENT)
Dept: CASE MANAGEMENT | Age: 73
End: 2018-06-13

## 2018-06-13 NOTE — PROGRESS NOTES
spoke with pts wife. Verbalized that she is currenlty at the [facility], doing laundry. Pt is doing well. ambulate with walker at times, but mostly independet. Verbalized that pt forgets to take meds when she and her dtr are at work. Himanshuy working with SW to apply for Medicaid. nurse encourage to arrange meds in pill box to help with timely administration and reduce med error. Wife verbalized that they are currenlty doing that and he forgets to take medications. does not have appointment infront of her currently. nurse will call again on the 15th, understanding verbalized.

## 2018-06-28 ENCOUNTER — PATIENT OUTREACH (OUTPATIENT)
Dept: CASE MANAGEMENT | Age: 73
End: 2018-06-28

## 2018-06-28 NOTE — PROGRESS NOTES
LVM on multiple phone #'s. Receive call back from Dtr who is [address] [employer] in [place]. Verbalized that Step mom is working until 6 and unable to take call. Nurse give contact info and encourage to have Step mom call Nurse. Nurse will continue to monitor and provide care as needed.

## 2018-07-06 ENCOUNTER — HOSPITAL ENCOUNTER (EMERGENCY)
Age: 73
Discharge: HOME | End: 2018-07-06
Attending: EMERGENCY MEDICINE
Payer: MEDICARE

## 2018-07-06 ENCOUNTER — APPOINTMENT (OUTPATIENT)
Dept: GENERAL RADIOLOGY | Age: 73
End: 2018-07-06
Attending: EMERGENCY MEDICINE
Payer: MEDICARE

## 2018-07-06 VITALS
OXYGEN SATURATION: 97 % | BODY MASS INDEX: 29.35 KG/M2 | SYSTOLIC BLOOD PRESSURE: 133 MMHG | TEMPERATURE: 98 F | HEIGHT: 70 IN | HEART RATE: 79 BPM | RESPIRATION RATE: 20 BRPM | WEIGHT: 205 LBS | DIASTOLIC BLOOD PRESSURE: 85 MMHG

## 2018-07-06 DIAGNOSIS — I50.9 ACUTE ON CHRONIC CONGESTIVE HEART FAILURE, UNSPECIFIED HEART FAILURE TYPE (HCC): Primary | ICD-10-CM

## 2018-07-06 LAB
ALBUMIN SERPL-MCNC: 3.1 G/DL (ref 3.2–4.6)
ALBUMIN/GLOB SERPL: 0.7 {RATIO} (ref 1.2–3.5)
ALP SERPL-CCNC: 114 U/L (ref 50–136)
ALT SERPL-CCNC: 23 U/L (ref 12–65)
ANION GAP SERPL CALC-SCNC: 9 MMOL/L (ref 7–16)
AST SERPL-CCNC: 32 U/L (ref 15–37)
BASOPHILS # BLD: 0 K/UL (ref 0–0.2)
BASOPHILS NFR BLD: 1 % (ref 0–2)
BILIRUB SERPL-MCNC: 1.9 MG/DL (ref 0.2–1.1)
BNP SERPL-MCNC: 1668 PG/ML
BUN SERPL-MCNC: 15 MG/DL (ref 8–23)
CALCIUM SERPL-MCNC: 8.8 MG/DL (ref 8.3–10.4)
CHLORIDE SERPL-SCNC: 109 MMOL/L (ref 98–107)
CO2 SERPL-SCNC: 26 MMOL/L (ref 21–32)
CREAT SERPL-MCNC: 1.69 MG/DL (ref 0.8–1.5)
DIFFERENTIAL METHOD BLD: ABNORMAL
EOSINOPHIL # BLD: 0.1 K/UL (ref 0–0.8)
EOSINOPHIL NFR BLD: 1 % (ref 0.5–7.8)
ERYTHROCYTE [DISTWIDTH] IN BLOOD BY AUTOMATED COUNT: 17.8 % (ref 11.9–14.6)
GLOBULIN SER CALC-MCNC: 4.4 G/DL (ref 2.3–3.5)
GLUCOSE SERPL-MCNC: 128 MG/DL (ref 65–100)
HCT VFR BLD AUTO: 44.5 % (ref 41.1–50.3)
HGB BLD-MCNC: 14.6 G/DL (ref 13.6–17.2)
IMM GRANULOCYTES # BLD: 0 K/UL (ref 0–0.5)
IMM GRANULOCYTES NFR BLD AUTO: 1 % (ref 0–5)
LYMPHOCYTES # BLD: 2.2 K/UL (ref 0.5–4.6)
LYMPHOCYTES NFR BLD: 34 % (ref 13–44)
MCH RBC QN AUTO: 27.5 PG (ref 26.1–32.9)
MCHC RBC AUTO-ENTMCNC: 32.8 G/DL (ref 31.4–35)
MCV RBC AUTO: 84 FL (ref 79.6–97.8)
MONOCYTES # BLD: 0.5 K/UL (ref 0.1–1.3)
MONOCYTES NFR BLD: 8 % (ref 4–12)
NEUTS SEG # BLD: 3.8 K/UL (ref 1.7–8.2)
NEUTS SEG NFR BLD: 55 % (ref 43–78)
PLATELET # BLD AUTO: 183 K/UL (ref 150–450)
PMV BLD AUTO: 10.1 FL (ref 10.8–14.1)
POTASSIUM SERPL-SCNC: 4.1 MMOL/L (ref 3.5–5.1)
PROT SERPL-MCNC: 7.5 G/DL (ref 6.3–8.2)
RBC # BLD AUTO: 5.3 M/UL (ref 4.23–5.67)
SODIUM SERPL-SCNC: 144 MMOL/L (ref 136–145)
WBC # BLD AUTO: 6.6 K/UL (ref 4.3–11.1)

## 2018-07-06 PROCEDURE — 80053 COMPREHEN METABOLIC PANEL: CPT | Performed by: EMERGENCY MEDICINE

## 2018-07-06 PROCEDURE — 74011000250 HC RX REV CODE- 250: Performed by: EMERGENCY MEDICINE

## 2018-07-06 PROCEDURE — 93005 ELECTROCARDIOGRAM TRACING: CPT | Performed by: EMERGENCY MEDICINE

## 2018-07-06 PROCEDURE — 71046 X-RAY EXAM CHEST 2 VIEWS: CPT

## 2018-07-06 PROCEDURE — 96374 THER/PROPH/DIAG INJ IV PUSH: CPT | Performed by: EMERGENCY MEDICINE

## 2018-07-06 PROCEDURE — 85025 COMPLETE CBC W/AUTO DIFF WBC: CPT | Performed by: EMERGENCY MEDICINE

## 2018-07-06 PROCEDURE — 74011250636 HC RX REV CODE- 250/636: Performed by: EMERGENCY MEDICINE

## 2018-07-06 PROCEDURE — 99285 EMERGENCY DEPT VISIT HI MDM: CPT | Performed by: EMERGENCY MEDICINE

## 2018-07-06 PROCEDURE — 96375 TX/PRO/DX INJ NEW DRUG ADDON: CPT | Performed by: EMERGENCY MEDICINE

## 2018-07-06 PROCEDURE — 83880 ASSAY OF NATRIURETIC PEPTIDE: CPT | Performed by: EMERGENCY MEDICINE

## 2018-07-06 PROCEDURE — 77030013140 HC MSK NEB VYRM -A

## 2018-07-06 PROCEDURE — 94640 AIRWAY INHALATION TREATMENT: CPT

## 2018-07-06 RX ORDER — IPRATROPIUM BROMIDE AND ALBUTEROL SULFATE 2.5; .5 MG/3ML; MG/3ML
3 SOLUTION RESPIRATORY (INHALATION)
Status: COMPLETED | OUTPATIENT
Start: 2018-07-06 | End: 2018-07-06

## 2018-07-06 RX ORDER — METOLAZONE 10 MG/1
5 TABLET ORAL DAILY
Qty: 3 TAB | Refills: 0 | Status: SHIPPED | OUTPATIENT
Start: 2018-07-06 | End: 2018-07-20

## 2018-07-06 RX ORDER — FUROSEMIDE 10 MG/ML
80 INJECTION INTRAMUSCULAR; INTRAVENOUS
Status: COMPLETED | OUTPATIENT
Start: 2018-07-06 | End: 2018-07-06

## 2018-07-06 RX ORDER — DEXAMETHASONE SODIUM PHOSPHATE 100 MG/10ML
10 INJECTION INTRAMUSCULAR; INTRAVENOUS ONCE
Status: COMPLETED | OUTPATIENT
Start: 2018-07-06 | End: 2018-07-06

## 2018-07-06 RX ADMIN — IPRATROPIUM BROMIDE AND ALBUTEROL SULFATE 3 ML: .5; 3 SOLUTION RESPIRATORY (INHALATION) at 18:19

## 2018-07-06 RX ADMIN — FUROSEMIDE 80 MG: 10 INJECTION, SOLUTION INTRAMUSCULAR; INTRAVENOUS at 18:26

## 2018-07-06 RX ADMIN — DEXAMETHASONE SODIUM PHOSPHATE 10 MG: 10 INJECTION INTRAMUSCULAR; INTRAVENOUS at 18:31

## 2018-07-06 NOTE — ED TRIAGE NOTES
Pt states his breathing got bad today, pt has COPD, is supposed to be on 4L/O2 at night or when he feels he needs it. Pt is not on oxygen today, states his oxygen tank is in [place]. Pt has used his inhaler today one time and states that it did help him. Pt denies chest pain, denies shob. Pt's legs are swollen, more so then usual, per pt.

## 2018-07-06 NOTE — ED PROVIDER NOTES
HPI Comments: Patient was COPD (on nighttime oxygen at home), and history of CHF ( EF around 45-50%) 6 with shortness of breath. Physician, upright versus supine does not have much affect on his dyspnea, exertion does, but he also will suddenly gets short winded while seated at rest.  Patient just went down to [place] for the day and came back that evening, and forgot his medicines down there. Patient is a 74 y.o. male presenting with shortness of breath. The history is provided by the patient. Shortness of Breath   This is a recurrent problem. The current episode started 6 to 12 hours ago. The problem has not changed since onset. Associated symptoms include leg swelling. Pertinent negatives include no fever, no headaches, no rhinorrhea, no sore throat, no cough, no hemoptysis, no wheezing, no PND, no orthopnea, no chest pain, no vomiting, no abdominal pain and no rash. He has had prior hospitalizations. He has had prior ED visits. Associated medical issues include COPD and heart failure.         Past Medical History:   Diagnosis Date    Acute CHF (congestive heart failure) ([facility] [address] 4/25/2015    Acute combined systolic and diastolic congestive heart failure ([facility] [address] 4/28/2015    Chronic obstructive pulmonary disease (HCC)     De Quervain's tenosynovitis, right 4/28/2015    Dyspnea on exertion 6/28/2015    Elevated serum creatinine 4/25/2015    Elevated troponin 6/28/2015    History of coronary artery disease     MI at 27 yo    History of right knee surgery     cartilage removal    History of shingles     Malignant hypertension 4/25/2015    MI (myocardial infarction) [facility])        Past Surgical History:   Procedure Laterality Date    HX HEART CATHETERIZATION  6/29/2015    no intervention    HX KNEE ARTHROSCOPY Right     removal of cartilage         Family History:   Problem Relation Age of Onset    Hypertension Mother     No Known Problems Father        Social History     Social History    Marital status:      Spouse name: N/A    Number of children: N/A    Years of education: N/A     Occupational History    retired      Social History Main Topics    Smoking status: Former Smoker     Packs/day: 0.25     Years: 20.00     Types: Cigarettes     Quit date: 4/5/2015    Smokeless tobacco: Never Used    Alcohol use No    Drug use: No    Sexual activity: Not on file     Other Topics Concern     Service No    Blood Transfusions No     no issues with receiving    Caffeine Concern No    Special Diet No    Exercise No    Seat Belt Yes     Social History Narrative    Lives with his wife         ALLERGIES: Pcn [penicillins]    Review of Systems   Constitutional: Negative for chills and fever. HENT: Negative for rhinorrhea and sore throat. Eyes: Negative for discharge and redness. Respiratory: Positive for shortness of breath. Negative for cough, hemoptysis and wheezing. Cardiovascular: Positive for leg swelling. Negative for chest pain, palpitations, orthopnea and PND. Gastrointestinal: Negative for abdominal pain, nausea and vomiting. Skin: Negative for rash. Neurological: Negative for dizziness and headaches. All other systems reviewed and are negative. Vitals:    07/06/18 1702 07/06/18 1748 07/06/18 1819 07/06/18 1826   BP: (!) 156/98   135/89   Pulse: 94   89   Resp: 20      Temp: 98.2 °F (36.8 °C)      SpO2: 97% 97% 91%    Weight: 93 kg (205 lb)      Height: 5' 10\" (1.778 m)               Physical Exam   Constitutional: He is oriented to person, place, and time. He appears well-developed and well-nourished. No distress. HENT:   Head: Normocephalic and atraumatic. Eyes: Conjunctivae are normal. Pupils are equal, round, and reactive to light. Right eye exhibits no discharge. Left eye exhibits no discharge. No scleral icterus. Neck: Normal range of motion. Neck supple. Cardiovascular: Normal rate, regular rhythm and normal heart sounds. Exam reveals no gallop. No murmur heard. Pulmonary/Chest: Effort normal and breath sounds normal. No respiratory distress. He has no wheezes. He has no rales. Abdominal: Soft. There is no tenderness. There is no guarding. Musculoskeletal: Normal range of motion. He exhibits edema. Neurological: He is alert and oriented to person, place, and time. He exhibits normal muscle tone. cni 2-12 grossly   Skin: Skin is warm and dry. He is not diaphoretic. Psychiatric: He has a normal mood and affect. His behavior is normal.   Nursing note and vitals reviewed. MDM  Number of Diagnoses or Management Options  Diagnosis management comments: Medical decision making note:  Patient with COPD and CHF presents with acute on chronic dyspnea    Before any treatment patient's sats were 97-99%. He was able to ambulate down the hallway and back to bed H, sats remained 99% on room air consistent with, he did get a little bit winded. As discussed with cardiology who prefers to leave his Lasix 40 mg twice a day rather than combining for one big dose. He does recommend adding 5 mg of Zaroxolyn prior to the morning dose. Patient has been given 80 mg of IV Lasix here and a DuoNeb. Anticipate discharge after he starts to diurese  This concludes the \"medical decision making note\" part of this emergency department visit note.           ED Course       Procedures

## 2018-07-06 NOTE — ED NOTES
I have reviewed discharge instructions with the patient. The patient verbalized understanding. Patient left ED via Discharge Method: wheelchair to Home with family member. Opportunity for questions and clarification provided. Patient given 1 scripts. To continue your aftercare when you leave the hospital, you may receive an automated call from our care team to check in on how you are doing. This is a free service and part of our promise to provide the best care and service to meet your aftercare needs.  If you have questions, or wish to unsubscribe from this service please call [phone]. Thank you for Choosing our [facility] Emergency Department.

## 2018-07-06 NOTE — DISCHARGE INSTRUCTIONS
Use your oxygen, inhalers, nebulizer, and Lasix like normal.  Take the new prescription of Zaroxolyn, one half tablet in the morning, 30 MINUTES BEFORE YOUR MORNING LASIX  Follow up with your family md, and with dr Semaj Acosta at [facility] cardiology, or first available     Heart Failure: Care Instructions  Your Care Instructions    Heart failure occurs when your heart does not pump as much blood as the body needs. Failure does not mean that the heart has stopped pumping but rather that it is not pumping as well as it should. Over time, this causes fluid buildup in your lungs and other parts of your body. Fluid buildup can cause shortness of breath, fatigue, swollen ankles, and other problems. By taking medicines regularly, reducing sodium (salt) in your diet, checking your weight every day, and making lifestyle changes, you can feel better and live longer. Follow-up care is a key part of your treatment and safety. Be sure to make and go to all appointments, and call your doctor if you are having problems. It's also a good idea to know your test results and keep a list of the medicines you take. How can you care for yourself at home? Medicines  ? · Be safe with medicines. Take your medicines exactly as prescribed. Call your doctor if you think you are having a problem with your medicine. ? · Do not take any vitamins, over-the-counter medicine, or herbal products without talking to your doctor first. Claretha Em not take ibuprofen (Advil or Motrin) and naproxen (Aleve) without talking to your doctor first. They could make your heart failure worse. ? · You may be taking some of the following medicine. ¨ Beta-blockers can slow heart rate, decrease blood pressure, and improve your condition. Taking a beta-blocker may lower your chance of needing to be hospitalized. ¨ Angiotensin-converting enzyme inhibitors (ACEIs) reduce the heart's workload, lower blood pressure, and reduce swelling.  Taking an ACEI may lower your chance of needing to be hospitalized again. ¨ Angiotensin II receptor blockers (ARBs) work like ACEIs. Your doctor may prescribe them instead of ACEIs. ¨ Diuretics, also called water pills, reduce swelling. ¨ Potassium supplements replace this important mineral, which is sometimes lost with diuretics. ¨ Aspirin and other blood thinners prevent blood clots, which can cause a stroke or heart attack. ? You will get more details on the specific medicines your doctor prescribes. Diet  ? · Your doctor may suggest that you limit sodium to 2,000 milligrams (mg) a day or less. That is less than 1 teaspoon of salt a day, including all the salt you eat in cooking or in packaged foods. People get most of their sodium from processed foods. Fast food and restaurant meals also tend to be very high in sodium. ? · Ask your doctor how much liquid you can drink each day. You may have to limit liquids. ?Weight  ? · Weigh yourself without clothing at the same time each day. Record your weight. Call your doctor if you have a sudden weight gain, such as more than 2 to 3 pounds in a day or 5 pounds in a week. (Your doctor may suggest a different range of weight gain.) A sudden weight gain may mean that your heart failure is getting worse. ? Activity level  ? · Start light exercise (if your doctor says it is okay). Even if you can only do a small amount, exercise will help you get stronger, have more energy, and manage your weight and your stress. Walking is an easy way to get exercise. Start out by walking a little more than you did before. Bit by bit, increase the amount you walk. ? · When you exercise, watch for signs that your heart is working too hard. You are pushing yourself too hard if you cannot talk while you are exercising.  If you become short of breath or dizzy or have chest pain, stop, sit down, and rest.   ? · If you feel \"wiped out\" the day after you exercise, walk slower or for a shorter distance until you can work up to a better pace. ? · Get enough rest at night. Sleeping with 1 or 2 pillows under your upper body and head may help you breathe easier. ? Lifestyle changes  ? · Do not smoke. Smoking can make a heart condition worse. If you need help quitting, talk to your doctor about stop-smoking programs and medicines. These can increase your chances of quitting for good. Quitting smoking may be the most important step you can take to protect your heart. ? · Limit alcohol to 2 drinks a day for men and 1 drink a day for women. Too much alcohol can cause health problems. ? · Avoid getting sick from colds and the flu. Get a pneumococcal vaccine shot. If you have had one before, ask your doctor whether you need another dose. Get a flu shot each year. If you must be around people with colds or the flu, wash your hands often. When should you call for help? Call 911 if you have symptoms of sudden heart failure such as:  ? · You have severe trouble breathing. ? · You cough up pink, foamy mucus. ? · You have a new irregular or rapid heartbeat. ?Call your doctor now or seek immediate medical care if:  ? · You have new or increased shortness of breath. ? · You are dizzy or lightheaded, or you feel like you may faint. ? · You have sudden weight gain, such as more than 2 to 3 pounds in a day or 5 pounds in a week. (Your doctor may suggest a different range of weight gain.)   ? · You have increased swelling in your legs, ankles, or feet. ? · You are suddenly so tired or weak that you cannot do your usual activities. ? Watch closely for changes in your health, and be sure to contact your doctor if you develop new symptoms. Where can you learn more? Go to http://jaime-jarrell.info/. Enter W337 in the search box to learn more about \"Heart Failure: Care Instructions. \"  Current as of: September 21, 2016  Content Version: 11.4  © 9247-5218 Healthwise, [employer].  Care instructions adapted under license by [employer] (which disclaims liability or warranty for this information). If you have questions about a medical condition or this instruction, always ask your healthcare professional. [address] any warranty or liability for your use of this information. Learning About Heart Failure Zones  What are heart failure zones? Heart failure zones give you an easy way to see changes in your heart failure symptoms. They also tell you when you need to get help. Check every day to see which zone you are in. Green zone. You are doing well. This is where you want to be. · Your weight is stable. This means it is not going up or down. · You breathe easily. · You are sleeping well. You are able to lie flat without shortness of breath. · You can do your usual activities. Yellow zone. Be careful. Your symptoms are changing. Call your doctor. · You have new or increased shortness of breath. · You are dizzy or lightheaded, or you feel like you may faint. · You have sudden weight gain, such as more than 2 to 3 pounds in a day or 5 pounds in a week. (Your doctor may suggest a different range of weight gain.)  · You have increased swelling in your legs, ankles, or feet. · You are so tired or weak that you cannot do your usual activities. · You are not sleeping well. Shortness of breath wakes you up at night. You need extra pillows. Your doctor's name: ____________________________________________________________  Your doctor's contact information: _________________________________________________  Red zone. This is an emergency. Call 911. You have symptoms of sudden heart failure, such as:  · You have severe trouble breathing. · You cough up pink, foamy mucus. · You have a new irregular or fast heartbeat. You have symptoms of a heart attack. These may include:  · Chest pain or pressure, or a strange feeling in the chest.  · Sweating. · Shortness of breath.   · Nausea or vomiting. · Pain, pressure, or a strange feeling in the back, neck, jaw, or upper belly or in one or both shoulders or arms. · Lightheadedness or sudden weakness. · A fast or irregular heartbeat. If you have symptoms of a heart attack: After you call 911, the  may tell you to chew 1 adult-strength or 2 to 4 low-dose aspirin. Wait for an ambulance. Do not try to drive yourself. Follow-up care is a key part of your treatment and safety. Be sure to make and go to all appointments, and call your doctor if you are having problems. It's also a good idea to know your test results and keep a list of the medicines you take. Where can you learn more? Go to http://jaime-jarrell.info/. Enter T174 in the search box to learn more about \"Learning About Heart Failure Zones. \"  Current as of: September 21, 2016  Content Version: 11.4  © 9697-9682 Healthwise[employer]. Care instructions adapted under license by [employer] (which disclaims liability or warranty for this information). If you have questions about a medical condition or this instruction, always ask your healthcare professional. [place] [address] any warranty or liability for your use of this information.

## 2018-07-08 LAB
ATRIAL RATE: 97 BPM
CALCULATED P AXIS, ECG09: 59 DEGREES
CALCULATED R AXIS, ECG10: -63 DEGREES
CALCULATED T AXIS, ECG11: 86 DEGREES
DIAGNOSIS, 93000: NORMAL
P-R INTERVAL, ECG05: 202 MS
Q-T INTERVAL, ECG07: 390 MS
QRS DURATION, ECG06: 114 MS
QTC CALCULATION (BEZET), ECG08: 495 MS
VENTRICULAR RATE, ECG03: 97 BPM

## 2018-07-10 ENCOUNTER — APPOINTMENT (OUTPATIENT)
Dept: GENERAL RADIOLOGY | Age: 73
DRG: 291 | End: 2018-07-10
Attending: EMERGENCY MEDICINE
Payer: MEDICARE

## 2018-07-10 ENCOUNTER — HOSPITAL ENCOUNTER (INPATIENT)
Age: 73
LOS: 10 days | Discharge: NURSING, REHAB OR LONG-TERM CARE | DRG: 291 | End: 2018-07-20
Attending: EMERGENCY MEDICINE | Admitting: INTERNAL MEDICINE
Payer: MEDICARE

## 2018-07-10 DIAGNOSIS — I50.23 ACUTE ON CHRONIC SYSTOLIC HEART FAILURE (HCC): Primary | ICD-10-CM

## 2018-07-10 LAB
ALBUMIN SERPL-MCNC: 2.9 G/DL (ref 3.2–4.6)
ALBUMIN/GLOB SERPL: 0.7 {RATIO} (ref 1.2–3.5)
ALP SERPL-CCNC: 111 U/L (ref 50–136)
ALT SERPL-CCNC: 29 U/L (ref 12–65)
ANION GAP SERPL CALC-SCNC: 8 MMOL/L (ref 7–16)
AST SERPL-CCNC: 30 U/L (ref 15–37)
BASOPHILS # BLD: 0 K/UL (ref 0–0.2)
BASOPHILS NFR BLD: 0 % (ref 0–2)
BILIRUB SERPL-MCNC: 1.1 MG/DL (ref 0.2–1.1)
BNP SERPL-MCNC: 1226 PG/ML
BUN SERPL-MCNC: 31 MG/DL (ref 8–23)
CALCIUM SERPL-MCNC: 8.3 MG/DL (ref 8.3–10.4)
CHLORIDE SERPL-SCNC: 107 MMOL/L (ref 98–107)
CO2 SERPL-SCNC: 29 MMOL/L (ref 21–32)
CREAT SERPL-MCNC: 2.01 MG/DL (ref 0.8–1.5)
DIFFERENTIAL METHOD BLD: ABNORMAL
EOSINOPHIL # BLD: 0 K/UL (ref 0–0.8)
EOSINOPHIL NFR BLD: 0 % (ref 0.5–7.8)
ERYTHROCYTE [DISTWIDTH] IN BLOOD BY AUTOMATED COUNT: 17.8 % (ref 11.9–14.6)
GLOBULIN SER CALC-MCNC: 4.3 G/DL (ref 2.3–3.5)
GLUCOSE SERPL-MCNC: 132 MG/DL (ref 65–100)
HCT VFR BLD AUTO: 44.1 % (ref 41.1–50.3)
HGB BLD-MCNC: 14.6 G/DL (ref 13.6–17.2)
IMM GRANULOCYTES # BLD: 0 K/UL (ref 0–0.5)
IMM GRANULOCYTES NFR BLD AUTO: 0 % (ref 0–5)
LYMPHOCYTES # BLD: 2.3 K/UL (ref 0.5–4.6)
LYMPHOCYTES NFR BLD: 20 % (ref 13–44)
MCH RBC QN AUTO: 27.2 PG (ref 26.1–32.9)
MCHC RBC AUTO-ENTMCNC: 33.1 G/DL (ref 31.4–35)
MCV RBC AUTO: 82.3 FL (ref 79.6–97.8)
MONOCYTES # BLD: 1.3 K/UL (ref 0.1–1.3)
MONOCYTES NFR BLD: 11 % (ref 4–12)
NEUTS SEG # BLD: 7.8 K/UL (ref 1.7–8.2)
NEUTS SEG NFR BLD: 69 % (ref 43–78)
PLATELET # BLD AUTO: 175 K/UL (ref 150–450)
PMV BLD AUTO: 10.5 FL (ref 10.8–14.1)
POTASSIUM SERPL-SCNC: 4.1 MMOL/L (ref 3.5–5.1)
PROT SERPL-MCNC: 7.2 G/DL (ref 6.3–8.2)
RBC # BLD AUTO: 5.36 M/UL (ref 4.23–5.67)
SODIUM SERPL-SCNC: 144 MMOL/L (ref 136–145)
TROPONIN I SERPL-MCNC: 0.61 NG/ML (ref 0.02–0.05)
WBC # BLD AUTO: 11.4 K/UL (ref 4.3–11.1)

## 2018-07-10 PROCEDURE — 84484 ASSAY OF TROPONIN QUANT: CPT | Performed by: EMERGENCY MEDICINE

## 2018-07-10 PROCEDURE — 74011250636 HC RX REV CODE- 250/636: Performed by: INTERNAL MEDICINE

## 2018-07-10 PROCEDURE — 94640 AIRWAY INHALATION TREATMENT: CPT

## 2018-07-10 PROCEDURE — 74011000250 HC RX REV CODE- 250: Performed by: INTERNAL MEDICINE

## 2018-07-10 PROCEDURE — 65270000029 HC RM PRIVATE

## 2018-07-10 PROCEDURE — 74011250636 HC RX REV CODE- 250/636: Performed by: EMERGENCY MEDICINE

## 2018-07-10 PROCEDURE — 80053 COMPREHEN METABOLIC PANEL: CPT | Performed by: EMERGENCY MEDICINE

## 2018-07-10 PROCEDURE — 96374 THER/PROPH/DIAG INJ IV PUSH: CPT | Performed by: EMERGENCY MEDICINE

## 2018-07-10 PROCEDURE — 74011250637 HC RX REV CODE- 250/637: Performed by: EMERGENCY MEDICINE

## 2018-07-10 PROCEDURE — 74011250637 HC RX REV CODE- 250/637: Performed by: INTERNAL MEDICINE

## 2018-07-10 PROCEDURE — 93005 ELECTROCARDIOGRAM TRACING: CPT | Performed by: EMERGENCY MEDICINE

## 2018-07-10 PROCEDURE — 99285 EMERGENCY DEPT VISIT HI MDM: CPT | Performed by: EMERGENCY MEDICINE

## 2018-07-10 PROCEDURE — 71045 X-RAY EXAM CHEST 1 VIEW: CPT

## 2018-07-10 PROCEDURE — 85025 COMPLETE CBC W/AUTO DIFF WBC: CPT | Performed by: EMERGENCY MEDICINE

## 2018-07-10 PROCEDURE — 83880 ASSAY OF NATRIURETIC PEPTIDE: CPT | Performed by: EMERGENCY MEDICINE

## 2018-07-10 RX ORDER — FUROSEMIDE 10 MG/ML
60 INJECTION INTRAMUSCULAR; INTRAVENOUS
Status: COMPLETED | OUTPATIENT
Start: 2018-07-10 | End: 2018-07-10

## 2018-07-10 RX ORDER — SODIUM CHLORIDE 0.9 % (FLUSH) 0.9 %
5-10 SYRINGE (ML) INJECTION AS NEEDED
Status: DISCONTINUED | OUTPATIENT
Start: 2018-07-10 | End: 2018-07-20 | Stop reason: HOSPADM

## 2018-07-10 RX ORDER — ATORVASTATIN CALCIUM 10 MG/1
20 TABLET, FILM COATED ORAL
Status: DISCONTINUED | OUTPATIENT
Start: 2018-07-10 | End: 2018-07-20 | Stop reason: HOSPADM

## 2018-07-10 RX ORDER — IPRATROPIUM BROMIDE AND ALBUTEROL SULFATE 2.5; .5 MG/3ML; MG/3ML
3 SOLUTION RESPIRATORY (INHALATION)
Status: DISCONTINUED | OUTPATIENT
Start: 2018-07-10 | End: 2018-07-11

## 2018-07-10 RX ORDER — FUROSEMIDE 10 MG/ML
40 INJECTION INTRAMUSCULAR; INTRAVENOUS 2 TIMES DAILY
Status: DISCONTINUED | OUTPATIENT
Start: 2018-07-10 | End: 2018-07-12

## 2018-07-10 RX ORDER — NITROGLYCERIN 0.4 MG/1
0.4 TABLET SUBLINGUAL
Status: COMPLETED | OUTPATIENT
Start: 2018-07-10 | End: 2018-07-10

## 2018-07-10 RX ORDER — SODIUM CHLORIDE 0.9 % (FLUSH) 0.9 %
5-10 SYRINGE (ML) INJECTION EVERY 8 HOURS
Status: DISCONTINUED | OUTPATIENT
Start: 2018-07-10 | End: 2018-07-20 | Stop reason: HOSPADM

## 2018-07-10 RX ORDER — ASPIRIN 81 MG/1
81 TABLET ORAL DAILY
Status: DISCONTINUED | OUTPATIENT
Start: 2018-07-11 | End: 2018-07-11 | Stop reason: SDUPTHER

## 2018-07-10 RX ORDER — HEPARIN SODIUM 5000 [USP'U]/ML
5000 INJECTION, SOLUTION INTRAVENOUS; SUBCUTANEOUS EVERY 12 HOURS
Status: DISCONTINUED | OUTPATIENT
Start: 2018-07-10 | End: 2018-07-20 | Stop reason: HOSPADM

## 2018-07-10 RX ORDER — CARVEDILOL 12.5 MG/1
12.5 TABLET ORAL 2 TIMES DAILY WITH MEALS
Status: DISCONTINUED | OUTPATIENT
Start: 2018-07-10 | End: 2018-07-12

## 2018-07-10 RX ORDER — FUROSEMIDE 10 MG/ML
60 INJECTION INTRAMUSCULAR; INTRAVENOUS 2 TIMES DAILY
Status: DISCONTINUED | OUTPATIENT
Start: 2018-07-10 | End: 2018-07-10

## 2018-07-10 RX ADMIN — Medication 10 ML: at 22:19

## 2018-07-10 RX ADMIN — NITROGLYCERIN 0.4 MG: 0.4 TABLET SUBLINGUAL at 17:20

## 2018-07-10 RX ADMIN — ATORVASTATIN CALCIUM 20 MG: 10 TABLET, FILM COATED ORAL at 22:08

## 2018-07-10 RX ADMIN — FUROSEMIDE 40 MG: 10 INJECTION, SOLUTION INTRAMUSCULAR; INTRAVENOUS at 22:16

## 2018-07-10 RX ADMIN — FUROSEMIDE 60 MG: 10 INJECTION, SOLUTION INTRAMUSCULAR; INTRAVENOUS at 17:20

## 2018-07-10 RX ADMIN — CARVEDILOL 12.5 MG: 12.5 TABLET, FILM COATED ORAL at 20:18

## 2018-07-10 RX ADMIN — IPRATROPIUM BROMIDE AND ALBUTEROL SULFATE 3 ML: .5; 3 SOLUTION RESPIRATORY (INHALATION) at 19:57

## 2018-07-10 RX ADMIN — HEPARIN SODIUM 5000 UNITS: 5000 INJECTION, SOLUTION INTRAVENOUS; SUBCUTANEOUS at 22:08

## 2018-07-10 NOTE — ED NOTES
TRANSFER - OUT REPORT:    Verbal report given to Niharika Argueta RN (name) on Carolyne Barber.  being transferred to [id number] (unit) for routine progression of care       Report consisted of patients Situation, Background, Assessment and   Recommendations(SBAR). Information from the following report(s) SBAR, ED Summary, Intake/Output, MAR and Recent Results was reviewed with the receiving nurse. Lines:   Peripheral IV 07/10/18 Right Antecubital (Active)   Site Assessment Clean, dry, & intact 7/10/2018  6:18 PM   Phlebitis Assessment 0 7/10/2018  6:18 PM   Infiltration Assessment 0 7/10/2018  6:18 PM   Dressing Status Clean, dry, & intact 7/10/2018  6:18 PM   Dressing Type Transparent 7/10/2018  6:18 PM        Opportunity for questions and clarification was provided.       Patient transported with:   O2 @ 2 liters

## 2018-07-10 NOTE — ED PROVIDER NOTES
HPI Comments: 68-year-old male with history of COPD and congestive heart failure presents with increasing shortness of breath. He was seen several days ago and diuresis in the ED. He reports since that time he has had progressively worsening leg swelling as well as shortness of breath. No position makes it better or worse. He denies any fevers, vomiting, chest pain or abdominal pain. he takes 40 mg of Lasix twice a day and was recently started on metolazone. Patient is a 74 y.o. male presenting with lower extremity edema. The history is provided by the patient. No  was used. Leg Swelling    Pertinent negatives include no back pain.         Past Medical History:   Diagnosis Date    Acute CHF (congestive heart failure) ([facility] [address] 4/25/2015    Acute combined systolic and diastolic congestive heart failure ([facility] [address] 4/28/2015    Chronic obstructive pulmonary disease (HCC)     De Quervain's tenosynovitis, right 4/28/2015    Dyspnea on exertion 6/28/2015    Elevated serum creatinine 4/25/2015    Elevated troponin 6/28/2015    History of coronary artery disease     MI at 25 yo    History of right knee surgery     cartilage removal    History of shingles     Malignant hypertension 4/25/2015    [facility])        Past Surgical History:   Procedure Laterality Date    HX HEART CATHETERIZATION  6/29/2015    no intervention    HX KNEE ARTHROSCOPY Right     removal of cartilage         Family History:   Problem Relation Age of Onset    Hypertension Mother     No Known Problems Father        Social History     Social History    Marital status:      Spouse name: N/A    Number of children: N/A    Years of education: N/A     Occupational History    retired      Social History Main Topics    Smoking status: Former Smoker     Packs/day: 0.25     Years: 20.00     Types: Cigarettes     Quit date: 4/5/2015    Smokeless tobacco: Never Used    Alcohol use No    Drug use: No    Sexual activity: Not on file     Other Topics Concern     Service No    Blood Transfusions No     no issues with receiving    Caffeine Concern No    Special Diet No    Exercise No    Seat Belt Yes     Social History Narrative    Lives with his wife         ALLERGIES: Pcn [penicillins]    Review of Systems   Constitutional: Negative for fatigue and fever. HENT: Negative for sore throat. Respiratory: Positive for shortness of breath. Negative for cough and chest tightness. Cardiovascular: Positive for chest pain. Negative for leg swelling. Gastrointestinal: Negative for abdominal pain, nausea and vomiting. Genitourinary: Negative for dysuria. Musculoskeletal: Negative for back pain. Neurological: Negative for syncope and headaches. Psychiatric/Behavioral: Negative for confusion. Vitals:    07/10/18 1539 07/10/18 1559 07/10/18 1620 07/10/18 1640   BP: 114/75 106/73 120/70 (!) 132/104   Pulse: 72 75 69 65   Resp: 30 (!) 45 (!) 44 10   Temp:       SpO2: 97% 95% 97% 95%   Weight:       Height:                Physical Exam   Constitutional: He is oriented to person, place, and time. He appears well-developed and well-nourished. No distress. HENT:   Head: Normocephalic and atraumatic. Eyes: Conjunctivae and EOM are normal. Pupils are equal, round, and reactive to light. Neck: Normal range of motion. Neck supple. Cardiovascular: Normal rate, regular rhythm and normal heart sounds. Pulmonary/Chest: Effort normal and breath sounds normal. No respiratory distress. He has no wheezes. He has no rales. Faint crackles in the lung bases   Abdominal: Soft. He exhibits no distension. There is no tenderness. There is no rebound. Musculoskeletal: Normal range of motion. He exhibits edema. He exhibits no tenderness. +3 pitting edema bilateral lower extremities. Normal pulses   Neurological: He is alert and oriented to person, place, and time. Skin: Skin is warm and dry.  No rash noted. He is not diaphoretic. Psychiatric: He has a normal mood and affect. His behavior is normal.   Vitals reviewed. MDM  Number of Diagnoses or Management Options  Acute on chronic systolic heart failure [facility]): new and requires workup  Diagnosis management comments: Patient reporting shortness of breath since he was seen about 4 days ago. States has not improved. Bilateral leg swollen. His BNP is still elevated though somewhat improved from previous. Chest x-ray worsening. Troponin chronically elevated. We'll admit to the hospitalist for further evaluation and diuresis. Patient and family updated. Admitted in stable condition.         ED Course       Procedures

## 2018-07-10 NOTE — H&P
Hospitalist H&P Note Admit Date:  7/10/2018  3:32 PM  
Name:  Pamela Velazquez. Age:  74 y.o. 
:  1945 MRN:  [id number] PCP:  Hellen Allan MD 
Treatment Team: Attending Provider: Vinny Shoemaker MD; Primary Nurse: Sallyann Aase HPI:  
Leidy Fairbanks is a 75 yo male who presented with worsening LL swelling on a background of COPD (4L O2 at home), CHF, CAD and HTN. He reports worsening LL swelling for the past 2 weeks. He reports dyspnea on rest and exertion. He sleeps on 2 pillows. Of note, he was recently hospitalized from  to  with acute CHF exacerbation. He was started on IV lasix and was discharged at that time on lasix PO BID. At baseline, he lives at home with his wife. He reports that he missed only 1 dose of his home lasix. 10 systems reviewed and negative except as noted in HPI. Past Medical History:  
Diagnosis Date  Acute CHF (congestive heart failure) ([address] 2015  Acute combined systolic and diastolic congestive heart failure ([address] 2015  Chronic obstructive pulmonary disease ([address]  De Quervain's tenosynovitis, right 2015  Dyspnea on exertion 2015  Elevated serum creatinine 2015  Elevated troponin 2015  History of coronary artery disease MI at 27 yo  
 History of right knee surgery   
 cartilage removal  
 History of shingles  Malignant hypertension 2015  MI (myocardial infarction) ([facility] [address] Past Surgical History:  
Procedure Laterality Date  HX HEART CATHETERIZATION  2015  
 no intervention  HX KNEE ARTHROSCOPY Right   
 removal of cartilage Allergies Allergen Reactions  Pcn [Penicillins] Other (comments) \"makes my heart stop\" Social History Substance Use Topics  Smoking status: Former Smoker Packs/day: 0.25 Years: 20.00 Types: Cigarettes Quit date: 2015  Smokeless tobacco: Never Used  Alcohol use No  
  
Family History Problem Relation Age of Onset  Hypertension Mother  No Known Problems Father Immunization History Administered Date(s) Administered  Influenza Vaccine 2016  Pneumococcal Polysaccharide (PPSV-23) 2016  TB Skin Test (PPD) Intradermal 2018, 2018, 2018 PTA Medications: 
Prior to Admission Medications Prescriptions Last Dose Informant Patient Reported? Taking? albuterol (VENTOLIN HFA) 90 mcg/actuation inhaler   No No  
Sig: Take 2 Puffs by inhalation four (4) times daily. albuterol-ipratropium (DUO-NEB) 2.5 mg-0.5 mg/3 ml nebu   No No  
Sig: 3 mL by Nebulization route four (4) times daily. Diaignosis--J44.9  
allopurinol (ZYLOPRIM) 100 mg tablet   No No  
Sig: Take 1 Tab by mouth daily. aspirin delayed-release 81 mg tablet   No No  
Sig: Take 1 Tab by mouth daily. atorvastatin (LIPITOR) 20 mg tablet   No No  
Sig: Take 1 Tab by mouth nightly. carvedilol (COREG) 25 mg tablet   Yes No  
Sig: Take 12.5 mg by mouth two (2) times daily (with meals). fluticasone (FLONASE) 50 mcg/actuation nasal spray   No No  
Si Sprays by Both Nostrils route daily. furosemide (LASIX) 40 mg tablet   No No  
Sig: Take 1 Tab by mouth two (2) times a day. guaiFENesin ER (MUCINEX) 1,200 mg Ta12 ER tablet   No No  
Sig: Take 1 Tab by mouth every twelve (12) hours. lisinopril (PRINIVIL, ZESTRIL) 5 mg tablet   No No  
Sig: Take 1 Tab by mouth daily. melatonin 3 mg tablet   Yes No  
Sig: Take 3 mg by mouth nightly. metOLazone (ZAROXOLYN) 10 mg tablet   No No  
Sig: Take 0.5 Tabs by mouth daily. Thirty minutes before your morning lasix  
omeprazole (PRILOSEC) 20 mg capsule   No No  
Sig: Take 1 Cap by mouth daily. polyethylene glycol (MIRALAX) 17 gram packet   No No  
Sig: Take 1 Packet by mouth daily. Patient taking differently: Take 17 g by mouth daily as needed. spironolactone (ALDACTONE) 25 mg tablet   No No  
Sig: Take 1 Tab by mouth daily. Facility-Administered Medications: None Review of Systems: 
Gen: No weight loss Eyes: no vision changes ENT: no sore throat Resp: +dyspnea, cough CV: No chest pain Abd:  no abd pain, no vomiting or diarrhea : No incontinence, no hematuria or dysuria, no flank pain MSK: +leg swelling Neuro: No HA or dizziness, no weakness, numbness/tingling Objective:  
Patient Vitals for the past 24 hrs: 
 Temp Pulse Resp BP SpO2  
07/10/18 1720 - 64 - 125/84 -  
07/10/18 1719 - 66 28 125/84 100 % 07/10/18 1640 - 65 10 (!) 132/104 95 % 07/10/18 1620 - 69 (!) 44 120/70 97 % 07/10/18 1559 - 75 (!) 45 106/73 95 % 07/10/18 1539 - 72 30 114/75 97 % 07/10/18 1536 - 74 - 111/74 99 % 07/10/18 1529 97.9 °F (36.6 °C) 71 20 118/77 96 % Oxygen Therapy O2 Sat (%): 100 % (07/10/18 1719) Pulse via Oximetry: 67 beats per minute (07/10/18 1719) O2 Device: Room air (07/10/18 1529) No intake or output data in the 24 hours ending 07/10/18 1745 Physical Exam: 
General:    Well nourished. Alert. Eyes:   Normal sclera. Extraocular movements intact. ENT:  Normocephalic, atraumatic. Moist mucous membranes CV:   RRR. No murmur, rub, or gallop. Lungs:  CTAB. No wheezing, rhonchi, or rales. Abdomen: Soft, nontender, nondistended. Bowel sounds normal.  
Extremities: Warm and dry. +bilateral LL edema Neurologic: CN II-XII grossly intact. Sensation intact. Skin:     No rashes or jaundice. Psych:  Normal mood and affect. I reviewed the labs, imaging, EKGs, telemetry, and other studies done this admission. Data Review:  
Recent Results (from the past 24 hour(s)) CBC WITH AUTOMATED DIFF Collection Time: 07/10/18  3:41 PM  
Result Value Ref Range WBC 11.4 (H) 4.3 - 11.1 K/uL  
 RBC 5.36 4.23 - 5.67 M/uL  
 HGB 14.6 13.6 - 17.2 g/dL HCT 44.1 41.1 - 50.3 % MCV 82.3 79.6 - 97.8 FL  
 MCH 27.2 26.1 - 32.9 PG  
 MCHC 33.1 31.4 - 35.0 g/dL  
 RDW 17.8 (H) 11.9 - 14.6 %  PLATELET 396 909 - 450 K/uL MPV 10.5 (L) 10.8 - 14.1 FL  
 DF AUTOMATED NEUTROPHILS 69 43 - 78 % LYMPHOCYTES 20 13 - 44 % MONOCYTES 11 4.0 - 12.0 % EOSINOPHILS 0 (L) 0.5 - 7.8 % BASOPHILS 0 0.0 - 2.0 % IMMATURE GRANULOCYTES 0 0.0 - 5.0 %  
 ABS. NEUTROPHILS 7.8 1.7 - 8.2 K/UL  
 ABS. LYMPHOCYTES 2.3 0.5 - 4.6 K/UL  
 ABS. MONOCYTES 1.3 0.1 - 1.3 K/UL  
 ABS. EOSINOPHILS 0.0 0.0 - 0.8 K/UL  
 ABS. BASOPHILS 0.0 0.0 - 0.2 K/UL  
 ABS. IMM. GRANS. 0.0 0.0 - 0.5 K/UL METABOLIC PANEL, COMPREHENSIVE Collection Time: 07/10/18  3:41 PM  
Result Value Ref Range Sodium 144 136 - 145 mmol/L Potassium 4.1 3.5 - 5.1 mmol/L Chloride 107 98 - 107 mmol/L  
 CO2 29 21 - 32 mmol/L Anion gap 8 7 - 16 mmol/L Glucose 132 (H) 65 - 100 mg/dL BUN 31 (H) 8 - 23 MG/DL Creatinine 2.01 (H) 0.8 - 1.5 MG/DL  
 GFR est AA 42 (L) >60 ml/min/1.73m2 GFR est non-AA 35 (L) >60 ml/min/1.73m2 Calcium 8.3 8.3 - 10.4 MG/DL Bilirubin, total 1.1 0.2 - 1.1 MG/DL  
 ALT (SGPT) 29 12 - 65 U/L  
 AST (SGOT) 30 15 - 37 U/L Alk. phosphatase 111 50 - 136 U/L Protein, total 7.2 6.3 - 8.2 g/dL Albumin 2.9 (L) 3.2 - 4.6 g/dL Globulin 4.3 (H) 2.3 - 3.5 g/dL A-G Ratio 0.7 (L) 1.2 - 3.5 BNP Collection Time: 07/10/18  3:41 PM  
Result Value Ref Range BNP 1226 pg/mL TROPONIN I Collection Time: 07/10/18  3:41 PM  
Result Value Ref Range Troponin-I, Qt. 0.61 (HH) 0.02 - 0.05 NG/ML All Micro Results None Other Studies: Xr Chest [facility] Result Date: 7/10/2018 Chest portable CLINICAL INDICATION: Acute moderate dyspnea with 2 weeks of chest pain that has worsened today COMPARISON: 7/6/2018 TECHNIQUE: single AP portable view chest at 3:20 PM upright FINDINGS: The lung volumes are slightly shallow leading to crowding. The cardiac silhouette is enlarged. There is mild increased hazy opacity in both bases with some silhouetting of the hemidiaphragms.  No evidence of a pneumothorax, overt pulmonary edema, or dense consolidation. IMPRESSION: Bibasilar mild infiltrates. Assessment and Plan:  
 
Hospital Problems as of 7/10/2018  Date Reviewed: 6/6/2018 Codes Class Noted - Resolved POA Acute respiratory failure with hypoxemia [facility]) ICD-10-CM: J96.01 
ICD-9-CM: 518.81  5/24/2018 - Present Yes  
   
 TAZ (obstructive sleep apnea) ICD-10-CM: C76.96 
ICD-9-CM: 327.23  10/2/2017 - Present Yes Hypertension (Chronic) ICD-10-CM: I10 
ICD-9-CM: 401.9  9/29/2017 - Present Yes COPD, moderate ([address] (Chronic) ICD-10-CM: J44.9 ICD-9-CM: 636  9/29/2017 - Present Yes Overview Signed 12/27/2015 12:38 PM by Guillermo Savage NP Complete PFTs: 7/20/15: 
Spirometry is consistent with a moderate obstructive/restrictive defect. . The residual volume is increased relative to other lung volumes suggesting air-trapping. The diffusion capacity corrected for alveolar volume was normal suggesting no loss of alveolo-capillary units. High triglycerides (Chronic) ICD-10-CM: E78.1 ICD-9-CM: 272.1  9/29/2017 - Present Yes  
   
 CHF (congestive heart failure) (HCC) ICD-10-CM: I50.9 ICD-9-CM: 428.0  9/27/2017 - Present Unknown Essential hypertension ICD-10-CM: I10 
ICD-9-CM: 401.9  9/28/2016 - Present Yes PLAN: 
Acute CHF 
CXR: bilateral mild infiltrates BNP 1226 Plan Lasix 40 IV BID Daily Weights Strict Is and Os Troponin leak most likely secondary to demand mismatch in the setting of CHF 
COPD: Not in exacerbation, Continue PRN DuoNebs HTN: Continue current regimen NINO on CKD: Most likely secondary to volume overload, repeat Chem 7 pending, urine Na in process DVT ppx:  heparin Anticipated DC needs:  Discharge in >48 hours, PT/OT in process Code status:  Full Risk:  high Signed: 
Ryan Michelle MD

## 2018-07-10 NOTE — PROGRESS NOTES
07/10/18 1840   Dual Skin Pressure Injury Assessment   Dual Skin Pressure Injury Assessment WDL   Second Care Provider (Based on [address] [place] [address]) Abby Almonte RN   pt is A/O.x4. No skin breakdown on the sacrum or heels. BLEs 3+ edema. Skin is intact. No dentures present. On 2LNC. . Will continue to monitor.

## 2018-07-10 NOTE — PROGRESS NOTES
TRANSFER - IN REPORT:    Verbal report received from MCKENZIE Robert on [facility].  being received from ED for routine progression of care      Report consisted of patients Situation, Background, Assessment and   Recommendations(SBAR). Information from the following report(s) SBAR, Kardex and ED Summary was reviewed with the receiving nurse. Opportunity for questions and clarification was provided. Assessment completed upon patients arrival to unit and care assumed.

## 2018-07-10 NOTE — Clinical Note
Status[de-identified] Inpatient [101] Type of Bed: Medical [8] Inpatient Hospitalization Certified Necessary for the Following Reasons: 3. Patient receiving treatment that can only be provided in an inpatient setting (further clarification in H&P documentation) Admitting Diagnosis: CHF (congestive heart failure) ([facility] [220249] Admitting Physician: Kyra So Attending Physician: Kyra So Estimated Length of Stay: 3-4 Midnights Discharge Plan[de-identified] Home with Office Follow-up

## 2018-07-11 LAB
ALBUMIN SERPL-MCNC: 2.7 G/DL (ref 3.2–4.6)
ALBUMIN/GLOB SERPL: 0.7 {RATIO} (ref 1.2–3.5)
ALP SERPL-CCNC: 103 U/L (ref 50–136)
ALT SERPL-CCNC: 24 U/L (ref 12–65)
ANION GAP SERPL CALC-SCNC: 10 MMOL/L (ref 7–16)
AST SERPL-CCNC: 23 U/L (ref 15–37)
ATRIAL RATE: 66 BPM
BASOPHILS # BLD: 0 K/UL (ref 0–0.2)
BASOPHILS NFR BLD: 0 % (ref 0–2)
BILIRUB SERPL-MCNC: 0.8 MG/DL (ref 0.2–1.1)
BUN SERPL-MCNC: 32 MG/DL (ref 8–23)
CALCIUM SERPL-MCNC: 8.3 MG/DL (ref 8.3–10.4)
CALCULATED P AXIS, ECG09: 90 DEGREES
CALCULATED R AXIS, ECG10: -71 DEGREES
CALCULATED T AXIS, ECG11: 100 DEGREES
CHLORIDE SERPL-SCNC: 108 MMOL/L (ref 98–107)
CO2 SERPL-SCNC: 27 MMOL/L (ref 21–32)
CREAT SERPL-MCNC: 1.77 MG/DL (ref 0.8–1.5)
DIAGNOSIS, 93000: NORMAL
DIFFERENTIAL METHOD BLD: ABNORMAL
EOSINOPHIL # BLD: 0.1 K/UL (ref 0–0.8)
EOSINOPHIL NFR BLD: 1 % (ref 0.5–7.8)
ERYTHROCYTE [DISTWIDTH] IN BLOOD BY AUTOMATED COUNT: 17.7 % (ref 11.9–14.6)
GLOBULIN SER CALC-MCNC: 4 G/DL (ref 2.3–3.5)
GLUCOSE SERPL-MCNC: 102 MG/DL (ref 65–100)
HCT VFR BLD AUTO: 41.5 % (ref 41.1–50.3)
HGB BLD-MCNC: 13.8 G/DL (ref 13.6–17.2)
IMM GRANULOCYTES # BLD: 0 K/UL (ref 0–0.5)
IMM GRANULOCYTES NFR BLD AUTO: 0 % (ref 0–5)
LYMPHOCYTES # BLD: 2.9 K/UL (ref 0.5–4.6)
LYMPHOCYTES NFR BLD: 32 % (ref 13–44)
MAGNESIUM SERPL-MCNC: 2.1 MG/DL (ref 1.8–2.4)
MCH RBC QN AUTO: 27.2 PG (ref 26.1–32.9)
MCHC RBC AUTO-ENTMCNC: 33.3 G/DL (ref 31.4–35)
MCV RBC AUTO: 81.7 FL (ref 79.6–97.8)
MONOCYTES # BLD: 0.7 K/UL (ref 0.1–1.3)
MONOCYTES NFR BLD: 8 % (ref 4–12)
NEUTS SEG # BLD: 5.3 K/UL (ref 1.7–8.2)
NEUTS SEG NFR BLD: 59 % (ref 43–78)
P-R INTERVAL, ECG05: 196 MS
PLATELET # BLD AUTO: 151 K/UL (ref 150–450)
PMV BLD AUTO: 11 FL (ref 10.8–14.1)
POTASSIUM SERPL-SCNC: 3.6 MMOL/L (ref 3.5–5.1)
PROT SERPL-MCNC: 6.7 G/DL (ref 6.3–8.2)
Q-T INTERVAL, ECG07: 448 MS
QRS DURATION, ECG06: 110 MS
QTC CALCULATION (BEZET), ECG08: 469 MS
RBC # BLD AUTO: 5.08 M/UL (ref 4.23–5.67)
SODIUM SERPL-SCNC: 145 MMOL/L (ref 136–145)
TROPONIN I SERPL-MCNC: 0.54 NG/ML (ref 0.02–0.05)
VENTRICULAR RATE, ECG03: 66 BPM
WBC # BLD AUTO: 9 K/UL (ref 4.3–11.1)

## 2018-07-11 PROCEDURE — 80053 COMPREHEN METABOLIC PANEL: CPT | Performed by: INTERNAL MEDICINE

## 2018-07-11 PROCEDURE — 94640 AIRWAY INHALATION TREATMENT: CPT

## 2018-07-11 PROCEDURE — 74011250637 HC RX REV CODE- 250/637: Performed by: HOSPITALIST

## 2018-07-11 PROCEDURE — 74011250637 HC RX REV CODE- 250/637: Performed by: INTERNAL MEDICINE

## 2018-07-11 PROCEDURE — 74011000250 HC RX REV CODE- 250: Performed by: INTERNAL MEDICINE

## 2018-07-11 PROCEDURE — C8929 TTE W OR WO FOL WCON,DOPPLER: HCPCS

## 2018-07-11 PROCEDURE — 85025 COMPLETE CBC W/AUTO DIFF WBC: CPT | Performed by: INTERNAL MEDICINE

## 2018-07-11 PROCEDURE — 36415 COLL VENOUS BLD VENIPUNCTURE: CPT | Performed by: INTERNAL MEDICINE

## 2018-07-11 PROCEDURE — 83735 ASSAY OF MAGNESIUM: CPT | Performed by: HOSPITALIST

## 2018-07-11 PROCEDURE — 74011250636 HC RX REV CODE- 250/636: Performed by: INTERNAL MEDICINE

## 2018-07-11 PROCEDURE — 97161 PT EVAL LOW COMPLEX 20 MIN: CPT

## 2018-07-11 PROCEDURE — 94760 N-INVAS EAR/PLS OXIMETRY 1: CPT

## 2018-07-11 PROCEDURE — 74011000302 HC RX REV CODE- 302: Performed by: HOSPITALIST

## 2018-07-11 PROCEDURE — 76450000000

## 2018-07-11 PROCEDURE — 97165 OT EVAL LOW COMPLEX 30 MIN: CPT

## 2018-07-11 PROCEDURE — 77010033678 HC OXYGEN DAILY

## 2018-07-11 PROCEDURE — 65270000029 HC RM PRIVATE

## 2018-07-11 PROCEDURE — 84484 ASSAY OF TROPONIN QUANT: CPT | Performed by: HOSPITALIST

## 2018-07-11 PROCEDURE — 86580 TB INTRADERMAL TEST: CPT | Performed by: HOSPITALIST

## 2018-07-11 RX ORDER — IPRATROPIUM BROMIDE AND ALBUTEROL SULFATE 2.5; .5 MG/3ML; MG/3ML
3 SOLUTION RESPIRATORY (INHALATION)
Status: DISCONTINUED | OUTPATIENT
Start: 2018-07-11 | End: 2018-07-20 | Stop reason: HOSPADM

## 2018-07-11 RX ORDER — POTASSIUM CHLORIDE 20 MEQ/1
20 TABLET, EXTENDED RELEASE ORAL DAILY
Status: DISCONTINUED | OUTPATIENT
Start: 2018-07-12 | End: 2018-07-12

## 2018-07-11 RX ORDER — LANOLIN ALCOHOL/MO/W.PET/CERES
400 CREAM (GRAM) TOPICAL 2 TIMES DAILY
Status: DISCONTINUED | OUTPATIENT
Start: 2018-07-11 | End: 2018-07-18

## 2018-07-11 RX ORDER — LISINOPRIL 5 MG/1
5 TABLET ORAL DAILY
Status: DISCONTINUED | OUTPATIENT
Start: 2018-07-11 | End: 2018-07-20 | Stop reason: HOSPADM

## 2018-07-11 RX ORDER — ASPIRIN 81 MG/1
81 TABLET ORAL DAILY
Status: DISCONTINUED | OUTPATIENT
Start: 2018-07-11 | End: 2018-07-20 | Stop reason: HOSPADM

## 2018-07-11 RX ADMIN — IPRATROPIUM BROMIDE AND ALBUTEROL SULFATE 3 ML: .5; 3 SOLUTION RESPIRATORY (INHALATION) at 00:00

## 2018-07-11 RX ADMIN — PERFLUTREN 1 ML: 6.52 INJECTION, SUSPENSION INTRAVENOUS at 13:00

## 2018-07-11 RX ADMIN — LISINOPRIL 5 MG: 5 TABLET ORAL at 11:20

## 2018-07-11 RX ADMIN — ATORVASTATIN CALCIUM 20 MG: 10 TABLET, FILM COATED ORAL at 21:15

## 2018-07-11 RX ADMIN — TUBERCULIN PURIFIED PROTEIN DERIVATIVE 5 UNITS: 5 INJECTION, SOLUTION INTRADERMAL at 09:27

## 2018-07-11 RX ADMIN — Medication 400 MG: at 09:25

## 2018-07-11 RX ADMIN — HEPARIN SODIUM 5000 UNITS: 5000 INJECTION, SOLUTION INTRAVENOUS; SUBCUTANEOUS at 21:16

## 2018-07-11 RX ADMIN — IPRATROPIUM BROMIDE AND ALBUTEROL SULFATE 3 ML: .5; 3 SOLUTION RESPIRATORY (INHALATION) at 03:46

## 2018-07-11 RX ADMIN — ASPIRIN 81 MG: 81 TABLET, COATED ORAL at 09:25

## 2018-07-11 RX ADMIN — IPRATROPIUM BROMIDE AND ALBUTEROL SULFATE 3 ML: .5; 3 SOLUTION RESPIRATORY (INHALATION) at 07:01

## 2018-07-11 RX ADMIN — CARVEDILOL 12.5 MG: 12.5 TABLET, FILM COATED ORAL at 17:03

## 2018-07-11 RX ADMIN — FUROSEMIDE 40 MG: 10 INJECTION, SOLUTION INTRAMUSCULAR; INTRAVENOUS at 09:26

## 2018-07-11 RX ADMIN — HEPARIN SODIUM 5000 UNITS: 5000 INJECTION, SOLUTION INTRAVENOUS; SUBCUTANEOUS at 09:25

## 2018-07-11 RX ADMIN — Medication 10 ML: at 21:16

## 2018-07-11 RX ADMIN — FUROSEMIDE 40 MG: 10 INJECTION, SOLUTION INTRAMUSCULAR; INTRAVENOUS at 17:03

## 2018-07-11 RX ADMIN — Medication 400 MG: at 17:03

## 2018-07-11 RX ADMIN — CARVEDILOL 12.5 MG: 12.5 TABLET, FILM COATED ORAL at 09:25

## 2018-07-11 RX ADMIN — Medication 10 ML: at 05:36

## 2018-07-11 RX ADMIN — Medication 10 ML: at 13:24

## 2018-07-11 NOTE — CONSULTS
[facility] Cardiology Consultation        Date of  Admission: 7/10/2018  3:32 PM     Primary Care Physician: Dr. Jenniffer Levy  Primary Cardiologist: Dr. Rito Wilder  Referring Physician: Keyshawn Castrejon NP  Consulting Physician: Dr. Marialuisa Stevens    CC/Reason for consult: CHF    HPI:  Huong Rios is a 74 y.o. male with h/o chronic systolic heart failure (h/o EF 30-35% 9/2017) with recent Echo 5/18 showing EF 45-50%, moderate non-obstructive CAD by cath 1/18, HTN, COPD on chronic 4L O2 and CKD who presented to the [facility] ED with c/o 2 week h/o worsening lower extremity swelling and SOB. Pt reports he is taking Lasix 40mg bid as prescribed and states he has only missed one dose. He denies recent change in diet, salt, or fluid intake. Troponin: 0.61 and 0.54. BNP: 1226. Pt was admitted and given 60 mg IV lasix at 1700 followed by 40 mg at 2200. [facility] Cardiology was consulted for CHF. Pt still has c/o lower extremity swelling but denies worsened shortness of breath, chest pain, and palpitations. He appears to have poor insight, discussed with patient importance of fluid restriction to 2L, diet control, and taking lasix as prescribed.       Past Medical History:   Diagnosis Date    Acute CHF (congestive heart failure) ([address] 4/25/2015    Acute combined systolic and diastolic congestive heart failure ([address] 4/28/2015    Chronic obstructive pulmonary disease (HCC)     De Quervain's tenosynovitis, right 4/28/2015    Dyspnea on exertion 6/28/2015    Elevated serum creatinine 4/25/2015    Elevated troponin 6/28/2015    History of coronary artery disease     MI at 25 yo    History of right knee surgery     cartilage removal    History of shingles     Malignant hypertension 4/25/2015    [facility]       Past Surgical History:   Procedure Laterality Date    HX HEART CATHETERIZATION  6/29/2015    no intervention    HX KNEE ARTHROSCOPY Right     removal of cartilage       Allergies   Allergen Reactions    Pcn [Penicillins] Other (comments)     \"makes my heart stop\"      Social History     Social History    Marital status:      Spouse name: N/A    Number of children: N/A    Years of education: N/A     Occupational History    retired      Social History Main Topics    Smoking status: Former Smoker     Packs/day: 0.25     Years: 20.00     Types: Cigarettes     Quit date: 4/5/2015    Smokeless tobacco: Never Used    Alcohol use No    Drug use: No    Sexual activity: Not on file     Other Topics Concern     Service No    Blood Transfusions No     no issues with receiving    Caffeine Concern No    Special Diet No    Exercise No    Seat Belt Yes     Social History Narrative    Lives with his wife     Family History   Problem Relation Age of Onset    Hypertension Mother     No Known Problems Father         Current Facility-Administered Medications   Medication Dose Route Frequency    [START ON 7/12/2018] potassium chloride (K-DUR, KLOR-CON) SR tablet 20 mEq  20 mEq Oral DAILY    magnesium oxide (MAG-OX) tablet 400 mg  400 mg Oral BID    aspirin delayed-release tablet 81 mg  81 mg Oral DAILY    albuterol-ipratropium (DUO-NEB) 2.5 MG-0.5 MG/3 ML  3 mL Nebulization Q4H PRN    tuberculin injection 5 Units  5 Units IntraDERMal ONCE    furosemide (LASIX) injection 40 mg  40 mg IntraVENous BID    atorvastatin (LIPITOR) tablet 20 mg  20 mg Oral QHS    carvedilol (COREG) tablet 12.5 mg  12.5 mg Oral BID WITH MEALS    sodium chloride (NS) flush 5-10 mL  5-10 mL IntraVENous Q8H    sodium chloride (NS) flush 5-10 mL  5-10 mL IntraVENous PRN    heparin (porcine) injection 5,000 Units  5,000 Units SubCUTAneous Q12H       Review of symptoms:  General: no recent weight loss/gain, weakness, fatigue, fever or chills   Skin: no rashes, lumps, or other skin changes   HEENT: no headache, dizziness, lightheadedness, vision changes, hearing changes, tinnitus, vertigo, sinus pressure/pain, bleeding gums, sore throat, or hoarseness   Neck: no swollen glands, goiter, pain or stiffness   Respiratory: no cough, sputum, hemoptysis, +dyspnea, wheezing   Cardiovascular: no chest pain or discomfort, palpitations, orthopnea, paroxysmal nocturnal dyspnea, +peripheral edema   Gastrointestinal: no trouble swallowing, heartburn, change of appetite, nausea, change in bowel habits, pain with defecation, rectal bleeding or black/tarry stools, hemorrhoids, constipation, diarrhea, abdominal pain, jaundice, liver or gallbladder problems   Urinary: no frequency, urgency , hematuria, burning/pain with urination, recent flank pain, polyuria, nocturia, or difficulty urinating   Peripheral Vascular: no claudication, leg cramps, prior DVTs, swelling of calves, legs, or feet, color change, or swelling with redness or tenderness   Musculoskeletal: no muscle or joint pain/stiffness, joint swelling, erythema of joints, or back pain   Psychiatric: no depression, mental disorders, or excessive stress   Neurological: no history of CVA, dizziness, no sensory or motor loss, seizures, syncope, tremors, numbness, tingling, no changes in mood, attention, or speech, no changes in orientation, memory, insight, or judgment. no headache, vertigo. Hematologic: no anemia, easy bruising or bleeding   Endocrine: no diabetes, thyroid problems, heat or cold intolerance, excessive sweating, polyuria, polydipsia        Subjective:   Physical Exam    Visit Vitals    /60 (BP 1 Location: Left arm, BP Patient Position: At rest)    Pulse 69    Temp 97.4 °F (36.3 °C)    Resp 20    Ht 5' 9\" (1.753 m)    Wt 98 kg (216 lb)    SpO2 93%    BMI 31.9 kg/m2     General Appearance:  Well developed, well nourished,alert and oriented x 3, and individual in no acute distress. Ears/Nose/Mouth/Throat:   Hearing grossly normal.         Neck: Supple, +JVD    Chest:   Lungs clear to auscultation bilaterally.    Cardiovascular:  Regular rate and rhythm, S1, S2 normal.   Abdomen:   Soft, non-tender, bowel sounds are active. Extremities: 2-3+ pitting edema bilaterally   Skin: Warm and dry. Cardiographics      Labs:   Recent Results (from the past 24 hour(s))   EKG, 12 LEAD, INITIAL    Collection Time: 07/10/18  3:39 PM   Result Value Ref Range    Ventricular Rate 66 BPM    Atrial Rate 66 BPM    P-R Interval 196 ms    QRS Duration 110 ms    Q-T Interval 448 ms    QTC Calculation (Bezet) 469 ms    Calculated P Axis 90 degrees    Calculated R Axis -71 degrees    Calculated T Axis 100 degrees    Diagnosis       !! AGE AND GENDER SPECIFIC ECG ANALYSIS !! Normal sinus rhythm  Possible Left atrial enlargement  Left axis deviation  Possible Inferior infarct (cited on or before 04-MAY-2018)  Anterior infarct (cited on or before 17-MAY-2018)  Abnormal ECG  When compared with ECG of 06-JUL-2018 17:04,  Premature ventricular complexes are no longer Present  Confirmed by Katie Ramirez ([id number]) on 7/11/2018 7:28:19 AM     CBC WITH AUTOMATED DIFF    Collection Time: 07/10/18  3:41 PM   Result Value Ref Range    WBC 11.4 (H) 4.3 - 11.1 K/uL    RBC 5.36 4.23 - 5.67 M/uL    HGB 14.6 13.6 - 17.2 g/dL    HCT 44.1 41.1 - 50.3 %    MCV 82.3 79.6 - 97.8 FL    MCH 27.2 26.1 - 32.9 PG    MCHC 33.1 31.4 - 35.0 g/dL    RDW 17.8 (H) 11.9 - 14.6 %    PLATELET 832 239 - 161 K/uL    MPV 10.5 (L) 10.8 - 14.1 FL    DF AUTOMATED      NEUTROPHILS 69 43 - 78 %    LYMPHOCYTES 20 13 - 44 %    MONOCYTES 11 4.0 - 12.0 %    EOSINOPHILS 0 (L) 0.5 - 7.8 %    BASOPHILS 0 0.0 - 2.0 %    IMMATURE GRANULOCYTES 0 0.0 - 5.0 %    ABS. NEUTROPHILS 7.8 1.7 - 8.2 K/UL    ABS. LYMPHOCYTES 2.3 0.5 - 4.6 K/UL    ABS. MONOCYTES 1.3 0.1 - 1.3 K/UL    ABS. EOSINOPHILS 0.0 0.0 - 0.8 K/UL    ABS. BASOPHILS 0.0 0.0 - 0.2 K/UL    ABS. IMM.  GRANS. 0.0 0.0 - 0.5 K/UL   METABOLIC PANEL, COMPREHENSIVE    Collection Time: 07/10/18  3:41 PM   Result Value Ref Range    Sodium 144 136 - 145 mmol/L    Potassium 4.1 3.5 - 5.1 mmol/L    Chloride 107 98 - 107 mmol/L    CO2 29 21 - 32 mmol/L    Anion gap 8 7 - 16 mmol/L    Glucose 132 (H) 65 - 100 mg/dL    BUN 31 (H) 8 - 23 MG/DL    Creatinine 2.01 (H) 0.8 - 1.5 MG/DL    GFR est AA 42 (L) >60 ml/min/1.73m2    GFR est non-AA 35 (L) >60 ml/min/1.73m2    Calcium 8.3 8.3 - 10.4 MG/DL    Bilirubin, total 1.1 0.2 - 1.1 MG/DL    ALT (SGPT) 29 12 - 65 U/L    AST (SGOT) 30 15 - 37 U/L    Alk. phosphatase 111 50 - 136 U/L    Protein, total 7.2 6.3 - 8.2 g/dL    Albumin 2.9 (L) 3.2 - 4.6 g/dL    Globulin 4.3 (H) 2.3 - 3.5 g/dL    A-G Ratio 0.7 (L) 1.2 - 3.5     BNP    Collection Time: 07/10/18  3:41 PM   Result Value Ref Range    BNP 1226 pg/mL   TROPONIN I    Collection Time: 07/10/18  3:41 PM   Result Value Ref Range    Troponin-I, Qt. 0.61 (HH) 0.02 - 4.95 NG/ML   METABOLIC PANEL, COMPREHENSIVE    Collection Time: 07/11/18  4:41 AM   Result Value Ref Range    Sodium 145 136 - 145 mmol/L    Potassium 3.6 3.5 - 5.1 mmol/L    Chloride 108 (H) 98 - 107 mmol/L    CO2 27 21 - 32 mmol/L    Anion gap 10 7 - 16 mmol/L    Glucose 102 (H) 65 - 100 mg/dL    BUN 32 (H) 8 - 23 MG/DL    Creatinine 1.77 (H) 0.8 - 1.5 MG/DL    GFR est AA 48 (L) >60 ml/min/1.73m2    GFR est non-AA 40 (L) >60 ml/min/1.73m2    Calcium 8.3 8.3 - 10.4 MG/DL    Bilirubin, total 0.8 0.2 - 1.1 MG/DL    ALT (SGPT) 24 12 - 65 U/L    AST (SGOT) 23 15 - 37 U/L    Alk.  phosphatase 103 50 - 136 U/L    Protein, total 6.7 6.3 - 8.2 g/dL    Albumin 2.7 (L) 3.2 - 4.6 g/dL    Globulin 4.0 (H) 2.3 - 3.5 g/dL    A-G Ratio 0.7 (L) 1.2 - 3.5     CBC WITH AUTOMATED DIFF    Collection Time: 07/11/18  4:41 AM   Result Value Ref Range    WBC 9.0 4.3 - 11.1 K/uL    RBC 5.08 4.23 - 5.67 M/uL    HGB 13.8 13.6 - 17.2 g/dL    HCT 41.5 41.1 - 50.3 %    MCV 81.7 79.6 - 97.8 FL    MCH 27.2 26.1 - 32.9 PG    MCHC 33.3 31.4 - 35.0 g/dL    RDW 17.7 (H) 11.9 - 14.6 %    PLATELET 575 983 - 617 K/uL    MPV 11.0 10.8 - 14.1 FL    DF AUTOMATED      NEUTROPHILS 59 43 - 78 %    LYMPHOCYTES 32 13 - 44 %    MONOCYTES 8 4.0 - 12.0 %    EOSINOPHILS 1 0.5 - 7.8 %    BASOPHILS 0 0.0 - 2.0 %    IMMATURE GRANULOCYTES 0 0.0 - 5.0 %    ABS. NEUTROPHILS 5.3 1.7 - 8.2 K/UL    ABS. LYMPHOCYTES 2.9 0.5 - 4.6 K/UL    ABS. MONOCYTES 0.7 0.1 - 1.3 K/UL    ABS. EOSINOPHILS 0.1 0.0 - 0.8 K/UL    ABS. BASOPHILS 0.0 0.0 - 0.2 K/UL    ABS. IMM. GRANS. 0.0 0.0 - 0.5 K/UL   TROPONIN I    Collection Time: 07/11/18  4:41 AM   Result Value Ref Range    Troponin-I, Qt. 0.54 (HH) 0.02 - 0.05 NG/ML   MAGNESIUM    Collection Time: 07/11/18  4:41 AM   Result Value Ref Range    Magnesium 2.1 1.8 - 2.4 mg/dL       Pt has been seen and examined by Dr. Kenya Mock. He agrees with the following assessment and plan. Assessment/Plan:          Diagnosis    Acute on chronic systolic heart failure (HCC)  EF 45-50%- agree with 40 mg Lasix IV bid, continue Coreg, resume home ACE-I, monitor daily weights, I&O, electrolytes and renal function closely, 2 L fluid restriction, repeat Echo    Acute respiratory failure with hypoxemia (HCC)- O2 per primary team    Moderate non obstructive CAD (coronary artery disease)- cath 1/2018, ASA, BB, ACE-I, statin    CKD (chronic kidney disease) stage 3, GFR 30-59 ml/min- monitor on IV diuresis    COPD, moderate ([address]- per primary team    Hypertension- fairly controlled, resume home lisinopril 5mg qday, monitor       Thank you for consulting [address] [place] [address] [address] [facility] and allowing us to participate in the care of this patient. We will continue to follow along with you.     Rosamaria Akins PA-C

## 2018-07-11 NOTE — ACP (ADVANCE CARE PLANNING)
Patient with several repeat admissions since January. Pointed this out to patient, and he states \"I think it's my job\". He has fair understanding of his heart failure. On one hand, he thinks we're trying to figure out how to help him/fix him, and on the other hand, he says \"I know it's not going to get better. I don't feel like it's going to get better\". Discussed difficulty in knowing patient's prognosis. He states \"yes you do. When it's your time, it's your time. \"  I asked if he meant that \"it's your time\" means your heart stops and you stop breathing. He replied \"that's not at all what I said, I want you to try every valiant effort\". This is in line with his previously stated wishes and per his HCPOA, which is on file. Dyspnea is patient's most life-impacting symptom. He says some days it is mild, and other days severe, but it is always present. Discussed energy conservation. Patient says he should be more practical about it, and he certainly \"tries to take on the world\" when he is having a good day.

## 2018-07-11 NOTE — CONSULTS
Palliative Care    Patient: Anette Garnett. MRN: [id number]  SSN: [id number]    YOB: 1945  Age: 74 y.o. Sex: male       Date of Request: 7/11/2018  Date of Consult:  7/11/2018  Reason for Consult:  goals of care and heart failure  Requesting Physician: Dr. Deandre Koo     Assessment/Plan:     Principal Diagnosis:    Dyspnea  R06.00  Additional Diagnoses:   · Edema  R60.9  · Counseling, Encounter for Medical Advice  Z71.9  · Encounter for Palliative Care  Z51.5    Palliative Performance Scale (PPS):  PPS: 80    Medical Decision Making:   Reviewed and summarized chart from admission to present. Discussed case with appropriate providers. Reviewed laboratory and x-ray data: CBC, CMP, CXR, BNP    Patient resting in bed, no distress noted, no family present. Patient known to palliative care from admission in January 2018. Patient with several repeat admissions since January. Pointed this out to patient, and he states \"I think it's my job\". He has fair understanding of his heart failure. On one hand, he thinks we're trying to figure out what's wrong and how to help him, and on the other hand, he says \"I know it's not going to get better. I don't feel like it's going to get better\". Discussed difficulty in knowing patient's prognosis. He states \"yes you do. When it's your time, it's your time. \"  I asked if he meant that \"it's your time\" means your heart stops and you stop breathing. He replied \"that's not at all what I said, I want you to try every valiant effort\". This is in line with his previously stated wishes and per his HCPOA, which is on file. Dyspnea is patient's most life-impacting symptom. He says some days it is mild, and other days severe, but it is always present. Discussed energy conservation. Patient says he should be more practical about it, and he certainly \"tries to take on the world\" when he is having a good day. Long discussion with patient about his family.   He was very very close to his dad and brother. His brother  from stage IV cancer last year. He was patient's best friend. Patient has a lot of local support. His wife works part-time, but he has children, ranging from age 19-55s. He tells me he has 23 daughters, 7 sons, and 100+ grandchildren. His two youngest children still live in the home. He says that he is never at home by himself. No further palliative care needs identified, will not follow. Please re-consult if needs arise. Thank you for the opportunity to participate in Mr. Tye Red care. .    Subjective:     History obtained from:  Patient and Chart    Chief Complaint: Intermittent dyspnea  History of Present Illness:  Mr. Mimi Graham is a 75 yo male with PMH of systolic and diastolic heart failure, COPD, CAD, HTN, MI, and other history as listed below. He presented to [facility] ER on 7/10 with worsening LE edema and shortness of breath despite recent hospitalization for IV lasix increase in PO lasix. Labs included elevated BNP at 1226. He was admitted for acute on chronic heart failure. Advance Directive: Yes       Code Status:  Full Code            Health Care Power of : Yes - Copy of [address] on file.     Past Medical History:   Diagnosis Date    Acute CHF (congestive heart failure) ([address] 2015    Acute combined systolic and diastolic [facility]    Chronic obstructive pulmonary disease (HCC)     De Quervain's tenosynovitis, right 2015    Dyspnea on exertion 2015    Elevated serum creatinine 2015    Elevated troponin 2015    History of coronary artery disease     MI at 27 yo    History of right knee surgery     cartilage removal    History of shingles     Malignant hypertension 2015    [facility])       Past Surgical History:   Procedure Laterality Date    HX HEART CATHETERIZATION  2015    no intervention    HX KNEE ARTHROSCOPY Right     removal of cartilage     Family History   Problem Relation Age of Onset    Hypertension Mother     No Known Problems Father       Social History   Substance Use Topics    Smoking status: Former Smoker     Packs/day: 0.25     Years: 20.00     Types: Cigarettes     Quit date: 4/5/2015    Smokeless tobacco: Never Used    Alcohol use No     Prior to Admission medications    Medication Sig Start Date End Date Taking? Authorizing Provider   metOLazone (ZAROXOLYN) 10 mg tablet Take 0.5 Tabs by mouth daily. Thirty minutes before your morning lasix 7/6/18   Alisha Birch MD   furosemide (LASIX) 40 mg tablet Take 1 Tab by mouth two (2) times a day. 6/8/18   Beryl Chacon MD   spironolactone (ALDACTONE) 25 mg tablet Take 1 Tab by mouth daily. 5/31/18   Maldonado Panchal MD   fluticasone (FLONASE) 50 mcg/actuation nasal spray 2 Sprays by Both Nostrils route daily. 3/29/18   Albin Francis MD   melatonin 3 mg tablet Take 3 mg by mouth nightly. Historical Provider   albuterol (VENTOLIN HFA) 90 mcg/actuation inhaler Take 2 Puffs by inhalation four (4) times daily. 2/6/18   Chase Luciano NP   lisinopril (PRINIVIL, ZESTRIL) 5 mg tablet Take 1 Tab by mouth daily. 1/29/18   Ashlee Frazier MD   omeprazole (PRILOSEC) 20 mg capsule Take 1 Cap by mouth daily. 1/29/18   Ashlee Frazier MD   atorvastatin (LIPITOR) 20 mg tablet Take 1 Tab by mouth nightly. 1/12/18   Cate Figueroa NP   guaiFENesin ER (MUCINEX) 1,200 mg Ta12 ER tablet Take 1 Tab by mouth every twelve (12) hours. 1/12/18   Cate Figueroa NP   polyethylene glycol (MIRALAX) 17 gram packet Take 1 Packet by mouth daily. Patient taking differently: Take 17 g by mouth daily as needed. 1/13/18   Cate Figueroa NP   albuterol-ipratropium (DUO-NEB) 2.5 mg-0.5 mg/3 ml nebu 3 mL by Nebulization route four (4) times daily. Diaignosis--J44.9 12/13/17   Chase Luciano NP   allopurinol (ZYLOPRIM) 100 mg tablet Take 1 Tab by mouth daily.  7/11/17   Adwoa Jasmine Kashmir Felder MD   carvedilol (COREG) 25 mg tablet Take 12.5 mg by mouth two (2) times daily (with meals). Historical Provider   aspirin delayed-release 81 mg tablet Take 1 Tab by mouth daily. 4/29/15   Brian Casanova MD       Allergies   Allergen Reactions    Pcn [Penicillins] Other (comments)     \"makes my heart stop\"        Review of Systems:  A comprehensive review of systems was negative except for:   Constitutional: Positive for fatigue. Cardiovascular/Respiratory: Positive for dyspnea on exertion. Objective:     Visit Vitals    /74 (BP 1 Location: Left arm, BP Patient Position: Sitting)    Pulse 71    Temp 97.7 °F (36.5 °C)    Resp 24    Ht 5' 9\" (1.753 m)    Wt 98 kg (216 lb)    SpO2 98%    BMI 31.9 kg/m2        Physical Exam:    General:  Cooperative. No acute distress. Eyes:  Conjunctivae/corneas clear    Nose: Nares normal. Septum midline. Neck: Supple, symmetrical, trachea midline, no JVD. Lungs:   Diminished to bases bilaterally, unlabored. Heart:  Regular rate and rhythm. Abdomen:   Soft, non-tender, non-distended. Extremities: Normal, atraumatic, no cyanosis. 2+ BLE pitting edema. Skin: Skin color, texture, turgor normal. No rash. Neurologic: Nonfocal.   Psych: Alert and oriented. Assessment:     Hospital Problems  Date Reviewed: 6/6/2018          Codes Class Noted POA    Acute respiratory failure with hypoxemia [facility]) ICD-10-CM: J96.01  ICD-9-CM: 518.81  5/24/2018 Yes        TAZ (obstructive sleep apnea) ICD-10-CM: G47.33  ICD-9-CM: 327.23  10/2/2017 Yes        Hypertension (Chronic) ICD-10-CM: I10  ICD-9-CM: 401.9  9/29/2017 Yes        COPD, moderate (HCC) (Chronic) ICD-10-CM: J44.9  ICD-9-CM: 496  9/29/2017 Yes    Overview Signed 12/27/2015 12:38 PM by Cleve Razo NP     Complete PFTs: 7/20/15:  Spirometry is consistent with a moderate obstructive/restrictive defect. . The residual volume is increased relative to other lung volumes suggesting air-trapping. The diffusion capacity corrected for alveolar volume was normal suggesting no loss of alveolo-capillary units.               High triglycerides (Chronic) ICD-10-CM: E78.1  ICD-9-CM: 272.1  9/29/2017 Yes        CHF (congestive heart failure) [facility] ICD-10-CM: I50.9  ICD-9-CM: 428.0  9/27/2017 Unknown        Essential hypertension ICD-10-CM: I10  ICD-9-CM: 401.9  9/28/2016 Yes              Signed By: Juan Jose Barkley NP     July 11, 2018

## 2018-07-11 NOTE — PROGRESS NOTES
Problem: Self Care Deficits Care Plan (Adult)  Goal: *Acute Goals and Plan of Care (Insert Text)  1. Patient will complete lower body bathing and dressing with modified independence and adaptive equipment as needed. 2. Patient will complete toileting with modified independence. 3. Patient will tolerate 30 minutes of OT treatment with 2-3 rest breaks to increase activity tolerance for ADLs. 4. Patient will complete functional transfers with modified independence and adaptive equipment as needed. 5. Patient will verbalize with independence 5 ways to complete energy conservation with ADL. Timeframe: 7 visits       OCCUPATIONAL THERAPY: Initial Assessment and AM 7/11/2018  INPATIENT: Hospital Day: 2  Payor: Madison Sadler / Plan: [address] [place] [address] HMO / Product Type: Managed Care Medicare /      NAME/AGE/GENDER: Kenton Espinosa is a 74 y.o. male   PRIMARY DIAGNOSIS:  CHF (congestive heart failure) ([facility] [address]  CHF (congestive heart failure) ([facility] [address] <principal problem not specified> <principal problem not specified>        ICD-10: Treatment Diagnosis:    · Generalized Muscle Weakness (M62.81)  · Other lack of cordination (R27.8)   Precautions/Allergies:     Pcn [penicillins]      ASSESSMENT:     Mr. Kashmir Pulido was admitted with CHF. Pt lives with his wife in a single story home with a tub and is independent at baseline. This session, pt presented sitting edge of bed after working with PT. Pt is short of breath with activity on room air, but O2 levels remain at 97-99%. Pt c/o swelling in B LEs that limit him some with functional transfers/ADL. Pt's upper body strength is good. Pt completed functional transfers/functional mobility with supervision using a rolling walker. Pt demonstrated deficits in activity tolerance, swelling, and knowledge of energy conservation impacting ADLs. At the end of the session, pt was left sitting up in chair with needs in reach.  Pt presented below functional baseline and would benefit from skilled acute OT services to address deficits. This section established at most recent assessment   PROBLEM LIST (Impairments causing functional limitations):  1. Decreased ADL/Functional Activities  2. Decreased Transfer Abilities  3. Decreased Ambulation Ability/Technique  4. Decreased Balance  5. Increased Pain  6. Decreased Activity Tolerance  7. Decreased Pacing Skills  8. Decreased Work Simplification/Energy Conservation Techniques  9. Increased Fatigue  10. Increased Shortness of Breath  11. Decreased Flexibility/Joint Mobility  12. Edema/Girth   INTERVENTIONS PLANNED: (Benefits and precautions of occupational therapy have been discussed with the patient.)  1. Activities of daily living training  2. Adaptive equipment training  3. Balance training  4. Clothing management  5. Donning&doffing training  6. Neuromuscular re-eduation  7. Therapeutic activity  8. Therapeutic exercise     TREATMENT PLAN: Frequency/Duration: Follow patient 3 times per week to address above goals. Rehabilitation Potential For Stated Goals: Excellent     RECOMMENDED REHABILITATION/EQUIPMENT: (at time of discharge pending progress): Due to the probability of continued deficits (see above) this patient will not likely need continued skilled occupational therapy after discharge. (dependent on progress-TBD)  Equipment:    TBD              OCCUPATIONAL PROFILE AND HISTORY:   History of Present Injury/Illness (Reason for Referral):  See H&P  Past Medical History/Comorbidities:   Mr. Rosa Gordon  has a past medical history of Acute CHF (congestive heart failure) ([facility] [address] (4/25/2015); Acute combined systolic and diastolic congestive heart failure ([facility] [address] (4/28/2015); Chronic obstructive pulmonary disease ([facility] [address] De Quervain's tenosynovitis, right (4/28/2015); Dyspnea on exertion (6/28/2015); Elevated serum creatinine (4/25/2015); Elevated troponin (6/28/2015); History of coronary artery disease; History of right knee surgery;  History of shingles; Malignant hypertension (4/25/2015); and MI (myocardial infarction) ([facility] [address] Mr. Harlo Nissen  has a past surgical history that includes hx knee arthroscopy (Right) and hx heart catheterization (6/29/2015). Social History/Living Environment:   Home Environment: Private residence  # Steps to Enter: 4  Rails to Enter: Yes  Hand Rails : Bilateral  Wheelchair Ramp: No  One/Two Story Residence: One story  Living Alone: No  Support Systems: Spouse/Significant Other/Partner, Family member(s)  Patient Expects to be Discharged to[de-identified] Private residence  Current DME Used/Available at Home: [employer], rollator  Tub or Shower Type: Shower  Prior Level of Function/Work/Activity:  Pt lives with wife and independent with ADL. Pt reports he uses a cane occasionally. Personal Factors: Other factors that influence how disability is experienced by the patient:  Multiple co-morbidities- see above   Number of Personal Factors/Comorbidities that affect the Plan of Care: Expanded review of therapy/medical records (1-2):  MODERATE COMPLEXITY   ASSESSMENT OF OCCUPATIONAL PERFORMANCE[de-identified]   Activities of Daily Living:   Basic ADLs (From Assessment) Complex ADLs (From Assessment)   Feeding: Setup  Oral Facial Hygiene/Grooming: Stand-by assistance  Bathing: Minimum assistance  Upper Body Dressing: Stand-by assistance  Lower Body Dressing: Moderate assistance  Toileting: Minimum assistance Instrumental ADL  Homemaking: Maximum assistance   Grooming/Bathing/Dressing Activities of Daily Living     Cognitive Retraining  Safety/Judgement: Awareness of environment                       Bed/Mat Mobility  Rolling: Supervision  Supine to Sit: Supervision  Sit to Stand: Supervision  Bed to Chair: Supervision  Scooting: Supervision       Most Recent Physical Functioning:   Gross Assessment:  AROM: Within functional limits  Strength:  Within functional limits  Coordination: Within functional limits               Posture:  Posture (WDL): Exceptions to WDL  Posture Assessment: Forward head, Rounded shoulders, Trunk flexion  Balance:  Sitting: Intact  Standing: Impaired  Standing - Static: Good  Standing - Dynamic : Fair Bed Mobility:  Rolling: Supervision  Supine to Sit: Supervision  Scooting: Supervision  Wheelchair Mobility:     Transfers:  Sit to Stand: Supervision  Stand to Sit: Supervision  Bed to Chair: Supervision            Patient Vitals for the past 6 hrs:   BP BP Patient Position SpO2 O2 Flow Rate (L/min) Pulse   07/11/18 0701 - - 96 % 4 l/min -   07/11/18 0712 110/60 At rest 93 % - 69   07/11/18 0925 - - 99 % - -       Mental Status  Neurologic State: Alert  Orientation Level: Oriented X4  Cognition: Appropriate decision making, Appropriate for age attention/concentration, Follows commands  Perception: Appears intact  Perseveration: No perseveration noted  Safety/Judgement: Awareness of environment                          Physical Skills Involved:  1. Balance  2. Strength  3. Activity Tolerance  4. Pain (acute)  5. Edema Cognitive Skills Affected (resulting in the inability to perform in a timely and safe manner): 1. none Psychosocial Skills Affected:  1. none   Number of elements that affect the Plan of Care: 3-5:  MODERATE COMPLEXITY   CLINICAL DECISION MAKING:   [facility] AM-PAC 6 Clicks   Daily Activity Inpatient Short Form  How much help from another person does the patient currently need. .. Total A Lot A Little None   1. Putting on and taking off regular lower body clothing? [] 1   [x] 2   [] 3   [] 4   2. Bathing (including washing, rinsing, drying)? [] 1   [] 2   [x] 3   [] 4   3. Toileting, which includes using toilet, bedpan or urinal?   [] 1   [] 2   [x] 3   [] 4   4. Putting on and taking off regular upper body clothing? [] 1   [] 2   [x] 3   [] 4   5. Taking care of personal grooming such as brushing teeth? [] 1   [] 2   [x] 3   [] 4   6. Eating meals?    [] 1   [] 2   [x] 3   [] 4   © 2007, Trustees of [employer], under license to [employer]. All rights reserved      Score:  Initial: 17 Most Recent: X (Date: -- )    Interpretation of Tool:  Represents activities that are increasingly more difficult (i.e. Bed mobility, Transfers, Gait). Score 24 23 22-20 19-15 14-10 9-7 6     Modifier CH CI CJ CK CL CM CN      ? Self Care:     - CURRENT STATUS: CK - 40%-59% impaired, limited or restricted    - GOAL STATUS: CJ - 20%-39% impaired, limited or restricted    - D/C STATUS:  ---------------To be determined---------------  Payor: Rosalio Smith / Plan: [address] [place] [address] HMO / Product Type: [employer] Care Medicare /      Medical Necessity:     · Patient demonstrates excellent rehab potential due to higher previous functional level. Reason for Services/Other Comments:  · Patient continues to require skilled intervention due to decreased activity tolerance with ADL/functional transfers. Use of outcome tool(s) and clinical judgement create a POC that gives a: LOW COMPLEXITY         TREATMENT:   (In addition to Assessment/Re-Assessment sessions the following treatments were rendered)     Pre-treatment Symptoms/Complaints:    Pain: Initial:   Pain Intensity 1: 0  Post Session:  0/10     Assessment/Reassessment only, no treatment provided today    Braces/Orthotics/Lines/Etc:   · O2 Device: Room air  Treatment/Session Assessment:    · Response to Treatment:  eval only  · Interdisciplinary Collaboration:   o Occupational Therapist  o Registered Nurse  · After treatment position/precautions:   o Up in chair  o Bed/Chair-wheels locked  o Call light within reach  o RN notified   · Compliance with Program/Exercises: Will assess as treatment progresses. · Recommendations/Intent for next treatment session: \"Next visit will focus on advancements to more challenging activities and reduction in assistance provided\".   Total Treatment Duration:  OT Patient Time In/Time Out  Time In: 0926  Time Out: 0945     Yunior Camarillo Omid Donis

## 2018-07-11 NOTE — ROUTINE PROCESS
CHF teaching held, but introduction completed to pt, will follow; 5mins    Continue FR/cardio diet  Palliative : DARA 32, on hold

## 2018-07-11 NOTE — ROUTINE PROCESS
CHF teaching held; multi staff @ BS, will follow.     Continue FR/cardio diet  Palliative care: RATT 32, ACP on file, enteerd

## 2018-07-11 NOTE — PROGRESS NOTES
Progress Note      Patient: Rylie Rodarte. Sex: male          MRN: [id number]           YOB: 1945      Age:  74 y.o. PCP:  Hernán Oshea MD  Treatment Team: Attending Provider: Jose Shukla MD; Utilization Review: Apollo Shafer RN; Consulting Provider: Connie Cortez MD; Care Manager: Colletta Cocks, LMSW  Subjective:     New patient for me today. Complaints of shortness of breath with minimal exertion, better with rest, somewhat improved since admission. Urinating well with Lasix. No fevers or chills or chest pain or abdominal pain or diarrhea. Objective:   Physical Exam:   Visit Vitals    /60    Pulse 66    Temp 97.3 °F (36.3 °C)    Resp 24    Ht 5' 9\" (1.753 m)    Wt 98 kg (216 lb)    SpO2 92%    BMI 31.9 kg/m2      Temp (24hrs), Av.8 °F (36.6 °C), Min:97.3 °F (36.3 °C), Max:98.5 °F (36.9 °C)    Oxygen Therapy  O2 Sat (%): 92 % (18 1611)  Pulse via Oximetry: 89 beats per minute (18 07)  O2 Device: Room air (18 0925)  O2 Flow Rate (L/min): 4 l/min (18 07)    Intake/Output Summary (Last 24 hours) at 18 1848  Last data filed at 18 0452   Gross per 24 hour   Intake                0 ml   Output              800 ml   Net             -800 ml      General: Conscious, mild resp distress at rest. Moderate resp distress with minimal exertion. Eyes:  VICENTA, No pallor/icterus    HENT:             Oral Mucosa is Moist, No sinus tenderness  Neck:               Supple, elevated JVD  Lungs:  Bibasilar crackles and decreased air entry at bases. No significant wheeze/rhonchi  Heart:  S1 S2 regular  Abdomen: Soft, normal bowel sounds, NTND, No guarding/rigidity/rebound tend. Mild abd. Wall edema+  Extremities: Noah.  Pitting pedal edema  Neurologic:  AAOX3, No acute FND, Motor:  LUE: 5/5, LLE: 5/5, RUE: 5/5, RLE: 5/5  Skin:                No acute rashes  Musculoskeletal: No Acute findings  Psych: Appropriate mood, Thought process is normal    LAB  Recent Results (from the past 24 hour(s))   METABOLIC PANEL, COMPREHENSIVE    Collection Time: 07/11/18  4:41 AM   Result Value Ref Range    Sodium 145 136 - 145 mmol/L    Potassium 3.6 3.5 - 5.1 mmol/L    Chloride 108 (H) 98 - 107 mmol/L    CO2 27 21 - 32 mmol/L    Anion gap 10 7 - 16 mmol/L    Glucose 102 (H) 65 - 100 mg/dL    BUN 32 (H) 8 - 23 MG/DL    Creatinine 1.77 (H) 0.8 - 1.5 MG/DL    GFR est AA 48 (L) >60 ml/min/1.73m2    GFR est non-AA 40 (L) >60 ml/min/1.73m2    Calcium 8.3 8.3 - 10.4 MG/DL    Bilirubin, total 0.8 0.2 - 1.1 MG/DL    ALT (SGPT) 24 12 - 65 U/L    AST (SGOT) 23 15 - 37 U/L    Alk. phosphatase 103 50 - 136 U/L    Protein, total 6.7 6.3 - 8.2 g/dL    Albumin 2.7 (L) 3.2 - 4.6 g/dL    Globulin 4.0 (H) 2.3 - 3.5 g/dL    A-G Ratio 0.7 (L) 1.2 - 3.5     CBC WITH AUTOMATED DIFF    Collection Time: 07/11/18  4:41 AM   Result Value Ref Range    WBC 9.0 4.3 - 11.1 K/uL    RBC 5.08 4.23 - 5.67 M/uL    HGB 13.8 13.6 - 17.2 g/dL    HCT 41.5 41.1 - 50.3 %    MCV 81.7 79.6 - 97.8 FL    MCH 27.2 26.1 - 32.9 PG    MCHC 33.3 31.4 - 35.0 g/dL    RDW 17.7 (H) 11.9 - 14.6 %    PLATELET 944 307 - 920 K/uL    MPV 11.0 10.8 - 14.1 FL    DF AUTOMATED      NEUTROPHILS 59 43 - 78 %    LYMPHOCYTES 32 13 - 44 %    MONOCYTES 8 4.0 - 12.0 %    EOSINOPHILS 1 0.5 - 7.8 %    BASOPHILS 0 0.0 - 2.0 %    IMMATURE GRANULOCYTES 0 0.0 - 5.0 %    ABS. NEUTROPHILS 5.3 1.7 - 8.2 K/UL    ABS. LYMPHOCYTES 2.9 0.5 - 4.6 K/UL    ABS. MONOCYTES 0.7 0.1 - 1.3 K/UL    ABS. EOSINOPHILS 0.1 0.0 - 0.8 K/UL    ABS. BASOPHILS 0.0 0.0 - 0.2 K/UL    ABS. IMM. GRANS. 0.0 0.0 - 0.5 K/UL   TROPONIN I    Collection Time: 07/11/18  4:41 AM   Result Value Ref Range    Troponin-I, Qt. 0.54 (HH) 0.02 - 0.05 NG/ML   MAGNESIUM    Collection Time: 07/11/18  4:41 AM   Result Value Ref Range    Magnesium 2.1 1.8 - 2.4 mg/dL       No results found. No results found.     All Micro Results     None Current Medications Reviewed      Assessment/Plan     1. Acute on chronic combined systolic and diastolic heart failure exacerbation. 2.  Acute hypoxemic respiratory failure with hypoxemia. 3.  Chronic COPD, no acute exacerbation. 4.  CKD stage III, monitor creatinine closely considering the need for IV diuretics. ,  5.  Mild troponin elevation, secondary to CHF exacerbation demand ischemia from respiratory failure and underlying CKD. 6.  Dyslipidemia  7. History of coronary artery disease  8. Osteoarthritis    Keep the patient on aspirin, Coreg, atorvastatin, lisinopril. Continue IV Lasix, monitor urine output, daily weights, electrolytes and creatinine closely. Cardiology consulted, appreciate their recommendations. Subcu heparin for DVT prophylaxis. Patient likely meets inpatient criteria considering acute CHF exacerbation, respiratory failure.     High risk patient     Devon Rojas MD  July 11, 2018

## 2018-07-11 NOTE — PHYSICIAN ADVISORY
Letter of Determination:  Outpatient status receiving Observation Services    This patient was originally hospitalized as Inpatient Status on 7/10/2018 for congestive heart failure exacerbvation. At this time this patient does not appear to meet the medical necessity requirements to support an inpatient level of care. It is our recommendation that this patient's hospitalization status should be changed from INPATIENT to [place] receiving OBSERVATION services via Condition Code 44.      This may change due to the medical condition of the patient and new clinical evidence as the patients care progreses. The final decision regarding the patient's hospitalization status depends on the attending physician's judgement.     Barrett Gunderson MD, KENNETH,   Physician [place].

## 2018-07-11 NOTE — PROGRESS NOTES
CM is familiar with pt. Pt lives with spouse. Reports being independent with ADLs-still drives. DME in the home includes cane and rollator. Pt not agreeable to STR but agreed to [facility]. Pt prefers [facility]. Referral sent-awaiting response. CM will continue to follow to DC needs. Care Management Interventions  PCP Verified by CM:  Yes  Transition of Care Consult (CM Consult): Home Health  Physical Therapy Consult: Yes  Occupational Therapy Consult: Yes  Current Support Network: Lives with Spouse  Confirm Follow Up Transport: Family  Plan discussed with Pt/Family/Caregiver: Yes  Freedom of Choice Offered: Yes  Discharge Location  Discharge Placement: Home with home health

## 2018-07-11 NOTE — PROGRESS NOTES
Hourly rounds completed throughout this shift. Pt denies needs at this time. Bed in locked and low position, bedside table within reach. Will continue to monitor and give bedside shift report to oncoming day shift nurse.

## 2018-07-11 NOTE — PROGRESS NOTES
Interdisciplinary Rounds completed 07/11/18. Nursing, Case Management, Physician and PT present. Plan of care reviewed and updated.

## 2018-07-11 NOTE — DISCHARGE INSTRUCTIONS
Heart Failure: Care Instructions  Your Care Instructions    Heart failure occurs when your heart does not pump as much blood as the body needs. Failure does not mean that the heart has stopped pumping but rather that it is not pumping as well as it should. Over time, this causes fluid buildup in your lungs and other parts of your body. Fluid buildup can cause shortness of breath, fatigue, swollen ankles, and other problems. By taking medicines regularly, reducing sodium (salt) in your diet, checking your weight every day, and making lifestyle changes, you can feel better and live longer. Follow-up care is a key part of your treatment and safety. Be sure to make and go to all appointments, and call your doctor if you are having problems. It's also a good idea to know your test results and keep a list of the medicines you take. How can you care for yourself at home? Medicines    · Be safe with medicines. Take your medicines exactly as prescribed. Call your doctor if you think you are having a problem with your medicine.     · Do not take any vitamins, over-the-counter medicine, or herbal products without talking to your doctor first. Lynn Salter not take ibuprofen (Advil or Motrin) and naproxen (Aleve) without talking to your doctor first. They could make your heart failure worse.     · You may be taking some of the following medicine. ¨ Beta-blockers can slow heart rate, decrease blood pressure, and improve your condition. Taking a beta-blocker may lower your chance of needing to be hospitalized. ¨ Angiotensin-converting enzyme inhibitors (ACEIs) reduce the heart's workload, lower blood pressure, and reduce swelling. Taking an ACEI may lower your chance of needing to be hospitalized again. ¨ Angiotensin II receptor blockers (ARBs) work like ACEIs. Your doctor may prescribe them instead of ACEIs. ¨ Diuretics, also called water pills, reduce swelling.   ¨ Potassium supplements replace this important mineral, which is sometimes lost with diuretics. ¨ Aspirin and other blood thinners prevent blood clots, which can cause a stroke or heart attack.    You will get more details on the specific medicines your doctor prescribes. Diet    · Your doctor may suggest that you limit sodium to 2,000 milligrams (mg) a day or less. That is less than 1 teaspoon of salt a day, including all the salt you eat in cooking or in packaged foods. People get most of their sodium from processed foods. Fast food and restaurant meals also tend to be very high in sodium.     · Ask your doctor how much liquid you can drink each day. You may have to limit liquids.    Weight    · Weigh yourself without clothing at the same time each day. Record your weight. Call your doctor if you have a sudden weight gain, such as more than 2 to 3 pounds in a day or 5 pounds in a week. (Your doctor may suggest a different range of weight gain.) A sudden weight gain may mean that your heart failure is getting worse.    Activity level    · Start light exercise (if your doctor says it is okay). Even if you can only do a small amount, exercise will help you get stronger, have more energy, and manage your weight and your stress. Walking is an easy way to get exercise. Start out by walking a little more than you did before. Bit by bit, increase the amount you walk.     · When you exercise, watch for signs that your heart is working too hard. You are pushing yourself too hard if you cannot talk while you are exercising. If you become short of breath or dizzy or have chest pain, stop, sit down, and rest.     · If you feel \"wiped out\" the day after you exercise, walk slower or for a shorter distance until you can work up to a better pace.     · Get enough rest at night. Sleeping with 1 or 2 pillows under your upper body and head may help you breathe easier.    Lifestyle changes    · Do not smoke. Smoking can make a heart condition worse.  If you need help quitting, talk to your doctor about stop-smoking programs and medicines. These can increase your chances of quitting for good. Quitting smoking may be the most important step you can take to protect your heart.     · Limit alcohol to 2 drinks a day for men and 1 drink a day for women. Too much alcohol can cause health problems.     · Avoid getting sick from colds and the flu. Get a pneumococcal vaccine shot. If you have had one before, ask your doctor whether you need another dose. Get a flu shot each year. If you must be around people with colds or the flu, wash your hands often. When should you call for help? Call 911 if you have symptoms of sudden heart failure such as:    · You have severe trouble breathing.     · You cough up pink, foamy mucus.     · You have a new irregular or rapid heartbeat.    Call your doctor now or seek immediate medical care if:    · You have new or increased shortness of breath.     · You are dizzy or lightheaded, or you feel like you may faint.     · You have sudden weight gain, such as more than 2 to 3 pounds in a day or 5 pounds in a week. (Your doctor may suggest a different range of weight gain.)     · You have increased swelling in your legs, ankles, or feet.     · You are suddenly so tired or weak that you cannot do your usual activities.    Watch closely for changes in your health, and be sure to contact your doctor if you develop new symptoms. Where can you learn more? Go to http://jaime-jarrell.info/. Enter M056 in the search box to learn more about \"Heart Failure: Care Instructions. \"  Current as of: May 10, 2017  Content Version: 11.7  © 5715-5758 [employer]. Care instructions adapted under license by [employer] (which disclaims liability or warranty for this information).  If you have questions about a medical condition or this instruction, always ask your healthcare professional. [address] any warranty or liability for your use of this information.

## 2018-07-12 LAB
ANION GAP SERPL CALC-SCNC: 11 MMOL/L (ref 7–16)
BASOPHILS # BLD: 0 K/UL (ref 0–0.2)
BASOPHILS NFR BLD: 0 % (ref 0–2)
BNP SERPL-MCNC: 923 PG/ML
BUN SERPL-MCNC: 31 MG/DL (ref 8–23)
CALCIUM SERPL-MCNC: 8.4 MG/DL (ref 8.3–10.4)
CHLORIDE SERPL-SCNC: 105 MMOL/L (ref 98–107)
CO2 SERPL-SCNC: 27 MMOL/L (ref 21–32)
CREAT SERPL-MCNC: 1.62 MG/DL (ref 0.8–1.5)
DIFFERENTIAL METHOD BLD: ABNORMAL
EOSINOPHIL # BLD: 0.1 K/UL (ref 0–0.8)
EOSINOPHIL NFR BLD: 1 % (ref 0.5–7.8)
ERYTHROCYTE [DISTWIDTH] IN BLOOD BY AUTOMATED COUNT: 17.8 % (ref 11.9–14.6)
EST. AVERAGE GLUCOSE BLD GHB EST-MCNC: 154 MG/DL
GLUCOSE BLD STRIP.AUTO-MCNC: 93 MG/DL (ref 65–100)
GLUCOSE BLD STRIP.AUTO-MCNC: 93 MG/DL (ref 65–100)
GLUCOSE BLD STRIP.AUTO-MCNC: 94 MG/DL (ref 65–100)
GLUCOSE SERPL-MCNC: 115 MG/DL (ref 65–100)
HBA1C MFR BLD: 7 % (ref 4.8–6)
HCT VFR BLD AUTO: 43.8 % (ref 41.1–50.3)
HGB BLD-MCNC: 14.2 G/DL (ref 13.6–17.2)
IMM GRANULOCYTES # BLD: 0 K/UL (ref 0–0.5)
IMM GRANULOCYTES NFR BLD AUTO: 0 % (ref 0–5)
LYMPHOCYTES # BLD: 2.8 K/UL (ref 0.5–4.6)
LYMPHOCYTES NFR BLD: 30 % (ref 13–44)
MAGNESIUM SERPL-MCNC: 2.2 MG/DL (ref 1.8–2.4)
MCH RBC QN AUTO: 26.8 PG (ref 26.1–32.9)
MCHC RBC AUTO-ENTMCNC: 32.4 G/DL (ref 31.4–35)
MCV RBC AUTO: 82.6 FL (ref 79.6–97.8)
MM INDURATION POC: 0 MM (ref 0–5)
MONOCYTES # BLD: 0.7 K/UL (ref 0.1–1.3)
MONOCYTES NFR BLD: 7 % (ref 4–12)
NEUTS SEG # BLD: 5.8 K/UL (ref 1.7–8.2)
NEUTS SEG NFR BLD: 62 % (ref 43–78)
PHOSPHATE SERPL-MCNC: 2.8 MG/DL (ref 2.3–3.7)
PLATELET # BLD AUTO: 169 K/UL (ref 150–450)
PMV BLD AUTO: 10.9 FL (ref 10.8–14.1)
POTASSIUM SERPL-SCNC: 3.7 MMOL/L (ref 3.5–5.1)
PPD POC: NORMAL NEGATIVE
RBC # BLD AUTO: 5.3 M/UL (ref 4.23–5.67)
SODIUM SERPL-SCNC: 143 MMOL/L (ref 136–145)
WBC # BLD AUTO: 9.4 K/UL (ref 4.3–11.1)

## 2018-07-12 PROCEDURE — 94640 AIRWAY INHALATION TREATMENT: CPT

## 2018-07-12 PROCEDURE — 83880 ASSAY OF NATRIURETIC PEPTIDE: CPT | Performed by: INTERNAL MEDICINE

## 2018-07-12 PROCEDURE — 85025 COMPLETE CBC W/AUTO DIFF WBC: CPT | Performed by: PHYSICIAN ASSISTANT

## 2018-07-12 PROCEDURE — 80048 BASIC METABOLIC PNL TOTAL CA: CPT | Performed by: PHYSICIAN ASSISTANT

## 2018-07-12 PROCEDURE — 82962 GLUCOSE BLOOD TEST: CPT

## 2018-07-12 PROCEDURE — 74011250636 HC RX REV CODE- 250/636: Performed by: INTERNAL MEDICINE

## 2018-07-12 PROCEDURE — 97530 THERAPEUTIC ACTIVITIES: CPT

## 2018-07-12 PROCEDURE — 83735 ASSAY OF MAGNESIUM: CPT | Performed by: PHYSICIAN ASSISTANT

## 2018-07-12 PROCEDURE — 94760 N-INVAS EAR/PLS OXIMETRY 1: CPT

## 2018-07-12 PROCEDURE — 83036 HEMOGLOBIN GLYCOSYLATED A1C: CPT | Performed by: HOSPITALIST

## 2018-07-12 PROCEDURE — 74011000250 HC RX REV CODE- 250: Performed by: HOSPITALIST

## 2018-07-12 PROCEDURE — 74011250637 HC RX REV CODE- 250/637: Performed by: HOSPITALIST

## 2018-07-12 PROCEDURE — 65270000029 HC RM PRIVATE

## 2018-07-12 PROCEDURE — 84100 ASSAY OF PHOSPHORUS: CPT | Performed by: HOSPITALIST

## 2018-07-12 PROCEDURE — 77010033678 HC OXYGEN DAILY

## 2018-07-12 PROCEDURE — 74011000250 HC RX REV CODE- 250: Performed by: INTERNAL MEDICINE

## 2018-07-12 PROCEDURE — 74011250637 HC RX REV CODE- 250/637: Performed by: INTERNAL MEDICINE

## 2018-07-12 PROCEDURE — 36415 COLL VENOUS BLD VENIPUNCTURE: CPT | Performed by: PHYSICIAN ASSISTANT

## 2018-07-12 RX ORDER — POTASSIUM CHLORIDE 20 MEQ/1
20 TABLET, EXTENDED RELEASE ORAL 2 TIMES DAILY
Status: DISCONTINUED | OUTPATIENT
Start: 2018-07-12 | End: 2018-07-16

## 2018-07-12 RX ORDER — BUMETANIDE 0.25 MG/ML
1 INJECTION INTRAMUSCULAR; INTRAVENOUS 2 TIMES DAILY
Status: DISCONTINUED | OUTPATIENT
Start: 2018-07-12 | End: 2018-07-14

## 2018-07-12 RX ORDER — CARVEDILOL 3.12 MG/1
3.12 TABLET ORAL 2 TIMES DAILY WITH MEALS
Status: DISCONTINUED | OUTPATIENT
Start: 2018-07-12 | End: 2018-07-20 | Stop reason: HOSPADM

## 2018-07-12 RX ORDER — INSULIN LISPRO 100 [IU]/ML
INJECTION, SOLUTION INTRAVENOUS; SUBCUTANEOUS
Status: DISCONTINUED | OUTPATIENT
Start: 2018-07-12 | End: 2018-07-20 | Stop reason: HOSPADM

## 2018-07-12 RX ADMIN — HEPARIN SODIUM 5000 UNITS: 5000 INJECTION, SOLUTION INTRAVENOUS; SUBCUTANEOUS at 08:21

## 2018-07-12 RX ADMIN — Medication 400 MG: at 17:24

## 2018-07-12 RX ADMIN — POTASSIUM CHLORIDE 20 MEQ: 1500 TABLET, EXTENDED RELEASE ORAL at 08:21

## 2018-07-12 RX ADMIN — CARVEDILOL 3.12 MG: 3.12 TABLET, FILM COATED ORAL at 17:24

## 2018-07-12 RX ADMIN — BUMETANIDE 1 MG: 0.25 INJECTION INTRAMUSCULAR; INTRAVENOUS at 17:35

## 2018-07-12 RX ADMIN — HEPARIN SODIUM 5000 UNITS: 5000 INJECTION, SOLUTION INTRAVENOUS; SUBCUTANEOUS at 21:23

## 2018-07-12 RX ADMIN — CARVEDILOL 12.5 MG: 12.5 TABLET, FILM COATED ORAL at 08:21

## 2018-07-12 RX ADMIN — ASPIRIN 81 MG: 81 TABLET, COATED ORAL at 08:21

## 2018-07-12 RX ADMIN — LISINOPRIL 5 MG: 5 TABLET ORAL at 08:20

## 2018-07-12 RX ADMIN — Medication 400 MG: at 08:21

## 2018-07-12 RX ADMIN — ATORVASTATIN CALCIUM 20 MG: 10 TABLET, FILM COATED ORAL at 21:22

## 2018-07-12 RX ADMIN — Medication 10 ML: at 14:54

## 2018-07-12 RX ADMIN — Medication 10 ML: at 05:15

## 2018-07-12 RX ADMIN — POTASSIUM CHLORIDE 20 MEQ: 20 TABLET, EXTENDED RELEASE ORAL at 17:24

## 2018-07-12 RX ADMIN — FUROSEMIDE 40 MG: 10 INJECTION, SOLUTION INTRAMUSCULAR; INTRAVENOUS at 08:21

## 2018-07-12 RX ADMIN — IPRATROPIUM BROMIDE AND ALBUTEROL SULFATE 3 ML: .5; 3 SOLUTION RESPIRATORY (INHALATION) at 23:44

## 2018-07-12 RX ADMIN — IPRATROPIUM BROMIDE AND ALBUTEROL SULFATE 3 ML: .5; 3 SOLUTION RESPIRATORY (INHALATION) at 00:38

## 2018-07-12 NOTE — ADT AUTH CERT NOTES
Patient Demographics        Patient Name [address] Sex  Address Phone       Sharad Ellis [phone] Male 1945 [address] [phone] (Home) *Preferred*  [phone] (Mobile)           CSN:       [id number]           Admit Date: Admit Time Room Bed       Jul 10, 2018  3:32  [[id number]] 01 [877]           Attending Providers        Provider Pager From To       Saul Washburn MD  07/10/18 07/10/18       Vignesh Botello MD  07/10/18            Emergency Contact(s)        Name Relation Home Work [address] [place] [address] [phone]       Joy Love Daughter [phone]         Utilization Review           Heart Failure - Care Day 3 (2018) by Joy Villareal RN        Review Entered Review Status       2018 Completed       Details              Care Day: 3 Care Date: 2018 Level of Care: Inpatient Floor       Guideline Day 3        Clinical Status       (X) * Hemodynamic stability       ( ) * Tachypnea absent       2018 11:14 AM EDT by Cyn Henderson         RR 20'S SUSTAINED              (X) * Oxygenation at baseline or acceptable for next level of care       (X) * Cardiac rate and rhythm acceptable       (X) * Pulmonary edema absent or acceptable for next level of care       (X) * Peripheral or sacral edema absent or improved       (X) * Mental status at baseline       (X) * Volume status acceptable on oral medication       (X) * Renal function at baseline or acceptable for next level of care       ( ) * Electrolyte levels normal or acceptable for next level of care       (X) * Immediate precipitating factors absent or controlled       ( ) * Discharge plans and education understood              Activity       (X) * Ambulatory              Routes       (X) * Oral hydration, medications, and diet              Interventions       ( ) * Oxygen absent              Medications       (X) Diuretics       7/12/2018 11:14 AM EDT by Ezekiel Vogel         LASIX 40 MG BID IV              (X) ACE inhibitor or ARB       7/12/2018 11:14 AM EDT by Ezekiel Vogel         PRINIVIL 5 MG QD PO              (X) Beta-blocker       7/12/2018 11:14 AM EDT by Ezekiel Vogel         COREG 12.5 MG BID PO              (X) Possible nitrates, digoxin, hydralazine                                   * Milestone              Additional Notes       Complaints of shortness of breath with exertion, better with rest, overall improving since admission.  Urinating well with Lasix.  No fevers or chills or chest pain or abdominal pain or diarrhea.        General:                    Conscious, no resp distress at rest.         Eyes:                                   VICENTA, No pallor/icterus                                               HENT:             Oral Mucosa is Moist, No sinus tenderness       Neck:               Supple, elevated JVD       Lungs:                                 Bibasilar crackles and decreased air entry at bases. No significant wheeze/rhonchi       Heart:                                  S1 S2 regular       Abdomen:                  Soft, normal bowel sounds, NTND, No guarding/rigidity/rebound tend. Mild abd. Wall edema+       Extremities:               Noah. Pitting pedal edema       Neurologic:                AAOX3, No acute FND, Motor:  LUE: 5/5, LLE: 5/5, RUE: 5/5, RLE: 5/5       Skin:                No acute rashes       Musculoskeletal: No Acute findings       Psych:             Appropriate mood, Thought process is normal                     Keep the patient on aspirin, Coreg, atorvastatin, lisinopril.       Continue IV Lasix, monitor urine output, daily weights, electrolytes and creatinine closely.       Cardiology consulted, appreciate their recommendations.       Subcu heparin for DVT prophylaxis.                High risk patient                       /76, TEMP 97.5, HR 70, RR 22, O2 92 ON 4L NC              A1C 7.0, , BUN 31, CREAT 1.62              DIAB DIET, CARD MONITORING, STRICT I/O, DAILY WGT              DUONEB X 1           Heart Failure - Care Day 2 (7/11/2018) by Prachi Corbett RN        Review Entered Review Status       7/12/2018 Completed       Details              Care Day: 2 Care Date: 7/11/2018 Level of Care: Inpatient Floor       Guideline Day 2        Level Of Care       (X) Intermediate care or floor              Clinical Status       (X) * Hemodynamic stability       (X) * Mental status at baseline       (X) * MI excluded       (X) * Cardiac rate and rhythm acceptable       (X) * Oxygenation at baseline or improved       (X) * Pulmonary edema absent or improved              Routes       (X) Oral or parenteral medications       (X) Low-salt diet              Interventions       (X) * Pulmonary catheter absent       (X) Weigh       7/12/2018 9:56 AM EDT by Fifi Ya         DAILY              (X) Possible electrolytes [I]       7/12/2018 9:56 AM EDT by Fifi Man         , BUN 32, CREAT 1.77, TROP 0.54              (X) Possible CXR, ECG, BNP       (X) Oxygen       7/12/2018 9:56 AM EDT by Fifi Man         4L NC AND TRIALS OF RA                     Medications       (X) Diuretics       7/12/2018 9:56 AM EDT by Fifi Ya         LASIX 40 MG BID IV              (X) ACE inhibitor or ARB       7/12/2018 9:56 AM EDT by Fifi Man         PRINIVIL 5 MG QD PO              (X) Beta-blocker       7/12/2018 9:56 AM EDT by Fifi Man         COREG 12.5 MG BID PO              (X) Possible aldosterone antagonist       (X) Possible nitrates, digoxin, hydralazine                                   * Milestone              Additional Notes       Continue IV lasix, needs diet education.  Poor insight seems like driving factor for admission.  Check echo, add back Ace.       [facility] Cardiology was consulted for CHF.  Pt still has c/o lower extremity swelling but denies worsened shortness of breath, chest pain, and palpitations. He appears to have poor insight, discussed with patient importance of fluid restriction to 2L, diet control, and taking lasix as prescribed              IM NOTE       New patient for me today.  Complaints of shortness of breath with minimal exertion, better with rest, somewhat improved since admission.  Urinating well with Lasix.  No fevers or chills or chest pain or abdominal pain or diarrhea.                General:                    Conscious, mild resp distress at rest. Moderate resp distress with minimal exertion.       Eyes:                                   VICENTA, No pallor/icterus                                               HENT:             Oral Mucosa is Moist, No sinus tenderness       Neck:               Supple, elevated JVD       Lungs:                                 Bibasilar crackles and decreased air entry at bases. No significant wheeze/rhonchi       Heart:                                  S1 S2 regular       Abdomen:                  Soft, normal bowel sounds, NTND, No guarding/rigidity/rebound tend. Mild abd. Wall edema+       Extremities:               Noah.  Pitting pedal edema       Neurologic:                AAOX3, No acute FND, Motor:  LUE: 5/5, LLE: 5/5, RUE: 5/5, RLE: 5/5       Skin:                No acute rashes       Musculoskeletal: No Acute findings       Psych:             Appropriate mood, Thought process is normal              Keep the patient on aspirin, Coreg, atorvastatin, lisinopril.       Continue IV Lasix, monitor urine output, daily weights, electrolytes and creatinine closely.       Cardiology consulted, appreciate their recommendations.       Subcu heparin for DVT prophylaxis.       Patient likely meets inpatient criteria considering acute CHF exacerbation, respiratory failure.        High risk patient                     /74, TEMP 97.7, HR 71, RR 24, O2 93 ON 4L NC              , BUN 32, CREAT 1.77, TROP 0.54              ECHO: Left ventricle: Systolic function was mildly reduced. Ejection fraction        was       estimated to be 45 %. There was mild diffuse hypokinesis. There was moderate       concentric hypertrophy. The E/e' ratio was 19.95. There was Grade III        Diastolic       Dysfunction.              -  Right ventricle: The ventricle was mildly to moderately dilated. Systolic       function was moderately reduced.              -  Left atrium: The atrium was moderately to markedly dilated.              -  Right atrium: The atrium was markedly dilated.              -  Inferior vena cava, hepatic veins: The inferior vena cava was dilated. The       respirophasic change in diameter was less than 50%.              -  Mitral valve: There was mild to moderate regurgitation.              -  Tricuspid valve: There was moderate regurgitation.              -  Pulmonic valve:  There was mild to moderate regurgitation.              -  Pericardium: A trivial pericardial effusion was identified.              PT, OT, HOME HEALTH CONSULT, CARD MONITORING, NUT SERV CONSULT, PALLIATIVE CARE CONSULT, CARD CONSULT, SW CONSULT, DAILY WGT, STRICT I/O              DUONEB Q4H

## 2018-07-12 NOTE — PROGRESS NOTES
Interdisciplinary Rounds completed 07/12/18. Nursing, Case Management, Physician and PT present. Plan of care reviewed and updated.     Continue with remote tele

## 2018-07-12 NOTE — PROGRESS NOTES
Problem: Mobility Impaired (Adult and Pediatric)  Goal: *Acute Goals and Plan of Care (Insert Text)  LTG:  (1.)Mr. Geremias Ramos will move from supine to sit and sit to supine , scoot up and down and roll side to side INDEPENDENTLY with bed flat within 7 treatment day(s). (2.)Mr. Geremias Ramos will transfer from bed to chair and chair to bed INDEPENDENTLY within 7 treatment day(s). (3.)Mr. Geremias Ramos will ambulate with MODIFIED INDEPENDENCE for 250 feet with the least restrictive device within 7 treatment day(s). (4.)Mr. Geremias Ramos will ascend and descend 4 steps with SUPERVISION using handrail(s) within 7 days. (5.)Mr. Geremias Ramos will perform exercises per HEP to improve strength and mobility within 7 days. ________________________________________________________________________________________________    PHYSICAL THERAPY: Daily Note, Treatment Day: 1st, AM 7/12/2018  INPATIENT: Hospital Day: 3  Payor: Reza Cavanaugh / Plan: [address] [place] [address] HMO / Product Type: Managed Care Medicare /      NAME/AGE/GENDER: Laura Stafford is a 74 y.o. male   PRIMARY DIAGNOSIS: CHF (congestive heart failure) ([facility] [address]  CHF (congestive heart failure) ([facility] [address] Acute respiratory failure with hypoxemia ([facility] [address] Acute respiratory failure with hypoxemia ([facility] [address]        ICD-10: Treatment Diagnosis:    · Generalized Muscle Weakness (M62.81)  · Other abnormalities of gait and mobility (R26.89)   Precaution/Allergies:  Pcn [penicillins]      ASSESSMENT:     Mr. Geremias Ramos is a 74year old male admitted from home for CHF (LE swelling, shortness of breath). He presents sitting up in bed without complaints and is agreeable to therapy assessment. He is on 4L today I imagine for comfort as he gets SOB although his sats are always very high. He tried walking on the 4L today to see if it helped his SOB and he wasn't sure upon return although he was able to walk to the elevators without stopping. Slow progress.     This section established at most recent assessment   PROBLEM LIST (Impairments causing functional limitations):  1. Decreased Strength  2. Decreased ADL/Functional Activities  3. Decreased Transfer Abilities  4. Decreased Ambulation Ability/Technique  5. Decreased Balance  6. Decreased Activity Tolerance  7. Increased Shortness of Breath   INTERVENTIONS PLANNED: (Benefits and precautions of physical therapy have been discussed with the patient.)  1. Balance Exercise  2. Bed Mobility  3. Gait Training  4. Home Exercise Program (HEP)  5. Therapeutic Activites  6. Therapeutic Exercise/Strengthening  7. Transfer Training     TREATMENT PLAN: Frequency/Duration: 3 times a week for duration of hospital stay  Rehabilitation Potential For Stated Goals: Good     RECOMMENDED REHABILITATION/EQUIPMENT: (at time of discharge pending progress): Due to the probability of continued deficits (see above) this patient will likely need continued skilled physical therapy after discharge. Equipment:    None at this time              HISTORY:   History of Present Injury/Illness (Reason for Referral):  Per H&P, \"Mr. Vinita Mendez is a 73 yo male who presented with worsening LL swelling on a background of COPD (4L O2 at home), CHF, CAD and HTN. He reports worsening LL swelling for the past 2 weeks. He reports dyspnea on rest and exertion. He sleeps on 2 pillows. Of note, he was recently hospitalized from 6/6 to 6/8 with acute CHF exacerbation. He was started on IV lasix and was discharged at that time on lasix PO BID. At baseline, he lives at home with his wife. He reports that he missed only 1 dose of his home lasix\"    Past Medical History/Comorbidities:   Mr. Vinita Mendez  has a past medical history of Acute CHF (congestive heart failure) ([facility] [address] (4/25/2015); Acute combined systolic and diastolic congestive heart failure ([address] (4/28/2015); Chronic obstructive pulmonary disease ([facility] [address] De Quervain's tenosynovitis, right (4/28/2015); Dyspnea on exertion (6/28/2015); Elevated serum creatinine (4/25/2015);  Elevated troponin (2015); History of coronary artery disease; History of right knee surgery; History of shingles; Malignant hypertension (2015); and MI (myocardial infarction) ([facility] [address] Mr. Markell Minaya  has a past surgical history that includes hx knee arthroscopy (Right) and hx heart catheterization (2015). Social History/Living Environment:   Home Environment: Private residence  # Steps to Enter: 4  Rails to Enter: Yes  Hand Rails : Bilateral  Wheelchair Ramp: No  One/Two Story Residence: One story  Living Alone: No  Support Systems: Spouse/Significant Other/Partner, Family member(s)  Patient Expects to be Discharged to[de-identified] Private residence  Current DME Used/Available at Home: Brett Keshena, rollator  Tub or Shower Type: Shower  Prior Level of Function/Work/Activity:  Sherine Rushing. lives with wife in single story home with 4 steps. Independent to mod I with occasional rollator use. Occasional O2 (rare). Pt drives and denies falls. Wife can help with ADLs if needed. Number of Personal Factors/Comorbidities that affect the Plan of Care: 1-2: MODERATE COMPLEXITY   EXAMINATION:   Most Recent Physical Functioning:   Gross Assessment:                  Posture:     Balance:    Bed Mobility:     Wheelchair Mobility:     Transfers:     Gait:     Distance (ft): 100 Feet (ft) (to elevators, sat and rested and walked back)  Assistive Device: Walker, rolling  Ambulation - Level of Assistance: Stand-by assistance      Body Structures Involved:  1. Heart  2. Muscles Body Functions Affected:  1. Movement Related Activities and Participation Affected:  1. General Tasks and Demands  2. Mobility  3. Self Care  4. Domestic Life  5.  Community, Social and [place]   Number of elements that affect the Plan of Care: 4+: HIGH COMPLEXITY   CLINICAL PRESENTATION:   Presentation: Stable and uncomplicated: LOW COMPLEXITY   CLINICAL DECISION MAKIN [facility] Mobility Inpatient Short Form  How much difficulty does the patient currently have. .. Unable A Lot A Little None   1. Turning over in bed (including adjusting bedclothes, sheets and blankets)? [] 1   [] 2   [] 3   [x] 4   2. Sitting down on and standing up from a chair with arms ( e.g., wheelchair, bedside commode, etc.)   [] 1   [] 2   [] 3   [x] 4   3. Moving from lying on back to sitting on the side of the bed? [] 1   [] 2   [] 3   [x] 4   How much help from another person does the patient currently need. .. Total A Lot A Little None   4. Moving to and from a bed to a chair (including a wheelchair)? [] 1   [] 2   [x] 3   [] 4   5. Need to walk in hospital room? [] 1   [] 2   [x] 3   [] 4   6. Climbing 3-5 steps with a railing? [] 1   [] 2   [x] 3   [] 4   © 2007, Trustees of [facility] [employer], under license to [employer]. All rights reserved      Score:  Initial: 21 Most Recent: X (Date: -- )    Interpretation of Tool:  Represents activities that are increasingly more difficult (i.e. Bed mobility, Transfers, Gait). Score 24 23 22-20 19-15 14-10 9-7 6     Modifier CH CI CJ CK CL CM CN      ? Mobility - Walking and Moving Around:     - CURRENT STATUS: CJ - 20%-39% impaired, limited or restricted    - GOAL STATUS: CI - 1%-19% impaired, limited or restricted    - D/C STATUS:  ---------------To be determined---------------  Payor: Marilee Colon / Plan: [address] / Product Type: Managed Care Medicare /      Medical Necessity:     · Patient demonstrates good rehab potential due to higher previous functional level. Reason for Services/Other Comments:  · Patient continues to demonstrate capacity to improve strength, mobility, balance, activity tolerance which will increase independence, decrease amount of assistance required from caregiver and increase safety.    Use of outcome tool(s) and clinical judgement create a POC that gives a: Clear prediction of patient's progress: LOW COMPLEXITY            TREATMENT: (In addition to Assessment/Re-Assessment sessions the following treatments were rendered)   Pre-treatment Symptoms/Complaints:  \"ok\"  Pain: Initial:   Pain Intensity 1: 0  Post Session:  0/10     Therapeutic Activity: (    15 minutes): Therapeutic activities including Bed transfers and Ambulation on level ground to improve mobility, strength, balance and endurance. Required minimal   to promote static and dynamic balance in standing with use of the walker. Braces/Orthotics/Lines/Etc:   · O2 Device: Room air  Treatment/Session Assessment:    · Response to Treatment:  Pt performs mobility with SBA in room and hallway with walker. Fatigues quickly. · Interdisciplinary Collaboration:   o Physical Therapy Assistant  o Registered Nurse  · After treatment position/precautions:   o Family at bedside  o pt left sitting EOB   · Compliance with Program/Exercises: good  · Recommendations/Intent for next treatment session: \"Next visit will focus on increasing gait distance\".   Total Treatment Duration:  PT Patient Time In/Time Out  Time In: 1145  Time Out: 1200    Rafael Sauer PTA

## 2018-07-12 NOTE — PROGRESS NOTES
Progress Note      Patient: Anette Garnett. Sex: male          MRN: [id number]           YOB: 1945      Age:  74 y.o. PCP:  Eugenie Conte MD  Treatment Team: Attending Provider: Coral Mcclellan MD; Utilization Review: Hernán Frazier RN; Consulting Provider: Fifi Jacobs MD; Care Manager: Rashmi Reeves [facility]  Subjective:     Complaints of shortness of breath with exertion, better with rest, overall improving since admission. Urinating well with Lasix. No fevers or chills or chest pain or abdominal pain or diarrhea. Objective:   Physical Exam:   Visit Vitals    /76 (BP 1 Location: Right arm, BP Patient Position: At rest)    Pulse 70    Temp 97.5 °F (36.4 °C)    Resp 22    Ht 5' 9\" (1.753 m)    Wt 103.9 kg (229 lb)    SpO2 92%    BMI 33.82 kg/m2      Temp (24hrs), Av.7 °F (36.5 °C), Min:97.3 °F (36.3 °C), Max:98 °F (36.7 °C)    Oxygen Therapy  O2 Sat (%): 92 % (18)  Pulse via Oximetry: 88 beats per minute (18)  O2 Device: Nasal cannula (18)  O2 Flow Rate (L/min): 4 l/min (18)  FIO2 (%): 36 % (188)    Intake/Output Summary (Last 24 hours) at 18 0943  Last data filed at 18 2319   Gross per 24 hour   Intake                0 ml   Output              700 ml   Net             -700 ml      General: Conscious, no resp distress at rest.    Eyes:  VICENTA, No pallor/icterus    HENT:             Oral Mucosa is Moist, No sinus tenderness  Neck:               Supple, elevated JVD  Lungs:  Bibasilar crackles and decreased air entry at bases. No significant wheeze/rhonchi  Heart:  S1 S2 regular  Abdomen: Soft, normal bowel sounds, NTND, No guarding/rigidity/rebound tend. Mild abd. Wall edema+  Extremities: Noah.  Pitting pedal edema  Neurologic:  AAOX3, No acute FND, Motor:  LUE: 5/5, LLE: 5/5, RUE: , RLE:   Skin:                No acute rashes  Musculoskeletal: No Acute findings  Psych:             Appropriate mood, Thought process is normal    LAB  Recent Results (from the past 24 hour(s))   METABOLIC PANEL, BASIC    Collection Time: 07/12/18  5:22 AM   Result Value Ref Range    Sodium 143 136 - 145 mmol/L    Potassium 3.7 3.5 - 5.1 mmol/L    Chloride 105 98 - 107 mmol/L    CO2 27 21 - 32 mmol/L    Anion gap 11 7 - 16 mmol/L    Glucose 115 (H) 65 - 100 mg/dL    BUN 31 (H) 8 - 23 MG/DL    Creatinine 1.62 (H) 0.8 - 1.5 MG/DL    GFR est AA 54 (L) >60 ml/min/1.73m2    GFR est non-AA 44 (L) >60 ml/min/1.73m2    Calcium 8.4 8.3 - 10.4 MG/DL   MAGNESIUM    Collection Time: 07/12/18  5:22 AM   Result Value Ref Range    Magnesium 2.2 1.8 - 2.4 mg/dL   BNP    Collection Time: 07/12/18  5:22 AM   Result Value Ref Range     pg/mL   CBC WITH AUTOMATED DIFF    Collection Time: 07/12/18  5:22 AM   Result Value Ref Range    WBC 9.4 4.3 - 11.1 K/uL    RBC 5.30 4.23 - 5.67 M/uL    HGB 14.2 13.6 - 17.2 g/dL    HCT 43.8 41.1 - 50.3 %    MCV 82.6 79.6 - 97.8 FL    MCH 26.8 26.1 - 32.9 PG    MCHC 32.4 31.4 - 35.0 g/dL    RDW 17.8 (H) 11.9 - 14.6 %    PLATELET 301 600 - 329 K/uL    MPV 10.9 10.8 - 14.1 FL    DF AUTOMATED      NEUTROPHILS 62 43 - 78 %    LYMPHOCYTES 30 13 - 44 %    MONOCYTES 7 4.0 - 12.0 %    EOSINOPHILS 1 0.5 - 7.8 %    BASOPHILS 0 0.0 - 2.0 %    IMMATURE GRANULOCYTES 0 0.0 - 5.0 %    ABS. NEUTROPHILS 5.8 1.7 - 8.2 K/UL    ABS. LYMPHOCYTES 2.8 0.5 - 4.6 K/UL    ABS. MONOCYTES 0.7 0.1 - 1.3 K/UL    ABS. EOSINOPHILS 0.1 0.0 - 0.8 K/UL    ABS. BASOPHILS 0.0 0.0 - 0.2 K/UL    ABS. IMM.  GRANS. 0.0 0.0 - 0.5 K/UL   PHOSPHORUS    Collection Time: 07/12/18  5:22 AM   Result Value Ref Range    Phosphorus 2.8 2.3 - 3.7 MG/DL   HEMOGLOBIN A1C WITH EAG    Collection Time: 07/12/18  5:22 AM   Result Value Ref Range    Hemoglobin A1c 7.0 (H) 4.8 - 6.0 %    Est. average glucose 154 mg/dL   PLEASE READ & DOCUMENT PPD TEST IN 24 HRS    Collection Time: 07/12/18  9:14 AM   Result Value Ref Range PPD  Negative    mm Induration 0 mm       No results found. No results found. All Micro Results     None          Current Medications Reviewed      Assessment/Plan     1. Acute on chronic combined systolic and diastolic heart failure exacerbation. 2.  Acute hypoxemic respiratory failure with hypoxemia - improving  3. Chronic COPD, no acute exacerbation. 4.  CKD stage III, monitor creatinine closely considering the need for IV diuretics. 5.  Mild troponin elevation, secondary to CHF exacerbation demand ischemia from respiratory failure and underlying CKD. 6.  Dyslipidemia  7. History of coronary artery disease  8. Osteoarthritis  9. ? New diagnosis of DM type 2 - A1C is 7. Will start him on diabetic diet, ISS, monitor. Diabetic teaching. May start Small dose of glipizide at d/c depending on his BS. Keep the patient on aspirin, Coreg, atorvastatin, lisinopril. Continue IV Lasix, monitor urine output, daily weights, electrolytes and creatinine closely. Cardiology consulted, appreciate their recommendations. Subcu heparin for DVT prophylaxis.     High risk patient     Argentina Torres MD  July 12, 2018

## 2018-07-12 NOTE — PROGRESS NOTES
Problem: Falls - Risk of  Goal: *Absence of Falls  Document Sue Fall Risk and appropriate interventions in the flowsheet.    Outcome: Progressing Towards Goal  Fall Risk Interventions:  Mobility Interventions: OT consult for ADLs, Patient to call before getting OOB, PT Consult for mobility concerns         Medication Interventions: Patient to call before getting OOB, Teach patient to arise slowly    Elimination Interventions: Call light in reach, Patient to call for help with toileting needs, Toileting schedule/hourly rounds

## 2018-07-12 NOTE — ROUTINE PROCESS
CHF teaching continues to reinforce: report worsening dyspnea, edema, WT gain, generalized weakness< FR/ Cardio diet .  Planner @ BS and scale @ home; 15mins

## 2018-07-12 NOTE — PROGRESS NOTES
07/12/18 1427   Vital Signs   Temp 97.4 °F (36.3 °C)   Temp Source Oral   Pulse (Heart Rate) (!) 59   Heart Rate Source Monitor   Resp Rate 22   O2 Sat (%) 98 %   Level of Consciousness Alert   BP 91/63   MAP (Calculated) 72   BP 1 Method Automatic   BP 1 Location Right arm   BP Patient Position At rest   MEWS Score 3   \"Dr. Annette Baker notified of mew score of 3. Medications adjusted. \"Will continue to monitor patient.

## 2018-07-12 NOTE — PROGRESS NOTES
Pt did not sleep most of the night and was talking in his sleep. Pt is not complaining to keeping on the oxygen. Pt state he has been breathing like that for more than a year and the oxygen do not help. Hourly rounds were done and pt needs were met. Report will be given to day shift nurse and will continue to monitor.

## 2018-07-12 NOTE — PROGRESS NOTES
[facility] CARDIOLOGY PROGRESS NOTE 
      
 
7/12/2018 4:11 PM 
 
Admit Date: 7/10/2018 Subjective:  
 
Some improved dyspnea but still noted and states 'sporadic and can vary between feeling much improved and very SOB within 10 mins'. No CP but complains of leg pain/back pain ROS: 
Cardiovascular:  As noted above Objective:  
  
Vitals:  
 07/12/18 0449 07/12/18 0810 07/12/18 1119 07/12/18 1427 BP: 105/71 112/76 126/88 91/63 Pulse: 72 70 62 (!) 59 Resp: 22 22 22 22 Temp: 98 °F (36.7 °C) 97.5 °F (36.4 °C) 97.5 °F (36.4 °C) 97.4 °F (36.3 °C) SpO2: 95% 92% 95% 98% Weight: 103.9 kg (229 lb) Height:      
 
 
Physical Exam: 
General-No Acute Distress Neck- supple, no JVD 
CV- regular rate and rhythm no MRG Lung- dec at bases Abd- soft, nontender, nondistended Ext- tr to 1+ edema bilaterally. Skin- warm and dry Data Review:  
Recent Labs  
   07/12/18 
 0522  07/11/18 
 0441  07/10/18 
 1541 NA  143  145  144  
K  3.7  3.6  4.1 MG  2.2  2.1   --   
BUN  31*  32*  31* CREA  1.62*  1.77*  2.01* GLU  115*  102*  132* WBC  9.4  9.0  11.4* HGB  14.2  13.8  14.6 HCT  43.8  41.5  44.1 PLT  169  151  175 TROIQ   --   0.54*  0.61* Assessment/Plan:  
 
Principal Problem: 
  Acute respiratory failure with hypoxemia ([address] (5/24/2018) Active Problems: Hypertension (9/29/2017) COPD, moderate ([address] (9/29/2017) High triglycerides (9/29/2017) Essential hypertension (9/28/2016) CHF (congestive heart failure) ([facility] (9/27/2017) TAZ (obstructive sleep apnea) (10/2/2017) Echo noted with EF ~45%; also RV dysfn/moderately elevated RVSP (not sig changed from prior echo 5/18). Continue diuresis; change to bumex (alb 2.6; family feels lasix doesn't work). Also appears some possible anxiety component. Improving renal fn (baseline ~1.6).  Chronic troponin elevation with no significant trend and likely component of LV dysfn/RV dysfn/CKD.   
 
 
Jada Forbes MD 
7/12/2018 4:11 PM

## 2018-07-13 ENCOUNTER — PATIENT OUTREACH (OUTPATIENT)
Dept: CASE MANAGEMENT | Age: 73
End: 2018-07-13

## 2018-07-13 LAB
ANION GAP SERPL CALC-SCNC: 10 MMOL/L (ref 7–16)
BUN SERPL-MCNC: 27 MG/DL (ref 8–23)
CALCIUM SERPL-MCNC: 8.7 MG/DL (ref 8.3–10.4)
CHLORIDE SERPL-SCNC: 106 MMOL/L (ref 98–107)
CO2 SERPL-SCNC: 26 MMOL/L (ref 21–32)
CREAT SERPL-MCNC: 1.51 MG/DL (ref 0.8–1.5)
GLUCOSE BLD STRIP.AUTO-MCNC: 123 MG/DL (ref 65–100)
GLUCOSE BLD STRIP.AUTO-MCNC: 85 MG/DL (ref 65–100)
GLUCOSE BLD STRIP.AUTO-MCNC: 85 MG/DL (ref 65–100)
GLUCOSE SERPL-MCNC: 95 MG/DL (ref 65–100)
MAGNESIUM SERPL-MCNC: 2.2 MG/DL (ref 1.8–2.4)
MM INDURATION POC: 0 MM (ref 0–5)
PHOSPHATE SERPL-MCNC: 2.6 MG/DL (ref 2.3–3.7)
POTASSIUM SERPL-SCNC: 4 MMOL/L (ref 3.5–5.1)
PPD POC: NORMAL NEGATIVE
SODIUM SERPL-SCNC: 142 MMOL/L (ref 136–145)

## 2018-07-13 PROCEDURE — 74011250637 HC RX REV CODE- 250/637: Performed by: INTERNAL MEDICINE

## 2018-07-13 PROCEDURE — 74011250637 HC RX REV CODE- 250/637: Performed by: HOSPITALIST

## 2018-07-13 PROCEDURE — 77010033678 HC OXYGEN DAILY

## 2018-07-13 PROCEDURE — 36415 COLL VENOUS BLD VENIPUNCTURE: CPT | Performed by: PHYSICIAN ASSISTANT

## 2018-07-13 PROCEDURE — 74011000250 HC RX REV CODE- 250: Performed by: INTERNAL MEDICINE

## 2018-07-13 PROCEDURE — 94762 N-INVAS EAR/PLS OXIMTRY CONT: CPT

## 2018-07-13 PROCEDURE — 83735 ASSAY OF MAGNESIUM: CPT | Performed by: PHYSICIAN ASSISTANT

## 2018-07-13 PROCEDURE — 65270000029 HC RM PRIVATE

## 2018-07-13 PROCEDURE — 74011250636 HC RX REV CODE- 250/636: Performed by: INTERNAL MEDICINE

## 2018-07-13 PROCEDURE — 97530 THERAPEUTIC ACTIVITIES: CPT

## 2018-07-13 PROCEDURE — 82962 GLUCOSE BLOOD TEST: CPT

## 2018-07-13 PROCEDURE — 74011000250 HC RX REV CODE- 250: Performed by: HOSPITALIST

## 2018-07-13 PROCEDURE — 80048 BASIC METABOLIC PNL TOTAL CA: CPT | Performed by: PHYSICIAN ASSISTANT

## 2018-07-13 PROCEDURE — 84100 ASSAY OF PHOSPHORUS: CPT | Performed by: PHYSICIAN ASSISTANT

## 2018-07-13 PROCEDURE — 94640 AIRWAY INHALATION TREATMENT: CPT

## 2018-07-13 RX ADMIN — ATORVASTATIN CALCIUM 20 MG: 10 TABLET, FILM COATED ORAL at 21:42

## 2018-07-13 RX ADMIN — ASPIRIN 81 MG: 81 TABLET, COATED ORAL at 08:40

## 2018-07-13 RX ADMIN — HEPARIN SODIUM 5000 UNITS: 5000 INJECTION, SOLUTION INTRAVENOUS; SUBCUTANEOUS at 08:39

## 2018-07-13 RX ADMIN — CARVEDILOL 3.12 MG: 3.12 TABLET, FILM COATED ORAL at 17:15

## 2018-07-13 RX ADMIN — LISINOPRIL 5 MG: 5 TABLET ORAL at 08:40

## 2018-07-13 RX ADMIN — Medication 400 MG: at 08:40

## 2018-07-13 RX ADMIN — CARVEDILOL 3.12 MG: 3.12 TABLET, FILM COATED ORAL at 08:40

## 2018-07-13 RX ADMIN — HEPARIN SODIUM 5000 UNITS: 5000 INJECTION, SOLUTION INTRAVENOUS; SUBCUTANEOUS at 21:42

## 2018-07-13 RX ADMIN — BUMETANIDE 1 MG: 0.25 INJECTION INTRAMUSCULAR; INTRAVENOUS at 08:42

## 2018-07-13 RX ADMIN — Medication 400 MG: at 17:15

## 2018-07-13 RX ADMIN — POTASSIUM CHLORIDE 20 MEQ: 20 TABLET, EXTENDED RELEASE ORAL at 17:15

## 2018-07-13 RX ADMIN — Medication 5 ML: at 13:37

## 2018-07-13 RX ADMIN — BUMETANIDE 1 MG: 0.25 INJECTION INTRAMUSCULAR; INTRAVENOUS at 17:17

## 2018-07-13 RX ADMIN — IPRATROPIUM BROMIDE AND ALBUTEROL SULFATE 3 ML: .5; 3 SOLUTION RESPIRATORY (INHALATION) at 23:38

## 2018-07-13 RX ADMIN — Medication 10 ML: at 05:07

## 2018-07-13 RX ADMIN — POTASSIUM CHLORIDE 20 MEQ: 20 TABLET, EXTENDED RELEASE ORAL at 08:40

## 2018-07-13 NOTE — PROGRESS NOTES
Progress Note      Patient: Oskar Cole Sex: male          MRN: [id number]           YOB: 1945      Age:  74 y.o. PCP:  Rakel Culver MD  Treatment Team: Attending Provider: Juan José Carmichael MD; Utilization Review: Crow Roblero RN; Consulting Provider: Sakshi Bañuelos MD; Care Manager: Rich Cho [facility]  Subjective:     Complaints of shortness of breath with exertion, better with rest, overall slowly improving. Urinating better now with bumex. No fevers or chills or chest pain or abdominal pain or diarrhea. Objective:   Physical Exam:   Visit Vitals    /88 (BP 1 Location: Left arm, BP Patient Position: At rest)    Pulse 63    Temp 97.4 °F (36.3 °C)    Resp (!) 36    Ht 5' 9\" (1.753 m)    Wt 104.6 kg (230 lb 8 oz)    SpO2 90%    BMI 34.04 kg/m2      Temp (24hrs), Av.5 °F (36.4 °C), Min:97.4 °F (36.3 °C), Max:97.6 °F (36.4 °C)    Oxygen Therapy  O2 Sat (%): 90 % (18)  Pulse via Oximetry: 61 beats per minute (18)  O2 Device: Nasal cannula (18)  O2 Flow Rate (L/min): 5 l/min (18)  FIO2 (%): 36 % (18 0543)    Intake/Output Summary (Last 24 hours) at 18 1022  Last data filed at 18 0955   Gross per 24 hour   Intake              120 ml   Output              450 ml   Net             -330 ml      General: Conscious, no resp distress at rest. Mild resp distress with exertion+   Eyes:  VICENTA, No pallor/icterus    HENT:             Oral Mucosa is Moist, No sinus tenderness  Neck:               Supple, elevated JVD  Lungs:  Bibasilar crackles and decreased air entry at bases. No significant wheeze/rhonchi  Heart:  S1 S2 regular  Abdomen: Soft, normal bowel sounds, NTND, No guarding/rigidity/rebound tend. Extremities: Noah. Pitting pedal edema - some improvement.   Neurologic:  AAOX3, No acute FND, Motor:  LUE: 5/5, LLE: 5/5, RUE: 5/5, RLE: 5/5  Skin:                No acute rashes  Musculoskeletal: No Acute findings  Psych:             Appropriate mood, Thought process is normal    LAB  Recent Results (from the past 24 hour(s))   GLUCOSE, POC    Collection Time: 07/12/18 11:16 AM   Result Value Ref Range    Glucose (POC) 94 65 - 100 mg/dL   GLUCOSE, POC    Collection Time: 07/12/18  4:24 PM   Result Value Ref Range    Glucose (POC) 93 65 - 100 mg/dL   GLUCOSE, POC    Collection Time: 07/12/18  8:21 PM   Result Value Ref Range    Glucose (POC) 93 65 - 179 mg/dL   METABOLIC PANEL, BASIC    Collection Time: 07/13/18  5:06 AM   Result Value Ref Range    Sodium 142 136 - 145 mmol/L    Potassium 4.0 3.5 - 5.1 mmol/L    Chloride 106 98 - 107 mmol/L    CO2 26 21 - 32 mmol/L    Anion gap 10 7 - 16 mmol/L    Glucose 95 65 - 100 mg/dL    BUN 27 (H) 8 - 23 MG/DL    Creatinine 1.51 (H) 0.8 - 1.5 MG/DL    GFR est AA 58 (L) >60 ml/min/1.73m2    GFR est non-AA 48 (L) >60 ml/min/1.73m2    Calcium 8.7 8.3 - 10.4 MG/DL   MAGNESIUM    Collection Time: 07/13/18  5:06 AM   Result Value Ref Range    Magnesium 2.2 1.8 - 2.4 mg/dL   PHOSPHORUS    Collection Time: 07/13/18  5:06 AM   Result Value Ref Range    Phosphorus 2.6 2.3 - 3.7 MG/DL   PLEASE READ & DOCUMENT PPD TEST IN 48 HRS    Collection Time: 07/13/18  9:27 AM   Result Value Ref Range    PPD  Negative    mm Induration 0 mm       No results found. No results found. All Micro Results     None          Current Medications Reviewed      Assessment/Plan     1. Acute on chronic combined systolic and diastolic heart failure exacerbation. 2.  Acute hypoxemic respiratory failure with hypoxemia - improving  3. Chronic COPD, no acute exacerbation. 4.  CKD stage III, monitor creatinine closely considering the need for IV diuretics. Cr is stable. 5.  Mild troponin elevation, secondary to CHF exacerbation demand ischemia from respiratory failure and underlying CKD. 6.  Dyslipidemia  7. History of coronary artery disease  8.   Osteoarthritis  9. ? New diagnosis of DM type 2 - A1C is 7. Will start him on diabetic diet, ISS, monitor. Diabetic teaching. BS are stable. Keep the patient on aspirin, Coreg, atorvastatin, lisinopril. Continue IV Bumex, monitor urine output, daily weights, electrolytes and creatinine closely. Cardiology consulted, appreciate their recommendations. Subcu heparin for DVT prophylaxis. Overall high risk patient sec to co-morbidities.     Dakota Arango MD  July 13, 2018

## 2018-07-13 NOTE — PROGRESS NOTES
7/13/18  Spoke with pts wife. This pt is currently in the hospital. Spoke with pts wife today and they are willing to pursue LTC placement. According to Wife, they dont have a place to stay. They will try to negotiate with their Dtr and see if its possible for them to stay there but currently no place to stay. Wife has lost her Job and very low on income. SW involvement at this time will be greatly appreciated. Salena Seek notified via e-mail. [facility] referral made. They would also like to reapply for Medicaid if possible. nurse will continue to follow and provide care as needed.

## 2018-07-13 NOTE — PROGRESS NOTES
PT Note:    PT attempted to see patient for PT treatment this AM. However, even after multiple bouts of encouragement, patient reported he did not feel like participating this morning. Will check back with patient later, as time allows.     Thank you,  JUAN TianT

## 2018-07-13 NOTE — PROGRESS NOTES
7/13/2018  Attempted to see patient this afternoon for OT, however dietitian in the room with pt and then pt scheduled for breathing treatment. Will re-attempt as time permits.   Thank you,  Michael La, OT

## 2018-07-13 NOTE — PROGRESS NOTES
Spoke with patient and spouse at bedside. Family is now asking for referrals to be sent for SNF rehab. Patient is agreeable to SNF rehab. Family would like referrals sent to [facility], [facility], [facility], and [facility]. Referrals sent to above facilities. Awaiting response. Patient has [employer] and will require pre-cert.

## 2018-07-13 NOTE — PROGRESS NOTES
Problem: Self Care Deficits Care Plan (Adult)  Goal: *Acute Goals and Plan of Care (Insert Text)  1. Patient will complete lower body bathing and dressing with modified independence and adaptive equipment as needed. 2. Patient will complete toileting with modified independence. 3. Patient will tolerate 30 minutes of OT treatment with 2-3 rest breaks to increase activity tolerance for ADLs. 4. Patient will complete functional transfers with modified independence and adaptive equipment as needed. 5. Patient will verbalize with independence 5 ways to complete energy conservation with ADL. Timeframe: 7 visits       OCCUPATIONAL THERAPY: Daily Note and Treatment Day: 1st 7/13/2018  INPATIENT: Hospital Day: 4  Payor: Joel Marques / Plan: [address] [place] [address] HMO / Product Type: [employer] Medicare /      NAME/AGE/GENDER: Sherine Barroso is a 74 y.o. male   PRIMARY DIAGNOSIS:  CHF (congestive heart failure) ([address]  CHF (congestive heart failure) ([address] Acute respiratory failure with hypoxemia ([address] Acute respiratory failure with hypoxemia ([address]        ICD-10: Treatment Diagnosis:    · Generalized Muscle Weakness (M62.81)  · Other lack of cordination (R27.8)   Precautions/Allergies:     Pcn [penicillins]      ASSESSMENT:     Mr. Markell Minaya was admitted with CHF. Pt lives with his wife in a single story home with a tub and is independent at baseline. 7/13/18 Pt presented sitting up in chair upon arrival. Pt's wife is at bedside and reports they are feeling quite overwhelmed. She reports they are just now learning that he is diabetic. Pt appears motivated to work with therapy. Pt is currently on 4 L of O2. Pt does become short of breath with activity with cues for patient to complete pursed lip breathing. Pt required multiple sitting breaks throughout session and needs cues to slow down. Pt demonstrates some decreased safety awareness with activity. Pt left up in the chair at end of session.  Pt appreciative of care. Pt to continue per plan of care. This section established at most recent assessment   PROBLEM LIST (Impairments causing functional limitations):  1. Decreased ADL/Functional Activities  2. Decreased Transfer Abilities  3. Decreased Ambulation Ability/Technique  4. Decreased Balance  5. Increased Pain  6. Decreased Activity Tolerance  7. Decreased Pacing Skills  8. Decreased Work Simplification/Energy Conservation Techniques  9. Increased Fatigue  10. Increased Shortness of Breath  11. Decreased Flexibility/Joint Mobility  12. Edema/Girth   INTERVENTIONS PLANNED: (Benefits and precautions of occupational therapy have been discussed with the patient.)  1. Activities of daily living training  2. Adaptive equipment training  3. Balance training  4. Clothing management  5. Donning&doffing training  6. Neuromuscular re-eduation  7. Therapeutic activity  8. Therapeutic exercise     TREATMENT PLAN: Frequency/Duration: Follow patient 3 times per week to address above goals. Rehabilitation Potential For Stated Goals: Excellent     RECOMMENDED REHABILITATION/EQUIPMENT: (at time of discharge pending progress): Due to the probability of continued deficits (see above) this patient will not likely need continued skilled occupational therapy after discharge. (dependent on progress-TBD)  Equipment:    TBD              OCCUPATIONAL PROFILE AND HISTORY:   History of Present Injury/Illness (Reason for Referral):  See H&P  Past Medical History/Comorbidities:   Mr. Vinita Mendez  has a past medical history of Acute CHF (congestive heart failure) ([facility] (4/25/2015); Acute combined systolic and diastolic congestive heart failure ([facility] (4/28/2015); Chronic obstructive pulmonary disease ([facility] De Quervain's tenosynovitis, right (4/28/2015); Dyspnea on exertion (6/28/2015); Elevated serum creatinine (4/25/2015); Elevated troponin (6/28/2015); History of coronary artery disease; History of right knee surgery; History of shingles;  Malignant hypertension (4/25/2015); and MI (myocardial infarction) ([facility] Mr. Tato Huang  has a past surgical history that includes hx knee arthroscopy (Right) and hx heart catheterization (6/29/2015). Social History/Living Environment:   Home Environment: Private residence  # Steps to Enter: 4  Rails to Enter: Yes  Hand Rails : Bilateral  Wheelchair Ramp: No  One/Two Story Residence: One story  Living Alone: No  Support Systems: Spouse/Significant Other/Partner, Family member(s)  Patient Expects to be Discharged to[de-identified] Private residence  Current DME Used/Available at Home: Rayetta Danker, rollator  Tub or Shower Type: Shower  Prior Level of Function/Work/Activity:  Pt lives with wife and independent with ADL. Pt reports he uses a cane occasionally. Personal Factors: Other factors that influence how disability is experienced by the patient:  Multiple co-morbidities- see above   Number of Personal Factors/Comorbidities that affect the Plan of Care: Expanded review of therapy/medical records (1-2):  MODERATE COMPLEXITY   ASSESSMENT OF OCCUPATIONAL PERFORMANCE[de-identified]   Activities of Daily Living:   Basic ADLs (From Assessment) Complex ADLs (From Assessment)   Feeding: Setup  Oral Facial Hygiene/Grooming: Stand-by assistance  Bathing: Minimum assistance  Upper Body Dressing: Stand-by assistance  Lower Body Dressing: Moderate assistance  Toileting: Minimum assistance Instrumental ADL  Homemaking: Maximum assistance   Grooming/Bathing/Dressing Activities of Daily Living     Cognitive Retraining  Safety/Judgement: Awareness of environment; Fall prevention                     Lower Body Dressing Assistance  Slip on Shoes Without Back: Supervision/set-up Bed/Mat Mobility  Sit to Stand: Contact guard assistance  Bed to Chair: Contact guard assistance  Scooting: Supervision       Most Recent Physical Functioning:   Gross Assessment:  AROM: Within functional limits  Strength:  Within functional limits  Coordination: Within functional limits Posture:  Posture (WDL): Exceptions to WDL  Posture Assessment: Forward head, Rounded shoulders, Trunk flexion  Balance:  Sitting: Intact  Standing: Impaired  Standing - Static: Good  Standing - Dynamic : Fair Bed Mobility:  Scooting: Supervision  Wheelchair Mobility:     Transfers:  Sit to Stand: Contact guard assistance  Bed to Chair: Contact guard assistance            Patient Vitals for the past 6 hrs:   BP BP Patient Position SpO2 Pulse   18 1043 (!) 139/91 Sitting 100 % 74   18 1509 128/72 Sitting 100 % 65       Mental Status  Neurologic State: Alert  Orientation Level: Oriented X4  Cognition: Appropriate decision making, Appropriate for age attention/concentration, Follows commands  Perception: Appears intact  Perseveration: No perseveration noted  Safety/Judgement: Awareness of environment, Fall prevention                          Physical Skills Involved:  1. Balance  2. Strength  3. Activity Tolerance  4. Pain (acute)  5. Edema Cognitive Skills Affected (resulting in the inability to perform in a timely and safe manner): 1. none Psychosocial Skills Affected:  1. none   Number of elements that affect the Plan of Care: 3-5:  MODERATE COMPLEXITY   CLINICAL DECISION MAKIN[address] [place] [id number] AM-PAC 6 Clicks   Daily Activity Inpatient Short Form  How much help from another person does the patient currently need. .. Total A Lot A Little None   1. Putting on and taking off regular lower body clothing? [] 1   [x] 2   [] 3   [] 4   2. Bathing (including washing, rinsing, drying)? [] 1   [] 2   [x] 3   [] 4   3. Toileting, which includes using toilet, bedpan or urinal?   [] 1   [] 2   [x] 3   [] 4   4. Putting on and taking off regular upper body clothing? [] 1   [] 2   [x] 3   [] 4   5. Taking care of personal grooming such as brushing teeth? [] 1   [] 2   [x] 3   [] 4   6. Eating meals?    [] 1   [] 2   [x] 3   [] 4   © , Trustees of [address] [place] [address] [address] [id number], under license to [employer]. All rights reserved      Score:  Initial: 17 Most Recent: X (Date: -- )    Interpretation of Tool:  Represents activities that are increasingly more difficult (i.e. Bed mobility, Transfers, Gait). Score 24 23 22-20 19-15 14-10 9-7 6     Modifier CH CI CJ CK CL CM CN      ? Self Care:     - CURRENT STATUS: CK - 40%-59% impaired, limited or restricted    - GOAL STATUS: CJ - 20%-39% impaired, limited or restricted    - D/C STATUS:  ---------------To be determined---------------  Payor: Katty Singh / Plan: [address] [place] [address] HMO / Product Type: [employer] Medicare /      Medical Necessity:     · Patient demonstrates excellent rehab potential due to higher previous functional level. Reason for Services/Other Comments:  · Patient continues to require skilled intervention due to decreased activity tolerance with ADL/functional transfers. Use of outcome tool(s) and clinical judgement create a POC that gives a: LOW COMPLEXITY         TREATMENT:   (In addition to Assessment/Re-Assessment sessions the following treatments were rendered)     Pre-treatment Symptoms/Complaints:    Pain: Initial:   Pain Intensity 1: 0  Post Session:  0/10     Therapeutic Activity: (    23): Therapeutic activities including Chair transfers and Ambulation on level ground to improve mobility, strength, balance and coordination. Required minimal assistance   to promote static and dynamic balance in standing.     n/a    Braces/Orthotics/Lines/Etc:   · O2 Device: Room air  Treatment/Session Assessment:    · Response to Treatment:  eval only  · Interdisciplinary Collaboration:   o Occupational Therapist  o Registered Nurse  · After treatment position/precautions:   o Up in chair  o Bed/Chair-wheels locked  o Call light within reach  o RN notified   · Compliance with Program/Exercises: Will assess as treatment progresses. · Recommendations/Intent for next treatment session:   \"Next visit will focus on advancements to more challenging activities and reduction in assistance provided\".   Total Treatment Duration:  OT Patient Time In/Time Out  Time In: 1550  Time Out: Jaida 14, OT

## 2018-07-13 NOTE — DIABETES MGMT
Patient admitted with acute respiratory failure, seen by diabetes educator. Patient was pre-diabetic in 2016 (hgA1c 5.8), but did not recall being told that by his PCP at the time. Current HgA1c 7 ( eA). Blood glucose 132 on admit and has ranged  during hospitalization. Creatinine 1.54. Patient has [place]. PCP is Dr. Moses Lee. Patient would like rehab at discharge notified case management. Patient's current regimen is Humalog SSI-reviewed with patient. Pt given educational material, \"Diabetes Self-Management: A Patient Teaching Guide\", which was reviewed with pt. Explained basic physiology of diabetes, as well as causes, s/s, and treatments for hypoglycemia and hyperglycemia. Described the effects of poor glycemic control on the development of long-term complications such as renal, eye, nerve, and cardiovascular disease. Described proper diabetic foot care and the importance of checking feet every day. Educated re: effects of carbohydrates on blood glucose, the \"plate method\" of healthy meal planning, basics of healthy meal plan, and Consistent Carbohydrate Diet. Also explained the relationship between hyperglycemia and infection. Discussed target goals for blood glucose and A1C. Pt verbalizes understanding of teaching. Explained the importance of blood glucose monitoring. Recommended frequency of twice a week fasting and 2 hour post prandial after biggest meal and to record in log book to take to PCP appointment. Provided blood glucose meter, strips, and instructed pt in use of meter. Patient's wife is diabetic and states she will help patient with glucometer. Provided patient with One Touch Verio Flex, but notified patient that [facility] often covers Accu-check meter which educator does not have access to. Patient will need prescription for glucometer and supplies at discharge to ensure patient gets whichever glucometer is covered by his insurance.  Educated patient that given his COPD if he ever required steroids for treatment in the future to be aware they would elevate his blood glucose and he would need to monitor his blood glucose more closely and notify his PCP if they were too high. Patient would likely benefit from continued diabetes outpatient education, but is not ready for it at this time. Patient provided with contact information to [facility] program to pursue at their convenience after discharge with their PCP. Patient to agreeable to plan. Patient and wife both became tearful. Allowed patient to vent his frustrations at diagnosis. Wife verbalized her personal struggles with her diabetes as well. Encouraged  Compliance with prescribed regimen, working on lifestyle modifications, and follow up with PCP for titration of regimen. Patient and wife appreciative of education and voices no further questions at this time.

## 2018-07-13 NOTE — PROGRESS NOTES
Problem: Patient Education: Go to Patient Education Activity  Goal: Patient/Family Education  Nutrition   Received consult for Diet Education: \"Recurrent admission for CHF, poor insight into disease, fluid restriction and diet\"    Assessment:  Diet order: 7-10: Cardiac, 7-11: 2 L fluid restriction, 7-12: CCHO, then CCHO, 2 gram Na  Food/Nutrition and Pertinent History: Hx of CHF, COPD, MI. Note pt and wife were instructed on cardiac/low sodium diet at [address] [place] [address] in 2015 and again in 2017  HgA1C 7-no prior hx of DM. POC glucoses 85-94 mg/dl  Pt seen in company of wife. She reports he has made a lot of diet changes in the past few years but he still sometimes will do what he wants and eat some high sodium foods. He has cut back on his salt intake to \"just enough: to give the food some taste but says it probably takes him 6 weeks to use 1 tsp of salt. Pt says he doesn't not eat out much because it costs too much. Wife says he does like and eat fried foods. Wife is diabetic and is able to correctly name CHO containing foods. Wife would like a [facility] meal plan and receptive to information of fluid restriction. Anthropometrics:Height: 5' 9\" (175.3 cm),  Weight: 104.6 kg (230 lb 8 oz), Weight Source: Bed, Body mass index is 34.04 kg/(m^2). BMI class of overweight. Macronutrient needs:  EER:  6865-4356 kcal /day (15-18 kcal/kg Actual BW)  EPR:  58-87 grams protein/day (0.8-1.2 grams/kg IBW)                                                                           Nutrition Diagnosis: Limited adherence to food and nutrition r/t recommendations r/t lack of self efficacy as evidenced by pt has made some positive changed in diet to lower Na intake but still includes high Na foods intermittently    Intervention:   Meals and snacks: 2 gram Na added to [facility] diet yesterday, will add 2 L fluid restriction to diet order today for clarification purposes  Education: Provided patient and wife with written instruction on CHF and CCHO. Handouts [facility] 1800 kcal/d with 60 grams CHO per meal and 30 grams CHO for 1 snack per day, CHF discharge packet to include 2 L fluid restriction and easy read guidelines for low Na and diabetic diet. Expected compliance is questionable  Reordeded diabetic teaching as diabetes management so diabetic educators will receive consult.      Shirley Jackson, [address] [place] [address], [address], [address] [place] [address]

## 2018-07-13 NOTE — PROGRESS NOTES
[facility] CARDIOLOGY PROGRESS NOTE 
      
 
7/13/2018 4:31 PM 
 
Admit Date: 7/10/2018 Subjective:  
 
Improved dyspnea from yesterday but still noted and states 'sporadic and can vary between feeling much improved and very SOB within 10 mins'. No CP. On ~3-4L; states prior only used 'as needed' oxygen but was mostly SOB  
 
ROS: 
Cardiovascular:  As noted above Objective:  
  
Vitals:  
 07/13/18 7720 07/13/18 0739 07/13/18 1043 07/13/18 1509 BP: 126/88  (!) 139/91 128/72 Pulse: 63  74 65 Resp: (!) 36  (!) 32 28 Temp: 97.4 °F (36.3 °C)  97.5 °F (36.4 °C) 98 °F (36.7 °C) SpO2: 92% 90% 100% 100% Weight:      
Height:      
 
 
Physical Exam: 
General-No Acute Distress Neck- supple, no JVD 
CV- regular rate and rhythm no MRG Lung- dec at bases Abd- soft, nontender, nondistended Ext- 1+ edema bilaterally. Skin- warm and dry Data Review:  
Recent Labs  
   07/13/18 
 0506  07/12/18 
 0522  07/11/18 
 0441 NA  142  143  145  
K  4.0  3.7  3.6 MG  2.2  2.2  2.1 BUN  27*  31*  32* CREA  1.51*  1.62*  1.77* GLU  95  115*  102* WBC   --   9.4  9.0 HGB   --   14.2  13.8 HCT   --   43.8  41.5 PLT   --   169  151 TROIQ   --    --   0.54* Assessment/Plan:  
 
Principal Problem: 
  Acute respiratory failure with hypoxemia ([facility] (5/24/2018) Active Problems: Hypertension (9/29/2017) COPD, moderate ([address] (9/29/2017) High triglycerides (9/29/2017) Essential hypertension (9/28/2016) CHF (congestive heart failure) ([facility] (9/27/2017) TAZ (obstructive sleep apnea) (10/2/2017) Echo noted with EF ~45%; also RV dysfn/moderately elevated RVSP (not sig changed from prior echo 5/18). Continue diuresis; changed to IV bumex (alb 2.6). Edema likely related to hypoalbuminemia/RV dysfn. Improving renal fn and appears back to baseline (baseline ~1.5-1.6).  Chronic troponin elevation with no significant trend and likely component of LV dysfn/RV dysfn/CKD. Prior noted cath from 1/18 with mild non obst CAD Raquel Hernandez MD 
7/13/2018 4:31 PM

## 2018-07-13 NOTE — PROGRESS NOTES
Pt found multiple times by RN and CNA with O2 off. Sats in 60s. Pt with TAZ but refuses CPAP, pt states he does not know when he pulls his O2 off as it is not on purpose. MD paged, order for continuous O2 monitoring.

## 2018-07-14 LAB
ANION GAP SERPL CALC-SCNC: 10 MMOL/L (ref 7–16)
BUN SERPL-MCNC: 26 MG/DL (ref 8–23)
CALCIUM SERPL-MCNC: 9 MG/DL (ref 8.3–10.4)
CHLORIDE SERPL-SCNC: 103 MMOL/L (ref 98–107)
CO2 SERPL-SCNC: 30 MMOL/L (ref 21–32)
CREAT SERPL-MCNC: 1.57 MG/DL (ref 0.8–1.5)
GLUCOSE BLD STRIP.AUTO-MCNC: 115 MG/DL (ref 65–100)
GLUCOSE BLD STRIP.AUTO-MCNC: 78 MG/DL (ref 65–100)
GLUCOSE BLD STRIP.AUTO-MCNC: 93 MG/DL (ref 65–100)
GLUCOSE BLD STRIP.AUTO-MCNC: 93 MG/DL (ref 65–100)
GLUCOSE SERPL-MCNC: 78 MG/DL (ref 65–100)
MAGNESIUM SERPL-MCNC: 2.4 MG/DL (ref 1.8–2.4)
MM INDURATION POC: 0 MM (ref 0–5)
PHOSPHATE SERPL-MCNC: 2.7 MG/DL (ref 2.3–3.7)
POTASSIUM SERPL-SCNC: 4.9 MMOL/L (ref 3.5–5.1)
PPD POC: NORMAL NEGATIVE
SODIUM SERPL-SCNC: 143 MMOL/L (ref 136–145)

## 2018-07-14 PROCEDURE — 83735 ASSAY OF MAGNESIUM: CPT | Performed by: PHYSICIAN ASSISTANT

## 2018-07-14 PROCEDURE — 74011250637 HC RX REV CODE- 250/637: Performed by: HOSPITALIST

## 2018-07-14 PROCEDURE — 65270000029 HC RM PRIVATE

## 2018-07-14 PROCEDURE — 77010033678 HC OXYGEN DAILY

## 2018-07-14 PROCEDURE — 80048 BASIC METABOLIC PNL TOTAL CA: CPT | Performed by: PHYSICIAN ASSISTANT

## 2018-07-14 PROCEDURE — 36415 COLL VENOUS BLD VENIPUNCTURE: CPT | Performed by: PHYSICIAN ASSISTANT

## 2018-07-14 PROCEDURE — 74011250636 HC RX REV CODE- 250/636: Performed by: INTERNAL MEDICINE

## 2018-07-14 PROCEDURE — 74011000250 HC RX REV CODE- 250: Performed by: INTERNAL MEDICINE

## 2018-07-14 PROCEDURE — 94762 N-INVAS EAR/PLS OXIMTRY CONT: CPT

## 2018-07-14 PROCEDURE — 84100 ASSAY OF PHOSPHORUS: CPT | Performed by: PHYSICIAN ASSISTANT

## 2018-07-14 PROCEDURE — 74011250637 HC RX REV CODE- 250/637: Performed by: INTERNAL MEDICINE

## 2018-07-14 PROCEDURE — 82962 GLUCOSE BLOOD TEST: CPT

## 2018-07-14 RX ORDER — BUMETANIDE 0.25 MG/ML
2 INJECTION INTRAMUSCULAR; INTRAVENOUS 2 TIMES DAILY
Status: DISCONTINUED | OUTPATIENT
Start: 2018-07-14 | End: 2018-07-16

## 2018-07-14 RX ADMIN — HEPARIN SODIUM 5000 UNITS: 5000 INJECTION, SOLUTION INTRAVENOUS; SUBCUTANEOUS at 21:00

## 2018-07-14 RX ADMIN — Medication 10 ML: at 21:56

## 2018-07-14 RX ADMIN — BUMETANIDE 1 MG: 0.25 INJECTION INTRAMUSCULAR; INTRAVENOUS at 09:22

## 2018-07-14 RX ADMIN — BUMETANIDE 2 MG: 0.25 INJECTION INTRAMUSCULAR; INTRAVENOUS at 17:34

## 2018-07-14 RX ADMIN — POTASSIUM CHLORIDE 20 MEQ: 20 TABLET, EXTENDED RELEASE ORAL at 08:24

## 2018-07-14 RX ADMIN — ASPIRIN 81 MG: 81 TABLET, COATED ORAL at 08:24

## 2018-07-14 RX ADMIN — HEPARIN SODIUM 5000 UNITS: 5000 INJECTION, SOLUTION INTRAVENOUS; SUBCUTANEOUS at 08:27

## 2018-07-14 RX ADMIN — Medication 5 ML: at 05:50

## 2018-07-14 RX ADMIN — LISINOPRIL 5 MG: 5 TABLET ORAL at 08:24

## 2018-07-14 RX ADMIN — Medication 400 MG: at 08:24

## 2018-07-14 RX ADMIN — CARVEDILOL 3.12 MG: 3.12 TABLET, FILM COATED ORAL at 17:31

## 2018-07-14 RX ADMIN — CARVEDILOL 3.12 MG: 3.12 TABLET, FILM COATED ORAL at 08:24

## 2018-07-14 RX ADMIN — Medication 10 ML: at 17:39

## 2018-07-14 RX ADMIN — ATORVASTATIN CALCIUM 20 MG: 10 TABLET, FILM COATED ORAL at 21:52

## 2018-07-14 RX ADMIN — POTASSIUM CHLORIDE 20 MEQ: 20 TABLET, EXTENDED RELEASE ORAL at 17:31

## 2018-07-14 RX ADMIN — Medication 400 MG: at 17:31

## 2018-07-14 NOTE — PROGRESS NOTES
Progress Note      Patient: Manuel Renee Sex: male          MRN: [id number]           YOB: 1945      Age:  74 y.o. PCP:  Luisana Patel MD  Treatment Team: Attending Provider: Michael Blackman MD; Utilization Review: Maldonado Boston RN; Consulting Provider: Mami Thomas MD; Care Manager: Leidy Chappell [facility]; Utilization Review: Ariana Beatty  Subjective:     Patient is a 75yo male with PMH of COPD (4LNC), CHF, CAD, and HTN, who reported to the ER with worsening Lower extremity edema, and SOB. Patient was recently hospitalized from - with acute CHF exacerbation. Echo EF 45%. Receiving bumex. Good UOP. Objective:   Physical Exam:   Visit Vitals    /89 (BP 1 Location: Right arm, BP Patient Position: At rest)    Pulse 70    Temp 97.6 °F (36.4 °C)    Resp 26    Ht 5' 9\" (1.753 m)    Wt 101 kg (222 lb 11.2 oz)    SpO2 100%    BMI 32.89 kg/m2      Temp (24hrs), Av.7 °F (36.5 °C), Min:97.4 °F (36.3 °C), Max:98 °F (36.7 °C)    Oxygen Therapy  O2 Sat (%): 100 % (18 1046)  Pulse via Oximetry: 66 beats per minute (18)  O2 Device: Nasal cannula (18)  O2 Flow Rate (L/min): 4 l/min (18)  FIO2 (%): 36 % (18)    Intake/Output Summary (Last 24 hours) at 18 1319  Last data filed at 18 0800   Gross per 24 hour   Intake              360 ml   Output              125 ml   Net              235 ml      General: Conscious, no resp distress at rest. Mild resp distress with exertion+   Eyes:  VICENTA, No pallor/icterus    HENT:             Oral Mucosa is Moist, No sinus tenderness  Neck:               Supple, elevated JVD  Lungs:  Diminshed. 4.5LNC  Heart:  S1 S2 regular  Abdomen: Soft, normal bowel sounds, NTND, No guarding/rigidity/rebound tend. Extremities: Noah. Pitting pedal edema .  L>R  Neurologic:  AAOX3, No acute FND, Motor:  LUE: 5/5, LLE: 5/5, RUE: 5/5, RLE: 5/5  Skin: No acute rashes  Musculoskeletal: No Acute findings  Psych:             Appropriate mood, Thought process is normal    LAB  Recent Results (from the past 24 hour(s))   GLUCOSE, POC    Collection Time: 07/13/18  3:39 PM   Result Value Ref Range    Glucose (POC) 85 65 - 100 mg/dL   GLUCOSE, POC    Collection Time: 07/13/18  7:44 PM   Result Value Ref Range    Glucose (POC) 123 (H) 65 - 300 mg/dL   METABOLIC PANEL, BASIC    Collection Time: 07/14/18  5:32 AM   Result Value Ref Range    Sodium 143 136 - 145 mmol/L    Potassium 4.9 3.5 - 5.1 mmol/L    Chloride 103 98 - 107 mmol/L    CO2 30 21 - 32 mmol/L    Anion gap 10 7 - 16 mmol/L    Glucose 78 65 - 100 mg/dL    BUN 26 (H) 8 - 23 MG/DL    Creatinine 1.57 (H) 0.8 - 1.5 MG/DL    GFR est AA 56 (L) >60 ml/min/1.73m2    GFR est non-AA 46 (L) >60 ml/min/1.73m2    Calcium 9.0 8.3 - 10.4 MG/DL   MAGNESIUM    Collection Time: 07/14/18  5:32 AM   Result Value Ref Range    Magnesium 2.4 1.8 - 2.4 mg/dL   PHOSPHORUS    Collection Time: 07/14/18  5:32 AM   Result Value Ref Range    Phosphorus 2.7 2.3 - 3.7 MG/DL   GLUCOSE, POC    Collection Time: 07/14/18  7:12 AM   Result Value Ref Range    Glucose (POC) 78 65 - 100 mg/dL   GLUCOSE, POC    Collection Time: 07/14/18 10:46 AM   Result Value Ref Range    Glucose (POC) 115 (H) 65 - 100 mg/dL       No results found. No results found. 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  -Continue IV Bumex 2mg BID  -monitor UOP  -Daily weights  -Monitor BMP  -Continue coreg    Acute hypoxemic respiratory failure with hypoxemia - improving   Chronic COPD, no acute exacerbation.  -Supplemental oxygen     CKD stage III,  - monitor creatinine closely considering the need for IV diuretics    Mild troponin elevation, secondary to CHF exacerbation demand ischemia from respiratory failure and underlying CKD. Dyslipidemia  -Continue atorvastatin    History of coronary artery disease  -continue asa,  lisinopril    New diagnosis of DM type 2 - A1C is 7.  -Diabetic diet  -SSI  -Diabetic education    DVT: [facility]      Discussed plan of care with Dr. Manny Narayanan, MARCI  July 14, 2018

## 2018-07-14 NOTE — PROGRESS NOTES
Provided meal ticket for family as requested by staff    Ailyn Anthony, staff Moustapha martinez 70, [address]  /   [email or web address]

## 2018-07-14 NOTE — PROGRESS NOTES
[facility] CARDIOLOGY PROGRESS NOTE 
      
 
7/14/2018 10:47 AM 
 
Admit Date: 7/10/2018 Admit Diagnosis: CHF (congestive heart failure) (HCC);CHF (congestive heart * Subjective: No complaints this AM, no chest pain or shortness of breath Interval History: (History of pertinent interval events obtained from nursing staff) ROS: 
GEN:  No fever or chills Cardiovascular:  As noted above Pulmonary:  As noted above Neuro:  No new focal motor or sensory loss Objective:  
 
Vitals:  
 07/14/18 0622 07/14/18 5555 07/14/18 0713 07/14/18 1046 BP:  121/78 (!) 141/96 126/89 Pulse:  72 78 70 Resp:  26 24 26 Temp:  97.4 °F (36.3 °C) 98 °F (36.7 °C) 97.6 °F (36.4 °C) SpO2:  98% 100% 100% Weight: 101 kg (222 lb 11.2 oz) Height:      
 
 
Physical Exam: 
General- No Acute Distress, Alert & Oriented x 3, appropriate mood. Neck- supple CV- regular rate and rhythm, distant S1, S2, no MRG Lung- clear bilaterally Abd- soft, nontender, nondistended Ext- 1+ PARVEEN bilaterally. Skin- warm and dry Current Facility-Administered Medications Medication Dose Route Frequency  insulin lispro (HUMALOG) injection   SubCUTAneous AC&HS  potassium chloride (K-DUR, KLOR-CON) SR tablet 20 mEq  20 mEq Oral BID  carvedilol (COREG) tablet 3.125 mg  3.125 mg Oral BID WITH MEALS  
 bumetanide (BUMEX) injection 1 mg  1 mg IntraVENous BID  magnesium oxide (MAG-OX) tablet 400 mg  400 mg Oral BID  aspirin delayed-release tablet 81 mg  81 mg Oral DAILY  albuterol-ipratropium (DUO-NEB) 2.5 MG-0.5 MG/3 ML  3 mL Nebulization Q4H PRN  
 lisinopril (PRINIVIL, ZESTRIL) tablet 5 mg  5 mg Oral DAILY  atorvastatin (LIPITOR) tablet 20 mg  20 mg Oral QHS  sodium chloride (NS) flush 5-10 mL  5-10 mL IntraVENous Q8H  
 sodium chloride (NS) flush 5-10 mL  5-10 mL IntraVENous PRN  
 heparin (porcine) injection 5,000 Units  5,000 Units SubCUTAneous Q12H Data Review:  
Recent Results (from the past 24 hour(s)) GLUCOSE, POC Collection Time: 07/13/18 11:40 AM  
Result Value Ref Range Glucose (POC) 85 65 - 100 mg/dL GLUCOSE, POC Collection Time: 07/13/18  3:39 PM  
Result Value Ref Range Glucose (POC) 85 65 - 100 mg/dL GLUCOSE, POC Collection Time: 07/13/18  7:44 PM  
Result Value Ref Range Glucose (POC) 123 (H) 65 - 100 mg/dL METABOLIC PANEL, BASIC Collection Time: 07/14/18  5:32 AM  
Result Value Ref Range Sodium 143 136 - 145 mmol/L Potassium 4.9 3.5 - 5.1 mmol/L Chloride 103 98 - 107 mmol/L  
 CO2 30 21 - 32 mmol/L Anion gap 10 7 - 16 mmol/L Glucose 78 65 - 100 mg/dL BUN 26 (H) 8 - 23 MG/DL Creatinine 1.57 (H) 0.8 - 1.5 MG/DL  
 GFR est AA 56 (L) >60 ml/min/1.73m2 GFR est non-AA 46 (L) >60 ml/min/1.73m2 Calcium 9.0 8.3 - 10.4 MG/DL MAGNESIUM Collection Time: 07/14/18  5:32 AM  
Result Value Ref Range Magnesium 2.4 1.8 - 2.4 mg/dL PHOSPHORUS Collection Time: 07/14/18  5:32 AM  
Result Value Ref Range Phosphorus 2.7 2.3 - 3.7 MG/DL  
GLUCOSE, POC Collection Time: 07/14/18  7:12 AM  
Result Value Ref Range Glucose (POC) 78 65 - 100 mg/dL EKG:  (EKG has been independently visualized by me with interpretation below) Assessment:  
 
Principal Problem: 
  Acute respiratory failure with hypoxemia ([address] (5/24/2018) Active Problems: Hypertension (9/29/2017) COPD, moderate ([address] (9/29/2017) Overview: Complete PFTs: 7/20/15: 
    Spirometry is consistent with a moderate obstructive/restrictive defect. Sarina Rutherford The residual volume is increased relative to other lung volumes suggesting  
    air-trapping. The diffusion capacity corrected for alveolar volume was  
    normal suggesting no loss of alveolo-capillary units. High triglycerides (9/29/2017) Essential hypertension (9/28/2016) CHF (congestive heart failure) ([address] (9/27/2017)   TAZ (obstructive sleep apnea) (10/2/2017) Plan: 1. CHF: Echo noted with EF ~45%; also RV dysfn/moderately elevated RVSP (not sig changed from prior echo 5/18). Continue diuresis, no significant diuresis overnight and will increase bumex to 2mg BID, edema likely related to hypoalbuminemia/RV dysfn. Improving renal fn and appears back to baseline (baseline ~1.5-1.6), cont close monitoring 2. Chronic troponin elevation with no significant trend and likely component of LV dysfn/RV dysfn/CKD. Prior noted cath from 1/18 with mild non obst CAD 3. PPx: heparin TID Melina Manzanares MD 
Cardiology/Electrophysiology

## 2018-07-14 NOTE — PROGRESS NOTES
Pt rounded on hourly this shift. Still with notable apenic episodes. Continuous pulse ox in place. All needs met at this time.

## 2018-07-15 LAB
ANION GAP SERPL CALC-SCNC: 8 MMOL/L (ref 7–16)
BASOPHILS # BLD: 0 K/UL (ref 0–0.2)
BASOPHILS NFR BLD: 0 % (ref 0–2)
BUN SERPL-MCNC: 23 MG/DL (ref 8–23)
CALCIUM SERPL-MCNC: 8.9 MG/DL (ref 8.3–10.4)
CHLORIDE SERPL-SCNC: 104 MMOL/L (ref 98–107)
CO2 SERPL-SCNC: 30 MMOL/L (ref 21–32)
CREAT SERPL-MCNC: 1.45 MG/DL (ref 0.8–1.5)
DIFFERENTIAL METHOD BLD: ABNORMAL
EOSINOPHIL # BLD: 0.1 K/UL (ref 0–0.8)
EOSINOPHIL NFR BLD: 1 % (ref 0.5–7.8)
ERYTHROCYTE [DISTWIDTH] IN BLOOD BY AUTOMATED COUNT: 17.8 % (ref 11.9–14.6)
GLUCOSE BLD STRIP.AUTO-MCNC: 111 MG/DL (ref 65–100)
GLUCOSE BLD STRIP.AUTO-MCNC: 74 MG/DL (ref 65–100)
GLUCOSE BLD STRIP.AUTO-MCNC: 75 MG/DL (ref 65–100)
GLUCOSE BLD STRIP.AUTO-MCNC: 77 MG/DL (ref 65–100)
GLUCOSE SERPL-MCNC: 78 MG/DL (ref 65–100)
HCT VFR BLD AUTO: 43.4 % (ref 41.1–50.3)
HGB BLD-MCNC: 14.2 G/DL (ref 13.6–17.2)
IMM GRANULOCYTES # BLD: 0 K/UL (ref 0–0.5)
IMM GRANULOCYTES NFR BLD AUTO: 0 % (ref 0–5)
LYMPHOCYTES # BLD: 2.6 K/UL (ref 0.5–4.6)
LYMPHOCYTES NFR BLD: 34 % (ref 13–44)
MAGNESIUM SERPL-MCNC: 2.4 MG/DL (ref 1.8–2.4)
MCH RBC QN AUTO: 27.1 PG (ref 26.1–32.9)
MCHC RBC AUTO-ENTMCNC: 32.7 G/DL (ref 31.4–35)
MCV RBC AUTO: 82.8 FL (ref 79.6–97.8)
MONOCYTES # BLD: 0.9 K/UL (ref 0.1–1.3)
MONOCYTES NFR BLD: 12 % (ref 4–12)
NEUTS SEG # BLD: 4 K/UL (ref 1.7–8.2)
NEUTS SEG NFR BLD: 53 % (ref 43–78)
PHOSPHATE SERPL-MCNC: 3 MG/DL (ref 2.3–3.7)
PLATELET # BLD AUTO: 148 K/UL (ref 150–450)
PMV BLD AUTO: 9.7 FL (ref 10.8–14.1)
POTASSIUM SERPL-SCNC: 4.4 MMOL/L (ref 3.5–5.1)
RBC # BLD AUTO: 5.24 M/UL (ref 4.23–5.67)
SODIUM SERPL-SCNC: 142 MMOL/L (ref 136–145)
WBC # BLD AUTO: 7.5 K/UL (ref 4.3–11.1)

## 2018-07-15 PROCEDURE — 80048 BASIC METABOLIC PNL TOTAL CA: CPT | Performed by: PHYSICIAN ASSISTANT

## 2018-07-15 PROCEDURE — 74011250636 HC RX REV CODE- 250/636: Performed by: INTERNAL MEDICINE

## 2018-07-15 PROCEDURE — 94762 N-INVAS EAR/PLS OXIMTRY CONT: CPT

## 2018-07-15 PROCEDURE — 36415 COLL VENOUS BLD VENIPUNCTURE: CPT | Performed by: PHYSICIAN ASSISTANT

## 2018-07-15 PROCEDURE — 82962 GLUCOSE BLOOD TEST: CPT

## 2018-07-15 PROCEDURE — 97530 THERAPEUTIC ACTIVITIES: CPT

## 2018-07-15 PROCEDURE — 74011250637 HC RX REV CODE- 250/637: Performed by: HOSPITALIST

## 2018-07-15 PROCEDURE — 83735 ASSAY OF MAGNESIUM: CPT | Performed by: PHYSICIAN ASSISTANT

## 2018-07-15 PROCEDURE — 74011250637 HC RX REV CODE- 250/637: Performed by: INTERNAL MEDICINE

## 2018-07-15 PROCEDURE — 74011000250 HC RX REV CODE- 250: Performed by: INTERNAL MEDICINE

## 2018-07-15 PROCEDURE — 85025 COMPLETE CBC W/AUTO DIFF WBC: CPT | Performed by: PHYSICIAN ASSISTANT

## 2018-07-15 PROCEDURE — 84100 ASSAY OF PHOSPHORUS: CPT | Performed by: PHYSICIAN ASSISTANT

## 2018-07-15 PROCEDURE — 65270000029 HC RM PRIVATE

## 2018-07-15 PROCEDURE — 77010033678 HC OXYGEN DAILY

## 2018-07-15 RX ADMIN — ATORVASTATIN CALCIUM 20 MG: 10 TABLET, FILM COATED ORAL at 21:29

## 2018-07-15 RX ADMIN — ASPIRIN 81 MG: 81 TABLET, COATED ORAL at 08:37

## 2018-07-15 RX ADMIN — CARVEDILOL 3.12 MG: 3.12 TABLET, FILM COATED ORAL at 08:38

## 2018-07-15 RX ADMIN — POTASSIUM CHLORIDE 20 MEQ: 20 TABLET, EXTENDED RELEASE ORAL at 08:37

## 2018-07-15 RX ADMIN — HEPARIN SODIUM 5000 UNITS: 5000 INJECTION, SOLUTION INTRAVENOUS; SUBCUTANEOUS at 08:38

## 2018-07-15 RX ADMIN — CARVEDILOL 3.12 MG: 3.12 TABLET, FILM COATED ORAL at 17:08

## 2018-07-15 RX ADMIN — Medication 10 ML: at 05:21

## 2018-07-15 RX ADMIN — Medication 400 MG: at 17:08

## 2018-07-15 RX ADMIN — HEPARIN SODIUM 5000 UNITS: 5000 INJECTION, SOLUTION INTRAVENOUS; SUBCUTANEOUS at 21:30

## 2018-07-15 RX ADMIN — Medication 10 ML: at 21:31

## 2018-07-15 RX ADMIN — LISINOPRIL 5 MG: 5 TABLET ORAL at 08:38

## 2018-07-15 RX ADMIN — BUMETANIDE 2 MG: 0.25 INJECTION INTRAMUSCULAR; INTRAVENOUS at 08:42

## 2018-07-15 RX ADMIN — Medication 400 MG: at 08:37

## 2018-07-15 RX ADMIN — POTASSIUM CHLORIDE 20 MEQ: 20 TABLET, EXTENDED RELEASE ORAL at 17:08

## 2018-07-15 RX ADMIN — BUMETANIDE 2 MG: 0.25 INJECTION INTRAMUSCULAR; INTRAVENOUS at 17:12

## 2018-07-15 RX ADMIN — Medication 10 ML: at 17:10

## 2018-07-15 NOTE — PROGRESS NOTES
Progress Note      Patient: Leah Medina. Sex: male          MRN: [id number]           YOB: 1945      Age:  74 y.o. PCP:  Vreo Dodson MD  Treatment Team: Attending Provider: Jose Wiggins MD; Utilization Review: Radha Richards RN; Consulting Provider: Shahla Cevallos MD; Care Manager: Mirian Hoover [facility]; Utilization Review: Jean Pierre Thakur  Subjective:     Patient is a 73yo male with PMH of COPD (4LNC), CHF, CAD, and HTN, who reported to the ER with worsening Lower extremity edema, and SOB. Patient was recently hospitalized from - with acute CHF exacerbation. Echo EF 45%. Cardiology consulted. Receiving bumex. Good UOP. Creatinine 1.77 on admission. 1.45 today. Patient on 3LNC. Denies CP, SOB, n/v/d. BLE edema improving. Objective:   Physical Exam:   Visit Vitals    /80 (BP 1 Location: Right arm, BP Patient Position: Sitting)    Pulse 68    Temp 97.7 °F (36.5 °C)    Resp 26    Ht 5' 9\" (1.753 m)    Wt 97.6 kg (215 lb 2.7 oz)    SpO2 98%    BMI 31.77 kg/m2      Temp (24hrs), Av °F (36.7 °C), Min:97.7 °F (36.5 °C), Max:98.5 °F (36.9 °C)    Oxygen Therapy  O2 Sat (%): 98 % (07/15/18 1123)  Pulse via Oximetry: 64 beats per minute (18 1145)  O2 Device: Room air (07/15/18 1108)  O2 Flow Rate (L/min): 3 l/min (decreased from 5) (18 1145)  FIO2 (%): 36 % (18 2339)    Intake/Output Summary (Last 24 hours) at 07/15/18 1321  Last data filed at 07/15/18 1225   Gross per 24 hour   Intake              600 ml   Output             3525 ml   Net            -2925 ml      General: Conscious, no resp distress at rest. Mild resp distress with exertion+   Eyes:  VICENTA, No pallor/icterus    HENT:             Oral Mucosa is Moist, No sinus tenderness  Neck:               Supple, elevated JVD  Lungs:  Diminshed. 3LNC  Heart:  S1 S2 regular  Abdomen: Soft, normal bowel sounds, NTND, No guarding/rigidity/rebound tend. Extremities: Noah. Pitting pedal edema . L>R  Neurologic:  AAOX3,  Skin:                No acute rashes  Psych:             Appropriate mood, Thought process is normal    LAB  Recent Results (from the past 24 hour(s))   GLUCOSE, POC    Collection Time: 07/14/18  3:51 PM   Result Value Ref Range    Glucose (POC) 93 65 - 100 mg/dL   PLEASE READ & DOCUMENT PPD TEST IN 72 HRS    Collection Time: 07/14/18  6:00 PM   Result Value Ref Range    PPD  Negative    mm Induration 0 mm   GLUCOSE, POC    Collection Time: 07/14/18  7:40 PM   Result Value Ref Range    Glucose (POC) 93 65 - 100 mg/dL   MAGNESIUM    Collection Time: 07/15/18  4:50 AM   Result Value Ref Range    Magnesium 2.4 1.8 - 2.4 mg/dL   PHOSPHORUS    Collection Time: 07/15/18  4:50 AM   Result Value Ref Range    Phosphorus 3.0 2.3 - 3.7 MG/DL   METABOLIC PANEL, BASIC    Collection Time: 07/15/18  4:50 AM   Result Value Ref Range    Sodium 142 136 - 145 mmol/L    Potassium 4.4 3.5 - 5.1 mmol/L    Chloride 104 98 - 107 mmol/L    CO2 30 21 - 32 mmol/L    Anion gap 8 7 - 16 mmol/L    Glucose 78 65 - 100 mg/dL    BUN 23 8 - 23 MG/DL    Creatinine 1.45 0.8 - 1.5 MG/DL    GFR est AA >60 >60 ml/min/1.73m2    GFR est non-AA 50 (L) >60 ml/min/1.73m2    Calcium 8.9 8.3 - 10.4 MG/DL   CBC WITH AUTOMATED DIFF    Collection Time: 07/15/18  4:50 AM   Result Value Ref Range    WBC 7.5 4.3 - 11.1 K/uL    RBC 5.24 4.23 - 5.67 M/uL    HGB 14.2 13.6 - 17.2 g/dL    HCT 43.4 41.1 - 50.3 %    MCV 82.8 79.6 - 97.8 FL    MCH 27.1 26.1 - 32.9 PG    MCHC 32.7 31.4 - 35.0 g/dL    RDW 17.8 (H) 11.9 - 14.6 %    PLATELET 280 (L) 522 - 450 K/uL    MPV 9.7 (L) 10.8 - 14.1 FL    DF AUTOMATED      NEUTROPHILS 53 43 - 78 %    LYMPHOCYTES 34 13 - 44 %    MONOCYTES 12 4.0 - 12.0 %    EOSINOPHILS 1 0.5 - 7.8 %    BASOPHILS 0 0.0 - 2.0 %    IMMATURE GRANULOCYTES 0 0.0 - 5.0 %    ABS. NEUTROPHILS 4.0 1.7 - 8.2 K/UL    ABS. LYMPHOCYTES 2.6 0.5 - 4.6 K/UL    ABS. MONOCYTES 0.9 0.1 - 1.3 K/UL    ABS. EOSINOPHILS 0.1 0.0 - 0.8 K/UL    ABS. BASOPHILS 0.0 0.0 - 0.2 K/UL    ABS. IMM. GRANS. 0.0 0.0 - 0.5 K/UL   GLUCOSE, POC    Collection Time: 07/15/18  7:28 AM   Result Value Ref Range    Glucose (POC) 75 65 - 100 mg/dL   GLUCOSE, POC    Collection Time: 07/15/18 11:22 AM   Result Value Ref Range    Glucose (POC) 77 65 - 100 mg/dL       No results found. No results found. 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  -Cardiology consulting  -Continue IV Bumex 2mg BID  -monitor UOP  -Daily weights  -Monitor BMP  -Continue coreg    Acute hypoxemic respiratory failure with hypoxemia - improving   Chronic COPD, no acute exacerbation.  -Supplemental oxygen     CKD stage III,  -BMP in am    Mild troponin elevation, secondary to CHF exacerbation demand ischemia from respiratory failure and underlying CKD.      Dyslipidemia  -Continue atorvastatin    History of coronary artery disease  -continue asa,  lisinopril    New diagnosis of DM type 2 - A1C is 7.  -Diabetic diet  -SSI  -Diabetic education    DVT: [facility]      Discussed plan of care with Dr. Sehlby Godoy, NP  July 15, 2018

## 2018-07-15 NOTE — PROGRESS NOTES
Pt sitting up in chair. Pt reports some relief with work of breathing. No new complaints. Papa Spell to go home. Hourly rounds completed this shift.

## 2018-07-15 NOTE — PROGRESS NOTES
[facility] CARDIOLOGY PROGRESS NOTE 
      
 
7/15/2018 10:50 AM 
 
Admit Date: 7/10/2018 Admit Diagnosis: CHF (congestive heart failure) (HCC);CHF (congestive heart * Subjective: No complaints this AM, no chest pain or shortness of breath Interval History: (History of pertinent interval events obtained from nursing staff) ROS: 
GEN:  No fever or chills Cardiovascular:  As noted above Pulmonary:  As noted above Neuro:  No new focal motor or sensory loss Objective:  
 
Vitals:  
 07/14/18 1933 07/14/18 2353 07/15/18 5706 07/15/18 6573 BP: (!) 134/94 116/78 116/77 122/83 Pulse: 84 66 70 66 Resp: 28 24 24 26 Temp: 98.5 °F (36.9 °C) 98.2 °F (36.8 °C) 97.8 °F (36.6 °C) 98.2 °F (36.8 °C) SpO2: 100% 100% 100% 100% Weight:   97.6 kg (215 lb 2.7 oz) Height:      
 
 
Physical Exam: 
General- No Acute Distress, Alert & Oriented x 3, appropriate mood. Neck- supple CV- regular rate and rhythm, distant S1, S2, no MRG Lung- clear bilaterally Abd- soft, nontender, nondistended Ext- 1+ PARVEEN bilaterally. Skin- warm and dry Current Facility-Administered Medications Medication Dose Route Frequency  bumetanide (BUMEX) injection 2 mg  2 mg IntraVENous BID  insulin lispro (HUMALOG) injection   SubCUTAneous AC&HS  potassium chloride (K-DUR, KLOR-CON) SR tablet 20 mEq  20 mEq Oral BID  carvedilol (COREG) tablet 3.125 mg  3.125 mg Oral BID WITH MEALS  magnesium oxide (MAG-OX) tablet 400 mg  400 mg Oral BID  aspirin delayed-release tablet 81 mg  81 mg Oral DAILY  albuterol-ipratropium (DUO-NEB) 2.5 MG-0.5 MG/3 ML  3 mL Nebulization Q4H PRN  
 lisinopril (PRINIVIL, ZESTRIL) tablet 5 mg  5 mg Oral DAILY  atorvastatin (LIPITOR) tablet 20 mg  20 mg Oral QHS  sodium chloride (NS) flush 5-10 mL  5-10 mL IntraVENous Q8H  
 sodium chloride (NS) flush 5-10 mL  5-10 mL IntraVENous PRN  
 heparin (porcine) injection 5,000 Units  5,000 Units SubCUTAneous Q12H  
 
Data Review:  
Recent Results (from the past 24 hour(s)) GLUCOSE, POC Collection Time: 07/14/18  3:51 PM  
Result Value Ref Range Glucose (POC) 93 65 - 100 mg/dL PLEASE READ & DOCUMENT PPD TEST IN 72 HRS Collection Time: 07/14/18  6:00 PM  
Result Value Ref Range PPD  Negative  
 mm Induration 0 mm GLUCOSE, POC Collection Time: 07/14/18  7:40 PM  
Result Value Ref Range Glucose (POC) 93 65 - 100 mg/dL MAGNESIUM Collection Time: 07/15/18  4:50 AM  
Result Value Ref Range Magnesium 2.4 1.8 - 2.4 mg/dL PHOSPHORUS Collection Time: 07/15/18  4:50 AM  
Result Value Ref Range Phosphorus 3.0 2.3 - 3.7 MG/DL  
METABOLIC PANEL, BASIC Collection Time: 07/15/18  4:50 AM  
Result Value Ref Range Sodium 142 136 - 145 mmol/L Potassium 4.4 3.5 - 5.1 mmol/L Chloride 104 98 - 107 mmol/L  
 CO2 30 21 - 32 mmol/L Anion gap 8 7 - 16 mmol/L Glucose 78 65 - 100 mg/dL BUN 23 8 - 23 MG/DL Creatinine 1.45 0.8 - 1.5 MG/DL  
 GFR est AA >60 >60 ml/min/1.73m2 GFR est non-AA 50 (L) >60 ml/min/1.73m2 Calcium 8.9 8.3 - 10.4 MG/DL  
CBC WITH AUTOMATED DIFF Collection Time: 07/15/18  4:50 AM  
Result Value Ref Range WBC 7.5 4.3 - 11.1 K/uL  
 RBC 5.24 4.23 - 5.67 M/uL  
 HGB 14.2 13.6 - 17.2 g/dL HCT 43.4 41.1 - 50.3 % MCV 82.8 79.6 - 97.8 FL  
 MCH 27.1 26.1 - 32.9 PG  
 MCHC 32.7 31.4 - 35.0 g/dL  
 RDW 17.8 (H) 11.9 - 14.6 % PLATELET 707 (L) 835 - 450 K/uL MPV 9.7 (L) 10.8 - 14.1 FL  
 DF AUTOMATED NEUTROPHILS 53 43 - 78 % LYMPHOCYTES 34 13 - 44 % MONOCYTES 12 4.0 - 12.0 % EOSINOPHILS 1 0.5 - 7.8 % BASOPHILS 0 0.0 - 2.0 % IMMATURE GRANULOCYTES 0 0.0 - 5.0 %  
 ABS. NEUTROPHILS 4.0 1.7 - 8.2 K/UL  
 ABS. LYMPHOCYTES 2.6 0.5 - 4.6 K/UL  
 ABS. MONOCYTES 0.9 0.1 - 1.3 K/UL  
 ABS. EOSINOPHILS 0.1 0.0 - 0.8 K/UL  
 ABS. BASOPHILS 0.0 0.0 - 0.2 K/UL  
 ABS. IMM. GRANS. 0.0 0.0 - 0.5 K/UL GLUCOSE, POC  Collection Time: 07/15/18  7:28 AM  
Result Value Ref Range Glucose (POC) 75 65 - 100 mg/dL EKG:  (EKG has been independently visualized by me with interpretation below) Assessment:  
 
Principal Problem: 
  Acute respiratory failure with hypoxemia ([address] (5/24/2018) Active Problems: Hypertension (9/29/2017) COPD, moderate ([address] (9/29/2017) Overview: Complete PFTs: 7/20/15: 
    Spirometry is consistent with a moderate obstructive/restrictive defect. Posada Berman The residual volume is increased relative to other lung volumes suggesting  
    air-trapping. The diffusion capacity corrected for alveolar volume was  
    normal suggesting no loss of alveolo-capillary units. High triglycerides (9/29/2017) Essential hypertension (9/28/2016) CHF (congestive heart failure) ([address] (9/27/2017) TAZ (obstructive sleep apnea) (10/2/2017) Plan: 1. CHF: Echo noted with EF ~45%; also RV dysfn/moderately elevated RVSP (not sig changed from prior echo 5/18). Continue diuresis, significant improvement in diuresis after increasing bumex to 2mg BID, edema likely related to hypoalbuminemia/RV dysfn. Improving renal fn and appears back to baseline (baseline ~1.5-1.6), cont close monitoring 2. Chronic troponin elevation with no significant trend and likely component of LV dysfn/RV dysfn/CKD. Jeff Khan noted cath from 1/18 with mild non obst CAD 3. PPx: heparin TID Jaylene Manzanares MD 
Cardiology/Electrophysiology

## 2018-07-15 NOTE — PROGRESS NOTES
Problem: Mobility Impaired (Adult and Pediatric)  Goal: *Acute Goals and Plan of Care (Insert Text)  LTG:  (1.)Mr. Valdez Drake will move from supine to sit and sit to supine , scoot up and down and roll side to side INDEPENDENTLY with bed flat within 7 treatment day(s). (2.)Mr. Valdez Drake will transfer from bed to chair and chair to bed INDEPENDENTLY within 7 treatment day(s). (3.)Mr. Valdez Drake will ambulate with MODIFIED INDEPENDENCE for 250 feet with the least restrictive device within 7 treatment day(s). (4.)Mr. Valdez Drake will ascend and descend 4 steps with SUPERVISION using handrail(s) within 7 days. (5.)Mr. Valdez Drake will perform exercises per HEP to improve strength and mobility within 7 days. ________________________________________________________________________________________________    PHYSICAL THERAPY: Daily Note, Treatment Day: 2nd, AM 7/15/2018  INPATIENT: Hospital Day: 6  Payor: Katty Singh / Plan: [address] [place] [address] HMO / Product Type: [employer] Medicare /      NAME/AGE/GENDER: Yash Childs is a 74 y.o. male   PRIMARY DIAGNOSIS: CHF (congestive heart failure) ([facility]  CHF (congestive heart failure) ([facility] Acute respiratory failure with hypoxemia ([facility] Acute respiratory failure with hypoxemia ([facility]        ICD-10: Treatment Diagnosis:    · Generalized Muscle Weakness (M62.81)  · Other abnormalities of gait and mobility (R26.89)   Precaution/Allergies:  Pcn [penicillins]      ASSESSMENT:     Mr. Valdez Drake presents sitting up in bed without complaints and is agreeable to therapy treatment. Performs bed mobility and transfers with supervision, CGA-SBA for mobility/ambulation in room and hallway. Pt performs seated and standing ADLs in bathroom with SBA-supervision. Pt able to increase gait distance compared to previous treatment; did need a seated rest break due to fatigue and shortness of breath. O2 sats 99% on 3L during activity.  Returned to room and up to chair afterwards with LEs elevated, needs in reach. Steady progress. Will continue with therapy efforts. This section established at most recent assessment   PROBLEM LIST (Impairments causing functional limitations):  1. Decreased Strength  2. Decreased ADL/Functional Activities  3. Decreased Transfer Abilities  4. Decreased Ambulation Ability/Technique  5. Decreased Balance  6. Decreased Activity Tolerance  7. Increased Shortness of Breath   INTERVENTIONS PLANNED: (Benefits and precautions of physical therapy have been discussed with the patient.)  1. Balance Exercise  2. Bed Mobility  3. Gait Training  4. Home Exercise Program (HEP)  5. Therapeutic Activites  6. Therapeutic Exercise/Strengthening  7. Transfer Training     TREATMENT PLAN: Frequency/Duration: 3 times a week for duration of hospital stay  Rehabilitation Potential For Stated Goals: Good     RECOMMENDED REHABILITATION/EQUIPMENT: (at time of discharge pending progress): Due to the probability of continued deficits (see above) this patient will likely need continued skilled physical therapy after discharge. Equipment:    None at this time              HISTORY:   History of Present Injury/Illness (Reason for Referral):  Per H&P, \"Mr. Randall Shaffer is a 75 yo male who presented with worsening LL swelling on a background of COPD (4L O2 at home), CHF, CAD and HTN. He reports worsening LL swelling for the past 2 weeks. He reports dyspnea on rest and exertion. He sleeps on 2 pillows. Of note, he was recently hospitalized from 6/6 to 6/8 with acute CHF exacerbation. He was started on IV lasix and was discharged at that time on lasix PO BID. At baseline, he lives at home with his wife. He reports that he missed only 1 dose of his home lasix\"    Past Medical History/Comorbidities:   Mr. Randall Shaffer  has a past medical history of Acute CHF (congestive heart failure) ([address] (4/25/2015); Acute combined systolic and diastolic congestive heart failure ([address] (4/28/2015); Chronic obstructive pulmonary disease ([address]  Sarabjit De La Torre Quervain's tenosynovitis, right (4/28/2015); Dyspnea on exertion (6/28/2015); Elevated serum creatinine (4/25/2015); Elevated troponin (6/28/2015); History of coronary artery disease; History of right knee surgery; History of shingles; Malignant hypertension (4/25/2015); and MI (myocardial infarction) ([facility] Mr. Vinita Mendez  has a past surgical history that includes hx knee arthroscopy (Right) and hx heart catheterization (6/29/2015). Social History/Living Environment:   Home Environment: Private residence  # Steps to Enter: 4  Rails to Enter: Yes  Hand Rails : Bilateral  Wheelchair Ramp: No  One/Two Story Residence: One story  Living Alone: No  Support Systems: Spouse/Significant Other/Partner, Family member(s)  Patient Expects to be Discharged to[de-identified] Private residence  Current DME Used/Available at Home: Norlene Isaac, rollator  Tub or Shower Type: Shower  Prior Level of Function/Work/Activity:  Mariela Tam. lives with wife in single story home with 4 steps. Independent to mod I with occasional rollator use. Occasional O2 (rare). Pt drives and denies falls. Wife can help with ADLs if needed. Number of Personal Factors/Comorbidities that affect the Plan of Care: 1-2: MODERATE COMPLEXITY   EXAMINATION:   Most Recent Physical Functioning:   Gross Assessment:  AROM: Within functional limits  Strength: Generally decreased, functional  Coordination: Within functional limits               Posture:  Posture (WDL): Exceptions to WDL  Posture Assessment: Forward head, Rounded shoulders, Trunk flexion  Balance:  Sitting: Intact  Standing: Impaired  Standing - Static: Good  Standing - Dynamic : Fair Bed Mobility:  Supine to Sit: Supervision  Scooting: Supervision  Wheelchair Mobility:     Transfers:  Sit to Stand: Supervision  Stand to Sit: Supervision  Bed to Chair: Stand-by assistance  Interventions: Verbal cues; Safety awareness training  Duration: 33 Minutes  Gait:     Base of Support: Widened;Center of gravity altered  Speed/Lu: Pace decreased (<100 feet/min); Fluctuations  Step Length: Left shortened;Right shortened  Gait Abnormalities: Trunk sway increased  Distance (ft): 120 Feet (ft) (x2 with seated rest break)  Assistive Device: Walker, rolling  Ambulation - Level of Assistance: Contact guard assistance;Stand-by assistance  Interventions: Verbal cues; Safety awareness training; Tactile cues      Body Structures Involved:  1. Heart  2. Muscles Body Functions Affected:  1. Movement Related Activities and Participation Affected:  1. General Tasks and Demands  2. Mobility  3. Self Care  4. Domestic Life  5. Community, Social and [place]   Number of elements that affect the Plan of Care: 4+: HIGH COMPLEXITY   CLINICAL PRESENTATION:   Presentation: Stable and uncomplicated: LOW COMPLEXITY   CLINICAL DECISION MAKIN [facility] Inpatient Short Form  How much difficulty does the patient currently have. .. Unable A Lot A Little None   1. Turning over in bed (including adjusting bedclothes, sheets and blankets)? [] 1   [] 2   [] 3   [x] 4   2. Sitting down on and standing up from a chair with arms ( e.g., wheelchair, bedside commode, etc.)   [] 1   [] 2   [] 3   [x] 4   3. Moving from lying on back to sitting on the side of the bed? [] 1   [] 2   [] 3   [x] 4   How much help from another person does the patient currently need. .. Total A Lot A Little None   4. Moving to and from a bed to a chair (including a wheelchair)? [] 1   [] 2   [x] 3   [] 4   5. Need to walk in hospital room? [] 1   [] 2   [x] 3   [] 4   6. Climbing 3-5 steps with a railing? [] 1   [] 2   [x] 3   [] 4   © , Trustees of [facility] [employer], under license to [employer]. All rights reserved      Score:  Initial: 21 Most Recent: X (Date: -- )    Interpretation of Tool:  Represents activities that are increasingly more difficult (i.e. Bed mobility, Transfers, Gait).    Score 24 23 22-20 19-15 14-10 9-7 6     Modifier CH CI CJ CK CL CM CN      ? Mobility - Walking and Moving Around:     - CURRENT STATUS: CJ - 20%-39% impaired, limited or restricted    - GOAL STATUS: CI - 1%-19% impaired, limited or restricted    - D/C STATUS:  ---------------To be determined---------------  Payor: Madison Sadler / Plan: [address] [place] [address] / Product Type: Managed Care Medicare /      Medical Necessity:     · Patient demonstrates good rehab potential due to higher previous functional level. Reason for Services/Other Comments:  · Patient continues to demonstrate capacity to improve strength, mobility, balance, activity tolerance which will increase independence, decrease amount of assistance required from caregiver and increase safety. Use of outcome tool(s) and clinical judgement create a POC that gives a: Clear prediction of patient's progress: LOW COMPLEXITY            TREATMENT:   (In addition to Assessment/Re-Assessment sessions the following treatments were rendered)   Pre-treatment Symptoms/Complaints:  \"thank you\" pt very talkative today  Pain: Initial:   Pain Intensity 1: 0  Post Session:  0/10     Therapeutic Activity: (  33 Minutes ):  Therapeutic activities including Bed mobility, sit-stand transfers, standing balance/ADLs, toilet transfers and Ambulation on level ground to improve mobility, strength, balance and activity tolerance. Required minimal Verbal cues; Safety awareness training; Tactile cues to promote static and dynamic balance in standing with use of the walker. Pt needs one seated rest break during activity due to fatigue, SOB. Braces/Orthotics/Lines/Etc:   · O2 Device: Room air  Treatment/Session Assessment:    · Response to Treatment:  Pt performs mobility with CGA-SBA in [facility].  Good progress  · Interdisciplinary Collaboration:   o Physical Therapist  o Registered Nurse  · After treatment position/precautions:   o Up in chair  o Bed/Chair-wheels locked  o Bed in low position  o Call light within reach  o RN notified   · Compliance with Program/Exercises: good  · Recommendations/Intent for next treatment session: \"Next visit will focus on increasing gait distance\".   Total Treatment Duration:  PT Patient Time In/Time Out  Time In: 1035  Time Out: [address] [facility], [facility]

## 2018-07-16 ENCOUNTER — PATIENT OUTREACH (OUTPATIENT)
Dept: CASE MANAGEMENT | Age: 73
End: 2018-07-16

## 2018-07-16 LAB
ANION GAP SERPL CALC-SCNC: 8 MMOL/L (ref 7–16)
BASOPHILS # BLD: 0 K/UL (ref 0–0.2)
BASOPHILS NFR BLD: 0 % (ref 0–2)
BUN SERPL-MCNC: 24 MG/DL (ref 8–23)
CALCIUM SERPL-MCNC: 8.9 MG/DL (ref 8.3–10.4)
CHLORIDE SERPL-SCNC: 103 MMOL/L (ref 98–107)
CO2 SERPL-SCNC: 31 MMOL/L (ref 21–32)
CREAT SERPL-MCNC: 1.53 MG/DL (ref 0.8–1.5)
DIFFERENTIAL METHOD BLD: ABNORMAL
EOSINOPHIL # BLD: 0.1 K/UL (ref 0–0.8)
EOSINOPHIL NFR BLD: 1 % (ref 0.5–7.8)
ERYTHROCYTE [DISTWIDTH] IN BLOOD BY AUTOMATED COUNT: 17.9 % (ref 11.9–14.6)
GLUCOSE BLD STRIP.AUTO-MCNC: 117 MG/DL (ref 65–100)
GLUCOSE BLD STRIP.AUTO-MCNC: 124 MG/DL (ref 65–100)
GLUCOSE BLD STRIP.AUTO-MCNC: 82 MG/DL (ref 65–100)
GLUCOSE BLD STRIP.AUTO-MCNC: 84 MG/DL (ref 65–100)
GLUCOSE SERPL-MCNC: 84 MG/DL (ref 65–100)
HCT VFR BLD AUTO: 42.9 % (ref 41.1–50.3)
HGB BLD-MCNC: 13.9 G/DL (ref 13.6–17.2)
IMM GRANULOCYTES # BLD: 0 K/UL (ref 0–0.5)
IMM GRANULOCYTES NFR BLD AUTO: 0 % (ref 0–5)
LYMPHOCYTES # BLD: 2.7 K/UL (ref 0.5–4.6)
LYMPHOCYTES NFR BLD: 36 % (ref 13–44)
MAGNESIUM SERPL-MCNC: 2.4 MG/DL (ref 1.8–2.4)
MCH RBC QN AUTO: 26.8 PG (ref 26.1–32.9)
MCHC RBC AUTO-ENTMCNC: 32.4 G/DL (ref 31.4–35)
MCV RBC AUTO: 82.8 FL (ref 79.6–97.8)
MONOCYTES # BLD: 0.7 K/UL (ref 0.1–1.3)
MONOCYTES NFR BLD: 9 % (ref 4–12)
NEUTS SEG # BLD: 4 K/UL (ref 1.7–8.2)
NEUTS SEG NFR BLD: 54 % (ref 43–78)
PHOSPHATE SERPL-MCNC: 3.4 MG/DL (ref 2.3–3.7)
PLATELET # BLD AUTO: 147 K/UL (ref 150–450)
PMV BLD AUTO: 9.9 FL (ref 10.8–14.1)
POTASSIUM SERPL-SCNC: 4.5 MMOL/L (ref 3.5–5.1)
RBC # BLD AUTO: 5.18 M/UL (ref 4.23–5.67)
SODIUM SERPL-SCNC: 142 MMOL/L (ref 136–145)
WBC # BLD AUTO: 7.5 K/UL (ref 4.3–11.1)

## 2018-07-16 PROCEDURE — 83735 ASSAY OF MAGNESIUM: CPT | Performed by: PHYSICIAN ASSISTANT

## 2018-07-16 PROCEDURE — 74011250637 HC RX REV CODE- 250/637: Performed by: NURSE PRACTITIONER

## 2018-07-16 PROCEDURE — 85025 COMPLETE CBC W/AUTO DIFF WBC: CPT | Performed by: PHYSICIAN ASSISTANT

## 2018-07-16 PROCEDURE — 74011250637 HC RX REV CODE- 250/637: Performed by: HOSPITALIST

## 2018-07-16 PROCEDURE — 97530 THERAPEUTIC ACTIVITIES: CPT

## 2018-07-16 PROCEDURE — 77010033678 HC OXYGEN DAILY

## 2018-07-16 PROCEDURE — 82962 GLUCOSE BLOOD TEST: CPT

## 2018-07-16 PROCEDURE — 84100 ASSAY OF PHOSPHORUS: CPT | Performed by: PHYSICIAN ASSISTANT

## 2018-07-16 PROCEDURE — 94760 N-INVAS EAR/PLS OXIMETRY 1: CPT

## 2018-07-16 PROCEDURE — 74011000250 HC RX REV CODE- 250: Performed by: INTERNAL MEDICINE

## 2018-07-16 PROCEDURE — 36415 COLL VENOUS BLD VENIPUNCTURE: CPT | Performed by: PHYSICIAN ASSISTANT

## 2018-07-16 PROCEDURE — 74011250636 HC RX REV CODE- 250/636: Performed by: INTERNAL MEDICINE

## 2018-07-16 PROCEDURE — 94762 N-INVAS EAR/PLS OXIMTRY CONT: CPT

## 2018-07-16 PROCEDURE — 80048 BASIC METABOLIC PNL TOTAL CA: CPT | Performed by: PHYSICIAN ASSISTANT

## 2018-07-16 PROCEDURE — 65270000029 HC RM PRIVATE

## 2018-07-16 PROCEDURE — 74011250637 HC RX REV CODE- 250/637: Performed by: INTERNAL MEDICINE

## 2018-07-16 RX ORDER — BUMETANIDE 1 MG/1
2 TABLET ORAL 2 TIMES DAILY
Status: DISCONTINUED | OUTPATIENT
Start: 2018-07-16 | End: 2018-07-20 | Stop reason: HOSPADM

## 2018-07-16 RX ORDER — SPIRONOLACTONE 25 MG/1
25 TABLET ORAL DAILY
Status: DISCONTINUED | OUTPATIENT
Start: 2018-07-16 | End: 2018-07-20 | Stop reason: HOSPADM

## 2018-07-16 RX ADMIN — HEPARIN SODIUM 5000 UNITS: 5000 INJECTION, SOLUTION INTRAVENOUS; SUBCUTANEOUS at 21:52

## 2018-07-16 RX ADMIN — SPIRONOLACTONE 25 MG: 25 TABLET, FILM COATED ORAL at 11:56

## 2018-07-16 RX ADMIN — ASPIRIN 81 MG: 81 TABLET, COATED ORAL at 08:35

## 2018-07-16 RX ADMIN — LISINOPRIL 5 MG: 5 TABLET ORAL at 08:35

## 2018-07-16 RX ADMIN — Medication 400 MG: at 08:35

## 2018-07-16 RX ADMIN — Medication 10 ML: at 22:00

## 2018-07-16 RX ADMIN — BUMETANIDE 2 MG: 1 TABLET ORAL at 13:43

## 2018-07-16 RX ADMIN — CARVEDILOL 3.12 MG: 3.12 TABLET, FILM COATED ORAL at 08:35

## 2018-07-16 RX ADMIN — POTASSIUM CHLORIDE 20 MEQ: 20 TABLET, EXTENDED RELEASE ORAL at 08:35

## 2018-07-16 RX ADMIN — BUMETANIDE 2 MG: 0.25 INJECTION INTRAMUSCULAR; INTRAVENOUS at 08:35

## 2018-07-16 RX ADMIN — Medication 10 ML: at 05:48

## 2018-07-16 RX ADMIN — Medication 400 MG: at 17:24

## 2018-07-16 RX ADMIN — HEPARIN SODIUM 5000 UNITS: 5000 INJECTION, SOLUTION INTRAVENOUS; SUBCUTANEOUS at 08:35

## 2018-07-16 RX ADMIN — CARVEDILOL 3.12 MG: 3.12 TABLET, FILM COATED ORAL at 17:24

## 2018-07-16 RX ADMIN — ATORVASTATIN CALCIUM 20 MG: 10 TABLET, FILM COATED ORAL at 21:52

## 2018-07-16 NOTE — PROGRESS NOTES
Interdisciplinary Rounds completed 07/16/18. Nursing, Case Management, Physician and PT present. Plan of care reviewed and updated. Discharge probably 7/18/18. Doing much better.

## 2018-07-16 NOTE — PROGRESS NOTES
Ambulatory Care Coordination  Social Work Assessment   Referral from which MCKENZIE CM: Hema Conti    Previously referred? If so, reason and brief outcome No   Reason for current referral: Assistance with LTC placement    Income information (if needed): Pt and his wife said that they didnt feel like they needed to share info a this time    Sources of Support: Wife and family    ACP set up? Needs information? Already in place   Medication Cost assistance needed? NA   Referral to CLTC/Medicaid needed? Pts wife is already filling out a Medicaid application and going tomorrow to talk with Medicaid office to make sure application is completed correctly    Referral to Medicare Extra Help/LIS program needed? NA   Small home repair needed? NA   MOW referral? NA   Any other concerns/questions? NA   Next steps: Pts wife said she wants them to be able to move into apartments in [place] [facility] [facility] after pt is done with his SNF rehab    JAYNA FUNEZ will follow up prior to pts discharge from SNF        Pt's wife is a  and said that she's in the process of working with a [facility] specialist who is helping them find a low income apartment and will help them with the deposit's for rent and utilities. Pt's wife said she just wants pt to be able to go to a rehab where he can get stronger and then come home to a safe nice environment that he can get continued home health in.      Pt's wife already has apartments in [place] [facility] she's checked out and plans on trying to get everything straight with those apartments while pt is in the hospital or at [facility]. JAYNA FUNEZ let pt and his wife know that JAYNA FUNEZ will follow up with the  for which ever facility pt ends up going to to make sure they know pt's discharge wishes. This note will not be viewable in [address]

## 2018-07-16 NOTE — PROGRESS NOTES
[facility] CARDIOLOGY     7/16/2018     9:35 AM    I have personally seen and examined Roberto Sanchez. with  Zach Bruner NP. I agree and confirm findings in history, physical exam, and assessment/plan as outlined above with following pertinent additions/exceptions:  Exam reveals distant heart and breath sounds. Trace edema is present. Plan to transition to po diuretic therapy.       Yulissa Rodriguez MD

## 2018-07-16 NOTE — PROGRESS NOTES
Progress Note      Patient: Pearl Da Silva. Sex: male          MRN: [id number]           YOB: 1945      Age:  74 y.o. PCP:  Brayden Perez MD  Treatment Team: Attending Provider: Andres Mix MD; Utilization Review: Alexsander Thomas RN; Consulting Provider: Miriam Smith MD; Care Manager: Nghia Jimenez LM; Utilization Review: Keiko Stockton  Subjective:     Patient is a 75yo male with PMH of COPD (4LNC), CHF, CAD, and HTN, who reported to the ER with worsening Lower extremity edema, and SOB. Patient was recently hospitalized from - with acute CHF exacerbation. Echo EF 45%. Cardiology consulted. Been receiving IV bumex. Good UOP. Transitioning to PO. Creatinine 1.53. Patient 95% on room air. Denies CP, n/v/d. BLE edema improving. complaints of SOB with exertion. Objective:   Physical Exam:   Visit Vitals    /82 (BP 1 Location: Right arm, BP Patient Position: At rest)    Pulse 63    Temp 97.5 °F (36.4 °C)    Resp 18    Ht 5' 9\" (1.753 m)    Wt 101.6 kg (224 lb)    SpO2 96%    BMI 33.08 kg/m2      Temp (24hrs), Av.7 °F (36.5 °C), Min:97.4 °F (36.3 °C), Max:98.1 °F (36.7 °C)    Oxygen Therapy  O2 Sat (%): 96 % (18 1143)  Pulse via Oximetry: 69 beats per minute (07/15/18 1953)  O2 Device: Room air (18 1143)  O2 Flow Rate (L/min): 2 l/min (18 0727)  FIO2 (%): 36 % (18 2339)    Intake/Output Summary (Last 24 hours) at 18 1220  Last data filed at 18 1052   Gross per 24 hour   Intake              480 ml   Output             2125 ml   Net            -1645 ml      General: Conscious, no resp distress at rest. Mild resp distress with exertion+   Eyes:  VICENTA, No pallor/icterus    HENT:             Oral Mucosa is Moist, No sinus tenderness  Neck:               Supple, elevated JVD  Lungs:  Diminshed.  Room air  Heart:  S1 S2 regular  Abdomen: Soft, normal bowel sounds, NTND, No guarding/rigidity/rebound tend. Extremities: Noah. nonpitting pedal edema . L>R  Neurologic:  AAOX3,  Skin:                No acute rashes  Psych:             Appropriate mood, Thought process is normal    LAB  Recent Results (from the past 24 hour(s))   GLUCOSE, POC    Collection Time: 07/15/18  3:48 PM   Result Value Ref Range    Glucose (POC) 74 65 - 100 mg/dL   GLUCOSE, POC    Collection Time: 07/15/18 10:14 PM   Result Value Ref Range    Glucose (POC) 111 (H) 65 - 100 mg/dL   MAGNESIUM    Collection Time: 07/16/18  5:11 AM   Result Value Ref Range    Magnesium 2.4 1.8 - 2.4 mg/dL   PHOSPHORUS    Collection Time: 07/16/18  5:11 AM   Result Value Ref Range    Phosphorus 3.4 2.3 - 3.7 MG/DL   CBC WITH AUTOMATED DIFF    Collection Time: 07/16/18  5:11 AM   Result Value Ref Range    WBC 7.5 4.3 - 11.1 K/uL    RBC 5.18 4.23 - 5.67 M/uL    HGB 13.9 13.6 - 17.2 g/dL    HCT 42.9 41.1 - 50.3 %    MCV 82.8 79.6 - 97.8 FL    MCH 26.8 26.1 - 32.9 PG    MCHC 32.4 31.4 - 35.0 g/dL    RDW 17.9 (H) 11.9 - 14.6 %    PLATELET 630 (L) 817 - 450 K/uL    MPV 9.9 (L) 10.8 - 14.1 FL    DF AUTOMATED      NEUTROPHILS 54 43 - 78 %    LYMPHOCYTES 36 13 - 44 %    MONOCYTES 9 4.0 - 12.0 %    EOSINOPHILS 1 0.5 - 7.8 %    BASOPHILS 0 0.0 - 2.0 %    IMMATURE GRANULOCYTES 0 0.0 - 5.0 %    ABS. NEUTROPHILS 4.0 1.7 - 8.2 K/UL    ABS. LYMPHOCYTES 2.7 0.5 - 4.6 K/UL    ABS. MONOCYTES 0.7 0.1 - 1.3 K/UL    ABS. EOSINOPHILS 0.1 0.0 - 0.8 K/UL    ABS. BASOPHILS 0.0 0.0 - 0.2 K/UL    ABS. IMM.  GRANS. 0.0 0.0 - 0.5 K/UL   METABOLIC PANEL, BASIC    Collection Time: 07/16/18  5:11 AM   Result Value Ref Range    Sodium 142 136 - 145 mmol/L    Potassium 4.5 3.5 - 5.1 mmol/L    Chloride 103 98 - 107 mmol/L    CO2 31 21 - 32 mmol/L    Anion gap 8 7 - 16 mmol/L    Glucose 84 65 - 100 mg/dL    BUN 24 (H) 8 - 23 MG/DL    Creatinine 1.53 (H) 0.8 - 1.5 MG/DL    GFR est AA 57 (L) >60 ml/min/1.73m2    GFR est non-AA 47 (L) >60 ml/min/1.73m2    Calcium 8.9 8.3 - 10.4 MG/DL   GLUCOSE, POC    Collection Time: 07/16/18  7:25 AM   Result Value Ref Range    Glucose (POC) 84 65 - 100 mg/dL   GLUCOSE, POC    Collection Time: 07/16/18 11:41 AM   Result Value Ref Range    Glucose (POC) 82 65 - 100 mg/dL       No results found. No results found. 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  -Cardiology consulting  - IV Bumex transitioning to oral  -monitor UOP  -Daily weights  -Monitor BMP  -Add back aldactone and stop supplemental KCL    Acute hypoxemic respiratory failure with hypoxemia - improving   Chronic COPD, no acute exacerbation.  -Supplemental oxygen- on room air     CKD stage III,  -BMP in am    Mild troponin elevation, secondary to CHF exacerbation demand ischemia from respiratory failure and underlying CKD.      Dyslipidemia  -Continue atorvastatin    History of coronary artery disease  -continue asa,  lisinopril    New diagnosis of DM type 2 - A1C is 7.  -Diabetic diet  -SSI  -Diabetic education    DVT: [facility]      Discussed plan of care with Dr. Mariela Treivno, NP  July 16, 2018

## 2018-07-16 NOTE — PROGRESS NOTES
Problem: Mobility Impaired (Adult and Pediatric)  Goal: *Acute Goals and Plan of Care (Insert Text)  LTG:  (1.)Mr. Ishaan Mustafa will move from supine to sit and sit to supine , scoot up and down and roll side to side INDEPENDENTLY with bed flat within 7 treatment day(s). (2.)Mr. Ishaan Mustafa will transfer from bed to chair and chair to bed INDEPENDENTLY within 7 treatment day(s). (3.)Mr. Ishaan Mustafa will ambulate with MODIFIED INDEPENDENCE for 250 feet with the least restrictive device within 7 treatment day(s). (4.)Mr. Ishaan Mustafa will ascend and descend 4 steps with SUPERVISION using handrail(s) within 7 days. (5.)Mr. Ishaan Mustafa will perform exercises per HEP to improve strength and mobility within 7 days. ________________________________________________________________________________________________    PHYSICAL THERAPY: Daily Note, Treatment Day: 3rd, AM 7/16/2018  INPATIENT: Hospital Day: 7  Payor: Marilee Colon / Plan: [address] [place] [address] HMO / Product Type: Managed Care Medicare /      NAME/AGE/GENDER: Prem Rosas. is a 74 y.o. male   PRIMARY DIAGNOSIS: CHF (congestive heart failure) ([facility]  CHF (congestive heart failure) ([facility] Acute respiratory failure with hypoxemia ([facility] Acute respiratory failure with hypoxemia ([facility]        ICD-10: Treatment Diagnosis:    · Generalized Muscle Weakness (M62.81)  · Other abnormalities of gait and mobility (R26.89)   Precaution/Allergies:  Pcn [penicillins]      ASSESSMENT:     Mr. Ishaan Mustafa presents sitting up in bed without complaints and is agreeable to therapy treatment. Performs bed mobility and transfers with supervision, CGA-SBA for mobility/ambulation in room and hallway. Pt able to increase gait distance compared to previous treatment without a seated rest break but did require 2 standing rest breaks due to fatigue and shortness of breath. O2 sats 97% on room air after  activity. VC's to ambulate closer to walker. Returned to room and up to chair afterwards.  NP entered and talked to pt as he rested. Pt performed bilateral LE ex as listed below. Left in chair with LEs elevated, needs in reach. Steady progress. Will continue with therapy efforts. This section established at most recent assessment   PROBLEM LIST (Impairments causing functional limitations):  1. Decreased Strength  2. Decreased ADL/Functional Activities  3. Decreased Transfer Abilities  4. Decreased Ambulation Ability/Technique  5. Decreased Balance  6. Decreased Activity Tolerance  7. Increased Shortness of Breath   INTERVENTIONS PLANNED: (Benefits and precautions of physical therapy have been discussed with the patient.)  1. Balance Exercise  2. Bed Mobility  3. Gait Training  4. Home Exercise Program (HEP)  5. Therapeutic Activites  6. Therapeutic Exercise/Strengthening  7. Transfer Training     TREATMENT PLAN: Frequency/Duration: 3 times a week for duration of hospital stay  Rehabilitation Potential For Stated Goals: Good     RECOMMENDED REHABILITATION/EQUIPMENT: (at time of discharge pending progress): Due to the probability of continued deficits (see above) this patient will likely need continued skilled physical therapy after discharge. Equipment:    None at this time              HISTORY:   History of Present Injury/Illness (Reason for Referral):  Per H&P, \"Mr. Amelie Lake is a 73 yo male who presented with worsening LL swelling on a background of COPD (4L O2 at home), CHF, CAD and HTN. He reports worsening LL swelling for the past 2 weeks. He reports dyspnea on rest and exertion. He sleeps on 2 pillows. Of note, he was recently hospitalized from 6/6 to 6/8 with acute CHF exacerbation. He was started on IV lasix and was discharged at that time on lasix PO BID. At baseline, he lives at home with his wife. He reports that he missed only 1 dose of his home lasix\"    Past Medical History/Comorbidities:   Mr. Amelie Lake  has a past medical history of Acute CHF (congestive heart failure) ([facility] (4/25/2015);  Acute combined systolic and diastolic congestive heart failure ([facility] (4/28/2015); Chronic obstructive pulmonary disease ([facility] De Quervain's tenosynovitis, right (4/28/2015); Dyspnea on exertion (6/28/2015); Elevated serum creatinine (4/25/2015); Elevated troponin (6/28/2015); History of coronary artery disease; History of right knee surgery; History of shingles; Malignant hypertension (4/25/2015); and MI (myocardial infarction) ([facility] Mr. Jace Qureshi  has a past surgical history that includes hx knee arthroscopy (Right) and hx heart catheterization (6/29/2015). Social History/Living Environment:   Home Environment: Private residence  # Steps to Enter: 4  Rails to Enter: Yes  Hand Rails : Bilateral  Wheelchair Ramp: No  One/Two Story Residence: One story  Living Alone: No  Support Systems: Spouse/Significant Other/Partner, Family member(s)  Patient Expects to be Discharged to[de-identified] Private residence  Current DME Used/Available at Home: Connee Simmering, rollator  Tub or Shower Type: Shower  Prior Level of Function/Work/Activity:  [employer]. lives with wife in single story home with 4 steps. Independent to mod I with occasional rollator use. Occasional O2 (rare). Pt drives and denies falls. Wife can help with ADLs if needed. Number of Personal Factors/Comorbidities that affect the Plan of Care: 1-2: MODERATE COMPLEXITY   EXAMINATION:   Most Recent Physical Functioning:   Gross Assessment:                  Posture:     Balance:  Sitting: Intact  Standing: Impaired  Standing - Static: Good  Standing - Dynamic : Fair Bed Mobility:  Supine to Sit: Supervision  Wheelchair Mobility:     Transfers:  Sit to Stand: Supervision  Stand to Sit: Supervision  Gait:     Base of Support: Center of gravity altered; Widened  Speed/Lu: Pace decreased (<100 feet/min); Slow  Step Length: Left shortened;Right shortened  Gait Abnormalities: Shuffling gait  Distance (ft): 200 Feet (ft)  Assistive Device: Walker, rolling  Ambulation - Level of Assistance: Contact guard assistance  Interventions: Safety awareness training;Verbal cues      Body Structures Involved:  1. Heart  2. Muscles Body Functions Affected:  1. Movement Related Activities and Participation Affected:  1. General Tasks and Demands  2. Mobility  3. Self Care  4. Domestic Life  5. Community, Social and [place]   Number of elements that affect the Plan of Care: 4+: HIGH COMPLEXITY   CLINICAL PRESENTATION:   Presentation: Stable and uncomplicated: LOW COMPLEXITY   CLINICAL DECISION MAKIN [facility] Inpatient Short Form  How much difficulty does the patient currently have. .. Unable A Lot A Little None   1. Turning over in bed (including adjusting bedclothes, sheets and blankets)? [] 1   [] 2   [] 3   [x] 4   2. Sitting down on and standing up from a chair with arms ( e.g., wheelchair, bedside commode, etc.)   [] 1   [] 2   [] 3   [x] 4   3. Moving from lying on back to sitting on the side of the bed? [] 1   [] 2   [] 3   [x] 4   How much help from another person does the patient currently need. .. Total A Lot A Little None   4. Moving to and from a bed to a chair (including a wheelchair)? [] 1   [] 2   [x] 3   [] 4   5. Need to walk in hospital room? [] 1   [] 2   [x] 3   [] 4   6. Climbing 3-5 steps with a railing? [] 1   [] 2   [x] 3   [] 4   © , Trustees of [address] [place] [id number], under license to [employer]. All rights reserved      Score:  Initial: 21 Most Recent: X (Date: -- )    Interpretation of Tool:  Represents activities that are increasingly more difficult (i.e. Bed mobility, Transfers, Gait). Score 24 23 22-20 19-15 14-10 9-7 6     Modifier CH CI CJ CK CL CM CN      ?  Mobility - Walking and Moving Around:     - CURRENT STATUS: CJ - 20%-39% impaired, limited or restricted    - GOAL STATUS: CI - 1%-19% impaired, limited or restricted    - D/C STATUS:  ---------------To be determined---------------  Payor: Abdiel Bautista / Plan: [address] HMO / Product Type: Managed Care Medicare /      Medical Necessity:     · Patient demonstrates good rehab potential due to higher previous functional level. Reason for Services/Other Comments:  · Patient continues to demonstrate capacity to improve strength, mobility, balance, activity tolerance which will increase independence, decrease amount of assistance required from caregiver and increase safety. Use of outcome tool(s) and clinical judgement create a POC that gives a: Clear prediction of patient's progress: LOW COMPLEXITY            TREATMENT:   (In addition to Assessment/Re-Assessment sessions the following treatments were rendered)   Pre-treatment Symptoms/Complaints:  \"thank you\" pt very talkative today  Pain: Initial:      Post Session:  0/10     Therapeutic Activity: (    25 min): Therapeutic activities including Bed mobility, sit-stand transfers, standing balance, LE ex and Ambulation on level ground to improve mobility, strength, balance and activity tolerance. Required minimal Safety awareness training;Verbal cues to promote static and dynamic balance in standing with use of the walker. Pt needs one seated rest break during activity due to fatigue, SOB. Date:  7/16/18 Date:   Date:     Activity/Exercise Seated Parameters Parameters Parameters   Heel raises X 20 B     Toe raises X 20 B     LAQ's X 15 B     Hip Flex X 15 B     Hip ABD X 15 B                           Braces/Orthotics/Lines/Etc:   · O2 Device: Room air  Treatment/Session Assessment:    · Response to Treatment:  Good progress  · Interdisciplinary Collaboration:   o Physical Therapy Assistant  o Registered Nurse  · After treatment position/precautions:   o Up in chair  o Bed/Chair-wheels locked  o Bed in low position  o Call light within reach  o RN notified   · Compliance with Program/Exercises: good  · Recommendations/Intent for next treatment session:   \"Next visit will focus on increasing gait distance\".   Total Treatment Duration:  PT Patient Time In/Time Out  Time In: 0940  Time Out: [address], PTA

## 2018-07-17 LAB
ANION GAP SERPL CALC-SCNC: 6 MMOL/L (ref 7–16)
BASOPHILS # BLD: 0 K/UL (ref 0–0.2)
BASOPHILS NFR BLD: 0 % (ref 0–2)
BUN SERPL-MCNC: 26 MG/DL (ref 8–23)
CALCIUM SERPL-MCNC: 8.7 MG/DL (ref 8.3–10.4)
CHLORIDE SERPL-SCNC: 102 MMOL/L (ref 98–107)
CO2 SERPL-SCNC: 31 MMOL/L (ref 21–32)
CREAT SERPL-MCNC: 1.62 MG/DL (ref 0.8–1.5)
DIFFERENTIAL METHOD BLD: ABNORMAL
EOSINOPHIL # BLD: 0.1 K/UL (ref 0–0.8)
EOSINOPHIL NFR BLD: 1 % (ref 0.5–7.8)
ERYTHROCYTE [DISTWIDTH] IN BLOOD BY AUTOMATED COUNT: 17.8 % (ref 11.9–14.6)
GLUCOSE BLD STRIP.AUTO-MCNC: 102 MG/DL (ref 65–100)
GLUCOSE BLD STRIP.AUTO-MCNC: 115 MG/DL (ref 65–100)
GLUCOSE BLD STRIP.AUTO-MCNC: 90 MG/DL (ref 65–100)
GLUCOSE BLD STRIP.AUTO-MCNC: 95 MG/DL (ref 65–100)
GLUCOSE SERPL-MCNC: 101 MG/DL (ref 65–100)
HCT VFR BLD AUTO: 42.2 % (ref 41.1–50.3)
HGB BLD-MCNC: 13.7 G/DL (ref 13.6–17.2)
IMM GRANULOCYTES # BLD: 0 K/UL (ref 0–0.5)
IMM GRANULOCYTES NFR BLD AUTO: 0 % (ref 0–5)
LYMPHOCYTES # BLD: 2.3 K/UL (ref 0.5–4.6)
LYMPHOCYTES NFR BLD: 33 % (ref 13–44)
MAGNESIUM SERPL-MCNC: 2.5 MG/DL (ref 1.8–2.4)
MCH RBC QN AUTO: 26.8 PG (ref 26.1–32.9)
MCHC RBC AUTO-ENTMCNC: 32.5 G/DL (ref 31.4–35)
MCV RBC AUTO: 82.4 FL (ref 79.6–97.8)
MONOCYTES # BLD: 0.6 K/UL (ref 0.1–1.3)
MONOCYTES NFR BLD: 8 % (ref 4–12)
NEUTS SEG # BLD: 3.9 K/UL (ref 1.7–8.2)
NEUTS SEG NFR BLD: 58 % (ref 43–78)
PHOSPHATE SERPL-MCNC: 3.4 MG/DL (ref 2.3–3.7)
PLATELET # BLD AUTO: 161 K/UL (ref 150–450)
PMV BLD AUTO: 10.8 FL (ref 10.8–14.1)
POTASSIUM SERPL-SCNC: 4.2 MMOL/L (ref 3.5–5.1)
RBC # BLD AUTO: 5.12 M/UL (ref 4.23–5.67)
SODIUM SERPL-SCNC: 139 MMOL/L (ref 136–145)
WBC # BLD AUTO: 6.9 K/UL (ref 4.3–11.1)

## 2018-07-17 PROCEDURE — 74011250637 HC RX REV CODE- 250/637: Performed by: INTERNAL MEDICINE

## 2018-07-17 PROCEDURE — 82962 GLUCOSE BLOOD TEST: CPT

## 2018-07-17 PROCEDURE — 74011250637 HC RX REV CODE- 250/637: Performed by: HOSPITALIST

## 2018-07-17 PROCEDURE — 65270000029 HC RM PRIVATE

## 2018-07-17 PROCEDURE — 97530 THERAPEUTIC ACTIVITIES: CPT

## 2018-07-17 PROCEDURE — 85025 COMPLETE CBC W/AUTO DIFF WBC: CPT | Performed by: PHYSICIAN ASSISTANT

## 2018-07-17 PROCEDURE — 80048 BASIC METABOLIC PNL TOTAL CA: CPT | Performed by: PHYSICIAN ASSISTANT

## 2018-07-17 PROCEDURE — 83735 ASSAY OF MAGNESIUM: CPT | Performed by: PHYSICIAN ASSISTANT

## 2018-07-17 PROCEDURE — 84100 ASSAY OF PHOSPHORUS: CPT | Performed by: PHYSICIAN ASSISTANT

## 2018-07-17 PROCEDURE — 74011250636 HC RX REV CODE- 250/636: Performed by: INTERNAL MEDICINE

## 2018-07-17 PROCEDURE — 36415 COLL VENOUS BLD VENIPUNCTURE: CPT | Performed by: PHYSICIAN ASSISTANT

## 2018-07-17 PROCEDURE — 74011250637 HC RX REV CODE- 250/637: Performed by: NURSE PRACTITIONER

## 2018-07-17 RX ADMIN — BUMETANIDE 2 MG: 1 TABLET ORAL at 09:10

## 2018-07-17 RX ADMIN — SPIRONOLACTONE 25 MG: 25 TABLET, FILM COATED ORAL at 09:10

## 2018-07-17 RX ADMIN — Medication 10 ML: at 06:00

## 2018-07-17 RX ADMIN — BUMETANIDE 2 MG: 1 TABLET ORAL at 16:26

## 2018-07-17 RX ADMIN — Medication 400 MG: at 09:10

## 2018-07-17 RX ADMIN — ASPIRIN 81 MG: 81 TABLET, COATED ORAL at 09:10

## 2018-07-17 RX ADMIN — CARVEDILOL 3.12 MG: 3.12 TABLET, FILM COATED ORAL at 09:10

## 2018-07-17 RX ADMIN — Medication 400 MG: at 16:26

## 2018-07-17 RX ADMIN — ATORVASTATIN CALCIUM 20 MG: 10 TABLET, FILM COATED ORAL at 21:44

## 2018-07-17 RX ADMIN — LISINOPRIL 5 MG: 5 TABLET ORAL at 09:10

## 2018-07-17 RX ADMIN — HEPARIN SODIUM 5000 UNITS: 5000 INJECTION, SOLUTION INTRAVENOUS; SUBCUTANEOUS at 09:10

## 2018-07-17 RX ADMIN — Medication 10 ML: at 22:00

## 2018-07-17 RX ADMIN — HEPARIN SODIUM 5000 UNITS: 5000 INJECTION, SOLUTION INTRAVENOUS; SUBCUTANEOUS at 21:44

## 2018-07-17 NOTE — PROGRESS NOTES
Problem: Mobility Impaired (Adult and Pediatric)  Goal: *Acute Goals and Plan of Care (Insert Text)  LTG:  (1.)Mr. Tarun Brizuela will move from supine to sit and sit to supine , scoot up and down and roll side to side INDEPENDENTLY with bed flat within 7 treatment day(s). (2.)Mr. Tarun Brizuela will transfer from bed to chair and chair to bed INDEPENDENTLY within 7 treatment day(s). (3.)Mr. Traun Brizuela will ambulate with MODIFIED INDEPENDENCE for 250 feet with the least restrictive device within 7 treatment day(s). (4.)Mr. Tarun Brizuela will ascend and descend 4 steps with SUPERVISION using handrail(s) within 7 days. (5.)Mr. Tarun Brizuela will perform exercises per HEP to improve strength and mobility within 7 days. ________________________________________________________________________________________________    PHYSICAL THERAPY: Daily Note, Treatment Day: 4th, PM 7/17/2018  INPATIENT: Hospital Day: 8  Payor: tSephenie Barnes / Plan: [address] [place] [address] HMO / Product Type: Managed Care Medicare /      NAME/AGE/GENDER: Huong Rios is a 74 y.o. male   PRIMARY DIAGNOSIS: CHF (congestive heart failure) ([facility]  CHF (congestive heart failure) ([facility] Acute respiratory failure with hypoxemia ([facility] Acute respiratory failure with hypoxemia ([facility]        ICD-10: Treatment Diagnosis:    · Generalized Muscle Weakness (M62.81)  · Other abnormalities of gait and mobility (R26.89)   Precaution/Allergies:  Pcn [penicillins]      ASSESSMENT:     Mr. Tarun Brizuela presents supine in bed and is agreeable to therapy treatment. Wife present. Performs bed mobility and transfers independently. Sit to stand with stand by assist.  Gait training with rolling walker x 250 feet with 2 sitting rest breaks. O2 saturation remain in the mid 90's. Patient is returned to supine in bed with wife at the end of the treatment session. Patient did quite well without O2 and minimum SOB noted. Good session today. Patient is making good progress towards goals.   Will continue PT efforts. This section established at most recent assessment   PROBLEM LIST (Impairments causing functional limitations):  1. Decreased Strength  2. Decreased ADL/Functional Activities  3. Decreased Transfer Abilities  4. Decreased Ambulation Ability/Technique  5. Decreased Balance  6. Decreased Activity Tolerance  7. Increased Shortness of Breath   INTERVENTIONS PLANNED: (Benefits and precautions of physical therapy have been discussed with the patient.)  1. Balance Exercise  2. Bed Mobility  3. Gait Training  4. Home Exercise Program (HEP)  5. Therapeutic Activites  6. Therapeutic Exercise/Strengthening  7. Transfer Training     TREATMENT PLAN: Frequency/Duration: 3 times a week for duration of hospital stay  Rehabilitation Potential For Stated Goals: Good     RECOMMENDED REHABILITATION/EQUIPMENT: (at time of discharge pending progress): Due to the probability of continued deficits (see above) this patient will likely need continued skilled physical therapy after discharge. Equipment:    None at this time              HISTORY:   History of Present Injury/Illness (Reason for Referral):  Per H&P, \"Mr. Mariel Thompson is a 73 yo male who presented with worsening LL swelling on a background of COPD (4L O2 at home), CHF, CAD and HTN. He reports worsening LL swelling for the past 2 weeks. He reports dyspnea on rest and exertion. He sleeps on 2 pillows. Of note, he was recently hospitalized from 6/6 to 6/8 with acute CHF exacerbation. He was started on IV lasix and was discharged at that time on lasix PO BID. At baseline, he lives at home with his wife. He reports that he missed only 1 dose of his home lasix\"    Past Medical History/Comorbidities:   Mr. Mariel Thompson  has a past medical history of Acute CHF (congestive heart failure) ([facility] (4/25/2015); Acute combined systolic and diastolic congestive heart failure ([facility] (4/28/2015); Chronic obstructive pulmonary disease ([facility] De Quervain's tenosynovitis, right (4/28/2015);  Dyspnea on exertion (6/28/2015); Elevated serum creatinine (4/25/2015); Elevated troponin (6/28/2015); History of coronary artery disease; History of right knee surgery; History of shingles; Malignant hypertension (4/25/2015); and MI (myocardial infarction) ([facility] Mr. Mary Ellen Appiah  has a past surgical history that includes hx knee arthroscopy (Right) and hx heart catheterization (6/29/2015). Social History/Living Environment:   Home Environment: Private residence  # Steps to Enter: 4  Rails to Enter: Yes  Hand Rails : Bilateral  Wheelchair Ramp: No  One/Two Story Residence: One story  Living Alone: No  Support Systems: Spouse/Significant Other/Partner, Family member(s)  Patient Expects to be Discharged to[de-identified] Private residence  Current DME Used/Available at Home: Virgen Olszewski, rollator  Tub or Shower Type: Shower  Prior Level of Function/Work/Activity:  [employer]. lives with wife in single story home with 4 steps. Independent to mod I with occasional rollator use. Occasional O2 (rare). Pt drives and denies falls. Wife can help with ADLs if needed. Number of Personal Factors/Comorbidities that affect the Plan of Care: 1-2: MODERATE COMPLEXITY   EXAMINATION:   Most Recent Physical Functioning:   Gross Assessment:                  Posture:     Balance:  Sitting: Intact  Standing: Intact  Standing - Static: Good  Standing - Dynamic : Fair Bed Mobility:  Rolling: Independent  Supine to Sit: Independent  Sit to Supine: Independent  Scooting: Independent  Wheelchair Mobility:     Transfers:  Sit to Stand: Supervision  Stand to Sit: Supervision  Gait:     Distance (ft): 250 Feet (ft)  Assistive Device: Walker, rolling  Ambulation - Level of Assistance: Stand-by assistance  Interventions: Safety awareness training      Body Structures Involved:  1. Heart  2. Muscles Body Functions Affected:  1. Movement Related Activities and Participation Affected:  1. General Tasks and Demands  2. Mobility  3. Self Care  4. Domestic Life  5.  Community, Social and [place]   Number of elements that affect the Plan of Care: 4+: HIGH COMPLEXITY   CLINICAL PRESENTATION:   Presentation: Stable and uncomplicated: LOW COMPLEXITY   CLINICAL DECISION MAKIN [facility]ility Inpatient Short Form  How much difficulty does the patient currently have. .. Unable A Lot A Little None   1. Turning over in bed (including adjusting bedclothes, sheets and blankets)? [] 1   [] 2   [] 3   [x] 4   2. Sitting down on and standing up from a chair with arms ( e.g., wheelchair, bedside commode, etc.)   [] 1   [] 2   [] 3   [x] 4   3. Moving from lying on back to sitting on the side of the bed? [] 1   [] 2   [] 3   [x] 4   How much help from another person does the patient currently need. .. Total A Lot A Little None   4. Moving to and from a bed to a chair (including a wheelchair)? [] 1   [] 2   [x] 3   [] 4   5. Need to walk in hospital room? [] 1   [] 2   [x] 3   [] 4   6. Climbing 3-5 steps with a railing? [] 1   [] 2   [x] 3   [] 4   © , Trustees of [address] [place] [address], under license to [employer]. All rights reserved      Score:  Initial: 21 Most Recent: X (Date: -- )    Interpretation of Tool:  Represents activities that are increasingly more difficult (i.e. Bed mobility, Transfers, Gait). Score 24 23 22-20 19-15 14-10 9-7 6     Modifier CH CI CJ CK CL CM CN      ? Mobility - Walking and Moving Around:     - CURRENT STATUS: CJ - 20%-39% impaired, limited or restricted    - GOAL STATUS: CI - 1%-19% impaired, limited or restricted    - D/C STATUS:  ---------------To be determined---------------  Payor: Johanny Aviles / Plan: [address] [place] [address] / Product Type: Managed Care Medicare /      Medical Necessity:     · Patient demonstrates good rehab potential due to higher previous functional level.   Reason for Services/Other Comments:  · Patient continues to demonstrate capacity to improve strength, mobility, balance, activity tolerance which will increase independence, decrease amount of assistance required from caregiver and increase safety. Use of outcome tool(s) and clinical judgement create a POC that gives a: Clear prediction of patient's progress: LOW COMPLEXITY            TREATMENT:   (In addition to Assessment/Re-Assessment sessions the following treatments were rendered)   Pre-treatment Symptoms/Complaints:  \"You look like my ex- wife\"  Pain: Initial:   Pain Intensity 1: 0  Post Session:  0/10     Therapeutic Activity: (    24 min): Therapeutic activities including Bed mobility, sit-stand transfers, standing balance, LE ex and Ambulation on level ground to improve mobility, strength, balance and activity tolerance. Required stand by assist to close supervision with Safety awareness training to promote static and dynamic balance in standing with use of the walker. Pt needs one seated rest break during activity due to fatigue, SOB. Date:  7/16/18 Date:   Date:     Activity/Exercise Seated Parameters Parameters Parameters   Heel raises X 20 B     Toe raises X 20 B     LAQ's X 15 B     Hip Flex X 15 B     Hip ABD X 15 B                           Braces/Orthotics/Lines/Etc:   · O2 Device: Room air  Treatment/Session Assessment:    · Response to Treatment:  Tolerated well  · Interdisciplinary Collaboration:   o Physical Therapy Assistant  o Registered Nurse  · After treatment position/precautions:   o Supine in bed  o Bed/Chair-wheels locked  o Bed in low position  o Call light within reach  o RN notified  o Family at bedside   · Compliance with Program/Exercises: compliant  · Recommendations/Intent for next treatment session: \"Next visit will focus on increasing gait distance\".   Total Treatment Duration:  PT Patient Time In/Time Out  Time In: 1256  Time Out: 46    Sharon Mariano, PTA

## 2018-07-17 NOTE — PROGRESS NOTES
PT note: Attempted to provide PT to Mr. Stef Elias however the patient requesting that this writer return later for therapy.   Aleisha Mustafa, PTA

## 2018-07-17 NOTE — PROGRESS NOTES
Pt resting quietly in bed. No s/s of distress. Pt on continuous pulse oximetry. VSS. Pt has no complaints. Snack given during PM shift. Pt states he slept through dinner. No further issues identified. Will continue to monitor/assess.

## 2018-07-17 NOTE — PROGRESS NOTES
[facility] CARDIOLOGY PROGRESS NOTE 
      
 
7/17/2018 3:31 PM 
 
Admit Date: 7/10/2018 Subjective: Much improved dyspnea overall. No CP. On ~3-4L; states prior only used 'as needed' oxygen but was mostly SOB. ~7.7L net neg ROS: 
Cardiovascular:  As noted above Objective:  
  
Vitals:  
 07/17/18 0418 07/17/18 0653 07/17/18 1039 07/17/18 1441 BP:  112/77 120/78 98/57 Pulse:  69 71 71 Resp:  22 24 24 Temp:  98 °F (36.7 °C) 97.5 °F (36.4 °C) 98.4 °F (36.9 °C) SpO2:  93% 97% 99% Weight: 97.2 kg (214 lb 4.6 oz) Height:      
 
 
Physical Exam: 
General-No Acute Distress Neck- supple, no JVD 
CV- regular rate and rhythm no MRG Lung- dec at bases Abd- soft, nontender, nondistended Ext- tr edema bilaterally. Skin- warm and dry Data Review:  
Recent Labs  
   07/17/18 
 0505  07/16/18 
 6209 NA  139  142  
K  4.2  4.5 MG  2.5*  2.4 BUN  26*  24* CREA  1.62*  1.53* GLU  101*  84 WBC  6.9  7.5 HGB  13.7  13.9 HCT  42.2  42.9 PLT  161  147* Assessment/Plan:  
 
Principal Problem: 
  Acute respiratory failure with hypoxemia ([facility] (5/24/2018) Active Problems: Hypertension (9/29/2017) COPD, moderate ([address] (9/29/2017) High triglycerides (9/29/2017) Essential hypertension (9/28/2016) CHF (congestive heart failure) ([facility] (9/27/2017) TAZ (obstructive sleep apnea) (10/2/2017) Echo noted with EF ~45%; also RV dysfn/moderately elevated RVSP (not sig changed from prior echo 5/18). Continue diuresis; changed to IV bumex (alb 2.6). Edema likely related to hypoalbuminemia/RV dysfn; improved overall. Stable renal  renal fn on bumex (baseline ~1.5-1.6). Chronic troponin elevation with no significant trend and likely component of LV dysfn/RV dysfn/CKD. Prior noted cath from 1/18 with mild non obst CAD. On coreg/lisinopril/aldactone Aline Ordaz MD 
7/17/2018 3:31 PM

## 2018-07-17 NOTE — PROGRESS NOTES
Progress Note      Patient: Prem Rosas. Sex: male          MRN: [id number]           YOB: 1945      Age:  74 y.o. PCP:  Julisa Mathis MD  Treatment Team: Attending Provider: Vignesh Botello MD; Consulting Provider: Tarik Hudson MD; Care Manager: Rhea Ferrell LMSW; Utilization Review: Joy Villareal RN  Subjective:     Patient is a 75yo male with PMH of COPD (4LNC), CHF, CAD, and HTN, who reported to the ER with worsening Lower extremity edema, and SOB. Patient was recently hospitalized from - with acute CHF exacerbation. Echo EF 45%. Cardiology consulted. Been receiving IV bumex. Good UOP. Transitioned to PO. Creatinine up slightly to 1.62. Patient 94% on room air. Denies CP, n/v/d. BLE edema improving. Continues complaints of SOB with exertion. Objective:   Physical Exam:   Visit Vitals    /78 (BP 1 Location: Right arm, BP Patient Position: At rest)    Pulse 71    Temp 97.5 °F (36.4 °C)    Resp 24    Ht 5' 9\" (1.753 m)    Wt 97.2 kg (214 lb 4.6 oz)    SpO2 97%    BMI 31.64 kg/m2      Temp (24hrs), Av °F (36.7 °C), Min:97.5 °F (36.4 °C), Max:98.4 °F (36.9 °C)    Oxygen Therapy  O2 Sat (%): 97 % (18 1039)  Pulse via Oximetry: 70 beats per minute (18 1432)  O2 Device: Room air (18 1432)  O2 Flow Rate (L/min): 2 l/min (18 1401)  FIO2 (%): 21 % (18 1432)    Intake/Output Summary (Last 24 hours) at 18 1220  Last data filed at 18 0957   Gross per 24 hour   Intake              120 ml   Output             2350 ml   Net            -2230 ml      General: Conscious, no resp distress at rest. Mild resp distress with exertion+   Eyes:  VICENTA, No pallor/icterus    HENT:             Oral Mucosa is Moist, No sinus tenderness  Neck:               Supple, elevated JVD  Lungs:  Diminshed. Room air  Heart:  S1 S2 regular  Abdomen: Soft, normal bowel sounds, NTND, No guarding/rigidity/rebound tend. Extremities: Noah. nonpitting pedal edema . L>R  Neurologic:  AAOX3,  Skin:                No acute rashes  Psych:             Appropriate mood, Thought process is normal    LAB  Recent Results (from the past 24 hour(s))   GLUCOSE, POC    Collection Time: 07/16/18  4:14 PM   Result Value Ref Range    Glucose (POC) 124 (H) 65 - 100 mg/dL   GLUCOSE, POC    Collection Time: 07/16/18  7:08 PM   Result Value Ref Range    Glucose (POC) 117 (H) 65 - 100 mg/dL   MAGNESIUM    Collection Time: 07/17/18  5:05 AM   Result Value Ref Range    Magnesium 2.5 (H) 1.8 - 2.4 mg/dL   PHOSPHORUS    Collection Time: 07/17/18  5:05 AM   Result Value Ref Range    Phosphorus 3.4 2.3 - 3.7 MG/DL   CBC WITH AUTOMATED DIFF    Collection Time: 07/17/18  5:05 AM   Result Value Ref Range    WBC 6.9 4.3 - 11.1 K/uL    RBC 5.12 4.23 - 5.67 M/uL    HGB 13.7 13.6 - 17.2 g/dL    HCT 42.2 41.1 - 50.3 %    MCV 82.4 79.6 - 97.8 FL    MCH 26.8 26.1 - 32.9 PG    MCHC 32.5 31.4 - 35.0 g/dL    RDW 17.8 (H) 11.9 - 14.6 %    PLATELET 109 185 - 060 K/uL    MPV 10.8 10.8 - 14.1 FL    DF AUTOMATED      NEUTROPHILS 58 43 - 78 %    LYMPHOCYTES 33 13 - 44 %    MONOCYTES 8 4.0 - 12.0 %    EOSINOPHILS 1 0.5 - 7.8 %    BASOPHILS 0 0.0 - 2.0 %    IMMATURE GRANULOCYTES 0 0.0 - 5.0 %    ABS. NEUTROPHILS 3.9 1.7 - 8.2 K/UL    ABS. LYMPHOCYTES 2.3 0.5 - 4.6 K/UL    ABS. MONOCYTES 0.6 0.1 - 1.3 K/UL    ABS. EOSINOPHILS 0.1 0.0 - 0.8 K/UL    ABS. BASOPHILS 0.0 0.0 - 0.2 K/UL    ABS. IMM.  GRANS. 0.0 0.0 - 0.5 K/UL   METABOLIC PANEL, BASIC    Collection Time: 07/17/18  5:05 AM   Result Value Ref Range    Sodium 139 136 - 145 mmol/L    Potassium 4.2 3.5 - 5.1 mmol/L    Chloride 102 98 - 107 mmol/L    CO2 31 21 - 32 mmol/L    Anion gap 6 (L) 7 - 16 mmol/L    Glucose 101 (H) 65 - 100 mg/dL    BUN 26 (H) 8 - 23 MG/DL    Creatinine 1.62 (H) 0.8 - 1.5 MG/DL    GFR est AA 54 (L) >60 ml/min/1.73m2    GFR est non-AA 44 (L) >60 ml/min/1.73m2    Calcium 8.7 8.3 - 10.4 MG/DL   GLUCOSE, POC    Collection Time: 07/17/18  6:52 AM   Result Value Ref Range    Glucose (POC) 102 (H) 65 - 100 mg/dL   GLUCOSE, POC    Collection Time: 07/17/18 10:39 AM   Result Value Ref Range    Glucose (POC) 95 65 - 100 mg/dL       No results found. No results found. 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  -Cardiology consulting  - IV Bumex transitioning to oral  -monitor UOP  -Daily weights  -Monitor BMP  -Add back aldactone and stop supplemental KCL 7/16    Acute hypoxemic respiratory failure with hypoxemia - improving   Chronic COPD, no acute exacerbation.  -Supplemental oxygen- on room air     CKD stage III,  -BMP in am    Mild troponin elevation, secondary to CHF exacerbation demand ischemia from respiratory failure and underlying CKD.      Dyslipidemia  -Continue atorvastatin    History of coronary artery disease  -continue asa,  lisinopril    New diagnosis of DM type 2 - A1C is 7.  -Diabetic diet  -SSI  -Diabetic education    DVT: [facility]      Discussed plan of care with Dr. Thierno Chinchilla, NP  July 17, 2018

## 2018-07-17 NOTE — PROGRESS NOTES
[employer] denied STR for pt. Pt's family appealed denial.  CM faxed documentation to [id number]  noting pt's need for STR in order to expedite appeal..  Awaiting response.

## 2018-07-17 NOTE — PROGRESS NOTES
Received call from [facility] stating patient was denied for SNF rehab. Notified patient and spouse of denial. Provided family with phone number for fast track appeal. Wife to call fast track appeal today.

## 2018-07-17 NOTE — PROGRESS NOTES
Interdisciplinary Rounds completed 07/17/18. Nursing, Case Management, Physician and PT present. Plan of care reviewed and updated. Family at bs attentive during IDT rounds.

## 2018-07-18 ENCOUNTER — PATIENT OUTREACH (OUTPATIENT)
Dept: CASE MANAGEMENT | Age: 73
End: 2018-07-18

## 2018-07-18 LAB
ANION GAP SERPL CALC-SCNC: 10 MMOL/L (ref 7–16)
BASOPHILS # BLD: 0 K/UL (ref 0–0.2)
BASOPHILS NFR BLD: 0 % (ref 0–2)
BUN SERPL-MCNC: 24 MG/DL (ref 8–23)
CALCIUM SERPL-MCNC: 8.8 MG/DL (ref 8.3–10.4)
CHLORIDE SERPL-SCNC: 100 MMOL/L (ref 98–107)
CO2 SERPL-SCNC: 29 MMOL/L (ref 21–32)
CREAT SERPL-MCNC: 1.63 MG/DL (ref 0.8–1.5)
DIFFERENTIAL METHOD BLD: ABNORMAL
EOSINOPHIL # BLD: 0.1 K/UL (ref 0–0.8)
EOSINOPHIL NFR BLD: 1 % (ref 0.5–7.8)
ERYTHROCYTE [DISTWIDTH] IN BLOOD BY AUTOMATED COUNT: 17.8 % (ref 11.9–14.6)
GLUCOSE BLD STRIP.AUTO-MCNC: 120 MG/DL (ref 65–100)
GLUCOSE BLD STRIP.AUTO-MCNC: 138 MG/DL (ref 65–100)
GLUCOSE BLD STRIP.AUTO-MCNC: 78 MG/DL (ref 65–100)
GLUCOSE BLD STRIP.AUTO-MCNC: 86 MG/DL (ref 65–100)
GLUCOSE SERPL-MCNC: 110 MG/DL (ref 65–100)
HCT VFR BLD AUTO: 42.7 % (ref 41.1–50.3)
HGB BLD-MCNC: 13.8 G/DL (ref 13.6–17.2)
IMM GRANULOCYTES # BLD: 0 K/UL (ref 0–0.5)
IMM GRANULOCYTES NFR BLD AUTO: 0 % (ref 0–5)
LYMPHOCYTES # BLD: 2.3 K/UL (ref 0.5–4.6)
LYMPHOCYTES NFR BLD: 36 % (ref 13–44)
MAGNESIUM SERPL-MCNC: 2.6 MG/DL (ref 1.8–2.4)
MCH RBC QN AUTO: 26.6 PG (ref 26.1–32.9)
MCHC RBC AUTO-ENTMCNC: 32.3 G/DL (ref 31.4–35)
MCV RBC AUTO: 82.4 FL (ref 79.6–97.8)
MONOCYTES # BLD: 0.5 K/UL (ref 0.1–1.3)
MONOCYTES NFR BLD: 9 % (ref 4–12)
NEUTS SEG # BLD: 3.4 K/UL (ref 1.7–8.2)
NEUTS SEG NFR BLD: 54 % (ref 43–78)
PHOSPHATE SERPL-MCNC: 3.6 MG/DL (ref 2.3–3.7)
PLATELET # BLD AUTO: 169 K/UL (ref 150–450)
PMV BLD AUTO: 10.2 FL (ref 10.8–14.1)
POTASSIUM SERPL-SCNC: 4 MMOL/L (ref 3.5–5.1)
RBC # BLD AUTO: 5.18 M/UL (ref 4.23–5.67)
SODIUM SERPL-SCNC: 139 MMOL/L (ref 136–145)
WBC # BLD AUTO: 6.3 K/UL (ref 4.3–11.1)

## 2018-07-18 PROCEDURE — 94762 N-INVAS EAR/PLS OXIMTRY CONT: CPT

## 2018-07-18 PROCEDURE — 77010033678 HC OXYGEN DAILY

## 2018-07-18 PROCEDURE — 84100 ASSAY OF PHOSPHORUS: CPT | Performed by: PHYSICIAN ASSISTANT

## 2018-07-18 PROCEDURE — 97110 THERAPEUTIC EXERCISES: CPT

## 2018-07-18 PROCEDURE — 74011250637 HC RX REV CODE- 250/637: Performed by: INTERNAL MEDICINE

## 2018-07-18 PROCEDURE — 74011250637 HC RX REV CODE- 250/637: Performed by: HOSPITALIST

## 2018-07-18 PROCEDURE — 65270000029 HC RM PRIVATE

## 2018-07-18 PROCEDURE — 36415 COLL VENOUS BLD VENIPUNCTURE: CPT | Performed by: PHYSICIAN ASSISTANT

## 2018-07-18 PROCEDURE — 74011250637 HC RX REV CODE- 250/637: Performed by: NURSE PRACTITIONER

## 2018-07-18 PROCEDURE — 80048 BASIC METABOLIC PNL TOTAL CA: CPT | Performed by: PHYSICIAN ASSISTANT

## 2018-07-18 PROCEDURE — 97530 THERAPEUTIC ACTIVITIES: CPT

## 2018-07-18 PROCEDURE — 74011250636 HC RX REV CODE- 250/636: Performed by: INTERNAL MEDICINE

## 2018-07-18 PROCEDURE — 82962 GLUCOSE BLOOD TEST: CPT

## 2018-07-18 PROCEDURE — 85025 COMPLETE CBC W/AUTO DIFF WBC: CPT | Performed by: PHYSICIAN ASSISTANT

## 2018-07-18 PROCEDURE — 83735 ASSAY OF MAGNESIUM: CPT | Performed by: PHYSICIAN ASSISTANT

## 2018-07-18 RX ORDER — LANOLIN ALCOHOL/MO/W.PET/CERES
400 CREAM (GRAM) TOPICAL DAILY
Status: DISCONTINUED | OUTPATIENT
Start: 2018-07-19 | End: 2018-07-20 | Stop reason: HOSPADM

## 2018-07-18 RX ORDER — ACETAMINOPHEN 325 MG/1
650 TABLET ORAL
Status: DISCONTINUED | OUTPATIENT
Start: 2018-07-18 | End: 2018-07-20 | Stop reason: HOSPADM

## 2018-07-18 RX ADMIN — CARVEDILOL 3.12 MG: 3.12 TABLET, FILM COATED ORAL at 17:10

## 2018-07-18 RX ADMIN — Medication 400 MG: at 08:20

## 2018-07-18 RX ADMIN — HEPARIN SODIUM 5000 UNITS: 5000 INJECTION, SOLUTION INTRAVENOUS; SUBCUTANEOUS at 08:19

## 2018-07-18 RX ADMIN — Medication 10 ML: at 06:00

## 2018-07-18 RX ADMIN — Medication 10 ML: at 13:30

## 2018-07-18 RX ADMIN — SPIRONOLACTONE 25 MG: 25 TABLET, FILM COATED ORAL at 08:20

## 2018-07-18 RX ADMIN — BUMETANIDE 2 MG: 1 TABLET ORAL at 17:10

## 2018-07-18 RX ADMIN — Medication 10 ML: at 21:20

## 2018-07-18 RX ADMIN — BUMETANIDE 2 MG: 1 TABLET ORAL at 08:20

## 2018-07-18 RX ADMIN — ACETAMINOPHEN 650 MG: 325 TABLET ORAL at 14:45

## 2018-07-18 RX ADMIN — HEPARIN SODIUM 5000 UNITS: 5000 INJECTION, SOLUTION INTRAVENOUS; SUBCUTANEOUS at 21:12

## 2018-07-18 RX ADMIN — ASPIRIN 81 MG: 81 TABLET, COATED ORAL at 08:20

## 2018-07-18 RX ADMIN — ATORVASTATIN CALCIUM 20 MG: 10 TABLET, FILM COATED ORAL at 21:12

## 2018-07-18 RX ADMIN — CARVEDILOL 3.12 MG: 3.12 TABLET, FILM COATED ORAL at 08:20

## 2018-07-18 RX ADMIN — LISINOPRIL 5 MG: 5 TABLET ORAL at 08:20

## 2018-07-18 NOTE — PROGRESS NOTES
Attempted to ambulate patient for oxygen qualifier. OT and myself at bedside. pt refused to get out of bed. Pt on RA at rest, SAT 97%.

## 2018-07-18 NOTE — PROGRESS NOTES
Interdisciplinary team rounds were held 7/18/2018 with the following team members:Nursing, case management, physician and PT. Plan of care discussed.

## 2018-07-18 NOTE — PROGRESS NOTES
Progress Note      Patient: Dino Coleman. Sex: male          MRN: [id number]           YOB: 1945      Age:  74 y.o. PCP:  Amrit Cline MD  Treatment Team: Attending Provider: Pankaj Vargas MD; Care Manager: Alan Magallanes LMSW; Utilization Review: Jacklyn Cabrera RN  Subjective:     Patient is a 73yo male with PMH of COPD (4LNC), CHF, CAD, and HTN, who reported to the ER with worsening Lower extremity edema, and SOB. Patient was recently hospitalized from - with acute CHF exacerbation. Echo EF 45%. Cardiology consulted. Patient was given IV bumex and transitioned to PO on . Been receiving IV bumex. Creatinine up slightly to 1.63. Patient 96% on room air. Denies CP, n/v/d. BLE edema improving. Continues complaints of SOB with exertion. Objective:   Physical Exam:   Visit Vitals    /62 (BP 1 Location: Right arm, BP Patient Position: Sitting)    Pulse 80    Temp 98.3 °F (36.8 °C)    Resp 18    Ht 5' 9\" (1.753 m)    Wt 94.8 kg (209 lb)    SpO2 97%    BMI 30.86 kg/m2      Temp (24hrs), Av.2 °F (36.8 °C), Min:98 °F (36.7 °C), Max:98.4 °F (36.9 °C)    Oxygen Therapy  O2 Sat (%): 97 % (18 1116)  Pulse via Oximetry: 70 beats per minute (18 1432)  O2 Device: Room air (18 1116)  O2 Flow Rate (L/min): 2 l/min (18 1401)  FIO2 (%): 21 % (18 1432)    Intake/Output Summary (Last 24 hours) at 18 1123  Last data filed at 18 0825   Gross per 24 hour   Intake              480 ml   Output             2225 ml   Net            -1745 ml      General: Conscious, no resp distress at rest. Mild resp distress with exertion+   Eyes:  VICENTA, No pallor/icterus    HENT:             Oral Mucosa is Moist, No sinus tenderness  Neck:               Supple, elevated JVD  Lungs:  Diminshed. Room air  Heart:  S1 S2 regular  Abdomen: Soft, normal bowel sounds, NTND, No guarding/rigidity/rebound tend. Extremities: Noah. nonpitting pedal edema . L>R  Neurologic:  AAOX3,  Skin:                No acute rashes  Psych:             Appropriate mood, Thought process is normal    LAB  Recent Results (from the past 24 hour(s))   GLUCOSE, POC    Collection Time: 07/17/18  3:53 PM   Result Value Ref Range    Glucose (POC) 90 65 - 100 mg/dL   GLUCOSE, POC    Collection Time: 07/17/18  9:01 PM   Result Value Ref Range    Glucose (POC) 115 (H) 65 - 100 mg/dL   MAGNESIUM    Collection Time: 07/18/18  5:01 AM   Result Value Ref Range    Magnesium 2.6 (H) 1.8 - 2.4 mg/dL   PHOSPHORUS    Collection Time: 07/18/18  5:01 AM   Result Value Ref Range    Phosphorus 3.6 2.3 - 3.7 MG/DL   CBC WITH AUTOMATED DIFF    Collection Time: 07/18/18  5:01 AM   Result Value Ref Range    WBC 6.3 4.3 - 11.1 K/uL    RBC 5.18 4.23 - 5.67 M/uL    HGB 13.8 13.6 - 17.2 g/dL    HCT 42.7 41.1 - 50.3 %    MCV 82.4 79.6 - 97.8 FL    MCH 26.6 26.1 - 32.9 PG    MCHC 32.3 31.4 - 35.0 g/dL    RDW 17.8 (H) 11.9 - 14.6 %    PLATELET 037 896 - 825 K/uL    MPV 10.2 (L) 10.8 - 14.1 FL    DF AUTOMATED      NEUTROPHILS 54 43 - 78 %    LYMPHOCYTES 36 13 - 44 %    MONOCYTES 9 4.0 - 12.0 %    EOSINOPHILS 1 0.5 - 7.8 %    BASOPHILS 0 0.0 - 2.0 %    IMMATURE GRANULOCYTES 0 0.0 - 5.0 %    ABS. NEUTROPHILS 3.4 1.7 - 8.2 K/UL    ABS. LYMPHOCYTES 2.3 0.5 - 4.6 K/UL    ABS. MONOCYTES 0.5 0.1 - 1.3 K/UL    ABS. EOSINOPHILS 0.1 0.0 - 0.8 K/UL    ABS. BASOPHILS 0.0 0.0 - 0.2 K/UL    ABS. IMM.  GRANS. 0.0 0.0 - 0.5 K/UL   METABOLIC PANEL, BASIC    Collection Time: 07/18/18  5:01 AM   Result Value Ref Range    Sodium 139 136 - 145 mmol/L    Potassium 4.0 3.5 - 5.1 mmol/L    Chloride 100 98 - 107 mmol/L    CO2 29 21 - 32 mmol/L    Anion gap 10 7 - 16 mmol/L    Glucose 110 (H) 65 - 100 mg/dL    BUN 24 (H) 8 - 23 MG/DL    Creatinine 1.63 (H) 0.8 - 1.5 MG/DL    GFR est AA 53 (L) >60 ml/min/1.73m2    GFR est non-AA 44 (L) >60 ml/min/1.73m2    Calcium 8.8 8.3 - 10.4 MG/DL   GLUCOSE, POC    Collection Time: 07/18/18  7:03 AM   Result Value Ref Range    Glucose (POC) 86 65 - 100 mg/dL       No results found. No results found. 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  -Cardiology consulted. Signed off 7/18.   - Continue Bumex, aldactone, coreg, lisinopril.   -monitor UOP  -Daily weights  -Monitor BMP      Acute hypoxemic respiratory failure with hypoxemia - improving   Chronic COPD, no acute exacerbation.  -Supplemental oxygen prn- on room air     CKD stage III,  -BMP in am     Dyslipidemia  -Continue atorvastatin    History of coronary artery disease  -continue asa,  lisinopril    New diagnosis of DM type 2 - A1C is 7.  -Diabetic diet  -SSI- will need to transition to Metformin at discharge  -Diabetic education    DVT: [facility]    Discharge plan: PT/OT/CM - patient would benefit from STR for continued improvement of generalized weakness on discharge.       Discussed plan of care with Dr. Anita Martin, NP  July 18, 2018

## 2018-07-18 NOTE — PROGRESS NOTES
Problem: Mobility Impaired (Adult and Pediatric)  Goal: *Acute Goals and Plan of Care (Insert Text)  LTG:  (1.)Mr. Mimi Graham will move from supine to sit and sit to supine , scoot up and down and roll side to side INDEPENDENTLY with bed flat within 7 treatment day(s). (2.)Mr. Mimi Graham will transfer from bed to chair and chair to bed INDEPENDENTLY within 7 treatment day(s). (3.)Mr. Mimi Graham will ambulate with MODIFIED INDEPENDENCE for 250 feet with the least restrictive device within 7 treatment day(s). (4.)Mr. Mimi Graham will ascend and descend 4 steps with SUPERVISION using handrail(s) within 7 days. (5.)Mr. Mimi Graham will perform exercises per HEP to improve strength and mobility within 7 days. ________________________________________________________________________________________________    PHYSICAL THERAPY: Daily Note, Treatment Day: 5th, AM 7/18/2018  INPATIENT: Hospital Day: 9  Payor: Serafin Desai / Plan: [address] [place] [address] HMO / Product Type: [employer] /      NAME/AGE/GENDER: Roberto Padilla is a 74 y.o. male   PRIMARY DIAGNOSIS: CHF (congestive heart failure) ([facility] [address]  CHF (congestive heart failure) ([facility] [address] Acute respiratory failure with hypoxemia ([facility] [address] Acute respiratory failure with hypoxemia ([facility] [address]        ICD-10: Treatment Diagnosis:    · Generalized Muscle Weakness (M62.81)  · Other abnormalities of gait and mobility (R26.89)   Precaution/Allergies:  Pcn [penicillins]      ASSESSMENT:     Mr. Mimi Graham presents in supine without complaints and is agreeable to therapy treatment. No complaints. Performs bed mobility independently. Navigates into bathroom with mod I and performs toileting, standing ADLs independently. Takes a seated rest break, then ambulates in hallway with a few standing rest breaks. Returned to room and up to chair afterwards with needs in reach. O2 sats 95% on room air. Performs seated exercises with great participation. Will continue with therapy efforts.       This section established at most recent assessment   PROBLEM LIST (Impairments causing functional limitations):  1. Decreased Strength  2. Decreased ADL/Functional Activities  3. Decreased Transfer Abilities  4. Decreased Ambulation Ability/Technique  5. Decreased Balance  6. Decreased Activity Tolerance  7. Increased Shortness of Breath   INTERVENTIONS PLANNED: (Benefits and precautions of physical therapy have been discussed with the patient.)  1. Balance Exercise  2. Bed Mobility  3. Gait Training  4. Home Exercise Program (HEP)  5. Therapeutic Activites  6. Therapeutic Exercise/Strengthening  7. Transfer Training     TREATMENT PLAN: Frequency/Duration: 3 times a week for duration of hospital stay  Rehabilitation Potential For Stated Goals: Good     RECOMMENDED REHABILITATION/EQUIPMENT: (at time of discharge pending progress): Due to the probability of continued deficits (see above) this patient will likely need continued skilled physical therapy after discharge. Equipment:    None at this time              HISTORY:   History of Present Injury/Illness (Reason for Referral):  Per H&P, \"Mr. Marily Shannon is a 75 yo male who presented with worsening LL swelling on a background of COPD (4L O2 at home), CHF, CAD and HTN. He reports worsening LL swelling for the past 2 weeks. He reports dyspnea on rest and exertion. He sleeps on 2 pillows. Of note, he was recently hospitalized from 6/6 to 6/8 with acute CHF exacerbation. He was started on IV lasix and was discharged at that time on lasix PO BID. At baseline, he lives at home with his wife. He reports that he missed only 1 dose of his home lasix\"    Past Medical History/Comorbidities:   Mr. Marily Shannon  has a past medical history of Acute CHF (congestive heart failure) ([facility] [address] (4/25/2015); Acute combined systolic and diastolic congestive heart failure ([facility] [address] (4/28/2015); Chronic obstructive pulmonary disease ([facility] [address] De Quervain's tenosynovitis, right (4/28/2015);  Dyspnea on exertion (6/28/2015); Elevated serum creatinine (4/25/2015); Elevated troponin (6/28/2015); History of coronary artery disease; History of right knee surgery; History of shingles; Malignant hypertension (4/25/2015); and MI (myocardial infarction) ([facility] [address] Mr. Andree Adams  has a past surgical history that includes hx knee arthroscopy (Right) and hx heart catheterization (6/29/2015). Social History/Living Environment:   Home Environment: Private residence  # Steps to Enter: 4  Rails to Enter: Yes  Hand Rails : Bilateral  Wheelchair Ramp: No  One/Two Story Residence: One story  Living Alone: No  Support Systems: Spouse/Significant Other/Partner, Family member(s)  Patient Expects to be Discharged to[de-identified] Private residence  Current DME Used/Available at Home: Elwanda Neisha, rollator  Tub or Shower Type: Shower  Prior Level of Function/Work/Activity:  Darcus Nim. lives with wife in single story home with 4 steps. Independent to mod I with occasional rollator use. Occasional O2 (rare). Pt drives and denies falls. Wife can help with ADLs if needed. Number of Personal Factors/Comorbidities that affect the Plan of Care: 1-2: MODERATE COMPLEXITY   EXAMINATION:   Most Recent Physical Functioning:   Gross Assessment:  AROM: Within functional limits  Strength: Generally decreased, functional  Coordination: Within functional limits               Posture:  Posture (WDL): Exceptions to WDL  Posture Assessment: Forward head, Rounded shoulders, Trunk flexion  Balance:  Sitting: Intact  Standing: Impaired  Standing - Static: Good  Standing - Dynamic : Fair (+) Bed Mobility:  Rolling: Independent  Supine to Sit: Independent  Scooting: Independent  Wheelchair Mobility:     Transfers:  Sit to Stand: Supervision  Stand to Sit: Supervision  Bed to Chair: Supervision;Stand-by assistance  Interventions: Verbal cues; Safety awareness training  Duration: 15 Minutes  Gait:     Base of Support: Center of gravity altered  Speed/Lu: Pace decreased (<100 feet/min)  Step Length: Left shortened;Right shortened  Gait Abnormalities: Trunk sway increased  Distance (ft): 250 Feet (ft)  Assistive Device: Walker, rolling  Ambulation - Level of Assistance: Stand-by assistance  Interventions: Verbal cues; Visual/Demos; Safety awareness training; Tactile cues      Body Structures Involved:  1. Heart  2. Muscles Body Functions Affected:  1. Movement Related Activities and Participation Affected:  1. General Tasks and Demands  2. Mobility  3. Self Care  4. Domestic Life  5. Community, Social and [place]   Number of elements that affect the Plan of Care: 4+: HIGH COMPLEXITY   CLINICAL PRESENTATION:   Presentation: Stable and uncomplicated: LOW COMPLEXITY   CLINICAL DECISION MAKIN [facility] Mobility Inpatient Short Form  How much difficulty does the patient currently have. .. Unable A Lot A Little None   1. Turning over in bed (including adjusting bedclothes, sheets and blankets)? [] 1   [] 2   [] 3   [x] 4   2. Sitting down on and standing up from a chair with arms ( e.g., wheelchair, bedside commode, etc.)   [] 1   [] 2   [] 3   [x] 4   3. Moving from lying on back to sitting on the side of the bed? [] 1   [] 2   [] 3   [x] 4   How much help from another person does the patient currently need. .. Total A Lot A Little None   4. Moving to and from a bed to a chair (including a wheelchair)? [] 1   [] 2   [x] 3   [] 4   5. Need to walk in hospital room? [] 1   [] 2   [x] 3   [] 4   6. Climbing 3-5 steps with a railing? [] 1   [] 2   [x] 3   [] 4   © , Trustees of [address] [place] [address] [address] [address], under license to [employer]. All rights reserved      Score:  Initial: 21 Most Recent: X (Date: -- )    Interpretation of Tool:  Represents activities that are increasingly more difficult (i.e. Bed mobility, Transfers, Gait). Score 24 23 22-20 19-15 14-10 9-7 6     Modifier CH CI CJ CK CL CM CN      ?  Mobility - Walking and Moving Around:     - CURRENT STATUS: CJ - 20%-39% impaired, limited or restricted    - GOAL STATUS: CI - 1%-19% impaired, limited or restricted    - D/C STATUS:  ---------------To be determined---------------  Payor: Rita Das / Plan: [facility] [email or web address] / Product Type: [employer] Medicare /      Medical Necessity:     · Patient demonstrates good rehab potential due to higher previous functional level. Reason for Services/Other Comments:  · Patient continues to demonstrate capacity to improve strength, mobility, balance, activity tolerance which will increase independence, decrease amount of assistance required from caregiver and increase safety. Use of outcome tool(s) and clinical judgement create a POC that gives a: Clear prediction of patient's progress: LOW COMPLEXITY            TREATMENT:   (In addition to Assessment/Re-Assessment sessions the following treatments were rendered)   Pre-treatment Symptoms/Complaints:  \"thank you\"  Pain: Initial:   Pain Intensity 1: 0  Post Session:  0/10     Therapeutic Activity: (  15 Minutes ):  Therapeutic activities including Bed mobility, sit-stand transfers, standing balance, toilet transfers, standing ADLs, Ambulation on level ground to improve mobility, strength, balance and activity tolerance. Required stand by assist to close supervision with Verbal cues; Visual/Demos; Safety awareness training; Tactile cues to promote static and dynamic balance in standing with use of the walker. Therapeutic Exercise: (10 Minutes):  Exercises per grid below to improve strength and balance. Required minimal visual and verbal cues to promote proper body posture, promote proper body mechanics and exercise form. Progressed complexity of movement as indicated.        Date:  7/16/18 Date:  7/18/18 Date:     Activity/Exercise Seated Parameters Parameters Parameters   Heel raises X 20 B 20x AB    Toe raises X 20 B 20x AB    LAQ's X 15 B 15x AB    Hip Flex X 15 B 15x AB    Hip ABD X 15 B 15x AB A=active, B=bilateral    Braces/Orthotics/Lines/Etc:   · O2 Device: Room air  Treatment/Session Assessment:    · Response to Treatment: good progress  · Interdisciplinary Collaboration:   o Physical Therapist  o Registered Nurse  · After treatment position/precautions:   o Up in chair  o Bed/Chair-wheels locked  o Bed in low position  o Call light within reach  o RN notified   · Compliance with Program/Exercises: compliant  · Recommendations/Intent for next treatment session: \"Next visit will focus on increasing gait distance\".   Total Treatment Duration:  PT Patient Time In/Time Out  Time In: 1005  Time Out: [address], DPT

## 2018-07-18 NOTE — PROGRESS NOTES
OT NOTE:  Attempted to see patient this afternoon for OT. Wife at bedside and attempting to encourage patient to participate in bathing task today. Pt's wife left and educated pt that ADL activity would be good for him today. Pt continues to refuse ADL. Respiratory therapy entered and would like to get O2 sat while pt moving. Attempted to encourage patient to get out of bed to see how pt tolerated activity on room air, however pt became increasingly agitated and appears angry. Pt refusing activity out of bed this afternoon. Pt reports that he is very upset. I sincerely apologized to patient for being upset and asked if there was anything I could get him. Finally pt states, \"It's not you! \" Left pt in supine with all needs at bedside. Will re-attempt tomorrow if agreeable.   Thank you,  Kelli Irwin, OT

## 2018-07-18 NOTE — PROGRESS NOTES
Spoke with pts wife. Verbalized that pt is currently in the hospital. They are working on having pt go to rehab before discharging home. Pt was denied on first application and wife verbalized that they appealed. Nurse will continue to monitor and provide care as needed.

## 2018-07-18 NOTE — PROGRESS NOTES
Problem: Nutrition Deficit  Goal: *Optimize nutritional status  Nutrition LOS Note:   Assessment  Diet order(s): CCHO, 2 gram Na with 2000 ml fluid restriction  Food,Nutrition, and Pertinent History: Pt says he never has a problem with his appetite and he can eat like a pig. However he says he listed supper last night because he was asleep but he ate 100% of breakfast and lunch today. Macronutrient Needs:  EER:  [id number] kcal /day (15-18 kcal/kg Actual BW)  EPR:  58-87 grams protein/day (0.8-1.2 grams/kg IBW)  Intake/Comparative Standards:  Average intake for past 5 day(s)/12 recorded meal(s): 75%. This potentially meets ~96% of kcal and ~100% of protein needs    Intervention:   Meals and snacks: Continue current diet. Discharge nutrition plan: Continue current diet. Pt and wife have received education.     Nimo Hernandez, [address] [place] [address], [facility] [address]

## 2018-07-19 LAB
ANION GAP SERPL CALC-SCNC: 10 MMOL/L (ref 7–16)
BUN SERPL-MCNC: 25 MG/DL (ref 8–23)
CALCIUM SERPL-MCNC: 8.4 MG/DL (ref 8.3–10.4)
CHLORIDE SERPL-SCNC: 104 MMOL/L (ref 98–107)
CO2 SERPL-SCNC: 29 MMOL/L (ref 21–32)
CREAT SERPL-MCNC: 1.54 MG/DL (ref 0.8–1.5)
GLUCOSE BLD STRIP.AUTO-MCNC: 101 MG/DL (ref 65–100)
GLUCOSE BLD STRIP.AUTO-MCNC: 104 MG/DL (ref 65–100)
GLUCOSE BLD STRIP.AUTO-MCNC: 94 MG/DL (ref 65–100)
GLUCOSE BLD STRIP.AUTO-MCNC: 99 MG/DL (ref 65–100)
GLUCOSE SERPL-MCNC: 88 MG/DL (ref 65–100)
POTASSIUM SERPL-SCNC: 4 MMOL/L (ref 3.5–5.1)
SODIUM SERPL-SCNC: 143 MMOL/L (ref 136–145)

## 2018-07-19 PROCEDURE — 97530 THERAPEUTIC ACTIVITIES: CPT

## 2018-07-19 PROCEDURE — 74011250637 HC RX REV CODE- 250/637: Performed by: INTERNAL MEDICINE

## 2018-07-19 PROCEDURE — 74011250636 HC RX REV CODE- 250/636: Performed by: INTERNAL MEDICINE

## 2018-07-19 PROCEDURE — 74011250637 HC RX REV CODE- 250/637: Performed by: NURSE PRACTITIONER

## 2018-07-19 PROCEDURE — 82962 GLUCOSE BLOOD TEST: CPT

## 2018-07-19 PROCEDURE — 74011250637 HC RX REV CODE- 250/637: Performed by: HOSPITALIST

## 2018-07-19 PROCEDURE — 80048 BASIC METABOLIC PNL TOTAL CA: CPT | Performed by: NURSE PRACTITIONER

## 2018-07-19 PROCEDURE — 97535 SELF CARE MNGMENT TRAINING: CPT

## 2018-07-19 PROCEDURE — 65270000029 HC RM PRIVATE

## 2018-07-19 PROCEDURE — 97110 THERAPEUTIC EXERCISES: CPT

## 2018-07-19 PROCEDURE — 36415 COLL VENOUS BLD VENIPUNCTURE: CPT | Performed by: NURSE PRACTITIONER

## 2018-07-19 RX ADMIN — Medication 10 ML: at 05:39

## 2018-07-19 RX ADMIN — CARVEDILOL 3.12 MG: 3.12 TABLET, FILM COATED ORAL at 16:51

## 2018-07-19 RX ADMIN — HEPARIN SODIUM 5000 UNITS: 5000 INJECTION, SOLUTION INTRAVENOUS; SUBCUTANEOUS at 23:36

## 2018-07-19 RX ADMIN — CARVEDILOL 3.12 MG: 3.12 TABLET, FILM COATED ORAL at 08:57

## 2018-07-19 RX ADMIN — BUMETANIDE 2 MG: 1 TABLET ORAL at 17:44

## 2018-07-19 RX ADMIN — ATORVASTATIN CALCIUM 20 MG: 10 TABLET, FILM COATED ORAL at 23:36

## 2018-07-19 RX ADMIN — Medication 10 ML: at 14:06

## 2018-07-19 RX ADMIN — Medication 10 ML: at 23:36

## 2018-07-19 RX ADMIN — BUMETANIDE 2 MG: 1 TABLET ORAL at 08:56

## 2018-07-19 RX ADMIN — SPIRONOLACTONE 25 MG: 25 TABLET, FILM COATED ORAL at 08:56

## 2018-07-19 RX ADMIN — MAGNESIUM OXIDE TAB 400 MG (241.3 MG ELEMENTAL MG) 400 MG: 400 (241.3 MG) TAB at 08:56

## 2018-07-19 RX ADMIN — HEPARIN SODIUM 5000 UNITS: 5000 INJECTION, SOLUTION INTRAVENOUS; SUBCUTANEOUS at 08:56

## 2018-07-19 RX ADMIN — ASPIRIN 81 MG: 81 TABLET, COATED ORAL at 08:57

## 2018-07-19 NOTE — PROGRESS NOTES
Hospitalist Progress Note Admit Date:  7/10/2018  3:32 PM  
Name:  Ria Borden. Age:  74 y.o. 
:  1945 MRN:  [id number] PCP:  Konstantin Cantu MD 
Treatment Team: Attending Provider: Ryan Michelle MD; Care Manager: Mendel Wang LMSW; Utilization Review: Milton Domingo RN; Charge Nurse: Ula Goodpasture, RN Subjective:  
CC:lower extremity edema and SOB Pt is a 75 yo male with PMH COPD, CHF, CAD, HTN. Pt on oxygen at home. Pt presented to the ER with worsening LE edema, SOB. Pt with recent admission for CHF exacerbation. An echo showed EF 45%, cardiology consulted and pt started on bumex. Pt's creatinine catina from 1.45 on admit to 1.63 on , today down to 1.54. Pt observed up in chair, no oxygen on and resp unlabored. Pt verbalizes that at home he gets SOB \"just sitting still\". Very mild pedal edema noted, non pitting. Pt denies chest pain, N/V/D. Per therapy pt was saturating at 96% even with ambulation. Awaiting authorization for pt to go to [facility]. Anticipate d/c Friday or Saturday. Objective:  
Patient Vitals for the past 24 hrs: 
 Temp Pulse Resp BP SpO2  
18 1410 97.7 °F (36.5 °C) 70 18 102/66 95 % 18 1136 - - - - 97 % 18 1113 97.6 °F (36.4 °C) 69 18 99/65 100 % 18 0723 97.4 °F (36.3 °C) 68 18 107/71 98 % 18 0438 97.6 °F (36.4 °C) 66 18 101/65 100 % 18 2243 - 74 18 119/71 100 % 180 - - - - 98 % 18 97.6 °F (36.4 °C) 74 18 (!) 82/47 98 % Oxygen Therapy O2 Sat (%): 95 % (18 1410) Pulse via Oximetry: 90 beats per minute (07/18/18 2150) O2 Device: Room air (18 1410) O2 Flow Rate (L/min): 2 l/min (18 1401) FIO2 (%): 21 % (18 1432) No intake or output data in the 24 hours ending 18 1619 General:    Well nourished. Awake and alert. Head:  Normocephalic, atraumatic Eyes:  Extraocular movements intact, normal sclera CV:   RRR.   No  Murmurs, clicks, or gallops Lungs:   Unlabored, no cyanosis Abdomen:   Soft, nondistended, nontender. Extremities: Warm and dry. No cyanosis, 1+ non-pitting edema. Skin:     No rashes or jaundice. Neuro:  No gross focal deficits Psych:  Mood and affect appropriate Data Review: 
I have reviewed all labs, meds, telemetry events, and studies from the last 24 hours. Recent Results (from the past 24 hour(s)) GLUCOSE, POC Collection Time: 07/18/18  8:43 PM  
Result Value Ref Range Glucose (POC) 138 (H) 65 - 100 mg/dL METABOLIC PANEL, BASIC Collection Time: 07/19/18  4:55 AM  
Result Value Ref Range Sodium 143 136 - 145 mmol/L Potassium 4.0 3.5 - 5.1 mmol/L Chloride 104 98 - 107 mmol/L  
 CO2 29 21 - 32 mmol/L Anion gap 10 7 - 16 mmol/L Glucose 88 65 - 100 mg/dL BUN 25 (H) 8 - 23 MG/DL Creatinine 1.54 (H) 0.8 - 1.5 MG/DL  
 GFR est AA 57 (L) >60 ml/min/1.73m2 GFR est non-AA 47 (L) >60 ml/min/1.73m2 Calcium 8.4 8.3 - 10.4 MG/DL  
GLUCOSE, POC Collection Time: 07/19/18  7:17 AM  
Result Value Ref Range Glucose (POC) 99 65 - 100 mg/dL GLUCOSE, POC Collection Time: 07/19/18 11:11 AM  
Result Value Ref Range Glucose (POC) 101 (H) 65 - 100 mg/dL All Micro Results None Current Meds: 
Current Facility-Administered Medications Medication Dose Route Frequency  acetaminophen (TYLENOL) tablet 650 mg  650 mg Oral Q4H PRN  
 magnesium oxide (MAG-OX) tablet 400 mg  400 mg Oral DAILY  bumetanide (BUMEX) tablet 2 mg  2 mg Oral BID  spironolactone (ALDACTONE) tablet 25 mg  25 mg Oral DAILY  insulin lispro (HUMALOG) injection   SubCUTAneous AC&HS  carvedilol (COREG) tablet 3.125 mg  3.125 mg Oral BID WITH MEALS  
 aspirin delayed-release tablet 81 mg  81 mg Oral DAILY  albuterol-ipratropium (DUO-NEB) 2.5 MG-0.5 MG/3 ML  3 mL Nebulization Q4H PRN  
 lisinopril (PRINIVIL, ZESTRIL) tablet 5 mg  5 mg Oral DAILY  atorvastatin (LIPITOR) tablet 20 mg 20 mg Oral QHS  sodium chloride (NS) flush 5-10 mL  5-10 mL IntraVENous Q8H  
 sodium chloride (NS) flush 5-10 mL  5-10 mL IntraVENous PRN  
 heparin (porcine) injection 5,000 Units  5,000 Units SubCUTAneous Q12H Diet: DIET DIABETIC CONSISTENT CARB Other Studies (last 24 hours): No results found. Assessment and Plan:  
 
Hospital Problems as of 7/19/2018  Date Reviewed: 6/6/2018 Codes Class Noted - Resolved POA * (Principal)Acute respiratory failure with hypoxemia (HCC) ICD-10-CM: J96.01 
ICD-9-CM: 518.81  5/24/2018 - Present Yes  
   
 TAZ (obstructive sleep apnea) ICD-10-CM: N59.41 
ICD-9-CM: 327.23  10/2/2017 - Present Yes Hypertension (Chronic) ICD-10-CM: I10 
ICD-9-CM: 401.9  9/29/2017 - Present Yes COPD, moderate ([address] (Chronic) ICD-10-CM: J44.9 ICD-9-CM: 482  9/29/2017 - Present Yes Overview Signed 12/27/2015 12:38 PM by Amandeep Blunt NP Complete PFTs: 7/20/15: 
Spirometry is consistent with a moderate obstructive/restrictive defect. . The residual volume is increased relative to other lung volumes suggesting air-trapping. The diffusion capacity corrected for alveolar volume was normal suggesting no loss of alveolo-capillary units. High triglycerides (Chronic) ICD-10-CM: E78.1 ICD-9-CM: 272.1  9/29/2017 - Present Yes  
   
 CHF (congestive heart failure) (HCC) ICD-10-CM: I50.9 ICD-9-CM: 428.0  9/27/2017 - Present Yes Essential hypertension ICD-10-CM: I10 
ICD-9-CM: 401.9  9/28/2016 - Present Yes A/P:   
1. Acute on chronic combined systolic and diastolic heart failure exacerbation. 
-Cardiology consulted. Signed off 7/18.  
- Continue Bumex, aldactone, coreg, lisinopril.  
-monitor UOP 
-Daily weights 
-Monitor BMP 
  
2. Acute hypoxemic respiratory failure with hypoxemia - improving 
-Supplemental oxygen prn 3. Chronic COPD, no acute exacerbation. 
-Supplemental oxygen prn 
  
4. CKD stage III 
-BMP in am 
  5. Dyslipidemia 
-Continue atorvastatin 
  
6. Coronary artery disease 
-continue ASA,  lisinopril 
  
7. DM type 2 - A1C is 7. 
-Diabetic diet 
-SSI- will need to transition to Metformin at discharge 
-Diabetic education DC planning/Dispo:  STR at [facility] DVT ppx:  SQ heparin Case reviewed with supervising physician - JACQUELINE Sahrma MD 
 
Signed: 
Marylen Marseille, NP-C

## 2018-07-19 NOTE — ROUTINE PROCESS
CHF teaching completed, verbalize emphasis on monitoring self and report to MD:   If you gain 2 lbs in one day or 5 lbs in a week, and short of breath.  If you cannot lay flat without developing short of breath or rapid breathing at night; or if it wakes you up. Develop a cough or wheezing.  If you notice swollen hands/feet/ankles or stomach with a bloated/ full feeling.  If you become confused or mentally fuzzy or dizzy.  If you notice a rapid or change in your heart rate.  If you become more exhausted all the time and unable to do the same level of activity without stopping to catch your breath. Drink no more than 8 cups a day in 8 oz. cups. Limit Cola Drinks. Your Heart can not handle any more. Stay away from salt (limit anything with salt or sodium in it). Limit to 250mg per serving. Exercise needs to be started with your Doctors approval.  Reduce stress, call your Doctor or myself if concerned  Pass posttest via teach back; will make self-available post DC, if an questions arise. Diabetic teaching completed. Planner/scale @ BS:  60 mins total    Back to SNF?

## 2018-07-19 NOTE — PROGRESS NOTES
Problem: Mobility Impaired (Adult and Pediatric)  Goal: *Acute Goals and Plan of Care (Insert Text)  LTG:  (1.)Mr. Valdez Drake will move from supine to sit and sit to supine , scoot up and down and roll side to side INDEPENDENTLY with bed flat within 7 treatment day(s). (2.)Mr. Valdez Drake will transfer from bed to chair and chair to bed INDEPENDENTLY within 7 treatment day(s). (3.)Mr. Valdez Drake will ambulate with MODIFIED INDEPENDENCE for 250 feet with the least restrictive device within 7 treatment day(s). (4.)Mr. Valdez Drake will ascend and descend 4 steps with SUPERVISION using handrail(s) within 7 days. (5.)Mr. Valdez Drake will perform exercises per HEP to improve strength and mobility within 7 days. ________________________________________________________________________________________________    PHYSICAL THERAPY: Daily Note, Treatment Day: 6th, AM 7/19/2018  INPATIENT: Hospital Day: 10  Payor: Katty Singh / Plan: [address] [place] [address] HMO / Product Type: [employer] Medicare /      NAME/AGE/GENDER: Yash Childs is a 74 y.o. male   PRIMARY DIAGNOSIS: CHF (congestive heart failure) ([facility]  CHF (congestive heart failure) ([facility] Acute respiratory failure with hypoxemia ([facility] Acute respiratory failure with hypoxemia ([facility]        ICD-10: Treatment Diagnosis:    · Generalized Muscle Weakness (M62.81)  · Other abnormalities of gait and mobility (R26.89)   Precaution/Allergies:  Pcn [penicillins]      ASSESSMENT:     Mr. Valdez Drake demonstrates good progress today with gait safety and was able to incorporate standing exercises for strength and balance. Did continue to need rest breaks during ambulation due to fatigue however rest breaks taken in standing. Performs mobility in room with supervision. O2 sats 96% after ambulation on room air. Overall good progress noted; pt is awaiting rehab vs  PT. This section established at most recent assessment   PROBLEM LIST (Impairments causing functional limitations):  1.  Decreased Strength  2. Decreased ADL/Functional Activities  3. Decreased Transfer Abilities  4. Decreased Ambulation Ability/Technique  5. Decreased Balance  6. Decreased Activity Tolerance  7. Increased Shortness of Breath   INTERVENTIONS PLANNED: (Benefits and precautions of physical therapy have been discussed with the patient.)  1. Balance Exercise  2. Bed Mobility  3. Gait Training  4. Home Exercise Program (HEP)  5. Therapeutic Activites  6. Therapeutic Exercise/Strengthening  7. Transfer Training     TREATMENT PLAN: Frequency/Duration: 3 times a week for duration of hospital stay  Rehabilitation Potential For Stated Goals: Good     RECOMMENDED REHABILITATION/EQUIPMENT: (at time of discharge pending progress): Due to the probability of continued deficits (see above) this patient will likely need continued skilled physical therapy after discharge. Equipment:    None at this time              HISTORY:   History of Present Injury/Illness (Reason for Referral):  Per H&P, \"Mr. Eva Freire is a 73 yo male who presented with worsening LL swelling on a background of COPD (4L O2 at home), CHF, CAD and HTN. He reports worsening LL swelling for the past 2 weeks. He reports dyspnea on rest and exertion. He sleeps on 2 pillows. Of note, he was recently hospitalized from 6/6 to 6/8 with acute CHF exacerbation. He was started on IV lasix and was discharged at that time on lasix PO BID. At baseline, he lives at home with his wife. He reports that he missed only 1 dose of his home lasix\"    Past Medical History/Comorbidities:   Mr. Eva Freire  has a past medical history of Acute CHF (congestive heart failure) ([facility] (4/25/2015); Acute combined systolic and diastolic congestive heart failure ([facility] (4/28/2015); Chronic obstructive pulmonary disease ([facility] De Quervain's tenosynovitis, right (4/28/2015); Dyspnea on exertion (6/28/2015); Elevated serum creatinine (4/25/2015); Elevated troponin (6/28/2015); History of coronary artery disease;  History of right knee surgery; History of shingles; Malignant hypertension (4/25/2015); and MI (myocardial infarction) ([facility] Mr. Darryl Tena  has a past surgical history that includes hx knee arthroscopy (Right) and hx heart catheterization (6/29/2015). Social History/Living Environment:   Home Environment: Private residence  # Steps to Enter: 4  Rails to Enter: Yes  Hand Rails : Bilateral  Wheelchair Ramp: No  One/Two Story Residence: One story  Living Alone: No  Support Systems: Spouse/Significant Other/Partner, Family member(s)  Patient Expects to be Discharged to[de-identified] Private residence  Current DME Used/Available at Home: Natasha Severe, rollator  Tub or Shower Type: Shower  Prior Level of Function/Work/Activity:  Pearl Da Silva. lives with wife in single story home with 4 steps. Independent to mod I with occasional rollator use. Occasional O2 (rare). Pt drives and denies falls. Wife can help with ADLs if needed. Number of Personal Factors/Comorbidities that affect the Plan of Care: 1-2: MODERATE COMPLEXITY   EXAMINATION:   Most Recent Physical Functioning:   Gross Assessment:  AROM: Within functional limits  Strength: Generally decreased, functional  Coordination: Within functional limits               Posture:  Posture (WDL): Exceptions to WDL  Posture Assessment: Forward head, Rounded shoulders, Trunk flexion  Balance:  Sitting: Intact  Standing: Impaired  Standing - Static: Good  Standing - Dynamic : Fair Bed Mobility:  Rolling: Independent  Supine to Sit: Independent  Sit to Supine: Independent  Scooting: Independent  Wheelchair Mobility:     Transfers:  Sit to Stand: Supervision  Stand to Sit: Supervision  Bed to Chair: Supervision  Interventions: Verbal cues; Safety awareness training  Duration: 17 Minutes  Gait:     Base of Support: Center of gravity altered  Speed/Lu: Pace decreased (<100 feet/min); Fluctuations  Step Length: Left shortened;Right shortened  Gait Abnormalities: Trunk sway increased  Distance (ft): 250 Feet (ft)  Assistive Device: Walker, rolling  Ambulation - Level of Assistance: Stand-by assistance  Interventions: Verbal cues; Safety awareness training      Body Structures Involved:  1. Heart  2. Muscles Body Functions Affected:  1. Movement Related Activities and Participation Affected:  1. General Tasks and Demands  2. Mobility  3. Self Care  4. Domestic Life  5. Community, Social and [place]   Number of elements that affect the Plan of Care: 4+: HIGH COMPLEXITY   CLINICAL PRESENTATION:   Presentation: Stable and uncomplicated: LOW COMPLEXITY   CLINICAL DECISION MAKIN [facility] Inpatient Short Form  How much difficulty does the patient currently have. .. Unable A Lot A Little None   1. Turning over in bed (including adjusting bedclothes, sheets and blankets)? [] 1   [] 2   [] 3   [x] 4   2. Sitting down on and standing up from a chair with arms ( e.g., wheelchair, bedside commode, etc.)   [] 1   [] 2   [] 3   [x] 4   3. Moving from lying on back to sitting on the side of the bed? [] 1   [] 2   [] 3   [x] 4   How much help from another person does the patient currently need. .. Total A Lot A Little None   4. Moving to and from a bed to a chair (including a wheelchair)? [] 1   [] 2   [x] 3   [] 4   5. Need to walk in hospital room? [] 1   [] 2   [x] 3   [] 4   6. Climbing 3-5 steps with a railing? [] 1   [] 2   [x] 3   [] 4   © , Trustees of [address] [place] [address] [address] [id number], under license to [employer]. All rights reserved      Score:  Initial: 21 Most Recent: X (Date: -- )    Interpretation of Tool:  Represents activities that are increasingly more difficult (i.e. Bed mobility, Transfers, Gait). Score 24 23 22-20 19-15 14-10 9-7 6     Modifier CH CI CJ CK CL CM CN      ?  Mobility - Walking and Moving Around:     - CURRENT STATUS: CJ - 20%-39% impaired, limited or restricted    - GOAL STATUS: CI - 1%-19% impaired, limited or restricted    - D/C STATUS:  ---------------To be determined---------------  Payor: Najma Alvarado / Plan: [address] [place] [address] HMO / Product Type: Managed Care Medicare /      Medical Necessity:     · Patient demonstrates good rehab potential due to higher previous functional level. Reason for Services/Other Comments:  · Patient continues to demonstrate capacity to improve strength, mobility, balance, activity tolerance which will increase independence, decrease amount of assistance required from caregiver and increase safety. Use of outcome tool(s) and clinical judgement create a POC that gives a: Clear prediction of patient's progress: LOW COMPLEXITY            TREATMENT:   (In addition to Assessment/Re-Assessment sessions the following treatments were rendered)   Pre-treatment Symptoms/Complaints:  \"thank you\"  Pain: Initial:   Pain Intensity 1: 0  Post Session:  0/10     Therapeutic Activity: (  17 Minutes ):  Therapeutic activities including Bed mobility, sit-stand transfers, standing balance, toilet transfers, standing ADLs, Ambulation on level ground to improve mobility, strength, balance and activity tolerance. Required  supervision with Verbal cues; Safety awareness training to promote static and dynamic balance in standing with use of the walker correctly and for safety with standing rest breaks. Therapeutic Exercise: (11 Minutes):  Exercises per grid below to improve strength and balance. Required minimal visual and verbal cues to promote proper body posture, promote proper body mechanics and exercise form.   Progressed complexity of movement as indicated (performed standing exercises today to increase difficulty and work on balance as well)       Date:  7/16/18 Date:  7/18/18 Date:  7/19/18   Activity/Exercise Seated Parameters Parameters Parameters   Heel raises X 20 B 20x AB    Toe raises X 20 B 20x AB    LAQ's X 15 B 15x AB    Hip Flex X 15 B 15x AB    Hip ABD X 15 B 15x AB    Standing heel raises   10x AB Standing toe raises   10x AB   Mini squats   10x   Standing hip aBd   10x AB   A=active, B=bilateral    Braces/Orthotics/Lines/Etc:   · O2 Device: Room air  Treatment/Session Assessment:    · Response to Treatment: doing well, no complications  · Interdisciplinary Collaboration:   o Physical Therapist  o Registered Nurse  · After treatment position/precautions:   o Up in chair  o Bed/Chair-wheels locked  o Bed in low position  o Call light within reach  o RN notified   · Compliance with Program/Exercises: compliant  · Recommendations/Intent for next treatment session: \"Next visit will focus on increasing gait distance,working on balance, activity tolerance\".   Total Treatment Duration:  PT Patient Time In/Time Out  Time In: 1108  Time Out: 3100 [facility] ROBIN Ruiz

## 2018-07-19 NOTE — PROGRESS NOTES
Problem: Self Care Deficits Care Plan (Adult)  Goal: *Acute Goals and Plan of Care (Insert Text)  1. Patient will complete lower body bathing and dressing with modified independence and adaptive equipment as needed. 2. Patient will complete toileting with modified independence. 3. Patient will tolerate 30 minutes of OT treatment with 2-3 rest breaks to increase activity tolerance for ADLs. 4. Patient will complete functional transfers with modified independence and adaptive equipment as needed. 5. Patient will verbalize with independence 5 ways to complete energy conservation with ADL. Timeframe: 7 visits       OCCUPATIONAL THERAPY: Daily Note 7/19/2018  INPATIENT: Hospital Day: 10  Payor: Orestes Iqbal / Plan: [address] [place] [address] HMO / Product Type: Managed Care Medicare /      NAME/AGE/GENDER: Paula Shetty is a 74 y.o. male   PRIMARY DIAGNOSIS:  CHF (congestive heart failure) ([address]  CHF (congestive heart failure) ([address] Acute respiratory failure with hypoxemia ([address] Acute respiratory failure with hypoxemia ([address]        ICD-10: Treatment Diagnosis:    · Generalized Muscle Weakness (M62.81)  · Other lack of cordination (R27.8)   Precautions/Allergies:     Pcn [penicillins]      ASSESSMENT:     Mr. Adryan Turner was in bed and agreeable to tx. Pt used the restroom with S without a device. Pt then walked 150' with RW with SBA with one sitting rest break on RA with saturation remaining above 90 when spot checked. Attempted education on when to use O2 and when not to, but pt did not seem to comprehend. This section established at most recent assessment   PROBLEM LIST (Impairments causing functional limitations):  1. Decreased ADL/Functional Activities  2. Decreased Transfer Abilities  3. Decreased Ambulation Ability/Technique  4. Decreased Balance  5. Increased Pain  6. Decreased Activity Tolerance  7. Decreased Pacing Skills  8. Decreased Work Simplification/Energy Conservation Techniques  9.  Increased Fatigue  10. Increased Shortness of Breath  11. Decreased Flexibility/Joint Mobility  12. Edema/Girth   INTERVENTIONS PLANNED: (Benefits and precautions of occupational therapy have been discussed with the patient.)  1. Activities of daily living training  2. Adaptive equipment training  3. Balance training  4. Clothing management  5. Donning&doffing training  6. Neuromuscular re-eduation  7. Therapeutic activity  8. Therapeutic exercise     TREATMENT PLAN: Frequency/Duration: Follow patient 3 times per week to address above goals. Rehabilitation Potential For Stated Goals: Excellent     RECOMMENDED REHABILITATION/EQUIPMENT: (at time of discharge pending progress): Due to the probability of continued deficits (see above) this patient will not likely need continued skilled occupational therapy after discharge. (dependent on progress-TBD)  Equipment:    TBD              OCCUPATIONAL PROFILE AND HISTORY:   History of Present Injury/Illness (Reason for Referral):  See H&P  Past Medical History/Comorbidities:   Mr. Eva Freire  has a past medical history of Acute CHF (congestive heart failure) [facility] (4/25/2015); Acute combined systolic and diastolic congestive heart failure ([facility] (4/28/2015); Chronic obstructive pulmonary disease ([facility] De Quervain's tenosynovitis, right (4/28/2015); Dyspnea on exertion (6/28/2015); Elevated serum creatinine (4/25/2015); Elevated troponin (6/28/2015); History of coronary artery disease; History of right knee surgery; History of shingles; Malignant hypertension (4/25/2015); and MI (myocardial infarction) ([facility] Mr. Eva Freire  has a past surgical history that includes hx knee arthroscopy (Right) and hx heart catheterization (6/29/2015).   Social History/Living Environment:   Home Environment: Private residence  # Steps to Enter: 4  Rails to Enter: Yes  Hand Rails : Bilateral  Wheelchair Ramp: No  One/Two Story Residence: One story  Living Alone: No  Support Systems: Spouse/Significant Other/Partner, Family member(s)  Patient Expects to be Discharged to[de-identified] Private residence  Current DME Used/Available at Home: [employer], rollator  Tub or Shower Type: Shower  Prior Level of Function/Work/Activity:  Pt lives with wife and independent with ADL. Pt reports he uses a cane occasionally. Personal Factors: Other factors that influence how disability is experienced by the patient:  Multiple co-morbidities- see above   Number of Personal Factors/Comorbidities that affect the Plan of Care: Expanded review of therapy/medical records (1-2):  MODERATE COMPLEXITY   ASSESSMENT OF OCCUPATIONAL PERFORMANCE[de-identified]   Activities of Daily Living:   Basic ADLs (From Assessment) Complex ADLs (From Assessment)   Feeding: Setup  Oral Facial Hygiene/Grooming: Stand-by assistance  Bathing: Minimum assistance  Upper Body Dressing: Stand-by assistance  Lower Body Dressing: Moderate assistance  Toileting: Minimum assistance Instrumental ADL  Homemaking: Maximum assistance   Grooming/Bathing/Dressing Activities of Daily Living                                     Most Recent Physical Functioning:   Gross Assessment:                  Posture:  Posture (WDL): Exceptions to WDL  Posture Assessment: Forward head, Rounded shoulders, Trunk flexion  Balance:    Bed Mobility:     Wheelchair Mobility:     Transfers:               Patient Vitals for the past 6 hrs:   BP BP Patient Position SpO2 Pulse   07/19/18 0438 101/65 At rest 100 % 66   07/19/18 0723 107/71 Sitting 98 % 68       Mental Status  Neurologic State: Alert, Appropriate for age  Orientation Level: Oriented X4  Cognition: Appropriate decision making  Perception: Appears intact  Perseveration: No perseveration noted  Safety/Judgement: Awareness of environment, Fall prevention                          Physical Skills Involved:  1. Balance  2. Strength  3. Activity Tolerance  4. Pain (acute)  5.  Edema Cognitive Skills Affected (resulting in the inability to perform in a timely and safe manner): 1. none Psychosocial Skills Affected:  1. none   Number of elements that affect the Plan of Care: 3-5:  MODERATE COMPLEXITY   CLINICAL DECISION MAKIN[address] [place] [address] AM-PAC 6 Clicks   Daily Activity Inpatient Short Form  How much help from another person does the patient currently need. .. Total A Lot A Little None   1. Putting on and taking off regular lower body clothing? [] 1   [x] 2   [] 3   [] 4   2. Bathing (including washing, rinsing, drying)? [] 1   [] 2   [x] 3   [] 4   3. Toileting, which includes using toilet, bedpan or urinal?   [] 1   [] 2   [x] 3   [] 4   4. Putting on and taking off regular upper body clothing? [] 1   [] 2   [x] 3   [] 4   5. Taking care of personal grooming such as brushing teeth? [] 1   [] 2   [x] 3   [] 4   6. Eating meals? [] 1   [] 2   [x] 3   [] 4   © , Trustees of [address] [place] [address], under license to [employer]. All rights reserved      Score:  Initial: 17 Most Recent: X (Date: -- )    Interpretation of Tool:  Represents activities that are increasingly more difficult (i.e. Bed mobility, Transfers, Gait). Score 24 23 22-20 19-15 14-10 9-7 6     Modifier CH CI CJ CK CL CM CN      ? Self Care:     - CURRENT STATUS: CK - 40%-59% impaired, limited or restricted    - GOAL STATUS: CJ - 20%-39% impaired, limited or restricted    - D/C STATUS:  ---------------To be determined---------------  Payor: Bria Shepard / Plan: [address] [place] [address] HMO / Product Type: Managed Care Medicare /      Medical Necessity:     · Patient demonstrates excellent rehab potential due to higher previous functional level. Reason for Services/Other Comments:  · Patient continues to require skilled intervention due to decreased activity tolerance with ADL/functional transfers.    Use of outcome tool(s) and clinical judgement create a POC that gives a: LOW COMPLEXITY         TREATMENT:   (In addition to Assessment/Re-Assessment sessions the following treatments were rendered)     Pre-treatment Symptoms/Complaints:    Pain: Initial:     None Post Session:  0/10     Self Care: (13): Procedure(s) (per grid) utilized to improve and/or restore self-care/home management as related to toileting. Required no tactile and   cueing to facilitate activities of daily living skills. Therapeutic Activity: (    15): Therapeutic activities including Bed transfers, Toilet transfers, Grocery shopping and Walking to mailbox to improve mobility, strength and activity tolerance. Required minimal   to promote dynamic balance in standing. Braces/Orthotics/Lines/Etc:   · O2 Device: Room air  Treatment/Session Assessment:    · Response to Treatment:  eval only  · Interdisciplinary Collaboration:   o Registered Nurse  o CHF Nurse  · After treatment position/precautions:   o Bed/Chair-wheels locked  o Call light within reach  o RN notified  o EOB   · Compliance with Program/Exercises: Will assess as treatment progresses. · Recommendations/Intent for next treatment session: \"Next visit will focus on advancements to more challenging activities and reduction in assistance provided\".   Total Treatment Duration:  OT Patient Time In/Time Out  Time In: 0907  Time Out: [facility] [address], OT

## 2018-07-19 NOTE — PROGRESS NOTES
Interdisciplinary Rounds completed 07/19/18. Nursing, Case Management, Physician and PT present. Plan of care reviewed and updated.     Hopefully to rehab tomorrow

## 2018-07-19 NOTE — PROGRESS NOTES
Dr Analilia Walters notified of bld pressure  Of 85//57 left arm and 86/61 in right arm   the patient asymptomatic. Recheck pt in 1-2 hrs and call physician back.

## 2018-07-20 VITALS
SYSTOLIC BLOOD PRESSURE: 95 MMHG | RESPIRATION RATE: 20 BRPM | TEMPERATURE: 97.4 F | WEIGHT: 209 LBS | HEART RATE: 79 BPM | HEIGHT: 69 IN | DIASTOLIC BLOOD PRESSURE: 64 MMHG | OXYGEN SATURATION: 100 % | BODY MASS INDEX: 30.96 KG/M2

## 2018-07-20 LAB
ANION GAP SERPL CALC-SCNC: 7 MMOL/L (ref 7–16)
BUN SERPL-MCNC: 24 MG/DL (ref 8–23)
CALCIUM SERPL-MCNC: 8.8 MG/DL (ref 8.3–10.4)
CHLORIDE SERPL-SCNC: 104 MMOL/L (ref 98–107)
CO2 SERPL-SCNC: 29 MMOL/L (ref 21–32)
CREAT SERPL-MCNC: 1.49 MG/DL (ref 0.8–1.5)
GLUCOSE BLD STRIP.AUTO-MCNC: 80 MG/DL (ref 65–100)
GLUCOSE BLD STRIP.AUTO-MCNC: 86 MG/DL (ref 65–100)
GLUCOSE SERPL-MCNC: 82 MG/DL (ref 65–100)
MAGNESIUM SERPL-MCNC: 2.6 MG/DL (ref 1.8–2.4)
POTASSIUM SERPL-SCNC: 4 MMOL/L (ref 3.5–5.1)
SODIUM SERPL-SCNC: 140 MMOL/L (ref 136–145)

## 2018-07-20 PROCEDURE — 74011250637 HC RX REV CODE- 250/637: Performed by: HOSPITALIST

## 2018-07-20 PROCEDURE — 74011250637 HC RX REV CODE- 250/637: Performed by: NURSE PRACTITIONER

## 2018-07-20 PROCEDURE — 36415 COLL VENOUS BLD VENIPUNCTURE: CPT | Performed by: NURSE PRACTITIONER

## 2018-07-20 PROCEDURE — 80048 BASIC METABOLIC PNL TOTAL CA: CPT | Performed by: NURSE PRACTITIONER

## 2018-07-20 PROCEDURE — 74011250636 HC RX REV CODE- 250/636: Performed by: INTERNAL MEDICINE

## 2018-07-20 PROCEDURE — 94762 N-INVAS EAR/PLS OXIMTRY CONT: CPT

## 2018-07-20 PROCEDURE — 82962 GLUCOSE BLOOD TEST: CPT

## 2018-07-20 PROCEDURE — 83735 ASSAY OF MAGNESIUM: CPT | Performed by: NURSE PRACTITIONER

## 2018-07-20 RX ORDER — LANOLIN ALCOHOL/MO/W.PET/CERES
400 CREAM (GRAM) TOPICAL DAILY
Qty: 30 TAB | Refills: 0 | Status: SHIPPED | OUTPATIENT
Start: 2018-07-21 | End: 2018-09-10 | Stop reason: ALTCHOICE

## 2018-07-20 RX ORDER — INSULIN LISPRO 100 [IU]/ML
INJECTION, SOLUTION INTRAVENOUS; SUBCUTANEOUS
Qty: 1 VIAL | Status: SHIPPED | OUTPATIENT
Start: 2018-07-20 | End: 2018-09-10 | Stop reason: ALTCHOICE

## 2018-07-20 RX ORDER — CARVEDILOL 3.12 MG/1
3.12 TABLET ORAL 2 TIMES DAILY WITH MEALS
Qty: 60 TAB | Refills: 0 | Status: SHIPPED | OUTPATIENT
Start: 2018-07-20 | End: 2018-09-05 | Stop reason: SDUPTHER

## 2018-07-20 RX ORDER — BUMETANIDE 2 MG/1
2 TABLET ORAL 2 TIMES DAILY
Qty: 60 TAB | Refills: 0 | Status: SHIPPED | OUTPATIENT
Start: 2018-07-20 | End: 2018-09-05 | Stop reason: SDUPTHER

## 2018-07-20 RX ADMIN — CARVEDILOL 3.12 MG: 3.12 TABLET, FILM COATED ORAL at 08:25

## 2018-07-20 RX ADMIN — ASPIRIN 81 MG: 81 TABLET, COATED ORAL at 08:25

## 2018-07-20 RX ADMIN — HEPARIN SODIUM 5000 UNITS: 5000 INJECTION, SOLUTION INTRAVENOUS; SUBCUTANEOUS at 08:25

## 2018-07-20 RX ADMIN — MAGNESIUM OXIDE TAB 400 MG (241.3 MG ELEMENTAL MG) 400 MG: 400 (241.3 MG) TAB at 08:25

## 2018-07-20 RX ADMIN — BUMETANIDE 2 MG: 1 TABLET ORAL at 08:25

## 2018-07-20 RX ADMIN — Medication 10 ML: at 06:42

## 2018-07-20 RX ADMIN — LISINOPRIL 5 MG: 5 TABLET ORAL at 08:25

## 2018-07-20 RX ADMIN — SPIRONOLACTONE 25 MG: 25 TABLET, FILM COATED ORAL at 08:25

## 2018-07-20 NOTE — ROUTINE PROCESS
CHF teaching completed to pt/ spouse, verbalize emphasis on monitoring self and report to MD:   If you gain 2 lbs in one day or 5 lbs in a week, and short of breath.  If you can not lay flat without developing short of breath or rapid breathing at night; or if it wakes you up. Develop a cough or wheezing.  If you notice swollen hands/feet/ankles or stomach with a bloated/ full feeling.  If you are  more confused or mentally fuzzy or dizzy.  If you notice a rapid or change in your heart rate.  If you become more exhausted all the time and unable to do the same level of activity without stopping to catch your breath. Drink no more than 8 cups a day in 8 oz. cups. Limit Cola Drinks. Your Heart can not handle any more. Stay away from salt (limit anything with salt or sodium in it). Limit to 250mg per serving. Exercise needs to be started with your Doctors approval.  Reduce stress; Call myself or Provider if assistance is needed. Pass post test via teach back, will make self available post DC ,if an questions arise. Diabetic teaching completed.  Planner/scale @ BS:  60 mins total

## 2018-07-20 NOTE — PROGRESS NOTES
Interdisciplinary Rounds completed 07/20/18. Nursing, Case Management, Physician and PT present. Plan of care reviewed and updated. Discharge to the [facility].

## 2018-07-20 NOTE — PROGRESS NOTES
Pt to DC today to [facility] 1 8. Rm and report line [id number] given to MCKENZIE.   Makeda Fink to transport -4:30pm.

## 2018-07-20 NOTE — PROGRESS NOTES
Hourly rounds completed and all needs were met at this time. Pt's output was 1575 ml  this shift, appearance yellow/clear. Pt denied having pain this shift. Pt has been encouraged to ambulate with assistance. Will continue to monitor and report to oncoming nurse.

## 2018-07-20 NOTE — PROGRESS NOTES
Hospitalist Progress Note Admit Date:  7/10/2018  3:32 PM  
Name:  Anette Garnett. Age:  74 y.o. 
:  1945 MRN:  [id number] PCP:  Shahram Sarkar MD 
Treatment Team: Attending Provider: Coral Mcclellan MD; Care Manager: Rashmi Reeves LMSW; Utilization Review: Hernán Frazier RN; Charge Nurse: Rosas Almaguer RN Subjective:  
CC:lower extremity edema and SOB Pt is a 73 yo male with PMH COPD, CHF, CAD, HTN. Pt on oxygen at home. Pt presented to the ER with worsening LE edema, SOB. Pt with recent admission for CHF exacerbation. An echo showed EF 45%, cardiology consulted and pt started on bumex. Pt's creatinine catina from 1.45 on admit to 1.63 on , today down to 1.54. Pt observed up in chair, no oxygen on and resp unlabored. Pt verbalizes that at home he gets SOB \"just sitting still\". 1+ non pitting pedal edema noted, non pitting. Pt denies chest pain, N/V/D. Per therapy pt was saturating at 96% even with ambulation. Discussed with pt and spouse that daily weights once he gets home can help to prevent exacerbations of HF but identifying fluid build up earlier. Awaiting authorization for pt to go to [facility]. Anticipate d/c Friday or Saturday. Objective:  
 
Patient Vitals for the past 24 hrs: 
 Temp Pulse Resp BP SpO2  
18 0724 97.7 °F (36.5 °C) 76 22 (!) 114/91 96 %  
18 0429 97.4 °F (36.3 °C) 76 18 118/80 96 % 18 2219 98 °F (36.7 °C) 86 18 137/82 97 % 18 1925 97.6 °F (36.4 °C) 84 18 109/73 99 % 18 1410 97.7 °F (36.5 °C) 70 18 102/66 95 % 18 1136 - - - - 97 % 18 1113 97.6 °F (36.4 °C) 69 18 99/65 100 % Oxygen Therapy O2 Sat (%): 96 % (18 0724) Pulse via Oximetry: 90 beats per minute (18 2150) O2 Device: Room air (18 1410) O2 Flow Rate (L/min): 2 l/min (18 1401) FIO2 (%): 21 % (18 1432) Intake/Output Summary (Last 24 hours) at 18 6429 Last data filed at 18 0893 Gross per 24 hour Intake              120 ml Output             1575 ml Net            -1455 ml General:    Well nourished. Awake and alert. Head:  Normocephalic, atraumatic Eyes:  Extraocular movements intact, normal sclera CV:   RRR. No  Murmurs, clicks, or gallops Lungs:   Unlabored, no cyanosis Abdomen:   Soft, nondistended, nontender. Extremities: Warm and dry. No cyanosis, 1+ non-pitting edema. Skin:     No rashes or jaundice. Neuro:  No gross focal deficits Psych:  Mood and affect appropriate Data Review: 
I have reviewed all labs, meds, telemetry events, and studies from the last 24 hours. Recent Results (from the past 24 hour(s)) GLUCOSE, POC Collection Time: 07/19/18 11:11 AM  
Result Value Ref Range Glucose (POC) 101 (H) 65 - 100 mg/dL GLUCOSE, POC Collection Time: 07/19/18  4:21 PM  
Result Value Ref Range Glucose (POC) 94 65 - 100 mg/dL GLUCOSE, POC Collection Time: 07/19/18 11:35 PM  
Result Value Ref Range Glucose (POC) 104 (H) 65 - 100 mg/dL METABOLIC PANEL, BASIC Collection Time: 07/20/18  4:54 AM  
Result Value Ref Range Sodium 140 136 - 145 mmol/L Potassium 4.0 3.5 - 5.1 mmol/L Chloride 104 98 - 107 mmol/L  
 CO2 29 21 - 32 mmol/L Anion gap 7 7 - 16 mmol/L Glucose 82 65 - 100 mg/dL BUN 24 (H) 8 - 23 MG/DL Creatinine 1.49 0.8 - 1.5 MG/DL  
 GFR est AA 59 (L) >60 ml/min/1.73m2 GFR est non-AA 49 (L) >60 ml/min/1.73m2 Calcium 8.8 8.3 - 10.4 MG/DL MAGNESIUM Collection Time: 07/20/18  4:54 AM  
Result Value Ref Range Magnesium 2.6 (H) 1.8 - 2.4 mg/dL GLUCOSE, POC Collection Time: 07/20/18  7:24 AM  
Result Value Ref Range Glucose (POC) 80 65 - 100 mg/dL All Micro Results None Current Meds: 
Current Facility-Administered Medications Medication Dose Route Frequency  acetaminophen (TYLENOL) tablet 650 mg  650 mg Oral Q4H PRN  
 magnesium oxide (MAG-OX) tablet 400 mg  400 mg Oral DAILY  bumetanide (BUMEX) tablet 2 mg  2 mg Oral BID  spironolactone (ALDACTONE) tablet 25 mg  25 mg Oral DAILY  insulin lispro (HUMALOG) injection   SubCUTAneous AC&HS  carvedilol (COREG) tablet 3.125 mg  3.125 mg Oral BID WITH MEALS  
 aspirin delayed-release tablet 81 mg  81 mg Oral DAILY  albuterol-ipratropium (DUO-NEB) 2.5 MG-0.5 MG/3 ML  3 mL Nebulization Q4H PRN  
 lisinopril (PRINIVIL, ZESTRIL) tablet 5 mg  5 mg Oral DAILY  atorvastatin (LIPITOR) tablet 20 mg  20 mg Oral QHS  sodium chloride (NS) flush 5-10 mL  5-10 mL IntraVENous Q8H  
 sodium chloride (NS) flush 5-10 mL  5-10 mL IntraVENous PRN  
 heparin (porcine) injection 5,000 Units  5,000 Units SubCUTAneous Q12H Diet: DIET DIABETIC CONSISTENT CARB Other Studies (last 24 hours): No results found. Assessment and Plan:  
 
Hospital Problems as of 7/20/2018  Date Reviewed: 6/6/2018 Codes Class Noted - Resolved POA * (Principal)Acute respiratory failure with hypoxemia (HCC) ICD-10-CM: J96.01 
ICD-9-CM: 518.81  5/24/2018 - Present Yes  
   
 TAZ (obstructive sleep apnea) ICD-10-CM: S60.12 
ICD-9-CM: 327.23  10/2/2017 - Present Yes Hypertension (Chronic) ICD-10-CM: I10 
ICD-9-CM: 401.9  9/29/2017 - Present Yes COPD, moderate ([address] (Chronic) ICD-10-CM: J44.9 ICD-9-CM: 010  9/29/2017 - Present Yes Overview Signed 12/27/2015 12:38 PM by Cleve Razo NP Complete PFTs: 7/20/15: 
Spirometry is consistent with a moderate obstructive/restrictive defect. . The residual volume is increased relative to other lung volumes suggesting air-trapping. The diffusion capacity corrected for alveolar volume was normal suggesting no loss of alveolo-capillary units. High triglycerides (Chronic) ICD-10-CM: E78.1 ICD-9-CM: 272.1  9/29/2017 - Present Yes  
   
 CHF (congestive heart failure) (HCC) ICD-10-CM: I50.9 ICD-9-CM: 428.0  9/27/2017 - Present Yes  Essential hypertension ICD-10-CM: I10 
ICD-9-CM: 401.9  9/28/2016 - Present Yes A/P:   
1. Acute on chronic combined systolic and diastolic heart failure exacerbation. 
-Cardiology consulted. Signed off 7/18.  
- Continue Bumex, aldactone, coreg, lisinopril.  
-monitor UOP 
-Daily weights 
-Monitor BMP 
  
2. Acute hypoxemic respiratory failure with hypoxemia - improving 
-Supplemental oxygen prn 3. Chronic COPD, no acute exacerbation. 
-Supplemental oxygen prn 
  
4. CKD stage III 
-BMP in am 
  5. Dyslipidemia 
-Continue atorvastatin 
  
6. Coronary artery disease 
-continue ASA,  lisinopril 
  
7. DM type 2 - A1C is 7. 
-Diabetic diet 
-SSI- will need to transition to Metformin at discharge 
-Diabetic education DC planning/Dispo:  STR at [facility] DVT ppx:  SQ heparin Case reviewed with supervising physician - JACQUELINE Valdivia MD 
 
Signed: 
Jennifer Arreaga, NP-C

## 2018-07-20 NOTE — DISCHARGE SUMMARY
Hospitalist Discharge Summary     Admit Date:  7/10/2018  3:32 PM   Name:  Huong Vick. Age:  74 y.o.  :  1945   MRN:  [id number]   PCP:  Bernard Claros MD  Treatment Team: Attending Provider: Tee Cormier MD; Care Manager: [facility] [place] Utilization Review: Louise Drake RN; Charge Nurse: Carola Alexander RN    Problem List for this Hospitalization:  Hospital Problems as of 2018  Date Reviewed: 2018          Codes Class Noted - Resolved POA    * (Principal)Acute respiratory failure with hypoxemia ([facility] [address] ICD-10-CM: J96.01  ICD-9-CM: 518.81  2018 - Present Yes        TAZ (obstructive sleep apnea) ICD-10-CM: G47.33  ICD-9-CM: 327.23  10/2/2017 - Present Yes        Hypertension (Chronic) ICD-10-CM: I10  ICD-9-CM: 401.9  2017 - Present Yes        COPD, moderate ([address] (Chronic) ICD-10-CM: J44.9  ICD-9-CM: 496  2017 - Present Yes    Overview Signed 2015 12:38 PM by Ree Alan, NP     Complete PFTs: 7/20/15:  Spirometry is consistent with a moderate obstructive/restrictive defect. . The residual volume is increased relative to other lung volumes suggesting air-trapping. The diffusion capacity corrected for alveolar volume was normal suggesting no loss of alveolo-capillary units. High triglycerides (Chronic) ICD-10-CM: E78.1  ICD-9-CM: 272.1  2017 - Present Yes        CHF (congestive heart failure) ([facility] [address] ICD-10-CM: I50.9  ICD-9-CM: 428.0  2017 - Present Yes        Essential hypertension ICD-10-CM: I10  ICD-9-CM: 401.9  2016 - Present Yes                Admission HPI from 7/10/2018:    \"Mr. Tarun Brizuela is a 75 yo male who presented with worsening LL swelling on a background of COPD (4L O2 at home), CHF, CAD and HTN. He reports worsening LL swelling for the past 2 weeks. He reports dyspnea on rest and exertion. He sleeps on 2 pillows. Of note, he was recently hospitalized from  to  with acute CHF exacerbation. He was started on IV lasix and was discharged at that time on lasix PO BID. At baseline, he lives at home with his wife. He reports that he missed only 1 dose of his home lasix. \"    Hospital Course:  Pt is a 73 yo male with PMH COPD, CHF, CAD, HTN. Pt on oxygen at home. Pt presented to the ER with worsening LE edema, SOB. Pt with recent admission for CHF exacerbation. An echo showed EF 45%, cardiology consulted and pt started on bumex. Pt's creatinine catina from 1.45 on admit to 1.63 on 7/18, today down to 1.54. Pt observed up in chair, no oxygen on and resp unlabored. Pt verbalizes that at home he gets SOB \"just sitting still\". 1+ non pitting pedal edema noted, non pitting. Pt denies chest pain, N/V/D. Per therapy pt was saturating at 96% even with ambulation.      Discussed with pt and spouse that daily weights once he gets home can help to prevent exacerbations of HF but identifying fluid build up earlier. Authorization received for pt to go to [facility]. Will have facility check BMP and Magnesium twice weekly. Follow up instructions and discharge meds at bottom of this note. Plan was discussed with patient, spouse, care team.  All questions answered. Patient was stable at time of discharge. Diagnostic Imaging/Tests:   Xr Chest Port    Result Date: 7/10/2018  Chest portable CLINICAL INDICATION: Acute moderate dyspnea with 2 weeks of chest pain that has worsened today COMPARISON: 7/6/2018 TECHNIQUE: single AP portable view chest at 3:20 PM upright FINDINGS: The lung volumes are slightly shallow leading to crowding. The cardiac silhouette is enlarged. There is mild increased hazy opacity in both bases with some silhouetting of the hemidiaphragms. No evidence of a pneumothorax, overt pulmonary edema, or dense consolidation. IMPRESSION: Bibasilar mild infiltrates.        Echocardiogram results:  Results for orders placed or performed during the hospital encounter of 07/10/18   2D ECHO COMPLETE ADULT (TTE) W OR WO CONTR Narrative    [place]  [address] [facility], [address] [facility]  [phone]    Transthoracic Echocardiogram  2D, M-mode, Doppler, and Color Doppler    Patient: Kei Reed  MR #: [id number]  : [id number]  Age: 74 years  Gender: Male  Study date: 2018  Account #: [de-identified]  Height: 68.9 in  Weight: 215.6 lb  BSA: 2.13 mï¾²  Status:Routine  Location: 605  BP: 110/ 60    Allergies: PENICILLINS    Sonographer:  Antonio Calero RDCS  Group:  [facility]  Referring Physician:  Roxi Nino. [facility]  Reading Physician:  Major Ann MD [facility]    INDICATIONS: Heart Failure, Systolic, Acute. PROCEDURE: This was a routine study. A transthoracic echocardiogram was  performed. The study included complete 2D imaging, M-mode, complete spectral  Doppler, and color Doppler. Intravenous contrast (Definity) was administered. Image quality was adequate. LEFT VENTRICLE: Size was normal. Systolic function was mildly reduced. Ejection  fraction was estimated to be 45 %. There was mild diffuse hypokinesis. There  was moderate concentric hypertrophy. The E/e' ratio was 19.95. There was   Grade  III Diastolic Dysfunction. RIGHT VENTRICLE: The ventricle was mildly to moderately dilated. Systolic  function was moderately reduced. Estimated peak pressure was in the range of  50-55 mmHg. LEFT ATRIUM: The atrium was moderately to markedly dilated. RIGHT ATRIUM: The atrium was markedly dilated. SYSTEMIC VEINS: IVC: The inferior vena cava was dilated. The respirophasic  change in diameter was less than 50%. AORTIC VALVE: The valve was trileaflet. Leaflets exhibited moderate   sclerosis. There was no evidence for stenosis. There was mild regurgitation. MITRAL VALVE: There was mild thickening. There was no evidence for stenosis. There was mild to moderate regurgitation.     TRICUSPID VALVE: The valve structure was normal. There was no evidence for  stenosis. There was moderate regurgitation. PULMONIC VALVE: The valve structure was normal. There was no evidence for  stenosis. There was mild to moderate regurgitation. PERICARDIUM: A trivial pericardial effusion was identified. AORTA: The root exhibited normal size. SUMMARY:    -  Left ventricle: Systolic function was mildly reduced. Ejection fraction   was  estimated to be 45 %. There was mild diffuse hypokinesis. There was moderate  concentric hypertrophy. The E/e' ratio was 19.95. There was Grade III   Diastolic  Dysfunction.    -  Right ventricle: The ventricle was mildly to moderately dilated. Systolic  function was moderately reduced. -  Left atrium: The atrium was moderately to markedly dilated. -  Right atrium: The atrium was markedly dilated. -  Inferior vena cava, hepatic veins: The inferior vena cava was dilated. The  respirophasic change in diameter was less than 50%. -  Mitral valve: There was mild to moderate regurgitation.    -  Tricuspid valve: There was moderate regurgitation.    -  Pulmonic valve: There was mild to moderate regurgitation.    -  Pericardium: A trivial pericardial effusion was identified. SYSTEM MEASUREMENT TABLES    2D mode  AoR Diam (2D): 3.3 cm  LA Dimension (2D): 4.6 cm  Left Atrium Systolic Volume Index; Method of Disks, Biplane; 2D mode;: 48.4  ml/m2  IVS/LVPW (2D): 1  IVSd (2D): 1.8 cm  LVIDd (2D): 4.2 cm  LVIDs (2D): 3.3 cm  LVOT Area (2D): 3.1 cm2  LVPWd (2D): 1.8 cm  RVIDd (2D): 3.2 cm    Tissue Doppler Imaging  LV Peak Early Morales Tissue Gualberto; Lateral MA (TDI): 3.5 cm/s  LV Peak Early Morales Tissue Gualberto; Medial MA (TDI): 3.2 cm/s    Unspecified Scan Mode  Peak Grad; Mean; Antegrade Flow: 11 mm[Hg]  Vmax; Antegrade Flow: 156 cm/s  LVOT Diam: 2 cm  MV Peak Gualberto/LV Peak Tissue Gualberto E-Wave; Lateral MA: 19.1  MV Peak Gualberto/LV Peak Tissue Gualberto E-Wave; Medial MA: 20.8  MV E/A: 2.4  MV Peak E Gualberto; Mean;  Antegrade Flow: 66.5 cm/s  RVSP: 50 mm[Hg]  Vmax; Regurgitant Flow: 304 cm/s    Prepared and signed by    Nicko Lockett. MD [facility]  Signed 11-Jul-2018 16:59:41           All Micro Results     None          Labs: Results:       BMP, Mg, Phos Recent Labs      07/20/18   0454  07/19/18   0455  07/18/18   0501   NA  140  143  139   K  4.0  4.0  4.0   CL  104  104  100   CO2  29  29  29   AGAP  7  10  10   BUN  24*  25*  24*   CREA  1.49  1.54*  1.63*   CA  8.8  8.4  8.8   GLU  82  88  110*   MG  2.6*   --   2.6*   PHOS   --    --   3.6      CBC Recent Labs      07/18/18   0501   WBC  6.3   RBC  5.18   HGB  13.8   HCT  42.7   PLT  169   GRANS  54   LYMPH  36   EOS  1   MONOS  9   BASOS  0   IG  0   ANEU  3.4   ABL  2.3   REAGAN  0.1   ABM  0.5   ABB  0.0   AIG  0.0      LFT No results for input(s): SGOT, ALT, TBIL, AP, TP, ALB, GLOB, AGRAT, GPT in the last 72 hours.    Cardiac Testing Lab Results   Component Value Date/Time     07/12/2018 05:22 AM    BNP 1226 07/10/2018 03:41 PM    BNP 1668 07/06/2018 05:08 PM     12/28/2016 03:53 PM     12/01/2016 03:18 PM    BNP 6 11/21/2016 09:50 AM     09/27/2017 07:19 PM     11/13/2015 05:23 AM    CK - MB 2.1 09/27/2017 07:19 PM    CK-MB Index 1.6 09/27/2017 07:19 PM    Troponin-I, Qt. 0.54 () 07/11/2018 04:41 AM    Troponin-I, Qt. 0.61 () 07/10/2018 03:41 PM    Troponin-I, Qt. 0.73 () 05/24/2018 03:43 AM      Coagulation Tests Lab Results   Component Value Date/Time    aPTT 32.5 12/28/2017 12:16 PM    aPTT >200.0 (HH) 12/28/2017 03:00 AM    aPTT 72.6 (H) 12/27/2017 07:32 PM      A1c Lab Results   Component Value Date/Time    Hemoglobin A1c 7.0 (H) 07/12/2018 05:22 AM      Lipid Panel Lab Results   Component Value Date/Time    Cholesterol, total 102 12/27/2017 04:13 AM    HDL Cholesterol 24 (L) 12/27/2017 04:13 AM    LDL,Direct 94 06/29/2015 06:00 AM    LDL, calculated 59 12/27/2017 04:13 AM    VLDL, calculated 19 12/27/2017 04:13 AM    Triglyceride 95 12/27/2017 04:13 AM    CHOL/HDL Ratio 4.3 12/27/2017 04:13 AM      Thyroid Panel Lab Results   Component Value Date/Time    TSH 0.604 05/25/2018 03:54 AM    TSH 1.165 04/26/2015 04:11 AM        Most Recent UA Lab Results   Component Value Date/Time    Color YELLOW 03/27/2018 01:38 PM    Appearance CLEAR 03/27/2018 01:38 PM    Specific gravity 1.020 03/27/2018 01:38 PM    pH (UA) 6.0 03/27/2018 01:38 PM    Protein 100 (A) 03/27/2018 01:38 PM    Glucose NEGATIVE  03/27/2018 01:38 PM    Ketone NEGATIVE  03/27/2018 01:38 PM    Bilirubin NEGATIVE  03/27/2018 01:38 PM    Blood NEGATIVE  03/27/2018 01:38 PM    Urobilinogen 4.0 (H) 03/27/2018 01:38 PM    Nitrites NEGATIVE  03/27/2018 01:38 PM    Leukocyte Esterase TRACE (A) 03/27/2018 01:38 PM        Allergies   Allergen Reactions    Pcn [Penicillins] Other (comments)     \"makes my heart stop\"     Immunization History   Administered Date(s) Administered    Influenza Vaccine 09/28/2016    Pneumococcal Polysaccharide (PPSV-23) 09/28/2016    TB Skin Test (PPD) Intradermal 01/05/2018, 03/27/2018, 05/27/2018, 07/11/2018       All Labs from Last 24 Hrs:  Recent Results (from the past 24 hour(s))   GLUCOSE, POC    Collection Time: 07/19/18  4:21 PM   Result Value Ref Range    Glucose (POC) 94 65 - 100 mg/dL   GLUCOSE, POC    Collection Time: 07/19/18 11:35 PM   Result Value Ref Range    Glucose (POC) 104 (H) 65 - 855 mg/dL   METABOLIC PANEL, BASIC    Collection Time: 07/20/18  4:54 AM   Result Value Ref Range    Sodium 140 136 - 145 mmol/L    Potassium 4.0 3.5 - 5.1 mmol/L    Chloride 104 98 - 107 mmol/L    CO2 29 21 - 32 mmol/L    Anion gap 7 7 - 16 mmol/L    Glucose 82 65 - 100 mg/dL    BUN 24 (H) 8 - 23 MG/DL    Creatinine 1.49 0.8 - 1.5 MG/DL    GFR est AA 59 (L) >60 ml/min/1.73m2    GFR est non-AA 49 (L) >60 ml/min/1.73m2    Calcium 8.8 8.3 - 10.4 MG/DL   MAGNESIUM    Collection Time: 07/20/18  4:54 AM   Result Value Ref Range    Magnesium 2.6 (H) 1.8 - 2.4 mg/dL   GLUCOSE, POC    Collection Time: 07/20/18  7:24 AM   Result Value Ref Range    Glucose (POC) 80 65 - 100 mg/dL   GLUCOSE, POC    Collection Time: 07/20/18 11:29 AM   Result Value Ref Range    Glucose (POC) 86 65 - 100 mg/dL       Discharge Exam:  Patient Vitals for the past 24 hrs:   Temp Pulse Resp BP SpO2   07/20/18 1515 97.4 °F (36.3 °C) 79 20 95/64 100 %   07/20/18 1418 - - - - 99 %   07/20/18 1047 98.1 °F (36.7 °C) 68 20 112/45 97 %   07/20/18 0724 97.7 °F (36.5 °C) 76 22 (!) 114/91 96 %   07/20/18 0429 97.4 °F (36.3 °C) 76 18 118/80 96 %   07/19/18 2219 98 °F (36.7 °C) 86 18 137/82 97 %   07/19/18 1925 97.6 °F (36.4 °C) 84 18 109/73 99 %     Oxygen Therapy  O2 Sat (%): 100 % (07/20/18 1515)  Pulse via Oximetry: 90 beats per minute (07/18/18 2150)  O2 Device: Room air (07/20/18 1418)  O2 Flow Rate (L/min): 2 l/min (07/16/18 1401)  FIO2 (%): 21 % (07/16/18 1432)    Intake/Output Summary (Last 24 hours) at 07/20/18 1551  Last data filed at 07/20/18 1409   Gross per 24 hour   Intake              360 ml   Output             2875 ml   Net            -2515 ml       General:    Well nourished. Alert. No distress. Eyes:   Normal sclera. Extraocular movements intact. ENT:  Normocephalic, atraumatic. Moist mucous membranes  CV:   Regular rate and rhythm. No murmur, rub, or gallop. Lungs:  Clear to auscultation bilaterally. No wheezing, rhonchi, or rales. Abdomen: Soft, nontender, nondistended. Bowel sounds normal.   Extremities: Warm and dry. No cyanosis or edema. Neurologic: CN II-XII grossly intact. Sensation intact. Skin:     No rashes or jaundice. Psych:  Normal mood and affect. Discharge Info:   Current Discharge Medication List      START taking these medications    Details   bumetanide (BUMEX) 2 mg tablet Take 1 Tab by mouth two (2) times a day. Qty: 60 Tab, Refills: 0      magnesium oxide (MAG-OX) 400 mg tablet Take 1 Tab by mouth daily.   Qty: 30 Tab, Refills: 0      insulin lispro (HUMALOG) 100 unit/mL injection For Blood Sugar (mg/dl) of:            151-200=2 units   201-250=4 units  251-300=6 units  301-350=8 units  351-400=10units  Over 400= 12units and Call MD  Hypoglycemia treatment for BS < 70 with or without symptoms per protocol:  use juice, glucose tabs, Dextrose 50% or glucagon  Qty: 1 Vial, Refills: o         CONTINUE these medications which have CHANGED    Details   carvedilol (COREG) 3.125 mg tablet Take 1 Tab by mouth two (2) times daily (with meals). Qty: 60 Tab, Refills: 0         CONTINUE these medications which have NOT CHANGED    Details   spironolactone (ALDACTONE) 25 mg tablet Take 1 Tab by mouth daily. Qty: 30 Tab, Refills: 0    Associated Diagnoses: Acute on chronic systolic heart failure (HCC)      fluticasone (FLONASE) 50 mcg/actuation nasal spray 2 Sprays by Both Nostrils route daily. Qty: 1 Bottle, Refills: 11      melatonin 3 mg tablet Take 3 mg by mouth nightly. albuterol (VENTOLIN HFA) 90 mcg/actuation inhaler Take 2 Puffs by inhalation four (4) times daily. Qty: 1 Inhaler, Refills: 11      lisinopril (PRINIVIL, ZESTRIL) 5 mg tablet Take 1 Tab by mouth daily. Qty: 30 Tab, Refills: 11      omeprazole (PRILOSEC) 20 mg capsule Take 1 Cap by mouth daily. Qty: 30 Cap, Refills: 11      atorvastatin (LIPITOR) 20 mg tablet Take 1 Tab by mouth nightly. Qty: 30 Tab, Refills: 11      guaiFENesin ER (MUCINEX) 1,200 mg Ta12 ER tablet Take 1 Tab by mouth every twelve (12) hours. Qty: 60 Tab, Refills: 0      polyethylene glycol (MIRALAX) 17 gram packet Take 1 Packet by mouth daily. Qty: 1 Packet, Refills: 11      albuterol-ipratropium (DUO-NEB) 2.5 mg-0.5 mg/3 ml nebu 3 mL by Nebulization route four (4) times daily. Diaignosis--J44.9  Qty: 120 Nebule, Refills: 11      allopurinol (ZYLOPRIM) 100 mg tablet Take 1 Tab by mouth daily. Qty: 30 Tab, Refills: 11      aspirin delayed-release 81 mg tablet Take 1 Tab by mouth daily.   Qty: 30 Tab, Refills: 0         STOP taking these medications       metOLazone (ZAROXOLYN) 10 mg tablet Comments:   Reason for Stopping:         furosemide (LASIX) 40 mg tablet Comments:   Reason for Stopping:                 Disposition: SNF    Activity: PT/OT Eval and Treat  Diet: DIET DIABETIC CONSISTENT CARB 2 GM NA (House Low NA); FR 2000ML    Follow-up Appointments   Procedures    FOLLOW UP VISIT Appointment in: Other (Aury Han) Follow up with cardiology as instructed     Follow up with cardiology as instructed     Standing Status:   Standing     Number of Occurrences:   1     Order Specific Question:   Appointment in     Answer: Other (Specify)         Follow-up Information     Follow up With Details Comments [address] [place] [address] Court OFFICE Go to DR Merlene Olivier on [address] [facility] at 3:45PM. [address] [place]   [address] [facility] [address] [facility]    Go Ch MD   [address] Lauren Maciel [address] [facility]  [phone]            Case reviewed with supervising physician - JACQUELINE Mcgee MD    Time spent in patient discharge planning and coordination 35 minutes.     Signed:  NICHOLE Morales

## 2018-07-23 ENCOUNTER — PATIENT OUTREACH (OUTPATIENT)
Dept: CASE MANAGEMENT | Age: 73
End: 2018-07-23

## 2018-07-24 NOTE — PROGRESS NOTES
Downtime Progress Note for Transcend  Leonidas Gabriel RN    Transition of Care Discharge Follow-up Questionnaire   Date/Time of Call:   Patient name:   :   N:   DANAE#   18 @ 9:34am  Bro Gasca.  1945  [id number]  [id number]   1st call   What was the patient hospitalized for? CHF   Does the patient understand his/her diagnosis and/or treatment and what happened during the hospitalization? Unknown   Did the patient receive discharge instructions? Unknown   Review any discharge instructions (see notes in [facility]). Ask patient if they understand these. Do they have any questions? 18 @ 9:34am- Called [phone], mail box was full, [phone], left a message with my contact information. [phone] has calling restrictions; [phone] was not inservice. Member is at [facility] for rehab. Will follow up with him after d/c     Were home services ordered (nursing, PT, OT, ST, etc.)? Unknown   If so, has the first visit occurred? If not, why? (Assist with coordination of services if necessary.) n/a   Was any DME ordered? Unknown   If so, has it been received? If not, why?  (Assist with coordination of arranging DME orders if necessary.) N/A   Complete a review of all medications (new, continued and discontinued meds per the D/C instructions and medication tab in [facility]). Unknown   Were all new prescriptions filled? If not, why?  (Assist with obtainment of medications if necessary.) Unknown   Does the patient understand the purpose and dosing instructions for all medications? (If patient has questions, provide explanation and education.) Unknown   Does the patient have any problems in performing ADLs? (If patient is unable to perform ADLs - what is the limiting factor(s)?   Do they have a support system that can assist? If no support system is present, discuss possible assistance that they may be able to obtain.) Unknown   Does the patient have all follow-up appointments scheduled? 7 day f/up with PCP?    7-14 day f/up with specialist?    If f/up has not been made - what actions has the care coordinator made to accomplish this? Has transportation been arranged? [facility] Pulmonary follow-up should be within 7 days of discharge; all others should have PCP follow-up within 7 days of discharge; follow-ups with other specialists should be within 7-14 days of discharge.) Unknown   Any other questions or concerns expressed by the patient? None   Schedule next appointment with GIOVANI JIMENEZ Coordinator or refer to RN Case Manager/  per the workflow guidelines. When is care coordinators next follow-up call scheduled? If referred for CCM - what RN care manager was the referral assigned? Will follow up with member after d/c for rehab.    TRESSA Call Completed By: Nakia Hilliard RN

## 2018-07-26 ENCOUNTER — PATIENT OUTREACH (OUTPATIENT)
Dept: CASE MANAGEMENT | Age: 73
End: 2018-07-26

## 2018-07-26 NOTE — PROGRESS NOTES
JAYNA FUNEZ called and spoke with pt's wife who said that pt is still currently at [facility] rehab and that she's staying with one of their daughters right now. Renetta Moreno said that she's still working on finding long term placement for her and pt but that she would be able to bring pt home with their daughter if he was discharged right now. JAYNA FUNEZ asked Renetta Swanceasar if there was anything else JAYNA FUNEZ could be doing to help her and pt with housing. Renetta Josh said she's got plenty of information and she just needs to run down leads now. Renetta Josh said she didn't have any other needs or questions at this time. PLAN:  JAYNA FUNEZ will graduate pt a this time as wife has plans for pt's LTC needs     This note will not be viewable in [address]

## 2018-08-09 ENCOUNTER — PATIENT OUTREACH (OUTPATIENT)
Dept: CASE MANAGEMENT | Age: 73
End: 2018-08-09

## 2018-08-09 NOTE — PROGRESS NOTES
8/9/18 spoke with pts wife, verbalized that they are working with the [place] to get support Financially  from the [place] program to assist with getting a home. will be seeing Cardiologist on th 15 th but has no appointment with PCP. nurse call PCP office, Swathi Plasencia verbalized that they are willing to see pt but pt has cancelled many of his recent appointments and unless they hear from pt or family they cannot set appointment. nurse reached out to family, who refuse three way calling. nurse explain to pts wife what was said by PCP, and encourage her to call immediately to have appointment set. when asked if they are staying with family mbr, wife verbalized that no. nurse request information in relation to where they were staying, and wife verbalized that she cannot give nurse that information. according to wife pt had 1600 ml of fluid drawn out to help him breath more effectively. nurse educate on s/sx of infection. educate on s/sx of CHF and encourage to call Dr immediately. continue to take all med's timely as verbalized by wife. nurse will continue to monitor and provide care as needed.

## 2018-08-17 ENCOUNTER — PATIENT OUTREACH (OUTPATIENT)
Dept: CASE MANAGEMENT | Age: 73
End: 2018-08-17

## 2018-08-17 NOTE — PROGRESS NOTES
ER utilization   Spoke with pts Wife. Verbalized that they did notify Dr prior to taking pt to ER. Nurse made aware of urgent care clinics in her area and Number presented to wife for [facility] urgent care clinics. Pt was have SOB when he was taken to the hospital. Nurse educate on the importance of having all appointment schedule within 7 days after discharge from hospital/SNF. Wife failed to have pt see PCP within 7 days after last discharge. Will be calling PCP on Monday. Nurse educate on using ER for life threatening circumstances only. Encourage to keep #'s for Urgent care clinics and PCP on refrigerator door for easy access. Nurse will continue to monitor and provide care as needed.

## 2018-08-23 ENCOUNTER — PATIENT OUTREACH (OUTPATIENT)
Dept: CASE MANAGEMENT | Age: 73
End: 2018-08-23

## 2018-08-23 NOTE — PROGRESS NOTES
Spoke with dtr, verbalized that she hasn't seen her mom or dad in a while. Nurse reached out to [employer] wife's # and LVM. Left call back # and encourage to call CC.

## 2018-09-14 ENCOUNTER — PATIENT OUTREACH (OUTPATIENT)
Dept: CASE MANAGEMENT | Age: 73
End: 2018-09-14

## 2018-09-14 NOTE — PROGRESS NOTES
9/14/18 all #'s on file has been disconnected. LVM with [place] family. Nurse will continue to monitor and provide care as needed.

## 2018-09-27 ENCOUNTER — PATIENT OUTREACH (OUTPATIENT)
Dept: CASE MANAGEMENT | Age: 73
End: 2018-09-27

## 2018-09-27 NOTE — PROGRESS NOTES
9/27/18 pt is currently staying with their dtr. Verbalized that pt has mild to moderate swelling in his BLE. encourage to elevate BLE to help reduce swelling. on O2 4Lpm. Nurse encourage to call 911 if SOB progressively gets worse. encourage to communicate with PCP with all needs and concerns. Saw PCP recently but unable to verbalized dates to nurse. will be seeing again in 3 months. wife verbalized that they are in the process of getting their own place. Wife transport to all appointments as needed. HHN will be coming out to assist with care as needed. nurse will continue to monitor and provide care.

## 2018-10-01 ENCOUNTER — PATIENT OUTREACH (OUTPATIENT)
Dept: CASE MANAGEMENT | Age: 73
End: 2018-10-01

## 2018-10-01 NOTE — PROGRESS NOTES
10/1/18 @ 10am- Called and a lady answered, ask to speak with member, stated she would give the message to member. She would not identify herself.

## 2018-10-04 PROBLEM — E11.9 CONTROLLED TYPE 2 DIABETES MELLITUS WITHOUT COMPLICATION, WITHOUT LONG-TERM CURRENT USE OF INSULIN (HCC): Status: ACTIVE | Noted: 2018-10-04

## 2018-10-08 PROBLEM — E11.21 TYPE 2 DIABETES WITH NEPHROPATHY (HCC): Status: ACTIVE | Noted: 2018-10-08

## 2018-10-08 PROBLEM — R06.02 SHORTNESS OF BREATH: Status: ACTIVE | Noted: 2018-10-08

## 2018-10-08 PROBLEM — I50.22 CHRONIC SYSTOLIC HEART FAILURE (HCC): Status: ACTIVE | Noted: 2018-10-08

## 2018-10-12 ENCOUNTER — PATIENT OUTREACH (OUTPATIENT)
Dept: CASE MANAGEMENT | Age: 73
End: 2018-10-12

## 2018-10-12 NOTE — PROGRESS NOTES
10/12/18 Spoke with pts wife. verbalized that pt is doing well. pt and wife is living in apartment. Wife was going to work today and received call from pt that he fell. Pt did not hurt himself. Educate wife on not leaving pt alone. Wife verbalized that she will continue to stay home with pt and not go to work. wife drives pt to all appointments as needed. pt is able to ambulate without asssit. nurse educate on the importance of dangling feet at side of bed before ambulating. nurse will continue to monitor and provide care as needed.

## 2018-10-17 PROBLEM — N18.2 STAGE 2 CHRONIC KIDNEY DISEASE: Status: ACTIVE | Noted: 2018-10-17

## 2018-10-17 PROBLEM — J44.9 CHRONIC OBSTRUCTIVE PULMONARY DISEASE (HCC): Status: ACTIVE | Noted: 2018-10-17

## 2018-10-20 ENCOUNTER — HOSPITAL ENCOUNTER (INPATIENT)
Age: 73
LOS: 3 days | Discharge: HOME | DRG: 101 | End: 2018-10-23
Attending: EMERGENCY MEDICINE | Admitting: INTERNAL MEDICINE
Payer: MEDICARE

## 2018-10-20 ENCOUNTER — APPOINTMENT (OUTPATIENT)
Dept: GENERAL RADIOLOGY | Age: 73
DRG: 101 | End: 2018-10-20
Attending: EMERGENCY MEDICINE
Payer: MEDICARE

## 2018-10-20 ENCOUNTER — APPOINTMENT (OUTPATIENT)
Dept: CT IMAGING | Age: 73
DRG: 101 | End: 2018-10-20
Attending: EMERGENCY MEDICINE
Payer: MEDICARE

## 2018-10-20 DIAGNOSIS — R56.9 SEIZURE-LIKE ACTIVITY (HCC): Primary | ICD-10-CM

## 2018-10-20 DIAGNOSIS — N18.9 CHRONIC KIDNEY DISEASE, UNSPECIFIED CKD STAGE: ICD-10-CM

## 2018-10-20 PROBLEM — R77.8 ELEVATED TROPONIN: Status: ACTIVE | Noted: 2018-10-20

## 2018-10-20 PROBLEM — I27.20 PULMONARY HYPERTENSION (HCC): Chronic | Status: ACTIVE | Noted: 2018-10-20

## 2018-10-20 PROBLEM — R94.31 PROLONGED Q-T INTERVAL ON ECG: Status: ACTIVE | Noted: 2018-10-20

## 2018-10-20 LAB
ALBUMIN SERPL-MCNC: 3.3 G/DL (ref 3.2–4.6)
ALBUMIN/GLOB SERPL: 0.8 {RATIO} (ref 1.2–3.5)
ALP SERPL-CCNC: 121 U/L (ref 50–136)
ALT SERPL-CCNC: 26 U/L (ref 12–65)
ANION GAP SERPL CALC-SCNC: 10 MMOL/L (ref 7–16)
AST SERPL-CCNC: 27 U/L (ref 15–37)
BACTERIA URNS QL MICRO: 0 /HPF
BASOPHILS # BLD: 0 K/UL (ref 0–0.2)
BASOPHILS NFR BLD: 1 % (ref 0–2)
BILIRUB DIRECT SERPL-MCNC: 0.5 MG/DL
BILIRUB INDIRECT SERPL-MCNC: 0.8 MG/DL (ref 0–1.1)
BILIRUB SERPL-MCNC: 1.3 MG/DL (ref 0.2–1.1)
BILIRUB SERPL-MCNC: 1.3 MG/DL (ref 0.2–1.1)
BNP SERPL-MCNC: 1206 PG/ML
BUN SERPL-MCNC: 24 MG/DL (ref 8–23)
CALCIUM SERPL-MCNC: 8.8 MG/DL (ref 8.3–10.4)
CASTS URNS QL MICRO: NORMAL /LPF
CHLORIDE SERPL-SCNC: 107 MMOL/L (ref 98–107)
CO2 SERPL-SCNC: 27 MMOL/L (ref 21–32)
CREAT SERPL-MCNC: 2.06 MG/DL (ref 0.8–1.5)
DIFFERENTIAL METHOD BLD: ABNORMAL
EOSINOPHIL # BLD: 0.1 K/UL (ref 0–0.8)
EOSINOPHIL NFR BLD: 2 % (ref 0.5–7.8)
EPI CELLS #/AREA URNS HPF: 0 /HPF
ERYTHROCYTE [DISTWIDTH] IN BLOOD BY AUTOMATED COUNT: 19.6 %
GLOBULIN SER CALC-MCNC: 4.1 G/DL (ref 2.3–3.5)
GLUCOSE BLD STRIP.AUTO-MCNC: 94 MG/DL (ref 65–100)
GLUCOSE SERPL-MCNC: 78 MG/DL (ref 65–100)
HCT VFR BLD AUTO: 45.3 % (ref 41.1–50.3)
HGB BLD-MCNC: 14.1 G/DL (ref 13.6–17.2)
IMM GRANULOCYTES # BLD: 0 K/UL (ref 0–0.5)
IMM GRANULOCYTES NFR BLD AUTO: 0 % (ref 0–5)
LACTATE BLD-SCNC: 1.5 MMOL/L (ref 0.5–1.9)
LYMPHOCYTES # BLD: 2.5 K/UL (ref 0.5–4.6)
LYMPHOCYTES NFR BLD: 32 % (ref 13–44)
MCH RBC QN AUTO: 27.5 PG (ref 26.1–32.9)
MCHC RBC AUTO-ENTMCNC: 31.1 G/DL (ref 31.4–35)
MCV RBC AUTO: 88.3 FL (ref 79.6–97.8)
MONOCYTES # BLD: 0.8 K/UL (ref 0.1–1.3)
MONOCYTES NFR BLD: 10 % (ref 4–12)
NEUTS SEG # BLD: 4.3 K/UL (ref 1.7–8.2)
NEUTS SEG NFR BLD: 55 % (ref 43–78)
NRBC # BLD: 0 K/UL (ref 0–0.2)
PLATELET # BLD AUTO: 152 K/UL (ref 150–450)
PMV BLD AUTO: 9.9 FL (ref 9.4–12.3)
POTASSIUM SERPL-SCNC: 4 MMOL/L (ref 3.5–5.1)
PROT SERPL-MCNC: 7.4 G/DL (ref 6.3–8.2)
RBC # BLD AUTO: 5.13 M/UL (ref 4.23–5.6)
RBC #/AREA URNS HPF: 0 /HPF
SODIUM SERPL-SCNC: 144 MMOL/L (ref 136–145)
TROPONIN I SERPL-MCNC: 0.46 NG/ML (ref 0.02–0.05)
TROPONIN I SERPL-MCNC: 0.49 NG/ML (ref 0.02–0.05)
WBC # BLD AUTO: 7.7 K/UL (ref 4.3–11.1)
WBC URNS QL MICRO: NORMAL /HPF

## 2018-10-20 PROCEDURE — 36415 COLL VENOUS BLD VENIPUNCTURE: CPT

## 2018-10-20 PROCEDURE — 85025 COMPLETE CBC W/AUTO DIFF WBC: CPT

## 2018-10-20 PROCEDURE — 81003 URINALYSIS AUTO W/O SCOPE: CPT | Performed by: EMERGENCY MEDICINE

## 2018-10-20 PROCEDURE — 94640 AIRWAY INHALATION TREATMENT: CPT

## 2018-10-20 PROCEDURE — 81015 MICROSCOPIC EXAM OF URINE: CPT

## 2018-10-20 PROCEDURE — 77030027138 HC INCENT SPIROMETER -A

## 2018-10-20 PROCEDURE — 83880 ASSAY OF NATRIURETIC PEPTIDE: CPT

## 2018-10-20 PROCEDURE — 80053 COMPREHEN METABOLIC PANEL: CPT

## 2018-10-20 PROCEDURE — 73130 X-RAY EXAM OF HAND: CPT

## 2018-10-20 PROCEDURE — 82247 BILIRUBIN TOTAL: CPT

## 2018-10-20 PROCEDURE — 70450 CT HEAD/BRAIN W/O DYE: CPT

## 2018-10-20 PROCEDURE — 74011250636 HC RX REV CODE- 250/636: Performed by: INTERNAL MEDICINE

## 2018-10-20 PROCEDURE — 71046 X-RAY EXAM CHEST 2 VIEWS: CPT

## 2018-10-20 PROCEDURE — 65660000000 HC RM CCU STEPDOWN

## 2018-10-20 PROCEDURE — 94760 N-INVAS EAR/PLS OXIMETRY 1: CPT

## 2018-10-20 PROCEDURE — 74011250637 HC RX REV CODE- 250/637: Performed by: INTERNAL MEDICINE

## 2018-10-20 PROCEDURE — 99285 EMERGENCY DEPT VISIT HI MDM: CPT | Performed by: EMERGENCY MEDICINE

## 2018-10-20 PROCEDURE — 84484 ASSAY OF TROPONIN QUANT: CPT

## 2018-10-20 PROCEDURE — 74011000258 HC RX REV CODE- 258: Performed by: INTERNAL MEDICINE

## 2018-10-20 PROCEDURE — 74011000250 HC RX REV CODE- 250: Performed by: INTERNAL MEDICINE

## 2018-10-20 PROCEDURE — 93005 ELECTROCARDIOGRAM TRACING: CPT | Performed by: EMERGENCY MEDICINE

## 2018-10-20 PROCEDURE — 83605 ASSAY OF LACTIC ACID: CPT

## 2018-10-20 PROCEDURE — 82962 GLUCOSE BLOOD TEST: CPT

## 2018-10-20 RX ORDER — SODIUM CHLORIDE 0.9 % (FLUSH) 0.9 %
5-10 SYRINGE (ML) INJECTION EVERY 8 HOURS
Status: DISCONTINUED | OUTPATIENT
Start: 2018-10-20 | End: 2018-10-23 | Stop reason: HOSPADM

## 2018-10-20 RX ORDER — TORSEMIDE 20 MG/1
20 TABLET ORAL DAILY
Status: DISCONTINUED | OUTPATIENT
Start: 2018-10-21 | End: 2018-10-23 | Stop reason: HOSPADM

## 2018-10-20 RX ORDER — SODIUM CHLORIDE 0.9 % (FLUSH) 0.9 %
5-10 SYRINGE (ML) INJECTION AS NEEDED
Status: DISCONTINUED | OUTPATIENT
Start: 2018-10-20 | End: 2018-10-23 | Stop reason: HOSPADM

## 2018-10-20 RX ORDER — IPRATROPIUM BROMIDE AND ALBUTEROL SULFATE 2.5; .5 MG/3ML; MG/3ML
3 SOLUTION RESPIRATORY (INHALATION)
Status: DISCONTINUED | OUTPATIENT
Start: 2018-10-20 | End: 2018-10-23 | Stop reason: HOSPADM

## 2018-10-20 RX ORDER — INSULIN LISPRO 100 [IU]/ML
INJECTION, SOLUTION INTRAVENOUS; SUBCUTANEOUS
Status: DISCONTINUED | OUTPATIENT
Start: 2018-10-20 | End: 2018-10-23 | Stop reason: HOSPADM

## 2018-10-20 RX ORDER — ATORVASTATIN CALCIUM 10 MG/1
20 TABLET, FILM COATED ORAL
Status: DISCONTINUED | OUTPATIENT
Start: 2018-10-20 | End: 2018-10-23 | Stop reason: HOSPADM

## 2018-10-20 RX ORDER — FLUTICASONE PROPIONATE 50 MCG
2 SPRAY, SUSPENSION (ML) NASAL DAILY
Status: DISCONTINUED | OUTPATIENT
Start: 2018-10-21 | End: 2018-10-23 | Stop reason: HOSPADM

## 2018-10-20 RX ORDER — ALLOPURINOL 100 MG/1
100 TABLET ORAL DAILY
Status: DISCONTINUED | OUTPATIENT
Start: 2018-10-21 | End: 2018-10-23 | Stop reason: HOSPADM

## 2018-10-20 RX ORDER — ACETAMINOPHEN 325 MG/1
650 TABLET ORAL
Status: DISCONTINUED | OUTPATIENT
Start: 2018-10-20 | End: 2018-10-23 | Stop reason: HOSPADM

## 2018-10-20 RX ORDER — LISINOPRIL 5 MG/1
5 TABLET ORAL DAILY
Status: DISCONTINUED | OUTPATIENT
Start: 2018-10-21 | End: 2018-10-21

## 2018-10-20 RX ORDER — CARVEDILOL 3.12 MG/1
3.12 TABLET ORAL 2 TIMES DAILY WITH MEALS
Status: DISCONTINUED | OUTPATIENT
Start: 2018-10-20 | End: 2018-10-23 | Stop reason: HOSPADM

## 2018-10-20 RX ORDER — SPIRONOLACTONE 25 MG/1
25 TABLET ORAL DAILY
Status: DISCONTINUED | OUTPATIENT
Start: 2018-10-21 | End: 2018-10-21

## 2018-10-20 RX ORDER — PANTOPRAZOLE SODIUM 40 MG/1
40 TABLET, DELAYED RELEASE ORAL
Status: DISCONTINUED | OUTPATIENT
Start: 2018-10-21 | End: 2018-10-23 | Stop reason: HOSPADM

## 2018-10-20 RX ORDER — ONDANSETRON 2 MG/ML
4 INJECTION INTRAMUSCULAR; INTRAVENOUS
Status: DISCONTINUED | OUTPATIENT
Start: 2018-10-20 | End: 2018-10-23 | Stop reason: HOSPADM

## 2018-10-20 RX ADMIN — ATORVASTATIN CALCIUM 20 MG: 10 TABLET, FILM COATED ORAL at 22:40

## 2018-10-20 RX ADMIN — LEVETIRACETAM 1000 MG: 100 INJECTION, SOLUTION INTRAVENOUS at 19:34

## 2018-10-20 RX ADMIN — Medication 5 ML: at 22:42

## 2018-10-20 RX ADMIN — IPRATROPIUM BROMIDE AND ALBUTEROL SULFATE 3 ML: .5; 3 SOLUTION RESPIRATORY (INHALATION) at 23:56

## 2018-10-20 RX ADMIN — IPRATROPIUM BROMIDE AND ALBUTEROL SULFATE 3 ML: .5; 3 SOLUTION RESPIRATORY (INHALATION) at 21:06

## 2018-10-20 RX ADMIN — CARVEDILOL 3.12 MG: 3.12 TABLET, FILM COATED ORAL at 22:41

## 2018-10-20 NOTE — PROGRESS NOTES
10/20/18 1846 Dual Skin Pressure Injury Assessment Dual Skin Pressure Injury Assessment [facility]DL Second Care Provider (Based on [address] [place] [address]) Mani Rn

## 2018-10-20 NOTE — ED PROVIDER NOTES
17-year-old male with history of CAD, CHF, CKD presents via EMS status post mechanical trip and fall resulting in left upper front tooth w/ resultant seizure like activity <1 minute. Wife states that the fall and seizure activity were witnessed by both her daughter and herself. States the patient fell on the way to the restroom shortly after hitting his leg and oxygen tank. He states that after hitting his head began foaming at the mouth with tonic-clonic jerking of extremities. Denies any previous seizure-like activity. Family states the patient did have an episode of incontinence during this. Denies chest pain, shortness of breath, numbness, tingling, weakness, vision disturbance, neck pain, back pain, abdominal pain. The history is provided by the patient. No  was used. Fall The accident occurred 3 to 5 hours ago. The fall occurred while walking. He fell from a height of ground level. He landed on hard floor. There was no blood loss. The point of impact was the head. The pain is present in the head. The pain is at a severity of 2/10. The pain is mild. Associated symptoms include loss of consciousness. Pertinent negatives include no visual change, no fever, no numbness, no abdominal pain, no nausea, no vomiting, no hematuria, no headaches and no laceration. He has tried nothing for the symptoms. The treatment provided no relief. Past Medical History:  
Diagnosis Date  Acute CHF (congestive heart failure) ([address] 4/25/2015  Acute combined systolic and diastolic congestive heart failure ([address] 4/28/2015  Chronic obstructive pulmonary disease ([address]  De Quervain's tenosynovitis, right 4/28/2015  Dyspnea on exertion 6/28/2015  Elevated serum creatinine 4/25/2015  Elevated troponin 6/28/2015  History of coronary artery disease MI at 25 yo  
 History of right knee surgery   
 cartilage removal  
 History of shingles  Malignant hypertension 4/25/2015  MI (myocardial infarction) ([facility] [address] Past Surgical History:  
Procedure Laterality Date  HX HEART CATHETERIZATION  6/29/2015  
 no intervention  HX KNEE ARTHROSCOPY Right   
 removal of cartilage Family History:  
Problem Relation Age of Onset  Hypertension Mother  No Known Problems Father Social History Socioeconomic History  Marital status:  Spouse name: Not on file  Number of children: Not on file  Years of education: Not on file  Highest education level: Not on file Social Needs  Financial resource strain: Not on file  Food insecurity - worry: Not on file  Food insecurity - inability: Not on file  Transportation needs - medical: Not on file  Transportation needs - non-medical: Not on file Occupational History  Occupation: retired Tobacco Use  Smoking status: Former Smoker Packs/day: 0.25 Years: 20.00 Pack years: 5.00 Types: Cigarettes Last attempt to quit: 4/5/2015 Years since quitting: 3.5  Smokeless tobacco: Never Used Substance and Sexual Activity  Alcohol use: No  
  Alcohol/week: 0.0 oz  Drug use: No  
 Sexual activity: Not on file Other Topics Concern   Service No  
 Blood Transfusions No  
  Comment: no issues with receiving  Caffeine Concern No  
 Occupational Exposure Not Asked Arias Gonzalez Hazards Not Asked  Sleep Concern Not Asked  Stress Concern Not Asked  Weight Concern Not Asked  Special Diet No  
 Back Care Not Asked  Exercise No  
 Bike Helmet Not Asked [address] Yes  Self-Exams Not Asked Social History Narrative Lives with his wife ALLERGIES: Pcn [penicillins] Review of Systems Constitutional: Negative for chills and fever. HENT: Negative for congestion, facial swelling and sore throat. Respiratory: Negative for cough and shortness of breath. Cardiovascular: Positive for leg swelling. Negative for chest pain and palpitations. Gastrointestinal: Negative for abdominal pain, constipation, nausea and vomiting. Genitourinary: Negative for dysuria, flank pain and hematuria. Musculoskeletal: Negative for back pain, joint swelling, myalgias and neck pain. Skin: Negative for pallor and wound. Neurological: Positive for seizures and loss of consciousness. Negative for dizziness, weakness, numbness and headaches. Psychiatric/Behavioral: Negative for confusion. Vitals:  
 10/20/18 1342 BP: 100/65 Pulse: 61 Resp: 18 Temp: 97.4 °F (36.3 °C) SpO2: 100% Weight: 95.3 kg (210 lb) Height: 5' 9\" (1.753 m) Physical Exam  
Constitutional: He is oriented to person, place, and time. He appears well-developed and well-nourished. Patient in no acute distress. HENT:  
Head: Normocephalic and atraumatic. No evidence of basilar skull fracture. No hemotympanum. Midface stable. Evidence of chip to small region of tooth # 9 (L front tooth). No dental avulsion, dental subluxation. No epistaxis. No septal hematoma. Eyes: Conjunctivae and EOM are normal. Pupils are equal, round, and reactive to light. Neck: Normal range of motion. No JVD present. No tracheal deviation present. No midline Cspine TTP. FROM. Cardiovascular: Normal rate, regular rhythm and normal heart sounds. RRR. Pulses 2+ and equal throughout. Pulmonary/Chest: Effort normal and breath sounds normal.  
CTAB. Abdominal: Soft. Bowel sounds are normal.  
Soft, NTND. No rebound or guarding. No CVAT. Musculoskeletal: Normal range of motion. He exhibits edema. He exhibits no tenderness or deformity. 2+ pitting bilateral LE edema. No midline T-spine or L-spine tenderness. No step-off. No deformity. Full range of motion of all extremities. Neurological: He is alert and oriented to person, place, and time.  No cranial nerve deficit or sensory deficit. Coordination normal.  
Skin: No laceration noted. No abrasions, lacerations, hematomas, or contusions. Nursing note and vitals reviewed. MDM Number of Diagnoses or Management Options Chronic kidney disease, unspecified CKD stage: new and requires workup Seizure-like activity [facility]): new and requires workup Diagnosis management comments: CT head with no acute findings. Pt with CKD and chronically elevated BNP and trop. Pt w/ no hx of seizure like activity. Family concerned and request observation overnight. ECG with evidence of prolongation of QT from baseline. No seizure like activity in ED. Amount and/or Complexity of Data Reviewed Clinical lab tests: ordered and reviewed Tests in the radiology section of CPT®: ordered and reviewed Tests in the medicine section of CPT®: ordered and reviewed Review and summarize past medical records: yes Independent visualization of images, tracings, or specimens: yes Risk of Complications, Morbidity, and/or Mortality Presenting problems: moderate Diagnostic procedures: moderate Management options: moderate Patient Progress Patient progress: stable ED Course as of Oct 20 1725 Sat Oct 20, 2018  
1643 XR hand IMPRESSION: Degenerative changes without evidence of acute bony abnormality. [DF] 1643 CT head Impression: Unremarkable unenhanced CT scan of the brain. [DF] 1724 CXR Impression: Cardiac megaly with clear lungs. [DF] ED Course User Index 
[DF] Arleth Stratton MD  
 
 
EKG Date/Time: 10/20/2018 5:35 PM 
Performed by: Arleth Stratton MD 
Authorized by: Arleth Stratton MD  
 
ECG reviewed by ED Physician in the absence of a cardiologist: yes Rate:  
  ECG rate:  59 ECG rate assessment: bradycardic Rhythm:  
  Rhythm: sinus bradycardia QRS:  
  QRS axis:  Normal 
Conduction:  
  Conduction: normal   
ST segments:   ST segments:  Normal 
 T waves:  
  T waves: normal   
Comments:  
   QT/QTc 546/540 msec Results Include: 
 
Recent Results (from the past 24 hour(s)) EKG, 12 LEAD, INITIAL Collection Time: 10/20/18  1:49 PM  
Result Value Ref Range Ventricular Rate 59 BPM  
 Atrial Rate 59 BPM  
 P-R Interval 212 ms QRS Duration 122 ms  
 Q-T Interval 546 ms  
 QTC Calculation (Bezet) 540 ms Calculated P Axis 52 degrees Calculated R Axis -61 degrees Calculated T Axis 78 degrees Diagnosis Sinus bradycardia with 1st degree A-V block Possible Left atrial enlargement Left axis deviation Possible Inferior infarct (cited on or before 04-MAY-2018) Possible Anterior infarct (cited on or before 17-MAY-2018) Abnormal ECG When compared with ECG of 10-JUL-2018 15:39, 
QT has lengthened CBC WITH AUTOMATED DIFF Collection Time: 10/20/18  1:53 PM  
Result Value Ref Range WBC 7.7 4.3 - 11.1 K/uL  
 RBC 5.13 4.23 - 5.6 M/uL  
 HGB 14.1 13.6 - 17.2 g/dL HCT 45.3 41.1 - 50.3 % MCV 88.3 79.6 - 97.8 FL  
 MCH 27.5 26.1 - 32.9 PG  
 MCHC 31.1 (L) 31.4 - 35.0 g/dL  
 RDW 19.6 % PLATELET 256 190 - 574 K/uL MPV 9.9 9.4 - 12.3 FL ABSOLUTE NRBC 0.00 0.0 - 0.2 K/uL  
 DF AUTOMATED NEUTROPHILS 55 43 - 78 % LYMPHOCYTES 32 13 - 44 % MONOCYTES 10 4.0 - 12.0 % EOSINOPHILS 2 0.5 - 7.8 % BASOPHILS 1 0.0 - 2.0 % IMMATURE GRANULOCYTES 0 0.0 - 5.0 %  
 ABS. NEUTROPHILS 4.3 1.7 - 8.2 K/UL  
 ABS. LYMPHOCYTES 2.5 0.5 - 4.6 K/UL  
 ABS. MONOCYTES 0.8 0.1 - 1.3 K/UL  
 ABS. EOSINOPHILS 0.1 0.0 - 0.8 K/UL  
 ABS. BASOPHILS 0.0 0.0 - 0.2 K/UL  
 ABS. IMM. GRANS. 0.0 0.0 - 0.5 K/UL METABOLIC PANEL, COMPREHENSIVE Collection Time: 10/20/18  1:53 PM  
Result Value Ref Range Sodium 144 136 - 145 mmol/L Potassium 4.0 3.5 - 5.1 mmol/L Chloride 107 98 - 107 mmol/L  
 CO2 27 21 - 32 mmol/L Anion gap 10 7 - 16 mmol/L Glucose 78 65 - 100 mg/dL  BUN 24 (H) 8 - 23 MG/DL  
 Creatinine 2.06 (H) 0.8 - 1.5 MG/DL  
 GFR est AA 41 (L) >60 ml/min/1.73m2 GFR est non-AA 34 (L) >60 ml/min/1.73m2 Calcium 8.8 8.3 - 10.4 MG/DL Bilirubin, total 1.3 (H) 0.2 - 1.1 MG/DL  
 ALT (SGPT) 26 12 - 65 U/L  
 AST (SGOT) 27 15 - 37 U/L Alk. phosphatase 121 50 - 136 U/L Protein, total 7.4 6.3 - 8.2 g/dL Albumin 3.3 3.2 - 4.6 g/dL Globulin 4.1 (H) 2.3 - 3.5 g/dL A-G Ratio 0.8 (L) 1.2 - 3.5 BNP Collection Time: 10/20/18  1:53 PM  
Result Value Ref Range BNP 1,206 pg/mL TROPONIN I Collection Time: 10/20/18  1:53 PM  
Result Value Ref Range Troponin-I, Qt. 0.49 (HH) 0.02 - 0.05 NG/ML  
POC LACTIC ACID Collection Time: 10/20/18  1:56 PM  
Result Value Ref Range Lactic Acid (POC) 1.5 0.5 - 1.9 mmol/L  
URINE MICROSCOPIC Collection Time: 10/20/18  4:04 PM  
Result Value Ref Range WBC 0-3 0 /hpf  
 RBC 0 0 /hpf Epithelial cells 0 0 /hpf Bacteria 0 0 /hpf Casts 0-3 0 /lpf Juliann Braxton MD; 10/20/2018 @4:33 PM Voice dictation software was used during the making of this note. This software is not perfect and grammatical and other typographical errors may be present.   This note has not been proofread for errors. 
===================================================================

## 2018-10-20 NOTE — ED NOTES
TRANSFER - OUT REPORT: 
 
Verbal report given to Pako Alaniz on Mckayla Garduno.  being transferred to [facility] for routine progression of care Report consisted of patients Situation, Background, Assessment and  
Recommendations(SBAR). Information from the following report(s) SBAR, MAR and Recent Results was reviewed with the receiving nurse. Lines:  
Peripheral IV Right Antecubital (Active) Site Assessment Clean, dry, & intact 10/20/2018  4:36 PM  
Phlebitis Assessment 0 10/20/2018  4:36 PM  
Infiltration Assessment 0 10/20/2018  4:36 PM  
Dressing Status Clean, dry, & intact 10/20/2018  4:36 PM  
Dressing Type Transparent 10/20/2018  4:36 PM  
  
 
Opportunity for questions and clarification was provided.

## 2018-10-20 NOTE — ED TRIAGE NOTES
Pt arrives EMS via EMS after tripping and falling and chipping one of his front teeth and small abrasion to upper gum. BGL 90, /70. Ambulatory. EMS states his wife made him come. Family states he got dizzy this morning, daughter tells family that pt was \"foaming at mouth and looking upwards\". Pt then fell on the way to restroom shortly after and hit leg on oxygen tank. Lower extremities swollen, hx of CHF. Family states he did have an episode of incontinence during all this. Chronic kidney disease present.

## 2018-10-20 NOTE — H&P
Hospitalist H&P Note Admit Date:  10/20/2018  2:12 PM  
Name:  Hilda Dixon. Age:  74 y.o. 
:  1945 MRN:  [id number] PCP:  Cordell Kebede MD 
Treatment Team: Primary Nurse: Jose David Mckeon RN 
 
HPI:  
 
CC: seizure Mr. Debbie Cespedes is a 73 yo male with PMH of s/dCHF (EF 45%/ LVDD2) , DM2, HTN, CKD3, chronic hypoxic respiratory failure on 4 L home O2, untreated TAZ, CAD who is evaluated after 2 episodes of seizure like activity today. He was sitting at breakfast when he had episode of mouth movements and was less responsive. EMS was called, his vitals were ok and no ED visit made. He had second episode while ambulating to the bathroom in which he tripped over the O2 tank with subsequently hitting his head and having episode of seizure like activity with accompanying urine incontinence. He was confused upon awakening. Wife reports he was foaming at the mouth. Denies vision changes, headache, near syncope sensation, chest pain or dyspnea. He did hit his mouth and chipped his tooth as result. He had negative CT head and has elevated BNP/ troponin chronically. He was recently seen by cardiology and diuretics increased. BP borderline low and he has slightly elevated creatinine above his normal. EKG tracing personally reviewed as sinus bradycardia with prolonged QTC. 10 systems reviewed and negative except as noted in HPI. - has arthritis, weight gain, darkending of skin and edema Past Medical History:  
Diagnosis Date  Acute CHF (congestive heart failure) ([address] 2015  Acute combined systolic and diastolic congestive heart failure ([address] 2015  Chronic obstructive pulmonary disease ([facility]  De Quervain's tenosynovitis, right 2015  Dyspnea on exertion 2015  Elevated serum creatinine 2015  Elevated troponin 2015  History of coronary artery disease  MI at 25 yo  
 History of right knee surgery   
 cartilage removal  
  History of shingles  Malignant hypertension 2015  MI (myocardial infarction) ([facility] Past Surgical History:  
Procedure Laterality Date  HX HEART CATHETERIZATION  2015  
 no intervention  HX KNEE ARTHROSCOPY Right   
 removal of cartilage Allergies Allergen Reactions  Pcn [Penicillins] Other (comments) \"makes my heart stop\" Social History Tobacco Use  Smoking status: Former Smoker Packs/day: 0.25 Years: 20.00 Pack years: 5.00 Types: Cigarettes Last attempt to quit: 2015 Years since quitting: 3.5  Smokeless tobacco: Never Used Substance Use Topics  Alcohol use: No  
  Alcohol/week: 0.0 oz Family History Problem Relation Age of Onset  Hypertension Mother  No Known Problems Father DM2, HTN, CAD, Cancer Immunization History Administered Date(s) Administered  Influenza Vaccine 2016  Influenza Vaccine (Quad) PF 10/04/2018  Pneumococcal Conjugate (PCV-13) 09/10/2018  Pneumococcal Polysaccharide (PPSV-23) 2016  TB Skin Test (PPD) Intradermal 2018, 2018, 2018, 2018 PTA Medications: 
Prior to Admission Medications Prescriptions Last Dose Informant Patient Reported? Taking? albuterol (VENTOLIN HFA) 90 mcg/actuation inhaler   No No  
Sig: Take 2 Puffs by inhalation four (4) times daily. albuterol-ipratropium (DUO-NEB) 2.5 mg-0.5 mg/3 ml nebu   No No  
Sig: 3 mL by Nebulization route four (4) times daily. Diaignosis--J44.9  
allopurinol (ZYLOPRIM) 100 mg tablet   No No  
Sig: TAKE 1 TABLET BY MOUTH EVERY DAY  
atorvastatin (LIPITOR) 20 mg tablet   No No  
Sig: Take 1 Tab by mouth nightly. carvedilol (COREG) 3.125 mg tablet   No No  
Sig: Take 1 Tab by mouth two (2) times daily (with meals). fluticasone (FLONASE) 50 mcg/actuation nasal spray   No No  
Si Sprays by Both Nostrils route daily.   
lisinopril (PRINIVIL, ZESTRIL) 5 mg tablet   No No  
 Sig: Take 1 Tab by mouth daily. melatonin 3 mg tablet   Yes No  
Sig: Take 3 mg by mouth nightly. omeprazole (PRILOSEC) 20 mg capsule   No No  
Sig: Take 1 Cap by mouth daily. polyethylene glycol (MIRALAX) 17 gram packet   No No  
Sig: Take 1 Packet by mouth daily. Patient taking differently: Take 17 g by mouth daily as needed. spironolactone (ALDACTONE) 25 mg tablet   No No  
Sig: Take 1 Tab by mouth daily. torsemide (DEMADEX) 20 mg tablet   No No  
Sig: Take 1 Tab by mouth daily. Facility-Administered Medications: None Objective:  
 
Patient Vitals for the past 24 hrs: 
 Temp Pulse Resp BP SpO2  
10/20/18 1342 97.4 °F (36.3 °C) 61 18 100/65 100 % Oxygen Therapy O2 Sat (%): 100 % (10/20/18 1342) O2 Device: Room air (10/20/18 1342) No intake or output data in the 24 hours ending 10/20/18 1755 Physical Exam: 
General:    Alert. No distress, pleasant Eyes:   Normal sclera. Extraocular movements intact. PERRLA 
ENT:  Normocephalic, atraumatic. Moist mucous membranes CV:   RRR. No m/r/g. trace edema Lungs:  CTAB. No wheezing, rhonchi, or rales. Abdomen: Soft, nontender, nondistended. Present BS Extremities: Warm and dry. . 
Neurologic:  grossly intact. Skin:     No rashes or jaundice. Normal coloration Psych:  Normal mood and affect. I reviewed the labs, imaging, EKGs, telemetry, and other studies done this admission. Data Review:  
Recent Results (from the past 24 hour(s)) EKG, 12 LEAD, INITIAL Collection Time: 10/20/18  1:49 PM  
Result Value Ref Range Ventricular Rate 59 BPM  
 Atrial Rate 59 BPM  
 P-R Interval 212 ms QRS Duration 122 ms  
 Q-T Interval 546 ms  
 QTC Calculation (Bezet) 540 ms Calculated P Axis 52 degrees Calculated R Axis -61 degrees Calculated T Axis 78 degrees Diagnosis Sinus bradycardia with 1st degree A-V block Possible Left atrial enlargement Left axis deviation Possible Inferior infarct (cited on or before 04-MAY-2018) Possible Anterior infarct (cited on or before 17-MAY-2018) Abnormal ECG When compared with ECG of 10-JUL-2018 15:39, 
QT has lengthened CBC WITH AUTOMATED DIFF Collection Time: 10/20/18  1:53 PM  
Result Value Ref Range WBC 7.7 4.3 - 11.1 K/uL  
 RBC 5.13 4.23 - 5.6 M/uL  
 HGB 14.1 13.6 - 17.2 g/dL HCT 45.3 41.1 - 50.3 % MCV 88.3 79.6 - 97.8 FL  
 MCH 27.5 26.1 - 32.9 PG  
 MCHC 31.1 (L) 31.4 - 35.0 g/dL  
 RDW 19.6 % PLATELET 266 482 - 404 K/uL MPV 9.9 9.4 - 12.3 FL ABSOLUTE NRBC 0.00 0.0 - 0.2 K/uL  
 DF AUTOMATED NEUTROPHILS 55 43 - 78 % LYMPHOCYTES 32 13 - 44 % MONOCYTES 10 4.0 - 12.0 % EOSINOPHILS 2 0.5 - 7.8 % BASOPHILS 1 0.0 - 2.0 % IMMATURE GRANULOCYTES 0 0.0 - 5.0 %  
 ABS. NEUTROPHILS 4.3 1.7 - 8.2 K/UL  
 ABS. LYMPHOCYTES 2.5 0.5 - 4.6 K/UL  
 ABS. MONOCYTES 0.8 0.1 - 1.3 K/UL  
 ABS. EOSINOPHILS 0.1 0.0 - 0.8 K/UL  
 ABS. BASOPHILS 0.0 0.0 - 0.2 K/UL  
 ABS. IMM. GRANS. 0.0 0.0 - 0.5 K/UL METABOLIC PANEL, COMPREHENSIVE Collection Time: 10/20/18  1:53 PM  
Result Value Ref Range Sodium 144 136 - 145 mmol/L Potassium 4.0 3.5 - 5.1 mmol/L Chloride 107 98 - 107 mmol/L  
 CO2 27 21 - 32 mmol/L Anion gap 10 7 - 16 mmol/L Glucose 78 65 - 100 mg/dL BUN 24 (H) 8 - 23 MG/DL Creatinine 2.06 (H) 0.8 - 1.5 MG/DL  
 GFR est AA 41 (L) >60 ml/min/1.73m2 GFR est non-AA 34 (L) >60 ml/min/1.73m2 Calcium 8.8 8.3 - 10.4 MG/DL Bilirubin, total 1.3 (H) 0.2 - 1.1 MG/DL  
 ALT (SGPT) 26 12 - 65 U/L  
 AST (SGOT) 27 15 - 37 U/L Alk. phosphatase 121 50 - 136 U/L Protein, total 7.4 6.3 - 8.2 g/dL Albumin 3.3 3.2 - 4.6 g/dL Globulin 4.1 (H) 2.3 - 3.5 g/dL A-G Ratio 0.8 (L) 1.2 - 3.5 BNP Collection Time: 10/20/18  1:53 PM  
Result Value Ref Range BNP 1,206 pg/mL TROPONIN I Collection Time: 10/20/18  1:53 PM  
Result Value Ref Range Troponin-I, Qt. 0.49 (HH) 0.02 - 0.05 NG/ML  
POC LACTIC ACID Collection Time: 10/20/18  1:56 PM  
Result Value Ref Range Lactic Acid (POC) 1.5 0.5 - 1.9 mmol/L  
URINE MICROSCOPIC Collection Time: 10/20/18  4:04 PM  
Result Value Ref Range WBC 0-3 0 /hpf  
 RBC 0 0 /hpf Epithelial cells 0 0 /hpf Bacteria 0 0 /hpf Casts 0-3 0 /lpf All Micro Results None Other Studies: Xr Chest Pa Lat Result Date: 10/20/2018 Two view chest History: Chest pain. Comparison: 07/10/2018 Findings: The heart is enlarged, unchanged. The lungs and pleural spaces are clear. The pulmonary vascularity is within normal limits. The visualized osseous structures are unremarkable. Impression: Cardiac megaly with clear lungs. Xr Hand Lt Min 3 V Result Date: 10/20/2018 Left hand series HISTORY: Pain after trauma. 3 views of the left hand were obtained. No prior studies are available for comparison. FINDINGS: There is no evidence of acute fracture or dislocation. There are degenerative changes at the DIP joints as well as at the first MCP joint. There is no significant soft tissue swelling. There is no bony destruction. IMPRESSION: Degenerative changes without evidence of acute bony abnormality. Ct Head Wo Cont Result Date: 10/20/2018 CT head without contrast History: possible seizure. Dizziness this morning. Fall Technique: 5mm axial images were obtained from the skull base to the vertex without intravenous contrast.  Radiation dose reduction techniques were used for this study:  Our CT scanners use one or all of the following: Automated exposure control, adjustment of the mA and/or kVp according to patient's size, iterative reconstruction. Comparison: 12/27/2017 Findings: The ventricles and sulci are normal in size and configuration. There are no extra-axial fluid collections. There is no evidence to suggest an acute major territorial infarct.  There is no evidence of acute intraparenchymal hemorrhage or mass effect. The bony calvarium is intact. The visualized mastoid air cells and paranasal sinuses are well pneumatized and aerated. Impression: Unremarkable unenhanced CT scan of the brain. Assessment and Plan:  
 
Hospital Problems as of 10/20/2018 Date Reviewed: 6/6/2018 Codes Class Noted - Resolved POA * (Principal) Seizure ([facility] ICD-10-CM: R56.9 ICD-9-CM: 780.39  10/20/2018 - Present Yes Prolonged Q-T interval on ECG ICD-10-CM: R94.31 
ICD-9-CM: 794.31  10/20/2018 - Present Yes Elevated troponin ICD-10-CM: R74.8 ICD-9-CM: 790.6  10/20/2018 - Present Yes Pulmonary hypertension (HCC) (Chronic) ICD-10-CM: I27.20 ICD-9-CM: 416.8  10/20/2018 - Present Yes Chronic obstructive pulmonary disease (HCC) ICD-10-CM: J44.9 ICD-9-CM: 545  10/17/2018 - Present Yes Chronic systolic heart failure (HCC) ICD-10-CM: I50.22 ICD-9-CM: 428.22  10/8/2018 - Present Yes Controlled type 2 diabetes mellitus without complication, without long-term current use of insulin ([facility] ICD-10-CM: E11.9 ICD-9-CM: 250.00  10/4/2018 - Present Yes  
   
 TAZ (obstructive sleep apnea) ICD-10-CM: A94.79 
ICD-9-CM: 327.23  10/2/2017 - Present Yes  
   
 CKD (chronic kidney disease) stage 3, GFR 30-59 ml/min (HCC) (Chronic) ICD-10-CM: N18.3 ICD-9-CM: 585.3  9/29/2017 - Present Yes COPD, moderate ([facility] (Chronic) ICD-10-CM: J44.9 ICD-9-CM: 463  9/29/2017 - Present Yes Overview Signed 12/27/2015 12:38 PM by Thee Gonsalez NP Complete PFTs: 7/20/15: 
Spirometry is consistent with a moderate obstructive/restrictive defect. . The residual volume is increased relative to other lung volumes suggesting air-trapping. The diffusion capacity corrected for alveolar volume was normal suggesting no loss of alveolo-capillary units. CHF (congestive heart failure) (HCC) ICD-10-CM: I50.9 ICD-9-CM: 428.0  9/27/2017 - Present Yes Essential hypertension ICD-10-CM: I10 
ICD-9-CM: 401.9  9/28/2016 - Present Yes · Seizures: with 2 events, will start IV keppra, teleneuro consult, seizure precautions and neuro checks · S/d CHF: stable and compensated · CAD/Elevated troponin: no indication for acute ischemia, has chronically elevated troponin, will trend labs · Prolonged QTC: he is not taking any medications that might contribute, will followup tele and check EKG tomorrow, consider cardio consult pending course · Possible syncope: monitor on tele, check orthostatics · CKD3: slightly above his baseline, followup BMP · Chronic COPD with chronic hypoxic respiratory failure: stable · TAZ: cpap noncompliant · HTN: home meds, hold is low hemodynamics · DM2: POC and SSI Discharge planning:  Pending DVT ppx: SCD Code status:  Full Estimated LOS:  Greater than 2 midnights Risk:  high Care plan: wife Kalee Payer [phone] Signed: Keyana Johns MD

## 2018-10-21 LAB
ANION GAP SERPL CALC-SCNC: 11 MMOL/L (ref 7–16)
ATRIAL RATE: 59 BPM
ATRIAL RATE: 73 BPM
BASOPHILS # BLD: 0.1 K/UL (ref 0–0.2)
BASOPHILS NFR BLD: 1 % (ref 0–2)
BUN SERPL-MCNC: 27 MG/DL (ref 8–23)
CALCIUM SERPL-MCNC: 8.5 MG/DL (ref 8.3–10.4)
CALCULATED P AXIS, ECG09: 52 DEGREES
CALCULATED P AXIS, ECG09: 67 DEGREES
CALCULATED R AXIS, ECG10: -61 DEGREES
CALCULATED R AXIS, ECG10: -67 DEGREES
CALCULATED T AXIS, ECG11: 78 DEGREES
CALCULATED T AXIS, ECG11: 84 DEGREES
CHLORIDE SERPL-SCNC: 108 MMOL/L (ref 98–107)
CO2 SERPL-SCNC: 24 MMOL/L (ref 21–32)
CREAT SERPL-MCNC: 2.07 MG/DL (ref 0.8–1.5)
DIAGNOSIS, 93000: NORMAL
DIAGNOSIS, 93000: NORMAL
DIFFERENTIAL METHOD BLD: ABNORMAL
EOSINOPHIL # BLD: 0.2 K/UL (ref 0–0.8)
EOSINOPHIL NFR BLD: 2 % (ref 0.5–7.8)
ERYTHROCYTE [DISTWIDTH] IN BLOOD BY AUTOMATED COUNT: 19.2 %
GLUCOSE BLD STRIP.AUTO-MCNC: 109 MG/DL (ref 65–100)
GLUCOSE BLD STRIP.AUTO-MCNC: 117 MG/DL (ref 65–100)
GLUCOSE BLD STRIP.AUTO-MCNC: 119 MG/DL (ref 65–100)
GLUCOSE BLD STRIP.AUTO-MCNC: 83 MG/DL (ref 65–100)
GLUCOSE SERPL-MCNC: 92 MG/DL (ref 65–100)
HCT VFR BLD AUTO: 42 % (ref 41.1–50.3)
HGB BLD-MCNC: 13.1 G/DL (ref 13.6–17.2)
IMM GRANULOCYTES # BLD: 0 K/UL (ref 0–0.5)
IMM GRANULOCYTES NFR BLD AUTO: 0 % (ref 0–5)
LYMPHOCYTES # BLD: 2.7 K/UL (ref 0.5–4.6)
LYMPHOCYTES NFR BLD: 32 % (ref 13–44)
MCH RBC QN AUTO: 27.3 PG (ref 26.1–32.9)
MCHC RBC AUTO-ENTMCNC: 31.2 G/DL (ref 31.4–35)
MCV RBC AUTO: 87.7 FL (ref 79.6–97.8)
MONOCYTES # BLD: 1 K/UL (ref 0.1–1.3)
MONOCYTES NFR BLD: 11 % (ref 4–12)
NEUTS SEG # BLD: 4.5 K/UL (ref 1.7–8.2)
NEUTS SEG NFR BLD: 54 % (ref 43–78)
NRBC # BLD: 0.02 K/UL (ref 0–0.2)
P-R INTERVAL, ECG05: 204 MS
P-R INTERVAL, ECG05: 212 MS
PLATELET # BLD AUTO: 153 K/UL (ref 150–450)
PMV BLD AUTO: 10.7 FL (ref 9.4–12.3)
POTASSIUM SERPL-SCNC: 3.9 MMOL/L (ref 3.5–5.1)
Q-T INTERVAL, ECG07: 494 MS
Q-T INTERVAL, ECG07: 546 MS
QRS DURATION, ECG06: 122 MS
QRS DURATION, ECG06: 122 MS
QTC CALCULATION (BEZET), ECG08: 540 MS
QTC CALCULATION (BEZET), ECG08: 544 MS
RBC # BLD AUTO: 4.79 M/UL (ref 4.23–5.6)
SODIUM SERPL-SCNC: 143 MMOL/L (ref 136–145)
TROPONIN I SERPL-MCNC: 0.35 NG/ML (ref 0.02–0.05)
TROPONIN I SERPL-MCNC: 0.45 NG/ML (ref 0.02–0.05)
VENTRICULAR RATE, ECG03: 59 BPM
VENTRICULAR RATE, ECG03: 73 BPM
WBC # BLD AUTO: 8.4 K/UL (ref 4.3–11.1)

## 2018-10-21 PROCEDURE — 93005 ELECTROCARDIOGRAM TRACING: CPT | Performed by: INTERNAL MEDICINE

## 2018-10-21 PROCEDURE — 74011250636 HC RX REV CODE- 250/636: Performed by: INTERNAL MEDICINE

## 2018-10-21 PROCEDURE — G8979 MOBILITY GOAL STATUS: HCPCS

## 2018-10-21 PROCEDURE — 94760 N-INVAS EAR/PLS OXIMETRY 1: CPT

## 2018-10-21 PROCEDURE — G8980 MOBILITY D/C STATUS: HCPCS

## 2018-10-21 PROCEDURE — 74011250637 HC RX REV CODE- 250/637: Performed by: INTERNAL MEDICINE

## 2018-10-21 PROCEDURE — 85025 COMPLETE CBC W/AUTO DIFF WBC: CPT

## 2018-10-21 PROCEDURE — 74011000258 HC RX REV CODE- 258: Performed by: INTERNAL MEDICINE

## 2018-10-21 PROCEDURE — 65660000000 HC RM CCU STEPDOWN

## 2018-10-21 PROCEDURE — 74011000250 HC RX REV CODE- 250: Performed by: INTERNAL MEDICINE

## 2018-10-21 PROCEDURE — 95816 EEG AWAKE AND DROWSY: CPT | Performed by: INTERNAL MEDICINE

## 2018-10-21 PROCEDURE — 84484 ASSAY OF TROPONIN QUANT: CPT

## 2018-10-21 PROCEDURE — 97161 PT EVAL LOW COMPLEX 20 MIN: CPT

## 2018-10-21 PROCEDURE — 80048 BASIC METABOLIC PNL TOTAL CA: CPT

## 2018-10-21 PROCEDURE — 82962 GLUCOSE BLOOD TEST: CPT

## 2018-10-21 PROCEDURE — 77030032490 HC SLV COMPR SCD KNE COVD -B

## 2018-10-21 PROCEDURE — 77010033678 HC OXYGEN DAILY

## 2018-10-21 PROCEDURE — 94640 AIRWAY INHALATION TREATMENT: CPT

## 2018-10-21 PROCEDURE — 36415 COLL VENOUS BLD VENIPUNCTURE: CPT

## 2018-10-21 PROCEDURE — G8978 MOBILITY CURRENT STATUS: HCPCS

## 2018-10-21 PROCEDURE — 97530 THERAPEUTIC ACTIVITIES: CPT

## 2018-10-21 RX ADMIN — IPRATROPIUM BROMIDE AND ALBUTEROL SULFATE 3 ML: .5; 3 SOLUTION RESPIRATORY (INHALATION) at 23:15

## 2018-10-21 RX ADMIN — ATORVASTATIN CALCIUM 20 MG: 10 TABLET, FILM COATED ORAL at 21:45

## 2018-10-21 RX ADMIN — LEVETIRACETAM 1000 MG: 100 INJECTION, SOLUTION INTRAVENOUS at 21:18

## 2018-10-21 RX ADMIN — LISINOPRIL 5 MG: 5 TABLET ORAL at 08:09

## 2018-10-21 RX ADMIN — IPRATROPIUM BROMIDE AND ALBUTEROL SULFATE 3 ML: .5; 3 SOLUTION RESPIRATORY (INHALATION) at 19:26

## 2018-10-21 RX ADMIN — ALLOPURINOL 100 MG: 100 TABLET ORAL at 08:09

## 2018-10-21 RX ADMIN — Medication 10 ML: at 15:01

## 2018-10-21 RX ADMIN — IPRATROPIUM BROMIDE AND ALBUTEROL SULFATE 3 ML: .5; 3 SOLUTION RESPIRATORY (INHALATION) at 15:24

## 2018-10-21 RX ADMIN — LEVETIRACETAM 1000 MG: 100 INJECTION, SOLUTION INTRAVENOUS at 08:16

## 2018-10-21 RX ADMIN — PANTOPRAZOLE SODIUM 40 MG: 40 TABLET, DELAYED RELEASE ORAL at 04:32

## 2018-10-21 RX ADMIN — Medication 5 ML: at 04:31

## 2018-10-21 RX ADMIN — Medication 5 ML: at 21:19

## 2018-10-21 RX ADMIN — IPRATROPIUM BROMIDE AND ALBUTEROL SULFATE 3 ML: .5; 3 SOLUTION RESPIRATORY (INHALATION) at 11:48

## 2018-10-21 RX ADMIN — IPRATROPIUM BROMIDE AND ALBUTEROL SULFATE 3 ML: .5; 3 SOLUTION RESPIRATORY (INHALATION) at 07:33

## 2018-10-21 RX ADMIN — TORSEMIDE 20 MG: 20 TABLET ORAL at 08:09

## 2018-10-21 RX ADMIN — CARVEDILOL 3.12 MG: 3.12 TABLET, FILM COATED ORAL at 08:09

## 2018-10-21 RX ADMIN — ACETAMINOPHEN 650 MG: 325 TABLET, FILM COATED ORAL at 21:46

## 2018-10-21 RX ADMIN — IPRATROPIUM BROMIDE AND ALBUTEROL SULFATE 3 ML: .5; 3 SOLUTION RESPIRATORY (INHALATION) at 04:53

## 2018-10-21 RX ADMIN — CARVEDILOL 3.12 MG: 3.12 TABLET, FILM COATED ORAL at 19:17

## 2018-10-21 NOTE — PROGRESS NOTES
Problem: Falls - Risk of 
Goal: *Absence of Falls Document Reyes Yates Fall Risk and appropriate interventions in the flowsheet. Outcome: Progressing Towards Goal 
Fall Risk Interventions: 
Mobility Interventions: Bed/chair exit alarm, Patient to call before getting OOB Medication Interventions: Evaluate medications/consider consulting pharmacy, Patient to call before getting OOB Elimination Interventions: Call light in reach, Patient to call for help with toileting needs

## 2018-10-21 NOTE — CONSULTS
[facility] Cardiology Consult                Date of  Admission: 10/20/2018  2:12 PM     Primary Care Physician: Dr Ana Lilia Anderson  Primary Cardiologist: Dr Alexey Esparza  Referring Physician: Dr Mark Roberts  Consulting Physician: Dr Manjinder Gonzalez    CC/Reason for consult: Near syncope with NINO on CKD, diuresis, and prolonged QTC      Felicia Maldonado is a 74 y.o. male with a hx of CKD stage III, HTN, COPD, HLD, TAZ, CHF, pulmonary HTN, elevated bnp, elevated trop I, and MI at 27 yo. The patient presented to the ED with two episodes of seizures today described as mouth movements with less responsive. The patient went up to go to the bathroom he tripped over his O2 tank fell, and had seizure like activity and was incontinent of urine. He had confusion when he woke and was transported to the hospital.  Initial head CT was negative and the patient has a hx of elevated trop I and BNP. The patient family reports a recent increased dieretic use at home for possible fluid overload. Today the patient states he feels fine with no complaints. He denies CP, SOB, weakness, dizziness, AMS, blurred vision, n/v/d, ha, or pain from his fall. He states that he remembers the whole event but his family that was there said he had a state of confusion and asked what happened after he feel. LHC: 1/2/2018 Mild CAD unchanged anatomy  Echo: 7/11/2018 Left ventricle: Systolic function was mildly reduced. Ejection fraction  was estimated to be 45 %. There was mild diffuse hypokinesis. There was moderate concentric hypertrophy. The E/e' ratio was 19.95. There was Grade III  Diastolic Dysfunction. Right ventricle: The ventricle was mildly to moderately dilated. Systolic function was moderately reduced. Left atrium: The atrium was moderately to markedly dilated.  Right atrium: The atrium was markedly dilated.  Inferior vena cava, hepatic veins: The inferior vena cava was dilated. The  respirophasic change in diameter was less than 50%.    Mitral valve: There was mild to moderate regurgitation.  Tricuspid valve: There was moderate regurgitation.   Pulmonic valve: There was mild to moderate regurgitation.  Pericardium: A trivial pericardial effusion was identified. Trop I:  0.35 trending down from 0.45  EKG: S.  With PVC now ST Elevation  Cr 2.07    Patient Active Problem List   Diagnosis Code    CKD (chronic kidney disease) stage 3, GFR 30-59 ml/min ([facility]) N18.3    Acute on chronic systolic heart failure ([facility]) I50.23    Coronary atherosclerosis of native coronary vessel I25.10    Arthritis M19.90    Hypertension I10    COPD, moderate ([facility]) J44.9    High triglycerides E78.1    Gastroesophageal reflux disease without esophagitis K21.9    Mixed hyperlipidemia E78.2    Essential hypertension I10    CHF (congestive heart failure) ([facility]) I50.9    TAZ (obstructive sleep apnea) G47.33    Atherosclerosis of native coronary artery of native heart with stable angina pectoris ([facility]) I25.118    Personal history of tobacco use Z87.891    Erysipelas A46    Preseptal cellulitis L03.213    Acute respiratory failure with hypoxemia ([facility]) J96.01    Controlled type 2 diabetes mellitus without complication, without long-term current use of insulin ([facility]) E11.9    Type 2 diabetes with nephropathy ([facility]) E11.21    Chronic systolic heart failure ([facility]) I50.22    Shortness of breath R06.02    Stage 2 chronic kidney disease N18.2    Chronic obstructive pulmonary disease ([facility]) J44.9    Seizure ([facility]) R56.9    Prolonged Q-T interval on ECG R94.31    Elevated troponin R74.8    Pulmonary hypertension ([facility]) I27.20       Past Medical History:   Diagnosis Date    Acute CHF (congestive heart failure) ([facility] [address] 4/25/2015    Acute combined systolic and diastolic congestive heart failure ([facility] [address] 4/28/2015    Chronic obstructive pulmonary disease ([facility])     De Quervain's tenosynovitis, right 4/28/2015    Dyspnea on exertion 6/28/2015    Elevated serum creatinine 4/25/2015    Elevated troponin 6/28/2015    History of coronary artery disease     MI at 25 yo    History of right knee surgery     cartilage removal    History of shingles     Malignant hypertension 4/25/2015    MI (myocardial infarction) [facility])       Past Surgical History:   Procedure Laterality Date    HX HEART CATHETERIZATION  6/29/2015    no intervention    HX KNEE ARTHROSCOPY Right     removal of cartilage     Allergies   Allergen Reactions    Pcn [Penicillins] Other (comments)     \"makes my heart stop\"      Family History   Problem Relation Age of Onset    Hypertension Mother     No Known Problems Father       Social History     Socioeconomic History    Marital status:      Spouse name: Not on file    Number of children: Not on file    Years of education: Not on file    Highest education level: Not on file   Social Needs    Financial resource strain: Not on file    Food insecurity - worry: Not on file    Food insecurity - inability: Not on file   Zuujit needs - medical: Not on file   Zuujit needs - non-medical: Not on file   Occupational History    Occupation: retired   Tobacco Use    Smoking status: Former Smoker     Packs/day: 0.25     Years: 20.00     Pack years: 5.00     Types: Cigarettes     Last attempt to quit: 4/5/2015     Years since quitting: 3.5    Smokeless tobacco: Never Used   Substance and Sexual Activity    Alcohol use: No     Alcohol/week: 0.0 oz    Drug use: No    Sexual activity: Not on file   Other Topics Concern     Service No    Blood Transfusions No     Comment: no issues with receiving    Caffeine Concern No    Occupational Exposure Not Asked    Hobby Hazards Not Asked    Sleep Concern Not Asked    Stress Concern Not Asked    Weight Concern Not Asked    Special Diet No    Back Care Not Asked    Exercise No    Bike Helmet Not Asked    Seat Belt Yes    Self-Exams Not Asked   Social History Narrative    Lives with his wife       Current Facility-Administered Medications   Medication Dose Route Frequency    albuterol-ipratropium (DUO-NEB) 2.5 MG-0.5 MG/3 ML  3 mL Nebulization Q4H RT    allopurinol (ZYLOPRIM) tablet 100 mg  100 mg Oral DAILY    atorvastatin (LIPITOR) tablet 20 mg  20 mg Oral QHS    carvedilol (COREG) tablet 3.125 mg  3.125 mg Oral BID WITH MEALS    fluticasone (FLONASE) 50 mcg/actuation nasal spray 2 Spray  2 Spray Both Nostrils DAILY    lisinopril (PRINIVIL, ZESTRIL) tablet 5 mg  5 mg Oral DAILY    pantoprazole (PROTONIX) tablet 40 mg  40 mg Oral ACB    torsemide (DEMADEX) tablet 20 mg  20 mg Oral DAILY    sodium chloride (NS) flush 5-10 mL  5-10 mL IntraVENous Q8H    sodium chloride (NS) flush 5-10 mL  5-10 mL IntraVENous PRN    acetaminophen (TYLENOL) tablet 650 mg  650 mg Oral Q6H PRN    ondansetron (ZOFRAN) injection 4 mg  4 mg IntraVENous Q4H PRN    levETIRAcetam (KEPPRA) 1,000 mg in 0.9% sodium chloride 100 mL IVPB  1,000 mg IntraVENous Q12H    insulin lispro (HUMALOG) injection   SubCUTAneous AC&HS       Review of Systems   Constitution: Positive for chills, diaphoresis and fever. Negative for weight gain and weight loss. HENT: Negative. Eyes: Negative. Cardiovascular: Negative for chest pain (currently pain free), claudication, cyanosis, dyspnea on exertion, irregular heartbeat, leg swelling, near-syncope, orthopnea, palpitations, paroxysmal nocturnal dyspnea and syncope. Respiratory: Negative for cough, shortness of breath and wheezing. Endocrine: Negative. Skin: Negative. Musculoskeletal: Negative. Gastrointestinal: Negative for nausea and vomiting. Genitourinary: Negative. Neurological: Positive for seizures (per family post fall). Negative for dizziness, focal weakness and headaches. Psychiatric/Behavioral: Positive for altered mental status (per family post fall). Allergic/Immunologic: Negative.          Physical Exam  Vitals:    10/21/18 6810 10/21/18 8225 10/21/18 5440 10/21/18 1058   BP:  112/79  97/65   Pulse:  67  68   Resp:  20  18   Temp:  97.3 °F (36.3 °C)  97.4 °F (36.3 °C)   SpO2: 99% 99% 95% 95%   Weight:       Height:           Physical Exam:  General: Well Developed, Well Nourished, No Acute Distress  HEENT: pupils equal and round, no abnormalities noted  Neck: supple, no JVD, no carotid bruits  Heart: S1S2 with RRR without murmurs or gallops  Lungs: Clear throughout auscultation bilaterally without adventitious sounds  Abd: soft, nontender, nondistended, with good bowel sounds  Ext: warm, no edema, calves supple/nontender, pulses 2+ bilaterally  Skin: warm and dry  Psychiatric: Normal mood and affect  Neurologic: Alert and oriented X 3      Labs:   Recent Labs     10/21/18  0140 10/20/18  1353    144   K 3.9 4.0   BUN 27* 24*   CREA 2.07* 2.06*   GLU 92 78   WBC 8.4 7.7   HGB 13.1* 14.1   HCT 42.0 45.3    152       Echo Results  (Last 48 hours)    None        CXR Results  (Last 48 hours)               10/20/18 1702  XR CHEST PA LAT Final result    Impression:  Impression: Cardiac megaly with clear lungs. Narrative:  Two view chest       History: Chest pain. Comparison: 07/10/2018       Findings: The heart is enlarged, unchanged. The lungs and pleural spaces are   clear. The pulmonary vascularity is within normal limits. The visualized osseous   structures are unremarkable. Assessment/Plan:     Assessment:      Principal Problem:    Seizure ([facility] [address] (10/20/2018)    Active Problems:    CKD (chronic kidney disease) stage 3, GFR 30-59 ml/min ([facility]) (9/29/2017)      COPD, moderate ([address] (9/29/2017)      Overview: Complete PFTs: 7/20/15:      Spirometry is consistent with a moderate obstructive/restrictive defect. .       The residual volume is increased relative to other lung volumes suggesting       air-trapping.  The diffusion capacity corrected for alveolar volume was       normal suggesting no loss of alveolo-capillary units.       Essential hypertension (9/28/2016)      CHF (congestive heart failure) ([facility] [address] (9/27/2017) Cr Elevated from baseline of 1.4-1.6 and is now 2.06. Likely from increased diuresing. Lungs are clear by CXR and ascultation. The patient appears euvolemic  at this time. TAZ (obstructive sleep apnea) (10/2/2017)      Controlled type 2 diabetes mellitus without complication, without long-term current use of insulin ([facility] [address] (10/4/2018)      Chronic systolic heart failure ([facility] [address] (10/8/2018) Pt was on Demadex, Aldactone, Lisinopril, coreg as and outpatient. Pt is euvolemic at this time and will hold ACE and Aldactone. Chronic obstructive pulmonary disease (HCC) (10/17/2018)      Prolonged Q-T interval on ECG (10/20/2018) Pt is currently not on any prolonging medications. Will continue to monitor. Has a hx of QTC above 500 in the past.  May need outpatient monitor. Elevated troponin (10/20/2018) Chronic, No need to follow      Pulmonary hypertension ([facility] [address] (10/20/2018)    Comments:  Pt appears to be euvolemic at this time with recent hx of increased diuretics per family. This may be an added component to his falling. Will hold ACE and Aldactone for now with hypotension, and recent fall. Thank you very much for this referral. We appreciate the opportunity to participate in this patient's care. We will follow along with above stated plan.     Sanna Snellen, NP  Consulting MD: Rosendo Ferrari MD

## 2018-10-21 NOTE — PROGRESS NOTES
Problem: Mobility Impaired (Adult and Pediatric) Goal: *Acute Goals and Plan of Care (Insert Text) LTG: 
(1.)Mr. Leticia Alicia will move from supine to sit and sit to supine , scoot up and down and roll side to side in bed with INDEPENDENT within 3 treatment day(s). (2.)Mr. Leticia Alicia will transfer from bed to chair and chair to bed with INDEPENDENT using the least restrictive device within 3 treatment day(s). (3.)Mr. Leticia Alicia will ambulate with INDEPENDENT for 250+ feet with the least restrictive device within 3 treatment day(s). ________________________________________________________________________________________________ PHYSICAL THERAPY: Initial Assessment, Treatment Day: Day of Assessment, AM 10/21/2018INPATIENT: Hospital Day: 2 Payor: Kyree Long / Plan: [address] [place] [address] HMO / Product Type: Managed Care Medicare /  
  
NAME/AGE/GENDER: Yee Biggs is a 74 y.o. male PRIMARY DIAGNOSIS: Seizure ([address] Seizure ([address] Seizure ([address] Seizure ([address] ICD-10: Treatment Diagnosis:  
 · Generalized Muscle Weakness (M62.81) · Difficulty in walking, Not elsewhere classified (R26.2) Precaution/Allergies: 
Pcn [penicillins] ASSESSMENT:  
Mr. Leticia Alicia presents alert in supine with spouse asleep in recliner. He agreed to participate. He had O2 only in 1 nostril and sat was 89% on 4L which he uses at home. He was able to trnf to sitting with some dizziness and standing with SBA. Pt uses rollator and has very poor posture using RW. He needed several cues to to stand straight, hands close, look ahead during ambulation and stopped each time to process info. He returned to sitting EOB after session due to wife occupancy in [address] [place] [address]. He ambulated with 4L O2 and 99% after session. His impairments include decreased functional mobility, decreased balance, decreased strength, decreased safety awareness, increased risk for falls.  Pt would benefit from further PT while in house to address these impairments to help improve to prior level of independence. Anticipate home discharge when medically cleared. This section established at most recent assessment PROBLEM LIST (Impairments causing functional limitations): 1. Decreased Strength 2. Decreased ADL/Functional Activities 3. Decreased Transfer Abilities 4. Decreased Ambulation Ability/Technique 5. Decreased Balance 6. Increased Shortness of Breath INTERVENTIONS PLANNED: (Benefits and precautions of physical therapy have been discussed with the patient.) 1. Balance Exercise 2. Bed Mobility 3. Gait Training 4. Neuromuscular Re-education/Strengthening 5. Therapeutic Activites 6. Therapeutic Exercise/Strengthening 7. Transfer Training TREATMENT PLAN: Frequency/Duration: 3 times a week for duration of hospital stay Rehabilitation Potential For Stated Goals: Good RECOMMENDED REHABILITATION/EQUIPMENT: (at time of discharge pending progress): Due to the probability of continued deficits (see above) this patient will not likely need continued skilled physical therapy after discharge. Equipment:  
? None at this time HISTORY:  
History of Present Injury/Illness (Reason for Referral): 
73 yo male with PMH of s/dCHF (EF 45%/ LVDD2) , DM2, HTN, CKD3, chronic hypoxic respiratory failure on 4 L home O2, untreated TAZ, CAD who is evaluated after 2 episodes of seizure like activity today. He was sitting at breakfast when he had episode of mouth movements and was less responsive. EMS was called, his vitals were ok and no ED visit made. He had second episode while ambulating to the bathroom in which he tripped over the O2 tank with subsequently hitting his head and having episode of seizure like activity with accompanying urine incontinence. He was confused upon awakening. Wife reports he was foaming at the mouth. Denies vision changes, headache, near syncope sensation, chest pain or dyspnea. He did hit his mouth and chipped his tooth as result. He had negative CT head and has elevated BNP/ troponin chronically. He was recently seen by cardiology and diuretics increased. BP borderline low and he has slightly elevated creatinine above his normal. EKG tracing personally reviewed as sinus bradycardia with prolonged QTC. Past Medical History/Comorbidities:  
Mr. Vipul Cordoba  has a past medical history of Acute CHF (congestive heart failure) ([facility] [address] Acute combined systolic and diastolic congestive heart failure ([facility] [address] Chronic obstructive pulmonary disease ([facility] [address] De Quervain's tenosynovitis, right, Dyspnea on exertion, Elevated serum creatinine, Elevated troponin, History of coronary artery disease, History of right knee surgery, History of shingles, Malignant hypertension, and MI (myocardial infarction) ([facility] [address] Mr. Vipul Cordoba  has a past surgical history that includes hx knee arthroscopy (Right); hx heart catheterization (6/29/2015); and ABDOMINAL FAT PAD BIOPSY (N/A, 5/29/2018). Social History/Living Environment:  
Home Environment: Private residence Living Alone: No 
Support Systems: Spouse/Significant Other/Partner Prior Level of Function/Work/Activity: 
Pt uses rollator at home and 4L O2. Does not drive or work. Number of Personal Factors/Comorbidities that affect the Plan of Care: 1-2: MODERATE COMPLEXITY EXAMINATION:  
Most Recent Physical Functioning:  
Gross Assessment: 
AROM: Generally decreased, functional 
Strength: Generally decreased, functional 
Coordination: Generally decreased, functional 
Tone: Normal 
Sensation: Intact Posture: 
Posture (WDL): Exceptions to [facility] Posture Assessment: Trunk flexion Balance: 
Sitting: Intact Standing: Impaired Standing - Static: Good Standing - Dynamic : Fair Bed Mobility: 
Rolling: Supervision Supine to Sit: Supervision Scooting: Supervision Wheelchair Mobility: 
  
Transfers: 
Sit to Stand: Contact guard assistance Stand to Sit: Contact guard assistance Gait: 
  
Gait Abnormalities: Decreased step clearance Distance (ft): 80 Feet (ft) Assistive Device: Walker, rolling Ambulation - Level of Assistance: Contact guard assistance Body Structures Involved: 1. Lungs Body Functions Affected: 1. Cardio 2. Respiratory 3. Neuromusculoskeletal 
4. Movement Related Activities and Participation Affected: 1. General Tasks and Demands 2. Mobility 3. Self Care 4. Domestic Life 5. Community, Social and [place] Number of elements that affect the Plan of Care: 1-2: LOW COMPLEXITY CLINICAL PRESENTATION:  
Presentation: Stable and uncomplicated: LOW COMPLEXITY CLINICAL DECISION [place] [place] [address] AM-PAC 6 Clicks Basic Mobility Inpatient Short Form How much difficulty does the patient currently have. .. Unable A Lot A Little None 1. Turning over in bed (including adjusting bedclothes, sheets and blankets)? [] 1   [] 2   [] 3   [x] 4  
2. Sitting down on and standing up from a chair with arms ( e.g., wheelchair, bedside commode, etc.)   [] 1   [] 2   [] 3   [x] 4  
3. Moving from lying on back to sitting on the side of the bed? [] 1   [] 2   [] 3   [x] 4 How much help from another person does the patient currently need. .. Total A Lot A Little None 4. Moving to and from a bed to a chair (including a wheelchair)? [] 1   [] 2   [] 3   [x] 4  
5. Need to walk in hospital room? [] 1   [] 2   [x] 3   [] 4  
6. Climbing 3-5 steps with a railing? [] 1   [] 2   [x] 3   [] 4  
© , Trustees of [address] [place] [address], under license to [employer]. All rights reserved Score:  Initial: 22 Most Recent: X (Date: -- ) Interpretation of Tool:  Represents activities that are increasingly more difficult (i.e. Bed mobility, Transfers, Gait). Score 24 23 22-20 19-15 14-10 9-7 6 Modifier CH CI CJ CK CL CM CN   
 
? Mobility - Walking and Moving Around:  - CURRENT STATUS: CJ - 20%-39% impaired, limited or restricted  - GOAL STATUS: CJ - 20%-39% impaired, limited or restricted  - D/C STATUS:  ---------------To be determined--------------- Payor: Rosalinda Blair / Plan: [address] [place] [address] HMO / Product Type: Managed Care Medicare /   
 
Medical Necessity:    
· Skilled intervention continues to be required due to decreased function. Reason for Services/Other Comments: 
· Patient continues to require skilled intervention due to medical complications and patient unable to attend/participate in therapy as expected. Use of outcome tool(s) and clinical judgement create a POC that gives a: Clear prediction of patient's progress: LOW COMPLEXITY  
  
 
 
 
TREATMENT:  
(In addition to Assessment/Re-Assessment sessions the following treatments were rendered) Pre-treatment Symptoms/Complaints:  none Pain: Initial:  
Pain Intensity 1: 0  Post Session:  0 Therapeutic Activity: (    8min): Therapeutic activities including Bed transfers, Ambulation on level ground and  to improve mobility, strength, balance and coordination. Required minimal   to promote static and dynamic balance in standing, promote coordination of bilateral, lower extremity(s) and promote motor control of bilateral, lower extremity(s). Braces/Orthotics/Lines/Etc:  
· IV · 4L 
· O2 Device: Nasal cannula Treatment/Session Assessment:   
· Response to Treatment:  See above · Interdisciplinary Collaboration:  
o Physical Therapist 
o Registered Nurse · After treatment position/precautions:  
o Up in chair 
o Call light within reach 
o RN notified 
o Family at bedside · Compliance with Program/Exercises: Will assess as treatment progresses. · Recommendations/Intent for next treatment session: \"Next visit will focus on advancements to more challenging activities and reduction in assistance provided\". Total Treatment Duration: PT Patient Time In/Time Out 
 Time In: 0930 Time Out: 1015 JUAN RedmondT

## 2018-10-21 NOTE — PROGRESS NOTES
Hospitalist Progress Note Admit Date:  10/20/2018  2:12 PM  
Name:  Clara Hdz. Age:  74 y.o. 
:  1945 MRN:  [id number] PCP:  Karina Mueller MD 
Treatment Team: Attending Provider: Yusuf Nazario MD; Consulting Provider: Vicki Rodriguez MD; Charge Nurse: Anya Hylton; Consulting Provider: Yusuf Nazario MD; Consulting Provider: Jaquita Claude, DO Subjective:  
 
 
Mr. Alnoso Rowan is a 73 yo male with PMH of s/dCHF (EF 45%/ LVDD2) , DM2, HTN, CKD3, chronic hypoxic respiratory failure on 4 L home O2, untreated TAZ, CAD who is evaluated after 2 episodes of seizure like activity. He had negative CT head and has elevated BNP/ troponin chronically. He was recently seen by cardiology and diuretics increased. BP borderline low and he has slightly elevated creatinine above his normal. EKG tracing personally reviewed as sinus bradycardia with prolonged QTC. EEG ordered, started keppra, teleneuro consulted. Plans for home at discharge 10-21-18 wife at bedside, has chronic shortness of breath, no chest pain, ate ok, no further seizure like events Objective:  
 
Patient Vitals for the past 24 hrs: 
 Temp Pulse Resp BP SpO2  
10/21/18 1058 97.4 °F (36.3 °C) 68 18 97/65 95 % 10/21/18 0734     95 % 10/21/18 0718 97.3 °F (36.3 °C) 67 20 112/79 99 % 10/21/18 0453     99 % 10/21/18 0423 97.5 °F (36.4 °C) 64 20 106/72 98 % 10/21/18 0033 98 °F (36.7 °C) 68 20 102/66 99 % 10/20/18 2356     98 % 10/20/18 1920 97.4 °F (36.3 °C) 69 20 123/81 98 % 10/20/18 [address] 124/83   
10/20/18 1849    111/83   
10/20/18 1848 98.3 °F (36.8 °C)  18 110/79 96 % 10/20/18 1737  [address] Oxygen Therapy O2 Sat (%): 95 % (10/21/18 1058) Pulse via Oximetry: 64 beats per minute (10/21/18 0734) O2 Device: Nasal cannula (10/21/18 0734) Intake/Output Summary (Last 24 hours) at 10/21/2018 1501 Last data filed at 10/21/2018 9649 Gross per 24 hour Intake 250 ml Output 175 ml Net 75 ml General:    Well nourished. Alert. No distress CV:   RRR. No murmur, rub, or gallop. Trace edema Lungs:   CTAB. No wheezing, rhonchi, or rales. Abdomen:   Soft, nontender, nondistended. Extremities: Warm and dry. Skin:     No rashes or jaundice. Neuro:  No gross focal deficits Data Review: 
I have reviewed all labs, meds, telemetry events, and studies from the last 24 hours: 
 
Recent Results (from the past 24 hour(s)) URINE MICROSCOPIC Collection Time: 10/20/18  4:04 PM  
Result Value Ref Range WBC 0-3 0 /hpf  
 RBC 0 0 /hpf Epithelial cells 0 0 /hpf Bacteria 0 0 /hpf Casts 0-3 0 /lpf  
BILIRUBIN, FRACTIONATED Collection Time: 10/20/18  7:34 PM  
Result Value Ref Range Bilirubin, total 1.3 (H) 0.2 - 1.1 MG/DL Bilirubin, direct 0.5 (H) <0.4 MG/DL Bilirubin, indirect 0.8 0.0 - 1.1 MG/DL  
TROPONIN I Collection Time: 10/20/18  7:34 PM  
Result Value Ref Range Troponin-I, Qt. 0.46 (HH) 0.02 - 0.05 NG/ML  
GLUCOSE, POC Collection Time: 10/20/18 10:06 PM  
Result Value Ref Range Glucose (POC) 94 65 - 100 mg/dL METABOLIC PANEL, BASIC Collection Time: 10/21/18  1:40 AM  
Result Value Ref Range Sodium 143 136 - 145 mmol/L Potassium 3.9 3.5 - 5.1 mmol/L Chloride 108 (H) 98 - 107 mmol/L  
 CO2 24 21 - 32 mmol/L Anion gap 11 7 - 16 mmol/L Glucose 92 65 - 100 mg/dL BUN 27 (H) 8 - 23 MG/DL Creatinine 2.07 (H) 0.8 - 1.5 MG/DL  
 GFR est AA 40 (L) >60 ml/min/1.73m2 GFR est non-AA 33 (L) >60 ml/min/1.73m2 Calcium 8.5 8.3 - 10.4 MG/DL  
CBC WITH AUTOMATED DIFF Collection Time: 10/21/18  1:40 AM  
Result Value Ref Range WBC 8.4 4.3 - 11.1 K/uL  
 RBC 4.79 4.23 - 5.6 M/uL  
 HGB 13.1 (L) 13.6 - 17.2 g/dL HCT 42.0 41.1 - 50.3 % MCV 87.7 79.6 - 97.8 FL  
 MCH 27.3 26.1 - 32.9 PG  
 MCHC 31.2 (L) 31.4 - 35.0 g/dL  
 RDW 19.2 % PLATELET 917 265 - 337 K/uL  MPV 10.7 9.4 - 12.3 FL  
 ABSOLUTE NRBC 0.02 0.0 - 0.2 K/uL  
 DF AUTOMATED NEUTROPHILS 54 43 - 78 % LYMPHOCYTES 32 13 - 44 % MONOCYTES 11 4.0 - 12.0 % EOSINOPHILS 2 0.5 - 7.8 % BASOPHILS 1 0.0 - 2.0 % IMMATURE GRANULOCYTES 0 0.0 - 5.0 %  
 ABS. NEUTROPHILS 4.5 1.7 - 8.2 K/UL  
 ABS. LYMPHOCYTES 2.7 0.5 - 4.6 K/UL  
 ABS. MONOCYTES 1.0 0.1 - 1.3 K/UL  
 ABS. EOSINOPHILS 0.2 0.0 - 0.8 K/UL  
 ABS. BASOPHILS 0.1 0.0 - 0.2 K/UL  
 ABS. IMM. GRANS. 0.0 0.0 - 0.5 K/UL  
TROPONIN I Collection Time: 10/21/18  1:40 AM  
Result Value Ref Range Troponin-I, Qt. 0.45 (HH) 0.02 - 0.05 NG/ML  
GLUCOSE, POC Collection Time: 10/21/18  7:23 AM  
Result Value Ref Range Glucose (POC) 83 65 - 100 mg/dL EKG, 12 LEAD, INITIAL Collection Time: 10/21/18  8:19 AM  
Result Value Ref Range Ventricular Rate 73 BPM  
 Atrial Rate 73 BPM  
 P-R Interval 204 ms QRS Duration 122 ms  
 Q-T Interval 494 ms QTC Calculation (Bezet) 544 ms Calculated P Axis 67 degrees Calculated R Axis -67 degrees Calculated T Axis 84 degrees Diagnosis Sinus rhythm with occasional Premature ventricular complexes Left axis deviation Inferior infarct (cited on or before 04-MAY-2018) Abnormal ECG When compared with ECG of 20-OCT-2018 13:49, 
Premature ventricular complexes are now Present Confirmed by Marquis Mckenzie ([id number]) on 10/21/2018 10:10:22 AM 
  
TROPONIN I Collection Time: 10/21/18 10:04 AM  
Result Value Ref Range Troponin-I, Qt. 0.35 (HH) 0.02 - 0.05 NG/ML  
GLUCOSE, POC Collection Time: 10/21/18 10:57 AM  
Result Value Ref Range Glucose (POC) 117 (H) 65 - 100 mg/dL All Micro Results None No results found for this visit on 10/20/18. Current Meds: 
Current Facility-Administered Medications Medication Dose Route Frequency  albuterol-ipratropium (DUO-NEB) 2.5 MG-0.5 MG/3 ML  3 mL Nebulization Q4H RT  
 allopurinol (ZYLOPRIM) tablet 100 mg  100 mg Oral DAILY  atorvastatin (LIPITOR) tablet 20 mg  20 mg Oral QHS  carvedilol (COREG) tablet 3.125 mg  3.125 mg Oral BID WITH MEALS  fluticasone (FLONASE) 50 mcg/actuation nasal spray 2 Spray  2 Spray Both Nostrils DAILY  pantoprazole (PROTONIX) tablet 40 mg  40 mg Oral ACB  torsemide (DEMADEX) tablet 20 mg  20 mg Oral DAILY  sodium chloride (NS) flush 5-10 mL  5-10 mL IntraVENous Q8H  
 sodium chloride (NS) flush 5-10 mL  5-10 mL IntraVENous PRN  
 acetaminophen (TYLENOL) tablet 650 mg  650 mg Oral Q6H PRN  
 ondansetron (ZOFRAN) injection 4 mg  4 mg IntraVENous Q4H PRN  
 levETIRAcetam (KEPPRA) 1,000 mg in 0.9% sodium chloride 100 mL IVPB  1,000 mg IntraVENous Q12H  
 insulin lispro (HUMALOG) injection   SubCUTAneous AC&HS Other Studies (last 24 hours): Xr Chest Pa Lat Result Date: 10/20/2018 Two view chest History: Chest pain. Comparison: 07/10/2018 Findings: The heart is enlarged, unchanged. The lungs and pleural spaces are clear. The pulmonary vascularity is within normal limits. The visualized osseous structures are unremarkable. Impression: Cardiac megaly with clear lungs. Xr Hand Lt Min 3 V Result Date: 10/20/2018 Left hand series HISTORY: Pain after trauma. 3 views of the left hand were obtained. No prior studies are available for comparison. FINDINGS: There is no evidence of acute fracture or dislocation. There are degenerative changes at the DIP joints as well as at the first MCP joint. There is no significant soft tissue swelling. There is no bony destruction. IMPRESSION: Degenerative changes without evidence of acute bony abnormality. Assessment and Plan:  
 
Hospital Problems as of 10/21/2018 Date Reviewed: 6/6/2018 Codes Class Noted - Resolved POA * (Principal) Seizure ([facility] [place] [facility] ICD-10-CM: R56.9 ICD-9-CM: 780.39  10/20/2018 - Present Yes Prolonged Q-T interval on ECG ICD-10-CM: R94.31 
ICD-9-CM: 794.31  10/20/2018 - Present Yes Elevated troponin ICD-10-CM: R74.8 ICD-9-CM: 790.6  10/20/2018 - Present Yes Pulmonary hypertension (HCC) (Chronic) ICD-10-CM: I27.20 ICD-9-CM: 416.8  10/20/2018 - Present Yes Chronic obstructive pulmonary disease (HCC) ICD-10-CM: J44.9 ICD-9-CM: 118  10/17/2018 - Present Yes Chronic systolic heart failure (HCC) ICD-10-CM: I50.22 ICD-9-CM: 428.22  10/8/2018 - Present Yes Controlled type 2 diabetes mellitus without complication, without long-term current use of insulin ([facility] [place] [facility] ICD-10-CM: E11.9 ICD-9-CM: 250.00  10/4/2018 - Present Yes  
   
 TAZ (obstructive sleep apnea) ICD-10-CM: G25.93 
ICD-9-CM: 327.23  10/2/2017 - Present Yes  
   
 CKD (chronic kidney disease) stage 3, GFR 30-59 ml/min (HCC) (Chronic) ICD-10-CM: N18.3 ICD-9-CM: 585.3  9/29/2017 - Present Yes COPD, moderate ([facility] [place] [facility] (Chronic) ICD-10-CM: J44.9 ICD-9-CM: 178  9/29/2017 - Present Yes Overview Signed 12/27/2015 12:38 PM by Nico Joshua NP Complete PFTs: 7/20/15: 
Spirometry is consistent with a moderate obstructive/restrictive defect. . The residual volume is increased relative to other lung volumes suggesting air-trapping. The diffusion capacity corrected for alveolar volume was normal suggesting no loss of alveolo-capillary units. CHF (congestive heart failure) (HCC) ICD-10-CM: I50.9 ICD-9-CM: 428.0  9/27/2017 - Present Yes Essential hypertension ICD-10-CM: I10 
ICD-9-CM: 401.9  9/28/2016 - Present Yes Plan: 
 
· Seizures: with 2 events, continue IV keppra, teleneuro consulted, seizure precautions and neuro checks, EEG 
· S/d CHF: stable and compensated, hold aldactone with slightly elevated creatinine · CAD/Elevated troponin: no indication for acute ischemia, has chronically elevated troponin, will trend labs · Prolonged QTC: he is not taking any medications that might contribute, will followup tele and  cardio consult · Possible syncope: monitor on tele · CKD3: slightly above his baseline, followup BMP · Chronic COPD with chronic hypoxic respiratory failure: stable · TAZ: cpap noncompliant · HTN: home meds, hold aldactone due to lower hemodynamics · DM2: POC and SSI  
  
  
Discharge planning:  Pending DVT ppx: SCD Code status:  Full Estimated LOS:  Greater than 2 midnights Risk:  high Care plan: wife Sixto [place] [phone] Signed: 
 
 
 
 
Signed: Marcelle Lockett MD

## 2018-10-22 ENCOUNTER — APPOINTMENT (OUTPATIENT)
Dept: MRI IMAGING | Age: 73
DRG: 101 | End: 2018-10-22
Attending: INTERNAL MEDICINE
Payer: MEDICARE

## 2018-10-22 LAB
ALBUMIN SERPL-MCNC: 2.9 G/DL (ref 3.2–4.6)
ALBUMIN/GLOB SERPL: 0.8 {RATIO} (ref 1.2–3.5)
ALP SERPL-CCNC: 101 U/L (ref 50–136)
ALT SERPL-CCNC: 22 U/L (ref 12–65)
ANION GAP SERPL CALC-SCNC: 12 MMOL/L (ref 7–16)
AST SERPL-CCNC: 21 U/L (ref 15–37)
ATRIAL RATE: 68 BPM
BASOPHILS # BLD: 0.1 K/UL (ref 0–0.2)
BASOPHILS NFR BLD: 1 % (ref 0–2)
BILIRUB SERPL-MCNC: 1.3 MG/DL (ref 0.2–1.1)
BNP SERPL-MCNC: 894 PG/ML
BUN SERPL-MCNC: 25 MG/DL (ref 8–23)
CALCIUM SERPL-MCNC: 8.4 MG/DL (ref 8.3–10.4)
CALCULATED P AXIS, ECG09: 62 DEGREES
CALCULATED R AXIS, ECG10: -65 DEGREES
CALCULATED T AXIS, ECG11: 46 DEGREES
CHLORIDE SERPL-SCNC: 107 MMOL/L (ref 98–107)
CO2 SERPL-SCNC: 23 MMOL/L (ref 21–32)
CREAT SERPL-MCNC: 1.82 MG/DL (ref 0.8–1.5)
DIAGNOSIS, 93000: NORMAL
DIFFERENTIAL METHOD BLD: ABNORMAL
EOSINOPHIL # BLD: 0.1 K/UL (ref 0–0.8)
EOSINOPHIL NFR BLD: 2 % (ref 0.5–7.8)
ERYTHROCYTE [DISTWIDTH] IN BLOOD BY AUTOMATED COUNT: 18.9 %
GLOBULIN SER CALC-MCNC: 3.6 G/DL (ref 2.3–3.5)
GLUCOSE BLD STRIP.AUTO-MCNC: 114 MG/DL (ref 65–100)
GLUCOSE BLD STRIP.AUTO-MCNC: 84 MG/DL (ref 65–100)
GLUCOSE BLD STRIP.AUTO-MCNC: 86 MG/DL (ref 65–100)
GLUCOSE BLD STRIP.AUTO-MCNC: 94 MG/DL (ref 65–100)
GLUCOSE BLD STRIP.AUTO-MCNC: 96 MG/DL (ref 65–100)
GLUCOSE SERPL-MCNC: 97 MG/DL (ref 65–100)
HCT VFR BLD AUTO: 40.4 % (ref 41.1–50.3)
HGB BLD-MCNC: 12.8 G/DL (ref 13.6–17.2)
IMM GRANULOCYTES # BLD: 0 K/UL (ref 0–0.5)
IMM GRANULOCYTES NFR BLD AUTO: 0 % (ref 0–5)
LYMPHOCYTES # BLD: 2.6 K/UL (ref 0.5–4.6)
LYMPHOCYTES NFR BLD: 35 % (ref 13–44)
MAGNESIUM SERPL-MCNC: 2.1 MG/DL (ref 1.8–2.4)
MCH RBC QN AUTO: 27.5 PG (ref 26.1–32.9)
MCHC RBC AUTO-ENTMCNC: 31.7 G/DL (ref 31.4–35)
MCV RBC AUTO: 86.9 FL (ref 79.6–97.8)
MONOCYTES # BLD: 0.7 K/UL (ref 0.1–1.3)
MONOCYTES NFR BLD: 10 % (ref 4–12)
NEUTS SEG # BLD: 3.8 K/UL (ref 1.7–8.2)
NEUTS SEG NFR BLD: 52 % (ref 43–78)
NRBC # BLD: 0.02 K/UL (ref 0–0.2)
P-R INTERVAL, ECG05: 228 MS
PLATELET # BLD AUTO: 155 K/UL (ref 150–450)
PMV BLD AUTO: 10.7 FL (ref 9.4–12.3)
POTASSIUM SERPL-SCNC: 3.9 MMOL/L (ref 3.5–5.1)
PROT SERPL-MCNC: 6.5 G/DL (ref 6.3–8.2)
Q-T INTERVAL, ECG07: 502 MS
QRS DURATION, ECG06: 118 MS
QTC CALCULATION (BEZET), ECG08: 533 MS
RBC # BLD AUTO: 4.65 M/UL (ref 4.23–5.6)
SODIUM SERPL-SCNC: 142 MMOL/L (ref 136–145)
VENTRICULAR RATE, ECG03: 68 BPM
WBC # BLD AUTO: 7.3 K/UL (ref 4.3–11.1)

## 2018-10-22 PROCEDURE — 65660000000 HC RM CCU STEPDOWN

## 2018-10-22 PROCEDURE — 70551 MRI BRAIN STEM W/O DYE: CPT

## 2018-10-22 PROCEDURE — 94640 AIRWAY INHALATION TREATMENT: CPT

## 2018-10-22 PROCEDURE — 74011250637 HC RX REV CODE- 250/637: Performed by: INTERNAL MEDICINE

## 2018-10-22 PROCEDURE — 93005 ELECTROCARDIOGRAM TRACING: CPT | Performed by: NURSE PRACTITIONER

## 2018-10-22 PROCEDURE — 83735 ASSAY OF MAGNESIUM: CPT

## 2018-10-22 PROCEDURE — 74011250636 HC RX REV CODE- 250/636: Performed by: INTERNAL MEDICINE

## 2018-10-22 PROCEDURE — 36415 COLL VENOUS BLD VENIPUNCTURE: CPT

## 2018-10-22 PROCEDURE — 74011250637 HC RX REV CODE- 250/637: Performed by: PSYCHIATRY & NEUROLOGY

## 2018-10-22 PROCEDURE — 77010033678 HC OXYGEN DAILY

## 2018-10-22 PROCEDURE — 80053 COMPREHEN METABOLIC PANEL: CPT

## 2018-10-22 PROCEDURE — 95819 EEG AWAKE AND ASLEEP: CPT | Performed by: PSYCHIATRY & NEUROLOGY

## 2018-10-22 PROCEDURE — 74011000258 HC RX REV CODE- 258: Performed by: INTERNAL MEDICINE

## 2018-10-22 PROCEDURE — 94760 N-INVAS EAR/PLS OXIMETRY 1: CPT

## 2018-10-22 PROCEDURE — 83880 ASSAY OF NATRIURETIC PEPTIDE: CPT

## 2018-10-22 PROCEDURE — 99223 1ST HOSP IP/OBS HIGH 75: CPT | Performed by: PSYCHIATRY & NEUROLOGY

## 2018-10-22 PROCEDURE — 85025 COMPLETE CBC W/AUTO DIFF WBC: CPT

## 2018-10-22 PROCEDURE — 82962 GLUCOSE BLOOD TEST: CPT

## 2018-10-22 PROCEDURE — 74011000250 HC RX REV CODE- 250: Performed by: INTERNAL MEDICINE

## 2018-10-22 RX ORDER — LEVETIRACETAM 500 MG/1
500 TABLET ORAL 2 TIMES DAILY
Status: DISCONTINUED | OUTPATIENT
Start: 2018-10-22 | End: 2018-10-23 | Stop reason: HOSPADM

## 2018-10-22 RX ORDER — LORAZEPAM 1 MG/1
1 TABLET ORAL ONCE
Status: COMPLETED | OUTPATIENT
Start: 2018-10-22 | End: 2018-10-22

## 2018-10-22 RX ADMIN — IPRATROPIUM BROMIDE AND ALBUTEROL SULFATE 3 ML: .5; 3 SOLUTION RESPIRATORY (INHALATION) at 03:29

## 2018-10-22 RX ADMIN — Medication 5 ML: at 22:00

## 2018-10-22 RX ADMIN — PANTOPRAZOLE SODIUM 40 MG: 40 TABLET, DELAYED RELEASE ORAL at 04:59

## 2018-10-22 RX ADMIN — ACETAMINOPHEN 650 MG: 325 TABLET, FILM COATED ORAL at 04:59

## 2018-10-22 RX ADMIN — IPRATROPIUM BROMIDE AND ALBUTEROL SULFATE 3 ML: .5; 3 SOLUTION RESPIRATORY (INHALATION) at 16:08

## 2018-10-22 RX ADMIN — Medication 5 ML: at 05:00

## 2018-10-22 RX ADMIN — ATORVASTATIN CALCIUM 20 MG: 10 TABLET, FILM COATED ORAL at 20:22

## 2018-10-22 RX ADMIN — LEVETIRACETAM 500 MG: 500 TABLET ORAL at 17:32

## 2018-10-22 RX ADMIN — CARVEDILOL 3.12 MG: 3.12 TABLET, FILM COATED ORAL at 17:34

## 2018-10-22 RX ADMIN — ACETAMINOPHEN 650 MG: 325 TABLET, FILM COATED ORAL at 11:42

## 2018-10-22 RX ADMIN — LEVETIRACETAM 1000 MG: 100 INJECTION, SOLUTION INTRAVENOUS at 08:23

## 2018-10-22 RX ADMIN — LORAZEPAM 1 MG: 1 TABLET ORAL at 17:46

## 2018-10-22 RX ADMIN — IPRATROPIUM BROMIDE AND ALBUTEROL SULFATE 3 ML: .5; 3 SOLUTION RESPIRATORY (INHALATION) at 13:10

## 2018-10-22 RX ADMIN — ALLOPURINOL 100 MG: 100 TABLET ORAL at 08:22

## 2018-10-22 RX ADMIN — CARVEDILOL 3.12 MG: 3.12 TABLET, FILM COATED ORAL at 08:22

## 2018-10-22 RX ADMIN — IPRATROPIUM BROMIDE AND ALBUTEROL SULFATE 3 ML: .5; 3 SOLUTION RESPIRATORY (INHALATION) at 07:26

## 2018-10-22 RX ADMIN — Medication 10 ML: at 17:32

## 2018-10-22 RX ADMIN — TORSEMIDE 20 MG: 20 TABLET ORAL at 08:22

## 2018-10-22 RX ADMIN — IPRATROPIUM BROMIDE AND ALBUTEROL SULFATE 3 ML: .5; 3 SOLUTION RESPIRATORY (INHALATION) at 21:05

## 2018-10-22 RX ADMIN — FLUTICASONE PROPIONATE 2 SPRAY: 50 SPRAY, METERED NASAL at 08:21

## 2018-10-22 NOTE — PROGRESS NOTES
Hospitalist Progress Note Admit Date:  10/20/2018  2:12 PM  
Name:  Felicia Huber. Age:  74 y.o. 
:  1945 MRN:  [id number] PCP:  Cathie Lee MD 
Treatment Team: Attending Provider: Benton Jameson MD; Consulting Provider: Brandon House MD; Consulting Provider: Benton Jameson MD; Charge Nurse: Keyshawn Anderson; Consulting Provider: Ki Paige DO Subjective:  
 
 
Mr. Gee Roper is a 75 yo male with PMH of s/dCHF (EF 45%/ LVDD2) , DM2, HTN, CKD3, chronic hypoxic respiratory failure on 4 L home O2, untreated TZA, CAD who is evaluated after 2 episodes of seizure like activity. He had negative CT head and has elevated BNP/ troponin chronically. He was recently seen by cardiology and diuretics increased. BP borderline low and he has slightly elevated creatinine above his normal. EKG tracing personally reviewed as sinus bradycardia with prolonged QTC. Cardiology following and medications are adjusted to hold aldactone and lisinopril. Additionally EEG negative, started keppra, neuro consulted who recommends MRI brain. Plans for home at discharge 10-22 -25 wife at bedside, has chronic dyspnea with episode that he feels was anxiety related, no chest pain, ate ok, had BM, has edema Objective:  
 
Patient Vitals for the past 24 hrs: 
 Temp Pulse Resp BP SpO2  
10/22/18 1608     95 % 10/22/18 1309     95 % 10/22/18 1156 97.7 °F (36.5 °C) 65 17 95/73 96 % 10/22/18 0732 97.7 °F (36.5 °C) 67 14 103/69 100 % 10/22/18 0727     99 % 10/22/18 0442 97.6 °F (36.4 °C) 69 16 105/75 91 % 10/22/18 0331     98 % 10/22/18 0027 97.4 °F (36.3 °C) 72 20 107/67 96 % 10/21/18 2316     98 % 10/21/18 1930 98.1 °F (36.7 °C) 72 20 110/72 99 % 10/21/18 1926     99 % Oxygen Therapy O2 Sat (%): 95 % (10/22/18 1608) Pulse via Oximetry: 66 beats per minute (10/22/18 160) O2 Device: Nasal cannula (10/22/18 1608) O2 Flow Rate (L/min): 2 l/min (10/22/18 1608) Intake/Output Summary (Last 24 hours) at 10/22/2018 1619 Last data filed at 10/22/2018 1156 Gross per 24 hour Intake  Output 600 ml Net -600 ml General:    Well nourished. Alert. No distress CV:   RRR. No murmur, rub, or gallop. Trace edema Lungs:   CTAB. No wheezing, rhonchi, or rales. Abdomen:   Soft, nontender, nondistended. Extremities: Warm and dry. Skin:     No rashes or jaundice. Neuro:  No gross focal deficits Data Review: 
I have reviewed all labs, meds, telemetry events, and studies from the last 24 hours: 
 
Recent Results (from the past 24 hour(s)) GLUCOSE, POC Collection Time: 10/21/18  9:44 PM  
Result Value Ref Range Glucose (POC) 119 (H) 65 - 100 mg/dL CBC WITH AUTOMATED DIFF Collection Time: 10/22/18  5:02 AM  
Result Value Ref Range WBC 7.3 4.3 - 11.1 K/uL  
 RBC 4.65 4.23 - 5.6 M/uL  
 HGB 12.8 (L) 13.6 - 17.2 g/dL HCT 40.4 (L) 41.1 - 50.3 % MCV 86.9 79.6 - 97.8 FL  
 MCH 27.5 26.1 - 32.9 PG  
 MCHC 31.7 31.4 - 35.0 g/dL  
 RDW 18.9 % PLATELET 274 986 - 608 K/uL MPV 10.7 9.4 - 12.3 FL ABSOLUTE NRBC 0.02 0.0 - 0.2 K/uL  
 DF AUTOMATED NEUTROPHILS 52 43 - 78 % LYMPHOCYTES 35 13 - 44 % MONOCYTES 10 4.0 - 12.0 % EOSINOPHILS 2 0.5 - 7.8 % BASOPHILS 1 0.0 - 2.0 % IMMATURE GRANULOCYTES 0 0.0 - 5.0 %  
 ABS. NEUTROPHILS 3.8 1.7 - 8.2 K/UL  
 ABS. LYMPHOCYTES 2.6 0.5 - 4.6 K/UL  
 ABS. MONOCYTES 0.7 0.1 - 1.3 K/UL  
 ABS. EOSINOPHILS 0.1 0.0 - 0.8 K/UL  
 ABS. BASOPHILS 0.1 0.0 - 0.2 K/UL  
 ABS. IMM. GRANS. 0.0 0.0 - 0.5 K/UL METABOLIC PANEL, COMPREHENSIVE Collection Time: 10/22/18  5:02 AM  
Result Value Ref Range Sodium 142 136 - 145 mmol/L Potassium 3.9 3.5 - 5.1 mmol/L Chloride 107 98 - 107 mmol/L  
 CO2 23 21 - 32 mmol/L Anion gap 12 7 - 16 mmol/L Glucose 97 65 - 100 mg/dL BUN 25 (H) 8 - 23 MG/DL  Creatinine 1.82 (H) 0.8 - 1.5 MG/DL  
 GFR est AA 47 (L) >60 ml/min/1.73m2 GFR est non-AA 39 (L) >60 ml/min/1.73m2 Calcium 8.4 8.3 - 10.4 MG/DL Bilirubin, total 1.3 (H) 0.2 - 1.1 MG/DL  
 ALT (SGPT) 22 12 - 65 U/L  
 AST (SGOT) 21 15 - 37 U/L Alk. phosphatase 101 50 - 136 U/L Protein, total 6.5 6.3 - 8.2 g/dL Albumin 2.9 (L) 3.2 - 4.6 g/dL Globulin 3.6 (H) 2.3 - 3.5 g/dL A-G Ratio 0.8 (L) 1.2 - 3.5 MAGNESIUM Collection Time: 10/22/18  5:02 AM  
Result Value Ref Range Magnesium 2.1 1.8 - 2.4 mg/dL BNP Collection Time: 10/22/18  5:02 AM  
Result Value Ref Range  pg/mL GLUCOSE, POC Collection Time: 10/22/18  6:52 AM  
Result Value Ref Range Glucose (POC) 96 65 - 100 mg/dL EKG, 12 LEAD, SUBSEQUENT Collection Time: 10/22/18  6:55 AM  
Result Value Ref Range Ventricular Rate 68 BPM  
 Atrial Rate 68 BPM  
 P-R Interval 228 ms QRS Duration 118 ms Q-T Interval 502 ms QTC Calculation (Bezet) 533 ms Calculated P Axis 62 degrees Calculated R Axis -65 degrees Calculated T Axis 46 degrees Diagnosis Sinus rhythm with 1st degree A-V block Left axis deviation Non-specific intra-ventricular conduction delay Prolonged QT Abnormal ECG When compared with ECG of 21-OCT-2018 08:19, 
Premature ventricular complexes are no longer Present Criteria for Inferior infarct are no longer Present Confirmed by Stacia President ([id number]) on 10/22/2018 7:09:54 AM 
  
GLUCOSE, POC Collection Time: 10/22/18 11:32 AM  
Result Value Ref Range Glucose (POC) 94 65 - 100 mg/dL GLUCOSE, POC Collection Time: 10/22/18 11:52 AM  
Result Value Ref Range Glucose (POC) 86 65 - 100 mg/dL All Micro Results None No results found for this visit on 10/20/18. Current Meds: 
Current Facility-Administered Medications Medication Dose Route Frequency  levETIRAcetam (KEPPRA) tablet 500 mg  500 mg Oral BID  
  albuterol-ipratropium (DUO-NEB) 2.5 MG-0.5 MG/3 ML  3 mL Nebulization Q4H RT  
 allopurinol (ZYLOPRIM) tablet 100 mg  100 mg Oral DAILY  atorvastatin (LIPITOR) tablet 20 mg  20 mg Oral QHS  carvedilol (COREG) tablet 3.125 mg  3.125 mg Oral BID WITH MEALS  fluticasone (FLONASE) 50 mcg/actuation nasal spray 2 Spray  2 Spray Both Nostrils DAILY  pantoprazole (PROTONIX) tablet 40 mg  40 mg Oral ACB  torsemide (DEMADEX) tablet 20 mg  20 mg Oral DAILY  sodium chloride (NS) flush 5-10 mL  5-10 mL IntraVENous Q8H  
 sodium chloride (NS) flush 5-10 mL  5-10 mL IntraVENous PRN  
 acetaminophen (TYLENOL) tablet 650 mg  650 mg Oral Q6H PRN  
 ondansetron (ZOFRAN) injection 4 mg  4 mg IntraVENous Q4H PRN  
 insulin lispro (HUMALOG) injection   SubCUTAneous AC&HS Other Studies (last 24 hours): No results found. Assessment and Plan:  
 
Hospital Problems as of 10/22/2018 Date Reviewed: 6/6/2018 Codes Class Noted - Resolved POA * (Principal) Seizure ([facility] ICD-10-CM: R56.9 ICD-9-CM: 780.39  10/20/2018 - Present Yes Prolonged Q-T interval on ECG ICD-10-CM: R94.31 
ICD-9-CM: 794.31  10/20/2018 - Present Yes Elevated troponin ICD-10-CM: R74.8 ICD-9-CM: 790.6  10/20/2018 - Present Yes Pulmonary hypertension (HCC) (Chronic) ICD-10-CM: I27.20 ICD-9-CM: 416.8  10/20/2018 - Present Yes Chronic obstructive pulmonary disease (HCC) ICD-10-CM: J44.9 ICD-9-CM: 949  10/17/2018 - Present Yes Chronic systolic heart failure (HCC) ICD-10-CM: I50.22 ICD-9-CM: 428.22  10/8/2018 - Present Yes Controlled type 2 diabetes mellitus without complication, without long-term current use of insulin ([facility] ICD-10-CM: E11.9 ICD-9-CM: 250.00  10/4/2018 - Present Yes  
   
 TAZ (obstructive sleep apnea) ICD-10-CM: R68.63 
ICD-9-CM: 327.23  10/2/2017 - Present Yes  
   
 CKD (chronic kidney disease) stage 3, GFR 30-59 ml/min (HCC) (Chronic) ICD-10-CM: N18.3 ICD-9-CM: 585.3  9/29/2017 - Present Yes COPD, moderate ([address] (Chronic) ICD-10-CM: J44.9 ICD-9-CM: 379  9/29/2017 - Present Yes Overview Signed 12/27/2015 12:38 PM by Shaniqua Carrera NP Complete PFTs: 7/20/15: 
Spirometry is consistent with a moderate obstructive/restrictive defect. . The residual volume is increased relative to other lung volumes suggesting air-trapping. The diffusion capacity corrected for alveolar volume was normal suggesting no loss of alveolo-capillary units. CHF (congestive heart failure) (HCC) ICD-10-CM: I50.9 ICD-9-CM: 428.0  9/27/2017 - Present Yes Essential hypertension ICD-10-CM: I10 
ICD-9-CM: 401.9  9/28/2016 - Present Yes Plan: 
 
· Seizures: continued lower dose keppra, pending MRI, case discussed with neuro dr. Bhavesh Lopez · S/d CHF: stable and compensated, holding aldactone/ lisinopril with slightly elevated creatinine · CAD/Elevated troponin: no indication for acute ischemia, has chronically elevated troponin · Prolonged QTC: he is not taking any medications that might contribute,   cardio consulted · Possible syncope: monitor on tele, cardiology followed · CKD3: slightly above his baseline, followup BMP · Chronic COPD with chronic hypoxic respiratory failure: stable · TAZ: cpap noncompliant · HTN: stable with medication adjustments · DM2: POC and SSI · Elevated bilirubin: will trend labs 
   
Discharge planning:  Pending DVT ppx: SCD Code status:  Full Estimated LOS:  Greater than 2 midnights Risk:  high Care plan: wife Dilia Gonzalez [phone] Signed: 
 
Signed: Jimmy Beach MD

## 2018-10-22 NOTE — CONSULTS
Consult    Patient: Shadia Will MRN: [id number]     YOB: 1945  Age: 74 y.o. Sex: male      Subjective: Shadia Will is a 74 y.o. male who is being seen for convulsions. The patient had 2 episodes of convulsions associated with loss of consciousness. The patient describes one of these episodes. He was sitting down for some time and then stood up to go to the bathroom. Shortly after standing up the patient lost consciousness. He collapsed to the ground and had convulsions in all 4 extremities. Duration was approximately 30 seconds. There is associated with urinary incontinence. No fecal incontinence. The patient did not bite his tongue. The patient had a similar event to this one. The patient has no history of CNS infection, stroke, or other intracranial pathologies. He has no family history of epilepsy.     Past Medical History:   Diagnosis Date    Acute CHF (congestive heart failure) ([facility] [address] 4/25/2015    Acute combined systolic and diastolic congestive heart failure ([facility] [address] 4/28/2015    Chronic obstructive pulmonary disease (HCC)     De Quervain's tenosynovitis, right 4/28/2015    Dyspnea on exertion 6/28/2015    Elevated serum creatinine 4/25/2015    Elevated troponin 6/28/2015    History of coronary artery disease     MI at 27 yo    History of right knee surgery     cartilage removal    History of shingles     Malignant hypertension 4/25/2015    [facility])      Past Surgical History:   Procedure Laterality Date    HX HEART CATHETERIZATION  6/29/2015    no intervention    HX KNEE ARTHROSCOPY Right     removal of cartilage      Family History   Problem Relation Age of Onset    Hypertension Mother     No Known Problems Father      Social History     Tobacco Use    Smoking status: Former Smoker     Packs/day: 0.25     Years: 20.00     Pack years: 5.00     Types: Cigarettes     Last attempt to quit: 4/5/2015     Years since quitting: 3. 5    Smokeless tobacco: Never Used   Substance Use Topics    Alcohol use: No     Alcohol/week: 0.0 oz      Current Facility-Administered Medications   Medication Dose Route Frequency Provider Last Rate Last Dose    albuterol-ipratropium (DUO-NEB) 2.5 MG-0.5 MG/3 ML  3 mL Nebulization Q4H RT Elizabet Rios MD   3 mL at 10/22/18 1310    allopurinol (ZYLOPRIM) tablet 100 mg  100 mg Oral DAILY Marcio Rios MD   100 mg at 10/22/18 2795    atorvastatin (LIPITOR) tablet 20 mg  20 mg Oral QHS Marcio Rios MD   20 mg at 10/21/18 2145    carvedilol (COREG) tablet 3.125 mg  3.125 mg Oral BID WITH MEALS Marcio Rios MD   3.125 mg at 10/22/18 9314    fluticasone (FLONASE) 50 mcg/actuation nasal spray 2 Spray  2 Spray Both Nostrils DAILY Marcio Rios MD   2 Spray at 10/22/18 0821    pantoprazole (PROTONIX) tablet 40 mg  40 mg Oral ACB Marcio Rios MD   40 mg at 10/22/18 0459    torsemide (DEMADEX) tablet 20 mg  20 mg Oral DAILY Marcio Rios MD   20 mg at 10/22/18 8079    sodium chloride (NS) flush 5-10 mL  5-10 mL IntraVENous Q8H Elizabet Rios MD   5 mL at 10/22/18 0500    sodium chloride (NS) flush 5-10 mL  5-10 mL IntraVENous PRN Marcio Rios MD        acetaminophen (TYLENOL) tablet 650 mg  650 mg Oral Q6H PRN Marcio Rios MD   650 mg at 10/22/18 1142    ondansetron (ZOFRAN) injection 4 mg  4 mg IntraVENous Q4H PRN Marcio Rios MD        levETIRAcetam (KEPPRA) 1,000 mg in 0.9% sodium chloride 100 mL IVPB  1,000 mg IntraVENous Q12H Elizabet Rios  mL/hr at 10/22/18 0823 1,000 mg at 10/22/18 2855    insulin lispro (HUMALOG) injection   SubCUTAneous AC&HS Marc Crowell MD   Stopped at 10/20/18 2200        Allergies   Allergen Reactions    Pcn [Penicillins] Other (comments)     \"makes my heart stop\"       Review of Systems:  CONSTITUTIONAL: No weight loss, fever, chills, weakness or fatigue. HEENT: Eyes: No visual loss, blurred vision, double vision or yellow sclerae. Ears, Nose, Throat: No hearing loss, sneezing, congestion, runny nose or sore throat. SKIN: No rash or itching. CARDIOVASCULAR: No chest pain, chest pressure or chest discomfort. No palpitations or edema. RESPIRATORY: No shortness of breath, cough or sputum. GASTROINTESTINAL: No anorexia, nausea, vomiting or diarrhea. No abdominal pain or blood. GENITOURINARY: no burning with urination. NEUROLOGICAL: No headache, dizziness, paralysis, ataxia, numbness or tingling in the extremities. No change in bowel or bladder control. MUSCULOSKELETAL: No muscle, back pain, joint pain or stiffness. HEMATOLOGIC: No anemia, bleeding or bruising. LYMPHATICS: No enlarged nodes. No history of splenectomy. PSYCHIATRIC: No history of depression or anxiety. ENDOCRINOLOGIC: No reports of sweating, cold or heat intolerance. No polyuria or polydipsia. ALLERGIES: No history of asthma, hives, eczema or rhinitis. Objective:     Vitals:    10/22/18 0727 10/22/18 0732 10/22/18 1156 10/22/18 1309   BP:  103/69 95/73    Pulse:  67 65    Resp:  14 17    Temp:  97.7 °F (36.5 °C) 97.7 °F (36.5 °C)    SpO2: 99% 100% 96% 95%   Weight:       Height:            Physical Exam:  General - Well developed, well nourished, in no apparent distress. Pleasant and conversent. HEENT - Normocephalic, atraumatic. Conjunctiva, tympanic membranes, and oropharynx are clear. Neck - Supple without masses, no bruits   Cardiovascular - Regular rate and rhythm. Normal S1, S2 without murmurs, rubs, or gallops. Lungs - Clear to auscultation. Abdomen - Soft, nontender with normal bowel sounds. Extremities - Peripheral pulses intact. No edema and no rashes. Neurological examination - Comprehension, attention , memory and reasoning are intact. Language and speech are normal. On cranial nerve examination pupils are equal round and reactive to light.  Fundoscopic examination is normal. Visual acuity is adequate. Visual fields are full to finger confrontation. Extraocular motility is normal. Face is symmetric and sensation is intact to light touch. Hearing is intact to finger rustle bilaterally. Motor examination - There is normal muscle tone and bulk. Power is full throughout. Muscle stretch reflexes her diminished throughout. Sensation is intact to light touch, pinprick, vibration and proprioception in all extremities. Cerebellar examination is normal.       Lab Results   Component Value Date/Time    Cholesterol, total 158 09/10/2018 09:13 AM    HDL Cholesterol 23 (L) 09/10/2018 09:13 AM    LDL,Direct 94 06/29/2015 06:00 AM    LDL, calculated 95 09/10/2018 09:13 AM    VLDL, calculated 40 09/10/2018 09:13 AM    Triglyceride 199 (H) 09/10/2018 09:13 AM    CHOL/HDL Ratio 4.3 12/27/2017 04:13 AM        Lab Results   Component Value Date/Time    Hemoglobin A1c 7.0 (H) 09/10/2018 09:13 AM    Hemoglobin A1c (POC) 5.8 01/19/2016 04:14 PM        CT Results (most recent): Personally Reviewed   Results from [place] encounter on 10/20/18   CT HEAD WO CONT    Narrative CT head without contrast    History: possible seizure. Dizziness this morning. Fall    Technique: 5mm axial images were obtained from the skull base to the vertex  without intravenous contrast.  Radiation dose reduction techniques were used for  this study:  Our CT scanners use one or all of the following: Automated exposure  control, adjustment of the mA and/or kVp according to patient's size, iterative  reconstruction. Comparison: 12/27/2017    Findings: The ventricles and sulci are normal in size and configuration. There  are no extra-axial fluid collections. There is no evidence to suggest an acute  major territorial infarct. There is no evidence of acute intraparenchymal  hemorrhage or mass effect. The bony calvarium is intact.  The visualized mastoid  air cells and paranasal sinuses are well pneumatized and aerated. Impression Impression: Unremarkable unenhanced CT scan of the brain. Results for orders placed or performed during the hospital encounter of 10/20/18   EKG, 12 LEAD, INITIAL   Result Value Ref Range    Ventricular Rate 73 BPM    Atrial Rate 73 BPM    P-R Interval 204 ms    QRS Duration 122 ms    Q-T Interval 494 ms    QTC Calculation (Bezet) 544 ms    Calculated P Axis 67 degrees    Calculated R Axis -67 degrees    Calculated T Axis 84 degrees    Diagnosis       Sinus rhythm with occasional Premature ventricular complexes  Left axis deviation  Inferior infarct (cited on or before 04-MAY-2018)  Abnormal ECG  When compared with ECG of 20-OCT-2018 13:49,  Premature ventricular complexes are now Present  Confirmed by Marian Genao ([id number]) on 10/21/2018 10:10:22 AM         Most recent MRA  No results found for this or any previous visit. Most recent Echo  No results found for this visit on 10/20/18. Assessment:     Convulsions: These events are very unlikely to be seizures. Convulsive syncope would be more likely. Although the patient had urinary incontinence this is very nonspecific and the patient was getting up to go to the bathroom to empty his bladder. No tongue laceration. Further, it is rare for patients his age to have generalized at onset seizures. EEG is normal.    Plan:     Keppra has are even started. I do not feel inclined to continue this medication. It should at least be reduced to 500 mg twice a day. I will further investigate the possibility of seizures with a brain MRI. If there is a nidus for epileptiform activity that I would consider continuing the patient on Keppra. The #1 cause of epilepsy in a patient this age is a history of stroke, specifically a cortical stroke.     Signed By: Jacque Cortez DO     October 22, 2018      I spent 70 minutes with this patient, over 50% of that time was at bedside counseling the patient was neurological condition.

## 2018-10-22 NOTE — PROCEDURES
Routine EEG Report    Reason for EEG: convulsions     Technical Summary: This EEG was performed with a 32-channel digital EEG machine with electrodes placed according to the international 10-20 system of placement. This was a 30 minute study. Background:   During the maximal awake state a posterior dominant rhythm was not clearly seen. Anterior background consisted of medium amplitude activity in the alpha range with occasional intermixed beta frequencies. Hyperventilation: Hyperventilation was performed for 3 minutes with good effort and no abnormalities were seen. Photic Stimulation: Photic stimulation was performed at various flash frequencies and no abnormalites were seen. Sleep: none    Impression: Results of this EEG are normal.  There are no lateralizing features or epileptiform discharges. No seizures. There is no clear posterior dominant rhythm; however, this may be seen if the patient is not relaxed during recording.

## 2018-10-22 NOTE — PROGRESS NOTES
Initial visit to assess pt's spiritual needs. Ministry of presence & prayer to demonstrate caring & concern, convey emotional & spiritual support. Provided 1 meal voucher to pt's wife.  
 
Chaplain Marquis Noreen MDiv,ThM,PhD

## 2018-10-22 NOTE — PROGRESS NOTES
[facility] CARDIOLOGY PROGRESS NOTE 
      
 
10/22/2018 8:55 AM 
 
Admit Date: 10/20/2018 Subjective: No complaints. ROS: 
GEN:  No fever or chills Cardiovascular:  As noted above:no CP or palpitations. Pulmonary:  As noted above:no SOB. Neuro:  No new focal motor or sensory loss Objective:  
  
Vitals:  
 10/22/18 1459 10/22/18 6062 10/22/18 7256 10/22/18 0732 BP:  105/75  103/69 Pulse:  69  67 Resp:  16  14 Temp:  97.6 °F (36.4 °C)  97.7 °F (36.5 °C) SpO2: 98% 91% 99% 100% Weight:      
Height:      
 
 
Physical Exam: 
General-no distress Neck- supple, no JVD 
CV- regular rate and rhythm no MRG Lung- clear bilaterally Abd- soft, nontender, nondistended Ext- 1+ edema bilaterally. Skin- warm and dry Psychiatric:  Normal mood and affect. Neurologic:  Alert and oriented X 3 Data Review:  
Recent Labs 10/22/18 
0502 10/21/18 
0140  143  
K 3.9 3.9 MG 2.1  --   
BUN 25* 27* CREA 1.82* 2.07* GLU 97 92 WBC 7.3 8.4 HGB 12.8* 13.1* HCT 40.4* 42.0  153 TELEMETRY:  NSR Assessment/Plan:  
 
Principal Problem: 
  Seizure ([facility] [address] (10/20/2018) Active Problems: 
  CKD (chronic kidney disease) stage 3, GFR 30-59 ml/min ([facility]) (9/29/2017):per hospitalist 
 
  COPD, moderate ([facility] [address] (9/29/2017):per hospitalist. 
    Overview: Complete PFTs: 7/20/15: 
    Spirometry is consistent with a moderate obstructive/restrictive defect. Daniella Newman The residual volume is increased relative to other lung volumes suggesting  
    air-trapping. The diffusion capacity corrected for alveolar volume was  
    normal suggesting no loss of alveolo-capillary units. Essential hypertension (9/28/2016):Medications stopped per consult note to allow BP to come up. CHF (congestive heart failure) ([facility] [address] (9/27/2017): Mild cardiomyopathy :stable. Outpatient follow up. TAZ (obstructive sleep apnea) (10/2/2017) Controlled type 2 diabetes mellitus without complication, without long-term current use of insulin ([address] (10/4/2018) Prolonged Q-T interval on ECG (10/20/2018):no intervention necessary. Elevated troponin (10/20/2018):no CP. No interrvention planned at this time. Pulmonary hypertension ([facility] [address] (10/20/2018) Will sign off and be on standby.  
 
 
 
 
Ana Maria Olivera MD 
10/22/2018 8:55 AM

## 2018-10-23 VITALS
HEIGHT: 69 IN | HEART RATE: 87 BPM | WEIGHT: 210 LBS | OXYGEN SATURATION: 98 % | RESPIRATION RATE: 20 BRPM | BODY MASS INDEX: 31.1 KG/M2 | TEMPERATURE: 98.6 F | DIASTOLIC BLOOD PRESSURE: 82 MMHG | SYSTOLIC BLOOD PRESSURE: 129 MMHG

## 2018-10-23 LAB
BASOPHILS # BLD: 0.1 K/UL (ref 0–0.2)
BASOPHILS NFR BLD: 1 % (ref 0–2)
DIFFERENTIAL METHOD BLD: ABNORMAL
EOSINOPHIL # BLD: 0.2 K/UL (ref 0–0.8)
EOSINOPHIL NFR BLD: 2 % (ref 0.5–7.8)
ERYTHROCYTE [DISTWIDTH] IN BLOOD BY AUTOMATED COUNT: 18.9 %
GLUCOSE BLD STRIP.AUTO-MCNC: 97 MG/DL (ref 65–100)
HCT VFR BLD AUTO: 41.3 % (ref 41.1–50.3)
HGB BLD-MCNC: 12.9 G/DL (ref 13.6–17.2)
IMM GRANULOCYTES # BLD: 0 K/UL (ref 0–0.5)
IMM GRANULOCYTES NFR BLD AUTO: 0 % (ref 0–5)
LYMPHOCYTES # BLD: 2.2 K/UL (ref 0.5–4.6)
LYMPHOCYTES NFR BLD: 31 % (ref 13–44)
MCH RBC QN AUTO: 27.4 PG (ref 26.1–32.9)
MCHC RBC AUTO-ENTMCNC: 31.2 G/DL (ref 31.4–35)
MCV RBC AUTO: 87.9 FL (ref 79.6–97.8)
MONOCYTES # BLD: 0.6 K/UL (ref 0.1–1.3)
MONOCYTES NFR BLD: 9 % (ref 4–12)
NEUTS SEG # BLD: 4.1 K/UL (ref 1.7–8.2)
NEUTS SEG NFR BLD: 57 % (ref 43–78)
NRBC # BLD: 0 K/UL (ref 0–0.2)
PLATELET # BLD AUTO: 143 K/UL (ref 150–450)
PMV BLD AUTO: 10.3 FL (ref 9.4–12.3)
RBC # BLD AUTO: 4.7 M/UL (ref 4.23–5.6)
WBC # BLD AUTO: 7.2 K/UL (ref 4.3–11.1)

## 2018-10-23 PROCEDURE — 36415 COLL VENOUS BLD VENIPUNCTURE: CPT

## 2018-10-23 PROCEDURE — 94640 AIRWAY INHALATION TREATMENT: CPT

## 2018-10-23 PROCEDURE — 82962 GLUCOSE BLOOD TEST: CPT

## 2018-10-23 PROCEDURE — 74011250637 HC RX REV CODE- 250/637: Performed by: INTERNAL MEDICINE

## 2018-10-23 PROCEDURE — 85025 COMPLETE CBC W/AUTO DIFF WBC: CPT

## 2018-10-23 PROCEDURE — 99232 SBSQ HOSP IP/OBS MODERATE 35: CPT | Performed by: PSYCHIATRY & NEUROLOGY

## 2018-10-23 PROCEDURE — 74011250637 HC RX REV CODE- 250/637: Performed by: PSYCHIATRY & NEUROLOGY

## 2018-10-23 PROCEDURE — 77010033678 HC OXYGEN DAILY

## 2018-10-23 PROCEDURE — 74011000250 HC RX REV CODE- 250: Performed by: INTERNAL MEDICINE

## 2018-10-23 PROCEDURE — 94760 N-INVAS EAR/PLS OXIMETRY 1: CPT

## 2018-10-23 RX ORDER — LEVETIRACETAM 500 MG/1
500 TABLET ORAL 2 TIMES DAILY
Qty: 30 TAB | Refills: 0 | Status: SHIPPED | OUTPATIENT
Start: 2018-10-23 | End: 2018-12-06

## 2018-10-23 RX ADMIN — PANTOPRAZOLE SODIUM 40 MG: 40 TABLET, DELAYED RELEASE ORAL at 06:25

## 2018-10-23 RX ADMIN — IPRATROPIUM BROMIDE AND ALBUTEROL SULFATE 3 ML: .5; 3 SOLUTION RESPIRATORY (INHALATION) at 03:05

## 2018-10-23 RX ADMIN — IPRATROPIUM BROMIDE AND ALBUTEROL SULFATE 3 ML: .5; 3 SOLUTION RESPIRATORY (INHALATION) at 08:28

## 2018-10-23 RX ADMIN — LEVETIRACETAM 500 MG: 500 TABLET ORAL at 09:05

## 2018-10-23 RX ADMIN — ALLOPURINOL 100 MG: 100 TABLET ORAL at 09:05

## 2018-10-23 RX ADMIN — Medication 5 ML: at 06:00

## 2018-10-23 RX ADMIN — IPRATROPIUM BROMIDE AND ALBUTEROL SULFATE 3 ML: .5; 3 SOLUTION RESPIRATORY (INHALATION) at 00:40

## 2018-10-23 RX ADMIN — CARVEDILOL 3.12 MG: 3.12 TABLET, FILM COATED ORAL at 09:05

## 2018-10-23 RX ADMIN — TORSEMIDE 20 MG: 20 TABLET ORAL at 09:05

## 2018-10-23 RX ADMIN — FLUTICASONE PROPIONATE 2 SPRAY: 50 SPRAY, METERED NASAL at 09:10

## 2018-10-23 NOTE — DISCHARGE INSTRUCTIONS
DISCHARGE SUMMARY from Nurse    PATIENT INSTRUCTIONS:    After general anesthesia or intravenous sedation, for 24 hours or while taking prescription Narcotics:  · Limit your activities  · Do not drive and operate hazardous machinery  · Do not make important personal or business decisions  · Do  not drink alcoholic beverages  · If you have not urinated within 8 hours after discharge, please contact your surgeon on call. Report the following to your surgeon:  · Excessive pain, swelling, redness or odor of or around the surgical area  · Temperature over 100.5  · Nausea and vomiting lasting longer than 4 hours or if unable to take medications  · Any signs of decreased circulation or nerve impairment to extremity: change in color, persistent  numbness, tingling, coldness or increase pain  · Any questions    What to do at Home:  Recommended activity: Activity as tolerated,     If you experience any of the following symptoms fever, new or unrelieved pain, nausea or vomiting or any other worrisome symptoms, please follow up with PCP. *  Please give a list of your current medications to your Primary Care Provider. *  Please update this list whenever your medications are discontinued, doses are      changed, or new medications (including over-the-counter products) are added. *  Please carry medication information at all times in case of emergency situations. These are general instructions for a healthy lifestyle:    No smoking/ No tobacco products/ Avoid exposure to second hand smoke  Surgeon General's Warning:  Quitting smoking now greatly reduces serious risk to your health.     Obesity, smoking, and sedentary lifestyle greatly increases your risk for illness    A healthy diet, regular physical exercise & weight monitoring are important for maintaining a healthy lifestyle    You may be retaining fluid if you have a history of heart failure or if you experience any of the following symptoms:  Weight gain of 3 pounds or more overnight or 5 pounds in a week, increased swelling in our hands or feet or shortness of breath while lying flat in bed. Please call your doctor as soon as you notice any of these symptoms; do not wait until your next office visit. Recognize signs and symptoms of STROKE:    F-face looks uneven    A-arms unable to move or move unevenly    S-speech slurred or non-existent    T-time-call 911 as soon as signs and symptoms begin-DO NOT go       Back to bed or wait to see if you get better-TIME IS BRAIN. Warning Signs of HEART ATTACK     Call 911 if you have these symptoms:   Chest discomfort. Most heart attacks involve discomfort in the center of the chest that lasts more than a few minutes, or that goes away and comes back. It can feel like uncomfortable pressure, squeezing, fullness, or pain.  Discomfort in other areas of the upper body. Symptoms can include pain or discomfort in one or both arms, the back, neck, jaw, or stomach.  Shortness of breath with or without chest discomfort.  Other signs may include breaking out in a cold sweat, nausea, or lightheadedness. Don't wait more than five minutes to call 911 - MINUTES MATTER! Fast action can save your life. Calling 911 is almost always the fastest way to get lifesaving treatment. Emergency Medical Services staff can begin treatment when they arrive -- up to an hour sooner than if someone gets to the hospital by car. The discharge information has been reviewed with the patient. The patient verbalized understanding. Discharge medications reviewed with the patient and appropriate educational materials and side effects teaching were provided. ___________________________________________________________________________________________________________________________________     Seizure: Care Instructions  Your Care Instructions    Seizures are caused by abnormal patterns of electrical signals in the brain.  They are different for each person. Seizures can affect movement, speech, vision, or awareness. Some people have only slight shaking of a hand and do not pass out. Other people may pass out and have violent shaking of the whole body. Some people appear to stare into space. They are awake, but they can't respond normally. Later, they may not remember what happened. You may need tests to identify the type and cause of the seizures. A seizure may occur only once, or you may have them more than one time. Taking medicines as directed and following up with your doctor may help keep you from having more seizures. The doctor has checked you carefully, but problems can develop later. If you notice any problems or new symptoms, get medical treatment right away. Follow-up care is a key part of your treatment and safety. Be sure to make and go to all appointments, and call your doctor if you are having problems. It's also a good idea to know your test results and keep a list of the medicines you take. How can you care for yourself at home? · Be safe with medicines. Take your medicines exactly as prescribed. Call your doctor if you think you are having a problem with your medicine. · Do not do any activity that could be dangerous to you or others until your doctor says it is safe to do so. For example, do not drive a car, operate machinery, swim, or climb ladders. · Be sure that anyone treating you for any health problem knows that you have had a seizure and what medicines you are taking for it. · Identify and avoid things that may make you more likely to have a seizure. These may include lack of sleep, alcohol or drug use, stress, or not eating. · Make sure you go to your follow-up appointment. When should you call for help? Call 911 anytime you think you may need emergency care.  For example, call if:    · You have another seizure.     · You have more than one seizure in 24 hours.     · You have new symptoms, such as trouble walking, speaking, or thinking clearly.    Call your doctor now or seek immediate medical care if:    · You are not acting normally.    Watch closely for changes in your health, and be sure to contact your doctor if you have any problems. Where can you learn more? Go to http://jaime-jarrell.info/. Enter T477 in the search box to learn more about \"Seizure: Care Instructions. \"  Current as of: June 4, 2018  Content Version: 11.8  © 4624-0225 [employer]. Care instructions adapted under license by [employer] (which disclaims liability or warranty for this information). If you have questions about a medical condition or this instruction, always ask your healthcare professional. [address] any warranty or liability for your use of this information.

## 2018-10-23 NOTE — PROGRESS NOTES
Consult Patient: Eugene Syed MRN: [id number] YOB: 1945  Age: 74 y.o. Sex: male Subjective: Eugene Syed is a 74 y.o. male who is being seen for convulsions. Doing well today, no complaints. Current Outpatient Medications Medication Sig Dispense Refill  levETIRAcetam (KEPPRA) 500 mg tablet Take 1 Tab by mouth two (2) times a day. 30 Tab 0  
 torsemide (DEMADEX) 20 mg tablet Take 1 Tab by mouth daily. 60 Tab 5  
 atorvastatin (LIPITOR) 20 mg tablet Take 1 Tab by mouth nightly. 90 Tab 3  
 allopurinol (ZYLOPRIM) 100 mg tablet TAKE 1 TABLET BY MOUTH EVERY DAY 90 Tab 3  carvedilol (COREG) 3.125 mg tablet Take 1 Tab by mouth two (2) times daily (with meals). 180 Tab 3  
 omeprazole (PRILOSEC) 20 mg capsule Take 1 Cap by mouth daily. 90 Cap 3  
 fluticasone (FLONASE) 50 mcg/actuation nasal spray 2 Sprays by Both Nostrils route daily. 1 Bottle 11  
 melatonin 3 mg tablet Take 3 mg by mouth nightly.  albuterol (VENTOLIN HFA) 90 mcg/actuation inhaler Take 2 Puffs by inhalation four (4) times daily. 1 Inhaler 11  
 polyethylene glycol (MIRALAX) 17 gram packet Take 1 Packet by mouth daily. (Patient taking differently: Take 17 g by mouth daily as needed.) 1 Packet 11  
 albuterol-ipratropium (DUO-NEB) 2.5 mg-0.5 mg/3 ml nebu 3 mL by Nebulization route four (4) times daily. Diaignosis--J44.9 120 Nebule 11 Allergies Allergen Reactions  Pcn [Penicillins] Other (comments) \"makes my heart stop\" Review of Systems: 
CONSTITUTIONAL: No weight loss, fever, chills, weakness or fatigue. HEENT: Eyes: No visual loss, blurred vision, double vision or yellow sclerae. Ears, Nose, Throat: No hearing loss, sneezing, congestion, runny nose or sore throat. SKIN: No rash or itching. CARDIOVASCULAR: No chest pain, chest pressure or chest discomfort. No palpitations or edema. Objective:  
 
Vitals: 10/23/18 4524 10/23/18 0425 10/23/18 1501 10/23/18 8428 BP:  128/85  129/82 Pulse:  82  87 Resp:  20  20 Temp:  97.4 °F (36.3 °C)  98.6 °F (37 °C) SpO2: 95% 97% 100% 98% Weight:      
Height:      
  
 
Physical Exam: 
General - Well developed, well nourished, in no apparent distress. Pleasant and conversent. HEENT - Normocephalic, atraumatic. Conjunctiva, tympanic membranes, and oropharynx are clear. Neck - Supple without masses, no bruits Cardiovascular - Regular rate and rhythm. Normal S1, S2 without murmurs, rubs, or gallops. Lungs - Clear to auscultation. Abdomen - Soft, nontender with normal bowel sounds. Extremities - Peripheral pulses intact. No edema and no rashes. Neurological examination - Comprehension, attention , memory and reasoning are intact. Language and speech are normal. On cranial nerve examination pupils are equal round and reactive to light. Fundoscopic examination is normal. Visual acuity is adequate. Visual fields are full to finger confrontation. Extraocular motility is normal. Face is symmetric and sensation is intact to light touch. Hearing is intact to finger rustle bilaterally. Motor examination - There is normal muscle tone and bulk. Power is full throughout. Lab Results Component Value Date/Time Cholesterol, total 158 09/10/2018 09:13 AM  
 HDL Cholesterol 23 (L) 09/10/2018 09:13 AM  
 LDL,Direct 94 06/29/2015 06:00 AM  
 LDL, calculated 95 09/10/2018 09:13 AM  
 VLDL, calculated 40 09/10/2018 09:13 AM  
 Triglyceride 199 (H) 09/10/2018 09:13 AM  
 CHOL/HDL Ratio 4.3 12/27/2017 04:13 AM  
  
 
Lab Results Component Value Date/Time Hemoglobin A1c 7.0 (H) 09/10/2018 09:13 AM  
 Hemoglobin A1c (POC) 5.8 01/19/2016 04:14 PM  
  
 
CT Results (most recent): Personally Reviewed Results from Hospital Encounter encounter on 10/20/18 CT HEAD WO CONT Narrative CT head without contrast 
 
History: possible seizure. Dizziness this morning. Fall Technique: 5mm axial images were obtained from the skull base to the vertex 
without intravenous contrast.  Radiation dose reduction techniques were used for 
this study:  Our CT scanners use one or all of the following: Automated exposure 
control, adjustment of the mA and/or kVp according to patient's size, iterative 
reconstruction. Comparison: 12/27/2017 Findings: The ventricles and sulci are normal in size and configuration. There 
are no extra-axial fluid collections. There is no evidence to suggest an acute 
major territorial infarct. There is no evidence of acute intraparenchymal 
hemorrhage or mass effect. The bony calvarium is intact. The visualized mastoid 
air cells and paranasal sinuses are well pneumatized and aerated. Impression Impression: Unremarkable unenhanced CT scan of the brain. Results for orders placed or performed during the hospital encounter of 10/20/18 EKG, 12 LEAD, INITIAL Result Value Ref Range Ventricular Rate 73 BPM  
 Atrial Rate 73 BPM  
 P-R Interval 204 ms QRS Duration 122 ms  
 Q-T Interval 494 ms QTC Calculation (Bezet) 544 ms Calculated P Axis 67 degrees Calculated R Axis -67 degrees Calculated T Axis 84 degrees Diagnosis Sinus rhythm with occasional Premature ventricular complexes Left axis deviation Inferior infarct (cited on or before 04-MAY-2018) Abnormal ECG When compared with ECG of 20-OCT-2018 13:49, 
Premature ventricular complexes are now Present Confirmed by Dari Pierson ([id number]) on 10/21/2018 10:10:22 AM 
  
  
 
MRI Results (most recent): Personally Reviewed Results from Hospital Encounter encounter on 10/20/18 MRI BRAIN WO CONT Narrative MRI of the brain without contrast. 
 
CLINICAL INDICATION: Seizure, fall with trauma to head PROCEDURE: Multiplanar and multisequence MR imaging performed through the brain 
without the administration of intravenous contrast. 
 
 COMPARISON: Head CT dated 10/20/2018 FINDINGS: No foci of abnormal restricted diffusion appreciated. There is mild 
bilateral hemispheric atrophy. There is no hydrocephalus. The basilar cisterns 
are patent. There are few scattered foci of white matter T2 hyperintensity most 
in keeping with mild chronic microangiopathic change. No abnormal extra-axial 
fluid collection noted. There is no acute hemorrhage, mass, or mass effect. There is a tiny punctate foci of susceptibility artifact in the right thalamus 
that may be sequela of prior hemorrhage. The marrow signal in the calvaria and 
skull base is normal. The sella is unremarkable. The craniocervical junction is 
unremarkable. Included paranasal sinuses and mastoid air cells are clear. The 
large central vascular flow voids and dural venous sinus flow voids are 
well-maintained. Impression IMPRESSION:. No acute intracranial abnormality. No obvious epileptogenic focus. Most recent Echo No results found for this visit on 10/20/18. Assessment:  
  
Convulsions: These events are very unlikely to be seizures. Convulsive syncope would be more likely. Although the patient had urinary incontinence this is very nonspecific and the patient was getting up to go to the bathroom to empty his bladder. No tongue laceration. Further, it is rare for patients his age to have generalized at onset seizures. EEG is normal. MRI normal.  
  
Plan:  
  
Keppra has been started. I do not feel inclined to continue this medication. It should at least be reduced to 500 mg twice a day. I will likely d/c at OP.  
  
 
Signed By: Fiedl Willson,  October 23, 2018

## 2018-10-23 NOTE — DISCHARGE SUMMARY
Hospitalist Discharge Summary     Admit Date:  10/20/2018  2:12 PM   Name:  Beatrice Harrison. Age:  74 y.o.  :  1945   MRN:  [id number]   PCP:  Antwon Paredes MD  Treatment Team: Attending Provider: Emiliana Rascon MD; Consulting Provider: Je Song MD; Consulting Provider: Emiliana Rascon MD; Consulting Provider: Alexandra Fay DO    Problem List for this Hospitalization:  Hospital Problems as of 10/23/2018 Date Reviewed: 2018          Codes Class Noted - Resolved POA    * (Principal) Seizure ([facility] ICD-10-CM: R56.9  ICD-9-CM: 780.39  10/20/2018 - Present Yes        Prolonged Q-T interval on ECG ICD-10-CM: R94.31  ICD-9-CM: 794.31  10/20/2018 - Present Yes        Elevated troponin ICD-10-CM: R74.8  ICD-9-CM: 790.6  10/20/2018 - Present Yes        Pulmonary hypertension ([facility] (Chronic) ICD-10-CM: I27.20  ICD-9-CM: 416.8  10/20/2018 - Present Yes        Chronic obstructive pulmonary disease ([facility] ICD-10-CM: J44.9  ICD-9-CM: 159  10/17/2018 - Present Yes        Chronic systolic heart failure ([facility] ICD-10-CM: S74.11  ICD-9-CM: 428.22  10/8/2018 - Present Yes        Controlled type 2 diabetes mellitus without complication, without long-term current use of insulin ([facility] ICD-10-CM: E11.9  ICD-9-CM: 250.00  10/4/2018 - Present Yes        TAZ (obstructive sleep apnea) ICD-10-CM: A98.05  ICD-9-CM: 327.23  10/2/2017 - Present Yes        CKD (chronic kidney disease) stage 3, GFR 30-59 ml/min (HCC) (Chronic) ICD-10-CM: N18.3  ICD-9-CM: 585.3  2017 - Present Yes        COPD, moderate ([email or web address] (Chronic) ICD-10-CM: J44.9  ICD-9-CM: 401  2017 - Present Yes    Overview Signed 2015 12:38 PM by Jose Reynoso NP     Complete PFTs: 7/20/15:  Spirometry is consistent with a moderate obstructive/restrictive defect. . The residual volume is increased relative to other lung volumes suggesting air-trapping.  The diffusion capacity corrected for alveolar volume was normal suggesting no loss of alveolo-capillary units. CHF (congestive heart failure) (HCC) ICD-10-CM: I50.9  ICD-9-CM: 428.0  9/27/2017 - Present Yes        Essential hypertension ICD-10-CM: I10  ICD-9-CM: 401.9  9/28/2016 - Present Yes                    Hospital Course:     Mr. Tye Hood is a 73 yo male with PMH of s/dCHF (EF 45%/ LVDD2) , DM2, HTN, CKD3, chronic hypoxic respiratory failure on 4 L home O2, untreated TAZ, CAD who is evaluated after 2 episodes of seizure like activity. He had negative CT head and has elevated BNP/ troponin chronically. He was recently seen by cardiology and diuretics increased. BP borderline low and he has slightly elevated creatinine above his normal. EKG tracing personally reviewed as sinus bradycardia with prolonged QTC. Cardiology following and medications are adjusted to hold aldactone and lisinopril. Additionally EEG/ MRI brain are negative, started keppra, neuro consulted who recommends that he remain on keppra pending outpatient followup. Plans for home at discharge.            Follow up instructions and discharge meds at bottom of this note. Plan was discussed with patient and wife. All questions answered. Patient was stable at time of discharge. Diagnostic Imaging/Tests:   Xr Chest Pa Lat    Result Date: 10/20/2018  Two view chest History: Chest pain. Comparison: 07/10/2018 Findings: The heart is enlarged, unchanged. The lungs and pleural spaces are clear. The pulmonary vascularity is within normal limits. The visualized osseous structures are unremarkable. Impression: Cardiac megaly with clear lungs. Xr Hand Lt Min 3 V    Result Date: 10/20/2018  Left hand series HISTORY: Pain after trauma. 3 views of the left hand were obtained. No prior studies are available for comparison. FINDINGS: There is no evidence of acute fracture or dislocation. There are degenerative changes at the DIP joints as well as at the first MCP joint. There is no significant soft tissue swelling. There is no bony destruction. IMPRESSION: Degenerative changes without evidence of acute bony abnormality. Ct Head Wo Cont    Result Date: 10/20/2018  CT head without contrast History: possible seizure. Dizziness this morning. Fall Technique: 5mm axial images were obtained from the skull base to the vertex without intravenous contrast.  Radiation dose reduction techniques were used for this study:  Our CT scanners use one or all of the following: Automated exposure control, adjustment of the mA and/or kVp according to patient's size, iterative reconstruction. Comparison: 12/27/2017 Findings: The ventricles and sulci are normal in size and configuration. There are no extra-axial fluid collections. There is no evidence to suggest an acute major territorial infarct. There is no evidence of acute intraparenchymal hemorrhage or mass effect. The bony calvarium is intact. The visualized mastoid air cells and paranasal sinuses are well pneumatized and aerated. Impression: Unremarkable unenhanced CT scan of the brain. Echocardiogram results:  No results found for this visit on 10/20/18.       All Micro Results     None          Labs: Results:       BMP, Mg, Phos Recent Labs     10/22/18  0502 10/21/18  0140 10/20/18  1353    143 144   K 3.9 3.9 4.0    108* 107   CO2 23 24 27   AGAP 12 11 10   BUN 25* 27* 24*   CREA 1.82* 2.07* 2.06*   CA 8.4 8.5 8.8   GLU 97 92 78   MG 2.1  --   --       CBC Recent Labs     10/23/18  0420 10/22/18  0502 10/21/18  0140   WBC 7.2 7.3 8.4   RBC 4.70 4.65 4.79   HGB 12.9* 12.8* 13.1*   HCT 41.3 40.4* 42.0   * 155 153   GRANS 57 52 54   LYMPH 31 35 32   EOS 2 2 2   MONOS 9 10 11   BASOS 1 1 1   IG 0 0 0   ANEU 4.1 3.8 4.5   ABL 2.2 2.6 2.7   REAGAN 0.2 0.1 0.2   ABM 0.6 0.7 1.0   ABB 0.1 0.1 0.1   AIG 0.0 0.0 0.0      LFT Recent Labs     10/22/18  0502 10/20/18  1353   SGOT 21 27   ALT 22 26    121   TP 6.5 7.4   ALB 2.9* 3.3   GLOB 3.6* 4.1*   AGRAT 0.8* 0.8*      Cardiac Testing Lab Results   Component Value Date/Time     10/22/2018 05:02 AM    BNP 1,206 10/20/2018 01:53 PM     07/12/2018 05:22 AM     12/28/2016 03:53 PM     12/01/2016 03:18 PM    BNP 6 11/21/2016 09:50 AM     09/27/2017 07:19 PM     11/13/2015 05:23 AM    CK - MB 2.1 09/27/2017 07:19 PM    CK-MB Index 1.6 09/27/2017 07:19 PM    Troponin-I, Qt. 0.35 (HH) 10/21/2018 10:04 AM    Troponin-I, Qt. 0.45 (HH) 10/21/2018 01:40 AM    Troponin-I, Qt. 0.46 () 10/20/2018 07:34 PM      Coagulation Tests Lab Results   Component Value Date/Time    aPTT 32.5 12/28/2017 12:16 PM    aPTT >200.0 (HH) 12/28/2017 03:00 AM    aPTT 72.6 (H) 12/27/2017 07:32 PM      A1c Lab Results   Component Value Date/Time    Hemoglobin A1c 7.0 (H) 09/10/2018 09:13 AM    Hemoglobin A1c 7.0 (H) 07/12/2018 05:22 AM      Lipid Panel Lab Results   Component Value Date/Time    Cholesterol, total 158 09/10/2018 09:13 AM    HDL Cholesterol 23 (L) 09/10/2018 09:13 AM    LDL,Direct 94 06/29/2015 06:00 AM    LDL, calculated 95 09/10/2018 09:13 AM    VLDL, calculated 40 09/10/2018 09:13 AM    Triglyceride 199 (H) 09/10/2018 09:13 AM    CHOL/HDL Ratio 4.3 12/27/2017 04:13 AM      Thyroid Panel Lab Results   Component Value Date/Time    TSH 0.604 05/25/2018 03:54 AM    TSH 1.165 04/26/2015 04:11 AM        Most Recent UA Lab Results   Component Value Date/Time    Color YELLOW 03/27/2018 01:38 PM    Appearance CLEAR 03/27/2018 01:38 PM    Specific gravity 1.020 03/27/2018 01:38 PM    pH (UA) 6.0 03/27/2018 01:38 PM    Protein 100 (A) 03/27/2018 01:38 PM    Glucose NEGATIVE  03/27/2018 01:38 PM    Ketone NEGATIVE  03/27/2018 01:38 PM    Bilirubin NEGATIVE  03/27/2018 01:38 PM    Blood NEGATIVE  03/27/2018 01:38 PM    Urobilinogen 4.0 (H) 03/27/2018 01:38 PM    Nitrites NEGATIVE  03/27/2018 01:38 PM    Leukocyte Esterase TRACE (A) 03/27/2018 01:38 PM    WBC 0-3 10/20/2018 04:04 PM    RBC 0 10/20/2018 04:04 PM Epithelial cells 0 10/20/2018 04:04 PM    Bacteria 0 10/20/2018 04:04 PM    Casts 0-3 10/20/2018 04:04 PM        Allergies   Allergen Reactions    Pcn [Penicillins] Other (comments)     \"makes my heart stop\"     Immunization History   Administered Date(s) Administered    Influenza Vaccine 09/28/2016    Influenza Vaccine (Quad) PF 10/04/2018    Pneumococcal Conjugate (PCV-13) 09/10/2018    Pneumococcal Polysaccharide (PPSV-23) 09/28/2016    TB Skin Test (PPD) Intradermal 01/05/2018, 03/27/2018, 05/27/2018, 07/11/2018       All Labs from Last 24 Hrs:  Recent Results (from the past 24 hour(s))   GLUCOSE, POC    Collection Time: 10/22/18 11:32 AM   Result Value Ref Range    Glucose (POC) 94 65 - 100 mg/dL   GLUCOSE, POC    Collection Time: 10/22/18 11:52 AM   Result Value Ref Range    Glucose (POC) 86 65 - 100 mg/dL   GLUCOSE, POC    Collection Time: 10/22/18  3:59 PM   Result Value Ref Range    Glucose (POC) 84 65 - 100 mg/dL   GLUCOSE, POC    Collection Time: 10/22/18  9:18 PM   Result Value Ref Range    Glucose (POC) 114 (H) 65 - 100 mg/dL   CBC WITH AUTOMATED DIFF    Collection Time: 10/23/18  4:20 AM   Result Value Ref Range    WBC 7.2 4.3 - 11.1 K/uL    RBC 4.70 4.23 - 5.6 M/uL    HGB 12.9 (L) 13.6 - 17.2 g/dL    HCT 41.3 41.1 - 50.3 %    MCV 87.9 79.6 - 97.8 FL    MCH 27.4 26.1 - 32.9 PG    MCHC 31.2 (L) 31.4 - 35.0 g/dL    RDW 18.9 %    PLATELET 393 (L) 432 - 450 K/uL    MPV 10.3 9.4 - 12.3 FL    ABSOLUTE NRBC 0.00 0.0 - 0.2 K/uL    DF AUTOMATED      NEUTROPHILS 57 43 - 78 %    LYMPHOCYTES 31 13 - 44 %    MONOCYTES 9 4.0 - 12.0 %    EOSINOPHILS 2 0.5 - 7.8 %    BASOPHILS 1 0.0 - 2.0 %    IMMATURE GRANULOCYTES 0 0.0 - 5.0 %    ABS. NEUTROPHILS 4.1 1.7 - 8.2 K/UL    ABS. LYMPHOCYTES 2.2 0.5 - 4.6 K/UL    ABS. MONOCYTES 0.6 0.1 - 1.3 K/UL    ABS. EOSINOPHILS 0.2 0.0 - 0.8 K/UL    ABS. BASOPHILS 0.1 0.0 - 0.2 K/UL    ABS. IMM.  GRANS. 0.0 0.0 - 0.5 K/UL   GLUCOSE, POC    Collection Time: 10/23/18  7:12 AM Result Value Ref Range    Glucose (POC) 97 65 - 100 mg/dL       Current Med List in Hospital:   Current Facility-Administered Medications   Medication Dose Route Frequency    levETIRAcetam (KEPPRA) tablet 500 mg  500 mg Oral BID    albuterol-ipratropium (DUO-NEB) 2.5 MG-0.5 MG/3 ML  3 mL Nebulization Q4H RT    allopurinol (ZYLOPRIM) tablet 100 mg  100 mg Oral DAILY    atorvastatin (LIPITOR) tablet 20 mg  20 mg Oral QHS    carvedilol (COREG) tablet 3.125 mg  3.125 mg Oral BID WITH MEALS    fluticasone (FLONASE) 50 mcg/actuation nasal spray 2 Spray  2 Spray Both Nostrils DAILY    pantoprazole (PROTONIX) tablet 40 mg  40 mg Oral ACB    torsemide (DEMADEX) tablet 20 mg  20 mg Oral DAILY    sodium chloride (NS) flush 5-10 mL  5-10 mL IntraVENous Q8H    sodium chloride (NS) flush 5-10 mL  5-10 mL IntraVENous PRN    acetaminophen (TYLENOL) tablet 650 mg  650 mg Oral Q6H PRN    ondansetron (ZOFRAN) injection 4 mg  4 mg IntraVENous Q4H PRN    insulin lispro (HUMALOG) injection   SubCUTAneous AC&HS       Discharge Exam:  Patient Vitals for the past 24 hrs:   Temp Pulse Resp BP SpO2   10/23/18 0833 98.6 °F (37 °C) 87 20 129/82 98 %   10/23/18 0829     100 %   10/23/18 0425 97.4 °F (36.3 °C) 82 20 128/85 97 %   10/23/18 0307     95 %   10/23/18 0041     96 %   10/23/18 0008 97.5 °F (36.4 °C) 81 20 138/88 98 %   10/22/18 2105     100 %   10/22/18 1934 97.8 °F (36.6 °C) 79 20 123/82 99 %   10/22/18 1733 97.7 °F (36.5 °C) 72 19 113/78 100 %   10/22/18 1608     95 %   10/22/18 1309     95 %   10/22/18 1156 97.7 °F (36.5 °C) 65 17 95/73 96 %     Oxygen Therapy  O2 Sat (%): 98 % (10/23/18 0833)  Pulse via Oximetry: 78 beats per minute (10/23/18 0829)  O2 Device: Nasal cannula (10/23/18 0829)  O2 Flow Rate (L/min): 2 l/min (10/23/18 0829)    Intake/Output Summary (Last 24 hours) at 10/23/2018 0922  Last data filed at 10/22/2018 1935  Gross per 24 hour   Intake 720 ml   Output 450 ml   Net 270 ml General:    Well nourished. Alert. CV:   Regular rate and rhythm. No murmur, rub, or gallop. No edema  Lungs:  Clear to auscultation bilaterally. No wheezing, rhonchi, or rales. Abdomen: Soft, nontender, nondistended. Bowel sounds normal.   Extremities: Warm and dry. Neurologic:  grossly intact. Skin:     No rashes or jaundice. Psych:  Normal mood and affect. Discharge Info:   Current Discharge Medication List      START taking these medications    Details   levETIRAcetam (KEPPRA) 500 mg tablet Take 1 Tab by mouth two (2) times a day. Qty: 30 Tab, Refills: 0         CONTINUE these medications which have NOT CHANGED    Details   torsemide (DEMADEX) 20 mg tablet Take 1 Tab by mouth daily. Qty: 60 Tab, Refills: 5      atorvastatin (LIPITOR) 20 mg tablet Take 1 Tab by mouth nightly. Qty: 90 Tab, Refills: 3      allopurinol (ZYLOPRIM) 100 mg tablet TAKE 1 TABLET BY MOUTH EVERY DAY  Qty: 90 Tab, Refills: 3      carvedilol (COREG) 3.125 mg tablet Take 1 Tab by mouth two (2) times daily (with meals). Qty: 180 Tab, Refills: 3      omeprazole (PRILOSEC) 20 mg capsule Take 1 Cap by mouth daily. Qty: 90 Cap, Refills: 3      fluticasone (FLONASE) 50 mcg/actuation nasal spray 2 Sprays by Both Nostrils route daily. Qty: 1 Bottle, Refills: 11      melatonin 3 mg tablet Take 3 mg by mouth nightly. albuterol (VENTOLIN HFA) 90 mcg/actuation inhaler Take 2 Puffs by inhalation four (4) times daily. Qty: 1 Inhaler, Refills: 11      polyethylene glycol (MIRALAX) 17 gram packet Take 1 Packet by mouth daily. Qty: 1 Packet, Refills: 11      albuterol-ipratropium (DUO-NEB) 2.5 mg-0.5 mg/3 ml nebu 3 mL by Nebulization route four (4) times daily.  Diaignosis--J44.9  Qty: 120 Nebule, Refills: 11         STOP taking these medications       spironolactone (ALDACTONE) 25 mg tablet Comments:   Reason for Stopping:         lisinopril (PRINIVIL, ZESTRIL) 5 mg tablet Comments:   Reason for Stopping: Disposition: home    Activity: Activity as tolerated  Diet: DIET CARDIAC Regular    Follow-up Appointments   Procedures    FOLLOW UP VISIT Appointment in: One Week 1 week PCP, 2 weeks [facility] cardiology, 4 weeks neurology dr Kam Wilson     1 week PCP, 2 weeks [facility] cardiology, 4 weeks neurology dr Kam Wilson     Standing Status:   Standing     Number of Occurrences:   1     Order Specific Question:   Appointment in     Answer: One Week         Follow-up Information     Follow up With Specialties Details Why Adria Crigler, MD [facility]   [address] Lauren Maciel [address] [place] [address]  [phone]            Time spent in patient discharge planning and coordination 30 minutes.     Signed:  Jen Kimbrough MD

## 2018-10-23 NOTE — PROGRESS NOTES
Problem: Falls - Risk of 
Goal: *Absence of Falls Document Claus Hopkinss Fall Risk and appropriate interventions in the flowsheet. Outcome: Progressing Towards Goal 
Fall Risk Interventions: 
Mobility Interventions: OT consult for ADLs, Patient to call before getting OOB, PT Consult for mobility concerns Mentation Interventions: Bed/chair exit alarm Medication Interventions: Bed/chair exit alarm, Evaluate medications/consider consulting pharmacy, Patient to call before getting OOB Elimination Interventions: Bed/chair exit alarm, Call light in reach History of Falls Interventions: Bed/chair exit alarm

## 2018-10-24 ENCOUNTER — PATIENT OUTREACH (OUTPATIENT)
Dept: CASE MANAGEMENT | Age: 73
End: 2018-10-24

## 2018-10-24 NOTE — PROGRESS NOTES
Spoke with pt and pts wife. Verbalized that pt was admitted to the hospital on suspicion of seizure activity. Pt fell at home and broke his teeth that's then wife call 911. Pt is currently on 4 LPM O2 via NC. Taking Carvedilol and Keppra. Nurse encouraged timely administration of meds to help with effective symptom control. Nurse educate on the importance of checking BS and BP daily and as ordered. Currently using walker to ambulate. Wife is very involved in pts care. No appointment set with PCP. Nurse encourage wife to call and set appointment within 7 days after discharged. Will call after phone call with CC. Wife transport to all appointment as ordered. Nurse encourage to call 911 for life threatening circumstances only. Nurse educate on communicating with PCP for all needs and concerns. Nurse will continue to monitor and provide care as needed.

## 2018-11-02 ENCOUNTER — APPOINTMENT (OUTPATIENT)
Dept: ULTRASOUND IMAGING | Age: 73
DRG: 948 | End: 2018-11-02
Attending: INTERNAL MEDICINE
Payer: MEDICARE

## 2018-11-02 ENCOUNTER — HOSPITAL ENCOUNTER (INPATIENT)
Age: 73
LOS: 2 days | Discharge: HOME | DRG: 948 | End: 2018-11-04
Attending: EMERGENCY MEDICINE | Admitting: INTERNAL MEDICINE
Payer: MEDICARE

## 2018-11-02 ENCOUNTER — APPOINTMENT (OUTPATIENT)
Dept: CT IMAGING | Age: 73
DRG: 948 | End: 2018-11-02
Attending: EMERGENCY MEDICINE
Payer: MEDICARE

## 2018-11-02 ENCOUNTER — APPOINTMENT (OUTPATIENT)
Dept: MRI IMAGING | Age: 73
DRG: 948 | End: 2018-11-02
Attending: INTERNAL MEDICINE
Payer: MEDICARE

## 2018-11-02 ENCOUNTER — APPOINTMENT (OUTPATIENT)
Dept: CT IMAGING | Age: 73
DRG: 948 | End: 2018-11-02
Attending: PSYCHIATRY & NEUROLOGY
Payer: MEDICARE

## 2018-11-02 ENCOUNTER — APPOINTMENT (OUTPATIENT)
Dept: GENERAL RADIOLOGY | Age: 73
DRG: 948 | End: 2018-11-02
Attending: EMERGENCY MEDICINE
Payer: MEDICARE

## 2018-11-02 DIAGNOSIS — I63.81 ACUTE LACUNAR INFARCTION (HCC): Primary | ICD-10-CM

## 2018-11-02 DIAGNOSIS — I50.23 ACUTE ON CHRONIC SYSTOLIC HEART FAILURE (HCC): ICD-10-CM

## 2018-11-02 PROBLEM — I63.9 STROKE (CEREBRUM) (HCC): Status: ACTIVE | Noted: 2018-11-02

## 2018-11-02 LAB
ANION GAP SERPL CALC-SCNC: 5 MMOL/L (ref 7–16)
ATRIAL RATE: 66 BPM
BUN SERPL-MCNC: 21 MG/DL (ref 8–23)
CALCIUM SERPL-MCNC: 8.7 MG/DL (ref 8.3–10.4)
CALCULATED P AXIS, ECG09: 61 DEGREES
CALCULATED R AXIS, ECG10: -78 DEGREES
CALCULATED T AXIS, ECG11: 85 DEGREES
CHLORIDE SERPL-SCNC: 107 MMOL/L (ref 98–107)
CO2 SERPL-SCNC: 28 MMOL/L (ref 21–32)
CREAT SERPL-MCNC: 1.7 MG/DL (ref 0.8–1.5)
DIAGNOSIS, 93000: NORMAL
ERYTHROCYTE [DISTWIDTH] IN BLOOD BY AUTOMATED COUNT: 17.8 %
GLUCOSE BLD STRIP.AUTO-MCNC: 92 MG/DL (ref 65–100)
GLUCOSE SERPL-MCNC: 91 MG/DL (ref 65–100)
HCT VFR BLD AUTO: 45.8 % (ref 41.1–50.3)
HGB BLD-MCNC: 14.3 G/DL (ref 13.6–17.2)
INR BLD: 1.4 (ref 0.9–1.2)
MAGNESIUM SERPL-MCNC: 2.2 MG/DL (ref 1.8–2.4)
MCH RBC QN AUTO: 27.3 PG (ref 26.1–32.9)
MCHC RBC AUTO-ENTMCNC: 31.2 G/DL (ref 31.4–35)
MCV RBC AUTO: 87.6 FL (ref 79.6–97.8)
NRBC # BLD: 0 K/UL (ref 0–0.2)
P-R INTERVAL, ECG05: 218 MS
PLATELET # BLD AUTO: 173 K/UL (ref 150–450)
PMV BLD AUTO: 10.8 FL (ref 9.4–12.3)
POTASSIUM SERPL-SCNC: 4.6 MMOL/L (ref 3.5–5.1)
PT BLD: 16.8 SECS (ref 9.6–11.6)
Q-T INTERVAL, ECG07: 510 MS
QRS DURATION, ECG06: 118 MS
QTC CALCULATION (BEZET), ECG08: 534 MS
RBC # BLD AUTO: 5.23 M/UL (ref 4.23–5.6)
SODIUM SERPL-SCNC: 140 MMOL/L (ref 136–145)
VENTRICULAR RATE, ECG03: 66 BPM
WBC # BLD AUTO: 7.3 K/UL (ref 4.3–11.1)

## 2018-11-02 PROCEDURE — 70450 CT HEAD/BRAIN W/O DYE: CPT

## 2018-11-02 PROCEDURE — 70544 MR ANGIOGRAPHY HEAD W/O DYE: CPT

## 2018-11-02 PROCEDURE — 71045 X-RAY EXAM CHEST 1 VIEW: CPT

## 2018-11-02 PROCEDURE — 93005 ELECTROCARDIOGRAM TRACING: CPT | Performed by: EMERGENCY MEDICINE

## 2018-11-02 PROCEDURE — 74011000258 HC RX REV CODE- 258: Performed by: EMERGENCY MEDICINE

## 2018-11-02 PROCEDURE — 80048 BASIC METABOLIC PNL TOTAL CA: CPT

## 2018-11-02 PROCEDURE — 70551 MRI BRAIN STEM W/O DYE: CPT

## 2018-11-02 PROCEDURE — 82962 GLUCOSE BLOOD TEST: CPT

## 2018-11-02 PROCEDURE — 93880 EXTRACRANIAL BILAT STUDY: CPT

## 2018-11-02 PROCEDURE — 85610 PROTHROMBIN TIME: CPT

## 2018-11-02 PROCEDURE — 99223 1ST HOSP IP/OBS HIGH 75: CPT | Performed by: INTERNAL MEDICINE

## 2018-11-02 PROCEDURE — 74011636320 HC RX REV CODE- 636/320: Performed by: EMERGENCY MEDICINE

## 2018-11-02 PROCEDURE — 70498 CT ANGIOGRAPHY NECK: CPT

## 2018-11-02 PROCEDURE — 99285 EMERGENCY DEPT VISIT HI MDM: CPT | Performed by: EMERGENCY MEDICINE

## 2018-11-02 PROCEDURE — 83735 ASSAY OF MAGNESIUM: CPT

## 2018-11-02 PROCEDURE — 74011250637 HC RX REV CODE- 250/637: Performed by: INTERNAL MEDICINE

## 2018-11-02 PROCEDURE — 85027 COMPLETE CBC AUTOMATED: CPT

## 2018-11-02 PROCEDURE — 76604 US EXAM CHEST: CPT | Performed by: INTERNAL MEDICINE

## 2018-11-02 PROCEDURE — 65660000000 HC RM CCU STEPDOWN

## 2018-11-02 RX ORDER — PANTOPRAZOLE SODIUM 40 MG/1
40 TABLET, DELAYED RELEASE ORAL
Status: DISCONTINUED | OUTPATIENT
Start: 2018-11-03 | End: 2018-11-04 | Stop reason: HOSPADM

## 2018-11-02 RX ORDER — SODIUM CHLORIDE 0.9 % (FLUSH) 0.9 %
5-10 SYRINGE (ML) INJECTION AS NEEDED
Status: DISCONTINUED | OUTPATIENT
Start: 2018-11-02 | End: 2018-11-04 | Stop reason: HOSPADM

## 2018-11-02 RX ORDER — POLYETHYLENE GLYCOL 3350 17 G/17G
17 POWDER, FOR SOLUTION ORAL
Status: DISCONTINUED | OUTPATIENT
Start: 2018-11-02 | End: 2018-11-04 | Stop reason: HOSPADM

## 2018-11-02 RX ORDER — SODIUM CHLORIDE 0.9 % (FLUSH) 0.9 %
5-10 SYRINGE (ML) INJECTION EVERY 8 HOURS
Status: DISCONTINUED | OUTPATIENT
Start: 2018-11-02 | End: 2018-11-04 | Stop reason: HOSPADM

## 2018-11-02 RX ORDER — SODIUM CHLORIDE 0.9 % (FLUSH) 0.9 %
10 SYRINGE (ML) INJECTION
Status: COMPLETED | OUTPATIENT
Start: 2018-11-02 | End: 2018-11-02

## 2018-11-02 RX ORDER — LEVETIRACETAM 500 MG/1
500 TABLET ORAL 2 TIMES DAILY
Status: DISCONTINUED | OUTPATIENT
Start: 2018-11-02 | End: 2018-11-04 | Stop reason: HOSPADM

## 2018-11-02 RX ORDER — ACETAMINOPHEN 325 MG/1
650 TABLET ORAL
Status: DISCONTINUED | OUTPATIENT
Start: 2018-11-02 | End: 2018-11-04 | Stop reason: HOSPADM

## 2018-11-02 RX ORDER — GUAIFENESIN 100 MG/5ML
81 LIQUID (ML) ORAL DAILY
Status: DISCONTINUED | OUTPATIENT
Start: 2018-11-03 | End: 2018-11-04 | Stop reason: HOSPADM

## 2018-11-02 RX ORDER — TORSEMIDE 20 MG/1
20 TABLET ORAL DAILY
Status: DISCONTINUED | OUTPATIENT
Start: 2018-11-03 | End: 2018-11-04 | Stop reason: HOSPADM

## 2018-11-02 RX ORDER — ALLOPURINOL 100 MG/1
100 TABLET ORAL DAILY
Status: DISCONTINUED | OUTPATIENT
Start: 2018-11-03 | End: 2018-11-04 | Stop reason: HOSPADM

## 2018-11-02 RX ORDER — ATORVASTATIN CALCIUM 40 MG/1
40 TABLET, FILM COATED ORAL
Status: DISCONTINUED | OUTPATIENT
Start: 2018-11-02 | End: 2018-11-04 | Stop reason: HOSPADM

## 2018-11-02 RX ORDER — IPRATROPIUM BROMIDE AND ALBUTEROL SULFATE 2.5; .5 MG/3ML; MG/3ML
3 SOLUTION RESPIRATORY (INHALATION)
Status: DISCONTINUED | OUTPATIENT
Start: 2018-11-02 | End: 2018-11-04 | Stop reason: HOSPADM

## 2018-11-02 RX ADMIN — Medication 5 ML: at 20:33

## 2018-11-02 RX ADMIN — Medication 10 ML: at 12:37

## 2018-11-02 RX ADMIN — LEVETIRACETAM 500 MG: 500 TABLET ORAL at 20:32

## 2018-11-02 RX ADMIN — SODIUM CHLORIDE 100 ML: 900 INJECTION, SOLUTION INTRAVENOUS at 12:37

## 2018-11-02 RX ADMIN — IOPAMIDOL 70 ML: 755 INJECTION, SOLUTION INTRAVENOUS at 12:37

## 2018-11-02 RX ADMIN — ATORVASTATIN CALCIUM 40 MG: 40 TABLET, FILM COATED ORAL at 20:32

## 2018-11-02 NOTE — ED PROVIDER NOTES
74-year-old male presents to the ER with complaints of slurred speech and weakness in his right arm Family noticed some slurring of his speech this morning around 9 AM. The patient had a scheduled home health evaluation today where they noticed some mild weakness in his right arm as well Recently admitted after multiple seizures, MRI and EEG documented as normal.  Patient sent home on [place]. The history is provided by the patient and a relative. Extremity Weakness This is a new problem. Episode onset: 9 AM. The problem occurs constantly. The problem has not changed since onset. The pain is present in the right arm. The patient is experiencing no pain. Pertinent negatives include no numbness, no stiffness, no tingling, no back pain and no neck pain. He has tried nothing for the symptoms. There has been no history of extremity trauma. Past Medical History:  
Diagnosis Date  Acute CHF (congestive heart failure) ([address] 4/25/2015  Acute combined systolic and diastolic congestive heart failure ([address] 4/28/2015  Chronic obstructive pulmonary disease ([address]  De Quervain's tenosynovitis, right 4/28/2015  Dyspnea on exertion 6/28/2015  Elevated serum creatinine 4/25/2015  Elevated troponin 6/28/2015  History of coronary artery disease MI at 25 yo  
 History of right knee surgery   
 cartilage removal  
 History of shingles  Malignant hypertension 4/25/2015  MI (my[facility] infarction) [facility] Past Surgical History:  
Procedure Laterality Date  HX HEART CATHETERIZATION  6/29/2015  
 no intervention  HX KNEE ARTHROSCOPY Right   
 removal of cartilage Family History:  
Problem Relation Age of Onset  Hypertension Mother  No Known Problems Father Social History Socioeconomic History  Marital status:  Spouse name: Not on file  Number of children: Not on file  Years of education: Not on file  Highest education level: Not on file Social Needs  Financial resource strain: Not on file  Food insecurity - worry: Not on file  Food insecurity - inability: Not on file  Transportation needs - medical: Not on file  Transportation needs - non-medical: Not on file Occupational History  Occupation: retired Tobacco Use  Smoking status: Former Smoker Packs/day: 0.25 Years: 20.00 Pack years: 5.00 Types: Cigarettes Last attempt to quit: 4/5/2015 Years since quitting: 3.5  Smokeless tobacco: Never Used Substance and Sexual Activity  Alcohol use: No  
  Alcohol/week: 0.0 oz  Drug use: No  
 Sexual activity: Not on file Other Topics Concern   Service No  
 Blood Transfusions No  
  Comment: no issues with receiving  Caffeine Concern No  
 Occupational Exposure Not Asked [place] Hazards Not Asked  Sleep Concern Not Asked  Stress Concern Not Asked  Weight Concern Not Asked  Special Diet No  
 Back Care Not Asked  Exercise No  
 Bike Helmet Not Asked [address] Yes  Self-Exams Not Asked Social History Narrative Lives with his wife ALLERGIES: Pcn [penicillins] Review of Systems Constitutional: Negative for activity change, chills, diaphoresis and fever. HENT: Negative for dental problem, hearing loss, nosebleeds, rhinorrhea and sore throat. Eyes: Negative for pain, discharge, redness and visual disturbance. Respiratory: Negative for cough, chest tightness and shortness of breath. Cardiovascular: Negative for chest pain, palpitations and leg swelling. Gastrointestinal: Negative for abdominal pain, constipation, diarrhea, nausea and vomiting. Endocrine: Negative for cold intolerance, heat intolerance, polydipsia and polyuria. Genitourinary: Negative for dysuria and flank pain. Musculoskeletal: Negative for arthralgias, back pain, joint swelling, myalgias, neck pain and stiffness. Skin: Negative for pallor and rash. Allergic/Immunologic: Negative for environmental allergies and food allergies. Neurological: Positive for speech difficulty and weakness. Negative for dizziness, tingling, tremors, light-headedness, numbness and headaches. Hematological: Negative for adenopathy. Does not bruise/bleed easily. Psychiatric/Behavioral: Negative for confusion and dysphoric mood. The patient is not nervous/anxious and is not hyperactive. All other systems reviewed and are negative. Vitals:  
 11/02/18 1213 BP: 110/78 Pulse: 70 Resp: 16 Temp: 97.4 °F (36.3 °C) SpO2: 100% Physical Exam  
Constitutional: He is oriented to person, place, and time. He appears well-developed and well-nourished. He appears distressed. HENT:  
Head: Normocephalic and atraumatic. Right Ear: External ear normal.  
Left Ear: External ear normal.  
Mouth/Throat: Oropharynx is clear and moist. No oropharyngeal exudate. Eyes: Conjunctivae and EOM are normal. Pupils are equal, round, and reactive to light. No scleral icterus. Neck: Normal range of motion. Neck supple. No JVD present. No thyromegaly present. Cardiovascular: Normal rate, regular rhythm, normal heart sounds and intact distal pulses. Exam reveals no gallop and no friction rub. No murmur heard. Pulmonary/Chest: Effort normal and breath sounds normal. No respiratory distress. He has no wheezes. Abdominal: Soft. Bowel sounds are normal. He exhibits no distension. There is no hepatosplenomegaly. There is no tenderness. Musculoskeletal: Normal range of motion. He exhibits no edema, tenderness or deformity. Neurological: He is alert and oriented to person, place, and time. He exhibits normal muscle tone. Coordination normal. GCS eye subscore is 4. GCS verbal subscore is 5. GCS motor subscore is 6. Very subtle slurring of words intermittently during my interview Skin: Skin is warm and dry. Capillary refill takes less than 2 seconds. No rash noted. Psychiatric: He has a normal mood and affect. His behavior is normal. Judgment and thought content normal.  
Nursing note and vitals reviewed. MDM Number of Diagnoses or Management Options Acute lacunar infarction: new and requires workup Diagnosis management comments: EKG reviewed 
 sinus rhythm, left axis deviation, single PVC noted No acute ischemic changes No interval change from prior study Neurology present in the department for patient evaluation CTA ordered TPA not indicated at this time The neurologist voiced plan to admit the patient, according to both the patient and his visitors. 1:21 PM 
Patient remains clinically stable. He had no worsening symptoms. His right  is still minimally weaker than the left I do not  on any slurred speech at this time. Awaiting read of CTA 
 
2:30 PM 
Discussed with neurology, Dr. Liya Morrow Patient will be admitted Hospital is paged Discussed patient with admitting hospitalist 
 
 
  
Amount and/or Complexity of Data Reviewed Clinical lab tests: ordered and reviewed Tests in the radiology section of CPT®: ordered and reviewed Tests in the medicine section of CPT®: ordered and reviewed Obtain history from someone other than the patient: yes Review and summarize past medical records: yes Discuss the patient with other providers: yes Independent visualization of images, tracings, or specimens: yes Risk of Complications, Morbidity, and/or Mortality Presenting problems: moderate Diagnostic procedures: moderate Management options: moderate General comments: Elements of this note have been dictated via voice recognition software. Text and phrases may be limited by the accuracy of the software. The chart has been reviewed, but errors may still be present. Patient Progress Patient progress: stable Procedures

## 2018-11-02 NOTE — ED TRIAGE NOTES
Pt sts home health came out today and sts that pt had difficulty and right hand weaker than left about 0930. No difficulty speaking noted at this time and hand  bilaterally equal. Family sts pt is baseline normal at present. Pt family sts that pt has been having some difficulty for 3 weeks.

## 2018-11-02 NOTE — CONSULTS
CONSULT NOTE    Giovanny Fagan.    11/2/2018    Date of Admission:  11/2/2018    The patient's chart is reviewed and the patient is discussed with the staff. Subjective:     Patient is a 74 y.o.  male seen and evaluated at the request of Dr. Edita Krishna. He was admitted this afternoon after presenting to the ED with altered mental status and reported weakness on right side of the body. He was otherwise reporting to be in his usual state of health. Patient didn't think he was different than baseline, but daughter was concerned. He had a CXR which suggested heart failure and we were consulted for possible thoracentesis. He reports some cough over the past few days and chronic dyspnea, but not significantly different than baseline. He is severely limited with exertion to approximately 50 feet or less. He has been seen in pulmonary clinic had restrictive PFTs without ILD and had overnight hypoxemia on ANJELICA requiring 4LPM with sleep. He otherwise showed no evidence of pulmonary disease. Review of Systems  A comprehensive review of systems was negative except for that written in the HPI.     Patient Active Problem List   Diagnosis Code    CKD (chronic kidney disease) stage 3, GFR 30-59 ml/min ([facility]) N18.3    Acute on chronic systolic heart failure ([facility]) I50.23    Coronary atherosclerosis of native coronary vessel I25.10    Arthritis M19.90    Hypertension I10    COPD, moderate ([facility]) J44.9    High triglycerides E78.1    Gastroesophageal reflux disease without esophagitis K21.9    Mixed hyperlipidemia E78.2    Essential hypertension I10    CHF (congestive heart failure) ([facility]) I50.9    TAZ (obstructive sleep apnea) G47.33    Atherosclerosis of native coronary artery of native heart with stable angina pectoris ([facility]) I25.118    Personal history of tobacco use Z87.891    Erysipelas A46    Preseptal cellulitis L03.213    Acute respiratory failure with hypoxemia ([facility]) J96.01  Controlled type 2 diabetes mellitus without complication, without long-term current use of insulin ([facility]) E11.9    Type 2 diabetes with nephropathy ([facility]) E11.21    Chronic systolic heart failure ([facility]) I50.22    Shortness of breath R06.02    Stage 2 chronic kidney disease N18.2    Chronic obstructive pulmonary disease ([facility]) J44.9    Seizure ([facility]) R56.9    Prolonged Q-T interval on ECG R94.31    Elevated troponin R74.8    Pulmonary hypertension ([facility]) I27.20    Stroke (cerebrum) ([facility]) I63.9     Prior to Admission Medications   Prescriptions Last Dose Informant Patient Reported? Taking? albuterol (VENTOLIN HFA) 90 mcg/actuation inhaler   No No   Sig: Take 2 Puffs by inhalation four (4) times daily. albuterol-ipratropium (DUO-NEB) 2.5 mg-0.5 mg/3 ml nebu   No No   Sig: 3 mL by Nebulization route four (4) times daily. Diaignosis--J44.9   allopurinol (ZYLOPRIM) 100 mg tablet   No No   Sig: TAKE 1 TABLET BY MOUTH EVERY DAY   atorvastatin (LIPITOR) 20 mg tablet   No No   Sig: Take 1 Tab by mouth nightly. carvedilol (COREG) 3.125 mg tablet   No No   Sig: Take 1 Tab by mouth two (2) times daily (with meals). fluticasone (FLONASE) 50 mcg/actuation nasal spray   No No   Si Sprays by Both Nostrils route daily. levETIRAcetam (KEPPRA) 500 mg tablet   No No   Sig: Take 1 Tab by mouth two (2) times a day. melatonin 3 mg tablet   Yes No   Sig: Take 3 mg by mouth nightly. omeprazole (PRILOSEC) 20 mg capsule   No No   Sig: Take 1 Cap by mouth daily. polyethylene glycol (MIRALAX) 17 gram packet   No No   Sig: Take 1 Packet by mouth daily. Patient taking differently: Take 17 g by mouth daily as needed. torsemide (DEMADEX) 20 mg tablet   No No   Sig: Take 1 Tab by mouth daily.       Facility-Administered Medications: None     Past Medical History:   Diagnosis Date    Acute CHF (congestive heart failure) ([facility] 2015    Acute combined systolic and diastolic congestive heart failure ([facility] 2015  Chronic obstructive pulmonary disease ([facility])     De Quervain's tenosynovitis, right 4/28/2015    Dyspnea on exertion 6/28/2015    Elevated serum creatinine 4/25/2015    Elevated troponin 6/28/2015    History of coronary artery disease     MI at 25 yo    History of right knee surgery     cartilage removal    History of shingles     Malignant hypertension 4/25/2015    MI [facility] infarction[facility])      Past Surgical History:   Procedure Laterality Date    HX HEART CATHETERIZATION  6/29/2015    no intervention    HX KNEE ARTHROSCOPY Right     removal of cartilage     Social History     Socioeconomic History    Marital status:      Spouse name: Not on file    Number of children: Not on file    Years of education: Not on file    Highest education level: Not on file   Social Needs    Financial resource strain: Not on file    Food insecurity - worry: Not on file    Food insecurity - inability: Not on file   [employer] needs - medical: Not on file   [employer] needs - non-medical: Not on file   Occupational History    Occupation: retired   Tobacco Use    Smoking status: Former Smoker     Packs/day: 0.25     Years: 20.00     Pack years: 5.00     Types: Cigarettes     Last attempt to quit: 4/5/2015     Years since quitting: 3.5    Smokeless tobacco: Never Used   Substance and Sexual Activity    Alcohol use: No     Alcohol/week: 0.0 oz    Drug use: No    Sexual activity: Not on file   Other Topics Concern     Service No    Blood Transfusions No     Comment: no issues with receiving    Caffeine Concern No    Occupational Exposure Not Asked    Hobby Hazards Not Asked    Sleep Concern Not Asked    Stress Concern Not Asked    Weight Concern Not Asked    Special Diet No    Back Care Not Asked    Exercise No    Bike Helmet Not Asked    Seat Belt Yes    Self-Exams Not Asked   Social History Narrative    Lives with his wife     Family History   Problem Relation Age of Onset    Hypertension Mother     No Known Problems Father      Allergies   Allergen Reactions    Pcn [Penicillins] Other (comments)     \"makes my heart stop\"     Current Facility-Administered Medications   Medication Dose Route Frequency    albuterol-ipratropium (DUO-NEB) 2.5 MG-0.5 MG/3 ML  3 mL Nebulization Q4H PRN    [START ON 11/3/2018] allopurinol (ZYLOPRIM) tablet 100 mg  100 mg Oral DAILY    atorvastatin (LIPITOR) tablet 40 mg  40 mg Oral QHS    levETIRAcetam (KEPPRA) tablet 500 mg  500 mg Oral BID    [START ON 11/3/2018] pantoprazole (PROTONIX) tablet 40 mg  40 mg Oral ACB    polyethylene glycol (MIRALAX) packet 17 g  17 g Oral DAILY PRN    [START ON 11/3/2018] torsemide (DEMADEX) tablet 20 mg  20 mg Oral DAILY    [START ON 11/3/2018] aspirin chewable tablet 81 mg  81 mg Oral DAILY    sodium chloride (NS) flush 5-10 mL  5-10 mL IntraVENous Q8H    sodium chloride (NS) flush 5-10 mL  5-10 mL IntraVENous PRN    acetaminophen (TYLENOL) tablet 650 mg  650 mg Oral Q4H PRN     Objective:     Vitals:    11/02/18 1418 11/02/18 1438 11/02/18 1457 11/02/18 1601   BP: 124/90 (!) 129/92 127/86 123/86   Pulse: 70 70 73 71   Resp:    20   Temp:    95.9 °F (35.5 °C)   SpO2: 96% 100% 100% 92%     PHYSICAL EXAM     Constitutional:  the patient is well developed and in no acute distress  EENMT:  Sclera clear, pupils equal, oral mucosa moist  Respiratory: crackles in bases  Cardiovascular:  RRR without M,G,R  Gastrointestinal: soft and non-tender; with positive bowel sounds. Musculoskeletal: warm without cyanosis. There is 1+ lower leg edema.   Skin:  no jaundice or rashes, no wounds   Neurologic: no gross neuro deficits     Psychiatric:  alert and oriented x 3    CXR:  Cardiomegaly, pulmonary edema, ? effusions      Recent Labs     11/02/18  1304 11/02/18  1216   WBC  --  7.3   HGB  --  14.3   HCT  --  45.8   PLT  --  173   INR 1.4*  --      Recent Labs     11/02/18  1216      K 4.6      GLU 91 CO2 28   BUN 21   CREA 1.70*   MG 2.2   CA 8.7     No results for input(s): PH, PCO2, PO2, HCO3 in the last 72 hours. No results for input(s): LCAD, LAC in the last 72 hours. Assessment:  (Medical Decision Making)     Hospital Problems  Date Reviewed: 6/6/2018          Codes Class Noted POA    Stroke (cerebrum) [facility]) ICD-10-CM: I63.9  ICD-9-CM: 434.91  11/2/2018 Unknown        CKD (chronic kidney disease) stage 3, GFR 30-59 ml/min ([facility]) (Chronic) ICD-10-CM: N18.3  ICD-9-CM: 585.3  9/29/2017 Yes        Coronary atherosclerosis of native coronary vessel (Chronic) ICD-10-CM: I25.10  ICD-9-CM: 414.01  9/29/2017 Yes        Hypertension (Chronic) ICD-10-CM: I10  ICD-9-CM: 401.9  9/29/2017 Yes        COPD, moderate ([address] (Chronic) ICD-10-CM: J44.9  ICD-9-CM: 496  9/29/2017 Yes    Overview Signed 12/27/2015 12:38 PM by Rayo Car NP     Complete PFTs: 7/20/15:  Spirometry is consistent with a moderate obstructive/restrictive defect. . The residual volume is increased relative to other lung volumes suggesting air-trapping. The diffusion capacity corrected for alveolar volume was normal suggesting no loss of alveolo-capillary units. Gastroesophageal reflux disease without esophagitis (Chronic) ICD-10-CM: K21.9  ICD-9-CM: 530.81  9/29/2017 Yes        Mixed hyperlipidemia (Chronic) ICD-10-CM: M80.8  ICD-9-CM: 272.2  9/28/2016 Yes            73 y/o male with CHF and pulmonary edema on CXR, questionable effusions. We are consulted for possible thoracentesis. Plan:  (Medical Decision Making)     --will U/S chest  --evaluate and treat likely decompensated heart failure  --uses 4LPM O2 at night  --consider BNP, TTE, etc  --diuresis per primary team    More than 50% of the time documented was spent in face-to-face contact with the patient and in the care of the patient on the floor/unit where the patient is located.     Thank you very much for this referral.  We appreciate the opportunity to participate in this patient's care. Will follow along with above stated plan.     Roberto Mortimer, MD

## 2018-11-02 NOTE — INTERVAL H&P NOTE
H&P Update: 
Burak Mccormack. was seen and examined. History and physical has been reviewed. The patient has been examined.  There have been no significant clinical changes since the completion of the originally dated History and Physical. 
 
Signed By: Yaritza Wagoner MD   
 November 2, 2018 4:46 PM

## 2018-11-02 NOTE — ROUTINE PROCESS
TRANSFER - OUT REPORT: 
 
Verbal report given to Al, RN on [facility].  being transferred to room 704 for routine progression of care Report consisted of patients Situation, Background, Assessment and  
Recommendations(SBAR). Information from the following report(s) ED Summary was reviewed with the receiving nurse. Lines:  
Peripheral IV 11/02/18 Right Antecubital (Active) Opportunity for questions and clarification was provided. Patient transported with: 
 [employer]

## 2018-11-02 NOTE — CONSULTS
Impression:    Possible minor stroke. Symptoms rapidly resolving. Denied stroke scale 1. Patient is not a candidate for IV TPA due to rapidly clearing neurologic deficits and low NIH stroke scale. No large vessel occlusion noted on CTA of head and neck. Suspect embolic etiology, either due to low ejection fraction/CHF or undiagnosed atrial fibrillation  . CHF    New onset of large LT pleural effusion      Recommendations:    Admit to EKG monitored bed hospitalist service    MRI of brain noncontrast    Transthoracic echocardiogram    Aspirin 81 mg daily    PT,OT speech therapy    Further evaluation of CHF and pleural effusion per internal medicine hospitalists    Chief complaint: Stroke alert        History:    The patient does have a 58-year-old male previously seen on October 22 by Dr. Brandon Plata for apparent seizures. Evaluation at that time included EEG and MRI of the brain. The patient was treated with Keppra. The patient was back at home in his usual state of health when he developed right sided numbness and weakness with slurred speech. Patient initially declined transport to the emergency department and was finally transported by his daughter. He was told to arrive in the emergency department around 12:30 approximately at the 3 hour after onset of symptoms and seen by the undersigned in CT within 5 minutes of arrival.  Incidentally the patient was noted to have gradual improvement in his symptoms. Initial inspection of the CT raised the possibility of hyperintense right MCA. For that reason CTA of the head was performed which showed no evidence of large vessel occlusion. NIH stroke scale in the emergency department was 14 and 10% reduction in sensation in the right upper and lower extremity.     Past Medical History:   Diagnosis Date    Acute CHF (congestive heart failure) ([facility] 4/25/2015    Acute combined systolic and diastolic congestive heart failure ([address] 4/28/2015    Chronic obstructive pulmonary disease ([facility]     De Quervain's tenosynovitis, right 4/28/2015    Dyspnea on exertion 6/28/2015    Elevated serum creatinine 4/25/2015    Elevated troponin 6/28/2015    History of coronary artery disease     MI at 27 yo    History of right knee surgery     cartilage removal    History of shingles     Malignant hypertension 4/25/2015    [facility])      Past Surgical History:   Procedure Laterality Date    HX HEART CATHETERIZATION  6/29/2015    no intervention    HX KNEE ARTHROSCOPY Right     removal of cartilage     Social History     Tobacco Use    Smoking status: Former Smoker     Packs/day: 0.25     Years: 20.00     Pack years: 5.00     Types: Cigarettes     Last attempt to quit: 4/5/2015     Years since quitting: 3.5    Smokeless tobacco: Never Used   Substance Use Topics    Alcohol use: No     Alcohol/week: 0.0 oz    Drug use: No     Family History   Problem Relation Age of Onset    Hypertension Mother     No Known Problems Father      No current facility-administered medications for this encounter. Current Outpatient Medications:     levETIRAcetam (KEPPRA) 500 mg tablet, Take 1 Tab by mouth two (2) times a day., Disp: 30 Tab, Rfl: 0    torsemide (DEMADEX) 20 mg tablet, Take 1 Tab by mouth daily. , Disp: 60 Tab, Rfl: 5    atorvastatin (LIPITOR) 20 mg tablet, Take 1 Tab by mouth nightly., Disp: 90 Tab, Rfl: 3    allopurinol (ZYLOPRIM) 100 mg tablet, TAKE 1 TABLET BY MOUTH EVERY DAY, Disp: 90 Tab, Rfl: 3    carvedilol (COREG) 3.125 mg tablet, Take 1 Tab by mouth two (2) times daily (with meals). , Disp: 180 Tab, Rfl: 3    omeprazole (PRILOSEC) 20 mg capsule, Take 1 Cap by mouth daily. , Disp: 90 Cap, Rfl: 3    fluticasone (FLONASE) 50 mcg/actuation nasal spray, 2 Sprays by Both Nostrils route daily. , Disp: 1 Bottle, Rfl: 11    melatonin 3 mg tablet, Take 3 mg by mouth nightly., Disp: , Rfl:     albuterol (VENTOLIN HFA) 90 mcg/actuation inhaler, Take 2 Puffs by inhalation four (4) times daily. , Disp: 1 Inhaler, Rfl: 11    polyethylene glycol (MIRALAX) 17 gram packet, Take 1 Packet by mouth daily. (Patient taking differently: Take 17 g by mouth daily as needed.), Disp: 1 Packet, Rfl: 11    albuterol-ipratropium (DUO-NEB) 2.5 mg-0.5 mg/3 ml nebu, 3 mL by Nebulization route four (4) times daily. Diaignosis--J44.9, Disp: 120 Nebule, Rfl: 11    Allergies   Allergen Reactions    Pcn [Penicillins] Other (comments)     \"makes my heart stop\"     Visit Vitals  /78   Pulse 70   Temp 97.4 °F (36.3 °C)   Resp 16   SpO2 100%     General: well nourished, appears stated age    Eyes: no proptosis or exophthalmos; conjunctivae clear, sclerae non-icteric    Chest: clear to auscultation    Cardiac: normal S1 S2; no murmurs gallop or rubs    Neurological:    MSE: alert, oriented times 3; fluent speech; no paraphasic errors; follows commands without difficulty    CN 2: visual fields full; no afferent pupillary defect; VA not checked; fundoscopic exam not performed  CN 3,4,6: Pupils symmetrical in size, reactive to light directly and consensually; no ptosis; full versions and ductions  CN 5: facial sensation intact to light touch and pin prick.  Corneal reflex not performed  CN 7: Symmetrical facial tone and movements  CN 8 responds to spoken voice  CN 9,10; palate symmetrical gag intact  CN 11: head turn and shoulder shrug intact  CN 12: tongue midline without atrophy or fasiculations    Motor:  Power 5/5 UE and LE proximal to distal  Fine motor movements symmetrical  Tone normal  Atrophy: absent      Cerebellar:  Finger to nose; heel to shin intact      Sensory  10% reduction in pinprick and light touch in the right upper and lower extremity    Reflexes    Symmetrical and normally active at 2+ in UE and LE  Plantar response flexor bilaterally    CT Brain Without Contrast Dated  November 2, 2018     Reference Exam: October 20, 2018     Indication: Right upper extremity weakness and slurred speech     Technique: Radiation dose reduction techniques were used for this study using  one or more of the following: automated exposure control; adjustment of mA  and/or kV (according to patient size);  iterative reconstruction. Routine CT of  the brain was performed using 5 mm axial images obtained. Images are presumed to  have been obtained less than 24 hours from presentation.     Findings:  The ventricular system, basilar cisterns, and cortical sulci all are  normal in size and position for the patient's age. There are no abnormal  intracranial masses, mass effect, nor extra-axial collections seen. There are  no abnormal areas of attenuation that would indicate a recent intracranial  hemorrhage or infarction. Bone windows show no fractures nor foreign bodies.     IMPRESSION  Impression:  Normal CT of the brain done without contrast.       Final [99] 11/02/2018 12:30 11/02/2018 12:38   Study Result     Title:  CT arteriogram of the neck and head.       Indication: Code stroke, right upper extremity weakness, slurred speech. History of seizure. Presumed amyloidosis. Myocardial hypertrophy, systolic  heart failure, hypoxemia, acute respiratory failure, tobacco use, coronary  artery disease, obstructive sleep apnea, chronic kidney disease, hypertension,  chronic obstructive pulmonary disease, hypertriglyceridemia.     Technique: Axial images of the neck and head were obtained after the uneventful   administration of intravenous iodinated contrast media. Contrast was used to  best identify the arterial structures.   Images were reviewed on a separate, free  standing, three-dimensional workstation as per the referring physicians request.        All stenosis percentages derived by comparing the narrowest segment with the  distal Internal Carotid Artery luminal diameter, as described in the Michelle  American Symptomatic Carotid Endarterectomy Trial (NASCET) criteria.      All CT scans at this facility are performed using dose reduction/dose modulation  techniques, as appropriate the performed exam, including the following:   Automated Exposure Control; Adjustment of the mA and/or kV according to patient  size (this includes techniques or standardized protocols for targeted exams  where dose is matched to indication/reason for exam); and Use of Iterative  Reconstruction Technique. Comparison: Chest x-ray today and earlier. Chest CT 12/30/2016. Head CT  11/2/2018. .     Findings:      Large right pleural effusion. Cardiomegaly. Lung apices are clear. Unremarkable thyroid gland. Patent paranasal sinuses and mastoid air cells. Intracranial images reveal no midline shift. There is generalized, mild,  atrophy. No destructive bone lesion.     No pulmonary artery filling defect. Patent superior sagittal sinus and  bilateral transverse sinuses. The left transverse sinus is smaller than the  right.     Moderate atherosclerotic disease in the visualized portion of thoracic aorta. Bovine arch configuration. The common trunk right brachiocephalic artery is  large diameter. Patent right and left subclavian arteries.     The right common carotid artery is patent, moderately tortuous, and demonstrates  atherosclerotic intimal thickening throughout its course. Right external carotid  artery is patent. The right internal carotid artery is patent with moderate  tortuosity in its cervical portion. The intracranial SANDRA has calcified plaques  without high-grade stenosis or aneurysm. Patent right middle cerebral and  anterior cerebral arteries. Patent right posterior communicating artery.     The anterior communicating artery is small diameter, but patent. .      The left common carotid artery arises from the right brachiocephalic artery and  is is tortuous yet patent throughout its course. There is intimal thickening in  its mid and distal aspect. The left external carotid artery is patent.  The left  internal carotid artery is widely patent and moderately tortuous in its cervical  aspect. Intracranial LICA contains punctate calcifications. Moderate stenosis  in the supraclinoid LICA. Patent left middle cerebral and anterior cerebral  artery. Patent left posterior communicating artery.     All the anterior circulation vessels demonstrate mural irregularity consistent  with atherosclerosis. .       Right vertebral artery is patent throughout its course. There is  atherosclerotic mural irregularity and mild narrowing in the right vertebral  artery. Left vertebral artery is small diameter but patent throughout its  tortuous course. Patent basilar artery. Patent right posterior cerebral  artery. Patent left posterior cerebral artery.       IMPRESSION  Impression: Diffuse atherosclerotic disease.     No significant stenosis in the common carotid or internal carotid arteries.     No intracranial arterial thromboembolus.     Patent bilateral vertebral arteries.     Large right pleural effusion is new since 10/20/2018. Cardiomegaly. CT and CTA reviewed by me in real time.      Recent Results (from the past 24 hour(s))   CBC W/O DIFF    Collection Time: 11/02/18 12:16 PM   Result Value Ref Range    WBC 7.3 4.3 - 11.1 K/uL    RBC 5.23 4.23 - 5.6 M/uL    HGB 14.3 13.6 - 17.2 g/dL    HCT 45.8 41.1 - 50.3 %    MCV 87.6 79.6 - 97.8 FL    MCH 27.3 26.1 - 32.9 PG    MCHC 31.2 (L) 31.4 - 35.0 g/dL    RDW 17.8 %    PLATELET 626 622 - 260 K/uL    MPV 10.8 9.4 - 12.3 FL    ABSOLUTE NRBC 0.00 0.0 - 0.2 K/uL   METABOLIC PANEL, BASIC    Collection Time: 11/02/18 12:16 PM   Result Value Ref Range    Sodium 140 136 - 145 mmol/L    Potassium 4.6 3.5 - 5.1 mmol/L    Chloride 107 98 - 107 mmol/L    CO2 28 21 - 32 mmol/L    Anion gap 5 (L) 7 - 16 mmol/L    Glucose 91 65 - 100 mg/dL    BUN 21 8 - 23 MG/DL    Creatinine 1.70 (H) 0.8 - 1.5 MG/DL    GFR est AA 51 (L) >60 ml/min/1.73m2    GFR est non-AA 42 (L) >60 ml/min/1.73m2 Calcium 8.7 8.3 - 10.4 MG/DL   MAGNESIUM    Collection Time: 11/02/18 12:16 PM   Result Value Ref Range    Magnesium 2.2 1.8 - 2.4 mg/dL   EKG, 12 LEAD, INITIAL    Collection Time: 11/02/18 12:16 PM   Result Value Ref Range    Ventricular Rate 66 BPM    Atrial Rate 66 BPM    P-R Interval 218 ms    QRS Duration 118 ms    Q-T Interval 510 ms    QTC Calculation (Bezet) 534 ms    Calculated P Axis 61 degrees    Calculated R Axis -78 degrees    Calculated T Axis 85 degrees    Diagnosis       Sinus rhythm with 1st degree A-V block with Premature atrial complexes with   Aberrant conduction  Possible Left atrial enlargement  Left axis deviation  Inferior infarct , age undetermined  Anterior infarct , age undetermined  Prolonged QT  Abnormal ECG  When compared with ECG of 22-OCT-2018 06:55,  Aberrant conduction is now Present  Nonspecific T wave abnormality now evident in Lateral leads     GLUCOSE, POC    Collection Time: 11/02/18 12:54 PM   Result Value Ref Range    Glucose (POC) 92 65 - 100 mg/dL   POC PT/INR    Collection Time: 11/02/18  1:04 PM   Result Value Ref Range    Prothrombin time (POC) 16.8 (H) 9.6 - 11.6 SECS    INR (POC) 1.4 (H) 0.9 - 1.2         Signed By: Tim Schumacher MD     November 2, 2018

## 2018-11-02 NOTE — PROCEDURES
PROCEDURE:  DIAGNOSTIC ULTRASOUND OF CHEST    DIAGNOSIS:  RIGHTPLEURAL EFFUSION    CHEST ULTRASOUND FINDINGS:    A turboBOTZcan Get Fractal ultrasound was used to image the chest and localize the pleural effusion on the right chest.    A tiny anechoic space was seen on the right consistent with an uncomplicated pleural effusion. The left demonstrated no significant pleural effusion    IMAGE: images on hard chart    No thoracentesis was attempted. Diurese for heart failure  Will sign off.       Alejandrina Weathers MD

## 2018-11-02 NOTE — ED NOTES
Patient resting in stretcher, cardiac monitoring and cycling vitals in place. Patient denies having any needs.

## 2018-11-02 NOTE — H&P (VIEW-ONLY)
CONSULT NOTE Araceli Saunders. 
 
11/2/2018 Date of Admission:  11/2/2018 The patient's chart is reviewed and the patient is discussed with the staff. Subjective:  
 
Patient is a 74 y.o.  male seen and evaluated at the request of Dr. Jey Clark. He was admitted this afternoon after presenting to the ED with altered mental status and reported weakness on right side of the body. He was otherwise reporting to be in his usual state of health. Patient didn't think he was different than baseline, but daughter was concerned. He had a CXR which suggested heart failure and we were consulted for possible thoracentesis. He reports some cough over the past few days and chronic dyspnea, but not significantly different than baseline. He is severely limited with exertion to approximately 50 feet or less. He has been seen in pulmonary clinic had restrictive PFTs without ILD and had overnight hypoxemia on ANJELICA requiring 4LPM with sleep. He otherwise showed no evidence of pulmonary disease. Review of Systems A comprehensive review of systems was negative except for that written in the HPI. Patient Active Problem List  
Diagnosis Code  CKD (chronic kidney disease) stage 3, GFR 30-59 ml/min ([facility]) N18.3  Acute on chronic systolic heart failure ([facility]) I50.23  Coronary atherosclerosis of native coronary vessel I25.10  Arthritis M19.90  
 Hypertension I10  
 COPD, moderate ([facility] J44.9  High triglycerides E78.1  Gastroesophageal reflux disease without esophagitis K21.9  Mixed hyperlipidemia E78.2  
 Essential hypertension I10  
 CHF (congestive heart failure) ([facility]) I50.9  TAZ (obstructive sleep apnea) G47.33  
 Atherosclerosis of native coronary artery of native heart with stable angina pectoris ([facility] [address] I25.118  
 Personal history of tobacco use Z87.891  
 Erysipelas A46  Preseptal cellulitis L03.213  Acute respiratory failure with hypoxemia ([facility]) J96.01  
  Controlled type 2 diabetes mellitus without complication, without long-term current use of insulin ([facility]) E11.9  Type 2 diabetes with nephropathy ([facility]) E11.21  
 Chronic systolic heart failure ([facility]) I50.22  Shortness of breath R06.02  
 Stage 2 chronic kidney disease N18.2  Chronic obstructive pulmonary disease ([facility] J44.9  Seizure ([facility] R56.9  Prolonged Q-T interval on ECG R94.31  
 Elevated troponin R74.8  Pulmonary hypertension ([facility]) I27.20  Stroke (cerebrum) ([facility]) I63.9 Prior to Admission Medications Prescriptions Last Dose Informant Patient Reported? Taking? albuterol (VENTOLIN HFA) 90 mcg/actuation inhaler   No No  
Sig: Take 2 Puffs by inhalation four (4) times daily. albuterol-ipratropium (DUO-NEB) 2.5 mg-0.5 mg/3 ml nebu   No No  
Sig: 3 mL by Nebulization route four (4) times daily. Diaignosis--J44.9  
allopurinol (ZYLOPRIM) 100 mg tablet   No No  
Sig: TAKE 1 TABLET BY MOUTH EVERY DAY  
atorvastatin (LIPITOR) 20 mg tablet   No No  
Sig: Take 1 Tab by mouth nightly. carvedilol (COREG) 3.125 mg tablet   No No  
Sig: Take 1 Tab by mouth two (2) times daily (with meals). fluticasone (FLONASE) 50 mcg/actuation nasal spray   No No  
Si Sprays by Both Nostrils route daily. levETIRAcetam (KEPPRA) 500 mg tablet   No No  
Sig: Take 1 Tab by mouth two (2) times a day. melatonin 3 mg tablet   Yes No  
Sig: Take 3 mg by mouth nightly. omeprazole (PRILOSEC) 20 mg capsule   No No  
Sig: Take 1 Cap by mouth daily. polyethylene glycol (MIRALAX) 17 gram packet   No No  
Sig: Take 1 Packet by mouth daily. Patient taking differently: Take 17 g by mouth daily as needed. torsemide (DEMADEX) 20 mg tablet   No No  
Sig: Take 1 Tab by mouth daily. Facility-Administered Medications: None Past Medical History:  
Diagnosis Date  Acute CHF (congestive heart failure) ([facility] 2015  Acute combined systolic and diastolic congestive heart failure ([facility] 2015  Chronic obstructive pulmonary disease ([facility]  De Quervain's tenosynovitis, right 4/28/2015  Dyspnea on exertion 6/28/2015  Elevated serum creatinine 4/25/2015  Elevated troponin 6/28/2015  History of coronary artery disease MI at 25 yo  
 History of right knee surgery   
 cartilage removal  
 History of shingles  Malignant hypertension 4/25/2015  [facility] Past Surgical History:  
Procedure Laterality Date  HX HEART CATHETERIZATION  6/29/2015  
 no intervention  HX KNEE ARTHROSCOPY Right   
 removal of cartilage Social History Socioeconomic History  Marital status:  Spouse name: Not on file  Number of children: Not on file  Years of education: Not on file  Highest education level: Not on file Social Needs  Financial resource strain: Not on file  Food insecurity - worry: Not on file  Food insecurity - inability: Not on file  Transportation needs - medical: Not on file  Transportation needs - non-medical: Not on file Occupational History  Occupation: retired Tobacco Use  Smoking status: Former Smoker Packs/day: 0.25 Years: 20.00 Pack years: 5.00 Types: Cigarettes Last attempt to quit: 4/5/2015 Years since quitting: 3.5  Smokeless tobacco: Never Used Substance and Sexual Activity  Alcohol use: No  
  Alcohol/week: 0.0 oz  Drug use: No  
 Sexual activity: Not on file Other Topics Concern   Service No  
 Blood Transfusions No  
  Comment: no issues with receiving  Caffeine Concern No  
 Occupational Exposure Not Asked Charmaine Bruner Hazards Not Asked  Sleep Concern Not Asked  Stress Concern Not Asked  Weight Concern Not Asked  Special Diet No  
 Back Care Not Asked  Exercise No  
 Bike Helmet Not Asked [place] Yes  Self-Exams Not Asked Social History Narrative Lives with his wife Family History Problem Relation Age of Onset  Hypertension Mother  No Known Problems Father Allergies Allergen Reactions  Pcn [Penicillins] Other (comments) \"makes my heart stop\" Current Facility-Administered Medications Medication Dose Route Frequency  albuterol-ipratropium (DUO-NEB) 2.5 MG-0.5 MG/3 ML  3 mL Nebulization Q4H PRN  
 [START ON 11/3/2018] allopurinol (ZYLOPRIM) tablet 100 mg  100 mg Oral DAILY  atorvastatin (LIPITOR) tablet 40 mg  40 mg Oral QHS  levETIRAcetam (KEPPRA) tablet 500 mg  500 mg Oral BID  [START ON 11/3/2018] pantoprazole (PROTONIX) tablet 40 mg  40 mg Oral ACB  polyethylene glycol (MIRALAX) packet 17 g  17 g Oral DAILY PRN  
 [START ON 11/3/2018] torsemide (DEMADEX) tablet 20 mg  20 mg Oral DAILY  [START ON 11/3/2018] aspirin chewable tablet 81 mg  81 mg Oral DAILY  sodium chloride (NS) flush 5-10 mL  5-10 mL IntraVENous Q8H  
 sodium chloride (NS) flush 5-10 mL  5-10 mL IntraVENous PRN  
 acetaminophen (TYLENOL) tablet 650 mg  650 mg Oral Q4H PRN Objective:  
 
Vitals:  
 11/02/18 1418 11/02/18 1438 11/02/18 1457 11/02/18 1601 BP: 124/90 (!) 129/92 127/86 123/86 Pulse: 70 70 73 71 Resp:    20 Temp:    95.9 °F (35.5 °C) SpO2: 96% 100% 100% 92% PHYSICAL EXAM  
 
Constitutional:  the patient is well developed and in no acute distress EENMT:  Sclera clear, pupils equal, oral mucosa moist 
Respiratory: crackles in bases Cardiovascular:  RRR without M,G,R 
Gastrointestinal: soft and non-tender; with positive bowel sounds. Musculoskeletal: warm without cyanosis. There is 1+ lower leg edema. Skin:  no jaundice or rashes, no wounds Neurologic: no gross neuro deficits Psychiatric:  alert and oriented x 3 CXR:  Cardiomegaly, pulmonary edema, ? effusions Recent Labs 11/02/18 
1304 11/02/18 [facility] WBC  --  7.3 HGB  --  14.3 HCT  --  45.8 PLT  --  173 INR 1.4*  --   
 
Recent Labs 11/02/18 [facility]  K 4.6  
 GLU 91  
CO2 28 BUN 21  
CREA 1.70* MG 2.2 CA 8.7 No results for input(s): PH, PCO2, PO2, HCO3 in the last 72 hours. No results for input(s): LCAD, LAC in the last 72 hours. Assessment:  (Medical Decision Making) Hospital Problems  Date Reviewed: 6/6/2018 Codes Class Noted POA Stroke (cerebrum) ([facility] ICD-10-CM: I63.9 ICD-9-CM: 434.91  11/2/2018 Unknown  
   
 CKD (chronic kidney disease) stage 3, GFR 30-59 ml/min ([facility]) (Chronic) ICD-10-CM: N18.3 ICD-9-CM: 585.3  9/29/2017 Yes Coronary atherosclerosis of native coronary vessel (Chronic) ICD-10-CM: I25.10 ICD-9-CM: 414.01  9/29/2017 Yes Hypertension (Chronic) ICD-10-CM: I10 
ICD-9-CM: 401.9  9/29/2017 Yes COPD, moderate ([facility] (Chronic) ICD-10-CM: J44.9 ICD-9-CM: 117  9/29/2017 Yes Overview Signed 12/27/2015 12:38 PM by Rayo Car NP Complete PFTs: 7/20/15: 
Spirometry is consistent with a moderate obstructive/restrictive defect. . The residual volume is increased relative to other lung volumes suggesting air-trapping. The diffusion capacity corrected for alveolar volume was normal suggesting no loss of alveolo-capillary units. Gastroesophageal reflux disease without esophagitis (Chronic) ICD-10-CM: K21.9 ICD-9-CM: 530.81  9/29/2017 Yes Mixed hyperlipidemia (Chronic) ICD-10-CM: S23.1 ICD-9-CM: 272.2  9/28/2016 Yes 75 y/o male with CHF and pulmonary edema on CXR, questionable effusions. We are consulted for possible thoracentesis. Plan:  (Medical Decision Making)  
 
--will U/S chest 
--evaluate and treat likely decompensated heart failure 
--uses 4LPM O2 at night 
--consider BNP, TTE, etc 
--diuresis per primary team 
 
More than 50% of the time documented was spent in face-to-face contact with the patient and in the care of the patient on the floor/unit where the patient is located. Thank you very much for this referral.  We appreciate the opportunity to participate in this patient's care. Will follow along with above stated plan.  
 
Khadra Lu MD

## 2018-11-02 NOTE — PROGRESS NOTES
Patient is being admitted to floor Placed copy of advance directives in chart if patient would like to complete.  should be called if patient has questions or needs guidance in completing form Signed by Maria Del Carmen Sanford

## 2018-11-02 NOTE — H&P
HOSPITALIST H&P/CONSULTNAME:  Rolo Lundberg. Age:  74 y.o. 
:   1945 MRN:   [id number] PCP: Joi Nicole MD 
Consulting MD: Treatment Team: Attending Provider: Alexis Sinclair DO 
HPI:  
73 yo AAM with past history of chronic systolic heart failure (EF of 45% in 2018), HTN, suspected new onset seizure disorder managed on Keppra and followed by neurology, COPD, and CAD presents after it was noticed that patient was weaker on the right side of his body earlier today. Patient's daughter at bedside, states that patient is \"normally very stoic and doesn't complain about much\". Patient denies feeling any different this morning and was in his usual state of health. Patient initially refused to come to ED but came at urging of his daughter three hours after onset with observed improvement in his symptoms after he came to the ED. Of note, patient was evaluated by neurology several weeks ago for seizure disorder and has been following up with Dr. Wild Zimmer. Per daughter, patient thought he was taking baby aspirin at home but this is his melatonin that he uses for sleep. ED Course: CT head showing hyperintense right MCA with reflex CTA head showing no evidence of large vessel occlusion, no tPA given . Patient given maintenance fluids and neurology evaluated with recommendations for hospitalist admission for inpatient workup. Complete ROS done and is as stated in HPI or otherwise negative Past Medical History:  
Diagnosis Date  Acute CHF (congestive heart failure) ([address] 2015  Acute combined systolic and diastolic congestive heart failure ([address] 2015  Chronic obstructive pulmonary disease ([address]  De Quervain's tenosynovitis, right 2015  Dyspnea on exertion 2015  Elevated serum creatinine 2015  Elevated troponin 2015  History of coronary artery disease  MI at 27 yo  
 History of right knee surgery   
 cartilage removal  
  History of shingles  Malignant hypertension 2015  MI (myocardial infarction) ([facility] Past Surgical History:  
Procedure Laterality Date  HX HEART CATHETERIZATION  2015  
 no intervention  HX KNEE ARTHROSCOPY Right   
 removal of cartilage Prior to Admission Medications Prescriptions Last Dose Informant Patient Reported? Taking? albuterol (VENTOLIN HFA) 90 mcg/actuation inhaler   No No  
Sig: Take 2 Puffs by inhalation four (4) times daily. albuterol-ipratropium (DUO-NEB) 2.5 mg-0.5 mg/3 ml nebu   No No  
Sig: 3 mL by Nebulization route four (4) times daily. Diaignosis--J44.9  
allopurinol (ZYLOPRIM) 100 mg tablet   No No  
Sig: TAKE 1 TABLET BY MOUTH EVERY DAY  
atorvastatin (LIPITOR) 20 mg tablet   No No  
Sig: Take 1 Tab by mouth nightly. carvedilol (COREG) 3.125 mg tablet   No No  
Sig: Take 1 Tab by mouth two (2) times daily (with meals). fluticasone (FLONASE) 50 mcg/actuation nasal spray   No No  
Si Sprays by Both Nostrils route daily. levETIRAcetam (KEPPRA) 500 mg tablet   No No  
Sig: Take 1 Tab by mouth two (2) times a day. melatonin 3 mg tablet   Yes No  
Sig: Take 3 mg by mouth nightly. omeprazole (PRILOSEC) 20 mg capsule   No No  
Sig: Take 1 Cap by mouth daily. polyethylene glycol (MIRALAX) 17 gram packet   No No  
Sig: Take 1 Packet by mouth daily. Patient taking differently: Take 17 g by mouth daily as needed. torsemide (DEMADEX) 20 mg tablet   No No  
Sig: Take 1 Tab by mouth daily. Facility-Administered Medications: None Allergies Allergen Reactions  Pcn [Penicillins] Other (comments) \"makes my heart stop\" Social History Tobacco Use  Smoking status: Former Smoker Packs/day: 0.25 Years: 20.00 Pack years: 5.00 Types: Cigarettes Last attempt to quit: 2015 Years since quitting: 3.5  Smokeless tobacco: Never Used Substance Use Topics  Alcohol use: No  
  Alcohol/week: 0.0 oz  
  
 Family History Problem Relation Age of Onset  Hypertension Mother  No Known Problems Father Objective:  
 
Visit Vitals /86 Pulse 73 Temp 97.4 °F (36.3 °C) Resp 16 SpO2 100% Temp (24hrs), Av.4 °F (36.3 °C), Min:97.4 °F (36.3 °C), Max:97.4 °F (36.3 °C) Oxygen Therapy O2 Sat (%): 100 % (18 1457) Pulse via Oximetry: 74 beats per minute (18 1457) O2 Device: Room air (18 1213) Physical Exam: 
General:    Alert, cooperative, no distress, appears stated age. Head:   Normocephalic, without obvious abnormality, atraumatic. Nose:  Nares normal. No drainage or sinus tenderness. Lungs:   Clear to auscultation bilaterally. No Wheezing or Rhonchi. No rales. Heart:   Regular rate and rhythm,  no murmur, rub or gallop. Abdomen:   Soft, non-tender. Not distended. Bowel sounds normal.  
Extremities: No cyanosis. No edema. No clubbing Skin:     Texture, turgor normal. No rashes or lesions. Not Jaundiced Neurologic: Alert and oriented x 3, no focal deficits. CN II thru XII intact b/l, normal finger-to-nose testing, +5/5 muscle strength of UEs and LEs, sensation to light touch intact Data Review:  
Recent Results (from the past 24 hour(s)) CBC W/O DIFF Collection Time: 18 12:16 PM  
Result Value Ref Range WBC 7.3 4.3 - 11.1 K/uL  
 RBC 5.23 4.23 - 5.6 M/uL  
 HGB 14.3 13.6 - 17.2 g/dL HCT 45.8 41.1 - 50.3 % MCV 87.6 79.6 - 97.8 FL  
 MCH 27.3 26.1 - 32.9 PG  
 MCHC 31.2 (L) 31.4 - 35.0 g/dL  
 RDW 17.8 % PLATELET 763 379 - 571 K/uL MPV 10.8 9.4 - 12.3 FL ABSOLUTE NRBC 0.00 0.0 - 0.2 K/uL METABOLIC PANEL, BASIC Collection Time: 18 12:16 PM  
Result Value Ref Range Sodium 140 136 - 145 mmol/L Potassium 4.6 3.5 - 5.1 mmol/L Chloride 107 98 - 107 mmol/L  
 CO2 28 21 - 32 mmol/L Anion gap 5 (L) 7 - 16 mmol/L Glucose 91 65 - 100 mg/dL BUN 21 8 - 23 MG/DL  Creatinine 1.70 (H) 0.8 - 1.5 MG/DL  
 GFR est AA 51 (L) >60 ml/min/1.73m2 GFR est non-AA 42 (L) >60 ml/min/1.73m2 Calcium 8.7 8.3 - 10.4 MG/DL MAGNESIUM Collection Time: 11/02/18 12:16 PM  
Result Value Ref Range Magnesium 2.2 1.8 - 2.4 mg/dL EKG, 12 LEAD, INITIAL Collection Time: 11/02/18 12:16 PM  
Result Value Ref Range Ventricular Rate 66 BPM  
 Atrial Rate 66 BPM  
 P-R Interval 218 ms QRS Duration 118 ms Q-T Interval 510 ms QTC Calculation (Bezet) 534 ms Calculated P Axis 61 degrees Calculated R Axis -78 degrees Calculated T Axis 85 degrees Diagnosis Sinus rhythm with 1st degree A-V block with Premature atrial complexes with Aberrant conduction Possible Left atrial enlargement Left axis deviation Inferior infarct , age undetermined Anterior infarct , age undetermined Prolonged QT Abnormal ECG When compared with ECG of 22-OCT-2018 06:55, Aberrant conduction is now Present Nonspecific T wave abnormality now evident in Lateral leads Confirmed by Elidia Grimes MD (), CHRISTELLE STACY ([id number]) on 11/2/2018 3:42:14 PM 
  
GLUCOSE, POC Collection Time: 11/02/18 12:54 PM  
Result Value Ref Range Glucose (POC) 92 65 - 100 mg/dL POC PT/INR Collection Time: 11/02/18  1:04 PM  
Result Value Ref Range Prothrombin time (POC) 16.8 (H) 9.6 - 11.6 SECS  
 INR (POC) 1.4 (H) 0.9 - 1.2 Imaging Brad Chavez Assessment and Plan: Active Hospital Problems Diagnosis Date Noted  Stroke (cerebrum) ([facility] 11/02/2018  Coronary atherosclerosis of native coronary vessel 09/29/2017  CKD (chronic kidney disease) stage 3, GFR 30-59 ml/min ([facility]) 09/29/2017  Hypertension 09/29/2017  COPD, moderate ([facility] [address] 09/29/2017 Complete PFTs: 7/20/15: 
Spirometry is consistent with a moderate obstructive/restrictive defect. . The residual volume is increased relative to other lung volumes suggesting air-trapping.  The diffusion capacity corrected for alveolar volume was normal suggesting no loss of alveolo-capillary units.  Gastroesophageal reflux disease without esophagitis 09/29/2017  Mixed hyperlipidemia 09/28/2016 PLAN 
- Admit to hospitalist 
- neurology following, appreciate recs 
- fall/stroke precautions - MRI/MRA, TTE, and carotid ultrasound ordered 
- added ASA 81 mg to regimen 
- increase home atorvastatin 20 mg to 40 mg 
- hold home Coreg for permissive hypertension 
- PT/OT/speech consults 
- continue with home diuretics - continue with rest of home meds - pulmonary consult for new pleural effusion for indication for diagnostic/therapeutic thoracentesis F/E/N: no fluids, replete electrolytes as needed, NPO and advance diet per speech therapy recs Ppx: SCDs for VTE Code Status: FULL CODE Disposition: pending further workup as above Signed By: Armin Marsh DO November 2, 2018

## 2018-11-02 NOTE — PROGRESS NOTES
Primary Nurse Lashawn Crook RN and Doug Wen RN performed a dual skin assessment on this patient No impairment noted Jg score is 16

## 2018-11-03 PROBLEM — J90 PLEURAL EFFUSION, RIGHT: Status: ACTIVE | Noted: 2018-11-03

## 2018-11-03 LAB
ANION GAP SERPL CALC-SCNC: 11 MMOL/L (ref 7–16)
BUN SERPL-MCNC: 21 MG/DL (ref 8–23)
CALCIUM SERPL-MCNC: 8.4 MG/DL (ref 8.3–10.4)
CHLORIDE SERPL-SCNC: 107 MMOL/L (ref 98–107)
CO2 SERPL-SCNC: 23 MMOL/L (ref 21–32)
CREAT SERPL-MCNC: 1.59 MG/DL (ref 0.8–1.5)
ERYTHROCYTE [DISTWIDTH] IN BLOOD BY AUTOMATED COUNT: 17.7 %
GLUCOSE SERPL-MCNC: 103 MG/DL (ref 65–100)
HCT VFR BLD AUTO: 43.9 % (ref 41.1–50.3)
HGB BLD-MCNC: 13.8 G/DL (ref 13.6–17.2)
MCH RBC QN AUTO: 27.7 PG (ref 26.1–32.9)
MCHC RBC AUTO-ENTMCNC: 31.4 G/DL (ref 31.4–35)
MCV RBC AUTO: 88 FL (ref 79.6–97.8)
NRBC # BLD: 0 K/UL (ref 0–0.2)
PLATELET # BLD AUTO: 164 K/UL (ref 150–450)
PMV BLD AUTO: 10.7 FL (ref 9.4–12.3)
POTASSIUM SERPL-SCNC: 3.9 MMOL/L (ref 3.5–5.1)
RBC # BLD AUTO: 4.99 M/UL (ref 4.23–5.6)
SODIUM SERPL-SCNC: 141 MMOL/L (ref 136–145)
WBC # BLD AUTO: 7.6 K/UL (ref 4.3–11.1)

## 2018-11-03 PROCEDURE — 80048 BASIC METABOLIC PNL TOTAL CA: CPT

## 2018-11-03 PROCEDURE — 36415 COLL VENOUS BLD VENIPUNCTURE: CPT

## 2018-11-03 PROCEDURE — 85027 COMPLETE CBC AUTOMATED: CPT

## 2018-11-03 PROCEDURE — 65660000000 HC RM CCU STEPDOWN

## 2018-11-03 PROCEDURE — 74011250637 HC RX REV CODE- 250/637: Performed by: INTERNAL MEDICINE

## 2018-11-03 PROCEDURE — 93306 TTE W/DOPPLER COMPLETE: CPT

## 2018-11-03 RX ADMIN — ALLOPURINOL 100 MG: 100 TABLET ORAL at 08:36

## 2018-11-03 RX ADMIN — ATORVASTATIN CALCIUM 40 MG: 40 TABLET, FILM COATED ORAL at 22:09

## 2018-11-03 RX ADMIN — ASPIRIN 81 MG: 81 TABLET, CHEWABLE ORAL at 08:35

## 2018-11-03 RX ADMIN — TORSEMIDE 20 MG: 20 TABLET ORAL at 08:36

## 2018-11-03 RX ADMIN — LEVETIRACETAM 500 MG: 500 TABLET ORAL at 17:08

## 2018-11-03 RX ADMIN — Medication 5 ML: at 17:08

## 2018-11-03 RX ADMIN — Medication 10 ML: at 06:00

## 2018-11-03 RX ADMIN — Medication 10 ML: at 22:09

## 2018-11-03 RX ADMIN — LEVETIRACETAM 500 MG: 500 TABLET ORAL at 08:36

## 2018-11-03 NOTE — PROGRESS NOTES
Received request for guest tray for lunch and supper on Saturday for spouse Will follow as needed Domitila Woo, staff [address], [id number] [facility] Andrea  /   [email or web address]

## 2018-11-04 VITALS
BODY MASS INDEX: 36.28 KG/M2 | RESPIRATION RATE: 18 BRPM | SYSTOLIC BLOOD PRESSURE: 127 MMHG | DIASTOLIC BLOOD PRESSURE: 78 MMHG | HEART RATE: 85 BPM | OXYGEN SATURATION: 96 % | TEMPERATURE: 97.6 F | WEIGHT: 245.7 LBS

## 2018-11-04 PROCEDURE — 74011250637 HC RX REV CODE- 250/637: Performed by: INTERNAL MEDICINE

## 2018-11-04 RX ORDER — GUAIFENESIN 100 MG/5ML
81 LIQUID (ML) ORAL DAILY
Qty: 30 TAB | Refills: 0 | Status: SHIPPED | OUTPATIENT
Start: 2018-11-05

## 2018-11-04 RX ADMIN — TORSEMIDE 20 MG: 20 TABLET ORAL at 09:17

## 2018-11-04 RX ADMIN — PANTOPRAZOLE SODIUM 40 MG: 40 TABLET, DELAYED RELEASE ORAL at 05:50

## 2018-11-04 RX ADMIN — ALLOPURINOL 100 MG: 100 TABLET ORAL at 09:16

## 2018-11-04 RX ADMIN — Medication 10 ML: at 05:50

## 2018-11-04 RX ADMIN — LEVETIRACETAM 500 MG: 500 TABLET ORAL at 09:17

## 2018-11-04 RX ADMIN — ASPIRIN 81 MG: 81 TABLET, CHEWABLE ORAL at 09:17

## 2018-11-04 NOTE — PROGRESS NOTES
Discharge paperwork reviewed with patient. He verbalizes understanding of medications, instructions and follow up appointments. IV DC'd with cath tip intact. He will call the nurse's station when he is ready for a wheelchair. Daughter is present but states they are not ready to leave yet because they are calling a ride but will call when they are.

## 2018-11-04 NOTE — PROGRESS NOTES
Patient has called this morning asking for guest trays for lunch and supper. Will follow up Heri Barrera, staff [address], [id number] [facility] Andrea  /   [email or web address]@"[email or web address]

## 2018-11-04 NOTE — DISCHARGE SUMMARY
Hospitalist Discharge Summary     Admit Date:  2018 12:24 PM   Name:  Baljit Parmar. Age:  74 y.o.  :  1945   MRN:  [id number]   PCP:  Jeanelle Castleman, MD  Treatment Team: Attending Provider: Ni Zavala DO; Utilization Review: Myles Cushing, RN    Problem List for this Hospitalization:  Hospital Problems as of 2018 Date Reviewed: 2018          Codes Class Noted - Resolved POA    * (Principal) Pleural effusion, right ICD-10-CM: J90  ICD-9-CM: 511.9  11/3/2018 - Present Yes        Stroke (cerebrum) ([facility] [address] ICD-10-CM: I63.9  ICD-9-CM: 434.91  2018 - Present Unknown        CKD (chronic kidney disease) stage 3, GFR 30-59 ml/min ([facility]) (Chronic) ICD-10-CM: N18.3  ICD-9-CM: 585.3  2017 - Present Yes        Coronary atherosclerosis of native coronary vessel (Chronic) ICD-10-CM: I25.10  ICD-9-CM: 414.01  2017 - Present Yes        Hypertension (Chronic) ICD-10-CM: I10  ICD-9-CM: 401.9  2017 - Present Yes        COPD, moderate ([facility] [address] (Chronic) ICD-10-CM: J44.9  ICD-9-CM: 598  2017 - Present Yes    Overview Signed 2015 12:38 PM by Nico Joshua NP     Complete PFTs: 7/20/15:  Spirometry is consistent with a moderate obstructive/restrictive defect. . The residual volume is increased relative to other lung volumes suggesting air-trapping. The diffusion capacity corrected for alveolar volume was normal suggesting no loss of alveolo-capillary units. Gastroesophageal reflux disease without esophagitis (Chronic) ICD-10-CM: K21.9  ICD-9-CM: 530.81  2017 - Present Yes        Mixed hyperlipidemia (Chronic) ICD-10-CM: V29.5  ICD-9-CM: 272.2  2016 - Present Yes                  Hospital Course: The patient is a 75 y/o gentleman with HTN, COPD, CAD, Chronic Systolic CHF, Seizure d/o, was admitted for R sided weakness r/o acute CVA. He was also noted to have a right pleural effusion. He was seen at the bedside and feels well.  Denies any SOB or chest discomfort. Right sided weakness has resolved. Head CT, CTA head and neck and Brain MRI are all unremarkable. Echocardiogram noted moderately reduced systolic function with an EF of 35-40%. No shunt was noted. The patient was seen by Pulmonary, right  chest US noted a tiny anechoic space c/w an uncomplicated pleural effusion and no thoracentesis was attempted. The patient is already on diuretics which have been continued. As he is clinically and hemodynamically stable, he is being discharged home today. Follow up instructions below. Plan was discussed with the patient and the nursing staff. All questions answered. Patient was stable at time of discharge and was instructed to call or return if there are any concerns or recurrence of symptoms. Diagnostic Imaging/Tests:   Mra Brain Wo Cont    Result Date: 11/2/2018  Title: MRA [place] Indication:  Cerebrovascular accident, acute stroke, atherosclerotic disease Comparison:  CTA performed earlier in the day. Technique:  Contiguous axial 3D time-of-flight images of the brain were used to generate maximum intensity projection images of the [place] and vertebrobasilar system. Axial source images as well as maximum intensity reconstructed 3-D Images were reviewed at the request of the referring physician. All stenosis percentages derived by comparing the narrowest segment with the distal Internal Carotid Artery luminal diameter, as described in the [address] [facility] American Symptomatic Carotid Endarterectomy Trial (NASCET) criteria. Findings:  Motion artifact significantly degrades the imaging. The paranasal sinuses and mastoid air cells are in adequately evaluated on this examination. Grossly, there is no intracranial mass or midline shift. There is mild ventricular dilation and generalized cerebral atrophy. Grossly, the internal carotid arteries, middle cerebral arteries, and anterior cerebral arteries are probably patent.   The internal carotid arteries are extremely poorly visualized in the cavernous portions. Grossly, the terminal vertebral arteries are patent. The left vertebral artery is stenotic near its terminus. Basilar artery is small diameter, but patent. Both right and left posterior cerebral arteries appear to have fetal origins and are patent. There appears to be mural irregularity in all of the intracranial arteries consistent with atherosclerosis. Impression:  Extremely limited examination secondary to motion artifact. The internal carotid arteries are extremely poorly visualized in their cavernous portions. The [place] vessels are patent. There are distal branches are not adequately evaluated with this examination. Has there been a clinical change since the CTA performed earlier today? That study demonstrated no intracranial arterial thrombus or occlusion. Mri Brain Wo Cont    Result Date: 11/2/2018  Examination: MRI brain without gadolinium History:  stroke, 74 years Male 74year old here for brain MRI and MRA without contrast - concern for stroke - seizures - slurred speech and right sided arm weakness - left sided facial droop - prior MRI in PACS Comparison: CT brain 11-2-18 Technique: Sagittal T1-weighted, axial FLAIR, axial fast spin-echo T2-weighted, axial T1-weighted, axial gradient echo and coronal fast inversion recovery images of the brain were performed. The study was performed on a 1.5  Nancy MRI unit. There were no contraindications to MRI based on the screening form. Findings:  Scattered small T2/FLAIR-hyperintensities in the periventricular and subcortical white matter. These are nonspecific and of uncertain clinical significance, however, differential considerations may include demyelinating disease, migraines, Lyme disease, vasculitis, and chronic small vessel ischemic change. There is no evidence of masses or mass effect, obvious hemorrhage, or acute or subacute infarct.  The ventricles and sulci are appropriate for age. The brainstem and cerebellum are unremarkable. Paranasal sinuses are clear. Globes and other extracranial soft tissues are unremarkable. Image quality somewhat degraded by motion artifact. Impression: Unremarkable brain MRI examination without gadolinium for age. No acute pathology identified. Ct Head Wo Cont    Result Date: 11/2/2018  CT Brain Without Contrast Dated  November 2, 2018 Reference Exam: October 20, 2018 Indication: Right upper extremity weakness and slurred speech Technique: Radiation dose reduction techniques were used for this study using one or more of the following: automated exposure control; adjustment of mA and/or kV (according to patient size);  iterative reconstruction. Routine CT of the brain was performed using 5 mm axial images obtained. Images are presumed to have been obtained less than 24 hours from presentation. Findings:  The ventricular system, basilar cisterns, and cortical sulci all are normal in size and position for the patient's age. There are no abnormal intracranial masses, mass effect, nor extra-axial collections seen. There are no abnormal areas of attenuation that would indicate a recent intracranial hemorrhage or infarction. Bone windows show no fractures nor foreign bodies. Impression:  Normal CT of the brain done without contrast.     Cta Head Neck W Wo Cont    Result Date: 11/2/2018  Title:  CT arteriogram of the neck and head. Indication: Code stroke, right upper extremity weakness, slurred speech. History of seizure. Presumed amyloidosis. Myocardial hypertrophy, systolic heart failure, hypoxemia, acute respiratory failure, tobacco use, coronary artery disease, obstructive sleep apnea, chronic kidney disease, hypertension, chronic obstructive pulmonary disease, hypertriglyceridemia. Technique: Axial images of the neck and head were obtained after the uneventful administration of intravenous iodinated contrast media.  Contrast was used to best identify the arterial structures. Images were reviewed on a separate, free standing, three-dimensional workstation as per the referring physicians request.  All stenosis percentages derived by comparing the narrowest segment with the distal Internal Carotid Artery luminal diameter, as described in the [address]  American Symptomatic Carotid Endarterectomy Trial (NASCET) criteria. All CT scans at this facility are performed using dose reduction/dose modulation techniques, as appropriate the performed exam, including the following: Automated Exposure Control; Adjustment of the mA and/or kV according to patient size (this includes techniques or standardized protocols for targeted exams where dose is matched to indication/reason for exam); and Use of Iterative Reconstruction Technique. Comparison: Chest x-ray today and earlier. Chest CT 12/30/2016. Head CT 11/2/2018. Ramin Agarwal Findings: Large right pleural effusion. Cardiomegaly. Lung apices are clear. Unremarkable thyroid gland. Patent paranasal sinuses and mastoid air cells. Intracranial images reveal no midline shift. There is generalized, mild, atrophy. No destructive bone lesion. No pulmonary artery filling defect. Patent superior sagittal sinus and bilateral transverse sinuses. The left transverse sinus is smaller than the right. Moderate atherosclerotic disease in the visualized portion of thoracic aorta. Bovine arch configuration. The common trunk right brachiocephalic artery is large diameter. Patent right and left subclavian arteries. The right common carotid artery is patent, moderately tortuous, and demonstrates atherosclerotic intimal thickening throughout its course. Right external carotid artery is patent. The right internal carotid artery is patent with moderate tortuosity in its cervical portion. The intracranial SANDRA has calcified plaques without high-grade stenosis or aneurysm.   Patent right middle cerebral and anterior cerebral arteries. Patent right posterior communicating artery. The anterior communicating artery is small diameter, but patent. . The left common carotid artery arises from the right brachiocephalic artery and is is tortuous yet patent throughout its course. There is intimal thickening in its mid and distal aspect. The left external carotid artery is patent. The left internal carotid artery is widely patent and moderately tortuous in its cervical aspect. Intracranial LICA contains punctate calcifications. Moderate stenosis in the supraclinoid LICA. Patent left middle cerebral and anterior cerebral artery. Patent left posterior communicating artery. All the anterior circulation vessels demonstrate mural irregularity consistent with atherosclerosis. .  Right vertebral artery is patent throughout its course. There is atherosclerotic mural irregularity and mild narrowing in the right vertebral artery. Left vertebral artery is small diameter but patent throughout its tortuous course. Patent basilar artery. Patent right posterior cerebral artery. Patent left posterior cerebral artery. Impression: Diffuse atherosclerotic disease. No significant stenosis in the common carotid or internal carotid arteries. No intracranial arterial thromboembolus. Patent bilateral vertebral arteries. Large right pleural effusion is new since 10/20/2018. Cardiomegaly. Xr Chest Port    Result Date: 11/2/2018  Single View portable upright chest x-ray dated   November 2, 2018 Reference Exam: October 20, 2018 Indication: Chest pain Findings: The cardiac silhouette is enlarged with lungs underinflated and some haziness blurring the vascular borders. IMPRESSION: Lungs underinflated but there still felt to be some CHF. Duplex Carotid Bilateral    Result Date: 11/2/2018  TITLE:  Carotid and Vertebral Ultrasound Examination.  INDICATION:  Cerebrovascular accident, speech disturbance, coronary artery disease, hypertension, history of smoking. CTA performed earlier in the day which demonstrates no significant stenosis in either common carotid or internal carotid artery. . TECHNIQUE: Grayscale, Color Doppler, and Spectral Doppler Ultrasound interrogation performed. Peak Systolic Velocity, ICA-to-CCA ratio, and End-Diastolic Velocity are recorded. The estimate of stenosis is inferred from velocity measurements cross referenced to published correlations as defined by the Society of Radiologists in [address] [place] [address] Drive Radiology 2003; 229; 340-346. COMPARISON: The above-mentioned CTA performed a few short hours ago. RIGHT NECK:  The peak systolic velocity in the Common Carotid Artery = 43 cm/sec; Internal Carotid Artery = 53 cm/sec. Ratio = 1.2. Intimal thickening in the CCA and carotid bulb. Moderately tortuous SANDRA. Anterograde flow in the vertebral artery. LEFT  NECK:  The peak systolic velocity in the Common Carotid Artery = 70 cm/sec; Internal Carotid Artery = 63 cm/sec. Ratio = 0.9. Intimal thickening in the CCA. Moderately tortuous LICA. Anterograde flow in the vertebral artery. IMPRESSION:  SANDRA:  No hemodynamically significant stenosis. LICA:  No hemodynamically significant stenosis. .      Echocardiogram results:  Results for orders placed or performed during the hospital encounter of 18   2D ECHO COMPLETE ADULT (TTE) [address]  [address]  [phone]    Transthoracic Echocardiogram  2D, M-mode, Doppler, and Color Doppler    Patient: Dutch Ball  MR #: [id number]  : [id number]  Age: 74 years  Gender: Male  Study date: 2018  Account #: [de-identified]  Height: 69 in  Weight: 199.5 lb  BSA: 2.07 mï¾²  Status:Routine  Location: [facility]  BP: 123/ 86    Allergies: PENICILLINS    Sonographer:  Louis Villalba, RDCS  Group:  [facility]  Referring Physician:  Klarissa Carver MD  Reading Physician:  Helen Yi, MD    INDICATIONS: stroke    HISTORY: PRIOR HISTORY: COPD, CAD (9/2017), CKD,    PROCEDURE: This was a routine study. A transthoracic echocardiogram was  performed. The study included complete 2D imaging, M-mode, complete spectral  Doppler, and color Doppler. Intravenous contrast (agitated saline) was  administered. Image quality was good. LEFT VENTRICLE: The cavity was small. Systolic function was moderately   reduced. Ejection fraction was estimated in the range of 35 % to 40 %. There was  moderate diffuse hypokinesis. There was severe concentric hypertrophy   (speckled  pattern; consideration for inflitrative cardiomyopathy). Left ventricular  diastolic function is abnormal (average E/e; 17.55) There is grade III  diastolic dysfunction. RIGHT VENTRICLE: The size was normal. Systolic function was normal. Systolic  pressure was mildly increased. Estimated peak pressure was in the range of  40-45 mmHg. LEFT ATRIUM: The atrium was moderately dilated. ATRIAL SEPTUM: No defect or patent foramen ovale was identified. Agitated  saline contrast injection (bubble study) was performed. There was no  right-to-left shunt, at rest or induced by the Valsalva maneuver. RIGHT ATRIUM: The atrium was moderately dilated. SYSTEMIC VEINS: IVC: The inferior vena cava was dilated. The respirophasic  change in diameter was more than 50%. AORTIC VALVE: The valve was trileaflet. Leaflets exhibited mild sclerosis. There was restriction of the left coronary cusp. There was no evidence for  stenosis. There was mild regurgitation. MITRAL VALVE: There was mild thickening. There was no evidence for stenosis. There was mild to moderate regurgitation. TRICUSPID VALVE: The valve structure was normal. There was no evidence for  stenosis. There was moderate to severe regurgitation. PULMONIC VALVE: The valve structure was normal. There was no evidence for  stenosis. There was mild regurgitation.     PERICARDIUM: There was no pericardial effusion. The pericardium was normal in  appearance. AORTA: The root exhibited normal size. SUMMARY:    -  Left ventricle: Systolic function was moderately reduced. Ejection   fraction  was estimated in the range of 35 % to 40 %. There was moderate diffuse  hypokinesis. There was severe concentric hypertrophy (speckled pattern;  consideration for inflitrative cardiomyopathy). There is grade III diastolic  dysfunction.    -  Right ventricle: Systolic pressure was mildly increased. Estimated peak  pressure was in the range of 40-45 mmHg. -  Left atrium: The atrium was moderately dilated. -  Atrial septum: Agitated saline contrast injection (bubble study) was  performed. There was no right-to-left shunt, at rest or induced by the   Valsalva  maneuver. -  Right atrium: The atrium was moderately dilated. -  Tricuspid valve: There was moderate to severe regurgitation. MEASUREMENT TABLES    DOPPLER MEASUREMENTS  Aortic valve   (Reference normals)  Regurg PHT   582 ms   (--)    SYSTEM MEASUREMENT TABLES    2D mode  AoR Diam (2D): 3.4 cm  LA Dimension (2D): 5.2 cm  Left Atrium Systolic Volume Index; Method of Disks, Biplane; 2D mode;: 48.3  ml/m2  IVS/LVPW (2D): 1  IVSd (2D): 2 cm  LVIDd (2D): 3.9 cm  LVIDs (2D): 3.4 cm  LVPWd (2D): 2 cm  RVIDd (2D): 3 cm    Tissue Doppler Imaging  LV Peak Early Morales Tissue Gualberto; Lateral MA (TDI): 3.8 cm/s  LV Peak Early Morales Tissue Gualberto; Medial MA (TDI): 3.4 cm/s    Unspecified Scan Mode  Peak Grad; Mean; Antegrade Flow: 7 mm[Hg]  Vmax;  Antegrade Flow: 136 cm/s  Peak Grad; Mean; Regurgitant Flow: 29 mm[Hg]  Vmax; Regurgitant Flow: 290 cm/s    Prepared and signed by    Kasie Wan MD  Signed [id number] 00:50:07           All Micro Results     None          Labs: Results:       BMP, Mg, Phos Recent Labs     11/03/18  0529 11/02/18  1216    140   K 3.9 4.6    107   CO2 23 28   AGAP 11 5*   BUN 21 21   CREA 1.59* 1.70*   CA 8.4 8.7   GLU 103* 91   MG  --  2.2      CBC Recent Labs     11/03/18  0529 11/02/18  1216   WBC 7.6 7.3   RBC 4.99 5.23   HGB 13.8 14.3   HCT 43.9 45.8    173      LFT No results for input(s): SGOT, ALT, TBIL, AP, TP, ALB, GLOB, AGRAT, GPT in the last 72 hours.    Cardiac Testing Lab Results   Component Value Date/Time     10/22/2018 05:02 AM    BNP 1,206 10/20/2018 01:53 PM     07/12/2018 05:22 AM     12/28/2016 03:53 PM     12/01/2016 03:18 PM    BNP 6 11/21/2016 09:50 AM     09/27/2017 07:19 PM     11/13/2015 05:23 AM    CK - MB 2.1 09/27/2017 07:19 PM    CK-MB Index 1.6 09/27/2017 07:19 PM    Troponin-I, Qt. 0.35 () 10/21/2018 10:04 AM    Troponin-I, Qt. 0.45 () 10/21/2018 01:40 AM    Troponin-I, Qt. 0.46 () 10/20/2018 07:34 PM      Coagulation Tests Lab Results   Component Value Date/Time    INR (POC) 1.4 (H) 11/02/2018 01:04 PM    aPTT 32.5 12/28/2017 12:16 PM    aPTT >200.0 (HH) 12/28/2017 03:00 AM    aPTT 72.6 (H) 12/27/2017 07:32 PM      A1c Lab Results   Component Value Date/Time    Hemoglobin A1c 7.0 (H) 09/10/2018 09:13 AM    Hemoglobin A1c 7.0 (H) 07/12/2018 05:22 AM      Lipid Panel Lab Results   Component Value Date/Time    Cholesterol, total 158 09/10/2018 09:13 AM    HDL Cholesterol 23 (L) 09/10/2018 09:13 AM    LDL,Direct 94 06/29/2015 06:00 AM    LDL, calculated 95 09/10/2018 09:13 AM    VLDL, calculated 40 09/10/2018 09:13 AM    Triglyceride 199 (H) 09/10/2018 09:13 AM    CHOL/HDL Ratio 4.3 12/27/2017 04:13 AM      Thyroid Panel Lab Results   Component Value Date/Time    TSH 0.604 05/25/2018 03:54 AM    TSH 1.165 04/26/2015 04:11 AM        Most Recent UA Lab Results   Component Value Date/Time    Color YELLOW 03/27/2018 01:38 PM    Appearance CLEAR 03/27/2018 01:38 PM    Specific gravity 1.020 03/27/2018 01:38 PM    pH (UA) 6.0 03/27/2018 01:38 PM    Protein 100 (A) 03/27/2018 01:38 PM    Glucose NEGATIVE  03/27/2018 01:38 PM    Ketone NEGATIVE  03/27/2018 01:38 PM    Bilirubin NEGATIVE  03/27/2018 01:38 PM    Blood NEGATIVE  03/27/2018 01:38 PM    Urobilinogen 4.0 (H) 03/27/2018 01:38 PM    Nitrites NEGATIVE  03/27/2018 01:38 PM    Leukocyte Esterase TRACE (A) 03/27/2018 01:38 PM        Allergies   Allergen Reactions    Pcn [Penicillins] Other (comments)     \"makes my heart stop\"     Immunization History   Administered Date(s) Administered    Influenza Vaccine 09/28/2016    Influenza Vaccine (Quad) PF 10/04/2018    Pneumococcal Conjugate (PCV-13) 09/10/2018    Pneumococcal Polysaccharide (PPSV-23) 09/28/2016    TB Skin Test (PPD) Intradermal 01/05/2018, 03/27/2018, 05/27/2018, 07/11/2018       All Labs from Last 24 Hrs:  No results found for this or any previous visit (from the past 24 hour(s)). Discharge Exam:  Patient Vitals for the past 24 hrs:   Temp Pulse Resp BP SpO2   11/04/18 1200 97.7 °F (36.5 °C) 80 18 119/74 96 %   11/04/18 0800 98.4 °F (36.9 °C) 82 17 129/87 94 %   11/04/18 0400 98 °F (36.7 °C) 79 18 113/69 91 %   11/04/18 0000 97.6 °F (36.4 °C) 86 16 96/60 94 %   11/03/18 2012 98.1 °F (36.7 °C) 73 16 122/84 98 %   11/03/18 1600 98 °F (36.7 °C) 73 16 127/84 97 %     Oxygen Therapy  O2 Sat (%): 96 % (11/04/18 1200)  Pulse via Oximetry: 74 beats per minute (11/02/18 1457)  O2 Device: Room air (11/02/18 1213)    Intake/Output Summary (Last 24 hours) at 11/4/2018 1524  Last data filed at 11/4/2018 0400  Gross per 24 hour   Intake    Output 400 ml   Net -400 ml       General:    Well nourished. Alert. No distress. Eyes:   Normal sclera. Extraocular movements intact. ENT:  Normocephalic, atraumatic. Moist mucous membranes  CV:   Regular rate and rhythm. No murmur, rub, or gallop. Lungs:  Clear to auscultation bilaterally, except for slight diminished BS at the right lung base. No wheezing, rhonchi, or rales. Abdomen: Soft, nontender, nondistended. Bowel sounds normal.   Extremities: Warm and dry. No cyanosis or edema.   Neurologic: CN II-XII grossly intact. Sensation intact. Skin:     No rashes or jaundice. Psych:  Normal mood and affect. Discharge Info:   Current Discharge Medication List      START taking these medications    Details   aspirin 81 mg chewable tablet Take 1 Tab by mouth daily. Qty: 30 Tab, Refills: 0         CONTINUE these medications which have NOT CHANGED    Details   levETIRAcetam (KEPPRA) 500 mg tablet Take 1 Tab by mouth two (2) times a day. Qty: 30 Tab, Refills: 0      torsemide (DEMADEX) 20 mg tablet Take 1 Tab by mouth daily. Qty: 60 Tab, Refills: 5      atorvastatin (LIPITOR) 20 mg tablet Take 1 Tab by mouth nightly. Qty: 90 Tab, Refills: 3      allopurinol (ZYLOPRIM) 100 mg tablet TAKE 1 TABLET BY MOUTH EVERY DAY  Qty: 90 Tab, Refills: 3      carvedilol (COREG) 3.125 mg tablet Take 1 Tab by mouth two (2) times daily (with meals). Qty: 180 Tab, Refills: 3      omeprazole (PRILOSEC) 20 mg capsule Take 1 Cap by mouth daily. Qty: 90 Cap, Refills: 3      fluticasone (FLONASE) 50 mcg/actuation nasal spray 2 Sprays by Both Nostrils route daily. Qty: 1 Bottle, Refills: 11      melatonin 3 mg tablet Take 3 mg by mouth nightly. albuterol (VENTOLIN HFA) 90 mcg/actuation inhaler Take 2 Puffs by inhalation four (4) times daily. Qty: 1 Inhaler, Refills: 11      albuterol-ipratropium (DUO-NEB) 2.5 mg-0.5 mg/3 ml nebu 3 mL by Nebulization route four (4) times daily. Diaignosis--J44.9  Qty: 120 Nebule, Refills: 11      polyethylene glycol (MIRALAX) 17 gram packet Take 1 Packet by mouth daily. Qty: 1 Packet, Refills: 11               Disposition: home    Activity: Activity as tolerated  Diet: DIET REGULAR    Follow-up Appointments   Procedures    FOLLOW UP VISIT Appointment in: One Week With PCP in 1-2 weeks. With PCP in 1-2 weeks. Standing Status:   Standing     Number of Occurrences:   1     Order Specific Question:   Appointment in     Answer:    One Week         Follow-up Information     Follow up With Specialties Details Why Contact Info    Elle Poe MD [facility]   [address] [facility]  [phone]            Time spent in patient discharge planning and coordination 35 minutes. Signed:   Latha Pereira MD

## 2018-11-04 NOTE — PROGRESS NOTES
Hospitalist Progress Note Admit Date:  2018 12:24 PM  
Name:  Beatrice Harrison. Age:  74 y.o. 
:  1945 MRN:  [id number] PCP:  Antwon Paredes MD 
Treatment Team: Attending Provider: Clayton Smalls DO; Utilization Review: Shahram Ferreira RN Subjective: The patient is a 75 y/o gentleman with HTN, COPD, CAD, Chronic Systolic CHF, Seizure d/o, was admitted for R sided weakness r/o acute CVA. He was also noted to have a large right pleural effusion. He was seen at the bedside and feels well. Denies any SOB or chest discomfort. Right sided weakness has resolved. Objective:  
 
Patient Vitals for the past 24 hrs: 
 Temp Pulse Resp BP SpO2  
18 98.1 °F (36.7 °C) 73 16 122/84 98 % 18 1600 98 °F (36.7 °C) 73 16 127/84 97 % 18 1200 98.8 °F (37.1 °C) 70 17 121/76 99 % 18 0800 97.5 °F (36.4 °C) 80 17 132/89 100 % 18 0400 97.6 °F (36.4 °C) 66 16 98/63 97 % 18 0000 97.7 °F (36.5 °C) 71 18 111/72 93 % Oxygen Therapy O2 Sat (%): 98 % (18) Pulse via Oximetry: 74 beats per minute (18 1457) O2 Device: Room air (18 1213) Intake/Output Summary (Last 24 hours) at 11/3/2018 2148 Last data filed at 11/3/2018 2012 Gross per 24 hour Intake  Output 900 ml Net -900 ml General:    Well nourished. Alert. CV:   RRR. No murmur, rub, or gallop. Lungs:   Clear to auscultation bilaterally, except for diminished BS at the bases, particularly on the right. No wheezing, rhonchi, or rales. Abdomen:   Soft, nontender, nondistended. Extremities: Warm and dry. No cyanosis. 1+ pitting edema bilaterally in the LE. Skin:     No rashes or jaundice. Data Review: 
I have reviewed all labs, meds, telemetry events, and studies from the last 24 hours. Recent Results (from the past 24 hour(s)) METABOLIC PANEL, BASIC Collection Time: 18  5:29 AM  
Result Value Ref Range  Sodium 141 136 - 145 mmol/L  
 Potassium 3.9 3.5 - 5.1 mmol/L Chloride 107 98 - 107 mmol/L  
 CO2 23 21 - 32 mmol/L Anion gap 11 7 - 16 mmol/L Glucose 103 (H) 65 - 100 mg/dL BUN 21 8 - 23 MG/DL Creatinine 1.59 (H) 0.8 - 1.5 MG/DL  
 GFR est AA 55 (L) >60 ml/min/1.73m2 GFR est non-AA 45 (L) >60 ml/min/1.73m2 Calcium 8.4 8.3 - 10.4 MG/DL  
CBC W/O DIFF Collection Time: 11/03/18  5:29 AM  
Result Value Ref Range WBC 7.6 4.3 - 11.1 K/uL  
 RBC 4.99 4.23 - 5.6 M/uL  
 HGB 13.8 13.6 - 17.2 g/dL HCT 43.9 41.1 - 50.3 % MCV 88.0 79.6 - 97.8 FL  
 MCH 27.7 26.1 - 32.9 PG  
 MCHC 31.4 31.4 - 35.0 g/dL  
 RDW 17.7 % PLATELET 824 186 - 292 K/uL MPV 10.7 9.4 - 12.3 FL ABSOLUTE NRBC 0.00 0.0 - 0.2 K/uL All Micro Results None Current Meds: 
Current Facility-Administered Medications Medication Dose Route Frequency  albuterol-ipratropium (DUO-NEB) 2.5 MG-0.5 MG/3 ML  3 mL Nebulization Q4H PRN  
 allopurinol (ZYLOPRIM) tablet 100 mg  100 mg Oral DAILY  atorvastatin (LIPITOR) tablet 40 mg  40 mg Oral QHS  levETIRAcetam (KEPPRA) tablet 500 mg  500 mg Oral BID  pantoprazole (PROTONIX) tablet 40 mg  40 mg Oral ACB  polyethylene glycol (MIRALAX) packet 17 g  17 g Oral DAILY PRN  
 torsemide (DEMADEX) tablet 20 mg  20 mg Oral DAILY  aspirin chewable tablet 81 mg  81 mg Oral DAILY  sodium chloride (NS) flush 5-10 mL  5-10 mL IntraVENous Q8H  
 sodium chloride (NS) flush 5-10 mL  5-10 mL IntraVENous PRN  
 acetaminophen (TYLENOL) tablet 650 mg  650 mg Oral Q4H PRN Other Studies (last 24 hours): No results found. Assessment and Plan:  
 
Hospital Problems as of 11/3/2018 Date Reviewed: 6/6/2018 Codes Class Noted - Resolved POA Stroke (cerebrum) ([facility] [address] ICD-10-CM: I63.9 ICD-9-CM: 434.91  11/2/2018 - Present Unknown  
   
 CKD (chronic kidney disease) stage 3, GFR 30-59 ml/min (HCC) (Chronic) ICD-10-CM: N18.3 ICD-9-CM: 585.3  9/29/2017 - Present Yes Coronary atherosclerosis of native coronary vessel (Chronic) ICD-10-CM: I25.10 ICD-9-CM: 414.01  9/29/2017 - Present Yes Hypertension (Chronic) ICD-10-CM: I10 
ICD-9-CM: 401.9  9/29/2017 - Present Yes COPD, moderate ([address] (Chronic) ICD-10-CM: J44.9 ICD-9-CM: 237  9/29/2017 - Present Yes Overview Signed 12/27/2015 12:38 PM by Nishi Yoder NP Complete PFTs: 7/20/15: 
Spirometry is consistent with a moderate obstructive/restrictive defect. . The residual volume is increased relative to other lung volumes suggesting air-trapping. The diffusion capacity corrected for alveolar volume was normal suggesting no loss of alveolo-capillary units. Gastroesophageal reflux disease without esophagitis (Chronic) ICD-10-CM: K21.9 ICD-9-CM: 530.81  9/29/2017 - Present Yes Mixed hyperlipidemia (Chronic) ICD-10-CM: G35.2 ICD-9-CM: 272.2  9/28/2016 - Present Yes 1 - Right sided weakness, resolved. Head CT, CTA head and neck and Brain MRI are all unremarkable. 2 - Right Pleural Effusion 3 - Chronic Systolic CHF 
4 - HTN 
5 - COPD 
6 - Seizure d/o PLAN:   
· CVA w/u is unremarkable · Echocardiogram pending · Pulmonary consultation for right pleural effusion appreciated. Continue Torsemide. · Continue home medications for chronic conditions · Possible discharge home tomorrow DC planning/Dispo: Home possibly tomorrow DVT ppx: with SCDs Signed: Becky Owen MD

## 2018-11-04 NOTE — DISCHARGE INSTRUCTIONS
Learning About a Pleural Effusion  What is it? A pleural effusion (say \"PLER-mayank ej-AMUK-hqdz\") is the buildup of fluid in the space between tissues lining the lungs and the chest wall. Because of the fluid buildup, the lungs may not be able to expand completely. This can make it hard to breathe. A pleural empyema (say \"ia-ep-XI-muh\") is a problem that can happen with pleural effusion. Bacteria or other infections cause pus to form in the pleural fluid. But most pleural effusions don't become infected. What causes it? A pleural effusion has many causes. They include pneumonia, cancer, inflammation of the tissues around the lungs, and heart failure. What are the symptoms? Symptoms of a pleural effusion may include:  · Trouble breathing. · Shortness of breath. · Chest pain. · Fever. · A cough. A minor pleural effusion may not cause any symptoms. How is it diagnosed? A pleural effusion is usually diagnosed with an X-ray and a physical exam. The doctor listens to the airflow in your lungs. How is it treated? A pleural effusion can be treated by removing fluid from the space between the tissues around the lungs. This is done with a needle that's put into the chest (thoracentesis). A small amount of the fluid may be sent to a lab to find out what is causing the buildup of fluid. Removing the fluid may help to relieve symptoms, such as shortness of breath and chest pain. It can help the lungs to expand more fully. If the pleural effusion doesn't get better, a catheter may be placed in the chest. This is a flexible tube that allows fluid to drain from the lungs. The catheter stays in the chest until the doctor removes it. Some people may get a treatment that removes the fluid and then puts a medicine into the chest cavity. This helps to prevent too much fluid from building up again. A minor pleural effusion often goes away on its own.   Doctors may need to treat the condition that is causing the pleural effusion. For example, you may get medicines to treat pneumonia or congestive heart failure. When the condition is treated, the effusion usually goes away. For a pleural empyema, the pus needs to be drained. It may drain from a flexible tube placed in the chest. Or you may have surgery to drain it. You also will get antibiotics. Follow-up care is a key part of your treatment and safety. Be sure to make and go to all appointments, and call your doctor if you are having problems. It's also a good idea to know your test results and keep a list of the medicines you take. Where can you learn more? Go to http://jaime-jarrell.info/. Enter A920 in the search box to learn more about \"Learning About a Pleural Effusion. \"  Current as of: June 18, 2018  Content Version: 11.8  © 9956-3847 "[employer]. Care instructions adapted under license by [employer] (which disclaims liability or warranty for this information). If you have questions about a medical condition or this instruction, always ask your healthcare professional. [place] [address] any warranty or liability for your use of this information. DISCHARGE SUMMARY from Nurse    PATIENT INSTRUCTIONS:    After general anesthesia or intravenous sedation, for 24 hours or while taking prescription Narcotics:  · Limit your activities  · Do not drive and operate hazardous machinery  · Do not make important personal or business decisions  · Do  not drink alcoholic beverages  · If you have not urinated within 8 hours after discharge, please contact your surgeon on call.     Report the following to your surgeon:  · Excessive pain, swelling, redness or odor of or around the surgical area  · Temperature over 100.5  · Nausea and vomiting lasting longer than 4 hours or if unable to take medications  · Any signs of decreased circulation or nerve impairment to extremity: change in color, persistent  numbness, tingling, coldness or increase pain  · Any questions      *  Please give a list of your current medications to your Primary Care Provider. *  Please update this list whenever your medications are discontinued, doses are      changed, or new medications (including over-the-counter products) are added. *  Please carry medication information at all times in case of emergency situations. These are general instructions for a healthy lifestyle:    No smoking/ No tobacco products/ Avoid exposure to second hand smoke  Surgeon General's Warning:  Quitting smoking now greatly reduces serious risk to your health. Obesity, smoking, and sedentary lifestyle greatly increases your risk for illness    A healthy diet, regular physical exercise & weight monitoring are important for maintaining a healthy lifestyle    You may be retaining fluid if you have a history of heart failure or if you experience any of the following symptoms:  Weight gain of 3 pounds or more overnight or 5 pounds in a week, increased swelling in our hands or feet or shortness of breath while lying flat in bed. Please call your doctor as soon as you notice any of these symptoms; do not wait until your next office visit. Recognize signs and symptoms of STROKE:    F-face looks uneven    A-arms unable to move or move unevenly    S-speech slurred or non-existent    T-time-call 911 as soon as signs and symptoms begin-DO NOT go       Back to bed or wait to see if you get better-TIME IS BRAIN. Warning Signs of HEART ATTACK     Call 911 if you have these symptoms:   Chest discomfort. Most heart attacks involve discomfort in the center of the chest that lasts more than a few minutes, or that goes away and comes back. It can feel like uncomfortable pressure, squeezing, fullness, or pain.  Discomfort in other areas of the upper body. Symptoms can include pain or discomfort in one or both arms, the back, neck, jaw, or stomach.    Shortness of breath with or without chest discomfort.  Other signs may include breaking out in a cold sweat, nausea, or lightheadedness. Don't wait more than five minutes to call 911 - MINUTES MATTER! Fast action can save your life. Calling 911 is almost always the fastest way to get lifesaving treatment. Emergency Medical Services staff can begin treatment when they arrive -- up to an hour sooner than if someone gets to the hospital by car. The discharge information has been reviewed with the patient. The patient verbalized understanding. Discharge medications reviewed with the patient and appropriate educational materials and side effects teaching were provided.   ___________________________________________________________________________________________________________________________________

## 2018-11-06 ENCOUNTER — PATIENT OUTREACH (OUTPATIENT)
Dept: CASE MANAGEMENT | Age: 73
End: 2018-11-06

## 2018-11-06 NOTE — PROGRESS NOTES
spoke with pts wife who is currently in [facility]. Verbalized that pt was discharged on the 4th. He had a TIA. Wife was not able to talk with nurse at this time. pt will be seeing PCP on the 14 th. Nurse will continue to monitor and provide care as needed.

## 2018-11-14 PROBLEM — G45.9 TRANSIENT CEREBRAL ISCHEMIA: Status: ACTIVE | Noted: 2018-11-14

## 2018-11-14 PROBLEM — G40.909 SEIZURE DISORDER (HCC): Status: ACTIVE | Noted: 2018-11-14

## 2018-11-16 ENCOUNTER — HOME HEALTH ADMISSION (OUTPATIENT)
Dept: HOME HEALTH SERVICES | Facility: HOME HEALTH | Age: 73
End: 2018-11-16

## 2018-11-26 ENCOUNTER — HOSPITAL ENCOUNTER (INPATIENT)
Age: 73
LOS: 7 days | Discharge: HOME HEALTH | DRG: 291 | End: 2018-12-03
Attending: EMERGENCY MEDICINE | Admitting: INTERNAL MEDICINE
Payer: MEDICARE

## 2018-11-26 ENCOUNTER — APPOINTMENT (OUTPATIENT)
Dept: GENERAL RADIOLOGY | Age: 73
DRG: 291 | End: 2018-11-26
Attending: EMERGENCY MEDICINE
Payer: MEDICARE

## 2018-11-26 DIAGNOSIS — J44.1 ACUTE EXACERBATION OF CHRONIC OBSTRUCTIVE PULMONARY DISEASE (COPD) (HCC): Primary | ICD-10-CM

## 2018-11-26 DIAGNOSIS — I50.23 ACUTE ON CHRONIC SYSTOLIC (CONGESTIVE) HEART FAILURE (HCC): ICD-10-CM

## 2018-11-26 DIAGNOSIS — R53.81 DEBILITY: ICD-10-CM

## 2018-11-26 DIAGNOSIS — Z51.5 ENCOUNTER FOR PALLIATIVE CARE: ICD-10-CM

## 2018-11-26 DIAGNOSIS — I50.813 ACUTE ON CHRONIC RIGHT-SIDED CONGESTIVE HEART FAILURE (HCC): ICD-10-CM

## 2018-11-26 LAB
ALBUMIN SERPL-MCNC: 3.3 G/DL (ref 3.2–4.6)
ALBUMIN/GLOB SERPL: 0.8 {RATIO} (ref 1.2–3.5)
ALP SERPL-CCNC: 122 U/L (ref 50–136)
ALT SERPL-CCNC: 24 U/L (ref 12–65)
ANION GAP SERPL CALC-SCNC: 10 MMOL/L (ref 7–16)
AST SERPL-CCNC: 34 U/L (ref 15–37)
BASOPHILS # BLD: 0 K/UL (ref 0–0.2)
BASOPHILS NFR BLD: 1 % (ref 0–2)
BILIRUB SERPL-MCNC: 2.2 MG/DL (ref 0.2–1.1)
BNP SERPL-MCNC: 865 PG/ML
BUN SERPL-MCNC: 26 MG/DL (ref 8–23)
CALCIUM SERPL-MCNC: 8.9 MG/DL (ref 8.3–10.4)
CHLORIDE SERPL-SCNC: 103 MMOL/L (ref 98–107)
CO2 SERPL-SCNC: 27 MMOL/L (ref 21–32)
CREAT SERPL-MCNC: 1.85 MG/DL (ref 0.8–1.5)
DIFFERENTIAL METHOD BLD: NORMAL
EOSINOPHIL # BLD: 0 K/UL (ref 0–0.8)
EOSINOPHIL NFR BLD: 1 % (ref 0.5–7.8)
ERYTHROCYTE [DISTWIDTH] IN BLOOD BY AUTOMATED COUNT: 16 %
GLOBULIN SER CALC-MCNC: 4.4 G/DL (ref 2.3–3.5)
GLUCOSE SERPL-MCNC: 79 MG/DL (ref 65–100)
HCT VFR BLD AUTO: 47 % (ref 41.1–50.3)
HGB BLD-MCNC: 14.9 G/DL (ref 13.6–17.2)
IMM GRANULOCYTES # BLD: 0 K/UL (ref 0–0.5)
IMM GRANULOCYTES NFR BLD AUTO: 1 % (ref 0–5)
LYMPHOCYTES # BLD: 1.5 K/UL (ref 0.5–4.6)
LYMPHOCYTES NFR BLD: 23 % (ref 13–44)
MCH RBC QN AUTO: 27.7 PG (ref 26.1–32.9)
MCHC RBC AUTO-ENTMCNC: 31.7 G/DL (ref 31.4–35)
MCV RBC AUTO: 87.5 FL (ref 79.6–97.8)
MONOCYTES # BLD: 0.3 K/UL (ref 0.1–1.3)
MONOCYTES NFR BLD: 5 % (ref 4–12)
NEUTS SEG # BLD: 4.5 K/UL (ref 1.7–8.2)
NEUTS SEG NFR BLD: 70 % (ref 43–78)
NRBC # BLD: 0 K/UL (ref 0–0.2)
PLATELET # BLD AUTO: 170 K/UL (ref 150–450)
PMV BLD AUTO: 11.6 FL (ref 9.4–12.3)
POTASSIUM SERPL-SCNC: 4.1 MMOL/L (ref 3.5–5.1)
PROT SERPL-MCNC: 7.7 G/DL (ref 6.3–8.2)
RBC # BLD AUTO: 5.37 M/UL (ref 4.23–5.6)
SODIUM SERPL-SCNC: 140 MMOL/L (ref 136–145)
WBC # BLD AUTO: 6.4 K/UL (ref 4.3–11.1)

## 2018-11-26 PROCEDURE — 74011000250 HC RX REV CODE- 250: Performed by: INTERNAL MEDICINE

## 2018-11-26 PROCEDURE — 74011000250 HC RX REV CODE- 250: Performed by: EMERGENCY MEDICINE

## 2018-11-26 PROCEDURE — 99285 EMERGENCY DEPT VISIT HI MDM: CPT | Performed by: EMERGENCY MEDICINE

## 2018-11-26 PROCEDURE — 77030013140 HC MSK NEB VYRM -A

## 2018-11-26 PROCEDURE — 85025 COMPLETE CBC W/AUTO DIFF WBC: CPT

## 2018-11-26 PROCEDURE — 74011250636 HC RX REV CODE- 250/636: Performed by: EMERGENCY MEDICINE

## 2018-11-26 PROCEDURE — 71046 X-RAY EXAM CHEST 2 VIEWS: CPT

## 2018-11-26 PROCEDURE — 94640 AIRWAY INHALATION TREATMENT: CPT

## 2018-11-26 PROCEDURE — 93005 ELECTROCARDIOGRAM TRACING: CPT | Performed by: EMERGENCY MEDICINE

## 2018-11-26 PROCEDURE — 96374 THER/PROPH/DIAG INJ IV PUSH: CPT | Performed by: EMERGENCY MEDICINE

## 2018-11-26 PROCEDURE — 83880 ASSAY OF NATRIURETIC PEPTIDE: CPT

## 2018-11-26 PROCEDURE — 74011250637 HC RX REV CODE- 250/637: Performed by: INTERNAL MEDICINE

## 2018-11-26 PROCEDURE — 65660000000 HC RM CCU STEPDOWN

## 2018-11-26 PROCEDURE — 80053 COMPREHEN METABOLIC PANEL: CPT

## 2018-11-26 RX ORDER — AZITHROMYCIN 250 MG/1
500 TABLET, FILM COATED ORAL DAILY
Status: DISCONTINUED | OUTPATIENT
Start: 2018-11-26 | End: 2018-11-27

## 2018-11-26 RX ORDER — GUAIFENESIN 600 MG/1
600 TABLET, EXTENDED RELEASE ORAL EVERY 12 HOURS
Status: DISCONTINUED | OUTPATIENT
Start: 2018-11-26 | End: 2018-12-03 | Stop reason: HOSPADM

## 2018-11-26 RX ORDER — FUROSEMIDE 10 MG/ML
40 INJECTION INTRAMUSCULAR; INTRAVENOUS
Status: COMPLETED | OUTPATIENT
Start: 2018-11-26 | End: 2018-11-26

## 2018-11-26 RX ORDER — FUROSEMIDE 10 MG/ML
40 INJECTION INTRAMUSCULAR; INTRAVENOUS DAILY
Status: DISCONTINUED | OUTPATIENT
Start: 2018-11-27 | End: 2018-11-27

## 2018-11-26 RX ORDER — ALBUTEROL SULFATE 0.83 MG/ML
2.5 SOLUTION RESPIRATORY (INHALATION)
Status: DISCONTINUED | OUTPATIENT
Start: 2018-11-26 | End: 2018-12-03 | Stop reason: HOSPADM

## 2018-11-26 RX ORDER — BUDESONIDE 0.5 MG/2ML
500 INHALANT ORAL
Status: DISCONTINUED | OUTPATIENT
Start: 2018-11-26 | End: 2018-12-03 | Stop reason: HOSPADM

## 2018-11-26 RX ORDER — IPRATROPIUM BROMIDE AND ALBUTEROL SULFATE 2.5; .5 MG/3ML; MG/3ML
3 SOLUTION RESPIRATORY (INHALATION)
Status: COMPLETED | OUTPATIENT
Start: 2018-11-26 | End: 2018-11-26

## 2018-11-26 RX ADMIN — GUAIFENESIN 600 MG: 600 TABLET, EXTENDED RELEASE ORAL at 21:44

## 2018-11-26 RX ADMIN — AZITHROMYCIN 500 MG: 250 TABLET, FILM COATED ORAL at 19:46

## 2018-11-26 RX ADMIN — IPRATROPIUM BROMIDE AND ALBUTEROL SULFATE 3 ML: .5; 3 SOLUTION RESPIRATORY (INHALATION) at 17:19

## 2018-11-26 RX ADMIN — FUROSEMIDE 40 MG: 10 INJECTION, SOLUTION INTRAMUSCULAR; INTRAVENOUS at 16:46

## 2018-11-26 RX ADMIN — BUDESONIDE 500 MCG: 0.5 INHALANT RESPIRATORY (INHALATION) at 19:21

## 2018-11-26 NOTE — PROGRESS NOTES
Patient with recent admit and discharge from [facility] (11-2-18 to 11-4-18).

## 2018-11-26 NOTE — ED PROVIDER NOTES
74year old male presenting form worsening cough, congestion, and wheezing. Known COPD and CHF. No fevers, nausea, or vomiting. Given albuterol with little improvement. 125 solumedrol enroute. Wheezing bilaterally Patient and wife state he has had cough and SOB for 3 to 4 days. Worsening daily. Sleeps sitting up for years. Wife has heard wheezing especially at night. Decreased appetite. No chest pain. Using inhalers. Also has CHF. Urine has been dark and decreased output. Has TAZ but does not use his CPAP. ECHO in November  to 40 %. On home oxygen. Shortness of Breath Associated symptoms include a fever, cough, wheezing and leg swelling. Pertinent negatives include no headaches, no chest pain, no vomiting and no abdominal pain. Past Medical History:  
Diagnosis Date  Acute CHF (congestive heart failure) ([address] 4/25/2015  Acute combined systolic and diastolic congestive heart failure ([address] 4/28/2015  Chronic obstructive pulmonary disease ([facility] [address]  De Quervain's tenosynovitis, right 4/28/2015  Dyspnea on exertion 6/28/2015  Elevated serum creatinine 4/25/2015  Elevated troponin 6/28/2015  History of coronary artery disease MI at 27 yo  
 History of right knee surgery   
 cartilage removal  
 History of shingles  Malignant hypertension 4/25/2015  MI (myocardial infarction) ([facility] [address] Past Surgical History:  
Procedure Laterality Date  HX HEART CATHETERIZATION  6/29/2015  
 no intervention  HX KNEE ARTHROSCOPY Right   
 removal of cartilage Family History:  
Problem Relation Age of Onset  Hypertension Mother  No Known Problems Father Social History Socioeconomic History  Marital status:  Spouse name: Not on file  Number of children: Not on file  Years of education: Not on file  Highest education level: Not on file Social Needs  Financial resource strain: Not on file  Food insecurity - worry: Not on file  Food insecurity - inability: Not on file  Transportation needs - medical: Not on file  Transportation needs - non-medical: Not on file Occupational History  Occupation: retired Tobacco Use  Smoking status: Former Smoker Packs/day: 0.25 Years: 20.00 Pack years: 5.00 Types: Cigarettes Last attempt to quit: 4/5/2015 Years since quitting: 3.6  Smokeless tobacco: Never Used Substance and Sexual Activity  Alcohol use: No  
  Alcohol/week: 0.0 oz  Drug use: No  
 Sexual activity: Not on file Other Topics Concern   Service No  
 Blood Transfusions No  
  Comment: no issues with receiving  Caffeine Concern No  
 Occupational Exposure Not Asked [place] Hazards Not Asked  Sleep Concern Not Asked  Stress Concern Not Asked  Weight Concern Not Asked  Special Diet No  
 Back Care Not Asked  Exercise No  
 Bike Helmet Not Asked [address] Yes  Self-Exams Not Asked Social History Narrative Lives with his wife ALLERGIES: Pcn [penicillins] Review of Systems Constitutional: Positive for appetite change and fever. HENT: Negative. Negative for congestion. Eyes: Negative. Respiratory: Positive for cough, chest tightness, shortness of breath and wheezing. Cardiovascular: Positive for leg swelling. Negative for chest pain and palpitations. Gastrointestinal: Negative for abdominal pain, nausea and vomiting. Genitourinary: Positive for decreased urine volume. Negative for dysuria. Musculoskeletal: Negative. Skin: Negative. Neurological: Positive for weakness and numbness (chronic). Negative for headaches. Hematological: Negative. Psychiatric/Behavioral: Positive for confusion. There were no vitals filed for this visit. Physical Exam  
Constitutional: He appears well-developed and well-nourished. HENT:  
Head: Normocephalic. Mouth/Throat: Oropharynx is clear and moist.  
Eyes: Pupils are equal, round, and reactive to light. Neck: Normal range of motion. Neck supple. JVD present. Cardiovascular:  
Irregular rhythm Pulmonary/Chest: Effort normal. He has decreased breath sounds in the right middle field, the right lower field, the left middle field and the left lower field. He has wheezes in the right middle field, the right lower field, the left middle field and the left lower field. He has rales in the right lower field and the left lower field. He exhibits no tenderness. Abdominal: Soft. Bowel sounds are normal. There is no tenderness. Musculoskeletal:  
     Right lower leg: He exhibits no edema. Left lower leg: He exhibits no edema. Neurological: He is alert. Skin: Skin is warm and dry. Nursing note and vitals reviewed. MDM Number of Diagnoses or Management Options Acute exacerbation of chronic obstructive pulmonary disease (COPD) ([facility] [address]): Acute on chronic right-sided congestive heart failure [facility]):  
Diagnosis management comments: Wheezing, SOB 
CXR cardiomegaly, congestion - I viewed images EKG a fib, left axis Labs reviewed Duoneb nebulizer, lasix 40 mg IV Small output urine 
reexam - continues with rales and wheezing and decreased breath sounds Consult hospitalist - Dr. Saurabh Epps - they will see Amount and/or Complexity of Data Reviewed Clinical lab tests: ordered and reviewed Tests in the radiology section of CPT®: ordered and reviewed Decide to obtain previous medical records or to obtain history from someone other than the patient: yes Obtain history from someone other than the patient: yes Review and summarize past medical records: yes Discuss the patient with other providers: yes Independent visualization of images, tracings, or specimens: yes Critical Care Total time providing critical care: 30-74 minutes (Critical care time 35 minutes) Procedures

## 2018-11-27 PROBLEM — I48.91 ATRIAL FIBRILLATION (HCC): Status: ACTIVE | Noted: 2018-11-27

## 2018-11-27 LAB
ANION GAP SERPL CALC-SCNC: 10 MMOL/L (ref 7–16)
ATRIAL RATE: 441 BPM
ATRIAL RATE: 85 BPM
BNP SERPL-MCNC: 1335 PG/ML
BUN SERPL-MCNC: 31 MG/DL (ref 8–23)
CALCIUM SERPL-MCNC: 8.7 MG/DL (ref 8.3–10.4)
CALCULATED R AXIS, ECG10: -52 DEGREES
CALCULATED R AXIS, ECG10: -76 DEGREES
CALCULATED T AXIS, ECG11: 68 DEGREES
CALCULATED T AXIS, ECG11: 82 DEGREES
CHLORIDE SERPL-SCNC: 103 MMOL/L (ref 98–107)
CO2 SERPL-SCNC: 26 MMOL/L (ref 21–32)
CREAT SERPL-MCNC: 1.95 MG/DL (ref 0.8–1.5)
DIAGNOSIS, 93000: NORMAL
DIAGNOSIS, 93000: NORMAL
ERYTHROCYTE [DISTWIDTH] IN BLOOD BY AUTOMATED COUNT: 15.7 %
GLUCOSE SERPL-MCNC: 147 MG/DL (ref 65–100)
HCT VFR BLD AUTO: 44.6 % (ref 41.1–50.3)
HGB BLD-MCNC: 14.2 G/DL (ref 13.6–17.2)
MCH RBC QN AUTO: 27.8 PG (ref 26.1–32.9)
MCHC RBC AUTO-ENTMCNC: 31.8 G/DL (ref 31.4–35)
MCV RBC AUTO: 87.3 FL (ref 79.6–97.8)
NRBC # BLD: 0 K/UL (ref 0–0.2)
PLATELET # BLD AUTO: 154 K/UL (ref 150–450)
PMV BLD AUTO: 11.7 FL (ref 9.4–12.3)
POTASSIUM SERPL-SCNC: 3.9 MMOL/L (ref 3.5–5.1)
Q-T INTERVAL, ECG07: 436 MS
Q-T INTERVAL, ECG07: 470 MS
QRS DURATION, ECG06: 116 MS
QRS DURATION, ECG06: 120 MS
QTC CALCULATION (BEZET), ECG08: 515 MS
QTC CALCULATION (BEZET), ECG08: 545 MS
RBC # BLD AUTO: 5.11 M/UL (ref 4.23–5.6)
SODIUM SERPL-SCNC: 139 MMOL/L (ref 136–145)
VENTRICULAR RATE, ECG03: 81 BPM
VENTRICULAR RATE, ECG03: 84 BPM
WBC # BLD AUTO: 7.6 K/UL (ref 4.3–11.1)

## 2018-11-27 PROCEDURE — 83880 ASSAY OF NATRIURETIC PEPTIDE: CPT

## 2018-11-27 PROCEDURE — 36415 COLL VENOUS BLD VENIPUNCTURE: CPT

## 2018-11-27 PROCEDURE — 74011250636 HC RX REV CODE- 250/636: Performed by: INTERNAL MEDICINE

## 2018-11-27 PROCEDURE — 77010033678 HC OXYGEN DAILY

## 2018-11-27 PROCEDURE — 80048 BASIC METABOLIC PNL TOTAL CA: CPT

## 2018-11-27 PROCEDURE — 65660000000 HC RM CCU STEPDOWN

## 2018-11-27 PROCEDURE — 76450000000

## 2018-11-27 PROCEDURE — 74011250637 HC RX REV CODE- 250/637: Performed by: NURSE PRACTITIONER

## 2018-11-27 PROCEDURE — 85027 COMPLETE CBC AUTOMATED: CPT

## 2018-11-27 PROCEDURE — 74011250637 HC RX REV CODE- 250/637: Performed by: INTERNAL MEDICINE

## 2018-11-27 PROCEDURE — 97116 GAIT TRAINING THERAPY: CPT

## 2018-11-27 PROCEDURE — 74011250637 HC RX REV CODE- 250/637: Performed by: FAMILY MEDICINE

## 2018-11-27 PROCEDURE — 74011000250 HC RX REV CODE- 250: Performed by: INTERNAL MEDICINE

## 2018-11-27 PROCEDURE — 97162 PT EVAL MOD COMPLEX 30 MIN: CPT

## 2018-11-27 PROCEDURE — 93005 ELECTROCARDIOGRAM TRACING: CPT | Performed by: INTERNAL MEDICINE

## 2018-11-27 PROCEDURE — 94640 AIRWAY INHALATION TREATMENT: CPT

## 2018-11-27 PROCEDURE — 74011250636 HC RX REV CODE- 250/636: Performed by: NURSE PRACTITIONER

## 2018-11-27 PROCEDURE — 94760 N-INVAS EAR/PLS OXIMETRY 1: CPT

## 2018-11-27 RX ORDER — PANTOPRAZOLE SODIUM 40 MG/1
40 TABLET, DELAYED RELEASE ORAL
Status: DISCONTINUED | OUTPATIENT
Start: 2018-11-27 | End: 2018-12-03 | Stop reason: HOSPADM

## 2018-11-27 RX ORDER — SODIUM CHLORIDE 0.9 % (FLUSH) 0.9 %
5-10 SYRINGE (ML) INJECTION AS NEEDED
Status: DISCONTINUED | OUTPATIENT
Start: 2018-11-27 | End: 2018-12-03 | Stop reason: HOSPADM

## 2018-11-27 RX ORDER — LEVETIRACETAM 500 MG/1
500 TABLET ORAL 2 TIMES DAILY
Status: DISCONTINUED | OUTPATIENT
Start: 2018-11-27 | End: 2018-12-03 | Stop reason: HOSPADM

## 2018-11-27 RX ORDER — HEPARIN SODIUM 5000 [USP'U]/ML
5000 INJECTION, SOLUTION INTRAVENOUS; SUBCUTANEOUS EVERY 8 HOURS
Status: DISCONTINUED | OUTPATIENT
Start: 2018-11-27 | End: 2018-11-27

## 2018-11-27 RX ORDER — GUAIFENESIN 100 MG/5ML
81 LIQUID (ML) ORAL DAILY
Status: DISCONTINUED | OUTPATIENT
Start: 2018-11-27 | End: 2018-12-03 | Stop reason: HOSPADM

## 2018-11-27 RX ORDER — CARVEDILOL 3.12 MG/1
3.12 TABLET ORAL 2 TIMES DAILY WITH MEALS
Status: DISCONTINUED | OUTPATIENT
Start: 2018-11-27 | End: 2018-11-30

## 2018-11-27 RX ORDER — ALLOPURINOL 100 MG/1
100 TABLET ORAL DAILY
Status: DISCONTINUED | OUTPATIENT
Start: 2018-11-27 | End: 2018-12-03 | Stop reason: HOSPADM

## 2018-11-27 RX ORDER — ONDANSETRON 2 MG/ML
4 INJECTION INTRAMUSCULAR; INTRAVENOUS
Status: DISCONTINUED | OUTPATIENT
Start: 2018-11-27 | End: 2018-12-03 | Stop reason: HOSPADM

## 2018-11-27 RX ORDER — SODIUM CHLORIDE 0.9 % (FLUSH) 0.9 %
5-10 SYRINGE (ML) INJECTION EVERY 8 HOURS
Status: DISCONTINUED | OUTPATIENT
Start: 2018-11-27 | End: 2018-12-03 | Stop reason: HOSPADM

## 2018-11-27 RX ORDER — ACETAMINOPHEN 325 MG/1
650 TABLET ORAL
Status: DISCONTINUED | OUTPATIENT
Start: 2018-11-27 | End: 2018-12-03 | Stop reason: HOSPADM

## 2018-11-27 RX ORDER — IPRATROPIUM BROMIDE AND ALBUTEROL SULFATE 2.5; .5 MG/3ML; MG/3ML
3 SOLUTION RESPIRATORY (INHALATION)
Status: DISCONTINUED | OUTPATIENT
Start: 2018-11-27 | End: 2018-12-03 | Stop reason: HOSPADM

## 2018-11-27 RX ORDER — ATORVASTATIN CALCIUM 10 MG/1
20 TABLET, FILM COATED ORAL
Status: DISCONTINUED | OUTPATIENT
Start: 2018-11-27 | End: 2018-12-03 | Stop reason: HOSPADM

## 2018-11-27 RX ORDER — FUROSEMIDE 10 MG/ML
40 INJECTION INTRAMUSCULAR; INTRAVENOUS 2 TIMES DAILY
Status: DISCONTINUED | OUTPATIENT
Start: 2018-11-27 | End: 2018-12-02

## 2018-11-27 RX ADMIN — IPRATROPIUM BROMIDE AND ALBUTEROL SULFATE 3 ML: .5; 3 SOLUTION RESPIRATORY (INHALATION) at 07:19

## 2018-11-27 RX ADMIN — IPRATROPIUM BROMIDE AND ALBUTEROL SULFATE 3 ML: .5; 3 SOLUTION RESPIRATORY (INHALATION) at 19:27

## 2018-11-27 RX ADMIN — FUROSEMIDE 40 MG: 10 INJECTION, SOLUTION INTRAMUSCULAR; INTRAVENOUS at 17:14

## 2018-11-27 RX ADMIN — LEVETIRACETAM 500 MG: 500 TABLET ORAL at 17:14

## 2018-11-27 RX ADMIN — APIXABAN 5 MG: 5 TABLET, FILM COATED ORAL at 10:47

## 2018-11-27 RX ADMIN — Medication 10 ML: at 02:08

## 2018-11-27 RX ADMIN — ATORVASTATIN CALCIUM 20 MG: 10 TABLET, FILM COATED ORAL at 01:29

## 2018-11-27 RX ADMIN — Medication 10 ML: at 05:36

## 2018-11-27 RX ADMIN — Medication 10 ML: at 21:24

## 2018-11-27 RX ADMIN — FUROSEMIDE 40 MG: 10 INJECTION, SOLUTION INTRAMUSCULAR; INTRAVENOUS at 09:45

## 2018-11-27 RX ADMIN — ALLOPURINOL 100 MG: 100 TABLET ORAL at 09:44

## 2018-11-27 RX ADMIN — CARVEDILOL 3.12 MG: 3.12 TABLET, FILM COATED ORAL at 17:14

## 2018-11-27 RX ADMIN — LEVETIRACETAM 500 MG: 500 TABLET ORAL at 09:45

## 2018-11-27 RX ADMIN — Medication 10 ML: at 17:15

## 2018-11-27 RX ADMIN — ASPIRIN 81 MG 81 MG: 81 TABLET ORAL at 09:44

## 2018-11-27 RX ADMIN — GUAIFENESIN 600 MG: 600 TABLET, EXTENDED RELEASE ORAL at 09:44

## 2018-11-27 RX ADMIN — BUDESONIDE 500 MCG: 0.5 INHALANT RESPIRATORY (INHALATION) at 19:27

## 2018-11-27 RX ADMIN — IPRATROPIUM BROMIDE AND ALBUTEROL SULFATE 3 ML: .5; 3 SOLUTION RESPIRATORY (INHALATION) at 01:52

## 2018-11-27 RX ADMIN — CARVEDILOL 3.12 MG: 3.12 TABLET, FILM COATED ORAL at 09:45

## 2018-11-27 RX ADMIN — PANTOPRAZOLE SODIUM 40 MG: 40 TABLET, DELAYED RELEASE ORAL at 05:37

## 2018-11-27 RX ADMIN — ATORVASTATIN CALCIUM 20 MG: 10 TABLET, FILM COATED ORAL at 21:20

## 2018-11-27 RX ADMIN — AZITHROMYCIN 500 MG: 250 TABLET, FILM COATED ORAL at 09:43

## 2018-11-27 RX ADMIN — HEPARIN SODIUM 5000 UNITS: 5000 INJECTION INTRAVENOUS; SUBCUTANEOUS at 02:07

## 2018-11-27 RX ADMIN — IPRATROPIUM BROMIDE AND ALBUTEROL SULFATE 3 ML: .5; 3 SOLUTION RESPIRATORY (INHALATION) at 14:00

## 2018-11-27 RX ADMIN — GUAIFENESIN 600 MG: 600 TABLET, EXTENDED RELEASE ORAL at 21:20

## 2018-11-27 RX ADMIN — APIXABAN 5 MG: 5 TABLET, FILM COATED ORAL at 21:20

## 2018-11-27 RX ADMIN — BUDESONIDE 500 MCG: 0.5 INHALANT RESPIRATORY (INHALATION) at 07:19

## 2018-11-27 NOTE — ED NOTES
Minimal output with lasix, appx 150ml. Patient remains largely non-dyspneic on NC, awaiting admission.  Snack provided at patient request.

## 2018-11-27 NOTE — PROGRESS NOTES
Patient is well known to Case Management due to multiple admissions and readmissions over the past year. He lives at home with his wife and is independent in all ADLs at baseline. Patient has home O2 through [employer] ([facility] Patient has been to short-term rehab once after his multiple admissions. He has had issues with ability to afford his inhalers. Patient is current with [facility] and wishes to continue their services at discharge. Case Management will continue to follow for discharge planning needs. Care Management Interventions PCP Verified by CM: Yes Transition of Care Consult (CM Consult): Discharge Planning, Home Health [address]: No 
Reason Outside [place]: Patient already serviced by other home care/hospice agency Discharge Durable Medical Equipment: No 
Physical Therapy Consult: Yes Occupational Therapy Consult: Yes Speech Therapy Consult: No 
Current Support Network: Lives with Spouse, Own Home Confirm Follow Up Transport: Family Plan discussed with Pt/Family/Caregiver: Yes Freedom of Choice Offered: Yes Discharge Location Discharge Placement: Home with home health

## 2018-11-27 NOTE — PROGRESS NOTES
Hospitalist Progress Note Admit Date:  2018  3:15 PM  
Name:  Gary Kim. Age:  74 y.o. 
:  1945 MRN:  [id number] PCP:  Brian Poon MD 
Treatment Team: Attending Provider: Regis Marquez DO; Consulting Provider: Edmar Luong MD; Care Manager: Asa Armstrong RN; Physician: Danny Felton MD 
 
Subjective:  
Patient 5W with pmhx of CHF EF 35%, COPD wears 4lnc at home, HTN, HLP presents with 3-4 days of increasing dyspnea, productive cough, leg edema and orthopnea. Denies fever, chills, nausea, vomiting, change in urine or stool, chest pain. No sick contacts. ED workup notable for , Cr 1.85, ECG with afib (new onset), cxr with pulm vascular congestion. Patient admits to poor compliance with both oxygen and medications. 18 Says sob better On chronic oxygen 4 lit/min 
afib Objective:  
 
Patient Vitals for the past 24 hrs: 
 Temp Pulse Resp BP SpO2  
18 1526 98.2 °F (36.8 °C) 93 18 92/53 95 % 18 1401     99 % 18 1127 97.8 °F (36.6 °C) 85 18 108/72 97 % 18 0743 98.1 °F (36.7 °C) 92 18 112/67 100 % 18 0719     99 % 18 0301 98 °F (36.7 °C) 83 20 124/81 99 % 18 0152     97 % 18 2228  89     
18 2123 98 °F (36.7 °C) 84 20 126/84 97 % 18 1921  93 20 (!) 130/94 96 % Oxygen Therapy O2 Sat (%): 95 % (18 1526) Pulse via Oximetry: 83 beats per minute (18 1401) O2 Device: Nasal cannula;Humidifier (18 1401) O2 Flow Rate (L/min): 4 l/min (18 1401) Intake/Output Summary (Last 24 hours) at 2018 1747 Last data filed at 2018 1729 Gross per 24 hour Intake 480 ml Output 700 ml Net -220 ml General:    Well nourished. Alert. On oxygen 
heent- normal 
CV:   Irregular irregular No murmur, rub, or gallop. Lungs:   Basilar crepitations Abdomen:   Soft, nontender, nondistended. Cns- no focal neurological deficits Extremities: Warm and dry. No cyanosis or edema. Skin:     No rashes or jaundice. Data Review: 
I have reviewed all labs, meds, telemetry events, and studies from the last 24 hours. Recent Results (from the past 24 hour(s)) METABOLIC PANEL, BASIC Collection Time: 11/27/18  6:57 AM  
Result Value Ref Range Sodium 139 136 - 145 mmol/L Potassium 3.9 3.5 - 5.1 mmol/L Chloride 103 98 - 107 mmol/L  
 CO2 26 21 - 32 mmol/L Anion gap 10 7 - 16 mmol/L Glucose 147 (H) 65 - 100 mg/dL BUN 31 (H) 8 - 23 MG/DL Creatinine 1.95 (H) 0.8 - 1.5 MG/DL  
 GFR est AA 43 (L) >60 ml/min/1.73m2 GFR est non-AA 36 (L) >60 ml/min/1.73m2 Calcium 8.7 8.3 - 10.4 MG/DL  
BNP Collection Time: 11/27/18  6:57 AM  
Result Value Ref Range BNP 1,335 pg/mL CBC W/O DIFF Collection Time: 11/27/18  6:57 AM  
Result Value Ref Range WBC 7.6 4.3 - 11.1 K/uL  
 RBC 5.11 4.23 - 5.6 M/uL  
 HGB 14.2 13.6 - 17.2 g/dL HCT 44.6 41.1 - 50.3 % MCV 87.3 79.6 - 97.8 FL  
 MCH 27.8 26.1 - 32.9 PG  
 MCHC 31.8 31.4 - 35.0 g/dL  
 RDW 15.7 % PLATELET 558 659 - 335 K/uL MPV 11.7 9.4 - 12.3 FL ABSOLUTE NRBC 0.00 0.0 - 0.2 K/uL EKG, 12 LEAD, SUBSEQUENT Collection Time: 11/27/18  3:19 PM  
Result Value Ref Range Ventricular Rate 84 BPM  
 Atrial Rate 85 BPM  
 QRS Duration 116 ms  
 Q-T Interval 436 ms  
 QTC Calculation (Bezet) 515 ms Calculated R Axis -52 degrees Calculated T Axis 82 degrees Diagnosis Atrial fibrillation with premature ventricular or aberrantly conducted  
complexes Left axis deviation Incomplete left bundle branch block Prolonged QT Abnormal ECG When compared with ECG of 26-NOV-2018 16:01, 
Criteria for Inferior infarct are no longer Present Confirmed by CEBE  MD (), Francois Bliss ([id number]) on 11/27/2018 4:00:56 PM 
  
  
 
All Micro Results None Current Meds: 
Current Facility-Administered Medications Medication Dose Route Frequency  albuterol-ipratropium (DUO-NEB) 2.5 MG-0.5 MG/3 ML  3 mL Nebulization Q6H RT  
 allopurinol (ZYLOPRIM) tablet 100 mg  100 mg Oral DAILY  aspirin chewable tablet 81 mg  81 mg Oral DAILY  atorvastatin (LIPITOR) tablet 20 mg  20 mg Oral QHS  carvedilol (COREG) tablet 3.125 mg  3.125 mg Oral BID WITH MEALS  pantoprazole (PROTONIX) tablet 40 mg  40 mg Oral ACB  sodium chloride (NS) flush 5-10 mL  5-10 mL IntraVENous Q8H  
 sodium chloride (NS) flush 5-10 mL  5-10 mL IntraVENous PRN  
 acetaminophen (TYLENOL) tablet 650 mg  650 mg Oral Q4H PRN  
 ondansetron (ZOFRAN) injection 4 mg  4 mg IntraVENous Q4H PRN  
 levETIRAcetam (KEPPRA) tablet 500 mg  500 mg Oral BID  furosemide (LASIX) injection 40 mg  40 mg IntraVENous BID  
 apixaban (ELIQUIS) tablet 5 mg  5 mg Oral Q12H  
 albuterol (PROVENTIL VENTOLIN) nebulizer solution 2.5 mg  2.5 mg Nebulization Q4H PRN  
 budesonide (PULMICORT) 500 mcg/2 ml nebulizer suspension  500 mcg Nebulization BID RT  
 guaiFENesin ER (MUCINEX) tablet 600 mg  600 mg Oral Q12H Other Studies (last 24 hours): No results found. Assessment and Plan:  
 
Hospital Problems as of 11/27/2018 Date Reviewed: 6/6/2018 Codes Class Noted - Resolved POA Atrial fibrillation [facility]) ICD-10-CM: I48.91 
ICD-9-CM: 427.31  11/27/2018 - Present Unknown * (Principal) Acute on chronic systolic (congestive) heart failure (HCC) ICD-10-CM: I05.16 ICD-9-CM: 428.23, 428.0  11/26/2018 - Present Unknown COPD exacerbation ([facility] ICD-10-CM: J44.1 ICD-9-CM: 491.21  11/26/2018 - Present Unknown Seizure disorder ([facility] ICD-10-CM: G40.909 ICD-9-CM: 345.90  11/14/2018 - Present Yes Stage 2 chronic kidney disease ICD-10-CM: N18.2 ICD-9-CM: 585.2  10/17/2018 - Present Type 2 diabetes with nephropathy ([facility] ICD-10-CM: E11.21 
ICD-9-CM: 250.40, 583.81  10/8/2018 - Present Yes CKD (chronic kidney disease) stage 3, GFR 30-59 ml/min (HCC) (Chronic) ICD-10-CM: N18.3 ICD-9-CM: 585.3  9/29/2017 - Present Yes Hypertension (Chronic) ICD-10-CM: I10 
ICD-9-CM: 401.9  9/29/2017 - Present Yes COPD, moderate ([address] (Chronic) ICD-10-CM: J44.9 ICD-9-CM: 095  9/29/2017 - Present Yes Overview Signed 12/27/2015 12:38 PM by Aleyda Mullen NP Complete PFTs: 7/20/15: 
Spirometry is consistent with a moderate obstructive/restrictive defect. . The residual volume is increased relative to other lung volumes suggesting air-trapping. The diffusion capacity corrected for alveolar volume was normal suggesting no loss of alveolo-capillary units. Mixed hyperlipidemia (Chronic) ICD-10-CM: F90.8 ICD-9-CM: 272.2  9/28/2016 - Present Yes PLAN:   
Acute on chf exa- cont lasix New afib- on eliquis- cardiology following Chronic hypoxia- on 4 lit/min nasal cannula 
htn Copd DM type 2 
ckd 3 Prolonged qt - d/c zithromax. DC planning/Dispo: DVT ppx:  eliquis Signed: 
Kandace Lesch, MD

## 2018-11-27 NOTE — ED NOTES
TRANSFER - OUT REPORT: 
 
Verbal report given to Alli Fair RN on [facility].  being transferred to [facility] [id number] for routine progression of care Report consisted of patients Situation, Background, Assessment and  
Recommendations(SBAR). Information from the following report(s) SBAR, ED Summary, [facility] and Recent Results was reviewed with the receiving nurse. Lines:  
Peripheral IV 11/26/18 Left Antecubital (Active) Site Assessment Clean, dry, & intact 11/26/2018  3:39 PM  
Phlebitis Assessment 0 11/26/2018  3:39 PM  
Infiltration Assessment 0 11/26/2018  3:39 PM  
Dressing Status Clean, dry, & intact 11/26/2018  3:39 PM  
Dressing Type Transparent 11/26/2018  3:39 PM  
Hub Color/Line Status Green 11/26/2018  3:39 PM  
  
 
Opportunity for questions and clarification was provided. Patient transported with: 
 [employer]

## 2018-11-27 NOTE — PROGRESS NOTES
Beside shift report completed with oncoming nurse, Catarino Jacobs. Patient resting quietly in bed at this time, eyes closed, respirations present. Wife remains at bedside. Call light within reach.

## 2018-11-27 NOTE — PROGRESS NOTES
TRANSFER - IN REPORT: 
 
Verbal report received from Paul Anne RN on [facility].  being received from Emergency Department for routine progression of care Report consisted of patients Situation, Background, Assessment and  
Recommendations(SBAR). Information from the following report(s) SBAR, Kardex, Intake/Output, MAR, Recent Results and Cardiac Rhythm A fib was reviewed with the receiving nurse. Opportunity for questions and clarification was provided. Assessment will be completed upon patients arrival to unit and care assumed.

## 2018-11-27 NOTE — PROGRESS NOTES
Problem: Interdisciplinary Rounds Goal: Interdisciplinary Rounds Interdisciplinary team rounds were held 11/27/2018 with the following team members:Care Management, Physical Therapy, Physician and Clinical Coordinator and the patient. Plan of care discussed. See clinical pathway and/or care plan for interventions and desired outcomes.

## 2018-11-27 NOTE — ROUTINE PROCESS
Spoke with patient and family in regards to admission and POC moving forward. Patient endorsed sob, orthopnea, prod cough prior to admit. Patient endorsed medication comnpliance of about 80% and we discussed improatnce of medciation compliance to avoid rehospitalization and progression of disease. Patient does not weigh daily and encouraged to do so. Discussed s/s recognition and early reporting to cardiology for improved management of disease. Patient sees Dr Yareli Cunha and will need follow up appointment post d/c 
 
CHF teaching started post introduction to pt/family; aware of diagnosis. Planner/scale @ BS and will follow. Smoking/ ETOH/Illicit drug use cessation and maintain a healthy weight covered. Pt/family aware that I can not prescribe nor adjust  medications: 15mins Palliative Care score: 52 entered ACP on file Start 2L/D Fluid restriction/ cardiac diet CHF teaching continues to pt/family. Emphasis on taking prescription meds as ordered, to keep F/U appts and to call MD STAT if any of the following occur: ? If you gain 2 lbs in one day or 5 lbs in a week, and short of breath. ? If you can not lay flat without developing short of breath or rapid breathing at night; or if it wakes you up. Develop a cough or wheezing. ? If you notice swollen hands/feet/ankles or stomach with a bloated/ full feeling. ? If you become confused or mentally fuzzy or dizzy. ? If you notice a rapid or change in your heart rate. ? If you become more exhausted all the time and unable to do the same level of activity without stopping to catch your breath. Drink no more than 8 cups a day in 8 oz. cups. Your Heart can not handle any more. Stay away from salt (limit anything with salt or sodium in it). Limit to 250mg per serving. Pt/family verbalizes understanding, will follow to reinforce teaching skills: 30 mins

## 2018-11-27 NOTE — H&P
HOSPITALIST H&P/CONSULTNAME:  Yee Francisco. Age:  74 y.o. 
:   1945 MRN:   [id number] PCP: Esteban Dixon MD 
Consulting MD: Treatment Team: Attending Provider: Vessie Scheuermann, MD; Primary Nurse: Kane Lenz, RN; Primary Nurse: Natasha Hughes, RN 
HPI:  
Patient 31E with pmhx of CHF EF 35%, COPD wears 4lnc at home, HTN, HLP presents with 3-4 days of increasing dyspnea, productive cough, leg edema and orthopnea. Denies fever, chills, nausea, vomiting, change in urine or stool, chest pain. No sick contacts. ED workup notable for , Cr 1.85, ECG with afib (new onset), cxr with pulm vascular congestion. Patient admits to poor compliance with both oxygen and medications. Hospitalist asked to admit for COPD/CHFe Complete ROS done and is as stated in HPI or otherwise negative Past Medical History:  
Diagnosis Date  Acute CHF (congestive heart failure) ([address] 2015  Acute combined systolic and diastolic congestive heart failure ([address] 2015  Chronic obstructive pulmonary disease ([address]  De Quervain's tenosynovitis, right 2015  Dyspnea on exertion 2015  Elevated serum creatinine 2015  Elevated troponin 2015  History of coronary artery disease MI at 25 yo  
 History of right knee surgery   
 cartilage removal  
 History of shingles  Malignant hypertension 2015  MI (myocardial infarction) ([facility] Past Surgical History:  
Procedure Laterality Date  HX HEART CATHETERIZATION  2015  
 no intervention  HX KNEE ARTHROSCOPY Right   
 removal of cartilage Prior to Admission Medications Prescriptions Last Dose Informant Patient Reported? Taking? albuterol (VENTOLIN HFA) 90 mcg/actuation inhaler   No No  
Sig: Take 2 Puffs by inhalation four (4) times daily.   
albuterol-ipratropium (DUO-NEB) 2.5 mg-0.5 mg/3 ml nebu   No No  
 Sig: 3 mL by Nebulization route four (4) times daily. Diaignosis--J44.9  
allopurinol (ZYLOPRIM) 100 mg tablet   No No  
Sig: TAKE 1 TABLET BY MOUTH EVERY DAY  
aspirin 81 mg chewable tablet   No No  
Sig: Take 1 Tab by mouth daily. atorvastatin (LIPITOR) 20 mg tablet   No No  
Sig: Take 1 Tab by mouth nightly. carvedilol (COREG) 3.125 mg tablet   No No  
Sig: Take 1 Tab by mouth two (2) times daily (with meals). fluticasone (FLONASE) 50 mcg/actuation nasal spray   No No  
Si Sprays by Both Nostrils route daily. levETIRAcetam (KEPPRA) 500 mg tablet   No No  
Sig: Take 1 Tab by mouth two (2) times a day. melatonin 3 mg tablet   Yes No  
Sig: Take 3 mg by mouth nightly. omeprazole (PRILOSEC) 20 mg capsule   No No  
Sig: Take 1 Cap by mouth daily. polyethylene glycol (MIRALAX) 17 gram packet   No No  
Sig: Take 1 Packet by mouth daily. Patient taking differently: Take 17 g by mouth daily as needed. torsemide (DEMADEX) 20 mg tablet   No No  
Sig: Take 1 Tab by mouth daily. Facility-Administered Medications: None Allergies Allergen Reactions  Pcn [Penicillins] Other (comments) \"makes my heart stop\" Social History Tobacco Use  Smoking status: Former Smoker Packs/day: 0.25 Years: 20.00 Pack years: 5.00 Types: Cigarettes Last attempt to quit: 2015 Years since quitting: 3.6  Smokeless tobacco: Never Used Substance Use Topics  Alcohol use: No  
  Alcohol/week: 0.0 oz Family History Problem Relation Age of Onset  Hypertension Mother  No Known Problems Father Objective:  
 
Visit Vitals /72 Pulse 90 Temp 97.4 °F (36.3 °C) Resp 22 Wt 143.8 kg (317 lb) SpO2 91% BMI 46.81 kg/m² Temp (24hrs), Av.4 °F (36.3 °C), Min:97.4 °F (36.3 °C), Max:97.4 °F (36.3 °C) Oxygen Therapy O2 Sat (%): 91 % (18) Pulse via Oximetry: 81 beats per minute (18) O2 Device: Nasal cannula (11/26/18 1719) O2 Flow Rate (L/min): 4 l/min (11/26/18 1719) Physical Exam: 
General:    Alert, cooperative, no distress, appears stated age. Head:   Normocephalic, without obvious abnormality, atraumatic. Nose:  Nares normal. No drainage or sinus tenderness. Lungs:   Bibasilar rales, mild exp wheezing Heart:   Regular rate and rhythm,  no murmur, rub or gallop. Abdomen:   Soft, non-tender. Not distended. Bowel sounds normal.  
Extremities: No cyanosis. No edema. +2 pitting edema b/l LE Skin:     Texture, turgor normal. No rashes or lesions. Not Jaundiced Neurologic: Alert and oriented x 3, no focal deficits Data Review:  
Recent Results (from the past 24 hour(s)) CBC WITH AUTOMATED DIFF Collection Time: 11/26/18  3:37 PM  
Result Value Ref Range WBC 6.4 4.3 - 11.1 K/uL  
 RBC 5.37 4.23 - 5.6 M/uL  
 HGB 14.9 13.6 - 17.2 g/dL HCT 47.0 41.1 - 50.3 % MCV 87.5 79.6 - 97.8 FL  
 MCH 27.7 26.1 - 32.9 PG  
 MCHC 31.7 31.4 - 35.0 g/dL  
 RDW 16.0 % PLATELET 111 886 - 878 K/uL MPV 11.6 9.4 - 12.3 FL ABSOLUTE NRBC 0.00 0.0 - 0.2 K/uL  
 DF AUTOMATED NEUTROPHILS 70 43 - 78 % LYMPHOCYTES 23 13 - 44 % MONOCYTES 5 4.0 - 12.0 % EOSINOPHILS 1 0.5 - 7.8 % BASOPHILS 1 0.0 - 2.0 % IMMATURE GRANULOCYTES 1 0.0 - 5.0 %  
 ABS. NEUTROPHILS 4.5 1.7 - 8.2 K/UL  
 ABS. LYMPHOCYTES 1.5 0.5 - 4.6 K/UL  
 ABS. MONOCYTES 0.3 0.1 - 1.3 K/UL  
 ABS. EOSINOPHILS 0.0 0.0 - 0.8 K/UL  
 ABS. BASOPHILS 0.0 0.0 - 0.2 K/UL  
 ABS. IMM. GRANS. 0.0 0.0 - 0.5 K/UL METABOLIC PANEL, COMPREHENSIVE Collection Time: 11/26/18  3:37 PM  
Result Value Ref Range Sodium 140 136 - 145 mmol/L Potassium 4.1 3.5 - 5.1 mmol/L Chloride 103 98 - 107 mmol/L  
 CO2 27 21 - 32 mmol/L Anion gap 10 7 - 16 mmol/L Glucose 79 65 - 100 mg/dL BUN 26 (H) 8 - 23 MG/DL Creatinine 1.85 (H) 0.8 - 1.5 MG/DL  
 GFR est AA 46 (L) >60 ml/min/1.73m2 GFR est non-AA 38 (L) >60 ml/min/1.73m2 Calcium 8.9 8.3 - 10.4 MG/DL Bilirubin, total 2.2 (H) 0.2 - 1.1 MG/DL  
 ALT (SGPT) 24 12 - 65 U/L  
 AST (SGOT) 34 15 - 37 U/L Alk. phosphatase 122 50 - 136 U/L Protein, total 7.7 6.3 - 8.2 g/dL Albumin 3.3 3.2 - 4.6 g/dL Globulin 4.4 (H) 2.3 - 3.5 g/dL A-G Ratio 0.8 (L) 1.2 - 3.5 BNP Collection Time: 11/26/18  3:37 PM  
Result Value Ref Range  pg/mL EKG, 12 LEAD, INITIAL Collection Time: 11/26/18  4:01 PM  
Result Value Ref Range Ventricular Rate 81 BPM  
 Atrial Rate 441 BPM  
 QRS Duration 120 ms  
 Q-T Interval 470 ms QTC Calculation (Bezet) 545 ms Calculated R Axis -76 degrees Calculated T Axis 68 degrees Diagnosis Atrial fibrillation Left axis deviation Inferior infarct (cited on or before 04-MAY-2018) Anterior infarct (cited on or before 17-MAY-2018) Abnormal ECG When compared with ECG of 02-NOV-2018 12:16, 
Atrial fibrillation has replaced Sinus rhythm Imaging Elida Olivas Desean Final [99] 11/26/2018 14:19 11/26/2018 14:24 Study Result Chest 2 view dated 11/26/2018 
  
Prior exam 11/2/2018 
  
CLINICAL INFORMATION: Shortness of breath, increased for the last 3 days 
  
Heart is enlarged. Mediastinum unremarkable. Vascularity is congested with hazy 
infiltrates in the lower lungs. No pleural effusion. 
  
IMPRESSION IMPRESSION: Cardiomegaly with pulmonary vascular congestion Assessment and Plan: Active Hospital Problems Diagnosis Date Noted  Acute on chronic systolic (congestive) heart failure ([address] 11/26/2018  COPD exacerbation ([address] 11/26/2018  Seizure disorder ([address] 11/14/2018  Stage 2 chronic kidney disease 10/17/2018  Type 2 diabetes with nephropathy ([address] 10/08/2018  Hypertension 09/29/2017  COPD, moderate ([address] 09/29/2017 Complete PFTs: 7/20/15: 
Spirometry is consistent with a moderate obstructive/restrictive defect. Buddy Blake The residual volume is increased relative to other lung volumes suggesting air-trapping. The diffusion capacity corrected for alveolar volume was normal suggesting no loss of alveolo-capillary units.  Mixed hyperlipidemia 09/28/2016 A/P 
- Ac/Chronic systolic CHF exacerbation - EF 35%, Lasix IV,  Cardiology consult. - Afib - new diagnosis. Monitor on tele. Defer [address] [place] [address] Road to cardiology 
- COPDe - schedule duonebs, add pulmicort. Add mucinex and azithromycin. Consider pulm consult if not improving. Wears 4l NC at home 
- CKD  - close to baseline, monitor with diuresis. - hlp - statin 
- seizures d/o - continue keppra Code Status: full code Anticipated discharge: 3-4 days Signed By: Alana Sauer DO November 26, 2018

## 2018-11-27 NOTE — PROGRESS NOTES
Spiritual Care Visit, initial visit. Visited with patient at bedside. Prayed for patient's healing and health. Visit by Kapil Lemos, Staff .  Mia., Aurora.B., B.A.

## 2018-11-27 NOTE — PROGRESS NOTES
Patient arrived to floor in wheelchair via patient transport. Family at bedside. Oriented to unit and call light system. Verbalized understanding. Call light within reach.

## 2018-11-27 NOTE — PROGRESS NOTES
Patient alert and oriented throughout this shift. Respirations even and unlabored on 4L O2 via nasal cannula. Patient up to bathroom with x1 staff assist, steady gait, tolerated fairly well. No acute events overnight. Patient resting quietly in bed at this time, eyes closed, respirations present. Bed low and locked. Bedside table, personal belongings and call light within reach. Wife remains at bedside.

## 2018-11-27 NOTE — PROGRESS NOTES
Problem: Mobility Impaired (Adult and Pediatric) Goal: *Acute Goals and Plan of Care (Insert Text) STG: 
(1.)Mr. Ruthann Pope will move from supine to sit and sit to supine  with MODIFIED INDEPENDENCE within 5 treatment day(s). (2.)Mr. Ruthann Pope will transfer from bed to chair and chair to bed with SUPERVISION using the least restrictive device within 5 treatment day(s). (3.)Mr. Ruthann Pope will ambulate with SUPERVISION for 150 feet with the least restrictive device within 5 treatment day(s). LTG: 
(1.)Mr. Love ascend/descend 4 steps w/ rails per home environment w/ CGA within 10 treatment day(s). (2.)Mr. Ruthann Pope will transfer from bed to chair and chair to bed with MODIFIED INDEPENDENCE using the least restrictive device within 10 treatment day(s). (3.)Mr. Ruthann Pope will ambulate with MODIFIED INDEPENDENCE for 200 feet with the least restrictive device within 10 treatment day(s). ________________________________________________________________________________________________ PHYSICAL THERAPY: Initial Assessment 11/27/2018INPATIENT: Hospital Day: 2 Payor: Jennifer Galloway / Plan: [address] [place] [address] HMO / Product Type: [employer] Medicare /  
  
NAME/AGE/GENDER: Mckayla Rose is a 74 y.o. male PRIMARY DIAGNOSIS: Acute on chronic systolic (congestive) heart failure (HCC) Acute on chronic systolic (congestive) heart failure (HCC) Acute on chronic systolic (congestive) heart failure ([facility] [address] ICD-10: Treatment Diagnosis:  
 · Generalized Muscle Weakness (M62.81) · Other abnormalities of gait and mobility (R26.89) Precaution/Allergies: 
Pcn [penicillins] ASSESSMENT:  
Mr. Ruthann Pope is a 73 y/o male adm. W/ CHF &  exacerbation, referred to PT to [facility] for post d/c needs - HH vs rehab\". Pt. Is currently CGA for mobility secondary to decreased strength, endurance, & balance.   Pt. Is mobilizing below his baseline as he is typically able to ambulate short community distances w/ SPC independently. Therefore, pt. Cont. To benefit from PT services to address. Pt. Is mobilizing w/ much more ease than on admission, and anticipate that with cont. Progress, he will be able to d/c home w/ wife & HHPT. This section established at most recent assessment PROBLEM LIST (Impairments causing functional limitations): 1. Decreased Strength 2. Decreased ADL/Functional Activities 3. Decreased Transfer Abilities 4. Decreased Ambulation Ability/Technique 5. Decreased Balance 6. Decreased Activity Tolerance 7. Decreased Pacing Skills 8. Decreased Work Simplification/Energy Conservation Techniques 9. Increased Shortness of Breath INTERVENTIONS PLANNED: (Benefits and precautions of physical therapy have been discussed with the patient.) 1. Balance Exercise 2. Bed Mobility 3. Gait Training 4. Therapeutic Activites 5. Therapeutic Exercise/Strengthening 6. Transfer Training TREATMENT PLAN: Frequency/Duration: 4 times a week for duration of hospital stay Rehabilitation Potential For Stated Goals: Good RECOMMENDED REHABILITATION/EQUIPMENT: (at time of discharge pending progress): Due to the probability of continued deficits (see above) this patient will likely need continued skilled physical therapy after discharge. Equipment: ? TBD HISTORY:  
History of Present Injury/Illness (Reason for Referral): PT. Adm. W/ 3-4 day h/o increased dyspnea, cough, LE edema, & orthopnea. Workup revealed COPD & CHF exacerbation.  
Past Medical History/Comorbidities:  
Mr. Caroline Lai  has a past medical history of Acute CHF (congestive heart failure) ([facility] [address] Acute combined systolic and diastolic congestive heart failure ([address] Chronic obstructive pulmonary disease ([facility] [address] De Quervain's tenosynovitis, right, Dyspnea on exertion, Elevated serum creatinine, Elevated troponin, History of coronary artery disease, History of right knee surgery, History of shingles, Malignant hypertension, and MI (myocardial infarction) ([facility] [address] Mr. Ree Opitz  has a past surgical history that includes hx knee arthroscopy (Right); hx heart catheterization (6/29/2015); and ABDOMINAL FAT PAD BIOPSY (N/A, 5/29/2018). Social History/Living Environment:  
Home Environment: Private residence # Steps to Enter: 4 One/Two Story Residence: One story Living Alone: No 
Support Systems: Friends \ neighbors, Spouse/Significant Other/Partner Patient Expects to be Discharged to[de-identified] Private residence Current DME Used/Available at Home: Oxygen, portable, Cane, straight Prior Level of Function/Work/Activity: 
Pt. Reports he ambulates independently w/ SPC or RW, depending on the day & level of fatigue. Number of Personal Factors/Comorbidities that affect the Plan of Care: 3+: HIGH COMPLEXITY EXAMINATION:  
Most Recent Physical Functioning:  
Gross Assessment: 
AROM: Within functional limits PROM: Within functional limits Strength: Generally decreased, functional 
Coordination: Within functional limits Tone: Normal 
         
  
Posture: 
Posture (WDL): Exceptions to [facility] Posture Assessment: Trunk flexion, Rounded shoulders, Forward head Balance: 
Sitting: Intact Standing: Impaired Standing - Static: Fair Standing - Dynamic : Fair Bed Mobility: 
  
Wheelchair Mobility: 
  
Transfers: 
Sit to Stand: Contact guard assistance Stand to Sit: Supervision Gait: 
  
Base of Support: Center of gravity altered Speed/Lu: Pace decreased (<100 feet/min) Step Length: Right shortened;Left shortened Stance: Right increased; Left increased Gait Abnormalities: Altered arm swing;Decreased step clearance; Other(holds RW too far in front) Distance (ft): 100 Feet (ft) Assistive Device: Walker, rolling Ambulation - Level of Assistance: Contact guard assistance Body Structures Involved: 1. Heart 2. Lungs 3. Muscles Body Functions Affected: 1. Cardio 2. Respiratory 3. Neuromusculoskeletal 
4. Movement Related Activities and Participation Affected: 1. General Tasks and Demands 2. Mobility 3. Self Care 4. Community, Social and [place] Number of elements that affect the Plan of Care: 4+: HIGH COMPLEXITY CLINICAL PRESENTATION:  
Presentation: Evolving clinical presentation with changing clinical characteristics: MODERATE COMPLEXITY CLINICAL DECISION MAKING:  
[facility] AM-PAC 6 Clicks Basic Mobility Inpatient Short Form How much difficulty does the patient currently have. .. Unable A Lot A Little None 1. Turning over in bed (including adjusting bedclothes, sheets and blankets)? [] 1   [] 2   [] 3   [x] 4  
2. Sitting down on and standing up from a chair with arms ( e.g., wheelchair, bedside commode, etc.)   [] 1   [] 2   [x] 3   [] 4  
3. Moving from lying on back to sitting on the side of the bed? [] 1   [] 2   [] 3   [x] 4 How much help from another person does the patient currently need. .. Total A Lot A Little None 4. Moving to and from a bed to a chair (including a wheelchair)? [] 1   [] 2   [x] 3   [] 4  
5. Need to walk in hospital room? [] 1   [] 2   [x] 3   [] 4  
6. Climbing 3-5 steps with a railing? [] 1   [] 2   [x] 3   [] 4  
© 2007, Trustees of [facility], under license to [employer] All rights reserved Score:  Initial: 14 Most Recent: X (Date: -- ) Interpretation of Tool:  Represents activities that are increasingly more difficult (i.e. Bed mobility, Transfers, Gait). Score 24 23 22-20 19-15 14-10 9-7 6 Modifier CH CI CJ CK CL CM CN   
 
? Mobility - Walking and Moving Around:  
  - CURRENT STATUS: CL - 60%-79% impaired, limited or restricted  - GOAL STATUS: CI - 1%-19% impaired, limited or restricted  - D/C STATUS:  ---------------To be determined--------------- Payor: Steven Ochoa / Plan: [address] [place] [address] HMO / Product Type: Managed Care Medicare /   
 
 Medical Necessity:    
· Patient demonstrates good rehab potential due to higher previous functional level. Reason for Services/Other Comments: 
· Patient continues to demonstrate capacity to improve strength, balance, & endurance which will increase independence, decrease amount of assistance required from caregiver and increase safety. Use of outcome tool(s) and clinical judgement create a POC that gives a: Questionable prediction of patient's progress: MODERATE COMPLEXITY  
  
 
 
 
TREATMENT:  
(In addition to Assessment/Re-Assessment sessions the following treatments were rendered) Pre-treatment Symptoms/Complaints:  \"I always wondered why I could be short of breath, yet my oxygen be good. \" 
Pain: Initial:  
Pain Intensity 1: 0  Post Session:  No c/o pain Gait Training (  10 minutes):  Gait training to improve and/or restore physical functioning as related to mobility. Ambulated 100 Feet (ft) with Contact guard assistance using a Walker, rolling   Pt. Required VCs for safe mangement of RW as tends to keep too far anteriorly. Braces/Orthotics/Lines/Etc:  
· O2 Device: Nasal cannula, Humidifier Treatment/Session Assessment:   
· Response to Treatment:  HR , O2 98-98% 2 LPM NC O2 
· Interdisciplinary Collaboration:  
o Physical Therapist 
o Registered Nurse · After treatment position/precautions:  
o Bed/Chair-wheels locked 
o Bed in low position 
o Call light within reach 
o Family at bedside 
o pt. sitting on EOB · Compliance with Program/Exercises: Compliant all of the time · Recommendations/Intent for next treatment session: \"Next visit will focus on advancements to more challenging activities and reduction in assistance provided\". Total Treatment Duration: PT Patient Time In/Time Out Time In: 4387 Time Out: 1440 Dorothy Durbin, PT

## 2018-11-28 LAB
ANION GAP SERPL CALC-SCNC: 10 MMOL/L (ref 7–16)
APPEARANCE UR: CLEAR
ATRIAL RATE: 119 BPM
BASOPHILS # BLD: 0 K/UL (ref 0–0.2)
BASOPHILS NFR BLD: 0 % (ref 0–2)
BILIRUB UR QL: NEGATIVE
BUN SERPL-MCNC: 33 MG/DL (ref 8–23)
CALCIUM SERPL-MCNC: 8.6 MG/DL (ref 8.3–10.4)
CALCULATED R AXIS, ECG10: -69 DEGREES
CALCULATED T AXIS, ECG11: 99 DEGREES
CHLORIDE SERPL-SCNC: 107 MMOL/L (ref 98–107)
CO2 SERPL-SCNC: 23 MMOL/L (ref 21–32)
COLOR UR: YELLOW
CREAT SERPL-MCNC: 1.75 MG/DL (ref 0.8–1.5)
DIAGNOSIS, 93000: NORMAL
DIFFERENTIAL METHOD BLD: ABNORMAL
EOSINOPHIL # BLD: 0 K/UL (ref 0–0.8)
EOSINOPHIL NFR BLD: 0 % (ref 0.5–7.8)
ERYTHROCYTE [DISTWIDTH] IN BLOOD BY AUTOMATED COUNT: 15.5 %
GLUCOSE SERPL-MCNC: 122 MG/DL (ref 65–100)
GLUCOSE UR STRIP.AUTO-MCNC: NEGATIVE MG/DL
HCT VFR BLD AUTO: 42.3 % (ref 41.1–50.3)
HGB BLD-MCNC: 13.7 G/DL (ref 13.6–17.2)
HGB UR QL STRIP: NEGATIVE
IMM GRANULOCYTES # BLD: 0.1 K/UL (ref 0–0.5)
IMM GRANULOCYTES NFR BLD AUTO: 1 % (ref 0–5)
KETONES UR QL STRIP.AUTO: NEGATIVE MG/DL
LEUKOCYTE ESTERASE UR QL STRIP.AUTO: NEGATIVE
LYMPHOCYTES # BLD: 1.7 K/UL (ref 0.5–4.6)
LYMPHOCYTES NFR BLD: 10 % (ref 13–44)
MCH RBC QN AUTO: 27.7 PG (ref 26.1–32.9)
MCHC RBC AUTO-ENTMCNC: 32.4 G/DL (ref 31.4–35)
MCV RBC AUTO: 85.6 FL (ref 79.6–97.8)
MONOCYTES # BLD: 1 K/UL (ref 0.1–1.3)
MONOCYTES NFR BLD: 6 % (ref 4–12)
NEUTS SEG # BLD: 14.9 K/UL (ref 1.7–8.2)
NEUTS SEG NFR BLD: 84 % (ref 43–78)
NITRITE UR QL STRIP.AUTO: NEGATIVE
NRBC # BLD: 0 K/UL (ref 0–0.2)
PH UR STRIP: 7 [PH] (ref 5–9)
PLATELET # BLD AUTO: 168 K/UL (ref 150–450)
PMV BLD AUTO: 11.1 FL (ref 9.4–12.3)
POTASSIUM SERPL-SCNC: 3.9 MMOL/L (ref 3.5–5.1)
PROT UR STRIP-MCNC: NEGATIVE MG/DL
Q-T INTERVAL, ECG07: 376 MS
QRS DURATION, ECG06: 126 MS
QTC CALCULATION (BEZET), ECG08: 480 MS
RBC # BLD AUTO: 4.94 M/UL (ref 4.23–5.6)
SODIUM SERPL-SCNC: 140 MMOL/L (ref 136–145)
SP GR UR REFRACTOMETRY: 1.01 (ref 1–1.02)
UROBILINOGEN UR QL STRIP.AUTO: 1 EU/DL (ref 0.2–1)
VENTRICULAR RATE, ECG03: 98 BPM
WBC # BLD AUTO: 17.7 K/UL (ref 4.3–11.1)

## 2018-11-28 PROCEDURE — 94760 N-INVAS EAR/PLS OXIMETRY 1: CPT

## 2018-11-28 PROCEDURE — 36415 COLL VENOUS BLD VENIPUNCTURE: CPT

## 2018-11-28 PROCEDURE — 94640 AIRWAY INHALATION TREATMENT: CPT

## 2018-11-28 PROCEDURE — 93312 ECHO TRANSESOPHAGEAL: CPT

## 2018-11-28 PROCEDURE — 74011250637 HC RX REV CODE- 250/637: Performed by: FAMILY MEDICINE

## 2018-11-28 PROCEDURE — 99152 MOD SED SAME PHYS/QHP 5/>YRS: CPT

## 2018-11-28 PROCEDURE — 93005 ELECTROCARDIOGRAM TRACING: CPT | Performed by: INTERNAL MEDICINE

## 2018-11-28 PROCEDURE — 74011250636 HC RX REV CODE- 250/636: Performed by: INTERNAL MEDICINE

## 2018-11-28 PROCEDURE — 74011250636 HC RX REV CODE- 250/636

## 2018-11-28 PROCEDURE — 77010033678 HC OXYGEN DAILY

## 2018-11-28 PROCEDURE — 74011250637 HC RX REV CODE- 250/637: Performed by: NURSE PRACTITIONER

## 2018-11-28 PROCEDURE — 74011000250 HC RX REV CODE- 250: Performed by: INTERNAL MEDICINE

## 2018-11-28 PROCEDURE — 65660000000 HC RM CCU STEPDOWN

## 2018-11-28 PROCEDURE — 80048 BASIC METABOLIC PNL TOTAL CA: CPT

## 2018-11-28 PROCEDURE — 81003 URINALYSIS AUTO W/O SCOPE: CPT

## 2018-11-28 PROCEDURE — 74011250637 HC RX REV CODE- 250/637: Performed by: INTERNAL MEDICINE

## 2018-11-28 PROCEDURE — 74011250636 HC RX REV CODE- 250/636: Performed by: NURSE PRACTITIONER

## 2018-11-28 PROCEDURE — 85025 COMPLETE CBC W/AUTO DIFF WBC: CPT

## 2018-11-28 RX ORDER — DOXYCYCLINE 100 MG/1
100 CAPSULE ORAL EVERY 12 HOURS
Status: DISCONTINUED | OUTPATIENT
Start: 2018-11-28 | End: 2018-12-03 | Stop reason: HOSPADM

## 2018-11-28 RX ORDER — FENTANYL CITRATE 50 UG/ML
25-100 INJECTION, SOLUTION INTRAMUSCULAR; INTRAVENOUS
Status: DISCONTINUED | OUTPATIENT
Start: 2018-11-28 | End: 2018-11-30 | Stop reason: HOSPADM

## 2018-11-28 RX ORDER — MIDAZOLAM HYDROCHLORIDE 1 MG/ML
1-10 INJECTION, SOLUTION INTRAMUSCULAR; INTRAVENOUS
Status: DISCONTINUED | OUTPATIENT
Start: 2018-11-28 | End: 2018-11-30 | Stop reason: HOSPADM

## 2018-11-28 RX ADMIN — CARVEDILOL 3.12 MG: 3.12 TABLET, FILM COATED ORAL at 11:12

## 2018-11-28 RX ADMIN — APIXABAN 5 MG: 5 TABLET, FILM COATED ORAL at 21:16

## 2018-11-28 RX ADMIN — GUAIFENESIN 600 MG: 600 TABLET, EXTENDED RELEASE ORAL at 21:16

## 2018-11-28 RX ADMIN — MIDAZOLAM 1 MG: 1 INJECTION INTRAMUSCULAR; INTRAVENOUS at 08:52

## 2018-11-28 RX ADMIN — FUROSEMIDE 40 MG: 10 INJECTION, SOLUTION INTRAMUSCULAR; INTRAVENOUS at 11:12

## 2018-11-28 RX ADMIN — DOXYCYCLINE HYCLATE 100 MG: 100 CAPSULE ORAL at 21:16

## 2018-11-28 RX ADMIN — Medication 10 ML: at 05:53

## 2018-11-28 RX ADMIN — FUROSEMIDE 40 MG: 10 INJECTION, SOLUTION INTRAMUSCULAR; INTRAVENOUS at 17:00

## 2018-11-28 RX ADMIN — IPRATROPIUM BROMIDE AND ALBUTEROL SULFATE 3 ML: .5; 3 SOLUTION RESPIRATORY (INHALATION) at 13:50

## 2018-11-28 RX ADMIN — IPRATROPIUM BROMIDE AND ALBUTEROL SULFATE 3 ML: .5; 3 SOLUTION RESPIRATORY (INHALATION) at 01:56

## 2018-11-28 RX ADMIN — ASPIRIN 81 MG 81 MG: 81 TABLET ORAL at 11:12

## 2018-11-28 RX ADMIN — BUDESONIDE 500 MCG: 0.5 INHALANT RESPIRATORY (INHALATION) at 19:41

## 2018-11-28 RX ADMIN — ALLOPURINOL 100 MG: 100 TABLET ORAL at 11:12

## 2018-11-28 RX ADMIN — Medication 10 ML: at 21:25

## 2018-11-28 RX ADMIN — BUDESONIDE 500 MCG: 0.5 INHALANT RESPIRATORY (INHALATION) at 07:54

## 2018-11-28 RX ADMIN — MIDAZOLAM 2 MG: 1 INJECTION INTRAMUSCULAR; INTRAVENOUS at 08:49

## 2018-11-28 RX ADMIN — IPRATROPIUM BROMIDE AND ALBUTEROL SULFATE 3 ML: .5; 3 SOLUTION RESPIRATORY (INHALATION) at 19:41

## 2018-11-28 RX ADMIN — APIXABAN 5 MG: 5 TABLET, FILM COATED ORAL at 11:12

## 2018-11-28 RX ADMIN — Medication 10 ML: at 14:03

## 2018-11-28 RX ADMIN — CARVEDILOL 3.12 MG: 3.12 TABLET, FILM COATED ORAL at 16:58

## 2018-11-28 RX ADMIN — IPRATROPIUM BROMIDE AND ALBUTEROL SULFATE 3 ML: .5; 3 SOLUTION RESPIRATORY (INHALATION) at 07:54

## 2018-11-28 RX ADMIN — LEVETIRACETAM 500 MG: 500 TABLET ORAL at 17:01

## 2018-11-28 RX ADMIN — ATORVASTATIN CALCIUM 20 MG: 10 TABLET, FILM COATED ORAL at 21:16

## 2018-11-28 RX ADMIN — GUAIFENESIN 600 MG: 600 TABLET, EXTENDED RELEASE ORAL at 11:12

## 2018-11-28 RX ADMIN — MIDAZOLAM 1 MG: 1 INJECTION INTRAMUSCULAR; INTRAVENOUS at 08:51

## 2018-11-28 RX ADMIN — LEVETIRACETAM 500 MG: 500 TABLET ORAL at 11:12

## 2018-11-28 NOTE — PROGRESS NOTES
Patient resting quietly in bed at this time, eyes closed, respirations present. Family at bedside. Call light within reach.

## 2018-11-28 NOTE — PROGRESS NOTES
TRANSFER - IN REPORT: 
 
Verbal report received from [facility] on [facility].  being received from Cath Lab for routine progression of care Report consisted of patients Situation, Background, Assessment and  
Recommendations(SBAR). Information from the following report(s) SBAR and Procedure Summary was reviewed with the receiving nurse. Opportunity for questions and clarification was provided. Assessment completed upon patients arrival to unit and care assumed.

## 2018-11-28 NOTE — PROGRESS NOTES
Patient resting quietly in bed at this time. Alert and oriented. Visitor at bedside. Oxygen 4 lpm via nc. Oxygen sat at 99%. Respirations even and unlabored. No complaints. Call light within reach. Will continue to monitor.

## 2018-11-28 NOTE — PROGRESS NOTES
TRANSFER - OUT REPORT: 
 
Verbal report given to [facility] RN on [facility].  being transferred to 809(unit) for routine progression of care Report consisted of patients Situation, Background, Assessment and  
Recommendations(SBAR). Information from the following report(s) Procedure Summary was reviewed with the receiving nurse. Lines:  
Peripheral IV 11/26/18 Left Antecubital (Active) Site Assessment Clean, dry, & intact 11/28/2018  5:53 AM  
Phlebitis Assessment 0 11/28/2018  5:53 AM  
Infiltration Assessment 0 11/28/2018  5:53 AM  
Dressing Status Clean, dry, & intact 11/28/2018  5:53 AM  
Dressing Type Transparent;Tape 11/28/2018  5:53 AM  
Hub Color/Line Status Patent; Flushed 11/28/2018  5:53 AM  
  
 
Opportunity for questions and clarification was provided.

## 2018-11-28 NOTE — PROGRESS NOTES
TRANSFER - OUT REPORT: 
 
PILO Dr Stevie Fraga Numbed approx 0840 Versed 4 mg Cardioversion not attempted Pt is alert to voice VSS Verbal report given to Barbara(name) on Burak Mccormack.  being transferred to CPRU(unit) for routine progression of care Report consisted of patients Situation, Background, Assessment and  
Recommendations(SBAR). Information from the following report(s) SBAR and Procedure Summary was reviewed with the receiving nurse. Lines:  
Peripheral IV 11/26/18 Left Antecubital (Active) Site Assessment Clean, dry, & intact 11/28/2018  5:53 AM  
Phlebitis Assessment 0 11/28/2018  5:53 AM  
Infiltration Assessment 0 11/28/2018  5:53 AM  
Dressing Status Clean, dry, & intact 11/28/2018  5:53 AM  
Dressing Type Transparent;Tape 11/28/2018  5:53 AM  
Hub Color/Line Status Patent; Flushed 11/28/2018  5:53 AM  
  
 
Opportunity for questions and clarification was provided.

## 2018-11-28 NOTE — PROGRESS NOTES
Patient alert and oriented. Respirations even and unlabored on 4L O2 via nasal cannula. No acute events overnight. Patient resting quietly in bed at this time, awake, respirations present. Denies needs at this time. Bed low and locked. Bedside table, personal belongings and call light within reach.

## 2018-11-28 NOTE — PROGRESS NOTES
Patient up in chair at this time. Wife at bedside. Respirations even and unlabored. Oxygen at 3 lpm via NC. Oxygen sat at 99%. No complaints at this time. Call light is within reach.

## 2018-11-28 NOTE — PROGRESS NOTES
Bedside shift report completed with oncoming nurse, Gwen Armijo. Patient resting quietly in bed at this time, awake, respirations present. Denies needs at this time. Family remains at bedside. Call light within reach.

## 2018-11-28 NOTE — PROGRESS NOTES
Report received from [facility] RN. Procedural findings communicated. Intra procedural  medication administration reviewed. Progression of care discussed. Patient received into [address] post procedure. Routine post procedural vital signs  yes

## 2018-11-28 NOTE — PROGRESS NOTES
Hospitalist Progress Note Admit Date:  2018  3:15 PM  
Name:  Burak Mccormack. Age:  74 y.o. 
:  1945 MRN:  [id number] PCP:  Ralph Conner MD 
Treatment Team: Attending Provider: [place] Gomez DO; Consulting Provider: Celestine Soliman MD; Care Manager: Jamilah Cintron, RN; Physician: Jamarcus Phillips MD 
 
Subjective:  
Patient 59W with pmhx of CHF EF 35%, COPD wears 4lnc at home, HTN, HLP presents with 3-4 days of increasing dyspnea, productive cough, leg edema and orthopnea. Denies fever, chills, nausea, vomiting, change in urine or stool, chest pain. No sick contacts. ED workup notable for , Cr 1.85, ECG with afib (new onset), cxr with pulm vascular congestion. Patient admits to poor compliance with both oxygen and medications. 18 Says sob better On chronic oxygen 4 lit/min 
afib  
 
18 
sleepy from procedure- had PILO today showed CHAU Thrombus Objective:  
 
Patient Vitals for the past 24 hrs: 
 Temp Pulse Resp BP SpO2  
18 1520 98.1 °F (36.7 °C) 98 19 101/72 99 % 18 1350     98 % 18 1035 97.4 °F (36.3 °C) 91  98/66 99 % 18 1018  93 (!) 32 92/62 99 % 18 1008  84 28 93/59 99 % 18 1003  87 28 90/59 99 % 18 0944  (!) 103 (!) 67 (!) 88/58 94 % 18 0903  97 (!) 48 101/57 99 % 18 0900  (!) 102 (!) 37  100 % 18 0855  100 (!) 42 (!) 206/115 99 % 18 0850  92 (!) 34  100 % 18 0845  93 27 (!) 118/96   
18 0754     99 % 18 0727 98 °F (36.7 °C) 91 18 122/72 95 % 18 0248 97.3 °F (36.3 °C) 77 18 111/77 98 % 18 0156     97 % 18 2256 97.3 °F (36.3 °C) 100 18 105/78 100 % 18 1929 98.2 °F (36.8 °C) 74 18 111/70 100 % 18 192     97 % Oxygen Therapy O2 Sat (%): 99 % (18 1520) Pulse via Oximetry: 92 beats per minute (18 1350) O2 Device: Nasal cannula (18 1350) O2 Flow Rate (L/min): 3 l/min (11/28/18 1350) FIO2 (%): 36 % (11/28/18 0156) Intake/Output Summary (Last 24 hours) at 11/28/2018 1846 Last data filed at 11/28/2018 1520 Gross per 24 hour Intake 280 ml Output 550 ml Net -270 ml General:    Well nourished. Alert. On oxygen 
heent- normal 
CV:   Irregular irregular No murmur, rub, or gallop. Lungs:   Basilar crepitations- improving Abdomen:   Soft, nontender, nondistended. Cns- no focal neurological deficits Extremities: Warm and dry. No cyanosis or edema. Skin:     No rashes or jaundice. Data Review: 
I have reviewed all labs, meds, telemetry events, and studies from the last 24 hours. Recent Results (from the past 24 hour(s)) CBC WITH AUTOMATED DIFF Collection Time: 11/28/18  6:09 AM  
Result Value Ref Range WBC 17.7 (H) 4.3 - 11.1 K/uL  
 RBC 4.94 4.23 - 5.6 M/uL  
 HGB 13.7 13.6 - 17.2 g/dL HCT 42.3 41.1 - 50.3 % MCV 85.6 79.6 - 97.8 FL  
 MCH 27.7 26.1 - 32.9 PG  
 MCHC 32.4 31.4 - 35.0 g/dL  
 RDW 15.5 % PLATELET 020 145 - 050 K/uL MPV 11.1 9.4 - 12.3 FL ABSOLUTE NRBC 0.00 0.0 - 0.2 K/uL  
 DF AUTOMATED NEUTROPHILS 84 (H) 43 - 78 % LYMPHOCYTES 10 (L) 13 - 44 % MONOCYTES 6 4.0 - 12.0 % EOSINOPHILS 0 (L) 0.5 - 7.8 % BASOPHILS 0 0.0 - 2.0 % IMMATURE GRANULOCYTES 1 0.0 - 5.0 %  
 ABS. NEUTROPHILS 14.9 (H) 1.7 - 8.2 K/UL  
 ABS. LYMPHOCYTES 1.7 0.5 - 4.6 K/UL  
 ABS. MONOCYTES 1.0 0.1 - 1.3 K/UL  
 ABS. EOSINOPHILS 0.0 0.0 - 0.8 K/UL  
 ABS. BASOPHILS 0.0 0.0 - 0.2 K/UL  
 ABS. IMM. GRANS. 0.1 0.0 - 0.5 K/UL METABOLIC PANEL, BASIC Collection Time: 11/28/18  6:09 AM  
Result Value Ref Range Sodium 140 136 - 145 mmol/L Potassium 3.9 3.5 - 5.1 mmol/L Chloride 107 98 - 107 mmol/L  
 CO2 23 21 - 32 mmol/L Anion gap 10 7 - 16 mmol/L Glucose 122 (H) 65 - 100 mg/dL BUN 33 (H) 8 - 23 MG/DL Creatinine 1.75 (H) 0.8 - 1.5 MG/DL  
 GFR est AA 49 (L) >60 ml/min/1.73m2 GFR est non-AA 41 (L) >60 ml/min/1.73m2 Calcium 8.6 8.3 - 10.4 MG/DL  
EKG, 12 LEAD, SUBSEQUENT Collection Time: 11/28/18  7:40 AM  
Result Value Ref Range Ventricular Rate 98 BPM  
 Atrial Rate 119 BPM  
 QRS Duration 126 ms  
 Q-T Interval 376 ms QTC Calculation (Bezet) 480 ms Calculated R Axis -69 degrees Calculated T Axis 99 degrees Diagnosis Atrial fibrillation with premature ventricular or aberrantly conducted  
complexes Left axis deviation Non-specific intra-ventricular conduction block Abnormal QRS-T angle, consider primary T wave abnormality Abnormal ECG When compared with ECG of 27-NOV-2018 15:19, 
aberrant beats are new Confirmed by STEPHANIE NELSON (), Misbah Hart ([id number]) on 11/28/2018 8:08:22 AM 
  
URINALYSIS W/ RFLX MICROSCOPIC Collection Time: 11/28/18  3:54 PM  
Result Value Ref Range Color YELLOW Appearance CLEAR Specific gravity 1.008 1.001 - 1.023    
 pH (UA) 7.0 5.0 - 9.0 Protein NEGATIVE  NEG mg/dL Glucose NEGATIVE  mg/dL Ketone NEGATIVE  NEG mg/dL Bilirubin NEGATIVE  NEG Blood NEGATIVE  NEG Urobilinogen 1.0 0.2 - 1.0 EU/dL Nitrites NEGATIVE  NEG Leukocyte Esterase NEGATIVE  NEG All Micro Results None Current Meds: 
Current Facility-Administered Medications Medication Dose Route Frequency  fentaNYL citrate (PF) injection  mcg   mcg IntraVENous Multiple  midazolam (VERSED) injection 1-10 mg  1-10 mg IntraVENous Multiple  albuterol-ipratropium (DUO-NEB) 2.5 MG-0.5 MG/3 ML  3 mL Nebulization Q6H RT  
 allopurinol (ZYLOPRIM) tablet 100 mg  100 mg Oral DAILY  aspirin chewable tablet 81 mg  81 mg Oral DAILY  atorvastatin (LIPITOR) tablet 20 mg  20 mg Oral QHS  carvedilol (COREG) tablet 3.125 mg  3.125 mg Oral BID WITH MEALS  pantoprazole (PROTONIX) tablet 40 mg  40 mg Oral ACB  sodium chloride (NS) flush 5-10 mL  5-10 mL IntraVENous Q8H  
  sodium chloride (NS) flush 5-10 mL  5-10 mL IntraVENous PRN  
 acetaminophen (TYLENOL) tablet 650 mg  650 mg Oral Q4H PRN  
 ondansetron (ZOFRAN) injection 4 mg  4 mg IntraVENous Q4H PRN  
 levETIRAcetam (KEPPRA) tablet 500 mg  500 mg Oral BID  furosemide (LASIX) injection 40 mg  40 mg IntraVENous BID  
 apixaban (ELIQUIS) tablet 5 mg  5 mg Oral Q12H  
 albuterol (PROVENTIL VENTOLIN) nebulizer solution 2.5 mg  2.5 mg Nebulization Q4H PRN  
 budesonide (PULMICORT) 500 mcg/2 ml nebulizer suspension  500 mcg Nebulization BID RT  
 guaiFENesin ER (MUCINEX) tablet 600 mg  600 mg Oral Q12H Other Studies (last 24 hours): No results found. Assessment and Plan:  
 
Hospital Problems as of 11/28/2018 Date Reviewed: 6/6/2018 Codes Class Noted - Resolved POA Atrial fibrillation [facility]) ICD-10-CM: I48.91 
ICD-9-CM: 427.31  11/27/2018 - Present Unknown * (Principal) Acute on chronic systolic (congestive) heart failure (HCC) ICD-10-CM: C17.82 ICD-9-CM: 428.23, 428.0  11/26/2018 - Present Unknown COPD exacerbation ([facility] ICD-10-CM: J44.1 ICD-9-CM: 491.21  11/26/2018 - Present Unknown Seizure disorder ([facility] ICD-10-CM: G40.909 ICD-9-CM: 345.90  11/14/2018 - Present Yes Stage 2 chronic kidney disease ICD-10-CM: N18.2 ICD-9-CM: 585.2  10/17/2018 - Present Type 2 diabetes with nephropathy [facility]) ICD-10-CM: E11.21 
ICD-9-CM: 250.40, 583.81  10/8/2018 - Present Yes  
   
 CKD (chronic kidney disease) stage 3, GFR 30-59 ml/min (HCC) (Chronic) ICD-10-CM: N18.3 ICD-9-CM: 585.3  9/29/2017 - Present Yes Hypertension (Chronic) ICD-10-CM: I10 
ICD-9-CM: 401.9  9/29/2017 - Present Yes COPD, moderate ([address] (Chronic) ICD-10-CM: J44.9 ICD-9-CM: 592  9/29/2017 - Present Yes Overview Signed 12/27/2015 12:38 PM by Attila Maynard NP Complete PFTs: 7/20/15: 
Spirometry is consistent with a moderate obstructive/restrictive defect. Kami Yates The residual volume is increased relative to other lung volumes suggesting air-trapping. The diffusion capacity corrected for alveolar volume was normal suggesting no loss of alveolo-capillary units. Mixed hyperlipidemia (Chronic) ICD-10-CM: G47.4 ICD-9-CM: 272.2  9/28/2016 - Present Yes PLAN:   
Left AA Thrombus- cont eliquis. Elevated wbc-? Etiology- no fever,no worsening sob. Acute on chf exa- cont lasix New afib- on eliquis- cardiology following Chronic hypoxia- on 4 lit/min nasal cannula 
htn Copd DM type 2 
ckd 3 Prolonged qt - d/c zithromax.- improved qt on repeat ekg. DC planning/Dispo: DVT ppx:  eliquis Signed: 
Tex Gambino MD

## 2018-11-28 NOTE — PROGRESS NOTES
PT note: Attempted to provide PT treatment to Mr. Leticia Alicia however the family member present declined treatment for the patient stating that he had a procedure earlier today and was not up for therapy today. Will continue PT efforts.

## 2018-11-28 NOTE — PROGRESS NOTES
Patient saving urine for measurement now  Oxygen via cannula  Says feeling much better  Wife remains at bedside iv site without redness or edema

## 2018-11-28 NOTE — PROGRESS NOTES
Problem: Falls - Risk of 
Goal: *Absence of Falls Document [facility] Heart Fall Risk and appropriate interventions in the flowsheet. Outcome: Progressing Towards Goal 
Fall Risk Interventions: 
Mobility Interventions: Communicate number of staff needed for ambulation/transfer Medication Interventions: Assess postural VS orthostatic hypotension Elimination Interventions: Call light in reach History of Falls Interventions: Consult care management for discharge planning

## 2018-11-28 NOTE — PROCEDURES
Brief Cardiac Procedure Note    Patient: Burak Mccormack. MRN: [id number]  SSN: [id number]    YOB: 1945  Age: 74 y.o. Sex: male      Date of Procedure: 11/28/2018     Pre-procedure Diagnosis: Atrial Fibrillation/Atrial Flutter    Post-procedure Diagnosis: CHAU thrombus    Reason for Procedure: Cardiac Arrhythmia    Procedure: Transesophageal Echocardiogram    Brief Description of Procedure: PILO performed prior to a cardioversion to look for thrombus CHAU clot noted a lot of smoke LV severe global hypokinesis    Performed By: Chase Dover MD     Assistants: staff    Anesthesia: Moderate Sedation    Estimated Blood Loss: Less than 10 mL      Specimens: None    Implants: None    Findings: LAAA clot    Complications: None    Recommendations: Continue medical therapy. needs blood thniner No cardioversion now    Signed By: Chase Dover MD     November 28, 2018

## 2018-11-28 NOTE — PROGRESS NOTES
Problem: Interdisciplinary Rounds Goal: Interdisciplinary Rounds Outcome: Progressing Towards Goal 
Interdisciplinary team rounds were held 11/28/2018 with the following team members:Care Management, Nursing, Physical Therapy, Physician and Clinical Coordinator and the patient. Plan of care discussed. See clinical pathway and/or care plan for interventions and desired outcomes.

## 2018-11-28 NOTE — PROGRESS NOTES
Received bedside shift report from offgoing nurse, Britt Florence. Patient resting quietly in bed at this time, awake, respirations present. Denies needs at this time. Family at bedside. Call light within reach.

## 2018-11-28 NOTE — PROGRESS NOTES
Per Seferino Sanchez RN from cath lab, patient's throat was numbed at 0840 and can not have anything by mouth until 1040.

## 2018-11-28 NOTE — PROGRESS NOTES
Quiet in bed  Ambulatory to bathroom  Alert and oriented  Oxygen via cannula  Patient says voiding large amounts  Encouraged to void in urinal for measurement

## 2018-11-28 NOTE — PROGRESS NOTES
Pt off floor for cardiac cath, then on bedrest following procedure. Will follow up for evaluation tomorrow.  
 
Elia Ramos, OT

## 2018-11-29 LAB
ANION GAP SERPL CALC-SCNC: 10 MMOL/L (ref 7–16)
BASOPHILS # BLD: 0 K/UL (ref 0–0.2)
BASOPHILS NFR BLD: 0 % (ref 0–2)
BUN SERPL-MCNC: 30 MG/DL (ref 8–23)
CALCIUM SERPL-MCNC: 8.4 MG/DL (ref 8.3–10.4)
CHLORIDE SERPL-SCNC: 107 MMOL/L (ref 98–107)
CO2 SERPL-SCNC: 25 MMOL/L (ref 21–32)
CREAT SERPL-MCNC: 1.68 MG/DL (ref 0.8–1.5)
DIFFERENTIAL METHOD BLD: ABNORMAL
EOSINOPHIL # BLD: 0 K/UL (ref 0–0.8)
EOSINOPHIL NFR BLD: 0 % (ref 0.5–7.8)
ERYTHROCYTE [DISTWIDTH] IN BLOOD BY AUTOMATED COUNT: 16 % (ref 11.9–14.6)
GLUCOSE SERPL-MCNC: 98 MG/DL (ref 65–100)
HCT VFR BLD AUTO: 43.6 % (ref 41.1–50.3)
HGB BLD-MCNC: 13.5 G/DL (ref 13.6–17.2)
IMM GRANULOCYTES # BLD: 0.1 K/UL (ref 0–0.5)
IMM GRANULOCYTES NFR BLD AUTO: 1 % (ref 0–5)
INR PPP: 1.7
LYMPHOCYTES # BLD: 2.4 K/UL (ref 0.5–4.6)
LYMPHOCYTES NFR BLD: 21 % (ref 13–44)
MCH RBC QN AUTO: 27.9 PG (ref 26.1–32.9)
MCHC RBC AUTO-ENTMCNC: 31 G/DL (ref 31.4–35)
MCV RBC AUTO: 90.1 FL (ref 79.6–97.8)
MONOCYTES # BLD: 1 K/UL (ref 0.1–1.3)
MONOCYTES NFR BLD: 8 % (ref 4–12)
NEUTS SEG # BLD: 8 K/UL (ref 1.7–8.2)
NEUTS SEG NFR BLD: 69 % (ref 43–78)
NRBC # BLD: 0 K/UL (ref 0–0.2)
PLATELET # BLD AUTO: 171 K/UL (ref 150–450)
PMV BLD AUTO: 11 FL (ref 9.4–12.3)
POTASSIUM SERPL-SCNC: 3.7 MMOL/L (ref 3.5–5.1)
PROTHROMBIN TIME: 19.5 SEC (ref 11.5–14.5)
RBC # BLD AUTO: 4.84 M/UL (ref 4.23–5.6)
SODIUM SERPL-SCNC: 142 MMOL/L (ref 136–145)
WBC # BLD AUTO: 11.5 K/UL (ref 4.3–11.1)

## 2018-11-29 PROCEDURE — 74011000250 HC RX REV CODE- 250: Performed by: INTERNAL MEDICINE

## 2018-11-29 PROCEDURE — 74011250636 HC RX REV CODE- 250/636: Performed by: FAMILY MEDICINE

## 2018-11-29 PROCEDURE — 97530 THERAPEUTIC ACTIVITIES: CPT

## 2018-11-29 PROCEDURE — 85025 COMPLETE CBC W/AUTO DIFF WBC: CPT

## 2018-11-29 PROCEDURE — 85610 PROTHROMBIN TIME: CPT

## 2018-11-29 PROCEDURE — 80048 BASIC METABOLIC PNL TOTAL CA: CPT

## 2018-11-29 PROCEDURE — 65660000000 HC RM CCU STEPDOWN

## 2018-11-29 PROCEDURE — 94640 AIRWAY INHALATION TREATMENT: CPT

## 2018-11-29 PROCEDURE — 74011250637 HC RX REV CODE- 250/637: Performed by: FAMILY MEDICINE

## 2018-11-29 PROCEDURE — 74011250637 HC RX REV CODE- 250/637: Performed by: NURSE PRACTITIONER

## 2018-11-29 PROCEDURE — 97535 SELF CARE MNGMENT TRAINING: CPT

## 2018-11-29 PROCEDURE — 94760 N-INVAS EAR/PLS OXIMETRY 1: CPT

## 2018-11-29 PROCEDURE — 97165 OT EVAL LOW COMPLEX 30 MIN: CPT

## 2018-11-29 PROCEDURE — 74011250636 HC RX REV CODE- 250/636: Performed by: NURSE PRACTITIONER

## 2018-11-29 PROCEDURE — 74011250637 HC RX REV CODE- 250/637: Performed by: INTERNAL MEDICINE

## 2018-11-29 PROCEDURE — 77010033678 HC OXYGEN DAILY

## 2018-11-29 PROCEDURE — 36415 COLL VENOUS BLD VENIPUNCTURE: CPT

## 2018-11-29 RX ORDER — ENOXAPARIN SODIUM 100 MG/ML
1 INJECTION SUBCUTANEOUS EVERY 12 HOURS
Status: DISCONTINUED | OUTPATIENT
Start: 2018-11-29 | End: 2018-12-02

## 2018-11-29 RX ADMIN — GUAIFENESIN 600 MG: 600 TABLET, EXTENDED RELEASE ORAL at 09:06

## 2018-11-29 RX ADMIN — IPRATROPIUM BROMIDE AND ALBUTEROL SULFATE 3 ML: .5; 3 SOLUTION RESPIRATORY (INHALATION) at 08:28

## 2018-11-29 RX ADMIN — DOXYCYCLINE HYCLATE 100 MG: 100 CAPSULE ORAL at 09:06

## 2018-11-29 RX ADMIN — IPRATROPIUM BROMIDE AND ALBUTEROL SULFATE 3 ML: .5; 3 SOLUTION RESPIRATORY (INHALATION) at 14:07

## 2018-11-29 RX ADMIN — CARVEDILOL 3.12 MG: 3.12 TABLET, FILM COATED ORAL at 09:06

## 2018-11-29 RX ADMIN — BUDESONIDE 500 MCG: 0.5 INHALANT RESPIRATORY (INHALATION) at 22:25

## 2018-11-29 RX ADMIN — Medication 5 ML: at 14:00

## 2018-11-29 RX ADMIN — IPRATROPIUM BROMIDE AND ALBUTEROL SULFATE 3 ML: .5; 3 SOLUTION RESPIRATORY (INHALATION) at 22:25

## 2018-11-29 RX ADMIN — BUDESONIDE 500 MCG: 0.5 INHALANT RESPIRATORY (INHALATION) at 08:28

## 2018-11-29 RX ADMIN — FUROSEMIDE 40 MG: 10 INJECTION, SOLUTION INTRAMUSCULAR; INTRAVENOUS at 16:07

## 2018-11-29 RX ADMIN — IPRATROPIUM BROMIDE AND ALBUTEROL SULFATE 3 ML: .5; 3 SOLUTION RESPIRATORY (INHALATION) at 02:27

## 2018-11-29 RX ADMIN — LEVETIRACETAM 500 MG: 500 TABLET ORAL at 09:06

## 2018-11-29 RX ADMIN — WARFARIN SODIUM 2.5 MG: 2 TABLET ORAL at 20:24

## 2018-11-29 RX ADMIN — ASPIRIN 81 MG 81 MG: 81 TABLET ORAL at 09:06

## 2018-11-29 RX ADMIN — CARVEDILOL 3.12 MG: 3.12 TABLET, FILM COATED ORAL at 16:06

## 2018-11-29 RX ADMIN — PANTOPRAZOLE SODIUM 40 MG: 40 TABLET, DELAYED RELEASE ORAL at 05:18

## 2018-11-29 RX ADMIN — APIXABAN 5 MG: 5 TABLET, FILM COATED ORAL at 09:06

## 2018-11-29 RX ADMIN — LEVETIRACETAM 500 MG: 500 TABLET ORAL at 16:07

## 2018-11-29 RX ADMIN — FUROSEMIDE 40 MG: 10 INJECTION, SOLUTION INTRAMUSCULAR; INTRAVENOUS at 09:06

## 2018-11-29 RX ADMIN — Medication 10 ML: at 20:26

## 2018-11-29 RX ADMIN — ATORVASTATIN CALCIUM 20 MG: 10 TABLET, FILM COATED ORAL at 20:24

## 2018-11-29 RX ADMIN — ENOXAPARIN SODIUM 100 MG: 100 INJECTION SUBCUTANEOUS at 20:26

## 2018-11-29 RX ADMIN — GUAIFENESIN 600 MG: 600 TABLET, EXTENDED RELEASE ORAL at 20:24

## 2018-11-29 RX ADMIN — DOXYCYCLINE HYCLATE 100 MG: 100 CAPSULE ORAL at 20:24

## 2018-11-29 RX ADMIN — ALLOPURINOL 100 MG: 100 TABLET ORAL at 09:06

## 2018-11-29 NOTE — PROGRESS NOTES
Hospitalist Progress Note Admit Date:  2018  3:15 PM  
Name:  Burak Mccormack. Age:  74 y.o. 
:  1945 MRN:  [id number] PCP:  Ralph Conner MD 
Treatment Team: Attending Provider: [place] Gomez DO; Consulting Provider: Celestine Soliman MD; Care Manager: Jamilah Cintron RN; Physician: Jamarcus Phillips MD; Consulting Provider: Marlys Calzada NP Subjective:  
Patient 73M with pmhx of CHF EF 35%, COPD wears 4lnc at home, HTN, HLP presents with 3-4 days of increasing dyspnea, productive cough, leg edema and orthopnea. Denies fever, chills, nausea, vomiting, change in urine or stool, chest pain. No sick contacts. ED workup notable for , Cr 1.85, ECG with afib (new onset), cxr with pulm vascular congestion. Patient admits to poor compliance with both oxygen and medications. 18 Says sob better On chronic oxygen 4 lit/min 
afib  
 
18 Says sob better PILO left atrial mural thrombus Low normal bp this am 
 
18 Says sob better Objective:  
 
Patient Vitals for the past 24 hrs: 
 Temp Pulse Resp BP SpO2  
18 1509 97.5 °F (36.4 °C) 76 18 102/71 100 % 18 1408     99 % 18 1109 97.8 °F (36.6 °C) (!) 101 18 126/86 98 % 18 0829     100 % 18 0725 97.3 °F (36.3 °C) 99 18 96/66 93 % 18 0324 97.4 °F (36.3 °C) (!) 101 18 102/69 96 % 18 0249  87     
18 0227     99 % 18 2324 98 °F (36.7 °C) 91 18 100/62 98 % 18 1941     96 % 18 1932 97.7 °F (36.5 °C) 100 18 108/82 100 % Oxygen Therapy O2 Sat (%): 100 % (18 1509) Pulse via Oximetry: 70 beats per minute (18 1408) O2 Device: Nasal cannula (18 140) O2 Flow Rate (L/min): 2 l/min (18 140) FIO2 (%): 28 % (18 0227) Intake/Output Summary (Last 24 hours) at 2018 1621 Last data filed at 2018 1315 Gross per 24 hour Intake 720 ml Output 500 ml Net 220 ml General:    Well nourished. Alert. On oxygen 
heent- normal 
CV:   Irregular irregular No murmur, rub, or gallop. Lungs:   Basilar crepitations Abdomen:   Soft, nontender, nondistended. Cns- no focal neurological deficits Extremities: Warm and dry. No cyanosis or edema. Skin:     No rashes or jaundice. Data Review: 
I have reviewed all labs, meds, telemetry events, and studies from the last 24 hours. Recent Results (from the past 24 hour(s)) CBC WITH AUTOMATED DIFF Collection Time: 11/29/18  5:28 AM  
Result Value Ref Range WBC 11.5 (H) 4.3 - 11.1 K/uL  
 RBC 4.84 4.23 - 5.6 M/uL  
 HGB 13.5 (L) 13.6 - 17.2 g/dL HCT 43.6 41.1 - 50.3 % MCV 90.1 79.6 - 97.8 FL  
 MCH 27.9 26.1 - 32.9 PG  
 MCHC 31.0 (L) 31.4 - 35.0 g/dL  
 RDW 16.0 (H) 11.9 - 14.6 % PLATELET 977 464 - 008 K/uL MPV 11.0 9.4 - 12.3 FL ABSOLUTE NRBC 0.00 0.0 - 0.2 K/uL  
 DF AUTOMATED NEUTROPHILS 69 43 - 78 % LYMPHOCYTES 21 13 - 44 % MONOCYTES 8 4.0 - 12.0 % EOSINOPHILS 0 (L) 0.5 - 7.8 % BASOPHILS 0 0.0 - 2.0 % IMMATURE GRANULOCYTES 1 0.0 - 5.0 %  
 ABS. NEUTROPHILS 8.0 1.7 - 8.2 K/UL  
 ABS. LYMPHOCYTES 2.4 0.5 - 4.6 K/UL  
 ABS. MONOCYTES 1.0 0.1 - 1.3 K/UL  
 ABS. EOSINOPHILS 0.0 0.0 - 0.8 K/UL  
 ABS. BASOPHILS 0.0 0.0 - 0.2 K/UL  
 ABS. IMM. GRANS. 0.1 0.0 - 0.5 K/UL METABOLIC PANEL, BASIC Collection Time: 11/29/18  5:28 AM  
Result Value Ref Range Sodium 142 136 - 145 mmol/L Potassium 3.7 3.5 - 5.1 mmol/L Chloride 107 98 - 107 mmol/L  
 CO2 25 21 - 32 mmol/L Anion gap 10 7 - 16 mmol/L Glucose 98 65 - 100 mg/dL BUN 30 (H) 8 - 23 MG/DL Creatinine 1.68 (H) 0.8 - 1.5 MG/DL  
 GFR est AA 51 (L) >60 ml/min/1.73m2 GFR est non-AA 43 (L) >60 ml/min/1.73m2 Calcium 8.4 8.3 - 10.4 MG/DL All Micro Results None Current Meds: 
Current Facility-Administered Medications Medication Dose Route Frequency  fentaNYL citrate (PF) injection  mcg   mcg IntraVENous Multiple  midazolam (VERSED) injection 1-10 mg  1-10 mg IntraVENous Multiple  doxycycline (VIBRAMYCIN) capsule 100 mg  100 mg Oral Q12H  
 albuterol-ipratropium (DUO-NEB) 2.5 MG-0.5 MG/3 ML  3 mL Nebulization Q6H RT  
 allopurinol (ZYLOPRIM) tablet 100 mg  100 mg Oral DAILY  aspirin chewable tablet 81 mg  81 mg Oral DAILY  atorvastatin (LIPITOR) tablet 20 mg  20 mg Oral QHS  carvedilol (COREG) tablet 3.125 mg  3.125 mg Oral BID WITH MEALS  pantoprazole (PROTONIX) tablet 40 mg  40 mg Oral ACB  sodium chloride (NS) flush 5-10 mL  5-10 mL IntraVENous Q8H  
 sodium chloride (NS) flush 5-10 mL  5-10 mL IntraVENous PRN  
 acetaminophen (TYLENOL) tablet 650 mg  650 mg Oral Q4H PRN  
 ondansetron (ZOFRAN) injection 4 mg  4 mg IntraVENous Q4H PRN  
 levETIRAcetam (KEPPRA) tablet 500 mg  500 mg Oral BID  furosemide (LASIX) injection 40 mg  40 mg IntraVENous BID  albuterol (PROVENTIL VENTOLIN) nebulizer solution 2.5 mg  2.5 mg Nebulization Q4H PRN  
 budesonide (PULMICORT) 500 mcg/2 ml nebulizer suspension  500 mcg Nebulization BID RT  
 guaiFENesin ER (MUCINEX) tablet 600 mg  600 mg Oral Q12H Other Studies (last 24 hours): No results found. Assessment and Plan:  
 
Hospital Problems as of 11/29/2018 Date Reviewed: 6/6/2018 Codes Class Noted - Resolved POA Atrial fibrillation [facility]) ICD-10-CM: I48.91 
ICD-9-CM: 427.31  11/27/2018 - Present Unknown * (Principal) Acute on chronic systolic (congestive) heart failure (HCC) ICD-10-CM: Q21.80 ICD-9-CM: 428.23, 428.0  11/26/2018 - Present Unknown COPD exacerbation ([facility] ICD-10-CM: J44.1 ICD-9-CM: 491.21  11/26/2018 - Present Unknown Seizure disorder ([facility] ICD-10-CM: G40.909 ICD-9-CM: 345.90  11/14/2018 - Present Yes Stage 2 chronic kidney disease ICD-10-CM: N18.2 ICD-9-CM: 585.2  10/17/2018 - Present Type 2 diabetes with nephropathy [facility]) ICD-10-CM: E11.21 
ICD-9-CM: 250.40, 583.81  10/8/2018 - Present Yes  
   
 CKD (chronic kidney disease) stage 3, GFR 30-59 ml/min (HCC) (Chronic) ICD-10-CM: N18.3 ICD-9-CM: 585.3  9/29/2017 - Present Yes Hypertension (Chronic) ICD-10-CM: I10 
ICD-9-CM: 401.9  9/29/2017 - Present Yes COPD, moderate ([address] (Chronic) ICD-10-CM: J44.9 ICD-9-CM: 520  9/29/2017 - Present Yes Overview Signed 12/27/2015 12:38 PM by Martita Phan NP Complete PFTs: 7/20/15: 
Spirometry is consistent with a moderate obstructive/restrictive defect. . The residual volume is increased relative to other lung volumes suggesting air-trapping. The diffusion capacity corrected for alveolar volume was normal suggesting no loss of alveolo-capillary units. Mixed hyperlipidemia (Chronic) ICD-10-CM: Z15.8 ICD-9-CM: 272.2  9/28/2016 - Present Yes PLAN:   
Acute on chf exa- cont lasix New afib- on eliquis- cardiology following Chronic hypoxia- on 4 lit/min nasal cannula 
htn Copd DM type 2 
ckd 3 Prolonged qt - d/c zithromax. DC planning/Dispo: DVT ppx:  Cardiology recommends coumadin Signed: 
Jimena Love MD

## 2018-11-29 NOTE — PROGRESS NOTES
Patient resting quietly in bed at this time, eyes closed, respirations even and unlabored. Wife remains at bedside. Call light within reach.

## 2018-11-29 NOTE — PROGRESS NOTES
Attempted to see pt, but he was sleeping. Will try again later. 32 61 16 - Again attempted to see pt, but still sleeping. Will plan to see tomorrow.

## 2018-11-29 NOTE — PROGRESS NOTES
Received bedside shift report from offgoing nurse, Nya Villarreal. Patient resting quietly in bed at this time, awake, respirations even and unlabored. Wife remains at bedside. Call light within reach.

## 2018-11-29 NOTE — PROGRESS NOTES
Patient stable with no complaints at this time. Patient is resting in bed with eyes closed. Patient denies any pain and appears comfortable at this time. Call light within reach and patient instructed to call if assistance is needed. Report to be given to oncoming RN 7p-7a

## 2018-11-29 NOTE — ROUTINE PROCESS
CHF teaching continues to pt/family. Emphasis on taking prescription meds as ordered, to keep F/U appts and to call MD STAT if any of the following occur: · If you gain 2 lbs in one day or 5 lbs in a week, and short of breath. · If you can not lay flat without developing short of breath or rapid breathing at night; or if it wakes you up. Develop a cough or wheezing. · If you notice swollen hands/feet/ankles or stomach with a bloated/ full feeling. · If you become confused or mentally fuzzy or dizzy. · If you notice a rapid or change in your heart rate. · If you become more exhausted all the time and unable to do the same level of activity without stopping to catch your breath. Drink no more than 8 cups a day in 8 oz. cups. Your Heart can not handle any more. Stay away from salt (limit anything with salt or sodium in it). Limit to 250mg per serving.  
Pt/family verbalizes understanding, pass post test to reinforce teaching skills:60 mins total 
 
Planner/Scale @ BS

## 2018-11-29 NOTE — PROGRESS NOTES
Patient voices no concerns at this time. Patient does have medications in his room which patient refuses to allow staff to have them. Patient has been educated about now taking medications from home while here in hospital.  Patient states he has not and will not take any from his home bag. Patient told that the bag of meds needs to go home with wife. Patient verbalized understanding. Patient denies any pain and appears comfortable at this time. Call light within reach and patient instructed to call if assistance is needed. Will continue to monitor.

## 2018-11-29 NOTE — PROGRESS NOTES
Bedside shift report completed with oncoming nurse, Bhargav Cordoba. Patient resting quietly in bed at this time, awake, respirations present. Denies needs at this time. Call light within reach.

## 2018-11-29 NOTE — PROGRESS NOTES
Problem: Falls - Risk of 
Goal: *Absence of Falls Document Demario Macias Fall Risk and appropriate interventions in the flowsheet. Outcome: Progressing Towards Goal 
Fall Risk Interventions: 
Mobility Interventions: Assess mobility with egress test, PT Consult for mobility concerns, PT Consult for assist device competence, OT consult for ADLs, Strengthening exercises (ROM-active/passive), Patient to call before getting OOB, Communicate number of staff needed for ambulation/transfer Medication Interventions: Evaluate medications/consider consulting pharmacy Elimination Interventions: Patient to call for help with toileting needs, Call light in reach History of Falls Interventions: Bed/chair exit alarm, Consult care management for discharge planning, Evaluate medications/consider consulting pharmacy, Door open when patient unattended, Investigate reason for fall

## 2018-11-29 NOTE — PROGRESS NOTES
Patient alert and oriented throughout this shift. Respirations even and unlabored on 4L O2 via nasal cannula. No acute events overnight. Patient resting quietly in bed at this time, eyes closed, respirations present. Bed low and locked. Bedside table, personal belongings and call light within reach.

## 2018-11-29 NOTE — PROGRESS NOTES
Warfarin dosing per pharmacist 
 
Rolo Hardin is a 74 y.o. male. Weight: 96.4 kg (212 lb 8 oz) Indication:  Mural thrombus Goal INR:  2-3 Home dose:  n/a Risk factors or significant drug interactions:  Doxycycline, elevated baseline INR Other anticoagulants:  Lovenox 100mg q 12 hours bridge Daily Monitoring Date  INR     Warfarin dose HGB              Notes 11/29  1.7  2.5mg  13.5 Pharmacy consulted to dose warfarin and Lovenox bridge for mural thrombus. Baseline INR is elevated and patient is on doxycycline, which increases the INR. Will start warfarin at a lower dose of 2.5mg daily. Thank you, Jefry Ohara, PharmD

## 2018-11-29 NOTE — PROGRESS NOTES
[facility] CARDIOLOGY PROGRESS NOTE 
      
 
11/29/2018 9:53 AM 
 
Admit Date: 11/26/2018 Subjective:  
Feels better. ROS: 
GEN:  No fever or chills Cardiovascular:  As noted above:no CP or palpitation. Pulmonary:  As noted above:SOB improving. Neuro:  No new focal motor or sensory loss Objective:  
  
Vitals:  
 11/29/18 8490 11/29/18 1947 11/29/18 0725 11/29/18 3225 BP: 102/69  96/66 Pulse: (!) 101  99 Resp: 18  18 Temp: 97.4 °F (36.3 °C)  97.3 °F (36.3 °C) SpO2: 96%  93% 100% Weight:  96.4 kg (212 lb 8 oz) Physical Exam: 
General-no distress Neck- supple, no JVD 
CV- irregular rate and rhythm no MRG Lung- crackles bilaterally Abd- soft, nontender, nondistended Ext- trace edema bilaterally. Skin- warm and dry Psychiatric:  Normal mood and affect. Neurologic:  Alert and oriented X 3 Data Review:  
Recent Labs  
  11/29/18 
0528 11/28/18 
3467  140  
K 3.7 3.9 BUN 30* 33* CREA 1.68* 1.75* GLU 98 122* WBC 11.5* 17.7* HGB 13.5* 13.7 HCT 43.6 42.3  168 TELEMETRY:  AFIB Assessment/Plan:  
 
Principal Problem: 
  Acute on chronic systolic (congestive) heart failure (HCC) (11/26/2018):Clinically,symptoms improved with iv Lasix. Continue Coreg. Defer ACEI/ARB/Entresto due to borderline hypotension and renal fx. Active Problems: 
  CKD (chronic kidney disease) stage 3, GFR 30-59 ml/min ([facility]) (9/29/2017): Improving. Hypertension (9/29/2017) COPD, moderate ([address] (9/29/2017) Overview: Complete PFTs: 7/20/15: 
    Spirometry is consistent with a moderate obstructive/restrictive defect. Stewart Munising The residual volume is increased relative to other lung volumes suggesting  
    air-trapping. The diffusion capacity corrected for alveolar volume was  
    normal suggesting no loss of alveolo-capillary units. Mixed hyperlipidemia (9/28/2016) Type 2 diabetes with nephropathy ([address] (10/8/2018) Seizure disorder ([address] (11/14/2018) COPD exacerbation ([address] (11/26/2018) Atrial fibrillation ([address] (11/27/2018):Rate is continue with Coreg. Eliquis added. LA thrombus on PILO precludes cardioversion. Could consider switching Eliquis to Warfarin with mural thrombus?  
 
 
 
 
 
 
 
Mary Kay Jorge MD 
11/29/2018 9:53 AM

## 2018-11-29 NOTE — CONSULTS
Palliative Care    Patient: Jayleen Azevedo. MRN: [id number]  SSN: [id number]    YOB: 1945  Age: 74 y.o. Sex: male       Date of Request: 11/27/2018  Date of Consult:  11/29/2018  Reason for Consult:  heart failure  Requesting Physician: Dr. Ethan Grady     Assessment/Plan:     Principal Diagnosis:    Debility, Unspecified  R53.81    Additional Diagnoses:   · Fatigue, Lethargy  R53.83  · Counseling, Encounter for Medical Advice  Z71.9  · Encounter for Palliative Care  Z51.5    Palliative Performance Scale (PPS):       Medical Decision Making:   Reviewed and summarized chart from admission to present. Discussed case with appropriate providers. Reviewed laboratory and x-ray data: CBC, BMP, PILO    Pt resting in bed, no distress noted. No family at bedside. Pt known to PC from previous admissions, but re-introduced role and reviewed events. Pt has fair understanding of his condition. Counseled on the chronic and progressive nature of CHF, and newly found atrial thrombus requiring anticoagulation. Pt has a HCPOA on file, naming his wife Yann Rowland, as agent. Reviewed his wishes regarding life support and resuscitation. He requests full code status and support, which is consistent with his previous stated wishes. He plans on returning home at discharge, with the support of his wife and daughter. We will not plan to follow. Will discuss findings with members of the interdisciplinary team.      Thank you for this referral.          .    Subjective:     History obtained from:  Patient and Chart    Chief Complaint: CHF  History of Present Illness:  Mr. Erasmo Stacy is a 73 yo male with PMH of systolic and diastolic heart failure, COPD, O2 dependent, CAD, HTN, MI, and other history as listed below. He presented to [facility] ER on 11/26 with 3-4 day history of worsening cough and shortness of breath and LE edema. Labs in ER revealed BNP of 865 and creatinine of 1.85.   EKG showed a fib, which is new onset for patient. CXR showed vascular congestion. He was admitted for further management of decompensated heart failure and COPD. He had PILO on 11/28 and found to have CHAU thrombus and anticoagulation managed by cardiology. He reports he is feeling better and ready to go home. Advance Directive: Yes       Code Status:  Full Code            Health Care Power of : Yes - Copy of [address] on file and indicate his wishes for maximum treatment. Past Medical History:   Diagnosis Date    Acute CHF (congestive heart failure) ([address] [facility] 4/25/2015    Acute combined systolic and diastolic congestive heart failure ([address] 4/28/2015    Chronic obstructive pulmonary disease (HCC)     De Quervain's tenosynovitis, right 4/28/2015    Dyspnea on exertion 6/28/2015    Elevated serum creatinine 4/25/2015    Elevated troponin 6/28/2015    History of coronary artery disease     MI at 25 yo    History of right knee surgery     cartilage removal    History of shingles     Malignant hypertension 4/25/2015    MI (myocardial infarction) [facility])       Past Surgical History:   Procedure Laterality Date    HX HEART CATHETERIZATION  6/29/2015    no intervention    HX KNEE ARTHROSCOPY Right     removal of cartilage     Family History   Problem Relation Age of Onset    Hypertension Mother     No Known Problems Father       Social History     Tobacco Use    Smoking status: Former Smoker     Packs/day: 0.25     Years: 20.00     Pack years: 5.00     Types: Cigarettes     Last attempt to quit: 4/5/2015     Years since quitting: 3.6    Smokeless tobacco: Never Used   Substance Use Topics    Alcohol use: No     Alcohol/week: 0.0 oz     Prior to Admission medications    Medication Sig Start Date End Date Taking? Authorizing Provider   aspirin 81 mg chewable tablet Take 1 Tab by mouth daily. 11/5/18  Yes Yury DIA MD   torsemide (DEMADEX) 20 mg tablet Take 1 Tab by mouth daily.  10/8/18  Yes Jennie Castanon MD GAURANG   atorvastatin (LIPITOR) 20 mg tablet Take 1 Tab by mouth nightly. 10/4/18  Yes Prabha Meza MD   carvedilol (COREG) 3.125 mg tablet Take 1 Tab by mouth two (2) times daily (with meals). 9/5/18  Yes Prabha Meza MD   omeprazole (PRILOSEC) 20 mg capsule Take 1 Cap by mouth daily. 9/5/18  Yes Prabha Meza MD   fluticasone [facility]) 50 mcg/actuation nasal spray 2 Sprays by Both Nostrils route daily. 3/29/18  Yes Arpan Mata MD   melatonin 3 mg tablet Take 3 mg by mouth nightly. Yes Provider, Sami   albuterol (VENTOLIN HFA) 90 mcg/actuation inhaler Take 2 Puffs by inhalation four (4) times daily. 2/6/18  Yes Rupali Rascon NP   polyethylene glycol (MIRALAX) 17 gram packet Take 1 Packet by mouth daily. Patient taking differently: Take 17 g by mouth daily as needed. 1/13/18  Yes Belén MERLOS NP   albuterol-ipratropium (DUO-NEB) 2.5 mg-0.5 mg/3 ml nebu 3 mL by Nebulization route four (4) times daily. Diaignosis--J44.9 12/13/17  Yes Rupali Rascon NP   levETIRAcetam (KEPPRA) 500 mg tablet Take 1 Tab by mouth two (2) times a day. 10/23/18   Gerardo Alatorre MD   allopurinol (ZYLOPRIM) 100 mg tablet TAKE 1 TABLET BY MOUTH EVERY DAY 9/25/18   Prabha Meza MD       Allergies   Allergen Reactions    Pcn [Penicillins] Other (comments)     \"makes my heart stop\"        Review of Systems:  A comprehensive review of systems was negative except for:   Constitutional: Positive for fatigue. Objective:     Visit Vitals  /86 (BP 1 Location: Right arm, BP Patient Position: At rest)   Pulse (!) 101   Temp 97.8 °F (36.6 °C)   Resp 18   Wt 212 lb 8 oz (96.4 kg)   SpO2 98%   BMI 31.38 kg/m²        Physical Exam:    General:  Cooperative. No acute distress. Eyes:  Conjunctivae/corneas clear    Nose: Nares normal. Septum midline.    Neck: Supple, symmetrical, trachea midline   Lungs:   Clear to auscultation bilaterally, unlabored   Heart:  Regular rate and rhythm, no murmur Abdomen:   Soft, non-tender, non-distended   Extremities: Normal, atraumatic, no cyanosis. Trace edema   Skin: Skin color, texture, turgor normal.    Neurologic: Nonfocal   Psych: Alert and oriented      Assessment:     Hospital Problems  Date Reviewed: 6/6/2018          Codes Class Noted POA    Atrial fibrillation ([facility] ICD-10-CM: I48.91  ICD-9-CM: 427.31  11/27/2018 Unknown        * (Principal) Acute on chronic systolic (congestive) heart failure ([facility] ICD-10-CM: I50.23  ICD-9-CM: 428.23, 428.0  11/26/2018 Unknown        COPD exacerbation ([facility] ICD-10-CM: J44.1  ICD-9-CM: 491.21  11/26/2018 Unknown        Seizure disorder ([facility] ICD-10-CM: G40.909  ICD-9-CM: 345.90  11/14/2018 Yes        Type 2 diabetes with nephropathy ([facility] ICD-10-CM: E11.21  ICD-9-CM: 250.40, 583.81  10/8/2018 Yes        CKD (chronic kidney disease) stage 3, GFR 30-59 ml/min (HCC) (Chronic) ICD-10-CM: N18.3  ICD-9-CM: 585.3  9/29/2017 Yes        Hypertension (Chronic) ICD-10-CM: I10  ICD-9-CM: 401.9  9/29/2017 Yes        COPD, moderate ([facility] (Chronic) ICD-10-CM: J44.9  ICD-9-CM: 567  9/29/2017 Yes    Overview Signed 12/27/2015 12:38 PM by Liliya Montero NP     Complete PFTs: 7/20/15:  Spirometry is consistent with a moderate obstructive/restrictive defect. . The residual volume is increased relative to other lung volumes suggesting air-trapping. The diffusion capacity corrected for alveolar volume was normal suggesting no loss of alveolo-capillary units.               Mixed hyperlipidemia (Chronic) ICD-10-CM: D18.6  ICD-9-CM: 272.2  9/28/2016 Yes              Signed By: Hiram Sawant NP     November 29, 2018

## 2018-11-29 NOTE — PROGRESS NOTES
Problem: Self Care Deficits Care Plan (Adult) Goal: *Acute Goals and Plan of Care (Insert Text) 1. Pt will toilet with SBA 2. Pt will complete functional mobility for ADLs with SBA 3. Pt will complete lower body dressing with SBA using AE as needed 4. Pt will complete grooming and hygiene at sink with SBA 5. Pt will demonstrate independence with HEP to promote increased BUE strength and functional use for ADLs 6. Pt will tolerate 23 minutes functional activity with 2 or fewer rest breaks to promote increased endurance for ADLs 7. Pt will complete bed mobility with SBA in prep for ADLs Timeframe: 7 days OCCUPATIONAL THERAPY: Initial Assessment and Treatment Day: Day of Assessment 11/29/2018INPATIENT: Hospital Day: 4 Payor: Laura Bird / Plan: [address] [place] [address] HMO / Product Type: [employer] Medicare /  
  
NAME/AGE/GENDER: Lynette Jj is a 74 y.o. male PRIMARY DIAGNOSIS:  Acute on chronic systolic (congestive) heart failure (HCC) Acute on chronic systolic (congestive) heart failure (HCC) Acute on chronic systolic (congestive) heart failure ([address] ICD-10: Treatment Diagnosis:  
 · Generalized Muscle Weakness (M62.81) Precautions/Allergies: 
   Pcn [penicillins] ASSESSMENT:  
Mr. Patricia Vuong was admitted with the above, c/o SOB, LE edema, and fatigue. Pt lives with his wife and was independent with ADLs at baseline, occasionally uses a cane or RW for mobility but reports that he normally does not use an AD. This session, pt presented with deficits in endurance, mobility, balance, and cognition impacting ADLs. Pt oriented to person and situation, aware that he is in the hospital but not which one, disoriented to time. Pt demonstrated decreased memory, attention, and problem solving, processing, and mild confusion at times, required min-mod cues for problem solving.  Pt required min assistance to change gown with cues to locate sleeves rather than attempting to don through button holes. Pt donned socks with CGA seated EOB. Pt stood with CGA, completed mobility in room with CGA using RW. Pt completed ADLs at sink with CGA, min cues to locate ADL items. Pt reported mild fatigue following session. Pt is below his functional baseline and would benefit from skilled OT services to address deficits. This section established at most recent assessment PROBLEM LIST (Impairments causing functional limitations): 1. Decreased Strength 2. Decreased ADL/Functional Activities 3. Decreased Transfer Abilities 4. Decreased Balance 5. Decreased Activity Tolerance 6. Increased Shortness of Breath 7. Decreased Cognition INTERVENTIONS PLANNED: (Benefits and precautions of occupational therapy have been discussed with the patient.) 1. Activities of daily living training 2. Adaptive equipment training 3. Balance training 4. Therapeutic activity 5. Therapeutic exercise TREATMENT PLAN: Frequency/Duration: Follow patient 3 times/ week to address above goals. Rehabilitation Potential For Stated Goals: Good RECOMMENDED REHABILITATION/EQUIPMENT: (at time of discharge pending progress): Due to the probability of continued deficits (see above) this patient will likely need continued skilled occupational therapy after discharge. Equipment:  
? None at this time OCCUPATIONAL PROFILE AND HISTORY:  
History of Present Injury/Illness (Reason for Referral): 
See H&P Past Medical History/Comorbidities:  
Mr. Peg Thompson  has a past medical history of Acute CHF (congestive heart failure) ([address] Acute combined systolic and diastolic congestive heart failure ([address] Chronic obstructive pulmonary disease ([address] De Quervain's tenosynovitis, right, Dyspnea on exertion, Elevated serum creatinine, Elevated troponin, History of coronary artery disease, History of right knee surgery, History of shingles, Malignant hypertension, and MI (myocardial infarction) ([facility] Mr. Clementine Zurita  has a past surgical history that includes hx knee arthroscopy (Right); hx heart catheterization (6/29/2015); and ABDOMINAL FAT PAD BIOPSY (N/A, 5/29/2018). Social History/Living Environment:  
Home Environment: Private residence # Steps to Enter: 4 One/Two Story Residence: One story Living Alone: No 
Support Systems: Spouse/Significant Other/Partner Patient Expects to be Discharged to[de-identified] Private residence Current DME Used/Available at Home: Cane, straight, Walker, rolling, Oxygen, portable, Shower chair Tub or Shower Type: Tub/Shower combination Prior Level of Function/Work/Activity: 
Pt lives with his wife and was independent with ADLs at baseline, occasionally uses a cane or RW for mobility but reports that he normally does not use an AD. Number of Personal Factors/Comorbidities that affect the Plan of Care: Brief history (0):  LOW COMPLEXITY ASSESSMENT OF OCCUPATIONAL PERFORMANCE[de-identified]  
Activities of Daily Living:  
Basic ADLs (From Assessment) Complex ADLs (From Assessment) Feeding: Independent Oral Facial Hygiene/Grooming: Contact guard assistance Bathing: Contact guard assistance Upper Body Dressing: Minimum assistance Lower Body Dressing: Contact guard assistance Toileting: Contact guard assistance Instrumental ADL Meal Preparation: Moderate assistance Homemaking: Moderate assistance Medication Management: Moderate assistance Financial Management: Moderate assistance Grooming/Bathing/Dressing Activities of Daily Living Grooming Grooming Assistance: Contact guard assistance Cognitive Retraining Safety/Judgement: Fall prevention Upper Body Dressing Assistance Hospital Gown: Minimum  assistance Functional Transfers Bathroom Mobility: Contact guard assistance Lower Body Dressing Assistance Socks: Contact guard assistance Bed/Mat Mobility Supine to Sit: Stand-by assistance Sit to Supine: Stand-by assistance Sit to Stand: Contact guard assistance Most Recent Physical Functioning:  
Gross Assessment: 
AROM: Within functional limits Strength: Generally decreased, functional 
Coordination: Within functional limits Tone: Normal 
         
  
Posture: 
Posture (WDL): Exceptions to [facility] Posture Assessment: Trunk flexion, Rounded shoulders, Forward head Balance: 
Sitting: Intact Standing: Impaired Standing - Static: Fair Standing - Dynamic : Fair Bed Mobility: 
Supine to Sit: Stand-by assistance Sit to Supine: Stand-by assistance Wheelchair Mobility: 
  
Transfers: 
Sit to Stand: Contact guard assistance Stand to Sit: Stand-by assistance Patient Vitals for the past 6 hrs: 
 BP BP Patient Position SpO2 O2 Flow Rate (L/min) Pulse 11/29/18 0725 96/66 At rest 93 %  99  
11/29/18 0829   100 % 2 l/min  Mental Status Neurologic State: Alert Orientation Level: Oriented to person, Oriented to situation, Disoriented to time(Pt aware that he is in the hospital, but not which one) Cognition: Follows commands, Decreased attention/concentration Perception: Appears intact Perseveration: No perseveration noted Safety/Judgement: Fall prevention Physical Skills Involved: 
1. Balance 2. Strength 3. Activity Tolerance Cognitive Skills Affected (resulting in the inability to perform in a timely and safe manner): 1. Short Term Recall 2. Long Term Memory 3. Sustained Attention 4. Divided Attention 5. Comprehension Psychosocial Skills Affected: 1. Habits/Routines 2. Environmental Adaptation Number of elements that affect the Plan of Care: 3-5:  MODERATE COMPLEXITY CLINICAL DECISION MAKING:  
[facility] AM-PAC 6 Clicks Daily Activity Inpatient Short Form How much help from another person does the patient currently need. .. Total A Lot A Little None 1. Putting on and taking off regular lower body clothing?    [] 1   [] 2   [x] 3   [] 4  
 2.  Bathing (including washing, rinsing, drying)? [] 1   [] 2   [x] 3   [] 4  
3. Toileting, which includes using toilet, bedpan or urinal?   [] 1   [] 2   [x] 3   [] 4  
4. Putting on and taking off regular upper body clothing? [] 1   [] 2   [x] 3   [] 4  
5. Taking care of personal grooming such as brushing teeth? [] 1   [] 2   [] 3   [x] 4  
6. Eating meals? [] 1   [] 2   [] 3   [x] 4  
© 2007, Trustees of [facility], under license to [employer]. All rights reserved Score:  Initial: 20 Most Recent: X (Date: -- ) Interpretation of Tool:  Represents activities that are increasingly more difficult (i.e. Bed mobility, Transfers, Gait). Score 24 23 22-20 19-15 14-10 9-7 6 Modifier CH CI CJ CK CL CM CN   
 
? Self Care:  
  - CURRENT STATUS: CJ - 20%-39% impaired, limited or restricted  - GOAL STATUS: CI - 1%-19% impaired, limited or restricted  - D/C STATUS:  ---------------To be determined--------------- Payor: Carole Teran / Plan: [address] [place] [address] HMO / Product Type: [employer] Medicare /   
 
Medical Necessity:    
· Patient demonstrates good rehab potential due to higher previous functional level. Reason for Services/Other Comments: 
· Patient continues to require present interventions due to patient's inability to independently complete ADLs. Use of outcome tool(s) and clinical judgement create a POC that gives a: LOW COMPLEXITY  
 
 
 
TREATMENT:  
(In addition to Assessment/Re-Assessment sessions the following treatments were rendered) Pre-treatment Symptoms/Complaints:   
Pain: Initial:  
Pain Intensity 1: 0  Post Session:  0 Self Care: (8 min): Procedure(s) (per grid) utilized to improve and/or restore self-care/home management as related to dressing and grooming. Required minimal visual and verbal cueing to facilitate activities of daily living skills and compensatory activities.  
 
Braces/Orthotics/Lines/Etc:  
 · O2 Device: Nasal cannula Treatment/Session Assessment:   
· Response to Treatment:  No adverse reaction · Interdisciplinary Collaboration:  
o Occupational Therapist 
o Registered Nurse · After treatment position/precautions:  
o Supine in bed 
o Bed/Chair-wheels locked 
o Bed in low position 
o Call light within reach 
o RN notified · Compliance with Program/Exercises: Will assess as treatment progresses. · Recommendations/Intent for next treatment session: \"Next visit will focus on advancements to more challenging activities and reduction in assistance provided\". Total Treatment Duration: OT Patient Time In/Time Out Time In: 5887 Time Out: 4685 Drew Perez OT

## 2018-11-29 NOTE — PROGRESS NOTES
Problem: Mobility Impaired (Adult and Pediatric) Goal: *Acute Goals and Plan of Care (Insert Text) STG: 
(1.)Mr. Debbie Cespedes will move from supine to sit and sit to supine  with MODIFIED INDEPENDENCE within 5 treatment day(s). (2.)Mr. Debbie Cespedes will transfer from bed to chair and chair to bed with SUPERVISION using the least restrictive device within 5 treatment day(s). (3.)Mr. Debbie Cespedes will ambulate with SUPERVISION for 150 feet with the least restrictive device within 5 treatment day(s). LTG: 
(1.)Mr. Love ascend/descend 4 steps w/ rails per home environment w/ CGA within 10 treatment day(s). (2.)Mr. Debbie Cespedes will transfer from bed to chair and chair to bed with MODIFIED INDEPENDENCE using the least restrictive device within 10 treatment day(s). (3.)Mr. Debbie Cespedes will ambulate with MODIFIED INDEPENDENCE for 200 feet with the least restrictive device within 10 treatment day(s). ________________________________________________________________________________________________ PHYSICAL THERAPY: Daily Note, Treatment Day: 1st, AM 11/29/2018INPATIENT: Hospital Day: 4 Payor: Cydney Jaramillo / Plan: [address] [place] [address] HMO / Product Type: [employer] Care Medicare /  
  
NAME/AGE/GENDER: Hilda Avelar is a 74 y.o. male PRIMARY DIAGNOSIS: Acute on chronic systolic (congestive) heart failure (HCC) Acute on chronic systolic (congestive) heart failure (HCC) Acute on chronic systolic (congestive) heart failure ([facility] ICD-10: Treatment Diagnosis:  
 · Generalized Muscle Weakness (M62.81) · Other abnormalities of gait and mobility (R26.89) Precaution/Allergies: 
Pcn [penicillins] ASSESSMENT:  
Mr. Debbie Cespedes is a 75 y/o male adm. W/ CHF &  exacerbation. Patient is sitting edge of bed with the bed rails up, patient does not provide a good answer when asked how and why. Sit to stand with supervision for safety. Transfers to the recliner, saturations checked. 94% on 3L O2.   Gait training with rolling walker x 250 feet with slow danish. Patient did require verbal cues to safety walker distance and hand placement on gripper part of the walker. Patient is returned to the recliner and appears a little SOB. Saturations 96%. PCT arrives . Patient is left sitting in the recliner with needs within reach. Good session. Patient is making good progress with gait distance today. Patient appears a little confused today but remains very pleasant and cooperative. Therefore, pt. Cont. To benefit from PT services. Will continue PT efforts. This section established at most recent assessment PROBLEM LIST (Impairments causing functional limitations): 1. Decreased Strength 2. Decreased ADL/Functional Activities 3. Decreased Transfer Abilities 4. Decreased Ambulation Ability/Technique 5. Decreased Balance 6. Decreased Activity Tolerance 7. Decreased Pacing Skills 8. Decreased Work Simplification/Energy Conservation Techniques 9. Increased Shortness of Breath INTERVENTIONS PLANNED: (Benefits and precautions of physical therapy have been discussed with the patient.) 1. Balance Exercise 2. Bed Mobility 3. Gait Training 4. Therapeutic Activites 5. Therapeutic Exercise/Strengthening 6. Transfer Training TREATMENT PLAN: Frequency/Duration: 4 times a week for duration of hospital stay Rehabilitation Potential For Stated Goals: Good RECOMMENDED REHABILITATION/EQUIPMENT: (at time of discharge pending progress): Due to the probability of continued deficits (see above) this patient will likely need continued skilled physical therapy after discharge. Equipment: ? TBD HISTORY:  
History of Present Injury/Illness (Reason for Referral): PT. Adm. W/ 3-4 day h/o increased dyspnea, cough, LE edema, & orthopnea. Workup revealed COPD & CHF exacerbation.  
Past Medical History/Comorbidities:  
Mr. Vera Richard  has a past medical history of Acute CHF (congestive heart failure) ([facility] Acute combined systolic and diastolic congestive heart failure ([facility] Chronic obstructive pulmonary disease (HCC), De Quervain's tenosynovitis, right, Dyspnea on exertion, Elevated serum creatinine, Elevated troponin, History of coronary artery disease, History of right knee surgery, History of shingles, Malignant hypertension, and MI (myocardial infarction) ([facility] Mr. Vera Richard  has a past surgical history that includes hx knee arthroscopy (Right); hx heart catheterization (6/29/2015); and ABDOMINAL FAT PAD BIOPSY (N/A, 5/29/2018). Social History/Living Environment:  
Home Environment: Private residence # Steps to Enter: 4 One/Two Story Residence: One story Living Alone: No 
Support Systems: Spouse/Significant Other/Partner Patient Expects to be Discharged to[de-identified] Private residence Current DME Used/Available at Home: Cane, straight, Walker, rolling, Oxygen, portable, Shower chair Tub or Shower Type: Tub/Shower combination Prior Level of Function/Work/Activity: 
Pt. Reports he ambulates independently w/ SPC or RW, depending on the day & level of fatigue. Number of Personal Factors/Comorbidities that affect the Plan of Care: 3+: HIGH COMPLEXITY EXAMINATION:  
Most Recent Physical Functioning:  
Gross Assessment: 
  
         
  
Posture: 
  
Balance: 
Sitting: Intact Standing: Impaired Standing - Static: Fair Standing - Dynamic : Fair Bed Mobility: 
  
Wheelchair Mobility: 
  
Transfers: 
Sit to Stand: Contact guard assistance Stand to Sit: Stand-by assistance Gait: 
  
Base of Support: Center of gravity altered; Widened Speed/Lu: Slow Distance (ft): 250 Feet (ft) Assistive Device: Gait belt;Walker, rolling Ambulation - Level of Assistance: Contact guard assistance Body Structures Involved: 1. Heart 2. Lungs 3. Muscles Body Functions Affected: 1. Cardio 2. Respiratory 3. Neuromusculoskeletal 
4. Movement Related Activities and Participation Affected: 1. General Tasks and Demands 2. Mobility 3. Self Care 4. Community, Social and [address] [place], [place] [address]y  Number of elements that affect the Plan of Care: 4+: HIGH COMPLEXITY CLINICAL PRESENTATION:  
Presentation: Evolving clinical presentation with changing clinical characteristics: MODERATE COMPLEXITY CLINICAL DECISION MAKING:  
[facility] AM-PAC 6 Clicks Basic Mobility Inpatient Short Form How much difficulty does the patient currently have. .. Unable A Lot A Little None 1. Turning over in bed (including adjusting bedclothes, sheets and blankets)? [] 1   [] 2   [] 3   [x] 4  
2. Sitting down on and standing up from a chair with arms ( e.g., wheelchair, bedside commode, etc.)   [] 1   [] 2   [x] 3   [] 4  
3. Moving from lying on back to sitting on the side of the bed? [] 1   [] 2   [] 3   [x] 4 How much help from another person does the patient currently need. .. Total A Lot A Little None 4. Moving to and from a bed to a chair (including a wheelchair)? [] 1   [] 2   [x] 3   [] 4  
5. Need to walk in hospital room? [] 1   [] 2   [x] 3   [] 4  
6. Climbing 3-5 steps with a railing? [] 1   [] 2   [x] 3   [] 4  
© 2007, Trustees of [facility], under license to [employer]. All rights reserved Score:  Initial: 14 Most Recent: X (Date: -- ) Interpretation of Tool:  Represents activities that are increasingly more difficult (i.e. Bed mobility, Transfers, Gait). Score 24 23 22-20 19-15 14-10 9-7 6 Modifier CH CI CJ CK CL CM CN   
 
? Mobility - Walking and Moving Around:  
  - CURRENT STATUS: CL - 60%-79% impaired, limited or restricted  - GOAL STATUS: CI - 1%-19% impaired, limited or restricted  - D/C STATUS:  ---------------To be determined--------------- Payor: Cydney Jaramillo / Plan: [address] [place] [address] HMO / Product Type: [employer] Care Medicare /   
 
Medical Necessity:    
· Patient demonstrates good rehab potential due to higher previous functional level. Reason for Services/Other Comments: 
· Patient continues to demonstrate capacity to improve strength, balance, & endurance which will increase independence, decrease amount of assistance required from caregiver and increase safety. Use of outcome tool(s) and clinical judgement create a POC that gives a: Questionable prediction of patient's progress: MODERATE COMPLEXITY  
  
 
 
 
TREATMENT:  
(In addition to Assessment/Re-Assessment sessions the following treatments were rendered) Pre-treatment Symptoms/Complaints:  \"OK\" Pain: Initial:  
Pain Intensity 1: 0  Post Session:  No c/o pain Gait Training (   minutes):  Gait training to improve and/or restore physical functioning as related to mobility. Ambulated 250 Feet (ft) with Contact guard assistance using a Gait belt;Walker, rolling   Pt. Required VCs for safe mangement of RW as tends to keep too far anteriorly. Therapeutic Activity: (    27 minutes): Therapeutic activities including transfers, sit to stand and gait training to improve mobility, strength, balance and coordination. Required min assist to contact guard assist for safety    to promote coordination of bilateral, lower extremity(s). Braces/Orthotics/Lines/Etc:  
· O2 Device: Nasal cannula, Humidifier Treatment/Session Assessment:   
· Response to Treatment:  Tolerated well  Some confusion noted · Interdisciplinary Collaboration:  
o Physical Therapy Assistant 
o Registered Nurse 
o Certified Nursing Assistant/Patient Care Technician · After treatment position/precautions:  
o Up in chair 
o Bed/Chair-wheels locked 
o Caregiver at bedside 
o Call light within reach 
o RN notified · Compliance with Program/Exercises: Compliant all of the time · Recommendations/Intent for next treatment session: \"Next visit will focus on advancements to more challenging activities and reduction in assistance provided\". Total Treatment Duration: PT Patient Time In/Time Out 
 Time In: 1 Time Out: 1030 Phil Read, PTA

## 2018-11-30 ENCOUNTER — PATIENT OUTREACH (OUTPATIENT)
Dept: CASE MANAGEMENT | Age: 73
End: 2018-11-30

## 2018-11-30 LAB
ANION GAP SERPL CALC-SCNC: 8 MMOL/L (ref 7–16)
BASOPHILS # BLD: 0 K/UL (ref 0–0.2)
BASOPHILS NFR BLD: 0 % (ref 0–2)
BUN SERPL-MCNC: 27 MG/DL (ref 8–23)
CALCIUM SERPL-MCNC: 8.3 MG/DL (ref 8.3–10.4)
CHLORIDE SERPL-SCNC: 110 MMOL/L (ref 98–107)
CO2 SERPL-SCNC: 27 MMOL/L (ref 21–32)
CREAT SERPL-MCNC: 1.52 MG/DL (ref 0.8–1.5)
DIFFERENTIAL METHOD BLD: ABNORMAL
EOSINOPHIL # BLD: 0 K/UL (ref 0–0.8)
EOSINOPHIL NFR BLD: 0 % (ref 0.5–7.8)
ERYTHROCYTE [DISTWIDTH] IN BLOOD BY AUTOMATED COUNT: 15.8 % (ref 11.9–14.6)
GLUCOSE SERPL-MCNC: 109 MG/DL (ref 65–100)
HCT VFR BLD AUTO: 43 % (ref 41.1–50.3)
HGB BLD-MCNC: 13.7 G/DL (ref 13.6–17.2)
IMM GRANULOCYTES # BLD: 0.1 K/UL (ref 0–0.5)
IMM GRANULOCYTES NFR BLD AUTO: 1 % (ref 0–5)
INR PPP: 1.7
LYMPHOCYTES # BLD: 2.5 K/UL (ref 0.5–4.6)
LYMPHOCYTES NFR BLD: 28 % (ref 13–44)
MCH RBC QN AUTO: 28.1 PG (ref 26.1–32.9)
MCHC RBC AUTO-ENTMCNC: 31.9 G/DL (ref 31.4–35)
MCV RBC AUTO: 88.3 FL (ref 79.6–97.8)
MONOCYTES # BLD: 0.7 K/UL (ref 0.1–1.3)
MONOCYTES NFR BLD: 8 % (ref 4–12)
NEUTS SEG # BLD: 5.7 K/UL (ref 1.7–8.2)
NEUTS SEG NFR BLD: 63 % (ref 43–78)
NRBC # BLD: 0 K/UL (ref 0–0.2)
PLATELET # BLD AUTO: 163 K/UL (ref 150–450)
PMV BLD AUTO: 11 FL (ref 9.4–12.3)
POTASSIUM SERPL-SCNC: 3.7 MMOL/L (ref 3.5–5.1)
PROTHROMBIN TIME: 19 SEC (ref 11.5–14.5)
RBC # BLD AUTO: 4.87 M/UL (ref 4.23–5.6)
SODIUM SERPL-SCNC: 145 MMOL/L (ref 136–145)
WBC # BLD AUTO: 9.1 K/UL (ref 4.3–11.1)

## 2018-11-30 PROCEDURE — 94640 AIRWAY INHALATION TREATMENT: CPT

## 2018-11-30 PROCEDURE — 85025 COMPLETE CBC W/AUTO DIFF WBC: CPT

## 2018-11-30 PROCEDURE — 74011250636 HC RX REV CODE- 250/636: Performed by: NURSE PRACTITIONER

## 2018-11-30 PROCEDURE — 85610 PROTHROMBIN TIME: CPT

## 2018-11-30 PROCEDURE — 80048 BASIC METABOLIC PNL TOTAL CA: CPT

## 2018-11-30 PROCEDURE — 36415 COLL VENOUS BLD VENIPUNCTURE: CPT

## 2018-11-30 PROCEDURE — 94760 N-INVAS EAR/PLS OXIMETRY 1: CPT

## 2018-11-30 PROCEDURE — 77010033678 HC OXYGEN DAILY

## 2018-11-30 PROCEDURE — 74011250637 HC RX REV CODE- 250/637: Performed by: FAMILY MEDICINE

## 2018-11-30 PROCEDURE — 74011250636 HC RX REV CODE- 250/636: Performed by: FAMILY MEDICINE

## 2018-11-30 PROCEDURE — 74011250637 HC RX REV CODE- 250/637: Performed by: INTERNAL MEDICINE

## 2018-11-30 PROCEDURE — 99222 1ST HOSP IP/OBS MODERATE 55: CPT | Performed by: NURSE PRACTITIONER

## 2018-11-30 PROCEDURE — 65660000000 HC RM CCU STEPDOWN

## 2018-11-30 PROCEDURE — 97530 THERAPEUTIC ACTIVITIES: CPT

## 2018-11-30 PROCEDURE — 74011000250 HC RX REV CODE- 250: Performed by: INTERNAL MEDICINE

## 2018-11-30 RX ORDER — CARVEDILOL 6.25 MG/1
6.25 TABLET ORAL 2 TIMES DAILY WITH MEALS
Status: DISCONTINUED | OUTPATIENT
Start: 2018-11-30 | End: 2018-12-03 | Stop reason: HOSPADM

## 2018-11-30 RX ADMIN — IPRATROPIUM BROMIDE AND ALBUTEROL SULFATE 3 ML: .5; 3 SOLUTION RESPIRATORY (INHALATION) at 07:37

## 2018-11-30 RX ADMIN — GUAIFENESIN 600 MG: 600 TABLET, EXTENDED RELEASE ORAL at 09:24

## 2018-11-30 RX ADMIN — Medication 10 ML: at 05:13

## 2018-11-30 RX ADMIN — IPRATROPIUM BROMIDE AND ALBUTEROL SULFATE 3 ML: .5; 3 SOLUTION RESPIRATORY (INHALATION) at 02:05

## 2018-11-30 RX ADMIN — LEVETIRACETAM 500 MG: 500 TABLET ORAL at 17:02

## 2018-11-30 RX ADMIN — BUDESONIDE 500 MCG: 0.5 INHALANT RESPIRATORY (INHALATION) at 19:47

## 2018-11-30 RX ADMIN — IPRATROPIUM BROMIDE AND ALBUTEROL SULFATE 3 ML: .5; 3 SOLUTION RESPIRATORY (INHALATION) at 13:15

## 2018-11-30 RX ADMIN — CARVEDILOL 3.12 MG: 3.12 TABLET, FILM COATED ORAL at 09:24

## 2018-11-30 RX ADMIN — ENOXAPARIN SODIUM 100 MG: 100 INJECTION SUBCUTANEOUS at 09:32

## 2018-11-30 RX ADMIN — DOXYCYCLINE HYCLATE 100 MG: 100 CAPSULE ORAL at 09:23

## 2018-11-30 RX ADMIN — ATORVASTATIN CALCIUM 20 MG: 10 TABLET, FILM COATED ORAL at 21:09

## 2018-11-30 RX ADMIN — GUAIFENESIN 600 MG: 600 TABLET, EXTENDED RELEASE ORAL at 21:09

## 2018-11-30 RX ADMIN — Medication 10 ML: at 21:09

## 2018-11-30 RX ADMIN — Medication 5 ML: at 14:00

## 2018-11-30 RX ADMIN — BUDESONIDE 500 MCG: 0.5 INHALANT RESPIRATORY (INHALATION) at 07:37

## 2018-11-30 RX ADMIN — DOXYCYCLINE HYCLATE 100 MG: 100 CAPSULE ORAL at 21:09

## 2018-11-30 RX ADMIN — ALLOPURINOL 100 MG: 100 TABLET ORAL at 09:24

## 2018-11-30 RX ADMIN — ASPIRIN 81 MG 81 MG: 81 TABLET ORAL at 09:24

## 2018-11-30 RX ADMIN — ENOXAPARIN SODIUM 100 MG: 100 INJECTION SUBCUTANEOUS at 21:09

## 2018-11-30 RX ADMIN — WARFARIN SODIUM 2.5 MG: 2 TABLET ORAL at 17:02

## 2018-11-30 RX ADMIN — PANTOPRAZOLE SODIUM 40 MG: 40 TABLET, DELAYED RELEASE ORAL at 05:12

## 2018-11-30 RX ADMIN — IPRATROPIUM BROMIDE AND ALBUTEROL SULFATE 3 ML: .5; 3 SOLUTION RESPIRATORY (INHALATION) at 19:47

## 2018-11-30 RX ADMIN — FUROSEMIDE 40 MG: 10 INJECTION, SOLUTION INTRAMUSCULAR; INTRAVENOUS at 17:00

## 2018-11-30 RX ADMIN — LEVETIRACETAM 500 MG: 500 TABLET ORAL at 09:24

## 2018-11-30 RX ADMIN — CARVEDILOL 6.25 MG: 6.25 TABLET, FILM COATED ORAL at 17:02

## 2018-11-30 RX ADMIN — FUROSEMIDE 40 MG: 10 INJECTION, SOLUTION INTRAMUSCULAR; INTRAVENOUS at 09:23

## 2018-11-30 NOTE — PROGRESS NOTES
Problem: Interdisciplinary Rounds Goal: Interdisciplinary Rounds Interdisciplinary team rounds were held 11/30/2018 with the following team members:Care Management, Physical Therapy, Physician and Clinical Coordinator and the patient. Plan of care discussed. See clinical pathway and/or care plan for interventions and desired outcomes.

## 2018-11-30 NOTE — PROGRESS NOTES
Patient stable with no complaints at this time. Patient sitting up in recliner with wife at side. Wife instructed to take home medications home. Patient has been very sleepy today but does wake and answer questions appropriately. Call light within reach and patient instructed to call if assistance is needed. Report to be given to oncoming RN 7p-7a

## 2018-11-30 NOTE — ACP (ADVANCE CARE PLANNING)
Counseled on the chronic and progressive nature of CHF, and newly found atrial thrombus requiring anticoagulation. Pt has a HCPOA on file, naming his wife Florecita Ronquillo, as agent. Reviewed his wishes regarding life support and resuscitation. He requests full code status and support, which is consistent with his previous stated wishes. He plans on returning home at discharge, with the support of his wife and daughter.

## 2018-11-30 NOTE — PROGRESS NOTES
In accordance with Medicare Guidelines, a copy of the Important Letter from Medicare was presented to the patient in anticipation of expected discharge within 48 hours. Patient was unable to sign Important Letter to Medicare which was put in patient's chart; patient was provided with unsigned copy of Important Letter to Medicare. Patient's Care Managers notified.

## 2018-11-30 NOTE — PROGRESS NOTES
Discussed patient status during IDT rounds. He has been changed to Coumadin therapy and we are awaiting therapeutic INR results prior to discharge home. We will resume home health care through [address] [place] [address] when ready for discharge. No other discharge planning needs identified at this time. Case Management will continue to follow. Care Management Interventions PCP Verified by CM: Yes Transition of Care Consult (CM Consult): Discharge Planning, Home Health [facility] - INPATIENT: No 
Reason Outside [place]: Patient already serviced by other home care/hospice agency Discharge Durable Medical Equipment: No 
Physical Therapy Consult: Yes Occupational Therapy Consult: Yes Speech Therapy Consult: No 
Current Support Network: Lives with Spouse, Own Home Confirm Follow Up Transport: Family Plan discussed with Pt/Family/Caregiver: Yes Freedom of Choice Offered: Yes Discharge Location Discharge Placement: Home with home health

## 2018-11-30 NOTE — PROGRESS NOTES
Problem: Falls - Risk of 
Goal: *Absence of Falls Document Benson Yarbrough Fall Risk and appropriate interventions in the flowsheet. Outcome: Progressing Towards Goal 
Fall Risk Interventions: 
Mobility Interventions: Assess mobility with egress test, Bed/chair exit alarm, Communicate number of staff needed for ambulation/transfer, Patient to call before getting OOB, Strengthening exercises (ROM-active/passive), OT consult for ADLs, PT Consult for assist device competence, PT Consult for mobility concerns Medication Interventions: Evaluate medications/consider consulting pharmacy Elimination Interventions: Patient to call for help with toileting needs, Call light in reach History of Falls Interventions: Bed/chair exit alarm, Consult care management for discharge planning, Evaluate medications/consider consulting pharmacy, Door open when patient unattended, Investigate reason for fall

## 2018-11-30 NOTE — PROGRESS NOTES
11/30/18 Spoke with pts wife. Verbalized that pt is in the hospital because of Afib. Nurse will continue to monitor after discharge.

## 2018-11-30 NOTE — ROUTINE PROCESS
Received bedside shift report from 1 [facility], Sandy Alvarado. Patient resting quietly in bed at this time, eyes closed, respirations even and unlabored. Call light within reach.

## 2018-11-30 NOTE — PROGRESS NOTES
Problem: Mobility Impaired (Adult and Pediatric) Goal: *Acute Goals and Plan of Care (Insert Text) STG: 
(1.)Mr. Ruthann Pope will move from supine to sit and sit to supine  with MODIFIED INDEPENDENCE within 5 treatment day(s). (2.)Mr. Ruthann Pope will transfer from bed to chair and chair to bed with SUPERVISION using the least restrictive device within 5 treatment day(s). (3.)Mr. Ruthann Pope will ambulate with SUPERVISION for 150 feet with the least restrictive device within 5 treatment day(s). LTG: 
(1.)Mr. Love ascend/descend 4 steps w/ rails per home environment w/ CGA within 10 treatment day(s). (2.)Mr. Ruthann Pope will transfer from bed to chair and chair to bed with MODIFIED INDEPENDENCE using the least restrictive device within 10 treatment day(s). (3.)Mr. Ruthann Pope will ambulate with MODIFIED INDEPENDENCE for 200 feet with the least restrictive device within 10 treatment day(s). ________________________________________________________________________________________________ PHYSICAL THERAPY: Daily Note, Treatment Day: 2nd, PM 11/30/2018INPATIENT: Hospital Day: 5 Payor: Jennifer Galloway / Plan: [address] [place] [address] HMO / Product Type: Managed Care Medicare /  
  
NAME/AGE/GENDER: Mckayla Rose is a 74 y.o. male PRIMARY DIAGNOSIS: Acute on chronic systolic (congestive) heart failure (HCC) Acute on chronic systolic (congestive) heart failure (HCC) Acute on chronic systolic (congestive) heart failure ([facility] ICD-10: Treatment Diagnosis:  
 · Generalized Muscle Weakness (M62.81) · Other abnormalities of gait and mobility (R26.89) Precaution/Allergies: 
Pcn [penicillins] ASSESSMENT:  
Mr. Ruthann Pope is supine in bed and realized quickly he is very confused. He was agreeable to walking with me but everything took a long time due to his confusion. He was able to jemal his socks and use the bathroom on his own. He was on RA with sats in the 90's throughout.   He was able to walk the [place] with the walker with shuffling steps. He maintained progress made last appointment and his confusion today seemed to be worse. This section established at most recent assessment PROBLEM LIST (Impairments causing functional limitations): 1. Decreased Strength 2. Decreased ADL/Functional Activities 3. Decreased Transfer Abilities 4. Decreased Ambulation Ability/Technique 5. Decreased Balance 6. Decreased Activity Tolerance 7. Decreased Pacing Skills 8. Decreased Work Simplification/Energy Conservation Techniques 9. Increased Shortness of Breath INTERVENTIONS PLANNED: (Benefits and precautions of physical therapy have been discussed with the patient.) 1. Balance Exercise 2. Bed Mobility 3. Gait Training 4. Therapeutic Activites 5. Therapeutic Exercise/Strengthening 6. Transfer Training TREATMENT PLAN: Frequency/Duration: 4 times a week for duration of hospital stay Rehabilitation Potential For Stated Goals: Good RECOMMENDED REHABILITATION/EQUIPMENT: (at time of discharge pending progress): Due to the probability of continued deficits (see above) this patient will likely need continued skilled physical therapy after discharge. Equipment: ? TBD HISTORY:  
History of Present Injury/Illness (Reason for Referral): PT. Adm. W/ 3-4 day h/o increased dyspnea, cough, LE edema, & orthopnea. Workup revealed COPD & CHF exacerbation. Past Medical History/Comorbidities:  
Mr. Sonia Perez  has a past medical history of Acute CHF (congestive heart failure) ([address] Acute combined systolic and diastolic congestive heart failure ([address] Chronic obstructive pulmonary disease ([address] De Quervain's tenosynovitis, right, Dyspnea on exertion, Elevated serum creatinine, Elevated troponin, History of coronary artery disease, History of right knee surgery, History of shingles, Malignant hypertension, and MI (myocardial infarction) ([address]   Mr. Sonia Perez  has a past surgical history that includes hx knee arthroscopy (Right); hx heart catheterization (2015); and ABDOMINAL FAT PAD BIOPSY (N/A, 2018). Social History/Living Environment:  
Home Environment: Private residence # Steps to Enter: 4 One/Two Story Residence: One story Living Alone: No 
Support Systems: Spouse/Significant Other/Partner Patient Expects to be Discharged to[de-identified] Private residence Current DME Used/Available at Home: Cane, straight, Walker, rolling, Oxygen, portable, Shower chair Tub or Shower Type: Tub/Shower combination Prior Level of Function/Work/Activity: 
Pt. Reports he ambulates independently w/ SPC or RW, depending on the day & level of fatigue. Number of Personal Factors/Comorbidities that affect the Plan of Care: 3+: HIGH COMPLEXITY EXAMINATION:  
Most Recent Physical Functioning:  
Gross Assessment: 
  
         
  
Posture: 
  
Balance: 
  Bed Mobility: 
Supine to Sit: Modified independent Wheelchair Mobility: 
  
Transfers: 
Sit to Stand: Supervision Stand to Sit: Independent Gait: 
  
Gait Abnormalities: Decreased step clearance;Shuffling gait; Steppage gait Distance (ft): 250 Feet (ft) Assistive Device: Walker, rolling Ambulation - Level of Assistance: Stand-by assistance;Contact guard assistance Body Structures Involved: 1. Heart 2. Lungs 3. Muscles Body Functions Affected: 1. Cardio 2. Respiratory 3. Neuromusculoskeletal 
4. Movement Related Activities and Participation Affected: 1. General Tasks and Demands 2. Mobility 3. Self Care 4. Community, Social and [place] Number of elements that affect the Plan of Care: 4+: HIGH COMPLEXITY CLINICAL PRESENTATION:  
Presentation: Evolving clinical presentation with changing clinical characteristics: MODERATE COMPLEXITY CLINICAL DECISION MAKIN [facility] [address] [id number] AM-PAC 6 Clicks Basic Mobility Inpatient Short Form How much difficulty does the patient currently have. .. Unable A Lot A Little None 1.  Turning over in bed (including adjusting bedclothes, sheets and blankets)? [] 1   [] 2   [] 3   [x] 4  
2. Sitting down on and standing up from a chair with arms ( e.g., wheelchair, bedside commode, etc.)   [] 1   [] 2   [x] 3   [] 4  
3. Moving from lying on back to sitting on the side of the bed? [] 1   [] 2   [] 3   [x] 4 How much help from another person does the patient currently need. .. Total A Lot A Little None 4. Moving to and from a bed to a chair (including a wheelchair)? [] 1   [] 2   [x] 3   [] 4  
5. Need to walk in hospital room? [] 1   [] 2   [x] 3   [] 4  
6. Climbing 3-5 steps with a railing? [] 1   [] 2   [x] 3   [] 4  
© 2007, Trustees of [facility] [employer], under license to [employer]. All rights reserved Score:  Initial: 14 Most Recent: X (Date: -- ) Interpretation of Tool:  Represents activities that are increasingly more difficult (i.e. Bed mobility, Transfers, Gait). Score 24 23 22-20 19-15 14-10 9-7 6 Modifier CH CI CJ CK CL CM CN   
 
? Mobility - Walking and Moving Around:  
  - CURRENT STATUS: CL - 60%-79% impaired, limited or restricted  - GOAL STATUS: CI - 1%-19% impaired, limited or restricted  - D/C STATUS:  ---------------To be determined--------------- Payor: Marcelle Funes / Plan: [address] [place] [address] HMO / Product Type: Managed Care Medicare /   
 
Medical Necessity:    
· Patient demonstrates good rehab potential due to higher previous functional level. Reason for Services/Other Comments: 
· Patient continues to demonstrate capacity to improve strength, balance, & endurance which will increase independence, decrease amount of assistance required from caregiver and increase safety.   
Use of outcome tool(s) and clinical judgement create a POC that gives a: Questionable prediction of patient's progress: MODERATE COMPLEXITY  
  
 
 
 
TREATMENT:  
(In addition to Assessment/Re-Assessment sessions the following treatments were rendered) Pre-treatment Symptoms/Complaints:  \"you are smooth\" Pain: Initial:  
Pain Intensity 1: 0  Post Session:  No c/o pain Therapeutic Activity: (    25 minutes): Therapeutic activities including Bed transfers, Chair transfers, Toilet transfers and Ambulation on level ground to improve mobility, strength, balance and endurance. Required minimal   to promote dynamic balance in standing. Braces/Orthotics/Lines/Etc:  
· O2 Device: Nasal cannula, Humidifier Treatment/Session Assessment:   
· Response to Treatment:  Tolerated well  Some confusion noted · Interdisciplinary Collaboration:  
o Physical Therapy Assistant 
o Registered Nurse · After treatment position/precautions:  
o Up in chair 
o Bed/Chair-wheels locked 
o Caregiver at bedside 
o Call light within reach 
o RN notified · Compliance with Program/Exercises: Compliant all of the time · Recommendations/Intent for next treatment session: \"Next visit will focus on advancements to more challenging activities and reduction in assistance provided\". Total Treatment Duration: PT Patient Time In/Time Out Time In: 1983 Time Out: 1500 Faisal Lancaster, PTA

## 2018-11-30 NOTE — PROGRESS NOTES
Hospitalist Progress Note Admit Date:  2018  3:15 PM  
Name:  Denilson Schofield. Age:  74 y.o. 
:  1945 MRN:  [id number] PCP:  Keshav Bettencourt MD 
Treatment Team: Attending Provider: Kelsy Mcdaniel DO; Consulting Provider: Mane Hoover MD; Care Manager: Ron Low, RN; Physician: Preston Christian MD 
 
Subjective:  
Patient 9D with pmhx of CHF EF 35%, COPD wears 4lnc at home, HTN, HLP presents with 3-4 days of increasing dyspnea, productive cough, leg edema and orthopnea. Denies fever, chills, nausea, vomiting, change in urine or stool, chest pain. No sick contacts. ED workup notable for , Cr 1.85, ECG with afib (new onset), cxr with pulm vascular congestion. Patient admits to poor compliance with both oxygen and medications. 18 Says sob better On chronic oxygen 4 lit/min 
afib  
 
18 Says sob better PILO left atrial mural thrombus Low normal bp this am 
 
18 Says sob better 18 Says feels better - breathing better Was started on coumadin and lovenox. Mild tachycardia Objective:  
 
Patient Vitals for the past 24 hrs: 
 Temp Pulse Resp BP SpO2  
18 1532 97.5 °F (36.4 °C) (!) 113 22 127/87 97 % 18 1317     98 % 18 1106 97.5 °F (36.4 °C) (!) 112 22 113/86 97 % 18 0739     95 % 18 0729 97.5 °F (36.4 °C) (!) 110 22 (!) 126/92 98 % 18 0401 97.1 °F (36.2 °C) (!) 113 22 128/79 100 % 18 0205     99 % 18 2356 98.6 °F (37 °C) (!) 110 18 120/80   
18 2225     99 % 18 1939 97.4 °F (36.3 °C) 83 18 113/82 100 % Oxygen Therapy O2 Sat (%): 97 % (18 1532) Pulse via Oximetry: 80 beats per minute (18 131) O2 Device: Nasal cannula (18 131) O2 Flow Rate (L/min): 2 l/min (18 131) FIO2 (%): 28 % (18 0227) Intake/Output Summary (Last 24 hours) at 2018 2009 Last data filed at 2018 9391 Gross per 24 hour Intake 956 ml Output 1200 ml Net -244 ml General:    Well nourished. Alert. On oxygen now on 2 lit/min 
heent- normal 
CV:   Irregular irregular No murmur, rub, or gallop. Lungs:   Basilar crepitations Abdomen:   Soft, nontender, nondistended. Cns- no focal neurological deficits Extremities: Warm and dry. No cyanosis or edema. Skin:     No rashes or jaundice. Data Review: 
I have reviewed all labs, meds, telemetry events, and studies from the last 24 hours. Recent Results (from the past 24 hour(s)) PROTHROMBIN TIME + INR Collection Time: 11/29/18  5:29 PM  
Result Value Ref Range Prothrombin time 19.5 (H) 11.5 - 14.5 sec INR 1.7 METABOLIC PANEL, BASIC Collection Time: 11/30/18  5:32 AM  
Result Value Ref Range Sodium 145 136 - 145 mmol/L Potassium 3.7 3.5 - 5.1 mmol/L Chloride 110 (H) 98 - 107 mmol/L  
 CO2 27 21 - 32 mmol/L Anion gap 8 7 - 16 mmol/L Glucose 109 (H) 65 - 100 mg/dL BUN 27 (H) 8 - 23 MG/DL Creatinine 1.52 (H) 0.8 - 1.5 MG/DL  
 GFR est AA 58 (L) >60 ml/min/1.73m2 GFR est non-AA 48 (L) >60 ml/min/1.73m2 Calcium 8.3 8.3 - 10.4 MG/DL  
CBC WITH AUTOMATED DIFF Collection Time: 11/30/18  5:32 AM  
Result Value Ref Range WBC 9.1 4.3 - 11.1 K/uL  
 RBC 4.87 4.23 - 5.6 M/uL  
 HGB 13.7 13.6 - 17.2 g/dL HCT 43.0 41.1 - 50.3 % MCV 88.3 79.6 - 97.8 FL  
 MCH 28.1 26.1 - 32.9 PG  
 MCHC 31.9 31.4 - 35.0 g/dL  
 RDW 15.8 (H) 11.9 - 14.6 % PLATELET 495 658 - 914 K/uL MPV 11.0 9.4 - 12.3 FL ABSOLUTE NRBC 0.00 0.0 - 0.2 K/uL  
 DF AUTOMATED NEUTROPHILS 63 43 - 78 % LYMPHOCYTES 28 13 - 44 % MONOCYTES 8 4.0 - 12.0 % EOSINOPHILS 0 (L) 0.5 - 7.8 % BASOPHILS 0 0.0 - 2.0 % IMMATURE GRANULOCYTES 1 0.0 - 5.0 %  
 ABS. NEUTROPHILS 5.7 1.7 - 8.2 K/UL  
 ABS. LYMPHOCYTES 2.5 0.5 - 4.6 K/UL  
 ABS. MONOCYTES 0.7 0.1 - 1.3 K/UL  
 ABS. EOSINOPHILS 0.0 0.0 - 0.8 K/UL ABS. BASOPHILS 0.0 0.0 - 0.2 K/UL  
 ABS. IMM. GRANS. 0.1 0.0 - 0.5 K/UL PROTHROMBIN TIME + INR Collection Time: 11/30/18  5:32 AM  
Result Value Ref Range Prothrombin time 19.0 (H) 11.5 - 14.5 sec INR 1.7 All Micro Results None Current Meds: 
Current Facility-Administered Medications Medication Dose Route Frequency  carvedilol (COREG) tablet 6.25 mg  6.25 mg Oral BID WITH MEALS  enoxaparin (LOVENOX) injection 100 mg  1 mg/kg SubCUTAneous Q12H  warfarin (COUMADIN) tablet 2.5 mg  2.5 mg Oral QPM  
 doxycycline (VIBRAMYCIN) capsule 100 mg  100 mg Oral Q12H  
 albuterol-ipratropium (DUO-NEB) 2.5 MG-0.5 MG/3 ML  3 mL Nebulization Q6H RT  
 allopurinol (ZYLOPRIM) tablet 100 mg  100 mg Oral DAILY  aspirin chewable tablet 81 mg  81 mg Oral DAILY  atorvastatin (LIPITOR) tablet 20 mg  20 mg Oral QHS  pantoprazole (PROTONIX) tablet 40 mg  40 mg Oral ACB  sodium chloride (NS) flush 5-10 mL  5-10 mL IntraVENous Q8H  
 sodium chloride (NS) flush 5-10 mL  5-10 mL IntraVENous PRN  
 acetaminophen (TYLENOL) tablet 650 mg  650 mg Oral Q4H PRN  
 ondansetron (ZOFRAN) injection 4 mg  4 mg IntraVENous Q4H PRN  
 levETIRAcetam (KEPPRA) tablet 500 mg  500 mg Oral BID  furosemide (LASIX) injection 40 mg  40 mg IntraVENous BID  albuterol (PROVENTIL VENTOLIN) nebulizer solution 2.5 mg  2.5 mg Nebulization Q4H PRN  
 budesonide (PULMICORT) 500 mcg/2 ml nebulizer suspension  500 mcg Nebulization BID RT  
 guaiFENesin ER (MUCINEX) tablet 600 mg  600 mg Oral Q12H Other Studies (last 24 hours): No results found. Assessment and Plan:  
 
Hospital Problems as of 11/30/2018 Date Reviewed: 6/6/2018 Codes Class Noted - Resolved POA Atrial fibrillation [facility]) ICD-10-CM: I48.91 
ICD-9-CM: 427.31  11/27/2018 - Present Unknown  * (Principal) Acute on chronic systolic (congestive) heart failure (HCC) ICD-10-CM: W63.75 
 ICD-9-CM: 428.23, 428.0  11/26/2018 - Present Unknown COPD exacerbation ([facility] ICD-10-CM: J44.1 ICD-9-CM: 491.21  11/26/2018 - Present Unknown Seizure disorder ([facility] ICD-10-CM: G40.909 ICD-9-CM: 345.90  11/14/2018 - Present Yes Stage 2 chronic kidney disease ICD-10-CM: N18.2 ICD-9-CM: 585.2  10/17/2018 - Present Type 2 diabetes with nephropathy [facility]) ICD-10-CM: E11.21 
ICD-9-CM: 250.40, 583.81  10/8/2018 - Present Yes  
   
 CKD (chronic kidney disease) stage 3, GFR 30-59 ml/min ([facility]) (Chronic) ICD-10-CM: N18.3 ICD-9-CM: 585.3  9/29/2017 - Present Yes Hypertension (Chronic) ICD-10-CM: I10 
ICD-9-CM: 401.9  9/29/2017 - Present Yes COPD, moderate ([facility] (Chronic) ICD-10-CM: J44.9 ICD-9-CM: 352  9/29/2017 - Present Yes Overview Signed 12/27/2015 12:38 PM by Hadley Alvarez NP Complete PFTs: 7/20/15: 
Spirometry is consistent with a moderate obstructive/restrictive defect. . The residual volume is increased relative to other lung volumes suggesting air-trapping. The diffusion capacity corrected for alveolar volume was normal suggesting no loss of alveolo-capillary units. Mixed hyperlipidemia (Chronic) ICD-10-CM: S12.1 ICD-9-CM: 272.2  9/28/2016 - Present Yes PLAN:   
Acute on chf exa- cont lasix New afib-PILO -left atrial mural thrombus- eliquis d/dami yesterday and started on coumadin-subtherapeutic now. Chronic hypoxia- on 2 lit/min nasal cannula 
htn Copd DM type 2 
ckd 3 Prolonged qt - d/c zithromax. DC planning/Dispo: DVT ppx:  coumadin Signed: 
Lottie Ricardo MD

## 2018-11-30 NOTE — PROGRESS NOTES
Patient alert and oriented to person, place and situation throughout this shift. Respirations even and unlabored on 4L O2 via nasal cannula. Patient slept throughout entire shift with exception of approximately 1 hour around 2200. No acute events overnight. Patient resting quietly in bed at this time, eyes closed, respirations present. No needs stated. Bed low and locked. Bedside table, personal belongings and call light within reach.

## 2018-11-30 NOTE — PROGRESS NOTES
[facility] CARDIOLOGY PROGRESS NOTE 
      
 
11/30/2018 11:34 AM 
 
Admit Date: 11/26/2018 Subjective:  
Sitting chair. He denies complaints. ROS: 
GEN:  No fever or chills Cardiovascular:  As noted above:no CP or palpitations. Pulmonary:  As noted above:SOB improving. Neuro:  No new focal motor or sensory loss Objective:  
  
Vitals:  
 11/30/18 0401 11/30/18 0729 11/30/18 0739 11/30/18 1106 BP: 128/79 (!) 126/92  113/86 Pulse: (!) 113 (!) 110  (!) 112 Resp: 22 22 22 Temp: 97.1 °F (36.2 °C) 97.5 °F (36.4 °C)  97.5 °F (36.4 °C) SpO2: 100% 98% 95% 97% Weight:      
 
 
Physical Exam: 
General-no distress Neck- supple, no JVD 
CV- irregular rate and rhythm no MRG Lung- coarse bs with wheezes bilaterally Abd- soft, nontender, nondistended Ext- trace edema bilaterally. Skin- warm and dry Psychiatric:  Normal mood and affect. Neurologic:  Alert and oriented X 3 Data Review:  
Recent Labs 11/30/18 
0532 11/29/18 
1729 11/29/18 
9770   --  142  
K 3.7  --  3.7 BUN 27*  --  30* CREA 1.52*  --  1.68* *  --  98 WBC 9.1  --  11.5* HGB 13.7  --  13.5* HCT 43.0  --  43.6   --  171 INR 1.7 1.7  --   
 
 
TELEMETRY:  AFIB Assessment/Plan:  
 
Principal Problem: 
  Acute on chronic systolic (congestive) heart failure (HCC) (11/26/2018):Clinically improving with iv Lasix. Continue iv Lasix. Transition to po Lasix as condition improves. No ACEI/ARB/Entresto with CRF. Active Problems: 
  CKD (chronic kidney disease) stage 3, GFR 30-59 ml/min ([facility]) (9/29/2017): Improving. Hypertension (9/29/2017):stable on current medications. COPD, moderate ([address] (9/29/2017) Overview: Complete PFTs: 7/20/15: 
    Spirometry is consistent with a moderate obstructive/restrictive defect. Buddy Quiet The residual volume is increased relative to other lung volumes suggesting air-trapping. The diffusion capacity corrected for alveolar volume was  
    normal suggesting no loss of alveolo-capillary units. Mixed hyperlipidemia (9/28/2016) Type 2 diabetes with nephropathy ([facility] (10/8/2018) Seizure disorder ([facility] (11/14/2018) COPD exacerbation ([address] (11/26/2018) Atrial fibrillation ([address] (11/27/2018):Rate control with BB and Warfarin anticoagulation ( ewa LA clot on PILO). Try to increase Coreg dose for additional HR control.  
 
 
 
 
 
 
 
Earl Messer MD 
11/30/2018 11:34 AM

## 2018-11-30 NOTE — PROGRESS NOTES
Patient stable with no complaints at this time. Patient is resting in bed with eyes closed. Call light within reach and patient instructed to call if assistance is needed. Will continue to monitor.

## 2018-11-30 NOTE — PROGRESS NOTES
Patient resting quietly in bed at this time, awake, watching tv, with respirations even and unlabored. Denies needs at this time. Call light within reach.

## 2018-11-30 NOTE — PROGRESS NOTES
Completed bedside shift report with oncoming nurse, Juanito Mahoney. Patient resting quietly in bed at this time, eyes closed, respirations even and unlabored. Bed low and locked. Bedside table, personal belongings and call light within reach.

## 2018-11-30 NOTE — PROGRESS NOTES
Warfarin dosing per pharmacist 
 
Margarita Caputo. is a 74 y.o. male. Weight: (refused) Indication:  Mural thrombus Goal INR:  2-3 Home dose:  n/a Risk factors or significant drug interactions:  Doxycycline, elevated baseline INR Other anticoagulants:  Enoxaparin 100mg q 12 hours bridge Daily Monitoring Date  INR     Warfarin dose HGB              Notes 11/29  1.7  2.5 mg  13.5 
11/30  1.7  2.5 mg  13.7 Pharmacy consulted to initiate warfarin and enoxaparin bridge in Mr[address] for mural thrombus. He was previously on apixaban, has an elevated baseline INR, and is on doxycycline. Continue warfarin 2.5 mg qhs. Daily INR. Continue enoxaparin x 5 days and until INR remains therapeutic x 24 hours. Pharmacy will continue to follow. Please call with any questions. Thank you, Alvaro Jackson, PharmD Clinical Pharmacist 
[phone]

## 2018-12-01 LAB
ANION GAP SERPL CALC-SCNC: 9 MMOL/L (ref 7–16)
BUN SERPL-MCNC: 29 MG/DL (ref 8–23)
CALCIUM SERPL-MCNC: 8.4 MG/DL (ref 8.3–10.4)
CHLORIDE SERPL-SCNC: 107 MMOL/L (ref 98–107)
CO2 SERPL-SCNC: 25 MMOL/L (ref 21–32)
CREAT SERPL-MCNC: 1.68 MG/DL (ref 0.8–1.5)
GLUCOSE SERPL-MCNC: 112 MG/DL (ref 65–100)
INR PPP: 1.6
POTASSIUM SERPL-SCNC: 3.8 MMOL/L (ref 3.5–5.1)
PROTHROMBIN TIME: 18.5 SEC (ref 11.5–14.5)
SODIUM SERPL-SCNC: 141 MMOL/L (ref 136–145)

## 2018-12-01 PROCEDURE — 80048 BASIC METABOLIC PNL TOTAL CA: CPT

## 2018-12-01 PROCEDURE — 74011250636 HC RX REV CODE- 250/636: Performed by: NURSE PRACTITIONER

## 2018-12-01 PROCEDURE — 74011250636 HC RX REV CODE- 250/636: Performed by: FAMILY MEDICINE

## 2018-12-01 PROCEDURE — 36415 COLL VENOUS BLD VENIPUNCTURE: CPT

## 2018-12-01 PROCEDURE — 94640 AIRWAY INHALATION TREATMENT: CPT

## 2018-12-01 PROCEDURE — 74011000250 HC RX REV CODE- 250: Performed by: INTERNAL MEDICINE

## 2018-12-01 PROCEDURE — 65660000000 HC RM CCU STEPDOWN

## 2018-12-01 PROCEDURE — 74011250637 HC RX REV CODE- 250/637: Performed by: INTERNAL MEDICINE

## 2018-12-01 PROCEDURE — 85610 PROTHROMBIN TIME: CPT

## 2018-12-01 PROCEDURE — 94760 N-INVAS EAR/PLS OXIMETRY 1: CPT

## 2018-12-01 PROCEDURE — 74011250637 HC RX REV CODE- 250/637: Performed by: FAMILY MEDICINE

## 2018-12-01 RX ORDER — WARFARIN 2 MG/1
4 TABLET ORAL EVERY EVENING
Status: DISCONTINUED | OUTPATIENT
Start: 2018-12-01 | End: 2018-12-02

## 2018-12-01 RX ADMIN — Medication 10 ML: at 21:16

## 2018-12-01 RX ADMIN — WARFARIN SODIUM 4 MG: 2 TABLET ORAL at 17:12

## 2018-12-01 RX ADMIN — ASPIRIN 81 MG 81 MG: 81 TABLET ORAL at 08:47

## 2018-12-01 RX ADMIN — CARVEDILOL 6.25 MG: 6.25 TABLET, FILM COATED ORAL at 17:12

## 2018-12-01 RX ADMIN — IPRATROPIUM BROMIDE AND ALBUTEROL SULFATE 3 ML: .5; 3 SOLUTION RESPIRATORY (INHALATION) at 01:10

## 2018-12-01 RX ADMIN — IPRATROPIUM BROMIDE AND ALBUTEROL SULFATE 3 ML: .5; 3 SOLUTION RESPIRATORY (INHALATION) at 13:39

## 2018-12-01 RX ADMIN — CARVEDILOL 6.25 MG: 6.25 TABLET, FILM COATED ORAL at 08:47

## 2018-12-01 RX ADMIN — ENOXAPARIN SODIUM 100 MG: 100 INJECTION SUBCUTANEOUS at 21:16

## 2018-12-01 RX ADMIN — ATORVASTATIN CALCIUM 20 MG: 10 TABLET, FILM COATED ORAL at 21:16

## 2018-12-01 RX ADMIN — IPRATROPIUM BROMIDE AND ALBUTEROL SULFATE 3 ML: .5; 3 SOLUTION RESPIRATORY (INHALATION) at 07:44

## 2018-12-01 RX ADMIN — BUDESONIDE 500 MCG: 0.5 INHALANT RESPIRATORY (INHALATION) at 07:44

## 2018-12-01 RX ADMIN — ACETAMINOPHEN 650 MG: 325 TABLET, FILM COATED ORAL at 22:48

## 2018-12-01 RX ADMIN — BUDESONIDE 500 MCG: 0.5 INHALANT RESPIRATORY (INHALATION) at 19:18

## 2018-12-01 RX ADMIN — ALLOPURINOL 100 MG: 100 TABLET ORAL at 08:47

## 2018-12-01 RX ADMIN — PANTOPRAZOLE SODIUM 40 MG: 40 TABLET, DELAYED RELEASE ORAL at 06:05

## 2018-12-01 RX ADMIN — GUAIFENESIN 600 MG: 600 TABLET, EXTENDED RELEASE ORAL at 21:16

## 2018-12-01 RX ADMIN — ACETAMINOPHEN 650 MG: 325 TABLET, FILM COATED ORAL at 06:05

## 2018-12-01 RX ADMIN — Medication 10 ML: at 06:05

## 2018-12-01 RX ADMIN — FUROSEMIDE 40 MG: 10 INJECTION, SOLUTION INTRAMUSCULAR; INTRAVENOUS at 17:12

## 2018-12-01 RX ADMIN — DOXYCYCLINE HYCLATE 100 MG: 100 CAPSULE ORAL at 21:16

## 2018-12-01 RX ADMIN — FUROSEMIDE 40 MG: 10 INJECTION, SOLUTION INTRAMUSCULAR; INTRAVENOUS at 08:47

## 2018-12-01 RX ADMIN — IPRATROPIUM BROMIDE AND ALBUTEROL SULFATE 3 ML: .5; 3 SOLUTION RESPIRATORY (INHALATION) at 19:18

## 2018-12-01 RX ADMIN — ENOXAPARIN SODIUM 100 MG: 100 INJECTION SUBCUTANEOUS at 08:47

## 2018-12-01 RX ADMIN — LEVETIRACETAM 500 MG: 500 TABLET ORAL at 08:47

## 2018-12-01 RX ADMIN — GUAIFENESIN 600 MG: 600 TABLET, EXTENDED RELEASE ORAL at 08:47

## 2018-12-01 RX ADMIN — Medication 10 ML: at 14:06

## 2018-12-01 RX ADMIN — DOXYCYCLINE HYCLATE 100 MG: 100 CAPSULE ORAL at 08:47

## 2018-12-01 RX ADMIN — LEVETIRACETAM 500 MG: 500 TABLET ORAL at 17:12

## 2018-12-01 NOTE — PROGRESS NOTES
Hospitalist Progress Note Admit Date:  2018  3:15 PM  
Name:  Zenaida Guthrie. Age:  74 y.o. 
:  1945 MRN:  [id number] PCP:  Myranda Munoz MD 
Treatment Team: Attending Provider: Brian Mackenzie DO; Consulting Provider: Arvin Lawrence MD; Care Manager: Jd Young RN; Physician: Kit Low MD 
 
Subjective:  
Patient 4M with pmhx of CHF EF 35%, COPD wears 4lnc at home, HTN, HLP presents with 3-4 days of increasing dyspnea, productive cough, leg edema and orthopnea. Denies fever, chills, nausea, vomiting, change in urine or stool, chest pain. No sick contacts. ED workup notable for , Cr 1.85, ECG with afib (new onset), cxr with pulm vascular congestion. Patient admits to poor compliance with both oxygen and medications. 18 Says sob better On chronic oxygen 4 lit/min 
afib  
 
18 Says sob better PILO left atrial mural thrombus Low normal bp this am 
 
18 Says sob better 18 Says feels better - breathing better Was started on coumadin and lovenox. Mild tachycardia 18 Sleeping- arousable, says breathing ok Subtherapeutic inr Objective:  
 
Patient Vitals for the past 24 hrs: 
 Temp Pulse Resp BP SpO2  
18 1125 98.1 °F (36.7 °C) 72 20 106/71 96 % 18 0744     98 % 18 0705 97.7 °F (36.5 °C) 74 19 113/78 100 % 18 0242 97.5 °F (36.4 °C) 82 19 (!) 124/94 99 % 18 0110     94 % 18 2352 97.3 °F (36.3 °C) 79 20 100/64 99 % 18 1947     93 % 18 1936 97.4 °F (36.3 °C) 77 20 109/77 98 % 18 1532 97.5 °F (36.4 °C) (!) 113 22 127/87 97 % 187     98 % Oxygen Therapy O2 Sat (%): 96 % (18 1125) Pulse via Oximetry: 76 beats per minute (18) O2 Device: Room air (18) O2 Flow Rate (L/min): 2 l/min (18) FIO2 (%): 28 % (18) Intake/Output Summary (Last 24 hours) at 12/1/2018 1216 Last data filed at 12/1/2018 1125 Gross per 24 hour Intake 598 ml Output 700 ml Net -102 ml General:    Well nourished. Sleeping- arousable- on oxygen- no distress 
heent- normal 
CV:   Irregular irregular No murmur, rub, or gallop. Lungs:   Basilar crepitations- improving Abdomen:   Soft, nontender, nondistended. Cns- no focal neurological deficits Extremities: Warm and dry. No cyanosis or edema. Skin:     No rashes or jaundice. Data Review: 
I have reviewed all labs, meds, telemetry events, and studies from the last 24 hours. Recent Results (from the past 24 hour(s)) PROTHROMBIN TIME + INR Collection Time: 12/01/18  6:32 AM  
Result Value Ref Range Prothrombin time 18.5 (H) 11.5 - 14.5 sec INR 1.6 METABOLIC PANEL, BASIC Collection Time: 12/01/18  6:32 AM  
Result Value Ref Range Sodium 141 136 - 145 mmol/L Potassium 3.8 3.5 - 5.1 mmol/L Chloride 107 98 - 107 mmol/L  
 CO2 25 21 - 32 mmol/L Anion gap 9 7 - 16 mmol/L Glucose 112 (H) 65 - 100 mg/dL BUN 29 (H) 8 - 23 MG/DL Creatinine 1.68 (H) 0.8 - 1.5 MG/DL  
 GFR est AA 51 (L) >60 ml/min/1.73m2 GFR est non-AA 43 (L) >60 ml/min/1.73m2 Calcium 8.4 8.3 - 10.4 MG/DL All Micro Results None Current Meds: 
Current Facility-Administered Medications Medication Dose Route Frequency  warfarin (COUMADIN) tablet 4 mg  4 mg Oral QPM  
 carvedilol (COREG) tablet 6.25 mg  6.25 mg Oral BID WITH MEALS  enoxaparin (LOVENOX) injection 100 mg  1 mg/kg SubCUTAneous Q12H  
 doxycycline (VIBRAMYCIN) capsule 100 mg  100 mg Oral Q12H  
 albuterol-ipratropium (DUO-NEB) 2.5 MG-0.5 MG/3 ML  3 mL Nebulization Q6H RT  
 allopurinol (ZYLOPRIM) tablet 100 mg  100 mg Oral DAILY  aspirin chewable tablet 81 mg  81 mg Oral DAILY  atorvastatin (LIPITOR) tablet 20 mg  20 mg Oral QHS  pantoprazole (PROTONIX) tablet 40 mg  40 mg Oral ACB  sodium chloride (NS) flush 5-10 mL  5-10 mL IntraVENous Q8H  
 sodium chloride (NS) flush 5-10 mL  5-10 mL IntraVENous PRN  
 acetaminophen (TYLENOL) tablet 650 mg  650 mg Oral Q4H PRN  
 ondansetron (ZOFRAN) injection 4 mg  4 mg IntraVENous Q4H PRN  
 levETIRAcetam (KEPPRA) tablet 500 mg  500 mg Oral BID  furosemide (LASIX) injection 40 mg  40 mg IntraVENous BID  albuterol (PROVENTIL VENTOLIN) nebulizer solution 2.5 mg  2.5 mg Nebulization Q4H PRN  
 budesonide (PULMICORT) 500 mcg/2 ml nebulizer suspension  500 mcg Nebulization BID RT  
 guaiFENesin ER (MUCINEX) tablet 600 mg  600 mg Oral Q12H Other Studies (last 24 hours): No results found. Assessment and Plan:  
 
Hospital Problems as of 12/1/2018 Date Reviewed: 6/6/2018 Codes Class Noted - Resolved POA Atrial fibrillation [facility]) ICD-10-CM: I48.91 
ICD-9-CM: 427.31  11/27/2018 - Present Unknown * (Principal) Acute on chronic systolic (congestive) heart failure (HCC) ICD-10-CM: J06.29 ICD-9-CM: 428.23, 428.0  11/26/2018 - Present Unknown COPD exacerbation ([facility] ICD-10-CM: J44.1 ICD-9-CM: 491.21  11/26/2018 - Present Unknown Seizure disorder ([facility] ICD-10-CM: G40.909 ICD-9-CM: 345.90  11/14/2018 - Present Yes Stage 2 chronic kidney disease ICD-10-CM: N18.2 ICD-9-CM: 585.2  10/17/2018 - Present Type 2 diabetes with nephropathy [facility]) ICD-10-CM: E11.21 
ICD-9-CM: 250.40, 583.81  10/8/2018 - Present Yes  
   
 CKD (chronic kidney disease) stage 3, GFR 30-59 ml/min (HCC) (Chronic) ICD-10-CM: N18.3 ICD-9-CM: 585.3  9/29/2017 - Present Yes Hypertension (Chronic) ICD-10-CM: I10 
ICD-9-CM: 401.9  9/29/2017 - Present Yes COPD, moderate ([address] (Chronic) ICD-10-CM: J44.9 ICD-9-CM: 365  9/29/2017 - Present Yes Overview Signed 12/27/2015 12:38 PM by Rafa Stewart NP Complete PFTs: 7/20/15: Spirometry is consistent with a moderate obstructive/restrictive defect. . The residual volume is increased relative to other lung volumes suggesting air-trapping. The diffusion capacity corrected for alveolar volume was normal suggesting no loss of alveolo-capillary units. Mixed hyperlipidemia (Chronic) ICD-10-CM: N43.3 ICD-9-CM: 272.2  9/28/2016 - Present Yes PLAN:   
Acute on chf exa- cont lasix New afib-PILO -left atrial mural thrombus- on coumadin and lovenox- sub therapeutic inr Chronic hypoxia- on 2 lit/min nasal cannula 
htn Copd DM type 2 
ckd 3 Prolonged qt - d/c zithromax. DC planning/Dispo: DVT ppx:  Coumadin and lovenox.  
 
Signed: 
Shahnaz Flores MD

## 2018-12-01 NOTE — PROGRESS NOTES
Patient resting in bed at this time. Wife at bedside. Respirations even and unlabored. Oxygen saturation 97% on RA. No complaints at this time. Call light within reach. Will continue to monitor.

## 2018-12-01 NOTE — PROGRESS NOTES
Warfarin dosing per pharmacist 
 
Crystal Yg. is a 74 y.o. male. Weight: (Patient refused weight nurse notified) Indication:  Mural thrombus Goal INR:  2-3 Home dose:  n/a Risk factors or significant drug interactions:  Doxycycline, elevated baseline INR Other anticoagulants:  Enoxaparin 100mg q 12 hours bridge Daily Monitoring Date  INR     Warfarin dose HGB              Notes 11/29  1.7  2.5 mg  13.5 
11/30  1.7  2.5 mg  13.7  
12/1  1.6  4 mg  --- Pharmacy consulted to initiate warfarin and enoxaparin bridge in Mr. Sonia Perez for mural thrombus. He was previously on apixaban, has an elevated baseline INR, and is on doxycycline. INR remains subtherapeutic with today's result trending down. Will increase dose to warfarin 4 mg tonight. Continue enoxaparin x 5 days and until INR remains therapeutic x 24 hours. Daily INR. Pharmacy will continue to follow. Please call with any questions. Thank you, Cuong Kelly, PharmD, [facility] Clinical Pharmacist 
[phone]

## 2018-12-01 NOTE — PROGRESS NOTES
Assessment completed. No distress noted at this time. No needs at this time. Pt has call light within reach. Instructed to call for assistance if needed. Will continue to monitor.

## 2018-12-01 NOTE — PROGRESS NOTES
[facility] [[email or web address]] CARDIOLOGY PROGRESS NOTE 
      
 
12/1/2018 10:39 AM 
 
Admit Date: 11/26/2018 Subjective:  
 
 
 
Review of Systems Constitutional: Negative for fever. Cardiovascular: Negative for chest pain. Genitourinary: Negative for dysuria. Musculoskeletal: Negative for myalgias. Skin: Negative for rash. Objective:  
  
Vitals:  
 12/01/18 0110 12/01/18 0242 12/01/18 7047 12/01/18 0978 BP:  (!) 124/94 113/78 Pulse:  82 74 Resp:  19 19 Temp:  97.5 °F (36.4 °C) 97.7 °F (36.5 °C) SpO2: 94% 99% 100% 98% Weight:      
 
 
 
Physical Exam  
Constitutional: He appears well-developed. HENT:  
Head: Normocephalic and atraumatic. Eyes: Pupils are equal, round, and reactive to light. Neck: Normal range of motion. Cardiovascular: Normal rate. No murmur heard. Pulmonary/Chest: Effort normal.  
Abdominal: Soft. He exhibits no distension. There is no tenderness. Musculoskeletal:  
Trace edema Neurological: He is alert. No cranial nerve deficit. Skin: Skin is warm. Psychiatric: He has a normal mood and affect. His behavior is normal.  
 
 
Data Review:  
Recent Labs 12/01/18 
1025 11/30/18 
0532  11/29/18 
3452  145  --  142  
K 3.8 3.7  --  3.7 BUN 29* 27*  --  30* CREA 1.68* 1.52*  --  1.68* * 109*  --  98 WBC  --  9.1  --  11.5* HGB  --  13.7  --  13.5* HCT  --  43.0  --  43.6 PLT  --  163  --  171 INR 1.6 1.7   < >  --   
 < > = values in this interval not displayed. Intake/Output Summary (Last 24 hours) at 12/1/2018 1039 Last data filed at 12/1/2018 7524 Gross per 24 hour Intake 598 ml Output 400 ml Net 198 ml Current Facility-Administered Medications Medication Dose Route Frequency  carvedilol (COREG) tablet 6.25 mg  6.25 mg Oral BID WITH MEALS  enoxaparin (LOVENOX) injection 100 mg  1 mg/kg SubCUTAneous Q12H  warfarin (COUMADIN) tablet 2.5 mg  2.5 mg Oral QPM  
  doxycycline (VIBRAMYCIN) capsule 100 mg  100 mg Oral Q12H  
 albuterol-ipratropium (DUO-NEB) 2.5 MG-0.5 MG/3 ML  3 mL Nebulization Q6H RT  
 allopurinol (ZYLOPRIM) tablet 100 mg  100 mg Oral DAILY  aspirin chewable tablet 81 mg  81 mg Oral DAILY  atorvastatin (LIPITOR) tablet 20 mg  20 mg Oral QHS  pantoprazole (PROTONIX) tablet 40 mg  40 mg Oral ACB  sodium chloride (NS) flush 5-10 mL  5-10 mL IntraVENous Q8H  
 sodium chloride (NS) flush 5-10 mL  5-10 mL IntraVENous PRN  
 acetaminophen (TYLENOL) tablet 650 mg  650 mg Oral Q4H PRN  
 ondansetron (ZOFRAN) injection 4 mg  4 mg IntraVENous Q4H PRN  
 levETIRAcetam (KEPPRA) tablet 500 mg  500 mg Oral BID  furosemide (LASIX) injection 40 mg  40 mg IntraVENous BID  albuterol (PROVENTIL VENTOLIN) nebulizer solution 2.5 mg  2.5 mg Nebulization Q4H PRN  
 budesonide (PULMICORT) 500 mcg/2 ml nebulizer suspension  500 mcg Nebulization BID RT  
 guaiFENesin ER (MUCINEX) tablet 600 mg  600 mg Oral Q12H Assessment/Plan: 1. Acute on chronic systolic heart failure cardiac catheterization 2018 with minimal coronary artery disease nonischemic cardiomyopathy. Patient with documented chronic kidney disease and not on Ace/ARB therapy. On IV Lasix with improvement of symptoms ins and outs likely not documented. 2. Left atrial thrombus noted on transesophageal echocardiogram patient being bridged with Lovenox? On warfarin with subtherapeutic INR. Patient could be bridged with novel anticoagulants such as apixiban. 3. CKD stable Estimated Creatinine Clearance: 43.5 mL/min (A) (based on SCr of 1.68 mg/dL (H)).   
 
Fredy Gandhi MD 
12/1/2018 10:39 AM

## 2018-12-02 LAB
ANION GAP SERPL CALC-SCNC: 6 MMOL/L (ref 7–16)
BUN SERPL-MCNC: 28 MG/DL (ref 8–23)
CALCIUM SERPL-MCNC: 8.1 MG/DL (ref 8.3–10.4)
CHLORIDE SERPL-SCNC: 108 MMOL/L (ref 98–107)
CO2 SERPL-SCNC: 26 MMOL/L (ref 21–32)
CREAT SERPL-MCNC: 1.73 MG/DL (ref 0.8–1.5)
GLUCOSE SERPL-MCNC: 101 MG/DL (ref 65–100)
INR PPP: 1.6
POTASSIUM SERPL-SCNC: 3.7 MMOL/L (ref 3.5–5.1)
PROTHROMBIN TIME: 18.9 SEC (ref 11.5–14.5)
SODIUM SERPL-SCNC: 140 MMOL/L (ref 136–145)

## 2018-12-02 PROCEDURE — 94760 N-INVAS EAR/PLS OXIMETRY 1: CPT

## 2018-12-02 PROCEDURE — 74011250637 HC RX REV CODE- 250/637: Performed by: INTERNAL MEDICINE

## 2018-12-02 PROCEDURE — 74011250637 HC RX REV CODE- 250/637: Performed by: FAMILY MEDICINE

## 2018-12-02 PROCEDURE — 74011250636 HC RX REV CODE- 250/636: Performed by: NURSE PRACTITIONER

## 2018-12-02 PROCEDURE — 74011250636 HC RX REV CODE- 250/636: Performed by: FAMILY MEDICINE

## 2018-12-02 PROCEDURE — 77010033678 HC OXYGEN DAILY

## 2018-12-02 PROCEDURE — 80048 BASIC METABOLIC PNL TOTAL CA: CPT

## 2018-12-02 PROCEDURE — 74011000250 HC RX REV CODE- 250: Performed by: INTERNAL MEDICINE

## 2018-12-02 PROCEDURE — 65660000000 HC RM CCU STEPDOWN

## 2018-12-02 PROCEDURE — 94640 AIRWAY INHALATION TREATMENT: CPT

## 2018-12-02 PROCEDURE — 36415 COLL VENOUS BLD VENIPUNCTURE: CPT

## 2018-12-02 PROCEDURE — 85610 PROTHROMBIN TIME: CPT

## 2018-12-02 RX ORDER — WARFARIN SODIUM 5 MG/1
5 TABLET ORAL EVERY EVENING
Status: DISCONTINUED | OUTPATIENT
Start: 2018-12-02 | End: 2018-12-02

## 2018-12-02 RX ADMIN — LEVETIRACETAM 500 MG: 500 TABLET ORAL at 08:42

## 2018-12-02 RX ADMIN — FUROSEMIDE 40 MG: 10 INJECTION, SOLUTION INTRAMUSCULAR; INTRAVENOUS at 08:40

## 2018-12-02 RX ADMIN — ALLOPURINOL 100 MG: 100 TABLET ORAL at 08:42

## 2018-12-02 RX ADMIN — Medication 10 ML: at 21:50

## 2018-12-02 RX ADMIN — Medication 10 ML: at 13:26

## 2018-12-02 RX ADMIN — ASPIRIN 81 MG 81 MG: 81 TABLET ORAL at 08:42

## 2018-12-02 RX ADMIN — BUDESONIDE 500 MCG: 0.5 INHALANT RESPIRATORY (INHALATION) at 19:56

## 2018-12-02 RX ADMIN — DOXYCYCLINE HYCLATE 100 MG: 100 CAPSULE ORAL at 08:41

## 2018-12-02 RX ADMIN — GUAIFENESIN 600 MG: 600 TABLET, EXTENDED RELEASE ORAL at 08:42

## 2018-12-02 RX ADMIN — PANTOPRAZOLE SODIUM 40 MG: 40 TABLET, DELAYED RELEASE ORAL at 05:18

## 2018-12-02 RX ADMIN — ATORVASTATIN CALCIUM 20 MG: 10 TABLET, FILM COATED ORAL at 21:50

## 2018-12-02 RX ADMIN — GUAIFENESIN 600 MG: 600 TABLET, EXTENDED RELEASE ORAL at 21:49

## 2018-12-02 RX ADMIN — CARVEDILOL 6.25 MG: 6.25 TABLET, FILM COATED ORAL at 17:00

## 2018-12-02 RX ADMIN — LEVETIRACETAM 500 MG: 500 TABLET ORAL at 17:00

## 2018-12-02 RX ADMIN — ENOXAPARIN SODIUM 100 MG: 100 INJECTION SUBCUTANEOUS at 08:40

## 2018-12-02 RX ADMIN — APIXABAN 5 MG: 5 TABLET, FILM COATED ORAL at 21:49

## 2018-12-02 RX ADMIN — BUDESONIDE 500 MCG: 0.5 INHALANT RESPIRATORY (INHALATION) at 07:22

## 2018-12-02 RX ADMIN — IPRATROPIUM BROMIDE AND ALBUTEROL SULFATE 3 ML: .5; 3 SOLUTION RESPIRATORY (INHALATION) at 19:56

## 2018-12-02 RX ADMIN — CARVEDILOL 6.25 MG: 6.25 TABLET, FILM COATED ORAL at 08:41

## 2018-12-02 RX ADMIN — IPRATROPIUM BROMIDE AND ALBUTEROL SULFATE 3 ML: .5; 3 SOLUTION RESPIRATORY (INHALATION) at 07:22

## 2018-12-02 RX ADMIN — IPRATROPIUM BROMIDE AND ALBUTEROL SULFATE 3 ML: .5; 3 SOLUTION RESPIRATORY (INHALATION) at 14:42

## 2018-12-02 RX ADMIN — DOXYCYCLINE HYCLATE 100 MG: 100 CAPSULE ORAL at 21:49

## 2018-12-02 RX ADMIN — Medication 10 ML: at 05:18

## 2018-12-02 RX ADMIN — IPRATROPIUM BROMIDE AND ALBUTEROL SULFATE 3 ML: .5; 3 SOLUTION RESPIRATORY (INHALATION) at 01:22

## 2018-12-02 RX ADMIN — WARFARIN SODIUM 2.5 MG: 2 TABLET ORAL at 08:42

## 2018-12-02 NOTE — PROGRESS NOTES
Hospitalist Progress Note Admit Date:  2018  3:15 PM  
Name:  Amanda Lindsey. Age:  74 y.o. 
:  1945 MRN:  [id number] PCP:  Pastor Rodas MD 
Treatment Team: Attending Provider: Kelvin Amaya DO; Consulting Provider: Adrienne Mosley MD; Care Manager: Valeriano Marks, RN; Physician: Nohemi Cockayne, MD 
 
Subjective:  
Patient 31Z with pmhx of CHF EF 35%, COPD wears 4lnc at home, HTN, HLP presents with 3-4 days of increasing dyspnea, productive cough, leg edema and orthopnea. Denies fever, chills, nausea, vomiting, change in urine or stool, chest pain. No sick contacts. ED workup notable for , Cr 1.85, ECG with afib (new onset), cxr with pulm vascular congestion. Patient admits to poor compliance with both oxygen and medications. 18 Says sob better On chronic oxygen 4 lit/min 
afib  
 
18 Says sob better PILO left atrial mural thrombus Low normal bp this am 
 
18 Says sob better 18 Says feels better - breathing better Was started on coumadin and lovenox. Mild tachycardia 18 Sleeping- arousable, says breathing ok Subtherapeutic inr 
 
18 On coumadin and lovenox Sleeping- arousable - says sob better Mild decrease in gfr Objective:  
 
Patient Vitals for the past 24 hrs: 
 Temp Pulse Resp BP SpO2  
18 1500 97.3 °F (36.3 °C) 69 18 135/78 97 % 18 1100 98.9 °F (37.2 °C) 69 18 122/83 98 % 18 0722     97 % 18 0700 97.3 °F (36.3 °C) 73 18 105/73 96 % 18 0343 97.8 °F (36.6 °C) 72 19 113/78 97 % 18 0122     97 % 18 2310 97.5 °F (36.4 °C) 73 22 110/68 98 % 18 1922 97.5 °F (36.4 °C) 70 20 123/62 98 % 18     98 % Oxygen Therapy O2 Sat (%): 97 % (18 1500) Pulse via Oximetry: 64 beats per minute (18 012) O2 Device: Nasal cannula (18) O2 Flow Rate (L/min): 2 l/min (18) FIO2 (%): 28 % (11/29/18 0227) Intake/Output Summary (Last 24 hours) at 12/2/2018 1534 Last data filed at 12/2/2018 1432 Gross per 24 hour Intake 580 ml Output 650 ml Net -70 ml General:    Well nourished. Sleeping- arousable- on oxygen- no distress 
heent- normal 
CV:   Irregular irregular No murmur, rub, or gallop. Lungs:   Basilar crepitations- improving Abdomen:   Soft, nontender, nondistended. Cns- no focal neurological deficits Extremities: Warm and dry. No cyanosis or edema. Skin:     No rashes or jaundice. Data Review: 
I have reviewed all labs, meds, telemetry events, and studies from the last 24 hours. Recent Results (from the past 24 hour(s)) PROTHROMBIN TIME + INR Collection Time: 12/02/18  5:39 AM  
Result Value Ref Range Prothrombin time 18.9 (H) 11.5 - 14.5 sec INR 1.6 METABOLIC PANEL, BASIC Collection Time: 12/02/18  5:39 AM  
Result Value Ref Range Sodium 140 136 - 145 mmol/L Potassium 3.7 3.5 - 5.1 mmol/L Chloride 108 (H) 98 - 107 mmol/L  
 CO2 26 21 - 32 mmol/L Anion gap 6 (L) 7 - 16 mmol/L Glucose 101 (H) 65 - 100 mg/dL BUN 28 (H) 8 - 23 MG/DL Creatinine 1.73 (H) 0.8 - 1.5 MG/DL  
 GFR est AA 50 (L) >60 ml/min/1.73m2 GFR est non-AA 41 (L) >60 ml/min/1.73m2 Calcium 8.1 (L) 8.3 - 10.4 MG/DL All Micro Results None Current Meds: 
Current Facility-Administered Medications Medication Dose Route Frequency  apixaban (ELIQUIS) tablet 5 mg  5 mg Oral Q12H  carvedilol (COREG) tablet 6.25 mg  6.25 mg Oral BID WITH MEALS  doxycycline (VIBRAMYCIN) capsule 100 mg  100 mg Oral Q12H  
 albuterol-ipratropium (DUO-NEB) 2.5 MG-0.5 MG/3 ML  3 mL Nebulization Q6H RT  
 allopurinol (ZYLOPRIM) tablet 100 mg  100 mg Oral DAILY  aspirin chewable tablet 81 mg  81 mg Oral DAILY  atorvastatin (LIPITOR) tablet 20 mg  20 mg Oral QHS  pantoprazole (PROTONIX) tablet 40 mg  40 mg Oral ACB  sodium chloride (NS) flush 5-10 mL  5-10 mL IntraVENous Q8H  
 sodium chloride (NS) flush 5-10 mL  5-10 mL IntraVENous PRN  
 acetaminophen (TYLENOL) tablet 650 mg  650 mg Oral Q4H PRN  
 ondansetron (ZOFRAN) injection 4 mg  4 mg IntraVENous Q4H PRN  
 levETIRAcetam (KEPPRA) tablet 500 mg  500 mg Oral BID  albuterol (PROVENTIL VENTOLIN) nebulizer solution 2.5 mg  2.5 mg Nebulization Q4H PRN  
 budesonide (PULMICORT) 500 mcg/2 ml nebulizer suspension  500 mcg Nebulization BID RT  
 guaiFENesin ER (MUCINEX) tablet 600 mg  600 mg Oral Q12H Other Studies (last 24 hours): No results found. Assessment and Plan:  
 
Hospital Problems as of 12/2/2018 Date Reviewed: 6/6/2018 Codes Class Noted - Resolved POA Atrial fibrillation [facility]) ICD-10-CM: I48.91 
ICD-9-CM: 427.31  11/27/2018 - Present Unknown * (Principal) Acute on chronic systolic (congestive) heart failure (HCC) ICD-10-CM: S90.53 ICD-9-CM: 428.23, 428.0  11/26/2018 - Present Unknown COPD exacerbation ([facility] ICD-10-CM: J44.1 ICD-9-CM: 491.21  11/26/2018 - Present Unknown Seizure disorder ([facility] ICD-10-CM: G40.909 ICD-9-CM: 345.90  11/14/2018 - Present Yes Stage 2 chronic kidney disease ICD-10-CM: N18.2 ICD-9-CM: 585.2  10/17/2018 - Present Type 2 diabetes with nephropathy [facility]) ICD-10-CM: E11.21 
ICD-9-CM: 250.40, 583.81  10/8/2018 - Present Yes  
   
 CKD (chronic kidney disease) stage 3, GFR 30-59 ml/min (HCC) (Chronic) ICD-10-CM: N18.3 ICD-9-CM: 585.3  9/29/2017 - Present Yes Hypertension (Chronic) ICD-10-CM: I10 
ICD-9-CM: 401.9  9/29/2017 - Present Yes COPD, moderate ([address] (Chronic) ICD-10-CM: J44.9 ICD-9-CM: 565  9/29/2017 - Present Yes Overview Signed 12/27/2015 12:38 PM by Geovanni Garcia NP Complete PFTs: 7/20/15: 
Spirometry is consistent with a moderate obstructive/restrictive defect. . The residual volume is increased relative to other lung volumes suggesting air-trapping. The diffusion capacity corrected for alveolar volume was normal suggesting no loss of alveolo-capillary units. Mixed hyperlipidemia (Chronic) ICD-10-CM: K51.5 ICD-9-CM: 272.2  9/28/2016 - Present Yes PLAN:   
Acute on chf exa- cont lasix New afib-PILO -left atrial mural thrombus- on coumadin and lovenox- sub therapeutic inr- spoke to pharmacy- said would add an extra warfarin this am. 
Chronic hypoxia- on 2 lit/min nasal cannula 
htn Copd DM type 2 
ckd 3 Prolonged qt - d/c zithromax. DC planning/Dispo: DVT ppx:  Coumadin and lovenox.  
 
Signed: 
Nadiya Boothe MD

## 2018-12-02 NOTE — PROGRESS NOTES
Shift assessment completed. No distress noted at this time. No needs at this time. Instructed to call for assistance if needed. Call light within reach. Will continue to monitor.

## 2018-12-02 NOTE — PROGRESS NOTES
[facility] CARDIOLOGY PROGRESS NOTE 
      
 
12/2/2018 10:39 AM 
 
Admit Date: 11/26/2018 Subjective:  
 
Patient feels breathing is better, Review of Systems Constitutional: Negative for fever. Cardiovascular: Negative for chest pain. Genitourinary: Negative for dysuria. Musculoskeletal: Negative for myalgias. Skin: Negative for rash. Objective:  
  
Vitals:  
 12/02/18 0605 12/02/18 0700 12/02/18 0722 12/02/18 1100 BP:  105/73  122/83 Pulse:  73  69 Resp:  18  18 Temp:  97.3 °F (36.3 °C)  98.9 °F (37.2 °C) SpO2:  96% 97% 98% Weight: 95.7 kg (210 lb 14.4 oz) Physical Exam  
Constitutional: He appears well-developed. HENT:  
Head: Normocephalic and atraumatic. Eyes: Pupils are equal, round, and reactive to light. Neck: Normal range of motion. Cardiovascular: Normal rate. No murmur heard. Pulmonary/Chest: Effort normal.  
Abdominal: Soft. He exhibits no distension. There is no tenderness. Musculoskeletal:  
Trace edema Neurological: He is alert. No cranial nerve deficit. Skin: Skin is warm. Psychiatric: He has a normal mood and affect. His behavior is normal.  
 
 
Data Review:  
Recent Labs 12/02/18 
9714 12/01/18 
2133 11/30/18 
0532  141 145  
K 3.7 3.8 3.7 BUN 28* 29* 27* CREA 1.73* 1.68* 1.52* * 112* 109* WBC  --   --  9.1 HGB  --   --  13.7 HCT  --   --  43.0 PLT  --   --  163 INR 1.6 1.6 1.7 Intake/Output Summary (Last 24 hours) at 12/2/2018 1216 Last data filed at 12/2/2018 8964 Gross per 24 hour Intake 580 ml Output 650 ml Net -70 ml  
 
Current Facility-Administered Medications Medication Dose Route Frequency  apixaban (ELIQUIS) tablet 5 mg  5 mg Oral Q12H  carvedilol (COREG) tablet 6.25 mg  6.25 mg Oral BID WITH MEALS  doxycycline (VIBRAMYCIN) capsule 100 mg  100 mg Oral Q12H  
 albuterol-ipratropium (DUO-NEB) 2.5 MG-0.5 MG/3 ML  3 mL Nebulization Q6H RT  
  allopurinol (ZYLOPRIM) tablet 100 mg  100 mg Oral DAILY  aspirin chewable tablet 81 mg  81 mg Oral DAILY  atorvastatin (LIPITOR) tablet 20 mg  20 mg Oral QHS  pantoprazole (PROTONIX) tablet 40 mg  40 mg Oral ACB  sodium chloride (NS) flush 5-10 mL  5-10 mL IntraVENous Q8H  
 sodium chloride (NS) flush 5-10 mL  5-10 mL IntraVENous PRN  
 acetaminophen (TYLENOL) tablet 650 mg  650 mg Oral Q4H PRN  
 ondansetron (ZOFRAN) injection 4 mg  4 mg IntraVENous Q4H PRN  
 levETIRAcetam (KEPPRA) tablet 500 mg  500 mg Oral BID  albuterol (PROVENTIL VENTOLIN) nebulizer solution 2.5 mg  2.5 mg Nebulization Q4H PRN  
 budesonide (PULMICORT) 500 mcg/2 ml nebulizer suspension  500 mcg Nebulization BID RT  
 guaiFENesin ER (MUCINEX) tablet 600 mg  600 mg Oral Q12H Assessment/Plan: 1. Echocardiogram images reviewed. Definite left atrial thrombus noted. Warfarin therapy was recommended for patient with presence of left atrial thrombus on anticoagulation therapy. Review of previous records note that fibrillation was newly discovered and he was recently placed on novel anticoagulation therapy near the time of PILO. This was not failure of NOAC. At this time it is reasonable to re-start novel anticoagulant therapy discontinue warfarin bridge, and reevaluate for left atrial thrombus after an appropriate treatment period. Following treatment. Additionally, the patient has had difficulty achieving therapeutic INRs and bridging with Lovenox. Does not seem necessary. Patient's are routinely initiated on novel anticoagulation therapy without transesophageal echocardiogram evaluation, and presumably, there is a population patients that have left atrial thrombus that are never detected. 2. CHF transition to oral lasix at this time. Estimated Creatinine Clearance: 42.1 mL/min (A) (based on SCr of 1.73 mg/dL (H)).   
 
Jared Buck MD 
12/2/2018 10:39 AM

## 2018-12-02 NOTE — PROGRESS NOTES
Provided meal voucher for family as requested Ray Tong, staff [address], [address]  /   [email or web address]

## 2018-12-02 NOTE — PROGRESS NOTES
Patient resting in bed at this time. Wife at bedside. Alert and oriented. Oxygen sat 97% on RA. Respirations even and unlabored. No complaints. Call light within reach. Will continue to monitor.

## 2018-12-02 NOTE — PROGRESS NOTES
Patient resting in bed. Wife at bedside. No complaints. Oxygen 98% on RA respirations even and unlabored. Call light within reach.

## 2018-12-02 NOTE — PROGRESS NOTES
Patient resting in bed at this time. Wife at bedside. Respirations even and unlabored. Oxygen sat at 97% on RA. No complaints at this time. Call light within each.

## 2018-12-02 NOTE — PROGRESS NOTES
Warfarin dosing per pharmacist 
 
Giovanny Gracia. is a 74 y.o. male. Weight: 95.7 kg (210 lb 14.4 oz) Indication:  Mural thrombus Goal INR:  2-3 Home dose:  n/a Risk factors or significant drug interactions:  Doxycycline, elevated baseline INR Other anticoagulants:  Enoxaparin 100mg q 12 hours bridge Daily Monitoring Date  INR     Warfarin dose  HGB              Notes 11/29  1.7  2.5 mg   13.5 
11/30  1.7  2.5 mg   13.7  
12/1  1.6  4 mg   --- 
12/2  1.6  2.5 + 5 mg  --- Pharmacy consulted to initiate warfarin and enoxaparin bridge in Mr. Allison Waldrop for mural thrombus. He was previously on apixaban, has an elevated baseline INR, and is on doxycycline. INR remains 1.6. Will give extra 2.5 mg dose this morning and start 5 mg daily this evening. Following daily INR Continue enoxaparin x 5 days and until INR remains therapeutic x 24 hours. Pharmacy will continue to follow. Please call with any questions. Thank you, Benedict Dover, Pharm. D. Clinical Pharmacist 
[phone]

## 2018-12-03 VITALS
BODY MASS INDEX: 31.14 KG/M2 | TEMPERATURE: 97.9 F | SYSTOLIC BLOOD PRESSURE: 112 MMHG | OXYGEN SATURATION: 96 % | HEART RATE: 78 BPM | RESPIRATION RATE: 20 BRPM | WEIGHT: 210.9 LBS | DIASTOLIC BLOOD PRESSURE: 78 MMHG

## 2018-12-03 LAB
ANION GAP SERPL CALC-SCNC: 10 MMOL/L (ref 7–16)
BUN SERPL-MCNC: 25 MG/DL (ref 8–23)
CALCIUM SERPL-MCNC: 8.5 MG/DL (ref 8.3–10.4)
CHLORIDE SERPL-SCNC: 108 MMOL/L (ref 98–107)
CO2 SERPL-SCNC: 22 MMOL/L (ref 21–32)
CREAT SERPL-MCNC: 1.5 MG/DL (ref 0.8–1.5)
GLUCOSE SERPL-MCNC: 94 MG/DL (ref 65–100)
INR PPP: 2.5
POTASSIUM SERPL-SCNC: 3.7 MMOL/L (ref 3.5–5.1)
PROTHROMBIN TIME: 26.5 SEC (ref 11.5–14.5)
SODIUM SERPL-SCNC: 140 MMOL/L (ref 136–145)

## 2018-12-03 PROCEDURE — 80048 BASIC METABOLIC PNL TOTAL CA: CPT

## 2018-12-03 PROCEDURE — 74011250637 HC RX REV CODE- 250/637: Performed by: FAMILY MEDICINE

## 2018-12-03 PROCEDURE — 94640 AIRWAY INHALATION TREATMENT: CPT

## 2018-12-03 PROCEDURE — 94760 N-INVAS EAR/PLS OXIMETRY 1: CPT

## 2018-12-03 PROCEDURE — 74011250637 HC RX REV CODE- 250/637: Performed by: INTERNAL MEDICINE

## 2018-12-03 PROCEDURE — 74011000250 HC RX REV CODE- 250: Performed by: INTERNAL MEDICINE

## 2018-12-03 PROCEDURE — 36415 COLL VENOUS BLD VENIPUNCTURE: CPT

## 2018-12-03 PROCEDURE — 85610 PROTHROMBIN TIME: CPT

## 2018-12-03 RX ORDER — CARVEDILOL 6.25 MG/1
6.25 TABLET ORAL 2 TIMES DAILY WITH MEALS
Qty: 60 TAB | Refills: 0 | Status: SHIPPED | OUTPATIENT
Start: 2018-12-03

## 2018-12-03 RX ORDER — LEVETIRACETAM 500 MG/1
500 TABLET ORAL 2 TIMES DAILY
Qty: 30 TAB | Refills: 0 | Status: SHIPPED | OUTPATIENT
Start: 2018-12-03 | End: 2018-12-11 | Stop reason: ALTCHOICE

## 2018-12-03 RX ADMIN — ALLOPURINOL 100 MG: 100 TABLET ORAL at 09:17

## 2018-12-03 RX ADMIN — LEVETIRACETAM 500 MG: 500 TABLET ORAL at 09:17

## 2018-12-03 RX ADMIN — GUAIFENESIN 600 MG: 600 TABLET, EXTENDED RELEASE ORAL at 09:17

## 2018-12-03 RX ADMIN — PANTOPRAZOLE SODIUM 40 MG: 40 TABLET, DELAYED RELEASE ORAL at 05:57

## 2018-12-03 RX ADMIN — ASPIRIN 81 MG 81 MG: 81 TABLET ORAL at 09:17

## 2018-12-03 RX ADMIN — APIXABAN 5 MG: 5 TABLET, FILM COATED ORAL at 09:17

## 2018-12-03 RX ADMIN — IPRATROPIUM BROMIDE AND ALBUTEROL SULFATE 3 ML: .5; 3 SOLUTION RESPIRATORY (INHALATION) at 02:48

## 2018-12-03 RX ADMIN — CARVEDILOL 6.25 MG: 6.25 TABLET, FILM COATED ORAL at 09:17

## 2018-12-03 RX ADMIN — IPRATROPIUM BROMIDE AND ALBUTEROL SULFATE 3 ML: .5; 3 SOLUTION RESPIRATORY (INHALATION) at 08:38

## 2018-12-03 RX ADMIN — Medication 10 ML: at 05:59

## 2018-12-03 RX ADMIN — DOXYCYCLINE HYCLATE 100 MG: 100 CAPSULE ORAL at 09:17

## 2018-12-03 RX ADMIN — BUDESONIDE 500 MCG: 0.5 INHALANT RESPIRATORY (INHALATION) at 08:38

## 2018-12-03 NOTE — DISCHARGE SUMMARY
Hospitalist Discharge Summary     Admit Date:  2018  3:15 PM   Name:  Isaías Valdez. Age:  74 y.o.  :  1945   MRN:  [id number]   PCP:  Eleln Lee MD  Treatment Team: Attending Provider: Nneka Bashir DO; Consulting Provider: Taj Olsen MD; Care Manager: Bryan Montalvo RN; Physician: Lindsey Morelos MD    Problem List for this Hospitalization:  Hospital Problems as of 12/3/2018 Date Reviewed: 2018          Codes Class Noted - Resolved POA    Atrial fibrillation ([facility] ICD-10-CM: I48.91  ICD-9-CM: 427.31  2018 - Present Unknown        * (Principal) Acute on chronic systolic (congestive) heart failure ([facility] ICD-10-CM: I50.23  ICD-9-CM: 428.23, 428.0  2018 - Present Unknown        COPD exacerbation ([facility] ICD-10-CM: J44.1  ICD-9-CM: 491.21  2018 - Present Unknown        Seizure disorder ([facility] ICD-10-CM: G40.909  ICD-9-CM: 345.90  2018 - Present Yes        Stage 2 chronic kidney disease ICD-10-CM: N18.2  ICD-9-CM: 585.2  10/17/2018 - Present         Type 2 diabetes with nephropathy ([facility] ICD-10-CM: E11.21  ICD-9-CM: 250.40, 583.81  10/8/2018 - Present Yes        CKD (chronic kidney disease) stage 3, GFR 30-59 ml/min (HCC) (Chronic) ICD-10-CM: N18.3  ICD-9-CM: 585.3  2017 - Present Yes        Hypertension (Chronic) ICD-10-CM: I10  ICD-9-CM: 401.9  2017 - Present Yes        COPD, moderate ([facility] (Chronic) ICD-10-CM: J44.9  ICD-9-CM: 695  2017 - Present Yes    Overview Signed 2015 12:38 PM by Cleotilde Lundborg, NP     Complete PFTs: 7/20/15:  Spirometry is consistent with a moderate obstructive/restrictive defect. . The residual volume is increased relative to other lung volumes suggesting air-trapping. The diffusion capacity corrected for alveolar volume was normal suggesting no loss of alveolo-capillary units.               Mixed hyperlipidemia (Chronic) ICD-10-CM: Z88.6  ICD-9-CM: 272.2  2016 - Present Yes Admission HPI from 11/26/2018:    Patient 73M with pmhx of CHF EF 35%, COPD wears 4lnc at home, HTN, HLP presents with 3-4 days of increasing dyspnea, productive cough, leg edema and orthopnea. Denies fever, chills, nausea, vomiting, change in urine or stool, chest pain. No sick contacts. ED workup notable for , Cr 1.85, ECG with afib (new onset), cxr with pulm vascular congestion. Patient admits to poor compliance with both oxygen and medications.      Hospitalist asked to admit for COPD/CHFe    Hospital Course:  Pt was on coreg 3.125, increased to 6.25 mg po q daily. Cardiology was consulted for new afib and acute onc hronic systolic chf exa. Continued on lasix 40 mg bid. Held his demadex during the stay. pt normally on 4 lit/min oxygen. He had been gradually weaned to 2lit/min. Pt PILO showed left atrial mural thrombus and ef of 15- 20% , was initially started on eliquis for new afib,then changed to coumadin and then again changed to eliquis as per Cardiology recommendation. Pts at had been having low normal bp- his blood pressures improved during the stay. For Continued on duonebs and Pulmicort during the stay. pts respiratory status back to baseline. Pt is ckd 3. Follow up instructions below. Plan was discussed with patient and wife at bedside. All questions answered. Patient was stable at time of discharge and was instructed to call or return if there are any concerns or recurrence of symptoms. Diagnostic Imaging/Tests:   Xr Chest Pa Lat    Result Date: 11/26/2018  Chest 2 view dated 11/26/2018 Prior exam 11/2/2018 CLINICAL INFORMATION: Shortness of breath, increased for the last 3 days Heart is enlarged. Mediastinum unremarkable. Vascularity is congested with hazy infiltrates in the lower lungs. No pleural effusion. IMPRESSION: Cardiomegaly with pulmonary vascular congestion      Echocardiogram results:  No results found for this visit on 11/26/18.       All Micro Results     None Labs: Results:       BMP, Mg, Phos Recent Labs     12/03/18  0621 12/02/18  0539 12/01/18  0632    140 141   K 3.7 3.7 3.8   * 108* 107   CO2 22 26 25   AGAP 10 6* 9   BUN 25* 28* 29*   CREA 1.50 1.73* 1.68*   CA 8.5 8.1* 8.4   GLU 94 101* 112*      CBC No results for input(s): WBC, RBC, HGB, HCT, PLT, GRANS, LYMPH, EOS, MONOS, BASOS, IG, ANEU, ABL, REAGAN, ABM, ABB, AIG, HGBEXT, HCTEXT, PLTEXT in the last 72 hours. LFT No results for input(s): SGOT, ALT, TBIL, AP, TP, ALB, GLOB, AGRAT, GPT in the last 72 hours.    Cardiac Testing Lab Results   Component Value Date/Time    BNP 1,335 11/27/2018 06:57 AM     11/26/2018 03:37 PM     10/22/2018 05:02 AM     12/28/2016 03:53 PM     12/01/2016 03:18 PM    BNP 6 11/21/2016 09:50 AM     09/27/2017 07:19 PM     11/13/2015 05:23 AM    CK - MB 2.1 09/27/2017 07:19 PM    CK-MB Index 1.6 09/27/2017 07:19 PM    Troponin-I, Qt. 0.35 () 10/21/2018 10:04 AM    Troponin-I, Qt. 0.45 () 10/21/2018 01:40 AM    Troponin-I, Qt. 0.46 () 10/20/2018 07:34 PM      Coagulation Tests Lab Results   Component Value Date/Time    Prothrombin time 26.5 (H) 12/03/2018 06:21 AM    Prothrombin time 18.9 (H) 12/02/2018 05:39 AM    Prothrombin time 18.5 (H) 12/01/2018 06:32 AM    INR 2.5 12/03/2018 06:21 AM    INR 1.6 12/02/2018 05:39 AM    INR 1.6 12/01/2018 06:32 AM    aPTT 32.5 12/28/2017 12:16 PM    aPTT >200.0 (HH) 12/28/2017 03:00 AM    aPTT 72.6 (H) 12/27/2017 07:32 PM      A1c Lab Results   Component Value Date/Time    Hemoglobin A1c 7.0 (H) 09/10/2018 09:13 AM    Hemoglobin A1c 7.0 (H) 07/12/2018 05:22 AM      Lipid Panel Lab Results   Component Value Date/Time    Cholesterol, total 158 09/10/2018 09:13 AM    HDL Cholesterol 23 (L) 09/10/2018 09:13 AM    LDL,Direct 94 06/29/2015 06:00 AM    LDL, calculated 95 09/10/2018 09:13 AM    VLDL, calculated 40 09/10/2018 09:13 AM    Triglyceride 199 (H) 09/10/2018 09:13 AM    CHOL/HDL Ratio 4.3 12/27/2017 04:13 AM      Thyroid Panel Lab Results   Component Value Date/Time    TSH 0.604 05/25/2018 03:54 AM    TSH 1.165 04/26/2015 04:11 AM        Most Recent UA Lab Results   Component Value Date/Time    Color YELLOW 11/28/2018 03:54 PM    Appearance CLEAR 11/28/2018 03:54 PM    Specific gravity 1.008 11/28/2018 03:54 PM    pH (UA) 7.0 11/28/2018 03:54 PM    Protein NEGATIVE  11/28/2018 03:54 PM    Glucose NEGATIVE  11/28/2018 03:54 PM    Ketone NEGATIVE  11/28/2018 03:54 PM    Bilirubin NEGATIVE  11/28/2018 03:54 PM    Blood NEGATIVE  11/28/2018 03:54 PM    Urobilinogen 1.0 11/28/2018 03:54 PM    Nitrites NEGATIVE  11/28/2018 03:54 PM    Leukocyte Esterase NEGATIVE  11/28/2018 03:54 PM        Allergies   Allergen Reactions    Pcn [Penicillins] Other (comments)     \"makes my heart stop\"     Immunization History   Administered Date(s) Administered    Influenza Vaccine 09/28/2016    Influenza Vaccine (Quad) PF 10/04/2018    Pneumococcal Conjugate (PCV-13) 09/10/2018    Pneumococcal Polysaccharide (PPSV-23) 09/28/2016    TB Skin Test (PPD) Intradermal 01/05/2018, 03/27/2018, 05/27/2018, 07/11/2018       All Labs from Last 24 Hrs:  Recent Results (from the past 24 hour(s))   PROTHROMBIN TIME + INR    Collection Time: 12/03/18  6:21 AM   Result Value Ref Range    Prothrombin time 26.5 (H) 11.5 - 14.5 sec    INR 2.5     METABOLIC PANEL, BASIC    Collection Time: 12/03/18  6:21 AM   Result Value Ref Range    Sodium 140 136 - 145 mmol/L    Potassium 3.7 3.5 - 5.1 mmol/L    Chloride 108 (H) 98 - 107 mmol/L    CO2 22 21 - 32 mmol/L    Anion gap 10 7 - 16 mmol/L    Glucose 94 65 - 100 mg/dL    BUN 25 (H) 8 - 23 MG/DL    Creatinine 1.50 0.8 - 1.5 MG/DL    GFR est AA 59 (L) >60 ml/min/1.73m2    GFR est non-AA 48 (L) >60 ml/min/1.73m2    Calcium 8.5 8.3 - 10.4 MG/DL       Discharge Exam:  Patient Vitals for the past 24 hrs:   Temp Pulse Resp BP SpO2   12/03/18 0838     96 %   12/03/18 0711 97.9 °F (36.6 °C) 78 20 112/78 95 %   12/03/18 0555 97.5 °F (36.4 °C) 76 22 132/85 100 %   12/03/18 0411  70      12/03/18 0305 97.6 °F (36.4 °C) 65 19 121/85 95 %   12/03/18 0248     99 %   12/03/18 0127  100      12/02/18 2246 98.3 °F (36.8 °C) 66 18 107/75 97 %   12/02/18 2008  99      12/02/18 1957     99 %   12/02/18 1852 97.4 °F (36.3 °C) 100 19 115/72 99 %   12/02/18 1500 97.3 °F (36.3 °C) 69 18 135/78 97 %   12/02/18 1442     98 %   12/02/18 1100 98.9 °F (37.2 °C) 69 18 122/83 98 %     Oxygen Therapy  O2 Sat (%): 96 % (12/03/18 0838)  Pulse via Oximetry: 98 beats per minute (12/03/18 0838)  O2 Device: Room air (12/03/18 0838)  O2 Flow Rate (L/min): 2 l/min (12/02/18 0722)  FIO2 (%): 21 % (12/02/18 1442)    Intake/Output Summary (Last 24 hours) at 12/3/2018 0950  Last data filed at 12/2/2018 2246  Gross per 24 hour   Intake 720 ml   Output 450 ml   Net 270 ml       General:    Well nourished. Alert. No distress. on oxygen nasal cannula 2 lit/min. Eyes:   Normal sclera. Extraocular movements intact. ENT:  Normocephalic, atraumatic. Moist mucous membranes  CV:   Regular rate and rhythm. No murmur, rub, or gallop. Lungs:  Clear to auscultation bilaterally. No wheezing, rhonchi, or rales. Abdomen: Soft, nontender, nondistended. Bowel sounds normal.   Extremities: Warm and dry. No cyanosis or edema. Neurologic: CN II-XII grossly intact. Sensation intact. Skin:     No rashes or jaundice. Psych:  Normal mood and affect. Discharge Info:   Current Discharge Medication List      START taking these medications    Details   !! levETIRAcetam (KEPPRA) 500 mg tablet Take 1 Tab by mouth two (2) times a day. Qty: 30 Tab, Refills: 0      apixaban (ELIQUIS) 5 mg tablet Take 1 Tab by mouth every twelve (12) hours. Qty: 60 Tab, Refills: 0       !! - Potential duplicate medications found. Please discuss with provider.       CONTINUE these medications which have CHANGED    Details   carvedilol (COREG) 6.25 mg tablet Take 1 Tab by mouth two (2) times daily (with meals). Qty: 60 Tab, Refills: 0         CONTINUE these medications which have NOT CHANGED    Details   aspirin 81 mg chewable tablet Take 1 Tab by mouth daily. Qty: 30 Tab, Refills: 0      torsemide (DEMADEX) 20 mg tablet Take 1 Tab by mouth daily. Qty: 60 Tab, Refills: 5      atorvastatin (LIPITOR) 20 mg tablet Take 1 Tab by mouth nightly. Qty: 90 Tab, Refills: 3      omeprazole (PRILOSEC) 20 mg capsule Take 1 Cap by mouth daily. Qty: 90 Cap, Refills: 3      fluticasone (FLONASE) 50 mcg/actuation nasal spray 2 Sprays by Both Nostrils route daily. Qty: 1 Bottle, Refills: 11      melatonin 3 mg tablet Take 3 mg by mouth nightly. albuterol (VENTOLIN HFA) 90 mcg/actuation inhaler Take 2 Puffs by inhalation four (4) times daily. Qty: 1 Inhaler, Refills: 11      polyethylene glycol (MIRALAX) 17 gram packet Take 1 Packet by mouth daily. Qty: 1 Packet, Refills: 11      albuterol-ipratropium (DUO-NEB) 2.5 mg-0.5 mg/3 ml nebu 3 mL by Nebulization route four (4) times daily. Diaignosis--J44.9  Qty: 120 Nebule, Refills: 11      !! levETIRAcetam (KEPPRA) 500 mg tablet Take 1 Tab by mouth two (2) times a day. Qty: 30 Tab, Refills: 0      allopurinol (ZYLOPRIM) 100 mg tablet TAKE 1 TABLET BY MOUTH EVERY DAY  Qty: 90 Tab, Refills: 3       !! - Potential duplicate medications found. Please discuss with provider. Disposition: home. Activity: As tolerated  Diet: DIET CARDIAC Regular and renal diet    Follow-up Appointments   Procedures    FOLLOW UP VISIT Appointment in: One Week F/u with pcp in a week with cbc and bmp. F/u with cardiology in 10 days. Cardiology will be giving  eliquis voucher to the pt. Advised to keep appointment with neurology. Pt is home oxygen- advised to contin. .. F/u with pcp in a week with cbc and bmp. F/u with cardiology in 10 days. Cardiology will be giving  eliquis voucher to the pt.   Advised to keep appointment with neurology. Pt is home oxygen- advised to continue. F/u with nephrology in 3 weeks. Daily weights. Standing Status:   Standing     Number of Occurrences:   1     Order Specific Question:   Appointment in     Answer: One Week         Follow-up Information     Follow up With Specialties Details Why [address]  On 12/5/2018 10:30 AM [facility] [address]    Cathie Lee MD Family Practice In 1 week  [address] [place] [address] [place] [id number] [phone]      [facility] [place] [id number]  [phone]          Time spent in patient discharge planning and coordination 35 minutes.     Signed:  Goyo Hess MD

## 2018-12-03 NOTE — PROGRESS NOTES
Patient is discharging home today. Resumption orders have been faxed to [facility] for resumption of his home health services. Patient is current with S for home O2. No new discharge planning needs identified at this time. Case Management will remain available to assist as needed. Care Management Interventions PCP Verified by CM: Yes Transition of Care Consult (CM Consult): Discharge Planning, Home Health [address]: No 
Reason Outside [place]: Patient already serviced by other home care/hospice agency Discharge Durable Medical Equipment: No 
Physical Therapy Consult: Yes Occupational Therapy Consult: Yes Speech Therapy Consult: No 
Current Support Network: Lives with Spouse, Own Home Confirm Follow Up Transport: Family Plan discussed with Pt/Family/Caregiver: Yes Freedom of Choice Offered: Yes Discharge Location Discharge Placement: Home with home health

## 2018-12-03 NOTE — PROGRESS NOTES
Patient resting in bed with no complaints at this time. Patient is alert and orientated with no distress noted. IV intact and patent with no s/s of infection noted. Respirations even and unlabored with heart rate regular. Patient unable to ambulate independently without assistance; needs x1 r/t weakness and unsteadiness. Bed in low locked position with call light within reach. Patient instructed to call if assistance is needed. Will continue to monitor.

## 2018-12-03 NOTE — PROGRESS NOTES
Received report from [facility]. Patient awake in bed. Respirations present. No signs of distress at this time. Bed low and locked. Call light within reach. Will continue to monitor.

## 2018-12-03 NOTE — DISCHARGE INSTRUCTIONS
Heart Failure: Care Instructions  Your Care Instructions    Heart failure occurs when your heart does not pump as much blood as the body needs. Failure does not mean that the heart has stopped pumping but rather that it is not pumping as well as it should. Over time, this causes fluid buildup in your lungs and other parts of your body. Fluid buildup can cause shortness of breath, fatigue, swollen ankles, and other problems. By taking medicines regularly, reducing sodium (salt) in your diet, checking your weight every day, and making lifestyle changes, you can feel better and live longer. Follow-up care is a key part of your treatment and safety. Be sure to make and go to all appointments, and call your doctor if you are having problems. It's also a good idea to know your test results and keep a list of the medicines you take. How can you care for yourself at home? Medicines    · Be safe with medicines. Take your medicines exactly as prescribed. Call your doctor if you think you are having a problem with your medicine.     · Do not take any vitamins, over-the-counter medicine, or herbal products without talking to your doctor first. Andriette Maillard not take ibuprofen (Advil or Motrin) and naproxen (Aleve) without talking to your doctor first. They could make your heart failure worse.     · You may be taking some of the following medicine. ? Beta-blockers can slow heart rate, decrease blood pressure, and improve your condition. Taking a beta-blocker may lower your chance of needing to be hospitalized. ? Angiotensin-converting enzyme inhibitors (ACEIs) reduce the heart's workload, lower blood pressure, and reduce swelling. Taking an ACEI may lower your chance of needing to be hospitalized again. ? Angiotensin II receptor blockers (ARBs) work like ACEIs. Your doctor may prescribe them instead of ACEIs. ? Diuretics, also called water pills, reduce swelling. ?  Potassium supplements replace this important mineral, which is sometimes lost with diuretics. ? Aspirin and other blood thinners prevent blood clots, which can cause a stroke or heart attack.    You will get more details on the specific medicines your doctor prescribes. Diet    · Your doctor may suggest that you limit sodium to 2,000 milligrams (mg) a day or less. That is less than 1 teaspoon of salt a day, including all the salt you eat in cooking or in packaged foods. People get most of their sodium from processed foods. Fast food and restaurant meals also tend to be very high in sodium.     · Ask your doctor how much liquid you can drink each day. You may have to limit liquids.    Weight    · Weigh yourself without clothing at the same time each day. Record your weight. Call your doctor if you have a sudden weight gain, such as more than 2 to 3 pounds in a day or 5 pounds in a week. (Your doctor may suggest a different range of weight gain.) A sudden weight gain may mean that your heart failure is getting worse.    Activity level    · Start light exercise (if your doctor says it is okay). Even if you can only do a small amount, exercise will help you get stronger, have more energy, and manage your weight and your stress. Walking is an easy way to get exercise. Start out by walking a little more than you did before. Bit by bit, increase the amount you walk.     · When you exercise, watch for signs that your heart is working too hard. You are pushing yourself too hard if you cannot talk while you are exercising. If you become short of breath or dizzy or have chest pain, stop, sit down, and rest.     · If you feel \"wiped out\" the day after you exercise, walk slower or for a shorter distance until you can work up to a better pace.     · Get enough rest at night. Sleeping with 1 or 2 pillows under your upper body and head may help you breathe easier.    Lifestyle changes    · Do not smoke. Smoking can make a heart condition worse.  If you need help quitting, talk to your doctor about stop-smoking programs and medicines. These can increase your chances of quitting for good. Quitting smoking may be the most important step you can take to protect your heart.     · Limit alcohol to 2 drinks a day for men and 1 drink a day for women. Too much alcohol can cause health problems.     · Avoid getting sick from colds and the flu. Get a pneumococcal vaccine shot. If you have had one before, ask your doctor whether you need another dose. Get a flu shot each year. If you must be around people with colds or the flu, wash your hands often. When should you call for help? Call 911 if you have symptoms of sudden heart failure such as:    · You have severe trouble breathing.     · You cough up pink, foamy mucus.     · You have a new irregular or rapid heartbeat.    Call your doctor now or seek immediate medical care if:    · You have new or increased shortness of breath.     · You are dizzy or lightheaded, or you feel like you may faint.     · You have sudden weight gain, such as more than 2 to 3 pounds in a day or 5 pounds in a week. (Your doctor may suggest a different range of weight gain.)     · You have increased swelling in your legs, ankles, or feet.     · You are suddenly so tired or weak that you cannot do your usual activities.    Watch closely for changes in your health, and be sure to contact your doctor if you develop new symptoms. Where can you learn more? Go to http://jaime-jarrell.info/. Enter B315 in the search box to learn more about \"Heart Failure: Care Instructions. \"  Current as of: December 6, 2017  Content Version: 11.8  © 5861-7505 Health[employer]. Care instructions adapted under license by [employer] (which disclaims liability or warranty for this information).  If you have questions about a medical condition or this instruction, always ask your healthcare professional. [address] any warranty or liability for your use of this information. Atrial Fibrillation: Care Instructions  Your Care Instructions    Atrial fibrillation is an irregular and often fast heartbeat. Treating this condition is important for several reasons. It can cause blood clots, which can travel from your heart to your brain and cause a stroke. If you have a fast heartbeat, you may feel lightheaded, dizzy, and weak. An irregular heartbeat can also increase your risk for heart failure. Atrial fibrillation is often the result of another heart condition, such as high blood pressure or coronary artery disease. Making changes to improve your heart condition will help you stay healthy and active. Follow-up care is a key part of your treatment and safety. Be sure to make and go to all appointments, and call your doctor if you are having problems. It's also a good idea to know your test results and keep a list of the medicines you take. How can you care for yourself at home? Medicines    · Take your medicines exactly as prescribed. Call your doctor if you think you are having a problem with your medicine. You will get more details on the specific medicines your doctor prescribes.     · If your doctor has given you a blood thinner to prevent a stroke, be sure you get instructions about how to take your medicine safely. Blood thinners can cause serious bleeding problems.     · Do not take any vitamins, over-the-counter drugs, or herbal products without talking to your doctor first.    Lifestyle changes    · Do not smoke. Smoking can increase your chance of a stroke and heart attack. If you need help quitting, talk to your doctor about stop-smoking programs and medicines. These can increase your chances of quitting for good.     · Eat a heart-healthy diet.     · Stay at a healthy weight. Lose weight if you need to.     · Limit alcohol to 2 drinks a day for men and 1 drink a day for women.  Too much alcohol can cause health problems.     · Avoid colds and flu. Get a pneumococcal vaccine shot. If you have had one before, ask your doctor whether you need another dose. Get a flu shot every year. If you must be around people with colds or flu, wash your hands often. Activity    · If your doctor recommends it, get more exercise. Walking is a good choice. Bit by bit, increase the amount you walk every day. Try for at least 30 minutes on most days of the week. You also may want to swim, bike, or do other activities. Your doctor may suggest that you join a cardiac rehabilitation program so that you can have help increasing your physical activity safely.     · Start light exercise if your doctor says it is okay. Even a small amount will help you get stronger, have more energy, and manage stress. Walking is an easy way to get exercise. Start out by walking a little more than you did in the hospital. Gradually increase the amount you walk.     · When you exercise, watch for signs that your heart is working too hard. You are pushing too hard if you cannot talk while you are exercising. If you become short of breath or dizzy or have chest pain, sit down and rest immediately.     · Check your pulse regularly. Place two fingers on the artery at the palm side of your wrist, in line with your thumb. If your heartbeat seems uneven or fast, talk to your doctor. When should you call for help? Call 911 anytime you think you may need emergency care. For example, call if:    · You have symptoms of a heart attack. These may include:  ? Chest pain or pressure, or a strange feeling in the chest.  ? Sweating. ? Shortness of breath. ? Nausea or vomiting. ? Pain, pressure, or a strange feeling in the back, neck, jaw, or upper belly or in one or both shoulders or arms. ? Lightheadedness or sudden weakness. ? A fast or irregular heartbeat. After you call 911, the  may tell you to chew 1 adult-strength or 2 to 4 low-dose aspirin. Wait for an ambulance.  Do not try to drive yourself.     · You have symptoms of a stroke. These may include:  ? Sudden numbness, tingling, weakness, or loss of movement in your face, arm, or leg, especially on only one side of your body. ? Sudden vision changes. ? Sudden trouble speaking. ? Sudden confusion or trouble understanding simple statements. ? Sudden problems with walking or balance. ? A sudden, severe headache that is different from past headaches.     · You passed out (lost consciousness).    Call your doctor now or seek immediate medical care if:    · You have new or increased shortness of breath.     · You feel dizzy or lightheaded, or you feel like you may faint.     · Your heart rate becomes irregular.     · You can feel your heart flutter in your chest or skip heartbeats. Tell your doctor if these symptoms are new or worse.    Watch closely for changes in your health, and be sure to contact your doctor if you have any problems. Where can you learn more? Go to http://jaime-jarrell.info/. Enter U020 in the search box to learn more about \"Atrial Fibrillation: Care Instructions. \"  Current as of: December 6, 2017  Content Version: 11.8  © 0075-2448 [employer]. Care instructions adapted under license by [employer] (which disclaims liability or warranty for this information). If you have questions about a medical condition or this instruction, always ask your healthcare professional. [address] any warranty or liability for your use of this information. Chronic Obstructive Pulmonary Disease (COPD): Care Instructions  Your Care Instructions    Chronic obstructive pulmonary disease (COPD) is a general term for a group of lung diseases, including emphysema and chronic bronchitis. People with COPD have decreased airflow in and out of the lungs, which makes it hard to breathe. The airways also can get clogged with thick mucus.  Cigarette smoking is a major cause of COPD.  Although there is no cure for COPD, you can slow its progress. Following your treatment plan and taking care of yourself can help you feel better and live longer. Follow-up care is a key part of your treatment and safety. Be sure to make and go to all appointments, and call your doctor if you are having problems. It's also a good idea to know your test results and keep a list of the medicines you take. How can you care for yourself at home?   Staying healthy    · Do not smoke. This is the most important step you can take to prevent more damage to your lungs. If you need help quitting, talk to your doctor about stop-smoking programs and medicines. These can increase your chances of quitting for good.     · Avoid colds and flu. Get a pneumococcal vaccine shot. If you have had one before, ask your doctor whether you need a second dose. Get the flu vaccine every fall. If you must be around people with colds or the flu, wash your hands often.     · Avoid secondhand smoke, air pollution, and high altitudes. Also avoid cold, dry air and hot, humid air. Stay at home with your windows closed when air pollution is bad.    Medicines and oxygen therapy    · Take your medicines exactly as prescribed. Call your doctor if you think you are having a problem with your medicine.     · You may be taking medicines such as:  ? Bronchodilators. These help open your airways and make breathing easier. Bronchodilators are either short-acting (work for 6 to 9 hours) or long-acting (work for 24 hours). You inhale most bronchodilators, so they start to act quickly. Always carry your quick-relief inhaler with you in case you need it while you are away from home. ? Corticosteroids (prednisone, budesonide). These reduce airway inflammation. They come in pill or inhaled form. You must take these medicines every day for them to work well.     · A spacer may help you get more inhaled medicine to your lungs.  Ask your doctor or pharmacist if a spacer is right for you. If it is, ask how to use it properly.     · Do not take any vitamins, over-the-counter medicine, or herbal products without talking to your doctor first.     · If your doctor prescribed antibiotics, take them as directed. Do not stop taking them just because you feel better. You need to take the full course of antibiotics.     · Oxygen therapy boosts the amount of oxygen in your blood and helps you breathe easier. Use the flow rate your doctor has recommended, and do not change it without talking to your doctor first.   Activity    · Get regular exercise. Walking is an easy way to get exercise. Start out slowly, and walk a little more each day.     · Pay attention to your breathing. You are exercising too hard if you cannot talk while you are exercising.     · Take short rest breaks when doing household chores and other activities.     · Learn breathing methods--such as breathing through pursed lips--to help you become less short of breath.     · If your doctor has not set you up with a pulmonary rehabilitation program, talk to him or her about whether rehab is right for you. Rehab includes exercise programs, education about your disease and how to manage it, help with diet and other changes, and emotional support. Diet    · Eat regular, healthy meals. Use bronchodilators about 1 hour before you eat to make it easier to eat. Eat several small meals instead of three large ones. Drink beverages at the end of the meal. Avoid foods that are hard to chew.     · Eat foods that contain protein so that you do not lose muscle mass.     · Talk with your doctor if you gain too much weight or if you lose weight without trying.    Mental health    · Talk to your family, friends, or a therapist about your feelings. It is normal to feel frightened, angry, hopeless, helpless, and even guilty. Talking openly about bad feelings can help you cope. If these feelings last, talk to your doctor.    When should you call for help? Call 911 anytime you think you may need emergency care. For example, call if:    · You have severe trouble breathing.    Call your doctor now or seek immediate medical care if:    · You have new or worse trouble breathing.     · You cough up blood.     · You have a fever.    Watch closely for changes in your health, and be sure to contact your doctor if:    · You cough more deeply or more often, especially if you notice more mucus or a change in the color of your mucus.     · You have new or worse swelling in your legs or belly.     · You are not getting better as expected. Where can you learn more? Go to http://jaime-jarrell.info/. [place] in the search box to learn more about \"Chronic Obstructive Pulmonary Disease (COPD): Care Instructions. \"  Current as of: December 6, 2017  Content Version: 11.8  © 4837-0579 [employer]. Care instructions adapted under license by [employer] (which disclaims liability or warranty for this information). If you have questions about a medical condition or this instruction, always ask your healthcare professional. [place] [address] any warranty or liability for your use of this information. DISCHARGE SUMMARY from Nurse    PATIENT INSTRUCTIONS:    After general anesthesia or intravenous sedation, for 24 hours or while taking prescription Narcotics:  · Limit your activities  · Do not drive and operate hazardous machinery  · Do not make important personal or business decisions  · Do  not drink alcoholic beverages  · If you have not urinated within 8 hours after discharge, please contact your surgeon on call.     Report the following to your surgeon:  · Excessive pain, swelling, redness or odor of or around the surgical area  · Temperature over 100.5  · Nausea and vomiting lasting longer than 4 hours or if unable to take medications  · Any signs of decreased circulation or nerve impairment to extremity: change in color, persistent  numbness, tingling, coldness or increase pain  · Any questions    What to do at Home:    *  Please give a list of your current medications to your Primary Care Provider. *  Please update this list whenever your medications are discontinued, doses are      changed, or new medications (including over-the-counter products) are added. *  Please carry medication information at all times in case of emergency situations. These are general instructions for a healthy lifestyle:    No smoking/ No tobacco products/ Avoid exposure to second hand smoke  Surgeon General's Warning:  Quitting smoking now greatly reduces serious risk to your health. Obesity, smoking, and sedentary lifestyle greatly increases your risk for illness    A healthy diet, regular physical exercise & weight monitoring are important for maintaining a healthy lifestyle    You may be retaining fluid if you have a history of heart failure or if you experience any of the following symptoms:  Weight gain of 3 pounds or more overnight or 5 pounds in a week, increased swelling in our hands or feet or shortness of breath while lying flat in bed. Please call your doctor as soon as you notice any of these symptoms; do not wait until your next office visit. Recognize signs and symptoms of STROKE:    F-face looks uneven    A-arms unable to move or move unevenly    S-speech slurred or non-existent    T-time-call 911 as soon as signs and symptoms begin-DO NOT go       Back to bed or wait to see if you get better-TIME IS BRAIN. Warning Signs of HEART ATTACK     Call 911 if you have these symptoms:   Chest discomfort. Most heart attacks involve discomfort in the center of the chest that lasts more than a few minutes, or that goes away and comes back. It can feel like uncomfortable pressure, squeezing, fullness, or pain.  Discomfort in other areas of the upper body.  Symptoms can include pain or discomfort in one or both arms, the back, neck, jaw, or stomach.  Shortness of breath with or without chest discomfort.  Other signs may include breaking out in a cold sweat, nausea, or lightheadedness. Don't wait more than five minutes to call 911 - MINUTES MATTER! Fast action can save your life. Calling 911 is almost always the fastest way to get lifesaving treatment. Emergency Medical Services staff can begin treatment when they arrive -- up to an hour sooner than if someone gets to the hospital by car. The discharge information has been reviewed with the patient. The patient verbalized understanding. Discharge medications reviewed with the patient and appropriate educational materials and side effects teaching were provided.   ___________________________________________________________________________________________________________________________________

## 2018-12-03 NOTE — PROGRESS NOTES
Patient c/o SOB and \"just not feeling good\". Patient awake in bed. AxO x4. VSS. No signs of acute distress. Patinet placed on 2 L NC for comfort. Bed low and locked. Call light within reach. Wife present at bedside. Will continue to monitor.

## 2018-12-03 NOTE — PROGRESS NOTES
Patient c/o SOB. Patient O2 sat 100% HR 72 RR 22. Patient has no acute findings of distress. Educated patient to deep breath. Wife present at bedside. Will continue to monitor patient.

## 2018-12-03 NOTE — PROGRESS NOTES
Discharge instructions and prescriptions provided and explained to the pt. Med side effect sheet reviewed. Opportunity for questions provided. Pt getting ready. Need transport home in cab. Instructed to call once ready to leave.

## 2018-12-03 NOTE — PROGRESS NOTES
Patient asleep in bed at this time. Respirations present. No signs of distress. Bed low and locked. Call light within reach. Wife at bedside. Bedside report given to oncoming RN, Ivana Avila.

## 2018-12-03 NOTE — PROGRESS NOTES
[facility] PROGRESS NOTE 
 
 
12/3/2018 9:34 AM 
 
Admit Date: 11/26/2018 Admit Diagnosis: Acute on chronic systolic (congestive) heart failure (HCC) Subjective:  
No chest pain or dyspnea. Objective:  
  
Vitals:  
 12/03/18 8427 12/03/18 0604 12/03/18 2968 12/03/18 1018 BP: 132/85  112/78 Pulse: 76  78 Resp: 22  20 Temp: 97.5 °F (36.4 °C)  97.9 °F (36.6 °C) SpO2: 100%  95% 96% Weight:  95.7 kg (210 lb 14.4 oz) Physical Exam: 
Neck-  
CV- irr+r Lungs- clear Ext- 
Skin- warm and dry Data Review:  
Recent Labs 12/03/18 
1347 12/02/18 
5156  140  
K 3.7 3.7 BUN 25* 28* CREA 1.50 1.73* GLU 94 101* INR 2.5 1.6 Assessment and Plan:  
 
Principal Problem: 
  Acute on chronic systolic (congestive) heart failure ([address] (11/26/2018)    agree with continuing demadex home dose Active Problems: 
  CKD (chronic kidney disease) stage 3, GFR 30-59 ml/min ([facility]) (9/29/2017) Hypertension (9/29/2017) COPD, moderate ([address] (9/29/2017) Overview: Complete PFTs: 7/20/15: 
    Spirometry is consistent with a moderate obstructive/restrictive defect. Ayanna Rushing The residual volume is increased relative to other lung volumes suggesting  
    air-trapping. The diffusion capacity corrected for alveolar volume was  
    normal suggesting no loss of alveolo-capillary units. Mixed hyperlipidemia (9/28/2016) Type 2 diabetes with nephropathy ([address] (10/8/2018) Seizure disorder ([address] (11/14/2018) COPD exacerbation ([address] (11/26/2018) Atrial fibrillation ([address] (11/27/2018)   CHAU thrombus  emphasized importance of anticoagulation and will provide month voucher to help him afford NOAC discussed with wife INR 2.5     warfarin discontinued OK for discharge  early f/u Dr Sharon Morton  .   
 
 
Kurt Galvez MD

## 2018-12-06 ENCOUNTER — PATIENT OUTREACH (OUTPATIENT)
Dept: CASE MANAGEMENT | Age: 73
End: 2018-12-06

## 2018-12-06 NOTE — PROGRESS NOTES
12/6/2018 pt was admitted for afib. Nurse reached out to pt and LVM. according to Epic, pt  was seen by Cardiologist today 12/6/18 and Neurologist on the 5th.  Nurse trying to establish communication to get pt in with his PCP within 7 days after discharge

## 2018-12-11 PROBLEM — J44.1 COPD EXACERBATION (HCC): Status: RESOLVED | Noted: 2018-11-26 | Resolved: 2018-12-11

## 2018-12-11 PROBLEM — J90 PLEURAL EFFUSION, RIGHT: Status: RESOLVED | Noted: 2018-11-03 | Resolved: 2018-12-11

## 2018-12-11 PROBLEM — J96.01 ACUTE RESPIRATORY FAILURE WITH HYPOXEMIA (HCC): Status: RESOLVED | Noted: 2018-05-24 | Resolved: 2018-12-11

## 2018-12-11 PROBLEM — J45.30 MILD PERSISTENT ASTHMA WITHOUT COMPLICATION: Status: ACTIVE | Noted: 2017-09-29

## 2018-12-12 ENCOUNTER — PATIENT OUTREACH (OUTPATIENT)
Dept: CASE MANAGEMENT | Age: 73
End: 2018-12-12

## 2018-12-12 NOTE — PROGRESS NOTES
LVM with pt. Left call back # and encourage to call CC. Nurse will continue to monitor and provide care as needed.

## 2018-12-21 ENCOUNTER — HOSPITAL ENCOUNTER (OUTPATIENT)
Dept: LAB | Age: 73
Discharge: HOME | End: 2018-12-21
Payer: MEDICARE

## 2018-12-21 LAB
ANION GAP SERPL CALC-SCNC: 8 MMOL/L (ref 7–16)
BNP SERPL-MCNC: 946 PG/ML
BUN SERPL-MCNC: 27 MG/DL (ref 8–23)
CALCIUM SERPL-MCNC: 8.5 MG/DL (ref 8.3–10.4)
CHLORIDE SERPL-SCNC: 105 MMOL/L (ref 98–107)
CO2 SERPL-SCNC: 26 MMOL/L (ref 21–32)
CREAT SERPL-MCNC: 1.97 MG/DL (ref 0.8–1.5)
GLUCOSE SERPL-MCNC: 83 MG/DL (ref 65–100)
MAGNESIUM SERPL-MCNC: 2.1 MG/DL (ref 1.8–2.4)
POTASSIUM SERPL-SCNC: 3.7 MMOL/L (ref 3.5–5.1)
SODIUM SERPL-SCNC: 139 MMOL/L (ref 136–145)

## 2018-12-21 PROCEDURE — 83735 ASSAY OF MAGNESIUM: CPT

## 2018-12-21 PROCEDURE — 83880 ASSAY OF NATRIURETIC PEPTIDE: CPT

## 2018-12-21 PROCEDURE — 80048 BASIC METABOLIC PNL TOTAL CA: CPT

## 2019-01-05 PROCEDURE — 0JB83ZX EXCISION OF ABDOMEN SUBCUTANEOUS TISSUE AND FASCIA, PERCUTANEOUS APPROACH, DIAGNOSTIC: ICD-10-PCS | Performed by: RADIOLOGY

## 2019-01-09 ENCOUNTER — HOSPITAL ENCOUNTER (INPATIENT)
Age: 74
LOS: 6 days | Discharge: HOME HEALTH | DRG: 291 | End: 2019-01-15
Attending: EMERGENCY MEDICINE | Admitting: HOSPITALIST
Payer: MEDICARE

## 2019-01-09 ENCOUNTER — APPOINTMENT (OUTPATIENT)
Dept: GENERAL RADIOLOGY | Age: 74
DRG: 291 | End: 2019-01-09
Attending: EMERGENCY MEDICINE
Payer: MEDICARE

## 2019-01-09 DIAGNOSIS — J84.9 INTERSTITIAL LUNG DISEASE (HCC): ICD-10-CM

## 2019-01-09 DIAGNOSIS — R06.09 DYSPNEA ON EXERTION: Primary | ICD-10-CM

## 2019-01-09 DIAGNOSIS — I50.43 ACUTE ON CHRONIC COMBINED SYSTOLIC AND DIASTOLIC CONGESTIVE HEART FAILURE (HCC): ICD-10-CM

## 2019-01-09 LAB
ALBUMIN SERPL-MCNC: 3.2 G/DL (ref 3.2–4.6)
ALBUMIN/GLOB SERPL: 0.8 {RATIO}
ALP SERPL-CCNC: 169 U/L (ref 50–136)
ALT SERPL-CCNC: 28 U/L (ref 12–65)
ANION GAP SERPL CALC-SCNC: 11 MMOL/L
AST SERPL-CCNC: 38 U/L (ref 15–37)
BASOPHILS # BLD: 0.1 K/UL (ref 0–0.2)
BASOPHILS NFR BLD: 1 % (ref 0–2)
BILIRUB SERPL-MCNC: 1.4 MG/DL (ref 0.2–1.1)
BNP SERPL-MCNC: 707 PG/ML
BUN SERPL-MCNC: 32 MG/DL (ref 8–23)
CALCIUM SERPL-MCNC: 8.7 MG/DL (ref 8.3–10.4)
CHLORIDE SERPL-SCNC: 105 MMOL/L (ref 98–107)
CO2 SERPL-SCNC: 23 MMOL/L (ref 21–32)
CREAT SERPL-MCNC: 2.23 MG/DL (ref 0.8–1.5)
DIFFERENTIAL METHOD BLD: ABNORMAL
EOSINOPHIL # BLD: 0.1 K/UL (ref 0–0.8)
EOSINOPHIL NFR BLD: 2 % (ref 0.5–7.8)
ERYTHROCYTE [DISTWIDTH] IN BLOOD BY AUTOMATED COUNT: 16.8 % (ref 11.9–14.6)
EST. AVERAGE GLUCOSE BLD GHB EST-MCNC: 166 MG/DL
GLOBULIN SER CALC-MCNC: 3.9 G/DL (ref 2.3–3.5)
GLUCOSE SERPL-MCNC: 78 MG/DL (ref 65–100)
HBA1C MFR BLD: 7.4 %
HCT VFR BLD AUTO: 44.3 % (ref 41.1–50.3)
HGB BLD-MCNC: 14.1 G/DL (ref 13.6–17.2)
IMM GRANULOCYTES # BLD AUTO: 0 K/UL (ref 0–0.5)
IMM GRANULOCYTES NFR BLD AUTO: 0 % (ref 0–5)
LYMPHOCYTES # BLD: 2.2 K/UL (ref 0.5–4.6)
LYMPHOCYTES NFR BLD: 32 % (ref 13–44)
MCH RBC QN AUTO: 27 PG (ref 26.1–32.9)
MCHC RBC AUTO-ENTMCNC: 31.8 G/DL (ref 31.4–35)
MCV RBC AUTO: 84.9 FL (ref 79.6–97.8)
MONOCYTES # BLD: 0.6 K/UL (ref 0.1–1.3)
MONOCYTES NFR BLD: 9 % (ref 4–12)
NEUTS SEG # BLD: 3.9 K/UL (ref 1.7–8.2)
NEUTS SEG NFR BLD: 56 % (ref 43–78)
NRBC # BLD: 0 K/UL (ref 0–0.2)
PLATELET # BLD AUTO: 139 K/UL (ref 150–450)
PMV BLD AUTO: 10.9 FL (ref 9.4–12.3)
POTASSIUM SERPL-SCNC: 3.7 MMOL/L (ref 3.5–5.1)
PROT SERPL-MCNC: 7.1 G/DL
RBC # BLD AUTO: 5.22 M/UL (ref 4.23–5.6)
SODIUM SERPL-SCNC: 139 MMOL/L (ref 136–145)
TROPONIN I BLD-MCNC: 0.03 NG/ML (ref 0.02–0.05)
WBC # BLD AUTO: 7 K/UL (ref 4.3–11.1)

## 2019-01-09 PROCEDURE — 99284 EMERGENCY DEPT VISIT MOD MDM: CPT | Performed by: EMERGENCY MEDICINE

## 2019-01-09 PROCEDURE — 83880 ASSAY OF NATRIURETIC PEPTIDE: CPT

## 2019-01-09 PROCEDURE — 71046 X-RAY EXAM CHEST 2 VIEWS: CPT

## 2019-01-09 PROCEDURE — 74011250636 HC RX REV CODE- 250/636: Performed by: HOSPITALIST

## 2019-01-09 PROCEDURE — 84484 ASSAY OF TROPONIN QUANT: CPT

## 2019-01-09 PROCEDURE — 74011000250 HC RX REV CODE- 250: Performed by: HOSPITALIST

## 2019-01-09 PROCEDURE — 65270000029 HC RM PRIVATE

## 2019-01-09 PROCEDURE — 80053 COMPREHEN METABOLIC PANEL: CPT

## 2019-01-09 PROCEDURE — 93005 ELECTROCARDIOGRAM TRACING: CPT | Performed by: EMERGENCY MEDICINE

## 2019-01-09 PROCEDURE — 83036 HEMOGLOBIN GLYCOSYLATED A1C: CPT

## 2019-01-09 PROCEDURE — 85025 COMPLETE CBC W/AUTO DIFF WBC: CPT

## 2019-01-09 RX ORDER — DIPHENHYDRAMINE HCL 25 MG
25 CAPSULE ORAL
Status: DISCONTINUED | OUTPATIENT
Start: 2019-01-09 | End: 2019-01-15 | Stop reason: HOSPADM

## 2019-01-09 RX ORDER — SODIUM CHLORIDE 0.9 % (FLUSH) 0.9 %
5-40 SYRINGE (ML) INJECTION AS NEEDED
Status: DISCONTINUED | OUTPATIENT
Start: 2019-01-09 | End: 2019-01-15 | Stop reason: HOSPADM

## 2019-01-09 RX ORDER — CLONIDINE HYDROCHLORIDE 0.1 MG/1
0.2 TABLET ORAL
Status: DISCONTINUED | OUTPATIENT
Start: 2019-01-09 | End: 2019-01-15 | Stop reason: HOSPADM

## 2019-01-09 RX ORDER — SODIUM CHLORIDE 0.9 % (FLUSH) 0.9 %
5-40 SYRINGE (ML) INJECTION EVERY 8 HOURS
Status: DISCONTINUED | OUTPATIENT
Start: 2019-01-09 | End: 2019-01-15 | Stop reason: HOSPADM

## 2019-01-09 RX ORDER — PROMETHAZINE HYDROCHLORIDE 25 MG/1
25 TABLET ORAL
Status: DISCONTINUED | OUTPATIENT
Start: 2019-01-09 | End: 2019-01-15 | Stop reason: HOSPADM

## 2019-01-09 RX ORDER — HYDRALAZINE HYDROCHLORIDE 20 MG/ML
10 INJECTION INTRAMUSCULAR; INTRAVENOUS
Status: DISCONTINUED | OUTPATIENT
Start: 2019-01-09 | End: 2019-01-15 | Stop reason: HOSPADM

## 2019-01-09 RX ORDER — CARVEDILOL 6.25 MG/1
6.25 TABLET ORAL 2 TIMES DAILY WITH MEALS
Status: DISCONTINUED | OUTPATIENT
Start: 2019-01-10 | End: 2019-01-15 | Stop reason: HOSPADM

## 2019-01-09 RX ORDER — IBUPROFEN 200 MG
1 TABLET ORAL
Status: DISCONTINUED | OUTPATIENT
Start: 2019-01-09 | End: 2019-01-15 | Stop reason: HOSPADM

## 2019-01-09 RX ORDER — HEPARIN SODIUM 5000 [USP'U]/ML
5000 INJECTION, SOLUTION INTRAVENOUS; SUBCUTANEOUS EVERY 12 HOURS
Status: DISCONTINUED | OUTPATIENT
Start: 2019-01-09 | End: 2019-01-10

## 2019-01-09 RX ORDER — GUAIFENESIN 100 MG/5ML
81 LIQUID (ML) ORAL DAILY
Status: DISCONTINUED | OUTPATIENT
Start: 2019-01-10 | End: 2019-01-10

## 2019-01-09 RX ORDER — ACETAMINOPHEN 325 MG/1
650 TABLET ORAL
Status: DISCONTINUED | OUTPATIENT
Start: 2019-01-09 | End: 2019-01-15 | Stop reason: HOSPADM

## 2019-01-09 RX ORDER — MORPHINE SULFATE 2 MG/ML
2 INJECTION, SOLUTION INTRAMUSCULAR; INTRAVENOUS
Status: DISCONTINUED | OUTPATIENT
Start: 2019-01-09 | End: 2019-01-15 | Stop reason: HOSPADM

## 2019-01-09 RX ORDER — BUMETANIDE 0.25 MG/ML
1 INJECTION INTRAMUSCULAR; INTRAVENOUS 2 TIMES DAILY
Status: DISCONTINUED | OUTPATIENT
Start: 2019-01-09 | End: 2019-01-10

## 2019-01-09 RX ADMIN — Medication 10 ML: at 22:17

## 2019-01-09 RX ADMIN — BUMETANIDE 1 MG: 0.25 INJECTION INTRAMUSCULAR; INTRAVENOUS at 22:03

## 2019-01-09 RX ADMIN — HEPARIN SODIUM 5000 UNITS: 5000 INJECTION INTRAVENOUS; SUBCUTANEOUS at 22:07

## 2019-01-09 NOTE — ED PROVIDER NOTES
75-year-old male has history of interstitial lung disease, sleep apnea, hypertension, pulmonary hypertension, congestive heart failure. Chronic shortness of breath even worse over the last 2 weeks. Increasing edema and increasing shortness of breath that is filled Lasix and torsemide. Lately increased torsemide 80 mg in the morning and 40 at night. Still with orthopnea and dyspnea on exertion. No fever or chest pain. The history is provided by the patient and the spouse. CHF This is a new problem. The problem has been gradually worsening. Associated symptoms include cough, wheezing and leg swelling. Pertinent negatives include no fever, no neck pain, no sputum production, no chest pain, no vomiting, no abdominal pain and no rash. He has tried nothing for the symptoms. He has had prior hospitalizations. Associated medical issues include CAD and heart failure. Associated medical issues do not include COPD, PE, DVT or recent surgery. Past Medical History:  
Diagnosis Date  Acute CHF (congestive heart failure) ([facility] [address] 4/25/2015  Acute combined systolic and diastolic congestive heart failure ([address] 4/28/2015  De Quervain's tenosynovitis, right 4/28/2015  Dyspnea on exertion 6/28/2015  Elevated serum creatinine 4/25/2015  Elevated troponin 6/28/2015  History of coronary artery disease MI at 27 yo  
 History of right knee surgery   
 cartilage removal  
 History of shingles  Malignant hypertension 4/25/2015  MI (myocardial infarction) ([facility] [address] Past Surgical History:  
Procedure Laterality Date  HX HEART CATHETERIZATION  6/29/2015  
 no intervention  HX KNEE ARTHROSCOPY Right   
 removal of cartilage Family History:  
Problem Relation Age of Onset  Hypertension Mother  No Known Problems Father Social History Socioeconomic History  Marital status:  Spouse name: Not on file  Number of children: Not on file  Years of education: Not on file  Highest education level: Not on file Social Needs  Financial resource strain: Not on file  Food insecurity - worry: Not on file  Food insecurity - inability: Not on file  Transportation needs - medical: Not on file  Transportation needs - non-medical: Not on file Occupational History  Occupation: retired Tobacco Use  Smoking status: Former Smoker Packs/day: 0.25 Years: 20.00 Pack years: 5.00 Types: Cigarettes Last attempt to quit: 4/5/2015 Years since quitting: 3.7  Smokeless tobacco: Never Used Substance and Sexual Activity  Alcohol use: No  
  Alcohol/week: 0.0 oz  Drug use: No  
 Sexual activity: Not on file Other Topics Concern   Service No  
 Blood Transfusions No  
  Comment: no issues with receiving  Caffeine Concern No  
 Occupational Exposure Not Asked [place] Hazards Not Asked  Sleep Concern Not Asked  Stress Concern Not Asked  Weight Concern Not Asked  Special Diet No  
 Back Care Not Asked  Exercise No  
 Bike Helmet Not Asked [address] [facility] Yes  Self-Exams Not Asked Social History Narrative Lives with his wife ALLERGIES: Pcn [penicillins] Review of Systems Constitutional: Negative for chills and fever. Respiratory: Positive for cough and wheezing. Negative for sputum production and shortness of breath. Cardiovascular: Positive for leg swelling. Negative for chest pain and palpitations. Gastrointestinal: Negative for abdominal pain, diarrhea and vomiting. Musculoskeletal: Negative for back pain and neck pain. Skin: Negative for color change and rash. All other systems reviewed and are negative. Vitals:  
 01/09/19 1539 BP: 119/79 Pulse: 71 Resp: 20 Temp: 97.8 °F (36.6 °C) SpO2: 100% Weight: 97.1 kg (214 lb) Height: 5' 10\" (1.778 m) Physical Exam  
 Constitutional: He is oriented to person, place, and time. He appears well-developed and well-nourished. No distress. HENT:  
Head: Normocephalic and atraumatic. Right Ear: External ear normal.  
Left Ear: External ear normal.  
Mouth/Throat: Oropharynx is clear and moist. No oropharyngeal exudate. Eyes: Conjunctivae and EOM are normal. Pupils are equal, round, and reactive to light. Neck: Normal range of motion. Neck supple. Cardiovascular: Normal rate and intact distal pulses. An irregularly irregular rhythm present. No murmur heard. Pulmonary/Chest: No respiratory distress. He has decreased breath sounds. He has wheezes. He has rales. Abdominal: Soft. Bowel sounds are normal. There is no tenderness. Musculoskeletal: He exhibits edema (2+ bilateral pitting). Neurological: He is alert and oriented to person, place, and time. Gait normal.  
Nl speech Skin: Skin is warm and dry. Psychiatric: He has a normal mood and affect. His speech is normal.  
Nursing note and vitals reviewed. MDM Number of Diagnoses or Management Options Diagnosis management comments: Assessment pneumonia, congestive heart failure exacerbation, effusion, silent ischemia. Doubt pulmonary embolism as patient is compliant with anticoagulants. Amount and/or Complexity of Data Reviewed Clinical lab tests: ordered and reviewed Tests in the radiology section of CPT®: ordered and reviewed Tests in the medicine section of CPT®: ordered and reviewed Discuss the patient with other providers: yes Independent visualization of images, tracings, or specimens: yes Risk of Complications, Morbidity, and/or Mortality Presenting problems: moderate Diagnostic procedures: low Management options: moderate General comments: EKG reveals atrial fibrillation without ST T changes. Normal ventricular response. Procedures Results Include: 
 
Recent Results (from the past 24 hour(s)) CBC WITH AUTOMATED DIFF Collection Time: 01/09/19  4:21 PM  
Result Value Ref Range WBC 7.0 4.3 - 11.1 K/uL  
 RBC 5.22 4.23 - 5.6 M/uL  
 HGB 14.1 13.6 - 17.2 g/dL HCT 44.3 41.1 - 50.3 % MCV 84.9 79.6 - 97.8 FL  
 MCH 27.0 26.1 - 32.9 PG  
 MCHC 31.8 31.4 - 35.0 g/dL  
 RDW 16.8 (H) 11.9 - 14.6 % PLATELET 187 (L) 183 - 450 K/uL MPV 10.9 9.4 - 12.3 FL ABSOLUTE NRBC 0.00 0.0 - 0.2 K/uL  
 DF AUTOMATED NEUTROPHILS 56 43 - 78 % LYMPHOCYTES 32 13 - 44 % MONOCYTES 9 4.0 - 12.0 % EOSINOPHILS 2 0.5 - 7.8 % BASOPHILS 1 0.0 - 2.0 % IMMATURE GRANULOCYTES 0 0.0 - 5.0 %  
 ABS. NEUTROPHILS 3.9 1.7 - 8.2 K/UL  
 ABS. LYMPHOCYTES 2.2 0.5 - 4.6 K/UL  
 ABS. MONOCYTES 0.6 0.1 - 1.3 K/UL  
 ABS. EOSINOPHILS 0.1 0.0 - 0.8 K/UL  
 ABS. BASOPHILS 0.1 0.0 - 0.2 K/UL  
 ABS. IMM. GRANS. 0.0 0.0 - 0.5 K/UL METABOLIC PANEL, COMPREHENSIVE Collection Time: 01/09/19  4:21 PM  
Result Value Ref Range Sodium 139 136 - 145 mmol/L Potassium 3.7 3.5 - 5.1 mmol/L Chloride 105 98 - 107 mmol/L  
 CO2 23 21 - 32 mmol/L Anion gap 11 mmol/L Glucose 78 65 - 100 mg/dL BUN 32 (H) 8 - 23 MG/DL Creatinine 2.23 (H) 0.8 - 1.5 MG/DL  
 GFR est AA 37 (L) >60 ml/min/1.73m2 GFR est non-AA 31 ml/min/1.73m2 Calcium 8.7 8.3 - 10.4 MG/DL Bilirubin, total 1.4 (H) 0.2 - 1.1 MG/DL  
 ALT (SGPT) 28 12 - 65 U/L  
 AST (SGOT) 38 (H) 15 - 37 U/L Alk. phosphatase 169 (H) 50 - 136 U/L Protein, total 7.1 g/dL Albumin 3.2 3.2 - 4.6 g/dL Globulin 3.9 (H) 2.3 - 3.5 g/dL A-G Ratio 0.8 BNP Collection Time: 01/09/19  4:21 PM  
Result Value Ref Range  pg/mL POC TROPONIN-I Collection Time: 01/09/19  4:26 PM  
Result Value Ref Range Troponin-I (POC) 0.03 0.02 - 0.05 ng/ml Xr Chest Pa Lat Result Date: 1/9/2019 PA and lateral chest radiographs History: Chest Pain, 73 years Male  facial swelling, bilateral lower extremity swelling X 2 weeks, cough with yellow sputum Comparison: Chest radiograph 11-2-18 Findings:  Persistent apparent cardiomegaly. Improved low lung volumes. Improved nonspecific mild diffuse interstitial prominence which could indicate interstitial edema. Mild dependent subsegmental atelectasis bilateral lung bases. No evidence of pneumothorax, pleural effusion, or new air space opacity. Visualized soft tissue and osseous structures otherwise unremarkable. Impression:  Improved lung volumes. Discussed with cardiology. They asked the hospitalist admit and patient be placed on Lasix drip and they will consult. Patient's cr higher than previous values values.

## 2019-01-09 NOTE — H&P
INTERNAL MEDICINE H&P/CONSULT Subjective:  
 
Patient is a 74years old AA male with history of CHf/ ef 35%/ DD gr2 and CKD stage 3 presents with worsening CLAUDIO with minimal activity for last 2 weeks. His legs edema has worsened from before. His po diuretic was increased lately w/ no improvement. He denies CP or fever. He has occasional cough w/ scanty sputum. No flu spx. His weight is 214 lb and his baseline is 210 lb. He makes fair urine now. He normally use 3-4 L O2 at home. Past Medical History:  
Diagnosis Date  Acute CHF (congestive heart failure) ([facility] [address] 4/25/2015  Acute combined systolic and diastolic congestive heart failure ([facility] [address] 4/28/2015  De Quervain's tenosynovitis, right 4/28/2015  Dyspnea on exertion 6/28/2015  Elevated serum creatinine 4/25/2015  Elevated troponin 6/28/2015  History of coronary artery disease MI at 25 yo  
 History of right knee surgery   
 cartilage removal  
 History of shingles  Malignant hypertension 4/25/2015  MI (myocardial infarction) ([facility] [address] Past Surgical History:  
Procedure Laterality Date  HX HEART CATHETERIZATION  6/29/2015  
 no intervention  HX KNEE ARTHROSCOPY Right   
 removal of cartilage Prior to Admission medications Medication Sig Start Date End Date Taking? Authorizing Provider Blood-Glucose Meter (ACCU-CHEK YOSEF PLUS METER) misc USE TO CHECK BLOOD SUGARS DAILY DX: E11.9 12/27/18   Merline Carlson MD  
Blood Glucose Control High&Low (ACCU-CHEK YOSEF CONTROL SOLN) soln USE AS DIRECTED 12/27/18   Merline Carlson MD  
glucose blood VI test strips (ACCU-CHEK YOSEF PLUS TEST STRP) strip USE TO CHECK BLOOD SUGARS DAILY DX: E11.9 12/27/18   Merline Carlson MD  
alcohol swabs (BD SINGLE USE SWABS REGULAR) padm USE AS DIRECTED DAILY DX. E11.9 12/27/18   Merline Carlson MD  
lancets (ACCU-CHEK SOFTCLIX LANCETS) misc USE TO CHECK BLOOD SUGARS DAILY DX: E11.9 12/27/18   Merline Carlson MD  
 torsemide (DEMADEX) 20 mg tablet Take 1 Tab by mouth daily. 12/6/18   Oskar Montalvo MD  
carvedilol (COREG) 6.25 mg tablet Take 1 Tab by mouth two (2) times daily (with meals). 12/3/18   Vivian Yarbrough MD  
apixaban (ELIQUIS) 5 mg tablet Take 1 Tab by mouth every twelve (12) hours. 12/3/18   Vivian Yarbrough MD  
aspirin 81 mg chewable tablet Take 1 Tab by mouth daily. 11/5/18   Ang Marie MD  
atorvastatin (LIPITOR) 20 mg tablet Take 1 Tab by mouth nightly. 10/4/18   Ab Jaramillo MD  
allopurinol (ZYLOPRIM) 100 mg tablet TAKE 1 TABLET BY MOUTH EVERY DAY 9/25/18   Ab Jaramillo MD  
omeprazole (PRILOSEC) 20 mg capsule Take 1 Cap by mouth daily. 9/5/18   Ab Jaramillo MD  
fluticasone (FLONASE) 50 mcg/actuation nasal spray 2 Sprays by Both Nostrils route daily. 3/29/18   Rena Mata MD  
melatonin 3 mg tablet Take 3 mg by mouth nightly. Provider, Historical  
albuterol (VENTOLIN HFA) 90 mcg/actuation inhaler Take 2 Puffs by inhalation four (4) times daily. 2/6/18   Tejas Lozano NP  
polyethylene glycol (MIRALAX) 17 gram packet Take 1 Packet by mouth daily. Patient taking differently: Take 17 g by mouth daily as needed. 1/13/18   Nneka MERLOS NP  
albuterol-ipratropium (DUO-NEB) 2.5 mg-0.5 mg/3 ml nebu 3 mL by Nebulization route four (4) times daily. Diaignosis--J44.9 12/13/17   Tejas Lozano NP Allergies Allergen Reactions  Pcn [Penicillins] Other (comments) \"makes my heart stop\" Social History Tobacco Use  Smoking status: Former Smoker Packs/day: 0.25 Years: 20.00 Pack years: 5.00 Types: Cigarettes Last attempt to quit: 4/5/2015 Years since quitting: 3.7  Smokeless tobacco: Never Used Substance Use Topics  Alcohol use: No  
  Alcohol/week: 0.0 oz Family History: HTN Review of Systems A comprehensive review of systems was negative except for that written in the HPI. Objective: Intake / Output: No intake/output data recorded. No intake/output data recorded. Physical Exam: 
Visit Vitals /79 Pulse 71 Temp 97.8 °F (36.6 °C) Resp 20 Ht 5' 10\" (1.778 m) Wt 97.1 kg (214 lb) SpO2 100% BMI 30.71 kg/m² General appearance: awake, alert, cooperative, moderate distress, appears stated age - obese Head: Normocephalic, without obvious abnormality, atraumatic Back: symmetric, no curvature. ROM normal. No CVA tenderness. Lungs: clear to auscultation bilaterally - Diminished bs bibasilar. Heart: regular rate and rhythm, S1, S2 normal, no murmur, click, rub or gallop. Abdomen: soft, no tenderness, no distension, normal bowel sound, no masses, no organomegaly Extremities: atraumatic, no cyanosis - Bilateral lower limbs edema +2 Skin: No rashes or ulceration. Neurologic: Grossly intact ECG: sinus rhythm Data Review (Labs):  
Recent Results (from the past 24 hour(s)) CBC WITH AUTOMATED DIFF Collection Time: 01/09/19  4:21 PM  
Result Value Ref Range WBC 7.0 4.3 - 11.1 K/uL  
 RBC 5.22 4.23 - 5.6 M/uL  
 HGB 14.1 13.6 - 17.2 g/dL HCT 44.3 41.1 - 50.3 % MCV 84.9 79.6 - 97.8 FL  
 MCH 27.0 26.1 - 32.9 PG  
 MCHC 31.8 31.4 - 35.0 g/dL  
 RDW 16.8 (H) 11.9 - 14.6 % PLATELET 536 (L) 461 - 450 K/uL MPV 10.9 9.4 - 12.3 FL ABSOLUTE NRBC 0.00 0.0 - 0.2 K/uL  
 DF AUTOMATED NEUTROPHILS 56 43 - 78 % LYMPHOCYTES 32 13 - 44 % MONOCYTES 9 4.0 - 12.0 % EOSINOPHILS 2 0.5 - 7.8 % BASOPHILS 1 0.0 - 2.0 % IMMATURE GRANULOCYTES 0 0.0 - 5.0 %  
 ABS. NEUTROPHILS 3.9 1.7 - 8.2 K/UL  
 ABS. LYMPHOCYTES 2.2 0.5 - 4.6 K/UL  
 ABS. MONOCYTES 0.6 0.1 - 1.3 K/UL  
 ABS. EOSINOPHILS 0.1 0.0 - 0.8 K/UL  
 ABS. BASOPHILS 0.1 0.0 - 0.2 K/UL  
 ABS. IMM. GRANS. 0.0 0.0 - 0.5 K/UL METABOLIC PANEL, COMPREHENSIVE Collection Time: 01/09/19  4:21 PM  
Result Value Ref Range Sodium 139 136 - 145 mmol/L Potassium 3.7 3.5 - 5.1 mmol/L  Chloride 105 98 - 107 mmol/L  
 CO2 23 21 - 32 mmol/L Anion gap 11 mmol/L Glucose 78 65 - 100 mg/dL BUN 32 (H) 8 - 23 MG/DL Creatinine 2.23 (H) 0.8 - 1.5 MG/DL  
 GFR est AA 37 (L) >60 ml/min/1.73m2 GFR est non-AA 31 ml/min/1.73m2 Calcium 8.7 8.3 - 10.4 MG/DL Bilirubin, total 1.4 (H) 0.2 - 1.1 MG/DL  
 ALT (SGPT) 28 12 - 65 U/L  
 AST (SGOT) 38 (H) 15 - 37 U/L Alk. phosphatase 169 (H) 50 - 136 U/L Protein, total 7.1 g/dL Albumin 3.2 3.2 - 4.6 g/dL Globulin 3.9 (H) 2.3 - 3.5 g/dL A-G Ratio 0.8 BNP Collection Time: 01/09/19  4:21 PM  
Result Value Ref Range  pg/mL POC TROPONIN-I Collection Time: 01/09/19  4:26 PM  
Result Value Ref Range Troponin-I (POC) 0.03 0.02 - 0.05 ng/ml Assessment:  
 
Active Problems: 
  Acute on chronic combined systolic and diastolic CHF (congestive heart failure) ([facility] [address] (11/26/2018) DM?: pt denies but had recent A1C 7 Plan:  
 
Telemetry Diuresis gently considering some worsening of his renal function, iv 
IO and weight monitor Hold eliquis until Cre down to baseline Follow A1C Card consulted, had recent echo PPD placed Blood pressure control AM lab Continue essential home medications. Patient is full code. Further management depends on patient progress. Thank you for the oppourtinity to contribute in the care of your patient. Time 35 minutes. Signed By: Tosha Barnard MD   
 January 9, 2019

## 2019-01-09 NOTE — ED TRIAGE NOTES
Pt here by EMS, [place], facial swelling, bilateral lower extremity swelling X 2 weeks, cough with yellow sputum. Received 5 of albuterol. Pt always wears 4L NC. Bgl 61. 128\60 Hx Afib pulse 60-90

## 2019-01-10 PROBLEM — I51.3 LEFT ATRIAL THROMBUS: Status: ACTIVE | Noted: 2019-01-10

## 2019-01-10 LAB
ANION GAP SERPL CALC-SCNC: 10 MMOL/L
ATRIAL RATE: 357 BPM
BUN SERPL-MCNC: 34 MG/DL (ref 8–23)
CALCIUM SERPL-MCNC: 8.6 MG/DL (ref 8.3–10.4)
CALCULATED P AXIS, ECG09: 0 DEGREES
CALCULATED R AXIS, ECG10: -72 DEGREES
CALCULATED T AXIS, ECG11: 108 DEGREES
CHLORIDE SERPL-SCNC: 104 MMOL/L (ref 98–107)
CO2 SERPL-SCNC: 26 MMOL/L (ref 21–32)
CREAT SERPL-MCNC: 2.28 MG/DL (ref 0.8–1.5)
DIAGNOSIS, 93000: NORMAL
ERYTHROCYTE [DISTWIDTH] IN BLOOD BY AUTOMATED COUNT: 16.6 % (ref 11.9–14.6)
GLUCOSE SERPL-MCNC: 83 MG/DL (ref 65–100)
HCT VFR BLD AUTO: 42.9 % (ref 41.1–50.3)
HGB BLD-MCNC: 13.5 G/DL (ref 13.6–17.2)
MCH RBC QN AUTO: 26.6 PG (ref 26.1–32.9)
MCHC RBC AUTO-ENTMCNC: 31.5 G/DL (ref 31.4–35)
MCV RBC AUTO: 84.6 FL (ref 79.6–97.8)
NRBC # BLD: 0 K/UL (ref 0–0.2)
PLATELET # BLD AUTO: 146 K/UL (ref 150–450)
PMV BLD AUTO: 11.9 FL (ref 9.4–12.3)
POTASSIUM SERPL-SCNC: 3.5 MMOL/L (ref 3.5–5.1)
Q-T INTERVAL, ECG07: 484 MS
QRS DURATION, ECG06: 124 MS
QTC CALCULATION (BEZET), ECG08: 547 MS
RBC # BLD AUTO: 5.07 M/UL (ref 4.23–5.6)
SODIUM SERPL-SCNC: 140 MMOL/L (ref 136–145)
VENTRICULAR RATE, ECG03: 77 BPM
WBC # BLD AUTO: 7.1 K/UL (ref 4.3–11.1)

## 2019-01-10 PROCEDURE — 74011000258 HC RX REV CODE- 258: Performed by: NURSE PRACTITIONER

## 2019-01-10 PROCEDURE — 77010033678 HC OXYGEN DAILY

## 2019-01-10 PROCEDURE — 74011250637 HC RX REV CODE- 250/637: Performed by: NURSE PRACTITIONER

## 2019-01-10 PROCEDURE — 94760 N-INVAS EAR/PLS OXIMETRY 1: CPT

## 2019-01-10 PROCEDURE — 74011250637 HC RX REV CODE- 250/637: Performed by: HOSPITALIST

## 2019-01-10 PROCEDURE — 85027 COMPLETE CBC AUTOMATED: CPT

## 2019-01-10 PROCEDURE — 74011250636 HC RX REV CODE- 250/636: Performed by: NURSE PRACTITIONER

## 2019-01-10 PROCEDURE — 65270000029 HC RM PRIVATE

## 2019-01-10 PROCEDURE — 36415 COLL VENOUS BLD VENIPUNCTURE: CPT

## 2019-01-10 PROCEDURE — 74011250636 HC RX REV CODE- 250/636: Performed by: HOSPITALIST

## 2019-01-10 PROCEDURE — 97530 THERAPEUTIC ACTIVITIES: CPT

## 2019-01-10 PROCEDURE — 97161 PT EVAL LOW COMPLEX 20 MIN: CPT

## 2019-01-10 PROCEDURE — 74011000250 HC RX REV CODE- 250: Performed by: HOSPITALIST

## 2019-01-10 PROCEDURE — 80048 BASIC METABOLIC PNL TOTAL CA: CPT

## 2019-01-10 RX ADMIN — FUROSEMIDE 10 MG/HR: 10 INJECTION, SOLUTION INTRAVENOUS at 11:31

## 2019-01-10 RX ADMIN — APIXABAN 5 MG: 5 TABLET, FILM COATED ORAL at 20:40

## 2019-01-10 RX ADMIN — CARVEDILOL 6.25 MG: 6.25 TABLET, FILM COATED ORAL at 17:24

## 2019-01-10 RX ADMIN — ASPIRIN 81 MG 81 MG: 81 TABLET ORAL at 08:35

## 2019-01-10 RX ADMIN — HEPARIN SODIUM 5000 UNITS: 5000 INJECTION INTRAVENOUS; SUBCUTANEOUS at 08:35

## 2019-01-10 RX ADMIN — APIXABAN 5 MG: 5 TABLET, FILM COATED ORAL at 12:49

## 2019-01-10 RX ADMIN — BUMETANIDE 1 MG: 0.25 INJECTION INTRAMUSCULAR; INTRAVENOUS at 08:35

## 2019-01-10 RX ADMIN — Medication 10 ML: at 05:51

## 2019-01-10 RX ADMIN — Medication 10 ML: at 22:00

## 2019-01-10 RX ADMIN — CARVEDILOL 6.25 MG: 6.25 TABLET, FILM COATED ORAL at 08:35

## 2019-01-10 NOTE — PROGRESS NOTES
Spiritual Care initial visit made by Chetan Madrigalall Luis. Support given. Patti fallon. THOM Horta Div / Bereavement Coordinator

## 2019-01-10 NOTE — ED NOTES
TRANSFER - OUT REPORT: 
 
Verbal report given to Wale Galdamez on [facility].  being transferred to Med/surg(unit) for routine progression of care Report consisted of patients Situation, Background, Assessment and  
Recommendations(SBAR). Information from the following report(s) SBAR was reviewed with the receiving nurse. Lines:  
Peripheral IV 01/09/19 Right Antecubital (Active) Site Assessment Clean, dry, & intact 1/9/2019  4:00 PM  
Phlebitis Assessment 0 1/9/2019  4:00 PM  
Infiltration Assessment 0 1/9/2019  4:00 PM  
Dressing Status Clean, dry, & intact 1/9/2019  4:00 PM  
Action Taken Blood drawn 1/9/2019  4:00 PM  
  
 
Opportunity for questions and clarification was provided. Informed receiving nurse that patient does not have a home CPAP to bring in as ordered by admitting physician. Patient transported with: 
 O2 @ 4 liters

## 2019-01-10 NOTE — CONSULTS
[facility] CARDIOLOGY     1/10/2019     11:44 AM    I have personally seen and examined Chacha Barrios. with  Matthew Whitman NP. I agree and confirm findings in history, physical exam, and assessment/plan as outlined above with following pertinent additions/exceptions: Remains volume overloaded. Switch to a Lasix drip and monitor renal fx.       Radha Martinez MD

## 2019-01-10 NOTE — CONSULTS
[facility] Cardiology Consult     Attending Cardiologist:Dr. Hemant Bach     Primary Cardiologist:Dr. Sariah Richard     Primary Care Physician:Dr. Butch Gutierrez               Referring--Dr. Díaz--CHF    Subjective: Ellen Monroy is a 74 y.o. male with known hx of NICM, persistent afib with TEE11 /2018 noting left atrial thrombus. He has been compliant with taking his heart failure medications and eliquis. He remains in afib. He really has had persistent SOB since his dishcarge. He saw Dr. Sariah Richard in early December and his diuretic was increased without much improvement of his edema. PILO noted EF 15-20%, TTE noted EF 35-40 % Grade 3 diastolic dysfunction, severe MR, TR--RVSP 40-45mm.      Past Medical History:   Diagnosis Date    Acute CHF (congestive heart failure) ([facility] [address] 4/25/2015    Acute combined systolic and diastolic congestive heart failure ([place] [address] 4/28/2015    De Quervain's tenosynovitis, right 4/28/2015    Dyspnea on exertion 6/28/2015    Elevated serum creatinine 4/25/2015    Elevated troponin 6/28/2015    History of coronary artery disease     MI at 25 yo    History of right knee surgery     cartilage removal    History of shingles     Malignant hypertension 4/25/2015    MI ([facility] infarction[facility])       Past Surgical History:   Procedure Laterality Date    HX HEART CATHETERIZATION  6/29/2015    no intervention    HX KNEE ARTHROSCOPY Right     removal of cartilage      Current Facility-Administered Medications   Medication Dose Route Frequency    furosemide (LASIX) 100 mg in 0.9% sodium chloride 100 mL infusion  10 mg/hr IntraVENous CONTINUOUS    aspirin chewable tablet 81 mg  81 mg Oral DAILY    carvedilol (COREG) tablet 6.25 mg  6.25 mg Oral BID WITH MEALS    sodium chloride (NS) flush 5-40 mL  5-40 mL IntraVENous Q8H    sodium chloride (NS) flush 5-40 mL  5-40 mL IntraVENous PRN    acetaminophen (TYLENOL) tablet 650 mg  650 mg Oral Q4H PRN    diphenhydrAMINE (BENADRYL) capsule 25 mg  25 mg Oral Q4H PRN    promethazine (PHENERGAN) tablet 25 mg  25 mg Oral Q6H PRN    nicotine (NICODERM CQ) 21 mg/24 hr patch 1 Patch  1 Patch TransDERmal DAILY PRN    morphine injection 2 mg  2 mg IntraVENous Q4H PRN    heparin (porcine) injection 5,000 Units  5,000 Units SubCUTAneous Q12H    cloNIDine HCl (CATAPRES) tablet 0.2 mg  0.2 mg Oral BID PRN    hydrALAZINE (APRESOLINE) 20 mg/mL injection 10 mg  10 mg IntraVENous Q6H PRN    tuberculin injection 5 Units  5 Units IntraDERMal ONCE     Allergies   Allergen Reactions    Pcn [Penicillins] Other (comments)     \"makes my heart stop\"      Social History     Tobacco Use    Smoking status: Former Smoker     Packs/day: 0.25     Years: 20.00     Pack years: 5.00     Types: Cigarettes     Last attempt to quit: 4/5/2015     Years since quitting: 3.7    Smokeless tobacco: Never Used   Substance Use Topics    Alcohol use: No     Alcohol/week: 0.0 oz      Family History   Problem Relation Age of Onset    Hypertension Mother     No Known Problems Father         Review of Systems  Gen: Denies fever, chills, malaise or fatigue. Appetite good. HEENT: Denies frequent headaches, dizzyness, visual disturbances, Neck pain or swallowing difficulty  Lungs:as above,   Cardiovascular: Denies chest pain as above   GI: Denies hememesis, dark tarry stools, No prior Hx of GI bleed, Denies constipation  : Denies dysuria, no complaints of frequency, nocturia  Heme: No prior bleeding disorders, no prior Cancer  Neuro: Denies prior CVA, TIA. Endocrine: no diabetes, thyroid disorders  Psychiatric: Denies anxiety, or other psychiatric illnesses.      Objective:     Visit Vitals  /69 (BP 1 Location: Right arm, BP Patient Position: At rest)   Pulse 80   Temp 98 °F (36.7 °C)   Resp 20   Ht 5' 10\" (1.778 m)   Wt 97.1 kg (214 lb)   SpO2 97%   BMI 30.71 kg/m²     General:Alert, cooperative, no distress, appears stated age  Head: Normocephalic, without obvious abnormality, atraumatic. Eyes: Conjunctivae/corneas clear. PERRL, EOMs intact  Nose:Nares normal. Septum midline. Mucosa normal. No drainage or sinus tenderness. Throat: Lips, mucosa, and tongue normal. Teeth and gums normal.   Neck: Supple, symmetrical, trachea midline,  no carotid bruit and no JVD. Lungs:Clear to auscultation bilaterally. Chest wall: No tenderness or deformity. Heart: irregular, irregular    Abdomen:Soft, non-tender. Bowel sounds normal. No masses, No organomegaly. Extremities: 2+ pitting edema, + facial edema. Pulses: 2+ and symmetric all extremities. Skin: Skin color, texture, turgor normal. No rashes or lesions  Lymph nodes: Cervical, supraclavicular, and axillary nodes normal  Neurologic:No focal deficits identified                 ECG: afib rate controlled. Data Review:     Recent Results (from the past 24 hour(s))   EKG, 12 LEAD, INITIAL    Collection Time: 01/09/19  4:03 PM   Result Value Ref Range    Ventricular Rate 77 BPM    Atrial Rate 357 BPM    QRS Duration 124 ms    Q-T Interval 484 ms    QTC Calculation (Bezet) 547 ms    Calculated P Axis 0 degrees    Calculated R Axis -72 degrees    Calculated T Axis 108 degrees    Diagnosis       !! AGE AND GENDER SPECIFIC ECG ANALYSIS !!   Atrial fibrillation  Left axis deviation  Inferior infarct , age undetermined  Anterior infarct , age undetermined  Abnormal ECG  When compared with ECG of 28-NOV-2018 07:40,  Anterior infarct is now Present  QT has lengthened     CBC WITH AUTOMATED DIFF    Collection Time: 01/09/19  4:21 PM   Result Value Ref Range    WBC 7.0 4.3 - 11.1 K/uL    RBC 5.22 4.23 - 5.6 M/uL    HGB 14.1 13.6 - 17.2 g/dL    HCT 44.3 41.1 - 50.3 %    MCV 84.9 79.6 - 97.8 FL    MCH 27.0 26.1 - 32.9 PG    MCHC 31.8 31.4 - 35.0 g/dL    RDW 16.8 (H) 11.9 - 14.6 %    PLATELET 847 (L) 673 - 450 K/uL    MPV 10.9 9.4 - 12.3 FL    ABSOLUTE NRBC 0.00 0.0 - 0.2 K/uL    DF AUTOMATED      NEUTROPHILS 56 43 - 78 %    LYMPHOCYTES 32 13 - 44 % MONOCYTES 9 4.0 - 12.0 %    EOSINOPHILS 2 0.5 - 7.8 %    BASOPHILS 1 0.0 - 2.0 %    IMMATURE GRANULOCYTES 0 0.0 - 5.0 %    ABS. NEUTROPHILS 3.9 1.7 - 8.2 K/UL    ABS. LYMPHOCYTES 2.2 0.5 - 4.6 K/UL    ABS. MONOCYTES 0.6 0.1 - 1.3 K/UL    ABS. EOSINOPHILS 0.1 0.0 - 0.8 K/UL    ABS. BASOPHILS 0.1 0.0 - 0.2 K/UL    ABS. IMM. GRANS. 0.0 0.0 - 0.5 K/UL   METABOLIC PANEL, COMPREHENSIVE    Collection Time: 01/09/19  4:21 PM   Result Value Ref Range    Sodium 139 136 - 145 mmol/L    Potassium 3.7 3.5 - 5.1 mmol/L    Chloride 105 98 - 107 mmol/L    CO2 23 21 - 32 mmol/L    Anion gap 11 mmol/L    Glucose 78 65 - 100 mg/dL    BUN 32 (H) 8 - 23 MG/DL    Creatinine 2.23 (H) 0.8 - 1.5 MG/DL    GFR est AA 37 (L) >60 ml/min/1.73m2    GFR est non-AA 31 ml/min/1.73m2    Calcium 8.7 8.3 - 10.4 MG/DL    Bilirubin, total 1.4 (H) 0.2 - 1.1 MG/DL    ALT (SGPT) 28 12 - 65 U/L    AST (SGOT) 38 (H) 15 - 37 U/L    Alk.  phosphatase 169 (H) 50 - 136 U/L    Protein, total 7.1 g/dL    Albumin 3.2 3.2 - 4.6 g/dL    Globulin 3.9 (H) 2.3 - 3.5 g/dL    A-G Ratio 0.8     BNP    Collection Time: 01/09/19  4:21 PM   Result Value Ref Range     pg/mL   POC TROPONIN-I    Collection Time: 01/09/19  4:26 PM   Result Value Ref Range    Troponin-I (POC) 0.03 0.02 - 0.05 ng/ml   HEMOGLOBIN A1C WITH EAG    Collection Time: 01/09/19  6:27 PM   Result Value Ref Range    Hemoglobin A1c 7.4 %    Est. average glucose 311 mg/dL   METABOLIC PANEL, BASIC    Collection Time: 01/10/19  5:27 AM   Result Value Ref Range    Sodium 140 136 - 145 mmol/L    Potassium 3.5 3.5 - 5.1 mmol/L    Chloride 104 98 - 107 mmol/L    CO2 26 21 - 32 mmol/L    Anion gap 10 mmol/L    Glucose 83 65 - 100 mg/dL    BUN 34 (H) 8 - 23 MG/DL    Creatinine 2.28 (H) 0.8 - 1.5 MG/DL    GFR est AA 36 (L) >60 ml/min/1.73m2    GFR est non-AA 30 ml/min/1.73m2    Calcium 8.6 8.3 - 10.4 MG/DL   CBC W/O DIFF    Collection Time: 01/10/19  5:27 AM   Result Value Ref Range    WBC 7.1 4.3 - 11.1 K/uL RBC 5.07 4.23 - 5.6 M/uL    HGB 13.5 (L) 13.6 - 17.2 g/dL    HCT 42.9 41.1 - 50.3 %    MCV 84.6 79.6 - 97.8 FL    MCH 26.6 26.1 - 32.9 PG    MCHC 31.5 31.4 - 35.0 g/dL    RDW 16.6 (H) 11.9 - 14.6 %    PLATELET 612 (L) 059 - 450 K/uL    MPV 11.9 9.4 - 12.3 FL    ABSOLUTE NRBC 0.00 0.0 - 0.2 K/uL         Assessment / Plan     Principal Problem:    Acute on chronic combined systolic and diastolic CHF (congestive heart failure) ([facility]) (11/26/2018)--remains with severe volume overload with severe TR, MR on echo. Will switch to IV lasix infusion. Monitor renal function closely. NO ACE or ARB due to renal function. Consider adding aldactone if potassium and renal function stable. Possible cardioversion once volume status improved. He will need repeat PILO if this is pursued given prior LA appendage thrombus     Active Problems:    CKD (chronic kidney disease) stage 3, GFR 30-59 ml/min ([facility]) (9/29/2017)      Essential hypertension (9/28/2016)--appears to be well controlled. States he is compliant. TAZ (obstructive sleep apnea) (10/2/2017)      Overview: Intolerant of CPAP      Type 2 diabetes with nephropathy ([facility] [address] (10/8/2018)      Atrial fibrillation ([facility]) (11/27/2018)--denies tachycardia but uncertain rate control outside hospital. Should have monitor placed upon discharge to evaluate rates at home with ADLs. Left atrial thrombus (1/10/2019)--resumed eliquis at home dose. Stopped asa due hx of NICM, nonobstructive CAD.                Moreno Gamino NP

## 2019-01-10 NOTE — PROGRESS NOTES
Am assessment completed. Pt is alert and oriented x 3 lungs diminished. Verbalizes needs well. Get sob with exertion. Denies pain. Continue to monitor.

## 2019-01-10 NOTE — PROGRESS NOTES
Problem: Mobility Impaired (Adult and Pediatric) Goal: *Acute Goals and Plan of Care (Insert Text) STG: 
(1.)Mr. Eliseo Cat will move from supine to sit and sit to supine  with SUPERVISION with head of bed elevated and bed rail within 4-7 treatment day(s). (2.)Mr. Eliseo Cat will transfer from bed to chair and chair to bed with MINIMAL ASSIST of 2 using the least restrictive device within 4-7 treatment day(s). (3.)Mr. Eliseo Cat will ambulate with MINIMAL ASSIST of 2 for 50 feet with the least restrictive device within 4-7 treatment day(s). LTG: 
(1.)Mr. Eliseo Cat will move from supine to sit and sit to supine  in bed with INDEPENDENCE with bed flat and no rail within 7-14 treatment day(s). (2.)Mr. Eliseo Cat will transfer from bed to chair and chair to bed with CONTACT GUARD ASSIST using the least restrictive device within 7-14 treatment day(s). (3.)Mr. Eliseo Cat will ambulate with CONTACT GUARD ASSIST for 100 feet with the least restrictive device within 7-14 treatment day(s). ________________________________________________________________________________________________ PHYSICAL THERAPY: Initial Assessment, Treatment Day: Day of Assessment, AM 1/10/2019INPATIENT: Hospital Day: 2 Payor: Kole Barney / Plan: [address] [place] [address] HMO / Product Type: Managed Care Medicare /  
  
NAME/AGE/GENDER: Aishwarya Sylvester is a 74 y.o. male PRIMARY DIAGNOSIS: Acute on chronic combined systolic and diastolic CHF (congestive heart failure) ([facility]) [I50.43] Acute on chronic combined systolic and diastolic CHF (congestive heart failure) ([facility]) Acute on chronic combined systolic and diastolic CHF (congestive heart failure) ([facility] [address] ICD-10: Treatment Diagnosis:  
 · Generalized Muscle Weakness (M62.81) · Difficulty in walking, Not elsewhere classified (R26.2) Precaution/Allergies: 
Pcn [penicillins] ASSESSMENT:  
Mr. Eliseo Cat presents supine on contact and agreeable to therapy assessment but stating \"I hope you don't want me to walk\". Moved from supine to sit with SBA. Presents with generalized weakness and increased shortness of breath with moving from supine to sit. His oxygen canula was laying in the floor, put it back on for him, he states he does not know how or who took it off. Pt with bilateral lower extremeity weakness but he states no orthopedic issues. He was current with HHPT prior to admission and states he was able to participate with them and do the exercises. Sit to stand with minimal assist but he stated he could not take any steps. He sat back down, rested and he did stand a second time but again said he could not walk and sat back down. Told PT it was not that he was refusing to walk he just couldn't. Pt will benefit from skilled PT interventions to maximize independence with mobility and strengthening. This section established at most recent assessment PROBLEM LIST (Impairments causing functional limitations): 1. Decreased Strength 2. Decreased ADL/Functional Activities 3. Decreased Transfer Abilities 4. Decreased Ambulation Ability/Technique 5. Decreased Activity Tolerance 6. Increased Shortness of Breath 7. Decreased [place] with Home Exercise Program 
 INTERVENTIONS PLANNED: (Benefits and precautions of physical therapy have been discussed with the patient.) 1. Balance Exercise 2. Bed Mobility 3. Gait Training 4. Therapeutic Activites 5. Therapeutic Exercise/Strengthening 6. Transfer Training TREATMENT PLAN: Frequency/Duration: daily for duration of hospital stay Rehabilitation Potential For Stated Goals: Good RECOMMENDED REHABILITATION/EQUIPMENT: (at time of discharge pending progress): Due to the probability of continued deficits (see above) this patient will likely need continued skilled physical therapy after discharge. Equipment:  
? to be determined HISTORY:  
History of Present Injury/Illness (Reason for Referral): 
 PER MD NOTE: Patient is a 74years old AA male with history of CHf/ ef 35%/ DD gr2 and CKD stage 3 presents with worsening CLAUDIO with minimal activity for last 2 weeks. His legs edema has worsened from before. His po diuretic was increased lately w/ no improvement. He denies CP or fever. He has occasional cough w/ scanty sputum. No flu spx. His weight is 214 lb and his baseline is 210 lb. He makes fair urine now. He normally use 3-4 L O2 at home. Past Medical History/Comorbidities:  
Mr. Angelika Diaz  has a past medical history of Acute CHF (congestive heart failure) ([facility] [address] Acute combined systolic and diastolic congestive heart failure ([facility] [address] De Quervain's tenosynovitis, right, Dyspnea on exertion, Elevated serum creatinine, Elevated troponin, History of coronary artery disease, History of right knee surgery, History of shingles, Malignant hypertension, and MI (myocardial infarction) ([facility] [address] Mr. Angelika Diaz  has a past surgical history that includes hx knee arthroscopy (Right); hx heart catheterization (6/29/2015); and ABDOMINAL FAT PAD BIOPSY (N/A, 5/29/2018). Social History/Living Environment:  
Home Environment: Private residence # Steps to Enter: 3 One/Two Story Residence: One story Living Alone: No 
Support Systems: Spouse/Significant Other/Partner Current DME Used/Available at Home: Oxygen, portable, Walker, rollator Tub or Shower Type: Tub/Shower combination Prior Level of Function/Work/Activity: 
Pt lives at home with spouse, independent with mobility. On home oxygen and was current with HHPT Number of Personal Factors/Comorbidities that affect the Plan of Care: 1-2: MODERATE COMPLEXITY EXAMINATION:  
Most Recent Physical Functioning:  
Gross Assessment: 
AROM: Generally decreased, functional 
Strength: Generally decreased, functional 
         
  
Posture: 
Posture (WDL): Within defined limits Balance: 
Sitting: Intact Standing: Impaired Standing - Static: Fair Bed Mobility: Supine to Sit: Stand-by assistance; Additional time Sit to Supine: Stand-by assistance;Contact guard assistance; Additional time Wheelchair Mobility: 
  
Transfers: 
Sit to Stand: Minimum assistance Stand to Sit: Minimum assistance Gait: 
  
Base of Support: Widened Body Structures Involved: 1. Muscles Body Functions Affected: 1. Movement Related Activities and Participation Affected: 1. Mobility 2. Self Care Number of elements that affect the Plan of Care: 4+: HIGH COMPLEXITY CLINICAL PRESENTATION:  
Presentation: Stable and uncomplicated: LOW COMPLEXITY CLINICAL DECISION MAKING:  
[facility] AM-PAC 6 Clicks Basic Mobility Inpatient Short Form How much difficulty does the patient currently have. .. Unable A Lot A Little None 1. Turning over in bed (including adjusting bedclothes, sheets and blankets)? [] 1   [] 2   [x] 3   [] 4  
2. Sitting down on and standing up from a chair with arms ( e.g., wheelchair, bedside commode, etc.)   [] 1   [] 2   [x] 3   [] 4  
3. Moving from lying on back to sitting on the side of the bed? [] 1   [] 2   [x] 3   [] 4 How much help from another person does the patient currently need. .. Total A Lot A Little None 4. Moving to and from a bed to a chair (including a wheelchair)? [] 1   [x] 2   [] 3   [] 4  
5. Need to walk in hospital room? [] 1   [x] 2   [] 3   [] 4  
6. Climbing 3-5 steps with a railing? [x] 1   [] 2   [] 3   [] 4  
© 2007, Trustees of [facility], under license to [employer]. All rights reserved Score:  Initial: 14 Most Recent: X (Date: -- ) Interpretation of Tool:  Represents activities that are increasingly more difficult (i.e. Bed mobility, Transfers, Gait). Score 24 23 22-20 19-15 14-10 9-7 6 Modifier CH CI CJ CK CL CM CN   
 
? Mobility - Walking and Moving Around:  
  - CURRENT STATUS: CL - 60%-79% impaired, limited or restricted  - GOAL STATUS: CJ - 20%-39% impaired, limited or restricted  - D/C STATUS:  ---------------To be determined--------------- Payor: Lauren Shannon / Plan: [address] HMO / Product Type: Managed Care Medicare /   
 
Medical Necessity:    
· Patient is expected to demonstrate progress in strength, coordination and functional technique to decrease assistance required with functional mobiltiy and strengthening. Reason for Services/Other Comments: 
· Patient continues to require skilled intervention due to inability to complete functional mobility and strengthening independently. Use of outcome tool(s) and clinical judgement create a POC that gives a: Clear prediction of patient's progress: LOW COMPLEXITY  
  
 
 
 
TREATMENT:  
(In addition to Assessment/Re-Assessment sessions the following treatments were rendered) Pre-treatment Symptoms/Complaints:  breathing Pain: Initial:  
Pain Intensity 1: 0  Post Session:  No repots of pain Assessment/Reassessment only, no treatment provided today Braces/Orthotics/Lines/Etc:  
· O2 Device: Nasal cannula Treatment/Session Assessment:   
· Response to Treatment:  Tolerated fair, weak · Interdisciplinary Collaboration:  
o Registered Nurse · After treatment position/precautions:  
o Supine in bed 
o Bed in low position 
o Call light within reach 
o Family at bedside · Compliance with Program/Exercises: Will assess as treatment progresses · Recommendations/Intent for next treatment session: \"Next visit will focus on advancements to more challenging activities and reduction in assistance provided\". Total Treatment Duration: PT Patient Time In/Time Out Time In: 2710 Time Out: 0901 Jaime Tilley, PT

## 2019-01-10 NOTE — PROGRESS NOTES
Sw reviewed chart (51 casemanagement notes). Appears pt always goes to our [facility]. Pt stated it was too busy. Pt is a 74 yr old AA gentleman who has been admitted to the hospital every single month in 2018. Pt has CHF. Pt has home 02 and prefers to always use [place] [address] services. Pt has [address] and has been to two SNF in [facility] and [facility] in July. Pt stated it is always against his will and he is told this is to keep you out of the hospital. Wife in room during care rounds taking notes but not contributing to the conference. Pt informed he has a [employer] casemanager who arranged MOW and assisted him with transportation arrangements. Pt states he always gets weak and SOB and it has nothing to do with how he eats or anything he does or doesn't do. Unsure what else to offer this gentleman. Pt had educational materials along with a scale in the room, but didn't seem aware they were there or that any education had taken place. Sw to cont to follow and assist and notify [facility] upon his d/c. Master Deleon

## 2019-01-10 NOTE — PROGRESS NOTES
INTERNAL MEDICINE PROGRESS NOTE As per prior Note: 
\" 
Subjective:  
 
Patient is a 74years old AA male with history of CHf/ ef 35%/ DD gr2 and CKD stage 3 presents with worsening CLAUDIO with minimal activity for last 2 weeks. His legs edema has worsened from before. His po diuretic was increased lately w/ no improvement. He denies CP or fever. He has occasional cough w/ scanty sputum. No flu spx. His weight is 214 lb and his baseline is 210 lb. He makes fair urine now. He normally use 3-4 L O2 at home. \" 
 
 
01/10/2019- pt seen- still sob-  Does not fluid restrict at home; otherwise mostly compliant- uses o2 most of the time weighs most of the time. Did double up on his lasix but did not work this time. He wants to be able to walk to the curtain in his room un-assisted. .. Not able to do this for > 1 week Past Medical History:  
Diagnosis Date  Acute CHF (congestive heart failure) ([facility] [address] 4/25/2015  Acute combined systolic and diastolic congestive heart failure ([address] 4/28/2015  De Quervain's tenosynovitis, right 4/28/2015  Dyspnea on exertion 6/28/2015  Elevated serum creatinine 4/25/2015  Elevated troponin 6/28/2015  History of coronary artery disease MI at 27 yo  
 History of right knee surgery   
 cartilage removal  
 History of shingles  Malignant hypertension 4/25/2015  MI (myocardial infarction) ([facility] [address] Past Surgical History:  
Procedure Laterality Date  HX HEART CATHETERIZATION  6/29/2015  
 no intervention  HX KNEE ARTHROSCOPY Right   
 removal of cartilage Prior to Admission medications Medication Sig Start Date End Date Taking? Authorizing Provider Blood-Glucose Meter (ACCU-CHEK YOSEF PLUS METER) misc USE TO CHECK BLOOD SUGARS DAILY DX: E11.9 12/27/18   David Noe MD  
Blood Glucose Control High&Low (ACCU-CHEK YOSEF CONTROL SOLN) soln USE AS DIRECTED 12/27/18   David Noe MD  
 glucose blood VI test strips (ACCU-CHEK YOSEF PLUS TEST STRP) strip USE TO CHECK BLOOD SUGARS DAILY DX: E11.9 12/27/18   Urvashi Gore MD  
alcohol swabs (BD SINGLE USE SWABS REGULAR) padm USE AS DIRECTED DAILY DX. E11.9 12/27/18   Urvashi Gore MD  
lancets (ACCU-CHEK SOFTCLIX LANCETS) misc USE TO CHECK BLOOD SUGARS DAILY DX: E11.9 12/27/18   Urvashi Gore MD  
torsemide [facility]) 20 mg tablet Take 1 Tab by mouth daily. 12/6/18   Charmaine Elizabeth MD  
carvedilol (COREG) 6.25 mg tablet Take 1 Tab by mouth two (2) times daily (with meals). 12/3/18   Claudia Silva MD  
apixaban (ELIQUIS) 5 mg tablet Take 1 Tab by mouth every twelve (12) hours. 12/3/18   Claudia Silva MD  
aspirin 81 mg chewable tablet Take 1 Tab by mouth daily. 11/5/18   Luis Antonio Marie MD  
atorvastatin (LIPITOR) 20 mg tablet Take 1 Tab by mouth nightly. 10/4/18   Urvashi Gore MD  
allopurinol (ZYLOPRIM) 100 mg tablet TAKE 1 TABLET BY MOUTH EVERY DAY 9/25/18   Urvashi Gore MD  
omeprazole (PRILOSEC) 20 mg capsule Take 1 Cap by mouth daily. 9/5/18   Urvashi Gore MD  
fluticasone (FLONASE) 50 mcg/actuation nasal spray 2 Sprays by Both Nostrils route daily. 3/29/18   Sandor Mata MD  
melatonin 3 mg tablet Take 3 mg by mouth nightly. Provider, Historical  
albuterol (VENTOLIN HFA) 90 mcg/actuation inhaler Take 2 Puffs by inhalation four (4) times daily. 2/6/18   Jin Valencia NP  
polyethylene glycol (MIRALAX) 17 gram packet Take 1 Packet by mouth daily. Patient taking differently: Take 17 g by mouth daily as needed. 1/13/18   Carissa MERLOS NP  
albuterol-ipratropium (DUO-NEB) 2.5 mg-0.5 mg/3 ml nebu 3 mL by Nebulization route four (4) times daily. Diaignosis--J44.9 12/13/17   Jin Valencia NP Allergies Allergen Reactions  Pcn [Penicillins] Other (comments) \"makes my heart stop\" Social History Tobacco Use  Smoking status: Former Smoker Packs/day: 0.25 Years: 20.00 Pack years: 5.00 Types: Cigarettes Last attempt to quit: 4/5/2015 Years since quitting: 3.7  Smokeless tobacco: Never Used Substance Use Topics  Alcohol use: No  
  Alcohol/week: 0.0 oz Family History: HTN Review of Systems A comprehensive review of systems was negative except for that written in the HPI. Objective: Intake / Output: 
01/10 0701 - 01/10 1900 In: -  
Out: [place] 01/08 1901 - 01/10 0700 In: -  
Out: 250 [Urine:250] Physical Exam: 
Visit Vitals /79 (BP 1 Location: Right arm, BP Patient Position: At rest) Pulse 66 Temp 97.6 °F (36.4 °C) Resp 20 Ht 5' 10\" (1.778 m) Wt 97.1 kg (214 lb) SpO2 100% BMI 30.71 kg/m² General appearance: awake, alert, cooperative, moderate distress, appears stated age - obese Head: Normocephalic, without obvious abnormality, atraumatic Back: symmetric, no curvature. ROM normal. No CVA tenderness. Lungs: clear to auscultation bilaterally - Diminished bs bibasilar. Heart: regular rate and rhythm, S1, S2 normal, no murmur, click, rub or gallop. Abdomen: soft, no tenderness, no distension, normal bowel sound, no masses, no organomegaly Extremities: atraumatic, no cyanosis - Bilateral lower limbs edema +2 Skin: No rashes or ulceration. Neurologic: Grossly intact ECG: sinus rhythm Data Review (Labs):  
Recent Results (from the past 24 hour(s)) HEMOGLOBIN A1C WITH EAG Collection Time: 01/09/19  6:27 PM  
Result Value Ref Range Hemoglobin A1c 7.4 % Est. average glucose 166 mg/dL METABOLIC PANEL, BASIC Collection Time: 01/10/19  5:27 AM  
Result Value Ref Range Sodium 140 136 - 145 mmol/L Potassium 3.5 3.5 - 5.1 mmol/L Chloride 104 98 - 107 mmol/L  
 CO2 26 21 - 32 mmol/L Anion gap 10 mmol/L Glucose 83 65 - 100 mg/dL BUN 34 (H) 8 - 23 MG/DL Creatinine 2.28 (H) 0.8 - 1.5 MG/DL  
 GFR est AA 36 (L) >60 ml/min/1.73m2 GFR est non-AA 30 ml/min/1.73m2 Calcium 8.6 8.3 - 10.4 MG/DL  
CBC W/O DIFF Collection Time: 01/10/19  5:27 AM  
Result Value Ref Range WBC 7.1 4.3 - 11.1 K/uL  
 RBC 5.07 4.23 - 5.6 M/uL  
 HGB 13.5 (L) 13.6 - 17.2 g/dL HCT 42.9 41.1 - 50.3 % MCV 84.6 79.6 - 97.8 FL  
 MCH 26.6 26.1 - 32.9 PG  
 MCHC 31.5 31.4 - 35.0 g/dL  
 RDW 16.6 (H) 11.9 - 14.6 % PLATELET 187 (L) 138 - 450 K/uL MPV 11.9 9.4 - 12.3 FL ABSOLUTE NRBC 0.00 0.0 - 0.2 K/uL Assessment:  
 
Active Problems: 
  Acute on chronic combined systolic and diastolic CHF (congestive heart failure) ([facility] [address] (11/26/2018) DM?: pt denies but had recent A1C 7 Plan:  
 
Continue telemetry Daily weight Fluid restrict to 2L daily Now on a lasix drip- does not feel like he is peeing as much as yesterday IO and weight monitor Cardiology consult appreciated - eliquis re-started A1C is 7.4 PPD placed Blood pressure control AM labs Continue essential home medications. Patient is full code. Further management depends on patient progress. Signed By: Mariana Kennedy MD   
 January 10, 2019

## 2019-01-10 NOTE — DIABETES MGMT
Patient admitted 17 with acute on chronic combined systolic and diastolic CHF seen by RN, ANGÉLICA. Wife at bedside. Pt has been seen in the past by diabetes management, most recently 2018 and received diabetes education. Pt states that he did not think he had DM. Postivie family history of DM. Previous HbA1c 7.0 from 9/10/18. Current HbA1c 7.4 (eA). Blood glucose 83. History of NICM, persistent afib, CAD, and HTN. Pt given educational material, \"Diabetes Self-Management: A Patient Teaching Guide\", which was briefly reviewed with pt and wife. Explained basic physiology of diabetes, as well as causes, s/s, and treatments for hypoglycemia and hyperglycemia. Described the effects of poor glycemic control on the development of long-term complications such as renal, eye, nerve, and cardiovascular disease. Described proper diabetic foot care and the importance of checking feet qd. Educated re: effects of carbohydrates on blood glucose, the \"plate method\" of healthy meal planning, basics of healthy meal plan, and Consistent Carbohydrate Diet. Also explained the relationship between hyperglycemia and infection. Discussed target goals for blood glucose and A1C. Pt and wife verbalize understanding of teaching. Wife states that she is also diabetic. Explained the importance of blood glucose monitoring. Recommended frequency of twice a week fasting and 2 hour post prandial after biggest meal and to record in log book to take to PCP appointment. Patient states that he has a blood glucose meter, but was not checking his blood glucose. Pt would likely benefit from continued outpatient diabetes education. Contact information for Owusu Erickson provided. Stressed the importance of follow up care for diabetes management with PCP. Pt and wife have no further questions at this time.

## 2019-01-10 NOTE — PROGRESS NOTES
Admission assessment completed. Patient/spouse orientated to bed controls and call light. Cardiac monitor placed and monitor room nottfied. No acute distress noted at the time of assessment.

## 2019-01-10 NOTE — ROUTINE PROCESS
CHF teaching started post introduction to pt/family; aware of diagnosis. Planner/scale @ BS and will follow. Smoking/ ETOH/Illicit drug use cessation and maintain a healthy weight covered. Pt/family aware that I can not prescribe nor adjust  medications: 15mins Palliative Care score:  RATT 48, ACP on file Start 2L/D Fluid restriction/ cardiac diet CHF teaching continues to pt/family. Emphasis on taking prescription meds as ordered, to keep F/U appts and to call MD STAT if any of the following occur: · If you gain 2 lbs in one day or 5 lbs in a week, and short of breath. · If you can not lay flat without developing short of breath or rapid breathing at night; or if it wakes you up. Develop a cough or wheezing.  
· If you notice swollen hands/feet/ankles or stomach with a bloated/ full feeling.

## 2019-01-10 NOTE — PROGRESS NOTES
Problem: Interdisciplinary Rounds Goal: Interdisciplinary Rounds Interdisciplinary team rounds were held 1/10/2019 with the following team members:Care Management, Nursing, Nutrition, Pharmacy and Physical Therapy and the patient and spouse. Plan of care discussed. See clinical pathway and/or care plan for interventions and desired outcomes.

## 2019-01-11 LAB
ANION GAP SERPL CALC-SCNC: 11 MMOL/L
BASOPHILS # BLD: 0 K/UL (ref 0–0.2)
BASOPHILS NFR BLD: 0 % (ref 0–2)
BUN SERPL-MCNC: 33 MG/DL (ref 8–23)
CALCIUM SERPL-MCNC: 8.7 MG/DL (ref 8.3–10.4)
CHLORIDE SERPL-SCNC: 104 MMOL/L (ref 98–107)
CO2 SERPL-SCNC: 26 MMOL/L (ref 21–32)
CREAT SERPL-MCNC: 2 MG/DL (ref 0.8–1.5)
DIFFERENTIAL METHOD BLD: ABNORMAL
EOSINOPHIL # BLD: 0.1 K/UL (ref 0–0.8)
EOSINOPHIL NFR BLD: 1 % (ref 0.5–7.8)
ERYTHROCYTE [DISTWIDTH] IN BLOOD BY AUTOMATED COUNT: 16.7 % (ref 11.9–14.6)
GLUCOSE SERPL-MCNC: 90 MG/DL (ref 65–100)
HCT VFR BLD AUTO: 43.4 % (ref 41.1–50.3)
HGB BLD-MCNC: 13.7 G/DL (ref 13.6–17.2)
IMM GRANULOCYTES # BLD AUTO: 0 K/UL (ref 0–0.5)
IMM GRANULOCYTES NFR BLD AUTO: 0 % (ref 0–5)
LYMPHOCYTES # BLD: 2.4 K/UL (ref 0.5–4.6)
LYMPHOCYTES NFR BLD: 33 % (ref 13–44)
MCH RBC QN AUTO: 26.8 PG (ref 26.1–32.9)
MCHC RBC AUTO-ENTMCNC: 31.6 G/DL (ref 31.4–35)
MCV RBC AUTO: 84.9 FL (ref 79.6–97.8)
MONOCYTES # BLD: 0.9 K/UL (ref 0.1–1.3)
MONOCYTES NFR BLD: 12 % (ref 4–12)
NEUTS SEG # BLD: 3.9 K/UL (ref 1.7–8.2)
NEUTS SEG NFR BLD: 53 % (ref 43–78)
NRBC # BLD: 0.03 K/UL (ref 0–0.2)
PLATELET # BLD AUTO: 148 K/UL (ref 150–450)
PMV BLD AUTO: 11.2 FL (ref 9.4–12.3)
POTASSIUM SERPL-SCNC: 3.5 MMOL/L (ref 3.5–5.1)
RBC # BLD AUTO: 5.11 M/UL (ref 4.23–5.6)
SODIUM SERPL-SCNC: 141 MMOL/L (ref 136–145)
WBC # BLD AUTO: 7.4 K/UL (ref 4.3–11.1)

## 2019-01-11 PROCEDURE — 74011250636 HC RX REV CODE- 250/636: Performed by: INTERNAL MEDICINE

## 2019-01-11 PROCEDURE — 74011250637 HC RX REV CODE- 250/637: Performed by: HOSPITALIST

## 2019-01-11 PROCEDURE — 93005 ELECTROCARDIOGRAM TRACING: CPT | Performed by: INTERNAL MEDICINE

## 2019-01-11 PROCEDURE — 74011250637 HC RX REV CODE- 250/637: Performed by: NURSE PRACTITIONER

## 2019-01-11 PROCEDURE — 97530 THERAPEUTIC ACTIVITIES: CPT

## 2019-01-11 PROCEDURE — 74011250637 HC RX REV CODE- 250/637: Performed by: INTERNAL MEDICINE

## 2019-01-11 PROCEDURE — 94760 N-INVAS EAR/PLS OXIMETRY 1: CPT

## 2019-01-11 PROCEDURE — 65270000029 HC RM PRIVATE

## 2019-01-11 PROCEDURE — 74011000258 HC RX REV CODE- 258: Performed by: INTERNAL MEDICINE

## 2019-01-11 PROCEDURE — 85025 COMPLETE CBC W/AUTO DIFF WBC: CPT

## 2019-01-11 PROCEDURE — 36415 COLL VENOUS BLD VENIPUNCTURE: CPT

## 2019-01-11 PROCEDURE — 80048 BASIC METABOLIC PNL TOTAL CA: CPT

## 2019-01-11 PROCEDURE — 77010033678 HC OXYGEN DAILY

## 2019-01-11 PROCEDURE — 74011000258 HC RX REV CODE- 258: Performed by: NURSE PRACTITIONER

## 2019-01-11 PROCEDURE — 74011250636 HC RX REV CODE- 250/636: Performed by: NURSE PRACTITIONER

## 2019-01-11 RX ORDER — BUMETANIDE 1 MG/1
2 TABLET ORAL 2 TIMES DAILY
Status: DISCONTINUED | OUTPATIENT
Start: 2019-01-11 | End: 2019-01-14

## 2019-01-11 RX ORDER — FUROSEMIDE 10 MG/ML
60 INJECTION INTRAMUSCULAR; INTRAVENOUS ONCE
Status: COMPLETED | OUTPATIENT
Start: 2019-01-11 | End: 2019-01-11

## 2019-01-11 RX ORDER — SODIUM CHLORIDE 450 MG/100ML
75 INJECTION, SOLUTION INTRAVENOUS CONTINUOUS
Status: DISCONTINUED | OUTPATIENT
Start: 2019-01-11 | End: 2019-01-11

## 2019-01-11 RX ADMIN — Medication 10 ML: at 21:48

## 2019-01-11 RX ADMIN — SODIUM CHLORIDE 75 ML/HR: 450 INJECTION, SOLUTION INTRAVENOUS at 11:30

## 2019-01-11 RX ADMIN — APIXABAN 5 MG: 5 TABLET, FILM COATED ORAL at 20:15

## 2019-01-11 RX ADMIN — CARVEDILOL 6.25 MG: 6.25 TABLET, FILM COATED ORAL at 08:34

## 2019-01-11 RX ADMIN — APIXABAN 5 MG: 5 TABLET, FILM COATED ORAL at 08:34

## 2019-01-11 RX ADMIN — BUMETANIDE 2 MG: 1 TABLET ORAL at 20:13

## 2019-01-11 RX ADMIN — FUROSEMIDE 60 MG: 10 INJECTION, SOLUTION INTRAMUSCULAR; INTRAVENOUS at 09:45

## 2019-01-11 RX ADMIN — PROMETHAZINE HYDROCHLORIDE 25 MG: 25 TABLET ORAL at 02:23

## 2019-01-11 RX ADMIN — FUROSEMIDE 10 MG/HR: 10 INJECTION, SOLUTION INTRAVENOUS at 08:26

## 2019-01-11 RX ADMIN — CARVEDILOL 6.25 MG: 6.25 TABLET, FILM COATED ORAL at 16:42

## 2019-01-11 NOTE — PROGRESS NOTES
Problem: Mobility Impaired (Adult and Pediatric) Goal: *Acute Goals and Plan of Care (Insert Text) STG: 
(1.)Mr. Paul Holder will move from supine to sit and sit to supine  with SUPERVISION with head of bed elevated and bed rail within 4-7 treatment day(s). (2.)Mr. Paul Holder will transfer from bed to chair and chair to bed with MINIMAL ASSIST of 2 using the least restrictive device within 4-7 treatment day(s). (3.)Mr. Paul Holder will ambulate with MINIMAL ASSIST of 2 for 50 feet with the least restrictive device within 4-7 treatment day(s). LTG: 
(1.)Mr. Paul Holder will move from supine to sit and sit to supine  in bed with INDEPENDENCE with bed flat and no rail within 7-14 treatment day(s). (2.)Mr. Paul Holder will transfer from bed to chair and chair to bed with CONTACT GUARD ASSIST using the least restrictive device within 7-14 treatment day(s). (3.)Mr. Paul Holder will ambulate with CONTACT GUARD ASSIST for 100 feet with the least restrictive device within 7-14 treatment day(s). ________________________________________________________________________________________________ PHYSICAL THERAPY: Daily Note, Treatment Day: 1st, AM 1/11/2019INPATIENT: Hospital Day: 3 Payor: [facility] / Plan: [address] [place] [address] HMO / Product Type: Managed Care Medicare /  
  
NAME/AGE/GENDER: Michael Andrade. is a 74 y.o. male PRIMARY DIAGNOSIS: Acute on chronic combined systolic and diastolic CHF (congestive heart failure) ([facility]) [I50.43] Acute on chronic combined systolic and diastolic CHF (congestive heart failure) ([facility]) Acute on chronic combined systolic and diastolic CHF (congestive heart failure) ([facility] [address] ICD-10: Treatment Diagnosis:  
 · Generalized Muscle Weakness (M62.81) · Difficulty in walking, Not elsewhere classified (R26.2) Precaution/Allergies: 
Pcn [penicillins] ASSESSMENT:  
Mr. Miller Lipoma presents supine on contact and agreeable to therapy assessment but stating \"I hope you don't want me to walk\". Moved from supine to sit with SBA. Presents with generalized weakness and increased shortness of breath with moving from supine to sit. His oxygen canula was laying in the floor, put it back on for him, he states he does not know how or who took it off. Pt with bilateral lower extremeity weakness but he states no orthopedic issues. He was current with HHPT prior to admission and states he was able to participate with them and do the exercises. Sit to stand with minimal assist but he stated he could not take any steps. He sat back down, rested and he did stand a second time but again said he could not walk and sat back down. Told PT it was not that he was refusing to walk he just couldn't. Pt will benefit from skilled PT interventions to maximize independence with mobility and strengthening. He is sitting on the EOB upon arrival.  He practice sit to stand x 2 with CGA. He ambulates 30 ft using RW with CGA with verbal cues to stay in walker and stand tall. He is on 2L of 02 throughout [place]. He remain in the chair with call light near. This section established at most recent assessment PROBLEM LIST (Impairments causing functional limitations): 1. Decreased Strength 2. Decreased ADL/Functional Activities 3. Decreased Transfer Abilities 4. Decreased Ambulation Ability/Technique 5. Decreased Activity Tolerance 6. Increased Shortness of Breath 7. Decreased Swink with Home Exercise Program 
 INTERVENTIONS PLANNED: (Benefits and precautions of physical therapy have been discussed with the patient.) 1. Balance Exercise 2. Bed Mobility 3. Gait Training 4. Therapeutic Activites 5. Therapeutic Exercise/Strengthening 6. Transfer Training TREATMENT PLAN: Frequency/Duration: daily for duration of hospital stay Rehabilitation Potential For Stated Goals: Good RECOMMENDED REHABILITATION/EQUIPMENT: (at time of discharge pending progress): Due to the probability of continued deficits (see above) this patient will likely need continued skilled physical therapy after discharge. Equipment:  
? to be determined HISTORY:  
History of Present Injury/Illness (Reason for Referral): PER MD NOTE: Patient is a 74years old AA male with history of CHf/ ef 35%/ DD gr2 and CKD stage 3 presents with worsening CLAUDIO with minimal activity for last 2 weeks. His legs edema has worsened from before. His po diuretic was increased lately w/ no improvement. He denies CP or fever. He has occasional cough w/ scanty sputum. No flu spx. His weight is 214 lb and his baseline is 210 lb. He makes fair urine now. He normally use 3-4 L O2 at home. Past Medical History/Comorbidities:  
Mr. Liss Elias  has a past medical history of Acute CHF (congestive heart failure) ([facility] [address] Acute combined systolic and diastolic congestive heart failure ([address] De Quervain's tenosynovitis, right, Dyspnea on exertion, Elevated serum creatinine, Elevated troponin, History of coronary artery disease, History of right knee surgery, History of shingles, Malignant hypertension, and MI (myocardial infarction) ([facility] [address] Mr. Liss Elias  has a past surgical history that includes hx knee arthroscopy (Right); hx heart catheterization (6/29/2015); and ABDOMINAL FAT PAD BIOPSY (N/A, 5/29/2018). Social History/Living Environment:  
Home Environment: Private residence # Steps to Enter: 3 One/Two Story Residence: One story Living Alone: No 
Support Systems: Spouse/Significant Other/Partner Patient Expects to be Discharged to[de-identified] Private residence Current DME Used/Available at Home: Oxygen, portable, Walker, rollator Tub or Shower Type: Tub/Shower combination Prior Level of Function/Work/Activity: 
Pt lives at home with spouse, independent with mobility. On home oxygen and was current with HHPT Number of Personal Factors/Comorbidities that affect the Plan of Care: 1-2: MODERATE COMPLEXITY EXAMINATION:  
Most Recent Physical Functioning:  
Gross Assessment: 
  
         
  
Posture: 
  
Balance: 
  Bed Mobility: 
Supine to Sit: Contact guard assistance Sit to Supine: (left up in chair) Wheelchair Mobility: 
  
Transfers: 
Sit to Stand: Minimum assistance Duration: 25 Minutes Gait: 
  
Base of Support: Widened Distance (ft): 30 Feet (ft) Assistive Device: Walker, rolling Ambulation - Level of Assistance: Contact guard assistance Body Structures Involved: 1. Muscles Body Functions Affected: 1. Movement Related Activities and Participation Affected: 1. Mobility 2. Self Care Number of elements that affect the Plan of Care: 4+: HIGH COMPLEXITY CLINICAL PRESENTATION:  
Presentation: Stable and uncomplicated: LOW COMPLEXITY CLINICAL DECISION MAKING:  
[facility] 6 Clicks Basic Mobility Inpatient Short Form How much difficulty does the patient currently have. .. Unable A Lot A Little None 1. Turning over in bed (including adjusting bedclothes, sheets and blankets)? [] 1   [] 2   [x] 3   [] 4  
2. Sitting down on and standing up from a chair with arms ( e.g., wheelchair, bedside commode, etc.)   [] 1   [] 2   [x] 3   [] 4  
3. Moving from lying on back to sitting on the side of the bed? [] 1   [] 2   [x] 3   [] 4 How much help from another person does the patient currently need. .. Total A Lot A Little None 4. Moving to and from a bed to a chair (including a wheelchair)? [] 1   [x] 2   [] 3   [] 4  
5. Need to walk in hospital room? [] 1   [x] 2   [] 3   [] 4  
6. Climbing 3-5 steps with a railing? [x] 1   [] 2   [] 3   [] 4  
© 2007, Trustees of [facility] [employer], under license to [employer]. All rights reserved Score:  Initial: 14 Most Recent: X (Date: -- ) Interpretation of Tool:  Represents activities that are increasingly more difficult (i.e. Bed mobility, Transfers, Gait).  
 Score 24 23 22-20 19-15 14-10 9-7 6   
 Modifier CH CI CJ CK CL CM CN   
 
? Mobility - Walking and Moving Around:  
  - CURRENT STATUS: CL - 60%-79% impaired, limited or restricted  - GOAL STATUS: CJ - 20%-39% impaired, limited or restricted  - D/C STATUS:  ---------------To be determined--------------- Payor: Leo Mix / Plan: [address] [place] [address] HMO / Product Type: Managed Care Medicare /   
 
Medical Necessity:    
· Patient is expected to demonstrate progress in strength, coordination and functional technique to decrease assistance required with functional mobiltiy and strengthening. Reason for Services/Other Comments: 
· Patient continues to require skilled intervention due to inability to complete functional mobility and strengthening independently. Use of outcome tool(s) and clinical judgement create a POC that gives a: Clear prediction of patient's progress: LOW COMPLEXITY  
  
 
 
 
TREATMENT:  
(In addition to Assessment/Re-Assessment sessions the following treatments were rendered) Pre-treatment Symptoms/Complaints:  breathing Pain: Initial:  
Pain Intensity 1: 0(same therapy)  Post Session:  No repots of pain Therapeutic Activity: (  25 Minutes ):  Therapeutic activities including Chair transfers and bed mobility to improve mobility. Required minimal   to promote static balance in standing. Braces/Orthotics/Lines/Etc:  
· O2 Device: Nasal cannula Treatment/Session Assessment:   
· Response to Treatment:  Tolerated tx well · Interdisciplinary Collaboration:  
o Registered Nurse · After treatment position/precautions:  
o Supine in bed 
o Bed in low position 
o Call light within reach 
o Family at bedside · Compliance with Program/Exercises: Will assess as treatment progresses · Recommendations/Intent for next treatment session: \"Next visit will focus on advancements to more challenging activities and reduction in assistance provided\". Total Treatment Duration: PT Patient Time In/Time Out Time In: 0930 Time Out: 1165 Davina Pickett, PTA

## 2019-01-11 NOTE — PROGRESS NOTES
Am assessment completed. Pt is alert and oriented x 3, lungs diminished. + bowel sounds. . Continent of bowel and bladder. 2+ edema to lower exts. Pt visible sob with and without exercise. denies pain. Continue to monitor.

## 2019-01-11 NOTE — PROGRESS NOTES
INTERNAL MEDICINE PROGRESS NOTE As per prior Note: 
\" 
Subjective:  
 
Patient is a 74years old AA male with history of CHf/ ef 35%/ DD gr2 and CKD stage 3 presents with worsening CLAUDIO with minimal activity for last 2 weeks. His legs edema has worsened from before. His po diuretic was increased lately w/ no improvement. He denies CP or fever. He has occasional cough w/ scanty sputum. No flu spx. His weight is 214 lb and his baseline is 210 lb. He makes fair urine now. He normally use 3-4 L O2 at home. \" 
 
 
01/10/2019- pt seen- still sob-  Does not fluid restrict at home; otherwise mostly compliant- uses o2 most of the time weighs most of the time. Did double up on his lasix but did not work this time. He wants to be able to walk to the curtain in his room un-assisted. .. Not able to do this for > 1 week 01/11/2019- pt seen still feels sob- chart documents weight documented notes he gained almost 15ilb since yesterday. He feels he is not peeing enough. Attempted to get the patient up to the bed on his own scale in the room - but unable to do so secondary to sob. Past Medical History:  
Diagnosis Date  Acute CHF (congestive heart failure) ([address] 4/25/2015  Acute combined systolic and diastolic congestive heart failure ([address] 4/28/2015  De Quervain's tenosynovitis, right 4/28/2015  Dyspnea on exertion 6/28/2015  Elevated serum creatinine 4/25/2015  Elevated troponin 6/28/2015  History of coronary artery disease MI at 27 yo  
 History of right knee surgery   
 cartilage removal  
 History of shingles  Malignant hypertension 4/25/2015  MI (myocardial infarction) ([address] Past Surgical History:  
Procedure Laterality Date  HX HEART CATHETERIZATION  6/29/2015  
 no intervention  HX KNEE ARTHROSCOPY Right   
 removal of cartilage Prior to Admission medications Medication Sig Start Date End Date Taking? Authorizing Provider Blood-Glucose Meter (ACCU-CHEK YOSEF PLUS METER) misc USE TO CHECK BLOOD SUGARS DAILY DX: E11.9 12/27/18   Hernandez Jackson MD  
Blood Glucose Control High&Low (ACCU-CHEK YOSEF CONTROL SOLN) soln USE AS DIRECTED 12/27/18   Hernandez Jackson MD  
glucose blood VI test strips (ACCU-CHEK YOSEF PLUS TEST STRP) strip USE TO CHECK BLOOD SUGARS DAILY DX: E11.9 12/27/18   Hernandez Jackson MD  
alcohol swabs (BD SINGLE USE SWABS REGULAR) padm USE AS DIRECTED DAILY DX. E11.9 12/27/18   Hernandez Jackson MD  
lancets (ACCU-CHEK SOFTCLIX LANCETS) misc USE TO CHECK BLOOD SUGARS DAILY DX: E11.9 12/27/18   Hernandez Jackson MD  
torsemide (DEMADEX) 20 mg tablet Take 1 Tab by mouth daily. 12/6/18   Oskar Montalvo MD  
carvedilol (COREG) 6.25 mg tablet Take 1 Tab by mouth two (2) times daily (with meals). 12/3/18   Vivian Yarbrough MD  
apixaban (ELIQUIS) 5 mg tablet Take 1 Tab by mouth every twelve (12) hours. 12/3/18   Vivian Yarbrough MD  
aspirin 81 mg chewable tablet Take 1 Tab by mouth daily. 11/5/18   Ang Marie MD  
atorvastatin (LIPITOR) 20 mg tablet Take 1 Tab by mouth nightly. 10/4/18   Hernandez Jackson MD  
allopurinol (ZYLOPRIM) 100 mg tablet TAKE 1 TABLET BY MOUTH EVERY DAY 9/25/18   Hernandez Jackson MD  
omeprazole (PRILOSEC) 20 mg capsule Take 1 Cap by mouth daily. 9/5/18   Hernandez Jackson MD  
fluticasone (FLONASE) 50 mcg/actuation nasal spray 2 Sprays by Both Nostrils route daily. 3/29/18   Rena Mata MD  
melatonin 3 mg tablet Take 3 mg by mouth nightly. Provider, Historical  
albuterol (VENTOLIN HFA) 90 mcg/actuation inhaler Take 2 Puffs by inhalation four (4) times daily. 2/6/18   Tejas Lozano NP  
polyethylene glycol (MIRALAX) 17 gram packet Take 1 Packet by mouth daily. Patient taking differently: Take 17 g by mouth daily as needed.  1/13/18   Nneka MERLOS, NP  
albuterol-ipratropium (DUO-NEB) 2.5 mg-0.5 mg/3 ml nebu 3 mL by Nebulization route four (4) times daily. Diaignosis--J44.9 12/13/17   Luanne Naas, NP Allergies Allergen Reactions  Pcn [Penicillins] Other (comments) \"makes my heart stop\" Social History Tobacco Use  Smoking status: Former Smoker Packs/day: 0.25 Years: 20.00 Pack years: 5.00 Types: Cigarettes Last attempt to quit: 4/5/2015 Years since quitting: 3.7  Smokeless tobacco: Never Used Substance Use Topics  Alcohol use: No  
  Alcohol/week: 0.0 oz Family History: HTN Review of Systems A comprehensive review of systems was negative except for that written in the HPI. Objective: Intake / Output: 
01/11 0701 - 01/11 1900 In: -  
Out: 500 [Urine:500] 01/09 1901 - 01/11 0700 In: -  
Out: 1625 [[id number]] Physical Exam: 
Visit Vitals /87 (BP Patient Position: At rest) Pulse 83 Temp 97.8 °F (36.6 °C) Resp 15 Ht 5' 10\" (1.778 m) Wt 94.8 kg (209 lb) SpO2 97% BMI 29.99 kg/m² General appearance: awake, alert, cooperative, moderate distress, appears stated age - obese Head: Normocephalic, without obvious abnormality, atraumatic Back: symmetric, no curvature. ROM normal. No CVA tenderness. Lungs: clear to auscultation bilaterally - Diminished bs bibasilar. Heart: regular rate and rhythm, S1, S2 normal, no murmur, click, rub or gallop. Abdomen: soft, no tenderness, no distension, normal bowel sound, no masses, no organomegaly Extremities: atraumatic, no cyanosis - Bilateral lower limbs edema +2 Skin: No rashes or ulceration. Neurologic: Grossly intact ECG: sinus rhythm Data Review (Labs):  
Recent Results (from the past 24 hour(s)) METABOLIC PANEL, BASIC Collection Time: 01/11/19 10:10 AM  
Result Value Ref Range Sodium 141 136 - 145 mmol/L Potassium 3.5 3.5 - 5.1 mmol/L Chloride 104 98 - 107 mmol/L  
 CO2 26 21 - 32 mmol/L Anion gap 11 mmol/L Glucose 90 65 - 100 mg/dL BUN 33 (H) 8 - 23 MG/DL Creatinine 2.00 (H) 0.8 - 1.5 MG/DL  
 GFR est AA 42 (L) >60 ml/min/1.73m2 GFR est non-AA 35 ml/min/1.73m2 Calcium 8.7 8.3 - 10.4 MG/DL  
CBC WITH AUTOMATED DIFF Collection Time: 01/11/19 10:10 AM  
Result Value Ref Range WBC 7.4 4.3 - 11.1 K/uL  
 RBC 5.11 4.23 - 5.6 M/uL  
 HGB 13.7 13.6 - 17.2 g/dL HCT 43.4 41.1 - 50.3 % MCV 84.9 79.6 - 97.8 FL  
 MCH 26.8 26.1 - 32.9 PG  
 MCHC 31.6 31.4 - 35.0 g/dL  
 RDW 16.7 (H) 11.9 - 14.6 % PLATELET 413 (L) 633 - 450 K/uL MPV 11.2 9.4 - 12.3 FL ABSOLUTE NRBC 0.03 0.0 - 0.2 K/uL  
 DF AUTOMATED NEUTROPHILS 53 43 - 78 % LYMPHOCYTES 33 13 - 44 % MONOCYTES 12 4.0 - 12.0 % EOSINOPHILS 1 0.5 - 7.8 % BASOPHILS 0 0.0 - 2.0 % IMMATURE GRANULOCYTES 0 0.0 - 5.0 %  
 ABS. NEUTROPHILS 3.9 1.7 - 8.2 K/UL  
 ABS. LYMPHOCYTES 2.4 0.5 - 4.6 K/UL  
 ABS. MONOCYTES 0.9 0.1 - 1.3 K/UL  
 ABS. EOSINOPHILS 0.1 0.0 - 0.8 K/UL  
 ABS. BASOPHILS 0.0 0.0 - 0.2 K/UL  
 ABS. IMM. GRANS. 0.0 0.0 - 0.5 K/UL Assessment:  
 
Active Problems: 
  Acute on chronic combined systolic and diastolic CHF (congestive heart failure) ([facility] (11/26/2018) DM?: pt denies but had recent A1C 7 Plan:  
 
Continue telemetry Daily weight- suspect incorrect as pt has gained > 14lbs since admission Fluid restrict to 2L daily Now on a lasix drip- does not feel like he is peeing as much as yesterday and feels  more sob; will get am labs and then give lasix based on that. Asked nursing to to hold the lasix ordered until labs come back but unfortunately they still gave it. Unable to get patient up to weigh him myself -so d/w nursing and they will get Physical therapy to weigh him on the scale in his room. Cardiology consult appreciated - eliquis re-started A1C is 7.4 PPD placed Blood pressure control AM labs Continue essential home medications. Patient is full code. Further management depends on patient progress. Signed By: Charisse Heath MD   
 January 11, 2019 Addendum- spoke with PT- patient weighs 209lb on the scale in his room; this is c/w with his own claim that he weighs 214lbs.  He was not weighed by staff on admit but had weighed himself on his own scale prior to coming into the hospital. 
Will discontinue the lasix drip and fluid restriction- gentle ivfls; and repeat bmp in the am.

## 2019-01-11 NOTE — ROUTINE PROCESS
CHF teaching continues  to pt/ family. , verbalize emphasis on monitoring self and report to MD: 
· If you gain 2 lbs in one day or 5 lbs in a week, and short of breath. · If you can not lay flat without developing short of breath or rapid breathing at night; or if it wakes you up. Develop a cough or wheezing. · If you notice swollen hands/feet/ankles or stomach with a bloated/ full feeling. · If you are  more confused or mentally fuzzy or dizzy. · If you notice a rapid or change in your heart rate. · If you become more exhausted all the time and unable to do the same level of activity without stopping to catch your breath. Drink no more than 8 cups a day in 8 oz. cups. Limit Cola Drinks. Your Heart can not handle any more. Stay away from salt (limit anything with salt or sodium in it). Limit to 250mg per serving. Exercise needs to be started with your Doctors approval. 
Reduce stress; Call myself or Provider if assistance is needed. Pass post test via teach back, will make self available post DC ,if an questions arise. Diabetic teaching completed.  Planner/scale @ BS:  20 mins more 
  
 
Reintroduction completed, will have to repeat all teaching as he can not retain information provided, in information overload

## 2019-01-11 NOTE — PROGRESS NOTES
01/11/19 6005 Oxygen Therapy O2 Sat (%) 96 % Pulse via Oximetry 89 beats per minute O2 Device Nasal cannula O2 Flow Rate (L/min) 2 l/min

## 2019-01-11 NOTE — PROGRESS NOTES
[facility] CARDIOLOGY PROGRESS NOTE 
      
 
1/11/2019 5:27 PM 
 
Admit Date: 1/9/2019 Subjective:  
 
No o/e; ~2L net neg. Still issues with dyspnea ROS: 
Cardiovascular:  As noted above Objective:  
  
Vitals:  
 01/11/19 0825 01/11/19 1000 01/11/19 1218 01/11/19 1637 BP: 116/87  121/77 116/76 Pulse: 83  79 75 Resp: 15  16 22 Temp: 97.8 °F (36.6 °C)  97.1 °F (36.2 °C) 97.6 °F (36.4 °C) SpO2: 97%  98% 96% Weight:  94.8 kg (209 lb) Height:      
 
 
Physical Exam: 
General-No Acute Distress Neck- supple, no JVD 
CV- irregular rate and rhythm no MRG Lung- min basilar rales Abd- soft, nontender, nondistended Ext- 1+ edema bilaterally. Skin- warm and dry Data Review:  
Recent Labs  
  01/11/19 
1010 01/10/19 
0527  140  
K 3.5 3.5 BUN 33* 34* CREA 2.00* 2.28* GLU 90 83 WBC 7.4 7.1 HGB 13.7 13.5* HCT 43.4 42.9 * 146* Assessment/Plan:  
 
Principal Problem: 
  Acute on chronic combined systolic and diastolic CHF (congestive heart failure) ([facility] (11/26/2018) Active Problems: 
  CKD (chronic kidney disease) stage 3, GFR 30-59 ml/min ([facility]) (9/29/2017) Essential hypertension (9/28/2016) TAZ (obstructive sleep apnea) (10/2/2017) Type 2 diabetes with nephropathy ([facility] (10/8/2018) Atrial fibrillation ([facility] (11/27/2018) Left atrial thrombus (1/10/2019) Was on lasix gtt but appears stopped per primary; also ? ? gentle fluids. Hold off fluids Exam still with mild fluid overload. Some sx ?related to AF intolerance; initially noted 11/18 and PILO at that time with worsening EF at 15-20%. At that time, rhythm control deferred with LA thrombus; has been 6 weeks since then. Does not need a repeat PILO to assess for resolution and once on chronic AC for 4 weeks, risk of embolization decrease and rhythm control strategies can be pursued. Currently with adequate rate control. Reassess options for antiarrhythmics (likely plan amiodarone) Start po bumex. Reassess options for LV dysfn meds as BPs tolerate (can use ACEI if Cr <3; also aldactone if Cr <2.5) Prior w/u for amyloid with fat pad biopsy neg (5/18) Kwabena Celeste MD 
1/11/2019 5:27 PM

## 2019-01-12 LAB
ANION GAP SERPL CALC-SCNC: 10 MMOL/L
BASOPHILS # BLD: 0 K/UL (ref 0–0.2)
BASOPHILS NFR BLD: 1 % (ref 0–2)
BUN SERPL-MCNC: 35 MG/DL (ref 8–23)
CALCIUM SERPL-MCNC: 8.4 MG/DL (ref 8.3–10.4)
CHLORIDE SERPL-SCNC: 107 MMOL/L (ref 98–107)
CO2 SERPL-SCNC: 23 MMOL/L (ref 21–32)
CREAT SERPL-MCNC: 2.06 MG/DL (ref 0.8–1.5)
DIFFERENTIAL METHOD BLD: ABNORMAL
EOSINOPHIL # BLD: 0.1 K/UL (ref 0–0.8)
EOSINOPHIL NFR BLD: 1 % (ref 0.5–7.8)
ERYTHROCYTE [DISTWIDTH] IN BLOOD BY AUTOMATED COUNT: 16.7 % (ref 11.9–14.6)
GLUCOSE SERPL-MCNC: 90 MG/DL (ref 65–100)
HCT VFR BLD AUTO: 42.8 % (ref 41.1–50.3)
HGB BLD-MCNC: 13.6 G/DL (ref 13.6–17.2)
IMM GRANULOCYTES # BLD AUTO: 0 K/UL (ref 0–0.5)
IMM GRANULOCYTES NFR BLD AUTO: 0 % (ref 0–5)
LYMPHOCYTES # BLD: 2.4 K/UL (ref 0.5–4.6)
LYMPHOCYTES NFR BLD: 32 % (ref 13–44)
MCH RBC QN AUTO: 26.8 PG (ref 26.1–32.9)
MCHC RBC AUTO-ENTMCNC: 31.8 G/DL (ref 31.4–35)
MCV RBC AUTO: 84.4 FL (ref 79.6–97.8)
MONOCYTES # BLD: 0.9 K/UL (ref 0.1–1.3)
MONOCYTES NFR BLD: 12 % (ref 4–12)
NEUTS SEG # BLD: 4 K/UL (ref 1.7–8.2)
NEUTS SEG NFR BLD: 54 % (ref 43–78)
NRBC # BLD: 0 K/UL (ref 0–0.2)
PLATELET # BLD AUTO: 126 K/UL (ref 150–450)
PMV BLD AUTO: 10.1 FL (ref 9.4–12.3)
POTASSIUM SERPL-SCNC: 3.6 MMOL/L (ref 3.5–5.1)
RBC # BLD AUTO: 5.07 M/UL (ref 4.23–5.6)
SODIUM SERPL-SCNC: 140 MMOL/L (ref 136–145)
WBC # BLD AUTO: 7.4 K/UL (ref 4.3–11.1)

## 2019-01-12 PROCEDURE — 74011250637 HC RX REV CODE- 250/637: Performed by: INTERNAL MEDICINE

## 2019-01-12 PROCEDURE — 65270000029 HC RM PRIVATE

## 2019-01-12 PROCEDURE — 74011250637 HC RX REV CODE- 250/637: Performed by: NURSE PRACTITIONER

## 2019-01-12 PROCEDURE — 74011250637 HC RX REV CODE- 250/637: Performed by: HOSPITALIST

## 2019-01-12 PROCEDURE — 36415 COLL VENOUS BLD VENIPUNCTURE: CPT

## 2019-01-12 PROCEDURE — 80048 BASIC METABOLIC PNL TOTAL CA: CPT

## 2019-01-12 PROCEDURE — 97530 THERAPEUTIC ACTIVITIES: CPT

## 2019-01-12 PROCEDURE — 85025 COMPLETE CBC W/AUTO DIFF WBC: CPT

## 2019-01-12 RX ORDER — ISOSORBIDE DINITRATE AND HYDRALAZINE HYDROCHLORIDE 37.5; 2 MG/1; MG/1
1 TABLET ORAL 3 TIMES DAILY
Status: DISCONTINUED | OUTPATIENT
Start: 2019-01-12 | End: 2019-01-15 | Stop reason: HOSPADM

## 2019-01-12 RX ADMIN — HYDRALAZINE HYDROCHLORIDE AND ISOSORBIDE DINITRATE 1 TABLET: 37.5; 2 TABLET, FILM COATED ORAL at 17:28

## 2019-01-12 RX ADMIN — APIXABAN 5 MG: 5 TABLET, FILM COATED ORAL at 22:05

## 2019-01-12 RX ADMIN — Medication 5 ML: at 22:00

## 2019-01-12 RX ADMIN — CARVEDILOL 6.25 MG: 6.25 TABLET, FILM COATED ORAL at 08:54

## 2019-01-12 RX ADMIN — Medication 10 ML: at 05:51

## 2019-01-12 RX ADMIN — BUMETANIDE 2 MG: 1 TABLET ORAL at 08:54

## 2019-01-12 RX ADMIN — BUMETANIDE 2 MG: 1 TABLET ORAL at 17:28

## 2019-01-12 RX ADMIN — APIXABAN 5 MG: 5 TABLET, FILM COATED ORAL at 08:54

## 2019-01-12 RX ADMIN — CARVEDILOL 6.25 MG: 6.25 TABLET, FILM COATED ORAL at 17:28

## 2019-01-12 NOTE — ROUTINE PROCESS
Patient continues with shortness of breath. Has started PO bumex as of today. Patient will need reinforcement of HF materials and also discussion on any medication changes that occur while IP. Patient known to program and appreciates the information. Patient has scale at bedside. Continue to reinforce. Will need TC7 at D/c. Patient stated he notified cardiology (as OP) of s/s and was ordered higher dose of diuretic and felt better for 2-3 days then he noticed his UOP had decreased and symptoms worsened over that time. Patient c/o fatigue, shortness of breath, edema and weight gain prior to admit. Limited relief in symptoms reported by patient since admit.

## 2019-01-12 NOTE — PROGRESS NOTES
PT note: Attempted to see patient for PT session this am.  Patient repeatedly refused, stating he can't walk. Reminded patient that he walked yesterday and patient stated \"that's a lie\". Patient reported he knows his body and he can't get up. Patient reported he may try later today. Therapist agreed to come back as scheduled allowed.  
 
Christina Draper, PT

## 2019-01-12 NOTE — PROGRESS NOTES
Problem: Mobility Impaired (Adult and Pediatric) Goal: *Acute Goals and Plan of Care (Insert Text) STG: 
(1.)Mr. Jose Velazquez will move from supine to sit and sit to supine  with SUPERVISION with head of bed elevated and bed rail within 4-7 treatment day(s). Progressing 1/12/19 
(2.)Mr. Jose Velazquez will transfer from bed to chair and chair to bed with MINIMAL ASSIST of 2 using the least restrictive device within 4-7 treatment day(s). Met 1/12/19 
(3.)Mr. Jose Velazquez will ambulate with MINIMAL ASSIST of 2 for 50 feet with the least restrictive device within 4-7 treatment day(s). Progressing 1/12/19 LTG: 
(1.)Mr. Jose Velazquez will move from supine to sit and sit to supine  in bed with INDEPENDENCE with bed flat and no rail within 7-14 treatment day(s). (2.)Mr. Jose Velazquez will transfer from bed to chair and chair to bed with CONTACT GUARD ASSIST using the least restrictive device within 7-14 treatment day(s). (3.)Mr. Jose Velazquez will ambulate with CONTACT GUARD ASSIST for 100 feet with the least restrictive device within 7-14 treatment day(s). ________________________________________________________________________________________________ PHYSICAL THERAPY: Daily Note, Treatment Day: 2nd, PM 1/12/2019INPATIENT: Hospital Day: 4 Payor: Frederic Can / Plan: [address] [place] [address] HMO / Product Type: Managed Care Medicare /  
  
NAME/AGE/GENDER: Ellen Monroy is a 74 y.o. male PRIMARY DIAGNOSIS: Acute on chronic combined systolic and diastolic CHF (congestive heart failure) ([facility]) [I50.43] Acute on chronic combined systolic and diastolic CHF (congestive heart failure) ([facility]) Acute on chronic combined systolic and diastolic [facility] (congestive [facility]) [facility] ICD-10: Treatment Diagnosis:  
 · Generalized Muscle Weakness (M62.81) · Difficulty in walking, Not elsewhere classified (R26.2) Precaution/Allergies: 
Pcn [penicillins] ASSESSMENT:  
Mr. Jose Velazquez presents supine on contact and agreeable to therapy assessment but stating \"I hope you don't want me to walk\". Moved from supine to sit with SBA. Presents with generalized weakness and increased shortness of breath with moving from supine to sit. His oxygen canula was laying in the floor, put it back on for him, he states he does not know how or who took it off. Pt with bilateral lower extremeity weakness but he states no orthopedic issues. He was current with HHPT prior to admission and states he was able to participate with them and do the exercises. Sit to stand with minimal assist but he stated he could not take any steps. He sat back down, rested and he did stand a second time but again said he could not walk and sat back down. Told PT it was not that he was refusing to walk he just couldn't. Pt will benefit from skilled PT interventions to maximize independence with mobility and strengthening. Patient agreeable to therapy this afternoon. SBA for all bed mobility, transfers, and gait. Ambulation distance limited by O2 line but also very short of breath with minimal exertion. Patient did short distance ambulate multiple times. Needing cues to stand closer to walker as he pushes it too far out in front. Patient should return home with HHPT. This section established at most recent assessment PROBLEM LIST (Impairments causing functional limitations): 1. Decreased Strength 2. Decreased ADL/Functional Activities 3. Decreased Transfer Abilities 4. Decreased Ambulation Ability/Technique 5. Decreased Activity Tolerance 6. Increased Shortness of Breath 7. Decreased [place] with Home Exercise Program 
 INTERVENTIONS PLANNED: (Benefits and precautions of physical therapy have been discussed with the patient.) 1. Balance Exercise 2. Bed Mobility 3. Gait Training 4. Therapeutic Activites 5. Therapeutic Exercise/Strengthening 6. Transfer Training TREATMENT PLAN: Frequency/Duration: daily for duration of hospital stay Rehabilitation Potential For Stated Goals: Good RECOMMENDED REHABILITATION/EQUIPMENT: (at time of discharge pending progress): Due to the probability of continued deficits (see above) this patient will likely need continued skilled physical therapy after discharge. Equipment:  
? to be determined HISTORY:  
History of Present Injury/Illness (Reason for Referral): PER MD NOTE: Patient is a 74years old AA male with history of CHf/ ef 35%/ DD gr2 and CKD stage 3 presents with worsening CLAUDIO with minimal activity for last 2 weeks. His legs edema has worsened from before. His po diuretic was increased lately w/ no improvement. He denies CP or fever. He has occasional cough w/ scanty sputum. No flu spx. His weight is 214 lb and his baseline is 210 lb. He makes fair urine now. He normally use 3-4 L O2 at home. Past Medical History/Comorbidities:  
Mr. Liss Elias  has a past medical history of Acute CHF (congestive heart failure) ([facility]), Acute combined systolic and diastolic congestive heart failure ([facility]), De Quervain's tenosynovitis, right, Dyspnea on exertion, Elevated serum creatinine, Elevated troponin, History of coronary artery disease, History of right knee surgery, History of shingles, Malignant hypertension, and MI (myocardial infarction) ([facility]). Mr. Liss Elias  has a past surgical history that includes hx knee arthroscopy (Right); hx heart catheterization (6/29/2015); and ABDOMINAL FAT PAD BIOPSY (N/A, 5/29/2018). Social History/Living Environment:  
Home Environment: Private residence # Steps to Enter: 3 One/Two Story Residence: One story Living Alone: No 
Support Systems: Spouse/Significant Other/Partner Patient Expects to be Discharged to[de-identified] Private residence Current DME Used/Available at Home: Oxygen, portable, Walker, rollator Tub or Shower Type: Tub/Shower combination Prior Level of Function/Work/Activity: 
Pt lives at home with spouse, independent with mobility.  On home oxygen and was current with HHPT Number of Personal Factors/Comorbidities that affect the Plan of Care: 1-2: MODERATE COMPLEXITY EXAMINATION:  
Most Recent Physical Functioning:  
Gross Assessment: 
AROM: Generally decreased, functional 
Strength: Generally decreased, functional 
         
  
Posture: 
  
Balance: 
Sitting: Intact Standing: Impaired Standing - Static: Fair Standing - Dynamic : Fair Bed Mobility: 
Supine to Sit: Stand-by assistance Sit to Supine: Stand-by assistance Wheelchair Mobility: 
  
Transfers: 
Sit to Stand: Stand-by assistance Stand to Sit: Stand-by assistance Bed to Chair: Stand-by assistance Gait: 
  
Base of Support: Center of gravity altered Speed/Lu: Pace decreased (<100 feet/min) Step Length: Left shortened;Right shortened Gait Abnormalities: Decreased step clearance Distance (ft): 15 Feet (ft)(x 3) Assistive Device: Walker, rolling Ambulation - Level of Assistance: Stand-by assistance Interventions: Safety awareness training;Verbal cues Body Structures Involved: 1. Muscles Body Functions Affected: 1. Movement Related Activities and Participation Affected: 1. Mobility 2. Self Care Number of elements that affect the Plan of Care: 4+: HIGH COMPLEXITY CLINICAL PRESENTATION:  
Presentation: Stable and uncomplicated: LOW COMPLEXITY CLINICAL DECISION MAKING:  
[employer] [facility] 6 Clicks Basic Mobility Inpatient Short Form How much difficulty does the patient currently have. .. Unable A Lot A Little None 1. Turning over in bed (including adjusting bedclothes, sheets and blankets)? [] 1   [] 2   [x] 3   [] 4  
2. Sitting down on and standing up from a chair with arms ( e.g., wheelchair, bedside commode, etc.)   [] 1   [] 2   [x] 3   [] 4  
3. Moving from lying on back to sitting on the side of the bed? [] 1   [] 2   [x] 3   [] 4 How much help from another person does the patient currently need. .. Total A Lot A Little None 4. Moving to and from a bed to a chair (including a wheelchair)? [] 1   [x] 2   [] 3   [] 4  
5. Need to walk in hospital room? [] 1   [x] 2   [] 3   [] 4  
6. Climbing 3-5 steps with a railing? [x] 1   [] 2   [] 3   [] 4  
© 2007, Trustees of [address] [place] [address] [address] [address], under license to [employer]. All rights reserved Score:  Initial: 14 Most Recent: X (Date: -- ) Interpretation of Tool:  Represents activities that are increasingly more difficult (i.e. Bed mobility, Transfers, Gait). Score 24 23 22-20 19-15 14-10 9-7 6 Modifier CH CI CJ CK CL CM CN   
 
? Mobility - Walking and Moving Around:  
  - CURRENT STATUS: CL - 60%-79% impaired, limited or restricted  - GOAL STATUS: CJ - 20%-39% impaired, limited or restricted  - D/C STATUS:  ---------------To be determined--------------- Payor: Natan Ruiz / Plan: [address] [place] [address] HMO / Product Type: Managed Care Medicare /   
 
Medical Necessity:    
· Patient is expected to demonstrate progress in strength, coordination and functional technique to decrease assistance required with functional mobiltiy and strengthening. Reason for Services/Other Comments: 
· Patient continues to require skilled intervention due to inability to complete functional mobility and strengthening independently. Use of outcome tool(s) and clinical judgement create a POC that gives a: Clear prediction of patient's progress: LOW COMPLEXITY  
  
 
 
 
TREATMENT:  
(In addition to Assessment/Re-Assessment sessions the following treatments were rendered) Pre-treatment Symptoms/Complaints:  Patient agreeable to therapy. Pain: Initial:  
Pain Intensity 1: 0  Post Session:  0/10 Therapeutic Activity: (    ):  Therapeutic activities including supine <> sit, sit <> stand multiple times, and ambulation with rolling walker to improve mobility and strength.   Required minimal Safety awareness training;Verbal cues to promote static and dynamic balance in standing and promote coordination of walker. Braces/Orthotics/Lines/Etc:  
· telemetry · O2 Device: Nasal cannula Treatment/Session Assessment:   
· Response to Treatment:  Patient participated well in the afternoon. Distance limited by O2 line as well as shortness of breath. Recovered well with rest breaks. Better to walk multiple short distance than one long distance. · Interdisciplinary Collaboration:  
o Physical Therapist 
o Registered Nurse · After treatment position/precautions:  
o Supine in bed 
o Bed in low position 
o Call light within reach · Compliance with Program/Exercises: Compliant most of the time · Recommendations/Intent for next treatment session: \"Next visit will focus on advancements to more challenging activities and reduction in assistance provided\". Total Treatment Duration: PT Patient Time In/Time Out Time In: 1250 Time Out: 1315 Brittani Rosen, PT

## 2019-01-12 NOTE — PROGRESS NOTES
Shift assessment complete via flowsheet. A&Ox4. Has 02 at 2L/ min via NC. Some dyspnea with exertion noted. Denies pain/discomfort. Cardiac monitor in place. Call light in reach. Safety measures in place.

## 2019-01-12 NOTE — PROGRESS NOTES
INTERNAL MEDICINE PROGRESS NOTE As per prior Note: 
\" 
Subjective:  
 
Patient is a 74years old AA male with history of CHf/ ef 35%/ DD gr2 and CKD stage 3 presents with worsening CLAUDIO with minimal activity for last 2 weeks. His legs edema has worsened from before. His po diuretic was increased lately w/ no improvement. He denies CP or fever. He has occasional cough w/ scanty sputum. No flu spx. His weight is 214 lb and his baseline is 210 lb. He makes fair urine now. He normally use 3-4 L O2 at home. \" 
 
 
01/10/2019- pt seen- still sob-  Does not fluid restrict at home; otherwise mostly compliant- uses o2 most of the time weighs most of the time. Did double up on his lasix but did not work this time. He wants to be able to walk to the curtain in his room un-assisted. .. Not able to do this for > 1 week 01/11/2019- pt seen still feels sob- chart documents weight documented notes he gained almost 15ilb since yesterday. He feels he is not peeing enough. Attempted to get the patient up to the bed on his own scale in the room - but unable to do so secondary to sob. 1/12/2019: Pt in bed with hob elevated. 1+ edema noted to bilateral lower extremities. Patient states feeling better although still complains of SOB. Eating lunch. Past Medical History:  
Diagnosis Date  Acute CHF (congestive heart failure) ([address] 4/25/2015  Acute combined systolic and diastolic congestive heart failure ([address] 4/28/2015  De Quervain's tenosynovitis, right 4/28/2015  Dyspnea on exertion 6/28/2015  Elevated serum creatinine 4/25/2015  Elevated troponin 6/28/2015  History of coronary artery disease MI at 25 yo  
 History of right knee surgery   
 cartilage removal  
 History of shingles  Malignant hypertension 4/25/2015  MI (myocardial infarction) ([address] Past Surgical History:  
Procedure Laterality Date  HX HEART CATHETERIZATION  6/29/2015  
 no intervention  HX KNEE ARTHROSCOPY Right   
 removal of cartilage Prior to Admission medications Medication Sig Start Date End Date Taking? Authorizing Provider Blood-Glucose Meter (ACCU-CHEK YOSEF PLUS METER) misc USE TO CHECK BLOOD SUGARS DAILY DX: E11.9 12/27/18   Lorraine Ulrich MD  
Blood Glucose Control High&Low (ACCU-CHEK YOSEF CONTROL SOLN) soln USE AS DIRECTED 12/27/18   Lorraine Ulrich MD  
glucose blood VI test strips (ACCU-CHEK YOSEF PLUS TEST STRP) strip USE TO CHECK BLOOD SUGARS DAILY DX: E11.9 12/27/18   Lorraine Ulrich MD  
alcohol swabs (BD SINGLE USE SWABS REGULAR) padm USE AS DIRECTED DAILY DX. E11.9 12/27/18   Lorraine Ulrich MD  
lancets (ACCU-CHEK SOFTCLIX LANCETS) misc USE TO CHECK BLOOD SUGARS DAILY DX: E11.9 12/27/18   Lorraine Ulrich MD  
torsemide (DEMADEX) 20 mg tablet Take 1 Tab by mouth daily. 12/6/18   Micheline Saleh MD  
carvedilol (COREG) 6.25 mg tablet Take 1 Tab by mouth two (2) times daily (with meals). 12/3/18   Samira Arceo MD  
apixaban (ELIQUIS) 5 mg tablet Take 1 Tab by mouth every twelve (12) hours. 12/3/18   Samira Arceo MD  
aspirin 81 mg chewable tablet Take 1 Tab by mouth daily. 11/5/18   Mary Marie MD  
atorvastatin (LIPITOR) 20 mg tablet Take 1 Tab by mouth nightly. 10/4/18   Lorraine Ulrich MD  
allopurinol (ZYLOPRIM) 100 mg tablet TAKE 1 TABLET BY MOUTH EVERY DAY 9/25/18   Lorraine Ulrich MD  
omeprazole (PRILOSEC) 20 mg capsule Take 1 Cap by mouth daily. 9/5/18   Lorraine Ulrich MD  
fluticasone (FLONASE) 50 mcg/actuation nasal spray 2 Sprays by Both Nostrils route daily. 3/29/18   Jose Luis Mata MD  
melatonin 3 mg tablet Take 3 mg by mouth nightly. Provider, Historical  
albuterol (VENTOLIN HFA) 90 mcg/actuation inhaler Take 2 Puffs by inhalation four (4) times daily. 2/6/18   Fredy Chaidez NP  
polyethylene glycol (MIRALAX) 17 gram packet Take 1 Packet by mouth daily. Patient taking differently: Take 17 g by mouth daily as needed. 1/13/18   Sandy MERLOS NP  
albuterol-ipratropium (DUO-NEB) 2.5 mg-0.5 mg/3 ml nebu 3 mL by Nebulization route four (4) times daily. Diaignosis--J44.9 12/13/17   Mehrdad Patricia NP Allergies Allergen Reactions  Pcn [Penicillins] Other (comments) \"makes my heart stop\" Social History Tobacco Use  Smoking status: Former Smoker Packs/day: 0.25 Years: 20.00 Pack years: 5.00 Types: Cigarettes Last attempt to quit: 4/5/2015 Years since quitting: 3.7  Smokeless tobacco: Never Used Substance Use Topics  Alcohol use: No  
  Alcohol/week: 0.0 oz Family History: HTN Review of Systems A comprehensive review of systems was negative except for that written in the HPI. Objective: Intake / Output: 
No intake/output data recorded. 01/10 1901 - 01/12 0700 In: 61 [P.O.:60] Out: 1400 [DVSNS:[id number]] Physical Exam: 
Visit Vitals /73 (BP 1 Location: Right arm, BP Patient Position: At rest;Supine) Pulse 71 Temp 95.8 °F (35.4 °C) Resp 18 Ht 5' 10\" (1.778 m) Wt 102.2 kg (225 lb 6.4 oz) SpO2 100% BMI 32.34 kg/m² General appearance: awake, alert, cooperative, moderate distress, appears stated age - obese Head: Normocephalic, without obvious abnormality, atraumatic Back: symmetric, no curvature. ROM normal. No CVA tenderness. Lungs: clear to auscultation bilaterally - Diminished bs bibasilar. Heart: regular rate and rhythm, S1, S2 normal, no murmur, click, rub or gallop. Abdomen: soft, no tenderness, no distension, normal bowel sound, no masses, no organomegaly Extremities: atraumatic, no cyanosis - Bilateral lower limbs edema +1 Skin: No rashes or ulceration. Neurologic: Grossly intact ECG: sinus rhythm Data Review (Labs):  
Recent Results (from the past 24 hour(s)) EKG, 12 LEAD, SUBSEQUENT  Collection Time: 01/11/19  3:18 PM  
 Result Value Ref Range Ventricular Rate 81 BPM  
 Atrial Rate 92 BPM  
 QRS Duration 124 ms Q-T Interval 462 ms QTC Calculation (Bezet) 536 ms Calculated R Axis -70 degrees Calculated T Axis 109 degrees Diagnosis Atrial fibrillation Left axis deviation Inferior infarct (cited on or before 09-JAN-2019) Anterior infarct (cited on or before 09-JAN-2019) Abnormal ECG When compared with ECG of 09-JAN-2019 16:03, No significant change was found METABOLIC PANEL, BASIC Collection Time: 01/12/19  6:32 AM  
Result Value Ref Range Sodium 140 136 - 145 mmol/L Potassium 3.6 3.5 - 5.1 mmol/L Chloride 107 98 - 107 mmol/L  
 CO2 23 21 - 32 mmol/L Anion gap 10 mmol/L Glucose 90 65 - 100 mg/dL BUN 35 (H) 8 - 23 MG/DL Creatinine 2.06 (H) 0.8 - 1.5 MG/DL  
 GFR est AA 41 (L) >60 ml/min/1.73m2 GFR est non-AA 34 ml/min/1.73m2 Calcium 8.4 8.3 - 10.4 MG/DL  
CBC WITH AUTOMATED DIFF Collection Time: 01/12/19  6:32 AM  
Result Value Ref Range WBC 7.4 4.3 - 11.1 K/uL  
 RBC 5.07 4.23 - 5.6 M/uL  
 HGB 13.6 13.6 - 17.2 g/dL HCT 42.8 41.1 - 50.3 % MCV 84.4 79.6 - 97.8 FL  
 MCH 26.8 26.1 - 32.9 PG  
 MCHC 31.8 31.4 - 35.0 g/dL  
 RDW 16.7 (H) 11.9 - 14.6 % PLATELET 917 (L) 911 - 450 K/uL MPV 10.1 9.4 - 12.3 FL ABSOLUTE NRBC 0.00 0.0 - 0.2 K/uL  
 DF AUTOMATED NEUTROPHILS 54 43 - 78 % LYMPHOCYTES 32 13 - 44 % MONOCYTES 12 4.0 - 12.0 % EOSINOPHILS 1 0.5 - 7.8 % BASOPHILS 1 0.0 - 2.0 % IMMATURE GRANULOCYTES 0 0.0 - 5.0 %  
 ABS. NEUTROPHILS 4.0 1.7 - 8.2 K/UL  
 ABS. LYMPHOCYTES 2.4 0.5 - 4.6 K/UL  
 ABS. MONOCYTES 0.9 0.1 - 1.3 K/UL  
 ABS. EOSINOPHILS 0.1 0.0 - 0.8 K/UL  
 ABS. BASOPHILS 0.0 0.0 - 0.2 K/UL  
 ABS. IMM. GRANS. 0.0 0.0 - 0.5 K/UL Assessment:  
 
Active Problems: 
  Acute on chronic combined systolic and diastolic CH[facility]congest[facility] (11/26/2018) DM?: pt denies but had recent A1C 7 Plan: Continue telemetry Daily weight- suspect incorrect as pt has gained > 14lbs since admission-current weight by staff is 225 lbs. Fluid restrict to 2L daily Now on a lasix drip- does not feel like he is peeing as much as yesterday and feels  more sob; will get am labs and then give lasix based on that. Asked nursing to to hold the lasix ordered until labs come back but unfortunately they still gave it. Unable to get patient up to weigh him myself -so d/w nursing and they will get Physical therapy to weigh him on the scale in his room. Cardiology consult appreciated - eliquis re-started A1C is 7.4 PPD placed Blood pressure control AM labs Continue essential home medications. Patient is full code. Further management depends on patient progress. Signed By: Oswald Schirmer, NP   
 January 12, 2019

## 2019-01-12 NOTE — PROGRESS NOTES
1/12/2019 3:51 PM 
 
Admit Date: 1/9/2019 Admit Diagnosis: Acute on chronic combined systolic and diastolic CHF (congestive heart failure) ([facility] [I50.43] Subjective:  
Pt feels better Has swelling I did his PILO and he had severe LV dysfunction and CHAU thrombus and smoke He is not on afterload reduction secondary to renal disease Objective:  
  
Visit Vitals /73 (BP 1 Location: Right arm, BP Patient Position: At rest;Supine) Pulse 71 Temp 95.8 °F (35.4 °C) Resp 18 Ht 5' 10\" (1.778 m) Wt 102.2 kg (225 lb 6.4 oz) SpO2 100% BMI 32.34 kg/m² Physical Exam: 
General-Well Developed,NAD appropriate mood. Neck- supple, pos  JVD 
CV- regular rate and rhythm no MRG Lung- crackles bilaterally Abd- soft, nontender, nondistended Ext- edema bilaterally. Skin- warm and dry Data Review:  
Recent Labs  
  01/12/19 
[id number]   
K 3.6 BUN 35* CREA 2.06* WBC 7.4 HGB 13.6 HCT 42.8 * Assessment/Plan:  
 
Principal Problem: 
  Acute on chronic combined systolic and diastolic CHF (congest[facility]) [facility] (11/26/2018) add bidil which is approved for CHF in  pts this may not worsen renal function Active Problems: 
  CKD (chronic kidney disease) stage 3, GFR 30-59 ml/min ([facility]) (9/29/2017) Essential hypertension (9/28/2016) TAZ (obstructive sleep apnea) (10/2/2017) Overview: Intolerant of CPAP Type 2 diabetes with nephropathy ([facility] (10/8/2018) Atrial fibrillation ([facility] (11/27/2018) rate control blood thinner Long term success at maintaining sinus will be difficult Left atrial thrombus (1/10/2019)

## 2019-01-12 NOTE — ROUTINE PROCESS
CHF teaching reinforced to pt : Emphasis to report worsening Daily weights/ dyspnea, will need to continue daily reinforcement by CHF Navigator team. Daily weights via provided scale are fluctuating 15lb daily. Scale is accurate per weighing myself . I believe the flux is due to PATIENT Instability with standing. Recommend using hospital standing scale or bed-scale. Planner/scale @ BS and will need daily CHF refresher/ education.: 20 mins more

## 2019-01-12 NOTE — PROGRESS NOTES
Am assessment completed. Pt is alert and oriented with forgetfulness. Weighted pt this am. Takes pills whole with thin liquids. Fair appetite. Denies pain. Continue to monitor.

## 2019-01-13 LAB
ANION GAP SERPL CALC-SCNC: 11 MMOL/L
ATRIAL RATE: 92 BPM
BASOPHILS # BLD: 0 K/UL (ref 0–0.2)
BASOPHILS NFR BLD: 1 % (ref 0–2)
BUN SERPL-MCNC: 36 MG/DL (ref 8–23)
CALCIUM SERPL-MCNC: 8.5 MG/DL (ref 8.3–10.4)
CALCULATED R AXIS, ECG10: -70 DEGREES
CALCULATED T AXIS, ECG11: 109 DEGREES
CHLORIDE SERPL-SCNC: 107 MMOL/L (ref 98–107)
CO2 SERPL-SCNC: 22 MMOL/L (ref 21–32)
CREAT SERPL-MCNC: 1.9 MG/DL (ref 0.8–1.5)
DIAGNOSIS, 93000: NORMAL
DIFFERENTIAL METHOD BLD: ABNORMAL
EOSINOPHIL # BLD: 0.1 K/UL (ref 0–0.8)
EOSINOPHIL NFR BLD: 1 % (ref 0.5–7.8)
ERYTHROCYTE [DISTWIDTH] IN BLOOD BY AUTOMATED COUNT: 16.7 % (ref 11.9–14.6)
GLUCOSE SERPL-MCNC: 84 MG/DL (ref 65–100)
HCT VFR BLD AUTO: 40.5 % (ref 41.1–50.3)
HGB BLD-MCNC: 13 G/DL (ref 13.6–17.2)
IMM GRANULOCYTES # BLD AUTO: 0 K/UL (ref 0–0.5)
IMM GRANULOCYTES NFR BLD AUTO: 0 % (ref 0–5)
LYMPHOCYTES # BLD: 2.2 K/UL (ref 0.5–4.6)
LYMPHOCYTES NFR BLD: 33 % (ref 13–44)
MCH RBC QN AUTO: 26.8 PG (ref 26.1–32.9)
MCHC RBC AUTO-ENTMCNC: 32.1 G/DL (ref 31.4–35)
MCV RBC AUTO: 83.5 FL (ref 79.6–97.8)
MONOCYTES # BLD: 0.8 K/UL (ref 0.1–1.3)
MONOCYTES NFR BLD: 11 % (ref 4–12)
NEUTS SEG # BLD: 3.6 K/UL (ref 1.7–8.2)
NEUTS SEG NFR BLD: 53 % (ref 43–78)
NRBC # BLD: 0.02 K/UL (ref 0–0.2)
PLATELET # BLD AUTO: 130 K/UL (ref 150–450)
PMV BLD AUTO: 11.5 FL (ref 9.4–12.3)
POTASSIUM SERPL-SCNC: 3.3 MMOL/L (ref 3.5–5.1)
Q-T INTERVAL, ECG07: 462 MS
QRS DURATION, ECG06: 124 MS
QTC CALCULATION (BEZET), ECG08: 536 MS
RBC # BLD AUTO: 4.85 M/UL (ref 4.23–5.6)
SODIUM SERPL-SCNC: 140 MMOL/L (ref 136–145)
VENTRICULAR RATE, ECG03: 81 BPM
WBC # BLD AUTO: 6.7 K/UL (ref 4.3–11.1)

## 2019-01-13 PROCEDURE — 74011250637 HC RX REV CODE- 250/637: Performed by: HOSPITALIST

## 2019-01-13 PROCEDURE — 80048 BASIC METABOLIC PNL TOTAL CA: CPT

## 2019-01-13 PROCEDURE — 85025 COMPLETE CBC W/AUTO DIFF WBC: CPT

## 2019-01-13 PROCEDURE — 74011250637 HC RX REV CODE- 250/637: Performed by: INTERNAL MEDICINE

## 2019-01-13 PROCEDURE — 74011250637 HC RX REV CODE- 250/637: Performed by: NURSE PRACTITIONER

## 2019-01-13 PROCEDURE — 94760 N-INVAS EAR/PLS OXIMETRY 1: CPT

## 2019-01-13 PROCEDURE — 97530 THERAPEUTIC ACTIVITIES: CPT

## 2019-01-13 PROCEDURE — 36415 COLL VENOUS BLD VENIPUNCTURE: CPT

## 2019-01-13 PROCEDURE — 65270000029 HC RM PRIVATE

## 2019-01-13 RX ORDER — METOLAZONE 2.5 MG/1
2.5 TABLET ORAL DAILY
Status: DISCONTINUED | OUTPATIENT
Start: 2019-01-13 | End: 2019-01-15 | Stop reason: HOSPADM

## 2019-01-13 RX ORDER — POTASSIUM CHLORIDE 20 MEQ/1
40 TABLET, EXTENDED RELEASE ORAL 2 TIMES DAILY
Status: DISCONTINUED | OUTPATIENT
Start: 2019-01-13 | End: 2019-01-15 | Stop reason: HOSPADM

## 2019-01-13 RX ADMIN — ACETAMINOPHEN 650 MG: 325 TABLET, FILM COATED ORAL at 00:56

## 2019-01-13 RX ADMIN — HYDRALAZINE HYDROCHLORIDE AND ISOSORBIDE DINITRATE 1 TABLET: 37.5; 2 TABLET, FILM COATED ORAL at 21:32

## 2019-01-13 RX ADMIN — APIXABAN 5 MG: 5 TABLET, FILM COATED ORAL at 21:32

## 2019-01-13 RX ADMIN — Medication 5 ML: at 21:47

## 2019-01-13 RX ADMIN — POTASSIUM CHLORIDE 40 MEQ: 20 TABLET, EXTENDED RELEASE ORAL at 09:07

## 2019-01-13 RX ADMIN — Medication 10 ML: at 14:00

## 2019-01-13 RX ADMIN — CARVEDILOL 6.25 MG: 6.25 TABLET, FILM COATED ORAL at 17:23

## 2019-01-13 RX ADMIN — BUMETANIDE 2 MG: 1 TABLET ORAL at 17:23

## 2019-01-13 RX ADMIN — CARVEDILOL 6.25 MG: 6.25 TABLET, FILM COATED ORAL at 08:34

## 2019-01-13 RX ADMIN — HYDRALAZINE HYDROCHLORIDE AND ISOSORBIDE DINITRATE 1 TABLET: 37.5; 2 TABLET, FILM COATED ORAL at 17:23

## 2019-01-13 RX ADMIN — Medication 5 ML: at 05:22

## 2019-01-13 RX ADMIN — BUMETANIDE 2 MG: 1 TABLET ORAL at 08:34

## 2019-01-13 RX ADMIN — METOLAZONE 2.5 MG: 2.5 TABLET ORAL at 09:34

## 2019-01-13 RX ADMIN — HYDRALAZINE HYDROCHLORIDE AND ISOSORBIDE DINITRATE 1 TABLET: 37.5; 2 TABLET, FILM COATED ORAL at 08:34

## 2019-01-13 RX ADMIN — APIXABAN 5 MG: 5 TABLET, FILM COATED ORAL at 08:34

## 2019-01-13 RX ADMIN — POTASSIUM CHLORIDE 40 MEQ: 20 TABLET, EXTENDED RELEASE ORAL at 17:23

## 2019-01-13 NOTE — ROUTINE PROCESS
Reinforced HF educational materials and also discussed POC and medication changes and rationale with patient and spouse. Patient in good spirits and appreciated time spent reinforcing materials. Continue to follow 20 mins

## 2019-01-13 NOTE — PROGRESS NOTES
Problem: Mobility Impaired (Adult and Pediatric) Goal: *Acute Goals and Plan of Care (Insert Text) STG: 
(1.)Mr. Rina Lovett will move from supine to sit and sit to supine  with SUPERVISION with head of bed elevated and bed rail within 4-7 treatment day(s). Progressing 1/12/19 
(2.)Mr. Rina Lovett will transfer from bed to chair and chair to bed with MINIMAL ASSIST of 2 using the least restrictive device within 4-7 treatment day(s). Met 1/12/19 
(3.)Mr. Rina Lovett will ambulate with MINIMAL ASSIST of 2 for 50 feet with the least restrictive device within 4-7 treatment day(s). Met 1/13/19/19 LTG: 
(1.)Mr. Rina Lovett will move from supine to sit and sit to supine  in bed with INDEPENDENCE with bed flat and no rail within 7-14 treatment day(s). (2.)Mr. Rina Lovett will transfer from bed to chair and chair to bed with CONTACT GUARD ASSIST using the least restrictive device within 7-14 treatment day(s). (3.)Mr. Rina Lovett will ambulate with CONTACT GUARD ASSIST for 100 feet with the least restrictive device within 7-14 treatment day(s). ________________________________________________________________________________________________ PHYSICAL THERAPY: Daily Note, Treatment Day: 3rd, PM 1/13/2019INPATIENT: Hospital Day: 5 Payor: Caprice Arce / Plan: [address] [place] [address] HMO / Product Type: Managed Care Medicare /  
  
NAME/AGE/GENDER: Sandra Swain is a 74 y.o. male PRIMARY DIAGNOSIS: Acute on chronic combined systolic and diastolic CHF (congestive heart failure) ([facility]) [I50.43] Acute on chronic combined systolic and diastolic CHF (congestive heart failure) ([facility]) Acute on chronic combined systolic and diastolic CHF (congestive heart failure) [facility] ICD-10: Treatment Diagnosis:  
 · Generalized Muscle Weakness (M62.81) · Difficulty in walking, Not elsewhere classified (R26.2) Precaution/Allergies: 
Pcn [penicillins] ASSESSMENT:  
Mr. Rina Lovett presents supine on contact and agreeable to therapy assessment but stating \"I hope you don't want me to walk\". Moved from supine to sit with SBA. Presents with generalized weakness and increased shortness of breath with moving from supine to sit. His oxygen canula was laying in the floor, put it back on for him, he states he does not know how or who took it off. Pt with bilateral lower extremeity weakness but he states no orthopedic issues. He was current with HHPT prior to admission and states he was able to participate with them and do the exercises. Sit to stand with minimal assist but he stated he could not take any steps. He sat back down, rested and he did stand a second time but again said he could not walk and sat back down. Told PT it was not that he was refusing to walk he just couldn't. Pt will benefit from skilled PT interventions to maximize independence with mobility and strengthening. Patient agreeable to therapy this afternoon. Already up in chair and spouse present. Patient ambulated increased distance with rolling walker today. Ambulated total of 270' but with 3 rest breaks. Did need occasional cues to stay within the walker. Spouse concerned patient will refuse HHPT too many times. Feels he has gotten stronger when he goes to rehab. Patient does not appear to want to go to rehab. He would be safe to d/c home but activity level would be limited. Patient is S-SBA for gait and S for transfers. Will notify SW of spouses concerns. This section established at most recent assessment PROBLEM LIST (Impairments causing functional limitations): 1. Decreased Strength 2. Decreased ADL/Functional Activities 3. Decreased Transfer Abilities 4. Decreased Ambulation Ability/Technique 5. Decreased Activity Tolerance 6. Increased Shortness of Breath 7. Decreased [place] with Home Exercise Program 
 INTERVENTIONS PLANNED: (Benefits and precautions of physical therapy have been discussed with the patient.) 1. Balance Exercise 2. Bed Mobility 3. Gait Training 4. Therapeutic Activites 5. Therapeutic Exercise/Strengthening 6. Transfer Training TREATMENT PLAN: Frequency/Duration: daily for duration of hospital stay Rehabilitation Potential For Stated Goals: Good RECOMMENDED REHABILITATION/EQUIPMENT: (at time of discharge pending progress): Due to the probability of continued deficits (see above) this patient will likely need continued skilled physical therapy after discharge. Equipment:  
? to be determined HISTORY:  
History of Present Injury/Illness (Reason for Referral): PER MD NOTE: Patient is a 74years old AA male with history of CHf/ ef 35%/ DD gr2 and CKD stage 3 presents with worsening CLAUDIO with minimal activity for last 2 weeks. His legs edema has worsened from before. His po diuretic was increased lately w/ no improvement. He denies CP or fever. He has occasional cough w/ scanty sputum. No flu spx. His weight is 214 lb and his baseline is 210 lb. He makes fair urine now. He normally use 3-4 L O2 at home. Past Medical History/Comorbidities:  
Mr. Ab Dennis  has a past medical history of Acute CHF (congestive heart failure) ([facility] Acute combined systolic and diastolic congestive heart failure ([address] De Quervain's tenosynovitis, right, Dyspnea on exertion, Elevated serum creatinine, Elevated troponin, History of coronary artery disease, History of right knee surgery, History of shingles, Malignant hypertension, and MI (myocardial infarction) ([facility] Mr. Ab Dennis  has a past surgical history that includes hx knee arthroscopy (Right); hx heart catheterization (6/29/2015); and ABDOMINAL FAT PAD BIOPSY (N/A, 5/29/2018). Social History/Living Environment:  
Home Environment: Private residence # Steps to Enter: 3 One/Two Story Residence: One story Living Alone: No 
Support Systems: Spouse/Significant Other/Partner Patient Expects to be Discharged to[de-identified] Private residence Current DME Used/Available at Home: Oxygen, portable, Walker, rollator Tub or Shower Type: Tub/Shower combination Prior Level of Function/Work/Activity: 
Pt lives at home with spouse, independent with mobility. On home oxygen and was current with HHPT Number of Personal Factors/Comorbidities that affect the Plan of Care: 1-2: MODERATE COMPLEXITY EXAMINATION:  
Most Recent Physical Functioning:  
Gross Assessment: 
AROM: Generally decreased, functional 
Strength: Generally decreased, functional 
         
  
Posture: 
  
Balance: 
Sitting: Intact Standing - Static: Fair Standing - Dynamic : Fair Bed Mobility: 
  
Wheelchair Mobility: 
  
Transfers: 
Sit to Stand: Supervision Stand to Sit: Supervision Gait: 
  
Base of Support: Center of gravity altered Speed/Lu: Pace decreased (<100 feet/min) Step Length: Left shortened;Right shortened Gait Abnormalities: Decreased step clearance Distance (ft): 270 Feet (ft)(total with 3 rest breaks) Assistive Device: Walker, rolling Ambulation - Level of Assistance: Supervision;Stand-by assistance Interventions: Safety awareness training;Verbal cues Body Structures Involved: 1. Muscles Body Functions Affected: 1. Movement Related Activities and Participation Affected: 1. Mobility 2. Self Care Number of elements that affect the Plan of Care: 4+: HIGH COMPLEXITY CLINICAL PRESENTATION:  
Presentation: Stable and uncomplicated: LOW COMPLEXITY CLINICAL DECISION MAKIN [facility] [place] [address] [address] AM-PAC 6 Clicks Basic Mobility Inpatient Short Form How much difficulty does the patient currently have. .. Unable A Lot A Little None 1. Turning over in bed (including adjusting bedclothes, sheets and blankets)? [] 1   [] 2   [x] 3   [] 4  
2. Sitting down on and standing up from a chair with arms ( e.g., wheelchair, bedside commode, etc.)   [] 1   [] 2   [x] 3   [] 4  
3. Moving from lying on back to sitting on the side of the bed?    [] 1 [] 2   [x] 3   [] 4 How much help from another person does the patient currently need. .. Total A Lot A Little None 4. Moving to and from a bed to a chair (including a wheelchair)? [] 1   [x] 2   [] 3   [] 4  
5. Need to walk in hospital room? [] 1   [x] 2   [] 3   [] 4  
6. Climbing 3-5 steps with a railing? [x] 1   [] 2   [] 3   [] 4  
© 2007, Trustees of [address] [place] [address] [address] [address], under license to [employer]. All rights reserved Score:  Initial: 14 Most Recent: X (Date: -- ) Interpretation of Tool:  Represents activities that are increasingly more difficult (i.e. Bed mobility, Transfers, Gait). Score 24 23 22-20 19-15 14-10 9-7 6 Modifier CH CI CJ CK CL CM CN   
 
? Mobility - Walking and Moving Around:  
  - CURRENT STATUS: CL - 60%-79% impaired, limited or restricted  - GOAL STATUS: CJ - 20%-39% impaired, limited or restricted  - D/C STATUS:  ---------------To be determined--------------- Payor: Milena Weathers / Plan: [address] [place] [address] HMO / Product Type: [email or web address] Care Medicare /   
 
Medical Necessity:    
· Patient is expected to demonstrate progress in strength, coordination and functional technique to decrease assistance required with functional mobiltiy and strengthening. Reason for Services/Other Comments: 
· Patient continues to require skilled intervention due to inability to complete functional mobility and strengthening independently. Use of outcome tool(s) and clinical judgement create a POC that gives a: Clear prediction of patient's progress: LOW COMPLEXITY  
  
 
 
 
TREATMENT:  
(In addition to Assessment/Re-Assessment sessions the following treatments were rendered) Pre-treatment Symptoms/Complaints:  Patient agreeable to therapy. Pain: Initial:  
Pain Intensity 1: 0  Post Session:  0/10 Therapeutic Activity: (   25 minutes ):  Therapeutic activities including   sit <> stand multiple times and ambulation with rolling walker to improve mobility and strength. Required minimal Safety awareness training;Verbal cues to promote static and dynamic balance in standing and promote coordination of walker. Braces/Orthotics/Lines/Etc:  
· telemetry · O2 Device: Nasal cannula Treatment/Session Assessment:   
· Response to Treatment:  Patient participated well in this afternoon. Increased gait distance and decreased assist.  Still easily short of breath with limited tolerance for activity. · Interdisciplinary Collaboration:  
o Physical Therapist 
o Registered Nurse · After treatment position/precautions:  
o Up in chair 
o Bed/Chair-wheels locked 
o Bed in low position 
o Call light within reach · Compliance with Program/Exercises: Compliant most of the time · Recommendations/Intent for next treatment session: \"Next visit will focus on advancements to more challenging activities and reduction in assistance provided\". Total Treatment Duration: PT Patient Time In/Time Out Time In: 1250 Time Out: 1315 Gilda Nieto, PT

## 2019-01-13 NOTE — PROGRESS NOTES
INTERNAL MEDICINE PROGRESS NOTE As per prior Note: 
\" 
Subjective:  
 
Patient is a 74years old AA male with history of CHf/ ef 35%/ DD gr2 and CKD stage 3 presents with worsening CLAUDIO with minimal activity for last 2 weeks. His legs edema has worsened from before. His po diuretic was increased lately w/ no improvement. He denies CP or fever. He has occasional cough w/ scanty sputum. No flu spx. His weight is 214 lb and his baseline is 210 lb. He makes fair urine now. He normally use 3-4 L O2 at home. \" 
 
 
1/13/2019: pt seen at bedside Reports feeling okay. It appears that pt does not have a good understanding of his underlying cardiac and renal disease. Wife present at bedside, seems to have better knowledge of pt's diagnosis and prognosis. As per the pt, he is not urinating a lot. He does report that leg swelling is better. Wears 2-3 ltrs at home, currently at 2 ltrs NC. Prior to Admission medications Medication Sig Start Date End Date Taking? Authorizing Provider Blood-Glucose Meter (ACCU-CHEK YOSEF PLUS METER) misc USE TO CHECK BLOOD SUGARS DAILY DX: E11.9 12/27/18   Gabriel Coleman MD  
Blood Glucose Control High&Low (ACCU-CHEK YOSEF CONTROL SOLN) soln USE AS DIRECTED 12/27/18   Gabriel Coleman MD  
glucose blood VI test strips (ACCU-CHEK YOSEF PLUS TEST STRP) strip USE TO CHECK BLOOD SUGARS DAILY DX: E11.9 12/27/18   Gabriel Coleman MD  
alcohol swabs (BD SINGLE USE SWABS REGULAR) padm USE AS DIRECTED DAILY DX. E11.9 12/27/18   Gabriel Coleman MD  
lancets (ACCU-CHEK SOFTCLIX LANCETS) misc USE TO CHECK BLOOD SUGARS DAILY DX: E11.9 12/27/18   Gabriel Coleman MD  
torsemide (DEMADEX) 20 mg tablet Take 1 Tab by mouth daily. 12/6/18   Tom Godoy MD  
carvedilol (COREG) 6.25 mg tablet Take 1 Tab by mouth two (2) times daily (with meals). 12/3/18   Jasmeet West MD  
apixaban (ELIQUIS) 5 mg tablet Take 1 Tab by mouth every twelve (12) hours.  12/3/18   Jasmeet West MD  
 aspirin 81 mg chewable tablet Take 1 Tab by mouth daily. 11/5/18   Jose M Marie MD  
atorvastatin (LIPITOR) 20 mg tablet Take 1 Tab by mouth nightly. 10/4/18   Shayy Kirby MD  
allopurinol (ZYLOPRIM) 100 mg tablet TAKE 1 TABLET BY MOUTH EVERY DAY 9/25/18   Shayy Kirby MD  
omeprazole (PRILOSEC) 20 mg capsule Take 1 Cap by mouth daily. 9/5/18   Shayy Kirby MD  
fluticasone (FLONASE) 50 mcg/actuation nasal spray 2 Sprays by Both Nostrils route daily. 3/29/18   Jeff Mata MD  
melatonin 3 mg tablet Take 3 mg by mouth nightly. Provider, Historical  
albuterol (VENTOLIN HFA) 90 mcg/actuation inhaler Take 2 Puffs by inhalation four (4) times daily. 2/6/18   Tian Shah NP  
polyethylene glycol (MIRALAX) 17 gram packet Take 1 Packet by mouth daily. Patient taking differently: Take 17 g by mouth daily as needed. 1/13/18   Noe MERLOS NP  
albuterol-ipratropium (DUO-NEB) 2.5 mg-0.5 mg/3 ml nebu 3 mL by Nebulization route four (4) times daily. Diaignosis--J44.9 12/13/17   Tian Shah NP Family History: HTN Review of Systems A comprehensive review of systems was negative except for that written in the HPI. Objective: Intake / Output: 
No intake/output data recorded. 01/11 1901 - 01/13 0700 In: 61 [P.O.:60] Out: 1100 [Urine:1100] Physical Exam: 
Visit Vitals BP 98/72 (BP 1 Location: Right arm, BP Patient Position: At rest) Pulse 67 Temp 97.3 °F (36.3 °C) Resp 18 Ht 5' 10\" (1.778 m) Wt 102.2 kg (225 lb 6.4 oz) SpO2 95% BMI 32.34 kg/m² General appearance: awake, alert, cooperative, moderate distress, appears stated age - obese Head: Normocephalic, without obvious abnormality, atraumatic Back: symmetric, no curvature. ROM normal. No CVA tenderness. Lungs: clear to auscultation bilaterally - Diminished bs bibasilar. Heart: regular rate and rhythm, S1, S2 normal, no murmur, click, rub or gallop. Abdomen: soft, no tenderness, no distension, normal bowel sound, no masses, no organomegaly Extremities: atraumatic, no cyanosis - Bilateral lower limbs edema +1 Skin: No rashes or ulceration. Neurologic: Grossly intact ECG: sinus rhythm Data Review (Labs):  
Recent Results (from the past 24 hour(s)) CBC WITH AUTOMATED DIFF Collection Time: 01/13/19  6:22 AM  
Result Value Ref Range WBC 6.7 4.3 - 11.1 K/uL  
 RBC 4.85 4.23 - 5.6 M/uL  
 HGB 13.0 (L) 13.6 - 17.2 g/dL HCT 40.5 (L) 41.1 - 50.3 % MCV 83.5 79.6 - 97.8 FL  
 MCH 26.8 26.1 - 32.9 PG  
 MCHC 32.1 31.4 - 35.0 g/dL  
 RDW 16.7 (H) 11.9 - 14.6 % PLATELET 295 (L) 103 - 450 K/uL MPV 11.5 9.4 - 12.3 FL ABSOLUTE NRBC 0.02 0.0 - 0.2 K/uL  
 DF AUTOMATED NEUTROPHILS 53 43 - 78 % LYMPHOCYTES 33 13 - 44 % MONOCYTES 11 4.0 - 12.0 % EOSINOPHILS 1 0.5 - 7.8 % BASOPHILS 1 0.0 - 2.0 % IMMATURE GRANULOCYTES 0 0.0 - 5.0 %  
 ABS. NEUTROPHILS 3.6 1.7 - 8.2 K/UL  
 ABS. LYMPHOCYTES 2.2 0.5 - 4.6 K/UL  
 ABS. MONOCYTES 0.8 0.1 - 1.3 K/UL  
 ABS. EOSINOPHILS 0.1 0.0 - 0.8 K/UL  
 ABS. BASOPHILS 0.0 0.0 - 0.2 K/UL  
 ABS. IMM. GRANS. 0.0 0.0 - 0.5 K/UL METABOLIC PANEL, BASIC Collection Time: 01/13/19  6:22 AM  
Result Value Ref Range Sodium 140 136 - 145 mmol/L Potassium 3.3 (L) 3.5 - 5.1 mmol/L Chloride 107 98 - 107 mmol/L  
 CO2 22 21 - 32 mmol/L Anion gap 11 mmol/L Glucose 84 65 - 100 mg/dL BUN 36 (H) 8 - 23 MG/DL Creatinine 1.90 (H) 0.8 - 1.5 MG/DL  
 GFR est AA 45 (L) >60 ml/min/1.73m2 GFR est non-AA 37 ml/min/1.73m2 Calcium 8.5 8.3 - 10.4 MG/DL Assessment:  
 
Active Problems: 
  Acute on chronic combined systolic and diastolic CHF (congestive heart failure) ([facility] [address] (11/26/2018) DM?: pt denies but had recent A1C 7 Plan:  
 
Continue telemetry Daily weight Fluid restrict to 2L daily Started on bumex 2 mg po every day. Added metolazone Continue PT 
 Cardiology consult appreciated - eliquis re-started A1C is 7.4 PPD placed Blood pressure control Continue essential home medications. Patient is full code. Dispo: will likely go home with HHA/PT/OT Signed By: Valdez Matthews MD   
 January 13, 2019

## 2019-01-13 NOTE — PROGRESS NOTES
Shift assessment done. Received alert and oriented. Respirations even with O2 at 2LPM via NC. HR regular. Abdomen tender, obese with active BS. Noah. edema noted on BLE. Denies needs and pain this time. Instructed to call for assistance when needed. Wife at bedside. Bed low and locked. Call light within reach. Will continue to monitor.

## 2019-01-13 NOTE — PROGRESS NOTES
Pt BP is 100/69. Notified Dr. Claudia Melendez and asked if to give scheduled Isosorbide-hydralazine. He said to hold it this time.

## 2019-01-13 NOTE — PROGRESS NOTES
Shift Assessment - Patient is alert and oriented. Bowel sounds active x4. No complaint of pain at this time. Abdomen is soft and non-tender. Lungs sounds clear. Edema noted to BLE. Resting in a low, locked bed with call light in reach. Wife at bedside.

## 2019-01-14 LAB
ANION GAP SERPL CALC-SCNC: 13 MMOL/L
BUN SERPL-MCNC: 37 MG/DL (ref 8–23)
CALCIUM SERPL-MCNC: 8.6 MG/DL (ref 8.3–10.4)
CHLORIDE SERPL-SCNC: 108 MMOL/L (ref 98–107)
CO2 SERPL-SCNC: 20 MMOL/L (ref 21–32)
CREAT SERPL-MCNC: 1.89 MG/DL (ref 0.8–1.5)
ERYTHROCYTE [DISTWIDTH] IN BLOOD BY AUTOMATED COUNT: 16.5 % (ref 11.9–14.6)
GLUCOSE SERPL-MCNC: 87 MG/DL (ref 65–100)
HCT VFR BLD AUTO: 38.2 % (ref 41.1–50.3)
HGB BLD-MCNC: 12.4 G/DL (ref 13.6–17.2)
MCH RBC QN AUTO: 26.8 PG (ref 26.1–32.9)
MCHC RBC AUTO-ENTMCNC: 32.5 G/DL (ref 31.4–35)
MCV RBC AUTO: 82.5 FL (ref 79.6–97.8)
NRBC # BLD: 0 K/UL (ref 0–0.2)
PLATELET # BLD AUTO: 115 K/UL (ref 150–450)
PMV BLD AUTO: 10 FL (ref 9.4–12.3)
POTASSIUM SERPL-SCNC: 3.7 MMOL/L (ref 3.5–5.1)
RBC # BLD AUTO: 4.63 M/UL (ref 4.23–5.6)
SODIUM SERPL-SCNC: 141 MMOL/L (ref 136–145)
WBC # BLD AUTO: 7.2 K/UL (ref 4.3–11.1)

## 2019-01-14 PROCEDURE — P9047 ALBUMIN (HUMAN), 25%, 50ML: HCPCS | Performed by: HOSPITALIST

## 2019-01-14 PROCEDURE — 94760 N-INVAS EAR/PLS OXIMETRY 1: CPT

## 2019-01-14 PROCEDURE — 77010033678 HC OXYGEN DAILY

## 2019-01-14 PROCEDURE — 74011250637 HC RX REV CODE- 250/637: Performed by: HOSPITALIST

## 2019-01-14 PROCEDURE — 74011250637 HC RX REV CODE- 250/637: Performed by: INTERNAL MEDICINE

## 2019-01-14 PROCEDURE — 36415 COLL VENOUS BLD VENIPUNCTURE: CPT

## 2019-01-14 PROCEDURE — 74011250637 HC RX REV CODE- 250/637: Performed by: NURSE PRACTITIONER

## 2019-01-14 PROCEDURE — 65270000029 HC RM PRIVATE

## 2019-01-14 PROCEDURE — 85027 COMPLETE CBC AUTOMATED: CPT

## 2019-01-14 PROCEDURE — 74011250636 HC RX REV CODE- 250/636: Performed by: HOSPITALIST

## 2019-01-14 PROCEDURE — 80048 BASIC METABOLIC PNL TOTAL CA: CPT

## 2019-01-14 RX ORDER — BUMETANIDE 2 MG/1
2 TABLET ORAL 2 TIMES DAILY
Qty: 60 TAB | Refills: 1 | Status: SHIPPED | OUTPATIENT
Start: 2019-01-14 | End: 2019-02-13

## 2019-01-14 RX ORDER — ALBUMIN HUMAN 250 G/1000ML
12.5 SOLUTION INTRAVENOUS EVERY 6 HOURS
Status: DISPENSED | OUTPATIENT
Start: 2019-01-14 | End: 2019-01-15

## 2019-01-14 RX ORDER — POTASSIUM CHLORIDE 20 MEQ/1
20 TABLET, EXTENDED RELEASE ORAL DAILY
Qty: 5 TAB | Refills: 0 | Status: SHIPPED | OUTPATIENT
Start: 2019-01-14 | End: 2019-01-19

## 2019-01-14 RX ORDER — FUROSEMIDE 40 MG/1
40 TABLET ORAL ONCE
Status: COMPLETED | OUTPATIENT
Start: 2019-01-14 | End: 2019-01-14

## 2019-01-14 RX ORDER — FUROSEMIDE 10 MG/ML
40 INJECTION INTRAMUSCULAR; INTRAVENOUS 3 TIMES DAILY
Status: DISCONTINUED | OUTPATIENT
Start: 2019-01-14 | End: 2019-01-15

## 2019-01-14 RX ORDER — ISOSORBIDE DINITRATE AND HYDRALAZINE HYDROCHLORIDE 37.5; 2 MG/1; MG/1
1 TABLET ORAL 3 TIMES DAILY
Qty: 90 TAB | Refills: 2 | Status: SHIPPED | OUTPATIENT
Start: 2019-01-14 | End: 2019-02-13

## 2019-01-14 RX ORDER — METOLAZONE 2.5 MG/1
2.5 TABLET ORAL DAILY
Qty: 5 TAB | Refills: 0 | Status: SHIPPED | OUTPATIENT
Start: 2019-01-14 | End: 2019-01-19

## 2019-01-14 RX ADMIN — METOLAZONE 2.5 MG: 2.5 TABLET ORAL at 09:10

## 2019-01-14 RX ADMIN — POTASSIUM CHLORIDE 40 MEQ: 20 TABLET, EXTENDED RELEASE ORAL at 17:29

## 2019-01-14 RX ADMIN — Medication 10 ML: at 21:47

## 2019-01-14 RX ADMIN — FUROSEMIDE 40 MG: 10 INJECTION, SOLUTION INTRAMUSCULAR; INTRAVENOUS at 17:32

## 2019-01-14 RX ADMIN — Medication 5 ML: at 05:41

## 2019-01-14 RX ADMIN — Medication 5 ML: at 13:45

## 2019-01-14 RX ADMIN — ACETAMINOPHEN 650 MG: 325 TABLET, FILM COATED ORAL at 13:45

## 2019-01-14 RX ADMIN — APIXABAN 5 MG: 5 TABLET, FILM COATED ORAL at 21:44

## 2019-01-14 RX ADMIN — ALBUMIN (HUMAN) 12.5 G: 0.25 INJECTION, SOLUTION INTRAVENOUS at 17:34

## 2019-01-14 RX ADMIN — BUMETANIDE 2 MG: 1 TABLET ORAL at 09:11

## 2019-01-14 RX ADMIN — HYDRALAZINE HYDROCHLORIDE AND ISOSORBIDE DINITRATE 1 TABLET: 37.5; 2 TABLET, FILM COATED ORAL at 21:44

## 2019-01-14 RX ADMIN — FUROSEMIDE 40 MG: 40 TABLET ORAL at 13:45

## 2019-01-14 RX ADMIN — HYDRALAZINE HYDROCHLORIDE AND ISOSORBIDE DINITRATE 1 TABLET: 37.5; 2 TABLET, FILM COATED ORAL at 17:29

## 2019-01-14 RX ADMIN — APIXABAN 5 MG: 5 TABLET, FILM COATED ORAL at 09:10

## 2019-01-14 RX ADMIN — FUROSEMIDE 40 MG: 10 INJECTION, SOLUTION INTRAMUSCULAR; INTRAVENOUS at 21:44

## 2019-01-14 RX ADMIN — CARVEDILOL 6.25 MG: 6.25 TABLET, FILM COATED ORAL at 17:30

## 2019-01-14 RX ADMIN — POTASSIUM CHLORIDE 40 MEQ: 20 TABLET, EXTENDED RELEASE ORAL at 09:10

## 2019-01-14 NOTE — ROUTINE PROCESS
Patient has tc7 scheduled for 1/17 2:45. infomration placed to d/c and will reschedule if LOS warrants

## 2019-01-14 NOTE — PROGRESS NOTES
Pt resting in bed and is alert and oriented x 4. he denies pain and is on 2 L NC. RR even and unlabored. Cardiac monitor in place. Call light in reach and pt instructed to call for assistance if needed. Will monitor. Family at beside.

## 2019-01-14 NOTE — PROGRESS NOTES
Shift assessment done. Received alert and oriented. Resp even with O2 at 2LPM via NC. HR regular. Abdomen distended, tender with active BS. Edema noted to BLE. Skin warm and dry. Instructed to call for assistance when needed. Denies needs and pain this time. Bed low and locked. Call light within reach. Will continue to monitor.

## 2019-01-14 NOTE — DISCHARGE INSTRUCTIONS
DISCHARGE SUMMARY from Nurse    PATIENT INSTRUCTIONS:    After general anesthesia or intravenous sedation, for 24 hours or while taking prescription Narcotics:  · Limit your activities  · Do not drive and operate hazardous machinery  · Do not make important personal or business decisions  · Do  not drink alcoholic beverages  · If you have not urinated within 8 hours after discharge, please contact your surgeon on call. Report the following to your surgeon:  · Excessive pain, swelling, redness or odor of or around the surgical area  · Temperature over 100.5  · Nausea and vomiting lasting longer than 4 hours or if unable to take medications  · Any signs of decreased circulation or nerve impairment to extremity: change in color, persistent  numbness, tingling, coldness or increase pain  · Any questions    What to do at Home:  Recommended activity: Activity as tolerated, follow up with PCP, cardiology. Weigh yourself daily, report wt gain of >2 lbs overnight, worsening swelling in legs, increased SOB, worsening cough. If you experience any of the following symptoms see above, please follow up with PCP or cardiology. *  Please give a list of your current medications to your Primary Care Provider. *  Please update this list whenever your medications are discontinued, doses are      changed, or new medications (including over-the-counter products) are added. *  Please carry medication information at all times in case of emergency situations. These are general instructions for a healthy lifestyle:    No smoking/ No tobacco products/ Avoid exposure to second hand smoke  Surgeon General's Warning:  Quitting smoking now greatly reduces serious risk to your health.     Obesity, smoking, and sedentary lifestyle greatly increases your risk for illness    A healthy diet, regular physical exercise & weight monitoring are important for maintaining a healthy lifestyle    You may be retaining fluid if you have a history of heart failure or if you experience any of the following symptoms:  Weight gain of 3 pounds or more overnight or 5 pounds in a week, increased swelling in our hands or feet or shortness of breath while lying flat in bed. Please call your doctor as soon as you notice any of these symptoms; do not wait until your next office visit. Recognize signs and symptoms of STROKE:    F-face looks uneven    A-arms unable to move or move unevenly    S-speech slurred or non-existent    T-time-call 911 as soon as signs and symptoms begin-DO NOT go       Back to bed or wait to see if you get better-TIME IS BRAIN. Warning Signs of HEART ATTACK     Call 911 if you have these symptoms:   Chest discomfort. Most heart attacks involve discomfort in the center of the chest that lasts more than a few minutes, or that goes away and comes back. It can feel like uncomfortable pressure, squeezing, fullness, or pain.  Discomfort in other areas of the upper body. Symptoms can include pain or discomfort in one or both arms, the back, neck, jaw, or stomach.  Shortness of breath with or without chest discomfort.  Other signs may include breaking out in a cold sweat, nausea, or lightheadedness. Don't wait more than five minutes to call 911 - MINUTES MATTER! Fast action can save your life. Calling 911 is almost always the fastest way to get lifesaving treatment. Emergency Medical Services staff can begin treatment when they arrive -- up to an hour sooner than if someone gets to the hospital by car. The discharge information has been reviewed with the patient. The patient verbalized understanding. Discharge medications reviewed with the patient and appropriate educational materials and side effects teaching were provided.   ___________________________________________________________________________________________________________________________________     Heart Failure: Care Instructions  Your Care Instructions    Heart failure occurs when your heart does not pump as much blood as the body needs. Failure does not mean that the heart has stopped pumping but rather that it is not pumping as well as it should. Over time, this causes fluid buildup in your lungs and other parts of your body. Fluid buildup can cause shortness of breath, fatigue, swollen ankles, and other problems. By taking medicines regularly, reducing sodium (salt) in your diet, checking your weight every day, and making lifestyle changes, you can feel better and live longer. Follow-up care is a key part of your treatment and safety. Be sure to make and go to all appointments, and call your doctor if you are having problems. It's also a good idea to know your test results and keep a list of the medicines you take. How can you care for yourself at home? Medicines    · Be safe with medicines. Take your medicines exactly as prescribed. Call your doctor if you think you are having a problem with your medicine.     · Do not take any vitamins, over-the-counter medicine, or herbal products without talking to your doctor first. Barbara Ban not take ibuprofen (Advil or Motrin) and naproxen (Aleve) without talking to your doctor first. They could make your heart failure worse.     · You may be taking some of the following medicine. ? Beta-blockers can slow heart rate, decrease blood pressure, and improve your condition. Taking a beta-blocker may lower your chance of needing to be hospitalized. ? Angiotensin-converting enzyme inhibitors (ACEIs) reduce the heart's workload, lower blood pressure, and reduce swelling. Taking an ACEI may lower your chance of needing to be hospitalized again. ? Angiotensin II receptor blockers (ARBs) work like ACEIs. Your doctor may prescribe them instead of ACEIs. ? Diuretics, also called water pills, reduce swelling. ? Potassium supplements replace this important mineral, which is sometimes lost with diuretics.   ? Aspirin and other blood thinners prevent blood clots, which can cause a stroke or heart attack.    You will get more details on the specific medicines your doctor prescribes. Diet    · Your doctor may suggest that you limit sodium to 2,000 milligrams (mg) a day or less. That is less than 1 teaspoon of salt a day, including all the salt you eat in cooking or in packaged foods. People get most of their sodium from processed foods. Fast food and restaurant meals also tend to be very high in sodium.     · Ask your doctor how much liquid you can drink each day. You may have to limit liquids.    Weight    · Weigh yourself without clothing at the same time each day. Record your weight. Call your doctor if you have a sudden weight gain, such as more than 2 to 3 pounds in a day or 5 pounds in a week. (Your doctor may suggest a different range of weight gain.) A sudden weight gain may mean that your heart failure is getting worse.    Activity level    · Start light exercise (if your doctor says it is okay). Even if you can only do a small amount, exercise will help you get stronger, have more energy, and manage your weight and your stress. Walking is an easy way to get exercise. Start out by walking a little more than you did before. Bit by bit, increase the amount you walk.     · When you exercise, watch for signs that your heart is working too hard. You are pushing yourself too hard if you cannot talk while you are exercising. If you become short of breath or dizzy or have chest pain, stop, sit down, and rest.     · If you feel \"wiped out\" the day after you exercise, walk slower or for a shorter distance until you can work up to a better pace.     · Get enough rest at night. Sleeping with 1 or 2 pillows under your upper body and head may help you breathe easier.    Lifestyle changes    · Do not smoke. Smoking can make a heart condition worse. If you need help quitting, talk to your doctor about stop-smoking programs and medicines.  These can increase your chances of quitting for good. Quitting smoking may be the most important step you can take to protect your heart.     · Limit alcohol to 2 drinks a day for men and 1 drink a day for women. Too much alcohol can cause health problems.     · Avoid getting sick from colds and the flu. Get a pneumococcal vaccine shot. If you have had one before, ask your doctor whether you need another dose. Get a flu shot each year. If you must be around people with colds or the flu, wash your hands often. When should you call for help? Call 911 if you have symptoms of sudden heart failure such as:    · You have severe trouble breathing.     · You cough up pink, foamy mucus.     · You have a new irregular or rapid heartbeat.    Call your doctor now or seek immediate medical care if:    · You have new or increased shortness of breath.     · You are dizzy or lightheaded, or you feel like you may faint.     · You have sudden weight gain, such as more than 2 to 3 pounds in a day or 5 pounds in a week. (Your doctor may suggest a different range of weight gain.)     · You have increased swelling in your legs, ankles, or feet.     · You are suddenly so tired or weak that you cannot do your usual activities.    Watch closely for changes in your health, and be sure to contact your doctor if you develop new symptoms. Where can you learn more? Go to http://jaime-jarrell.info/. Enter D937 in the search box to learn more about \"Heart Failure: Care Instructions. \"  Current as of: December 6, 2017  Content Version: 11.8  © 2069-6085 [employer]. Care instructions adapted under license by [employer] (which disclaims liability or warranty for this information). If you have questions about a medical condition or this instruction, always ask your healthcare professional. [address] any warranty or liability for your use of this information.

## 2019-01-14 NOTE — PROGRESS NOTES
I had a long discussion with patient and his wife about pt's systolic and grade 3 diastolic CHF with EF 76% and CKD-3. Pt is still having bilateral LE edema, gradually getting better. Pt's wife is asking if we can be more aggressive in getting rid of LE swelling. I have counseled her that pt is on optimal medications right now, and aggressive diuresis will affect pt's kidney functions. I will add IV albumin to help with diuresis with bumex and metolazone. Discharge has been held. Will reassess in AM.

## 2019-01-14 NOTE — DISCHARGE SUMMARY
Hospitalist Discharge Summary     Patient ID:  Akhil Rodriguez  [id number]  74 y.o.  1945  Admit date: 1/9/2019  3:32 PM  Discharge date and time: 1/14/2019  Attending: Becky Wheeler MD  PCP:  David Noe MD  Treatment Team: Attending Provider: Becky Wheeler MD; Consulting Provider: Brii Westbrook MD    Principal Diagnosis   Acute on chronic combined systolic and diastolic CHF (congestive heart failure) ([facility]  Acute on chronic hypoxic respiratory failure  Medication noncompliance  CKD-3; stable  Left atrial thrombus            Principal Problem:    Acute on chronic combined systolic and diastolic CHF (congestive heart failure) ([address] (11/26/2018)    Active Problems:    CKD (chronic kidney disease) stage 3, GFR 30-59 ml/min ([address] (9/29/2017)      Essential hypertension (9/28/2016)      TAZ (obstructive sleep apnea) (10/2/2017)      Overview: Intolerant of CPAP      Type 2 diabetes with nephropathy ([facility] (10/8/2018)      Atrial fibrillation ([facility] [address] (11/27/2018)      Left atrial thrombus (1/10/2019)             Hospital Course:  Please refer to the admission H&P for details of presentation. In summary, the patient is a 74 year sold male with pmhx of systolic and diastolic CHF with EF 69%  G2DD, CKD-2, paroxysmal atrial fib with left atrial appendage thrombus on eliquis, HTN, dyslipidemia admitted on 1/9/19 for acute on chronic hypoxic respiratory failure due to acute decompensation of chronic systolic and diastolic CHF. Pt was evaluated by cardiologist. Pt was on lasix gtt with good diuresis and resolution of edema to significant extent. Lasix gtt was switched to bumex. Metolazone was started on 1/13 to aide diuresis. Pt is feeling betted clinically. On evaluation, it does appear that pt has a poor understanding of his medical problems and complications. Pt's wife is primary caretaker. Pt and his wife has been counseled about possible worsening of symptoms and progression of disease.    Pt was evaluated by PT, was able to walk for 240 feet with resting in between. Pt is medically cleared for discharge today. He is strictly advised to be compliant with medications as prescribed. Risk of readmission is high due to noncompliance and pt's poor understanding of medical comorbidities/complications. Rest of the hospital course was uneventful. For further details, please refer to progress notes. Significant Diagnostic Studies:   PA and lateral chest radiographs     History: Chest Pain, 73 years Male  facial swelling, bilateral lower extremity  swelling X 2 weeks, cough with yellow sputum     Comparison: Chest radiograph 11-2-18     Findings:  Persistent apparent cardiomegaly. Improved low lung volumes. Improved nonspecific mild diffuse interstitial prominence which could indicate  interstitial edema. Mild dependent subsegmental atelectasis bilateral lung  bases. No evidence of pneumothorax, pleural effusion, or new air space opacity. Visualized soft tissue and osseous structures otherwise unremarkable.       IMPRESSION  Impression:  Improved lung volumes. Labs: Results:       Chemistry Recent Labs     01/13/19  0622 01/12/19  0632 01/11/19  1010   GLU 84 90 90    140 141   K 3.3* 3.6 3.5    107 104   CO2 22 23 26   BUN 36* 35* 33*   CREA 1.90* 2.06* 2.00*   CA 8.5 8.4 8.7   AGAP 11 10 11      CBC w/Diff Recent Labs     01/13/19  0622 01/12/19  0632 01/11/19  1010   WBC 6.7 7.4 7.4   RBC 4.85 5.07 5.11   HGB 13.0* 13.6 13.7   HCT 40.5* 42.8 43.4   * 126* 148*   GRANS 53 54 53   LYMPH 33 32 33   EOS 1 1 1      Cardiac Enzymes No results for input(s): CPK, CKND1, RAHUL in the last 72 hours. No lab exists for component: CKRMB, TROIP   Coagulation No results for input(s): PTP, INR, APTT in the last 72 hours.     No lab exists for component: INREXT    Lipid Panel Lab Results   Component Value Date/Time    Cholesterol, total 158 09/10/2018 09:13 AM    HDL Cholesterol 23 (L) 09/10/2018 09:13 AM    LDL,Direct 94 06/29/2015 06:00 AM    LDL, calculated 95 09/10/2018 09:13 AM    VLDL, calculated 40 09/10/2018 09:13 AM    Triglyceride 199 (H) 09/10/2018 09:13 AM    CHOL/HDL Ratio 4.3 12/27/2017 04:13 AM      BNP No results for input(s): BNPP in the last 72 hours. Liver Enzymes No results for input(s): TP, ALB, TBIL, AP, SGOT, GPT in the last 72 hours. No lab exists for component: DBIL   Thyroid Studies Lab Results   Component Value Date/Time    TSH 0.604 05/25/2018 03:54 AM            Discharge Exam:  Visit Vitals  /66   Pulse 85   Temp 97.6 °F (36.4 °C)   Resp 20   Ht 5' 10\" (1.778 m)   Wt 102.1 kg (225 lb)   SpO2 98%   BMI 32.28 kg/m²     General appearance: alert, cooperative, no distress, appears stated age  Lungs: clear to auscultation bilaterally  Heart: regular rate and rhythm, S1, S2 normal, no murmur, click, rub or gallop  Abdomen: soft, non-tender. Bowel sounds normal. No masses,  no organomegaly  Extremities: 1+ pitting edema in bilateral LE  Neurologic: Grossly normal    Disposition: home with interim HHA/PT/OT  Discharge Condition: stable  Patient Instructions:   Current Discharge Medication List      START taking these medications    Details   bumetanide (BUMEX) 2 mg tablet Take 1 Tab by mouth two (2) times a day for 30 days. Qty: 60 Tab, Refills: 1      isosorbide-hydrALAZINE (BIDIL) 20-37.5 mg per tablet Take 1 Tab by mouth three (3) times daily for 30 days. Qty: 90 Tab, Refills: 2      metOLazone (ZAROXOLYN) 2.5 mg tablet Take 1 Tab by mouth daily for 5 days. Qty: 5 Tab, Refills: 0      potassium chloride (K-DUR, KLOR-CON) 20 mEq tablet Take 1 Tab by mouth daily for 5 days.   Qty: 5 Tab, Refills: 0         CONTINUE these medications which have NOT CHANGED    Details   Blood-Glucose Meter (ACCU-CHEK YOSEF PLUS METER) misc USE TO CHECK BLOOD SUGARS DAILY DX: E11.9  Qty: 100 Each, Refills: 0      Blood Glucose Control High&Low (ACCU-CHEK YOSEF CONTROL SOLN) soln USE AS DIRECTED  Qty: 1 Vial, Refills: 0      glucose blood VI test strips (ACCU-CHEK YOSEF PLUS TEST STRP) strip USE TO CHECK BLOOD SUGARS DAILY DX: E11.9  Qty: 100 Strip, Refills: 3      alcohol swabs (BD SINGLE USE SWABS REGULAR) padm USE AS DIRECTED DAILY DX. E11.9  Qty: 100 Pad, Refills: 3      lancets (ACCU-CHEK SOFTCLIX LANCETS) misc USE TO CHECK BLOOD SUGARS DAILY DX: E11.9  Qty: 100 Each, Refills: 3      carvedilol (COREG) 6.25 mg tablet Take 1 Tab by mouth two (2) times daily (with meals). Qty: 60 Tab, Refills: 0      apixaban (ELIQUIS) 5 mg tablet Take 1 Tab by mouth every twelve (12) hours. Qty: 60 Tab, Refills: 0      aspirin 81 mg chewable tablet Take 1 Tab by mouth daily. Qty: 30 Tab, Refills: 0      atorvastatin (LIPITOR) 20 mg tablet Take 1 Tab by mouth nightly. Qty: 90 Tab, Refills: 3      allopurinol (ZYLOPRIM) 100 mg tablet TAKE 1 TABLET BY MOUTH EVERY DAY  Qty: 90 Tab, Refills: 3      omeprazole (PRILOSEC) 20 mg capsule Take 1 Cap by mouth daily. Qty: 90 Cap, Refills: 3      fluticasone (FLONASE) 50 mcg/actuation nasal spray 2 Sprays by Both Nostrils route daily. Qty: 1 Bottle, Refills: 11      melatonin 3 mg tablet Take 3 mg by mouth nightly. albuterol (VENTOLIN HFA) 90 mcg/actuation inhaler Take 2 Puffs by inhalation four (4) times daily. Qty: 1 Inhaler, Refills: 11      polyethylene glycol (MIRALAX) 17 gram packet Take 1 Packet by mouth daily. Qty: 1 Packet, Refills: 11      albuterol-ipratropium (DUO-NEB) 2.5 mg-0.5 mg/3 ml nebu 3 mL by Nebulization route four (4) times daily. Diaignosis--J44.9  Qty: 120 Nebule, Refills: 11         STOP taking these medications       torsemide (DEMADEX) 20 mg tablet Comments:   Reason for Stopping:               Activity: PT/OT per Home Health  Diet: Cardiac Diet  Wound Care: None needed    Follow-up  · Follow up with PCP and Cardiology in 1-2 weeks.     Time spent to discharge patient 35 minutes  Signed:  Elle Renee MD  1/14/2019  7:55 AM

## 2019-01-14 NOTE — PROGRESS NOTES
PT Note: S: Pt checked on twice. O: Pt supine. A: Pt sleeping first attempt. Pt declined second attempt. P: Check back as time allows.

## 2019-01-14 NOTE — PROGRESS NOTES
[facility] CARDIOLOGY PROGRESS NOTE 
      
 
1/14/2019 10:36 AM 
 
Admit Date: 1/9/2019 Subjective:  
Cr Stable, Hgb slowly trending down but close to baseline w/ no reports of xiomara bleeding, Pt is -5.215 L since admission with -2050 last shift. Questionable accuracy with daily weights with wild fluctuations at this time. During exam patient was SOB at rest.  Per rehab notes he can walk 75 feet with two breaks needed. No complaints of CP. 
 
ROS: 
Cardiovascular:  As noted above Objective:  
  
Vitals:  
 01/13/19 2314 01/14/19 5635 01/14/19 8971 01/14/19 3253 BP: 106/71 102/66  95/73 Pulse: 91 85  86 Resp: 20 20  20 Temp: 97.4 °F (36.3 °C) 97.6 °F (36.4 °C)  97.1 °F (36.2 °C) SpO2: 96% 98%  97% Weight:   102.1 kg (225 lb) Height:      
 
 
Physical Exam: 
General-No Acute Distress Neck- supple, no JVD 
CV- irregular rate and rhythm no MRG Lung- clear apex, decreased bases with expiratory wheezing Abd- soft, nontender, nondistended Ext- +2 edema Skin- warm and dry Data Review:  
Recent Labs  
  01/14/19 
0802 01/13/19 
0910  140  
K 3.7 3.3*  
BUN 37* 36* CREA 1.89* 1.90* GLU 87 84 WBC 7.2 6.7 HGB 12.4* 13.0*  
HCT 38.2* 40.5* * 130* Assessment/Plan:  
 
Principal Problem: 
  Acute on chronic combined systolic and diastolic CHF (congestive heart failure) ([facility] [address] (11/26/2018) Pt on Cardiac/HF fluid restricted diet, on Bidil, bumex, zaraoxalyn, coreg. Continue Current Therapy. Will need a close follow up in off office in a week at discharge on Jan 17 at 245. Difficult situation with CKD and HF for long term management. Active Problems: 
  CKD (chronic kidney disease) stage 3, GFR 30-59 ml/min ([facility]) (9/29/2017) Cr Stable close to baseline Essential hypertension (9/28/2016) Hypotensive currently TAZ (obstructive sleep apnea) (10/2/2017)   Intolerant of CPAP 
 
 Type 2 diabetes with nephropathy ([facility] (10/8/2018) Per Primary Team 
 
  Atrial fibrillation ([facility] (11/27/2018) On NOAC,  Will be hard to keep in rhythm Left atrial thrombus (1/10/2019) On NOAC Lyn Patel NP 
1/14/2019 10:36 AM

## 2019-01-14 NOTE — ROUTINE PROCESS
TC7 scheduled- 
Patient now with metolazone- bidil being held r/t hypotension- continue to follow. Cr 1.89 today >5L UOP. Reinforce as needed CHF teaching completed, verbalize emphasis on monitoring self and report to MD: 
? If you gain 2 lbs in one day or 5 lbs in a week, and short of breath. ? If you can not lay flat without developing short of breath or rapid breathing at night; or if it wakes you up. Develop a cough or wheezing. ? If you notice swollen hands/feet/ankles or stomach with a bloated/ full feeling. ? If you are  more confused or mentally fuzzy or dizzy. ? If you notice a rapid or change in your heart rate. ? If you become more exhausted all the time and unable to do the same level of activity without stopping to catch your breath. Drink no more than 8 cups a day in 8 oz. cups. Limit Cola Drinks. Your Heart can not handle any more. Stay away from salt (limit anything with salt or sodium in it). Limit to 250mg per serving. Exercise needs to be started with your Doctors approval. 
Reduce stress; Call myself or Provider if assistance is needed. Pass post test via teach back, will make self available post DC ,if an questions arise. Diabetic teaching completed.  Planner/scale @ BS:  60 mins total

## 2019-01-15 ENCOUNTER — PATIENT OUTREACH (OUTPATIENT)
Dept: CASE MANAGEMENT | Age: 74
End: 2019-01-15

## 2019-01-15 VITALS
WEIGHT: 225 LBS | BODY MASS INDEX: 32.21 KG/M2 | OXYGEN SATURATION: 99 % | HEIGHT: 70 IN | HEART RATE: 82 BPM | RESPIRATION RATE: 18 BRPM | SYSTOLIC BLOOD PRESSURE: 100 MMHG | DIASTOLIC BLOOD PRESSURE: 70 MMHG | TEMPERATURE: 97.2 F

## 2019-01-15 LAB
ANION GAP SERPL CALC-SCNC: 11 MMOL/L
BUN SERPL-MCNC: 45 MG/DL (ref 8–23)
CALCIUM SERPL-MCNC: 8.7 MG/DL (ref 8.3–10.4)
CHLORIDE SERPL-SCNC: 106 MMOL/L (ref 98–107)
CO2 SERPL-SCNC: 22 MMOL/L (ref 21–32)
CREAT SERPL-MCNC: 2.32 MG/DL (ref 0.8–1.5)
ERYTHROCYTE [DISTWIDTH] IN BLOOD BY AUTOMATED COUNT: 16.9 % (ref 11.9–14.6)
GLUCOSE SERPL-MCNC: 90 MG/DL (ref 65–100)
HCT VFR BLD AUTO: 37.2 % (ref 41.1–50.3)
HGB BLD-MCNC: 11.8 G/DL (ref 13.6–17.2)
MCH RBC QN AUTO: 26.3 PG (ref 26.1–32.9)
MCHC RBC AUTO-ENTMCNC: 31.7 G/DL (ref 31.4–35)
MCV RBC AUTO: 83 FL (ref 79.6–97.8)
NRBC # BLD: 0.02 K/UL (ref 0–0.2)
PLATELET # BLD AUTO: 131 K/UL (ref 150–450)
PMV BLD AUTO: 11.2 FL (ref 9.4–12.3)
POTASSIUM SERPL-SCNC: 3.9 MMOL/L (ref 3.5–5.1)
RBC # BLD AUTO: 4.48 M/UL (ref 4.23–5.6)
SODIUM SERPL-SCNC: 139 MMOL/L (ref 136–145)
WBC # BLD AUTO: 6.7 K/UL (ref 4.3–11.1)

## 2019-01-15 PROCEDURE — 74011250637 HC RX REV CODE- 250/637: Performed by: HOSPITALIST

## 2019-01-15 PROCEDURE — 74011250636 HC RX REV CODE- 250/636: Performed by: HOSPITALIST

## 2019-01-15 PROCEDURE — 36415 COLL VENOUS BLD VENIPUNCTURE: CPT

## 2019-01-15 PROCEDURE — 77010033678 HC OXYGEN DAILY

## 2019-01-15 PROCEDURE — 80048 BASIC METABOLIC PNL TOTAL CA: CPT

## 2019-01-15 PROCEDURE — 74011250637 HC RX REV CODE- 250/637: Performed by: INTERNAL MEDICINE

## 2019-01-15 PROCEDURE — 85027 COMPLETE CBC AUTOMATED: CPT

## 2019-01-15 PROCEDURE — 94760 N-INVAS EAR/PLS OXIMETRY 1: CPT

## 2019-01-15 PROCEDURE — P9047 ALBUMIN (HUMAN), 25%, 50ML: HCPCS | Performed by: HOSPITALIST

## 2019-01-15 PROCEDURE — 74011250637 HC RX REV CODE- 250/637: Performed by: NURSE PRACTITIONER

## 2019-01-15 RX ORDER — BUMETANIDE 1 MG/1
2 TABLET ORAL 2 TIMES DAILY
Status: DISCONTINUED | OUTPATIENT
Start: 2019-01-15 | End: 2019-01-15 | Stop reason: HOSPADM

## 2019-01-15 RX ORDER — METOCLOPRAMIDE HYDROCHLORIDE 5 MG/ML
10 INJECTION INTRAMUSCULAR; INTRAVENOUS ONCE
Status: DISCONTINUED | OUTPATIENT
Start: 2019-01-15 | End: 2019-01-15 | Stop reason: HOSPADM

## 2019-01-15 RX ORDER — METOCLOPRAMIDE HYDROCHLORIDE 5 MG/ML
10 INJECTION INTRAMUSCULAR; INTRAVENOUS ONCE
Status: COMPLETED | OUTPATIENT
Start: 2019-01-15 | End: 2019-01-15

## 2019-01-15 RX ADMIN — Medication 10 ML: at 05:19

## 2019-01-15 RX ADMIN — ALBUMIN (HUMAN) 12.5 G: 0.25 INJECTION, SOLUTION INTRAVENOUS at 05:16

## 2019-01-15 RX ADMIN — HYDRALAZINE HYDROCHLORIDE AND ISOSORBIDE DINITRATE 1 TABLET: 37.5; 2 TABLET, FILM COATED ORAL at 09:39

## 2019-01-15 RX ADMIN — METOCLOPRAMIDE 10 MG: 5 INJECTION, SOLUTION INTRAMUSCULAR; INTRAVENOUS at 12:14

## 2019-01-15 RX ADMIN — ACETAMINOPHEN 650 MG: 325 TABLET, FILM COATED ORAL at 11:21

## 2019-01-15 RX ADMIN — ALBUMIN (HUMAN) 12.5 G: 0.25 INJECTION, SOLUTION INTRAVENOUS at 00:26

## 2019-01-15 RX ADMIN — METOLAZONE 2.5 MG: 2.5 TABLET ORAL at 09:39

## 2019-01-15 RX ADMIN — BUMETANIDE 2 MG: 1 TABLET ORAL at 09:39

## 2019-01-15 RX ADMIN — CARVEDILOL 6.25 MG: 6.25 TABLET, FILM COATED ORAL at 09:39

## 2019-01-15 RX ADMIN — APIXABAN 5 MG: 5 TABLET, FILM COATED ORAL at 09:39

## 2019-01-15 RX ADMIN — ACETAMINOPHEN 650 MG: 325 TABLET, FILM COATED ORAL at 16:39

## 2019-01-15 RX ADMIN — ACETAMINOPHEN 650 MG: 325 TABLET, FILM COATED ORAL at 01:30

## 2019-01-15 RX ADMIN — POTASSIUM CHLORIDE 40 MEQ: 20 TABLET, EXTENDED RELEASE ORAL at 09:39

## 2019-01-15 NOTE — PROGRESS NOTES
PT note: 
 
Pt declined PT this morning. Had a long discussion with pt and his wife. Talked about insurance, IRC, STR, and pacing himself. Will follow up with pt later was time allows. He is supposed to be discharged home today.   
 
Claudettecandi Singh, PTA

## 2019-01-15 NOTE — PROGRESS NOTES
PRN Tylenol 650 mg given PO for complaints of arm pain. Pt in recliner. Wife at bedside. Will continue to monitor.

## 2019-01-15 NOTE — PROGRESS NOTES
One time dose Reglan given IV per MD order. Pt eating a cheeseburger. Wife at bedside, pt and wife waiting for family to pick them up.

## 2019-01-15 NOTE — PROGRESS NOTES
Attempted to remove IV. Wife refused to have access removed because they \"will be here a few more hours. \" Per pt wife, the son does not get off work until [id number] [address]. MD made aware. Will continue to monitor.

## 2019-01-15 NOTE — DISCHARGE SUMMARY
Hospitalist Discharge Summary     Patient ID:  Ericka Schuster  [id number]  74 y.o.  1945  Admit date: 1/9/2019  3:32 PM  Discharge date and time: 1/15/2019  Attending: Beth Avery MD  PCP:  Abdirahman Garnica MD  Treatment Team: Attending Provider: Beth Avery MD; Consulting Provider: Chong Luna MD    Principal Diagnosis   Acute on chronic combined systolic and diastolic CHF (congestive heart failure) ([facility] [address]  Acute on chronic hypoxic respiratory failure  Medication noncompliance  CKD-3; stable  Left atrial thrombus            Principal Problem:    Acute on chronic combined systolic and diastolic CHF (congestive heart failure) ([address] (11/26/2018)    Active Problems:    CKD (chronic kidney disease) stage 3, GFR 30-59 ml/min ([address] (9/29/2017)      Essential hypertension (9/28/2016)      TAZ (obstructive sleep apnea) (10/2/2017)      Overview: Intolerant of CPAP      Type 2 diabetes with nephropathy ([facility] [address] (10/8/2018)      Atrial fibrillation ([facility] [address] (11/27/2018)      Left atrial thrombus (1/10/2019)             Hospital Course:  Please refer to the admission H&P for details of presentation. In summary, the patient is a 74 year sold male with pmhx of systolic and diastolic CHF with EF 15%  G2DD, CKD-2, paroxysmal atrial fib with left atrial appendage thrombus on eliquis, HTN, dyslipidemia admitted on 1/9/19 for acute on chronic hypoxic respiratory failure due to acute decompensation of chronic systolic and diastolic CHF. Pt was evaluated by cardiologist. Pt was on lasix gtt with good diuresis and resolution of edema to significant extent. Lasix gtt was switched to bumex. Metolazone was started on 1/13 to aide diuresis. Pt is feeling betted clinically. On evaluation, it does appear that pt has a poor understanding of his medical problems and complications. Pt's wife is primary caretaker. Pt and his wife has been counseled about possible worsening of symptoms and progression of disease.    Pt was evaluated by PT, was able to walk for 240 feet with resting in between. Pt is medically cleared for discharge today. He is strictly advised to be compliant with medications as prescribed. Risk of readmission is high due to noncompliance and pt's poor understanding of medical comorbidities/complications. Rest of the hospital course was uneventful. For further details, please refer to progress notes. Discharge was postponed to 1/15/19 as pt's wife requested to attempt more aggressive diuresis. Pt was started on IV albumin along with IV lasix. Pt has diuresed well but kidney functions have slightly worsened. I have counseled about being cautious in diuresing the patient as pt is already having cardiorenal picture with underlying CKD-3, and with high doses of diuretics, pt's kidney function can deteriorate more rapidly. They are advised to follow up with Cardiology, Nephrology and PCP in 1-2 weeks. Significant Diagnostic Studies:   PA and lateral chest radiographs     History: Chest Pain, 73 years Male  facial swelling, bilateral lower extremity  swelling X 2 weeks, cough with yellow sputum     Comparison: Chest radiograph 11-2-18     Findings:  Persistent apparent cardiomegaly. Improved low lung volumes. Improved nonspecific mild diffuse interstitial prominence which could indicate  interstitial edema. Mild dependent subsegmental atelectasis bilateral lung  bases. No evidence of pneumothorax, pleural effusion, or new air space opacity. Visualized soft tissue and osseous structures otherwise unremarkable.       IMPRESSION  Impression:  Improved lung volumes.       Labs: Results:       Chemistry Recent Labs     01/15/19  0534 01/14/19  0802 01/13/19  0622   GLU 90 87 84    141 140   K 3.9 3.7 3.3*    108* 107   CO2 22 20* 22   BUN 45* 37* 36*   CREA 2.32* 1.89* 1.90*   CA 8.7 8.6 8.5   AGAP 11 13 11      CBC w/Diff Recent Labs     01/15/19  0534 01/14/19  0802 01/13/19  0622   WBC 6.7 7.2 6.7   RBC 4.48 4.63 4.85   HGB 11.8* 12.4* 13.0*   HCT 37.2* 38.2* 40.5*   * 115* 130*   GRANS  --   --  53   LYMPH  --   --  33   EOS  --   --  1      Cardiac Enzymes No results for input(s): CPK, CKND1, RAHUL in the last 72 hours. No lab exists for component: CKRMB, TROIP   Coagulation No results for input(s): PTP, INR, APTT in the last 72 hours. No lab exists for component: INREXT, INREXT    Lipid Panel Lab Results   Component Value Date/Time    Cholesterol, total 158 09/10/2018 09:13 AM    HDL Cholesterol 23 (L) 09/10/2018 09:13 AM    LDL,Direct 94 06/29/2015 06:00 AM    LDL, calculated 95 09/10/2018 09:13 AM    VLDL, calculated 40 09/10/2018 09:13 AM    Triglyceride 199 (H) 09/10/2018 09:13 AM    CHOL/HDL Ratio 4.3 12/27/2017 04:13 AM      BNP No results for input(s): BNPP in the last 72 hours. Liver Enzymes No results for input(s): TP, ALB, TBIL, AP, SGOT, GPT in the last 72 hours. No lab exists for component: DBIL   Thyroid Studies Lab Results   Component Value Date/Time    TSH 0.604 05/25/2018 03:54 AM            Discharge Exam:  Visit Vitals  /70 (BP Patient Position: At rest)   Pulse 82   Temp 97.2 °F (36.2 °C)   Resp 18   Ht 5' 10\" (1.778 m)   Wt 102.1 kg (225 lb)   SpO2 99%   BMI 32.28 kg/m²     General appearance: alert, cooperative, no distress, appears stated age  Lungs: clear to auscultation bilaterally  Heart: regular rate and rhythm, S1, S2 normal, no murmur, click, rub or gallop  Abdomen: soft, non-tender. Bowel sounds normal. No masses,  no organomegaly  Extremities: 1+ pitting edema in bilateral LE  Neurologic: Grossly normal    Disposition: home with interim HHA/PT/OT  Discharge Condition: stable  Patient Instructions:   Current Discharge Medication List      START taking these medications    Details   bumetanide (BUMEX) 2 mg tablet Take 1 Tab by mouth two (2) times a day for 30 days.   Qty: 60 Tab, Refills: 1      isosorbide-hydrALAZINE (BIDIL) 20-37.5 mg per tablet Take 1 Tab by mouth three (3) times daily for 30 days. Qty: 90 Tab, Refills: 2      metOLazone (ZAROXOLYN) 2.5 mg tablet Take 1 Tab by mouth daily for 5 days. Qty: 5 Tab, Refills: 0      potassium chloride (K-DUR, KLOR-CON) 20 mEq tablet Take 1 Tab by mouth daily for 5 days. Qty: 5 Tab, Refills: 0         CONTINUE these medications which have NOT CHANGED    Details   Blood-Glucose Meter (ACCU-CHEK YOSEF PLUS METER) misc USE TO CHECK BLOOD SUGARS DAILY DX: E11.9  Qty: 100 Each, Refills: 0      Blood Glucose Control High&Low (ACCU-CHEK YOSEF CONTROL SOLN) soln USE AS DIRECTED  Qty: 1 Vial, Refills: 0      glucose blood VI test strips (ACCU-CHEK YOSEF PLUS TEST STRP) strip USE TO CHECK BLOOD SUGARS DAILY DX: E11.9  Qty: 100 Strip, Refills: 3      alcohol swabs (BD SINGLE USE SWABS REGULAR) padm USE AS DIRECTED DAILY DX. E11.9  Qty: 100 Pad, Refills: 3      lancets (ACCU-CHEK SOFTCLIX LANCETS) misc USE TO CHECK BLOOD SUGARS DAILY DX: E11.9  Qty: 100 Each, Refills: 3      carvedilol (COREG) 6.25 mg tablet Take 1 Tab by mouth two (2) times daily (with meals). Qty: 60 Tab, Refills: 0      apixaban (ELIQUIS) 5 mg tablet Take 1 Tab by mouth every twelve (12) hours. Qty: 60 Tab, Refills: 0      aspirin 81 mg chewable tablet Take 1 Tab by mouth daily. Qty: 30 Tab, Refills: 0      atorvastatin (LIPITOR) 20 mg tablet Take 1 Tab by mouth nightly. Qty: 90 Tab, Refills: 3      allopurinol (ZYLOPRIM) 100 mg tablet TAKE 1 TABLET BY MOUTH EVERY DAY  Qty: 90 Tab, Refills: 3      omeprazole (PRILOSEC) 20 mg capsule Take 1 Cap by mouth daily. Qty: 90 Cap, Refills: 3      fluticasone (FLONASE) 50 mcg/actuation nasal spray 2 Sprays by Both Nostrils route daily. Qty: 1 Bottle, Refills: 11      melatonin 3 mg tablet Take 3 mg by mouth nightly. albuterol (VENTOLIN HFA) 90 mcg/actuation inhaler Take 2 Puffs by inhalation four (4) times daily.   Qty: 1 Inhaler, Refills: 11      polyethylene glycol (MIRALAX) 17 gram packet Take 1 Packet by mouth daily. Qty: 1 Packet, Refills: 11      albuterol-ipratropium (DUO-NEB) 2.5 mg-0.5 mg/3 ml nebu 3 mL by Nebulization route four (4) times daily. Diaignosis--J44.9  Qty: 120 Nebule, Refills: 11         STOP taking these medications       torsemide (DEMADEX) 20 mg tablet Comments:   Reason for Stopping:               Activity: PT/OT per Home Health  Diet: Cardiac Diet  Wound Care: None needed    Follow-up  · Follow up with PCP and Cardiology in 1-2 weeks.     Time spent to discharge patient 35 minutes  Signed:  Valdez Matthews MD  1/15/2019  7:55 AM

## 2019-01-15 NOTE — PROGRESS NOTES
Discharge instructions were reviewed with the patient and his wife. Opportunity for questions given. Patient verbalized understanding of discharge and follow up instructions, as well as S/S to report to MD or return to ER for. PIV was removed. Prescriptions provided. Patient will D/C to home. Pt's wife says no ride until later today.

## 2019-01-15 NOTE — PROGRESS NOTES
Problem: Breathing Pattern - Ineffective Goal: *Absence of hypoxia Outcome: Progressing Towards Goal 
Pt is on 2L NC, SAT=99%. Pt states that he wears 2L at home on daily basis.

## 2019-01-15 NOTE — PROGRESS NOTES
I have discussed with the patient and his wife about slight worsening in kidney functions after IV lasix in an attempt for aggressive diuresis. I have counseled them that aggressive diuresis will make kidney function worse more rapidly and we have to be cautious in doing that. Pt is clinically stable, still has pedal edema which is improving gradually. Respiratory status is stable, pt is on 2 ltrs NC which is at baseline. Pt will be discharged today with HHA/PT/OT

## 2019-01-15 NOTE — PROGRESS NOTES
Shift assessment complete via flowsheet. A&Ox4. Denies pain/discomfort at present. 02at 2L/min via NC. Has SOB with exertion. Encouraged to call for assistance as needed. Call light in reach. No acute distress.

## 2019-01-15 NOTE — PROGRESS NOTES
Shift assessment complete. Pt alert and oriented x4. Respirations even and unlabored. Lung sounds diminished on room air. HR irregular. Tele on per MD order. +2 edema noted to BLE, compression stockings in place. Skin intact. IV patent and capped. Pt denies pain and nausea. No other needs voiced at this time. Safety measures in place, call light within reach, family at bedside. Will continue to monitor.

## 2019-01-16 ENCOUNTER — PATIENT OUTREACH (OUTPATIENT)
Dept: CASE MANAGEMENT | Age: 74
End: 2019-01-16

## 2019-01-16 NOTE — PROGRESS NOTES
Received call from [facility] office, that pt has been unreachable. I looked through the chart and found a number. Dominique/pt's dtr answered the phone, states that this was his phone and now the #[phone]. I reached pt and wife. Wife states that [facility] we would like home health, but we in [facility] until Monday, can they start next week\". I informed wife that if pt is not home bound then he would not be appropriate for [facility]. She stated \"well if I need, I got there number\". I asked wife if I cold give HH this number, bc/ we have a old number in the chart. Khalida Saleh with [facility] informed.

## 2019-01-16 NOTE — PROGRESS NOTES
Pt and wife still in room. Asked pt if ride is on way, per wife, their ride is an hour away. Wife proceeded to ask if pt would be getting his night time medications, politely informed pt and spouse that he will not be getting medications because he is technically discharged.

## 2019-01-16 NOTE — PROGRESS NOTES
LVM with pt. Left call back # and encourage to call CC. Reached out ot PCP, LVM . Nurse will continue to monitor and provide care as needed.

## 2019-01-17 ENCOUNTER — PATIENT OUTREACH (OUTPATIENT)
Dept: CASE MANAGEMENT | Age: 74
End: 2019-01-17

## 2019-01-21 ENCOUNTER — PATIENT OUTREACH (OUTPATIENT)
Dept: CASE MANAGEMENT | Age: 74
End: 2019-01-21

## 2019-01-22 ENCOUNTER — HOSPITAL ENCOUNTER (OUTPATIENT)
Dept: LAB | Age: 74
Discharge: HOME | End: 2019-01-22
Payer: MEDICARE

## 2019-01-22 DIAGNOSIS — N18.2 STAGE 2 CHRONIC KIDNEY DISEASE: ICD-10-CM

## 2019-01-22 PROBLEM — Z72.0 TOBACCO ABUSE: Status: ACTIVE | Noted: 2019-01-22

## 2019-01-22 PROBLEM — I10 HYPERTENSION, ESSENTIAL: Status: ACTIVE | Noted: 2019-01-22

## 2019-01-22 PROBLEM — E11.9 TYPE 2 DIABETES MELLITUS WITHOUT COMPLICATION (HCC): Status: ACTIVE | Noted: 2019-01-22

## 2019-01-22 LAB
ANION GAP SERPL CALC-SCNC: 10 MMOL/L
BUN SERPL-MCNC: 71 MG/DL (ref 8–23)
CALCIUM SERPL-MCNC: 8.6 MG/DL (ref 8.3–10.4)
CHLORIDE SERPL-SCNC: 101 MMOL/L (ref 98–107)
CO2 SERPL-SCNC: 27 MMOL/L (ref 21–32)
CREAT SERPL-MCNC: 3 MG/DL (ref 0.8–1.5)
GLUCOSE SERPL-MCNC: 98 MG/DL (ref 65–100)
POTASSIUM SERPL-SCNC: 3.2 MMOL/L (ref 3.5–5.1)
SODIUM SERPL-SCNC: 138 MMOL/L (ref 136–145)

## 2019-01-22 PROCEDURE — 36415 COLL VENOUS BLD VENIPUNCTURE: CPT

## 2019-01-22 PROCEDURE — 80048 BASIC METABOLIC PNL TOTAL CA: CPT

## 2019-01-23 NOTE — PROGRESS NOTES
Lab tests show worsening kidney function. Recommend reducing Bumex to 2 mg once daily. Also, he needs to take KCL 20 meq once daily for 5 doses, then stop. Recheck BMP in 2 weeks.    SJ

## 2019-01-27 ENCOUNTER — TELEPHONE (OUTPATIENT)
Dept: CARDIOLOGY | Age: 74
End: 2019-01-27

## 2019-01-27 NOTE — TELEPHONE ENCOUNTER
Spoke with Niels Hernandez. Reports Pt BP down to 75/50. He was getting up and BP dropped with standing. Pt reported feeling a little dizzy. Now BP 85/53 and Pt feels fine. Was recently admitted with sHF and meds adjusted on d/c. Discussed current meds (taking Bumex 2mg daily). Discussed orthostatic hypotension and precautions to avoid falls. Discussed need to f/u with PCP or Dr. Guevara Nails for med evaluation. May need to adjust doses. Also may need repeat labs as last Cr up to 3.00. Reports Pt not eating well. Poor appetite. States only drinking ~1L daily. Discussed with taking diuretics Pt needs to make sure maintaining adequate intake/output volumes as this could be contributing to low BPs. Bree Diez v/u and will call Mon AM for MD appts. Explained that if Pt continues to have problems over the weekend to go to ED.      Salomón Botello, MARCI-C

## 2019-02-01 ENCOUNTER — PATIENT OUTREACH (OUTPATIENT)
Dept: CASE MANAGEMENT | Age: 74
End: 2019-02-01

## 2019-02-01 NOTE — PROGRESS NOTES
Patient is currently hospitalized in [facility]. Plan: will follow up in 1-2 weeks This note will not be viewable in [address]

## 2019-02-03 ENCOUNTER — HOSPITAL ENCOUNTER (INPATIENT)
Dept: HOSPITAL 94 - ER | Age: 74
LOS: 4 days | Discharge: HOME | End: 2019-02-07
Payer: MEDICARE

## 2019-02-03 DIAGNOSIS — I50.21: ICD-10-CM

## 2019-02-03 DIAGNOSIS — I48.91: ICD-10-CM

## 2019-02-03 DIAGNOSIS — I42.8: ICD-10-CM

## 2019-02-03 DIAGNOSIS — G93.49: ICD-10-CM

## 2019-02-03 DIAGNOSIS — I63.9: Primary | ICD-10-CM

## 2019-02-06 NOTE — PROGRESS NOTES
Patient is hospitalized in [facility]. Plan: follow up call within 1-2 weeks. This note will not be viewable in [address]

## 2019-02-06 NOTE — PROGRESS NOTES
[facility] will no longer follow. Patient is hospitalized in [place]. This note will not be viewable in [address]

## 2019-02-12 ENCOUNTER — PATIENT OUTREACH (OUTPATIENT)
Dept: CASE MANAGEMENT | Age: 74
End: 2019-02-12

## 2019-02-12 NOTE — PROGRESS NOTES
Patient is in a [facility] facility in [place], [place]. For follow up. This note will not be viewable in [address]

## 2019-02-19 ENCOUNTER — HOSPITAL ENCOUNTER (INPATIENT)
Age: 74
LOS: 21 days | Discharge: NURSING, REHAB OR LONG-TERM CARE | DRG: 291 | End: 2019-03-12
Attending: EMERGENCY MEDICINE | Admitting: HOSPITALIST
Payer: MEDICARE

## 2019-02-19 ENCOUNTER — APPOINTMENT (OUTPATIENT)
Dept: CT IMAGING | Age: 74
DRG: 291 | End: 2019-02-19
Attending: HOSPITALIST
Payer: MEDICARE

## 2019-02-19 ENCOUNTER — APPOINTMENT (OUTPATIENT)
Dept: CT IMAGING | Age: 74
DRG: 291 | End: 2019-02-19
Attending: EMERGENCY MEDICINE
Payer: MEDICARE

## 2019-02-19 ENCOUNTER — APPOINTMENT (OUTPATIENT)
Dept: GENERAL RADIOLOGY | Age: 74
DRG: 291 | End: 2019-02-19
Attending: EMERGENCY MEDICINE
Payer: MEDICARE

## 2019-02-19 DIAGNOSIS — R53.81 DEBILITY: ICD-10-CM

## 2019-02-19 DIAGNOSIS — R31.9 HEMATURIA, UNSPECIFIED TYPE: ICD-10-CM

## 2019-02-19 DIAGNOSIS — R60.9 EDEMA, UNSPECIFIED TYPE: ICD-10-CM

## 2019-02-19 DIAGNOSIS — R41.82 ALTERED MENTAL STATUS, UNSPECIFIED ALTERED MENTAL STATUS TYPE: ICD-10-CM

## 2019-02-19 DIAGNOSIS — Z51.5 ENCOUNTER FOR PALLIATIVE CARE: ICD-10-CM

## 2019-02-19 DIAGNOSIS — G93.40 ACUTE ENCEPHALOPATHY: Primary | ICD-10-CM

## 2019-02-19 DIAGNOSIS — Z99.2 ESRD (END STAGE RENAL DISEASE) ON DIALYSIS (HCC): ICD-10-CM

## 2019-02-19 DIAGNOSIS — I50.9 ACUTE CONGESTIVE HEART FAILURE, UNSPECIFIED HEART FAILURE TYPE (HCC): ICD-10-CM

## 2019-02-19 DIAGNOSIS — N18.6 ESRD (END STAGE RENAL DISEASE) ON DIALYSIS (HCC): ICD-10-CM

## 2019-02-19 LAB
ALBUMIN SERPL-MCNC: 3.4 G/DL (ref 3.2–4.6)
ALBUMIN/GLOB SERPL: 0.8 {RATIO} (ref 1.2–3.5)
ALP SERPL-CCNC: 139 U/L (ref 50–136)
ALT SERPL-CCNC: 22 U/L (ref 12–65)
AMMONIA PLAS-SCNC: 33 UMOL/L (ref 11–32)
ANION GAP SERPL CALC-SCNC: 11 MMOL/L (ref 7–16)
APPEARANCE UR: ABNORMAL
ARTERIAL PATENCY WRIST A: YES
AST SERPL-CCNC: 42 U/L (ref 15–37)
ATRIAL RATE: 115 BPM
BACTERIA URNS QL MICRO: 0 /HPF
BASOPHILS # BLD: 0 K/UL (ref 0–0.2)
BASOPHILS NFR BLD: 0 % (ref 0–2)
BDY SITE: ABNORMAL
BILIRUB SERPL-MCNC: 4.5 MG/DL (ref 0.2–1.1)
BILIRUB UR QL: ABNORMAL
BNP SERPL-MCNC: 750 PG/ML
BODY TEMPERATURE: 98.6
BUN SERPL-MCNC: 30 MG/DL (ref 8–23)
CALCIUM SERPL-MCNC: 9.3 MG/DL (ref 8.3–10.4)
CALCULATED R AXIS, ECG10: -71 DEGREES
CALCULATED T AXIS, ECG11: 92 DEGREES
CASTS URNS QL MICRO: ABNORMAL /LPF
CHLORIDE SERPL-SCNC: 103 MMOL/L (ref 98–107)
CK SERPL-CCNC: 289 U/L (ref 21–215)
CO2 BLD-SCNC: 29 MMOL/L
CO2 SERPL-SCNC: 27 MMOL/L (ref 21–32)
COLLECT TIME,HTIME: 1158
COLOR UR: ABNORMAL
CREAT SERPL-MCNC: 2.42 MG/DL (ref 0.8–1.5)
DIAGNOSIS, 93000: NORMAL
DIFFERENTIAL METHOD BLD: ABNORMAL
EOSINOPHIL # BLD: 0 K/UL (ref 0–0.8)
EOSINOPHIL NFR BLD: 0 % (ref 0.5–7.8)
EPI CELLS #/AREA URNS HPF: ABNORMAL /HPF
ERYTHROCYTE [DISTWIDTH] IN BLOOD BY AUTOMATED COUNT: 19.4 % (ref 11.9–14.6)
FLOW RATE ISTAT,IFRATE: 2 L/MIN
GAS FLOW.O2 O2 DELIVERY SYS: ABNORMAL L/MIN
GLOBULIN SER CALC-MCNC: 4.1 G/DL (ref 2.3–3.5)
GLUCOSE BLD STRIP.AUTO-MCNC: 74 MG/DL (ref 65–100)
GLUCOSE BLD STRIP.AUTO-MCNC: 80 MG/DL (ref 65–100)
GLUCOSE BLD STRIP.AUTO-MCNC: 96 MG/DL (ref 65–100)
GLUCOSE SERPL-MCNC: 85 MG/DL (ref 65–100)
GLUCOSE UR STRIP.AUTO-MCNC: NEGATIVE MG/DL
HCO3 BLDV-SCNC: 27.4 MMOL/L (ref 23–28)
HCT VFR BLD AUTO: 42.5 % (ref 41.1–50.3)
HGB BLD-MCNC: 13.7 G/DL (ref 13.6–17.2)
HGB UR QL STRIP: ABNORMAL
IMM GRANULOCYTES # BLD AUTO: 0.1 K/UL (ref 0–0.5)
IMM GRANULOCYTES NFR BLD AUTO: 1 % (ref 0–5)
INR PPP: 3
KETONES UR QL STRIP.AUTO: ABNORMAL MG/DL
LACTATE BLD-SCNC: 2.63 MMOL/L (ref 0.5–1.9)
LEUKOCYTE ESTERASE UR QL STRIP.AUTO: ABNORMAL
LYMPHOCYTES # BLD: 1.5 K/UL (ref 0.5–4.6)
LYMPHOCYTES NFR BLD: 14 % (ref 13–44)
MCH RBC QN AUTO: 27.2 PG (ref 26.1–32.9)
MCHC RBC AUTO-ENTMCNC: 32.2 G/DL (ref 31.4–35)
MCV RBC AUTO: 84.5 FL (ref 79.6–97.8)
MONOCYTES # BLD: 1.1 K/UL (ref 0.1–1.3)
MONOCYTES NFR BLD: 10 % (ref 4–12)
NEUTS SEG # BLD: 8.1 K/UL (ref 1.7–8.2)
NEUTS SEG NFR BLD: 75 % (ref 43–78)
NITRITE UR QL STRIP.AUTO: POSITIVE
NRBC # BLD: 0 K/UL (ref 0–0.2)
PCO2 BLDV: 49.2 MMHG (ref 41–51)
PH BLDV: 7.35 [PH] (ref 7.32–7.42)
PH UR STRIP: 6 [PH] (ref 5–9)
PLATELET # BLD AUTO: 160 K/UL (ref 150–450)
PMV BLD AUTO: 11.4 FL (ref 9.4–12.3)
PO2 BLDV: 19 MMHG
POTASSIUM SERPL-SCNC: 4.6 MMOL/L (ref 3.5–5.1)
PROCALCITONIN SERPL-MCNC: 0.4 NG/ML
PROT SERPL-MCNC: 7.5 G/DL (ref 6.3–8.2)
PROT UR STRIP-MCNC: 100 MG/DL
PROTHROMBIN TIME: 30.3 SEC (ref 11.7–14.5)
Q-T INTERVAL, ECG07: 434 MS
QRS DURATION, ECG06: 118 MS
QTC CALCULATION (BEZET), ECG08: 565 MS
RBC # BLD AUTO: 5.03 M/UL (ref 4.23–5.6)
RBC #/AREA URNS HPF: >100 /HPF
SAO2 % BLDV: 28 % (ref 65–88)
SERVICE CMNT-IMP: ABNORMAL
SERVICE CMNT-IMP: ABNORMAL
SODIUM SERPL-SCNC: 141 MMOL/L (ref 136–145)
SP GR UR REFRACTOMETRY: 1.01 (ref 1–1.02)
SPECIMEN TYPE: ABNORMAL
UROBILINOGEN UR QL STRIP.AUTO: 1 EU/DL (ref 0.2–1)
VENTRICULAR RATE, ECG03: 102 BPM
WBC # BLD AUTO: 10.7 K/UL (ref 4.3–11.1)
WBC URNS QL MICRO: ABNORMAL /HPF

## 2019-02-19 PROCEDURE — 36415 COLL VENOUS BLD VENIPUNCTURE: CPT

## 2019-02-19 PROCEDURE — 81001 URINALYSIS AUTO W/SCOPE: CPT

## 2019-02-19 PROCEDURE — 74011250636 HC RX REV CODE- 250/636: Performed by: HOSPITALIST

## 2019-02-19 PROCEDURE — 87086 URINE CULTURE/COLONY COUNT: CPT

## 2019-02-19 PROCEDURE — 87088 URINE BACTERIA CULTURE: CPT

## 2019-02-19 PROCEDURE — 82140 ASSAY OF AMMONIA: CPT

## 2019-02-19 PROCEDURE — 74011250637 HC RX REV CODE- 250/637: Performed by: HOSPITALIST

## 2019-02-19 PROCEDURE — 99285 EMERGENCY DEPT VISIT HI MDM: CPT | Performed by: EMERGENCY MEDICINE

## 2019-02-19 PROCEDURE — 82803 BLOOD GASES ANY COMBINATION: CPT

## 2019-02-19 PROCEDURE — 74011000258 HC RX REV CODE- 258: Performed by: EMERGENCY MEDICINE

## 2019-02-19 PROCEDURE — 87186 SC STD MICRODIL/AGAR DIL: CPT

## 2019-02-19 PROCEDURE — 85610 PROTHROMBIN TIME: CPT

## 2019-02-19 PROCEDURE — 82550 ASSAY OF CK (CPK): CPT

## 2019-02-19 PROCEDURE — 77010033678 HC OXYGEN DAILY

## 2019-02-19 PROCEDURE — 70450 CT HEAD/BRAIN W/O DYE: CPT

## 2019-02-19 PROCEDURE — 36600 WITHDRAWAL OF ARTERIAL BLOOD: CPT

## 2019-02-19 PROCEDURE — 84145 PROCALCITONIN (PCT): CPT

## 2019-02-19 PROCEDURE — 93005 ELECTROCARDIOGRAM TRACING: CPT | Performed by: EMERGENCY MEDICINE

## 2019-02-19 PROCEDURE — 82962 GLUCOSE BLOOD TEST: CPT

## 2019-02-19 PROCEDURE — 71046 X-RAY EXAM CHEST 2 VIEWS: CPT

## 2019-02-19 PROCEDURE — 65660000000 HC RM CCU STEPDOWN

## 2019-02-19 PROCEDURE — 87040 BLOOD CULTURE FOR BACTERIA: CPT

## 2019-02-19 PROCEDURE — 74011000258 HC RX REV CODE- 258: Performed by: HOSPITALIST

## 2019-02-19 PROCEDURE — 74011250636 HC RX REV CODE- 250/636: Performed by: EMERGENCY MEDICINE

## 2019-02-19 PROCEDURE — 94640 AIRWAY INHALATION TREATMENT: CPT

## 2019-02-19 PROCEDURE — 74011000250 HC RX REV CODE- 250: Performed by: HOSPITALIST

## 2019-02-19 PROCEDURE — 80053 COMPREHEN METABOLIC PANEL: CPT

## 2019-02-19 PROCEDURE — 74176 CT ABD & PELVIS W/O CONTRAST: CPT

## 2019-02-19 PROCEDURE — 87205 SMEAR GRAM STAIN: CPT

## 2019-02-19 PROCEDURE — 83605 ASSAY OF LACTIC ACID: CPT

## 2019-02-19 PROCEDURE — 85025 COMPLETE CBC W/AUTO DIFF WBC: CPT

## 2019-02-19 PROCEDURE — 83880 ASSAY OF NATRIURETIC PEPTIDE: CPT

## 2019-02-19 PROCEDURE — 94760 N-INVAS EAR/PLS OXIMETRY 1: CPT

## 2019-02-19 RX ORDER — NALOXONE HYDROCHLORIDE 0.4 MG/ML
0.4 INJECTION, SOLUTION INTRAMUSCULAR; INTRAVENOUS; SUBCUTANEOUS AS NEEDED
Status: DISCONTINUED | OUTPATIENT
Start: 2019-02-19 | End: 2019-03-12 | Stop reason: HOSPADM

## 2019-02-19 RX ORDER — FINASTERIDE 5 MG/1
5 TABLET, FILM COATED ORAL DAILY
Status: DISCONTINUED | OUTPATIENT
Start: 2019-02-20 | End: 2019-03-12 | Stop reason: HOSPADM

## 2019-02-19 RX ORDER — DIPHENHYDRAMINE HCL 25 MG
25 CAPSULE ORAL
Status: DISCONTINUED | OUTPATIENT
Start: 2019-02-19 | End: 2019-03-12 | Stop reason: HOSPADM

## 2019-02-19 RX ORDER — LANOLIN ALCOHOL/MO/W.PET/CERES
400 CREAM (GRAM) TOPICAL
Status: DISCONTINUED | OUTPATIENT
Start: 2019-02-19 | End: 2019-03-01

## 2019-02-19 RX ORDER — IPRATROPIUM BROMIDE AND ALBUTEROL SULFATE 2.5; .5 MG/3ML; MG/3ML
3 SOLUTION RESPIRATORY (INHALATION)
Status: DISCONTINUED | OUTPATIENT
Start: 2019-02-19 | End: 2019-02-21

## 2019-02-19 RX ORDER — FUROSEMIDE 10 MG/ML
80 INJECTION INTRAMUSCULAR; INTRAVENOUS EVERY 8 HOURS
Status: DISCONTINUED | OUTPATIENT
Start: 2019-02-19 | End: 2019-02-20

## 2019-02-19 RX ORDER — ONDANSETRON 2 MG/ML
4 INJECTION INTRAMUSCULAR; INTRAVENOUS
Status: DISCONTINUED | OUTPATIENT
Start: 2019-02-19 | End: 2019-03-12 | Stop reason: HOSPADM

## 2019-02-19 RX ORDER — INSULIN LISPRO 100 [IU]/ML
INJECTION, SOLUTION INTRAVENOUS; SUBCUTANEOUS
Status: DISCONTINUED | OUTPATIENT
Start: 2019-02-19 | End: 2019-02-22

## 2019-02-19 RX ORDER — PHYTONADIONE 5 MG/1
10 TABLET ORAL
Status: COMPLETED | OUTPATIENT
Start: 2019-02-19 | End: 2019-02-19

## 2019-02-19 RX ORDER — FLUTICASONE PROPIONATE 50 MCG
2 SPRAY, SUSPENSION (ML) NASAL DAILY
Status: DISCONTINUED | OUTPATIENT
Start: 2019-02-20 | End: 2019-03-12 | Stop reason: HOSPADM

## 2019-02-19 RX ORDER — ACETAMINOPHEN 325 MG/1
650 TABLET ORAL
Status: DISCONTINUED | OUTPATIENT
Start: 2019-02-19 | End: 2019-03-12 | Stop reason: HOSPADM

## 2019-02-19 RX ORDER — POLYETHYLENE GLYCOL 3350 17 G/17G
17 POWDER, FOR SOLUTION ORAL DAILY
Status: DISCONTINUED | OUTPATIENT
Start: 2019-02-20 | End: 2019-03-05

## 2019-02-19 RX ORDER — CARVEDILOL 6.25 MG/1
6.25 TABLET ORAL 2 TIMES DAILY WITH MEALS
Status: DISCONTINUED | OUTPATIENT
Start: 2019-02-19 | End: 2019-02-22

## 2019-02-19 RX ORDER — MORPHINE SULFATE 2 MG/ML
2 INJECTION, SOLUTION INTRAMUSCULAR; INTRAVENOUS
Status: DISCONTINUED | OUTPATIENT
Start: 2019-02-19 | End: 2019-03-12 | Stop reason: HOSPADM

## 2019-02-19 RX ORDER — ADHESIVE BANDAGE
30 BANDAGE TOPICAL DAILY PRN
Status: DISCONTINUED | OUTPATIENT
Start: 2019-02-19 | End: 2019-03-02

## 2019-02-19 RX ORDER — PANTOPRAZOLE SODIUM 40 MG/1
40 TABLET, DELAYED RELEASE ORAL
Status: DISCONTINUED | OUTPATIENT
Start: 2019-02-20 | End: 2019-03-12 | Stop reason: HOSPADM

## 2019-02-19 RX ORDER — TAMSULOSIN HYDROCHLORIDE 0.4 MG/1
0.4 CAPSULE ORAL DAILY
Status: DISCONTINUED | OUTPATIENT
Start: 2019-02-20 | End: 2019-02-27

## 2019-02-19 RX ORDER — SODIUM CHLORIDE 0.9 % (FLUSH) 0.9 %
5-40 SYRINGE (ML) INJECTION EVERY 8 HOURS
Status: DISCONTINUED | OUTPATIENT
Start: 2019-02-19 | End: 2019-03-12 | Stop reason: HOSPADM

## 2019-02-19 RX ORDER — POTASSIUM CHLORIDE 20 MEQ/1
20 TABLET, EXTENDED RELEASE ORAL DAILY
Status: DISCONTINUED | OUTPATIENT
Start: 2019-02-20 | End: 2019-02-26

## 2019-02-19 RX ORDER — HYDROCODONE BITARTRATE AND ACETAMINOPHEN 5; 325 MG/1; MG/1
1 TABLET ORAL
Status: DISCONTINUED | OUTPATIENT
Start: 2019-02-19 | End: 2019-03-12 | Stop reason: HOSPADM

## 2019-02-19 RX ORDER — FUROSEMIDE 10 MG/ML
80 INJECTION INTRAMUSCULAR; INTRAVENOUS ONCE
Status: COMPLETED | OUTPATIENT
Start: 2019-02-19 | End: 2019-02-19

## 2019-02-19 RX ORDER — SODIUM CHLORIDE 0.9 % (FLUSH) 0.9 %
5-40 SYRINGE (ML) INJECTION AS NEEDED
Status: DISCONTINUED | OUTPATIENT
Start: 2019-02-19 | End: 2019-03-12 | Stop reason: HOSPADM

## 2019-02-19 RX ADMIN — FUROSEMIDE 80 MG: 10 INJECTION, SOLUTION INTRAMUSCULAR; INTRAVENOUS at 21:44

## 2019-02-19 RX ADMIN — LACTULOSE 30 G: 20 SOLUTION ORAL at 16:42

## 2019-02-19 RX ADMIN — Medication 400 MG: at 16:42

## 2019-02-19 RX ADMIN — IPRATROPIUM BROMIDE AND ALBUTEROL SULFATE 3 ML: .5; 3 SOLUTION RESPIRATORY (INHALATION) at 16:48

## 2019-02-19 RX ADMIN — Medication 5 ML: at 21:45

## 2019-02-19 RX ADMIN — CEFTRIAXONE SODIUM 2 G: 2 INJECTION, POWDER, FOR SOLUTION INTRAMUSCULAR; INTRAVENOUS at 17:13

## 2019-02-19 RX ADMIN — PHYTONADIONE 10 MG: 5 TABLET ORAL at 17:55

## 2019-02-19 RX ADMIN — Medication 10 ML: at 16:44

## 2019-02-19 RX ADMIN — SODIUM CHLORIDE 1 G: 900 INJECTION, SOLUTION INTRAVENOUS at 14:53

## 2019-02-19 RX ADMIN — IPRATROPIUM BROMIDE AND ALBUTEROL SULFATE 3 ML: .5; 3 SOLUTION RESPIRATORY (INHALATION) at 19:15

## 2019-02-19 RX ADMIN — FUROSEMIDE 80 MG: 10 INJECTION, SOLUTION INTRAMUSCULAR; INTRAVENOUS at 16:42

## 2019-02-19 RX ADMIN — CARVEDILOL 6.25 MG: 6.25 TABLET, FILM COATED ORAL at 16:42

## 2019-02-19 NOTE — H&P
H&P 
 
 
Patient: Viet Ramirez. Sex: male             MRN: [id number] YOB: 1945      Age:  66 y.o. Chief Complaint:  SOB 
 
HPI This is a 78-year-old gentleman with multiple medical problems including CHF, A. fib, CKD stage IV,, chronic COPD, obstructive sleep apnea who is noncompliant with CPAP, who is currently in a rehab facility, was brought into the emergency room with complaints of hematuria, leg swellings and shortness of breath. Patient is currently taking Bumex but wife noticed that he has increased bilateral leg swellings to the thigh area, progressively getting worse over the last several days in spite of oral Bumex. He was also having shortness of breath, moderate in severity, progressively getting worse, worse with minimal exertion, not resolved with rest.  Breathing is worse lying down, better with sitting up. No fevers or chills. No nausea or vomitings but has decreased appetite and has not been eating much over the last few days. No diarrhea. Yesterday Schulz catheter was placed and since then he had some blood in the urine and a few clots. Patient was also complaining of pain in the penis after the Schulz insertion. He was started on antibiotics couple of days ago for possible acute UTI. Not sure what antibiotics he was on as per the wife. He was also confused for the last 2-3 days. Currently patient denies any chest pain or abdominal pain. Review of Systems Comprehensive 10 point ROS is done, and pertinent positives & negatives per HPI, rest of them are negative. Past Medical History:  
Diagnosis Date  Acute CHF (congestive heart failure) ([address] 4/25/2015  Acute combined systolic and diastolic congestive heart failure ([address] 4/28/2015  De Quervain's tenosynovitis, right 4/28/2015  Dyspnea on exertion 6/28/2015  Elevated serum creatinine 4/25/2015  Elevated troponin 6/28/2015  History of coronary artery disease MI at 27 yo  
 History of right knee surgery   
 cartilage removal  
 History of shingles  Malignant hypertension 4/25/2015  MI (myocardial infarction) ([address] Past Surgical History:  
Procedure Laterality Date  HX HEART CATHETERIZATION  6/29/2015  
 no intervention  HX KNEE ARTHROSCOPY Right   
 removal of cartilage Family History Problem Relation Age of Onset  Hypertension Mother  No Known Problems Father Social History Socioeconomic History  Marital status:  Spouse name: Not on file  Number of children: Not on file  Years of education: Not on file  Highest education level: Not on file Occupational History  Occupation: retired Tobacco Use  Smoking status: Former Smoker Packs/day: 0.25 Years: 20.00 Pack years: 5.00 Types: Cigarettes Last attempt to quit: 4/5/2015 Years since quitting: 3.8  Smokeless tobacco: Never Used Substance and Sexual Activity  Alcohol use: No  
  Alcohol/week: 0.0 oz  Drug use: No  
Other Topics Concern   Service No  
 Blood Transfusions No  
  Comment: no issues with receiving  Caffeine Concern No  
 Special Diet No  
 Exercise No  
 Seat Belt Yes Social History Narrative Lives with his wife Allergies Allergen Reactions  Pcn [Penicillins] Other (comments) \"makes my heart stop\" Prior to Admission medications Medication Sig Start Date End Date Taking? Authorizing Provider  
omeprazole (PRILOSEC) 20 mg capsule Take 1 Cap by mouth daily. 1/24/19   Caio Roberto MD  
potassium chloride (K-DUR, KLOR-CON) 20 mEq tablet Take 1 Tab by mouth daily.  1/23/19   Seb Berman MD  
Blood-Glucose Meter (ACCU-CHEK YOSEF PLUS METER) misc USE TO CHECK BLOOD SUGARS DAILY DX: E11.9 12/27/18   Caio Roberto MD  
Blood Glucose Control High&Low (ACCU-CHEK YOSEF CONTROL SOLN) soln USE AS DIRECTED 18   Mamadou Chacon MD  
glucose blood VI test strips (ACCU-CHEK YOSEF PLUS TEST STRP) strip USE TO CHECK BLOOD SUGARS DAILY DX: E11.9 18   Mamadou Chacon MD  
alcohol swabs (BD SINGLE USE SWABS REGULAR) padm USE AS DIRECTED DAILY DX. E11.9 18   Mamadou Chacno MD  
lancets (ACCU-CHEK SOFTCLIX LANCETS) misc USE TO CHECK BLOOD SUGARS DAILY DX: E11.9 18   Mamadou Chacon MD  
carvedilol (COREG) 6.25 mg tablet Take 1 Tab by mouth two (2) times daily (with meals). 12/3/18   Dayday Roberts MD  
apixaban (ELIQUIS) 5 mg tablet Take 1 Tab by mouth every twelve (12) hours. 12/3/18   Dayday Roberts MD  
aspirin 81 mg chewable tablet Take 1 Tab by mouth daily. 18   Katelyn Marie MD  
atorvastatin (LIPITOR) 20 mg tablet Take 1 Tab by mouth nightly. 10/4/18   Mamadou Chacon MD  
allopurinol (ZYLOPRIM) 100 mg tablet TAKE 1 TABLET BY MOUTH EVERY DAY 18   Mamadou Chacon MD  
fluticasone [facility]) 50 mcg/actuation nasal spray 2 Sprays by Both Nostrils route daily. 3/29/18   Ama Mata MD  
melatonin 3 mg tablet Take 3 mg by mouth nightly. Provider, Historical  
albuterol (VENTOLIN HFA) 90 mcg/actuation inhaler Take 2 Puffs by inhalation four (4) times daily. 18   Nancy Michelle NP  
polyethylene glycol (MIRALAX) 17 gram packet Take 1 Packet by mouth daily. Patient taking differently: Take 17 g by mouth daily as needed. 18   Ruel Homans D, NP  
albuterol-ipratropium (DUO-NEB) 2.5 mg-0.5 mg/3 ml nebu 3 mL by Nebulization route four (4) times daily. Diaignosis--J44.9 17   Nancy Michelle NP Physical Exam  
 
Visit Vitals BP (!) 131/92 Pulse (!) 108 Temp 96.9 °F (36.1 °C) Resp 22 Ht 5' 10\" (1.778 m) Wt 95.7 kg (211 lb) SpO2 100% BMI 30.28 kg/m² Temp (24hrs), Av.9 °F (36.1 °C), Min:96.9 °F (36.1 °C), Max:96.9 °F (36.1 °C) Oxygen Therapy O2 Sat (%): 100 % (19 0896) Pulse via Oximetry: 105 beats per minute (02/19/19 1416) O2 Device: Nasal cannula (02/19/19 1109) O2 Flow Rate (L/min): 2 l/min (02/19/19 1109) No intake or output data in the 24 hours ending 02/19/19 1517 General: Conscious, No acute distress Eyes:  VICENTA, No pallor/icterus HENT:             Oral Mucosa is Moist, No sinus tenderness Neck:               Supple, elevated JVP Lungs:  Diminished bilateral air entry with diffuse crackles up to the midlung on both sides. No significant wheezing Heart:  S1 S2 irregular Abdomen: Soft, Positive bowel sounds, NTND, No guarding/rigidity/rebound tend. Schulz in place with somewhat bloody urine and few small clots. Extremities: Bilateral Pitting edema up in to the thigh Neurologic:  AAOX2, No acute FND, Motor: LUE: 5/5, LLE: 5/5, RUE: 5/5, RLE: 5/5 Skin:                No acute rashes. Morna Rout Morna Rout Anasarca. Abdominal wall edema Musculoskeletal: No Acute findings Psych:             Calm and slightly confused LAB Recent Results (from the past 24 hour(s)) GLUCOSE, POC Collection Time: 02/19/19 11:15 AM  
Result Value Ref Range Glucose (POC) 74 65 - 100 mg/dL PROTHROMBIN TIME + INR Collection Time: 02/19/19 11:19 AM  
Result Value Ref Range Prothrombin time 30.3 (H) 11.7 - 14.5 sec INR 3.0 POC LACTIC ACID Collection Time: 02/19/19 11:20 AM  
Result Value Ref Range Lactic Acid (POC) 2.63 (H) 0.5 - 1.9 mmol/L  
EKG, 12 LEAD, INITIAL Collection Time: 02/19/19 11:20 AM  
Result Value Ref Range Ventricular Rate 102 BPM  
 Atrial Rate 115 BPM  
 QRS Duration 118 ms Q-T Interval 434 ms QTC Calculation (Bezet) 565 ms Calculated R Axis -71 degrees Calculated T Axis 92 degrees Diagnosis Atrial fibrillation with rapid ventricular response with premature  
ventricular or aberrantly conducted complexes Left axis deviation Anterior infarct (cited on or before 09-JAN-2019) Prolonged QT Abnormal ECG 
 When compared with ECG of 11-JAN-2019 15:18, No significant change was found CBC WITH AUTOMATED DIFF Collection Time: 02/19/19 11:21 AM  
Result Value Ref Range WBC 10.7 4.3 - 11.1 K/uL  
 RBC 5.03 4.23 - 5.6 M/uL  
 HGB 13.7 13.6 - 17.2 g/dL HCT 42.5 41.1 - 50.3 % MCV 84.5 79.6 - 97.8 FL  
 MCH 27.2 26.1 - 32.9 PG  
 MCHC 32.2 31.4 - 35.0 g/dL  
 RDW 19.4 (H) 11.9 - 14.6 % PLATELET 278 008 - 319 K/uL MPV 11.4 9.4 - 12.3 FL ABSOLUTE NRBC 0.00 0.0 - 0.2 K/uL  
 DF AUTOMATED NEUTROPHILS 75 43 - 78 % LYMPHOCYTES 14 13 - 44 % MONOCYTES 10 4.0 - 12.0 % EOSINOPHILS 0 (L) 0.5 - 7.8 % BASOPHILS 0 0.0 - 2.0 % IMMATURE GRANULOCYTES 1 0.0 - 5.0 %  
 ABS. NEUTROPHILS 8.1 1.7 - 8.2 K/UL  
 ABS. LYMPHOCYTES 1.5 0.5 - 4.6 K/UL  
 ABS. MONOCYTES 1.1 0.1 - 1.3 K/UL  
 ABS. EOSINOPHILS 0.0 0.0 - 0.8 K/UL  
 ABS. BASOPHILS 0.0 0.0 - 0.2 K/UL  
 ABS. IMM. GRANS. 0.1 0.0 - 0.5 K/UL METABOLIC PANEL, COMPREHENSIVE Collection Time: 02/19/19 11:21 AM  
Result Value Ref Range Sodium 141 136 - 145 mmol/L Potassium 4.6 3.5 - 5.1 mmol/L Chloride 103 98 - 107 mmol/L  
 CO2 27 21 - 32 mmol/L Anion gap 11 7 - 16 mmol/L Glucose 85 65 - 100 mg/dL BUN 30 (H) 8 - 23 MG/DL Creatinine 2.42 (H) 0.8 - 1.5 MG/DL  
 GFR est AA 34 (L) >60 ml/min/1.73m2 GFR est non-AA 28 (L) >60 ml/min/1.73m2 Calcium 9.3 8.3 - 10.4 MG/DL Bilirubin, total 4.5 (H) 0.2 - 1.1 MG/DL  
 ALT (SGPT) 22 12 - 65 U/L  
 AST (SGOT) 42 (H) 15 - 37 U/L Alk. phosphatase 139 (H) 50 - 136 U/L Protein, total 7.5 6.3 - 8.2 g/dL Albumin 3.4 3.2 - 4.6 g/dL Globulin 4.1 (H) 2.3 - 3.5 g/dL A-G Ratio 0.8 (L) 1.2 - 3.5 BNP Collection Time: 02/19/19 11:21 AM  
Result Value Ref Range  pg/mL PROCALCITONIN Collection Time: 02/19/19 11:21 AM  
Result Value Ref Range Procalcitonin 0.4 ng/mL POC VENOUS BLOOD GAS Collection Time: 02/19/19 12:02 PM  
Result Value Ref Range Device: NASAL CANNULA pH, venous (POC) 7.354 7.32 - 7.42    
 pCO2, venous (POC) 49.2 41 - 51 MMHG  
 pO2, venous (POC) 19 (LL) mmHg HCO3, venous (POC) 27.4 23 - 28 MMOL/L  
 sO2, venous (POC) 28 (L) 65 - 88 % Allens test (POC) YES Site RIGHT RADIAL Patient temp. 98.6 Specimen type (POC) MIXED VENOUS Performed by Festus   
 CO2, POC 29 MMOL/L Flow rate (POC) 2.000 L/min Respiratory comment: PhysicianNotified COLLECT TIME 1,158 URINALYSIS W/ RFLX MICROSCOPIC Collection Time: 02/19/19 12:20 PM  
Result Value Ref Range Color RED Appearance TURBID Specific gravity 1.012 1.001 - 1.023    
 pH (UA) 6.0 5.0 - 9.0 Protein 100 (A) NEG mg/dL Glucose NEGATIVE  mg/dL Ketone TRACE (A) NEG mg/dL Bilirubin MODERATE (A) NEG Blood LARGE (A) NEG Urobilinogen 1.0 0.2 - 1.0 EU/dL Nitrites POSITIVE (A) NEG Leukocyte Esterase MODERATE (A) NEG    
 WBC 20-50 0 /hpf  
 RBC >100 0 /hpf Epithelial cells 0-3 0 /hpf Bacteria 0 0 /hpf Casts 3-5 0 /lpf  
AMMONIA Collection Time: 02/19/19 12:52 PM  
Result Value Ref Range Ammonia 33 (H) 11 - 32 UMOL/L IMAGING:  
 
Xr Chest Pa Lat Result Date: 2/19/2019 IMPRESSION: Cardiomegaly and prominent pulmonary vascular markings. Ct Head Wo Cont Result Date: 2/19/2019 Impression: Negative CT scan of the brain. Note: If a subtle CVA is suspected, MRI would be more definitive if clinically warranted. All Micro Results Procedure Component Value Units Date/Time CULTURE, URINE [[id number]] Order Status:  Sent Specimen:  Cath Urine CULTURE, BLOOD [[id number]] Collected:  02/19/19 1252 Order Status:  Completed Specimen:  Blood Updated:  02/19/19 1326 CULTURE, BLOOD [[id number]] Collected:  02/19/19 1140 Order Status:  Completed Specimen:  Blood Updated:  02/19/19 1237 EKG: personally reviewed and shows A.fib with RVR 
 
 CXR: Personally reviewed and shows fluid overload and pulm edema+ Assessment/Plan 1. Acute on chronic Combined systolic and diastolic heart failure decompensation 2. CKD stage IV 3. Hematuria, could be secondary to coagulopathy and possible traumatic Schulz insertion. Patient also complains of penile pain after the Schulz insertion. We will monitor hemoglobin closely. Consult urology for recommendations. Meanwhile will do bladder irrigation as needed and given vitamin K to reverse coagulopathy from possible vitamin K deficiency or liver dysfunction. 4.  Chronic COPD, does not appear to have any exacerbation. 5.  Hyperbilirubinemia, coagulopathy, Could be secondary to  liver dysfunction from passive congestion from heart failure. will  follow-up on CT abdomen. Monitor liver functions and bilirubin levels. 6.  Coagulopathy could be secondary to acute liver dysfunction or vitamin deficiency. We will give him vitamin K considering hematuria and elevated INR and observe. 7.  History of carotid disease 8. A. fib with RVR, continue Coreg. Hold Eliquis considering coagulopathy, hematuria and possible acute liver dysfunction. 9.  Obesity 10. Acute encephalopathy, likely metabolic or could be hepatic encephalopathy from acute liver dysfunction. Will give him 1 dose of lactulose and also check ammonia levels. 11.  UTI ? -Follow-up urine cultures, meanwhile keep him on IV ceftriaxone. 12.  Hypertension, monitor blood pressure closely and use hydralazine as needed. 13.  Chronic thrombocytopenia. Could be secondary to liver dysfunction. 14.  Lactic acidosis, could be secondary to hypoxemia will be from liver dysfunction. Monitor lactate levels. Patient is going to be admitted to telemetry and will need more than 2 midnights of hospital stay. We will place him on aggressive IV diuresis with IV Lasix 80 mg every 8 hourly and monitor his urine output, creatinine, electrolytes closely. Follow-up CT abdomen and pelvis. Consult nephrology considering CKD and need for IV diuresis, possible worsening renal function. Urology consulted for recommendations. All the pertinent investigative studies and treatment plan was discussed with the patient. Further recommendations as per the clinical course. DVT prophylaxis: SCDs. Hold lasix in view of elevated INR Code status: Full Risk: High risk sec to above medical issues. Angle García MD 
February 19, 2019

## 2019-02-19 NOTE — ED NOTES
Pharmacy called regarding PCN allergy. Pt states \"it stops my heart\". Mark Jiménez in pharmacy would like to speak with Dr. Arsen Bliss.  is aware.

## 2019-02-19 NOTE — PROGRESS NOTES
TRANSFER - IN REPORT: 
 
Verbal report received from April, RN (name) on Destinee Álvarez.  being received from ER (unit) for routine progression of care Report consisted of patients Situation, Background, Assessment and  
Recommendations(SBAR). Information from the following report(s) SBAR, Kardex, ED Summary, [facility] and Recent Results was reviewed with the receiving nurse. Opportunity for questions and clarification was provided. Assessment completed upon patients arrival to unit and care assumed.

## 2019-02-19 NOTE — ED TRIAGE NOTES
Patient arrives with wife from nephrology. Patient SOB and AMS. Complains of \"penis pain\" Patient had catheter placed yesterday. Wife reports tissue in his [place] bag. Also reports increased swelling \"from foot to hips. \" Patient wearing nasal cannula at 2 L. . Wife reports patient is normally alert and joking around.

## 2019-02-19 NOTE — ED PROVIDER NOTES
Presents with complaints of altered mental status. Patient is at rehabilitation following a hospitalization for acute kidney injury and hematuria. He had continued lower extremity swelling and had a Schulz placed yesterday. Since that time he's had hematuria lethargy and irregular breathing. He does have a history of heart failure. He is supposed to wear oxygen. The history is provided by the patient. Altered mental status This is a new problem. The current episode started 6 to 12 hours ago. Associated symptoms include confusion, somnolence and weakness. Mental status baseline is normal.  His past medical history is significant for heart disease. Past Medical History:  
Diagnosis Date  Acute CHF (congestive heart failure) ([facility] [address] [address] 4/25/2015  Acute combined systolic and diastolic congestive heart failure ([facility] [address] 4/28/2015  De Quervain's tenosynovitis, right 4/28/2015  Dyspnea on exertion 6/28/2015  Elevated serum creatinine 4/25/2015  Elevated troponin 6/28/2015  History of coronary artery disease MI at 27 yo  
 History of right knee surgery   
 cartilage removal  
 History of shingles  Malignant hypertension 4/25/2015  MI (myocardial infarction) ([facility] [address] Past Surgical History:  
Procedure Laterality Date  HX HEART CATHETERIZATION  6/29/2015  
 no intervention  HX KNEE ARTHROSCOPY Right   
 removal of cartilage Family History:  
Problem Relation Age of Onset  Hypertension Mother  No Known Problems Father Social History Socioeconomic History  Marital status:  Spouse name: Not on file  Number of children: Not on file  Years of education: Not on file  Highest education level: Not on file Social Needs  Financial resource strain: Not on file  Food insecurity - worry: Not on file  Food insecurity - inability: Not on file  Transportation needs - medical: Not on file  Transportation needs - non-medical: Not on file Occupational History  Occupation: retired Tobacco Use  Smoking status: Former Smoker Packs/day: 0.25 Years: 20.00 Pack years: 5.00 Types: Cigarettes Last attempt to quit: 4/5/2015 Years since quitting: 3.8  Smokeless tobacco: Never Used Substance and Sexual Activity  Alcohol use: No  
  Alcohol/week: 0.0 oz  Drug use: No  
 Sexual activity: Not on file Other Topics Concern   Service No  
 Blood Transfusions No  
  Comment: no issues with receiving  Caffeine Concern No  
 Occupational Exposure Not Asked [place] Hazards Not Asked  Sleep Concern Not Asked  Stress Concern Not Asked  Weight Concern Not Asked  Special Diet No  
 Back Care Not Asked  Exercise No  
 Bike Helmet Not Asked [address] Yes  Self-Exams Not Asked Social History Narrative Lives with his wife ALLERGIES: Pcn [penicillins] Review of Systems Unable to perform ROS: Mental status change Neurological: Positive for weakness. Psychiatric/Behavioral: Positive for confusion. Vitals:  
 02/19/19 1109 BP: 118/89 Pulse: (!) 108 Resp: 22 SpO2: 95% Weight: 95.7 kg (211 lb) Height: 5' 10\" (1.778 m) Physical Exam  
Constitutional: He appears well-developed and well-nourished. He appears lethargic. He appears ill. He appears distressed. HENT:  
Head: Normocephalic and atraumatic. Eyes: Conjunctivae are normal. Right eye exhibits no discharge. Left eye exhibits no discharge. Neck: Normal range of motion. Neck supple. Cardiovascular: Normal rate and regular rhythm. Pulmonary/Chest: Breath sounds normal. He is in respiratory distress (tachypnea). Abdominal: Soft. He exhibits no distension. There is no tenderness. Musculoskeletal: Normal range of motion. He exhibits edema (4+). Neurological: He appears lethargic. No cranial nerve deficit.  GCS eye subscore is 3. GCS verbal subscore is 4. GCS motor subscore is 6. Generalized weakness Skin: Skin is warm and dry. He is not diaphoretic. Psychiatric: He has a normal mood and affect. His behavior is normal.  
Nursing note and vitals reviewed. MDM Number of Diagnoses or Management Options Acute congestive heart failure, unspecified heart failure type [facility]): Acute encephalopathy:  
Hematuria, unspecified type:  
Diagnosis management comments: Patient intermittently very directed. He intermittently applying a splint catheter. Wife states this is not like him at all. Reviewed his chart. He was admitted for similar symptoms from multiple facilities very recently. Leave Schulz catheter in for now. Given antibiotics for possible UTI and admit him to the hospitalist. 
 
  
Amount and/or Complexity of Data Reviewed Clinical lab tests: ordered and reviewed Decide to obtain previous medical records or to obtain history from someone other than the patient: yes (Wife 
) Review and summarize past medical records: yes Discuss the patient with other providers: yes Independent visualization of images, tracings, or specimens: yes (Afib, no st elevation) Risk of Complications, Morbidity, and/or Mortality Presenting problems: high Diagnostic procedures: moderate Management options: high Patient Progress Patient progress: stable Procedures

## 2019-02-19 NOTE — ED NOTES
TRANSFER - OUT REPORT: 
 
Verbal report given to MCKENZIE Blank on [facility].  being transferred toTELE for routine progression of care Report consisted of patients Situation, Background, Assessment and  
Recommendations(SBAR). Information from the following report(s) ED Summary was reviewed with the receiving nurse. Lines:  
Peripheral IV 02/19/19 Right Antecubital (Active) Opportunity for questions and clarification was provided. Patient transported with: 
 Patient-specific medications from Pharmacy

## 2019-02-19 NOTE — PROGRESS NOTES
Patient admitted to floor. Assessed. Discussed POC. Oriented to room. Call light within reach. Instructed to call for assist.  Wife at bedside. Dual admission skin assessment completed. Skin dry, warm, and intact. No  Abnormalities noted.

## 2019-02-20 PROBLEM — N18.4 CKD (CHRONIC KIDNEY DISEASE) STAGE 4, GFR 15-29 ML/MIN (HCC): Status: ACTIVE | Noted: 2019-02-20

## 2019-02-20 PROBLEM — E87.20 LACTIC ACIDOSIS: Status: ACTIVE | Noted: 2019-02-20

## 2019-02-20 LAB
ALBUMIN SERPL-MCNC: 3.4 G/DL (ref 3.2–4.6)
ALBUMIN/GLOB SERPL: 0.9 {RATIO} (ref 1.2–3.5)
ALP SERPL-CCNC: 130 U/L (ref 50–136)
ALT SERPL-CCNC: 26 U/L (ref 12–65)
AMMONIA PLAS-SCNC: 29 UMOL/L (ref 11–32)
ANION GAP SERPL CALC-SCNC: 12 MMOL/L (ref 7–16)
AST SERPL-CCNC: 34 U/L (ref 15–37)
BASOPHILS # BLD: 0 K/UL (ref 0–0.2)
BASOPHILS NFR BLD: 0 % (ref 0–2)
BILIRUB SERPL-MCNC: 3.6 MG/DL (ref 0.2–1.1)
BUN SERPL-MCNC: 34 MG/DL (ref 8–23)
CALCIUM SERPL-MCNC: 9.2 MG/DL (ref 8.3–10.4)
CHLORIDE SERPL-SCNC: 105 MMOL/L (ref 98–107)
CO2 SERPL-SCNC: 25 MMOL/L (ref 21–32)
CREAT SERPL-MCNC: 2.36 MG/DL (ref 0.8–1.5)
CREAT UR-MCNC: 138 MG/DL
DIFFERENTIAL METHOD BLD: ABNORMAL
EOSINOPHIL # BLD: 0 K/UL (ref 0–0.8)
EOSINOPHIL NFR BLD: 0 % (ref 0.5–7.8)
ERYTHROCYTE [DISTWIDTH] IN BLOOD BY AUTOMATED COUNT: 19.2 % (ref 11.9–14.6)
GLOBULIN SER CALC-MCNC: 3.6 G/DL (ref 2.3–3.5)
GLUCOSE BLD STRIP.AUTO-MCNC: 104 MG/DL (ref 65–100)
GLUCOSE BLD STRIP.AUTO-MCNC: 120 MG/DL (ref 65–100)
GLUCOSE BLD STRIP.AUTO-MCNC: 125 MG/DL (ref 65–100)
GLUCOSE BLD STRIP.AUTO-MCNC: 94 MG/DL (ref 65–100)
GLUCOSE SERPL-MCNC: 93 MG/DL (ref 65–100)
HCT VFR BLD AUTO: 39.7 % (ref 41.1–50.3)
HGB BLD-MCNC: 12.9 G/DL (ref 13.6–17.2)
IMM GRANULOCYTES # BLD AUTO: 0 K/UL (ref 0–0.5)
IMM GRANULOCYTES NFR BLD AUTO: 0 % (ref 0–5)
INR PPP: 2.5
LACTATE SERPL-SCNC: 2.1 MMOL/L (ref 0.4–2)
LACTATE SERPL-SCNC: 3.1 MMOL/L (ref 0.4–2)
LYMPHOCYTES # BLD: 1.4 K/UL (ref 0.5–4.6)
LYMPHOCYTES NFR BLD: 14 % (ref 13–44)
MAGNESIUM SERPL-MCNC: 2.1 MG/DL (ref 1.8–2.4)
MCH RBC QN AUTO: 27.3 PG (ref 26.1–32.9)
MCHC RBC AUTO-ENTMCNC: 32.5 G/DL (ref 31.4–35)
MCV RBC AUTO: 84.1 FL (ref 79.6–97.8)
MONOCYTES # BLD: 1 K/UL (ref 0.1–1.3)
MONOCYTES NFR BLD: 10 % (ref 4–12)
NEUTS SEG # BLD: 7.4 K/UL (ref 1.7–8.2)
NEUTS SEG NFR BLD: 75 % (ref 43–78)
NRBC # BLD: 0.02 K/UL (ref 0–0.2)
PHOSPHATE SERPL-MCNC: 3.9 MG/DL (ref 2.3–3.7)
PLATELET # BLD AUTO: 147 K/UL (ref 150–450)
PMV BLD AUTO: 11.6 FL (ref 9.4–12.3)
POTASSIUM SERPL-SCNC: 3.9 MMOL/L (ref 3.5–5.1)
PROT SERPL-MCNC: 7 G/DL (ref 6.3–8.2)
PROT UR-MCNC: 128 MG/DL
PROT/CREAT UR-RTO: 0.9
PROTHROMBIN TIME: 27 SEC (ref 11.7–14.5)
RBC # BLD AUTO: 4.72 M/UL (ref 4.23–5.6)
SODIUM SERPL-SCNC: 142 MMOL/L (ref 136–145)
WBC # BLD AUTO: 9.8 K/UL (ref 4.3–11.1)

## 2019-02-20 PROCEDURE — 82140 ASSAY OF AMMONIA: CPT

## 2019-02-20 PROCEDURE — 77030020263 HC SOL INJ SOD CL0.9% LFCR 1000ML

## 2019-02-20 PROCEDURE — 74011250636 HC RX REV CODE- 250/636: Performed by: INTERNAL MEDICINE

## 2019-02-20 PROCEDURE — 77030021907 HC KT URIN FOL O&M -A

## 2019-02-20 PROCEDURE — 86580 TB INTRADERMAL TEST: CPT | Performed by: HOSPITALIST

## 2019-02-20 PROCEDURE — 74011250637 HC RX REV CODE- 250/637: Performed by: UROLOGY

## 2019-02-20 PROCEDURE — 85025 COMPLETE CBC W/AUTO DIFF WBC: CPT

## 2019-02-20 PROCEDURE — 65660000000 HC RM CCU STEPDOWN

## 2019-02-20 PROCEDURE — 36415 COLL VENOUS BLD VENIPUNCTURE: CPT

## 2019-02-20 PROCEDURE — 74011250637 HC RX REV CODE- 250/637: Performed by: INTERNAL MEDICINE

## 2019-02-20 PROCEDURE — 76450000000

## 2019-02-20 PROCEDURE — 74011250636 HC RX REV CODE- 250/636: Performed by: HOSPITALIST

## 2019-02-20 PROCEDURE — 83735 ASSAY OF MAGNESIUM: CPT

## 2019-02-20 PROCEDURE — 94640 AIRWAY INHALATION TREATMENT: CPT

## 2019-02-20 PROCEDURE — 74011250637 HC RX REV CODE- 250/637: Performed by: HOSPITALIST

## 2019-02-20 PROCEDURE — 97530 THERAPEUTIC ACTIVITIES: CPT

## 2019-02-20 PROCEDURE — 83605 ASSAY OF LACTIC ACID: CPT

## 2019-02-20 PROCEDURE — 82962 GLUCOSE BLOOD TEST: CPT

## 2019-02-20 PROCEDURE — 74011000258 HC RX REV CODE- 258: Performed by: HOSPITALIST

## 2019-02-20 PROCEDURE — 0TWBX0Z REVISION OF DRAINAGE DEVICE IN BLADDER, EXTERNAL APPROACH: ICD-10-PCS | Performed by: HOSPITALIST

## 2019-02-20 PROCEDURE — 97162 PT EVAL MOD COMPLEX 30 MIN: CPT

## 2019-02-20 PROCEDURE — 77010033678 HC OXYGEN DAILY

## 2019-02-20 PROCEDURE — 74011000302 HC RX REV CODE- 302: Performed by: HOSPITALIST

## 2019-02-20 PROCEDURE — 74011000258 HC RX REV CODE- 258: Performed by: INTERNAL MEDICINE

## 2019-02-20 PROCEDURE — 84156 ASSAY OF PROTEIN URINE: CPT

## 2019-02-20 PROCEDURE — 80053 COMPREHEN METABOLIC PANEL: CPT

## 2019-02-20 PROCEDURE — 94760 N-INVAS EAR/PLS OXIMETRY 1: CPT

## 2019-02-20 PROCEDURE — 74011000250 HC RX REV CODE- 250: Performed by: HOSPITALIST

## 2019-02-20 PROCEDURE — 85610 PROTHROMBIN TIME: CPT

## 2019-02-20 PROCEDURE — 84100 ASSAY OF PHOSPHORUS: CPT

## 2019-02-20 RX ORDER — METOLAZONE 5 MG/1
5 TABLET ORAL 2 TIMES DAILY
Status: DISCONTINUED | OUTPATIENT
Start: 2019-02-20 | End: 2019-02-26

## 2019-02-20 RX ORDER — VANCOMYCIN HYDROCHLORIDE
1250 EVERY 24 HOURS
Status: DISCONTINUED | OUTPATIENT
Start: 2019-02-21 | End: 2019-02-21

## 2019-02-20 RX ORDER — SODIUM CHLORIDE 9 MG/ML
25 INJECTION, SOLUTION INTRAVENOUS ONCE
Status: COMPLETED | OUTPATIENT
Start: 2019-02-20 | End: 2019-02-20

## 2019-02-20 RX ADMIN — FINASTERIDE 5 MG: 5 TABLET, FILM COATED ORAL at 08:55

## 2019-02-20 RX ADMIN — TUBERCULIN PURIFIED PROTEIN DERIVATIVE 5 UNITS: 5 INJECTION, SOLUTION INTRADERMAL at 16:34

## 2019-02-20 RX ADMIN — IPRATROPIUM BROMIDE AND ALBUTEROL SULFATE 3 ML: .5; 3 SOLUTION RESPIRATORY (INHALATION) at 16:19

## 2019-02-20 RX ADMIN — PANTOPRAZOLE SODIUM 40 MG: 40 TABLET, DELAYED RELEASE ORAL at 06:42

## 2019-02-20 RX ADMIN — METOLAZONE 5 MG: 5 TABLET ORAL at 18:03

## 2019-02-20 RX ADMIN — CEFTRIAXONE SODIUM 2 G: 2 INJECTION, POWDER, FOR SOLUTION INTRAMUSCULAR; INTRAVENOUS at 16:30

## 2019-02-20 RX ADMIN — Medication 5 ML: at 06:20

## 2019-02-20 RX ADMIN — FUROSEMIDE 80 MG: 10 INJECTION, SOLUTION INTRAMUSCULAR; INTRAVENOUS at 13:54

## 2019-02-20 RX ADMIN — SODIUM CHLORIDE 25 ML/HR: 900 INJECTION, SOLUTION INTRAVENOUS at 06:19

## 2019-02-20 RX ADMIN — CARVEDILOL 6.25 MG: 6.25 TABLET, FILM COATED ORAL at 08:55

## 2019-02-20 RX ADMIN — CARVEDILOL 6.25 MG: 6.25 TABLET, FILM COATED ORAL at 16:33

## 2019-02-20 RX ADMIN — POLYETHYLENE GLYCOL 3350 17 G: 17 POWDER, FOR SOLUTION ORAL at 08:55

## 2019-02-20 RX ADMIN — IPRATROPIUM BROMIDE AND ALBUTEROL SULFATE 3 ML: .5; 3 SOLUTION RESPIRATORY (INHALATION) at 08:36

## 2019-02-20 RX ADMIN — IPRATROPIUM BROMIDE AND ALBUTEROL SULFATE 3 ML: .5; 3 SOLUTION RESPIRATORY (INHALATION) at 11:41

## 2019-02-20 RX ADMIN — IPRATROPIUM BROMIDE AND ALBUTEROL SULFATE 3 ML: .5; 3 SOLUTION RESPIRATORY (INHALATION) at 20:21

## 2019-02-20 RX ADMIN — Medication 400 MG: at 12:00

## 2019-02-20 RX ADMIN — Medication 400 MG: at 08:55

## 2019-02-20 RX ADMIN — TAMSULOSIN HYDROCHLORIDE 0.4 MG: 0.4 CAPSULE ORAL at 08:55

## 2019-02-20 RX ADMIN — Medication 400 MG: at 16:33

## 2019-02-20 RX ADMIN — VANCOMYCIN HYDROCHLORIDE 2500 MG: 10 INJECTION, POWDER, LYOPHILIZED, FOR SOLUTION INTRAVENOUS at 06:28

## 2019-02-20 RX ADMIN — POTASSIUM CHLORIDE 20 MEQ: 20 TABLET, EXTENDED RELEASE ORAL at 08:55

## 2019-02-20 RX ADMIN — FUROSEMIDE 80 MG: 10 INJECTION, SOLUTION INTRAMUSCULAR; INTRAVENOUS at 06:20

## 2019-02-20 RX ADMIN — FUROSEMIDE 15 MG/HR: 10 INJECTION, SOLUTION INTRAMUSCULAR; INTRAVENOUS at 19:48

## 2019-02-20 NOTE — CONSULTS
[facility] Cardiology Consult                Date of  Admission: 2/19/2019 11:19 AM     Primary Care Physician: Yinka Reese MD  Primary Cardiologist: Dr Angie Gonsales  Referring Physician: Hospital Medicine  Consulting Physician: Dr Pati Hodge    CC/Reason for consult: HF      Melissa Christine is a 66 y.o. male with hx of CKD stage IV, A Fib, combined HF with EF 30-35% and Grade II diastolic dysfunction, moderate to severe TR, de Quevains tenosynovitis, right knee surgery, CAD, malignnant HTN, and MI ([facility] 2015 w/o intervention). The patient has had recent DC for HF on 11/26/2018, 1/9/2019, and an admission for Hypothermia on 1/28/2018. The patient presented to the ED with complaints of hematuria, LLE, and SOB. The patient was at a rehab facility where was was being treated for possible UTI treated with ABX x2 says prior to admission and a birmingham cathter was placed on 2/18/2018 due to noted haematuria. Per the patients wife noted a been confused for about 2-3 days and was on oral bumex for his dieretic. In the ED he was noted to have increased breathlessness when laying down, moderate sob, described as progressively worse, increased CLAUDIO, and SOB is not relieved with rest.  He denies fever, chills, weakness, dizziness, ha, CP, or  N/V. Pt WBC was normal with Cr of 2.36, LA of 3.1, , and UA positive for Blood, ketones, and WBC. CT of the ABD showed plural effusion andimproper placement of the birmingham which was subsequently placed into the bladder. He is noted to have Gram positive Cocci in his blood cultures with negative initial growth in his urine. Recent Cardiac Synopsis w/ Labs  I/O -1.35 L  LHC: 1/2/2018 2. Mild coronary artery disease that is unchanged angiographically from his last heart catheterization in 2015. Echo: 11/2/2018 Left ventricle: Systolic function was moderately reduced. Ejection  Fraction was estimated in the range of 35 % to 40 %. There was moderate diffuse  hypokinesis.  There was severe concentric hypertrophy (speckled pattern;  consideration for inflitrative cardiomyopathy). There is grade III diastolic  dysfunction. -  Right ventricle: Systolic pressure was mildly increased. Estimated peak pressure was in the range of 40-45 mmHg. -  Left atrium: The atrium was moderately dilated. -  Atrial septum: Agitated saline contrast injection (bubble study) was  performed. There was no right-to-left shunt, at rest or induced by the   Valsalva maneuver. -  Right atrium: The atrium was moderately dilated. -  Tricuspid valve: There was moderate to severe regurgitation. EKG: A Fib with RVR  CT of the ABD: 1. Schulz catheter with balloon inflated within the prostate gland with a  moderate volume of urine seen in the bladder. 2. Moderate size right and small left-sided pleural effusion. Mild diffuse  anasarca.   3. Moderate volume ascites within the right and left upper quadrants.       Lab Results   Component Value Date     02/20/2019    K 3.9 02/20/2019    MG 2.1 02/20/2019    BUN 34 (H) 02/20/2019    CREA 2.36 (H) 02/20/2019    WBC 9.8 02/20/2019    HGB 12.9 (L) 02/20/2019     (L) 02/20/2019    INR 2.5 02/20/2019    TROIQ 0.35 (HH) 10/21/2018    TROIQ 0.45 (HH) 10/21/2018    TROIQ 0.46 (HH) 10/20/2018    TSH 0.604 05/25/2018    CKMB 2.1 09/27/2017          Patient Active Problem List   Diagnosis Code    CKD (chronic kidney disease) stage 3, GFR 30-59 ml/min ([facility]) N18.3    Acute on chronic systolic heart failure ([facility]) I50.23    Coronary atherosclerosis of native coronary vessel I25.10    Arthritis M19.90    Hypertension I10    Mild persistent asthma without complication Y20.85    High triglycerides E78.1    Gastroesophageal reflux disease without esophagitis K21.9    Mixed hyperlipidemia E78.2    Essential hypertension I10    CHF (congestive heart failure) ([facility]) I50.9    TAZ (obstructive sleep apnea) G47.33    Atherosclerosis of native coronary artery of native heart with stable angina pectoris ([facility] I25.118    Personal history of tobacco use Z87.891    Erysipelas A46    Preseptal cellulitis L03.213    Controlled type 2 diabetes mellitus without complication, without long-term current use of insulin ([facility]) E11.9    Type 2 diabetes with nephropathy ([facility]) E11.21    Chronic systolic heart failure ([facility]) I50.22    Shortness of breath R06.02    Stage 2 chronic kidney disease N18.2    Seizure ([facility]) R56.9    Prolonged Q-T interval on ECG R94.31    Elevated troponin R74.8    Pulmonary hypertension ([facility]) I27.20    Stroke (cerebrum) ([facility]) I63.9    Transient cerebral ischemia G45.9    Seizure disorder ([facility]) G40.909    Acute on chronic combined systolic and diastolic CHF (congestive heart failure) ([facility]) I50.43    Atrial fibrillation ([facility]) I48.91    Left atrial thrombus I51.3    Tobacco abuse Z72.0    Hypertension, essential I10    Type 2 diabetes mellitus without complication ([facility]) E18.2    CKD (chronic kidney disease) stage 4, GFR 15-29 ml/min ([facility]) N18.4    Lactic acidosis E87.2       Past Medical History:   Diagnosis Date    Acute CHF (congestive heart failure) ([facility]) 4/25/2015    Acute combined systolic and diastolic congestive heart failure ([facility] 4/28/2015    De Quervain's tenosynovitis, right 4/28/2015    Dyspnea on exertion 6/28/2015    Elevated serum creatinine 4/25/2015    Elevated troponin 6/28/2015    History of coronary artery disease     MI at 25 yo    History of right knee surgery     cartilage removal    History of shingles     Malignant hypertension 4/25/2015    MI ([facility])       Past Surgical History:   Procedure Laterality Date    HX HEART CATHETERIZATION  6/29/2015    no intervention    HX KNEE ARTHROSCOPY Right     removal of cartilage     Allergies   Allergen Reactions    Pcn [Penicillins] Other (comments)     \"makes my heart stop\"      Family History   Problem Relation Age of Onset    Hypertension Mother  No Known Problems Father       Social History     Socioeconomic History    Marital status:      Spouse name: Not on file    Number of children: Not on file    Years of education: Not on file    Highest education level: Not on file   Social Needs    Financial resource strain: Not on file    Food insecurity - worry: Not on file    Food insecurity - inability: Not on file    Transportation needs - medical: Not on file   [employer] needs - non-medical: Not on file   Occupational History    Occupation: retired   Tobacco Use    Smoking status: Former Smoker     Packs/day: 0.25     Years: 20.00     Pack years: 5.00     Types: Cigarettes     Last attempt to quit: 4/5/2015     Years since quitting: 3.8    Smokeless tobacco: Never Used   Substance and Sexual Activity    Alcohol use: No     Alcohol/week: 0.0 oz    Drug use: No    Sexual activity: Not on file   Other Topics Concern     Service No    Blood Transfusions No     Comment: no issues with receiving    Caffeine Concern No    Occupational Exposure Not Asked    Hobby Hazards Not Asked    Sleep Concern Not Asked    Stress Concern Not Asked    Weight Concern Not Asked    Special Diet No    Back Care Not Asked    Exercise No    Bike Helmet Not Asked    Seat Belt Yes    Self-Exams Not Asked   Social History Narrative    Lives with his wife       Current Facility-Administered Medications   Medication Dose Route Frequency    [START ON 2/21/2019] vancomycin (VANCOCIN) 1250 mg in  ml infusion  1,250 mg IntraVENous Q24H    albuterol-ipratropium (DUO-NEB) 2.5 MG-0.5 MG/3 ML  3 mL Nebulization QID RT    carvedilol (COREG) tablet 6.25 mg  6.25 mg Oral BID WITH MEALS    fluticasone (FLONASE) 50 mcg/actuation nasal spray 2 Spray  2 Spray Both Nostrils DAILY    pantoprazole (PROTONIX) tablet 40 mg  40 mg Oral ACB    polyethylene glycol (MIRALAX) packet 17 g  17 g Oral DAILY    sodium chloride (NS) flush 5-40 mL  5-40 mL IntraVENous Q8H    sodium chloride (NS) flush 5-40 mL  5-40 mL IntraVENous PRN    acetaminophen (TYLENOL) tablet 650 mg  650 mg Oral Q4H PRN    HYDROcodone-acetaminophen (NORCO) 5-325 mg per tablet 1 Tab  1 Tab Oral Q4H PRN    morphine injection 2 mg  2 mg IntraVENous Q4H PRN    naloxone (NARCAN) injection 0.4 mg  0.4 mg IntraVENous PRN    diphenhydrAMINE (BENADRYL) capsule 25 mg  25 mg Oral Q4H PRN    ondansetron (ZOFRAN) injection 4 mg  4 mg IntraVENous Q4H PRN    magnesium hydroxide (MILK OF MAGNESIA) 400 mg/5 mL oral suspension 30 mL  30 mL Oral DAILY PRN    furosemide (LASIX) injection 80 mg  80 mg IntraVENous Q8H    potassium chloride (K-DUR, KLOR-CON) SR tablet 20 mEq  20 mEq Oral DAILY    magnesium oxide (MAG-OX) tablet 400 mg  400 mg Oral TID WITH MEALS    insulin lispro (HUMALOG) injection   SubCUTAneous AC&HS    cefTRIAXone (ROCEPHIN) 2 g in 0.9% sodium chloride (MBP/ADV) 50 mL  2 g IntraVENous Q24H    tamsulosin (FLOMAX) capsule 0.4 mg  0.4 mg Oral DAILY    finasteride (PROSCAR) tablet 5 mg  5 mg Oral DAILY       Review of Systems   Constitution: Negative for weight gain and weight loss. HENT: Negative. Eyes: Negative. Cardiovascular: Positive for dyspnea on exertion. Negative for chest pain (currently pain free), claudication, cyanosis, irregular heartbeat, leg swelling, near-syncope, orthopnea, palpitations, paroxysmal nocturnal dyspnea and syncope. Respiratory: Positive for shortness of breath and sleep disturbances due to breathing. Negative for cough and wheezing. Endocrine: Negative. Skin: Negative. Musculoskeletal: Negative. Gastrointestinal: Negative for nausea and vomiting. Genitourinary: Positive for hematuria. Neurological: Negative for dizziness. Psychiatric/Behavioral: Negative. Allergic/Immunologic: Negative.          Physical Exam  Vitals:    02/20/19 0029 02/20/19 0431 02/20/19 0840 02/20/19 0845   BP: 106/66 100/68  119/82   Pulse: 87 97  65 Resp: 22 20  20   Temp: 97.8 °F (36.6 °C) 97.7 °F (36.5 °C)  97.9 °F (36.6 °C)   SpO2: 92% 98% 96% 99%   Weight:  100.3 kg (221 lb 1.6 oz)     Height:           Physical Exam:  General: Well Developed, Well Nourished, No Acute Distress  HEENT: pupils equal and round, no abnormalities noted  Neck: supple, no JVD, no carotid bruits  Heart: S1S2 with RRR without murmurs or gallops  Lungs: Clear throughout auscultation bilaterally without adventitious sounds  Abd: soft, nontender, nondistended, with good bowel sounds  Ext: warm, no edema, calves supple/nontender, pulses 3+ bilaterally  Skin: warm and dry  Psychiatric: Normal mood and affect  Neurologic: Alert and oriented X 3      Labs:   Recent Labs     02/20/19  0355 02/19/19  1121 02/19/19  1119    141  --    K 3.9 4.6  --    MG 2.1  --   --    BUN 34* 30*  --    CREA 2.36* 2.42*  --    GLU 93 85  --    WBC 9.8 10.7  --    HGB 12.9* 13.7  --    HCT 39.7* 42.5  --    * 160  --    INR 2.5  --  3.0       Echo Results  (Last 48 hours)    None        Xr Chest Pa Lat    Result Date: 2/19/2019  IMPRESSION: Cardiomegaly and prominent pulmonary vascular markings. Ct Head Wo Cont    Result Date: 2/19/2019  Impression: Negative CT scan of the brain. Note: If a subtle CVA is suspected, MRI would be more definitive if clinically warranted. Ct Abd Pelv Wo Cont    Result Date: 2/19/2019  IMPRESSION: 1. Schulz catheter with balloon inflated within the prostate gland with a moderate volume of urine seen in the bladder. 2. Moderate size right and small left-sided pleural effusion. Mild diffuse anasarca. 3. Moderate volume ascites within the right and left upper quadrants. Results discussed with floor RN at 6:57 PM on Tuesday, February 19, 2019 via telephone with Dr. Candace Castro.         Assessment/Plan:     Assessment:      Active Problems:    Hypertension (9/29/2017) Controled continue current therapy      CHF (congestive heart failure) ([address] (9/27/2017) Cr improving with Diuresis, Continue Lasix IV, on HF approved BB coreg 6.25 mg BID, no ARB/ACE/ARNI with NINO on CKD. Continue strict I/O, daily weight, and BMP. Will add fluid restrictions to diet. CKD (chronic kidney disease) stage 4, GFR 15-29 ml/min ([facility]) (2/20/2019) Improving, continue with IV lasix      Lactic acidosis (2/20/2019) Per Primary on ABX    Bacteriemia: See above, Per primary,    Hematuria: Per Urology    Confusion: per Primary Team        Thank you very much for this referral. We appreciate the opportunity to participate in this patient's care. We will follow along with above stated plan.     Rina Johnston NP  Consulting MD: Dr Patti Jacobs

## 2019-02-20 NOTE — CONSULTS
Nephrology consult    Admission Date:  2/19/2019    Admission Diagnosis  CHF (congestive heart failure) ([facility] [I50.9]    We are asked by Dr. Kaylen Mckeon    History of Present Illness: Mr. Andrew Avery is a 66 y.o. Male with PMH significant for not limited to CHF, A fib, CKD stage IV, chronic COPD, BPH with urinary retention, TAZ non compliant with CPAP, admitted from rehab facility with complaints of worsening shortness of breath and LE edema. CXR revealed cardiomegaly and prominent vascular markings, CT abdomen/ pelvis revealed moderate right and small left sided pleural effusions and moderate ascites, Negative CT scan of the brain. Procalcitonin elevated 3.1, WBC 12.9. From a renal standpoint his admitting creatinine was 2.42-->2.36. Pt is followed in outpatient office since 2018 baseline creatinine 1.5, CKD 3. He reportedly had NINO 3 weeks ago 2/2 hypotension in [place] [address] for which antihypertensives were stopped and he was put on midordine, Creatinine reportedly had been in the mid 4s, which improved. UA with protein, RBC >100, moderate leukocyte esterase, positive nitrites, and trace ketones. Abdominal CT revealed no contour deforming renal mass. No hydronephrosis or nephrolithiasis with birmingham in place. Home meds significant for K dur, not on ACEi or ARB. Patient seen and examined on rounds spouse at the bedside, he reports nausea, vomiting, with poor intake prior to this hospital admission, now with diarrhea since started on lactulose. He has had poor intake, and denies dysuria, flank pain, pt reports having decrease in UOP with decrease in urine stream and hesitancy prior to admission and birmingham placement. No NSAID use.      Past Medical History:   Diagnosis Date    Acute CHF (congestive heart failure) ([facility] 4/25/2015    Acute combined systolic and diastolic congestive heart failure ([facility] 4/28/2015    De Quervain's tenosynovitis, right 4/28/2015    Dyspnea on exertion 6/28/2015    Elevated serum creatinine 4/25/2015    Elevated troponin 6/28/2015    History of coronary artery disease     MI at 25 yo    History of right knee surgery     cartilage removal    History of shingles     Malignant hypertension 4/25/2015    [facility]       Past Surgical History:   Procedure Laterality Date    HX HEART CATHETERIZATION  6/29/2015    no intervention    HX KNEE ARTHROSCOPY Right     removal of cartilage      Current Facility-Administered Medications   Medication Dose Route Frequency    [START ON 2/21/2019] vancomycin (VANCOCIN) 1250 mg in  ml infusion  1,250 mg IntraVENous Q24H    albuterol-ipratropium (DUO-NEB) 2.5 MG-0.5 MG/3 ML  3 mL Nebulization QID RT    carvedilol (COREG) tablet 6.25 mg  6.25 mg Oral BID WITH MEALS    fluticasone (FLONASE) 50 mcg/actuation nasal spray 2 Spray  2 Spray Both Nostrils DAILY    pantoprazole (PROTONIX) tablet 40 mg  40 mg Oral ACB    polyethylene glycol (MIRALAX) packet 17 g  17 g Oral DAILY    sodium chloride (NS) flush 5-40 mL  5-40 mL IntraVENous Q8H    sodium chloride (NS) flush 5-40 mL  5-40 mL IntraVENous PRN    acetaminophen (TYLENOL) tablet 650 mg  650 mg Oral Q4H PRN    HYDROcodone-acetaminophen (NORCO) 5-325 mg per tablet 1 Tab  1 Tab Oral Q4H PRN    morphine injection 2 mg  2 mg IntraVENous Q4H PRN    naloxone (NARCAN) injection 0.4 mg  0.4 mg IntraVENous PRN    diphenhydrAMINE (BENADRYL) capsule 25 mg  25 mg Oral Q4H PRN    ondansetron (ZOFRAN) injection 4 mg  4 mg IntraVENous Q4H PRN    magnesium hydroxide (MILK OF MAGNESIA) 400 mg/5 mL oral suspension 30 mL  30 mL Oral DAILY PRN    furosemide (LASIX) injection 80 mg  80 mg IntraVENous Q8H    potassium chloride (K-DUR, KLOR-CON) SR tablet 20 mEq  20 mEq Oral DAILY    magnesium oxide (MAG-OX) tablet 400 mg  400 mg Oral TID WITH MEALS    insulin lispro (HUMALOG) injection   SubCUTAneous AC&HS    cefTRIAXone (ROCEPHIN) 2 g in 0.9% sodium chloride (MBP/ADV) 50 mL 2 g IntraVENous Q24H    tamsulosin (FLOMAX) capsule 0.4 mg  0.4 mg Oral DAILY    finasteride (PROSCAR) tablet 5 mg  5 mg Oral DAILY     Allergies   Allergen Reactions    Pcn [Penicillins] Other (comments)     \"makes my heart stop\"      Social History     Tobacco Use    Smoking status: Former Smoker     Packs/day: 0.25     Years: 20.00     Pack years: 5.00     Types: Cigarettes     Last attempt to quit: 4/5/2015     Years since quitting: 3.8    Smokeless tobacco: Never Used   Substance Use Topics    Alcohol use: No     Alcohol/week: 0.0 oz      Family History   Problem Relation Age of Onset    Hypertension Mother     No Known Problems Father         Review of Systems  Gen - no fever, no chills, appetite poor  HEENT - no sore throat  CV - no chest pain, no palpitation, no orthopnea  Lung - no shortness of breath, no cough, no hemoptysis  Abd - no tenderness, + nausea/vomiting, no bloody stool  Ext - ++ edema  Musculoskeletal - no joint pain, no back pain  Neurologic - no seizures  Psychiatric - no anxiety, no depression  Skin - no rashes  Genitourinary - + decreased urine output, + hesitancy,  no foamy urine    Objective:     Vitals:    02/20/19 0431 02/20/19 0840 02/20/19 0845 02/20/19 0900   BP: 100/68  119/82    Pulse: 97  65    Resp: 20  20    Temp: 97.7 °F (36.5 °C)  97.9 °F (36.6 °C)    SpO2: 98% 96% 99% 96%   Weight: 100.3 kg (221 lb 1.6 oz)      Height:           Intake/Output Summary (Last 24 hours) at 2/20/2019 1119  Last data filed at 2/20/2019 6015  Gross per 24 hour   Intake    Output 1350 ml   Net -1350 ml       Physical Exam  GEN :in no distress, alert and oriented  HEENT: anicteric sclerae,  Mucous membranes are moist.  Neck - supple without JVD  CV - regular rate and rhythm,  no rub  Lung - diminished bases bilaterally, lungs expand symmetrically, no wheezes or crackles  Abd - soft, nontender, bowel sounds present  Ext - +1 BLE edema  Neurologic - nonfocal  Skin - no rashes  Psychiatric: Normal mood and affect. Data Review:   Recent Labs     02/20/19  0355 02/19/19  1121   WBC 9.8 10.7   HGB 12.9* 13.7   HCT 39.7* 42.5   * 160     Recent Labs     02/20/19  0355 02/19/19  1121    141   K 3.9 4.6    103   CO2 25 27   BUN 34* 30*   CREA 2.36* 2.42*   GLU 93 85   CA 9.2 9.3   MG 2.1  --    PHOS 3.9*  --      No results for input(s): PH, PCO2, PO2, PCO2 in the last 72 hours.     Problem List:     Patient Active Problem List    Diagnosis Date Noted    CKD (chronic kidney disease) stage 4, GFR 15-29 ml/min ([facility] 02/20/2019    Lactic acidosis 02/20/2019    Tobacco abuse 01/22/2019    Hypertension, essential 01/22/2019    Type 2 diabetes mellitus without complication ([address] 67/20/4972    Left atrial thrombus 01/10/2019    Atrial fibrillation ([address] 11/27/2018    Acute on chronic combined systolic and diastolic CHF (congestive heart failure) ([address] 11/26/2018    Transient cerebral ischemia 11/14/2018    Seizure disorder ([address] 11/14/2018    Stroke (cerebrum) ([address] 11/02/2018    Seizure ([address] 10/20/2018    Prolonged Q-T interval on ECG 10/20/2018    Elevated troponin 10/20/2018    Pulmonary hypertension ([address] 10/20/2018    Stage 2 chronic kidney disease 10/17/2018    Type 2 diabetes with nephropathy ([address] 10/08/2018    Chronic systolic heart failure ([address] 10/08/2018    Shortness of breath 10/08/2018    Controlled type 2 diabetes mellitus without complication, without long-term current use of insulin ([address] 10/04/2018    Acute on chronic systolic heart failure ([address] 05/24/2018    Erysipelas 03/27/2018    Preseptal cellulitis 03/27/2018    Personal history of tobacco use 02/06/2018    Atherosclerosis of native coronary artery of native heart with stable angina pectoris ([address] 10/13/2017    TAZ (obstructive sleep apnea) 10/02/2017    CKD (chronic kidney disease) stage 3, GFR 30-59 ml/min ([facility] 09/29/2017    Coronary atherosclerosis of native coronary vessel 09/29/2017  Hypertension 09/29/2017    Mild persistent asthma without complication 28/60/6570    High triglycerides 09/29/2017    Gastroesophageal reflux disease without esophagitis 09/29/2017    CHF (congestive heart failure) ([facility] 09/27/2017    Mixed hyperlipidemia 09/28/2016    Essential hypertension 09/28/2016    Arthritis        Impression:    Plan:     1. NINO/ CKD 3 multifactorial likely 2/2 urinary retention, nausea, vomiting, and poor intake. Non oliguria with birmingham placement. Abdominal CT revealed no contour deforming renal mass. No hydronephrosis or nephrolithiasis with birmingham in place. Pt had decrease in urinary stream and hesitancy with decrease in urinary stream prior to follow catheter, history + for BPH urinary retention. - continue gentle IVF and trend renal function and UOP closely with birmingham in place.   -will quantify proteinuria, continue lasix    2. Acute on chronic Combined systolic and diastolic heart failure decompensation  -diuresis     3. A fib with RVR   -on coreg, Eliquis on hold in setting of coagulopathy. Hematuria, and acute liver dysfunction. 4. UTI  -follow up urine cultures per hosp on ceftriaxone    5. Chronic COPD    6.  Urinary retention  -Urology following

## 2019-02-20 NOTE — PROGRESS NOTES
Bedside shift change report given to Bethanie Barfield RN (oncoming nurse) by Silvina Rubio RN (offgoing nurse). Report included the following information SBAR, Kardex, ED Summary, Intake/Output, MAR, Recent Results, Med Rec Status and Cardiac Rhythm of Atrial Fibrillation.

## 2019-02-20 NOTE — PROGRESS NOTES
Notified by Dr. Zulema Fink, radiologist of birmingham catheter balloon being inflated within the prostate. Notified Dr. Joey Ortiz, urology. Order per Dr. Joey Ortiz to deflate balloon and advance birmingham catheter into bladder. Balloon deflated with tawny Mccormick RN and advanced birmingham. Re-inflated balloon and birmingham draining with hematuria.

## 2019-02-20 NOTE — DIABETES MGMT
Patient admitted with CHF, seen by diabetes educator. Patient is well known to the diabetes management team as he was seen in 2018 and 2019. Patient's A1c in January was 7.4 (eA). Patient and wife deny patient is diabetic. \"Our doctor said he is not diabetic\". Patient and wife reported the same thing in January as well. Patient does not have a glucometer and does not monitor his blood glucose. Reinforced with patient and wife that an A1c of 7.4 is indicative of diabetes based on ADA standards. Patient is complex and has spent the last few months in and out of hospitals and rehab. Patient also has CKD. Creatinine 2.36. GFR 35. BUN 34. Blood glucose 74 on admission. Blood glucose ranged 74-96 yesterday and 93 this morning. Patient would benefit from continued monitoring of blood glucose today to see how he trends, if readings remain low would recommend reducing frequency of checks. Educated patient and wife that it would be best to continue to occasionally check patient's blood glucose randomly at home in the future to ensure that his diabetes does not require medical management and contribute to his health issues. Educated patient and wife that his PCP should continue to track his A1c in the future to ensure patient is controlled. Patient and wife verbalize understanding and voice no questions at this time.

## 2019-02-20 NOTE — DISCHARGE INSTRUCTIONS

## 2019-02-20 NOTE — PROGRESS NOTES
Care Management Interventions PCP Verified by CM: Yes(saw 1/24/19) Palliative Care Criteria Met (RRAT>21 & CHF Dx)?: Yes(RRAT 48 Dx CHF) Transition of Care Consult (CM Consult): Discharge Planning Discharge Durable Medical Equipment: No(Cane, rollator and Home O2 from [employer]) Physical Therapy Consult: Yes Occupational Therapy Consult: Yes Speech Therapy Consult: No 
Current Support Network: Lives with Spouse Confirm Follow Up Transport: Family Plan discussed with Pt/Family/Caregiver: Yes Freedom of Choice Offered: Yes Discharge Location Discharge Placement: Unable to determine at this time Met with patient for d/c planning. Patient alert and oriented x 3, and lives with his wife. DME includes cane, Rollator and O2 with [employer]. He has [facility] for his medications. He has been at rehab in the past in [address] [place] [address] and [place] and has had home health with [facility] in the past. Noted PT notes states max assist with mobility and may need PT. Patient states unsure what his plan for d/c is and no family at bedside. CM following for d/c. Will request PPD for possible rehab. CM following.

## 2019-02-20 NOTE — PROGRESS NOTES
Pharmacokinetic Consult to Pharmacist 
 
Mally Harrison. is a 66 y.o. male being treated for bloodstream infection with vancomycin. Height: 5' 10\" (177.8 cm)  Weight: 100.3 kg (221 lb 1.6 oz) Lab Results Component Value Date/Time BUN 34 (H) 02/20/2019 03:55 AM  
 Creatinine 2.36 (H) 02/20/2019 03:55 AM  
 WBC 9.8 02/20/2019 03:55 AM  
 Procalcitonin 0.4 02/19/2019 11:21 AM  
 Lactic acid 3.1 (HH) 02/20/2019 03:55 AM  
 Lactic Acid (POC) 2.63 (H) 02/19/2019 11:20 AM  
  
Estimated Creatinine Clearance: 31.6 mL/min (A) (based on SCr of 2.36 mg/dL (H)). CULTURES: 
All Micro Results Procedure Component Value Units Date/Time CULTURE, URINE [[id number]] Collected:  02/19/19 1220 Order Status:  Completed Specimen:  Cath Urine Updated:  02/20/19 7729 Special Requests: NO SPECIAL REQUESTS Culture result:    
  NO GROWTH AFTER SHORT PERIOD OF INCUBATION. FURTHER RESULTS TO FOLLOW AFTER OVERNIGHT INCUBATION. CULTURE, BLOOD [[id number]] Collected:  02/19/19 1140 Order Status:  Completed Specimen:  Blood Updated:  02/20/19 1379 Special Requests: --     
  RIGHT Antecubital 
  
  Culture result: NO GROWTH AFTER 17 HOURS     
 CULTURE, BLOOD [[id number]] Collected:  02/19/19 1252 Order Status:  Completed Specimen:  Blood Updated:  02/20/19 0611 Special Requests: --     
  RIGHT 
HAND 
  
  GRAM STAIN    
  GRAM POSITIVE COCCI IN CHAINS AEROBIC AND ANAEROBIC BOTTLES RESULTS VERIFIED, PHONED TO AND READ BACK BY EAMON GONZALEZ @0864 02/20/2019 [facility] Culture result:    
  CULTURE IN PROGRESS,FURTHER UPDATES TO FOLLOW Day 1 of vancomycin. Goal trough is 15-20. Vancomycin dose initiated at 2,500 mg load, followed by 1,250 mg q24h. Will continue to follow patient. Thank you, Ethel Quick, PharmD

## 2019-02-20 NOTE — PROGRESS NOTES
Problem: Mobility Impaired (Adult and Pediatric) Goal: *Acute Goals and Plan of Care (Insert Text) LTG: 
(1.)Mr. Thompson Brito will move from supine to sit and sit to supine , scoot up and down and roll side to side in bed with STAND BY ASSIST within 7 treatment day(s). (2.)Mr. Thompson Brito will transfer from bed to chair and chair to bed with CONTACT GUARD ASSIST using the least restrictive device within 7 treatment day(s). (3.)Mr. Thompson Brito will ambulate with MINIMAL ASSIST for 75 feet with the least restrictive device within 7 treatment day(s). (4.)Mr. Thompson Brito will participate in therapeutic activity/exercises x 23 minutes for increased strength within 7 days. ________________________________________________________________________________________________ PHYSICAL THERAPY: Initial Assessment and AM 2/20/2019INPATIENT: PT Visit Days : 1 Payor: Nadia Torres / Plan: [address] [place] [address] HMO / Product Type: [employer] /   
  
NAME/AGE/GENDER: Viet Preciado is a 66 y.o. male PRIMARY DIAGNOSIS: [facility] [I50.9] <principal problem not specified> <principal problem not specified> 
 
  
ICD-10: Treatment Diagnosis:  
 · Generalized Muscle Weakness (M62.81) · Difficulty in walking, Not elsewhere classified (R26.2) Precaution/Allergies: 
Pcn [penicillins] ASSESSMENT:  
Mr. Thompson Brito is a 66 y.o. male in the hospital for the above who was supine in bed upon arrival.  Pt reports that he lives with his wife in one story house that has 2 steps to enter prior to recently being in the hospital with discharge to rehab. Pt appears to be confused as well as he was really unable to provided much history on his PLOF. Pt did state that he was able to get up the other day with assist of staff. Pt admitted to no recent falls in the past year. Mr. Thompson Brito presents to PT with decreased AROM and strength in B LEs.   He also appears to have pitting edema in B LEs as well. Pt reported use of supplemental O2 at home. During evaluation pt performed supine to sit with min-modA. His sitting balance was fair and he appeared dyspneic. Pt's SpO2 recorded at 95% and pt able to stand for short period with mod-maxA/RW. Mr. Nataly Feng could benefit from skilled PT as he is currently functioning below his baseline. In addition to evaluation pt received treatment consisting of therapeutic activity. He performed several STS transfers with RW/mod-maxA with cues of safety. Pt also performed standing activities demonstrating fair to poor standing balance. He was given cuing for posture and breathing techniques while up but appeared to fatigue fairly quickly. Pt noted to have increased hip/knee flexion with increased duration OOB. Pt given modA x 2 for sit to supine and SpO2 again checked, above 90%. Pt benefited from treatment to address decreased strength and activity tolerance. This section established at most recent assessment PROBLEM LIST (Impairments causing functional limitations): 1. Decreased Strength 2. Decreased ADL/Functional Activities 3. Decreased Transfer Abilities 4. Decreased Ambulation Ability/Technique 5. Decreased Balance 6. Decreased Activity Tolerance 7. Increased Fatigue 8. Increased Shortness of Breath 9. Edema/Girth 10. Decreased Cognition INTERVENTIONS PLANNED: (Benefits and precautions of physical therapy have been discussed with the patient.) 1. Balance Exercise 2. Bed Mobility 3. Family Education 4. Gait Training 5. Therapeutic Activites 6. Therapeutic Exercise/Strengthening 7. Transfer Training TREATMENT PLAN: Frequency/Duration: 3 times a week for duration of hospital stay Rehabilitation Potential For Stated Goals: Fair RECOMMENDED REHABILITATION/EQUIPMENT: (at time of discharge pending progress): Due to the probability of continued deficits (see above) this patient will likely need continued skilled physical therapy after discharge. Equipment:  
? None at this time HISTORY:  
History of Present Injury/Illness (Reason for Referral): 
See H&P Past Medical History/Comorbidities:  
Mr. Charli Ryder  has a past medical history of Acute CHF (congestive heart failure) ([facility] (4/25/2015), Acute combined systolic and diastolic congestive heart failure ([facility] (4/28/2015), De Quervain's tenosynovitis, right (4/28/2015), Dyspnea on exertion (6/28/2015), Elevated serum creatinine (4/25/2015), Elevated troponin (6/28/2015), History of coronary artery disease, History of right knee surgery, History of shingles, Malignant hypertension (4/25/2015), and [facility] Mr. Charli Ryder  has a past surgical history that includes hx knee arthroscopy (Right) and hx heart catheterization (6/29/2015). Social History/Living Environment:  
Home Environment: Rehabilitation facility One/Two Story Residence: One story Living Alone: No 
Support Systems: Spouse/Significant Other/Partner Patient Expects to be Discharged to[de-identified] Unknown Current DME Used/Available at Home: Cathleen Goldberg, rollator Prior Level of Function/Work/Activity: 
Was in rehab PTA but unable to elaborate on  PLOF. Reported not recent falls. Number of Personal Factors/Comorbidities that affect the Plan of Care: 1-2: MODERATE COMPLEXITY EXAMINATION:  
Most Recent Physical Functioning:  
Gross Assessment: 
AROM: Generally decreased, functional 
Strength: Generally decreased, functional 
         
  
Posture: 
Posture (WDL): Exceptions to [facility] Posture Assessment: Rounded shoulders Balance: 
Sitting: Impaired Sitting - Static: Fair (occasional) Sitting - Dynamic: Prop sitting Standing: Impaired Standing - Static: Fair;Constant support Standing - Dynamic : Poor Bed Mobility: 
Supine to Sit: Minimum assistance; Moderate assistance Sit to Supine: Moderate assistance;Assist x2 Scooting: Total assistance Wheelchair Mobility: 
  
Transfers: 
Sit to Stand: Moderate assistance;Maximum assistance Stand to Sit: Moderate assistance Gait: 
  
   
  
Body Structures Involved: 1. Heart 2. Lungs 3. Muscles Body Functions Affected: 1. Cardio 2. Respiratory 3. Genitourinary 4. Neuromusculoskeletal 
5. Movement Related Activities and Participation Affected: 1. Learning and Applying Knowledge 2. General Tasks and Demands 3. Mobility 4. Self Care 5. Domestic Life 6. Community, Social and [place] Number of elements that affect the Plan of Care: 4+: HIGH COMPLEXITY CLINICAL PRESENTATION:  
Presentation: Evolving clinical presentation with changing clinical characteristics: MODERATE COMPLEXITY CLINICAL DECISION MAKING:  
[facility] 6 Clicks Basic Mobility Inpatient Short Form How much difficulty does the patient currently have. .. Unable A Lot A Little None 1. Turning over in bed (including adjusting bedclothes, sheets and blankets)? [] 1   [] 2   [x] 3   [] 4  
2. Sitting down on and standing up from a chair with arms ( e.g., wheelchair, bedside commode, etc.)   [] 1   [x] 2   [] 3   [] 4  
3. Moving from lying on back to sitting on the side of the bed? [] 1   [] 2   [x] 3   [] 4 How much help from another person does the patient currently need. .. Total A Lot A Little None 4. Moving to and from a bed to a chair (including a wheelchair)? [] 1   [x] 2   [] 3   [] 4  
5. Need to walk in hospital room? [] 1   [x] 2   [] 3   [] 4  
6. Climbing 3-5 steps with a railing? [x] 1   [] 2   [] 3   [] 4  
© 2007, Trustees of [employer], under license to [employer]. All rights reserved Score:  Initial: 13 Most Recent: X (Date: -- ) Interpretation of Tool:  Represents activities that are increasingly more difficult (i.e. Bed mobility, Transfers, Gait). Medical Necessity:    
· Patient demonstrates fair rehab potential due to higher previous functional level. Reason for Services/Other Comments: 
· Patient continues to require skilled intervention due to decreased functional mobility, balance, and activity tolerance. Use of outcome tool(s) and clinical judgement create a POC that gives a: Questionable prediction of patient's progress: MODERATE COMPLEXITY  
  
 
 
 
TREATMENT:  
(In addition to Assessment/Re-Assessment sessions the following treatments were rendered) Pre-treatment Symptoms/Complaints: \"Need to pee\" Pain: Initial:  
Pain Intensity 1: 0  Post Session:  0 Therapeutic Activity: (    9 minutes): Therapeutic activities including Bed transfers, standing/sitting activities, and STS transfers to improve mobility, strength, balance and activity tolerance. Required moderate   to promote static and dynamic balance in standing and standing posture with cues for activity pacing. Braces/Orthotics/Lines/Etc:  
· IV 
· [place] catheter · O2 Device: Nasal cannula Treatment/Session Assessment:   
· Response to Treatment:  Fairly given decreased activity tolerance and confusion. · Interdisciplinary Collaboration:  
o Physical Therapist 
o Registered Nurse 
o Certified Nursing Assistant/Patient Care Technician · After treatment position/precautions:  
o Supine in bed 
o Bed alarm/tab alert on 
o Bed/Chair-wheels locked 
o Bed in low position 
o Call light within reach · Compliance with Program/Exercises: Will assess as treatment progresses · Recommendations/Intent for next treatment session: \"Next visit will focus on advancements to more challenging activities and reduction in assistance provided\". Total Treatment Duration: PT Patient Time In/Time Out Time In: 0900 Time Out: 5920 Konrad Jimenez, PT, DPT

## 2019-02-20 NOTE — CONSULTS
Urology Consult    Subjective:     Date of Consultation:  February 19, 2019    The patient is a 76-year-old white male who has a history of congestive heart failure and now presents with peripheral edema extending all the way up to the mid thigh. A Schulz catheter was placed yesterday outside of the hospital.  It is unclear if the patient was in urinary retention at the time or whether this was just placed to facilitate diuresis. Today the patient was complaining of pain in the penis and was having blood at the meatus. CT scan was obtained and revealed that the Schulz balloon was inflated within the prostate. He has been on antibiotics. Urinalysis today showed 20-50 WBCs and greater than 100 RBCs but no bacteria. I instructed the nurses to deflate the balloon and advanced it into the bladder which was subsequently done. After that he did drain a fair amount of urine. He also had some relief of his penile and pelvic pain.     Past Medical History:   Diagnosis Date    Acute CHF (congestive heart failure) ([address] 4/25/2015    Acute combined systolic and diastolic congestive heart failure ([address] 4/28/2015    De Quervain's tenosynovitis, right 4/28/2015    Dyspnea on exertion 6/28/2015    Elevated serum creatinine 4/25/2015    Elevated troponin 6/28/2015    History of coronary artery disease     MI at 25 yo    History of right knee surgery     cartilage removal    History of shingles     Malignant hypertension 4/25/2015    [facility])       Past Surgical History:   Procedure Laterality Date    HX HEART CATHETERIZATION  6/29/2015    no intervention    HX KNEE ARTHROSCOPY Right     removal of cartilage      Family History   Problem Relation Age of Onset    Hypertension Mother     No Known Problems Father       Social History     Tobacco Use    Smoking status: Former Smoker     Packs/day: 0.25     Years: 20.00     Pack years: 5.00     Types: Cigarettes     Last attempt to quit: 4/5/2015     Years since quitting: 3.8    Smokeless tobacco: Never Used   Substance Use Topics    Alcohol use: No     Alcohol/week: 0.0 oz     Allergies   Allergen Reactions    Pcn [Penicillins] Other (comments)     \"makes my heart stop\"      Prior to Admission medications    Medication Sig Start Date End Date Taking? Authorizing Provider   omeprazole (PRILOSEC) 20 mg capsule Take 1 Cap by mouth daily. 1/24/19   Mark Houston MD   potassium chloride (K-DUR, KLOR-CON) 20 mEq tablet Take 1 Tab by mouth daily. 1/23/19   Reinaldo Carlos MD   Blood-Glucose Meter (ACCU-CHEK YOSEF PLUS METER) misc USE TO CHECK BLOOD SUGARS DAILY DX: E11.9 12/27/18   Mark Houston MD   Blood Glucose Control High&Low (ACCU-CHEK YOSEF CONTROL SOLN) soln USE AS DIRECTED 12/27/18   Mark Houston MD   glucose blood VI test strips (ACCU-CHEK YOSEF PLUS TEST STRP) strip USE TO CHECK BLOOD SUGARS DAILY DX: E11.9 12/27/18   Mark Houston MD   alcohol swabs (BD SINGLE USE SWABS REGULAR) padm USE AS DIRECTED DAILY DX. E11.9 12/27/18   Mark Houston MD   lancets (ACCU-CHEK SOFTCLIX LANCETS) misc USE TO CHECK BLOOD SUGARS DAILY DX: E11.9 12/27/18   Mark Houston MD   carvedilol (COREG) 6.25 mg tablet Take 1 Tab by mouth two (2) times daily (with meals). 12/3/18   Pallavi Jimenez MD   apixaban (ELIQUIS) 5 mg tablet Take 1 Tab by mouth every twelve (12) hours. 12/3/18   Pallavi Jimenez MD   aspirin 81 mg chewable tablet Take 1 Tab by mouth daily. 11/5/18   Maryse Marie MD   atorvastatin (LIPITOR) 20 mg tablet Take 1 Tab by mouth nightly. 10/4/18   Mark Houston MD   allopurinol (ZYLOPRIM) 100 mg tablet TAKE 1 TABLET BY MOUTH EVERY DAY 9/25/18   Mark Houston MD   fluticasone [facility]) 50 mcg/actuation nasal spray 2 Sprays by Both Nostrils route daily. 3/29/18   Wen Mata MD   melatonin 3 mg tablet Take 3 mg by mouth nightly.     Provider, Historical   albuterol (VENTOLIN HFA) 90 mcg/actuation inhaler Take 2 Puffs by inhalation four (4) times daily. 18   Luanne Caballero NP   polyethylene glycol (MIRALAX) 17 gram packet Take 1 Packet by mouth daily. Patient taking differently: Take 17 g by mouth daily as needed. 18   Julissa MERLOS NP   albuterol-ipratropium (DUO-NEB) 2.5 mg-0.5 mg/3 ml nebu 3 mL by Nebulization route four (4) times daily. Diaignosis--J44.9 17   Luanne Caballero NP         Review of Systems: unobtainable due to confusion. Objective:     Patient Vitals for the past 8 hrs:   BP Temp Pulse Resp SpO2   19 1915     93 %   19 1817   95     19 1650     94 %   19 1626 145/89 97.6 °F (36.4 °C) (!) 142 24 93 %   19 1437 (!) 131/92       19 1416     100 %   19 1407 124/86    94 %     Temp (24hrs), Av.3 °F (36.3 °C), Min:96.9 °F (36.1 °C), Max:97.6 °F (36.4 °C)      Intake and Output:   No intake/output data recorded. Physical exam:. The patient is afebrile with stable vital signs. He is pleasantly confused telling me \"you are my best friend\". The abdomen is soft and nontender. The phallus is uncircumcised and there is some blood at the meatus. Both testicles are descended and normal size, shape, consistency. There is no evidence of epididymoorchitis. A 16 French Schulz catheter is in place draining blood-tinged urine. It does easily irrigate however. Peripheral edema noted up to the mid thigh. Assessment:     Active Problems:    CHF (congestive heart failure) ([facility] (2017)         The patient's Schulz catheter was improperly placed within the prostate and is now in appropriate position. It is unclear if the patient was in urinary retention with a Schulz catheter was placed. Plan: Schulz catheter should be left in place. Current medication should be continued. Once his cardiac and overall fluid balance has improved we can consider options for potential BPH with urinary retention.     Signed By: Juan Carlos Cabrera Carlos Anders MD                         February 19, 2019

## 2019-02-20 NOTE — PROGRESS NOTES
HOSPITALIST PROGRESS NOTE Patient: Giulia Christensen. Sex: male             MRN: [id number] YOB: 1945      Age:  66 y.o. Chief Complaint:  SOB Subjective: This is a 57-year-old gentleman with multiple medical problems including CHF, A. fib, CKD stage IV,, chronic COPD, obstructive sleep apnea who is noncompliant with CPAP, who is currently in a rehab facility, admitted on 2/19 for acute on chronic CHF in view of worsening bilateral LE swelling, hypoxic respiratory failure and hematuria. was brought into the emergency room with complaints of hematuria, leg swellings and shortness of breath. He was also having shortness of breath, moderate in severity, progressively getting worse, worse with minimal exertion, not resolved with rest.  Breathing is worse lying down, better with sitting up.   
 
2/20: 
Pt seen at bedside Reports feeling okay. He states that he is not sure why he was brought in to ER. He reports his SOB is at baseline, denies chest pain, nausea/vomiting, abdominal pain, diarrhea. He does have chronic bilateral LE swelling, which is getting worse. He denies any suprapubic or penile pain Schulz cathter was adjusted Review of Systems 10 point ROS is negative except what mentioned above Prior to Admission medications Medication Sig Start Date End Date Taking? Authorizing Provider  
omeprazole (PRILOSEC) 20 mg capsule Take 1 Cap by mouth daily. 1/24/19   Stanley Murrell MD  
potassium chloride (K-DUR, KLOR-CON) 20 mEq tablet Take 1 Tab by mouth daily.  1/23/19   Catarino Abbott MD  
Blood-Glucose Meter (ACCU-CHEK YOSEF PLUS METER) misc USE TO CHECK BLOOD SUGARS DAILY DX: E11.9 12/27/18   Stanley Murrell MD  
Blood Glucose Control High&Low (ACCU-CHEK YOSEF CONTROL SOLN) soln USE AS DIRECTED 12/27/18   Stanley Murrell MD  
glucose blood VI test strips (ACCU-CHEK YOSEF PLUS TEST STRP) strip USE TO CHECK BLOOD SUGARS DAILY DX: E11.9 18   Trang Hyman MD  
alcohol swabs (BD SINGLE USE SWABS REGULAR) padm USE AS DIRECTED DAILY DX. E11.9 18   Trang Hyman MD  
lancets (ACCU-CHEK SOFTCLIX LANCETS) misc USE TO CHECK BLOOD SUGARS DAILY DX: E11.9 18   Trang Hyman MD  
carvedilol (COREG) 6.25 mg tablet Take 1 Tab by mouth two (2) times daily (with meals). 12/3/18   Shira Cid MD  
apixaban (ELIQUIS) 5 mg tablet Take 1 Tab by mouth every twelve (12) hours. 12/3/18   Shira Cid MD  
aspirin 81 mg chewable tablet Take 1 Tab by mouth daily. 18   Velma Marie MD  
atorvastatin (LIPITOR) 20 mg tablet Take 1 Tab by mouth nightly. 10/4/18   Trang Hyman MD  
allopurinol (ZYLOPRIM) 100 mg tablet TAKE 1 TABLET BY MOUTH EVERY DAY 18   Trang Hyman MD  
fluticasone [facility]) 50 mcg/actuation nasal spray 2 Sprays by Both Nostrils route daily. 3/29/18   Maribell Mata MD  
melatonin 3 mg tablet Take 3 mg by mouth nightly. Provider, Historical  
albuterol (VENTOLIN HFA) 90 mcg/actuation inhaler Take 2 Puffs by inhalation four (4) times daily. 18   Mehrdad Patricia NP  
polyethylene glycol (MIRALAX) 17 gram packet Take 1 Packet by mouth daily. Patient taking differently: Take 17 g by mouth daily as needed. 18   Sandy MERLOS NP  
albuterol-ipratropium (DUO-NEB) 2.5 mg-0.5 mg/3 ml nebu 3 mL by Nebulization route four (4) times daily. Diaignosis--J44.9 17   Mehrdad Patricia NP Physical Exam  
 
Visit Vitals /82 (BP 1 Location: Right arm, BP Patient Position: At rest) Pulse 65 Temp 97.9 °F (36.6 °C) Resp 20 Ht 5' 10\" (1.778 m) Wt 100.3 kg (221 lb 1.6 oz) SpO2 99% BMI 31.72 kg/m² Temp (24hrs), Av.5 °F (36.4 °C), Min:96.9 °F (36.1 °C), Max:97.9 °F (36.6 °C) Oxygen Therapy O2 Sat (%): 99 % (1945) Pulse via Oximetry: 79 beats per minute (19) O2 Device: Nasal cannula (19) O2 Flow Rate (L/min): 2 l/min(wears 2L at home) (02/20/19 0840) Intake/Output Summary (Last 24 hours) at 2/20/2019 1782 Last data filed at 2/20/2019 8893 Gross per 24 hour Intake  Output 1350 ml Net -1350 ml General: NAD Eyes:  VICENTA, No pallor/icterus HENT:             Oral Mucosa is Moist, No sinus tenderness Neck:               Supple, elevated JVP Lungs:  Diminished bilateral air entry with diffuse crackles up to the midlung on both sides. No significant wheezing Heart:  S1 S2 irregular Abdomen: Soft, Positive bowel sounds, NTND, No guarding/rigidity/rebound tend. Schulz in place with somewhat bloody urine and few small clots. Extremities: Bilateral Pitting edema up in to the thigh Neurologic:   No acute FND, Motor: LUE: 5/5, LLE: 5/5, RUE: 5/5, RLE: 5/5 Skin:                No acute rashes. Sirisha Eves Sirisha Eves Anasarca. Abdominal wall edema Musculoskeletal: No Acute findings Psych:             AOX 3 
 
LAB Recent Results (from the past 24 hour(s)) GLUCOSE, POC Collection Time: 02/19/19 11:15 AM  
Result Value Ref Range Glucose (POC) 74 65 - 100 mg/dL PROTHROMBIN TIME + INR Collection Time: 02/19/19 11:19 AM  
Result Value Ref Range Prothrombin time 30.3 (H) 11.7 - 14.5 sec INR 3.0 POC LACTIC ACID Collection Time: 02/19/19 11:20 AM  
Result Value Ref Range Lactic Acid (POC) 2.63 (H) 0.5 - 1.9 mmol/L  
EKG, 12 LEAD, INITIAL Collection Time: 02/19/19 11:20 AM  
Result Value Ref Range Ventricular Rate 102 BPM  
 Atrial Rate 115 BPM  
 QRS Duration 118 ms Q-T Interval 434 ms QTC Calculation (Bezet) 565 ms Calculated R Axis -71 degrees Calculated T Axis 92 degrees Diagnosis Atrial fibrillation with rapid ventricular response with premature  
ventricular or aberrantly conducted complexes Left axis deviation Anterior infarct (cited on or before 09-JAN-2019) Prolonged QT Abnormal ECG When compared with ECG of 11-JAN-2019 15:18, 
 No significant change was found Confirmed by Haider Clements MD (), CHRISTELLE STACY ([id number]) on 2/19/2019 5:09:08 PM 
  
CBC WITH AUTOMATED DIFF Collection Time: 02/19/19 11:21 AM  
Result Value Ref Range WBC 10.7 4.3 - 11.1 K/uL  
 RBC 5.03 4.23 - 5.6 M/uL  
 HGB 13.7 13.6 - 17.2 g/dL HCT 42.5 41.1 - 50.3 % MCV 84.5 79.6 - 97.8 FL  
 MCH 27.2 26.1 - 32.9 PG  
 MCHC 32.2 31.4 - 35.0 g/dL  
 RDW 19.4 (H) 11.9 - 14.6 % PLATELET 095 995 - 843 K/uL MPV 11.4 9.4 - 12.3 FL ABSOLUTE NRBC 0.00 0.0 - 0.2 K/uL  
 DF AUTOMATED NEUTROPHILS 75 43 - 78 % LYMPHOCYTES 14 13 - 44 % MONOCYTES 10 4.0 - 12.0 % EOSINOPHILS 0 (L) 0.5 - 7.8 % BASOPHILS 0 0.0 - 2.0 % IMMATURE GRANULOCYTES 1 0.0 - 5.0 %  
 ABS. NEUTROPHILS 8.1 1.7 - 8.2 K/UL  
 ABS. LYMPHOCYTES 1.5 0.5 - 4.6 K/UL  
 ABS. MONOCYTES 1.1 0.1 - 1.3 K/UL  
 ABS. EOSINOPHILS 0.0 0.0 - 0.8 K/UL  
 ABS. BASOPHILS 0.0 0.0 - 0.2 K/UL  
 ABS. IMM. GRANS. 0.1 0.0 - 0.5 K/UL METABOLIC PANEL, COMPREHENSIVE Collection Time: 02/19/19 11:21 AM  
Result Value Ref Range Sodium 141 136 - 145 mmol/L Potassium 4.6 3.5 - 5.1 mmol/L Chloride 103 98 - 107 mmol/L  
 CO2 27 21 - 32 mmol/L Anion gap 11 7 - 16 mmol/L Glucose 85 65 - 100 mg/dL BUN 30 (H) 8 - 23 MG/DL Creatinine 2.42 (H) 0.8 - 1.5 MG/DL  
 GFR est AA 34 (L) >60 ml/min/1.73m2 GFR est non-AA 28 (L) >60 ml/min/1.73m2 Calcium 9.3 8.3 - 10.4 MG/DL Bilirubin, total 4.5 (H) 0.2 - 1.1 MG/DL  
 ALT (SGPT) 22 12 - 65 U/L  
 AST (SGOT) 42 (H) 15 - 37 U/L Alk. phosphatase 139 (H) 50 - 136 U/L Protein, total 7.5 6.3 - 8.2 g/dL Albumin 3.4 3.2 - 4.6 g/dL Globulin 4.1 (H) 2.3 - 3.5 g/dL A-G Ratio 0.8 (L) 1.2 - 3.5 BNP Collection Time: 02/19/19 11:21 AM  
Result Value Ref Range  pg/mL PROCALCITONIN Collection Time: 02/19/19 11:21 AM  
Result Value Ref Range Procalcitonin 0.4 ng/mL CULTURE, BLOOD  Collection Time: 02/19/19 11:40 AM  
 Result Value Ref Range Special Requests: RIGHT Antecubital 
    
 Culture result: NO GROWTH AFTER 17 HOURS    
POC VENOUS BLOOD GAS Collection Time: 02/19/19 12:02 PM  
Result Value Ref Range Device: NASAL CANNULA pH, venous (POC) 7.354 7.32 - 7.42    
 pCO2, venous (POC) 49.2 41 - 51 MMHG  
 pO2, venous (POC) 19 (LL) mmHg HCO3, venous (POC) 27.4 23 - 28 MMOL/L  
 sO2, venous (POC) 28 (L) 65 - 88 % Allens test (POC) YES Site RIGHT RADIAL Patient temp. 98.6 Specimen type (POC) MIXED VENOUS Performed by Festus   
 CO2, POC 29 MMOL/L Flow rate (POC) 2.000 L/min Respiratory comment: PhysicianNotified COLLECT TIME 1,158 URINALYSIS W/ RFLX MICROSCOPIC Collection Time: 02/19/19 12:20 PM  
Result Value Ref Range Color RED Appearance TURBID Specific gravity 1.012 1.001 - 1.023    
 pH (UA) 6.0 5.0 - 9.0 Protein 100 (A) NEG mg/dL Glucose NEGATIVE  mg/dL Ketone TRACE (A) NEG mg/dL Bilirubin MODERATE (A) NEG Blood LARGE (A) NEG Urobilinogen 1.0 0.2 - 1.0 EU/dL Nitrites POSITIVE (A) NEG Leukocyte Esterase MODERATE (A) NEG    
 WBC 20-50 0 /hpf  
 RBC >100 0 /hpf Epithelial cells 0-3 0 /hpf Bacteria 0 0 /hpf Casts 3-5 0 /lpf CULTURE, URINE Collection Time: 02/19/19 12:20 PM  
Result Value Ref Range Special Requests: NO SPECIAL REQUESTS Culture result:     
  NO GROWTH AFTER SHORT PERIOD OF INCUBATION. FURTHER RESULTS TO FOLLOW AFTER OVERNIGHT INCUBATION. CULTURE, BLOOD Collection Time: 02/19/19 12:52 PM  
Result Value Ref Range Special Requests: RIGHT 
HAND 
    
 GRAM STAIN GRAM POSITIVE COCCI IN CHAINS    
 GRAM STAIN AEROBIC AND ANAEROBIC BOTTLES    
 GRAM STAIN     
  RESULTS VERIFIED, PHONED TO AND READ BACK BY EAMON Duncan @[id number] 02/20/2019 [facility] Culture result: CULTURE IN PROGRESS,FURTHER UPDATES TO FOLLOW    
AMMONIA  Collection Time: 02/19/19 12:52 PM  
 Result Value Ref Range Ammonia 33 (H) 11 - 32 UMOL/L  
GLUCOSE, POC Collection Time: 02/19/19  4:38 PM  
Result Value Ref Range Glucose (POC) 80 65 - 100 mg/dL CK Collection Time: 02/19/19  4:48 PM  
Result Value Ref Range  (H) 21 - 215 U/L  
GLUCOSE, POC Collection Time: 02/19/19  9:58 PM  
Result Value Ref Range Glucose (POC) 96 65 - 100 mg/dL CBC WITH AUTOMATED DIFF Collection Time: 02/20/19  3:55 AM  
Result Value Ref Range WBC 9.8 4.3 - 11.1 K/uL  
 RBC 4.72 4.23 - 5.6 M/uL  
 HGB 12.9 (L) 13.6 - 17.2 g/dL HCT 39.7 (L) 41.1 - 50.3 % MCV 84.1 79.6 - 97.8 FL  
 MCH 27.3 26.1 - 32.9 PG  
 MCHC 32.5 31.4 - 35.0 g/dL  
 RDW 19.2 (H) 11.9 - 14.6 % PLATELET 322 (L) 913 - 450 K/uL MPV 11.6 9.4 - 12.3 FL ABSOLUTE NRBC 0.02 0.0 - 0.2 K/uL  
 DF AUTOMATED NEUTROPHILS 75 43 - 78 % LYMPHOCYTES 14 13 - 44 % MONOCYTES 10 4.0 - 12.0 % EOSINOPHILS 0 (L) 0.5 - 7.8 % BASOPHILS 0 0.0 - 2.0 % IMMATURE GRANULOCYTES 0 0.0 - 5.0 %  
 ABS. NEUTROPHILS 7.4 1.7 - 8.2 K/UL  
 ABS. LYMPHOCYTES 1.4 0.5 - 4.6 K/UL  
 ABS. MONOCYTES 1.0 0.1 - 1.3 K/UL  
 ABS. EOSINOPHILS 0.0 0.0 - 0.8 K/UL  
 ABS. BASOPHILS 0.0 0.0 - 0.2 K/UL  
 ABS. IMM. GRANS. 0.0 0.0 - 0.5 K/UL METABOLIC PANEL, COMPREHENSIVE Collection Time: 02/20/19  3:55 AM  
Result Value Ref Range Sodium 142 136 - 145 mmol/L Potassium 3.9 3.5 - 5.1 mmol/L Chloride 105 98 - 107 mmol/L  
 CO2 25 21 - 32 mmol/L Anion gap 12 7 - 16 mmol/L Glucose 93 65 - 100 mg/dL BUN 34 (H) 8 - 23 MG/DL Creatinine 2.36 (H) 0.8 - 1.5 MG/DL  
 GFR est AA 35 (L) >60 ml/min/1.73m2 GFR est non-AA 29 (L) >60 ml/min/1.73m2 Calcium 9.2 8.3 - 10.4 MG/DL Bilirubin, total 3.6 (H) 0.2 - 1.1 MG/DL  
 ALT (SGPT) 26 12 - 65 U/L  
 AST (SGOT) 34 15 - 37 U/L Alk. phosphatase 130 50 - 136 U/L Protein, total 7.0 6.3 - 8.2 g/dL Albumin 3.4 3.2 - 4.6 g/dL Globulin 3.6 (H) 2.3 - 3.5 g/dL A-G Ratio 0.9 (L) 1.2 - 3.5 PROTHROMBIN TIME + INR Collection Time: 02/20/19  3:55 AM  
Result Value Ref Range Prothrombin time 27.0 (H) 11.7 - 14.5 sec INR 2.5 AMMONIA Collection Time: 02/20/19  3:55 AM  
Result Value Ref Range Ammonia 29 11 - 32 UMOL/L  
LACTIC ACID Collection Time: 02/20/19  3:55 AM  
Result Value Ref Range Lactic acid 3.1 (HH) 0.4 - 2.0 MMOL/L  
MAGNESIUM Collection Time: 02/20/19  3:55 AM  
Result Value Ref Range Magnesium 2.1 1.8 - 2.4 mg/dL PHOSPHORUS Collection Time: 02/20/19  3:55 AM  
Result Value Ref Range Phosphorus 3.9 (H) 2.3 - 3.7 MG/DL  
GLUCOSE, POC Collection Time: 02/20/19  6:29 AM  
Result Value Ref Range Glucose (POC) 94 65 - 100 mg/dL IMAGING:  
 
Xr Chest Pa Lat Result Date: 2/19/2019 IMPRESSION: Cardiomegaly and prominent pulmonary vascular markings. Ct Head Wo Cont Result Date: 2/19/2019 Impression: Negative CT scan of the brain. Note: If a subtle CVA is suspected, MRI would be more definitive if clinically warranted. Ct Abd Pelv Wo Cont Result Date: 2/19/2019 IMPRESSION: 1. Schulz catheter with balloon inflated within the prostate gland with a moderate volume of urine seen in the bladder. 2. Moderate size right and small left-sided pleural effusion. Mild diffuse anasarca. 3. Moderate volume ascites within the right and left upper quadrants. Results discussed with floor RN at 6:57 PM on Tuesday, February 19, 2019 via telephone with Dr. Marily Johnson. All Micro Results Procedure Component Value Units Date/Time CULTURE, URINE [[id number]] Collected:  02/19/19 1220 Order Status:  Completed Specimen:  Cath Urine Updated:  02/20/19 8587 Special Requests: NO SPECIAL REQUESTS Culture result:    
  NO GROWTH AFTER SHORT PERIOD OF INCUBATION. FURTHER RESULTS TO FOLLOW AFTER OVERNIGHT INCUBATION. CULTURE, BLOOD [[id number]] Collected:  02/19/19 1140 Order Status:  Completed Specimen:  Blood Updated:  02/20/19 2801 Special Requests: --     
  RIGHT Antecubital 
  
  Culture result: NO GROWTH AFTER 17 HOURS     
 CULTURE, BLOOD [[id number]] Collected:  02/19/19 1252 Order Status:  Completed Specimen:  Blood Updated:  02/20/19 7834 Special Requests: --     
  RIGHT 
HAND 
  
  GRAM STAIN    
  GRAM POSITIVE COCCI IN CHAINS AEROBIC AND ANAEROBIC BOTTLES RESULTS VERIFIED, PHONED TO AND READ BACK BY EAMON GONZALEZ @2757 02/20/2019 [facility] Culture result:    
  CULTURE IN PROGRESS,FURTHER UPDATES TO FOLLOW  
     
  
 
 
EKG: personally reviewed and shows A.fib with RVR 
 
CXR: Personally reviewed and shows fluid overload and pulm edema+ Assessment/Plan 1. Acute on chronic Combined systolic and diastolic heart failure decompensated 2. CKD stage IV: stable 3. Hematuria: clearing 4. Chronic COPD, does not appear to have any exacerbation. 5.  Hyperbilirubinemia, coagulopathy, Could be secondary to  liver dysfunction from passive congestion from heart failure. will  follow-up on CT abdomen. Monitor liver functions and bilirubin levels. 6.  Coagulopathy could be secondary to acute liver dysfunction or vitamin deficiency. We will give him vitamin K considering hematuria and elevated INR and observe. 7.  History of carotid disease 8. A. fib with RVR, continue Coreg. Hold Eliquis considering coagulopathy, hematuria and possible acute liver dysfunction. 9.  Obesity 10. Acute encephalopathy: resolved encephalopathy from acute liver dysfunction. Will give him 1 dose of lactulose and also check ammonia levels. 11.  UTI ? -Follow-up urine cultures, meanwhile keep him on IV ceftriaxone. 12.  Hypertension, monitor blood pressure closely and use hydralazine as needed. 13.  Chronic thrombocytopenia. Could be secondary to liver dysfunction. 14.  Lactic acidosis, could be secondary to hypoxemia will be from liver dysfunction. Monitor lactate levels. Continue IV diuresis with IV Lasix 80 mg every 8 hourly and monitor his urine output, creatinine, electrolytes closely. 1.3 ltrs negative CTAP reviewed: [place] cath balloon deflated and advanced into the bladder, hematuria is clearing up. Urology suggests to keep the [place] in place CKD and need for IV diuresis: will monitor  BMP, creatinine slightly improved from 2.42 to 2.36 Urine culture pending: continue IV rocephin 
Continue potassium replacement Continue other home meds as reconciled in [facility] 
 
DVT prophylaxis: SCDs. WILL RESTART [place] Code status: Full Risk: High risk sec to above medical issues. Pt/ot Nay Wolff MD 
February 20, 2019

## 2019-02-20 NOTE — PROGRESS NOTES
PT Note: PT orders received/reviewed and evaluation attempted. Patient was able to provide some history but unable to complete evaluation due to RN initiating new IV with  present for blood draw. .  Evaluation will be re-attempted as schedule and patient's status allow. Thanks, Ruma Stack, PT, DPT

## 2019-02-20 NOTE — ROUTINE PROCESS
CHF teaching started post introduction to pt/family; aware of diagnosis. Planner/scale @ BS and will follow. Smoking/ ETOH/Illicit drug use cessation and maintain a healthy weight covered. Pt/family aware that I can not prescribe nor adjust  medications: 15mins Palliative Care score: 50 entered- multiple admissions recently CHF ckd IV, COPD 
ACP on file Start 2L/D Fluid restriction/ cardiac diet CHF teaching continues to pt/family. Emphasis on taking prescription meds as ordered, to keep F/U appts and to call MD STAT if any of the following occur: ? If you gain 2 lbs in one day or 5 lbs in a week, and short of breath. ? If you can not lay flat without developing short of breath or rapid breathing at night; or if it wakes you up. Develop a cough or wheezing. ? If you notice swollen hands/feet/ankles or stomach with a bloated/ full feeling. ? If you become confused or mentally fuzzy or dizzy. ? If you notice a rapid or change in your heart rate. ? If you become more exhausted all the time and unable to do the same level of activity without stopping to catch your breath. Drink no more than 8 cups a day in 8 oz. cups. Your Heart can not handle any more. Stay away from salt (limit anything with salt or sodium in it). Limit to 250mg per serving. Pt/family verbalizes understanding, will follow to reinforce teaching skills: 20 mins

## 2019-02-21 LAB
ANION GAP SERPL CALC-SCNC: 12 MMOL/L (ref 7–16)
APTT PPP: 173.4 SEC (ref 24.7–39.8)
APTT PPP: 35.7 SEC (ref 24.7–39.8)
BUN SERPL-MCNC: 39 MG/DL (ref 8–23)
CALCIUM SERPL-MCNC: 8.5 MG/DL (ref 8.3–10.4)
CHLORIDE SERPL-SCNC: 102 MMOL/L (ref 98–107)
CO2 SERPL-SCNC: 25 MMOL/L (ref 21–32)
CREAT SERPL-MCNC: 2.57 MG/DL (ref 0.8–1.5)
ERYTHROCYTE [DISTWIDTH] IN BLOOD BY AUTOMATED COUNT: 19.1 % (ref 11.9–14.6)
GLUCOSE BLD STRIP.AUTO-MCNC: 102 MG/DL (ref 65–100)
GLUCOSE BLD STRIP.AUTO-MCNC: 104 MG/DL (ref 65–100)
GLUCOSE BLD STRIP.AUTO-MCNC: 106 MG/DL (ref 65–100)
GLUCOSE BLD STRIP.AUTO-MCNC: 119 MG/DL (ref 65–100)
GLUCOSE BLD STRIP.AUTO-MCNC: 87 MG/DL (ref 65–100)
GLUCOSE SERPL-MCNC: 95 MG/DL (ref 65–100)
HCT VFR BLD AUTO: 36.3 % (ref 41.1–50.3)
HGB BLD-MCNC: 11.9 G/DL (ref 13.6–17.2)
INR PPP: 2
MCH RBC QN AUTO: 26.9 PG (ref 26.1–32.9)
MCHC RBC AUTO-ENTMCNC: 32.8 G/DL (ref 31.4–35)
MCV RBC AUTO: 82.1 FL (ref 79.6–97.8)
MM INDURATION POC: 0 MM (ref 0–5)
NRBC # BLD: 0.13 K/UL (ref 0–0.2)
PLATELET # BLD AUTO: 126 K/UL (ref 150–450)
PMV BLD AUTO: 11.3 FL (ref 9.4–12.3)
POTASSIUM SERPL-SCNC: 3.6 MMOL/L (ref 3.5–5.1)
PPD POC: NEGATIVE NEGATIVE
PROTHROMBIN TIME: 22.3 SEC (ref 11.7–14.5)
RBC # BLD AUTO: 4.42 M/UL (ref 4.23–5.6)
SODIUM SERPL-SCNC: 139 MMOL/L (ref 136–145)
WBC # BLD AUTO: 7.2 K/UL (ref 4.3–11.1)

## 2019-02-21 PROCEDURE — 80048 BASIC METABOLIC PNL TOTAL CA: CPT

## 2019-02-21 PROCEDURE — 74011250636 HC RX REV CODE- 250/636: Performed by: HOSPITALIST

## 2019-02-21 PROCEDURE — 77010033678 HC OXYGEN DAILY

## 2019-02-21 PROCEDURE — 74011250636 HC RX REV CODE- 250/636: Performed by: INTERNAL MEDICINE

## 2019-02-21 PROCEDURE — 94760 N-INVAS EAR/PLS OXIMETRY 1: CPT

## 2019-02-21 PROCEDURE — 94640 AIRWAY INHALATION TREATMENT: CPT

## 2019-02-21 PROCEDURE — 85730 THROMBOPLASTIN TIME PARTIAL: CPT

## 2019-02-21 PROCEDURE — 74011250637 HC RX REV CODE- 250/637: Performed by: INTERNAL MEDICINE

## 2019-02-21 PROCEDURE — 99223 1ST HOSP IP/OBS HIGH 75: CPT | Performed by: NURSE PRACTITIONER

## 2019-02-21 PROCEDURE — 65660000000 HC RM CCU STEPDOWN

## 2019-02-21 PROCEDURE — 97530 THERAPEUTIC ACTIVITIES: CPT

## 2019-02-21 PROCEDURE — 74011000258 HC RX REV CODE- 258: Performed by: HOSPITALIST

## 2019-02-21 PROCEDURE — 74011000250 HC RX REV CODE- 250: Performed by: HOSPITALIST

## 2019-02-21 PROCEDURE — 74011250637 HC RX REV CODE- 250/637: Performed by: UROLOGY

## 2019-02-21 PROCEDURE — 36415 COLL VENOUS BLD VENIPUNCTURE: CPT

## 2019-02-21 PROCEDURE — 97165 OT EVAL LOW COMPLEX 30 MIN: CPT

## 2019-02-21 PROCEDURE — 85610 PROTHROMBIN TIME: CPT

## 2019-02-21 PROCEDURE — 74011250637 HC RX REV CODE- 250/637: Performed by: HOSPITALIST

## 2019-02-21 PROCEDURE — 85027 COMPLETE CBC AUTOMATED: CPT

## 2019-02-21 PROCEDURE — C8929 TTE W OR WO FOL WCON,DOPPLER: HCPCS

## 2019-02-21 PROCEDURE — 82962 GLUCOSE BLOOD TEST: CPT

## 2019-02-21 PROCEDURE — 74011000258 HC RX REV CODE- 258: Performed by: INTERNAL MEDICINE

## 2019-02-21 RX ORDER — HEPARIN SODIUM 5000 [USP'U]/100ML
12-25 INJECTION, SOLUTION INTRAVENOUS
Status: DISCONTINUED | OUTPATIENT
Start: 2019-02-21 | End: 2019-02-26

## 2019-02-21 RX ORDER — VANCOMYCIN HYDROCHLORIDE
1250
Status: DISCONTINUED | OUTPATIENT
Start: 2019-02-22 | End: 2019-02-22 | Stop reason: DRUGHIGH

## 2019-02-21 RX ORDER — HEPARIN SODIUM 5000 [USP'U]/ML
5000 INJECTION, SOLUTION INTRAVENOUS; SUBCUTANEOUS ONCE
Status: COMPLETED | OUTPATIENT
Start: 2019-02-21 | End: 2019-02-21

## 2019-02-21 RX ORDER — IPRATROPIUM BROMIDE AND ALBUTEROL SULFATE 2.5; .5 MG/3ML; MG/3ML
3 SOLUTION RESPIRATORY (INHALATION)
Status: DISCONTINUED | OUTPATIENT
Start: 2019-02-21 | End: 2019-03-12 | Stop reason: HOSPADM

## 2019-02-21 RX ADMIN — FUROSEMIDE 15 MG/HR: 10 INJECTION, SOLUTION INTRAMUSCULAR; INTRAVENOUS at 02:33

## 2019-02-21 RX ADMIN — METOLAZONE 5 MG: 5 TABLET ORAL at 08:22

## 2019-02-21 RX ADMIN — IPRATROPIUM BROMIDE AND ALBUTEROL SULFATE 3 ML: .5; 3 SOLUTION RESPIRATORY (INHALATION) at 12:36

## 2019-02-21 RX ADMIN — IPRATROPIUM BROMIDE AND ALBUTEROL SULFATE 3 ML: .5; 3 SOLUTION RESPIRATORY (INHALATION) at 08:57

## 2019-02-21 RX ADMIN — POTASSIUM CHLORIDE 20 MEQ: 20 TABLET, EXTENDED RELEASE ORAL at 08:22

## 2019-02-21 RX ADMIN — VANCOMYCIN HYDROCHLORIDE 1250 MG: 10 INJECTION, POWDER, LYOPHILIZED, FOR SOLUTION INTRAVENOUS at 05:33

## 2019-02-21 RX ADMIN — HYDROCODONE BITARTRATE AND ACETAMINOPHEN 1 TABLET: 5; 325 TABLET ORAL at 23:41

## 2019-02-21 RX ADMIN — HEPARIN SODIUM 5000 UNITS: 5000 INJECTION INTRAVENOUS; SUBCUTANEOUS at 10:30

## 2019-02-21 RX ADMIN — FINASTERIDE 5 MG: 5 TABLET, FILM COATED ORAL at 09:54

## 2019-02-21 RX ADMIN — Medication 400 MG: at 17:03

## 2019-02-21 RX ADMIN — POLYETHYLENE GLYCOL 3350 17 G: 17 POWDER, FOR SOLUTION ORAL at 08:20

## 2019-02-21 RX ADMIN — Medication 400 MG: at 08:22

## 2019-02-21 RX ADMIN — Medication 10 ML: at 05:34

## 2019-02-21 RX ADMIN — Medication 10 ML: at 21:05

## 2019-02-21 RX ADMIN — FUROSEMIDE 15 MG/HR: 10 INJECTION, SOLUTION INTRAMUSCULAR; INTRAVENOUS at 08:20

## 2019-02-21 RX ADMIN — PANTOPRAZOLE SODIUM 40 MG: 40 TABLET, DELAYED RELEASE ORAL at 08:22

## 2019-02-21 RX ADMIN — CARVEDILOL 6.25 MG: 6.25 TABLET, FILM COATED ORAL at 17:03

## 2019-02-21 RX ADMIN — HEPARIN SODIUM AND DEXTROSE 12 UNITS/KG/HR: 5000; 5 INJECTION INTRAVENOUS at 10:31

## 2019-02-21 RX ADMIN — PERFLUTREN 1 ML: 6.52 INJECTION, SUSPENSION INTRAVENOUS at 09:00

## 2019-02-21 RX ADMIN — MORPHINE SULFATE 2 MG: 2 INJECTION, SOLUTION INTRAMUSCULAR; INTRAVENOUS at 20:55

## 2019-02-21 RX ADMIN — TAMSULOSIN HYDROCHLORIDE 0.4 MG: 0.4 CAPSULE ORAL at 08:22

## 2019-02-21 RX ADMIN — CARVEDILOL 6.25 MG: 6.25 TABLET, FILM COATED ORAL at 08:22

## 2019-02-21 RX ADMIN — METOLAZONE 5 MG: 5 TABLET ORAL at 17:19

## 2019-02-21 RX ADMIN — CEFTRIAXONE SODIUM 2 G: 2 INJECTION, POWDER, FOR SOLUTION INTRAMUSCULAR; INTRAVENOUS at 17:05

## 2019-02-21 RX ADMIN — Medication 400 MG: at 12:34

## 2019-02-21 RX ADMIN — FUROSEMIDE 15 MG/HR: 10 INJECTION, SOLUTION INTRAMUSCULAR; INTRAVENOUS at 17:12

## 2019-02-21 RX ADMIN — FLUTICASONE PROPIONATE 2 SPRAY: 50 SPRAY, METERED NASAL at 08:20

## 2019-02-21 NOTE — PROGRESS NOTES
HOSPITALIST PROGRESS NOTE Patient: Destinee Álvarez. Sex: male             MRN: [id number] YOB: 1945      Age:  66 y.o. Chief Complaint:  SOB Subjective: This is a 70-year-old gentleman with multiple medical problems including CHF, A. fib, CKD stage IV,, chronic COPD, obstructive sleep apnea who is noncompliant with CPAP, who is currently in a rehab facility, admitted on 2/19 for acute on chronic CHF in view of worsening bilateral LE swelling, hypoxic respiratory failure and hematuria. was brought into the emergency room with complaints of hematuria, leg swellings and shortness of breath. He was also having shortness of breath, moderate in severity, progressively getting worse, worse with minimal exertion, not resolved with rest.  Breathing is worse lying down, better with sitting up.   
 
2/21: 
Pt seen at bedside Reports feeling okay. No worsening of SOB, denies any pain, nausea/vomiting Still has significant anasarca On 2 ltrs NC. No overnight events. Review of Systems 10 point ROS is negative except what mentioned above Prior to Admission medications Medication Sig Start Date End Date Taking? Authorizing Provider  
omeprazole (PRILOSEC) 20 mg capsule Take 1 Cap by mouth daily. 1/24/19   Liset Grover MD  
potassium chloride (K-DUR, KLOR-CON) 20 mEq tablet Take 1 Tab by mouth daily.  1/23/19   Ignacio Corbin MD  
Blood-Glucose Meter (ACCU-CHEK YOSEF PLUS METER) misc USE TO CHECK BLOOD SUGARS DAILY DX: E11.9 12/27/18   Liset Grover MD  
Blood Glucose Control High&Low (ACCU-CHEK YOSEF CONTROL SOLN) soln USE AS DIRECTED 12/27/18   Liset Grover MD  
glucose blood VI test strips (ACCU-CHEK YOSEF PLUS TEST STRP) strip USE TO CHECK BLOOD SUGARS DAILY DX: E11.9 12/27/18   Liset Grover MD  
alcohol swabs (BD SINGLE USE SWABS REGULAR) padm USE AS DIRECTED DAILY DX. E11.9 12/27/18   Liset Grover MD  
 lancets (ACCU-CHEK SOFTCLIX LANCETS) misc USE TO CHECK BLOOD SUGARS DAILY DX: E11.9 18   Evette Benjamin MD  
carvedilol (COREG) 6.25 mg tablet Take 1 Tab by mouth two (2) times daily (with meals). 12/3/18   Everton Valencia MD  
apixaban (ELIQUIS) 5 mg tablet Take 1 Tab by mouth every twelve (12) hours. 12/3/18   Everton Valencia MD  
aspirin 81 mg chewable tablet Take 1 Tab by mouth daily. 18   Mallet, Faith Lombard, MD  
atorvastatin (LIPITOR) 20 mg tablet Take 1 Tab by mouth nightly. 10/4/18   Evette Benjamin MD  
allopurinol (ZYLOPRIM) 100 mg tablet TAKE 1 TABLET BY MOUTH EVERY DAY 18   Evette Benjamin MD  
fluticasone [facility]) 50 mcg/actuation nasal spray 2 Sprays by Both Nostrils route daily. 3/29/18   Sonny Mata MD  
melatonin 3 mg tablet Take 3 mg by mouth nightly. Provider, Historical  
albuterol (VENTOLIN HFA) 90 mcg/actuation inhaler Take 2 Puffs by inhalation four (4) times daily. 18   Kiya Pierce NP  
polyethylene glycol (MIRALAX) 17 gram packet Take 1 Packet by mouth daily. Patient taking differently: Take 17 g by mouth daily as needed. 18   Jared MERLOS NP  
albuterol-ipratropium (DUO-NEB) 2.5 mg-0.5 mg/3 ml nebu 3 mL by Nebulization route four (4) times daily. Diaignosis--J44.9 17   Kiya Pierce NP Physical Exam  
 
Visit Vitals BP 97/68 Pulse 100 Temp 97.3 °F (36.3 °C) Resp 20 Ht 5' 10\" (1.778 m) Wt 98.3 kg (216 lb 12.8 oz) SpO2 99% BMI 31.11 kg/m² Temp (24hrs), Av.6 °F (36.4 °C), Min:97.3 °F (36.3 °C), Max:97.9 °F (36.6 °C) Oxygen Therapy O2 Sat (%): 99 % (19 0547) Pulse via Oximetry: 100 beats per minute (19) O2 Device: Nasal cannula (19) O2 Flow Rate (L/min): 2 l/min (19) Intake/Output Summary (Last 24 hours) at 2019 6314 Last data filed at 2019 5289 Gross per 24 hour Intake 150 ml Output 950 ml Net -800 ml General: NAD 
 Eyes: VICENTA, No pallor/icterus HENT:             Oral Mucosa is Moist, No sinus tenderness Neck:               Supple, elevated JVP Lungs:  Diminished bilateral air entry with diffuse crackles up to the midlung on both sides. No significant wheezing Heart:  S1 S2 irregular Abdomen: Soft, non tender, distended, BS normal 
Extremities: Bilateral UE/LE Pitting edema, Neurologic:   No acute FND, Motor: LUE: 5/5, LLE: 5/5, RUE: 5/5, RLE: 5/5 Skin:                No acute rashes. Annamary Ripa Annamary Ripa Anasarca. Abdominal wall edema Musculoskeletal: No Acute findings Psych:             AOX 3 
 
LAB Recent Results (from the past 24 hour(s)) LACTIC ACID Collection Time: 02/20/19  9:16 AM  
Result Value Ref Range Lactic acid 2.1 (HH) 0.4 - 2.0 MMOL/L  
GLUCOSE, POC Collection Time: 02/20/19 11:42 AM  
Result Value Ref Range Glucose (POC) 120 (H) 65 - 100 mg/dL PROTEIN/CREATININE RATIO, URINE Collection Time: 02/20/19  2:07 PM  
Result Value Ref Range Protein, urine random 128 (H) <11.9 mg/dL Creatinine, urine 138.00 mg/dL Protein/Creat. urine Ratio 0.9 GLUCOSE, POC Collection Time: 02/20/19  5:12 PM  
Result Value Ref Range Glucose (POC) 125 (H) 65 - 100 mg/dL GLUCOSE, POC Collection Time: 02/20/19 10:13 PM  
Result Value Ref Range Glucose (POC) 104 (H) 65 - 100 mg/dL METABOLIC PANEL, BASIC Collection Time: 02/21/19  3:50 AM  
Result Value Ref Range Sodium 139 136 - 145 mmol/L Potassium 3.6 3.5 - 5.1 mmol/L Chloride 102 98 - 107 mmol/L  
 CO2 25 21 - 32 mmol/L Anion gap 12 7 - 16 mmol/L Glucose 95 65 - 100 mg/dL BUN 39 (H) 8 - 23 MG/DL Creatinine 2.57 (H) 0.8 - 1.5 MG/DL  
 GFR est AA 31 (L) >60 ml/min/1.73m2 GFR est non-AA 26 (L) >60 ml/min/1.73m2 Calcium 8.5 8.3 - 10.4 MG/DL  
CBC W/O DIFF Collection Time: 02/21/19  3:50 AM  
Result Value Ref Range WBC 7.2 4.3 - 11.1 K/uL  
 RBC 4.42 4.23 - 5.6 M/uL  
 HGB 11.9 (L) 13.6 - 17.2 g/dL HCT 36.3 (L) 41.1 - 50.3 % MCV 82.1 79.6 - 97.8 FL  
 MCH 26.9 26.1 - 32.9 PG  
 MCHC 32.8 31.4 - 35.0 g/dL  
 RDW 19.1 (H) 11.9 - 14.6 % PLATELET 230 (L) 931 - 450 K/uL MPV 11.3 9.4 - 12.3 FL ABSOLUTE NRBC 0.13 0.0 - 0.2 K/uL GLUCOSE, POC Collection Time: 02/21/19  6:17 AM  
Result Value Ref Range Glucose (POC) 102 (H) 65 - 100 mg/dL IMAGING:  
 
Xr Chest Pa Lat Result Date: 2/19/2019 IMPRESSION: Cardiomegaly and prominent pulmonary vascular markings. Ct Head Wo Cont Result Date: 2/19/2019 Impression: Negative CT scan of the brain. Note: If a subtle CVA is suspected, MRI would be more definitive if clinically warranted. Ct Abd Pelv Wo Cont Result Date: 2/19/2019 IMPRESSION: 1. Schulz catheter with balloon inflated within the prostate gland with a moderate volume of urine seen in the bladder. 2. Moderate size right and small left-sided pleural effusion. Mild diffuse anasarca. 3. Moderate volume ascites within the right and left upper quadrants. Results discussed with floor RN at 6:57 PM on Tuesday, February 19, 2019 via telephone with Dr. Nikki Garcia. All Micro Results Procedure Component Value Units Date/Time CULTURE, URINE [[id number]]  (Abnormal) Collected:  02/19/19 1220 Order Status:  Completed Specimen:  Cath Urine Updated:  02/21/19 3943 Special Requests: NO SPECIAL REQUESTS Culture result:    
  1000 COLONIES/mL GRAM POSITIVE COCCI IDENTIFICATION AND SUSCEPTIBILITY TO FOLLOW CULTURE, BLOOD [[id number]] Collected:  02/19/19 1140 Order Status:  Completed Specimen:  Blood Updated:  02/20/19 1139 Special Requests: --     
  RIGHT Antecubital 
  
  Culture result: NO GROWTH AFTER 23 HOURS     
 CULTURE, BLOOD [[id number]] Collected:  02/19/19 1252 Order Status:  Completed Specimen:  Blood Updated:  02/20/19 7303 Special Requests: --     
  RIGHT 
HAND 
  
  GRAM STAIN    
  GRAM POSITIVE COCCI IN CHAINS AEROBIC AND ANAEROBIC BOTTLES RESULTS VERIFIED, PHONED TO AND READ BACK BY EAMON GONZALEZ @2089 02/20/2019 [facility] Culture result:    
  CULTURE IN PROGRESS,FURTHER UPDATES TO FOLLOW  
     
  
 
 
EKG: personally reviewed and shows A.fib with RVR 
 
CXR: Personally reviewed and shows fluid overload and pulm edema+ Assessment/Plan 1. Acute on chronic Combined systolic and diastolic heart failure decompensated 2. CKD stage IV: stable 3. Hematuria: clearing 4. Chronic COPD, does not appear to have any exacerbation. 5.  Hyperbilirubinemia, coagulopathy, Could be secondary to  liver dysfunction from passive congestion from heart failure. will  follow-up on CT abdomen. Monitor liver functions and bilirubin levels. 6.  Coagulopathy could be secondary to acute liver dysfunction or vitamin deficiency. We will give him vitamin K considering hematuria and elevated INR and observe. 7.  History of carotid disease 8. A. fib with RVR, continue Coreg. Hold Eliquis considering coagulopathy, hematuria and possible acute liver dysfunction. 9.  Obesity 10. Acute encephalopathy: resolved encephalopathy from acute liver dysfunction. Will give him 1 dose of lactulose and also check ammonia levels. 11.  UTI ? -Follow-up urine cultures, meanwhile keep him on IV ceftriaxone. 12.  Hypertension, monitor blood pressure closely and use hydralazine as needed. 13.  Chronic thrombocytopenia. Could be secondary to liver dysfunction. 14.  Lactic acidosis, could be secondary to hypoxemia will be from liver dysfunction. Monitor lactate levels. IV lasix 80 mg q8h switched to Lasix gtt 2.1 ltrs negative Creatinine slightly increased from 2.36 to 2.57 Urine has cleared up CKD and need for IV diuresis: monitor  BMP, slight worsening of renal function Urine culture positive for GNR <1000 colonies: continue IV rocephin 
Continue potassium replacement Continue other home meds as reconciled in [facility] 
 DVT prophylaxis: SCDs. Heparin drip Code status: Full Risk: High risk sec to above medical issues. Pt/ot Nay Mcneill MD 
February 21, 2019

## 2019-02-21 NOTE — PROGRESS NOTES
[facility] Nephrology Follow-Up on: NINO on CKD HPI: Doing better ROS: 
Denies CP, SOB. Current Facility-Administered Medications Medication Dose Route Frequency  heparin 25,000 units in dextrose 500 mL infusion  12-25 Units/kg/hr IntraVENous TITRATE  [START ON 2/22/2019] vancomycin (VANCOCIN) 1250 mg in  ml infusion  1,250 mg IntraVENous Q36H  
 [START ON 2/22/2019] Vancomycin random level reminder   Other ONCE  
 tuberculin injection 5 Units  5 Units IntraDERMal ONCE  
 furosemide (LASIX) 100 mg in 0.9% sodium chloride 100 mL infusion  15 mg/hr IntraVENous CONTINUOUS  
 metOLazone (ZAROXOLYN) tablet 5 mg  5 mg Oral BID  albuterol-ipratropium (DUO-NEB) 2.5 MG-0.5 MG/3 ML  3 mL Nebulization QID RT  
 carvedilol (COREG) tablet 6.25 mg  6.25 mg Oral BID WITH MEALS  fluticasone (FLONASE) 50 mcg/actuation nasal spray 2 Spray  2 Spray Both Nostrils DAILY  pantoprazole (PROTONIX) tablet 40 mg  40 mg Oral ACB  polyethylene glycol (MIRALAX) packet 17 g  17 g Oral DAILY  sodium chloride (NS) flush 5-40 mL  5-40 mL IntraVENous Q8H  
 sodium chloride (NS) flush 5-40 mL  5-40 mL IntraVENous PRN  
 acetaminophen (TYLENOL) tablet 650 mg  650 mg Oral Q4H PRN  
 HYDROcodone-acetaminophen (NORCO) 5-325 mg per tablet 1 Tab  1 Tab Oral Q4H PRN  
 morphine injection 2 mg  2 mg IntraVENous Q4H PRN  
 naloxone (NARCAN) injection 0.4 mg  0.4 mg IntraVENous PRN  
 diphenhydrAMINE (BENADRYL) capsule 25 mg  25 mg Oral Q4H PRN  
 ondansetron (ZOFRAN) injection 4 mg  4 mg IntraVENous Q4H PRN  
 magnesium hydroxide (MILK OF MAGNESIA) 400 mg/5 mL oral suspension 30 mL  30 mL Oral DAILY PRN  potassium chloride (K-DUR, KLOR-CON) SR tablet 20 mEq  20 mEq Oral DAILY  magnesium oxide (MAG-OX) tablet 400 mg  400 mg Oral TID WITH MEALS  insulin lispro (HUMALOG) injection   SubCUTAneous AC&HS  cefTRIAXone (ROCEPHIN) 2 g in 0.9% sodium chloride (MBP/ADV) 50 mL  2 g IntraVENous Q24H  tamsulosin (FLOMAX) capsule 0.4 mg  0.4 mg Oral DAILY  finasteride (PROSCAR) tablet 5 mg  5 mg Oral DAILY Exam: 
Vitals:  
 02/21/19 0053 02/21/19 5015 02/21/19 0840 02/21/19 4348 BP: 91/64 97/68 102/67 Pulse: 97 100 96 Resp: 20 20 24 Temp: 97.6 °F (36.4 °C) 97.3 °F (36.3 °C) 98.8 °F (37.1 °C) SpO2: 99% 99% 99% 96% Weight:  98.3 kg (216 lb 12.8 oz) Height:      
 
 
 
Intake/Output Summary (Last 24 hours) at 2/21/2019 1156 Last data filed at 2/21/2019 1486 Gross per 24 hour Intake 150 ml Output 950 ml Net -800 ml PE: 
GEN - in no distress CV - regular, no murmur, no rub Lung - clear bilaterally Abd - soft, nontender Ext - 1-2+ edema Labs Recent Labs  
  02/21/19 
0350 02/20/19 
0355 02/19/19 
1121 WBC 7.2 9.8 10.7 HGB 11.9* 12.9* 13.7 HCT 36.3* 39.7* 42.5 * 147* 160 Recent Labs  
  02/21/19 
0350 02/20/19 
0355 02/19/19 
1121  142 141  
K 3.6 3.9 4.6  105 103 CO2 25 25 27 BUN 39* 34* 30* CREA 2.57* 2.36* 2.42* GLU 95 93 85  
CA 8.5 9.2 9.3 MG  --  2.1  --   
PHOS  --  3.9*  -- No results for input(s): PH, PCO2, PO2, PCO2 in the last 72 hours. Problem List: 
Patient Active Problem List  
 Diagnosis Date Noted  CKD (chronic kidney disease) stage 4, GFR 15-29 ml/min ([facility]) 02/20/2019  Lactic acidosis 02/20/2019  Tobacco abuse 01/22/2019  Hypertension, essential 01/22/2019  Type 2 diabetes mellitus without complication ([facility] 03/35/3589  Left atrial thrombus 01/10/2019  Atrial fibrillation ([facility] 11/27/2018  Acute on chronic combined systolic and diastolic CHF (congestive heart failure) ([facility] 11/26/2018  Transient cerebral ischemia 11/14/2018  Seizure disorder ([facility] 11/14/2018  Stroke (cerebrum) ([facility] 11/02/2018  Seizure ([facility] 10/20/2018  Prolonged Q-T interval on ECG 10/20/2018  Elevated troponin 10/20/2018  Pulmonary hypertension ([facility] 10/20/2018  Stage 2 chronic kidney disease 10/17/2018  Type 2 diabetes with nephropathy ([facility] 10/08/2018  Chronic systolic heart failure ([facility] 10/08/2018  Shortness of breath 10/08/2018  Controlled type 2 diabetes mellitus without complication, without long-term current use of insulin [facility] 10/04/2018  Acute on chronic systolic heart failure ([facility] 05/24/2018  Erysipelas 03/27/2018  Preseptal cellulitis 03/27/2018  Personal history of tobacco use 02/06/2018  Atherosclerosis of native coronary artery of native heart with stable angina pectoris ([facility] 10/13/2017  TAZ (obstructive sleep apnea) 10/02/2017  CKD (chronic kidney disease) stage 3, GFR 30-59 ml/min ([facility]) 09/29/2017  Coronary atherosclerosis of native coronary vessel 09/29/2017  Hypertension 09/29/2017  Mild persistent asthma without complication 01/18/1478  High triglycerides 09/29/2017  Gastroesophageal reflux disease without esophagitis 09/29/2017  CHF (congestive heart failure) ([facility] 09/27/2017  Mixed hyperlipidemia 09/28/2016  Essential hypertension 09/28/2016  Arthritis Issues Addressed By Nephrology: 
 
Plan: 1. CHF 2. NINO on CKD 3. Debility Continue diuresis, UO dropping off; may need some dialysis for UF if response continues to decrease

## 2019-02-21 NOTE — PROGRESS NOTES
Problem: Self Care Deficits Care Plan (Adult) Goal: *Acute Goals and Plan of Care (Insert Text) 1. Pt will toilet with SBA 2. Pt will complete functional mobility for ADLs with SBA 3. Pt will complete lower body dressing with Sba using AE as needed 4. Pt will complete grooming and hygiene at sink with sba 5. Pt will demonstrate independence with HEP to promote increased BUE strength and functional use for ADLs 6. Pt will tolerate 23 minutes functional activity with min or fewer rest breaks to promote increased endurance for ADLs 7. Pt will complete bed mobility with SBA in prep for ADLs Timeframe: 7 days OCCUPATIONAL THERAPY: Initial Assessment 2/21/2019INPATIENT:   
Payor: Sherley Stevens / Plan: [address] [place] [address] HMO / Product Type: Managed Care Medicare /  
  
NAME/AGE/GENDER: Wilfredo Cox is a 66 y.o. male PRIMARY DIAGNOSIS:  CHF (congestive heart failure) ([facility] [address] [I50.9] <principal problem not specified> <principal problem not specified> 
 
  
ICD-10: Treatment Diagnosis:  
 · Generalized Muscle Weakness (M62.81) Precautions/Allergies: 
  falls Pcn [penicillins] ASSESSMENT:  
Mr. Neita Nissen was admitted from rehab with SOB and edema d/t CHF exacerbation. Pt reports that he was independent with ADLs and ambulatory with a RW at rehab, however is a poor historian and unsure of accuracy of report. This session, pt presented with deficits in cognition, mobility, balance, endurance, and strength impacting ADLs. Pt A&O to person and place only and was confused with impaired processing, attention, and memory. Pt appeared somewhat suspicious and requested constant clarification of purpose of OT evaluation despite repeated detailed explanations. Pt transferred from supine to sitting with CGA, scooted to the edge with CGA. Pt verbalized need to urinate, required frequent cues/ explanation of b[place] to comprehend.  Pt donned socks with min assistance, stood with min assistance, transferred to the chair with min assistance. Pt became very fatigued and SOB with min exertion from transfer, SpO2 remained in 90s. BUE strength is slightly decreased, grossly 4 to 4+/5, BLE edema noted. Pt is below his functional baseline and would benefit from skilled OT services to address deficits, will likely require rehab post d/c. This section established at most recent assessment PROBLEM LIST (Impairments causing functional limitations): 1. Decreased Strength 2. Decreased ADL/Functional Activities 3. Decreased Transfer Abilities 4. Decreased Balance 5. Decreased Activity Tolerance 6. Increased Fatigue 7. Increased Shortness of Breath 8. Decreased Cognition INTERVENTIONS PLANNED: (Benefits and precautions of occupational therapy have been discussed with the patient.) 1. Activities of daily living training 2. Adaptive equipment training 3. Balance training 4. Therapeutic activity 5. Therapeutic exercise TREATMENT PLAN: Frequency/Duration: Follow patient 3 times/ week to address above goals. Rehabilitation Potential For Stated Goals: Good RECOMMENDED REHABILITATION/EQUIPMENT: (at time of discharge pending progress): Due to the probability of continued deficits (see above) this patient will likely need continued skilled occupational therapy after discharge. Equipment:  
? None at this time OCCUPATIONAL PROFILE AND HISTORY:  
History of Present Injury/Illness (Reason for Referral): 
See H&P Past Medical History/Comorbidities:  
Mr. Linda Gilliam  has a past medical history of Acute CHF (congestive heart failure) ([facility] [address] (4/25/2015), Acute combined systolic and diastolic congestive heart failure ([facility] [address] (4/28/2015), De Quervain's tenosynovitis, right (4/28/2015), Dyspnea on exertion (6/28/2015), Elevated serum creatinine (4/25/2015), Elevated troponin (6/28/2015), History of coronary artery disease, History of right knee surgery, History of shingles, Malignant hypertension (4/25/2015), and MI (myocardial infarction) ([facility] [address] Mr. Willi Chen  has a past surgical history that includes hx knee arthroscopy (Right) and hx heart catheterization (6/29/2015). Social History/Living Environment:  
Home Environment: Rehabilitation facility One/Two Story Residence: One story Living Alone: No 
Support Systems: Spouse/Significant Other/Partner Patient Expects to be Discharged to[de-identified] Unknown Current DME Used/Available at Home: kevin Joseph Prior Level of Function/Work/Activity: 
From rehab, reports was independent w/ ADLs and mobility w/ a RW. Poor historian. Number of Personal Factors/Comorbidities that affect the Plan of Care: Expanded review of therapy/medical records (1-2):  MODERATE COMPLEXITY ASSESSMENT OF OCCUPATIONAL PERFORMANCE[de-identified]  
Activities of Daily Living:  
Basic ADLs (From Assessment) Complex ADLs (From Assessment) Feeding: Setup Oral Facial Hygiene/Grooming: Contact guard assistance Bathing: Moderate assistance Upper Body Dressing: Minimum assistance Lower Body Dressing: Minimum assistance Toileting: Minimum assistance, Moderate assistance Instrumental ADL Meal Preparation: Maximum assistance Homemaking: Maximum assistance Medication Management: Moderate assistance Financial Management: Moderate assistance Grooming/Bathing/Dressing Activities of Daily Living Cognitive Retraining Safety/Judgement: Fall prevention Bed/Mat Mobility Supine to Sit: Contact guard assistance Sit to Stand: Minimum assistance Bed to Chair: Minimum assistance Scooting: Contact guard assistance Most Recent Physical Functioning:  
Gross Assessment: 
AROM: Within functional limits Strength: Generally decreased, functional 
         
  
Posture: 
Posture (WDL): Exceptions to [facility] Posture Assessment: Rounded shoulders Balance: 
Sitting: Impaired Sitting - Static: Good (unsupported) Sitting - Dynamic: Fair (occasional) Standing: Impaired Standing - Static: Fair Standing - Dynamic : Fair Bed Mobility: 
Supine to Sit: Contact guard assistance Scooting: Contact guard assistance Wheelchair Mobility: 
  
Transfers: 
Sit to Stand: Minimum assistance Stand to Sit: Minimum assistance Bed to Chair: Minimum assistance Patient Vitals for the past 6 hrs: 
 BP BP Patient Position SpO2 O2 Flow Rate (L/min) Pulse 02/21/19 0547 97/68  99 %  100  
02/21/19 0740    2 l/min   
02/21/19 0840 102/67 At rest 99 %  96  
02/21/19 0858   96 % 2 l/min  Mental Status Neurologic State: Alert, Confused Orientation Level: Oriented to person, Oriented to place, Disoriented to situation, Disoriented to time Cognition: Follows commands Perception: Appears intact Perseveration: No perseveration noted Safety/Judgement: Fall prevention Physical Skills Involved: 
1. Balance 2. Strength 3. Activity Tolerance 4. Edema Cognitive Skills Affected (resulting in the inability to perform in a timely and safe manner): 1. Executive Function 2. Short Term Recall 3. Long Term Memory 4. Sustained Attention 5. Divided Attention 6. Comprehension Psychosocial Skills Affected: 1. Habits/Routines 2. Environmental Adaptation 3. Self-Awareness Number of elements that affect the Plan of Care: 5+:  HIGH COMPLEXITY CLINICAL DECISION MAKING:  
[employer] AM-PAC 6 Clicks Daily Activity Inpatient Short Form How much help from another person does the patient currently need. .. Total A Lot A Little None 1. Putting on and taking off regular lower body clothing? [] 1   [] 2   [x] 3   [] 4  
2. Bathing (including washing, rinsing, drying)? [] 1   [] 2   [x] 3   [] 4  
3. Toileting, which includes using toilet, bedpan or urinal?   [] 1   [] 2   [x] 3   [] 4  
4. Putting on and taking off regular upper body clothing?    [] 1   [] 2   [x] 3   [] 4  
 5. Taking care of personal grooming such as brushing teeth? [] 1   [] 2   [x] 3   [] 4  
6. Eating meals? [] 1   [] 2   [] 3   [x] 4  
© 2007, Trustees of [address] [place] [address] [address] [address], under license to [employer]. All rights reserved Score:  Initial: 19 Most Recent: X (Date: -- ) Interpretation of Tool:  Represents activities that are increasingly more difficult (i.e. Bed mobility, Transfers, Gait). Medical Necessity:    
· Patient demonstrates good rehab potential due to higher previous functional level. Reason for Services/Other Comments: 
· Patient continues to require present interventions due to patient's inability to independently complete ADLs. Use of outcome tool(s) and clinical judgement create a POC that gives a: MODERATE COMPLEXITY  
 
 
 
TREATMENT:  
(In addition to Assessment/Re-Assessment sessions the following treatments were rendered) Pre-treatment Symptoms/Complaints:   
Pain: Initial:  
Pain Intensity 1: 0  Post Session:  0 Assessment/Reassessment only, no treatment provided today Braces/Orthotics/Lines/Etc:  
· IV 
· birmingham catheter · O2 Device: Nasal cannula Treatment/Session Assessment:   
· Response to Treatment:  Fatigue, SOB · Interdisciplinary Collaboration:  
o Occupational Therapist 
o Registered Nurse · After treatment position/precautions:  
o Up in chair 
o Bed alarm/tab alert on 
o Bed/Chair-wheels locked 
o Call light within reach 
o RN notified · Compliance with Program/Exercises: Will assess as treatment progresses. · Recommendations/Intent for next treatment session: \"Next visit will focus on advancements to more challenging activities and reduction in assistance provided\". Total Treatment Duration: OT Patient Time In/Time Out Time In: 4084 Time Out: 1032 Cleo Villarreal OT

## 2019-02-21 NOTE — PROGRESS NOTES
[facility] CARDIOLOGY PROGRESS NOTE 
      
 
2/21/2019 7:27 AM 
 
Admit Date: 2/19/2019 Subjective:  
Sob Objective:  
  
Vitals:  
 02/20/19 2021 02/20/19 2116 02/21/19 6784 02/21/19 6733 BP:  93/58 91/64 97/68 Pulse:  (!) 107 97 100 Resp:  20 20 20 Temp:  97.8 °F (36.6 °C) 97.6 °F (36.4 °C) 97.3 °F (36.3 °C) SpO2: 97% 93% 99% 99% Weight:    98.3 kg (216 lb 12.8 oz) Height:      
 
 
Physical Exam: 
General- +CHEYNE-DE SANTIAGO RESPIRATION WITH PERIODS OF MARKED APNEAe Distress Neck- + JVD 
CV- IRregular rate, + GALLOP Lung- clear bilaterally Abd- soft, nontender, nondistended Ext- no edema bilaterally. Skin- warm and dry Data Review:  
Recent Labs  
  02/21/19 
0350 02/20/19 
0355  02/19/19 
1119  142   < >  --   
K 3.6 3.9   < >  --   
MG  --  2.1  --   --   
BUN 39* 34*   < >  --   
CREA 2.57* 2.36*   < >  --   
GLU 95 93   < >  --   
WBC 7.2 9.8   < >  --   
HGB 11.9* 12.9*   < >  --   
HCT 36.3* 39.7*   < >  --   
* 147*   < >  --   
INR  --  2.5  --  3.0  
 < > = values in this interval not displayed. Assessment/Plan:  
 
HFmrEF A fib Hx atrial thrombus CKD 4 Hematuria Copd, chronic respiratory failure Encephalopathy Hyperbilirubinemia, ? Cirrhosis 
 
//// Continue lasix and metolazone Start heparin to prevent stroke Venessa Prado MD 
2/21/2019 7:27 AM

## 2019-02-21 NOTE — ROUTINE PROCESS
CHF teaching  Refresher from most recent admit started post re- introduction to pt.; aware of diagnosis. Planner/(home)scale @ BS and will follow. Smoking/ ETOH/Illicit drug use cessation and maintain a healthy weight covered. Pt. aware that I can not prescribe nor adjust  medications: 15mins Palliative Care score:  ACP on file Start 2L/D Fluid restriction/ cardiac diet CHF teaching continues to pt. . Emphasis on taking prescription meds as ordered, to keep F/U appts and to call MD STAT if any of the following occur: ? If you gain 2 lbs in one day or 5 lbs in a week, and short of breath. ? If you can not lay flat without developing short of breath or rapid breathing at night; or if it wakes you up. Develop a cough or wheezing. ? If you notice swollen hands/feet/ankles or stomach with a bloated/ full feeling. ? If you become confused or mentally fuzzy or dizzy. ? If you notice a rapid or change in your heart rate. ? If you become more exhausted all the time and unable to do the same level of activity without stopping to catch your breath. Drink no more than 8 cups a day in 8 oz. cups. Your Heart can not handle any more. Stay away from salt (limit anything with salt or sodium in it). Limit to 250mg per serving. Pt. verbalizes understanding, will follow to reinforce teaching skills: 20 mins ECHO @ BS States: \" Everyone is telling me that this is bad. It must be true\". Advised to compliant wuth o2  Delivery [place].  
Readmit on avg. 25 days since Nov.

## 2019-02-21 NOTE — ACP (ADVANCE CARE PLANNING)
Pt well known to  from previous admissions. He continues to have fair understanding of his condition. He voiced some frustration in the fact that he has not been home in over a month- this is his third hospitalization this year. We reviewed his wishes regarding his care. He continues to want aggressive treatment, including dialysis, intubation, and resuscitation, as previously stated. He has a HCPOA on file naming his wife, Jewel Martines, as his surrogate decision maker. Although he is tired of being in the hospital, he voiced a desire to return any time he needed medical treatment. Pt unsure if he will return to [facility] post discharge, or go home. He has had home health in the past.  He is not receptive to hospice discussion.

## 2019-02-21 NOTE — PROGRESS NOTES
Problem: Mobility Impaired (Adult and Pediatric) Goal: *Acute Goals and Plan of Care (Insert Text) LTG: 
(1.)Mr. Ana Paula Mahan will move from supine to sit and sit to supine , scoot up and down and roll side to side in bed with STAND BY ASSIST within 7 treatment day(s). (2.)Mr. Ana Paula Mahan will transfer from bed to chair and chair to bed with CONTACT GUARD ASSIST using the least restrictive device within 7 treatment day(s). (3.)Mr. Ana Paula Mahan will ambulate with MINIMAL ASSIST for 75 feet with the least restrictive device within 7 treatment day(s). (4.)Mr. Ana Paula Mahan will participate in therapeutic activity/exercises x 23 minutes for increased strength within 7 days. ________________________________________________________________________________________________ PHYSICAL THERAPY: Daily Note and AM 2/21/2019INPATIENT: PT Visit Days : 2 Payor: Tessa Baxter / Plan: [address] [place] [address] HMO / Product Type: Managed Care Medicare /   
  
NAME/AGE/GENDER: Lashon Pinon is a 66 y.o. male PRIMARY DIAGNOSIS: CHF (congestive heart failure) ([facility] [I50.9] <principal problem not specified> <principal problem not specified> 
 
  
ICD-10: Treatment Diagnosis:  
 · Generalized Muscle Weakness (M62.81) · Difficulty in walking, Not elsewhere classified (R26.2) Precaution/Allergies: 
Pcn [penicillins] ASSESSMENT:  
Mr. Ana Paula Mahan was sitting in recliner upon arrival and agreeable to PT. He was able to perform STS transfers with RW and Jose Alfredo-SBA. Pt also worked on standing posture requiring cuing for B hip extension as well as being more upright. Pt also performed several bouts of ambulation in room with RW and CGA-Jose Alfredo. His steps are shuffled and he has decreased gait speed. Pt returned to chair for sitting rest break. Pt noted to be tachycardic with activity 120s bpm.  Pt has progressed in ambulation and functional mobility. This section established at most recent assessment PROBLEM LIST (Impairments causing functional limitations): 1. Decreased Strength 2. Decreased ADL/Functional Activities 3. Decreased Transfer Abilities 4. Decreased Ambulation Ability/Technique 5. Decreased Balance 6. Decreased Activity Tolerance 7. Increased Fatigue 8. Increased Shortness of Breath 9. Edema/Girth 10. Decreased Cognition INTERVENTIONS PLANNED: (Benefits and precautions of physical therapy have been discussed with the patient.) 1. Balance Exercise 2. Bed Mobility 3. Family Education 4. Gait Training 5. Therapeutic Activites 6. Therapeutic Exercise/Strengthening 7. Transfer Training TREATMENT PLAN: Frequency/Duration: 3 times a week for duration of hospital stay Rehabilitation Potential For Stated Goals: Fair RECOMMENDED REHABILITATION/EQUIPMENT: (at time of discharge pending progress): Due to the probability of continued deficits (see above) this patient will likely need continued skilled physical therapy after discharge. Equipment:  
? None at this time HISTORY:  
History of Present Injury/Illness (Reason for Referral): 
See H&P Past Medical History/Comorbidities:  
Mr. Paul Holder  has a past medical history of Acute CHF (congestive heart failure) ([facility] [address] (4/25/2015), Acute combined systolic and diastolic congestive heart failure ([facility] [address] (4/28/2015), De Quervain's tenosynovitis, right (4/28/2015), Dyspnea on exertion (6/28/2015), Elevated serum creatinine (4/25/2015), Elevated troponin (6/28/2015), History of coronary artery disease, History of right knee surgery, History of shingles, Malignant hypertension (4/25/2015), and MI (myocardial infarction) ([facility] [address] Mr. Paul Holder  has a past surgical history that includes hx knee arthroscopy (Right) and hx heart catheterization (6/29/2015). Social History/Living Environment:  
Home Environment: Rehabilitation facility One/Two Story Residence: One story Living Alone: No 
Support Systems: Spouse/Significant Other/Partner Patient Expects to be Discharged to[de-identified] Unknown Current DME Used/Available at Home: kevin Swanson Prior Level of Function/Work/Activity: 
Was in rehab PTA but unable to elaborate on  PLOF. Reported not recent falls. Number of Personal Factors/Comorbidities that affect the Plan of Care: 1-2: MODERATE COMPLEXITY EXAMINATION:  
Most Recent Physical Functioning:  
Gross Assessment: 
AROM: Generally decreased, functional 
Strength: Generally decreased, functional 
         
  
Posture: 
Posture (WDL): Exceptions to [facility] Posture Assessment: Rounded shoulders Balance: 
Sitting: Intact; With support Standing: Impaired Standing - Static: Fair Standing - Dynamic : Fair(-) Bed Mobility: 
  
Wheelchair Mobility: 
  
Transfers: 
Sit to Stand: Minimum assistance;Stand-by assistance Stand to Sit: Contact guard assistance;Stand-by assistance Gait: 
  
Speed/Lu: Slow;Shuffled Step Length: Left shortened;Right shortened Gait Abnormalities: Decreased step clearance;Trunk sway increased Distance (ft): 6 Feet (ft)(+ 8) Assistive Device: Walker, rolling Ambulation - Level of Assistance: Contact guard assistance;Minimal assistance Body Structures Involved: 1. Heart 2. Lungs 3. Muscles Body Functions Affected: 1. Cardio 2. Respiratory 3. Genitourinary 4. Neuromusculoskeletal 
5. Movement Related Activities and Participation Affected: 1. Learning and Applying Knowledge 2. General Tasks and Demands 3. Mobility 4. Self Care 5. Domestic Life 6. Community, Social and [place]ron[place] Number of elements that affect the Plan of Care: 4+: HIGH COMPLEXITY CLINICAL PRESENTATION:  
Presentation: Evolving clinical presentation with changing clinical characteristics: MODERATE COMPLEXITY CLINICAL DECISION MAKIN [facility] [address] [address] AM-PAC 6 Clicks Basic Mobility Inpatient Short Form How much difficulty does the patient currently have. .. Unable A Lot A Little None 1.  Turning over in bed (including adjusting bedclothes, sheets and blankets)? [] 1   [] 2   [x] 3   [] 4  
2. Sitting down on and standing up from a chair with arms ( e.g., wheelchair, bedside commode, etc.)   [] 1   [x] 2   [] 3   [] 4  
3. Moving from lying on back to sitting on the side of the bed? [] 1   [] 2   [x] 3   [] 4 How much help from another person does the patient currently need. .. Total A Lot A Little None 4. Moving to and from a bed to a chair (including a wheelchair)? [] 1   [x] 2   [] 3   [] 4  
5. Need to walk in hospital room? [] 1   [x] 2   [] 3   [] 4  
6. Climbing 3-5 steps with a railing? [x] 1   [] 2   [] 3   [] 4  
© 2007, Trustees of [facility] [employer], under license to [employer]. All rights reserved Score:  Initial: 13 Most Recent: X (Date: -- ) Interpretation of Tool:  Represents activities that are increasingly more difficult (i.e. Bed mobility, Transfers, Gait). Medical Necessity:    
· Patient demonstrates fair rehab potential due to higher previous functional level. Reason for Services/Other Comments: 
· Patient continues to require skilled intervention due to decreased functional mobility, balance, and activity tolerance. Use of outcome tool(s) and clinical judgement create a POC that gives a: Questionable prediction of patient's progress: MODERATE COMPLEXITY  
  
 
 
 
TREATMENT:  
(In addition to Assessment/Re-Assessment sessions the following treatments were rendered) Pre-treatment Symptoms/Complaints:  \"I've had 8 wives\" Pain: Initial:  
Pain Intensity 1: 0  Post Session:  0 Therapeutic Activity: (    23  minutes): Therapeutic activities including ambulation on level ground, standing activities, and STS transfers to improve mobility, strength, balance and activity tolerance. Required minimal cuing   to promote static and dynamic balance in standing and standing posture with cues for activity pacing. Date: 
2/21/19 Date: 
 Date: Activity/Exercise Parameters Parameters Parameters STS 1 x 4 Braces/Orthotics/Lines/Etc:  
· IV 
· b[place] catheter · O2 Device: Nasal cannula Treatment/Session Assessment:   
· Response to Treatment:  See above. · Interdisciplinary Collaboration:  
o Physical Therapist 
o Registered Nurse 
o Certified Nursing Assistant/Patient Care Technician · After treatment position/precautions:  
o Up in chair 
o Bed alarm/tab alert on 
o Bed/Chair-wheels locked 
o Call light within reach · Compliance with Program/Exercises: Will assess as treatment progresses · Recommendations/Intent for next treatment session: \"Next visit will focus on advancements to more challenging activities and reduction in assistance provided\". Total Treatment Duration: PT Patient Time In/Time Out Time In: 6247 Time Out: 3335 Autumn Phillip, PT, DPT

## 2019-02-21 NOTE — PROGRESS NOTES
Bedside and Verbal shift change report given to MCKENZIE Blank (oncoming nurse) by Celina Kendall RN (offgoing nurse). Report included the following information SBAR, Kardex, Accordion and Recent Results.

## 2019-02-21 NOTE — CONSULTS
Palliative Care    Patient: Jose Manuel Hitchcock. MRN: [id number]  SSN: [id number]    YOB: 1945  Age: 66 y.o. Sex: male       Date of Request: 2/20/2019  Date of Consult:  2/21/2019  Reason for Consult:  CHF  Requesting Physician: Dr Shawn Arguello     Assessment/Plan:     Principal Diagnosis:    Debility, Unspecified  R53.81    Additional Diagnoses:   · Dyspnea  R06.00  · Edema  R60.9  · Fatigue, Lethargy  R53.83  · Counseling, Encounter for Medical Advice  Z71.9  · Encounter for Palliative Care  Z51.5    Palliative Performance Scale (PPS):  PPS: 60    Medical Decision Making:   Reviewed and summarized notes from admission, as well as previous PC notes  Discussed case with appropriate providers  Reviewed laboratory and x-ray data from admission to present    Pt sitting in chair, no distress noted. No family at bedside. Pt well known to PC from previous admissions. He continues to have fair understanding of his condition. He voiced some frustration in the fact that he has not been home in over a month- this is his third hospitalization this year. We reviewed his wishes regarding his care. He continues to want aggressive treatment, including dialysis, intubation, and resuscitation, as previously stated. He has a HCPOA on file naming his wife, Ivet Moon, as his surrogate decision maker. Although he is tired of being in the hospital, he voiced a desire to return any time he needed medical treatment. Pt unsure if he will return to [facility] post discharge, or go home. He has had home health in the past.  He is not receptive to hospice discussion. CM following to assist.  We will not plan to follow.      Will discuss findings with members of the interdisciplinary team.      Thank you for this referral.          .    Subjective:     History obtained from:  Patient and Chart    Chief Complaint: CHF  History of Present Illness:  Mr Emeka Bennett is a 65 yo AA male with PMH of CHF, CAD, MI, CKD, COPD, and other conditions listed below, who presented to the ER on 2/19/2019 with c/o hematuria, LE edema, and dyspnea for several days. Pt denied fever, chills, n/v/d. .  Work up in the ER revealed acute on chronic CHF exacerbation, and pt was admitted for further management. He has been evaluated by Cardiology and nephrology. He reports mild improvement in his symptoms since admission. Advance Directive: Yes       Code Status:  Full Code            Health Care Power of : Yes - Copy of [address] on file. Past Medical History:   Diagnosis Date    Acute CHF (congestive heart failure) ([facility] [address] 4/25/2015    Acute combined systolic and diastolic congestive heart failure ([facility] [address] 4/28/2015    De Quervain's tenosynovitis, right 4/28/2015    Dyspnea on exertion 6/28/2015    Elevated serum creatinine 4/25/2015    Elevated troponin 6/28/2015    History of coronary artery disease     MI at 25 yo    History of right knee surgery     cartilage removal    History of shingles     Malignant hypertension 4/25/2015    MI (myocardial infarction) [facility])       Past Surgical History:   Procedure Laterality Date    HX HEART CATHETERIZATION  6/29/2015    no intervention    HX KNEE ARTHROSCOPY Right     removal of cartilage     Family History   Problem Relation Age of Onset    Hypertension Mother     No Known Problems Father       Social History     Tobacco Use    Smoking status: Former Smoker     Packs/day: 0.25     Years: 20.00     Pack years: 5.00     Types: Cigarettes     Last attempt to quit: 4/5/2015     Years since quitting: 3.8    Smokeless tobacco: Never Used   Substance Use Topics    Alcohol use: No     Alcohol/week: 0.0 oz     Prior to Admission medications    Medication Sig Start Date End Date Taking? Authorizing Provider   omeprazole (PRILOSEC) 20 mg capsule Take 1 Cap by mouth daily. 1/24/19   David Noe MD   potassium chloride (K-DUR, KLOR-CON) 20 mEq tablet Take 1 Tab by mouth daily.  1/23/19   Guevara Nails Martina Quick MD   Blood-Glucose Meter (ACCU-CHEK YOSEF PLUS METER) misc USE TO CHECK BLOOD SUGARS DAILY DX: E11.9 12/27/18   Mamadou Chacon MD   Blood Glucose Control High&Low (ACCU-CHEK YOSEF CONTROL SOLN) soln USE AS DIRECTED 12/27/18   Mamadou Chacon MD   glucose blood VI test strips (ACCU-CHEK YOSEF PLUS TEST STRP) strip USE TO CHECK BLOOD SUGARS DAILY DX: E11.9 12/27/18   Mamadou Chacon MD   alcohol swabs (BD SINGLE USE SWABS REGULAR) padm USE AS DIRECTED DAILY DX. E11.9 12/27/18   Mamadou Chacon MD   lancets (ACCU-CHEK SOFTCLIX LANCETS) misc USE TO CHECK BLOOD SUGARS DAILY DX: E11.9 12/27/18   Mamadou Chacon MD   carvedilol (COREG) 6.25 mg tablet Take 1 Tab by mouth two (2) times daily (with meals). 12/3/18   Dayday Roberts MD   apixaban (ELIQUIS) 5 mg tablet Take 1 Tab by mouth every twelve (12) hours. 12/3/18   Dayday Roberts MD   aspirin 81 mg chewable tablet Take 1 Tab by mouth daily. 11/5/18   Katelyn Marie MD   atorvastatin (LIPITOR) 20 mg tablet Take 1 Tab by mouth nightly. 10/4/18   Mamadou Chacon MD   allopurinol (ZYLOPRIM) 100 mg tablet TAKE 1 TABLET BY MOUTH EVERY DAY 9/25/18   Mamadou Chacon MD   fluticasone [facility]) 50 mcg/actuation nasal spray 2 Sprays by Both Nostrils route daily. 3/29/18   Ama Mata MD   melatonin 3 mg tablet Take 3 mg by mouth nightly. Provider, Historical   albuterol (VENTOLIN HFA) 90 mcg/actuation inhaler Take 2 Puffs by inhalation four (4) times daily. 2/6/18   Nancy Michelle NP   polyethylene glycol (MIRALAX) 17 gram packet Take 1 Packet by mouth daily. Patient taking differently: Take 17 g by mouth daily as needed. 1/13/18   Ruel Homans D, NP   albuterol-ipratropium (DUO-NEB) 2.5 mg-0.5 mg/3 ml nebu 3 mL by Nebulization route four (4) times daily.  Diaignosis--J44.9 12/13/17   Nancy Michelle NP       Allergies   Allergen Reactions    Pcn [Penicillins] Other (comments)     \"makes my heart stop\"        Review of Systems:  A comprehensive review of systems was negative except for:   Constitutional: Positive for fatigue. Respiratory: Positive for improving dyspnea   Cardiovascular: Positive for LE edema      Objective:     Visit Vitals  /67 (BP 1 Location: Left arm, BP Patient Position: At rest)   Pulse 96   Temp 98.8 °F (37.1 °C)   Resp 24   Ht 5' 10\" (1.778 m)   Wt 216 lb 12.8 oz (98.3 kg)   SpO2 99%   BMI 31.11 kg/m²        Physical Exam:    General:  Cooperative. No acute distress. Eyes:  Conjunctivae/corneas clear    Nose: Nares normal. Septum midline. O2 via NC   Neck: Supple, symmetrical, trachea midline   Lungs:   Decreased bilaterally, unlabored   Heart:  Regular rate and rhythm   Abdomen:   Soft, non-tender, non-distended   Extremities: Normal, atraumatic, no cyanosis.  BLE edema   Skin: Skin color, texture, turgor normal.   Neurologic: Nonfocal   Psych: Alert and oriented      Assessment:     Hospital Problems  Date Reviewed: 12/11/2018          Codes Class Noted POA    CKD (chronic kidney disease) stage 4, GFR 15-29 ml/min ([facility]) ICD-10-CM: N18.4  ICD-9-CM: 585.4  2/20/2019 Unknown        Lactic acidosis ICD-10-CM: E87.2  ICD-9-CM: 276.2  2/20/2019 Unknown        Hypertension (Chronic) ICD-10-CM: I10  ICD-9-CM: 401.9  9/29/2017 Yes        CHF (congestive heart failure) ([facility] ICD-10-CM: I50.9  ICD-9-CM: 428.0  9/27/2017 Unknown              Signed By: Peggy Stanton NP     February 21, 2019

## 2019-02-21 NOTE — PROGRESS NOTES
Have attempted to call [facility] twice to verify home meds. No answer. Will continue to attempt to contact.

## 2019-02-21 NOTE — PROGRESS NOTES
Discussed lack of UOP with Dr. Jazmine Dixon. Bladder scanned patient. Noted to have on average 80 ml in bladder. Ordered to irrigate [place]. Irrigated [place] with no clots noted and return of irrigation. Dr. Jazmine Dixon to add meds.

## 2019-02-22 ENCOUNTER — APPOINTMENT (OUTPATIENT)
Dept: GENERAL RADIOLOGY | Age: 74
DRG: 291 | End: 2019-02-22
Attending: INTERNAL MEDICINE
Payer: MEDICARE

## 2019-02-22 LAB
ANION GAP SERPL CALC-SCNC: 12 MMOL/L (ref 7–16)
APTT PPP: 84.3 SEC (ref 24.7–39.8)
APTT PPP: 96.7 SEC (ref 24.7–39.8)
ARTERIAL PATENCY WRIST A: YES
BACTERIA SPEC CULT: ABNORMAL
BASE EXCESS BLD CALC-SCNC: 1 MMOL/L
BDY SITE: ABNORMAL
BODY TEMPERATURE: 98.6
BUN SERPL-MCNC: 44 MG/DL (ref 8–23)
CALCIUM SERPL-MCNC: 8.4 MG/DL (ref 8.3–10.4)
CHLORIDE SERPL-SCNC: 100 MMOL/L (ref 98–107)
CO2 BLD-SCNC: 24 MMOL/L
CO2 SERPL-SCNC: 25 MMOL/L (ref 21–32)
COLLECT TIME,HTIME: 2353
CREAT SERPL-MCNC: 2.98 MG/DL (ref 0.8–1.5)
FLOW RATE ISTAT,IFRATE: 4 L/MIN
GAS FLOW.O2 O2 DELIVERY SYS: ABNORMAL L/MIN
GLUCOSE BLD STRIP.AUTO-MCNC: 104 MG/DL (ref 65–100)
GLUCOSE BLD STRIP.AUTO-MCNC: 122 MG/DL (ref 65–100)
GLUCOSE BLD STRIP.AUTO-MCNC: 98 MG/DL (ref 65–100)
GLUCOSE SERPL-MCNC: 100 MG/DL (ref 65–100)
HCO3 BLD-SCNC: 23.1 MMOL/L (ref 22–26)
LACTATE SERPL-SCNC: 2.5 MMOL/L (ref 0.4–2)
MM INDURATION POC: 0 MM (ref 0–5)
PCO2 BLD: 28.2 MMHG (ref 35–45)
PH BLD: 7.52 [PH] (ref 7.35–7.45)
PO2 BLD: 98 MMHG (ref 75–100)
POTASSIUM SERPL-SCNC: 3.7 MMOL/L (ref 3.5–5.1)
PPD POC: NEGATIVE NEGATIVE
SAO2 % BLD: 98 % (ref 95–98)
SERVICE CMNT-IMP: ABNORMAL
SERVICE CMNT-IMP: ABNORMAL
SODIUM SERPL-SCNC: 137 MMOL/L (ref 136–145)
SPECIMEN TYPE: ABNORMAL
VANCOMYCIN SERPL-MCNC: 19.5 UG/ML

## 2019-02-22 PROCEDURE — 74011250636 HC RX REV CODE- 250/636: Performed by: INTERNAL MEDICINE

## 2019-02-22 PROCEDURE — 74011000250 HC RX REV CODE- 250: Performed by: HOSPITALIST

## 2019-02-22 PROCEDURE — 77010033678 HC OXYGEN DAILY

## 2019-02-22 PROCEDURE — 82803 BLOOD GASES ANY COMBINATION: CPT

## 2019-02-22 PROCEDURE — 94640 AIRWAY INHALATION TREATMENT: CPT

## 2019-02-22 PROCEDURE — 80048 BASIC METABOLIC PNL TOTAL CA: CPT

## 2019-02-22 PROCEDURE — 74011000258 HC RX REV CODE- 258: Performed by: INTERNAL MEDICINE

## 2019-02-22 PROCEDURE — 74011250637 HC RX REV CODE- 250/637: Performed by: INTERNAL MEDICINE

## 2019-02-22 PROCEDURE — 74011250637 HC RX REV CODE- 250/637: Performed by: UROLOGY

## 2019-02-22 PROCEDURE — 87040 BLOOD CULTURE FOR BACTERIA: CPT

## 2019-02-22 PROCEDURE — 65660000000 HC RM CCU STEPDOWN

## 2019-02-22 PROCEDURE — 83605 ASSAY OF LACTIC ACID: CPT

## 2019-02-22 PROCEDURE — 85730 THROMBOPLASTIN TIME PARTIAL: CPT

## 2019-02-22 PROCEDURE — 83883 ASSAY NEPHELOMETRY NOT SPEC: CPT

## 2019-02-22 PROCEDURE — 74011000258 HC RX REV CODE- 258: Performed by: HOSPITALIST

## 2019-02-22 PROCEDURE — 36600 WITHDRAWAL OF ARTERIAL BLOOD: CPT

## 2019-02-22 PROCEDURE — 74011250637 HC RX REV CODE- 250/637: Performed by: HOSPITALIST

## 2019-02-22 PROCEDURE — 80202 ASSAY OF VANCOMYCIN: CPT

## 2019-02-22 PROCEDURE — 36415 COLL VENOUS BLD VENIPUNCTURE: CPT

## 2019-02-22 PROCEDURE — 94760 N-INVAS EAR/PLS OXIMETRY 1: CPT

## 2019-02-22 PROCEDURE — 74019 RADEX ABDOMEN 2 VIEWS: CPT

## 2019-02-22 PROCEDURE — 82962 GLUCOSE BLOOD TEST: CPT

## 2019-02-22 PROCEDURE — 74011250636 HC RX REV CODE- 250/636: Performed by: HOSPITALIST

## 2019-02-22 RX ORDER — CARVEDILOL 12.5 MG/1
12.5 TABLET ORAL 2 TIMES DAILY WITH MEALS
Status: DISCONTINUED | OUTPATIENT
Start: 2019-02-22 | End: 2019-02-25

## 2019-02-22 RX ORDER — VANCOMYCIN HYDROCHLORIDE
1250 ONCE
Status: COMPLETED | OUTPATIENT
Start: 2019-02-22 | End: 2019-02-22

## 2019-02-22 RX ORDER — LORAZEPAM 0.5 MG/1
0.5 TABLET ORAL
Status: DISCONTINUED | OUTPATIENT
Start: 2019-02-22 | End: 2019-02-22

## 2019-02-22 RX ADMIN — Medication 10 ML: at 05:21

## 2019-02-22 RX ADMIN — FLUTICASONE PROPIONATE 2 SPRAY: 50 SPRAY, METERED NASAL at 08:24

## 2019-02-22 RX ADMIN — ONDANSETRON 4 MG: 2 INJECTION INTRAMUSCULAR; INTRAVENOUS at 05:18

## 2019-02-22 RX ADMIN — Medication 400 MG: at 12:25

## 2019-02-22 RX ADMIN — IPRATROPIUM BROMIDE AND ALBUTEROL SULFATE 3 ML: .5; 3 SOLUTION RESPIRATORY (INHALATION) at 19:26

## 2019-02-22 RX ADMIN — IPRATROPIUM BROMIDE AND ALBUTEROL SULFATE 3 ML: .5; 3 SOLUTION RESPIRATORY (INHALATION) at 05:48

## 2019-02-22 RX ADMIN — IPRATROPIUM BROMIDE AND ALBUTEROL SULFATE 3 ML: .5; 3 SOLUTION RESPIRATORY (INHALATION) at 23:27

## 2019-02-22 RX ADMIN — Medication 400 MG: at 08:24

## 2019-02-22 RX ADMIN — FUROSEMIDE 15 MG/HR: 10 INJECTION, SOLUTION INTRAMUSCULAR; INTRAVENOUS at 01:08

## 2019-02-22 RX ADMIN — FINASTERIDE 5 MG: 5 TABLET, FILM COATED ORAL at 08:24

## 2019-02-22 RX ADMIN — POTASSIUM CHLORIDE 20 MEQ: 20 TABLET, EXTENDED RELEASE ORAL at 08:22

## 2019-02-22 RX ADMIN — FUROSEMIDE 20 MG/HR: 10 INJECTION, SOLUTION INTRAMUSCULAR; INTRAVENOUS at 14:04

## 2019-02-22 RX ADMIN — PANTOPRAZOLE SODIUM 40 MG: 40 TABLET, DELAYED RELEASE ORAL at 08:23

## 2019-02-22 RX ADMIN — TAMSULOSIN HYDROCHLORIDE 0.4 MG: 0.4 CAPSULE ORAL at 08:23

## 2019-02-22 RX ADMIN — Medication 10 ML: at 21:44

## 2019-02-22 RX ADMIN — FUROSEMIDE 20 MG/HR: 10 INJECTION, SOLUTION INTRAMUSCULAR; INTRAVENOUS at 21:43

## 2019-02-22 RX ADMIN — METOLAZONE 5 MG: 5 TABLET ORAL at 09:31

## 2019-02-22 RX ADMIN — LORAZEPAM 0.5 MG: 0.5 TABLET ORAL at 13:11

## 2019-02-22 RX ADMIN — CARVEDILOL 12.5 MG: 12.5 TABLET, FILM COATED ORAL at 08:22

## 2019-02-22 RX ADMIN — HEPARIN SODIUM AND DEXTROSE 9 UNITS/KG/HR: 5000; 5 INJECTION INTRAVENOUS at 14:04

## 2019-02-22 RX ADMIN — CEFTRIAXONE SODIUM 2 G: 2 INJECTION, POWDER, FOR SOLUTION INTRAMUSCULAR; INTRAVENOUS at 16:11

## 2019-02-22 RX ADMIN — HYDROCODONE BITARTRATE AND ACETAMINOPHEN 1 TABLET: 5; 325 TABLET ORAL at 05:21

## 2019-02-22 RX ADMIN — Medication 400 MG: at 16:03

## 2019-02-22 RX ADMIN — CARVEDILOL 12.5 MG: 12.5 TABLET, FILM COATED ORAL at 16:03

## 2019-02-22 RX ADMIN — FUROSEMIDE 15 MG/HR: 10 INJECTION, SOLUTION INTRAMUSCULAR; INTRAVENOUS at 08:21

## 2019-02-22 RX ADMIN — METOLAZONE 5 MG: 5 TABLET ORAL at 18:17

## 2019-02-22 RX ADMIN — VANCOMYCIN HYDROCHLORIDE 1250 MG: 10 INJECTION, POWDER, LYOPHILIZED, FOR SOLUTION INTRAVENOUS at 09:33

## 2019-02-22 NOTE — PROGRESS NOTES
[facility] PROGRESS NOTE Follow up for: 
 
Subjective:  
Patient seen and examined. Eating Lunch. Breathing is fair. ROS: 
Gen - no fever, no chills, appetite okay CV - no chest pain, +orthopnea Lung - +shortness of breath, no cough Ext - + edema Objective:  
Exam: 
Vitals:  
 02/22/19 5036 02/22/19 0831 02/22/19 0840 02/22/19 4264 BP: 100/65 99/72  97/71 Pulse: 83 87  74 Resp:  21 Temp:  97.1 °F (36.2 °C) SpO2:  100% 100% Weight:      
Height:      
 
 
 
Intake/Output Summary (Last 24 hours) at 2/22/2019 1228 Last data filed at 2/22/2019 3882 Gross per 24 hour Intake 790.89 ml Output 1200 ml Net -409.11 ml  
 
 
Current Facility-Administered Medications Medication Dose Route Frequency  carvedilol (COREG) tablet 12.5 mg  12.5 mg Oral BID WITH MEALS  VANCOMYCIN INTERMITTENT DOSING   Other Rx Dosing/Monitoring  heparin 25,000 units in dextrose 500 mL infusion  12-25 Units/kg/hr IntraVENous TITRATE  albuterol-ipratropium (DUO-NEB) 2.5 MG-0.5 MG/3 ML  3 mL Nebulization Q4H PRN  
 furosemide (LASIX) 100 mg in 0.9% sodium chloride 100 mL infusion  20 mg/hr IntraVENous CONTINUOUS  
 metOLazone (ZAROXOLYN) tablet 5 mg  5 mg Oral BID  fluticasone (FLONASE) 50 mcg/actuation nasal spray 2 Spray  2 Spray Both Nostrils DAILY  pantoprazole (PROTONIX) tablet 40 mg  40 mg Oral ACB  polyethylene glycol (MIRALAX) packet 17 g  17 g Oral DAILY  sodium chloride (NS) flush 5-40 mL  5-40 mL IntraVENous Q8H  
 sodium chloride (NS) flush 5-40 mL  5-40 mL IntraVENous PRN  
 acetaminophen (TYLENOL) tablet 650 mg  650 mg Oral Q4H PRN  
 HYDROcodone-acetaminophen (NORCO) 5-325 mg per tablet 1 Tab  1 Tab Oral Q4H PRN  
 morphine injection 2 mg  2 mg IntraVENous Q4H PRN  
 naloxone (NARCAN) injection 0.4 mg  0.4 mg IntraVENous PRN  
 diphenhydrAMINE (BENADRYL) capsule 25 mg  25 mg Oral Q4H PRN  
 ondansetron (ZOFRAN) injection 4 mg  4 mg IntraVENous Q4H PRN  
  magnesium hydroxide (MILK OF MAGNESIA) 400 mg/5 mL oral suspension 30 mL  30 mL Oral DAILY PRN  potassium chloride (K-DUR, KLOR-CON) SR tablet 20 mEq  20 mEq Oral DAILY  magnesium oxide (MAG-OX) tablet 400 mg  400 mg Oral TID WITH MEALS  insulin lispro (HUMALOG) injection   SubCUTAneous AC&HS  cefTRIAXone (ROCEPHIN) 2 g in 0.9% sodium chloride (MBP/ADV) 50 mL  2 g IntraVENous Q24H  
 tamsulosin (FLOMAX) capsule 0.4 mg  0.4 mg Oral DAILY  finasteride (PROSCAR) tablet 5 mg  5 mg Oral DAILY EXAM 
GEN - Alert, oriented, in no distress CV - S1, S2, RRR, no rub, murmur, or gallop Lung - clear to auscultation bilaterally Abd - soft, nontender, BS present Ext - 2+ edema Recent Labs  
  02/21/19 
0350 02/20/19 
0355 WBC 7.2 9.8 HGB 11.9* 12.9*  
HCT 36.3* 39.7* * 147* Recent Labs  
  02/22/19 
0514 02/21/19 
0350 02/20/19 
0355  139 142  
K 3.7 3.6 3.9  102 105 CO2 25 25 25 BUN 44* 39* 34* CREA 2.98* 2.57* 2.36* CA 8.4 8.5 9.2  95 93 MG  --   --  2.1 PHOS  --   --  3.9* Assessment and Plan:  
 
1) NINO on CKD-  Renal function worse the past few days with diuresis. Not much UOP despite lasix gtt w/ metolazone. I suspect he will need dialysis. Will continue diuresis through the weekend if continues to have inadequate response, will plan on catheter placement on Monday. 2) Heart failure- cards following. Diuresis as above 3) Afib w/ RVR- BB and Heparin gtt.  
 
Prem Lira MD

## 2019-02-22 NOTE — PROGRESS NOTES
HOSPITALIST PROGRESS NOTE Patient: Sandra Mata. Sex: male             MRN: [id number] YOB: 1945      Age:  66 y.o. Chief Complaint:  SOB Subjective: This is a 75-year-old gentleman with multiple medical problems including CHF, A. fib, CKD stage IV,, chronic COPD, obstructive sleep apnea who is noncompliant with CPAP, who is currently in a rehab facility, admitted on 2/19 for acute on chronic CHF in view of worsening bilateral LE swelling, hypoxic respiratory failure and hematuria. was brought into the emergency room with complaints of hematuria, leg swellings and shortness of breath. He was also having shortness of breath, moderate in severity, progressively getting worse, worse with minimal exertion, not resolved with rest.  Breathing is worse lying down, better with sitting up.   
 
2/22: 
Pt seen. .. Appears Sob- but denies -not putting out much urine overnight Still on 2L 
 denies any pain, nausea/vomiting Still has significant anasarca No overnight events. Review of Systems 10 point ROS is negative except what mentioned above Prior to Admission medications Medication Sig Start Date End Date Taking? Authorizing Provider  
omeprazole (PRILOSEC) 20 mg capsule Take 1 Cap by mouth daily. 1/24/19   Marialuisa Flores MD  
potassium chloride (K-DUR, KLOR-CON) 20 mEq tablet Take 1 Tab by mouth daily.  1/23/19   Ellen Dykes MD  
Blood-Glucose Meter (ACCU-CHEK YOSEF PLUS METER) misc USE TO CHECK BLOOD SUGARS DAILY DX: E11.9 12/27/18   Marialuisa Flores MD  
Blood Glucose Control High&Low (ACCU-CHEK YOSEF CONTROL SOLN) soln USE AS DIRECTED 12/27/18   Marialuisa Flores MD  
glucose blood VI test strips (ACCU-CHEK YOSEF PLUS TEST STRP) strip USE TO CHECK BLOOD SUGARS DAILY DX: E11.9 12/27/18   Marialuisa Flores MD  
alcohol swabs (BD SINGLE USE SWABS REGULAR) padm USE AS DIRECTED DAILY DX. E11.9 12/27/18   Marialuisa Flores MD  
 lancets (ACCU-CHEK SOFTCLIX LANCETS) misc USE TO CHECK BLOOD SUGARS DAILY DX: E11.9 18   Mamadou Chacon MD  
carvedilol (COREG) 6.25 mg tablet Take 1 Tab by mouth two (2) times daily (with meals). 12/3/18   Dayday Roberts MD  
apixaban (ELIQUIS) 5 mg tablet Take 1 Tab by mouth every twelve (12) hours. 12/3/18   Dayday Roberts MD  
aspirin 81 mg chewable tablet Take 1 Tab by mouth daily. 18   Katelyn Marie MD  
atorvastatin (LIPITOR) 20 mg tablet Take 1 Tab by mouth nightly. 10/4/18   Mamadou Chacon MD  
allopurinol (ZYLOPRIM) 100 mg tablet TAKE 1 TABLET BY MOUTH EVERY DAY 18   Mamadou Chacon MD  
fluticasone [facility]) 50 mcg/actuation nasal spray 2 Sprays by Both Nostrils route daily. 3/29/18   Ama Mata MD  
melatonin 3 mg tablet Take 3 mg by mouth nightly. Provider, Historical  
albuterol (VENTOLIN HFA) 90 mcg/actuation inhaler Take 2 Puffs by inhalation four (4) times daily. 18   Nancy Michelle NP  
polyethylene glycol (MIRALAX) 17 gram packet Take 1 Packet by mouth daily. Patient taking differently: Take 17 g by mouth daily as needed. 18   Ruel Homans D, NP  
albuterol-ipratropium (DUO-NEB) 2.5 mg-0.5 mg/3 ml nebu 3 mL by Nebulization route four (4) times daily. Diaignosis--J44.9 17   Nancy Michelle NP Physical Exam  
 
Visit Vitals BP 97/71 Pulse 74 Temp 97.1 °F (36.2 °C) Resp 21 Ht 5' 10\" (1.778 m) Wt 105.2 kg (232 lb) SpO2 100% BMI 33.29 kg/m² Temp (24hrs), Av.6 °F (36.4 °C), Min:97.1 °F (36.2 °C), Max:97.9 °F (36.6 °C) Oxygen Therapy O2 Sat (%): 100 % (19) Pulse via Oximetry: 87 beats per minute (19) O2 Device: Nasal cannula (19) O2 Flow Rate (L/min): 2 l/min(decreased from 4) (19) Intake/Output Summary (Last 24 hours) at 2019 4719 Last data filed at 2019 0306 Gross per 24 hour Intake 790.89 ml Output 1200 ml Net -409.11 ml  
  
 
 General: NAD Eyes:  VICENTA, No pallor/icterus HENT:             Oral Mucosa is Moist, No sinus tenderness Neck:               Supple, elevated JVP Lungs:  Diminished bilateral air entry with diffuse crackles up to the midlung on both sides. No significant wheezing Heart:  S1 S2 irregular Abdomen: Soft, non tender, distended, BS normal 
Extremities: Bilateral UE/LE Pitting edema, Neurologic:   No acute FND, Motor: LUE: 5/5, LLE: 5/5, RUE: 5/5, RLE: 5/5 Skin:                No acute rashes. Ronaldo Opoka Ronaldo Opoka Anasarca. Abdominal wall edema Musculoskeletal: No Acute findings Psych:             AOX 3 
 
LAB Recent Results (from the past 24 hour(s)) GLUCOSE, POC Collection Time: 02/21/19 11:15 AM  
Result Value Ref Range Glucose (POC) 87 65 - 100 mg/dL GLUCOSE, POC Collection Time: 02/21/19  4:06 PM  
Result Value Ref Range Glucose (POC) 106 (H) 65 - 100 mg/dL PTT Collection Time: 02/21/19  4:14 PM  
Result Value Ref Range aPTT 173.4 (HH) 24.7 - 39.8 SEC PLEASE READ & DOCUMENT PPD TEST IN 24 HRS Collection Time: 02/21/19  4:30 PM  
Result Value Ref Range PPD Negative Negative  
 mm Induration 0 mm GLUCOSE, POC Collection Time: 02/21/19  9:01 PM  
Result Value Ref Range Glucose (POC) 119 (H) 65 - 100 mg/dL GLUCOSE, POC Collection Time: 02/21/19  9:59 PM  
Result Value Ref Range Glucose (POC) 104 (H) 65 - 100 mg/dL PTT Collection Time: 02/21/19 11:39 PM  
Result Value Ref Range aPTT 96.7 (H) 24.7 - 39.8 SEC Wayne Ferrers Collection Time: 02/22/19  5:14 AM  
Result Value Ref Range Vancomycin, random 19.5 UG/ML  
PTT Collection Time: 02/22/19  5:14 AM  
Result Value Ref Range aPTT 84.3 (H) 24.7 - 39.8 SEC METABOLIC PANEL, BASIC Collection Time: 02/22/19  5:14 AM  
Result Value Ref Range Sodium 137 136 - 145 mmol/L Potassium 3.7 3.5 - 5.1 mmol/L Chloride 100 98 - 107 mmol/L  
 CO2 25 21 - 32 mmol/L  Anion gap 12 7 - 16 mmol/L  
 Glucose 100 65 - 100 mg/dL BUN 44 (H) 8 - 23 MG/DL Creatinine 2.98 (H) 0.8 - 1.5 MG/DL  
 GFR est AA 27 (L) >60 ml/min/1.73m2 GFR est non-AA 22 (L) >60 ml/min/1.73m2 Calcium 8.4 8.3 - 10.4 MG/DL  
GLUCOSE, POC Collection Time: 02/22/19  6:31 AM  
Result Value Ref Range Glucose (POC) 122 (H) 65 - 100 mg/dL IMAGING:  
 
Xr Chest Pa Lat Result Date: 2/19/2019 IMPRESSION: Cardiomegaly and prominent pulmonary vascular markings. Ct Head Wo Cont Result Date: 2/19/2019 Impression: Negative CT scan of the brain. Note: If a subtle CVA is suspected, MRI would be more definitive if clinically warranted. Ct Abd Pelv Wo Cont Result Date: 2/19/2019 IMPRESSION: 1. Schulz catheter with balloon inflated within the prostate gland with a moderate volume of urine seen in the bladder. 2. Moderate size right and small left-sided pleural effusion. Mild diffuse anasarca. 3. Moderate volume ascites within the right and left upper quadrants. Results discussed with floor RN at 6:57 PM on Tuesday, February 19, 2019 via telephone with Dr. Jessica Moran. All Micro Results Procedure Component Value Units Date/Time CULTURE, BLOOD [[id number]] Collected:  02/19/19 1140 Order Status:  Completed Specimen:  Blood Updated:  02/22/19 8856 Special Requests: --     
  RIGHT Antecubital 
  
  Culture result: NO GROWTH 3 DAYS     
 CULTURE, BLOOD [[id number]]  (Abnormal) Collected:  02/19/19 1252 Order Status:  Completed Specimen:  Blood Updated:  02/22/19 0800 Special Requests: --     
  RIGHT 
HAND 
  
  GRAM STAIN    
  GRAM POSITIVE COCCI IN CHAINS AEROBIC AND ANAEROBIC BOTTLES RESULTS VERIFIED, PHONED TO AND READ BACK BY EAMON GONZALEZ @6580 02/20/2019 [facility] Culture result: STAPHYLOCOCCUS SPECIES, COAGULASE NEGATIVE ALPHA STREPTOCOCCUS CULTURE IN PROGRESS,FURTHER UPDATES TO FOLLOW CULTURE, URINE [[id number]]  (Abnormal)  (Susceptibility) Collected:  02/19/19 1220 Order Status:  Completed Specimen:  Cath Urine Updated:  02/22/19 3362 Special Requests: NO SPECIAL REQUESTS Culture result:    
  1000 COLONIES/mL STAPHYLOCOCCUS HAEMOLYTICUS  
     
  
 
 
EKG: personally reviewed and shows A.fib with RVR 
 
CXR: Personally reviewed and shows fluid overload and pulm edema+ Assessment/Plan 1. Acute on chronic Combined systolic and diastolic heart failure decompensated 2. CKD stage IV: renal following - will likely need hd at some point. IV lasix 80 mg q8h switched to Lasix gtt but niot much urine output; Creatinine slightly increased from 2.36 to 2.57 to 2.98; 
Pt. is ok with hd if needed. 3.  Hematuria: clearing 4. Chronic COPD, does not appear to have any exacerbation- this appears to be volume 5. Hyperbilirubinemia, coagulopathy, Could be secondary to  liver dysfunction from passive congestion from heart failure. 6.  Coagulopathy could be secondary to acute liver dysfunction or vitamin deficiency- will monitor 7. History of carotid disease 8. A. fib with RVR, continue Coreg. Hold Eliquis considering coagulopathy, hematuria and possible acute liver dysfunction. 9.  Obesity 10. Acute encephalopathy: resolved encephalopathy from acute liver dysfunction. 11.  UTI - urine and bc growing staph hemolyticus- continue vanc and rocephin- will send - repeat bc 
12. Hypertension, monitor blood pressure closely and use hydralazine as needed. 13.  Chronic thrombocytopenia. Could be secondary to liver dysfunction. 14.  Lactic acidosis, could be secondary to hypoxemia will be from liver dysfunction. Continue to monitor 15. Concern for amyloidosis- will d/w renal re possible biopsy again Continue other home meds as reconciled in [facility] 
 
DVT prophylaxis: SCDs. Heparin drip Code status: Full Risk: High risk sec to the above medical issues.  
Pt/ot Eval 
 
 Adria Slade MD 
February 22, 2019 D/w renal - repeating kamba/lambda- will decide further w/up for amyloid based on that; 
May need hd by Monday or sooner if clinically warranted

## 2019-02-22 NOTE — PROGRESS NOTES
Patient does not know what rehab he is from and according to care everywhere report in [place] on 2/11/19 he was transferred to [facility] in [facility]. CM left message for oJrje Soliman the Liason at [facility] to see if he was at their facility and if he can come back when medically stable. Patient unsure if wants to go back vs home. CM has not been able to reach family number for wife disconnected at this time. Noted nephrology notes may need dialysis and remains on lasix gtt. CM following for d/c plans and needs. Addendum: Spoke with Jorje Soliman admission at [facility] and she stated patient was at there facility a few days then transferred to hospital. They is no bed hold and she is not sure they will be able to take patient back to their facility. She states he had been there in the past and this last admission was difficult to care for all healthcare needs. CM following for d/c. Have been unable to speak with family.

## 2019-02-22 NOTE — PROGRESS NOTES
Notified Dr. Terri Navarro MD of patient's cheyne-morales breathing. O2 sat 99% on 2L with brief drop to 88% and then back up to 99%. No new orders at this time. Will continue to monitor patient.

## 2019-02-22 NOTE — PROGRESS NOTES
02/22/19 0840 Oxygen Therapy O2 Sat (%) 100 % Pulse via Oximetry 87 beats per minute O2 Device Nasal cannula O2 Flow Rate (L/min) 2 l/min 
(decreased from 4)

## 2019-02-22 NOTE — PROGRESS NOTES
Bedside and Verbal shift change report given to Angel Vargas RN (oncoming nurse) by Yakelin Pickens RN (offgoing nurse). Report included the following information SBAR, Kardex, Procedure Summary, Intake/Output, MAR, Recent Results, Med Rec Status and Cardiac Rhythm A Fib. Heparin gtt dual verified

## 2019-02-22 NOTE — PROGRESS NOTES
[facility] CARDIOLOGY PROGRESS NOTE 
      
 
2/22/2019 6:30 AM 
 
Admit Date: 2/19/2019 Subjective: No cp or inc sob Objective:  
  
Vitals:  
 02/22/19 0101 02/22/19 9102 02/22/19 1302 02/22/19 7913 BP: (!) 89/65 98/63 Pulse: 77 68 Resp: 18 18 Temp: 97.5 °F (36.4 °C) 97.4 °F (36.3 °C) SpO2: 100% 97% 98% Weight:    105.2 kg (232 lb) Height:      
 
 
Physical Exam: 
General-No Acute Distress, on bedside commode Neck- supple, no JVD 
CV- irregular rate and rhythm no MRG Lung- clear bilaterally Abd- soft, nontender, nondistended Ext- + edema bilaterally. Skin- warm and dry Data Review:  
Recent Labs  
  02/21/19 
0845 02/21/19 
0350 02/20/19 
0355 NA  --  139 142 K  --  3.6 3.9 MG  --   --  2.1 BUN  --  39* 34* CREA  --  2.57* 2.36* GLU  --  95 93 WBC  --  7.2 9.8 HGB  --  11.9* 12.9* HCT  --  36.3* 39.7* PLT  --  126* 147* INR 2.0  --  2.5 Echo ef 25 to 30% on echo 2/21 down from 35 to 40% on 11/4/18 Assessment/Plan:  
 
HFrEF, progressive A fib Hx atrial thrombus CKD 4, normal kidney size on us 2017 Hematuria Copd, chronic respiratory failure Encephalopathy Suspected liver cirrhosis with hyperbilirubinemia and inc INR. , liver size on recent ct Negative amyloid w/u 2018 
 
//// 
 
Cont. Lasix drip and metolazone Inc coreg. No ace/arb/arni/mra due to ckd Cont. Heparin until we can decide what and when [address] can be instituted. He sure looks like amyloid, should we re evaluate? ? 
 
 
 
 
Eric Ferrer MD 
2/22/2019 6:30 AM

## 2019-02-23 LAB
ANION GAP SERPL CALC-SCNC: 13 MMOL/L (ref 7–16)
APTT PPP: 93 SEC (ref 24.7–39.8)
BACTERIA SPEC CULT: ABNORMAL
BUN SERPL-MCNC: 50 MG/DL (ref 8–23)
CALCIUM SERPL-MCNC: 8.5 MG/DL (ref 8.3–10.4)
CHLORIDE SERPL-SCNC: 99 MMOL/L (ref 98–107)
CO2 SERPL-SCNC: 24 MMOL/L (ref 21–32)
CREAT SERPL-MCNC: 3.12 MG/DL (ref 0.8–1.5)
GLUCOSE BLD STRIP.AUTO-MCNC: 96 MG/DL (ref 65–100)
GLUCOSE SERPL-MCNC: 106 MG/DL (ref 65–100)
GRAM STN SPEC: ABNORMAL
LACTATE SERPL-SCNC: 2.2 MMOL/L (ref 0.4–2)
MM INDURATION POC: 0 MM (ref 0–5)
POTASSIUM SERPL-SCNC: 3.7 MMOL/L (ref 3.5–5.1)
PPD POC: NEGATIVE NEGATIVE
SERVICE CMNT-IMP: ABNORMAL
SODIUM SERPL-SCNC: 136 MMOL/L (ref 136–145)
VANCOMYCIN SERPL-MCNC: 22.9 UG/ML

## 2019-02-23 PROCEDURE — 85730 THROMBOPLASTIN TIME PARTIAL: CPT

## 2019-02-23 PROCEDURE — 74011250636 HC RX REV CODE- 250/636: Performed by: INTERNAL MEDICINE

## 2019-02-23 PROCEDURE — 74011250637 HC RX REV CODE- 250/637: Performed by: INTERNAL MEDICINE

## 2019-02-23 PROCEDURE — 74011250636 HC RX REV CODE- 250/636: Performed by: HOSPITALIST

## 2019-02-23 PROCEDURE — 80048 BASIC METABOLIC PNL TOTAL CA: CPT

## 2019-02-23 PROCEDURE — 83605 ASSAY OF LACTIC ACID: CPT

## 2019-02-23 PROCEDURE — 82962 GLUCOSE BLOOD TEST: CPT

## 2019-02-23 PROCEDURE — 74011250637 HC RX REV CODE- 250/637: Performed by: UROLOGY

## 2019-02-23 PROCEDURE — 80202 ASSAY OF VANCOMYCIN: CPT

## 2019-02-23 PROCEDURE — 36415 COLL VENOUS BLD VENIPUNCTURE: CPT

## 2019-02-23 PROCEDURE — 74011000258 HC RX REV CODE- 258: Performed by: HOSPITALIST

## 2019-02-23 PROCEDURE — 74011250637 HC RX REV CODE- 250/637: Performed by: HOSPITALIST

## 2019-02-23 PROCEDURE — 65660000000 HC RM CCU STEPDOWN

## 2019-02-23 PROCEDURE — 74011000258 HC RX REV CODE- 258: Performed by: INTERNAL MEDICINE

## 2019-02-23 RX ADMIN — POTASSIUM CHLORIDE 20 MEQ: 20 TABLET, EXTENDED RELEASE ORAL at 09:25

## 2019-02-23 RX ADMIN — TAMSULOSIN HYDROCHLORIDE 0.4 MG: 0.4 CAPSULE ORAL at 09:25

## 2019-02-23 RX ADMIN — METOLAZONE 5 MG: 5 TABLET ORAL at 17:37

## 2019-02-23 RX ADMIN — HEPARIN SODIUM AND DEXTROSE 9 UNITS/KG/HR: 5000; 5 INJECTION INTRAVENOUS at 18:58

## 2019-02-23 RX ADMIN — CEFTRIAXONE SODIUM 2 G: 2 INJECTION, POWDER, FOR SOLUTION INTRAMUSCULAR; INTRAVENOUS at 17:37

## 2019-02-23 RX ADMIN — Medication 400 MG: at 12:20

## 2019-02-23 RX ADMIN — Medication 400 MG: at 17:37

## 2019-02-23 RX ADMIN — FUROSEMIDE 20 MG/HR: 10 INJECTION, SOLUTION INTRAMUSCULAR; INTRAVENOUS at 15:41

## 2019-02-23 RX ADMIN — Medication 10 ML: at 12:21

## 2019-02-23 RX ADMIN — FLUTICASONE PROPIONATE 2 SPRAY: 50 SPRAY, METERED NASAL at 09:26

## 2019-02-23 RX ADMIN — PANTOPRAZOLE SODIUM 40 MG: 40 TABLET, DELAYED RELEASE ORAL at 09:26

## 2019-02-23 RX ADMIN — CARVEDILOL 12.5 MG: 12.5 TABLET, FILM COATED ORAL at 17:37

## 2019-02-23 RX ADMIN — Medication 10 ML: at 21:16

## 2019-02-23 RX ADMIN — Medication 10 ML: at 05:03

## 2019-02-23 RX ADMIN — FUROSEMIDE 20 MG/HR: 10 INJECTION, SOLUTION INTRAMUSCULAR; INTRAVENOUS at 01:57

## 2019-02-23 RX ADMIN — METOLAZONE 5 MG: 5 TABLET ORAL at 09:26

## 2019-02-23 RX ADMIN — POLYETHYLENE GLYCOL 3350 17 G: 17 POWDER, FOR SOLUTION ORAL at 09:23

## 2019-02-23 RX ADMIN — CARVEDILOL 12.5 MG: 12.5 TABLET, FILM COATED ORAL at 09:26

## 2019-02-23 RX ADMIN — ONDANSETRON 4 MG: 2 INJECTION INTRAMUSCULAR; INTRAVENOUS at 20:02

## 2019-02-23 RX ADMIN — FINASTERIDE 5 MG: 5 TABLET, FILM COATED ORAL at 09:22

## 2019-02-23 RX ADMIN — Medication 400 MG: at 09:26

## 2019-02-23 RX ADMIN — FUROSEMIDE 20 MG/HR: 10 INJECTION, SOLUTION INTRAMUSCULAR; INTRAVENOUS at 08:30

## 2019-02-23 NOTE — PROGRESS NOTES
Verbal bedside report given to Aravind Arizmendi oncoming RN. Patient's situation, background, assessment and recommendations provided. Opportunity for questions provided. Oncoming RN assumed care of patient. Heparin IV drip verified at bedside with oncoming RN.

## 2019-02-23 NOTE — PROGRESS NOTES
Verbal bedside report received from [facility], [facility], [address] [facility]. Assumed care of patient.

## 2019-02-23 NOTE — PROGRESS NOTES
Verbal bedside report received from Baljinder Donovan RN. Assumed care of patient. Heparin and lasix IV drips verified at bedside with outgoing RN.

## 2019-02-23 NOTE — PROGRESS NOTES
Verbal bedside report received from Honora Schwab, [place]. Assumed care of patient.

## 2019-02-23 NOTE — PROGRESS NOTES
HOSPITALIST PROGRESS NOTE Patient: Eliezer Albarran. Sex: male             MRN: [id number] YOB: 1945      Age:  66 y.o. Chief Complaint:  SOB Subjective: This is a 57-year-old gentleman with multiple medical problems including CHF, A. fib, CKD stage IV,, chronic COPD, obstructive sleep apnea who is noncompliant with CPAP, who is currently in a rehab facility, admitted on 2/19 for acute on chronic CHF in view of worsening bilateral LE swelling, hypoxic respiratory failure and hematuria. was brought into the emergency room with complaints of hematuria, leg swellings and shortness of breath. He was also having shortness of breath, moderate in severity, progressively getting worse, worse with minimal exertion, not resolved with rest.  Breathing is worse lying down, better with sitting up.   
 
2/22: 
Pt seen. .. Appears Sob- but denies -not putting out much urine overnight Still on 2L 
 denies any pain, nausea/vomiting Still has significant anasarca No overnight events. 02/23/2019- pt seen- got confused with ativan yesterday - so held- per reports last time it he got it it knocked him out for days. His creatnine is up but he has started putting out urine. . Will likely need hd though. Barbara Peaks Review of Systems 10 point ROS is negative except what mentioned above Prior to Admission medications Medication Sig Start Date End Date Taking? Authorizing Provider  
omeprazole (PRILOSEC) 20 mg capsule Take 1 Cap by mouth daily. 1/24/19   Israel Vizcarra MD  
potassium chloride (K-DUR, KLOR-CON) 20 mEq tablet Take 1 Tab by mouth daily.  1/23/19   Jared Alexander MD  
Blood-Glucose Meter (ACCU-CHEK YOSEF PLUS METER) misc USE TO CHECK BLOOD SUGARS DAILY DX: E11.9 12/27/18   Israel Vizcarra MD  
Blood Glucose Control High&Low (ACCU-CHEK YOSEF CONTROL SOLN) soln USE AS DIRECTED 12/27/18   Israel Vizcarra MD  
 glucose blood VI test strips (ACCU-CHEK YOSEF PLUS TEST STRP) strip USE TO CHECK BLOOD SUGARS DAILY DX: E11.9 18   Israel Vizcarra MD  
alcohol swabs (BD SINGLE USE SWABS REGULAR) padm USE AS DIRECTED DAILY DX. E11.9 18   Israel Vizcarra MD  
lancets (ACCU-CHEK SOFTCLIX LANCETS) misc USE TO CHECK BLOOD SUGARS DAILY DX: E11.9 18   Israel Vizcarra MD  
carvedilol (COREG) 6.25 mg tablet Take 1 Tab by mouth two (2) times daily (with meals). 12/3/18   Cinthya Finley MD  
apixaban (ELIQUIS) 5 mg tablet Take 1 Tab by mouth every twelve (12) hours. 12/3/18   Cinthya Finley MD  
aspirin 81 mg chewable tablet Take 1 Tab by mouth daily. 18   Keon Marie MD  
atorvastatin (LIPITOR) 20 mg tablet Take 1 Tab by mouth nightly. 10/4/18   Israel Vizcarra MD  
allopurinol (ZYLOPRIM) 100 mg tablet TAKE 1 TABLET BY MOUTH EVERY DAY 18   Israel Vizcarra MD  
fluticasone [facility]) 50 mcg/actuation nasal spray 2 Sprays by Both Nostrils route daily. 3/29/18   Ann Mata MD  
melatonin 3 mg tablet Take 3 mg by mouth nightly. Provider, Historical  
albuterol (VENTOLIN HFA) 90 mcg/actuation inhaler Take 2 Puffs by inhalation four (4) times daily. 18   Eamon Robledo NP  
polyethylene glycol (MIRALAX) 17 gram packet Take 1 Packet by mouth daily. Patient taking differently: Take 17 g by mouth daily as needed. 18   Suly MERLOS NP  
albuterol-ipratropium (DUO-NEB) 2.5 mg-0.5 mg/3 ml nebu 3 mL by Nebulization route four (4) times daily. Diaignosis--J44.9 17   Eamon Robledo NP Physical Exam  
 
Visit Vitals BP 94/70 (BP 1 Location: Left arm, BP Patient Position: At rest) Pulse 76 Temp 96.9 °F (36.1 °C) Resp 20 Ht 5' 10\" (1.778 m) Wt 105.4 kg (232 lb 6.4 oz) SpO2 93% BMI 33.35 kg/m² Temp (24hrs), Av °F (36.7 °C), Min:96.9 °F (36.1 °C), Max:98.9 °F (37.2 °C) Oxygen Therapy O2 Sat (%): 93 % (19 0856) Pulse via Oximetry: 75 beats per minute (02/22/19 2334) O2 Device: Nasal cannula (02/23/19 0831) O2 Flow Rate (L/min): 4 l/min (02/23/19 0831) Intake/Output Summary (Last 24 hours) at 2/23/2019 1037 Last data filed at 2/23/2019 0530 Gross per 24 hour Intake 400 ml Output 1525 ml Net -1125 ml General: NAD Eyes:  VICENTA, No pallor/icterus HENT:             Oral Mucosa is Moist, No sinus tenderness Neck:               Supple, elevated JVP Lungs:  Diminished bilateral air entry with diffuse crackles up to the midlung on both sides. No significant wheezing Heart:  S1 S2 irregular Abdomen: Soft, non tender, distended, BS normal 
Extremities: Bilateral UE/LE Pitting edema, Neurologic:   No acute FND, Motor: LUE: 5/5, LLE: 5/5, RUE: 5/5, RLE: 5/5 Skin:                No acute rashes. Frutoso Golder Frutoso Golder Anasarca. Abdominal wall edema Musculoskeletal: No Acute findings Psych:             AOX 3 
 
LAB Recent Results (from the past 24 hour(s)) GLUCOSE, POC Collection Time: 02/22/19 12:06 PM  
Result Value Ref Range Glucose (POC) 98 65 - 100 mg/dL PLEASE READ & DOCUMENT PPD TEST IN 48 HRS Collection Time: 02/22/19  4:28 PM  
Result Value Ref Range PPD Negative Negative  
 mm Induration 0 mm CULTURE, BLOOD Collection Time: 02/22/19  5:40 PM  
Result Value Ref Range Special Requests: RIGHT 
HAND Culture result: NO GROWTH AFTER 13 HOURS    
CULTURE, BLOOD Collection Time: 02/22/19  5:53 PM  
Result Value Ref Range Special Requests: RIGHT 
FOREARM Culture result: NO GROWTH AFTER 13 HOURS    
LACTIC ACID Collection Time: 02/22/19  5:53 PM  
Result Value Ref Range Lactic acid 2.5 (HH) 0.4 - 2.0 MMOL/L  
GLUCOSE, POC Collection Time: 02/22/19  9:46 PM  
Result Value Ref Range Glucose (POC) 104 (H) 65 - 100 mg/dL POC G3 Collection Time: 02/22/19 11:54 PM  
Result Value Ref Range Device: NASAL CANNULA  pH (POC) 7.521 (H) 7.35 - 7.45    
 pCO2 (POC) 28.2 (L) 35 - 45 MMHG  
 pO2 (POC) 98 75 - 100 MMHG  
 HCO3 (POC) 23.1 22 - 26 MMOL/L  
 sO2 (POC) 98 95 - 98 % Base excess (POC) 1 mmol/L Allens test (POC) YES Site RIGHT RADIAL Patient temp. 98.6 Specimen type (POC) ARTERIAL Performed by Phan   
 CO2, POC 24 MMOL/L Flow rate (POC) 4.000 L/min COLLECT TIME 2,353 LACTIC ACID Collection Time: 02/23/19  3:33 AM  
Result Value Ref Range Lactic acid 2.2 (HH) 0.4 - 2.0 MMOL/L  
PTT Collection Time: 02/23/19  3:33 AM  
Result Value Ref Range aPTT 93.0 (H) 24.7 - 39.8 SEC METABOLIC PANEL, BASIC Collection Time: 02/23/19  3:33 AM  
Result Value Ref Range Sodium 136 136 - 145 mmol/L Potassium 3.7 3.5 - 5.1 mmol/L Chloride 99 98 - 107 mmol/L  
 CO2 24 21 - 32 mmol/L Anion gap 13 7 - 16 mmol/L Glucose 106 (H) 65 - 100 mg/dL BUN 50 (H) 8 - 23 MG/DL Creatinine 3.12 (H) 0.8 - 1.5 MG/DL  
 GFR est AA 25 (L) >60 ml/min/1.73m2 GFR est non-AA 21 (L) >60 ml/min/1.73m2 Calcium 8.5 8.3 - 10.4 MG/DL  
GLUCOSE, POC Collection Time: 02/23/19  6:14 AM  
Result Value Ref Range Glucose (POC) 96 65 - 100 mg/dL IMAGING:  
 
Xr Chest Pa Lat Result Date: 2/19/2019 IMPRESSION: Cardiomegaly and prominent pulmonary vascular markings. Xr Abd (ap And Erect Or Decub) Result Date: 2/22/2019 IMPRESSION: No acute intra-abdominal process. Ct Head Wo Cont Result Date: 2/19/2019 Impression: Negative CT scan of the brain. Note: If a subtle CVA is suspected, MRI would be more definitive if clinically warranted. Ct Abd Pelv Wo Cont Result Date: 2/19/2019 IMPRESSION: 1. Schulz catheter with balloon inflated within the prostate gland with a moderate volume of urine seen in the bladder. 2. Moderate size right and small left-sided pleural effusion. Mild diffuse anasarca. 3. Moderate volume ascites within the right and left upper quadrants. Results discussed with floor RN at 6:57 PM on Tuesday, February 19, 2019 via telephone with Dr. Marily Johnson. All Micro Results Procedure Component Value Units Date/Time CULTURE, BLOOD [[id number]] Collected:  02/22/19 1740 Order Status:  Completed Specimen:  Blood Updated:  02/23/19 4009 Special Requests: --     
  RIGHT 
HAND Culture result: NO GROWTH AFTER 13 HOURS     
 CULTURE, BLOOD [[id number]] Collected:  02/22/19 1753 Order Status:  Completed Specimen:  Blood Updated:  02/23/19 9320 Special Requests: --     
  RIGHT 
FOREARM Culture result: NO GROWTH AFTER 13 HOURS     
 CULTURE, BLOOD [[id number]] Collected:  02/19/19 1140 Order Status:  Completed Specimen:  Blood Updated:  02/23/19 0757 Special Requests: --     
  RIGHT Antecubital 
  
  Culture result: NO GROWTH 4 DAYS     
 CULTURE, BLOOD [[id number]]  (Abnormal) Collected:  02/19/19 1252 Order Status:  Completed Specimen:  Blood Updated:  02/23/19 0789 Special Requests: --     
  RIGHT 
HAND 
  
  GRAM STAIN    
  GRAM POSITIVE COCCI IN CHAINS AEROBIC AND ANAEROBIC BOTTLES RESULTS VERIFIED, PHONED TO AND READ BACK BY EAMON GONZALEZ @0176 02/20/2019 [facility] Culture result: STAPHYLOCOCCUS SPECIES, COAGULASE NEGATIVE ALPHA STREPTOCOCCUS, NOT S. PNEUMONIAE  
     
      
  THIS ORGANISM MAY BE INDICATIVE OF CULTURE CONTAMINATION, HOWEVER, CLINICAL CORRELATION NEEDS TO BE EVALUATED, AS EACH CASE IS UNIQUE. CULTURE, URINE [[id number]]  (Abnormal)  (Susceptibility) Collected:  02/19/19 1220 Order Status:  Completed Specimen:  Cath Urine Updated:  02/22/19 5808 Special Requests: NO SPECIAL REQUESTS Culture result:    
  1000 COLONIES/mL STAPHYLOCOCCUS HAEMOLYTICUS  
     
  
 
 
EKG: personally reviewed and shows A.fib with RVR 
 
CXR: Personally reviewed and shows fluid overload and pulm edema+ Assessment/Plan Essentially remains unchanged at this time 1. Acute on chronic Combined systolic and diastolic heart failure- decompensated secondary to volume overload 2. CKD stage IV: renal following - will likely need hd at some point. IV lasix 80 mg q8h was switched to Lasix gtt but not much urine output initially but now passing urine; Creatinine slightly increased from 2.36 to 2.57 to 2.98 to 3.12;  
Pt. is ok with hd if needed. Likely Monday. 3.  Hematuria: pretty much cleared 4. Chronic COPD- does not appear to have any exacerbation- this appears to be volume 5. Hyperbilirubinemia coagulopathy - could be secondary to liver dysfunction from passive congestion from heart failure. 6.  Coagulopathy could be secondary to acute liver dysfunction or vitamin deficiency- will monitor 7. History of carotid disease 8. A. fib with RVR, continue Coreg. Hold Eliquis considering coagulopathy, hematuria and possible acute liver dysfunction. 9.  Obesity 10. Acute encephalopathy: resolved encephalopathy from acute liver dysfunction. 11.  UTI - urine and bc growing staph hemolyticus- continue vanc and rocephin- will send - repeat bc 
12. Hypertension, monitor blood pressure closely and use hydralazine as needed. 13.  Chronic thrombocytopenia. Could be secondary to liver dysfunction. 14.  Lactic acidosis, could be secondary to hypoxemia will be from liver dysfunction. Continue to monitor 15. Concern for amyloidosis- d/w renal re possible biopsy again - they are repeating the kamba/lambda- will decide further w/up for amyloid based on that; 
16.  Continue other home meds as reconciled in [place] 17. PT/OT as tolerated DVT prophylaxis: SCDs. Heparin drip Code status: Full Risk: High risk sec to the above medical issues. Marie Galvan MD 
February 23, 2019

## 2019-02-23 NOTE — PROGRESS NOTES
2/23/2019 7:10 AM 
 
Admit Date: 2/19/2019 Admit Diagnosis: [facility] [I50.9] Subjective:  
 Patient with heart failure. On lasix gtt. Still sig fluid overload. Echo worrisome for infiltrative process Objective:  
  
Visit Vitals BP 90/61 (BP 1 Location: Right arm, BP Patient Position: At rest) Pulse 79 Temp 98.4 °F (36.9 °C) Resp 20 Ht 5' 10\" (1.778 m) Wt 105.4 kg (232 lb 6.4 oz) SpO2 100% BMI 33.35 kg/m² ROS:  delerius Physical Exam: 
 
Physical Examination:Eyes - pupils equal and reactive, extraocular eye movements intact Neck/lymph - supple, no significant adenopathy Chest/CV -, rales noted diffusely Heart - normal rate and regular rhythm, S3 present Abdomen/GI - soft, nontender, nondistended, no masses or organomegaly Musculoskeletal - no joint tenderness, deformity or swelling Extremities - pedal edema 3 + Skin - normal coloration and turgor, no rashes, no suspicious skin lesions noted Current Facility-Administered Medications Medication Dose Route Frequency  carvedilol (COREG) tablet 12.5 mg  12.5 mg Oral BID WITH MEALS  VANCOMYCIN INTERMITTENT DOSING   Other Rx Dosing/Monitoring  heparin 25,000 units in dextrose 500 mL infusion  12-25 Units/kg/hr IntraVENous TITRATE  albuterol-ipratropium (DUO-NEB) 2.5 MG-0.5 MG/3 ML  3 mL Nebulization Q4H PRN  
 furosemide (LASIX) 100 mg in 0.9% sodium chloride 100 mL infusion  20 mg/hr IntraVENous CONTINUOUS  
 metOLazone (ZAROXOLYN) tablet 5 mg  5 mg Oral BID  fluticasone (FLONASE) 50 mcg/actuation nasal spray 2 Spray  2 Spray Both Nostrils DAILY  pantoprazole (PROTONIX) tablet 40 mg  40 mg Oral ACB  polyethylene glycol (MIRALAX) packet 17 g  17 g Oral DAILY  sodium chloride (NS) flush 5-40 mL  5-40 mL IntraVENous Q8H  
 sodium chloride (NS) flush 5-40 mL  5-40 mL IntraVENous PRN  
 acetaminophen (TYLENOL) tablet 650 mg  650 mg Oral Q4H PRN  
  HYDROcodone-acetaminophen (NORCO) 5-325 mg per tablet 1 Tab  1 Tab Oral Q4H PRN  
 morphine injection 2 mg  2 mg IntraVENous Q4H PRN  
 naloxone (NARCAN) injection 0.4 mg  0.4 mg IntraVENous PRN  
 diphenhydrAMINE (BENADRYL) capsule 25 mg  25 mg Oral Q4H PRN  
 ondansetron (ZOFRAN) injection 4 mg  4 mg IntraVENous Q4H PRN  
 magnesium hydroxide (MILK OF MAGNESIA) 400 mg/5 mL oral suspension 30 mL  30 mL Oral DAILY PRN  potassium chloride (K-DUR, KLOR-CON) SR tablet 20 mEq  20 mEq Oral DAILY  magnesium oxide (MAG-OX) tablet 400 mg  400 mg Oral TID WITH MEALS  cefTRIAXone (ROCEPHIN) 2 g in 0.9% sodium chloride (MBP/ADV) 50 mL  2 g IntraVENous Q24H  
 tamsulosin (FLOMAX) capsule 0.4 mg  0.4 mg Oral DAILY  finasteride (PROSCAR) tablet 5 mg  5 mg Oral DAILY Data Review:  
@LABRCNT(Na,K,BUN,CREA,WBC,HGB,HCT,PLT,INR,TRP,TCHOL*,Triglyceride*,LDL*,LDLCPOC HDL*,HDL])@ TELEMETRY: sr 
 
Assessment/Plan: Active Problems: Hypertension (9/29/2017) The current medical regimen is effective;  continue present plan and medications. CHF (congestive heart failure) ([facility] (9/27/2017)on lasix gtt and meds CKD (chronic kidney disease) stage 4, GFR 15-29 ml/min ([facility]) (2/20/2019)monitor Lactic acidosis (2/20/2019) Gab Morrissey MD

## 2019-02-23 NOTE — PROGRESS NOTES
[facility] PROGRESS NOTE Follow up for: 
 
Subjective:  
Patient seen and examined. Good UOP. Breathing is okay, Cr slightly worse. Objective:  
Exam: 
Vitals:  
 02/23/19 0380 02/23/19 6547 02/23/19 4296 02/23/19 1241 BP: 100/63 90/61 94/70 92/68 Pulse: 79 79 76 78 Resp: 20 20 20 18 Temp: 98.2 °F (36.8 °C) 98.4 °F (36.9 °C) 96.9 °F (36.1 °C) 96.1 °F (35.6 °C) SpO2: 98% 100% 93% 98% Weight:  105.4 kg (232 lb 6.4 oz) Height:      
 
 
 
Intake/Output Summary (Last 24 hours) at 2/23/2019 1309 Last data filed at 2/23/2019 1241 Gross per 24 hour Intake 200 ml Output 1980 ml Net -1780 ml  
 
 
Current Facility-Administered Medications Medication Dose Route Frequency  carvedilol (COREG) tablet 12.5 mg  12.5 mg Oral BID WITH MEALS  VANCOMYCIN INTERMITTENT DOSING   Other Rx Dosing/Monitoring  heparin 25,000 units in dextrose 500 mL infusion  12-25 Units/kg/hr IntraVENous TITRATE  albuterol-ipratropium (DUO-NEB) 2.5 MG-0.5 MG/3 ML  3 mL Nebulization Q4H PRN  
 furosemide (LASIX) 100 mg in 0.9% sodium chloride 100 mL infusion  20 mg/hr IntraVENous CONTINUOUS  
 metOLazone (ZAROXOLYN) tablet 5 mg  5 mg Oral BID  fluticasone (FLONASE) 50 mcg/actuation nasal spray 2 Spray  2 Spray Both Nostrils DAILY  pantoprazole (PROTONIX) tablet 40 mg  40 mg Oral ACB  polyethylene glycol (MIRALAX) packet 17 g  17 g Oral DAILY  sodium chloride (NS) flush 5-40 mL  5-40 mL IntraVENous Q8H  
 sodium chloride (NS) flush 5-40 mL  5-40 mL IntraVENous PRN  
 acetaminophen (TYLENOL) tablet 650 mg  650 mg Oral Q4H PRN  
 HYDROcodone-acetaminophen (NORCO) 5-325 mg per tablet 1 Tab  1 Tab Oral Q4H PRN  
 morphine injection 2 mg  2 mg IntraVENous Q4H PRN  
 naloxone (NARCAN) injection 0.4 mg  0.4 mg IntraVENous PRN  
 diphenhydrAMINE (BENADRYL) capsule 25 mg  25 mg Oral Q4H PRN  
 ondansetron (ZOFRAN) injection 4 mg  4 mg IntraVENous Q4H PRN  
  magnesium hydroxide (MILK OF MAGNESIA) 400 mg/5 mL oral suspension 30 mL  30 mL Oral DAILY PRN  potassium chloride (K-DUR, KLOR-CON) SR tablet 20 mEq  20 mEq Oral DAILY  magnesium oxide (MAG-OX) tablet 400 mg  400 mg Oral TID WITH MEALS  cefTRIAXone (ROCEPHIN) 2 g in 0.9% sodium chloride (MBP/ADV) 50 mL  2 g IntraVENous Q24H  
 tamsulosin (FLOMAX) capsule 0.4 mg  0.4 mg Oral DAILY  finasteride (PROSCAR) tablet 5 mg  5 mg Oral DAILY EXAM 
GEN - Alert, oriented, in no distress CV - S1, S2, RRR, no rub, murmur, or gallop Lung - clear to auscultation bilaterally Abd - soft, nontender, BS present Ext - 2+ edema Recent Labs  
  02/21/19 
0350 WBC 7.2 HGB 11.9*  
HCT 36.3*  
* Recent Labs  
  02/23/19 
5440 02/22/19 
0514 02/21/19 
0350  137 139  
K 3.7 3.7 3.6 CL 99 100 102 CO2 24 25 25 BUN 50* 44* 39* CREA 3.12* 2.98* 2.57* CA 8.5 8.4 8.5 * 100 95 Assessment and Plan:  
1) NINO on CKD-  Renal function worse but good UOP on increased Lasix gtt. I suspect he will need dialysis. Will continue diuresis through the weekend if continues to have inadequate response, will plan on catheter placement on Monday. 2) Heart failure- cards following. diuresis as above. Still likely to require HD 3) Afib w/ RVR- BB and Heparin gtt.  
 
 
 
Rachel Mejia MD

## 2019-02-23 NOTE — PROGRESS NOTES
Verbal bedside report given to Shruthi De Guzman oncoming RN. Patient's situation, background, assessment and recommendations provided. Opportunity for questions provided. Oncoming RN assumed care of patient. Lasix and heparin IV drips verified at bedside with oncoming RN.

## 2019-02-23 NOTE — PROGRESS NOTES
Patient more lethargic this shift; spouse concerned due to \"not being normal behavior\" for patient. MD notified. New orders received for ABG and to DC ativan. Respiratory notified. Will continue to monitor patient closely

## 2019-02-23 NOTE — PROGRESS NOTES
Verbal bedside report given to Ashanti Zaman oncoming RN. Patient's situation, background, assessment and recommendations provided. Opportunity for questions provided. Oncoming RN assumed care of patient.

## 2019-02-24 ENCOUNTER — APPOINTMENT (OUTPATIENT)
Dept: CT IMAGING | Age: 74
DRG: 291 | End: 2019-02-24
Attending: INTERNAL MEDICINE
Payer: MEDICARE

## 2019-02-24 LAB
AMMONIA PLAS-SCNC: 40 UMOL/L (ref 11–32)
ANION GAP SERPL CALC-SCNC: 12 MMOL/L (ref 7–16)
APTT PPP: 99.4 SEC (ref 24.7–39.8)
BACTERIA SPEC CULT: NORMAL
BASOPHILS # BLD: 0.1 K/UL (ref 0–0.2)
BASOPHILS NFR BLD: 1 % (ref 0–2)
BUN SERPL-MCNC: 52 MG/DL (ref 8–23)
CALCIUM SERPL-MCNC: 8.9 MG/DL (ref 8.3–10.4)
CHLORIDE SERPL-SCNC: 96 MMOL/L (ref 98–107)
CO2 SERPL-SCNC: 27 MMOL/L (ref 21–32)
CREAT SERPL-MCNC: 3.26 MG/DL (ref 0.8–1.5)
DIFFERENTIAL METHOD BLD: ABNORMAL
EOSINOPHIL # BLD: 0.1 K/UL (ref 0–0.8)
EOSINOPHIL NFR BLD: 1 % (ref 0.5–7.8)
ERYTHROCYTE [DISTWIDTH] IN BLOOD BY AUTOMATED COUNT: 19.7 % (ref 11.9–14.6)
GLUCOSE BLD STRIP.AUTO-MCNC: 115 MG/DL (ref 65–100)
GLUCOSE BLD STRIP.AUTO-MCNC: 95 MG/DL (ref 65–100)
GLUCOSE SERPL-MCNC: 107 MG/DL (ref 65–100)
HCT VFR BLD AUTO: 38.5 % (ref 41.1–50.3)
HGB BLD-MCNC: 12.9 G/DL (ref 13.6–17.2)
IMM GRANULOCYTES # BLD AUTO: 0 K/UL (ref 0–0.5)
IMM GRANULOCYTES NFR BLD AUTO: 0 % (ref 0–5)
LACTATE SERPL-SCNC: 2.3 MMOL/L (ref 0.4–2)
LYMPHOCYTES # BLD: 1.9 K/UL (ref 0.5–4.6)
LYMPHOCYTES NFR BLD: 27 % (ref 13–44)
MCH RBC QN AUTO: 27.4 PG (ref 26.1–32.9)
MCHC RBC AUTO-ENTMCNC: 33.5 G/DL (ref 31.4–35)
MCV RBC AUTO: 81.7 FL (ref 79.6–97.8)
MONOCYTES # BLD: 0.8 K/UL (ref 0.1–1.3)
MONOCYTES NFR BLD: 11 % (ref 4–12)
NEUTS SEG # BLD: 4.1 K/UL (ref 1.7–8.2)
NEUTS SEG NFR BLD: 60 % (ref 43–78)
NRBC # BLD: 0.03 K/UL (ref 0–0.2)
PLATELET # BLD AUTO: 161 K/UL (ref 150–450)
PMV BLD AUTO: 11.7 FL (ref 9.4–12.3)
POTASSIUM SERPL-SCNC: 3.7 MMOL/L (ref 3.5–5.1)
RBC # BLD AUTO: 4.71 M/UL (ref 4.23–5.6)
SERVICE CMNT-IMP: NORMAL
SODIUM SERPL-SCNC: 135 MMOL/L (ref 136–145)
WBC # BLD AUTO: 6.9 K/UL (ref 4.3–11.1)

## 2019-02-24 PROCEDURE — 82962 GLUCOSE BLOOD TEST: CPT

## 2019-02-24 PROCEDURE — 74011250637 HC RX REV CODE- 250/637: Performed by: INTERNAL MEDICINE

## 2019-02-24 PROCEDURE — 65660000000 HC RM CCU STEPDOWN

## 2019-02-24 PROCEDURE — 77030037759

## 2019-02-24 PROCEDURE — 85025 COMPLETE CBC W/AUTO DIFF WBC: CPT

## 2019-02-24 PROCEDURE — 74011250636 HC RX REV CODE- 250/636: Performed by: HOSPITALIST

## 2019-02-24 PROCEDURE — 74011000258 HC RX REV CODE- 258: Performed by: INTERNAL MEDICINE

## 2019-02-24 PROCEDURE — 77030034849

## 2019-02-24 PROCEDURE — 74011000258 HC RX REV CODE- 258: Performed by: HOSPITALIST

## 2019-02-24 PROCEDURE — 74011250637 HC RX REV CODE- 250/637: Performed by: HOSPITALIST

## 2019-02-24 PROCEDURE — 80048 BASIC METABOLIC PNL TOTAL CA: CPT

## 2019-02-24 PROCEDURE — 83605 ASSAY OF LACTIC ACID: CPT

## 2019-02-24 PROCEDURE — 77010033678 HC OXYGEN DAILY

## 2019-02-24 PROCEDURE — 94760 N-INVAS EAR/PLS OXIMETRY 1: CPT

## 2019-02-24 PROCEDURE — 36415 COLL VENOUS BLD VENIPUNCTURE: CPT

## 2019-02-24 PROCEDURE — 74011250636 HC RX REV CODE- 250/636: Performed by: INTERNAL MEDICINE

## 2019-02-24 PROCEDURE — 5A1D70Z PERFORMANCE OF URINARY FILTRATION, INTERMITTENT, LESS THAN 6 HOURS PER DAY: ICD-10-PCS | Performed by: INTERNAL MEDICINE

## 2019-02-24 PROCEDURE — 76937 US GUIDE VASCULAR ACCESS: CPT

## 2019-02-24 PROCEDURE — 70450 CT HEAD/BRAIN W/O DYE: CPT

## 2019-02-24 PROCEDURE — 82140 ASSAY OF AMMONIA: CPT

## 2019-02-24 PROCEDURE — 74011250637 HC RX REV CODE- 250/637: Performed by: UROLOGY

## 2019-02-24 PROCEDURE — 85730 THROMBOPLASTIN TIME PARTIAL: CPT

## 2019-02-24 RX ORDER — PHENAZOPYRIDINE HYDROCHLORIDE 95 MG/1
190 TABLET ORAL
Status: DISPENSED | OUTPATIENT
Start: 2019-02-24 | End: 2019-02-27

## 2019-02-24 RX ADMIN — CARVEDILOL 12.5 MG: 12.5 TABLET, FILM COATED ORAL at 16:42

## 2019-02-24 RX ADMIN — METOLAZONE 5 MG: 5 TABLET ORAL at 17:30

## 2019-02-24 RX ADMIN — POLYETHYLENE GLYCOL 3350 17 G: 17 POWDER, FOR SOLUTION ORAL at 08:30

## 2019-02-24 RX ADMIN — Medication 400 MG: at 08:30

## 2019-02-24 RX ADMIN — Medication 10 ML: at 05:07

## 2019-02-24 RX ADMIN — Medication 400 MG: at 16:42

## 2019-02-24 RX ADMIN — PANTOPRAZOLE SODIUM 40 MG: 40 TABLET, DELAYED RELEASE ORAL at 08:30

## 2019-02-24 RX ADMIN — URINARY PAIN RELIEF 190 MG: 95 TABLET ORAL at 13:14

## 2019-02-24 RX ADMIN — METOLAZONE 5 MG: 5 TABLET ORAL at 08:30

## 2019-02-24 RX ADMIN — DIPHENHYDRAMINE HYDROCHLORIDE 25 MG: 25 CAPSULE ORAL at 23:02

## 2019-02-24 RX ADMIN — HYDROCODONE BITARTRATE AND ACETAMINOPHEN 1 TABLET: 5; 325 TABLET ORAL at 23:02

## 2019-02-24 RX ADMIN — CARVEDILOL 12.5 MG: 12.5 TABLET, FILM COATED ORAL at 08:30

## 2019-02-24 RX ADMIN — FUROSEMIDE 20 MG/HR: 10 INJECTION, SOLUTION INTRAMUSCULAR; INTRAVENOUS at 04:01

## 2019-02-24 RX ADMIN — LACTULOSE 30 G: 20 SOLUTION ORAL at 16:42

## 2019-02-24 RX ADMIN — CEFTRIAXONE SODIUM 2 G: 2 INJECTION, POWDER, FOR SOLUTION INTRAMUSCULAR; INTRAVENOUS at 16:38

## 2019-02-24 RX ADMIN — FINASTERIDE 5 MG: 5 TABLET, FILM COATED ORAL at 08:30

## 2019-02-24 RX ADMIN — Medication 10 ML: at 13:14

## 2019-02-24 RX ADMIN — Medication 400 MG: at 13:14

## 2019-02-24 RX ADMIN — POTASSIUM CHLORIDE 20 MEQ: 20 TABLET, EXTENDED RELEASE ORAL at 08:30

## 2019-02-24 RX ADMIN — TAMSULOSIN HYDROCHLORIDE 0.4 MG: 0.4 CAPSULE ORAL at 08:30

## 2019-02-24 RX ADMIN — FLUTICASONE PROPIONATE 2 SPRAY: 50 SPRAY, METERED NASAL at 08:30

## 2019-02-24 NOTE — PROGRESS NOTES
Bedside and Verbal shift change report given to brooks gasca (oncoming nurse) by self (offgoing nurse). Report included the following information SBAR, Kardex and Recent Results.

## 2019-02-24 NOTE — ROUTINE PROCESS
Patient continues on lasix gtt and volume overlaoded. Patient Cr. continues totrise and currently > 3. HD has been mentioned and we will follow along with POC as it unfolds. Nephrology seeing patient and per notes will readdress on Monday. Patien currently with lasix gtt and metolazone bid. Reinforce hf education as needed and pending clinical course. Patient known to program and encouraged to call with any questions concerns moving forward. Medication review will be needed once medications/patient are stabilized. Will need TC7 at d/c. CHF teaching completed, verbalize emphasis on monitoring self and report to MD: 
? If you gain 2 lbs in one day or 5 lbs in a week, and short of breath. ? If you can not lay flat without developing short of breath or rapid breathing at night; or if it wakes you up. Develop a cough or wheezing. ? If you notice swollen hands/feet/ankles or stomach with a bloated/ full feeling. ? If you are  more confused or mentally fuzzy or dizzy. ? If you notice a rapid or change in your heart rate. ? If you become more exhausted all the time and unable to do the same level of activity without stopping to catch your breath. Drink no more than 8 cups a day in 8 oz. cups. Limit Cola Drinks. Your Heart can not handle any more. Stay away from salt (limit anything with salt or sodium in it). Limit to 250mg per serving. Exercise needs to be started with your Doctors approval. 
Reduce stress; Call myself or Provider if assistance is needed. Pass post test via teach back, will make self available post DC ,if an questions arise. Diabetic teaching completed.  Planner/scale @ BS:  60 mins total

## 2019-02-24 NOTE — PROGRESS NOTES
2/24/2019 7:10 AM 
 
Admit Date: 2/19/2019 Admit Diagnosis: [facility] [I50.9] Subjective:  
 Patient with heart failure. On lasix gtt. Still sig fluid overload. Echo worrisome for infiltrative process. Continue diuresis Objective:  
  
Visit Vitals /75 (BP 1 Location: Left arm, BP Patient Position: At rest) Pulse 75 Temp 97.5 °F (36.4 °C) Resp 18 Ht 5' 10\" (1.778 m) Wt 101.9 kg (224 lb 9.6 oz) SpO2 100% BMI 32.23 kg/m² ROS:  delerius Physical Exam: 
 
Physical Examination:Eyes - pupils equal and reactive, extraocular eye movements intact Neck/lymph - supple, no significant adenopathy Chest/CV -, rales noted diffusely Heart - normal rate and regular rhythm, S3 present Abdomen/GI - soft, nontender, nondistended, no masses or organomegaly Musculoskeletal - no joint tenderness, deformity or swelling Extremities - pedal edema 3 + Skin - normal coloration and turgor, no rashes, no suspicious skin lesions noted Current Facility-Administered Medications Medication Dose Route Frequency  carvedilol (COREG) tablet 12.5 mg  12.5 mg Oral BID WITH MEALS  VANCOMYCIN INTERMITTENT DOSING   Other Rx Dosing/Monitoring  heparin 25,000 units in dextrose 500 mL infusion  12-25 Units/kg/hr IntraVENous TITRATE  albuterol-ipratropium (DUO-NEB) 2.5 MG-0.5 MG/3 ML  3 mL Nebulization Q4H PRN  
 furosemide (LASIX) 100 mg in 0.9% sodium chloride 100 mL infusion  20 mg/hr IntraVENous CONTINUOUS  
 metOLazone (ZAROXOLYN) tablet 5 mg  5 mg Oral BID  fluticasone (FLONASE) 50 mcg/actuation nasal spray 2 Spray  2 Spray Both Nostrils DAILY  pantoprazole (PROTONIX) tablet 40 mg  40 mg Oral ACB  polyethylene glycol (MIRALAX) packet 17 g  17 g Oral DAILY  sodium chloride (NS) flush 5-40 mL  5-40 mL IntraVENous Q8H  
 sodium chloride (NS) flush 5-40 mL  5-40 mL IntraVENous PRN  
 acetaminophen (TYLENOL) tablet 650 mg  650 mg Oral Q4H PRN  
  HYDROcodone-acetaminophen (NORCO) 5-325 mg per tablet 1 Tab  1 Tab Oral Q4H PRN  
 morphine injection 2 mg  2 mg IntraVENous Q4H PRN  
 naloxone (NARCAN) injection 0.4 mg  0.4 mg IntraVENous PRN  
 diphenhydrAMINE (BENADRYL) capsule 25 mg  25 mg Oral Q4H PRN  
 ondansetron (ZOFRAN) injection 4 mg  4 mg IntraVENous Q4H PRN  
 magnesium hydroxide (MILK OF MAGNESIA) 400 mg/5 mL oral suspension 30 mL  30 mL Oral DAILY PRN  potassium chloride (K-DUR, KLOR-CON) SR tablet 20 mEq  20 mEq Oral DAILY  magnesium oxide (MAG-OX) tablet 400 mg  400 mg Oral TID WITH MEALS  cefTRIAXone (ROCEPHIN) 2 g in 0.9% sodium chloride (MBP/ADV) 50 mL  2 g IntraVENous Q24H  
 tamsulosin (FLOMAX) capsule 0.4 mg  0.4 mg Oral DAILY  finasteride (PROSCAR) tablet 5 mg  5 mg Oral DAILY Data Review:  
@LABRCNT(Na,K,BUN,CREA,WBC,HGB,HCT,PLT,INR,TRP,TCHOL*,Triglyceride*,LDL*,LDLCPOC HDL*,HDL])@ TELEMETRY: sr 
 
Assessment/Plan: Active Problems: Hypertension (9/29/2017) The current medical regimen is effective;  continue present plan and medications. CHF (congestive heart failure) ([facility] (9/27/2017)on lasix gtt and meds CKD (chronic kidney disease) stage 4, GFR 15-29 ml/min ([facility]) (2/20/2019)monitor Lactic acidosis (2/20/2019) Ruben Almaguer MD

## 2019-02-24 NOTE — PROGRESS NOTES
Pharmacokinetic Consult to Pharmacist 
 
Ben Dunbar. is a 66 y.o. male being treated for bloodstream infection with vancomycin. Height: 5' 10\" (177.8 cm)  Weight: 101.9 kg (224 lb 9.6 oz) Lab Results Component Value Date/Time BUN 52 (H) 02/24/2019 05:09 AM  
 Creatinine 3.26 (H) 02/24/2019 05:09 AM  
 WBC 6.9 02/24/2019 05:09 AM  
 Procalcitonin 0.4 02/19/2019 11:21 AM  
 Lactic acid 2.2 (HH) 02/23/2019 03:33 AM  
 Lactic Acid (POC) 2.63 (H) 02/19/2019 11:20 AM  
  
Estimated Creatinine Clearance: 23.1 mL/min (A) (based on SCr of 3.26 mg/dL (H)). CULTURES: 
All Micro Results Procedure Component Value Units Date/Time CULTURE, BLOOD [[id number]] Collected:  02/22/19 1740 Order Status:  Completed Specimen:  Blood Updated:  02/24/19 4623 Special Requests: --     
  RIGHT 
HAND Culture result: NO GROWTH 2 DAYS     
 CULTURE, BLOOD [[id number]] Collected:  02/22/19 1753 Order Status:  Completed Specimen:  Blood Updated:  02/24/19 1988 Special Requests: --     
  RIGHT 
FOREARM Culture result: NO GROWTH 2 DAYS     
 CULTURE, BLOOD [[id number]] Collected:  02/19/19 1140 Order Status:  Completed Specimen:  Blood Updated:  02/24/19 6268 Special Requests: --     
  RIGHT Antecubital 
  
  Culture result: NO GROWTH 5 DAYS     
 CULTURE, BLOOD [[id number]]  (Abnormal) Collected:  02/19/19 1252 Order Status:  Completed Specimen:  Blood Updated:  02/23/19 0747 Special Requests: --     
  RIGHT 
HAND 
  
  GRAM STAIN    
  GRAM POSITIVE COCCI IN CHAINS AEROBIC AND ANAEROBIC BOTTLES RESULTS VERIFIED, PHONED TO AND READ BACK BY EAMON GONZALEZ @5984 02/20/2019 [facility] Culture result: STAPHYLOCOCCUS SPECIES, COAGULASE NEGATIVE ALPHA STREPTOCOCCUS, NOT S. PNEUMONIAE  
     
      
  THIS ORGANISM MAY BE INDICATIVE OF CULTURE CONTAMINATION, HOWEVER, CLINICAL CORRELATION NEEDS TO BE EVALUATED, AS EACH CASE IS UNIQUE. CULTURE, URINE [[id number]]  (Abnormal)  (Susceptibility) Collected:  02/19/19 1220 Order Status:  Completed Specimen:  Cath Urine Updated:  02/22/19 0275 Special Requests: NO SPECIAL REQUESTS Culture result:    
  1000 COLONIES/mL STAPHYLOCOCCUS HAEMOLYTICUS Day 5 of vancomycin. Goal trough is 15-20. Patient with CKD at baseline. Appears to have an NINO at present as SCr continues to rise. Nephrology not planning to pursue HD until at least Monday at present. The vancomycin level returned supratherapeutic ~36 hours after the last dose. As patient's SCr continues to rise I will not order an additional dose at this time as I expect minimal clearance. Will order random vancomycin level for tomorrow morning. Continue intermittent dosing. Will continue to follow patient. Thank you, Jesús Irving, PharmD, [facility] Clinical Pharmacist 
[phone]

## 2019-02-24 NOTE — PROGRESS NOTES
Patient catheter bag found disconnected from catheter and laying in the floor. New bag placed and patient educated on danger and safety regarding tampering with [place] system. Dr. Carola Aparicio notified and orders for arm velco restraints placed. Patient also pulled out IV line from RUE. PICC team called and notifed to come and obtain new IV access.

## 2019-02-24 NOTE — PROGRESS NOTES
[facility] PROGRESS NOTE Follow up for: 
 
Subjective:  
Patient seen and examined. Confused this AM.  States he's breathing fine. Objective:  
Exam: 
Vitals:  
 02/23/19 2045 02/24/19 0050 02/24/19 6222 02/24/19 6906 BP: 97/64 98/73  105/75 Pulse: 71 78  75 Resp: 20 19 18 Temp: 96.6 °F (35.9 °C) 96.7 °F (35.9 °C)  97.5 °F (36.4 °C) SpO2: 97% 100%  100% Weight:   101.9 kg (224 lb 9.6 oz) Height:      
 
 
 
Intake/Output Summary (Last 24 hours) at 2/24/2019 1238 Last data filed at 2/24/2019 7766 Gross per 24 hour Intake 0 ml Output 1525 ml Net -1525 ml  
 
 
Current Facility-Administered Medications Medication Dose Route Frequency  phenazopyridine (PYRIDIUM) tab 190 mg  190 mg Oral TID PRN  
 carvedilol (COREG) tablet 12.5 mg  12.5 mg Oral BID WITH MEALS  VANCOMYCIN INTERMITTENT DOSING   Other Rx Dosing/Monitoring  heparin 25,000 units in dextrose 500 mL infusion  12-25 Units/kg/hr IntraVENous TITRATE  albuterol-ipratropium (DUO-NEB) 2.5 MG-0.5 MG/3 ML  3 mL Nebulization Q4H PRN  
 furosemide (LASIX) 100 mg in 0.9% sodium chloride 100 mL infusion  20 mg/hr IntraVENous CONTINUOUS  
 metOLazone (ZAROXOLYN) tablet 5 mg  5 mg Oral BID  fluticasone (FLONASE) 50 mcg/actuation nasal spray 2 Spray  2 Spray Both Nostrils DAILY  pantoprazole (PROTONIX) tablet 40 mg  40 mg Oral ACB  polyethylene glycol (MIRALAX) packet 17 g  17 g Oral DAILY  sodium chloride (NS) flush 5-40 mL  5-40 mL IntraVENous Q8H  
 sodium chloride (NS) flush 5-40 mL  5-40 mL IntraVENous PRN  
 acetaminophen (TYLENOL) tablet 650 mg  650 mg Oral Q4H PRN  
 HYDROcodone-acetaminophen (NORCO) 5-325 mg per tablet 1 Tab  1 Tab Oral Q4H PRN  
 morphine injection 2 mg  2 mg IntraVENous Q4H PRN  
 naloxone (NARCAN) injection 0.4 mg  0.4 mg IntraVENous PRN  
 diphenhydrAMINE (BENADRYL) capsule 25 mg  25 mg Oral Q4H PRN  
 ondansetron (ZOFRAN) injection 4 mg  4 mg IntraVENous Q4H PRN  
  magnesium hydroxide (MILK OF MAGNESIA) 400 mg/5 mL oral suspension 30 mL  30 mL Oral DAILY PRN  potassium chloride (K-DUR, KLOR-CON) SR tablet 20 mEq  20 mEq Oral DAILY  magnesium oxide (MAG-OX) tablet 400 mg  400 mg Oral TID WITH MEALS  cefTRIAXone (ROCEPHIN) 2 g in 0.9% sodium chloride (MBP/ADV) 50 mL  2 g IntraVENous Q24H  
 tamsulosin (FLOMAX) capsule 0.4 mg  0.4 mg Oral DAILY  finasteride (PROSCAR) tablet 5 mg  5 mg Oral DAILY EXAM 
GEN - Awake, but confused CV - S1, S2, RRR, no rub, murmur, or gallop Lung - clear to auscultation bilaterally Abd - soft, nontender, BS present Ext - 2+ edema Recent Labs  
  02/24/19 
8973 WBC 6.9 HGB 12.9*  
HCT 38.5*  Recent Labs  
  02/24/19 
0509 02/23/19 
4857 02/22/19 
8318 * 136 137  
K 3.7 3.7 3.7 CL 96* 99 100 CO2 27 24 25 BUN 52* 50* 44* CREA 3.26* 3.12* 2.98* CA 8.9 8.5 8.4 * 106* 100 Assessment and Plan:  
1) NINO on CKD-  Renal function worse but good UOP on increased Lasix gtt.  On Lasix 20mg/hr and metolazone 5mg daily, he didn't even make 2L of urine. I don't think we are going to get volume control without initiating HD. Will plan on catheter placement and initiation tomorrow. 2) Heart failure- cards following.  diuresis as above. Still likely to require HD 3) Afib w/ RVR- BB and Heparin gtt. 
  
 
 
 
Peggy Donis MD

## 2019-02-24 NOTE — PROGRESS NOTES
Bedside and Verbal shift change report given to self (oncoming nurse) by Chrissy Soni (offgoing nurse). Report included the following information SBAR, Kardex and Recent Results.

## 2019-02-24 NOTE — PROGRESS NOTES
Verbal bedside report given to [facility], oncoming RN. Patient's situation, background, assessment and recommendations provided. Opportunity for questions provided. Oncoming RN assumed care of patient.

## 2019-02-25 ENCOUNTER — APPOINTMENT (OUTPATIENT)
Dept: INTERVENTIONAL RADIOLOGY/VASCULAR | Age: 74
DRG: 291 | End: 2019-02-25
Attending: INTERNAL MEDICINE
Payer: MEDICARE

## 2019-02-25 LAB
ANION GAP SERPL CALC-SCNC: 11 MMOL/L (ref 7–16)
ANION GAP SERPL CALC-SCNC: 13 MMOL/L (ref 7–16)
APTT PPP: 110.7 SEC (ref 24.7–39.8)
BUN SERPL-MCNC: 50 MG/DL (ref 8–23)
BUN SERPL-MCNC: 54 MG/DL (ref 8–23)
CALCIUM SERPL-MCNC: 8.5 MG/DL (ref 8.3–10.4)
CALCIUM SERPL-MCNC: 8.6 MG/DL (ref 8.3–10.4)
CHLORIDE SERPL-SCNC: 97 MMOL/L (ref 98–107)
CHLORIDE SERPL-SCNC: 98 MMOL/L (ref 98–107)
CO2 SERPL-SCNC: 24 MMOL/L (ref 21–32)
CO2 SERPL-SCNC: 28 MMOL/L (ref 21–32)
CREAT SERPL-MCNC: 3.17 MG/DL (ref 0.8–1.5)
CREAT SERPL-MCNC: 3.4 MG/DL (ref 0.8–1.5)
ERYTHROCYTE [DISTWIDTH] IN BLOOD BY AUTOMATED COUNT: 19.8 % (ref 11.9–14.6)
GLUCOSE BLD STRIP.AUTO-MCNC: 102 MG/DL (ref 65–100)
GLUCOSE BLD STRIP.AUTO-MCNC: 104 MG/DL (ref 65–100)
GLUCOSE BLD STRIP.AUTO-MCNC: 89 MG/DL (ref 65–100)
GLUCOSE BLD STRIP.AUTO-MCNC: 92 MG/DL (ref 65–100)
GLUCOSE SERPL-MCNC: 113 MG/DL (ref 65–100)
GLUCOSE SERPL-MCNC: 92 MG/DL (ref 65–100)
HCT VFR BLD AUTO: 35.8 % (ref 41.1–50.3)
HGB BLD-MCNC: 12.1 G/DL (ref 13.6–17.2)
KAPPA LC FREE SER-MCNC: 109.6 MG/L (ref 3.3–19.4)
KAPPA LC FREE/LAMBDA FREE SER: 2.62 {RATIO} (ref 0.26–1.65)
LAMBDA LC FREE SERPL-MCNC: 41.9 MG/L (ref 5.71–26.3)
MCH RBC QN AUTO: 27.8 PG (ref 26.1–32.9)
MCHC RBC AUTO-ENTMCNC: 33.8 G/DL (ref 31.4–35)
MCV RBC AUTO: 82.3 FL (ref 79.6–97.8)
NRBC # BLD: 0 K/UL (ref 0–0.2)
PLATELET # BLD AUTO: 119 K/UL (ref 150–450)
PMV BLD AUTO: 11.9 FL (ref 9.4–12.3)
POTASSIUM SERPL-SCNC: 3.6 MMOL/L (ref 3.5–5.1)
POTASSIUM SERPL-SCNC: 3.6 MMOL/L (ref 3.5–5.1)
RBC # BLD AUTO: 4.35 M/UL (ref 4.23–5.6)
SODIUM SERPL-SCNC: 135 MMOL/L (ref 136–145)
SODIUM SERPL-SCNC: 136 MMOL/L (ref 136–145)
VANCOMYCIN SERPL-MCNC: 18 UG/ML
WBC # BLD AUTO: 6.1 K/UL (ref 4.3–11.1)

## 2019-02-25 PROCEDURE — 65660000000 HC RM CCU STEPDOWN

## 2019-02-25 PROCEDURE — 74011250636 HC RX REV CODE- 250/636: Performed by: INTERNAL MEDICINE

## 2019-02-25 PROCEDURE — 82962 GLUCOSE BLOOD TEST: CPT

## 2019-02-25 PROCEDURE — C1894 INTRO/SHEATH, NON-LASER: HCPCS

## 2019-02-25 PROCEDURE — 77030037400 HC ADH TISS HI VISC EXOFIN CHMP -B

## 2019-02-25 PROCEDURE — C1750 CATH, HEMODIALYSIS,LONG-TERM: HCPCS

## 2019-02-25 PROCEDURE — 74011250636 HC RX REV CODE- 250/636: Performed by: HOSPITALIST

## 2019-02-25 PROCEDURE — 74011250637 HC RX REV CODE- 250/637: Performed by: INTERNAL MEDICINE

## 2019-02-25 PROCEDURE — 0JH63XZ INSERTION OF TUNNELED VASCULAR ACCESS DEVICE INTO CHEST SUBCUTANEOUS TISSUE AND FASCIA, PERCUTANEOUS APPROACH: ICD-10-PCS | Performed by: RADIOLOGY

## 2019-02-25 PROCEDURE — 74011250636 HC RX REV CODE- 250/636

## 2019-02-25 PROCEDURE — 77001 FLUOROGUIDE FOR VEIN DEVICE: CPT

## 2019-02-25 PROCEDURE — 77030019605

## 2019-02-25 PROCEDURE — 74011250636 HC RX REV CODE- 250/636: Performed by: RADIOLOGY

## 2019-02-25 PROCEDURE — 77030018719 HC DRSG PTCH ANTIMIC J&J -A

## 2019-02-25 PROCEDURE — 74011250637 HC RX REV CODE- 250/637: Performed by: UROLOGY

## 2019-02-25 PROCEDURE — 77030031131 HC SUT MXN P COVD -B

## 2019-02-25 PROCEDURE — 77030002916 HC SUT ETHLN J&J -A

## 2019-02-25 PROCEDURE — 80048 BASIC METABOLIC PNL TOTAL CA: CPT

## 2019-02-25 PROCEDURE — C1769 GUIDE WIRE: HCPCS

## 2019-02-25 PROCEDURE — 76937 US GUIDE VASCULAR ACCESS: CPT

## 2019-02-25 PROCEDURE — P9047 ALBUMIN (HUMAN), 25%, 50ML: HCPCS | Performed by: INTERNAL MEDICINE

## 2019-02-25 PROCEDURE — 36415 COLL VENOUS BLD VENIPUNCTURE: CPT

## 2019-02-25 PROCEDURE — 74011250637 HC RX REV CODE- 250/637: Performed by: HOSPITALIST

## 2019-02-25 PROCEDURE — 02H633Z INSERTION OF INFUSION DEVICE INTO RIGHT ATRIUM, PERCUTANEOUS APPROACH: ICD-10-PCS | Performed by: RADIOLOGY

## 2019-02-25 PROCEDURE — 74011000250 HC RX REV CODE- 250: Performed by: RADIOLOGY

## 2019-02-25 PROCEDURE — 85027 COMPLETE CBC AUTOMATED: CPT

## 2019-02-25 PROCEDURE — 77030020263 HC SOL INJ SOD CL0.9% LFCR 1000ML

## 2019-02-25 PROCEDURE — 90935 HEMODIALYSIS ONE EVALUATION: CPT

## 2019-02-25 PROCEDURE — 80202 ASSAY OF VANCOMYCIN: CPT

## 2019-02-25 PROCEDURE — 85730 THROMBOPLASTIN TIME PARTIAL: CPT

## 2019-02-25 PROCEDURE — B244ZZZ ULTRASONOGRAPHY OF RIGHT HEART: ICD-10-PCS | Performed by: RADIOLOGY

## 2019-02-25 RX ORDER — MIDODRINE HYDROCHLORIDE 5 MG/1
5 TABLET ORAL
Status: DISCONTINUED | OUTPATIENT
Start: 2019-02-25 | End: 2019-02-26

## 2019-02-25 RX ORDER — SODIUM CHLORIDE 9 MG/ML
25 INJECTION, SOLUTION INTRAVENOUS CONTINUOUS
Status: DISCONTINUED | OUTPATIENT
Start: 2019-02-25 | End: 2019-02-25

## 2019-02-25 RX ORDER — VANCOMYCIN HYDROCHLORIDE
1250 ONCE
Status: COMPLETED | OUTPATIENT
Start: 2019-02-25 | End: 2019-02-25

## 2019-02-25 RX ORDER — MIDAZOLAM HYDROCHLORIDE 1 MG/ML
.5-2 INJECTION, SOLUTION INTRAMUSCULAR; INTRAVENOUS
Status: DISCONTINUED | OUTPATIENT
Start: 2019-02-25 | End: 2019-02-25

## 2019-02-25 RX ORDER — HEPARIN SODIUM 1000 [USP'U]/ML
5000 INJECTION, SOLUTION INTRAVENOUS; SUBCUTANEOUS ONCE
Status: COMPLETED | OUTPATIENT
Start: 2019-02-25 | End: 2019-02-25

## 2019-02-25 RX ORDER — FENTANYL CITRATE 50 UG/ML
25-50 INJECTION, SOLUTION INTRAMUSCULAR; INTRAVENOUS
Status: DISCONTINUED | OUTPATIENT
Start: 2019-02-25 | End: 2019-02-25

## 2019-02-25 RX ORDER — LIDOCAINE HYDROCHLORIDE 20 MG/ML
1-20 INJECTION, SOLUTION INFILTRATION; PERINEURAL
Status: DISCONTINUED | OUTPATIENT
Start: 2019-02-25 | End: 2019-03-12 | Stop reason: HOSPADM

## 2019-02-25 RX ORDER — ALBUMIN HUMAN 250 G/1000ML
12.5 SOLUTION INTRAVENOUS ONCE
Status: COMPLETED | OUTPATIENT
Start: 2019-02-25 | End: 2019-02-25

## 2019-02-25 RX ORDER — HEPARIN SODIUM 200 [USP'U]/100ML
1000 INJECTION, SOLUTION INTRAVENOUS ONCE
Status: COMPLETED | OUTPATIENT
Start: 2019-02-25 | End: 2019-02-25

## 2019-02-25 RX ADMIN — CARVEDILOL 12.5 MG: 12.5 TABLET, FILM COATED ORAL at 08:06

## 2019-02-25 RX ADMIN — Medication 10 ML: at 23:44

## 2019-02-25 RX ADMIN — HEPARIN SODIUM 3800 UNITS: 1000 INJECTION INTRAVENOUS; SUBCUTANEOUS at 13:59

## 2019-02-25 RX ADMIN — LACTULOSE 30 G: 20 SOLUTION ORAL at 23:45

## 2019-02-25 RX ADMIN — HEPARIN SODIUM AND DEXTROSE 9 UNITS/KG/HR: 5000; 5 INJECTION INTRAVENOUS at 07:25

## 2019-02-25 RX ADMIN — Medication 400 MG: at 08:06

## 2019-02-25 RX ADMIN — Medication 5 ML: at 06:10

## 2019-02-25 RX ADMIN — ALBUMIN (HUMAN) 12.5 G: 0.25 INJECTION, SOLUTION INTRAVENOUS at 17:23

## 2019-02-25 RX ADMIN — ONDANSETRON 4 MG: 2 INJECTION INTRAMUSCULAR; INTRAVENOUS at 12:12

## 2019-02-25 RX ADMIN — Medication 400 MG: at 16:09

## 2019-02-25 RX ADMIN — LIDOCAINE HYDROCHLORIDE 10 ML: 20 INJECTION, SOLUTION INFILTRATION; PERINEURAL at 13:32

## 2019-02-25 RX ADMIN — VANCOMYCIN HYDROCHLORIDE 1250 MG: 10 INJECTION, POWDER, LYOPHILIZED, FOR SOLUTION INTRAVENOUS at 17:21

## 2019-02-25 RX ADMIN — POTASSIUM CHLORIDE 20 MEQ: 20 TABLET, EXTENDED RELEASE ORAL at 08:06

## 2019-02-25 RX ADMIN — LIDOCAINE HYDROCHLORIDE 10 ML: 10; .005 INJECTION, SOLUTION EPIDURAL; INFILTRATION; INTRACAUDAL; PERINEURAL at 13:35

## 2019-02-25 RX ADMIN — PANTOPRAZOLE SODIUM 40 MG: 40 TABLET, DELAYED RELEASE ORAL at 06:12

## 2019-02-25 RX ADMIN — MIDODRINE HYDROCHLORIDE 5 MG: 5 TABLET ORAL at 17:20

## 2019-02-25 RX ADMIN — HEPARIN SODIUM 2000 UNITS: 200 INJECTION, SOLUTION INTRAVENOUS at 13:31

## 2019-02-25 RX ADMIN — LACTULOSE 30 G: 20 SOLUTION ORAL at 04:00

## 2019-02-25 RX ADMIN — METOLAZONE 5 MG: 5 TABLET ORAL at 08:06

## 2019-02-25 RX ADMIN — FLUTICASONE PROPIONATE 2 SPRAY: 50 SPRAY, METERED NASAL at 08:08

## 2019-02-25 RX ADMIN — TAMSULOSIN HYDROCHLORIDE 0.4 MG: 0.4 CAPSULE ORAL at 08:06

## 2019-02-25 RX ADMIN — FINASTERIDE 5 MG: 5 TABLET, FILM COATED ORAL at 08:06

## 2019-02-25 RX ADMIN — LACTULOSE 30 G: 20 SOLUTION ORAL at 16:10

## 2019-02-25 NOTE — PROGRESS NOTES
Dr. Vish Mathur aware of BP 79'Q systolic. Rate 60's with atrial fibrillation. Patient alert and awake. Denies complaints but remains with intermittent confusion. Will continue to monitor closely during IR procedure.

## 2019-02-25 NOTE — PROGRESS NOTES
Attempted to see pt. This AM for PT. Nursing reported transport was on their way to take pt. For his port placement. Will cont. Efforts.

## 2019-02-25 NOTE — PROGRESS NOTES
Paged Dr. Sly Kumari about patient being hypotensive upon returning from dialysis. Instructed to hold PM dose of coreg. Will continue to monitor.

## 2019-02-25 NOTE — PROGRESS NOTES
Bedside and Verbal shift change report given to self (oncoming nurse) by Jia griffith (offgoing nurse). Report included the following information SBAR.

## 2019-02-25 NOTE — PROGRESS NOTES
Wife arrival to Unit,  Notified of patient room change, moved from room 311, now in room 322, room changed, located across from -[address] [place] [address] for safety, voiced understanding.

## 2019-02-25 NOTE — PROGRESS NOTES
Progress Note Pt did not get a full hd run secondary to hypotension. Wife at bedside states that he is supposed to be on midodrine and she wants to know hwy he is not on it. She also states that he was no longer supposed to be getting coreg but this has been re-started. No midodrine on med reconciliation. No report from [facility] on the hard chart which wifes says she gave us on the day of admit. I have requested records from [facility]. Wife will try and bring in a copy. Low dose midodrine started until we can confirm dose.

## 2019-02-25 NOTE — PROGRESS NOTES
TRANSFER - OUT REPORT: 
 
Verbal report given to Yvonne Infante RN on [facility].  being transferred to IR for routine progression of care Report consisted of patients Situation, Background, Assessment and  
Recommendations(SBAR). Information from the following report(s) SBAR, Kardex and Procedure Summary was reviewed with the receiving nurse. Lines:  
Peripheral IV 02/23/19 Posterior;Right Wrist (Active) Site Assessment Clean, dry, & intact 2/24/2019  3:50 PM  
Phlebitis Assessment 0 2/24/2019  3:50 PM  
Infiltration Assessment 0 2/24/2019  3:50 PM  
Dressing Status Clean, dry, & intact 2/24/2019  3:50 PM  
Dressing Type Transparent 2/24/2019  4:30 AM  
Hub Color/Line Status Patent 2/24/2019  4:30 AM  
   
Peripheral IV 02/24/19 Left Forearm (Active) Opportunity for questions and clarification was provided. Patient transported with: 
 O2 @ 3 liters Registered Nurse

## 2019-02-25 NOTE — PROGRESS NOTES
[facility] Subjective: A on CKD  Pt very lethargic. Seen on dialysis. Run #1 Review of Systems - Can not be obtained due to pt's condition. Objective: 
 
Vitals:  
 02/25/19 1341 02/25/19 1347 02/25/19 1355 02/25/19 1437 BP: 98/83 93/61 90/62 138/80 Pulse: (!) 55 66 85 92 Resp: 21 15 Temp:      
SpO2: 95% 92% Weight:      
Height:      
 
 
PE 
Gen: lethargic CV:reg rate Chest:clear Abd: soft Ext/Access:  Rt IJ tunneled HD cath [facility] Recent Labs  
  02/24/19 
2264 WBC 6.9 HGB 12.9*  
HCT 38.5*  Recent Labs  
  02/25/19 
0348 02/24/19 
0509 02/23/19 
6301 * 135* 136  
K 3.6 3.7 3.7 CL 98 96* 99  
CO2 24 27 24 * 107* 106* BUN 54* 52* 50* CREA 3.40* 3.26* 3.12* CA 8.5 8.9 8.5 Radiology A/P:  
Patient Active Problem List  
Diagnosis Code  CKD (chronic kidney disease) stage 3, GFR 30-59 ml/min ([facility]) N18.3  Acute on chronic systolic heart failure ([facility]) I50.23  Coronary atherosclerosis of native coronary vessel I25.10  Arthritis M19.90  
 Hypertension I10  
 Mild persistent asthma without complication J80.36  
 High triglycerides E78.1  Gastroesophageal reflux disease without esophagitis K21.9  Mixed hyperlipidemia E78.2  
 Essential hypertension I10  
 CHF (congestive heart failure) ([facility]) I50.9  TAZ (obstructive sleep apnea) G47.33  
 Atherosclerosis of native coronary artery of native heart with stable angina pectoris ([facility] I25.118  
 Personal history of tobacco use Z87.891  
 Erysipelas A46  Preseptal cellulitis L03.213  Controlled type 2 diabetes mellitus without complication, without long-term current use of insulin ([facility]) E11.9  Type 2 diabetes with nephropathy ([facility]) E11.21  
 Chronic systolic heart failure ([facility]) I50.22  Shortness of breath R06.02  
 Stage 2 chronic kidney disease N18.2  Seizure ([facility] R56.9  Prolonged Q-T interval on ECG R94.31  
  Elevated troponin R74.8  Pulmonary hypertension ([facility]) I27.20  Stroke (cerebrum) ([facility]) I63.9  Transient cerebral ischemia G45.9  Seizure disorder ([facility] G40.909  Acute on chronic combined systolic and diastolic CHF (congestive heart failure) ([facility]) I50.43  Atrial fibrillation ([facility] I48.91  Left atrial thrombus I51.3  Tobacco abuse Z72.0  Hypertension, essential I10  Type 2 diabetes mellitus without complication ([facility]) C08.2  CKD (chronic kidney disease) stage 4, GFR 15-29 ml/min ([facility]) N18.4  Lactic acidosis E87.2 A on CKD - pt seen on dialysis. Refractory to diuretics. Pt is hypotensive at start of dialysis. Will abort the run today as he is too hypotensive for fluid removal and chemistries are ok  Will re-evaluate need for HD tomorrow. CHF A Fib with RVR Jessy Chiu MD

## 2019-02-25 NOTE — PROGRESS NOTES
TRANSFER - OUT REPORT: 
 
Verbal report given to Jeanine Morris RN on [facility].  being transferred to [address] [place] [address] (after dialysis) for routine progression of care Report consisted of patients Situation, Background, Assessment and  
Recommendations(SBAR). Information from the following report(s) SBAR, Kardex and Procedure Summary was reviewed with the receiving nurse. Lines:  
Peripheral IV 02/23/19 Posterior;Right Wrist (Active) Site Assessment Clean, dry, & intact 2/24/2019  3:50 PM  
Phlebitis Assessment 0 2/24/2019  3:50 PM  
Infiltration Assessment 0 2/24/2019  3:50 PM  
Dressing Status Clean, dry, & intact 2/24/2019  3:50 PM  
Dressing Type Transparent 2/24/2019  4:30 AM  
Hub Color/Line Status Patent 2/24/2019  4:30 AM  
   
Peripheral IV 02/24/19 Left Forearm (Active) Opportunity for questions and clarification was provided. Patient transported with: 
 O2 @ 3 liters Tech

## 2019-02-25 NOTE — DIALYSIS
TRANSFER OUT- DIALYSIS Hemodialysis treatment aborted early d/t hypotension and pt pulling at lines. Dr. Herlinda Daly at bedside. Patient lethargic. Pt rinsed back without difficulties. Post VS   /59  P 78 Flushed both ports with 10 mL of NS.  CVC dressing clean, dry, and intact, te[place] caps intact, bilateral lumens wrapped with 4x4 gauze. Report given to Janice Rivas, primary nurse. Patient to 322 after dialysis.

## 2019-02-25 NOTE — PROGRESS NOTES
[facility] CARDIOLOGY PROGRESS NOTE 
      
 
2/25/2019 6:02 PM 
 
Admit Date: 2/19/2019 No effective dialysis due to hypotension Subjective:  
+ dyspnea Objective:  
  
Vitals:  
 02/25/19 1507 02/25/19 1600 02/25/19 1609 02/25/19 1720 BP: 101/59 (!) 89/67 90/63 102/69 Pulse: 78 70 66 62 Resp:  18  18 Temp:  98.6 °F (37 °C)  97.6 °F (36.4 °C) SpO2:  96%  94% Weight:      
Height:      
 
 
Physical Exam: 
General-No Acute Distress but restless Neck- supple, no JVD 
CV- irgregular rate and rhythm no MRG Lung- clear bilaterally Abd- soft, nontender, nondistended Ext- + edema bilaterally. Skin- warm and dry Data Review:  
Recent Labs  
  02/25/19 
1453 02/25/19 
0348 02/24/19 
6160  135* 135* K 3.6 3.6 3.7 BUN 50* 54* 52* CREA 3.17* 3.40* 3.26* GLU 92 113* 107* WBC 6.1  --  6.9 HGB 12.1*  --  12.9*  
HCT 35.8*  --  38.5* *  --  161 Assessment/Plan:  
HFrEF, progressive A fib Hx atrial thrombus CKD 4, normal kidney size on us 2017 Hematuria Copd, chronic respiratory failure Encephalopathy Suspected liver cirrhosis with hyperbilirubinemia and inc INR. , liver size on recent ct Negative amyloid w/u 2018 
//// Coreg is stopped for now Standing order for ivf removed On heparin Ana Pearce MD 
2/25/2019 6:02 PM

## 2019-02-25 NOTE — PROGRESS NOTES
HOSPITALIST PROGRESS NOTE Patient: Rain Herrera. Sex: male             MRN: [id number] YOB: 1945      Age:  66 y.o. Chief Complaint:  SOB Subjective: This is a 42-year-old gentleman with multiple medical problems including CHF, A. fib, CKD stage IV,, chronic COPD, obstructive sleep apnea who is noncompliant with CPAP, who is currently in a rehab facility, admitted on 2/19 for acute on chronic CHF in view of worsening bilateral LE swelling, hypoxic respiratory failure and hematuria. was brought into the emergency room with complaints of hematuria, leg swellings and shortness of breath. He was also having shortness of breath, moderate in severity, progressively getting worse, worse with minimal exertion, not resolved with rest.  Breathing is worse lying down, better with sitting up.   
 
2/22: 
Pt seen. .. Appears Sob- but denies -not putting out much urine overnight Still on 2L 
 denies any pain, nausea/vomiting Still has significant anasarca No overnight events. 02/23/2019- pt seen- got confused with ativan yesterday - so held- per reports last time it he got it it knocked him out for days. His creatnine is up but he has started putting out urine. . Will likely need hd though. Artist Ana 02/24/2020- seen- pulled apart his [place]; pulled out all his ivs- no access now in soft wrist restraints. 02/25/2019- pt seen- still confused- ct head showed no cva. Plan is for hd today after access placement. Review of Systems 10 point ROS is negative except what mentioned above Prior to Admission medications Medication Sig Start Date End Date Taking? Authorizing Provider  
omeprazole (PRILOSEC) 20 mg capsule Take 1 Cap by mouth daily. 1/24/19   Tru Lin MD  
potassium chloride (K-DUR, KLOR-CON) 20 mEq tablet Take 1 Tab by mouth daily.  1/23/19   Lizandro Sue MD  
Blood-Glucose Meter (ACCU-CHEK YOSEF PLUS METER) misc USE TO CHECK BLOOD SUGARS DAILY DX: E11.9 12/27/18   Israel Vizcarra MD  
Blood Glucose Control High&Low (ACCU-CHEK YOSEF CONTROL SOLN) soln USE AS DIRECTED 12/27/18   Israel Vizcarra MD  
glucose blood VI test strips (ACCU-CHEK YOSEF PLUS TEST STRP) strip USE TO CHECK BLOOD SUGARS DAILY DX: E11.9 12/27/18   Israel Vizcarra MD  
alcohol swabs (BD SINGLE USE SWABS REGULAR) padm USE AS DIRECTED DAILY DX. E11.9 12/27/18   Israel Vizcarra MD  
lancets (ACCU-CHEK SOFTCLIX LANCETS) misc USE TO CHECK BLOOD SUGARS DAILY DX: E11.9 12/27/18   Israel Vizcarra MD  
carvedilol (COREG) 6.25 mg tablet Take 1 Tab by mouth two (2) times daily (with meals). 12/3/18   Cinthya Finley MD  
apixaban (ELIQUIS) 5 mg tablet Take 1 Tab by mouth every twelve (12) hours. 12/3/18   Cinthya Finley MD  
aspirin 81 mg chewable tablet Take 1 Tab by mouth daily. 11/5/18   Keon Marie MD  
atorvastatin (LIPITOR) 20 mg tablet Take 1 Tab by mouth nightly. 10/4/18   Israel Vizcarra MD  
allopurinol (ZYLOPRIM) 100 mg tablet TAKE 1 TABLET BY MOUTH EVERY DAY 9/25/18   Israel Vizcarra MD  
fluticasone [facility]) 50 mcg/actuation nasal spray 2 Sprays by Both Nostrils route daily. 3/29/18   Ann Mata MD  
melatonin 3 mg tablet Take 3 mg by mouth nightly. Provider, Historical  
albuterol (VENTOLIN HFA) 90 mcg/actuation inhaler Take 2 Puffs by inhalation four (4) times daily. 2/6/18   Eamon Robledo NP  
polyethylene glycol (MIRALAX) 17 gram packet Take 1 Packet by mouth daily. Patient taking differently: Take 17 g by mouth daily as needed. 1/13/18   Suly MERLOS NP  
albuterol-ipratropium (DUO-NEB) 2.5 mg-0.5 mg/3 ml nebu 3 mL by Nebulization route four (4) times daily. Diaignosis--J44.9 12/13/17   Eamon Robledo NP Physical Exam  
 
Visit Vitals BP 91/59 Pulse 64 Temp 97.2 °F (36.2 °C) Resp 20 Ht 5' 10\" (1.778 m) Wt 101.9 kg (224 lb 9.6 oz) SpO2 100% BMI 32.23 kg/m² Temp (24hrs), Av.3 °F (36.3 °C), Min:97.2 °F (36.2 °C), Max:97.5 °F (36.4 °C) Oxygen Therapy O2 Sat (%): 100 % (19 0911) Pulse via Oximetry: 75 beats per minute (19 2334) O2 Device: Room air (19 0911) O2 Flow Rate (L/min): 3 l/min (19 0800) Intake/Output Summary (Last 24 hours) at 2019 1243 Last data filed at 2019 0200 Gross per 24 hour Intake 100 ml Output 1500 ml Net -1400 ml General: NAD Eyes:  VICENTA, No pallor/icterus HENT:             Oral Mucosa is Moist, No sinus tenderness Neck:               Supple, elevated JVP Lungs:  Diminished bilateral air entry with diffuse crackles up to the midlung on both sides. No significant wheezing Heart:  S1 S2 irregular Abdomen: Soft, non tender, distended, BS normal 
Extremities: Bilateral UE/LE Pitting edema, Neurologic:   No acute FND, Motor: LUE: 5/5, LLE: 5/5, RUE: 5/5, RLE: 5/5 Skin:                No acute rashes. Baljinder Hylton Anasarca. Abdominal wall edema Musculoskeletal: No Acute findings Psych:            Unable to assess secondary to ams LAB Recent Results (from the past 24 hour(s)) AMMONIA Collection Time: 19  1:27 PM  
Result Value Ref Range Ammonia 40 (H) 11 - 32 UMOL/L  
LACTIC ACID Collection Time: 19  1:27 PM  
Result Value Ref Range Lactic acid 2.3 (HH) 0.4 - 2.0 MMOL/L  
GLUCOSE, POC Collection Time: 19  4:08 PM  
Result Value Ref Range Glucose (POC) 95 65 - 100 mg/dL GLUCOSE, POC Collection Time: 19  9:06 PM  
Result Value Ref Range Glucose (POC) 115 (H) 65 - 100 mg/dL PTT Collection Time: 19  3:48 AM  
Result Value Ref Range aPTT 110.7 (H) 24.7 - 39.8 SEC [place] Collection Time: 19  3:48 AM  
Result Value Ref Range Vancomycin, random 83.6 UG/ML  
METABOLIC PANEL, BASIC Collection Time: 19  3:48 AM  
Result Value Ref Range  Sodium 135 (L) 136 - 145 mmol/L  
 Potassium 3.6 3.5 - 5.1 mmol/L Chloride 98 98 - 107 mmol/L  
 CO2 24 21 - 32 mmol/L Anion gap 13 7 - 16 mmol/L Glucose 113 (H) 65 - 100 mg/dL BUN 54 (H) 8 - 23 MG/DL Creatinine 3.40 (H) 0.8 - 1.5 MG/DL  
 GFR est AA 23 (L) >60 ml/min/1.73m2 GFR est non-AA 19 (L) >60 ml/min/1.73m2 Calcium 8.5 8.3 - 10.4 MG/DL  
GLUCOSE, POC Collection Time: 02/25/19  6:13 AM  
Result Value Ref Range Glucose (POC) 102 (H) 65 - 100 mg/dL GLUCOSE, POC Collection Time: 02/25/19 11:19 AM  
Result Value Ref Range Glucose (POC) 104 (H) 65 - 100 mg/dL IMAGING:  
 
Xr Chest Pa Lat Result Date: 2/19/2019 IMPRESSION: Cardiomegaly and prominent pulmonary vascular markings. Xr Abd (ap And Erect Or Decub) Result Date: 2/22/2019 IMPRESSION: No acute intra-abdominal process. Ct Head Wo Cont Result Date: 2/24/2019 IMPRESSION: No acute intracranial abnormality. Ct Head Wo Cont Result Date: 2/19/2019 Impression: Negative CT scan of the brain. Note: If a subtle CVA is suspected, MRI would be more definitive if clinically warranted. Ct Abd Pelv Wo Cont Result Date: 2/19/2019 IMPRESSION: 1. Schulz catheter with balloon inflated within the prostate gland with a moderate volume of urine seen in the bladder. 2. Moderate size right and small left-sided pleural effusion. Mild diffuse anasarca. 3. Moderate volume ascites within the right and left upper quadrants. Results discussed with floor RN at 6:57 PM on Tuesday, February 19, 2019 via telephone with Dr. Margarita Crowley. All Micro Results Procedure Component Value Units Date/Time CULTURE, BLOOD [[id number]] Collected:  02/22/19 1740 Order Status:  Completed Specimen:  Blood Updated:  02/25/19 0932 Special Requests: --     
  RIGHT 
HAND Culture result: NO GROWTH 3 DAYS     
 CULTURE, BLOOD [[id number]] Collected:  02/22/19 1753 Order Status:  Completed Specimen:  Blood Updated:  02/25/19 0932 Special Requests: --     
  RIGHT 
FOREARM Culture result: NO GROWTH 3 DAYS     
 CULTURE, BLOOD [[id number]] Collected:  02/19/19 1140 Order Status:  Completed Specimen:  Blood Updated:  02/24/19 5413 Special Requests: --     
  RIGHT Antecubital 
  
  Culture result: NO GROWTH 5 DAYS     
 CULTURE, BLOOD [[id number]]  (Abnormal) Collected:  02/19/19 1252 Order Status:  Completed Specimen:  Blood Updated:  02/23/19 3938 Special Requests: --     
  RIGHT 
HAND 
  
  GRAM STAIN    
  GRAM POSITIVE COCCI IN CHAINS AEROBIC AND ANAEROBIC BOTTLES RESULTS VERIFIED, PHONED TO AND READ BACK BY EAMON GONZALEZ @1986 02/20/2019 [facility] Culture result: STAPHYLOCOCCUS SPECIES, COAGULASE NEGATIVE ALPHA STREPTOCOCCUS, NOT S. PNEUMONIAE  
     
      
  THIS ORGANISM MAY BE INDICATIVE OF CULTURE CONTAMINATION, HOWEVER, CLINICAL CORRELATION NEEDS TO BE EVALUATED, AS EACH CASE IS UNIQUE. CULTURE, URINE [[id number]]  (Abnormal)  (Susceptibility) Collected:  02/19/19 1220 Order Status:  Completed Specimen:  Cath Urine Updated:  02/22/19 1184 Special Requests: NO SPECIAL REQUESTS Culture result:    
  1000 COLONIES/mL STAPHYLOCOCCUS HAEMOLYTICUS  
     
  
 
 
EKG: personally reviewed and shows A.fib with RVR 
 
CXR: Personally reviewed and shows fluid overload and pulm edema+ Assessment/Plan Essentially remains unchanged at this time except as noted below: 1. Acute on chronic Combined systolic and diastolic heart failure- decompensated secondary to volume overload 2. CKD stage IV: renal following - IV lasix 80 mg q8h was switched to Lasix gtt but not much urine output initially but now passing urine; Creatinine steadily increasing  from 2.36 to 2.57 to 2.98 to 3.12 to 3.26 to 3.40 plan for hd today after access placement Pt. Stated previously that he is ok with hd if needed. 3.  Hematuria: pretty much cleared 4.  Chronic COPD- does not appear to have any exacerbation- this appears to be volume 5. Hyperbilirubinemia coagulopathy - could be secondary to liver dysfunction from passive congestion from heart failure. 6.  Coagulopathy could be secondary to acute liver dysfunction or vitamin deficiency- will monitor 7. History of carotid disease 8. A. fib with RVR, continue Coreg. Holding Eliquis on heparin drip 9. Obesity 10. Acute encephalopathy: continue lactulose- hopefully will start to improve after HD; he is currently on a 1:1 sitter to aid with care. 11.  UTI - urine and bc were growing staph hemolyticus- continue vanc- stop rocephin- 
12. Hypertension, monitor blood pressure closely and use hydralazine as needed. 13.  Chronic thrombocytopenia. Could be secondary to liver dysfunction- currently resolved 14. Lactic acidosis, could be secondary to hypoxemia will be from liver dysfunction. Continue to monitor 15. Concern for amyloidosis- d/w renal re possible biopsy again - they are repeating the kamba/lambda- will decide further w/up for amyloid based on that; 
16.  Continue other home meds as reconciled in [facility] 17. PT/OT as tolerated 18. Further workup and mgt based on his clinical course DVT prophylaxis: SCDs. Heparin drip Code status: Full Risk: High risk secondary  to the above medical issues. Yue Montana MD 
February 25, 2019

## 2019-02-25 NOTE — DIALYSIS
TRANSFER IN - DIALYSIS Received patient in dialysis unit  from  (unit) for ordered procedure. Consent verified for renal replacement therapy. Patient lethargic and confused. Vital signs stable. BP 92/68 P76 Hemodialysis initiated using right PC. Aspirated and flushed both ports without difficulty. Dressing clean, dry and intact. Machine settings per MD order. Will monitor during treatment.

## 2019-02-25 NOTE — PROCEDURES
Department of Interventional Radiology  [phone]        Interventional Radiology Brief Procedure Note    Patient: Earnest Cohen MRN: [id number]  SSN: [id number]    YOB: 1945  Age: 66 y.o. Sex: male      Date of Procedure: 2/25/2019    Pre-Procedure Diagnosis: Renal failure    Post-Procedure Diagnosis: SAME    Procedure(s): Tunneled Central Venous Catheter    Brief Description of Procedure:  Tunneled dialysis catheter    Performed By: Vidya Fields MD     Assistants: None    Anesthesia:Lidocaine    Estimated Blood Loss: Less than 10ml    Specimens:  None    Implants:  Tunnelled Hemodialysis Catheter    Findings: Patent right IJ    Complications: None    Recommendations: OK to use     Follow Up: as needed    Signed By: Viyda Fields MD     February 25, 2019

## 2019-02-26 ENCOUNTER — APPOINTMENT (OUTPATIENT)
Dept: GENERAL RADIOLOGY | Age: 74
DRG: 291 | End: 2019-02-26
Attending: FAMILY MEDICINE
Payer: MEDICARE

## 2019-02-26 LAB
AMMONIA PLAS-SCNC: 31 UMOL/L (ref 11–32)
ANION GAP SERPL CALC-SCNC: 15 MMOL/L (ref 7–16)
ARTERIAL PATENCY WRIST A: YES
BASE EXCESS BLD CALC-SCNC: 2 MMOL/L
BDY SITE: ABNORMAL
BODY TEMPERATURE: 98.6
BUN SERPL-MCNC: 57 MG/DL (ref 8–23)
CALCIUM SERPL-MCNC: 9.1 MG/DL (ref 8.3–10.4)
CHLORIDE SERPL-SCNC: 99 MMOL/L (ref 98–107)
CO2 BLD-SCNC: 27 MMOL/L
CO2 SERPL-SCNC: 22 MMOL/L (ref 21–32)
COLLECT TIME,HTIME: 1450
CREAT SERPL-MCNC: 4.08 MG/DL (ref 0.8–1.5)
FLOW RATE ISTAT,IFRATE: 3 L/MIN
GAS FLOW.O2 O2 DELIVERY SYS: ABNORMAL L/MIN
GLUCOSE BLD STRIP.AUTO-MCNC: 56 MG/DL (ref 65–100)
GLUCOSE BLD STRIP.AUTO-MCNC: 74 MG/DL (ref 65–100)
GLUCOSE BLD STRIP.AUTO-MCNC: 83 MG/DL (ref 65–100)
GLUCOSE BLD STRIP.AUTO-MCNC: 84 MG/DL (ref 65–100)
GLUCOSE SERPL-MCNC: 81 MG/DL (ref 65–100)
HCO3 BLD-SCNC: 26 MMOL/L (ref 22–26)
PCO2 BLD: 36.9 MMHG (ref 35–45)
PH BLD: 7.46 [PH] (ref 7.35–7.45)
PO2 BLD: 112 MMHG (ref 75–100)
POTASSIUM SERPL-SCNC: 4.4 MMOL/L (ref 3.5–5.1)
SAO2 % BLD: 99 % (ref 95–98)
SERVICE CMNT-IMP: ABNORMAL
SERVICE CMNT-IMP: ABNORMAL
SODIUM SERPL-SCNC: 136 MMOL/L (ref 136–145)
SPECIMEN TYPE: ABNORMAL
VANCOMYCIN SERPL-MCNC: 24.5 UG/ML

## 2019-02-26 PROCEDURE — 82803 BLOOD GASES ANY COMBINATION: CPT

## 2019-02-26 PROCEDURE — 74011250637 HC RX REV CODE- 250/637: Performed by: UROLOGY

## 2019-02-26 PROCEDURE — 71045 X-RAY EXAM CHEST 1 VIEW: CPT

## 2019-02-26 PROCEDURE — 74011000302 HC RX REV CODE- 302: Performed by: INTERNAL MEDICINE

## 2019-02-26 PROCEDURE — 74011250637 HC RX REV CODE- 250/637: Performed by: INTERNAL MEDICINE

## 2019-02-26 PROCEDURE — 74011250637 HC RX REV CODE- 250/637: Performed by: HOSPITALIST

## 2019-02-26 PROCEDURE — 82962 GLUCOSE BLOOD TEST: CPT

## 2019-02-26 PROCEDURE — 77010033678 HC OXYGEN DAILY

## 2019-02-26 PROCEDURE — 80048 BASIC METABOLIC PNL TOTAL CA: CPT

## 2019-02-26 PROCEDURE — 82140 ASSAY OF AMMONIA: CPT

## 2019-02-26 PROCEDURE — 36415 COLL VENOUS BLD VENIPUNCTURE: CPT

## 2019-02-26 PROCEDURE — 94760 N-INVAS EAR/PLS OXIMETRY 1: CPT

## 2019-02-26 PROCEDURE — 36600 WITHDRAWAL OF ARTERIAL BLOOD: CPT

## 2019-02-26 PROCEDURE — 97110 THERAPEUTIC EXERCISES: CPT

## 2019-02-26 PROCEDURE — 65660000000 HC RM CCU STEPDOWN

## 2019-02-26 PROCEDURE — 80202 ASSAY OF VANCOMYCIN: CPT

## 2019-02-26 PROCEDURE — 90935 HEMODIALYSIS ONE EVALUATION: CPT

## 2019-02-26 PROCEDURE — 86580 TB INTRADERMAL TEST: CPT | Performed by: INTERNAL MEDICINE

## 2019-02-26 RX ORDER — MIDODRINE HYDROCHLORIDE 5 MG/1
10 TABLET ORAL
Status: DISCONTINUED | OUTPATIENT
Start: 2019-02-26 | End: 2019-03-02

## 2019-02-26 RX ADMIN — Medication 400 MG: at 08:49

## 2019-02-26 RX ADMIN — Medication 400 MG: at 13:20

## 2019-02-26 RX ADMIN — TUBERCULIN PURIFIED PROTEIN DERIVATIVE 5 UNITS: 5 INJECTION, SOLUTION INTRADERMAL at 14:43

## 2019-02-26 RX ADMIN — PANTOPRAZOLE SODIUM 40 MG: 40 TABLET, DELAYED RELEASE ORAL at 06:12

## 2019-02-26 RX ADMIN — Medication 20 ML: at 21:44

## 2019-02-26 RX ADMIN — Medication 10 ML: at 06:13

## 2019-02-26 RX ADMIN — Medication 10 ML: at 13:21

## 2019-02-26 RX ADMIN — TAMSULOSIN HYDROCHLORIDE 0.4 MG: 0.4 CAPSULE ORAL at 08:49

## 2019-02-26 RX ADMIN — FINASTERIDE 5 MG: 5 TABLET, FILM COATED ORAL at 08:49

## 2019-02-26 RX ADMIN — MIDODRINE HYDROCHLORIDE 10 MG: 5 TABLET ORAL at 13:19

## 2019-02-26 RX ADMIN — LACTULOSE 30 G: 20 SOLUTION ORAL at 06:12

## 2019-02-26 RX ADMIN — FLUTICASONE PROPIONATE 2 SPRAY: 50 SPRAY, METERED NASAL at 08:53

## 2019-02-26 RX ADMIN — MIDODRINE HYDROCHLORIDE 10 MG: 5 TABLET ORAL at 08:48

## 2019-02-26 RX ADMIN — MIDODRINE HYDROCHLORIDE 10 MG: 5 TABLET ORAL at 16:48

## 2019-02-26 RX ADMIN — Medication 400 MG: at 16:48

## 2019-02-26 NOTE — PROGRESS NOTES
[facility] PROGRESS NOTE Follow up for: 
 
Subjective:  
Patient seen and examined on dialysis. Confused. No complaints. Goal UF 1 kg decreased from 2 kg. Tolerating poorly with hypotension. BP 78/30. Objective:  
Exam: 
Vitals:  
 02/26/19 4105 02/26/19 0537 02/26/19 0848 02/26/19 1040 BP: 92/65 101/55 106/63 108/79 Pulse: 71 82 73 (!) 48 Resp: 20 20 18 Temp: 98.7 °F (37.1 °C) 97.3 °F (36.3 °C) 97.6 °F (36.4 °C) SpO2: 100% 96% 100% Weight:      
Height:      
 
 
 
Intake/Output Summary (Last 24 hours) at 2/26/2019 1103 Last data filed at 2/26/2019 3181 Gross per 24 hour Intake 0 ml Output 300 ml Net -300 ml Current Facility-Administered Medications Medication Dose Route Frequency  midodrine (PROAMITINE) tablet 10 mg  10 mg Oral TID WITH MEALS  tuberculin injection 5 Units  5 Units IntraDERMal ONCE  
 lidocaine (XYLOCAINE) 20 mg/mL (2 %) injection  mg  1-20 mL IntraDERMal Multiple  phenazopyridine (PYRIDIUM) tab 190 mg  190 mg Oral TID PRN  
 lactulose (CHRONULAC) solution 30 g  30 g Oral Q6H  
 VANCOMYCIN INTERMITTENT DOSING   Other Rx Dosing/Monitoring  heparin 25,000 units in dextrose 500 mL infusion  12-25 Units/kg/hr IntraVENous TITRATE  albuterol-ipratropium (DUO-NEB) 2.5 MG-0.5 MG/3 ML  3 mL Nebulization Q4H PRN  
 fluticasone (FLONASE) 50 mcg/actuation nasal spray 2 Spray  2 Spray Both Nostrils DAILY  pantoprazole (PROTONIX) tablet 40 mg  40 mg Oral ACB  polyethylene glycol (MIRALAX) packet 17 g  17 g Oral DAILY  sodium chloride (NS) flush 5-40 mL  5-40 mL IntraVENous Q8H  
 sodium chloride (NS) flush 5-40 mL  5-40 mL IntraVENous PRN  
 acetaminophen (TYLENOL) tablet 650 mg  650 mg Oral Q4H PRN  
 HYDROcodone-acetaminophen (NORCO) 5-325 mg per tablet 1 Tab  1 Tab Oral Q4H PRN  
 morphine injection 2 mg  2 mg IntraVENous Q4H PRN  
 naloxone (NARCAN) injection 0.4 mg  0.4 mg IntraVENous PRN  
  diphenhydrAMINE (BENADRYL) capsule 25 mg  25 mg Oral Q4H PRN  
 ondansetron (ZOFRAN) injection 4 mg  4 mg IntraVENous Q4H PRN  
 magnesium hydroxide (MILK OF MAGNESIA) 400 mg/5 mL oral suspension 30 mL  30 mL Oral DAILY PRN  
 magnesium oxide (MAG-OX) tablet 400 mg  400 mg Oral TID WITH MEALS  tamsulosin (FLOMAX) capsule 0.4 mg  0.4 mg Oral DAILY  finasteride (PROSCAR) tablet 5 mg  5 mg Oral DAILY EXAM 
GEN - Awake, but confused CV - S1, S2, RRR, no rub, murmur, or gallop Lung - clear to auscultation bilaterally Abd - soft, nontender, BS present Ext - 2+ edema Recent Labs  
  02/25/19 
1453 02/24/19 
0509 WBC 6.1 6.9 HGB 12.1* 12.9*  
HCT 35.8* 38.5* * 161 Recent Labs  
  02/26/19 
0334 02/25/19 
1453 02/25/19 
0348  136 135* K 4.4 3.6 3.6 CL 99 97* 98  
CO2 22 28 24 BUN 57* 50* 54* CREA 4.08* 3.17* 3.40* CA 9.1 8.6 8.5 GLU 81 92 113* Assessment and Plan:  
1) NINO on CKD-  initiated dialysis with poor response to diuretics. Now with poor response to dialysis with hypotension. Second treatment today. Goal decreased for low blood pressure. Overall poor prognosis. Continue to attempt UF as tolerates 2) Heart failure- cards following. Severe BiV failure with EF 20-25%. Intolerant of medical therapy with hypotension. Needs palliative care to address code status and goals of care. Not tolerating HD. Will try to speak with wife 3) Afib w/ RVR-  Heparin gtt.  Off bb for hypotension 
  
VIC Avelar

## 2019-02-26 NOTE — PROGRESS NOTES
Bedside and Verbal shift change report given to phong (oncoming nurse) by self (offgoing nurse). Report included the following information SBAR, Kardex and Recent Results.

## 2019-02-26 NOTE — PROGRESS NOTES
[facility] CARDIOLOGY PROGRESS NOTE 
      
 
2/26/2019 8:07 AM 
 
Admit Date: 2/19/2019 Subjective:  
Patient remains sedate and minimally interactive. Encephalopathy persists. ROS: 
Cardiovascular:  As noted above Objective:  
  
Vitals:  
 02/25/19 1720 02/25/19 2030 02/26/19 0119 02/26/19 4318 BP: 102/69 101/66 92/65 101/55 Pulse: 62 70 71 82 Resp: 18 19 20 20 Temp: 97.6 °F (36.4 °C) 98.1 °F (36.7 °C) 98.7 °F (37.1 °C) 97.3 °F (36.3 °C) SpO2: 94% 100% 100% 96% Weight:      
Height:      
 
 
Physical Exam: 
General-No Acute Distress Neck- supple, no JVD 
CV- regular rate and rhythm no MRG Lung- clear bilaterally Abd- soft, nontender, nondistended Ext- no edema bilaterally. Skin- warm and dry Data Review:  
Recent Labs  
  02/26/19 
0334 02/25/19 
1453  02/24/19 
0509  136   < > 135* K 4.4 3.6   < > 3.7 BUN 57* 50*   < > 52* CREA 4.08* 3.17*   < > 3.26* GLU 81 92   < > 107* WBC  --  6.1  --  6.9 HGB  --  12.1*  --  12.9* HCT  --  35.8*  --  38.5* PLT  --  119*  --  161  
 < > = values in this interval not displayed. SUMMARY: 
 
-  Left ventricle: Systolic function was markedly reduced. Ejection fraction 
was estimated in the range of 25 % to 30 %. There was severe diffuse 
hypokinesis with distinct regional wall motion abnormalities. There was  
severe 
global hypokinesis. There was severe concentric hypertrophy. The myocardial 
specular pattern appeared moderately abnormal. Consider infiltrative 
cardiomyopathy if clinically indicated. -  Right ventricle: The ventricle was mildly dilated. Systolic function was 
moderately to markedly reduced. There was mild pulmonary artery hypertension. 
 
-  Left atrium: The atrium was moderately to markedly dilated. -  Right atrium: The atrium was markedly dilated. -  Inferior vena cava, hepatic veins: The inferior vena cava was moderately dilated. The respirophasic change in diameter was less than 50%. -  Aortic valve: The valve was trileaflet. Leaflets exhibited moderate 
sclerosis. The left coronary cusp demonstrated immobility. There was mild 
regurgitation. -  Mitral valve: There was mild to moderate regurgitation. -  Tricuspid valve: There was mild to moderate regurgitation. -  Pulmonic valve: There was moderate regurgitation. Assessment/Plan: Active Problems: Hypertension (9/29/2017) This is not an issue and all medications have been stopped CHF (congestive heart failure) ([facility] [address] (9/27/2017) Has severe BiV failure. EF 20-25%. Intolerant of medical therapy due to hypotension.   
  - DC all therapies including zaroxolyn and KCL 
  - Volume to be controlled with HD 
  - Increase midodrine to allow adequate HD 
  - Prognosis is poor and need to address. Patient remains full code CKD (chronic kidney disease) stage 4, GFR 15-29 ml/min ([facility]) (2/20/2019) Now on HD and will increase midodrine to allow better HD. Overall prognosis is poor and likely will not tolerate HD long term. Needs palliative care to address code status and goals or care. Lactic acidosis (2/20/2019)   Per primary team. 
 
 
 
 
Zulema Salinas MD 
2/26/2019 8:07 AM

## 2019-02-26 NOTE — DIALYSIS
TRANSFER OUT- DIALYSIS Hemodialysis treatment completed without complication. Patient alert, sitter at bedside and VS stable  BP 94/70  P 78   
 
 0.5 Kgs removed, not able to remove full UF goal due to hypotension during tx. BP resolved with NS bolus and reduced UF goal. 
 
Flushed both ports with 10 mL of NS.  CVC dressing clean, dry, and intact, tego caps intact, bilateral lumens wrapped with 4x4 gauze. Patient to room 322 after dialysis.

## 2019-02-26 NOTE — PROGRESS NOTES
Sitter required around to clock, as patient very restless and randomly pulling on IV lines, O2 NC and Schulz Cath. Tubing.

## 2019-02-26 NOTE — PROGRESS NOTES
Hospitalist Progress Note Admit Date:  2019 11:19 AM  
Name:  Rain Herrera. Age:  66 y.o. 
:  1945 MRN:  [id number] PCP:  Tru Lin MD 
Treatment Team: Attending Provider: Lexy Alvarez MD; Consulting Provider: Maldonado Chen MD; Consulting Provider: Jf Montanez; Utilization Review: Ester Malik RN; Care Manager: Apolonia Obregon RN; Care Manager: Ludivina Dominguez HPI/Subjective: This is a 68-year-old gentleman with multiple medical problems including CHF, A. fib, CKD stage IV,, chronic COPD, obstructive sleep apnea who is noncompliant with CPAP, who is currently in a rehab facility, admitted on  for acute on chronic CHF in view of worsening bilateral LE swelling, hypoxic respiratory failure and hematuria. was brought into the emergency room with complaints of hematuria, leg swellings and shortness of breath. He was also having shortness of breath, moderate in severity, progressively getting worse, worse with minimal exertion, not resolved with rest.  Tunneled cath placed  and HD started but could not tolerate his session due to hypotension. Midodrine was restarted. : back from HD, did tolerate it better than yesterday but still with some hypotension. Wife not present. Patient sleeping, nad. Awakens to loud voice and follows some simple commands but appears weak. Not conversant. Unable to get ROS. Objective:  
 
Patient Vitals for the past 24 hrs: 
 Temp Pulse Resp BP SpO2  
19 1705 98.1 °F (36.7 °C) 61 17 (!) 77/53 99 % 19 1648  76  (!) 80/54   
19 1324 97.8 °F (36.6 °C) 69 18 98/60 97 % 19 1319  76  98/60   
19 1219  78  93/70   
19 1202  80  106/54   
19 1132  78  93/70   
19 1112     95 % 19 1109  73  106/58   
19 1105  (!) 58  (!) 83/48   
19 1045  (!) 53  124/53   
19 1040  (!) 48  108/79  02/26/19 0848 97.6 °F (36.4 °C) 73 18 106/63 100 % 02/26/19 0537 97.3 °F (36.3 °C) 82 20 101/55 96 % 02/26/19 0119 98.7 °F (37.1 °C) 71 20 92/65 100 % 02/25/19 2030 98.1 °F (36.7 °C) 70 19 101/66 100 % 02/25/19 1720 97.6 °F (36.4 °C) 62 18 102/69 94 % Oxygen Therapy O2 Sat (%): 99 % (02/26/19 1705) Pulse via Oximetry: 65 beats per minute (02/25/19 1347) O2 Device: Nasal cannula (02/26/19 1112) O2 Flow Rate (L/min): 3 l/min (02/26/19 1112) ETCO2 (mmHg): 21 mmHg (02/25/19 1347) Intake/Output Summary (Last 24 hours) at 2/26/2019 1714 Last data filed at 2/26/2019 1219 Gross per 24 hour Intake 0 ml Output 800 ml Net -800 ml *Note that automatically entered I/Os may not be accurate; dependent on patient compliance with collection and accurate  by techs. General:    Well nourished. Alert. CV:   RRR. No murmur, rub, or gallop. Lungs:   CTAB. No wheezing, rhonchi, or rales. Abdomen:   Soft, nontender, nondistended. Extremities: Warm and dry. No cyanosis or edema. Skin:     No rashes or jaundice. Neuro:  No gross focal deficits Data Review: 
I have reviewed all labs, meds, and studies from the last 24 hours: 
 
Recent Results (from the past 24 hour(s)) GLUCOSE, POC Collection Time: 02/25/19  9:39 PM  
Result Value Ref Range Glucose (POC) 89 65 - 100 mg/dL METABOLIC PANEL, BASIC Collection Time: 02/26/19  3:34 AM  
Result Value Ref Range Sodium 136 136 - 145 mmol/L Potassium 4.4 3.5 - 5.1 mmol/L Chloride 99 98 - 107 mmol/L  
 CO2 22 21 - 32 mmol/L Anion gap 15 7 - 16 mmol/L Glucose 81 65 - 100 mg/dL BUN 57 (H) 8 - 23 MG/DL Creatinine 4.08 (H) 0.8 - 1.5 MG/DL  
 GFR est AA 18 (L) >60 ml/min/1.73m2 GFR est non-AA 15 (L) >60 ml/min/1.73m2 Calcium 9.1 8.3 - 10.4 MG/DL  
GLUCOSE, POC Collection Time: 02/26/19  6:36 AM  
Result Value Ref Range Glucose (POC) 74 65 - 100 mg/dL GLUCOSE, POC  
 Collection Time: 02/26/19 10:47 AM  
Result Value Ref Range Glucose (POC) 56 (L) 65 - 100 mg/dL GLUCOSE, POC Collection Time: 02/26/19 10:49 AM  
Result Value Ref Range Glucose (POC) 83 65 - 100 mg/dL POC G3 Collection Time: 02/26/19  3:03 PM  
Result Value Ref Range Device: NASAL CANNULA pH (POC) 7.456 (H) 7.35 - 7.45    
 pCO2 (POC) 36.9 35 - 45 MMHG  
 pO2 (POC) 112 (H) 75 - 100 MMHG  
 HCO3 (POC) 26.0 22 - 26 MMOL/L  
 sO2 (POC) 99 (H) 95 - 98 % Base excess (POC) 2 mmol/L Allens test (POC) YES Site RIGHT RADIAL Patient temp. 98.6 Specimen type (POC) ARTERIAL Performed by Yamileth   
 CO2, POC 27 MMOL/L Flow rate (POC) 3.000 L/min Critical value read back 00:01 COLLECT TIME 1,450 GLUCOSE, POC Collection Time: 02/26/19  3:07 PM  
Result Value Ref Range Glucose (POC) 84 65 - 100 mg/dL All Micro Results Procedure Component Value Units Date/Time CULTURE, BLOOD [[id number]] Collected:  02/22/19 1740 Order Status:  Completed Specimen:  Blood Updated:  02/26/19 1057 Special Requests: --     
  RIGHT 
HAND Culture result: NO GROWTH 4 DAYS     
 CULTURE, BLOOD [[id number]] Collected:  02/22/19 1753 Order Status:  Completed Specimen:  Blood Updated:  02/26/19 1057 Special Requests: --     
  RIGHT 
FOREARM Culture result: NO GROWTH 4 DAYS     
 CULTURE, BLOOD [[id number]] Collected:  02/19/19 1140 Order Status:  Completed Specimen:  Blood Updated:  02/24/19 0718 Special Requests: --     
  RIGHT Antecubital 
  
  Culture result: NO GROWTH 5 DAYS     
 CULTURE, BLOOD [[id number]]  (Abnormal) Collected:  02/19/19 1252 Order Status:  Completed Specimen:  Blood Updated:  02/23/19 0747 Special Requests: --     
  RIGHT 
HAND 
  
  GRAM STAIN    
  GRAM POSITIVE COCCI IN CHAINS   AEROBIC AND ANAEROBIC BOTTLES  
     
      
 RESULTS VERIFIED, PHONED TO AND READ BACK BY EAMON GONZALEZ @0527 2019 [facility] Culture result: STAPHYLOCOCCUS SPECIES, COAGULASE NEGATIVE ALPHA STREPTOCOCCUS, NOT S. PNEUMONIAE  
     
      
  THIS ORGANISM MAY BE INDICATIVE OF CULTURE CONTAMINATION, HOWEVER, CLINICAL CORRELATION NEEDS TO BE EVALUATED, AS EACH CASE IS UNIQUE. CULTURE, URINE [[id number]]  (Abnormal)  (Susceptibility) Collected:  19 1220 Order Status:  Completed Specimen:  Cath Urine Updated:  19 0211 Special Requests: NO SPECIAL REQUESTS Culture result:    
  1000 COLONIES/mL STAPHYLOCOCCUS HAEMOLYTICUS Results for orders placed or performed during the hospital encounter of 19  
2D ECHO COMPLETE ADULT (TTE) W OR WO CONTR Narrative [facility] [address] [place], [address] [facility] 
[phone] Transthoracic Echocardiogram 
2D, M-mode, Doppler, and Color Doppler Patient: Cris Valentin 
MR #: [id number] : [id number] Age: 66 years Gender: Male Study date: 2019 Account #: [de-identified] Height: 70 in Weight: 220.4 lb 
BSA: 2.18 mï¾² Status:Routine Location: [facility] BP: 113/ 79 Allergies: PENICILLINS Sonographer:  Saray Junior RDCS Group:  [facility] Referring Physician:  Justin Hicks. Naya Lilly MD [facility] Reading Physician:  TITO Julio MD [facility] INDICATIONS: Reassess EF PROCEDURE: This was a routine study. A transthoracic echocardiogram was 
performed. The study included complete 2D imaging, M-mode, complete spectral 
Doppler, and color Doppler. Intravenous contrast (Definity, 2 ml) was 
administered. Image quality was adequate. LEFT VENTRICLE: Size was normal. Systolic function was markedly reduced. Ejection fraction was estimated in the range of 25 % to 30 %. There was  
severe 
diffuse hypokinesis with distinct regional wall motion abnormalities. There  
was severe global hypokinesis. There was severe concentric hypertrophy. The 
myocardial specular pattern appeared moderately abnormal. Consider  
infiltrative 
cardiomyopathy if clinically indicated. Left ventricular diastolic  
dysfunction Grade 3. Average E/e' of 16.7. The study was not technically sufficient to 
allow evaluation of LV diastolic function. RIGHT VENTRICLE: The ventricle was mildly dilated. Systolic function was 
moderately to markedly reduced. There was mild pulmonary artery hypertension. Estimated peak pressure was in the range of 35-40 mmHg. LEFT ATRIUM: The atrium was moderately to markedly dilated. RIGHT ATRIUM: The atrium was markedly dilated. SYSTEMIC VEINS: IVC: The inferior vena cava was moderately dilated. The 
respirophasic change in diameter was less than 50%. AORTIC VALVE: The valve was trileaflet. Leaflets exhibited moderate  
sclerosis. The left coronary cusp demonstrated immobility. There was no evidence for 
stenosis. There was mild regurgitation. MITRAL VALVE: There was focal thickening of the posterior leaflet. There was  
no 
evidence for stenosis. There was mild to moderate regurgitation. TRICUSPID VALVE: The valve structure was normal. There was no evidence for 
stenosis. There was mild to moderate regurgitation. The regurgitant jet was 
eccentric. PULMONIC VALVE: The valve structure was normal. There was no evidence for 
stenosis. There was moderate regurgitation. PERICARDIUM: There was no pericardial effusion. AORTA: The root exhibited normal size. SUMMARY: 
 
-  Left ventricle: Systolic function was markedly reduced. Ejection fraction 
was estimated in the range of 25 % to 30 %. There was severe diffuse 
hypokinesis with distinct regional wall motion abnormalities. There was  
severe 
global hypokinesis. There was severe concentric hypertrophy. The myocardial 
specular pattern appeared moderately abnormal. Consider infiltrative cardiomyopathy if clinically indicated. -  Right ventricle: The ventricle was mildly dilated. Systolic function was 
moderately to markedly reduced. There was mild pulmonary artery hypertension. 
 
-  Left atrium: The atrium was moderately to markedly dilated. -  Right atrium: The atrium was markedly dilated. -  Inferior vena cava, hepatic veins: The inferior vena cava was moderately 
dilated. The respirophasic change in diameter was less than 50%. -  Aortic valve: The valve was trileaflet. Leaflets exhibited moderate 
sclerosis. The left coronary cusp demonstrated immobility. There was mild 
regurgitation. 
 
-  Mitral valve: There was mild to moderate regurgitation. 
 
-  Tricuspid valve: There was mild to moderate regurgitation. 
 
-  Pulmonic valve: There was moderate regurgitation. SYSTEM MEASUREMENT TABLES 
 
2D mode AoR Diam (2D): 3 cm 
LA Dimension (2D): 5.1 cm Left Atrium Systolic Volume Index; Method of Disks, Biplane; 2D mode;: 52.3 
ml/m2 IVS/LVPW (2D): 0.9 IVSd (2D): 2.1 cm 
LVIDd (2D): 3.7 cm 
LVIDs (2D): 3.3 cm 
LVPWd (2D): 2.3 cm RVIDd (2D): 3.9 cm Unspecified Scan Mode Peak Grad; Mean; Antegrade Flow: 5 mm[Hg] Vmax; Antegrade Flow: 107 cm/s Peak Grad; Mean; Antegrade Flow: 3 mm[Hg] Vmax; Antegrade Flow: 88.1 cm/s Prepared and signed by TITO Lancaster MD [facility] Signed 21-Feb-2019 17:34:55 Current Meds: 
Current Facility-Administered Medications Medication Dose Route Frequency  midodrine (PROAMITINE) tablet 10 mg  10 mg Oral TID WITH MEALS  tuberculin injection 5 Units  5 Units IntraDERMal ONCE  
 lidocaine (XYLOCAINE) 20 mg/mL (2 %) injection  mg  1-20 mL IntraDERMal Multiple  phenazopyridine (PYRIDIUM) tab 190 mg  190 mg Oral TID PRN  
 lactulose (CHRONULAC) solution 30 g  30 g Oral Q6H  
 VANCOMYCIN INTERMITTENT DOSING   Other Rx Dosing/Monitoring  heparin 25,000 units in dextrose 500 mL infusion  12-25 Units/kg/hr IntraVENous TITRATE  albuterol-ipratropium (DUO-NEB) 2.5 MG-0.5 MG/3 ML  3 mL Nebulization Q4H PRN  
 fluticasone (FLONASE) 50 mcg/actuation nasal spray 2 Spray  2 Spray Both Nostrils DAILY  pantoprazole (PROTONIX) tablet 40 mg  40 mg Oral ACB  polyethylene glycol (MIRALAX) packet 17 g  17 g Oral DAILY  sodium chloride (NS) flush 5-40 mL  5-40 mL IntraVENous Q8H  
 sodium chloride (NS) flush 5-40 mL  5-40 mL IntraVENous PRN  
 acetaminophen (TYLENOL) tablet 650 mg  650 mg Oral Q4H PRN  
 HYDROcodone-acetaminophen (NORCO) 5-325 mg per tablet 1 Tab  1 Tab Oral Q4H PRN  
 morphine injection 2 mg  2 mg IntraVENous Q4H PRN  
 naloxone (NARCAN) injection 0.4 mg  0.4 mg IntraVENous PRN  
 diphenhydrAMINE (BENADRYL) capsule 25 mg  25 mg Oral Q4H PRN  
 ondansetron (ZOFRAN) injection 4 mg  4 mg IntraVENous Q4H PRN  
 magnesium hydroxide (MILK OF MAGNESIA) 400 mg/5 mL oral suspension 30 mL  30 mL Oral DAILY PRN  
 magnesium oxide (MAG-OX) tablet 400 mg  400 mg Oral TID WITH MEALS  tamsulosin (FLOMAX) capsule 0.4 mg  0.4 mg Oral DAILY  finasteride (PROSCAR) tablet 5 mg  5 mg Oral DAILY Other Studies (last 24 hours): No results found. Assessment and Plan:  
 
Hospital Problems as of 2/26/2019 Date Reviewed: 12/11/2018 Codes Class Noted - Resolved POA  
 CKD (chronic kidney disease) stage 4, GFR 15-29 ml/min (HCC) ICD-10-CM: N18.4 ICD-9-CM: 585.4  2/20/2019 - Present Unknown Lactic acidosis ICD-10-CM: E87.2 ICD-9-CM: 276.2  2/20/2019 - Present Unknown  
   
 CKD (chronic kidney disease) stage 3, GFR 30-59 ml/min (HCC) (Chronic) ICD-10-CM: N18.3 ICD-9-CM: 585.3  9/29/2017 - Present Hypertension (Chronic) ICD-10-CM: I10 
ICD-9-CM: 401.9  9/29/2017 - Present Yes  
   
 CHF (congestive heart failure) (HCC) ICD-10-CM: I50.9 ICD-9-CM: 428.0  9/27/2017 - Present Unknown Plan: # Acute on chronic sCHF 
 - cannot tolerate diuresis or OMT 2/2 hypotension - HD for volume overload, but this may not be sustainable long term given his BP issues. - cardiology following # ESRD now on HD 
 - Tunneled cath placed 2/25 - Midodrine restarted but still having hypotension during HD.  
 - nephro appreciated # AFib 
 - restart Eliquis 2/27 # Hematuria 
 - resolved DC planning/Dispo: Would benefit from goals of care discussion but family not present when I saw the patient. PC would be helpful too. Diet:  DIET DIABETIC CONSISTENT CARB 
DVT ppx:  Resume eder Signed: 
Slick Wong MD

## 2019-02-26 NOTE — DIALYSIS
TRANSFER IN - DIALYSIS Received patient in dialysis unit  from [facility] (unit) for ordered procedure. Consent verified for renal replacement therapy. Patient alert and vital signs stable. /79 P48 Hemodialysis initiated using right perm catheter. Aspirated and flushed both ports without difficulty. Dressing clean, dry and intact. Machine settings per MD order. Will monitor during treatment.

## 2019-02-26 NOTE — PROGRESS NOTES
Problem: Self Care Deficits Care Plan (Adult) Goal: *Acute Goals and Plan of Care (Insert Text) 1. Pt will toilet with SBA 2. Pt will complete functional mobility for ADLs with SBA 3. Pt will complete lower body dressing with Sba using AE as needed 4. Pt will complete grooming and hygiene at sink with sba 5. Pt will demonstrate independence with HEP to promote increased BUE strength and functional use for ADLs 6. Pt will tolerate 23 minutes functional activity with min or fewer rest breaks to promote increased endurance for ADLs 7. Pt will complete bed mobility with SBA in prep for ADLs Timeframe: 7 days OCCUPATIONAL THERAPY: Daily Note and AM 2/26/2019INPATIENT: OT Visit Days: 1 Payor: Abbey Pap / Plan: [address] [place] [address] HMO / Product Type: [employer] /  
  
NAME/AGE/GENDER: Ayan Sebastian is a 66 y.o. male PRIMARY DIAGNOSIS:  [facility]) [facility] [I50.9] <principal problem not specified> <principal problem not specified> 
 
  
ICD-10: Treatment Diagnosis:  
 · Generalized Muscle Weakness (M62.81) Precautions/Allergies: 
  falls Ativan [lorazepam] and Pcn [penicillins] ASSESSMENT:  
Mr. Duke Qureshi was admitted from rehab with SOB and edema d/t CHF exacerbation. Pt was supine in bed with wife at bedside. Pt completed the exercises below with ANNALEE. Pt is confused. Minimum progress made. Continue POC. This section established at most recent assessment PROBLEM LIST (Impairments causing functional limitations): 1. Decreased Strength 2. Decreased ADL/Functional Activities 3. Decreased Transfer Abilities 4. Decreased Balance 5. Decreased Activity Tolerance 6. Increased Fatigue 7. Increased Shortness of Breath 8. Decreased Cognition INTERVENTIONS PLANNED: (Benefits and precautions of occupational therapy have been discussed with the patient.) 1. Activities of daily living training 2. Adaptive equipment training 3. Balance training 4. Therapeutic activity 5. Therapeutic exercise TREATMENT PLAN: Frequency/Duration: Follow patient 3 times/ week to address above goals. Rehabilitation Potential For Stated Goals: Good RECOMMENDED REHABILITATION/EQUIPMENT: (at time of discharge pending progress): Due to the probability of continued deficits (see above) this patient will likely need continued skilled occupational therapy after discharge. Equipment:  
? None at this time OCCUPATIONAL PROFILE AND HISTORY:  
History of Present Injury/Illness (Reason for Referral): 
See H&P Past Medical History/Comorbidities:  
Mr. Annabella Castaneda  has a past medical history of Acute CHF (congestive heart failure) ([facility] (4/25/2015), Acute combined systolic and diastolic congestive heart failure ([facility] (4/28/2015), De Quervain's tenosynovitis, right (4/28/2015), Dyspnea on exertion (6/28/2015), Elevated serum creatinine (4/25/2015), Elevated troponin (6/28/2015), History of coronary artery disease, History of right knee surgery, History of shingles, Malignant hypertension (4/25/2015), and MI (myocardial infarction) ([facility] Mr. Annabella Castaneda  has a past surgical history that includes hx knee arthroscopy (Right); hx heart catheterization (6/29/2015); and ir insert tunl cvc w/o port over 5 yr (2/25/2019). Social History/Living Environment:  
Home Environment: Rehabilitation facility One/Two Story Residence: One story Living Alone: No 
Support Systems: Spouse/Significant Other/Partner Patient Expects to be Discharged to[de-identified] Unknown Current DME Used/Available at Home: kevin Jensen Prior Level of Function/Work/Activity: 
From rehab, reports was independent w/ ADLs and mobility w/ a RW. Poor historian. Number of Personal Factors/Comorbidities that affect the Plan of Care: Expanded review of therapy/medical records (1-2):  MODERATE COMPLEXITY ASSESSMENT OF OCCUPATIONAL PERFORMANCE[de-identified]  
Activities of Daily Living: Basic ADLs (From Assessment) Complex ADLs (From Assessment) Feeding: Setup Oral Facial Hygiene/Grooming: Contact guard assistance Bathing: Moderate assistance Upper Body Dressing: Minimum assistance Lower Body Dressing: Minimum assistance Toileting: Minimum assistance, Moderate assistance Instrumental ADL Meal Preparation: Maximum assistance Homemaking: Maximum assistance Medication Management: Moderate assistance Financial Management: Moderate assistance Grooming/Bathing/Dressing Activities of Daily Living Cognitive Retraining Safety/Judgement: Fall prevention Most Recent Physical Functioning:  
Gross Assessment: 
  
         
  
Posture: 
Posture (WDL): Exceptions to [facility] Posture Assessment: Rounded shoulders Balance: 
  Bed Mobility: 
  
Wheelchair Mobility: 
  
Transfers: 
   
 
    
 
Patient Vitals for the past 6 hrs: 
 BP SpO2 O2 Flow Rate (L/min) Pulse 02/26/19 0750   3 l/min   
02/26/19 0848 106/63 100 %  73  
02/26/19 1040 108/79   (!) 48  
02/26/19 1045 124/53   (!) 53  
02/26/19 1105 (!) 83/48   (!) 58  
02/26/19 1109 106/58   73  
02/26/19 1112  95 % 3 l/min   
02/26/19 1132 93/70   78 Mental Status Neurologic State: Alert Orientation Level: Oriented to person Cognition: Follows commands, Impaired decision making, Poor safety awareness Perception: Appears intact Perseveration: No perseveration noted Safety/Judgement: Fall prevention Physical Skills Involved: 
1. Balance 2. Strength 3. Activity Tolerance 4. Edema Cognitive Skills Affected (resulting in the inability to perform in a timely and safe manner): 1. Executive Function 2. Short Term Recall 3. Long Term Memory 4. Sustained Attention 5. Divided Attention 6. Comprehension Psychosocial Skills Affected: 1. Habits/Routines 2. Environmental Adaptation 3. Self-Awareness Number of elements that affect the Plan of Care: 5+:  HIGH COMPLEXITY CLINICAL DECISION MAKING:  
M [employer] AM-PAC 6 Clicks Daily Activity Inpatient Short Form How much help from another person does the patient currently need. .. Total A Lot A Little None 1. Putting on and taking off regular lower body clothing? [] 1   [] 2   [x] 3   [] 4  
2. Bathing (including washing, rinsing, drying)? [] 1   [] 2   [x] 3   [] 4  
3. Toileting, which includes using toilet, bedpan or urinal?   [] 1   [] 2   [x] 3   [] 4  
4. Putting on and taking off regular upper body clothing? [] 1   [] 2   [x] 3   [] 4  
5. Taking care of personal grooming such as brushing teeth? [] 1   [] 2   [x] 3   [] 4  
6. Eating meals? [] 1   [] 2   [] 3   [x] 4  
© 2007, [employer] of [facility] [employer], under license to [employer]. All rights reserved Score:  Initial: 19 Most Recent: X (Date: -- ) Interpretation of Tool:  Represents activities that are increasingly more difficult (i.e. Bed mobility, Transfers, Gait). Medical Necessity:    
· Patient demonstrates good rehab potential due to higher previous functional level. Reason for Services/Other Comments: 
· Patient continues to require present interventions due to patient's inability to independently complete ADLs. Use of outcome tool(s) and clinical judgement create a POC that gives a: MODERATE COMPLEXITY  
 
 
 
TREATMENT:  
(In addition to Assessment/Re-Assessment sessions the following treatments were rendered) Pre-treatment Symptoms/Complaints:   
Pain: Initial:  
   Post Session:  0 Therapeutic Exercise: (  13):  Exercises per grid below to improve mobility and strength. Required min verbal and manual cues to promote proper body posture and promote proper body mechanics. Progressed range and repetitions as indicated. Date: 
2/26/19 Date: 
 Date: Activity/Exercise Parameters Parameters Parameters Shoulder flexion/extension  10 reps Shoulder horizontal abduction/adduction  10 reps Elbow flexion/extension  10 reps Digit flexion/extensio  10 reps Wrist flexion/extension  10 reps Braces/Orthotics/Lines/Etc:  
· IV 
· [place] catheter · O2 Device: Nasal cannula Treatment/Session Assessment:   
· Response to Treatment:  Tolerated ok · Interdisciplinary Collaboration:  
o Certified Occupational Therapy Assistant 
o Registered Nurse · After treatment position/precautions:  
o Supine in bed 
o Bed alarm/tab alert on 
o Bed/Chair-wheels locked 
o Call light within reach 
o RN notified 
o Family at bedside 
o sitter present · Compliance with Program/Exercises: Will assess as treatment progresses. · Recommendations/Intent for next treatment session: \"Next visit will focus on advancements to more challenging activities and reduction in assistance provided\". Total Treatment Duration: OT Patient Time In/Time Out Time In: 5877 Time Out: 1323 Na Lucinda [address] Francisco

## 2019-02-26 NOTE — PROGRESS NOTES
Bedside and Verbal shift change report given to self (oncoming nurse) by Zina Rios (offgoing nurse). Report included the following information SBAR, Kardex and Recent Results.

## 2019-02-27 LAB
ANION GAP SERPL CALC-SCNC: 13 MMOL/L (ref 7–16)
BACTERIA SPEC CULT: NORMAL
BACTERIA SPEC CULT: NORMAL
BUN SERPL-MCNC: 60 MG/DL (ref 8–23)
CALCIUM SERPL-MCNC: 8.7 MG/DL (ref 8.3–10.4)
CHLORIDE SERPL-SCNC: 99 MMOL/L (ref 98–107)
CO2 SERPL-SCNC: 25 MMOL/L (ref 21–32)
CREAT SERPL-MCNC: 4.86 MG/DL (ref 0.8–1.5)
GLUCOSE SERPL-MCNC: 95 MG/DL (ref 65–100)
MM INDURATION POC: 0 MM (ref 0–5)
POTASSIUM SERPL-SCNC: 4.3 MMOL/L (ref 3.5–5.1)
PPD POC: NEGATIVE NEGATIVE
SERVICE CMNT-IMP: NORMAL
SERVICE CMNT-IMP: NORMAL
SODIUM SERPL-SCNC: 137 MMOL/L (ref 136–145)

## 2019-02-27 PROCEDURE — 74011250636 HC RX REV CODE- 250/636: Performed by: INTERNAL MEDICINE

## 2019-02-27 PROCEDURE — 36415 COLL VENOUS BLD VENIPUNCTURE: CPT

## 2019-02-27 PROCEDURE — 90935 HEMODIALYSIS ONE EVALUATION: CPT

## 2019-02-27 PROCEDURE — 74011250637 HC RX REV CODE- 250/637: Performed by: INTERNAL MEDICINE

## 2019-02-27 PROCEDURE — 80048 BASIC METABOLIC PNL TOTAL CA: CPT

## 2019-02-27 PROCEDURE — 74011250637 HC RX REV CODE- 250/637: Performed by: UROLOGY

## 2019-02-27 PROCEDURE — 65660000000 HC RM CCU STEPDOWN

## 2019-02-27 PROCEDURE — 76450000000

## 2019-02-27 PROCEDURE — 74011250637 HC RX REV CODE- 250/637: Performed by: HOSPITALIST

## 2019-02-27 RX ORDER — METOLAZONE 5 MG/1
10 TABLET ORAL DAILY
Status: DISCONTINUED | OUTPATIENT
Start: 2019-02-28 | End: 2019-03-02

## 2019-02-27 RX ORDER — FUROSEMIDE 10 MG/ML
100 INJECTION INTRAMUSCULAR; INTRAVENOUS 2 TIMES DAILY
Status: DISCONTINUED | OUTPATIENT
Start: 2019-02-27 | End: 2019-03-02

## 2019-02-27 RX ADMIN — APIXABAN 5 MG: 5 TABLET, FILM COATED ORAL at 08:21

## 2019-02-27 RX ADMIN — Medication 10 ML: at 05:10

## 2019-02-27 RX ADMIN — Medication 400 MG: at 17:14

## 2019-02-27 RX ADMIN — PANTOPRAZOLE SODIUM 40 MG: 40 TABLET, DELAYED RELEASE ORAL at 08:22

## 2019-02-27 RX ADMIN — FUROSEMIDE 100 MG: 10 INJECTION, SOLUTION INTRAMUSCULAR; INTRAVENOUS at 17:20

## 2019-02-27 RX ADMIN — MIDODRINE HYDROCHLORIDE 10 MG: 5 TABLET ORAL at 17:13

## 2019-02-27 RX ADMIN — TAMSULOSIN HYDROCHLORIDE 0.4 MG: 0.4 CAPSULE ORAL at 08:22

## 2019-02-27 RX ADMIN — Medication 20 ML: at 21:46

## 2019-02-27 RX ADMIN — Medication 400 MG: at 08:22

## 2019-02-27 RX ADMIN — MIDODRINE HYDROCHLORIDE 10 MG: 5 TABLET ORAL at 13:20

## 2019-02-27 RX ADMIN — FINASTERIDE 5 MG: 5 TABLET, FILM COATED ORAL at 08:21

## 2019-02-27 RX ADMIN — FLUTICASONE PROPIONATE 2 SPRAY: 50 SPRAY, METERED NASAL at 08:32

## 2019-02-27 RX ADMIN — MIDODRINE HYDROCHLORIDE 10 MG: 5 TABLET ORAL at 08:22

## 2019-02-27 RX ADMIN — VANCOMYCIN HYDROCHLORIDE 750 MG: 10 INJECTION, POWDER, LYOPHILIZED, FOR SOLUTION INTRAVENOUS at 17:31

## 2019-02-27 RX ADMIN — APIXABAN 5 MG: 5 TABLET, FILM COATED ORAL at 17:12

## 2019-02-27 RX ADMIN — Medication 10 ML: at 17:30

## 2019-02-27 RX ADMIN — Medication 400 MG: at 13:20

## 2019-02-27 NOTE — PROGRESS NOTES
Verbal bedside report received from [place] [id number] [facility]. Assumed care of patient.

## 2019-02-27 NOTE — DIALYSIS
TRANSFER OUT- DIALYSIS Hemodialysis treatment stopped due to unresolved hypotension after given normal saline and Dr Marissa Burger was notified. Patient alert and VS 84/67 BP 84/67  P 84   
 
 0 Kgs removed. Flushed both ports with 10 mL of NS.  CVC dressing clean, dry, and intact, tego caps intact, bilateral lumens wrapped with 4x4 gauze. Patient to 322 after dialysis.

## 2019-02-27 NOTE — PROGRESS NOTES
Hospitalist Progress Note Admit Date:  2019 11:19 AM  
Name:  Sandra Mata. Age:  66 y.o. 
:  1945 MRN:  [id number] PCP:  Marialuisa Flores MD 
Treatment Team: Attending Provider: Kevin Jack MD; Consulting Provider: Deborah Dubose MD; Consulting Provider: Naida Guillaume; Utilization Review: Ashanti Barfield RN; Care Manager: Yareli Roberson HPI/Subjective: This is a 49-year-old gentleman with multiple medical problems including CHF, A. fib, CKD stage IV,, chronic COPD, obstructive sleep apnea who is noncompliant with CPAP, who is currently in a rehab facility, admitted on  for acute on chronic CHF in view of worsening bilateral LE swelling, hypoxic respiratory failure and hematuria. was brought into the emergency room with complaints of hematuria, leg swellings and shortness of breath. He was also having shortness of breath, moderate in severity, progressively getting worse, worse with minimal exertion, not resolved with rest.  Tunneled cath placed  and HD started but could not tolerate his session due to hypotension. Midodrine was restarted. : resting in bed. Hypotensive again during HD to the point where he nearly passed out (per HD notes). Wife, son and grand kids all at bedside. Patient tired still and does open eyes and attempt to converse, but very weak appearing. Objective:  
 
Patient Vitals for the past 24 hrs: 
 Temp Pulse Resp BP SpO2  
19 1320  73  91/61   
19 1315 98 °F (36.7 °C) 73 16 (!) 74/52 98 % 19 1220  65  (!) 88/64   
19 1156  (!) 58  114/69   
19 1122  (!) 57  124/67   
19 1112  (!) 47  99/69   
19 0909 97.6 °F (36.4 °C) 83 18 100/68 100 % 19 0822  68  90/65   
19 0626  67  (!) 88/59   
19 0455 96.4 °F (35.8 °C) 68 17 (!) 85/56 99 % 19 97.1 °F (36.2 °C) 66 17 93/59 100 % 19 97.8 °F (36.6 °C) 63 15 90/55 98 % 02/26/19 1844    95/62   
02/26/19 1705 98.1 °F (36.7 °C) 61 17 (!) 80/54 99 % Oxygen Therapy O2 Sat (%): 98 % (02/27/19 1315) Pulse via Oximetry: 65 beats per minute (02/25/19 1347) O2 Device: Nasal cannula (02/27/19 1604) O2 Flow Rate (L/min): 3 l/min (02/27/19 1604) ETCO2 (mmHg): 21 mmHg (02/25/19 1347) Intake/Output Summary (Last 24 hours) at 2/27/2019 1648 Last data filed at 2/27/2019 1354 Gross per 24 hour Intake  Output 100 ml Net -100 ml *Note that automatically entered I/Os may not be accurate; dependent on patient compliance with collection and accurate  by techs. General:    Well nourished. Alert. CV:   RRR. No murmur, rub, or gallop. Lungs:   CTAB. No wheezing, rhonchi, or rales. NC O2. Abdomen:   Soft, nontender, nondistended. Extremities: Warm and dry. 3+ pitting edema b/l LEs. Skin:     No rashes or jaundice. Neuro:  No gross focal deficits Data Review: 
I have reviewed all labs, meds, and studies from the last 24 hours: 
 
Recent Results (from the past 24 hour(s)) Yohan Weiss Collection Time: 02/26/19  5:17 PM  
Result Value Ref Range Vancomycin, random 24.5 UG/ML  
AMMONIA Collection Time: 02/26/19  5:17 PM  
Result Value Ref Range Ammonia 31 11 - 32 UMOL/L  
METABOLIC PANEL, BASIC Collection Time: 02/27/19  4:08 AM  
Result Value Ref Range Sodium 137 136 - 145 mmol/L Potassium 4.3 3.5 - 5.1 mmol/L Chloride 99 98 - 107 mmol/L  
 CO2 25 21 - 32 mmol/L Anion gap 13 7 - 16 mmol/L Glucose 95 65 - 100 mg/dL BUN 60 (H) 8 - 23 MG/DL Creatinine 4.86 (H) 0.8 - 1.5 MG/DL  
 GFR est AA 15 (L) >60 ml/min/1.73m2 GFR est non-AA 12 (L) >60 ml/min/1.73m2 Calcium 8.7 8.3 - 10.4 MG/DL  
PLEASE READ & DOCUMENT PPD TEST IN 24 HRS Collection Time: 02/27/19  2:43 PM  
Result Value Ref Range PPD negative Negative  
 mm Induration 0 mm All Micro Results Procedure Component Value Units Date/Time CULTURE, BLOOD [[id number]] Collected:  02/22/19 1740 Order Status:  Completed Specimen:  Blood Updated:  02/27/19 0436 Special Requests: --     
  RIGHT 
HAND Culture result: NO GROWTH 5 DAYS     
 CULTURE, BLOOD [[id number]] Collected:  02/22/19 1753 Order Status:  Completed Specimen:  Blood Updated:  02/27/19 2239 Special Requests: --     
  RIGHT 
FOREARM Culture result: NO GROWTH 5 DAYS     
 CULTURE, BLOOD [[id number]] Collected:  02/19/19 1140 Order Status:  Completed Specimen:  Blood Updated:  02/24/19 0335 Special Requests: --     
  RIGHT Antecubital 
  
  Culture result: NO GROWTH 5 DAYS     
 CULTURE, BLOOD [[id number]]  (Abnormal) Collected:  02/19/19 1252 Order Status:  Completed Specimen:  Blood Updated:  02/23/19 0747 Special Requests: --     
  RIGHT 
HAND 
  
  GRAM STAIN    
  GRAM POSITIVE COCCI IN CHAINS AEROBIC AND ANAEROBIC BOTTLES RESULTS VERIFIED, PHONED TO AND READ BACK BY EAMONRADHA GRAFFEY @0262 02/20/2019 [facility] Culture result: STAPHYLOCOCCUS SPECIES, COAGULASE NEGATIVE ALPHA STREPTOCOCCUS, NOT S. PNEUMONIAE  
     
      
  THIS ORGANISM MAY BE INDICATIVE OF CULTURE CONTAMINATION, HOWEVER, CLINICAL CORRELATION NEEDS TO BE EVALUATED, AS EACH CASE IS UNIQUE. CULTURE, URINE [[id number]]  (Abnormal)  (Susceptibility) Collected:  02/19/19 1220 Order Status:  Completed Specimen:  Cath Urine Updated:  02/22/19 2146 Special Requests: NO SPECIAL REQUESTS Culture result:    
  1000 COLONIES/mL STAPHYLOCOCCUS HAEMOLYTICUS Results for orders placed or performed during the hospital encounter of 02/19/19  
2D ECHO COMPLETE ADULT (TTE) W OR WO CONTR Narrative StefanieMoultonkaren [address] [place], [address] [facility] 
[phone] Transthoracic Echocardiogram 
2D, M-mode, Doppler, and Color Doppler Patient: Marjorie Yates 
 MR #: [id number] : [id number] Age: 66 years Gender: Male Study date: 2019 Account #: [de-identified] Height: 70 in Weight: 220.4 lb 
BSA: 2.18 mï¾² Status:Routine Location: [facility] BP: 113/ 79 Allergies: PENICILLINS Sonographer:  Victor Hugo Gusman [facility] Group:  [facility] Referring Physician:  Dominik Floyd. Eliel Ocampo MD [facility] Reading Physician:  TITO Gregorio MD [facility] INDICATIONS: Reassess EF PROCEDURE: This was a routine study. A transthoracic echocardiogram was 
performed. The study included complete 2D imaging, M-mode, complete spectral 
Doppler, and color Doppler. Intravenous contrast (Definity, 2 ml) was 
administered. Image quality was adequate. LEFT VENTRICLE: Size was normal. Systolic function was markedly reduced. Ejection fraction was estimated in the range of 25 % to 30 %. There was  
severe 
diffuse hypokinesis with distinct regional wall motion abnormalities. There  
was 
severe global hypokinesis. There was severe concentric hypertrophy. The 
myocardial specular pattern appeared moderately abnormal. Consider  
infiltrative 
cardiomyopathy if clinically indicated. Left ventricular diastolic  
dysfunction Grade 3. Average E/e' of 16.7. The study was not technically sufficient to 
allow evaluation of LV diastolic function. RIGHT VENTRICLE: The ventricle was mildly dilated. Systolic function was 
moderately to markedly reduced. There was mild pulmonary artery hypertension. Estimated peak pressure was in the range of 35-40 mmHg. LEFT ATRIUM: The atrium was moderately to markedly dilated. RIGHT ATRIUM: The atrium was markedly dilated. SYSTEMIC VEINS: IVC: The inferior vena cava was moderately dilated. The 
respirophasic change in diameter was less than 50%. AORTIC VALVE: The valve was trileaflet. Leaflets exhibited moderate  
sclerosis. The left coronary cusp demonstrated immobility. There was no evidence for 
stenosis. There was mild regurgitation. MITRAL VALVE: There was focal thickening of the posterior leaflet. There was  
no 
evidence for stenosis. There was mild to moderate regurgitation. TRICUSPID VALVE: The valve structure was normal. There was no evidence for 
stenosis. There was mild to moderate regurgitation. The regurgitant jet was 
eccentric. PULMONIC VALVE: The valve structure was normal. There was no evidence for 
stenosis. There was moderate regurgitation. PERICARDIUM: There was no pericardial effusion. AORTA: The root exhibited normal size. SUMMARY: 
 
-  Left ventricle: Systolic function was markedly reduced. Ejection fraction 
was estimated in the range of 25 % to 30 %. There was severe diffuse 
hypokinesis with distinct regional wall motion abnormalities. There was  
severe 
global hypokinesis. There was severe concentric hypertrophy. The myocardial 
specular pattern appeared moderately abnormal. Consider infiltrative 
cardiomyopathy if clinically indicated. -  Right ventricle: The ventricle was mildly dilated. Systolic function was 
moderately to markedly reduced. There was mild pulmonary artery hypertension. 
 
-  Left atrium: The atrium was moderately to markedly dilated. -  Right atrium: The atrium was markedly dilated. -  Inferior vena cava, hepatic veins: The inferior vena cava was moderately 
dilated. The respirophasic change in diameter was less than 50%. -  Aortic valve: The valve was trileaflet. Leaflets exhibited moderate 
sclerosis. The left coronary cusp demonstrated immobility. There was mild 
regurgitation. 
 
-  Mitral valve: There was mild to moderate regurgitation. 
 
-  Tricuspid valve: There was mild to moderate regurgitation. 
 
-  Pulmonic valve: There was moderate regurgitation. SYSTEM MEASUREMENT TABLES 
 
2D mode AoR Diam (2D): 3 cm 
LA Dimension (2D): 5.1 cm Left Atrium Systolic Volume Index; Method of Disks, Biplane; 2D mode;: 52.3 
ml/m2 IVS/LVPW (2D): 0.9 IVSd (2D): 2.1 cm 
 LVIDd (2D): 3.7 cm 
LVIDs (2D): 3.3 cm 
LVPWd (2D): 2.3 cm RVIDd (2D): 3.9 cm Unspecified Scan Mode Peak Grad; Mean; Antegrade Flow: 5 mm[Hg] Vmax; Antegrade Flow: 107 cm/s Peak Grad; Mean; Antegrade Flow: 3 mm[Hg] Vmax; Antegrade Flow: 88.1 cm/s Prepared and signed by TITO Sheets MD [facility] Signed 21-Feb-2019 17:34:55 Current Meds: 
Current Facility-Administered Medications Medication Dose Route Frequency  vancomycin (VANCOCIN) 750 mg in  ml infusion  750 mg IntraVENous ONCE  
 furosemide (LASIX) injection 100 mg  100 mg IntraVENous BID  [START ON 2/28/2019] metOLazone (ZAROXOLYN) tablet 10 mg  10 mg Oral DAILY  midodrine (PROAMITINE) tablet 10 mg  10 mg Oral TID WITH MEALS  
 apixaban (ELIQUIS) tablet 5 mg  5 mg Oral BID  lidocaine (XYLOCAINE) 20 mg/mL (2 %) injection  mg  1-20 mL IntraDERMal Multiple  lactulose (CHRONULAC) solution 30 g  30 g Oral Q6H  
 VANCOMYCIN INTERMITTENT DOSING   Other Rx Dosing/Monitoring  albuterol-ipratropium (DUO-NEB) 2.5 MG-0.5 MG/3 ML  3 mL Nebulization Q4H PRN  
 fluticasone (FLONASE) 50 mcg/actuation nasal spray 2 Spray  2 Spray Both Nostrils DAILY  pantoprazole (PROTONIX) tablet 40 mg  40 mg Oral ACB  polyethylene glycol (MIRALAX) packet 17 g  17 g Oral DAILY  sodium chloride (NS) flush 5-40 mL  5-40 mL IntraVENous Q8H  
 sodium chloride (NS) flush 5-40 mL  5-40 mL IntraVENous PRN  
 acetaminophen (TYLENOL) tablet 650 mg  650 mg Oral Q4H PRN  
 HYDROcodone-acetaminophen (NORCO) 5-325 mg per tablet 1 Tab  1 Tab Oral Q4H PRN  
 morphine injection 2 mg  2 mg IntraVENous Q4H PRN  
 naloxone (NARCAN) injection 0.4 mg  0.4 mg IntraVENous PRN  
 diphenhydrAMINE (BENADRYL) capsule 25 mg  25 mg Oral Q4H PRN  
 ondansetron (ZOFRAN) injection 4 mg  4 mg IntraVENous Q4H PRN  
 magnesium hydroxide (MILK OF MAGNESIA) 400 mg/5 mL oral suspension 30 mL  30 mL Oral DAILY PRN  
  magnesium oxide (MAG-OX) tablet 400 mg  400 mg Oral TID WITH MEALS  finasteride (PROSCAR) tablet 5 mg  5 mg Oral DAILY Other Studies (last 24 hours): Xr Chest Sngl V Result Date: 2/26/2019 EXAM: Chest x-ray. INDICATION: Dyspnea. COMPARISON: February 19, 2019. TECHNIQUE: Single frontal view chest. FINDINGS: The heart remains enlarged. Previously seen pulmonary vascular congestion has improved, with mild residual. No pneumothorax, pleural effusion or overt pulmonary edema is identified. There is no focal consolidation. A new right-sided dialysis catheter is in place, with its tips in the right atrium. IMPRESSION: 1. Improved pulmonary vascular congestion, with mild residual. 2. New indwelling dialysis catheter, without evidence of a pneumothorax. Assessment and Plan:  
 
Hospital Problems as of 2/27/2019 Date Reviewed: 12/11/2018 Codes Class Noted - Resolved POA  
 CKD (chronic kidney disease) stage 4, GFR 15-29 ml/min ([facility]) ICD-10-CM: N18.4 ICD-9-CM: 585.4  2/20/2019 - Present Yes Lactic acidosis ICD-10-CM: E87.2 ICD-9-CM: 276.2  2/20/2019 - Present Unknown  
   
 CKD (chronic kidney disease) stage 3, GFR 30-59 ml/min ([facility]) (Chronic) ICD-10-CM: N18.3 ICD-9-CM: 585.3  9/29/2017 - Present Hypertension (Chronic) ICD-10-CM: I10 
ICD-9-CM: 401.9  9/29/2017 - Present Yes * (Principal) CHF (congestive heart failure) ([facility]) ICD-10-CM: I50.9 ICD-9-CM: 428.0  9/27/2017 - Present Yes Plan: # Acute on chronic sCHF 
            - cannot tolerate diuresis or OMT 2/2 hypotension 
            - HD for volume overload, but this may not be sustainable long term given his BP issues. - cardiology following 
  
# ESRD now on HD 
            - Tunneled cath placed 2/25 - Midodrine restarted but still having hypotension during HD.  
            - nephro appreciated. Has not been tolerant of HD thus far. Trial of diuretics and may attempt one more HD session. 
  
# AFib 
            - restart Eliquis 2/27 
  
# Hematuria 
            - resolved DC planning/Dispo: As above. PC consulted for goals of care. Wife very optimistic. I did my best to manage expectations in the setting of untreated sCHF and ESRD without HD. Diet:  DIET REGULAR 
DIET NUTRITIONAL SUPPLEMENTS 
DVT ppx:  eliquis Signed: 
Meryl Nelson MD

## 2019-02-27 NOTE — PROGRESS NOTES
Spoke with MD Rosalia Ambrose about nursing misc order to hold lactulose if ammonia level WNL. MD aware that last ammonia 31 on 2/26. Per report pt did not have any loose stool last night or this am.  
 
MD to adjust orders.

## 2019-02-27 NOTE — DIALYSIS
TRANSFER IN - DIALYSIS Received patient in dialysis unit  from room 322 (unit) for ordered procedure. Consent verified for renal replacement therapy. Patient alert, confused, sitter present and vital signs stable. BP 99/69 P 47 Hemodialysis initiated using right perm cath. Aspirated and flushed both ports without difficulty. Dressing clean, dry and intact. Machine settings per MD order. Heparin 0 unit bolus and 0 units/hr. Will monitor during treatment.

## 2019-02-27 NOTE — PROGRESS NOTES
Pharmacokinetic Consult to Pharmacist 
 
Wilfredo Mathur. is a 66 y.o. male being treated for bacteremia. Height: 5' 10\" (177.8 cm)  Weight: (Pt unable to stand, RN Poncho Fu aware) Lab Results Component Value Date/Time BUN 60 (H) 02/27/2019 04:08 AM  
 Creatinine 4.86 (H) 02/27/2019 04:08 AM  
 WBC 6.1 02/25/2019 02:53 PM  
 Procalcitonin 0.4 02/19/2019 11:21 AM  
 Lactic acid 2.3 (HH) 02/24/2019 01:27 PM  
 Lactic Acid (POC) 2.63 (H) 02/19/2019 11:20 AM  
  
Estimated Creatinine Clearance: 15.5 mL/min (A) (based on SCr of 4.86 mg/dL (H)). CULTURES: 
All Micro Results Procedure Component Value Units Date/Time CULTURE, BLOOD [[id number]] Collected:  02/22/19 1740 Order Status:  Completed Specimen:  Blood Updated:  02/27/19 2524 Special Requests: --     
  RIGHT 
HAND Culture result: NO GROWTH 5 DAYS     
 CULTURE, BLOOD [[id number]] Collected:  02/22/19 1753 Order Status:  Completed Specimen:  Blood Updated:  02/27/19 1063 Special Requests: --     
  RIGHT 
FOREARM Culture result: NO GROWTH 5 DAYS     
 CULTURE, BLOOD [[id number]] Collected:  02/19/19 1140 Order Status:  Completed Specimen:  Blood Updated:  02/24/19 5389 Special Requests: --     
  RIGHT Antecubital 
  
  Culture result: NO GROWTH 5 DAYS     
 CULTURE, BLOOD [[id number]]  (Abnormal) Collected:  02/19/19 1252 Order Status:  Completed Specimen:  Blood Updated:  02/23/19 0747 Special Requests: --     
  RIGHT 
HAND 
  
  GRAM STAIN    
  GRAM POSITIVE COCCI IN CHAINS AEROBIC AND ANAEROBIC BOTTLES RESULTS VERIFIED, PHONED TO AND READ BACK BY EAMON GONZALEZ @1479 02/20/2019 [facility] Culture result: STAPHYLOCOCCUS SPECIES, COAGULASE NEGATIVE ALPHA STREPTOCOCCUS, NOT S. PNEUMONIAE  
     
      
  THIS ORGANISM MAY BE INDICATIVE OF CULTURE CONTAMINATION, HOWEVER, CLINICAL CORRELATION NEEDS TO BE EVALUATED, AS EACH CASE IS UNIQUE. CULTURE, URINE [[id number]]  (Abnormal)  (Susceptibility) Collected:  02/19/19 1220 Order Status:  Completed Specimen:  Cath Urine Updated:  02/22/19 6921 Special Requests: NO SPECIAL REQUESTS Culture result:    
  1000 COLONIES/mL STAPHYLOCOCCUS HAEMOLYTICUS Lab Results Component Value Date/Time Vancomycin, random 24.5 02/26/2019 05:17 PM  
 
 
Day 8 of vancomycin and Day 6 from first negative blood culture. Goal trough is 15-20. I will continue to dose intermittently based on HD schedule. Redose 750mg X1 post HD today. Pharmacist will continue to follow patient. Please call with any questions. Thank you, Etelvina Friend, PharmD, [facility] Clinical Pharmacist 
[phone]

## 2019-02-27 NOTE — PROGRESS NOTES
Patient off unit in \[facility]\"", spoke with dialysis RN about patient having midodrine due and if it could be administered while in dialysis. Per RN patient to be transported back to unit shortly. Will administer medication when patient arrives back on unit.

## 2019-02-27 NOTE — PROGRESS NOTES
Problem: Falls - Risk of 
Goal: *Absence of Falls Document Blum Rank Fall Risk and appropriate interventions in the flowsheet. Outcome: Progressing Towards Goal 
Fall Risk Interventions: 
Mobility Interventions: Bed/chair exit alarm, PT Consult for mobility concerns, PT Consult for assist device competence, OT consult for ADLs Mentation Interventions: Adequate sleep, hydration, pain control, Family/sitter at bedside, More frequent rounding, Room close to nurse's station Medication Interventions: Bed/chair exit alarm, Evaluate medications/consider consulting pharmacy, Patient to call before getting OOB Elimination Interventions: Bed/chair exit alarm, Patient to call for help with toileting needs, Toileting schedule/hourly rounds, Call light in reach History of Falls Interventions: Bed/chair exit alarm, Door open when patient unattended, Evaluate medications/consider consulting pharmacy, Room close to nurse's station Problem: Pressure Injury - Risk of 
Goal: *Prevention of pressure injury Document Jg Scale and appropriate interventions in the flowsheet. Outcome: Progressing Towards Goal 
Pressure Injury Interventions: 
Sensory Interventions: Discuss PT/OT consult with provider, Keep linens dry and wrinkle-free, Minimize linen layers, Monitor skin under medical devices Moisture Interventions: Absorbent underpads, Limit adult briefs, Minimize layers Activity Interventions: Increase time out of bed, Pressure redistribution bed/mattress(bed type) Mobility Interventions: HOB 30 degrees or less, Pressure redistribution bed/mattress (bed type) Nutrition Interventions: Document food/fluid/supplement intake

## 2019-02-27 NOTE — PROGRESS NOTES
Verbal bedside report given to [facility], oncoming RN. Patient's situation, background, assessment and recommendations provided. Opportunity for questions provided. Oncoming RN assumed care of patient.

## 2019-02-27 NOTE — PROGRESS NOTES
[facility] CARDIOLOGY PROGRESS NOTE 
 
2/27/2019 8:20 AM 
 
Admit Date: 2/19/2019 Admit Diagnosis: CHF (congestive heart failure) ([facility] [address] [I50.9] Subjective:  
Stable overnight without angina, CHF, or palpitations, nods head to questions but won't speak this AM, encephalopathy improving? BP marginal but HR stable, on high dose midodrine, remains edematous with volume removal as tolerated per HD. Betha Lute Betha Lute No other complaints overnight. Tolerating meds well. Objective:  
  
Vitals:  
 02/26/19 2050 02/27/19 0005 02/27/19 9180 02/27/19 3768 BP: 90/55 93/59 (!) 85/56 (!) 88/59 Pulse: 63 66 68 67 Resp: 15 17 17 Temp: 97.8 °F (36.6 °C) 97.1 °F (36.2 °C) 96.4 °F (35.8 °C) SpO2: 98% 100% 99% Weight:      
Height:      
 
 
Physical Exam: 
Neck- supple, 10cm JVD sitting upright CV- regular rate and rhythm no MRG Lung- clear bilaterally anteriorly, coarse laterally Abd- soft, nontender, nondistended Ext- 3+ pitting LE edema, 10cm JVD sitting upright Skin- warm and dry Data Review:  
Recent Labs  
  02/27/19 
0408 02/26/19 
0334 02/25/19 
1453  136 136  
K 4.3 4.4 3.6 BUN 60* 57* 50* CREA 4.86* 4.08* 3.17* GLU 95 81 92 WBC  --   --  6.1 HGB  --   --  12.1* HCT  --   --  35.8* PLT  --   --  119*  
 
 
-  Left ventricle: Systolic function was markedly reduced. Ejection fraction 
was estimated in the range of 25 % to 30 %. There was severe diffuse 
hypokinesis with distinct regional wall motion abnormalities. There was  
severe 
global hypokinesis. There was severe concentric hypertrophy. The myocardial 
specular pattern appeared moderately abnormal. Consider infiltrative 
cardiomyopathy if clinically indicated. -  Right ventricle: The ventricle was mildly dilated. Systolic function was 
moderately to markedly reduced. There was mild pulmonary artery hypertension. 
 
-  Left atrium: The atrium was moderately to markedly dilated. -  Right atrium: The atrium was markedly dilated. -  Inferior vena cava, hepatic veins: The inferior vena cava was moderately 
dilated. The respirophasic change in diameter was less than 50%. -  Aortic valve: The valve was trileaflet. Leaflets exhibited moderate 
sclerosis. The left coronary cusp demonstrated immobility. There was mild 
regurgitation. -  Mitral valve: There was mild to moderate regurgitation. -  Tricuspid valve: There was mild to moderate regurgitation. -  Pulmonic valve: There was moderate regurgitation. 
  
Assessment/Plan:  
  
Active Problems: Hypertension - hypotension now, see below, midodrine TIDcc This is not an issue and all medications have been stopped 
  
  CHF (congestive heart failure) ([facility] [address] (9/27/2017) Has severe BiV failure. EF 20-25%. Intolerant of medical therapy due to hypotension.   
             - DC all therapies including zaroxolyn and KCL 
             - Volume to be controlled with HD 
             - Increase midodrine to allow adequate HD 
             - Prognosis is poor. Patient remains full code 
  
  CKD (chronic kidney disease) stage 4, GFR 15-29 ml/min ([facility] [address] (2/20/2019) Now on HD and increased midodrine to allow better HD. Overall prognosis is poor and likely will not tolerate HD long term. Needs palliative care to address code status and goals or care.   
  
  Lactic acidosis (2/20/2019) Per primary team. 
  
BMP, CBC, MG IN AM 
 
A. Bee August MD 
[facility] Cardiology Pager [phone]

## 2019-02-27 NOTE — PROGRESS NOTES
[facility] PROGRESS NOTE Follow up for: 
 
Subjective:  
Patient seen and examined. Unfortunately did not tolerated dialysis today. BP dropped and patient nearly passed out prior to stopping HD. 
 
ROS:  UTO 2/2 confused Objective:  
Exam: 
Vitals:  
 02/27/19 1156 02/27/19 1220 02/27/19 1315 02/27/19 1320 BP: 114/69 (!) 88/64 (!) 74/52 91/61 Pulse: (!) 58 65 73 73 Resp:   16 Temp:   98 °F (36.7 °C) SpO2:   98% Weight:      
Height:      
 
 
 
Intake/Output Summary (Last 24 hours) at 2/27/2019 1348 Last data filed at 2/27/2019 2772 Gross per 24 hour Intake  Output 50 ml Net -50 ml  
 
 
Current Facility-Administered Medications Medication Dose Route Frequency  vancomycin (VANCOCIN) 750 mg in  ml infusion  750 mg IntraVENous ONCE  
 midodrine (PROAMITINE) tablet 10 mg  10 mg Oral TID WITH MEALS  tuberculin injection 5 Units  5 Units IntraDERMal ONCE  
 apixaban (ELIQUIS) tablet 5 mg  5 mg Oral BID  lidocaine (XYLOCAINE) 20 mg/mL (2 %) injection  mg  1-20 mL IntraDERMal Multiple  lactulose (CHRONULAC) solution 30 g  30 g Oral Q6H  
 VANCOMYCIN INTERMITTENT DOSING   Other Rx Dosing/Monitoring  albuterol-ipratropium (DUO-NEB) 2.5 MG-0.5 MG/3 ML  3 mL Nebulization Q4H PRN  
 fluticasone (FLONASE) 50 mcg/actuation nasal spray 2 Spray  2 Spray Both Nostrils DAILY  pantoprazole (PROTONIX) tablet 40 mg  40 mg Oral ACB  polyethylene glycol (MIRALAX) packet 17 g  17 g Oral DAILY  sodium chloride (NS) flush 5-40 mL  5-40 mL IntraVENous Q8H  
 sodium chloride (NS) flush 5-40 mL  5-40 mL IntraVENous PRN  
 acetaminophen (TYLENOL) tablet 650 mg  650 mg Oral Q4H PRN  
 HYDROcodone-acetaminophen (NORCO) 5-325 mg per tablet 1 Tab  1 Tab Oral Q4H PRN  
 morphine injection 2 mg  2 mg IntraVENous Q4H PRN  
 naloxone (NARCAN) injection 0.4 mg  0.4 mg IntraVENous PRN  
 diphenhydrAMINE (BENADRYL) capsule 25 mg  25 mg Oral Q4H PRN  
  ondansetron (ZOFRAN) injection 4 mg  4 mg IntraVENous Q4H PRN  
 magnesium hydroxide (MILK OF MAGNESIA) 400 mg/5 mL oral suspension 30 mL  30 mL Oral DAILY PRN  
 magnesium oxide (MAG-OX) tablet 400 mg  400 mg Oral TID WITH MEALS  finasteride (PROSCAR) tablet 5 mg  5 mg Oral DAILY EXAM 
GEN - Alert, oriented, in no distress CV - S1, S2, RRR, no rub, murmur, or gallop Lung - clear to auscultation bilaterally Abd - soft, nontender, BS present Ext - 2+ edema Recent Labs  
  02/25/19 
1453 WBC 6.1 HGB 12.1*  
HCT 35.8*  
* Recent Labs  
  02/27/19 
0408 02/26/19 
0334 02/25/19 
1453  136 136  
K 4.3 4.4 3.6 CL 99 99 97* CO2 25 22 28 BUN 60* 57* 50* CREA 4.86* 4.08* 3.17* CA 8.7 9.1 8.6 GLU 95 81 92 Assessment and Plan:  
1) NINO on CKD-  initiated dialysis with poor response to diuretics. Unfortunately, he is not tolerating dialysis despite stopping all of his anti-HTN medications and starting Midodrine. I discussed with patient and family that patient is not tolerating dialysis and that unfortunately, he may be out of options. I have consulted palliative care to outline goals of care and discuss hospice. Family would like to try dialysis one more time, which I agreed to do if only to reassure family that he will not tolerate it long term. 2) Heart failure- cards following. Severe BiV failure with EF 20-25%. Intolerant of medical therapy with hypotension. 3) Afib w/ RVR-  Heparin gtt. Off bb for hypotension Livan Bhakta MD

## 2019-02-27 NOTE — PROGRESS NOTES
Bedside and Verbal shift change report given to self (oncoming nurse) by Carmen Garnica RN (offgoing nurse). Report included the following information SBAR, Kardex and MAR. Neuro check compelted on shift change with offgoing RN. Pt alert and oriented to person, place, and time, disoriented to situation.  equal, leg strength equal, follows commands, speech clear. Pt resting in bed comfortably, no distress. Sitter at bedside.

## 2019-02-27 NOTE — PROGRESS NOTES
PT Note: 
Spoke with RN who reported pt had issues at HD with BP earlier and recommended hold PT today. Will re-attempt pending schedule and pt status. Thanks, Miguel Nguyen, PT, DPT

## 2019-02-27 NOTE — PROGRESS NOTES
Problem: Falls - Risk of 
Goal: *Absence of Falls Document Jose Armando Smith Fall Risk and appropriate interventions in the flowsheet. Outcome: Progressing Towards Goal 
Fall Risk Interventions: 
Mobility Interventions: Bed/chair exit alarm, Communicate number of staff needed for ambulation/transfer, Patient to call before getting OOB Mentation Interventions: Bed/chair exit alarm, Door open when patient unattended, Family/sitter at bedside, Increase mobility, More frequent rounding, Reorient patient, Room close to nurse's station Medication Interventions: Assess postural VS orthostatic hypotension, Bed/chair exit alarm, Patient to call before getting OOB, Teach patient to arise slowly Elimination Interventions: Bed/chair exit alarm, Call light in reach, Toileting schedule/hourly rounds, Patient to call for help with toileting needs History of Falls Interventions: Bed/chair exit alarm, Investigate reason for fall, Room close to nurse's station

## 2019-02-27 NOTE — PROGRESS NOTES
Problem: Nutrition Deficit Goal: *Optimize nutritional status Nutrition LOS Note: day 7 Assessment Diet order(s): CCHOFood,Nutrition, and Pertinent History: Pt seen sleeping following HD today. Sitter at bedside states that she assisted with breakfast and lunch (ate ~50% of breakfast and <25% of lunch). Note that pt is new to HD this admission (initiated on 2/25) and it had to be stopped early today d/t hypotension. Additional h/o COPD, DM, and CHF. Pt has been seen by RD and given CCHO and low sodium diet education during previous admissions (7/2018). He did not wake up during my visit. A1C 7.4 (1/9/19). AM BMP glucose of 95 mg/dl. POC glucose (2/26): 74,56,83,84 mg/dl. No SSI being provided. He is receiving 100 mg lasix q12. Anthropometrics: Height: 5' 10\" (177.8 cm), Weight Source: Estimated(patient unable to stand) Wt: 101.8 kg (2/24 standing scale) Weight: (Pt unable to stand, RN Victor Hugo Corea aware), Body mass index is 32.23 kg/m². BMI class of overweight for age >65 years. Edema: generalized anasarca Macronutrient Needs: 
· EER:  1208-4678 kcal /day (20-25 kcal/kg standard BW of 80 kg) · EPR:   grams protein/day (1.2-1.3 grams/kg SBW for 80 kg)(GFR 15)-CKD with HD Intake/Comparative Standards: Per RD meal rounds: <25% of lunch. One recorded meal intake of 75%. Nutrition Diagnosis: Predicted inadequate energy intake r/t decreased ability to consume adequate energy intake, as evidenced by pt eating <25% of lunch, lethargy, and requiring feeding assistance. Intervention:  
Meals and snacks: D/C CCHO restriction. Add sodium restriction. Supplementation: Add Nepro TID Coordination of care: sitter at bedside. Discharge nutrition plan: Too soon to determine. Cristie Felty, [address], [address], [address] [place] [address], [phone]

## 2019-02-28 LAB
ANION GAP SERPL CALC-SCNC: 11 MMOL/L (ref 7–16)
BUN SERPL-MCNC: 57 MG/DL (ref 8–23)
CALCIUM SERPL-MCNC: 8.6 MG/DL (ref 8.3–10.4)
CHLORIDE SERPL-SCNC: 100 MMOL/L (ref 98–107)
CO2 SERPL-SCNC: 26 MMOL/L (ref 21–32)
CREAT SERPL-MCNC: 5.2 MG/DL (ref 0.8–1.5)
ERYTHROCYTE [DISTWIDTH] IN BLOOD BY AUTOMATED COUNT: 19.9 % (ref 11.9–14.6)
GLUCOSE SERPL-MCNC: 113 MG/DL (ref 65–100)
HCT VFR BLD AUTO: 36.2 % (ref 41.1–50.3)
HGB BLD-MCNC: 11.8 G/DL (ref 13.6–17.2)
MAGNESIUM SERPL-MCNC: 3.4 MG/DL (ref 1.8–2.4)
MCH RBC QN AUTO: 27.1 PG (ref 26.1–32.9)
MCHC RBC AUTO-ENTMCNC: 32.6 G/DL (ref 31.4–35)
MCV RBC AUTO: 83.2 FL (ref 79.6–97.8)
MM INDURATION POC: 0 MM (ref 0–5)
NRBC # BLD: 0.04 K/UL (ref 0–0.2)
PLATELET # BLD AUTO: 33 K/UL (ref 150–450)
PMV BLD AUTO: ABNORMAL FL (ref 9.4–12.3)
POTASSIUM SERPL-SCNC: 4.1 MMOL/L (ref 3.5–5.1)
PPD POC: NEGATIVE NEGATIVE
RBC # BLD AUTO: 4.35 M/UL (ref 4.23–5.6)
SODIUM SERPL-SCNC: 137 MMOL/L (ref 136–145)
WBC # BLD AUTO: 7.7 K/UL (ref 4.3–11.1)

## 2019-02-28 PROCEDURE — 65660000000 HC RM CCU STEPDOWN

## 2019-02-28 PROCEDURE — 74011250637 HC RX REV CODE- 250/637: Performed by: INTERNAL MEDICINE

## 2019-02-28 PROCEDURE — 74011250636 HC RX REV CODE- 250/636: Performed by: INTERNAL MEDICINE

## 2019-02-28 PROCEDURE — 74011250637 HC RX REV CODE- 250/637: Performed by: UROLOGY

## 2019-02-28 PROCEDURE — 74011250637 HC RX REV CODE- 250/637: Performed by: HOSPITALIST

## 2019-02-28 PROCEDURE — 80048 BASIC METABOLIC PNL TOTAL CA: CPT

## 2019-02-28 PROCEDURE — 85027 COMPLETE CBC AUTOMATED: CPT

## 2019-02-28 PROCEDURE — 83735 ASSAY OF MAGNESIUM: CPT

## 2019-02-28 PROCEDURE — 97110 THERAPEUTIC EXERCISES: CPT

## 2019-02-28 PROCEDURE — 36415 COLL VENOUS BLD VENIPUNCTURE: CPT

## 2019-02-28 PROCEDURE — 97530 THERAPEUTIC ACTIVITIES: CPT

## 2019-02-28 PROCEDURE — 99232 SBSQ HOSP IP/OBS MODERATE 35: CPT | Performed by: NURSE PRACTITIONER

## 2019-02-28 RX ADMIN — LACTULOSE 30 G: 20 SOLUTION ORAL at 17:06

## 2019-02-28 RX ADMIN — LACTULOSE 30 G: 20 SOLUTION ORAL at 22:53

## 2019-02-28 RX ADMIN — Medication 5 ML: at 23:26

## 2019-02-28 RX ADMIN — Medication 10 ML: at 17:08

## 2019-02-28 RX ADMIN — POLYETHYLENE GLYCOL 3350 17 G: 17 POWDER, FOR SOLUTION ORAL at 08:39

## 2019-02-28 RX ADMIN — MIDODRINE HYDROCHLORIDE 10 MG: 5 TABLET ORAL at 11:19

## 2019-02-28 RX ADMIN — FINASTERIDE 5 MG: 5 TABLET, FILM COATED ORAL at 08:37

## 2019-02-28 RX ADMIN — MIDODRINE HYDROCHLORIDE 10 MG: 5 TABLET ORAL at 08:38

## 2019-02-28 RX ADMIN — PANTOPRAZOLE SODIUM 40 MG: 40 TABLET, DELAYED RELEASE ORAL at 08:38

## 2019-02-28 RX ADMIN — APIXABAN 5 MG: 5 TABLET, FILM COATED ORAL at 17:06

## 2019-02-28 RX ADMIN — FUROSEMIDE 100 MG: 10 INJECTION, SOLUTION INTRAMUSCULAR; INTRAVENOUS at 17:07

## 2019-02-28 RX ADMIN — FLUTICASONE PROPIONATE 2 SPRAY: 50 SPRAY, METERED NASAL at 08:39

## 2019-02-28 RX ADMIN — APIXABAN 5 MG: 5 TABLET, FILM COATED ORAL at 08:38

## 2019-02-28 RX ADMIN — LACTULOSE 30 G: 20 SOLUTION ORAL at 11:15

## 2019-02-28 RX ADMIN — Medication 10 ML: at 05:04

## 2019-02-28 RX ADMIN — MIDODRINE HYDROCHLORIDE 10 MG: 5 TABLET ORAL at 17:05

## 2019-02-28 NOTE — PROGRESS NOTES
Problem: Falls - Risk of 
Goal: *Absence of Falls Document Rossmoyne Rank Fall Risk and appropriate interventions in the flowsheet. Outcome: Progressing Towards Goal 
Fall Risk Interventions: 
Mobility Interventions: Bed/chair exit alarm, Communicate number of staff needed for ambulation/transfer, Patient to call before getting OOB Mentation Interventions: Bed/chair exit alarm, Door open when patient unattended, Family/sitter at bedside, Reorient patient, Room close to nurse's station, Toileting rounds Medication Interventions: Assess postural VS orthostatic hypotension, Bed/chair exit alarm, Patient to call before getting OOB, Teach patient to arise slowly Elimination Interventions: Bed/chair exit alarm, Call light in reach, Patient to call for help with toileting needs, Toileting schedule/hourly rounds History of Falls Interventions: Bed/chair exit alarm, Investigate reason for fall, Room close to nurse's station

## 2019-02-28 NOTE — PROGRESS NOTES
Pt with critically low BP, will hold OT treatment at this time and reattempt when pt is stable to participate/ as schedule allows.  
 
[employer], OT

## 2019-02-28 NOTE — PROGRESS NOTES
Problem: Mobility Impaired (Adult and Pediatric) Goal: *Acute Goals and Plan of Care (Insert Text) LTG: 
(1.)Mr. Malcolm Benavides will move from supine to sit and sit to supine , scoot up and down and roll side to side in bed with STAND BY ASSIST within 7 treatment day(s). (2.)Mr. Malcolm Benavides will transfer from bed to chair and chair to bed with CONTACT GUARD ASSIST using the least restrictive device within 7 treatment day(s). (3.)Mr. Malcolm Benavides will ambulate with MINIMAL ASSIST for 75 feet with the least restrictive device within 7 treatment day(s). (4.)Mr. Malcolm Benavides will participate in therapeutic activity/exercises x 23 minutes for increased strength within 7 days. ________________________________________________________________________________________________ PHYSICAL THERAPY: Daily Note and AM 2/28/2019INPATIENT: PT Visit Days : 3 Payor: Lopez Levine / Plan: [address] [place] [address] HMO / Product Type: Managed Care Medicare /   
  
NAME/AGE/GENDER: Blayne Jackson is a 66 y.o. male PRIMARY DIAGNOSIS: CHF (congestive heart failure) ([facility]) [I50.9] CHF (congestive heart failure) ([facility]) [facility]congestive [facility] failure) [facility] ICD-10: Treatment Diagnosis:  
 · Generalized Muscle Weakness (M62.81) · Difficulty in walking, Not elsewhere classified (R26.2) Precaution/Allergies: 
Ativan [lorazepam] and Pcn [penicillins] ASSESSMENT:  
Mr. Malcolm Benavides was supine upon arrival and agreeable to PT. He was able to perform supine to sit mod assist and additional time. He stood to [facility]. Pt worked on standing posture to be more upright. He ambulated in room around bed with RW and Jose Alfredo slowly. His steps are shuffled and he has decreased gait speed. Pt sat in chair and rested . BP was 113/55 and )2 sat was 100% on 3L/min via nasal cannula. He performed bilateral LE ex including standing marching. BP checked again and was 92/52 and O2 still 100%. RN notified of BP.   Pt has progressed some in ambulation and functional mobility. This section established at most recent assessment PROBLEM LIST (Impairments causing functional limitations): 1. Decreased Strength 2. Decreased ADL/Functional Activities 3. Decreased Transfer Abilities 4. Decreased Ambulation Ability/Technique 5. Decreased Balance 6. Decreased Activity Tolerance 7. Increased Fatigue 8. Increased Shortness of Breath 9. Edema/Girth 10. Decreased Cognition INTERVENTIONS PLANNED: (Benefits and precautions of physical therapy have been discussed with the patient.) 1. Balance Exercise 2. Bed Mobility 3. Family Education 4. Gait Training 5. Therapeutic Activites 6. Therapeutic Exercise/Strengthening 7. Transfer Training TREATMENT PLAN: Frequency/Duration: 3 times a week for duration of hospital stay Rehabilitation Potential For Stated Goals: Fair RECOMMENDED REHABILITATION/EQUIPMENT: (at time of discharge pending progress): Due to the probability of continued deficits (see above) this patient will likely need continued skilled physical therapy after discharge. Equipment:  
? None at this time HISTORY:  
History of Present Injury/Illness (Reason for Referral): 
See H&P Past Medical History/Comorbidities:  
Mr. Emeka Bennett  has a past medical history of Acute CHF (congestive heart failure) ([facility] (4/25/2015), Acute combined systolic and diastolic congestive heart failure ([address] (4/28/2015), De Quervain's tenosynovitis, right (4/28/2015), Dyspnea on exertion (6/28/2015), Elevated serum creatinine (4/25/2015), Elevated troponin (6/28/2015), History of coronary artery disease, History of right knee surgery, History of shingles, Malignant hypertension (4/25/2015), and MI (myocardial infarction) ([facility] Mr. Emeka Bennett  has a past surgical history that includes hx knee arthroscopy (Right); hx heart catheterization (6/29/2015); and ir insert tunl cvc w/o port over 5 yr (2/25/2019). Social History/Living Environment: Home Environment: Rehabilitation facility One/Two Story Residence: One story Living Alone: No 
Support Systems: Spouse/Significant Other/Partner Patient Expects to be Discharged to[de-identified] Unknown Current DME Used/Available at Home: kevin Neal Prior Level of Function/Work/Activity: 
Was in rehab PTA but unable to elaborate on  PLOF. Reported not recent falls. Number of Personal Factors/Comorbidities that affect the Plan of Care: 1-2: MODERATE COMPLEXITY EXAMINATION:  
Most Recent Physical Functioning:  
Gross Assessment: 
  
         
  
Posture: 
  
Balance: 
Sitting: Impaired Sitting - Static: Good (unsupported) Sitting - Dynamic: Fair (occasional) Standing: Impaired Standing - Static: Fair Standing - Dynamic : Fair Bed Mobility: 
Supine to Sit: Moderate assistance Wheelchair Mobility: 
  
Transfers: 
Sit to Stand: Minimum assistance Stand to Sit: Minimum assistance Gait: 
  
Speed/Lu: Shuffled; Slow Step Length: Left shortened;Right shortened Gait Abnormalities: Decreased step clearance;Shuffling gait; Step to gait Distance (ft): 10 Feet (ft) Assistive Device: Walker, rolling Ambulation - Level of Assistance: Minimal assistance Body Structures Involved: 1. Heart 2. Lungs 3. Muscles Body Functions Affected: 1. Cardio 2. Respiratory 3. Genitourinary 4. Neuromusculoskeletal 
5. Movement Related Activities and Participation Affected: 1. Learning and Applying Knowledge 2. General Tasks and Demands 3. Mobility 4. Self Care 5. Domestic Life 6. Community, Social and [place] Number of elements that affect the Plan of Care: 4+: HIGH COMPLEXITY CLINICAL PRESENTATION:  
Presentation: Evolving clinical presentation with changing clinical characteristics: MODERATE COMPLEXITY CLINICAL DECISION MAKIN [facility] [address] [id number] AM-PAC 6 Clicks Basic Mobility Inpatient Short Form How much difficulty does the patient currently have. .. Unable A Lot A Little None 1.  Turning over in bed (including adjusting bedclothes, sheets and blankets)? [] 1   [] 2   [x] 3   [] 4  
2. Sitting down on and standing up from a chair with arms ( e.g., wheelchair, bedside commode, etc.)   [] 1   [x] 2   [] 3   [] 4  
3. Moving from lying on back to sitting on the side of the bed? [] 1   [] 2   [x] 3   [] 4 How much help from another person does the patient currently need. .. Total A Lot A Little None 4. Moving to and from a bed to a chair (including a wheelchair)? [] 1   [x] 2   [] 3   [] 4  
5. Need to walk in hospital room? [] 1   [x] 2   [] 3   [] 4  
6. Climbing 3-5 steps with a railing? [x] 1   [] 2   [] 3   [] 4  
© 2007, Trustees of [address] [place] [address], under license to [employer]. All rights reserved Score:  Initial: 13 Most Recent: X (Date: -- ) Interpretation of Tool:  Represents activities that are increasingly more difficult (i.e. Bed mobility, Transfers, Gait). Medical Necessity:    
· Patient demonstrates fair rehab potential due to higher previous functional level. Reason for Services/Other Comments: 
· Patient continues to require skilled intervention due to decreased functional mobility, balance, and activity tolerance. Use of outcome tool(s) and clinical judgement create a POC that gives a: Questionable prediction of patient's progress: MODERATE COMPLEXITY  
  
 
 
 
TREATMENT:  
(In addition to Assessment/Re-Assessment sessions the following treatments were rendered) Pre-treatment Symptoms/Complaints:  \"I hurt all over\". Pain: Initial:  
Pain Intensity 1: 1 Pain Location 1: (everywhere)  Post Session:  0 Therapeutic Activity: (    25  minutes): Therapeutic activities including ambulation on level ground, standing activities, and STS transfers to improve mobility, strength, balance and activity tolerance. Required minimal cuing   to promote static and dynamic balance in standing and standing posture with cues for activity pacing. Therapeutic Exercise: ( 14 min):  Exercises per grid below to improve mobility, strength and balance. Required min visual, verbal and tactile cues to promote proper body alignment, promote proper body posture and promote proper body mechanics. Date: 
2/21/19 Date: 
 Date: Activity/Exercise Parameters Parameters Parameters STS 1 x 4 Date: 
2/28/19 Date: 
 Date: Activity/Exercise Seated Parameters Parameters Parameters Heel raises X 15 B Toe raises X 15 B    
LAQ's X 15 B Hip Flex Standing marching X 10 B Hip ABD X 10 B Braces/Orthotics/Lines/Etc:  
· [place] catheter · O2 Device: Nasal cannula Treatment/Session Assessment:   
· Response to Treatment:  See above. · Interdisciplinary Collaboration:  
o Physical Therapy Assistant 
o Registered Nurse 
o Certified Nursing Assistant/Patient Care Technician · After treatment position/precautions:  
o Up in chair 
o Bed alarm/tab alert on 
o Bed/Chair-wheels locked 
o Call light within reach · Compliance with Program/Exercises: Will assess as treatment progresses · Recommendations/Intent for next treatment session: \"Next visit will focus on advancements to more challenging activities and reduction in assistance provided\". Total Treatment Duration: PT Patient Time In/Time Out Time In: 0901 Time Out: 4458 Curry Youssef, JM

## 2019-02-28 NOTE — PROGRESS NOTES
[facility] Subjective: A on CKD  No new complaints. Review of Systems -  
General ROS: negative for - fever, chills Respiratory ROS: no SOB, cough, CLAUDIO Cardiovascular ROS: no CP, palpitations Gastrointestinal ROS: no N&V, abdominal pain, diarrhea Genito-Urinary ROS: no difficulty voiding, dysuria Neurological ROS: no seizures, focal weekness Objective: 
 
Vitals:  
 02/28/19 9439 02/28/19 0451 02/28/19 0838 02/28/19 0900 BP: 95/65 97/68 101/61 113/55 Pulse: 66 72 78 Resp: 18 18 18 Temp: 98 °F (36.7 °C) 97.2 °F (36.2 °C) 98.1 °F (36.7 °C) SpO2: 98% 100% 100% 100% Weight:  102.9 kg (226 lb 14.4 oz) Height:      
 
 
PE 
Gen: in no acute distress CV:reg rate Chest:clear Abd: soft Ext/Access: 2+ edema , rt IJ tunneled HD cath [facility] Recent Labs  
  02/28/19 
0340 02/25/19 
1453 WBC 7.7 6.1 HGB 11.8* 12.1*  
HCT 36.2* 35.8* PLT 33* 119* Recent Labs  
  02/28/19 
0340 02/27/19 
0408 02/26/19 
0334  137 136  
K 4.1 4.3 4.4  
 99 99 CO2 26 25 22 * 95 81 BUN 57* 60* 57* CREA 5.20* 4.86* 4.08* MG 3.4*  --   --   
CA 8.6 8.7 9.1 Radiology A/P:  
Patient Active Problem List  
Diagnosis Code  CKD (chronic kidney disease) stage 3, GFR 30-59 ml/min ([facility]) N18.3  Acute on chronic systolic heart failure ([facility]) I50.23  Coronary atherosclerosis of native coronary vessel I25.10  Arthritis M19.90  
 Hypertension I10  
 Mild persistent asthma without complication T85.69  
 High triglycerides E78.1  Gastroesophageal reflux disease without esophagitis K21.9  Mixed hyperlipidemia E78.2  
 Essential hypertension I10  
 CHF (congestive heart failure) ([facility]) I50.9  TAZ (obstructive sleep apnea) G47.33  
 Atherosclerosis of native coronary artery of native heart with stable angina pectoris ([address] I25.118  
 Personal history of tobacco use Z87.891  
 Erysipelas A46  Preseptal cellulitis L03.213  Controlled type 2 diabetes mellitus without complication, without long-term current use of insulin ([facility]) E11.9  Type 2 diabetes with nephropathy ([facility]) E11.21  
 Chronic systolic heart failure ([facility]) I50.22  Shortness of breath R06.02  
 Stage 2 chronic kidney disease N18.2  Seizure ([facility] R56.9  Prolonged Q-T interval on ECG R94.31  
 Elevated troponin R74.8  Pulmonary hypertension ([facility]) I27.20  Stroke (cerebrum) ([facility]) I63.9  Transient cerebral ischemia G45.9  Seizure disorder ([facility] G40.909  Acute on chronic combined systolic and diastolic CHF (congestive heart failure) ([facility]) I50.43  Atrial fibrillation ([facility] I48.91  Left atrial thrombus I51.3  Tobacco abuse Z72.0  Hypertension, essential I10  Type 2 diabetes mellitus without complication ([facility]) A39.5  CKD (chronic kidney disease) stage 4, GFR 15-29 ml/min ([facility]) N18.4  Lactic acidosis E87.2 A on CKD - labs are worse. Little response to lasix 100mg IV. Will try dialysis again tomorrow, but he has gotten hypotensive every time we have tried. I am not hopefull that he will tolerate it despite midodrine. Hospice care may be the best option for him CHF - EF of 20 - 25% A Fib with RVR Nash Gonzalez MD

## 2019-02-28 NOTE — PROGRESS NOTES
Verbal bedside report received from Bisi Baumann, [facility]. Assumed care of patient.

## 2019-02-28 NOTE — PROGRESS NOTES
Problem: Falls - Risk of 
Goal: *Absence of Falls Document Jose Armando Luis Fall Risk and appropriate interventions in the flowsheet. Outcome: Progressing Towards Goal 
Fall Risk Interventions: 
Mobility Interventions: Patient to call before getting OOB, Bed/chair exit alarm, Communicate number of staff needed for ambulation/transfer, PT Consult for mobility concerns, PT Consult for assist device competence Mentation Interventions: Bed/chair exit alarm, Door open when patient unattended, Family/sitter at bedside, Increase mobility, More frequent rounding, Room close to nurse's station Medication Interventions: Bed/chair exit alarm, Patient to call before getting OOB, Evaluate medications/consider consulting pharmacy Elimination Interventions: Bed/chair exit alarm, Call light in reach, Patient to call for help with toileting needs, Toileting schedule/hourly rounds History of Falls Interventions: Bed/chair exit alarm, Investigate reason for fall, Room close to nurse's station, Door open when patient unattended Problem: Pressure Injury - Risk of 
Goal: *Prevention of pressure injury Document Jg Scale and appropriate interventions in the flowsheet. Outcome: Progressing Towards Goal 
Pressure Injury Interventions: 
Sensory Interventions: Pressure redistribution bed/mattress (bed type), Discuss PT/OT consult with provider, Minimize linen layers, Keep linens dry and wrinkle-free Moisture Interventions: Absorbent underpads, Check for incontinence Q2 hours and as needed, Internal/External urinary devices, Minimize layers Activity Interventions: Increase time out of bed, Pressure redistribution bed/mattress(bed type), PT/OT evaluation Mobility Interventions: HOB 30 degrees or less, Pressure redistribution bed/mattress (bed type), PT/OT evaluation Nutrition Interventions: Document food/fluid/supplement intake

## 2019-02-28 NOTE — PROGRESS NOTES
Verbal bedside report given to Artie Mock, oncoming RN. Patient's situation, background, assessment and recommendations provided. Opportunity for questions provided. Oncoming RN assumed care of patient.

## 2019-02-28 NOTE — PROGRESS NOTES
[facility] CARDIOLOGY PROGRESS NOTE 
      
 
2/28/2019 9:42 AM 
 
Admit Date: 2/19/2019 Subjective:  
Resting in bed. Denies any angina. EF 25-30% with RV dysfunction and mild Pul HTN. Not tolerating HD per Nephrology notes. Palliative care consult pending. ROS: 
Cardiovascular:  As noted above Objective:  
  
Vitals:  
 02/27/19 2116 02/28/19 5483 02/28/19 0451 02/28/19 3433 BP: 108/58 95/65 97/68 101/61 Pulse: 61 66 72 78 Resp: 18 18 18 18 Temp: 97.6 °F (36.4 °C) 98 °F (36.7 °C) 97.2 °F (36.2 °C) 98.1 °F (36.7 °C) SpO2: 100% 98% 100% 100% Weight:   102.9 kg (226 lb 14.4 oz) Height:      
 
 
Physical Exam: 
General-No Acute Distress Neck- supple, +JVD CV- regular rate and rhythm no MRG Lung- coarse breath sounds Abd- soft, nontender, nondistended Ext- 3+ edema bilaterally. Skin- warm and dry Data Review:  
Recent Labs  
  02/28/19 
0340 02/27/19 
0408  02/25/19 
1453  137   < > 136  
K 4.1 4.3   < > 3.6 MG 3.4*  --   --   --   
BUN 57* 60*   < > 50* CREA 5.20* 4.86*   < > 3.17* * 95   < > 92 WBC 7.7  --   --  6.1 HGB 11.8*  --   --  12.1*  
HCT 36.2*  --   --  35.8* PLT 33*  --   --  119*  
 < > = values in this interval not displayed. Assessment/Plan:  
 
Principal Problem: 
  CHF (congestive heart failure) ([facility] [address] (9/27/2017) Has BIV failure, EF 20-25%. Intolerant to meds d/t hypotension. Stopped zaroxolyn and KCL yesterday, volume to be controlled with HD, increased midodrine yesterday to allow adequate HD, prognosis poor. Palliative care consult pending today. Active Problems: Hypertension (9/29/2017) Actually mild hypotension, now on midodrine. Off all med for hypertension. CKD (chronic kidney disease) stage 4, GFR 15-29 ml/min ([facility]) (2/20/2019)   Now on HD and increased midodrine to allow better HD.  Overall prognosis is poor and likely will not tolerate HD long term.  Needs palliative care to address code status and goals or care.   
 
 
  Lactic acidosis (2/20/2019) Per primary team  
 
 
Will sign off, call for questions.   
 
Lizzy Pérez NP 
2/28/2019 9:42 AM

## 2019-02-28 NOTE — CONSULTS
This is a re-consult. Please see consult note from 2/21/2019, and follow up note from today.     Jesus Alberto Kelly, [facility]  Palliative Care

## 2019-02-28 NOTE — PROGRESS NOTES
Spoke with MD Janet Riley about patient BP 86/61, received orders to hold am dose of zaroxylyn and lasix IVP. Will continue to monitor. MD Li notified of Mag 3.4, received orders to hold PO magnesium r/t high magnesium levels. Will continue to monitor. MD Ana Harvey notified of dialysis planned for tomorrow. MD Ana Harvey aware that lasix held r/t hypotension and PO orders to hold PO Mag for Mag level of 3.4.

## 2019-02-28 NOTE — PROGRESS NOTES
Palliative Care Progress Note Patient: Eliezer Zaldivar MRN: [id number]  SSN: [id number] YOB: 1945  Age: 66 y.o. Sex: male Assessment/Plan: Chief Complaint/Interval History:  Sitting in chair, not very communicative today Principal Diagnosis: · Debility, Unspecified  R53.81 Additional Diagnoses: · Altered Mental Status R41.82 
· Edema  R60.9 · Fatigue, Lethargy  R53.83 
· Frailty  R54 
· Encounter for Palliative Care  Z51.5 Palliative Performance Scale (PPS) PPS: 60 Medical Decision Making:  
Reviewed and summarized notes from last note until present Discussed case with appropriate providersJatin Atkinson NP Reviewed laboratory and x-ray data from last note until present We have been reconsulted to address goals of care. Pt well known to PC. We have seen him 5 times in the last year for goals discussions, including last week. He has consistently stated that he wants all aggressive treatments, including (but not limited to) dialysis, intubation, resuscitation, and artificial nutrition. His wishes are reflected in his Advance Directive on file. He has told PC in the past that he feels it is the medical teams duty to do everything possible to keep him alive. Today, he is sitting in the recliner, no distress noted. There is a male visitor laying down on the window seat. Every time I asked Mr Fior Parks a question, he would not answer, but the family member would answer as though I was talking to him. Discussed with Marlene Schrader NP. Unsure if dialysis will be attempted today. I will attempt to speak with Sushil Horne, pt's wife and HCPOA. She has been supportive of pt's wishes in the past.  Will continue to follow. Will discuss findings with members of the interdisciplinary team.   
 
  
More than 50% of this 25 minute visit was spent counseling and coordination of care as outlined above. Subjective:  
 
Review of Systems: Review of systems not obtained due to patient factors- he would not answer questions Objective:  
 
Visit Vitals /55 Pulse 78 Temp 98.1 °F (36.7 °C) Resp 18 Ht 5' 10\" (1.778 m) Wt 226 lb 14.4 oz (102.9 kg) SpO2 100% BMI 32.56 kg/m² Physical Exam: 
 
General:  Debilitated. No acute distress. Eyes:  Conjunctivae/corneas clear Nose: Nares normal. Septum midline. Neck: Supple, symmetrical, trachea midline Lungs:   Coarse bilaterally, unlabored Heart:  Regular rate and rhythm Abdomen:   Soft, non-tender, non-distended Extremities: Normal, atraumatic, no cyanosis. BLE edema Skin: Skin color, texture, turgor normal.   
Neurologic: Nonfocal  
Psych: Unable to assess Signed By: Dawson Brooks NP February 28, 2019

## 2019-02-28 NOTE — PROGRESS NOTES
Bedside and Verbal shift change report given to self (oncoming nurse) by Cherry Marie RN (offgoing nurse). Report included the following information SBAR, Kardex and MAR.

## 2019-02-28 NOTE — PROGRESS NOTES
Verbal bedside report received from Dwain Roberson [place]. Assumed care of patient.

## 2019-02-28 NOTE — PROGRESS NOTES
Bedside and Verbal shift change report given to Gifty Reed RN (oncoming nurse) by self Julian Zimmerman nurse). Report included the following information SBAR, Kardex and MAR.

## 2019-02-28 NOTE — PROGRESS NOTES
Hospitalist Progress Note Admit Date:  2019 11:19 AM  
Name:  Becki Tan. Age:  66 y.o. 
:  1945 MRN:  [id number] PCP:  Janice Martin MD 
Treatment Team: Attending Provider: Angie Henning MD; Consulting Provider: Meri Angelucci, MD; Consulting Provider: Karena Albarado; Utilization Review: Milena Rivera RN; Care Manager: Sherrell Farah; Physical Therapy Assistant: Valeriano Nguyen; Consulting Provider: Alfredo Gannon NP 
 
HPI/Subjective: This is a 75-year-old gentleman with multiple medical problems including CHF, A. fib, CKD stage IV,, chronic COPD, obstructive sleep apnea who is noncompliant with CPAP, who is currently in a rehab facility, admitted on  for acute on chronic CHF in view of worsening bilateral LE swelling, hypoxic respiratory failure and hematuria. was brought into the emergency room with complaints of hematuria, leg swellings and shortness of breath. He was also having shortness of breath, moderate in severity, progressively getting worse, worse with minimal exertion, not resolved with rest.  Tunneled cath placed  and HD started but could not tolerate his session due to hypotension. Midodrine was restarted. : Up in chair, resting, nad. No family present. Does wake up and communicate some, but very little. Denies pain. Says he's resting well. Objective:  
 
Patient Vitals for the past 24 hrs: 
 Temp Pulse Resp BP SpO2  
19 1305 97.6 °F (36.4 °C) 66 18 (!) 86/61 99 % 19 1000  79  (!) 86/61   
19 0900    113/55 100 % 19 0838 98.1 °F (36.7 °C) 78 18 101/61 100 % 19 0451 97.2 °F (36.2 °C) 72 18 97/68 100 % 19 0039 98 °F (36.7 °C) 66 18 95/65 98 % 19 2116 97.6 °F (36.4 °C) 61 18 108/58 100 % 19 1714    104/63   
19 1702 97.4 °F (36.3 °C) 64 17 92/62 100 % Oxygen Therapy O2 Sat (%): 99 % (19 1305) Pulse via Oximetry: 65 beats per minute (02/25/19 1347) O2 Device: Nasal cannula (02/28/19 1119) O2 Flow Rate (L/min): 3 l/min (02/28/19 1119) ETCO2 (mmHg): 21 mmHg (02/25/19 1347) Intake/Output Summary (Last 24 hours) at 2/28/2019 1327 Last data filed at 2/28/2019 4867 Gross per 24 hour Intake 100 ml Output 150 ml Net -50 ml *Note that automatically entered I/Os may not be accurate; dependent on patient compliance with collection and accurate  by [employer]. General:    Well nourished. Alert. CV:   RRR. No murmur, rub, or gallop. Lungs:   CTAB. No wheezing, rhonchi, or rales. 3L NC O2 Abdomen:   Soft, nontender, nondistended. Extremities: Warm and dry. No cyanosis. 3+ pitting edema b/l LEs. Skin:     No rashes or jaundice. Neuro:  No gross focal deficits Data Review: 
I have reviewed all labs, meds, and studies from the last 24 hours: 
 
Recent Results (from the past 24 hour(s)) PLEASE READ & DOCUMENT PPD TEST IN 24 HRS Collection Time: 02/27/19  2:43 PM  
Result Value Ref Range PPD negative Negative  
 mm Induration 0 mm METABOLIC PANEL, BASIC Collection Time: 02/28/19  3:40 AM  
Result Value Ref Range Sodium 137 136 - 145 mmol/L Potassium 4.1 3.5 - 5.1 mmol/L Chloride 100 98 - 107 mmol/L  
 CO2 26 21 - 32 mmol/L Anion gap 11 7 - 16 mmol/L Glucose 113 (H) 65 - 100 mg/dL BUN 57 (H) 8 - 23 MG/DL Creatinine 5.20 (H) 0.8 - 1.5 MG/DL  
 GFR est AA 14 (L) >60 ml/min/1.73m2 GFR est non-AA 12 (L) >60 ml/min/1.73m2 Calcium 8.6 8.3 - 10.4 MG/DL  
CBC W/O DIFF Collection Time: 02/28/19  3:40 AM  
Result Value Ref Range WBC 7.7 4.3 - 11.1 K/uL  
 RBC 4.35 4.23 - 5.6 M/uL  
 HGB 11.8 (L) 13.6 - 17.2 g/dL HCT 36.2 (L) 41.1 - 50.3 % MCV 83.2 79.6 - 97.8 FL  
 MCH 27.1 26.1 - 32.9 PG  
 MCHC 32.6 31.4 - 35.0 g/dL  
 RDW 19.9 (H) 11.9 - 14.6 % PLATELET 33 (L) 561 - 450 K/uL MPV Unable to calculate. Recommend adding IPF. 9.4 - 12.3 FL ABSOLUTE NRBC 0.04 0.0 - 0.2 K/uL MAGNESIUM Collection Time: 02/28/19  3:40 AM  
Result Value Ref Range Magnesium 3.4 (H) 1.8 - 2.4 mg/dL All Micro Results Procedure Component Value Units Date/Time CULTURE, BLOOD [[id number]] Collected:  02/22/19 1740 Order Status:  Completed Specimen:  Blood Updated:  02/27/19 3326 Special Requests: --     
  RIGHT 
HAND Culture result: NO GROWTH 5 DAYS     
 CULTURE, BLOOD [[id number]] Collected:  02/22/19 1753 Order Status:  Completed Specimen:  Blood Updated:  02/27/19 5054 Special Requests: --     
  RIGHT 
FOREARM Culture result: NO GROWTH 5 DAYS     
 CULTURE, BLOOD [[id number]] Collected:  02/19/19 1140 Order Status:  Completed Specimen:  Blood Updated:  02/24/19 9195 Special Requests: --     
  RIGHT Antecubital 
  
  Culture result: NO GROWTH 5 DAYS     
 CULTURE, BLOOD [[id number]]  (Abnormal) Collected:  02/19/19 1252 Order Status:  Completed Specimen:  Blood Updated:  02/23/19 0768 Special Requests: --     
  RIGHT 
HAND 
  
  GRAM STAIN    
  GRAM POSITIVE COCCI IN CHAINS AEROBIC AND ANAEROBIC BOTTLES RESULTS VERIFIED, PHONED TO AND READ BACK BY EAMON GONZALEZ @8778 02/20/2019 [facility] Culture result: STAPHYLOCOCCUS SPECIES, COAGULASE NEGATIVE ALPHA STREPTOCOCCUS, NOT S. PNEUMONIAE  
     
      
  THIS ORGANISM MAY BE INDICATIVE OF CULTURE CONTAMINATION, HOWEVER, CLINICAL CORRELATION NEEDS TO BE EVALUATED, AS EACH CASE IS UNIQUE. CULTURE, URINE [[id number]]  (Abnormal)  (Susceptibility) Collected:  02/19/19 1220 Order Status:  Completed Specimen:  Cath Urine Updated:  02/22/19 3010 Special Requests: NO SPECIAL REQUESTS   Culture result:    
  1000 COLONIES/mL STAPHYLOCOCCUS HAEMOLYTICUS  
     
  
 
 
 Results for orders placed or performed during the hospital encounter of 19  
2D ECHO COMPLETE ADULT (TTE) W OR WO CONTR Narrative [facility] [address] [place], [address] [facility] 
[phone] Transthoracic Echocardiogram 
2D, M-mode, Doppler, and Color Doppler Patient: Breana Nelson 
MR #: [id number] : [id number] Age: 66 years Gender: Male Study date: 2019 Account #: [de-identified] Height: 70 in Weight: 220.4 lb 
BSA: 2.18 mï¾² Status:Routine Location: [facility] BP: 113/ 79 Allergies: PENICILLINS Sonographer:  Cliff Strong, [facility] Group:  [facility] Cardiology Referring Physician:  Khadijah Tolbert. Criss Villarreal MD [facility] Reading Physician:  TITO Medina MD [facility] INDICATIONS: Reassess EF PROCEDURE: This was a routine study. A transthoracic echocardiogram was 
performed. The study included complete 2D imaging, M-mode, complete spectral 
Doppler, and color Doppler. Intravenous contrast (Definity, 2 ml) was 
administered. Image quality was adequate. LEFT VENTRICLE: Size was normal. Systolic function was markedly reduced. Ejection fraction was estimated in the range of 25 % to 30 %. There was  
severe 
diffuse hypokinesis with distinct regional wall motion abnormalities. There  
was 
severe global hypokinesis. There was severe concentric hypertrophy. The 
myocardial specular pattern appeared moderately abnormal. Consider  
infiltrative 
cardiomyopathy if clinically indicated. Left ventricular diastolic  
dysfunction Grade 3. Average E/e' of 16.7. The study was not technically sufficient to 
allow evaluation of LV diastolic function. RIGHT VENTRICLE: The ventricle was mildly dilated. Systolic function was 
moderately to markedly reduced. There was mild pulmonary artery hypertension. Estimated peak pressure was in the range of 35-40 mmHg. LEFT ATRIUM: The atrium was moderately to markedly dilated. RIGHT ATRIUM: The atrium was markedly dilated. SYSTEMIC VEINS: IVC: The inferior vena cava was moderately dilated. The 
respirophasic change in diameter was less than 50%. AORTIC VALVE: The valve was trileaflet. Leaflets exhibited moderate  
sclerosis. The left coronary cusp demonstrated immobility. There was no evidence for 
stenosis. There was mild regurgitation. MITRAL VALVE: There was focal thickening of the posterior leaflet. There was  
no 
evidence for stenosis. There was mild to moderate regurgitation. TRICUSPID VALVE: The valve structure was normal. There was no evidence for 
stenosis. There was mild to moderate regurgitation. The regurgitant jet was 
eccentric. PULMONIC VALVE: The valve structure was normal. There was no evidence for 
stenosis. There was moderate regurgitation. PERICARDIUM: There was no pericardial effusion. AORTA: The root exhibited normal size. SUMMARY: 
 
-  Left ventricle: Systolic function was markedly reduced. Ejection fraction 
was estimated in the range of 25 % to 30 %. There was severe diffuse 
hypokinesis with distinct regional wall motion abnormalities. There was  
severe 
global hypokinesis. There was severe concentric hypertrophy. The myocardial 
specular pattern appeared moderately abnormal. Consider infiltrative 
cardiomyopathy if clinically indicated. -  Right ventricle: The ventricle was mildly dilated. Systolic function was 
moderately to markedly reduced. There was mild pulmonary artery hypertension. 
 
-  Left atrium: The atrium was moderately to markedly dilated. -  Right atrium: The atrium was markedly dilated. -  Inferior vena cava, hepatic veins: The inferior vena cava was moderately 
dilated. The respirophasic change in diameter was less than 50%. -  Aortic valve: The valve was trileaflet. Leaflets exhibited moderate 
sclerosis. The left coronary cusp demonstrated immobility. There was mild 
regurgitation. -  Mitral valve: There was mild to moderate regurgitation. 
 
-  Tricuspid valve: There was mild to moderate regurgitation. 
 
-  Pulmonic valve: There was moderate regurgitation. SYSTEM MEASUREMENT TABLES 
 
2D mode AoR Diam (2D): 3 cm 
LA Dimension (2D): 5.1 cm Left Atrium Systolic Volume Index; Method of Disks, Biplane; 2D mode;: 52.3 
ml/m2 IVS/LVPW (2D): 0.9 IVSd (2D): 2.1 cm 
LVIDd (2D): 3.7 cm 
LVIDs (2D): 3.3 cm 
LVPWd (2D): 2.3 cm RVIDd (2D): 3.9 cm Unspecified Scan Mode Peak Grad; Mean; Antegrade Flow: 5 mm[Hg] Vmax; Antegrade Flow: 107 cm/s Peak Grad; Mean; Antegrade Flow: 3 mm[Hg] Vmax; Antegrade Flow: 88.1 cm/s Prepared and signed by TITO Frias MD [facility] Signed 21-Feb-2019 17:34:55 Current Meds: 
Current Facility-Administered Medications Medication Dose Route Frequency  furosemide (LASIX) injection 100 mg  100 mg IntraVENous BID  metOLazone (ZAROXOLYN) tablet 10 mg  10 mg Oral DAILY  midodrine (PROAMITINE) tablet 10 mg  10 mg Oral TID WITH MEALS  
 apixaban (ELIQUIS) tablet 5 mg  5 mg Oral BID  lidocaine (XYLOCAINE) 20 mg/mL (2 %) injection  mg  1-20 mL IntraDERMal Multiple  lactulose (CHRONULAC) solution 30 g  30 g Oral Q6H  
 VANCOMYCIN INTERMITTENT DOSING   Other Rx Dosing/Monitoring  albuterol-ipratropium (DUO-NEB) 2.5 MG-0.5 MG/3 ML  3 mL Nebulization Q4H PRN  
 fluticasone (FLONASE) 50 mcg/actuation nasal spray 2 Spray  2 Spray Both Nostrils DAILY  pantoprazole (PROTONIX) tablet 40 mg  40 mg Oral ACB  polyethylene glycol (MIRALAX) packet 17 g  17 g Oral DAILY  sodium chloride (NS) flush 5-40 mL  5-40 mL IntraVENous Q8H  
 sodium chloride (NS) flush 5-40 mL  5-40 mL IntraVENous PRN  
 acetaminophen (TYLENOL) tablet 650 mg  650 mg Oral Q4H PRN  
 HYDROcodone-acetaminophen (NORCO) 5-325 mg per tablet 1 Tab  1 Tab Oral Q4H PRN  
 morphine injection 2 mg  2 mg IntraVENous Q4H PRN  
  naloxone (NARCAN) injection 0.4 mg  0.4 mg IntraVENous PRN  
 diphenhydrAMINE (BENADRYL) capsule 25 mg  25 mg Oral Q4H PRN  
 ondansetron (ZOFRAN) injection 4 mg  4 mg IntraVENous Q4H PRN  
 magnesium hydroxide (MILK OF MAGNESIA) 400 mg/5 mL oral suspension 30 mL  30 mL Oral DAILY PRN  
 magnesium oxide (MAG-OX) tablet 400 mg  400 mg Oral TID WITH MEALS  finasteride (PROSCAR) tablet 5 mg  5 mg Oral DAILY Other Studies (last 24 hours): No results found. Assessment and Plan:  
 
Hospital Problems as of 2/28/2019 Date Reviewed: 12/11/2018 Codes Class Noted - Resolved POA  
 CKD (chronic kidney disease) stage 4, GFR 15-29 ml/min ([facility]) ICD-10-CM: N18.4 ICD-9-CM: 585.4  2/20/2019 - Present Yes Lactic acidosis ICD-10-CM: E87.2 ICD-9-CM: 276.2  2/20/2019 - Present Unknown  
   
 CKD (chronic kidney disease) stage 3, GFR 30-59 ml/min ([facility]) (Chronic) ICD-10-CM: N18.3 ICD-9-CM: 585.3  9/29/2017 - Present Hypertension (Chronic) ICD-10-CM: I10 
ICD-9-CM: 401.9  9/29/2017 - Present Yes * (Principal) CHF (congestive heart failure) ([facility]) ICD-10-CM: I50.9 ICD-9-CM: 428.0  9/27/2017 - Present Yes Plan: # Acute on chronic sCHF 
            - cannot tolerate diuresis or OMT 2/2 hypotension 
            - HD for volume overload, but this may not be sustainable long term given his BP issues.             - cardiology following 
  
# ESRD now on HD 
            - Tunneled cath placed 2/25 
            - Midodrine restarted but still having hypotension during HD.             - nephro appreciated. Has not been tolerant of HD thus far and had a poor response to 100mg IV lasix. Diuretics held 2/28 AM due to low BPs.   
# AFib 
            - restart Eliquis 2/27 
  
# Hematuria 
            - resolved DC planning/Dispo: PC following for goals of care. Patient appropriate for DNR and consideration of hospice, however family not present.  HD trial 3/1 but unlikely he will tolerate based on previous sessions. Diet:  DIET REGULAR 
DIET NUTRITIONAL SUPPLEMENTS 
DVT ppx:  eliquis Signed: 
Eben Hernandez MD

## 2019-02-28 NOTE — PROGRESS NOTES
2/28/2019 5:23 PM 
 
Admit Date: 2/19/2019 Admit Diagnosis: [facility]est[facility]) [facility] [I50.9] Subjective: No cp or sob Objective:  
  
Visit Vitals /67 Pulse 79 Temp 97.6 °F (36.4 °C) Resp 22 Ht 5' 10\" (1.778 m) Wt 102.9 kg (226 lb 14.4 oz) SpO2 99% BMI 32.56 kg/m² Physical Exam: 
Ree Jose, Well Nourished, No Acute Distress, Alert & Oriented x 3, appropriate mood. Neck- supple, no JVD 
CV- regular rate and rhythm no MRG Lung- clear bilaterally Abd- soft, nontender, nondistended Ext- no edema bilaterally. Skin- warm and dry Data Review:  
Recent Labs  
  02/28/19 
0340   
K 4.1 BUN 57* CREA 5.20* WBC 7.7 HGB 11.8* HCT 36.2*  
PLT 33* Assessment/Plan:  
 
Principal Problem: 
  Chronic systolic CHF (congestive heart failure) ([facility] (9/27/2017)- volume overload secondary to a on crf- hd for voulme- no ace/arb or beta blocker due to hypotension On midodrine- -poor prognosis- will sign off Active Problems: Hypertension (9/29/2017) CKD (chronic kidney disease) stage 4, GFR 15-29 ml/min ([facility]) (2/20/2019) Lactic acidosis (2/20/2019)

## 2019-03-01 LAB
ANION GAP SERPL CALC-SCNC: 13 MMOL/L (ref 7–16)
BASOPHILS # BLD: 0.1 K/UL (ref 0–0.2)
BASOPHILS NFR BLD: 1 % (ref 0–2)
BUN SERPL-MCNC: 67 MG/DL (ref 8–23)
CALCIUM SERPL-MCNC: 8.7 MG/DL (ref 8.3–10.4)
CHLORIDE SERPL-SCNC: 100 MMOL/L (ref 98–107)
CO2 SERPL-SCNC: 24 MMOL/L (ref 21–32)
CREAT SERPL-MCNC: 5.8 MG/DL (ref 0.8–1.5)
DIFFERENTIAL METHOD BLD: ABNORMAL
EOSINOPHIL # BLD: 0.1 K/UL (ref 0–0.8)
EOSINOPHIL NFR BLD: 1 % (ref 0.5–7.8)
ERYTHROCYTE [DISTWIDTH] IN BLOOD BY AUTOMATED COUNT: 19.8 % (ref 11.9–14.6)
GLUCOSE SERPL-MCNC: 113 MG/DL (ref 65–100)
HCT VFR BLD AUTO: 35.2 % (ref 41.1–50.3)
HGB BLD-MCNC: 11.7 G/DL (ref 13.6–17.2)
IMM GRANULOCYTES # BLD AUTO: 0 K/UL (ref 0–0.5)
IMM GRANULOCYTES NFR BLD AUTO: 0 % (ref 0–5)
LYMPHOCYTES # BLD: 1.2 K/UL (ref 0.5–4.6)
LYMPHOCYTES NFR BLD: 15 % (ref 13–44)
MCH RBC QN AUTO: 27.2 PG (ref 26.1–32.9)
MCHC RBC AUTO-ENTMCNC: 33.2 G/DL (ref 31.4–35)
MCV RBC AUTO: 81.9 FL (ref 79.6–97.8)
MM INDURATION POC: 0 MM (ref 0–5)
MONOCYTES # BLD: 1.1 K/UL (ref 0.1–1.3)
MONOCYTES NFR BLD: 15 % (ref 4–12)
NEUTS SEG # BLD: 5.4 K/UL (ref 1.7–8.2)
NEUTS SEG NFR BLD: 69 % (ref 43–78)
NRBC # BLD: 0 K/UL (ref 0–0.2)
PLATELET # BLD AUTO: 36 K/UL (ref 150–450)
PMV BLD AUTO: ABNORMAL FL (ref 9.4–12.3)
POTASSIUM SERPL-SCNC: 3.7 MMOL/L (ref 3.5–5.1)
PPD POC: NEGATIVE NEGATIVE
RBC # BLD AUTO: 4.3 M/UL (ref 4.23–5.6)
SODIUM SERPL-SCNC: 137 MMOL/L (ref 136–145)
VANCOMYCIN SERPL-MCNC: 24.2 UG/ML
WBC # BLD AUTO: 7.9 K/UL (ref 4.3–11.1)

## 2019-03-01 PROCEDURE — 74011250637 HC RX REV CODE- 250/637: Performed by: INTERNAL MEDICINE

## 2019-03-01 PROCEDURE — 85025 COMPLETE CBC W/AUTO DIFF WBC: CPT

## 2019-03-01 PROCEDURE — 74011250636 HC RX REV CODE- 250/636: Performed by: INTERNAL MEDICINE

## 2019-03-01 PROCEDURE — 99233 SBSQ HOSP IP/OBS HIGH 50: CPT | Performed by: NURSE PRACTITIONER

## 2019-03-01 PROCEDURE — 36415 COLL VENOUS BLD VENIPUNCTURE: CPT

## 2019-03-01 PROCEDURE — 80048 BASIC METABOLIC PNL TOTAL CA: CPT

## 2019-03-01 PROCEDURE — 74011250637 HC RX REV CODE- 250/637: Performed by: HOSPITALIST

## 2019-03-01 PROCEDURE — 65270000029 HC RM PRIVATE

## 2019-03-01 PROCEDURE — 77030018846 HC SOL IRR STRL H20 ICUM -A

## 2019-03-01 PROCEDURE — 65660000000 HC RM CCU STEPDOWN

## 2019-03-01 PROCEDURE — 80202 ASSAY OF VANCOMYCIN: CPT

## 2019-03-01 PROCEDURE — 74011250637 HC RX REV CODE- 250/637: Performed by: UROLOGY

## 2019-03-01 RX ADMIN — FUROSEMIDE 100 MG: 10 INJECTION, SOLUTION INTRAMUSCULAR; INTRAVENOUS at 09:51

## 2019-03-01 RX ADMIN — FINASTERIDE 5 MG: 5 TABLET, FILM COATED ORAL at 09:51

## 2019-03-01 RX ADMIN — LACTULOSE 30 G: 20 SOLUTION ORAL at 22:48

## 2019-03-01 RX ADMIN — APIXABAN 5 MG: 5 TABLET, FILM COATED ORAL at 09:51

## 2019-03-01 RX ADMIN — Medication 10 ML: at 12:15

## 2019-03-01 RX ADMIN — Medication 10 ML: at 22:50

## 2019-03-01 RX ADMIN — PANTOPRAZOLE SODIUM 40 MG: 40 TABLET, DELAYED RELEASE ORAL at 07:50

## 2019-03-01 RX ADMIN — Medication 10 ML: at 06:05

## 2019-03-01 RX ADMIN — MIDODRINE HYDROCHLORIDE 10 MG: 5 TABLET ORAL at 12:12

## 2019-03-01 RX ADMIN — MIDODRINE HYDROCHLORIDE 10 MG: 5 TABLET ORAL at 07:51

## 2019-03-01 RX ADMIN — LACTULOSE 30 G: 20 SOLUTION ORAL at 04:51

## 2019-03-01 RX ADMIN — METOLAZONE 10 MG: 5 TABLET ORAL at 09:51

## 2019-03-01 RX ADMIN — Medication 400 MG: at 12:11

## 2019-03-01 RX ADMIN — Medication 400 MG: at 07:51

## 2019-03-01 RX ADMIN — FLUTICASONE PROPIONATE 2 SPRAY: 50 SPRAY, METERED NASAL at 09:52

## 2019-03-01 RX ADMIN — MIDODRINE HYDROCHLORIDE 10 MG: 5 TABLET ORAL at 17:30

## 2019-03-01 RX ADMIN — FUROSEMIDE 100 MG: 10 INJECTION, SOLUTION INTRAMUSCULAR; INTRAVENOUS at 17:30

## 2019-03-01 NOTE — PROGRESS NOTES
CM met with pt & wife at bedside, offered support. Wife states she prefers to wait on discussing further plans until pt daughter is available this afternoon. DCP hospice ? CM to continue to monitor. Care Management Interventions PCP Verified by CM: Yes(saw 1/24/19) Palliative Care Criteria Met (RRAT>21 & CHF Dx)?: Yes(RRAT 48 Dx CHF) Transition of Care Consult (CM Consult): Discharge Planning Discharge Durable Medical Equipment: No(Cane, rollator and Home O2 from [facility] [employer] [facility]) Physical Therapy Consult: Yes Occupational Therapy Consult: Yes Speech Therapy Consult: No 
Current Support Network: Lives with Spouse Confirm Follow Up Transport: Family Plan discussed with Pt/Family/Caregiver: Yes Freedom of Choice Offered: Yes Discharge Location Discharge Placement: Unable to determine at this time

## 2019-03-01 NOTE — PROGRESS NOTES
Verbal bedside report given to ashley Mace RN. Patient's situation, background, assessment and recommendations provided. Opportunity for questions provided. Oncoming RN assumed care of patient.

## 2019-03-01 NOTE — PROGRESS NOTES
Verbal bedside report given to Tacos Rose onctiffany RN. Patient's situation, background, assessment and recommendations provided. Opportunity for questions provided. Oncoming RN assumed care of patient. Family in the room. Pt visited a lot with family today

## 2019-03-01 NOTE — PROGRESS NOTES
Hospitalist Progress Note Admit Date:  2019 11:19 AM  
Name:  Colette Segura. Age:  66 y.o. 
:  1945 MRN:  [id number] PCP:  Alexys Iqbal MD 
Treatment Team: Attending Provider: Zorita Paget, MD; Consulting Provider: Conrad Montgomery MD; Utilization Review: Matty Dan RN; Care Manager: Darius Verde; Physical Therapy Assistant: Ирина Garcia; Consulting Provider: Vivienne Melgoza NP; Consulting Provider: Hola Campos MD 
 
HPI/Subjective: This is a 57-year-old gentleman with multiple medical problems including CHF, A. fib, CKD stage IV,, chronic COPD, obstructive sleep apnea who is noncompliant with CPAP, who is currently in a rehab facility, admitted on  for acute on chronic CHF in view of worsening bilateral LE swelling, hypoxic respiratory failure and hematuria. was brought into the emergency room with complaints of hematuria, leg swellings and shortness of breath. He was also having shortness of breath, moderate in severity, progressively getting worse, worse with minimal exertion, not resolved with rest.  Tunneled cath placed  and HD started but could not tolerate his session due to hypotension. Midodrine was restarted and he has remained intolerated of HD due to BP issues. 3/1: Nephrology opted not to attempt HD anymore. Patient and his wife are requesting a second opinion. Patient seems more alert today than previous days. Objective:  
 
Patient Vitals for the past 24 hrs: 
 Temp Pulse Resp BP SpO2  
19 1312 97.5 °F (36.4 °C) 69 18 120/57 91 % 19 1211  74  97/63   
19 0825 97.8 °F (36.6 °C) 66 20 113/63 99 % 19 0820     99 % 19 0512 97.6 °F (36.4 °C) 74 20 113/71 99 % 19 0042 97.5 °F (36.4 °C) 71 20 117/73 99 % 19 2032 96.2 °F (35.7 °C) 67 23 110/78 100 % 19 1705  79  104/67   
19 1656 97.6 °F (36.4 °C) 75 22 106/66 99 % Oxygen Therapy O2 Sat (%): 91 % (03/01/19 1312) Pulse via Oximetry: 73 beats per minute (03/01/19 0820) O2 Device: Nasal cannula (03/01/19 0820) O2 Flow Rate (L/min): 2 l/min(weaned) (03/01/19 3818) ETCO2 (mmHg): 21 mmHg (02/25/19 1347) Intake/Output Summary (Last 24 hours) at 3/1/2019 1511 Last data filed at 3/1/2019 1100 Gross per 24 hour Intake  Output 375 ml Net -375 ml *Note that automatically entered I/Os may not be accurate; dependent on patient compliance with collection and accurate  by techs. General:    Well nourished. Alert. CV:   RRR. No murmur, rub, or gallop. Lungs:   CTAB. No wheezing, rhonchi, or rales. Abdomen:   Soft, nontender, nondistended. Extremities: Warm and dry. No cyanosis or edema. Skin:     No rashes or jaundice. Neuro:  No gross focal deficits Data Review: 
I have reviewed all labs, meds, and studies from the last 24 hours: 
 
Recent Results (from the past 24 hour(s)) METABOLIC PANEL, BASIC Collection Time: 03/01/19  3:44 AM  
Result Value Ref Range Sodium 137 136 - 145 mmol/L Potassium 3.7 3.5 - 5.1 mmol/L Chloride 100 98 - 107 mmol/L  
 CO2 24 21 - 32 mmol/L Anion gap 13 7 - 16 mmol/L Glucose 113 (H) 65 - 100 mg/dL BUN 67 (H) 8 - 23 MG/DL Creatinine 5.80 (H) 0.8 - 1.5 MG/DL  
 GFR est AA 12 (L) >60 ml/min/1.73m2 GFR est non-AA 10 (L) >60 ml/min/1.73m2 Calcium 8.7 8.3 - 10.4 MG/DL  
CBC WITH AUTOMATED DIFF Collection Time: 03/01/19  3:44 AM  
Result Value Ref Range WBC 7.9 4.3 - 11.1 K/uL  
 RBC 4.30 4.23 - 5.6 M/uL  
 HGB 11.7 (L) 13.6 - 17.2 g/dL HCT 35.2 (L) 41.1 - 50.3 % MCV 81.9 79.6 - 97.8 FL  
 MCH 27.2 26.1 - 32.9 PG  
 MCHC 33.2 31.4 - 35.0 g/dL  
 RDW 19.8 (H) 11.9 - 14.6 % PLATELET 36 (L) 057 - 450 K/uL MPV Unable to calculate. Recommend adding IPF. 9.4 - 12.3 FL ABSOLUTE NRBC 0.00 0.0 - 0.2 K/uL  
 DF AUTOMATED NEUTROPHILS 69 43 - 78 % LYMPHOCYTES 15 13 - 44 % MONOCYTES 15 (H) 4.0 - 12.0 % EOSINOPHILS 1 0.5 - 7.8 % BASOPHILS 1 0.0 - 2.0 % IMMATURE GRANULOCYTES 0 0.0 - 5.0 %  
 ABS. NEUTROPHILS 5.4 1.7 - 8.2 K/UL  
 ABS. LYMPHOCYTES 1.2 0.5 - 4.6 K/UL  
 ABS. MONOCYTES 1.1 0.1 - 1.3 K/UL  
 ABS. EOSINOPHILS 0.1 0.0 - 0.8 K/UL  
 ABS. BASOPHILS 0.1 0.0 - 0.2 K/UL  
 ABS. IMM. GRANS. 0.0 0.0 - 0.5 K/UL  
VANCOMYCIN, RANDOM Collection Time: 03/01/19  3:44 AM  
Result Value Ref Range Vancomycin, random 24.2 UG/ML All Micro Results Procedure Component Value Units Date/Time CULTURE, BLOOD [[id number]] Collected:  02/22/19 1740 Order Status:  Completed Specimen:  Blood Updated:  02/27/19 8212 Special Requests: --     
  RIGHT 
HAND Culture result: NO GROWTH 5 DAYS     
 CULTURE, BLOOD [[id number]] Collected:  02/22/19 1753 Order Status:  Completed Specimen:  Blood Updated:  02/27/19 7621 Special Requests: --     
  RIGHT 
FOREARM Culture result: NO GROWTH 5 DAYS     
 CULTURE, BLOOD [[id number]] Collected:  02/19/19 1140 Order Status:  Completed Specimen:  Blood Updated:  02/24/19 6527 Special Requests: --     
  RIGHT Antecubital 
  
  Culture result: NO GROWTH 5 DAYS     
 CULTURE, BLOOD [[id number]]  (Abnormal) Collected:  02/19/19 1252 Order Status:  Completed Specimen:  Blood Updated:  02/23/19 0747 Special Requests: --     
  RIGHT 
HAND 
  
  GRAM STAIN    
  GRAM POSITIVE COCCI IN CHAINS AEROBIC AND ANAEROBIC BOTTLES RESULTS VERIFIED, PHONED TO AND READ BACK BY EAMON GONZALEZ @6657 02/20/2019 [facility] Culture result: STAPHYLOCOCCUS SPECIES, COAGULASE NEGATIVE ALPHA STREPTOCOCCUS, NOT S. PNEUMONIAE  
     
      
  THIS ORGANISM MAY BE INDICATIVE OF CULTURE CONTAMINATION, HOWEVER, CLINICAL CORRELATION NEEDS TO BE EVALUATED, AS EACH CASE IS UNIQUE. CULTURE, URINE [[id number]]  (Abnormal)  (Susceptibility) Collected:  02/19/19 1220 Order Status:  Completed Specimen:  Cath Urine Updated:  19 7179 Special Requests: NO SPECIAL REQUESTS Culture result:    
  1000 COLONIES/mL STAPHYLOCOCCUS HAEMOLYTICUS Results for orders placed or performed during the hospital encounter of 19  
2D ECHO COMPLETE ADULT (TTE) W OR WO CONTR Narrative [facility] [address] [place], [address] [facility] 
[phone] Transthoracic Echocardiogram 
2D, M-mode, Doppler, and Color Doppler Patient: Carlos Eduardo Villar 
MR #: [id number] : [id number] Age: 66 years Gender: Male Study date: 2019 Account #: [de-identified] Height: 70 in Weight: 220.4 lb 
BSA: 2.18 mï¾² Status:Routine Location: [facility] BP: 113/ 79 Allergies: PENICILLINS Sonographer:  Jose M Lockwood RDCS Group:  [address] Cardiology Referring Physician:  Ingirs Kelley. Deidra Lion MD [facility] Reading Physician:  TITO Ingram MD [facility] INDICATIONS: Reassess EF PROCEDURE: This was a routine study. A transthoracic echocardiogram was 
performed. The study included complete 2D imaging, M-mode, complete spectral 
Doppler, and color Doppler. Intravenous contrast (Definity, 2 ml) was 
administered. Image quality was adequate. LEFT VENTRICLE: Size was normal. Systolic function was markedly reduced. Ejection fraction was estimated in the range of 25 % to 30 %. There was  
severe 
diffuse hypokinesis with distinct regional wall motion abnormalities. There  
was 
severe global hypokinesis. There was severe concentric hypertrophy. The 
myocardial specular pattern appeared moderately abnormal. Consider  
infiltrative 
cardiomyopathy if clinically indicated. Left ventricular diastolic  
dysfunction Grade 3. Average E/e' of 16.7. The study was not technically sufficient to 
allow evaluation of LV diastolic function. RIGHT VENTRICLE: The ventricle was mildly dilated. Systolic function was moderately to markedly reduced. There was mild pulmonary artery hypertension. Estimated peak pressure was in the range of 35-40 mmHg. LEFT ATRIUM: The atrium was moderately to markedly dilated. RIGHT ATRIUM: The atrium was markedly dilated. SYSTEMIC VEINS: IVC: The inferior vena cava was moderately dilated. The 
respirophasic change in diameter was less than 50%. AORTIC VALVE: The valve was trileaflet. Leaflets exhibited moderate  
sclerosis. The left coronary cusp demonstrated immobility. There was no evidence for 
stenosis. There was mild regurgitation. MITRAL VALVE: There was focal thickening of the posterior leaflet. There was  
no 
evidence for stenosis. There was mild to moderate regurgitation. TRICUSPID VALVE: The valve structure was normal. There was no evidence for 
stenosis. There was mild to moderate regurgitation. The regurgitant jet was 
eccentric. PULMONIC VALVE: The valve structure was normal. There was no evidence for 
stenosis. There was moderate regurgitation. PERICARDIUM: There was no pericardial effusion. AORTA: The root exhibited normal size. SUMMARY: 
 
-  Left ventricle: Systolic function was markedly reduced. Ejection fraction 
was estimated in the range of 25 % to 30 %. There was severe diffuse 
hypokinesis with distinct regional wall motion abnormalities. There was  
severe 
global hypokinesis. There was severe concentric hypertrophy. The myocardial 
specular pattern appeared moderately abnormal. Consider infiltrative 
cardiomyopathy if clinically indicated. -  Right ventricle: The ventricle was mildly dilated. Systolic function was 
moderately to markedly reduced. There was mild pulmonary artery hypertension. 
 
-  Left atrium: The atrium was moderately to markedly dilated. -  Right atrium: The atrium was markedly dilated. -  Inferior vena cava, hepatic veins: The inferior vena cava was moderately dilated. The respirophasic change in diameter was less than 50%. -  Aortic valve: The valve was trileaflet. Leaflets exhibited moderate 
sclerosis. The left coronary cusp demonstrated immobility. There was mild 
regurgitation. 
 
-  Mitral valve: There was mild to moderate regurgitation. 
 
-  Tricuspid valve: There was mild to moderate regurgitation. 
 
-  Pulmonic valve: There was moderate regurgitation. SYSTEM MEASUREMENT TABLES 
 
2D mode AoR Diam (2D): 3 cm 
LA Dimension (2D): 5.1 cm Left Atrium Systolic Volume Index; Method of Disks, Biplane; 2D mode;: 52.3 
ml/m2 IVS/LVPW (2D): 0.9 IVSd (2D): 2.1 cm 
LVIDd (2D): 3.7 cm 
LVIDs (2D): 3.3 cm 
LVPWd (2D): 2.3 cm RVIDd (2D): 3.9 cm Unspecified Scan Mode Peak Grad; Mean; Antegrade Flow: 5 mm[Hg] Vmax; Antegrade Flow: 107 cm/s Peak Grad; Mean; Antegrade Flow: 3 mm[Hg] Vmax; Antegrade Flow: 88.1 cm/s Prepared and signed by TITO Arreaga MD [facility] Signed 21-Feb-2019 17:34:55 Current Meds: 
Current Facility-Administered Medications Medication Dose Route Frequency  [START ON 3/2/2019] apixaban (ELIQUIS) tablet 5 mg  5 mg Oral BID  furosemide (LASIX) injection 100 mg  100 mg IntraVENous BID  metOLazone (ZAROXOLYN) tablet 10 mg  10 mg Oral DAILY  midodrine (PROAMITINE) tablet 10 mg  10 mg Oral TID WITH MEALS  lidocaine (XYLOCAINE) 20 mg/mL (2 %) injection  mg  1-20 mL IntraDERMal Multiple  lactulose (CHRONULAC) solution 30 g  30 g Oral Q6H  
 albuterol-ipratropium (DUO-NEB) 2.5 MG-0.5 MG/3 ML  3 mL Nebulization Q4H PRN  
 fluticasone (FLONASE) 50 mcg/actuation nasal spray 2 Spray  2 Spray Both Nostrils DAILY  pantoprazole (PROTONIX) tablet 40 mg  40 mg Oral ACB  polyethylene glycol (MIRALAX) packet 17 g  17 g Oral DAILY  sodium chloride (NS) flush 5-40 mL  5-40 mL IntraVENous Q8H  
 sodium chloride (NS) flush 5-40 mL  5-40 mL IntraVENous PRN  
  acetaminophen (TYLENOL) tablet 650 mg  650 mg Oral Q4H PRN  
 HYDROcodone-acetaminophen (NORCO) 5-325 mg per tablet 1 Tab  1 Tab Oral Q4H PRN  
 morphine injection 2 mg  2 mg IntraVENous Q4H PRN  
 naloxone (NARCAN) injection 0.4 mg  0.4 mg IntraVENous PRN  
 diphenhydrAMINE (BENADRYL) capsule 25 mg  25 mg Oral Q4H PRN  
 ondansetron (ZOFRAN) injection 4 mg  4 mg IntraVENous Q4H PRN  
 magnesium hydroxide (MILK OF MAGNESIA) 400 mg/5 mL oral suspension 30 mL  30 mL Oral DAILY PRN  
 finasteride (PROSCAR) tablet 5 mg  5 mg Oral DAILY Other Studies (last 24 hours): No results found. Assessment and Plan:  
 
Hospital Problems as of 3/1/2019 Date Reviewed: 12/11/2018 Codes Class Noted - Resolved POA  
 CKD (chronic kidney disease) stage 4, GFR 15-29 ml/min ([facility]) ICD-10-CM: N18.4 ICD-9-CM: 585.4  2/20/2019 - Present Yes Lactic acidosis ICD-10-CM: E87.2 ICD-9-CM: 276.2  2/20/2019 - Present Unknown  
   
 CKD (chronic kidney disease) stage 3, GFR 30-59 ml/min ([facility]) (Chronic) ICD-10-CM: N18.3 ICD-9-CM: 585.3  9/29/2017 - Present Hypertension (Chronic) ICD-10-CM: I10 
ICD-9-CM: 401.9  9/29/2017 - Present Yes * (Principal) CHF (congestive heart failure) ([facility]) ICD-10-CM: I50.9 ICD-9-CM: 428.0  9/27/2017 - Present Yes Plan: # ESRD now on HD 
            - Tunneled cath placed 2/25 
            - Midodrine restarted but has had hypotension during HD.             - Has not been tolerant of HD thus far and had a poor response to 100mg IV lasix. - [facility] has signed off but family has requested a second opinion. I spoke with Dr. Raul Joyner who will see the patient either today or tomorrow. 
  
# Acute on chronic sCHF 
            - cannot tolerate diuresis or OMT 2/2 hypotension 
            - HD ideal for volume overload but has been unable to tolerate due to BPs             - cardiology following # Thrombocytopenia - stable, no s/s bleeding. Hold eliquis. - monitor. # AFib 
            - Hold eliquis as above 
  
# Hematuria 
            - resolved DC planning/Dispo: Patient remains appropriate for hospice given his intolerance to HD. They have requested a second opinion and I spoke with Dr. Remer Closs on 3/1 who will come see the patient. I told the family that a second opinion is no guarantee for continued HD attempts nor is it a guarantee for successful HD attempts. They understand. Diet:  DIET REGULAR 
DIET NUTRITIONAL SUPPLEMENTS 
DVT ppx: Eliquis held Signed: 
Nelsy Martin MD

## 2019-03-01 NOTE — PROGRESS NOTES
Bedside and Verbal shift change report given to Tammy Singh RN (oncoming nurse) by self [facility] nurse). Report included the following information SBAR, Kardex and MAR.

## 2019-03-01 NOTE — PROGRESS NOTES
[facility] Subjective: A on CKD  No new complaints. Review of Systems -  
General ROS: negative for - fever, chills Respiratory ROS: no SOB, cough, CLAUDIO Cardiovascular ROS: no CP, palpitations Gastrointestinal ROS: no N&V, abdominal pain, diarrhea Genito-Urinary ROS: no difficulty voiding, dysuria Neurological ROS: no seizures, focal weekness Objective: 
 
Vitals:  
 03/01/19 2543 03/01/19 2335 03/01/19 0820 03/01/19 0825 BP: 117/73 113/71  113/63 Pulse: 71 74  66 Resp: 20 20  20 Temp: 97.5 °F (36.4 °C) 97.6 °F (36.4 °C)  97.8 °F (36.6 °C) SpO2: 99% 99% 99% 99% Weight:  104.4 kg (230 lb 3.2 oz) Height:      
 
 
PE 
Gen: in no acute distress CV:reg rate Chest:clear Abd: soft Ext/Access: 2+ edema , rt IJ tunneled HD cath [facility] LAB Recent Labs 03/01/19 
0344 02/28/19 
0340 WBC 7.9 7.7 HGB 11.7* 11.8* HCT 35.2* 36.2*  
PLT 36* 33* Recent Labs 03/01/19 
1812 02/28/19 
0340 02/27/19 
0408  137 137  
K 3.7 4.1 4.3  100 99 CO2 24 26 25 * 113* 95 BUN 67* 57* 60* CREA 5.80* 5.20* 4.86* MG  --  3.4*  --   
CA 8.7 8.6 8.7 Radiology A/P:  
Patient Active Problem List  
Diagnosis Code  CKD (chronic kidney disease) stage 3, GFR 30-59 ml/min ([facility]) N18.3  Acute on chronic systolic heart failure ([facility]) I50.23  Coronary atherosclerosis of native coronary vessel I25.10  Arthritis M19.90  
 Hypertension I10  
 Mild persistent asthma without complication Q39.24  
 High triglycerides E78.1  Gastroesophageal reflux disease without esophagitis K21.9  Mixed hyperlipidemia E78.2  
 Essential hypertension I10  
 CHF (congestive heart failure) ([facility]) I50.9  TAZ (obstructive sleep apnea) G47.33  
 Atherosclerosis of native coronary artery of native heart with stable angina pectoris ([address] I25.118  
 Personal history of tobacco use Z87.891  
 Erysipelas A46  Preseptal cellulitis L03.213  Controlled type 2 diabetes mellitus without complication, without long-term current use of insulin ([facility]) E11.9  Type 2 diabetes with nephropathy ([facility]) E11.21  
 Chronic systolic heart failure ([facility]) I50.22  Shortness of breath R06.02  
 Stage 2 chronic kidney disease N18.2  Seizure ([facility] R56.9  Prolonged Q-T interval on ECG R94.31  
 Elevated troponin R74.8  Pulmonary hypertension ([facility]) I27.20  Stroke (cerebrum) ([facility]) I63.9  Transient cerebral ischemia G45.9  Seizure disorder ([facility] G40.909  Acute on chronic combined systolic and diastolic CHF (congestive heart failure) ([facility]) I50.43  Atrial fibrillation ([facility] I48.91  Left atrial thrombus I51.3  Tobacco abuse Z72.0  Hypertension, essential I10  Type 2 diabetes mellitus without complication ([facility]) A38.0  CKD (chronic kidney disease) stage 4, GFR 15-29 ml/min ([facility]) N18.4  Lactic acidosis E87.2 A on CKD - 
 
CHF - EF of 20 - 25% A Fib with RVR Chart reviewed and case d/w HD RN reported pt with complete syncope with associated profound hypotension with in a short time of initiating dialysis. He is too hemodynamically unstable for dialysis candidacy. He can not continue dialysis because of his cardiomyopathy. I have had an extensive d/w opt and wife(visibly upset) at bedside regarding this. Ms Frida Arita called her sister Ricci Kebede on the phone and I reiterated everything to her and she voiced understanding and will explain things to her sister. I recommend home with hospice or hospice house. Kita Mtz RN was present throughout the encounter. I will sign off thanks. Gabriela Cantu MD

## 2019-03-01 NOTE — PROGRESS NOTES
Problem: Falls - Risk of 
Goal: *Absence of Falls Document Ingris Calabrese Fall Risk and appropriate interventions in the flowsheet. Outcome: Progressing Towards Goal 
Fall Risk Interventions: 
Mobility Interventions: Assess mobility with egress test, Bed/chair exit alarm, Communicate number of staff needed for ambulation/transfer, Mechanical lift, OT consult for ADLs, Patient to call before getting OOB, PT Consult for mobility concerns, PT Consult for assist device competence, Strengthening exercises (ROM-active/passive), Utilize walker, cane, or other assistive device, Utilize gait belt for transfers/ambulation Mentation Interventions: Adequate sleep, hydration, pain control, Bed/chair exit alarm, Door open when patient unattended, Evaluate medications/consider consulting pharmacy, Eyeglasses and hearing aids, Familiar objects from home, Family/sitter at bedside, Gait belt with transfers/ambulation, HELP ([address]) if available, Increase mobility, More frequent rounding, Reorient patient, Room close to nurse's station, Self-releasing belt, Toileting rounds, Update white board Medication Interventions: Assess postural VS orthostatic hypotension, Evaluate medications/consider consulting pharmacy, Patient to call before getting OOB, Teach patient to arise slowly, Utilize gait belt for transfers/ambulation Elimination Interventions: Bed/chair exit alarm, Call light in reach, Elevated toilet seat, Urinal in reach, Toileting schedule/hourly rounds, Toilet paper/wipes in reach, Patient to call for help with toileting needs History of Falls Interventions: Bed/chair exit alarm, Consult care management for discharge planning, Door open when patient unattended, Evaluate medications/consider consulting pharmacy, Investigate reason for fall, Room close to nurse's station, Utilize gait belt for transfer/ambulation

## 2019-03-01 NOTE — PROGRESS NOTES
Palliative Care Progress Note Patient: Minor Taylor. MRN: [id number]  SSN: [id number] YOB: 1945  Age: 66 y.o. Sex: male Assessment/Plan: Chief Complaint/Interval History:  Alert today, denies pain Principal Diagnosis: · Debility, Unspecified  R53.81 Additional Diagnoses:  
· Edema  R60.9 · Fatigue, Lethargy  R53.83 
· Frailty  R54 · Counseling, Encounter for Medical Advice  Z71.9 
· Encounter for Palliative Care  Z51.5 Palliative Performance Scale (PPS) PPS: 60 Medical Decision Making:  
Reviewed and summarized notes from last note until present Discussed case with appropriate providersYann De La Torre RN; Dr Mitch Connor Reviewed laboratory and x-ray data- CBC, BMP Pt resting in bed, no distress noted. Wife, Katarzyna Hernandez, at bedside. Introduced role of PC to Katarzyna Hernandez, and reviewed events of hospitalization, in particular, pt's intolerance of dialysis. Both pt and Katarzyna Hernandez are not accepting that dialysis is not an option, and would like a second opinion on the matter. Katarzyna Hernandez asked if there was some form of \"slow dialysis\" that could be done, hoping that pt would tolerate it better. Counseled that Nephrology would determine this, and also that a second opinion would not guarantee that dialysis would be offered. Pt is able to verbalize that he is likely nearing the end of his life, but simply states he is not ready to die. He denies fear of death. He states he just wants to be with his family as long as possible. He spoke of his mya in God and His ability to provide a miracle. Affirmed his mya and his hope for a miracle, but also advised that we needed to plan in case it was God's plan for him to die. He voiced understanding. Reviewed his wishes again regarding life support and resuscitation. He states (again) that he wants to be a full code.   Counseled that resuscitative efforts would not likely be successful, but that we would certainly honor his wishes. Both he and Lulla Leyden voiced some frustration with being asked about this multiple times. Mr Neita Nissen is simply going to have to die during a code if he remains hospitalized, and he is okay with this. I recommend not repeatedly asking him or Lulla Leyden about this issue. He had made his choice very clear, and we need to honor it. Discussed with Belkis Yousif RN and Dr Kika Wooten. Will continue to follow. Will discuss findings with members of the interdisciplinary team.   
 
  
More than 50% of this 35 minute visit was spent counseling and coordination of care as outlined above. Subjective:  
 
Review of Systems: 
Review of systems not obtained due to patient factors- he would not answer questions Objective:  
 
Visit Vitals /63 (BP 1 Location: Right arm, BP Patient Position: At rest) Pulse 66 Temp 97.8 °F (36.6 °C) Resp 20 Ht 5' 10\" (1.778 m) Wt 230 lb 3.2 oz (104.4 kg) SpO2 99% BMI 33.03 kg/m² Physical Exam: 
 
General:  Debilitated. No acute distress. Eyes:  Conjunctivae/corneas clear Nose: Nares normal. Septum midline. Neck: Supple, symmetrical, trachea midline Lungs:   Coarse bilaterally, unlabored Heart:  Regular rate and rhythm Abdomen:   Soft, non-tender, non-distended Extremities: Normal, atraumatic, no cyanosis. BLE edema Skin: Skin color, texture, turgor normal.   
Neurologic: Nonfocal  
Psych: Unable to assess Signed By: Chucky Chin NP March 1, 2019

## 2019-03-02 PROBLEM — D69.6 THROMBOCYTOPENIA (HCC): Status: ACTIVE | Noted: 2019-03-02

## 2019-03-02 PROBLEM — I95.3 HEMODIALYSIS-ASSOCIATED HYPOTENSION: Status: ACTIVE | Noted: 2019-03-02

## 2019-03-02 PROBLEM — Z99.2 ESRD (END STAGE RENAL DISEASE) ON DIALYSIS (HCC): Status: ACTIVE | Noted: 2019-03-02

## 2019-03-02 PROBLEM — N18.6 ESRD (END STAGE RENAL DISEASE) ON DIALYSIS (HCC): Status: ACTIVE | Noted: 2019-03-02

## 2019-03-02 PROBLEM — D63.1 ANEMIA DUE TO CHRONIC KIDNEY DISEASE: Status: ACTIVE | Noted: 2019-03-02

## 2019-03-02 PROBLEM — N18.9 ANEMIA DUE TO CHRONIC KIDNEY DISEASE: Status: ACTIVE | Noted: 2019-03-02

## 2019-03-02 PROBLEM — R31.9 HEMATURIA: Status: ACTIVE | Noted: 2019-03-02

## 2019-03-02 PROBLEM — R55 SYNCOPE: Status: ACTIVE | Noted: 2019-03-02

## 2019-03-02 PROBLEM — J81.0 ACUTE PULMONARY EDEMA (HCC): Status: ACTIVE | Noted: 2019-03-02

## 2019-03-02 LAB
ANION GAP SERPL CALC-SCNC: 12 MMOL/L (ref 7–16)
BUN SERPL-MCNC: 69 MG/DL (ref 8–23)
CALCIUM SERPL-MCNC: 8.3 MG/DL (ref 8.3–10.4)
CHLORIDE SERPL-SCNC: 100 MMOL/L (ref 98–107)
CO2 SERPL-SCNC: 24 MMOL/L (ref 21–32)
CREAT SERPL-MCNC: 6.09 MG/DL (ref 0.8–1.5)
ERYTHROCYTE [DISTWIDTH] IN BLOOD BY AUTOMATED COUNT: 19.5 % (ref 11.9–14.6)
GLUCOSE BLD STRIP.AUTO-MCNC: 105 MG/DL (ref 65–100)
GLUCOSE BLD STRIP.AUTO-MCNC: 123 MG/DL (ref 65–100)
GLUCOSE SERPL-MCNC: 119 MG/DL (ref 65–100)
HCT VFR BLD AUTO: 35.7 % (ref 41.1–50.3)
HGB BLD-MCNC: 11.9 G/DL (ref 13.6–17.2)
MAGNESIUM SERPL-MCNC: 3.4 MG/DL (ref 1.8–2.4)
MCH RBC QN AUTO: 27.4 PG (ref 26.1–32.9)
MCHC RBC AUTO-ENTMCNC: 33.3 G/DL (ref 31.4–35)
MCV RBC AUTO: 82.1 FL (ref 79.6–97.8)
NRBC # BLD: 0 K/UL (ref 0–0.2)
PHOSPHATE SERPL-MCNC: 5.4 MG/DL (ref 2.3–3.7)
PLATELET # BLD AUTO: 47 K/UL (ref 150–450)
PMV BLD AUTO: 11.8 FL (ref 9.4–12.3)
POTASSIUM SERPL-SCNC: 3.5 MMOL/L (ref 3.5–5.1)
RBC # BLD AUTO: 4.35 M/UL (ref 4.23–5.6)
SODIUM SERPL-SCNC: 136 MMOL/L (ref 136–145)
WBC # BLD AUTO: 7.2 K/UL (ref 4.3–11.1)

## 2019-03-02 PROCEDURE — 74011250637 HC RX REV CODE- 250/637: Performed by: INTERNAL MEDICINE

## 2019-03-02 PROCEDURE — 80048 BASIC METABOLIC PNL TOTAL CA: CPT

## 2019-03-02 PROCEDURE — 65660000000 HC RM CCU STEPDOWN

## 2019-03-02 PROCEDURE — 82962 GLUCOSE BLOOD TEST: CPT

## 2019-03-02 PROCEDURE — 74011000258 HC RX REV CODE- 258: Performed by: INTERNAL MEDICINE

## 2019-03-02 PROCEDURE — 74011250637 HC RX REV CODE- 250/637: Performed by: HOSPITALIST

## 2019-03-02 PROCEDURE — 36415 COLL VENOUS BLD VENIPUNCTURE: CPT

## 2019-03-02 PROCEDURE — 85027 COMPLETE CBC AUTOMATED: CPT

## 2019-03-02 PROCEDURE — 84100 ASSAY OF PHOSPHORUS: CPT

## 2019-03-02 PROCEDURE — 74011250637 HC RX REV CODE- 250/637: Performed by: UROLOGY

## 2019-03-02 PROCEDURE — 74011250636 HC RX REV CODE- 250/636: Performed by: INTERNAL MEDICINE

## 2019-03-02 PROCEDURE — 83735 ASSAY OF MAGNESIUM: CPT

## 2019-03-02 RX ORDER — METOLAZONE 5 MG/1
10 TABLET ORAL 2 TIMES DAILY
Status: DISCONTINUED | OUTPATIENT
Start: 2019-03-02 | End: 2019-03-12 | Stop reason: HOSPADM

## 2019-03-02 RX ORDER — FLUDROCORTISONE ACETATE 0.1 MG/1
200 TABLET ORAL 2 TIMES DAILY
Status: DISCONTINUED | OUTPATIENT
Start: 2019-03-02 | End: 2019-03-05

## 2019-03-02 RX ORDER — PETROLATUM 42 G/100G
OINTMENT TOPICAL AS NEEDED
Status: DISCONTINUED | OUTPATIENT
Start: 2019-03-02 | End: 2019-03-12 | Stop reason: HOSPADM

## 2019-03-02 RX ORDER — DIGOXIN 125 MCG
0.12 TABLET ORAL
Status: DISCONTINUED | OUTPATIENT
Start: 2019-03-06 | End: 2019-03-12 | Stop reason: HOSPADM

## 2019-03-02 RX ORDER — MIDODRINE HYDROCHLORIDE 5 MG/1
15 TABLET ORAL
Status: DISCONTINUED | OUTPATIENT
Start: 2019-03-02 | End: 2019-03-12 | Stop reason: HOSPADM

## 2019-03-02 RX ORDER — DIGOXIN 250 MCG
0.25 TABLET ORAL DAILY
Status: COMPLETED | OUTPATIENT
Start: 2019-03-02 | End: 2019-03-05

## 2019-03-02 RX ADMIN — MIDODRINE HYDROCHLORIDE 10 MG: 5 TABLET ORAL at 08:58

## 2019-03-02 RX ADMIN — FLUTICASONE PROPIONATE 2 SPRAY: 50 SPRAY, METERED NASAL at 08:59

## 2019-03-02 RX ADMIN — FLUDROCORTISONE ACETATE 200 MCG: 0.1 TABLET ORAL at 16:30

## 2019-03-02 RX ADMIN — FUROSEMIDE 20 MG/HR: 10 INJECTION, SOLUTION INTRAMUSCULAR; INTRAVENOUS at 17:57

## 2019-03-02 RX ADMIN — METOLAZONE 10 MG: 5 TABLET ORAL at 17:57

## 2019-03-02 RX ADMIN — FLUDROCORTISONE ACETATE 200 MCG: 0.1 TABLET ORAL at 21:32

## 2019-03-02 RX ADMIN — DIGOXIN 0.25 MG: 250 TABLET ORAL at 16:16

## 2019-03-02 RX ADMIN — Medication 10 ML: at 05:45

## 2019-03-02 RX ADMIN — Medication 10 ML: at 21:42

## 2019-03-02 RX ADMIN — Medication 10 ML: at 16:21

## 2019-03-02 RX ADMIN — FUROSEMIDE 200 MG: 10 INJECTION, SOLUTION INTRAMUSCULAR; INTRAVENOUS at 21:33

## 2019-03-02 RX ADMIN — APIXABAN 5 MG: 2.5 TABLET, FILM COATED ORAL at 10:00

## 2019-03-02 RX ADMIN — APIXABAN 5 MG: 2.5 TABLET, FILM COATED ORAL at 21:23

## 2019-03-02 RX ADMIN — MIDODRINE HYDROCHLORIDE 15 MG: 5 TABLET ORAL at 16:14

## 2019-03-02 RX ADMIN — FINASTERIDE 5 MG: 5 TABLET, FILM COATED ORAL at 08:58

## 2019-03-02 RX ADMIN — MIDODRINE HYDROCHLORIDE 10 MG: 5 TABLET ORAL at 11:15

## 2019-03-02 NOTE — PROGRESS NOTES
Patient is receiving lasix infusion at this time. Patient vomited after eating chocolate pudding this evening. .Patient stated he felt better after vomiting. Offered zofran but patient stated he felt better. Sitter discontinued as family is present with patient. Addressed with charge nurse. Patient has some rash to bilateral sides of abdomen and is complaining of itching. Notified MD and verbal orders for topical cream. Patient is alert and oriented to self. He has been sleeping most of the day and has been very drowsy. Tends to fall asleep while speaking to patient. Family concerned about lactic acid and ammonia level being rechecked today. Notified MD as well and no new orders were received. Patient had 2 BMs today. Schulz intact and draining. Family at bedside. Hourly rounds performed this shift. Bed lowered and locked, side rails x2, and call light in reach. All patient needs are met at this time. Will give report to oncoming nurse.

## 2019-03-02 NOTE — PROGRESS NOTES
TRANSFER - OUT REPORT: 
 
Verbal [facility]  being transferred to [address] [place] [address] for routine progression of care Report consisted of patients Situation, Background, Assessment and Recommendations(SBAR). Information from the following report(s) SBAR, Kardex, MAR, Med Rec Status and Cardiac Rhythm A fib was reviewed with the receiving nurse. Opportunity for questions and clarification was provided.

## 2019-03-02 NOTE — PROGRESS NOTES
Pt and family member oriented to room. No sitter currently but per telemetry floor pt needs sitter more in daytime and they have not been staffing a sitter in pm. Pt had one large BM this shift with some confusion when attempting to leave bed to defecate. Hourly rounds performed through shift, pt denies needs at this time. Bed in low position and call light/ personal items within reach. Will continue to monitor and report to day shift nurse.

## 2019-03-02 NOTE — PROGRESS NOTES
Hospitalist Progress Note Patient: Earnest Persaud. MRN: [id number]  SSN: [id number] YOB: 1945  Age: 66 y.o. Sex: male Admit Date: 2/19/2019 LOS: 11 days Subjective:  
 
From previous notes: \"66year-old gentleman with multiple medical problems including CHF, A. fib, CKD stage IV,, chronic COPD, obstructive sleep apnea who is noncompliant with CPAP, who is currently in a rehab facility, admitted on 2/19 for acute on chronic CHF in view of worsening bilateral LE swelling, hypoxic respiratory failure and hematuria. was brought into the emergency room with complaints of hematuria, leg swellings and shortness of breath. He was also having shortness of breath, moderate in severity, progressively getting worse, worse with minimal exertion, not resolved with rest.  Tunneled cath placed 2/25 and HD started but could not tolerate his session due to hypotension. Midodrine was restarted and he has remained intolerated of HD due to BP issues. \" [facility] recommended hospice care on 3/1/19 so the wife requested a second opinion. 3/2 - He is alert at the time of my exam. He does not remember the events of the past two days. Currently complains of mild SOB and leg edema. Denies CP. Denies F/C/N/V. Review of systems negative except stated above. Objective:  
 
Visit Vitals /65 (BP 1 Location: Right arm, BP Patient Position: At rest) Pulse 78 Temp 98 °F (36.7 °C) Resp 20 Ht 5' 10\" (1.778 m) Wt 114.2 kg (251 lb 12.8 oz) SpO2 100% BMI 36.13 kg/m² Oxygen Therapy O2 Sat (%): 100 % (03/02/19 1056) Pulse via Oximetry: 73 beats per minute (03/01/19 0820) O2 Device: Nasal cannula (03/01/19 2030) O2 Flow Rate (L/min): 2 l/min (03/01/19 2030) ETCO2 (mmHg): 21 mmHg (02/25/19 1347) Intake and Output:  
 
Intake/Output Summary (Last 24 hours) at 3/2/2019 1140 Last data filed at 3/2/2019 8420 Gross per 24 hour Intake  Output 700 ml Net -700 ml Physical Exam:  
GENERAL: alert, cooperative, no distress, appears stated age EYE: conjunctivae/corneas clear. PERRL. THROAT & NECK: normal and no erythema or exudates noted. LUNG: Scattered rhonchi HEART: regular rate and rhythm, S1S2, no murmur, no JVD ABDOMEN: soft, non-tender, non-distended. Bowel sounds normal.  
EXTREMITIES:  2+ BLE edema SKIN: no rash or abnormalities NEUROLOGIC: A&Ox3. Cranial nerves 2-12 grossly intact. Lab/Data Review: 
Recent Results (from the past 24 hour(s)) PLEASE READ & DOCUMENT PPD TEST IN 72 HRS Collection Time: 03/01/19  2:30 PM  
Result Value Ref Range PPD negative Negative  
 mm Induration 0 mm CBC W/O DIFF Collection Time: 03/02/19  5:25 AM  
Result Value Ref Range WBC 7.2 4.3 - 11.1 K/uL  
 RBC 4.35 4.23 - 5.6 M/uL  
 HGB 11.9 (L) 13.6 - 17.2 g/dL HCT 35.7 (L) 41.1 - 50.3 % MCV 82.1 79.6 - 97.8 FL  
 MCH 27.4 26.1 - 32.9 PG  
 MCHC 33.3 31.4 - 35.0 g/dL  
 RDW 19.5 (H) 11.9 - 14.6 % PLATELET 47 (L) 574 - 450 K/uL MPV 11.8 9.4 - 12.3 FL ABSOLUTE NRBC 0.00 0.0 - 0.2 K/uL METABOLIC PANEL, BASIC Collection Time: 03/02/19  5:25 AM  
Result Value Ref Range Sodium 136 136 - 145 mmol/L Potassium 3.5 3.5 - 5.1 mmol/L Chloride 100 98 - 107 mmol/L  
 CO2 24 21 - 32 mmol/L Anion gap 12 7 - 16 mmol/L Glucose 119 (H) 65 - 100 mg/dL BUN 69 (H) 8 - 23 MG/DL Creatinine 6.09 (H) 0.8 - 1.5 MG/DL  
 GFR est AA 12 (L) >60 ml/min/1.73m2 GFR est non-AA 10 (L) >60 ml/min/1.73m2 Calcium 8.3 8.3 - 10.4 MG/DL MAGNESIUM Collection Time: 03/02/19  5:25 AM  
Result Value Ref Range Magnesium 3.4 (H) 1.8 - 2.4 mg/dL PHOSPHORUS Collection Time: 03/02/19  5:25 AM  
Result Value Ref Range Phosphorus 5.4 (H) 2.3 - 3.7 MG/DL  
GLUCOSE, POC Collection Time: 03/02/19  7:20 AM  
Result Value Ref Range Glucose (POC) 105 (H) 65 - 100 mg/dL GLUCOSE, POC Collection Time: 03/02/19 11:13 AM  
Result Value Ref Range Glucose (POC) 123 (H) 65 - 100 mg/dL Imaging: Xr Chest Sngl V Result Date: 2019 IMPRESSION: 1. Improved pulmonary vascular congestion, with mild residual. 2. New indwelling dialysis catheter, without evidence of a pneumothorax. Xr Chest Pa Lat Result Date: 2019 IMPRESSION: Cardiomegaly and prominent pulmonary vascular markings. Xr Abd (ap And Erect Or Decub) Result Date: 2019 IMPRESSION: No acute intra-abdominal process. Ir Insert Tunl Cvc W/o Port Over 5 [address] Result Date: 2019 Impression: Technically successful tunneled hemodialysis catheter placement. Plan: Recover for 1 hour. Catheter is ready for use. Suture should be removed in 2 weeks. Ct Head Wo Cont Result Date: 2019 IMPRESSION: No acute intracranial abnormality. Ct Head Wo Cont Result Date: 2019 Impression: Negative CT scan of the brain. Note: If a subtle CVA is suspected, MRI would be more definitive if clinically warranted. Ct Abd Pelv Wo Cont Result Date: 2019 IMPRESSION: 1. Schulz catheter with balloon inflated within the prostate gland with a moderate volume of urine seen in the bladder. 2. Moderate size right and small left-sided pleural effusion. Mild diffuse anasarca. 3. Moderate volume ascites within the right and left upper quadrants. Results discussed with floor RN at 6:57 PM on 2019 via telephone with Dr. Silvina Lugo. Results for orders placed or performed during the hospital encounter of 19  
2D ECHO COMPLETE ADULT (TTE) W OR WO CONTR Narrative [place] [address], [address] [facility] 
[phone] Transthoracic Echocardiogram 
2D, M-mode, Doppler, and Color Doppler Patient: Sandie Guillaume 
MR #: [id number] : [id number] Age: 66 years Gender: Male Study date: 2019 Account #: [de-identified] Height: 70 in Weight: 220.4 lb 
BSA: 2.18 mï¾² Status:Routine Location: [facility] BP: 113/ 79 Allergies: PENICILLINS Sonographer:  Guanakito Smith RD Group:  [address] Cardiology Referring Physician:  Conner Gaona. Klever Mata MD [facility] Reading Physician:  TITO Gomez MD [facility] INDICATIONS: Reassess EF PROCEDURE: This was a routine study. A transthoracic echocardiogram was 
performed. The study included complete 2D imaging, M-mode, complete spectral 
Doppler, and color Doppler. Intravenous contrast (Definity, 2 ml) was 
administered. Image quality was adequate. LEFT VENTRICLE: Size was normal. Systolic function was markedly reduced. Ejection fraction was estimated in the range of 25 % to 30 %. There was  
severe 
diffuse hypokinesis with distinct regional wall motion abnormalities. There  
was 
severe global hypokinesis. There was severe concentric hypertrophy. The 
myocardial specular pattern appeared moderately abnormal. Consider  
infiltrative 
cardiomyopathy if clinically indicated. Left ventricular diastolic  
dysfunction Grade 3. Average E/e' of 16.7. The study was not technically sufficient to 
allow evaluation of LV diastolic function. RIGHT VENTRICLE: The ventricle was mildly dilated. Systolic function was 
moderately to markedly reduced. There was mild pulmonary artery hypertension. Estimated peak pressure was in the range of 35-40 mmHg. LEFT ATRIUM: The atrium was moderately to markedly dilated. RIGHT ATRIUM: The atrium was markedly dilated. SYSTEMIC VEINS: IVC: The inferior vena cava was moderately dilated. The 
respirophasic change in diameter was less than 50%. AORTIC VALVE: The valve was trileaflet. Leaflets exhibited moderate  
sclerosis. The left coronary cusp demonstrated immobility. There was no evidence for 
stenosis. There was mild regurgitation. MITRAL VALVE: There was focal thickening of the posterior leaflet. There was  
no 
evidence for stenosis. There was mild to moderate regurgitation. TRICUSPID VALVE: The valve structure was normal. There was no evidence for 
stenosis. There was mild to moderate regurgitation. The regurgitant jet was 
eccentric. PULMONIC VALVE: The valve structure was normal. There was no evidence for 
stenosis. There was moderate regurgitation. PERICARDIUM: There was no pericardial effusion. AORTA: The root exhibited normal size. SUMMARY: 
 
-  Left ventricle: Systolic function was markedly reduced. Ejection fraction 
was estimated in the range of 25 % to 30 %. There was severe diffuse 
hypokinesis with distinct regional wall motion abnormalities. There was  
severe 
global hypokinesis. There was severe concentric hypertrophy. The myocardial 
specular pattern appeared moderately abnormal. Consider infiltrative 
cardiomyopathy if clinically indicated. -  Right ventricle: The ventricle was mildly dilated. Systolic function was 
moderately to markedly reduced. There was mild pulmonary artery hypertension. 
 
-  Left atrium: The atrium was moderately to markedly dilated. -  Right atrium: The atrium was markedly dilated. -  Inferior vena cava, hepatic veins: The inferior vena cava was moderately 
dilated. The respirophasic change in diameter was less than 50%. -  Aortic valve: The valve was trileaflet. Leaflets exhibited moderate 
sclerosis. The left coronary cusp demonstrated immobility. There was mild 
regurgitation. 
 
-  Mitral valve: There was mild to moderate regurgitation. 
 
-  Tricuspid valve: There was mild to moderate regurgitation. 
 
-  Pulmonic valve: There was moderate regurgitation. SYSTEM MEASUREMENT TABLES 
 
2D mode AoR Diam (2D): 3 cm 
LA Dimension (2D): 5.1 cm Left Atrium Systolic Volume Index; Method of Disks, Biplane; 2D mode;: 52.3 
ml/m2 IVS/LVPW (2D): 0.9 IVSd (2D): 2.1 cm 
LVIDd (2D): 3.7 cm 
LVIDs (2D): 3.3 cm 
LVPWd (2D): 2.3 cm RVIDd (2D): 3.9 cm Unspecified Scan Mode Peak Grad; Mean; Antegrade Flow: 5 mm[Hg] Vmax; Antegrade Flow: 107 cm/s Peak Grad; Mean; Antegrade Flow: 3 mm[Hg] Vmax; Antegrade Flow: 88.1 cm/s Prepared and signed by TITO Wiggins MD [facility] Signed 21-Feb-2019 17:34:55 Cultures: All Micro Results Procedure Component Value Units Date/Time CULTURE, BLOOD [[id number]] Collected:  02/22/19 1740 Order Status:  Completed Specimen:  Blood Updated:  02/27/19 2800 Special Requests: --     
  RIGHT 
HAND Culture result: NO GROWTH 5 DAYS     
 CULTURE, BLOOD [[id number]] Collected:  02/22/19 1753 Order Status:  Completed Specimen:  Blood Updated:  02/27/19 2200 Special Requests: --     
  RIGHT 
FOREARM Culture result: NO GROWTH 5 DAYS     
 CULTURE, BLOOD [[id number]] Collected:  02/19/19 1140 Order Status:  Completed Specimen:  Blood Updated:  02/24/19 6059 Special Requests: --     
  RIGHT Antecubital 
  
  Culture result: NO GROWTH 5 DAYS     
 CULTURE, BLOOD [[id number]]  (Abnormal) Collected:  02/19/19 1252 Order Status:  Completed Specimen:  Blood Updated:  02/23/19 0747 Special Requests: --     
  RIGHT 
HAND 
  
  GRAM STAIN    
  GRAM POSITIVE COCCI IN CHAINS AEROBIC AND ANAEROBIC BOTTLES RESULTS VERIFIED, PHONED TO AND READ BACK BY EAMON GONZALEZ @28 02/20/2019 [facility] Culture result: STAPHYLOCOCCUS SPECIES, COAGULASE NEGATIVE ALPHA STREPTOCOCCUS, NOT S. PNEUMONIAE  
     
      
  THIS ORGANISM MAY BE INDICATIVE OF CULTURE CONTAMINATION, HOWEVER, CLINICAL CORRELATION NEEDS TO BE EVALUATED, AS EACH CASE IS UNIQUE. CULTURE, URINE [[id number]]  (Abnormal)  (Susceptibility) Collected:  02/19/19 1220 Order Status:  Completed Specimen:  Cath Urine Updated:  02/22/19 1491 Special Requests: NO SPECIAL REQUESTS Culture result:    
  1000 COLONIES/mL STAPHYLOCOCCUS HAEMOLYTICUS Assessment/Plan:  
 
Principal Problem: Acute respiratory failure with hypoxia ([address] (5/24/2018) - Due to acute pulmonary edema 
- Oxygen needs slowly improving 
- Having issues with HD so fluid not coming off - Wean as appropriate Active Problems: 
  Acute on chronic combined systolic and diastolic CHF (congestive heart failure) ([address] (11/26/2018) - EF 25%-30% 
- Continue Lasix + Zaroxolyn - No ACE or BB due to hypotension - Cardiology signed off ESRD (end stage renal disease) on dialysis ([address] (3/2/2019) - New start HD --> not tolerating as he has hypotension and syncope during first 2 attempts - [facility] recommended hospice and signed off/fired by patient's wife - Awaiting second opinion by Dr. Heriberto Chong Acute pulmonary edema ([address] (3/2/2019) - Due to CHF + ESRD 
- Last CXR continues to show edema - Need dialysis to get fluid off, but BP not toleratin Hemodialysis-associated hypotension (3/2/2019) - Patient so far unable to tolerate dialysis - Hypotension + Syncope - Continue Midodrine Syncope (3/2/2019) - Due to hypotension on HD 
- No events other than while on HD Thrombocytopenia ([address] (3/2/2019) - Stable - Likely due to acute illness Hematuria (3/2/2019) - Resolved Anemia due to chronic kidney disease (3/2/2019) - Stable - Check CBC daily Coronary atherosclerosis of native coronary vessel (9/29/2017) - No acute CP 
- Continue Eliquis Type 2 diabetes with nephropathy ([address] (10/8/2018) - No acute issues - Continue to monitor Lactic acidosis (2/20/2019) - Resolved Pulmonary hypertension ([address] (10/20/2018) Atrial fibrillation ([address] (11/27/2018) - Currently NSR, but had periods of paroxysmal afib/atach overnight 
- Continue Eliquis - No HR/BP meds due to hypotension Mixed hyperlipidemia (9/28/2016) TAZ (obstructive sleep apnea) (10/2/2017) - Non-compliant with CPAP Tobacco abuse (1/22/2019) Dispo - Awaiting second opinion from Nephrology. Discharge to rehab vs. Hospice. DIET REGULAR 2 GM NA (House Low NA) DIET NUTRITIONAL SUPPLEMENTS All Meals; Nepro DVT Prophylaxis: Eliquis Signed By: Lyndall Gilford, DO March 2, 2019

## 2019-03-02 NOTE — PROGRESS NOTES
Assistant took BP and got a hypotensive reading. Went in to assess patient and he was in a deep sleep lying supine in bed. Woke patient completely up, sat him up and reassessed BP. BP WNL. Patient will be getting midodrine this morning as well.

## 2019-03-02 NOTE — PROGRESS NOTES
Transfer to remote telemetry orders received from Dr. Yolette Jimenez. Wife Phillip Ana at bedside and updated.

## 2019-03-02 NOTE — PROGRESS NOTES
Monitor Room called stating pt was in [facility]. Assessed pt and no s/sx of distress. Lencho Kenny RN from tele to verify this is not common for this patient. Katrina Irby RN and this RN looked at strips again. Possibly A-tach or flutter. MD notified. Stat labs ordered. Will continue to monitor.

## 2019-03-02 NOTE — PROGRESS NOTES
03/02/19 0002 Dual Skin Pressure Injury Assessment Dual Skin Pressure Injury Assessment WDL (blanchable redness to sacrum) Second Care Provider (Based on [address] [place] [address]) Sully RINCON

## 2019-03-02 NOTE — PROGRESS NOTES
TRANSFER - IN REPORT: 
 
Verbal report received from Jeni(name) on Lashon Franz.  being received from Tele(unit) for routine progression of care Report consisted of patients Situation, Background, Assessment and  
Recommendations(SBAR). Information from the following report(s) SBAR, Intake/Output, MAR, Recent Results and Cardiac Rhythm afib was reviewed with the receiving nurse. Opportunity for questions and clarification was provided. Assessment completed upon patients arrival to unit and care assumed.

## 2019-03-02 NOTE — CONSULTS
RENAL H&P/CONSULT    Subjective:     Cc - I was asked to see patient for second opinion    Patient is a 65 y/o AAM with Acute Kidney Injury on top of Chronic Kidney Disease stage 3 had attempts at dialysis x three days, but each time needed to be taken off dialysis due to hemodynamic instability. He also has CHF, A. fib, obstructive sleep apnea who was admitted from a rehab facility on 2/19 for acute on chronic CHF with worsening bilateral LE swelling, hypoxic respiratory failure and hematuria. He did not respond to medical management and a tunneled cath placed 2/25 and HD started. He could not tolerate his session due to hypotension even after midodrine treatment. [facility] recommended hospice care on 3/1/19 so the wife requested a second opinion. The patient is uncomfortable due to dyspnea despite O2 by NC and is thirsty. His wife is at bedside. I discussed with patient and wife that dialysis is a good treatment for kidney failure, but that his main problem is heart failure and that heart transplant is a treatment for heart failure, however, he is not a candidate for it due to co-morbid conditions and advanced age. I  Discussed that in order to attempt dialysis again he would need to improve hemodynamic stability and respond favorably to medication changes, but that the likelihood of success was not good as he has not responded well to medical therapy so far.           Past Medical History:   Diagnosis Date    Acute CHF (congestive heart failure) ([address] 4/25/2015    Acute combined systolic and diastolic congestive heart failure ([address] 4/28/2015    De Quervain's tenosynovitis, right 4/28/2015    Dyspnea on exertion 6/28/2015    Elevated serum creatinine 4/25/2015    Elevated troponin 6/28/2015    History of coronary artery disease     MI at 27 yo    History of right knee surgery     cartilage removal    History of shingles     Malignant hypertension 4/25/2015    MI (myocardial infarction) ([address] Past Surgical History:   Procedure Laterality Date    HX HEART CATHETERIZATION  6/29/2015    no intervention    HX KNEE ARTHROSCOPY Right     removal of cartilage    IR INSERT TUNL CVC W/O PORT OVER 5 YR  2/25/2019      Prior to Admission medications    Medication Sig Start Date End Date Taking? Authorizing Provider   omeprazole (PRILOSEC) 20 mg capsule Take 1 Cap by mouth daily. 1/24/19   Zana Ross MD   potassium chloride (K-DUR, KLOR-CON) 20 mEq tablet Take 1 Tab by mouth daily. 1/23/19   Gabriella Gramajo MD   Blood-Glucose Meter (ACCU-CHEK YOSEF PLUS METER) misc USE TO CHECK BLOOD SUGARS DAILY DX: E11.9 12/27/18   Zana Ross MD   Blood Glucose Control High&Low (ACCU-CHEK YOSEF CONTROL SOLN) soln USE AS DIRECTED 12/27/18   Zana Ross MD   glucose blood VI test strips (ACCU-CHEK YOSEF PLUS TEST STRP) strip USE TO CHECK BLOOD SUGARS DAILY DX: E11.9 12/27/18   Zana Ross MD   alcohol swabs (BD SINGLE USE SWABS REGULAR) padm USE AS DIRECTED DAILY DX. E11.9 12/27/18   Zana Ross MD   lancets (ACCU-CHEK SOFTCLIX LANCETS) misc USE TO CHECK BLOOD SUGARS DAILY DX: E11.9 12/27/18   Zana Ross MD   carvedilol (COREG) 6.25 mg tablet Take 1 Tab by mouth two (2) times daily (with meals). 12/3/18   Javier Alvarado MD   apixaban (ELIQUIS) 5 mg tablet Take 1 Tab by mouth every twelve (12) hours. 12/3/18   Javier Alvarado MD   aspirin 81 mg chewable tablet Take 1 Tab by mouth daily. 11/5/18   Maryse Marie MD   atorvastatin (LIPITOR) 20 mg tablet Take 1 Tab by mouth nightly. 10/4/18   Zana Ross MD   allopurinol (ZYLOPRIM) 100 mg tablet TAKE 1 TABLET BY MOUTH EVERY DAY 9/25/18   Zana Ross MD   fluticasone [facility]) 50 mcg/actuation nasal spray 2 Sprays by Both Nostrils route daily. 3/29/18   Davidson Mata MD   melatonin 3 mg tablet Take 3 mg by mouth nightly.     Provider, Historical   albuterol (VENTOLIN HFA) 90 mcg/actuation inhaler Take 2 Puffs by inhalation four (4) times daily. 2/6/18   Tian Shah NP   polyethylene glycol (MIRALAX) 17 gram packet Take 1 Packet by mouth daily. Patient taking differently: Take 17 g by mouth daily as needed. 1/13/18   Noe MERLOS NP   albuterol-ipratropium (DUO-NEB) 2.5 mg-0.5 mg/3 ml nebu 3 mL by Nebulization route four (4) times daily. Diaignosis--J44.9 12/13/17   Tian Shah NP     Allergies   Allergen Reactions    Ativan [Lorazepam] Other (comments)     Confusion- mild but long lasting- days    Pcn [Penicillins] Other (comments)     \"makes my heart stop\"      Social History     Tobacco Use    Smoking status: Former Smoker     Packs/day: 0.25     Years: 20.00     Pack years: 5.00     Types: Cigarettes     Last attempt to quit: 4/5/2015     Years since quitting: 3.9    Smokeless tobacco: Never Used   Substance Use Topics    Alcohol use: No     Alcohol/week: 0.0 oz      Family History   Problem Relation Age of Onset    Hypertension Mother     No Known Problems Father           Review of Systems    Constitutional: no fever, restless  Eyes: fair vision,    Ears, nose, mouth, throat, and face:fair hearing,    Respiratory: no asthma,    Cardiovascular:no chest pain,    Gastrointestinal:no diarrhea,    Genitourinary: he has had hematuria and dysuria,   Hematologic/lymphatic: no bleeding tendency,    Neurological: no seizures,    Behvioral/Psych: no psych hospitalization    Endocrine: no goiter,       Objective:       Visit Vitals  /65 (BP 1 Location: Right arm, BP Patient Position: At rest)   Pulse 78   Temp 98 °F (36.7 °C)   Resp 20   Ht 5' 10\" (1.778 m)   Wt 114.2 kg (251 lb 12.8 oz)   SpO2 100%   BMI 36.13 kg/m²       No intake/output data recorded. 02/28 1901 - 03/02 0700  In: -   Out: 4335 [Urine:1075]    General:  Alert, cooperative, moderate respiratory distress with Cheyne-Sloan respiration, appears stated age. Head:  Normocephalic, without obvious abnormality, atraumatic.    Eyes: Conjunctivae/corneas clear. EOMs intact. Ears:  Normal external ear canals both ears. Nose: Nares normal. Septum midline. Mucosa normal. No drainage or sinus tenderness. Throat: Lips, mucosa, and tongue normal. Teeth and gums normal.   Neck: Supple, symmetrical, trachea midline, no adenopathy, thyroid: no enlargement/tenderness/nodules, pos for JVD. Back:   Symmetric, no curvature. ROM normal. No CVA tenderness. Lungs:   Crackles in bases to auscultation bilaterally. Chest wall:  No tenderness or deformity. Heart:  Regular rate and rhythm, S1, S2 normal, no  rub. Abdomen:   Soft, non-tender. Bowel sounds normal. No masses,  No organomegaly. No renal bruit. Abdomiinal wall is bulging and ascites is present   Extremities: Extremities normal, atraumatic, no cyanosis, 3+ edema. Skin: Skin color, texture, turgor normal. No rashes or lesions. Lymph nodes: Cervical and supraclavicular nodes normal.   Neurologic: Grossly intact. No asterixis.          Data Review:     Recent Results (from the past 24 hour(s))   PLEASE READ & DOCUMENT PPD TEST IN 72 HRS    Collection Time: 03/01/19  2:30 PM   Result Value Ref Range    PPD negative Negative    mm Induration 0 mm   CBC W/O DIFF    Collection Time: 03/02/19  5:25 AM   Result Value Ref Range    WBC 7.2 4.3 - 11.1 K/uL    RBC 4.35 4.23 - 5.6 M/uL    HGB 11.9 (L) 13.6 - 17.2 g/dL    HCT 35.7 (L) 41.1 - 50.3 %    MCV 82.1 79.6 - 97.8 FL    MCH 27.4 26.1 - 32.9 PG    MCHC 33.3 31.4 - 35.0 g/dL    RDW 19.5 (H) 11.9 - 14.6 %    PLATELET 47 (L) 293 - 450 K/uL    MPV 11.8 9.4 - 12.3 FL    ABSOLUTE NRBC 0.00 0.0 - 0.2 K/uL   METABOLIC PANEL, BASIC    Collection Time: 03/02/19  5:25 AM   Result Value Ref Range    Sodium 136 136 - 145 mmol/L    Potassium 3.5 3.5 - 5.1 mmol/L    Chloride 100 98 - 107 mmol/L    CO2 24 21 - 32 mmol/L    Anion gap 12 7 - 16 mmol/L    Glucose 119 (H) 65 - 100 mg/dL    BUN 69 (H) 8 - 23 MG/DL    Creatinine 6.09 (H) 0.8 - 1.5 MG/DL    GFR est AA 12 (L) >60 ml/min/1.73m2    GFR est non-AA 10 (L) >60 ml/min/1.73m2    Calcium 8.3 8.3 - 10.4 MG/DL   MAGNESIUM    Collection Time: 03/02/19  5:25 AM   Result Value Ref Range    Magnesium 3.4 (H) 1.8 - 2.4 mg/dL   PHOSPHORUS    Collection Time: 03/02/19  5:25 AM   Result Value Ref Range    Phosphorus 5.4 (H) 2.3 - 3.7 MG/DL   GLUCOSE, POC    Collection Time: 03/02/19  7:20 AM   Result Value Ref Range    Glucose (POC) 105 (H) 65 - 100 mg/dL   GLUCOSE, POC    Collection Time: 03/02/19 11:13 AM   Result Value Ref Range    Glucose (POC) 123 (H) 65 - 100 mg/dL       CXR shows massive cardiomegaly and fluid excess      ECHO 2/21/19 -  SUMMARY:  -  Left ventricle: Systolic function was markedly reduced. Ejection fraction  was estimated in the range of 25 % to 30 %. There was severe diffuse  hypokinesis with distinct regional wall motion abnormalities. There was   severeglobal hypokinesis. There was severe concentric hypertrophy. The myocardial  specular pattern appeared moderately abnormal. Consider infiltrative  cardiomyopathy if clinically indicated. -  Right ventricle: The ventricle was mildly dilated. Systolic function was  moderately to markedly reduced. There was mild pulmonary artery hypertension.  -  Left atrium: The atrium was moderately to markedly dilated. -  Right atrium: The atrium was markedly dilated. -  Inferior vena cava, hepatic veins: The inferior vena cava was moderately  dilated. The respirophasic change in diameter was less than 50%. -  Aortic valve: The valve was trileaflet. Leaflets exhibited moderate  sclerosis. The left coronary cusp demonstrated immobility. There was mild  regurgitation. -  Mitral valve: There was mild to moderate regurgitation. -  Tricuspid valve: There was mild to moderate regurgitation. -  Pulmonic valve: There was moderate regurgitation.       Principal Problem:    Acute respiratory failure with hypoxia ([address] (5/24/2018)    Active Problems:    Coronary atherosclerosis of native coronary vessel (9/29/2017)      Mixed hyperlipidemia (9/28/2016)      TAZ (obstructive sleep apnea) (10/2/2017)      Overview: Intolerant of CPAP      Type 2 diabetes with nephropathy ([address] (10/8/2018)      Pulmonary hypertension ([address] (10/20/2018)      Acute on chronic combined systolic and diastolic CHF (congestive heart failure) ([address] (11/26/2018)      Atrial fibrillation ([address] (11/27/2018)      Tobacco abuse (1/22/2019)      Lactic acidosis (2/20/2019)      ESRD (end stage renal disease) on dialysis ([address] (3/2/2019)      Acute pulmonary edema ([address] (3/2/2019)      Hemodialysis-associated hypotension (3/2/2019)      Syncope (3/2/2019)      Thrombocytopenia ([address] (3/2/2019)      Hematuria (3/2/2019)      Anemia due to chronic kidney disease (3/2/2019)        Assessment:     1. Acute Kidney Injury on top of Chronic Kidney Disease stage 3 -  - mainly due to cardio-renal syndrome  - marked hemodynamic instability with inadequate response to medical therapy and inability to tolerate dialysis despite Midodrine  - he is too unstable for hemodialysis today  - will try to maximize medical therapy including addition of Digoxin and Fludrocortisone while attempting diuresis with Lasix drip/Metolazone and increasing Midodrine  - his wife understands that this is not likely to succeed to get him stabilize for dialysis in the next days and that this is likely and end of life situation     2. Acute on chronic Combined systolic and diastolic heart failure decompensation  - intensify diuresis and medical therapy as above      3. A fib with RVR   - on no antihypertensive  - monitor response to Digoxin     4. UTI  -  on ceftriaxone     5. Respiratory failure -  - on oxygen therapy per primary team     6. Urinary retention  - S/P Schulz with urology involved     7. Hypermagnesemia -  - this may be contributing to the poor hemodynamics   -avoid magnesium containing meds    8.  Anemia -  - adequate control    9.  Acid/base -  - bicarbonate level is WNL      Plan:     As above    Diane Duarte M.D.

## 2019-03-03 LAB
ALBUMIN SERPL-MCNC: 2.7 G/DL (ref 3.2–4.6)
ANION GAP SERPL CALC-SCNC: 11 MMOL/L (ref 7–16)
BUN SERPL-MCNC: 73 MG/DL (ref 8–23)
CALCIUM SERPL-MCNC: 8.3 MG/DL (ref 8.3–10.4)
CHLORIDE SERPL-SCNC: 99 MMOL/L (ref 98–107)
CO2 SERPL-SCNC: 26 MMOL/L (ref 21–32)
CREAT SERPL-MCNC: 5.97 MG/DL (ref 0.8–1.5)
ERYTHROCYTE [DISTWIDTH] IN BLOOD BY AUTOMATED COUNT: 19.8 % (ref 11.9–14.6)
GLUCOSE SERPL-MCNC: 154 MG/DL (ref 65–100)
HCT VFR BLD AUTO: 35.5 % (ref 41.1–50.3)
HGB BLD-MCNC: 11.8 G/DL (ref 13.6–17.2)
MCH RBC QN AUTO: 27.4 PG (ref 26.1–32.9)
MCHC RBC AUTO-ENTMCNC: 33.2 G/DL (ref 31.4–35)
MCV RBC AUTO: 82.4 FL (ref 79.6–97.8)
NRBC # BLD: 0 K/UL (ref 0–0.2)
PHOSPHATE SERPL-MCNC: 5.2 MG/DL (ref 2.3–3.7)
PLATELET # BLD AUTO: 50 K/UL (ref 150–450)
PMV BLD AUTO: ABNORMAL FL (ref 9.4–12.3)
POTASSIUM SERPL-SCNC: 3.5 MMOL/L (ref 3.5–5.1)
RBC # BLD AUTO: 4.31 M/UL (ref 4.23–5.6)
SODIUM SERPL-SCNC: 136 MMOL/L (ref 136–145)
WBC # BLD AUTO: 7.5 K/UL (ref 4.3–11.1)

## 2019-03-03 PROCEDURE — 74011250636 HC RX REV CODE- 250/636: Performed by: HOSPITALIST

## 2019-03-03 PROCEDURE — 77030019605

## 2019-03-03 PROCEDURE — 74011250637 HC RX REV CODE- 250/637: Performed by: INTERNAL MEDICINE

## 2019-03-03 PROCEDURE — 65660000000 HC RM CCU STEPDOWN

## 2019-03-03 PROCEDURE — 74011250637 HC RX REV CODE- 250/637: Performed by: UROLOGY

## 2019-03-03 PROCEDURE — 36415 COLL VENOUS BLD VENIPUNCTURE: CPT

## 2019-03-03 PROCEDURE — 85027 COMPLETE CBC AUTOMATED: CPT

## 2019-03-03 PROCEDURE — 80069 RENAL FUNCTION PANEL: CPT

## 2019-03-03 PROCEDURE — 74011000258 HC RX REV CODE- 258: Performed by: INTERNAL MEDICINE

## 2019-03-03 PROCEDURE — 74011250636 HC RX REV CODE- 250/636: Performed by: INTERNAL MEDICINE

## 2019-03-03 RX ADMIN — FLUDROCORTISONE ACETATE 200 MCG: 0.1 TABLET ORAL at 09:23

## 2019-03-03 RX ADMIN — APIXABAN 5 MG: 2.5 TABLET, FILM COATED ORAL at 21:58

## 2019-03-03 RX ADMIN — FUROSEMIDE 20 MG/HR: 10 INJECTION, SOLUTION INTRAMUSCULAR; INTRAVENOUS at 12:51

## 2019-03-03 RX ADMIN — MIDODRINE HYDROCHLORIDE 15 MG: 5 TABLET ORAL at 09:23

## 2019-03-03 RX ADMIN — FUROSEMIDE 20 MG/HR: 10 INJECTION, SOLUTION INTRAMUSCULAR; INTRAVENOUS at 12:44

## 2019-03-03 RX ADMIN — Medication 10 ML: at 05:13

## 2019-03-03 RX ADMIN — METOLAZONE 10 MG: 5 TABLET ORAL at 11:03

## 2019-03-03 RX ADMIN — Medication 10 ML: at 21:58

## 2019-03-03 RX ADMIN — FLUDROCORTISONE ACETATE 200 MCG: 0.1 TABLET ORAL at 18:19

## 2019-03-03 RX ADMIN — LACTULOSE 30 G: 20 SOLUTION ORAL at 16:00

## 2019-03-03 RX ADMIN — FUROSEMIDE 20 MG/HR: 10 INJECTION, SOLUTION INTRAMUSCULAR; INTRAVENOUS at 02:32

## 2019-03-03 RX ADMIN — METOLAZONE 10 MG: 5 TABLET ORAL at 17:52

## 2019-03-03 RX ADMIN — ONDANSETRON 4 MG: 2 INJECTION INTRAMUSCULAR; INTRAVENOUS at 19:52

## 2019-03-03 RX ADMIN — Medication 10 ML: at 14:00

## 2019-03-03 RX ADMIN — MIDODRINE HYDROCHLORIDE 15 MG: 5 TABLET ORAL at 17:52

## 2019-03-03 RX ADMIN — MIDODRINE HYDROCHLORIDE 15 MG: 5 TABLET ORAL at 12:43

## 2019-03-03 RX ADMIN — DIGOXIN 0.25 MG: 250 TABLET ORAL at 09:23

## 2019-03-03 RX ADMIN — FLUTICASONE PROPIONATE 2 SPRAY: 50 SPRAY, METERED NASAL at 11:02

## 2019-03-03 RX ADMIN — LACTULOSE 30 G: 20 SOLUTION ORAL at 09:22

## 2019-03-03 RX ADMIN — FINASTERIDE 5 MG: 5 TABLET, FILM COATED ORAL at 09:24

## 2019-03-03 RX ADMIN — APIXABAN 5 MG: 2.5 TABLET, FILM COATED ORAL at 09:24

## 2019-03-03 NOTE — PROGRESS NOTES
RENAL PROGRESS NOTE Subjective:  
 
Cc - I was asked to see patient for second opinion Patient is a 65 y/o AAM with Acute Kidney Injury on top of Chronic Kidney Disease stage 3 had attempts at dialysis x three days, but each time needed to be taken off dialysis due to hemodynamic instability. He also has CHF, A. fib, obstructive sleep apnea who was admitted from a rehab facility on 2/19 for acute on chronic CHF with worsening bilateral LE swelling, hypoxic respiratory failure and hematuria. He did not respond to medical management and a tunneled cath placed 2/25 and HD started. He could not tolerate his session due to hypotension even after midodrine treatment. [facility] recommended hospice care on 3/1/19 so the wife requested a second opinion. The patient is uncomfortable due to dyspnea despite O2 by NC and is thirsty. His wife is at bedside. I discussed with patient and wife that dialysis is a good treatment for kidney failure, but that his main problem is heart failure and that heart transplant is a treatment for heart failure, however, he is not a candidate for it due to co-morbid conditions and advanced age. I  Discussed that in order to attempt dialysis again he would need to improve hemodynamic stability and respond favorably to medication changes, but that the likelihood of success was not good as he has not responded well to medical therapy so far. 3/3/19 - feels and breathes better - in better spirit - no longer in respiratory distress - edema improving Past Medical History:  
Diagnosis Date  Acute [address] (congestive heart failure) [address] 4/25/2015  Acute combined systolic and diastolic congestive heart failure ([facility] 4/28/2015  De Quervain's tenosynovitis, right 4/28/2015  Dyspnea on exertion 6/28/2015  Elevated serum creatinine 4/25/2015  Elevated troponin 6/28/2015  History of coronary artery disease MI at 25 yo  
 History of right knee surgery cartilage removal  
 History of shingles  Malignant hypertension 4/25/2015  MI (myocardial infarction) [address] [address] Past Surgical History:  
Procedure Laterality Date  HX HEART CATHETERIZATION  6/29/2015  
 no intervention  HX KNEE ARTHROSCOPY Right   
 removal of cartilage  IR INSERT TUNL CVC W/O PORT OVER 5 YR  2/25/2019 Prior to Admission medications Medication Sig Start Date End Date Taking? Authorizing Provider  
omeprazole (PRILOSEC) 20 mg capsule Take 1 Cap by mouth daily. 1/24/19   Lou Hernandez MD  
potassium chloride (K-DUR, KLOR-CON) 20 mEq tablet Take 1 Tab by mouth daily. 1/23/19   Patti Andersen MD  
Blood-Glucose Meter (ACCU-CHEK YOSEF PLUS METER) misc USE TO CHECK BLOOD SUGARS DAILY DX: E11.9 12/27/18   Lou Hernandez MD  
Blood Glucose Control High&Low (ACCU-CHEK YOSEF CONTROL SOLN) soln USE AS DIRECTED 12/27/18   Lou Hernandez MD  
glucose blood VI test strips (ACCU-CHEK YOSEF PLUS TEST STRP) strip USE TO CHECK BLOOD SUGARS DAILY DX: E11.9 12/27/18   Lou Hernandez MD  
alcohol swabs (BD SINGLE USE SWABS REGULAR) padm USE AS DIRECTED DAILY DX. E11.9 12/27/18   Lou Hernandez MD  
lancets (ACCU-CHEK SOFTCLIX LANCETS) misc USE TO CHECK BLOOD SUGARS DAILY DX: E11.9 12/27/18   Lou Hernandez MD  
carvedilol (COREG) 6.25 mg tablet Take 1 Tab by mouth two (2) times daily (with meals). 12/3/18   Karo Urias MD  
apixaban (ELIQUIS) 5 mg tablet Take 1 Tab by mouth every twelve (12) hours. 12/3/18   Karo Urias MD  
aspirin 81 mg chewable tablet Take 1 Tab by mouth daily. 11/5/18   Reynold Marie MD  
atorvastatin (LIPITOR) 20 mg tablet Take 1 Tab by mouth nightly. 10/4/18   Lou Hernandez MD  
allopurinol (ZYLOPRIM) 100 mg tablet TAKE 1 TABLET BY MOUTH EVERY DAY 9/25/18   Lou Hernandez MD  
fluticasone [facility]) 50 mcg/actuation nasal spray 2 Sprays by Both Nostrils route daily.  3/29/18   Roslyn Mo MD  
 melatonin 3 mg tablet Take 3 mg by mouth nightly. Provider, Historical  
albuterol (VENTOLIN HFA) 90 mcg/actuation inhaler Take 2 Puffs by inhalation four (4) times daily. 2/6/18   Wilbertjovani Perdomo NP  
polyethylene glycol (MIRALAX) 17 gram packet Take 1 Packet by mouth daily. Patient taking differently: Take 17 g by mouth daily as needed. 1/13/18   James MERLOS NP  
albuterol-ipratropium (DUO-NEB) 2.5 mg-0.5 mg/3 ml nebu 3 mL by Nebulization route four (4) times daily. Diaignosis--J44.9 12/13/17   Wilbert MARCI Perdomo Allergies Allergen Reactions  Ativan [Lorazepam] Other (comments) Confusion- mild but long lasting- days  Pcn [Penicillins] Other (comments) \"makes my heart stop\" Social History Tobacco Use  Smoking status: Former Smoker Packs/day: 0.25 Years: 20.00 Pack years: 5.00 Types: Cigarettes Last attempt to quit: 4/5/2015 Years since quitting: 3.9  Smokeless tobacco: Never Used Substance Use Topics  Alcohol use: No  
  Alcohol/week: 0.0 oz Family History Problem Relation Age of Onset  Hypertension Mother  No Known Problems Father Review of Systems Constitutional: no fever, rested better Eyes: fair vision,   
Ears, nose, mouth, throat, and face:fair hearing, Respiratory: no asthma,   
Cardiovascular:no chest pain,   
Gastrointestinal:no diarrhea,   
Genitourinary: he has had hematuria and dysuria, Hematologic/lymphatic: no bleeding tendency, Neurological: no seizures, Behvioral/Psych: no psych hospitalization Endocrine: no goiter,  
 
 
Objective:  
 
 
Visit Vitals /62 Pulse 80 Temp 97.6 °F (36.4 °C) Resp 18 Ht 5' 10\" (1.778 m) Wt 99.6 kg (219 lb 9.6 oz) SpO2 96% BMI 31.51 kg/m² 03/03 0701 - 03/03 1900 In: 240 [P.O.:240] Out: -  
03/01 1901 - 03/03 0700 In: -  
Out: [address] [facility] [id number] General:  Alert, cooperative, no respiratory distress on nasal cannula O2, appears stated age. Head:  Normocephalic, without obvious abnormality, atraumatic. Eyes:  Conjunctivae/corneas clear. EOMs intact. Throat: Lips, mucosa, and tongue normal. Teeth and gums normal.  
Neck: Supple, symmetrical, trachea midline, no adenopathy, pos for JVD. Lungs:   Crackles in bases to auscultation bilaterally improving. Heart:  Regular rate and rhythm, S1, S2 normal, no  rub. Abdomen:   Soft, non-tender. Bowel sounds normal. No masses,  No organomegaly. No renal bruit. Abdominal wall is bulging and ascites is present Extremities: Extremities normal, atraumatic, no cyanosis, 3+ edema, improving. Skin: Skin color, texture, turgor normal. No rashes or lesions. Neurologic: Grossly intact. No asterixis. Data Review:  
 
Recent Results (from the past 24 hour(s)) RENAL FUNCTION PANEL Collection Time: 03/03/19 10:53 AM  
Result Value Ref Range Sodium 136 136 - 145 mmol/L Potassium 3.5 3.5 - 5.1 mmol/L Chloride 99 98 - 107 mmol/L  
 CO2 26 21 - 32 mmol/L Anion gap 11 7 - 16 mmol/L Glucose 154 (H) 65 - 100 mg/dL BUN 73 (H) 8 - 23 MG/DL Creatinine 5.97 (H) 0.8 - 1.5 MG/DL  
 GFR est AA 12 (L) >60 ml/min/1.73m2 GFR est non-AA 10 (L) >60 ml/min/1.73m2 Calcium 8.3 8.3 - 10.4 MG/DL Phosphorus 5.2 (H) 2.3 - 3.7 MG/DL Albumin 2.7 (L) 3.2 - 4.6 g/dL CBC W/O DIFF Collection Time: 03/03/19 10:53 AM  
Result Value Ref Range WBC 7.5 4.3 - 11.1 K/uL  
 RBC 4.31 4.23 - 5.6 M/uL  
 HGB 11.8 (L) 13.6 - 17.2 g/dL HCT 35.5 (L) 41.1 - 50.3 % MCV 82.4 79.6 - 97.8 FL  
 MCH 27.4 26.1 - 32.9 PG  
 MCHC 33.2 31.4 - 35.0 g/dL  
 RDW 19.8 (H) 11.9 - 14.6 % PLATELET 50 (L) 303 - 450 K/uL MPV Unable to calculate. Recommend adding IPF. 9.4 - 12.3 FL ABSOLUTE NRBC 0.00 0.0 - 0.2 K/uL CXR shows massive cardiomegaly and fluid excess ECHO 2/21/19 -  SUMMARY: 
-  Left ventricle: Systolic function was markedly reduced. Ejection fraction was estimated in the range of 25 % to 30 %. There was severe diffuse 
hypokinesis with distinct regional wall motion abnormalities. There was  
severeglobal hypokinesis. There was severe concentric hypertrophy. The myocardial 
specular pattern appeared moderately abnormal. Consider infiltrative 
cardiomyopathy if clinically indicated. -  Right ventricle: The ventricle was mildly dilated. Systolic function was 
moderately to markedly reduced. There was mild pulmonary artery hypertension. 
-  Left atrium: The atrium was moderately to markedly dilated. -  Right atrium: The atrium was markedly dilated. -  Inferior vena cava, hepatic veins: The inferior vena cava was moderately 
dilated. The respirophasic change in diameter was less than 50%. -  Aortic valve: The valve was trileaflet. Leaflets exhibited moderate 
sclerosis. The left coronary cusp demonstrated immobility. There was mild 
regurgitation. -  Mitral valve: There was mild to moderate regurgitation. -  Tricuspid valve: There was mild to moderate regurgitation. -  Pulmonic valve: There was moderate regurgitation. Principal Problem: 
  Acute respiratory failure with hypoxia ([address] (5/24/2018) Active Problems: 
  Coronary atherosclerosis of native coronary vessel (9/29/2017) Mixed hyperlipidemia (9/28/2016) TAZ (obstructive sleep apnea) (10/2/2017) Overview: Intolerant of CPAP Type 2 diabetes with nephropathy ([address] (10/8/2018) Pulmonary hypertension ([address] (10/20/2018) Acute on chronic combined systolic and diastolic CHF (congestive heart failure) ([address] (11/26/2018) Atrial fibrillation ([address] (11/27/2018) Tobacco abuse (1/22/2019) Lactic acidosis (2/20/2019) ESRD (end stage renal disease) on dialysis ([address] (3/2/2019) Acute pulmonary edema ([address] (3/2/2019) Hemodialysis-associated hypotension (3/2/2019) Syncope (3/2/2019) Thrombocytopenia ([address] (3/2/2019) Hematuria (3/2/2019) Anemia due to chronic kidney disease (3/2/2019) Assessment: 1. Acute Kidney Injury on top of Chronic Kidney Disease stage 3 - 
- mainly due to cardio-renal syndrome 
- marked hemodynamic instability with inadequate response to medical therapy and inability to tolerate dialysis despite Midodrine 
- he is too unstable for hemodialysis today 
- will try to maximize medical therapy including addition of Digoxin and Fludrocortisone while attempting diuresis with Lasix drip/Metolazone and increasing Midodrine 
- his wife understands that this is not likely to succeed to get him stabilize for dialysis in the next days and that this is likely and end of life situation 
- creatinine stable 
- no urgent dialysis need, will not order dialysis yet, plan to evaluate in am if HD will be needed 
  
2. Acute on chronic Combined systolic and diastolic heart failure decompensation 
- intensify diuresis and medical therapy as above  
  
3. A fib with RVR  
- on no antihypertensive 
- may be responding to Digoxin 
  
4. UTI 
-  on ceftriaxone 
  
5. Respiratory failure - 
- on oxygen therapy per primary team 
- improving and tolerating nasal cannula 
  
6. Urinary retention - S/P Schulz with urology involved 
  
7. Hypermagnesemia - 
- this may be contributing to the poor hemodynamics 
- avoid magnesium containing meds 8. Anemia - 
- adequate control 9. Acid/base - 
- bicarbonate level is WNL Plan: As above Diane Duarte M.D.

## 2019-03-03 NOTE — PROGRESS NOTES
Hospitalist Progress Note Patient: Minor Taylor. MRN: [id number]  SSN: [id number] YOB: 1945  Age: 66 y.o. Sex: male Admit Date: 2/19/2019 LOS: 12 days Subjective:  
 
From previous notes: \"66year-old gentleman with multiple medical problems including CHF, A. fib, CKD stage IV,, chronic COPD, obstructive sleep apnea who is noncompliant with CPAP, who is currently in a rehab facility, admitted on 2/19 for acute on chronic CHF in view of worsening bilateral LE swelling, hypoxic respiratory failure and hematuria. was brought into the emergency room with complaints of hematuria, leg swellings and shortness of breath. He was also having shortness of breath, moderate in severity, progressively getting worse, worse with minimal exertion, not resolved with rest.  Tunneled cath placed 2/25 and HD started but could not tolerate his session due to hypotension. Midodrine was restarted and he has remained intolerated of HD due to BP issues. \" [facility] recommended hospice care on 3/1/19 so the wife requested a second opinion. 3/2 - He is alert at the time of my exam. He does not remember the events of the past two days. Currently complains of mild SOB and leg edema. Denies CP. Denies F/C/N/V. 
3/3 - He is sitting in the chair this morning. States he had a good night and he feels better today. Denies CP/SOB. Thinks his BLE edema is improved. Review of systems negative except stated above. Objective:  
 
Visit Vitals /60 (BP 1 Location: Right arm, BP Patient Position: At rest) Pulse 71 Temp 97.7 °F (36.5 °C) Resp 18 Ht 5' 10\" (1.778 m) Wt 99.6 kg (219 lb 9.6 oz) SpO2 98% BMI 31.51 kg/m² Oxygen Therapy O2 Sat (%): 98 % (03/03/19 0716) Pulse via Oximetry: 73 beats per minute (03/01/19 0820) O2 Device: Nasal cannula (03/01/19 2030) O2 Flow Rate (L/min): 2 l/min (03/01/19 2030) ETCO2 (mmHg): 21 mmHg (02/25/19 1347) Intake and Output: Intake/Output Summary (Last 24 hours) at 3/3/2019 1132 Last data filed at 3/2/2019 2323 Gross per 24 hour Intake  Output 825 ml Net -825 ml Physical Exam:  
GENERAL: alert, cooperative, no distress, appears stated age EYE: conjunctivae/corneas clear. PERRL. THROAT & NECK: normal and no erythema or exudates noted. LUNG: Scattered rhonchi HEART: regular rate and rhythm, S1S2, no murmur, no JVD ABDOMEN: soft, non-tender, non-distended. Bowel sounds normal.  
EXTREMITIES:  2+ BLE edema SKIN: no rash or abnormalities NEUROLOGIC: A&Ox3. Cranial nerves 2-12 grossly intact. Lab/Data Review: 
Recent Results (from the past 24 hour(s)) RENAL FUNCTION PANEL Collection Time: 03/03/19 10:53 AM  
Result Value Ref Range Sodium 136 136 - 145 mmol/L Potassium 3.5 3.5 - 5.1 mmol/L Chloride 99 98 - 107 mmol/L  
 CO2 26 21 - 32 mmol/L Anion gap 11 7 - 16 mmol/L Glucose 154 (H) 65 - 100 mg/dL BUN 73 (H) 8 - 23 MG/DL Creatinine 5.97 (H) 0.8 - 1.5 MG/DL  
 GFR est AA 12 (L) >60 ml/min/1.73m2 GFR est non-AA 10 (L) >60 ml/min/1.73m2 Calcium 8.3 8.3 - 10.4 MG/DL Phosphorus 5.2 (H) 2.3 - 3.7 MG/DL Albumin 2.7 (L) 3.2 - 4.6 g/dL CBC W/O DIFF Collection Time: 03/03/19 10:53 AM  
Result Value Ref Range WBC 7.5 4.3 - 11.1 K/uL  
 RBC 4.31 4.23 - 5.6 M/uL  
 HGB 11.8 (L) 13.6 - 17.2 g/dL HCT 35.5 (L) 41.1 - 50.3 % MCV 82.4 79.6 - 97.8 FL  
 MCH 27.4 26.1 - 32.9 PG  
 MCHC 33.2 31.4 - 35.0 g/dL  
 RDW 19.8 (H) 11.9 - 14.6 % PLATELET 50 (L) 027 - 450 K/uL MPV Unable to calculate. Recommend adding IPF. 9.4 - 12.3 FL ABSOLUTE NRBC 0.00 0.0 - 0.2 K/uL Imaging: Xr Chest Sngl V Result Date: 2/26/2019 IMPRESSION: 1. Improved pulmonary vascular congestion, with mild residual. 2. New indwelling dialysis catheter, without evidence of a pneumothorax. Xr Chest Pa Lat Result Date: 2/19/2019 IMPRESSION: Cardiomegaly and prominent pulmonary vascular markings. Xr Abd (ap And Erect Or Decub) Result Date: 2019 IMPRESSION: No acute intra-abdominal process. Ir Insert Tunl Cvc W/o Port Over 5 [address] Result Date: 2019 Impression: Technically successful tunneled hemodialysis catheter placement. Plan: Recover for 1 hour. Catheter is ready for use. Suture should be removed in 2 weeks. Ct Head Wo Cont Result Date: 2019 IMPRESSION: No acute intracranial abnormality. Ct Head Wo Cont Result Date: 2019 Impression: Negative CT scan of the brain. Note: If a subtle CVA is suspected, MRI would be more definitive if clinically warranted. Ct Abd Pelv Wo Cont Result Date: 2019 IMPRESSION: 1. Schulz catheter with balloon inflated within the prostate gland with a moderate volume of urine seen in the bladder. 2. Moderate size right and small left-sided pleural effusion. Mild diffuse anasarca. 3. Moderate volume ascites within the right and left upper quadrants. Results discussed with floor RN at 6:57 PM on 2019 via telephone with Dr. Ursula New. Results for orders placed or performed during the hospital encounter of 19  
2D ECHO COMPLETE ADULT (TTE) W OR WO CONTR Narrative [facility] [place], [facility] 
[phone] Transthoracic Echocardiogram 
2D, M-mode, Doppler, and Color Doppler Patient: Haylee Petersen 
MR #: [id number] : [id number] Age: 66 years Gender: Male Study date: 2019 Account #: [de-identified] Height: 70 in Weight: 220.4 lb 
BSA: 2.18 mï¾² Status:Routine Location: 311 BP: 113/ 79 Allergies: PENICILLINS Sonographer:  Kendall Pritchard RDCS Group:  [facility] Referring Physician:  Franky Priest MD [facility] Reading Physician:  TITO Joyner MD [facility] INDICATIONS: Reassess EF 
 
 PROCEDURE: This was a routine study. A transthoracic echocardiogram was 
performed. The study included complete 2D imaging, M-mode, complete spectral 
Doppler, and color Doppler. Intravenous contrast (Definity, 2 ml) was 
administered. Image quality was adequate. LEFT VENTRICLE: Size was normal. Systolic function was markedly reduced. Ejection fraction was estimated in the range of 25 % to 30 %. There was  
severe 
diffuse hypokinesis with distinct regional wall motion abnormalities. There  
was 
severe global hypokinesis. There was severe concentric hypertrophy. The 
myocardial specular pattern appeared moderately abnormal. Consider  
infiltrative 
cardiomyopathy if clinically indicated. Left ventricular diastolic  
dysfunction Grade 3. Average E/e' of 16.7. The study was not technically sufficient to 
allow evaluation of LV diastolic function. RIGHT VENTRICLE: The ventricle was mildly dilated. Systolic function was 
moderately to markedly reduced. There was mild pulmonary artery hypertension. Estimated peak pressure was in the range of 35-40 mmHg. LEFT ATRIUM: The atrium was moderately to markedly dilated. RIGHT ATRIUM: The atrium was markedly dilated. SYSTEMIC VEINS: IVC: The inferior vena cava was moderately dilated. The 
respirophasic change in diameter was less than 50%. AORTIC VALVE: The valve was trileaflet. Leaflets exhibited moderate  
sclerosis. The left coronary cusp demonstrated immobility. There was no evidence for 
stenosis. There was mild regurgitation. MITRAL VALVE: There was focal thickening of the posterior leaflet. There was  
no 
evidence for stenosis. There was mild to moderate regurgitation. TRICUSPID VALVE: The valve structure was normal. There was no evidence for 
stenosis. There was mild to moderate regurgitation. The regurgitant jet was 
eccentric. PULMONIC VALVE: The valve structure was normal. There was no evidence for stenosis. There was moderate regurgitation. PERICARDIUM: There was no pericardial effusion. AORTA: The root exhibited normal size. SUMMARY: 
 
-  Left ventricle: Systolic function was markedly reduced. Ejection fraction 
was estimated in the range of 25 % to 30 %. There was severe diffuse 
hypokinesis with distinct regional wall motion abnormalities. There was  
severe 
global hypokinesis. There was severe concentric hypertrophy. The myocardial 
specular pattern appeared moderately abnormal. Consider infiltrative 
cardiomyopathy if clinically indicated. -  Right ventricle: The ventricle was mildly dilated. Systolic function was 
moderately to markedly reduced. There was mild pulmonary artery hypertension. 
 
-  Left atrium: The atrium was moderately to markedly dilated. -  Right atrium: The atrium was markedly dilated. -  Inferior vena cava, hepatic veins: The inferior vena cava was moderately 
dilated. The respirophasic change in diameter was less than 50%. -  Aortic valve: The valve was trileaflet. Leaflets exhibited moderate 
sclerosis. The left coronary cusp demonstrated immobility. There was mild 
regurgitation. 
 
-  Mitral valve: There was mild to moderate regurgitation. 
 
-  Tricuspid valve: There was mild to moderate regurgitation. 
 
-  Pulmonic valve: There was moderate regurgitation. SYSTEM MEASUREMENT TABLES 
 
2D mode AoR Diam (2D): 3 cm 
LA Dimension (2D): 5.1 cm Left Atrium Systolic Volume Index; Method of Disks, Biplane; 2D mode;: 52.3 
ml/m2 IVS/LVPW (2D): 0.9 IVSd (2D): 2.1 cm 
LVIDd (2D): 3.7 cm 
LVIDs (2D): 3.3 cm 
LVPWd (2D): 2.3 cm RVIDd (2D): 3.9 cm Unspecified Scan Mode Peak Grad; Mean; Antegrade Flow: 5 mm[Hg] Vmax; Antegrade Flow: 107 cm/s Peak Grad; Mean; Antegrade Flow: 3 mm[Hg] Vmax; Antegrade Flow: 88.1 cm/s Prepared and signed by TITO Franklin MD [facility] Signed 21-Feb-2019 17:34:55 Cultures: All Micro Results Procedure Component Value Units Date/Time CULTURE, BLOOD [[id number]] Collected:  02/22/19 1740 Order Status:  Completed Specimen:  Blood Updated:  02/27/19 2398 Special Requests: --     
  RIGHT 
HAND Culture result: NO GROWTH 5 DAYS     
 CULTURE, BLOOD [[id number]] Collected:  02/22/19 1753 Order Status:  Completed Specimen:  Blood Updated:  02/27/19 3080 Special Requests: --     
  RIGHT 
FOREARM Culture result: NO GROWTH 5 DAYS     
 CULTURE, BLOOD [[id number]] Collected:  02/19/19 1140 Order Status:  Completed Specimen:  Blood Updated:  02/24/19 7211 Special Requests: --     
  RIGHT Antecubital 
  
  Culture result: NO GROWTH 5 DAYS     
 CULTURE, BLOOD [[id number]]  (Abnormal) Collected:  02/19/19 1252 Order Status:  Completed Specimen:  Blood Updated:  02/23/19 0747 Special Requests: --     
  RIGHT 
HAND 
  
  GRAM STAIN    
  GRAM POSITIVE COCCI IN CHAINS AEROBIC AND ANAEROBIC BOTTLES RESULTS VERIFIED, PHONED TO AND READ BACK BY EAMON GONZALEZ @5067 02/20/2019 [facility] Culture result: STAPHYLOCOCCUS SPECIES, COAGULASE NEGATIVE ALPHA STREPTOCOCCUS, NOT S. PNEUMONIAE  
     
      
  THIS ORGANISM MAY BE INDICATIVE OF CULTURE CONTAMINATION, HOWEVER, CLINICAL CORRELATION NEEDS TO BE EVALUATED, AS EACH CASE IS UNIQUE. CULTURE, URINE [[id number]]  (Abnormal)  (Susceptibility) Collected:  02/19/19 1220 Order Status:  Completed Specimen:  Cath Urine Updated:  02/22/19 0633 Special Requests: NO SPECIAL REQUESTS Culture result:    
  1000 COLONIES/mL STAPHYLOCOCCUS HAEMOLYTICUS Assessment/Plan:  
 
Principal Problem: 
  Acute respiratory failure with hypoxia ([address] (5/24/2018) - Due to acute pulmonary edema 
- Oxygen needs slowly improving 
- Having issues with HD so fluid not coming off - Wean as appropriate Active Problems: Acute on chronic combined systolic and diastolic CHF (congestive heart failure) ([address] (11/26/2018) - EF 25%-30% 
- Continue Lasix gtt per Nephrology - No ACE or BB due to hypotension - Cardiology signed off ESRD (end stage renal disease) on dialysis ([address] (3/2/2019) - New start HD --> not tolerating as he has hypotension and syncope during first 2 attempts - [facility] recommended hospice and signed off/fired by patient's wife 
- Dr. Jone Gaffney started Lasix gtt --> creatinine improved today Acute pulmonary edema ([address] (3/2/2019) - Due to CHF + ESRD 
- Last CXR continues to show edema - Need dialysis to get fluid off, but BP not tolerating 
- Continue Lasix gtt per Nephro Hemodialysis-associated hypotension (3/2/2019) - Patient so far unable to tolerate dialysis - BP improved this AM 
- Hypotension + Syncope - Continue Midodrine Syncope (3/2/2019) - Due to hypotension on HD 
- No events other than while on HD Thrombocytopenia ([address] (3/2/2019) - Stable - Likely due to acute illness Hematuria (3/2/2019) - Resolved Anemia due to chronic kidney disease (3/2/2019) - Stable - Check CBC daily Coronary atherosclerosis of native coronary vessel (9/29/2017) - No acute CP 
- Continue Eliquis Type 2 diabetes with nephropathy ([address] (10/8/2018) - No acute issues - Continue to monitor Lactic acidosis (2/20/2019) - Resolved Pulmonary hypertension ([address] (10/20/2018) Atrial fibrillation ([address] (11/27/2018) - Currently NSR, but had periods of paroxysmal afib/atach overnight 
- Continue Eliquis - No HR/BP meds due to hypotension Mixed hyperlipidemia (9/28/2016) TAZ (obstructive sleep apnea) (10/2/2017) - Non-compliant with CPAP Tobacco abuse (1/22/2019) Dispo - Continue Lasix gtt per Nephrology. Discharge to rehab vs. Hospice. DIET REGULAR 2 GM NA (House Low NA) DIET NUTRITIONAL SUPPLEMENTS All Meals; Sonia DVT Prophylaxis: Eliquis Signed By: Rafael Hunter,  March 3, 2019

## 2019-03-04 LAB
ALBUMIN SERPL-MCNC: 2.6 G/DL (ref 3.2–4.6)
ANION GAP SERPL CALC-SCNC: 10 MMOL/L (ref 7–16)
BUN SERPL-MCNC: 72 MG/DL (ref 8–23)
CALCIUM SERPL-MCNC: 8 MG/DL (ref 8.3–10.4)
CHLORIDE SERPL-SCNC: 98 MMOL/L (ref 98–107)
CO2 SERPL-SCNC: 28 MMOL/L (ref 21–32)
CREAT SERPL-MCNC: 5.87 MG/DL (ref 0.8–1.5)
ERYTHROCYTE [DISTWIDTH] IN BLOOD BY AUTOMATED COUNT: 19.3 % (ref 11.9–14.6)
GLUCOSE SERPL-MCNC: 90 MG/DL (ref 65–100)
HCT VFR BLD AUTO: 33.5 % (ref 41.1–50.3)
HGB BLD-MCNC: 11.1 G/DL (ref 13.6–17.2)
MCH RBC QN AUTO: 27.2 PG (ref 26.1–32.9)
MCHC RBC AUTO-ENTMCNC: 33.1 G/DL (ref 31.4–35)
MCV RBC AUTO: 82.1 FL (ref 79.6–97.8)
NRBC # BLD: 0 K/UL (ref 0–0.2)
PHOSPHATE SERPL-MCNC: 5.3 MG/DL (ref 2.3–3.7)
PLATELET # BLD AUTO: 58 K/UL (ref 150–450)
PMV BLD AUTO: ABNORMAL FL (ref 9.4–12.3)
POTASSIUM SERPL-SCNC: 3.1 MMOL/L (ref 3.5–5.1)
RBC # BLD AUTO: 4.08 M/UL (ref 4.23–5.6)
SODIUM SERPL-SCNC: 136 MMOL/L (ref 136–145)
WBC # BLD AUTO: 7.3 K/UL (ref 4.3–11.1)

## 2019-03-04 PROCEDURE — 85027 COMPLETE CBC AUTOMATED: CPT

## 2019-03-04 PROCEDURE — 74011250637 HC RX REV CODE- 250/637: Performed by: INTERNAL MEDICINE

## 2019-03-04 PROCEDURE — 36415 COLL VENOUS BLD VENIPUNCTURE: CPT

## 2019-03-04 PROCEDURE — 80069 RENAL FUNCTION PANEL: CPT

## 2019-03-04 PROCEDURE — 65660000000 HC RM CCU STEPDOWN

## 2019-03-04 PROCEDURE — 74011250636 HC RX REV CODE- 250/636: Performed by: INTERNAL MEDICINE

## 2019-03-04 PROCEDURE — 99233 SBSQ HOSP IP/OBS HIGH 50: CPT | Performed by: NURSE PRACTITIONER

## 2019-03-04 PROCEDURE — 77010033678 HC OXYGEN DAILY

## 2019-03-04 PROCEDURE — 74011000258 HC RX REV CODE- 258: Performed by: INTERNAL MEDICINE

## 2019-03-04 PROCEDURE — 74011250637 HC RX REV CODE- 250/637: Performed by: HOSPITALIST

## 2019-03-04 PROCEDURE — 94760 N-INVAS EAR/PLS OXIMETRY 1: CPT

## 2019-03-04 PROCEDURE — 74011250637 HC RX REV CODE- 250/637: Performed by: UROLOGY

## 2019-03-04 RX ORDER — POTASSIUM CHLORIDE 14.9 MG/ML
20 INJECTION INTRAVENOUS ONCE
Status: COMPLETED | OUTPATIENT
Start: 2019-03-04 | End: 2019-03-04

## 2019-03-04 RX ORDER — NYSTATIN 100000 [USP'U]/G
POWDER TOPICAL 2 TIMES DAILY
Status: DISCONTINUED | OUTPATIENT
Start: 2019-03-04 | End: 2019-03-12 | Stop reason: HOSPADM

## 2019-03-04 RX ORDER — POTASSIUM CHLORIDE 750 MG/1
10 TABLET, EXTENDED RELEASE ORAL DAILY
Status: DISCONTINUED | OUTPATIENT
Start: 2019-03-04 | End: 2019-03-06

## 2019-03-04 RX ADMIN — Medication 10 ML: at 21:03

## 2019-03-04 RX ADMIN — FUROSEMIDE 30 MG/HR: 10 INJECTION, SOLUTION INTRAMUSCULAR; INTRAVENOUS at 05:54

## 2019-03-04 RX ADMIN — Medication 10 ML: at 05:04

## 2019-03-04 RX ADMIN — FLUTICASONE PROPIONATE 2 SPRAY: 50 SPRAY, METERED NASAL at 09:00

## 2019-03-04 RX ADMIN — POTASSIUM CHLORIDE 10 MEQ: 10 TABLET, EXTENDED RELEASE ORAL at 11:17

## 2019-03-04 RX ADMIN — NYSTATIN: 100000 POWDER TOPICAL at 20:59

## 2019-03-04 RX ADMIN — FLUDROCORTISONE ACETATE 200 MCG: 0.1 TABLET ORAL at 17:13

## 2019-03-04 RX ADMIN — DIGOXIN 0.25 MG: 250 TABLET ORAL at 08:38

## 2019-03-04 RX ADMIN — MIDODRINE HYDROCHLORIDE 15 MG: 5 TABLET ORAL at 17:13

## 2019-03-04 RX ADMIN — APIXABAN 5 MG: 2.5 TABLET, FILM COATED ORAL at 08:37

## 2019-03-04 RX ADMIN — FUROSEMIDE 30 MG/HR: 10 INJECTION, SOLUTION INTRAMUSCULAR; INTRAVENOUS at 14:27

## 2019-03-04 RX ADMIN — FLUDROCORTISONE ACETATE 200 MCG: 0.1 TABLET ORAL at 08:37

## 2019-03-04 RX ADMIN — POTASSIUM CHLORIDE 20 MEQ: 200 INJECTION, SOLUTION INTRAVENOUS at 11:17

## 2019-03-04 RX ADMIN — METOLAZONE 10 MG: 5 TABLET ORAL at 17:13

## 2019-03-04 RX ADMIN — MIDODRINE HYDROCHLORIDE 15 MG: 5 TABLET ORAL at 08:36

## 2019-03-04 RX ADMIN — HYDROCODONE BITARTRATE AND ACETAMINOPHEN 1 TABLET: 5; 325 TABLET ORAL at 15:12

## 2019-03-04 RX ADMIN — FINASTERIDE 5 MG: 5 TABLET, FILM COATED ORAL at 08:37

## 2019-03-04 RX ADMIN — MIDODRINE HYDROCHLORIDE 15 MG: 5 TABLET ORAL at 11:17

## 2019-03-04 RX ADMIN — APIXABAN 5 MG: 2.5 TABLET, FILM COATED ORAL at 20:58

## 2019-03-04 RX ADMIN — FUROSEMIDE 30 MG/HR: 10 INJECTION, SOLUTION INTRAMUSCULAR; INTRAVENOUS at 00:55

## 2019-03-04 RX ADMIN — LACTULOSE 20 G: 20 SOLUTION ORAL at 17:13

## 2019-03-04 RX ADMIN — METOLAZONE 10 MG: 5 TABLET ORAL at 08:36

## 2019-03-04 RX ADMIN — LACTULOSE 30 G: 20 SOLUTION ORAL at 08:38

## 2019-03-04 NOTE — PROGRESS NOTES
RENAL PROGRESS NOTE Subjective:  
 
Cc - I was asked to see patient for second opinion Patient is a 67 y/o AAM with Acute Kidney Injury on top of Chronic Kidney Disease stage 3 had attempts at dialysis x three days, but each time needed to be taken off dialysis due to hemodynamic instability. He also has CHF, A. fib, obstructive sleep apnea who was admitted from a rehab facility on 2/19 for acute on chronic CHF with worsening bilateral LE swelling, hypoxic respiratory failure and hematuria. He did not respond to medical management and a tunneled cath placed 2/25 and HD started. He could not tolerate his session due to hypotension even after midodrine treatment. [facility] recommended hospice care on 3/1/19 so the wife requested a second opinion. The patient is uncomfortable due to dyspnea despite O2 by NC and is thirsty. His wife is at bedside. I discussed with patient and wife that dialysis is a good treatment for kidney failure, but that his main problem is heart failure and that heart transplant is a treatment for heart failure, however, he is not a candidate for it due to co-morbid conditions and advanced age. I  Discussed that in order to attempt dialysis again he would need to improve hemodynamic stability and respond favorably to medication changes, but that the likelihood of success was not good as he has not responded well to medical therapy so far. 3/3/19 - feels and breathes better - in better spirit - no longer in respiratory distress - edema improving 3/4/19 - gradually progressing - no labs available yet - will continue to try medical therapy Past Medical History:  
Diagnosis Date  Acute CHF (congestive heart failure) ([facility] [address] 4/25/2015  Acute combined systolic and diastolic congestive heart failure ([facility] [address] 4/28/2015  De Quervain's tenosynovitis, right 4/28/2015  Dyspnea on exertion 6/28/2015  Elevated serum creatinine 4/25/2015  Elevated troponin 6/28/2015  History of coronary artery disease MI at 27 yo  
 History of right knee surgery   
 cartilage removal  
 History of shingles  Malignant hypertension 4/25/2015  MI (myocardial infarction) ([facility] [address] Past Surgical History:  
Procedure Laterality Date  HX HEART CATHETERIZATION  6/29/2015  
 no intervention  HX KNEE ARTHROSCOPY Right   
 removal of cartilage  IR INSERT TUNL CVC W/O PORT OVER 5 YR  2/25/2019 Prior to Admission medications Medication Sig Start Date End Date Taking? Authorizing Provider  
omeprazole (PRILOSEC) 20 mg capsule Take 1 Cap by mouth daily. 1/24/19   Des Mansfield MD  
potassium chloride (K-DUR, KLOR-CON) 20 mEq tablet Take 1 Tab by mouth daily. 1/23/19   Folry Qureshi MD  
Blood-Glucose Meter (ACCU-CHEK YOSEF PLUS METER) misc USE TO CHECK BLOOD SUGARS DAILY DX: E11.9 12/27/18   Des Mansfield MD  
Blood Glucose Control High&Low (ACCU-CHEK YOSEF CONTROL SOLN) soln USE AS DIRECTED 12/27/18   Des Mansfield MD  
glucose blood VI test strips (ACCU-CHEK YOSEF PLUS TEST STRP) strip USE TO CHECK BLOOD SUGARS DAILY DX: E11.9 12/27/18   Des Mansfield MD  
alcohol swabs (BD SINGLE USE SWABS REGULAR) padm USE AS DIRECTED DAILY DX. E11.9 12/27/18   Des Mansfield MD  
lancets (ACCU-CHEK SOFTCLIX LANCETS) misc USE TO CHECK BLOOD SUGARS DAILY DX: E11.9 12/27/18   Des Mansfield MD  
carvedilol (COREG) 6.25 mg tablet Take 1 Tab by mouth two (2) times daily (with meals). 12/3/18   Andrea Pugh MD  
apixaban (ELIQUIS) 5 mg tablet Take 1 Tab by mouth every twelve (12) hours. 12/3/18   Andrea Pugh MD  
aspirin 81 mg chewable tablet Take 1 Tab by mouth daily. 11/5/18   Caty Marie MD  
atorvastatin (LIPITOR) 20 mg tablet Take 1 Tab by mouth nightly.  10/4/18   Des Mansfield MD  
allopurinol (ZYLOPRIM) 100 mg tablet TAKE 1 TABLET BY MOUTH EVERY DAY 9/25/18   Des Mansfield MD  
 fluticasone (FLONASE) 50 mcg/actuation nasal spray 2 Sprays by Both Nostrils route daily. 3/29/18   Mehran Mata MD  
melatonin 3 mg tablet Take 3 mg by mouth nightly. Provider, Sami  
albuterol (VENTOLIN HFA) 90 mcg/actuation inhaler Take 2 Puffs by inhalation four (4) times daily. 2/6/18   Kailyn Mantilla NP  
polyethylene glycol (MIRALAX) 17 gram packet Take 1 Packet by mouth daily. Patient taking differently: Take 17 g by mouth daily as needed. 1/13/18   Kelli MERLOS NP  
albuterol-ipratropium (DUO-NEB) 2.5 mg-0.5 mg/3 ml nebu 3 mL by Nebulization route four (4) times daily. Diaignosis--J44.9 12/13/17   Kailyn Mantilla NP Allergies Allergen Reactions  Ativan [Lorazepam] Other (comments) Confusion- mild but long lasting- days  Pcn [Penicillins] Other (comments) \"makes my heart stop\" Social History Tobacco Use  Smoking status: Former Smoker Packs/day: 0.25 Years: 20.00 Pack years: 5.00 Types: Cigarettes Last attempt to quit: 4/5/2015 Years since quitting: 3.9  Smokeless tobacco: Never Used Substance Use Topics  Alcohol use: No  
  Alcohol/week: 0.0 oz Family History Problem Relation Age of Onset  Hypertension Mother  No Known Problems Father Review of Systems Constitutional: no fever, rested better Eyes: fair vision,   
Ears, nose, mouth, throat, and face:fair hearing, Respiratory: no asthma,   
Cardiovascular:no chest pain,   
Gastrointestinal:no diarrhea,   
Genitourinary: he has had hematuria and dysuria, Hematologic/lymphatic: no bleeding tendency, Neurological: no seizures, Behvioral/Psych: no psych hospitalization Endocrine: no goiter,  
 
 
Objective:  
 
 
Visit Vitals /69 (BP 1 Location: Left arm, BP Patient Position: At rest) Pulse 65 Temp 98.1 °F (36.7 °C) Resp 18 Ht 5' 10\" (1.778 m) Wt 101.4 kg (223 lb 8 oz) SpO2 100% BMI 32.07 kg/m² 03/03 1901 - 03/04 0700 In: 120 [P.O.:120] Out: 625 [Urine:625] 03/02 0701 - 03/03 1900 In: 240 [P.O.:240] Out: 1675 [[id number]] General:  Alert, cooperative, no respiratory distress on nasal cannula O2, appears stated age. Head:  Normocephalic, without obvious abnormality, atraumatic. Eyes:  Conjunctivae/corneas clear. EOMs intact. Throat: Lips, mucosa, and tongue normal. Teeth and gums normal.  
Neck: Supple, symmetrical, trachea midline, no adenopathy, pos for JVD. Lungs:   Crackles in bases to auscultation bilaterally improving. Heart:  Regular rate and rhythm, S1, S2 normal, no  rub. Abdomen:   Soft, non-tender. Bowel sounds normal. No masses,  No organomegaly. No renal bruit. Abdominal wall is bulging and ascites is present Extremities: Extremities normal, atraumatic, no cyanosis, 3+ edema, improving. Skin: Skin color, texture, turgor normal. No rashes or lesions. Neurologic: Grossly intact. No asterixis. Data Review:  
 
Recent Results (from the past 24 hour(s)) RENAL FUNCTION PANEL Collection Time: 03/03/19 10:53 AM  
Result Value Ref Range Sodium 136 136 - 145 mmol/L Potassium 3.5 3.5 - 5.1 mmol/L Chloride 99 98 - 107 mmol/L  
 CO2 26 21 - 32 mmol/L Anion gap 11 7 - 16 mmol/L Glucose 154 (H) 65 - 100 mg/dL BUN 73 (H) 8 - 23 MG/DL Creatinine 5.97 (H) 0.8 - 1.5 MG/DL  
 GFR est AA 12 (L) >60 ml/min/1.73m2 GFR est non-AA 10 (L) >60 ml/min/1.73m2 Calcium 8.3 8.3 - 10.4 MG/DL Phosphorus 5.2 (H) 2.3 - 3.7 MG/DL Albumin 2.7 (L) 3.2 - 4.6 g/dL CBC W/O DIFF Collection Time: 03/03/19 10:53 AM  
Result Value Ref Range WBC 7.5 4.3 - 11.1 K/uL  
 RBC 4.31 4.23 - 5.6 M/uL  
 HGB 11.8 (L) 13.6 - 17.2 g/dL HCT 35.5 (L) 41.1 - 50.3 % MCV 82.4 79.6 - 97.8 FL  
 MCH 27.4 26.1 - 32.9 PG  
 MCHC 33.2 31.4 - 35.0 g/dL  
 RDW 19.8 (H) 11.9 - 14.6 % PLATELET 50 (L) 838 - 450 K/uL MPV Unable to calculate. Recommend adding IPF. 9.4 - 12.3 FL ABSOLUTE NRBC 0.00 0.0 - 0.2 K/uL CXR shows massive cardiomegaly and fluid excess ECHO 2/21/19 -  SUMMARY: 
-  Left ventricle: Systolic function was markedly reduced. Ejection fraction 
was estimated in the range of 25 % to 30 %. There was severe diffuse 
hypokinesis with distinct regional wall motion abnormalities. There was  
severeglobal hypokinesis. There was severe concentric hypertrophy. The myocardial 
specular pattern appeared moderately abnormal. Consider infiltrative 
cardiomyopathy if clinically indicated. -  Right ventricle: The ventricle was mildly dilated. Systolic function was 
moderately to markedly reduced. There was mild pulmonary artery hypertension. 
-  Left atrium: The atrium was moderately to markedly dilated. -  Right atrium: The atrium was markedly dilated. -  Inferior vena cava, hepatic veins: The inferior vena cava was moderately 
dilated. The respirophasic change in diameter was less than 50%. -  Aortic valve: The valve was trileaflet. Leaflets exhibited moderate 
sclerosis. The left coronary cusp demonstrated immobility. There was mild 
regurgitation. -  Mitral valve: There was mild to moderate regurgitation. -  Tricuspid valve: There was mild to moderate regurgitation. -  Pulmonic valve: There was moderate regurgitation. Principal Problem: 
  Acute respiratory failure with hypoxia ([address] (5/24/2018) Active Problems: 
  Coronary atherosclerosis of native coronary vessel (9/29/2017) Mixed hyperlipidemia (9/28/2016) TAZ (obstructive sleep apnea) (10/2/2017) Overview: Intolerant of CPAP Type 2 diabetes with nephropathy ([address] (10/8/2018) Pulmonary hypertension ([address] (10/20/2018) Acute on chronic combined systolic and diastolic CHF (congestive heart failure) ([address] (11/26/2018) Atrial fibrillation ([address] (11/27/2018) Tobacco abuse (1/22/2019) Lactic acidosis (2/20/2019) ESRD (end stage renal disease) on dialysis ([facility] [address] (3/2/2019) Acute pulmonary edema ([address] (3/2/2019) Hemodialysis-associated hypotension (3/2/2019) Syncope (3/2/2019) Thrombocytopenia ([address] (3/2/2019) Hematuria (3/2/2019) Anemia due to chronic kidney disease (3/2/2019) Assessment: 1. Acute Kidney Injury on top of Chronic Kidney Disease stage 3 - 
- mainly due to cardio-renal syndrome 
- marked hemodynamic instability with inadequate response to medical therapy and inability to tolerate dialysis despite Midodrine 
- he is too unstable for hemodialysis today 
- will try to maximize medical therapy including addition of Digoxin and Fludrocortisone while attempting diuresis with Lasix drip/Metolazone and increasing Midodrine 
- his wife understands that this is not likely to succeed to get him stabilize for dialysis in the next days and that this is likely and end of life situation 
- seems to be improving some with medical mamanegement 
- no urgent dialysis needed 
  
2. Acute on chronic Combined systolic and diastolic heart failure decompensation 
- intensify diuresis and medical therapy as above  
  
3. A fib with RVR  
- on no antihypertensive 
- clinically responding to Digoxin 
  
4. UTI 
-  on ceftriaxone 
  
5. Respiratory failure - 
- on oxygen therapy per primary team 
- improving and tolerating nasal cannula 
  
6. Urinary retention - S/P Schulz with urology involved 
  
7. Hypermagnesemia - 
- hoping for consistently improved hemodynamics with decreasing Magnesium level 
- avoid magnesium containing meds, Magnesium citrate discontinued 8. Anemia - 
- adequate control 9. Acid/base - 
- bicarbonate level is WNL 10. Hypokalemia - 
- replace IV and PO potassium, see orders Plan: As above Judy Ashraf M.D.

## 2019-03-04 NOTE — PROGRESS NOTES
3/4/2019 Attempted to see patient for OT, however pt and wife sleeping in the room with lights out and barely acknowledged staff at bedside. Pt declines to participate at this time and appears somewhat drowsy. Will re-attempt as time permits. Thank you, Gunjan Zhou, OT

## 2019-03-04 NOTE — PROGRESS NOTES
Hourly rounds met. Pt alert and oriented x 3. Pt is lying in the bed in a locked, lock position with the call light in reach. Wife is present at the bedside. No needs stated at this time. Will continue to monitor and report to the oncoming night shift nurse.

## 2019-03-04 NOTE — PROGRESS NOTES
Interdisciplinary Rounds completed 03/04/19. Nursing, Case Management, Physician and PT present. Plan of care reviewed and updated.  
 
Kvng chaudhari d/c

## 2019-03-04 NOTE — PROGRESS NOTES
Hospitalist Progress Note Patient: Eliezer Albarran. MRN: [id number]  SSN: [id number] YOB: 1945  Age: 66 y.o. Sex: male Admit Date: 2/19/2019 LOS: 13 days Subjective:  
 
From previous notes: \"66year-old gentleman with multiple medical problems including CHF, A. fib, CKD stage IV,, chronic COPD, obstructive sleep apnea who is noncompliant with CPAP, who is currently in a rehab facility, admitted on 2/19 for acute on chronic CHF in view of worsening bilateral LE swelling, hypoxic respiratory failure and hematuria. was brought into the emergency room with complaints of hematuria, leg swellings and shortness of breath. He was also having shortness of breath, moderate in severity, progressively getting worse, worse with minimal exertion, not resolved with rest.  Tunneled cath placed 2/25 and HD started but could not tolerate his session due to hypotension. Midodrine was restarted and he has remained intolerated of HD due to BP issues. \" [facility] recommended hospice care on 3/1/19 so the wife requested a second opinion. 3/3 - He is sitting in the chair this morning. States he had a good night and he feels better today. Denies CP/SOB. Thinks his BLE edema is improved. 3/4 - He continues to be alert and feels better. SOB improved. Having diarrhea with Lactulose. Denies CP/SOB. Review of systems negative except stated above. Objective:  
 
Visit Vitals BP 92/54 (BP 1 Location: Left arm, BP Patient Position: At rest) Pulse 74 Temp 98.5 °F (36.9 °C) Resp 18 Ht 5' 10\" (1.778 m) Wt 101.4 kg (223 lb 8 oz) SpO2 98% BMI 32.07 kg/m² Oxygen Therapy O2 Sat (%): 98 % (03/04/19 1147) Pulse via Oximetry: 70 beats per minute (03/04/19 0831) O2 Device: Nasal cannula (03/04/19 0831) O2 Flow Rate (L/min): 3 l/min (03/04/19 0831) ETCO2 (mmHg): 21 mmHg (02/25/19 1347) Intake and Output:  
 
Intake/Output Summary (Last 24 hours) at 3/4/2019 1442 Last data filed at 3/4/2019 6204 Gross per 24 hour Intake 120 ml Output 1475 ml Net -1355 ml Physical Exam:  
GENERAL: alert, cooperative, no distress, appears stated age EYE: conjunctivae/corneas clear. PERRL. THROAT & NECK: normal and no erythema or exudates noted. LUNG: Scattered rhonchi HEART: regular rate and rhythm, S1S2, no murmur, no JVD ABDOMEN: soft, non-tender, non-distended. Bowel sounds normal.  
EXTREMITIES:  2+ BLE edema SKIN: no rash or abnormalities NEUROLOGIC: A&Ox3. Cranial nerves 2-12 grossly intact. Lab/Data Review: 
Recent Results (from the past 24 hour(s)) RENAL FUNCTION PANEL Collection Time: 03/04/19  6:42 AM  
Result Value Ref Range Sodium 136 136 - 145 mmol/L Potassium 3.1 (L) 3.5 - 5.1 mmol/L Chloride 98 98 - 107 mmol/L  
 CO2 28 21 - 32 mmol/L Anion gap 10 7 - 16 mmol/L Glucose 90 65 - 100 mg/dL BUN 72 (H) 8 - 23 MG/DL Creatinine 5.87 (H) 0.8 - 1.5 MG/DL  
 GFR est AA 12 (L) >60 ml/min/1.73m2 GFR est non-AA 10 (L) >60 ml/min/1.73m2 Calcium 8.0 (L) 8.3 - 10.4 MG/DL Phosphorus 5.3 (H) 2.3 - 3.7 MG/DL Albumin 2.6 (L) 3.2 - 4.6 g/dL CBC W/O DIFF Collection Time: 03/04/19  6:42 AM  
Result Value Ref Range WBC 7.3 4.3 - 11.1 K/uL  
 RBC 4.08 (L) 4.23 - 5.6 M/uL  
 HGB 11.1 (L) 13.6 - 17.2 g/dL HCT 33.5 (L) 41.1 - 50.3 % MCV 82.1 79.6 - 97.8 FL  
 MCH 27.2 26.1 - 32.9 PG  
 MCHC 33.1 31.4 - 35.0 g/dL  
 RDW 19.3 (H) 11.9 - 14.6 % PLATELET 58 (L) 244 - 450 K/uL MPV Unable to calculate. Recommend adding IPF. 9.4 - 12.3 FL ABSOLUTE NRBC 0.00 0.0 - 0.2 K/uL Imaging: Xr Chest Sngl V Result Date: 2/26/2019 IMPRESSION: 1. Improved pulmonary vascular congestion, with mild residual. 2. New indwelling dialysis catheter, without evidence of a pneumothorax. Xr Chest Pa Lat Result Date: 2/19/2019 IMPRESSION: Cardiomegaly and prominent pulmonary vascular markings. Xr Abd (ap And Erect Or Decub) Result Date: 2019 IMPRESSION: No acute intra-abdominal process. Ir Insert Tunl Cvc W/o Port Over 5 [address] Result Date: 2019 Impression: Technically successful tunneled hemodialysis catheter placement. Plan: Recover for 1 hour. Catheter is ready for use. Suture should be removed in 2 weeks. Ct Head Wo Cont Result Date: 2019 IMPRESSION: No acute intracranial abnormality. Ct Head Wo Cont Result Date: 2019 Impression: Negative CT scan of the brain. Note: If a subtle CVA is suspected, MRI would be more definitive if clinically warranted. Ct Abd Pelv Wo Cont Result Date: 2019 IMPRESSION: 1. Schulz catheter with balloon inflated within the prostate gland with a moderate volume of urine seen in the bladder. 2. Moderate size right and small left-sided pleural effusion. Mild diffuse anasarca. 3. Moderate volume ascites within the right and left upper quadrants. Results discussed with floor RN at 6:57 PM on 2019 via telephone with Dr. Kusum Calloway. Results for orders placed or performed during the hospital encounter of 19  
2D ECHO COMPLETE ADULT (TTE) W OR WO CONTR Narrative [facility] [address] [place], [address] [facility] 
[phone] Transthoracic Echocardiogram 
2D, M-mode, Doppler, and Color Doppler Patient: Cris Valentin 
MR #: [id number] : [id number] Age: 66 years Gender: Male Study date: 2019 Account #: [de-identified] Height: 70 in Weight: 220.4 lb 
BSA: 2.18 mï¾² Status:Routine Location: 311 BP: 113/ 79 Allergies: PENICILLINS Sonographer:  Saray Junior RDCS Group:  [facility] Referring Physician:  Justin Hicks. Naya Lilly MD [facility] Reading Physician:  TITO Julio MD [facility] INDICATIONS: Reassess EF PROCEDURE: This was a routine study. A transthoracic echocardiogram was 
performed.  The study included complete 2D imaging, M-mode, complete spectral 
 Doppler, and color Doppler. Intravenous contrast (Definity, 2 ml) was 
administered. Image quality was adequate. LEFT VENTRICLE: Size was normal. Systolic function was markedly reduced. Ejection fraction was estimated in the range of 25 % to 30 %. There was  
severe 
diffuse hypokinesis with distinct regional wall motion abnormalities. There  
was 
severe global hypokinesis. There was severe concentric hypertrophy. The 
myocardial specular pattern appeared moderately abnormal. Consider  
infiltrative 
cardiomyopathy if clinically indicated. Left ventricular diastolic  
dysfunction Grade 3. Average E/e' of 16.7. The study was not technically sufficient to 
allow evaluation of LV diastolic function. RIGHT VENTRICLE: The ventricle was mildly dilated. Systolic function was 
moderately to markedly reduced. There was mild pulmonary artery hypertension. Estimated peak pressure was in the range of 35-40 mmHg. LEFT ATRIUM: The atrium was moderately to markedly dilated. RIGHT ATRIUM: The atrium was markedly dilated. SYSTEMIC VEINS: IVC: The inferior vena cava was moderately dilated. The 
respirophasic change in diameter was less than 50%. AORTIC VALVE: The valve was trileaflet. Leaflets exhibited moderate  
sclerosis. The left coronary cusp demonstrated immobility. There was no evidence for 
stenosis. There was mild regurgitation. MITRAL VALVE: There was focal thickening of the posterior leaflet. There was  
no 
evidence for stenosis. There was mild to moderate regurgitation. TRICUSPID VALVE: The valve structure was normal. There was no evidence for 
stenosis. There was mild to moderate regurgitation. The regurgitant jet was 
eccentric. PULMONIC VALVE: The valve structure was normal. There was no evidence for 
stenosis. There was moderate regurgitation. PERICARDIUM: There was no pericardial effusion. AORTA: The root exhibited normal size.  
 
SUMMARY: 
 
 -  Left ventricle: Systolic function was markedly reduced. Ejection fraction 
was estimated in the range of 25 % to 30 %. There was severe diffuse 
hypokinesis with distinct regional wall motion abnormalities. There was  
severe 
global hypokinesis. There was severe concentric hypertrophy. The myocardial 
specular pattern appeared moderately abnormal. Consider infiltrative 
cardiomyopathy if clinically indicated. -  Right ventricle: The ventricle was mildly dilated. Systolic function was 
moderately to markedly reduced. There was mild pulmonary artery hypertension. 
 
-  Left atrium: The atrium was moderately to markedly dilated. -  Right atrium: The atrium was markedly dilated. -  Inferior vena cava, hepatic veins: The inferior vena cava was moderately 
dilated. The respirophasic change in diameter was less than 50%. -  Aortic valve: The valve was trileaflet. Leaflets exhibited moderate 
sclerosis. The left coronary cusp demonstrated immobility. There was mild 
regurgitation. 
 
-  Mitral valve: There was mild to moderate regurgitation. 
 
-  Tricuspid valve: There was mild to moderate regurgitation. 
 
-  Pulmonic valve: There was moderate regurgitation. SYSTEM MEASUREMENT TABLES 
 
2D mode AoR Diam (2D): 3 cm 
LA Dimension (2D): 5.1 cm Left Atrium Systolic Volume Index; Method of Disks, Biplane; 2D mode;: 52.3 
ml/m2 IVS/LVPW (2D): 0.9 IVSd (2D): 2.1 cm 
LVIDd (2D): 3.7 cm 
LVIDs (2D): 3.3 cm 
LVPWd (2D): 2.3 cm RVIDd (2D): 3.9 cm Unspecified Scan Mode Peak Grad; Mean; Antegrade Flow: 5 mm[Hg] Vmax; Antegrade Flow: 107 cm/s Peak Grad; Mean; Antegrade Flow: 3 mm[Hg] Vmax; Antegrade Flow: 88.1 cm/s Prepared and signed by TITO Colin MD [facility] Signed 21-Feb-2019 17:34:55 Cultures: All Micro Results Procedure Component Value Units Date/Time CULTURE, BLOOD [[id number]] Collected:  02/22/19 1740 Order Status:  Completed Specimen:  Blood Updated:  02/27/19 6808 Special Requests: --     
  RIGHT 
HAND Culture result: NO GROWTH 5 DAYS     
 CULTURE, BLOOD [[id number]] Collected:  02/22/19 1753 Order Status:  Completed Specimen:  Blood Updated:  02/27/19 8746 Special Requests: --     
  RIGHT 
FOREARM Culture result: NO GROWTH 5 DAYS     
 CULTURE, BLOOD [[id number]] Collected:  02/19/19 1140 Order Status:  Completed Specimen:  Blood Updated:  02/24/19 6510 Special Requests: --     
  RIGHT Antecubital 
  
  Culture result: NO GROWTH 5 DAYS     
 CULTURE, BLOOD [[id number]]  (Abnormal) Collected:  02/19/19 1252 Order Status:  Completed Specimen:  Blood Updated:  02/23/19 0768 Special Requests: --     
  RIGHT 
HAND 
  
  GRAM STAIN    
  GRAM POSITIVE COCCI IN CHAINS AEROBIC AND ANAEROBIC BOTTLES RESULTS VERIFIED, PHONED TO AND READ BACK BY EAMON GONZALEZ @2966 02/20/2019 [facility] Culture result: STAPHYLOCOCCUS SPECIES, COAGULASE NEGATIVE ALPHA STREPTOCOCCUS, NOT S. PNEUMONIAE  
     
      
  THIS ORGANISM MAY BE INDICATIVE OF CULTURE CONTAMINATION, HOWEVER, CLINICAL CORRELATION NEEDS TO BE EVALUATED, AS EACH CASE IS UNIQUE. CULTURE, URINE [[id number]]  (Abnormal)  (Susceptibility) Collected:  02/19/19 1220 Order Status:  Completed Specimen:  Cath Urine Updated:  02/22/19 4200 Special Requests: NO SPECIAL REQUESTS Culture result:    
  1000 COLONIES/mL STAPHYLOCOCCUS HAEMOLYTICUS Assessment/Plan:  
 
Principal Problem: 
  Acute respiratory failure with hypoxia ([address] (5/24/2018) - Due to acute pulmonary edema 
- Oxygen needs slowly improving 
- Having issues with HD so fluid coming off slowly with intermittent Lasix - Wean as appropriate Active Problems: 
  Acute on chronic combined systolic and diastolic CHF (congestive heart failure) ([address] (11/26/2018) - EF 25%-30% 
- Continue intermittent Lasix gtt per Nephrology - No ACE or BB due to hypotension - Cardiology signed off ESRD (end stage renal disease) on dialysis ([address] (3/2/2019) - New start HD --> did not tolerate as he had hypotension and syncope during first 2 attempts - [address] Nephrology recommended hospice and signed off/fired by patient's wife 
- Dr. Gissel Chandler started intermittent Lasix gtt --> creatinine improved today Acute pulmonary edema ([address] (3/2/2019) - Due to CHF + ESRD 
- Last CXR continues to show edema - Need dialysis to get fluid off, but BP not tolerating 
- Continue intermittent Lasix gtt per Nephro Hemodialysis-associated hypotension (3/2/2019) - Patient so far unable to tolerate dialysis - BP improved this AM 
- Hypotension + Syncope - Continue Midodrine Syncope (3/2/2019) - Due to hypotension on HD 
- No events other than while on HD Thrombocytopenia ([address] (3/2/2019) - Stable - Likely due to acute illness Hematuria (3/2/2019) - Resolved Anemia due to chronic kidney disease (3/2/2019) - Stable - Check CBC daily Coronary atherosclerosis of native coronary vessel (9/29/2017) - No acute CP 
- Continue Eliquis Type 2 diabetes with nephropathy ([address] (10/8/2018) - No acute issues - Continue to monitor Lactic acidosis (2/20/2019) - Resolved Pulmonary hypertension ([address] (10/20/2018) Atrial fibrillation ([address] (11/27/2018) - Currently NSR, but had periods of paroxysmal afib/atach overnight 
- Continue Eliquis - No HR/BP meds due to hypotension Mixed hyperlipidemia (9/28/2016) TAZ (obstructive sleep apnea) (10/2/2017) - Non-compliant with CPAP Tobacco abuse (1/22/2019) Dispo - Continue intermittent Lasix gtt per Nephrology. Discharge to [facility] once insurance approves. DIET REGULAR 2 GM NA (House Low NA) DIET NUTRITIONAL SUPPLEMENTS All Meals; Nepro DVT Prophylaxis: Eliquis Signed By: Makayla Rosas DO March 4, 2019

## 2019-03-04 NOTE — PROGRESS NOTES
Palliative Care Progress Note Patient: Shana Bush. MRN: [id number]  SSN: [id number] YOB: 1945  Age: 66 y.o. Sex: male Assessment/Plan: Chief Complaint/Interval History:  Blood vessel irritated from potassium IV Principal Diagnosis: · Debility, Unspecified  R53.81 Additional Diagnoses:  
· Edema  R60.9 · Fatigue, Lethargy  R53.83 
· Frailty  R54 · Counseling, Encounter for Medical Advice  Z71.9 
· Encounter for Palliative Care  Z51.5 Palliative Performance Scale (PPS) PPS: 60 Medical Decision Making:  
Reviewed and summarized notes from last note until present Discussed case with appropriate providers- RN Yvonne Sinclair, RN Rosalia Cerrato, ARMIN Ryan. Reviewed laboratory and x-ray data Pt resting in bed, wife, Carmen Anthony, at bedside. Pt endorses pain in L forearm where potassium was administered; RN is managing, and pt now says he feels better. Pt denies SOB, N/V. Both the pt and wife Carmen Anthony seem frustrated. Ms. Andrew Avery says this is due to her wish for slow HD for her . Reviewed that nephrologist Dr. Geraldine Castro finds the pt's BP drops too much to do HD. Ms. Andrew Avery asked for clarification on why it is recommended that the pt go to [facility]. ARMIN EM clarified that [facility] is the better location for providing the slow Lasix/Metolazone drip prescribed by Dr. Geraldine Castro. Explained that this slow drip will take fluid off slowly without dropping the pt's BP. Ms. Andrew Avery also asked about the difference between palliative care and hospice. She expressed frustration that, last Friday, a nephrologist strongly recommended hospice. Ms. Andrew Avery added that she thought hospice had been recommended because of the pt's age. Reviewed that the hospice recommendation was due to the pt's HF and ESRD, not his age. The pt and his wife continue to want aggressive care. Will continue to follow.  
  
Will discuss findings with members of the interdisciplinary team.   
 
  
 More than 50% of this 35 minute visit was spent counseling and coordination of care as outlined above. Subjective:  
 
Review of Systems: 
Review of systems not obtained due to patient factors Extremities - pain in L forearm due to potassium administration Objective:  
 
Visit Vitals BP 92/54 (BP 1 Location: Left arm, BP Patient Position: At rest) Pulse 74 Temp 98.5 °F (36.9 °C) Resp 18 Ht 5' 10\" (1.778 m) Wt 223 lb 8 oz (101.4 kg) SpO2 98% BMI 32.07 kg/m² Physical Exam: 
 
General:  Debilitated. No acute distress. Eyes:  Conjunctivae/corneas clear Nose: Nares normal. Septum midline. Neck: Supple, symmetrical, trachea midline Lungs:   Coarse bilaterally, unlabored Heart:  Regular rate and rhythm Abdomen:   Soft, non-tender, non-distended Extremities: Normal, atraumatic, no cyanosis. +1 pitting BLE edema Skin: Skin color, texture, turgor normal.   
Neurologic: Nonfocal  
Psych: Alert and oriented. Signed By: Adrián Whitney NP March 4, 2019

## 2019-03-04 NOTE — PROGRESS NOTES
Hourly rounds completed on patient. Pt had multiple episodes of vomiting through out the night, gave zofran per Mar. Wife is at the bedside. Will continue to monitor. Patient denies any needs at this time. Will report to oncoming nurse.

## 2019-03-04 NOTE — PROGRESS NOTES
Wife called nurse into room to look at diialysis catheter dressing. Patient had scratched dressing and caused dressing to tear and bio patch to be removed. Site was bloody. Changed dressing using sterile technique. Contacted IR, spoke to Aysha Villasenor in IR. IR physician will come assess catheter in AM to make sure catheter still in correct position.

## 2019-03-04 NOTE — PROGRESS NOTES
PT Daily Note: 
Second attempt to see patient for physical therapy today but patient was unavailable as he was speaking with other medical staff. Will check back on patient at a later date/time if schedule permits.  
Thank you, 
Brigida Oleary, PTA

## 2019-03-04 NOTE — PROGRESS NOTES
PT Daily Note: Attempted to see patient for physical therapy this morning but patient was being cleaned up by PCT and family member at bedside asked PT to return at a later time. Will check back on patient at a later date/time if schedule permits.  
Thank you, 
Chip Russell, PTA

## 2019-03-04 NOTE — PROGRESS NOTES
Pt and family agreeable to [facility]. Precert started-awaiting response. Patient

## 2019-03-05 LAB
ALBUMIN SERPL-MCNC: 2.7 G/DL (ref 3.2–4.6)
ANION GAP SERPL CALC-SCNC: 13 MMOL/L (ref 7–16)
BUN SERPL-MCNC: 77 MG/DL (ref 8–23)
CALCIUM SERPL-MCNC: 8 MG/DL (ref 8.3–10.4)
CHLORIDE SERPL-SCNC: 99 MMOL/L (ref 98–107)
CO2 SERPL-SCNC: 25 MMOL/L (ref 21–32)
CREAT SERPL-MCNC: 5.81 MG/DL (ref 0.8–1.5)
ERYTHROCYTE [DISTWIDTH] IN BLOOD BY AUTOMATED COUNT: 19.5 % (ref 11.9–14.6)
GLUCOSE SERPL-MCNC: 85 MG/DL (ref 65–100)
HCT VFR BLD AUTO: 36.2 % (ref 41.1–50.3)
HGB BLD-MCNC: 11.8 G/DL (ref 13.6–17.2)
MCH RBC QN AUTO: 26.8 PG (ref 26.1–32.9)
MCHC RBC AUTO-ENTMCNC: 32.6 G/DL (ref 31.4–35)
MCV RBC AUTO: 82.3 FL (ref 79.6–97.8)
NRBC # BLD: 0 K/UL (ref 0–0.2)
PHOSPHATE SERPL-MCNC: 5.6 MG/DL (ref 2.3–3.7)
PLATELET # BLD AUTO: 65 K/UL (ref 150–450)
PMV BLD AUTO: ABNORMAL FL (ref 9.4–12.3)
POTASSIUM SERPL-SCNC: 2.8 MMOL/L (ref 3.5–5.1)
RBC # BLD AUTO: 4.4 M/UL (ref 4.23–5.6)
SODIUM SERPL-SCNC: 137 MMOL/L (ref 136–145)
WBC # BLD AUTO: 7.7 K/UL (ref 4.3–11.1)

## 2019-03-05 PROCEDURE — 74011250636 HC RX REV CODE- 250/636: Performed by: INTERNAL MEDICINE

## 2019-03-05 PROCEDURE — 97530 THERAPEUTIC ACTIVITIES: CPT

## 2019-03-05 PROCEDURE — 99231 SBSQ HOSP IP/OBS SF/LOW 25: CPT | Performed by: NURSE PRACTITIONER

## 2019-03-05 PROCEDURE — 65270000029 HC RM PRIVATE

## 2019-03-05 PROCEDURE — 74011250637 HC RX REV CODE- 250/637: Performed by: INTERNAL MEDICINE

## 2019-03-05 PROCEDURE — 85027 COMPLETE CBC AUTOMATED: CPT

## 2019-03-05 PROCEDURE — 80069 RENAL FUNCTION PANEL: CPT

## 2019-03-05 PROCEDURE — 36415 COLL VENOUS BLD VENIPUNCTURE: CPT

## 2019-03-05 PROCEDURE — 74011250637 HC RX REV CODE- 250/637: Performed by: HOSPITALIST

## 2019-03-05 PROCEDURE — 74011250637 HC RX REV CODE- 250/637: Performed by: UROLOGY

## 2019-03-05 RX ORDER — POTASSIUM CHLORIDE 14.9 MG/ML
20 INJECTION INTRAVENOUS EVERY 4 HOURS
Status: COMPLETED | OUTPATIENT
Start: 2019-03-05 | End: 2019-03-05

## 2019-03-05 RX ORDER — POTASSIUM CHLORIDE 1.5 G/1.77G
40 POWDER, FOR SOLUTION ORAL ONCE
Status: COMPLETED | OUTPATIENT
Start: 2019-03-05 | End: 2019-03-05

## 2019-03-05 RX ORDER — MELATONIN
2000 DAILY
Status: DISCONTINUED | OUTPATIENT
Start: 2019-03-05 | End: 2019-03-12 | Stop reason: HOSPADM

## 2019-03-05 RX ORDER — POTASSIUM CHLORIDE 1.5 G/1.77G
40 POWDER, FOR SOLUTION ORAL ONCE
Status: ACTIVE | OUTPATIENT
Start: 2019-03-05 | End: 2019-03-06

## 2019-03-05 RX ADMIN — POTASSIUM CHLORIDE 40 MEQ: 1.5 POWDER, FOR SOLUTION ORAL at 09:09

## 2019-03-05 RX ADMIN — POTASSIUM CHLORIDE 20 MEQ: 200 INJECTION, SOLUTION INTRAVENOUS at 12:54

## 2019-03-05 RX ADMIN — POTASSIUM CHLORIDE 20 MEQ: 200 INJECTION, SOLUTION INTRAVENOUS at 16:34

## 2019-03-05 RX ADMIN — METOLAZONE 10 MG: 5 TABLET ORAL at 09:09

## 2019-03-05 RX ADMIN — APIXABAN 5 MG: 2.5 TABLET, FILM COATED ORAL at 09:09

## 2019-03-05 RX ADMIN — Medication 10 ML: at 13:18

## 2019-03-05 RX ADMIN — DIGOXIN 0.25 MG: 250 TABLET ORAL at 09:10

## 2019-03-05 RX ADMIN — FLUTICASONE PROPIONATE 2 SPRAY: 50 SPRAY, METERED NASAL at 09:00

## 2019-03-05 RX ADMIN — MIDODRINE HYDROCHLORIDE 15 MG: 5 TABLET ORAL at 09:09

## 2019-03-05 RX ADMIN — APIXABAN 5 MG: 2.5 TABLET, FILM COATED ORAL at 21:16

## 2019-03-05 RX ADMIN — METOLAZONE 10 MG: 5 TABLET ORAL at 17:55

## 2019-03-05 RX ADMIN — FINASTERIDE 5 MG: 5 TABLET, FILM COATED ORAL at 09:08

## 2019-03-05 RX ADMIN — FLUDROCORTISONE ACETATE 200 MCG: 0.1 TABLET ORAL at 09:09

## 2019-03-05 RX ADMIN — Medication 10 ML: at 21:16

## 2019-03-05 RX ADMIN — POTASSIUM CHLORIDE 10 MEQ: 10 TABLET, EXTENDED RELEASE ORAL at 09:09

## 2019-03-05 RX ADMIN — VITAMIN D, TAB 1000IU (100/BT) 2000 UNITS: 25 TAB at 09:09

## 2019-03-05 RX ADMIN — Medication 1 TABLET: at 09:08

## 2019-03-05 RX ADMIN — NYSTATIN: 100000 POWDER TOPICAL at 09:00

## 2019-03-05 RX ADMIN — LACTULOSE 20 G: 20 SOLUTION ORAL at 09:09

## 2019-03-05 RX ADMIN — PANTOPRAZOLE SODIUM 40 MG: 40 TABLET, DELAYED RELEASE ORAL at 05:25

## 2019-03-05 RX ADMIN — Medication 10 ML: at 05:27

## 2019-03-05 RX ADMIN — HYDROCODONE BITARTRATE AND ACETAMINOPHEN 1 TABLET: 5; 325 TABLET ORAL at 01:51

## 2019-03-05 RX ADMIN — MIDODRINE HYDROCHLORIDE 15 MG: 5 TABLET ORAL at 17:55

## 2019-03-05 RX ADMIN — POTASSIUM CHLORIDE 20 MEQ: 200 INJECTION, SOLUTION INTRAVENOUS at 20:21

## 2019-03-05 RX ADMIN — MIDODRINE HYDROCHLORIDE 15 MG: 5 TABLET ORAL at 12:00

## 2019-03-05 RX ADMIN — NYSTATIN: 100000 POWDER TOPICAL at 18:00

## 2019-03-05 NOTE — PROGRESS NOTES
Hospitalist Progress Note     Admit Date:  2019 11:19 AM   Name:  Rain Herrera. Age:  66 y.o.  :  1945   MRN:  [id number]   PCP:  Tru Lin MD  Treatment Team: Attending Provider: Zachery Villareal DO; Consulting Provider: Maldonado Chen MD; Utilization Review: Ester Malik RN; Consulting Provider: Jessica Tam MD; Care Manager: Riddhi Miller RN; Occupational Therapist: Anil Simon OT    Subjective:     From previous notes: \"66year-old gentleman with multiple medical problems including CHF, A. fib, CKD stage IV,, chronic COPD, obstructive sleep apnea who is noncompliant with CPAP, who is currently in a rehab facility, admitted on  for acute on chronic CHF in view of worsening bilateral LE swelling, hypoxic respiratory failure and hematuria. was brought into the emergency room with complaints of hematuria, leg swellings and shortness of breath. He was also having shortness of breath, moderate in severity, progressively getting worse, worse with minimal exertion, not resolved with rest.  Tunneled cath placed  and HD started but could not tolerate his session due to hypotension. Midodrine was restarted and he has remained intolerated of HD due to BP issues. \" [facility] recommended hospice care on 3/1/19 so the wife requested a second opinion.     3/3 - He is sitting in the chair this morning. States he had a good night and he feels better today. Denies CP/SOB. Thinks his BLE edema is improved. 3/4 - He continues to be alert and feels better. SOB improved. Having diarrhea with Lactulose. Denies CP/SOB.     3/5/19:  Alert. Complains of diarrhea and we are decreasing lactulose. Still too unstable for hd. He is having some urine output with lasix gtt/metolazone and increasing dose midodrine. Nephrology made clear they suspect patient near end of life with likely cardiorenal syndrome. Current plan is attempt arrange placement ltach facility. Review of systems negative except stated above. Objective:     Patient Vitals for the past 24 hrs:   Temp Pulse Resp BP SpO2   03/05/19 1435 98.8 °F (37.1 °C)   101/59    03/05/19 1143 97.8 °F (36.6 °C)  19 99/54    03/05/19 0805 98 °F (36.7 °C)  19 91/56    03/05/19 0542 97.8 °F (36.6 °C) 67 18 100/72 90 %   03/05/19 0000 97.8 °F (36.6 °C) 62 18 103/65 90 %   03/04/19 1936 97.2 °F (36.2 °C) 72 18 109/71 99 %   03/04/19 1723 98.5 °F (36.9 °C) 96 20 109/68 100 %     Oxygen Therapy  O2 Sat (%): 90 % (03/05/19 0542)  Pulse via Oximetry: 70 beats per minute (03/04/19 0831)  O2 Device: Nasal cannula (03/04/19 0831)  O2 Flow Rate (L/min): 3 l/min (03/04/19 0831)  ETCO2 (mmHg): 21 mmHg (02/25/19 1347)    Intake/Output Summary (Last 24 hours) at 3/5/2019 1637  Last data filed at 3/5/2019 0547  Gross per 24 hour   Intake    Output 2126 ml   Net -2126 ml         Physical Examination:  Physical Exam   Constitutional: He is oriented to person, place, and time. No distress. HENT:   Mouth/Throat: Oropharynx is clear and moist. No oropharyngeal exudate. Eyes: Right eye exhibits no discharge. No scleral icterus. Neck: No tracheal deviation present. Cardiovascular: Normal rate. Exam reveals no gallop. Pulmonary/Chest: Breath sounds normal. No stridor. No respiratory distress. He exhibits no tenderness. Abdominal: There is no tenderness. There is no rebound. Musculoskeletal: He exhibits edema. He exhibits no tenderness. Neurological: He is alert and oriented to person, place, and time. GCS score is 15. Skin: Skin is dry. He is not diaphoretic. No pallor. Data Review:  I have reviewed all labs, meds, telemetry events, and studies from the last 24 hours.     Recent Results (from the past 24 hour(s))   RENAL FUNCTION PANEL    Collection Time: 03/05/19  5:58 AM   Result Value Ref Range    Sodium 137 136 - 145 mmol/L    Potassium 2.8 (LL) 3.5 - 5.1 mmol/L    Chloride 99 98 - 107 mmol/L    CO2 25 21 - 32 mmol/L    Anion gap 13 7 - 16 mmol/L    Glucose 85 65 - 100 mg/dL    BUN 77 (H) 8 - 23 MG/DL    Creatinine 5.81 (H) 0.8 - 1.5 MG/DL    GFR est AA 12 (L) >60 ml/min/1.73m2    GFR est non-AA 10 (L) >60 ml/min/1.73m2    Calcium 8.0 (L) 8.3 - 10.4 MG/DL    Phosphorus 5.6 (H) 2.3 - 3.7 MG/DL    Albumin 2.7 (L) 3.2 - 4.6 g/dL   CBC W/O DIFF    Collection Time: 03/05/19  5:58 AM   Result Value Ref Range    WBC 7.7 4.3 - 11.1 K/uL    RBC 4.40 4.23 - 5.6 M/uL    HGB 11.8 (L) 13.6 - 17.2 g/dL    HCT 36.2 (L) 41.1 - 50.3 %    MCV 82.3 79.6 - 97.8 FL    MCH 26.8 26.1 - 32.9 PG    MCHC 32.6 31.4 - 35.0 g/dL    RDW 19.5 (H) 11.9 - 14.6 %    PLATELET 65 (L) 721 - 450 K/uL    MPV Unable to calculate. Recommend adding IPF.  9.4 - 12.3 FL    ABSOLUTE NRBC 0.00 0.0 - 0.2 K/uL        All Micro Results     Procedure Component Value Units Date/Time    CULTURE, BLOOD [[id number]] Collected:  02/22/19 1740    Order Status:  Completed Specimen:  Blood Updated:  02/27/19 0837     Special Requests: --        RIGHT  HAND       Culture result: NO GROWTH 5 DAYS       CULTURE, BLOOD [[id number]] Collected:  02/22/19 1753    Order Status:  Completed Specimen:  Blood Updated:  02/27/19 0837     Special Requests: --        RIGHT  FOREARM       Culture result: NO GROWTH 5 DAYS       CULTURE, BLOOD [[id number]] Collected:  02/19/19 1140    Order Status:  Completed Specimen:  Blood Updated:  02/24/19 0654     Special Requests: --        RIGHT  Antecubital       Culture result: NO GROWTH 5 DAYS       CULTURE, BLOOD [[id number]]  (Abnormal) Collected:  02/19/19 1252    Order Status:  Completed Specimen:  Blood Updated:  02/23/19 0747     Special Requests: --        RIGHT  HAND       GRAM STAIN       GRAM POSITIVE COCCI IN CHAINS                  AEROBIC AND ANAEROBIC BOTTLES                  RESULTS VERIFIED, PHONED TO AND READ BACK BY EAMON GONZALEZ @0847 02/20/2019 [facility]           Culture result:       STAPHYLOCOCCUS SPECIES, COAGULASE NEGATIVE ALPHA STREPTOCOCCUS, NOT S. PNEUMONIAE                  THIS ORGANISM MAY BE INDICATIVE OF CULTURE CONTAMINATION, HOWEVER, CLINICAL CORRELATION NEEDS TO BE EVALUATED, AS EACH CASE IS UNIQUE.           CULTURE, URINE [[id number]]  (Abnormal)  (Susceptibility) Collected:  02/19/19 1220    Order Status:  Completed Specimen:  Cath Urine Updated:  02/22/19 0645     Special Requests: NO SPECIAL REQUESTS        Culture result:       1000 COLONIES/mL STAPHYLOCOCCUS HAEMOLYTICUS                Current Meds:  Current Facility-Administered Medications   Medication Dose Route Frequency    cholecalciferol (VITAMIN D3) tablet 2,000 Units  2,000 Units Oral DAILY    B complex-vitamin C-folic acid (NEPHRO-JANEL) 0.8 mg tab  1 Tab Oral DAILY    potassium chloride (KLOR-CON) packet for solution 40 mEq  40 mEq Oral ONCE    potassium chloride 20 mEq in 100 ml IVPB  20 mEq IntraVENous Q4H    lactulose (CHRONULAC) solution 10 g  10 g Oral BID    potassium chloride (KLOR-CON) tablet 10 mEq  10 mEq Oral DAILY    nystatin (MYCOSTATIN) 100,000 unit/gram powder   Topical BID    metOLazone (ZAROXOLYN) tablet 10 mg  10 mg Oral BID    furosemide (LASIX) 100 mg in 0.9% sodium chloride 100 mL infusion  30 mg/hr IntraVENous CONTINUOUS    midodrine (PROAMITINE) tablet 15 mg  15 mg Oral TID WITH MEALS    [START ON 3/6/2019] digoxin (LANOXIN) tablet 0.125 mg  0.125 mg Oral Q MON, WED & FRI    mineral oil-hydrophil petrolat (AQUAPHOR) ointment   Topical PRN    apixaban (ELIQUIS) tablet 5 mg  5 mg Oral BID    lidocaine (XYLOCAINE) 20 mg/mL (2 %) injection  mg  1-20 mL IntraDERMal Multiple    albuterol-ipratropium (DUO-NEB) 2.5 MG-0.5 MG/3 ML  3 mL Nebulization Q4H PRN    fluticasone (FLONASE) 50 mcg/actuation nasal spray 2 Spray  2 Spray Both Nostrils DAILY    pantoprazole (PROTONIX) tablet 40 mg  40 mg Oral ACB    polyethylene glycol (MIRALAX) packet 17 g  17 g Oral DAILY    sodium chloride (NS) flush 5-40 mL  5-40 mL IntraVENous Q8H    sodium chloride (NS) flush 5-40 mL  5-40 mL IntraVENous PRN    acetaminophen (TYLENOL) tablet 650 mg  650 mg Oral Q4H PRN    HYDROcodone-acetaminophen (NORCO) 5-325 mg per tablet 1 Tab  1 Tab Oral Q4H PRN    morphine injection 2 mg  2 mg IntraVENous Q4H PRN    naloxone (NARCAN) injection 0.4 mg  0.4 mg IntraVENous PRN    diphenhydrAMINE (BENADRYL) capsule 25 mg  25 mg Oral Q4H PRN    ondansetron (ZOFRAN) injection 4 mg  4 mg IntraVENous Q4H PRN    finasteride (PROSCAR) tablet 5 mg  5 mg Oral DAILY       Diet:  DIET REGULAR  DIET NUTRITIONAL SUPPLEMENTS    Other Studies (last 24 hours):  No results found.     Assessment and Plan:     Hospital Problems as of 3/5/2019 Date Reviewed: 12/11/2018          Codes Class Noted - Resolved POA    ESRD (end stage renal disease) on dialysis [facility]) ICD-10-CM: N18.6, Z99.2  ICD-9-CM: 585.6, V45.11  3/2/2019 - Present Yes        Acute pulmonary edema ([facility] ICD-10-CM: J81.0  ICD-9-CM: 518.4  3/2/2019 - Present Yes        Hemodialysis-associated hypotension ICD-10-CM: I95.3  ICD-9-CM: 458.21  3/2/2019 - Present Yes        Syncope ICD-10-CM: R55  ICD-9-CM: 780.2  3/2/2019 - Present No        Thrombocytopenia ([facility] ICD-10-CM: D69.6  ICD-9-CM: 287.5  3/2/2019 - Present Yes        Hematuria ICD-10-CM: R31.9  ICD-9-CM: 599.70  3/2/2019 - Present Yes        Anemia due to chronic kidney disease ICD-10-CM: N18.9, D63.1  ICD-9-CM: 285.21  3/2/2019 - Present Yes        CKD (chronic kidney disease) stage 4, GFR 15-29 ml/min ([facility]) ICD-10-CM: N18.4  ICD-9-CM: 585.4  2/20/2019 - Present         Lactic acidosis ICD-10-CM: E87.2  ICD-9-CM: 276.2  2/20/2019 - Present Yes        Tobacco abuse ICD-10-CM: Z72.0  ICD-9-CM: 305.1  1/22/2019 - Present Yes        Atrial fibrillation ([facility] ICD-10-CM: I48.91  ICD-9-CM: 427.31  11/27/2018 - Present Yes        Acute on chronic combined systolic and diastolic CHF (congestive heart failure) (HCC) ICD-10-CM: I50.43  ICD-9-CM: 428.43, 428.0 11/26/2018 - Present Yes        Pulmonary hypertension (HCC) (Chronic) ICD-10-CM: I27.20  ICD-9-CM: 416.8  10/20/2018 - Present Yes        Type 2 diabetes with nephropathy ([facility] ICD-10-CM: E11.21  ICD-9-CM: 250.40, 583.81  10/8/2018 - Present Yes        * (Principal) Acute respiratory failure with hypoxia (HCC) ICD-10-CM: J96.01  ICD-9-CM: 518.81  5/24/2018 - Present Yes        TAZ (obstructive sleep apnea) ICD-10-CM: G47.33  ICD-9-CM: 327.23  10/2/2017 - Present Yes    Overview Signed 12/11/2018  1:24 PM by Jovanna Eisenberg NP     Intolerant of CPAP             CKD (chronic kidney disease) stage 3, GFR 30-59 ml/min (HCC) (Chronic) ICD-10-CM: N18.3  ICD-9-CM: 585.3  9/29/2017 - Present         Coronary atherosclerosis of native coronary vessel (Chronic) ICD-10-CM: I25.10  ICD-9-CM: 414.01  9/29/2017 - Present Yes        Hypertension (Chronic) ICD-10-CM: I10  ICD-9-CM: 401.9  9/29/2017 - Present         CHF (congestive heart failure) (HCC) ICD-10-CM: I50.9  ICD-9-CM: 428.0  9/27/2017 - Present         Mixed hyperlipidemia (Chronic) ICD-10-CM: H92.3  ICD-9-CM: 272.2  9/28/2016 - Present Yes              A/P:    Principal Problem:    Acute respiratory failure with hypoxia ([facility] (5/24/2018)  - Due to acute pulmonary edema  - cardiorenal syndrome-- some response to lasix gtt/metolazone, increase midodrine.  nephro to add florinef dig.       Active Problems:    Acute on chronic combined systolic and diastolic CHF (congestive heart failure) ([facility]) (11/26/2018)  - EF 25%-30%  - diuretic dig as above  - No ACE or BB due to hypotension  - Cardiology signed off       ESRD (end stage renal disease) on dialysis ([facility] (3/2/2019)  - New start HD --> did not tolerate as he had hypotension and syncope during first 2 attempts  - [address] [facility] recommended hospice and signed off/fired by patient's wife  - Dr. Peggy Rosa started intermittent Lasix gtt --was clear he agrees with expected prognosis but timing not clear      Thrombocytopenia ([facility] (3/2/2019)  - Stable  - Likely due to acute illness--agree and no change -- follow cbc       Anemia due to chronic kidney disease (3/2/2019)    Follow CBC      Hx cad       Type 2 diabetes with nephropathy ([address] (10/8/2018)  - No acute issues  - Continue to monitor       Pulmonary hypertension ([address] (10/20/2018)       Atrial fibrillation ([address] (11/27/2018)  - Currently NSR, but had periods of paroxysmal afib  - Continue Eliquis cva prophylaxis  - No HR/BP meds due to hypotension       Mixed hyperlipidemia (9/28/2016)       TAZ (obstructive sleep apnea) (10/2/2017)  - Non-compliant with CPAP--intolerant per spouse       Tobacco abuse (1/22/2019)     Dispo - .  Discharge to [facility] once insurance approves.     DIET REGULAR 2 GM NA (House Low NA)  DIET NUTRITIONAL SUPPLEMENTS All Meals; Nepro     DVT Prophylaxis: Eliquis

## 2019-03-05 NOTE — PROGRESS NOTES
RENAL PROGRESS NOTE    Subjective:     Daya - I was asked to see patient for second opinion    Patient is a 67 y/o AAM with Acute Kidney Injury on top of Chronic Kidney Disease stage 3 had attempts at dialysis x three days, but each time needed to be taken off dialysis due to hemodynamic instability. He also has CHF, A. fib, obstructive sleep apnea who was admitted from a rehab facility on 2/19 for acute on chronic CHF with worsening bilateral LE swelling, hypoxic respiratory failure and hematuria. He did not respond to medical management and a tunneled cath placed 2/25 and HD started. He could not tolerate his session due to hypotension even after midodrine treatment. [facility] recommended hospice care on 3/1/19 so the wife requested a second opinion. The patient is uncomfortable due to dyspnea despite O2 by NC and is thirsty. His wife is at bedside. I discussed with patient and wife that dialysis is a good treatment for kidney failure, but that his main problem is heart failure and that heart transplant is a treatment for heart failure, however, he is not a candidate for it due to co-morbid conditions and advanced age. I  Discussed that in order to attempt dialysis again he would need to improve hemodynamic stability and respond favorably to medication changes, but that the likelihood of success was not good as he has not responded well to medical therapy so far.     3/3/19 - feels and breathes better - in better spirit - no longer in respiratory distress - edema improving   3/4/19 - gradually progressing - no labs available yet - will continue to try medical therapy  3/5/19 - alert and communicating well this am, but wife states she has noted some confusion - no new tremor, no overt uremic symptoms - respiratory status improving        Past Medical History:   Diagnosis Date    Acute CHF (congestive heart failure) ([address] 4/25/2015    Acute combined systolic and diastolic congestive heart failure ([address] 4/28/2015    De Quervain's tenosynovitis, right 4/28/2015    Dyspnea on exertion 6/28/2015    Elevated serum creatinine 4/25/2015    Elevated troponin 6/28/2015    History of coronary artery disease     MI at 27 yo    History of right knee surgery     cartilage removal    History of shingles     Malignant hypertension 4/25/2015    [facility])       Past Surgical History:   Procedure Laterality Date    HX HEART CATHETERIZATION  6/29/2015    no intervention    HX KNEE ARTHROSCOPY Right     removal of cartilage    IR INSERT TUNL CVC W/O PORT OVER 5 YR  2/25/2019      Prior to Admission medications    Medication Sig Start Date End Date Taking? Authorizing Provider   omeprazole (PRILOSEC) 20 mg capsule Take 1 Cap by mouth daily. 1/24/19   Osvaldo Melton MD   potassium chloride (K-DUR, KLOR-CON) 20 mEq tablet Take 1 Tab by mouth daily. 1/23/19   Julieann Kussmaul, MD   Blood-Glucose Meter (ACCU-CHEK YOSEF PLUS METER) misc USE TO CHECK BLOOD SUGARS DAILY DX: E11.9 12/27/18   Osvaldo Melton MD   Blood Glucose Control High&Low (ACCU-CHEK YOSEF CONTROL SOLN) soln USE AS DIRECTED 12/27/18   Osvaldo Melton MD   glucose blood VI test strips (ACCU-CHEK YOSEF PLUS TEST STRP) strip USE TO CHECK BLOOD SUGARS DAILY DX: E11.9 12/27/18   Osvaldo Melton MD   alcohol swabs (BD SINGLE USE SWABS REGULAR) padm USE AS DIRECTED DAILY DX. E11.9 12/27/18   Osvaldo Melton MD   lancets (ACCU-CHEK SOFTCLIX LANCETS) misc USE TO CHECK BLOOD SUGARS DAILY DX: E11.9 12/27/18   Osvaldo Melton MD   carvedilol (COREG) 6.25 mg tablet Take 1 Tab by mouth two (2) times daily (with meals). 12/3/18   Michelle Saleh MD   apixaban (ELIQUIS) 5 mg tablet Take 1 Tab by mouth every twelve (12) hours. 12/3/18   Michelle Saleh MD   aspirin 81 mg chewable tablet Take 1 Tab by mouth daily. 11/5/18   Avery Marie MD   atorvastatin (LIPITOR) 20 mg tablet Take 1 Tab by mouth nightly.  10/4/18   Osvaldo Melton MD allopurinol (ZYLOPRIM) 100 mg tablet TAKE 1 TABLET BY MOUTH EVERY DAY 9/25/18   Carlin Lewis MD   fluticasone [facility]) 50 mcg/actuation nasal spray 2 Sprays by Both Nostrils route daily. 3/29/18   Allyssa Mata MD   melatonin 3 mg tablet Take 3 mg by mouth nightly. Provider, Historical   albuterol (VENTOLIN HFA) 90 mcg/actuation inhaler Take 2 Puffs by inhalation four (4) times daily. 2/6/18   Ari Singh NP   polyethylene glycol (MIRALAX) 17 gram packet Take 1 Packet by mouth daily. Patient taking differently: Take 17 g by mouth daily as needed. 1/13/18   Brunilda MERLOS NP   albuterol-ipratropium (DUO-NEB) 2.5 mg-0.5 mg/3 ml nebu 3 mL by Nebulization route four (4) times daily.  Diaignosis--J44.9 12/13/17   Ari Singh NP     Allergies   Allergen Reactions    Ativan [Lorazepam] Other (comments)     Confusion- mild but long lasting- days    Pcn [Penicillins] Other (comments)     \"makes my heart stop\"      Social History     Tobacco Use    Smoking status: Former Smoker     Packs/day: 0.25     Years: 20.00     Pack years: 5.00     Types: Cigarettes     Last attempt to quit: 4/5/2015     Years since quitting: 3.9    Smokeless tobacco: Never Used   Substance Use Topics    Alcohol use: No     Alcohol/week: 0.0 oz      Family History   Problem Relation Age of Onset    Hypertension Mother     No Known Problems Father           Review of Systems    Constitutional: no fever, rested better   Eyes: fair vision,    Ears, nose, mouth, throat, and face:fair hearing,    Respiratory: no asthma,    Cardiovascular:no chest pain,    Gastrointestinal:no diarrhea,    Genitourinary: he has had hematuria and dysuria,   Hematologic/lymphatic: no bleeding tendency,    Neurological: no seizures,    Behvioral/Psych: no psych hospitalization    Endocrine: no goiter,       Objective:       Visit Vitals  /72 (BP 1 Location: Left arm, BP Patient Position: At rest)   Pulse 67   Temp 97.8 °F (36.6 °C)   Resp 18   Ht 5' 10\" (1.778 m)   Wt 101.7 kg (224 lb 3.2 oz)   SpO2 90%   BMI 32.17 kg/m²       03/04 1901 - 03/05 0700  In: -   Out: 6233 [BITAQ:6817]  03/03 0701 - 03/04 1900  In: 360 [P.O.:360]  Out: 6908 [Urine:3075]    General:  Alert, cooperative, no respiratory distress on RA, appears stated age. Head:  Normocephalic, without obvious abnormality, atraumatic. Eyes:  Conjunctivae/corneas clear. EOMs intact. Throat: Lips, mucosa, and tongue normal. Teeth and gums normal.   Neck: Supple, symmetrical, trachea midline, no adenopathy, pos for JVD. Lungs:   Crackles in bases to auscultation bilaterally improving. Heart:  Regular rate and rhythm, S1, S2 normal, no  rub. Abdomen:   Soft, non-tender. Bowel sounds normal. No masses,  No organomegaly. No renal bruit. Abdominal wall is bulging and ascites is present   Extremities: Extremities normal, atraumatic, no cyanosis, 3+ edema, improving. Skin: Skin color, texture, turgor normal. No rashes or lesions. Neurologic: Grossly intact. No asterixis. Data Review:     Results for Ang Tejada (MRN [id number]) as of 3/5/2019 09:51   Ref.  Range 3/3/2019 10:53 3/4/2019 06:42 3/5/2019 05:58   Sodium Latest Ref Range: 136 - 145 mmol/L 136 136 137   Potassium Latest Ref Range: 3.5 - 5.1 mmol/L 3.5 3.1 (L) 2.8 (LL)   Chloride Latest Ref Range: 98 - 107 mmol/L 99 98 99   CO2 Latest Ref Range: 21 - 32 mmol/L 26 28 25   Anion gap Latest Ref Range: 7 - 16 mmol/L 11 10 13   Glucose Latest Ref Range: 65 - 100 mg/dL 154 (H) 90 85   BUN Latest Ref Range: 8 - 23 MG/DL 73 (H) 72 (H) 77 (H)   Creatinine Latest Ref Range: 0.8 - 1.5 MG/DL 5.97 (H) 5.87 (H) 5.81 (H)   Calcium Latest Ref Range: 8.3 - 10.4 MG/DL 8.3 8.0 (L) 8.0 (L)   Phosphorus Latest Ref Range: 2.3 - 3.7 MG/DL 5.2 (H) 5.3 (H) 5.6 (H)   GFR est non-AA Latest Ref Range: >60 ml/min/1.73m2 10 (L) 10 (L) 10 (L)   GFR est AA Latest Ref Range: >60 ml/min/1.73m2 12 (L) 12 (L) 12 (L)   Albumin Latest Ref Range: 3.2 - 4.6 g/dL 2.7 (L) 2.6 (L) 2.7 (L)       Recent Results (from the past 24 hour(s))   RENAL FUNCTION PANEL    Collection Time: 03/04/19  6:42 AM   Result Value Ref Range    Sodium 136 136 - 145 mmol/L    Potassium 3.1 (L) 3.5 - 5.1 mmol/L    Chloride 98 98 - 107 mmol/L    CO2 28 21 - 32 mmol/L    Anion gap 10 7 - 16 mmol/L    Glucose 90 65 - 100 mg/dL    BUN 72 (H) 8 - 23 MG/DL    Creatinine 5.87 (H) 0.8 - 1.5 MG/DL    GFR est AA 12 (L) >60 ml/min/1.73m2    GFR est non-AA 10 (L) >60 ml/min/1.73m2    Calcium 8.0 (L) 8.3 - 10.4 MG/DL    Phosphorus 5.3 (H) 2.3 - 3.7 MG/DL    Albumin 2.6 (L) 3.2 - 4.6 g/dL   CBC W/O DIFF    Collection Time: 03/04/19  6:42 AM   Result Value Ref Range    WBC 7.3 4.3 - 11.1 K/uL    RBC 4.08 (L) 4.23 - 5.6 M/uL    HGB 11.1 (L) 13.6 - 17.2 g/dL    HCT 33.5 (L) 41.1 - 50.3 %    MCV 82.1 79.6 - 97.8 FL    MCH 27.2 26.1 - 32.9 PG    MCHC 33.1 31.4 - 35.0 g/dL    RDW 19.3 (H) 11.9 - 14.6 %    PLATELET 58 (L) 445 - 450 K/uL    MPV Unable to calculate. Recommend adding IPF. 9.4 - 12.3 FL    ABSOLUTE NRBC 0.00 0.0 - 0.2 K/uL       CXR shows massive cardiomegaly and fluid excess      ECHO 2/21/19 -  SUMMARY:  -  Left ventricle: Systolic function was markedly reduced. Ejection fraction  was estimated in the range of 25 % to 30 %. There was severe diffuse  hypokinesis with distinct regional wall motion abnormalities. There was   severeglobal hypokinesis. There was severe concentric hypertrophy. The myocardial  specular pattern appeared moderately abnormal. Consider infiltrative  cardiomyopathy if clinically indicated. -  Right ventricle: The ventricle was mildly dilated. Systolic function was  moderately to markedly reduced. There was mild pulmonary artery hypertension.  -  Left atrium: The atrium was moderately to markedly dilated. -  Right atrium: The atrium was markedly dilated. -  Inferior vena cava, hepatic veins: The inferior vena cava was moderately  dilated.  The respirophasic change in diameter was less than 50%. -  Aortic valve: The valve was trileaflet. Leaflets exhibited moderate  sclerosis. The left coronary cusp demonstrated immobility. There was mild  regurgitation. -  Mitral valve: There was mild to moderate regurgitation. -  Tricuspid valve: There was mild to moderate regurgitation. -  Pulmonic valve: There was moderate regurgitation. Principal Problem:    Acute respiratory failure with hypoxia ([address] (5/24/2018)    Active Problems:    Coronary atherosclerosis of native coronary vessel (9/29/2017)      Mixed hyperlipidemia (9/28/2016)      TAZ (obstructive sleep apnea) (10/2/2017)      Overview: Intolerant of CPAP      Type 2 diabetes with nephropathy ([address] (10/8/2018)      Pulmonary hypertension ([address] (10/20/2018)      Acute on chronic combined systolic and diastolic CHF (congestive heart failure) ([address] (11/26/2018)      Atrial fibrillation ([address] (11/27/2018)      Tobacco abuse (1/22/2019)      Lactic acidosis (2/20/2019)      ESRD (end stage renal disease) on dialysis ([address] (3/2/2019)      Acute pulmonary edema ([address] (3/2/2019)      Hemodialysis-associated hypotension (3/2/2019)      Syncope (3/2/2019)      Thrombocytopenia ([address] (3/2/2019)      Hematuria (3/2/2019)      Anemia due to chronic kidney disease (3/2/2019)        Assessment:     1.  Acute Kidney Injury on top of Chronic Kidney Disease stage 3 -  - mainly due to cardio-renal syndrome  - marked hemodynamic instability with inadequate response to medical therapy and inability to tolerate dialysis despite Midodrine  - he is too unstable for hemodialysis today  - will try to maximize medical therapy including addition of Digoxin and Fludrocortisone while attempting diuresis with Lasix drip/Metolazone and increasing Midodrine  - his wife understands that this is not likely to succeed to get him stabilize for dialysis in the next days and that this is likely and end of life situation  - further improvement with medical management appears to be present  - no urgent dialysis needed     2. Acute on chronic Combined systolic and diastolic heart failure decompensation  - responding to intensify diuresis and medical therapy as above      3. A fib with RVR   - on no antihypertensive  - clinically responding to Digoxin     4. UTI  -  on ceftriaxone     5. Respiratory failure -  - on oxygen therapy per primary team  - improving and tolerating nasal cannula     6. Urinary retention  - S/P Schulz with urology involved     7. Hypermagnesemia -  - hoping for consistently improved hemodynamics with decreasing Magnesium level  - avoid magnesium containing meds, Magnesium citrate discontinued    8. Anemia -  - adequate control    9. Acid/base -  - bicarbonate level is WNL    10. Hypokalemia -  -inadequate response to replaced IV and PO potassium  - treat with KCL 20 mEq IV x 3 doses   - stop Fludrocortisone  - monitor response    11.  Hypocalcemia -  - mostly due to hypoalbuminemia  - add Vitamin D    Plan:     As above    Judy Ashraf M.D.

## 2019-03-05 NOTE — PROGRESS NOTES
Interdisciplinary Rounds completed 03/05/19. Nursing, Case Management, Physician and PT present. Plan of care reviewed and updated.

## 2019-03-05 NOTE — PROGRESS NOTES
3/5/2019  Attempted to see patient for OT, but patient is currently out walking in the hallway with PT. Will re-attempt as time permits.   Thank you,  Radha Rasheed, OT

## 2019-03-05 NOTE — PROGRESS NOTES
Palliative Care Progress Note    Patient: Caty Welch MRN: [id number]  SSN: [id number]    YOB: 1945  Age: 66 y.o. Sex: male       Assessment/Plan:     Chief Complaint/Interval History:  In good spirits today, feels he is improving        Principal Diagnosis:    · Debility, Unspecified  R53.81    Additional Diagnoses:   · Edema  R60.9  · Fatigue, Lethargy  R53.83  · Frailty  R54  · Counseling, Encounter for Medical Advice  Z71.9  · Encounter for Palliative Care  Z51.5    Palliative Performance Scale (PPS)  PPS: 60    Medical Decision Making:   Reviewed and summarized notes from last note until present   Discussed case with appropriate providersShawn Barrios CM  Reviewed laboratory and x-ray data- CBC, BMP    Pt resting in bed, wife, Shayy Meier, at bedside. Pt reports he is doing well today. He continues to respond to medical therapy initiated by Dr Leonie Hernandez, and renal function has improved. He is hopeful that things will continue to improve. Discussed plans for [facility] approval pending. No further  needs- we will sign off. Thank you for allowing us to participate in Mr Love's care. Will discuss findings with members of the interdisciplinary team.         More than 50% of this 15 minute visit was spent counseling and coordination of care as outlined above. Subjective:     Review of Systems:  Comprehensive review of systems was negative except for:  Constitutional: positive for fatigue  CV: positive for improved LE swelling      Objective:     Visit Vitals  BP 99/54 (BP 1 Location: Right arm, BP Patient Position: At rest)   Pulse 67   Temp 97.8 °F (36.6 °C)   Resp 19   Ht 5' 10\" (1.778 m)   Wt 224 lb 3.2 oz (101.7 kg)   SpO2 90%   BMI 32.17 kg/m²       Physical Exam:    General:  Debilitated. No acute distress. Eyes:  Conjunctivae/corneas clear    Nose: Nares normal. Septum midline.    Neck: Supple, symmetrical, trachea midline   Lungs:   Coarse bilaterally, unlabored   Heart: Regular rate and rhythm    Abdomen:   Soft, non-tender, non-distended   Extremities: Normal, atraumatic, no cyanosis. +1 pitting BLE edema   Skin: Skin color, texture, turgor normal.    Neurologic: Nonfocal   Psych: Alert and oriented.      Signed By: Mikal Galarza NP     March 5, 2019

## 2019-03-05 NOTE — PROGRESS NOTES
Hourly rounding completed. Patient rested during the night. No signs of pain or distress noted. All needs met at this time.

## 2019-03-05 NOTE — PROGRESS NOTES
Problem: Mobility Impaired (Adult and Pediatric)  Goal: *Acute Goals and Plan of Care (Insert Text)  LTG:  (1.)Mr. Jose Velazquez will move from supine to sit and sit to supine , scoot up and down and roll side to side in bed with STAND BY ASSIST within 7 treatment day(s). (2.)Mr. Jose Velazquez will transfer from bed to chair and chair to bed with CONTACT GUARD ASSIST using the least restrictive device within 7 treatment day(s). (3.)Mr. Jose Velazquez will ambulate with MINIMAL ASSIST for 75 feet with the least restrictive device within 7 treatment day(s). (4.)Mr. Jose Velazquez will participate in therapeutic activity/exercises x 23 minutes for increased strength within 7 days. ________________________________________________________________________________________________      PHYSICAL THERAPY: Daily Note and PM 3/5/2019  INPATIENT: PT Visit Days : 3  Payor: Frederic Can / Plan: [address] [place] [address] HMO / Product Type: [employer] /       NAME/AGE/GENDER: Ellen Monroy is a 66 y.o. male   PRIMARY DIAGNOSIS: CHF (congestive heart failure) ([facility] [I50.9] Acute respiratory failure with hypoxia ([facility] [facility] Acute respiratory failure with hypoxia ([facility]       ICD-10: Treatment Diagnosis:    · Generalized Muscle Weakness (M62.81)  · Difficulty in walking, Not elsewhere classified (R26.2)   Precaution/Allergies:  Ativan [lorazepam] and Pcn [penicillins]      ASSESSMENT:     Mr. Jose Velazquez was sitting in chair with family present. He is willing to try and walk, he is a bit confused. He stood from the chair with mostly CGA and was able to walk about 40 feet down the hallway, he sat and rested then walked back. He has a shuffled slow hunched over gait he is not able to correct and was fatigued upon return but did well. He was able to get himself supine in the bed with little help. Good steady progress and has met goal #3.       This section established at most recent assessment   PROBLEM LIST (Impairments causing functional limitations):  1. Decreased Strength  2. Decreased ADL/Functional Activities  3. Decreased Transfer Abilities  4. Decreased Ambulation Ability/Technique  5. Decreased Balance  6. Decreased Activity Tolerance  7. Increased Fatigue  8. Increased Shortness of Breath  9. Edema/Girth  10. Decreased Cognition   INTERVENTIONS PLANNED: (Benefits and precautions of physical therapy have been discussed with the patient.)  1. Balance Exercise  2. Bed Mobility  3. Family Education  4. Gait Training  5. Therapeutic Activites  6. Therapeutic Exercise/Strengthening  7. Transfer Training     TREATMENT PLAN: Frequency/Duration: 3 times a week for duration of hospital stay  Rehabilitation Potential For Stated Goals: Rajesh Barillas REHABILITATION/EQUIPMENT: (at time of discharge pending progress): Due to the probability of continued deficits (see above) this patient will likely need continued skilled physical therapy after discharge. Equipment:    None at this time              HISTORY:   History of Present Injury/Illness (Reason for Referral):  See H&P  Past Medical History/Comorbidities:   Mr. Rakan Alberto  has a past medical history of Acute CHF (congestive heart failure) ([facility] [address] (4/25/2015), Acute combined systolic and diastolic congestive heart failure ([facility] [address] (4/28/2015), De Quervain's tenosynovitis, right (4/28/2015), Dyspnea on exertion (6/28/2015), Elevated serum creatinine (4/25/2015), Elevated troponin (6/28/2015), History of coronary artery disease, History of right knee surgery, History of shingles, Malignant hypertension (4/25/2015), and MI (myocardial infarction) ([facility] [address] Mr. Rakan Alberto  has a past surgical history that includes hx knee arthroscopy (Right); hx heart catheterization (6/29/2015); and ir insert tunl cvc w/o port over 5 yr (2/25/2019).   Social History/Living Environment:   Home Environment: Rehabilitation facility  One/Two Story Residence: One story  Living Alone: No  Support Systems: Spouse/Significant Other/Partner  Patient Expects to be Discharged to[de-identified] Unknown  Current DME Used/Available at Home: Walker, rollator  Prior Level of Function/Work/Activity:  Was in rehab PTA but unable to elaborate on  PLOF. Reported not recent falls. Number of Personal Factors/Comorbidities that affect the Plan of Care: 1-2: MODERATE COMPLEXITY   EXAMINATION:   Most Recent Physical Functioning:   Gross Assessment:                  Posture:     Balance:    Bed Mobility:  Sit to Supine: Minimum assistance  Wheelchair Mobility:     Transfers:  Sit to Stand: Contact guard assistance;Minimum assistance  Stand to Sit: Contact guard assistance  Gait:     Speed/Lu: Shuffled; Slow  Step Length: Left shortened;Right shortened  Gait Abnormalities: Decreased step clearance;Shuffling gait; Steppage gait  Distance (ft): 80 Feet (ft)  Assistive Device: Walker, rolling  Ambulation - Level of Assistance: Contact guard assistance;Minimal assistance      Body Structures Involved:  1. Heart  2. Lungs  3. Muscles Body Functions Affected:  1. Cardio  2. Respiratory  3. Genitourinary  4. Neuromusculoskeletal  5. Movement Related Activities and Participation Affected:  1. Learning and Applying Knowledge  2. General Tasks and Demands  3. Mobility  4. Self Care  5. Domestic Life  6. Community, Social and [place]   Number of elements that affect the Plan of Care: 4+: HIGH COMPLEXITY   CLINICAL PRESENTATION:   Presentation: Evolving clinical presentation with changing clinical characteristics: MODERATE COMPLEXITY   CLINICAL DECISION MAKIN [facility] Inpatient Short Form  How much difficulty does the patient currently have. .. Unable A Lot A Little None   1. Turning over in bed (including adjusting bedclothes, sheets and blankets)? [] 1   [] 2   [x] 3   [] 4   2. Sitting down on and standing up from a chair with arms ( e.g., wheelchair, bedside commode, etc.)   [] 1   [x] 2   [] 3   [] 4   3.   Moving from lying on back to sitting on the side of the bed? [] 1   [] 2   [x] 3   [] 4   How much help from another person does the patient currently need. .. Total A Lot A Little None   4. Moving to and from a bed to a chair (including a wheelchair)? [] 1   [x] 2   [] 3   [] 4   5. Need to walk in hospital room? [] 1   [x] 2   [] 3   [] 4   6. Climbing 3-5 steps with a railing? [x] 1   [] 2   [] 3   [] 4   © 2007, Trustees of [facility] [employer], under license to [employer]. All rights reserved      Score:  Initial: 13 Most Recent: X (Date: -- )    Interpretation of Tool:  Represents activities that are increasingly more difficult (i.e. Bed mobility, Transfers, Gait). Medical Necessity:     · Patient demonstrates fair rehab potential due to higher previous functional level. Reason for Services/Other Comments:  · Patient continues to require skilled intervention due to decreased functional mobility, balance, and activity tolerance. Use of outcome tool(s) and clinical judgement create a POC that gives a: Questionable prediction of patient's progress: MODERATE COMPLEXITY            TREATMENT:   (In addition to Assessment/Re-Assessment sessions the following treatments were rendered)   Pre-treatment Symptoms/Complaints:  \"I will try\". Pain: Initial:   Pain Intensity 1: 0  Post Session:  0     Therapeutic Activity: (    30  minutes): Therapeutic activities including ambulation on level ground, standing activities, bed mobility and STS transfers to improve mobility, strength, balance and activity tolerance. Required minimal cuing   to promote static and dynamic balance in standing and standing posture with cues for activity pacing.              Date:  2/21/19 Date:   Date:     Activity/Exercise Parameters Parameters Parameters   STS 1 x 4                                              Date:  2/28/19 Date:   Date:     Activity/Exercise Seated Parameters Parameters Parameters   Heel raises X 15 B     Toe raises X 15 B     LAQ's X 15 B     Hip Flex Standing marching X 10 B     Hip ABD X 10 B                       Braces/Orthotics/Lines/Etc:   · IV  · [place] catheter  · O2 Device: Nasal cannula  Treatment/Session Assessment:    · Response to Treatment:  See above. · Interdisciplinary Collaboration:   o Physical Therapy Assistant  o Registered Nurse  · After treatment position/precautions:   o Supine in bed  o Bed alarm/tab alert on  o Bed/Chair-wheels locked  o Call light within reach  o RN notified  o Family at bedside   · Compliance with Program/Exercises: Compliant all of the time  · Recommendations/Intent for next treatment session: \"Next visit will focus on advancements to more challenging activities and reduction in assistance provided\".   Total Treatment Duration:  PT Patient Time In/Time Out  Time In: 1315  Time Out: 1345    Rich Meade, PTA

## 2019-03-06 LAB
ALBUMIN SERPL-MCNC: 2.5 G/DL (ref 3.2–4.6)
ANION GAP SERPL CALC-SCNC: 12 MMOL/L (ref 7–16)
BASOPHILS # BLD: 0.1 K/UL (ref 0–0.2)
BASOPHILS NFR BLD: 1 % (ref 0–2)
BUN SERPL-MCNC: 79 MG/DL (ref 8–23)
CALCIUM SERPL-MCNC: 8 MG/DL (ref 8.3–10.4)
CHLORIDE SERPL-SCNC: 101 MMOL/L (ref 98–107)
CO2 SERPL-SCNC: 24 MMOL/L (ref 21–32)
CREAT SERPL-MCNC: 5.64 MG/DL (ref 0.8–1.5)
DIFFERENTIAL METHOD BLD: ABNORMAL
EOSINOPHIL # BLD: 0.2 K/UL (ref 0–0.8)
EOSINOPHIL NFR BLD: 2 % (ref 0.5–7.8)
ERYTHROCYTE [DISTWIDTH] IN BLOOD BY AUTOMATED COUNT: 19 % (ref 11.9–14.6)
GLUCOSE SERPL-MCNC: 97 MG/DL (ref 65–100)
HCT VFR BLD AUTO: 34.6 % (ref 41.1–50.3)
HGB BLD-MCNC: 11.5 G/DL (ref 13.6–17.2)
IMM GRANULOCYTES # BLD AUTO: 0 K/UL (ref 0–0.5)
IMM GRANULOCYTES NFR BLD AUTO: 0 % (ref 0–5)
LYMPHOCYTES # BLD: 1.2 K/UL (ref 0.5–4.6)
LYMPHOCYTES NFR BLD: 15 % (ref 13–44)
MCH RBC QN AUTO: 27.2 PG (ref 26.1–32.9)
MCHC RBC AUTO-ENTMCNC: 33.2 G/DL (ref 31.4–35)
MCV RBC AUTO: 81.8 FL (ref 79.6–97.8)
MONOCYTES # BLD: 1.1 K/UL (ref 0.1–1.3)
MONOCYTES NFR BLD: 13 % (ref 4–12)
NEUTS SEG # BLD: 5.5 K/UL (ref 1.7–8.2)
NEUTS SEG NFR BLD: 69 % (ref 43–78)
NRBC # BLD: 0 K/UL (ref 0–0.2)
PHOSPHATE SERPL-MCNC: 5.4 MG/DL (ref 2.3–3.7)
PLATELET # BLD AUTO: 97 K/UL (ref 150–450)
PMV BLD AUTO: 11.8 FL (ref 9.4–12.3)
POTASSIUM SERPL-SCNC: 3.2 MMOL/L (ref 3.5–5.1)
RBC # BLD AUTO: 4.23 M/UL (ref 4.23–5.6)
SODIUM SERPL-SCNC: 137 MMOL/L (ref 136–145)
WBC # BLD AUTO: 8 K/UL (ref 4.3–11.1)

## 2019-03-06 PROCEDURE — 85025 COMPLETE CBC W/AUTO DIFF WBC: CPT

## 2019-03-06 PROCEDURE — 74011250637 HC RX REV CODE- 250/637: Performed by: INTERNAL MEDICINE

## 2019-03-06 PROCEDURE — 74011250637 HC RX REV CODE- 250/637: Performed by: UROLOGY

## 2019-03-06 PROCEDURE — 74011250637 HC RX REV CODE- 250/637: Performed by: HOSPITALIST

## 2019-03-06 PROCEDURE — 36415 COLL VENOUS BLD VENIPUNCTURE: CPT

## 2019-03-06 PROCEDURE — 97530 THERAPEUTIC ACTIVITIES: CPT

## 2019-03-06 PROCEDURE — 65270000029 HC RM PRIVATE

## 2019-03-06 PROCEDURE — 97535 SELF CARE MNGMENT TRAINING: CPT

## 2019-03-06 PROCEDURE — 80069 RENAL FUNCTION PANEL: CPT

## 2019-03-06 RX ORDER — POTASSIUM CHLORIDE 20 MEQ/1
20 TABLET, EXTENDED RELEASE ORAL DAILY
Status: DISCONTINUED | OUTPATIENT
Start: 2019-03-07 | End: 2019-03-08

## 2019-03-06 RX ORDER — POTASSIUM CHLORIDE 20 MEQ/1
40 TABLET, EXTENDED RELEASE ORAL
Status: COMPLETED | OUTPATIENT
Start: 2019-03-06 | End: 2019-03-06

## 2019-03-06 RX ORDER — TORSEMIDE 100 MG/1
100 TABLET ORAL 2 TIMES DAILY
Status: DISCONTINUED | OUTPATIENT
Start: 2019-03-06 | End: 2019-03-12 | Stop reason: HOSPADM

## 2019-03-06 RX ADMIN — Medication 10 ML: at 05:23

## 2019-03-06 RX ADMIN — POTASSIUM CHLORIDE 40 MEQ: 20 TABLET, EXTENDED RELEASE ORAL at 11:47

## 2019-03-06 RX ADMIN — METOLAZONE 10 MG: 5 TABLET ORAL at 17:58

## 2019-03-06 RX ADMIN — NYSTATIN: 100000 POWDER TOPICAL at 10:02

## 2019-03-06 RX ADMIN — VITAMIN D, TAB 1000IU (100/BT) 2000 UNITS: 25 TAB at 10:00

## 2019-03-06 RX ADMIN — MIDODRINE HYDROCHLORIDE 15 MG: 5 TABLET ORAL at 11:48

## 2019-03-06 RX ADMIN — DIGOXIN 0.12 MG: 125 TABLET ORAL at 11:47

## 2019-03-06 RX ADMIN — Medication 10 ML: at 17:58

## 2019-03-06 RX ADMIN — TORSEMIDE 100 MG: 100 TABLET ORAL at 11:47

## 2019-03-06 RX ADMIN — PANTOPRAZOLE SODIUM 40 MG: 40 TABLET, DELAYED RELEASE ORAL at 05:21

## 2019-03-06 RX ADMIN — ACETAMINOPHEN 650 MG: 325 TABLET, FILM COATED ORAL at 13:06

## 2019-03-06 RX ADMIN — TORSEMIDE 100 MG: 100 TABLET ORAL at 17:58

## 2019-03-06 RX ADMIN — APIXABAN 5 MG: 2.5 TABLET, FILM COATED ORAL at 20:47

## 2019-03-06 RX ADMIN — NYSTATIN: 100000 POWDER TOPICAL at 17:57

## 2019-03-06 RX ADMIN — MIDODRINE HYDROCHLORIDE 15 MG: 5 TABLET ORAL at 10:00

## 2019-03-06 RX ADMIN — APIXABAN 5 MG: 2.5 TABLET, FILM COATED ORAL at 10:01

## 2019-03-06 RX ADMIN — POTASSIUM CHLORIDE 10 MEQ: 10 TABLET, EXTENDED RELEASE ORAL at 10:01

## 2019-03-06 RX ADMIN — Medication 10 ML: at 21:58

## 2019-03-06 RX ADMIN — Medication 1 TABLET: at 10:00

## 2019-03-06 RX ADMIN — MIDODRINE HYDROCHLORIDE 15 MG: 5 TABLET ORAL at 17:57

## 2019-03-06 RX ADMIN — FINASTERIDE 5 MG: 5 TABLET, FILM COATED ORAL at 10:00

## 2019-03-06 RX ADMIN — METOLAZONE 10 MG: 5 TABLET ORAL at 11:48

## 2019-03-06 RX ADMIN — FLUTICASONE PROPIONATE 2 SPRAY: 50 SPRAY, METERED NASAL at 10:12

## 2019-03-06 RX ADMIN — LACTULOSE 10 G: 20 SOLUTION ORAL at 10:01

## 2019-03-06 NOTE — PROGRESS NOTES
Interdisciplinary Rounds completed 03/06/19. Nursing, Case Management, Physician and PT present. Plan of care reviewed and updated. Hopefully regency at discharge.

## 2019-03-06 NOTE — PROGRESS NOTES
Hospitalist Progress Note     Admit Date:  2019 11:19 AM   Name:  Nohelia Hughes. Age:  66 y.o.  :  1945   MRN:  [id number]   PCP:  Shayy Kirby MD  Treatment Team: Attending Provider: Nicole Wood DO; Consulting Provider: Katie Schmidt MD; Utilization Review: Marquis Murray RN; Consulting Provider: Maryann Castillo MD; Care Manager: Sonia Ramos RN; Physical Therapist: Lala Kinney DPT    Subjective:     From previous notes: \"66year-old gentleman with multiple medical problems including CHF, A. fib, CKD stage IV,, chronic COPD, obstructive sleep apnea who is noncompliant with CPAP, who is currently in a rehab facility, admitted on  for acute on chronic CHF in view of worsening bilateral LE swelling, hypoxic respiratory failure and hematuria. was brought into the emergency room with complaints of hematuria, leg swellings and shortness of breath. He was also having shortness of breath, moderate in severity, progressively getting worse, worse with minimal exertion, not resolved with rest.  Tunneled cath placed  and HD started but could not tolerate his session due to hypotension. Midodrine was restarted and he has remained intolerated of HD due to BP issues. \" [facility] recommended hospice care on 3/1/19 so the wife requested a second opinion.     3/3 - He is sitting in the chair this morning. States he had a good night and he feels better today. Denies CP/SOB. Thinks his BLE edema is improved. 3/4 - He continues to be alert and feels better. SOB improved. Having diarrhea with Lactulose. Denies CP/SOB.      3/5/19:  Alert. Complains of diarrhea and we are decreasing lactulose. Still too unstable for hd. He is having some urine output with lasix gtt/metolazone and increasing dose midodrine. Nephrology made clear they suspect patient near end of life with likely cardiorenal syndrome. Current plan is attempt arrange placement ltach facility.    3/6/19:   spouse and pt aware of cardiorenal syndrome and has responded some to aggressive diuretic program and spouse anxious about conversion to oral demadex and metolazone. Potassium 3.2 diarrhea some better willl follow ammonia as decreased lactulose. plt better 97. He has no new complaints. Pt and spouse aware of possible poor outcome and if does not improve consider hospice care. Review of systems negative except stated above. Objective:     Patient Vitals for the past 24 hrs:   Temp Pulse Resp BP SpO2   03/06/19 1222 97.6 °F (36.4 °C) 67 18 130/75 100 %   03/06/19 1147  75  121/64    03/06/19 0827 98.6 °F (37 °C) 64 18 99/64 100 %   03/06/19 0533 97.6 °F (36.4 °C) 70 18 112/74 98 %   03/06/19 0007 98 °F (36.7 °C) 63 18 119/74 100 %   03/05/19 1958 97.4 °F (36.3 °C) 80 18 103/60 100 %     Oxygen Therapy  O2 Sat (%): 100 % (03/06/19 1222)  Pulse via Oximetry: 70 beats per minute (03/04/19 0831)  O2 Device: Nasal cannula (03/06/19 1222)  O2 Flow Rate (L/min): 3 l/min (03/06/19 1222)  ETCO2 (mmHg): 21 mmHg (02/25/19 1347)    Intake/Output Summary (Last 24 hours) at 3/6/2019 1609  Last data filed at 3/6/2019 1458  Gross per 24 hour   Intake    Output 1325 ml   Net -1325 ml         Physical Examination:  Physical Exam   Constitutional: No distress. HENT:   Right Ear: External ear normal.   Left Ear: External ear normal.   Nose: Nose normal.   Eyes: Pupils are equal, round, and reactive to light. No scleral icterus. Neck: No tracheal deviation present. No thyromegaly present. Cardiovascular: Normal rate. Exam reveals no gallop. Pulmonary/Chest: Breath sounds normal. He has no wheezes. Abdominal: Bowel sounds are normal. He exhibits no distension. There is no tenderness. Musculoskeletal: He exhibits no tenderness or deformity. Neurological: He is alert. GCS score is 15. Skin: Skin is warm. He is not diaphoretic. No pallor.    Psychiatric: Affect normal.       Data Review:  I have reviewed all labs, meds, telemetry events, and studies from the last 24 hours. Recent Results (from the past 24 hour(s))   RENAL FUNCTION PANEL    Collection Time: 03/06/19  5:40 AM   Result Value Ref Range    Sodium 137 136 - 145 mmol/L    Potassium 3.2 (L) 3.5 - 5.1 mmol/L    Chloride 101 98 - 107 mmol/L    CO2 24 21 - 32 mmol/L    Anion gap 12 7 - 16 mmol/L    Glucose 97 65 - 100 mg/dL    BUN 79 (H) 8 - 23 MG/DL    Creatinine 5.64 (H) 0.8 - 1.5 MG/DL    GFR est AA 13 (L) >60 ml/min/1.73m2    GFR est non-AA 10 (L) >60 ml/min/1.73m2    Calcium 8.0 (L) 8.3 - 10.4 MG/DL    Phosphorus 5.4 (H) 2.3 - 3.7 MG/DL    Albumin 2.5 (L) 3.2 - 4.6 g/dL   CBC WITH AUTOMATED DIFF    Collection Time: 03/06/19  5:40 AM   Result Value Ref Range    WBC 8.0 4.3 - 11.1 K/uL    RBC 4.23 4.23 - 5.6 M/uL    HGB 11.5 (L) 13.6 - 17.2 g/dL    HCT 34.6 (L) 41.1 - 50.3 %    MCV 81.8 79.6 - 97.8 FL    MCH 27.2 26.1 - 32.9 PG    MCHC 33.2 31.4 - 35.0 g/dL    RDW 19.0 (H) 11.9 - 14.6 %    PLATELET 97 (L) 038 - 450 K/uL    MPV 11.8 9.4 - 12.3 FL    ABSOLUTE NRBC 0.00 0.0 - 0.2 K/uL    DF AUTOMATED      NEUTROPHILS 69 43 - 78 %    LYMPHOCYTES 15 13 - 44 %    MONOCYTES 13 (H) 4.0 - 12.0 %    EOSINOPHILS 2 0.5 - 7.8 %    BASOPHILS 1 0.0 - 2.0 %    IMMATURE GRANULOCYTES 0 0.0 - 5.0 %    ABS. NEUTROPHILS 5.5 1.7 - 8.2 K/UL    ABS. LYMPHOCYTES 1.2 0.5 - 4.6 K/UL    ABS. MONOCYTES 1.1 0.1 - 1.3 K/UL    ABS. EOSINOPHILS 0.2 0.0 - 0.8 K/UL    ABS. BASOPHILS 0.1 0.0 - 0.2 K/UL    ABS. IMM.  GRANS. 0.0 0.0 - 0.5 K/UL        All Micro Results     Procedure Component Value Units Date/Time    CULTURE, BLOOD [[id number]] Collected:  02/22/19 1740    Order Status:  Completed Specimen:  Blood Updated:  02/27/19 0837     Special Requests: --        RIGHT  HAND       Culture result: NO GROWTH 5 DAYS       CULTURE, BLOOD [[id number]] Collected:  02/22/19 1753    Order Status:  Completed Specimen:  Blood Updated:  02/27/19 0837     Special Requests: -- RIGHT  FOREARM       Culture result: NO GROWTH 5 DAYS       CULTURE, BLOOD [[id number]] Collected:  02/19/19 1140    Order Status:  Completed Specimen:  Blood Updated:  02/24/19 0654     Special Requests: --        RIGHT  Antecubital       Culture result: NO GROWTH 5 DAYS       CULTURE, BLOOD [[id number]]  (Abnormal) Collected:  02/19/19 1252    Order Status:  Completed Specimen:  Blood Updated:  02/23/19 0747     Special Requests: --        RIGHT  HAND       GRAM STAIN       GRAM POSITIVE COCCI IN CHAINS                  AEROBIC AND ANAEROBIC BOTTLES                  RESULTS VERIFIED, PHONED TO AND READ BACK BY EAMON GONZALEZ @18 02/20/2019 [facility]           Culture result:       STAPHYLOCOCCUS SPECIES, COAGULASE NEGATIVE                  ALPHA STREPTOCOCCUS, NOT S. PNEUMONIAE                  THIS ORGANISM MAY BE INDICATIVE OF CULTURE CONTAMINATION, HOWEVER, CLINICAL CORRELATION NEEDS TO BE EVALUATED, AS EACH CASE IS UNIQUE.           CULTURE, URINE [[id number]]  (Abnormal)  (Susceptibility) Collected:  02/19/19 1220    Order Status:  Completed Specimen:  Cath Urine Updated:  02/22/19 0645     Special Requests: NO SPECIAL REQUESTS        Culture result:       1000 COLONIES/mL STAPHYLOCOCCUS HAEMOLYTICUS                Current Meds:  Current Facility-Administered Medications   Medication Dose Route Frequency    torsemide (DEMADEX) tablet 100 mg  100 mg Oral BID    [START ON 3/7/2019] potassium chloride (K-DUR, KLOR-CON) SR tablet 20 mEq  20 mEq Oral DAILY    cholecalciferol (VITAMIN D3) tablet 2,000 Units  2,000 Units Oral DAILY    B complex-vitamin C-folic acid (NEPHRO-JANEL) 0.8 mg tab  1 Tab Oral DAILY    lactulose (CHRONULAC) solution 10 g  10 g Oral BID    nystatin (MYCOSTATIN) 100,000 unit/gram powder   Topical BID    metOLazone (ZAROXOLYN) tablet 10 mg  10 mg Oral BID    midodrine (PROAMITINE) tablet 15 mg  15 mg Oral TID WITH MEALS    digoxin (LANOXIN) tablet 0.125 mg  0.125 mg Oral Q MON, WED & FRI  mineral oil-hydrophil petrolat (AQUAPHOR) ointment   Topical PRN    apixaban (ELIQUIS) tablet 5 mg  5 mg Oral BID    lidocaine (XYLOCAINE) 20 mg/mL (2 %) injection  mg  1-20 mL IntraDERMal Multiple    albuterol-ipratropium (DUO-NEB) 2.5 MG-0.5 MG/3 ML  3 mL Nebulization Q4H PRN    fluticasone (FLONASE) 50 mcg/actuation nasal spray 2 Spray  2 Spray Both Nostrils DAILY    pantoprazole (PROTONIX) tablet 40 mg  40 mg Oral ACB    sodium chloride (NS) flush 5-40 mL  5-40 mL IntraVENous Q8H    sodium chloride (NS) flush 5-40 mL  5-40 mL IntraVENous PRN    acetaminophen (TYLENOL) tablet 650 mg  650 mg Oral Q4H PRN    HYDROcodone-acetaminophen (NORCO) 5-325 mg per tablet 1 Tab  1 Tab Oral Q4H PRN    morphine injection 2 mg  2 mg IntraVENous Q4H PRN    naloxone (NARCAN) injection 0.4 mg  0.4 mg IntraVENous PRN    diphenhydrAMINE (BENADRYL) capsule 25 mg  25 mg Oral Q4H PRN    ondansetron (ZOFRAN) injection 4 mg  4 mg IntraVENous Q4H PRN    finasteride (PROSCAR) tablet 5 mg  5 mg Oral DAILY       Diet:  DIET REGULAR  DIET NUTRITIONAL SUPPLEMENTS    Other Studies (last 24 hours):  No results found.     Assessment and Plan:     Hospital Problems as of 3/6/2019 Date Reviewed: 12/11/2018          Codes Class Noted - Resolved POA    ESRD (end stage renal disease) on dialysis [facility]) ICD-10-CM: N18.6, Z99.2  ICD-9-CM: 585.6, V45.11  3/2/2019 - Present Yes        Acute pulmonary edema ([facility] [address] ICD-10-CM: J81.0  ICD-9-CM: 518.4  3/2/2019 - Present Yes        Hemodialysis-associated hypotension ICD-10-CM: I95.3  ICD-9-CM: 458.21  3/2/2019 - Present Yes        Syncope ICD-10-CM: R55  ICD-9-CM: 780.2  3/2/2019 - Present No        Thrombocytopenia (HCC) ICD-10-CM: D69.6  ICD-9-CM: 287.5  3/2/2019 - Present Yes        Hematuria ICD-10-CM: R31.9  ICD-9-CM: 599.70  3/2/2019 - Present Yes        Anemia due to chronic kidney disease ICD-10-CM: N18.9, D63.1  ICD-9-CM: 285.21  3/2/2019 - Present Yes        CKD (chronic kidney disease) stage 4, GFR 15-29 ml/min (HCC) ICD-10-CM: N18.4  ICD-9-CM: 585.4  2/20/2019 - Present         Lactic acidosis ICD-10-CM: E87.2  ICD-9-CM: 276.2  2/20/2019 - Present Yes        Tobacco abuse ICD-10-CM: Z72.0  ICD-9-CM: 305.1  1/22/2019 - Present Yes        Atrial fibrillation ([facility] ICD-10-CM: I48.91  ICD-9-CM: 427.31  11/27/2018 - Present Yes        Acute on chronic combined systolic and diastolic CHF (congestive heart failure) ([facility] ICD-10-CM: I50.43  ICD-9-CM: 428.43, 428.0  11/26/2018 - Present Yes        Pulmonary hypertension (HCC) (Chronic) ICD-10-CM: I27.20  ICD-9-CM: 416.8  10/20/2018 - Present Yes        Type 2 diabetes with nephropathy ([facility] ICD-10-CM: E11.21  ICD-9-CM: 250.40, 583.81  10/8/2018 - Present Yes        * (Principal) Acute respiratory failure with hypoxia (HCC) ICD-10-CM: J96.01  ICD-9-CM: 518.81  5/24/2018 - Present Yes        TAZ (obstructive sleep apnea) ICD-10-CM: G47.33  ICD-9-CM: 327.23  10/2/2017 - Present Yes    Overview Signed 12/11/2018  1:24 PM by Dwight Ling NP     Intolerant of CPAP             CKD (chronic kidney disease) stage 3, GFR 30-59 ml/min (HCC) (Chronic) ICD-10-CM: N18.3  ICD-9-CM: 585.3  9/29/2017 - Present         Coronary atherosclerosis of native coronary vessel (Chronic) ICD-10-CM: I25.10  ICD-9-CM: 414.01  9/29/2017 - Present Yes        Hypertension (Chronic) ICD-10-CM: I10  ICD-9-CM: 401.9  9/29/2017 - Present         CHF (congestive heart failure) (HCC) ICD-10-CM: I50.9  ICD-9-CM: 428.0  9/27/2017 - Present         Mixed hyperlipidemia (Chronic) ICD-10-CM: T30.4  ICD-9-CM: 272.2  9/28/2016 - Present Yes              A/P:      Principal Problem:    Acute respiratory failure with hypoxia ([facility] [address] (5/24/2018)--improving/improved  - Due to acute pulmonary edema  - cardiorenal syndrome-- some response to lasix gtt/metolazone, increase midodrine.  nephro to add florinef dig.--conversion to oral demadex from iv lasix today        Active Problems:    Acute on chronic combined systolic and diastolic CHF (congestive heart failure) ([facility] [address] (11/26/2018)  - EF 25%-30%  - diuretic dig as above  - No ACE or BB due to hypotension  - Cardiology signed off  This is all unchanged       ESRD (end stage renal disease) on dialysis [facility]) (3/2/2019)  - New start HD --> did not tolerate as he had hypotension and syncope during first 2 attempts  - [facility] recommended hospice and signed off/fired by patient's wife  - Dr. Peyton Lay started intermittent Lasix gtt --was clear he agrees with expected prognosis but timing not clear-conversion to oral demadex today           Thrombocytopenia ([facility] [address] (3/2/2019)  - Likely due to acute illness--improving 97k today        Anemia due to chronic kidney disease (3/2/2019)     Follow CBC      Hx cad       Type 2 diabetes with nephropathy ([facility] [address] (10/8/2018)  - No acute issues  - Continue to monitor--and this is unchanged       Pulmonary hypertension ([facility] [address] (10/20/2018)       Atrial fibrillation ([facility] [address] (11/27/2018)  -  periods of paroxysmal afib  - Continue Eliquis cva prophylaxis  - No HR/BP meds due to hypotension--unchanged plan       Mixed hyperlipidemia (9/28/2016)       TAZ (obstructive sleep apnea) (10/2/2017)  - Non-compliant with CPAP--intolerant per spouse        Tobacco abuse (1/22/2019)     Dispo - . Discharge to [facility] once insurance approves. This continues to be the plan     DIET REGULAR 2 GM NA (House Low NA)  DIET NUTRITIONAL SUPPLEMENTS All Meals; Nepro     DVT Prophylaxis: Eliquis

## 2019-03-06 NOTE — PROGRESS NOTES
RENAL PROGRESS NOTE    Subjective:     Cc - I was asked to see patient for second opinion    Patient is a 67 y/o AAM with Acute Kidney Injury on top of Chronic Kidney Disease stage 3 had attempts at dialysis x three days, but each time needed to be taken off dialysis due to hemodynamic instability. He also has CHF, A. fib, obstructive sleep apnea who was admitted from a rehab facility on 2/19 for acute on chronic CHF with worsening bilateral LE swelling, hypoxic respiratory failure and hematuria. He did not respond to medical management and a tunneled cath placed 2/25 and HD started. He could not tolerate his session due to hypotension even after midodrine treatment. [facility] recommended hospice care on 3/1/19 so the wife requested a second opinion. The patient is uncomfortable due to dyspnea despite O2 by NC and is thirsty. His wife is at bedside. I discussed with patient and wife that dialysis is a good treatment for kidney failure, but that his main problem is heart failure and that heart transplant is a treatment for heart failure, however, he is not a candidate for it due to co-morbid conditions and advanced age. I  Discussed that in order to attempt dialysis again he would need to improve hemodynamic stability and respond favorably to medication changes, but that the likelihood of success was not good as he has not responded well to medical therapy so far.     3/3/19 - feels and breathes better - in better spirit - no longer in respiratory distress - edema improving   3/4/19 - gradually progressing - no labs available yet - will continue to try medical therapy  3/5/19 - alert and communicating well this am, but wife states she has noted some confusion - no new tremor, no overt uremic symptoms - respiratory status improving  3/6/19 - feels and looks better - tolerates RA oxygen - edema improving - fair urine output - will try to switch to PO diuresis with Torsemide and Metolazone - discussed with  Cyril and RN and wife        Past Medical History:   Diagnosis Date    Acute CHF (congestive heart failure) ([facility] [address] 4/25/2015    Acute combined systolic and diastolic congestive heart failure ([facility] [address] 4/28/2015    De Quervain's tenosynovitis, right 4/28/2015    Dyspnea on exertion 6/28/2015    Elevated serum creatinine 4/25/2015    Elevated troponin 6/28/2015    History of coronary artery disease     MI at 27 yo    History of right knee surgery     cartilage removal    History of shingles     Malignant hypertension 4/25/2015    MI [facility]ocardial infarction[facility])       Past Surgical History:   Procedure Laterality Date    HX HEART CATHETERIZATION  6/29/2015    no intervention    HX KNEE ARTHROSCOPY Right     removal of cartilage    IR INSERT TUNL CVC W/O PORT OVER 5 YR  2/25/2019      Prior to Admission medications    Medication Sig Start Date End Date Taking? Authorizing Provider   omeprazole (PRILOSEC) 20 mg capsule Take 1 Cap by mouth daily. 1/24/19   Osvaldo Melton MD   potassium chloride (K-DUR, KLOR-CON) 20 mEq tablet Take 1 Tab by mouth daily. 1/23/19   Julieann Kussmaul, MD   Blood-Glucose Meter (ACCU-CHEK YOSEF PLUS METER) misc USE TO CHECK BLOOD SUGARS DAILY DX: E11.9 12/27/18   Osvaldo Melton MD   Blood Glucose Control High&Low (ACCU-CHEK YOSEF CONTROL SOLN) soln USE AS DIRECTED 12/27/18   Osvaldo Melton MD   glucose blood VI test strips (ACCU-CHEK YOSEF PLUS TEST STRP) strip USE TO CHECK BLOOD SUGARS DAILY DX: E11.9 12/27/18   Osvaldo Melton MD   alcohol swabs (BD SINGLE USE SWABS REGULAR) padm USE AS DIRECTED DAILY DX. E11.9 12/27/18   Osvaldo Melton MD   lancets (ACCU-CHEK SOFTCLIX LANCETS) misc USE TO CHECK BLOOD SUGARS DAILY DX: E11.9 12/27/18   Osvaldo Melton MD   carvedilol (COREG) 6.25 mg tablet Take 1 Tab by mouth two (2) times daily (with meals). 12/3/18   Michelle Saleh MD   apixaban (ELIQUIS) 5 mg tablet Take 1 Tab by mouth every twelve (12) hours.  12/3/18   Ashlie Maryam Camarillo MD   aspirin 81 mg chewable tablet Take 1 Tab by mouth daily. 11/5/18   Leyla Marie MD   atorvastatin (LIPITOR) 20 mg tablet Take 1 Tab by mouth nightly. 10/4/18   Sidra Somers MD   allopurinol (ZYLOPRIM) 100 mg tablet TAKE 1 TABLET BY MOUTH EVERY DAY 9/25/18   Sidra Somers MD   fluticasone [facility]) 50 mcg/actuation nasal spray 2 Sprays by Both Nostrils route daily. 3/29/18   Lula Mata MD   melatonin 3 mg tablet Take 3 mg by mouth nightly. Provider, Historical   albuterol (VENTOLIN HFA) 90 mcg/actuation inhaler Take 2 Puffs by inhalation four (4) times daily. 2/6/18   Pepper Melendrez NP   polyethylene glycol (MIRALAX) 17 gram packet Take 1 Packet by mouth daily. Patient taking differently: Take 17 g by mouth daily as needed. 1/13/18   Tato MERLOS NP   albuterol-ipratropium (DUO-NEB) 2.5 mg-0.5 mg/3 ml nebu 3 mL by Nebulization route four (4) times daily.  Diaignosis--J44.9 12/13/17   Pepper Melendrez NP     Allergies   Allergen Reactions    Ativan [Lorazepam] Other (comments)     Confusion- mild but long lasting- days    Pcn [Penicillins] Other (comments)     \"makes my heart stop\"      Social History     Tobacco Use    Smoking status: Former Smoker     Packs/day: 0.25     Years: 20.00     Pack years: 5.00     Types: Cigarettes     Last attempt to quit: 4/5/2015     Years since quitting: 3.9    Smokeless tobacco: Never Used   Substance Use Topics    Alcohol use: No     Alcohol/week: 0.0 oz      Family History   Problem Relation Age of Onset    Hypertension Mother     No Known Problems Father           Review of Systems    Constitutional: no fever, rested better   Eyes: fair vision,    Ears, nose, mouth, throat, and face:fair hearing,    Respiratory: no asthma,    Cardiovascular:no chest pain,    Gastrointestinal:no diarrhea,    Genitourinary: he has had hematuria and dysuria,   Hematologic/lymphatic: no bleeding tendency,    Neurological: no seizures, Behvioral/Psych: no psych hospitalization    Endocrine: no goiter,       Objective:       Visit Vitals  BP 99/64 (BP 1 Location: Left arm, BP Patient Position: At rest)   Pulse 64   Temp 98.6 °F (37 °C)   Resp 18   Ht 5' 10\" (1.778 m)   Wt 100.5 kg (221 lb 8 oz)   SpO2 100%   BMI 31.78 kg/m²       No intake/output data recorded. 03/04 1901 - 03/06 0700  In: -   Out: 2050 [Urine:2050]    General:  Alert, cooperative, no respiratory distress on RA, appears stated age. Head:  Normocephalic, without obvious abnormality, atraumatic. Eyes:  Conjunctivae/corneas clear. EOMs intact. Throat: Lips, mucosa, and tongue normal. Teeth and gums normal.   Neck: Supple, symmetrical, trachea midline, no adenopathy, pos for JVD. Lungs:   Crackles in bases to auscultation bilaterally improving. Heart:  Regular rate and rhythm, S1, S2 normal, no  rub. Abdomen:   Soft, non-tender. Bowel sounds normal. No masses,  No organomegaly. No renal bruit. Abdominal wall is bulging and ascites is present   Extremities: Extremities normal, atraumatic, no cyanosis, 3+ edema, improving. Skin: Skin color, texture, turgor normal. No rashes or lesions. Neurologic: Grossly intact. No asterixis. Data Review:     Results for Giovanny Emerson (MRN [id number]) as of 3/5/2019 09:51   Ref.  Range 3/3/2019 10:53 3/4/2019 06:42 3/5/2019 05:58   Sodium Latest Ref Range: 136 - 145 mmol/L 136 136 137   Potassium Latest Ref Range: 3.5 - 5.1 mmol/L 3.5 3.1 (L) 2.8 (LL)   Chloride Latest Ref Range: 98 - 107 mmol/L 99 98 99   CO2 Latest Ref Range: 21 - 32 mmol/L 26 28 25   Anion gap Latest Ref Range: 7 - 16 mmol/L 11 10 13   Glucose Latest Ref Range: 65 - 100 mg/dL 154 (H) 90 85   BUN Latest Ref Range: 8 - 23 MG/DL 73 (H) 72 (H) 77 (H)   Creatinine Latest Ref Range: 0.8 - 1.5 MG/DL 5.97 (H) 5.87 (H) 5.81 (H)   Calcium Latest Ref Range: 8.3 - 10.4 MG/DL 8.3 8.0 (L) 8.0 (L)   Phosphorus Latest Ref Range: 2.3 - 3.7 MG/DL 5.2 (H) 5.3 (H) 5.6 (H)   GFR est non-AA Latest Ref Range: >60 ml/min/1.73m2 10 (L) 10 (L) 10 (L)   GFR est AA Latest Ref Range: >60 ml/min/1.73m2 12 (L) 12 (L) 12 (L)   Albumin Latest Ref Range: 3.2 - 4.6 g/dL 2.7 (L) 2.6 (L) 2.7 (L)       Recent Results (from the past 24 hour(s))   RENAL FUNCTION PANEL    Collection Time: 03/06/19  5:40 AM   Result Value Ref Range    Sodium 137 136 - 145 mmol/L    Potassium 3.2 (L) 3.5 - 5.1 mmol/L    Chloride 101 98 - 107 mmol/L    CO2 24 21 - 32 mmol/L    Anion gap 12 7 - 16 mmol/L    Glucose 97 65 - 100 mg/dL    BUN 79 (H) 8 - 23 MG/DL    Creatinine 5.64 (H) 0.8 - 1.5 MG/DL    GFR est AA 13 (L) >60 ml/min/1.73m2    GFR est non-AA 10 (L) >60 ml/min/1.73m2    Calcium 8.0 (L) 8.3 - 10.4 MG/DL    Phosphorus 5.4 (H) 2.3 - 3.7 MG/DL    Albumin 2.5 (L) 3.2 - 4.6 g/dL   CBC WITH AUTOMATED DIFF    Collection Time: 03/06/19  5:40 AM   Result Value Ref Range    WBC 8.0 4.3 - 11.1 K/uL    RBC 4.23 4.23 - 5.6 M/uL    HGB 11.5 (L) 13.6 - 17.2 g/dL    HCT 34.6 (L) 41.1 - 50.3 %    MCV 81.8 79.6 - 97.8 FL    MCH 27.2 26.1 - 32.9 PG    MCHC 33.2 31.4 - 35.0 g/dL    RDW 19.0 (H) 11.9 - 14.6 %    PLATELET 97 (L) 560 - 450 K/uL    MPV 11.8 9.4 - 12.3 FL    ABSOLUTE NRBC 0.00 0.0 - 0.2 K/uL    DF AUTOMATED      NEUTROPHILS 69 43 - 78 %    LYMPHOCYTES 15 13 - 44 %    MONOCYTES 13 (H) 4.0 - 12.0 %    EOSINOPHILS 2 0.5 - 7.8 %    BASOPHILS 1 0.0 - 2.0 %    IMMATURE GRANULOCYTES 0 0.0 - 5.0 %    ABS. NEUTROPHILS 5.5 1.7 - 8.2 K/UL    ABS. LYMPHOCYTES 1.2 0.5 - 4.6 K/UL    ABS. MONOCYTES 1.1 0.1 - 1.3 K/UL    ABS. EOSINOPHILS 0.2 0.0 - 0.8 K/UL    ABS. BASOPHILS 0.1 0.0 - 0.2 K/UL    ABS. IMM. GRANS. 0.0 0.0 - 0.5 K/UL       CXR shows massive cardiomegaly and fluid excess      ECHO 2/21/19 -  SUMMARY:  -  Left ventricle: Systolic function was markedly reduced. Ejection fraction  was estimated in the range of 25 % to 30 %. There was severe diffuse  hypokinesis with distinct regional wall motion abnormalities.  There was severeglobal hypokinesis. There was severe concentric hypertrophy. The myocardial  specular pattern appeared moderately abnormal. Consider infiltrative  cardiomyopathy if clinically indicated. -  Right ventricle: The ventricle was mildly dilated. Systolic function was  moderately to markedly reduced. There was mild pulmonary artery hypertension.  -  Left atrium: The atrium was moderately to markedly dilated. -  Right atrium: The atrium was markedly dilated. -  Inferior vena cava, hepatic veins: The inferior vena cava was moderately  dilated. The respirophasic change in diameter was less than 50%. -  Aortic valve: The valve was trileaflet. Leaflets exhibited moderate  sclerosis. The left coronary cusp demonstrated immobility. There was mild  regurgitation. -  Mitral valve: There was mild to moderate regurgitation. -  Tricuspid valve: There was mild to moderate regurgitation. -  Pulmonic valve: There was moderate regurgitation. Principal Problem:    Acute respiratory failure with hypoxia ([address] (5/24/2018)    Active Problems:    Coronary atherosclerosis of native coronary vessel (9/29/2017)      Mixed hyperlipidemia (9/28/2016)      TAZ (obstructive sleep apnea) (10/2/2017)      Overview: Intolerant of CPAP      Type 2 diabetes with nephropathy ([address] (10/8/2018)      Pulmonary hypertension ([address] (10/20/2018)      Acute on chronic combined systolic and diastolic CHF (congestive heart failure) ([address] (11/26/2018)      Atrial fibrillation ([address] (11/27/2018)      Tobacco abuse (1/22/2019)      Lactic acidosis (2/20/2019)      ESRD (end stage renal disease) on dialysis ([address] (3/2/2019)      Acute pulmonary edema ([address] (3/2/2019)      Hemodialysis-associated hypotension (3/2/2019)      Syncope (3/2/2019)      Thrombocytopenia ([address] (3/2/2019)      Hematuria (3/2/2019)      Anemia due to chronic kidney disease (3/2/2019)        Assessment:     1.  Acute Kidney Injury on top of Chronic Kidney Disease stage 3 -  - mainly due to cardio-renal syndrome  - marked hemodynamic instability with inadequate response to medical therapy and inability to tolerate dialysis despite Midodrine  - responded to medical therapy including addition of Digoxin  and increasing Midodrine  - his wife understands that this is not likely to succeed to get him stabilize for dialysis in the next days and that this is likely and end of life situation  - further improvement with medical management appears to be present  - no urgent dialysis needed  - attempt switch to PO diuresis     2. Acute on chronic Combined systolic and diastolic heart failure decompensation  - responding to intensify diuresis and medical therapy as above      3. A fib with RVR   - on no antihypertensive  - clinically responding to Digoxin     4. UTI  -  on ceftriaxone     5. Respiratory failure -  - on oxygen therapy per primary team  - improving and tolerating nasal cannula     6. Urinary retention  - S/P Schulz with urology involved     7. Hypermagnesemia -  - hoping for consistently improved hemodynamics with decreasing Magnesium level  - avoid magnesium containing meds, Magnesium citrate discontinued    8. Anemia -  - adequate control    9. Acid/base -  - bicarbonate level is WNL    10. Hypokalemia -  - treat with KCL 20 mEq PO daily and now dose of 40 mEq   - off Fludrocortisone  - monitor response    11.  Hypocalcemia -  - mostly due to hypoalbuminemia  - add Vitamin D    Plan:     As above    Pennie Kohli M.D.

## 2019-03-06 NOTE — PROGRESS NOTES
Problem: Mobility Impaired (Adult and Pediatric)  Goal: *Acute Goals and Plan of Care (Insert Text)  LTG:  (1.)Mr. Clarisa Nicole will move from supine to sit and sit to supine , scoot up and down and roll side to side in bed with STAND BY ASSIST within 7 treatment day(s). (2.)Mr. Clarisa Nicole will transfer from bed to chair and chair to bed with CONTACT GUARD ASSIST using the least restrictive device within 7 treatment day(s). (3.)Mr. Clarisa Nicole will ambulate with MINIMAL ASSIST for 75 feet with the least restrictive device within 7 treatment day(s). (4.)Mr. Clarisa Nicole will participate in therapeutic activity/exercises x 23 minutes for increased strength within 7 days. ________________________________________________________________________________________________      PHYSICAL THERAPY: Daily Note and PM 3/6/2019  INPATIENT: PT Visit Days : 4  Payor: [employer] / Plan: [address] [place] [address] HMO / Product Type: [employer] /       NAME/AGE/GENDER: Colette Zavaleta is a 66 y.o. male   PRIMARY DIAGNOSIS: CHF (congestive heart failure) ([facility] [address] [I50.9] Acute respiratory failure with hypoxia ([facility] [address] Acute respiratory failure with hypoxia ([facility] [address]       ICD-10: Treatment Diagnosis:    · Generalized Muscle Weakness (M62.81)  · Difficulty in walking, Not elsewhere classified (R26.2)   Precaution/Allergies:  Ativan [lorazepam] and Pcn [penicillins]      ASSESSMENT:     Mr. Clarisa Nicole was sitting in chair with family present. He is willing to try and walk, he is a bit confused. He stood from the chair x7 with mostly CGA and was able to walk about ~20 feet down each session, He has a shuffled slow hunched over gait he is able to correct only momentarily and was fatigued upon return. He returned to chair after session. Pt very slow about 80ft taking about 32min with breaks. Good steady progress.  Cont with PT      This section established at most recent assessment   PROBLEM LIST (Impairments causing functional limitations):  1. Decreased Strength  2. Decreased ADL/Functional Activities  3. Decreased Transfer Abilities  4. Decreased Ambulation Ability/Technique  5. Decreased Balance  6. Decreased Activity Tolerance  7. Increased Fatigue  8. Increased Shortness of Breath  9. Edema/Girth  10. Decreased Cognition   INTERVENTIONS PLANNED: (Benefits and precautions of physical therapy have been discussed with the patient.)  1. Balance Exercise  2. Bed Mobility  3. Family Education  4. Gait Training  5. Therapeutic Activites  6. Therapeutic Exercise/Strengthening  7. Transfer Training     TREATMENT PLAN: Frequency/Duration: 3 times a week for duration of hospital stay  Rehabilitation Potential For Stated Goals: Rajesh Barillas REHABILITATION/EQUIPMENT: (at time of discharge pending progress): Due to the probability of continued deficits (see above) this patient will likely need continued skilled physical therapy after discharge. Equipment:    None at this time              HISTORY:   History of Present Injury/Illness (Reason for Referral):  See H&P  Past Medical History/Comorbidities:   Mr. Rakan Alberto  has a past medical history of Acute CHF (congestive heart failure) ([facility] [address] (4/25/2015), Acute combined systolic and diastolic congestive heart failure ([facility] [address] (4/28/2015), De Quervain's tenosynovitis, right (4/28/2015), Dyspnea on exertion (6/28/2015), Elevated serum creatinine (4/25/2015), Elevated troponin (6/28/2015), History of coronary artery disease, History of right knee surgery, History of shingles, Malignant hypertension (4/25/2015), and MI (myocardial infarction) ([facility] [address] Mr. Rakan Alberto  has a past surgical history that includes hx knee arthroscopy (Right); hx heart catheterization (6/29/2015); and ir insert tunl cvc w/o port over 5 yr (2/25/2019).   Social History/Living Environment:   Home Environment: Rehabilitation facility  One/Two Story Residence: One story  Living Alone: No  Support Systems: Spouse/Significant Other/Partner  Patient Expects to be Discharged to[de-identified] Unknown  Current DME Used/Available at Home: Walker, rollator  Prior Level of Function/Work/Activity:  Was in rehab PTA but unable to elaborate on  PLOF. Reported not recent falls. Number of Personal Factors/Comorbidities that affect the Plan of Care: 1-2: MODERATE COMPLEXITY   EXAMINATION:   Most Recent Physical Functioning:   Gross Assessment:                  Posture:     Balance:  Sitting: Impaired  Sitting - Static: Good (unsupported)  Sitting - Dynamic: Fair (occasional)  Standing: Impaired  Standing - Static: Fair  Standing - Dynamic : Poor Bed Mobility:     Wheelchair Mobility:     Transfers:  Sit to Stand: Minimum assistance  Stand to Sit: Contact guard assistance  Gait:     Gait Abnormalities: Decreased step clearance;Shuffling gait  Distance (ft): 80 Feet (ft)  Assistive Device: Walker, rolling  Ambulation - Level of Assistance: Minimal assistance      Body Structures Involved:  1. Heart  2. Lungs  3. Muscles Body Functions Affected:  1. Cardio  2. Respiratory  3. Genitourinary  4. Neuromusculoskeletal  5. Movement Related Activities and Participation Affected:  1. Learning and Applying Knowledge  2. General Tasks and Demands  3. Mobility  4. Self Care  5. Domestic Life  6. Community, Social and [place]   Number of elements that affect the Plan of Care: 4+: HIGH COMPLEXITY   CLINICAL PRESENTATION:   Presentation: Evolving clinical presentation with changing clinical characteristics: MODERATE COMPLEXITY   CLINICAL DECISION MAKIN [facility] Inpatient Short Form  How much difficulty does the patient currently have. .. Unable A Lot A Little None   1. Turning over in bed (including adjusting bedclothes, sheets and blankets)? [] 1   [] 2   [x] 3   [] 4   2. Sitting down on and standing up from a chair with arms ( e.g., wheelchair, bedside commode, etc.)   [] 1   [x] 2   [] 3   [] 4   3.   Moving from lying on back to sitting on the side of the bed? [] 1   [] 2   [x] 3   [] 4   How much help from another person does the patient currently need. .. Total A Lot A Little None   4. Moving to and from a bed to a chair (including a wheelchair)? [] 1   [x] 2   [] 3   [] 4   5. Need to walk in hospital room? [] 1   [x] 2   [] 3   [] 4   6. Climbing 3-5 steps with a railing? [x] 1   [] 2   [] 3   [] 4   © 2007, Trustees of [facility] [employer], under license to [employer]. All rights reserved      Score:  Initial: 13 Most Recent: X (Date: -- )    Interpretation of Tool:  Represents activities that are increasingly more difficult (i.e. Bed mobility, Transfers, Gait). Medical Necessity:     · Patient demonstrates fair rehab potential due to higher previous functional level. Reason for Services/Other Comments:  · Patient continues to require skilled intervention due to decreased functional mobility, balance, and activity tolerance. Use of outcome tool(s) and clinical judgement create a POC that gives a: Questionable prediction of patient's progress: MODERATE COMPLEXITY            TREATMENT:   (In addition to Assessment/Re-Assessment sessions the following treatments were rendered)   Pre-treatment Symptoms/Complaints:  \"I will try\". Pain: Initial:   Pain Intensity 1: 0  Post Session:  0     Therapeutic Activity: (    39  minutes): Therapeutic activities including ambulation on level ground, standing activities, bed mobility and STS transfers to improve mobility, strength, balance and activity tolerance. Required minimal cuing   to promote static and dynamic balance in standing and standing posture with cues for activity pacing.              Date:  2/21/19 Date:   Date:     Activity/Exercise Parameters Parameters Parameters   STS 1 x 4                                              Date:  2/28/19 Date:   Date:     Activity/Exercise Seated Parameters Parameters Parameters   Heel raises X 15 B     Toe raises X 15 B LAQ's X 15 B     Hip Flex Standing marching X 10 B     Hip ABD X 10 B                       Braces/Orthotics/Lines/Etc:   · IV  · [place] catheter  · O2 Device: Nasal cannula  Treatment/Session Assessment:    · Response to Treatment:  See above. · Interdisciplinary Collaboration:   o Physical Therapist  o Registered Nurse  · After treatment position/precautions:   o Up in chair  o Bed alarm/tab alert on  o Bed/Chair-wheels locked  o Call light within reach  o RN notified  o Family at bedside   · Compliance with Program/Exercises: Compliant all of the time  · Recommendations/Intent for next treatment session: \"Next visit will focus on advancements to more challenging activities and reduction in assistance provided\".   Total Treatment Duration:  PT Patient Time In/Time Out  Time In: 1355  Time Out: Yo Mason, DPT

## 2019-03-06 NOTE — PROGRESS NOTES
Hourly rounds met. Pt is Alert and Oriented x 3 with periods of confusion. He follows commands well and states his need. Bowel sounds active. VSS. Pt is lying in the bed in a low, locked position with the call light in reach. Patient's wife is at the bed side. No needs stated at this time. Will continue to monitor and report to the oncoming night shift nurse.

## 2019-03-06 NOTE — PROGRESS NOTES
Problem: Self Care Deficits Care Plan (Adult)  Goal: *Acute Goals and Plan of Care (Insert Text)  1. Pt will toilet with SBA   2. Pt will complete functional mobility for ADLs with SBA  3. Pt will complete lower body dressing with Sba using AE as needed  4. Pt will complete grooming and hygiene at sink with sba  5. Pt will demonstrate independence with HEP to promote increased BUE strength and functional use for ADLs  6. Pt will tolerate 23 minutes functional activity with min or fewer rest breaks to promote increased endurance for ADLs  7. Pt will complete bed mobility with SBA in prep for ADLs    Timeframe: 7 days      OCCUPATIONAL THERAPY: Daily Note and AM 3/6/2019  INPATIENT: OT Visit Days: 2  Payor: Jose Luis Birch / Plan: [address] [place] [address] HMO / Product Type: [employer] Medicare /      NAME/AGE/GENDER: Destinee Saravia is a 66 y.o. male   PRIMARY DIAGNOSIS:  CHF (congestive heart failure) ([address] [I50.9] Acute respiratory failure with hypoxia ([address] Acute respiratory failure with hypoxia ([address]       ICD-10: Treatment Diagnosis:    · Generalized Muscle Weakness (M62.81)   Precautions/Allergies:    falls Ativan [lorazepam] and Pcn [penicillins]      ASSESSMENT:     Mr. Willi Chen was admitted from rehab with SOB and edema d/t CHF exacerbation. 3/6/19: Pt was supine in bed upon arrival. Pt c/o 10/10 generalized pain due to swelling. Pt is agreeable to OT treatment this morning and wife was supportive. Pt needed moderate assistance for bed mobility. Pt demonstrates poor safety with standing to the walker and needs cues for foot and hand placement. Pt stands in very flexed posture and leans through his elbows on the walker. Pt transferred to the chair with cues to step closer to the walker and needing minimal assistance x 2 with safety concerns. Pt participated well in bathing task but needs rest breaks and fatigues quickly.  Pt completed total body bathing with moderate assistance from sitting and standing position good effort and participation. Pt needs cues repeated at times due to some memory issues noted. Pt left sitting up in the chair at end of session. Pt to continue per plan of care. This section established at most recent assessment   PROBLEM LIST (Impairments causing functional limitations):  1. Decreased Strength  2. Decreased ADL/Functional Activities  3. Decreased Transfer Abilities  4. Decreased Balance  5. Decreased Activity Tolerance  6. Increased Fatigue  7. Increased Shortness of Breath  8. Decreased Cognition   INTERVENTIONS PLANNED: (Benefits and precautions of occupational therapy have been discussed with the patient.)  1. Activities of daily living training  2. Adaptive equipment training  3. Balance training  4. Therapeutic activity  5. Therapeutic exercise     TREATMENT PLAN: Frequency/Duration: Follow patient 3 times/ week to address above goals. Rehabilitation Potential For Stated Goals: Good     RECOMMENDED REHABILITATION/EQUIPMENT: (at time of discharge pending progress): Due to the probability of continued deficits (see above) this patient will likely need continued skilled occupational therapy after discharge. Equipment:    None at this time              OCCUPATIONAL PROFILE AND HISTORY:   History of Present Injury/Illness (Reason for Referral):  See H&P  Past Medical History/Comorbidities:   Mr. Adah Kawasaki  has a past medical history of Acute CHF (congestive heart failure) ([address] (4/25/2015), Acute combined systolic and diastolic congestive heart failure ([address] (4/28/2015), De Quervain's tenosynovitis, right (4/28/2015), Dyspnea on exertion (6/28/2015), Elevated serum creatinine (4/25/2015), Elevated troponin (6/28/2015), History of coronary artery disease, History of right knee surgery, History of shingles, Malignant hypertension (4/25/2015), and MI (myocardial infarction) ([facility]   Mr. Adah Kawasaki  has a past surgical history that includes hx knee arthroscopy (Right); hx heart catheterization (6/29/2015); and ir insert tunl cvc w/o port over 5 yr (2/25/2019). Social History/Living Environment:   Home Environment: Rehabilitation facility  One/Two Story Residence: One story  Living Alone: No  Support Systems: Spouse/Significant Other/Partner  Patient Expects to be Discharged to[de-identified] Unknown  Current DME Used/Available at Home: Walker, rollator  Prior Level of Function/Work/Activity:  From rehab, reports was independent w/ ADLs and mobility w/ a RW. Poor historian. Number of Personal Factors/Comorbidities that affect the Plan of Care: Expanded review of therapy/medical records (1-2):  MODERATE COMPLEXITY   ASSESSMENT OF OCCUPATIONAL PERFORMANCE[de-identified]   Activities of Daily Living:   Basic ADLs (From Assessment) Complex ADLs (From Assessment)   Feeding: Setup  Oral Facial Hygiene/Grooming: Contact guard assistance  Bathing: Moderate assistance  Upper Body Dressing: Minimum assistance  Lower Body Dressing: Minimum assistance  Toileting: Minimum assistance, Moderate assistance Instrumental ADL  Meal Preparation: Maximum assistance  Homemaking: Maximum assistance  Medication Management: Moderate assistance  Financial Management: Moderate assistance   Grooming/Bathing/Dressing Activities of Daily Living   Grooming  Washing Face: Stand-by assistance Cognitive Retraining  Safety/Judgement: Fall prevention;Home safety   Upper Body Bathing  Bathing Assistance: Stand-by assistance  Position Performed: Seated in chair     Lower Body Bathing  Bathing Assistance: Moderate assistance  Perineal  : Moderate assistance  Position Performed: Seated in chair;Standing  Cues: Tactile cues provided;Verbal cues provided;Visual cues provided  Adaptive Equipment: Walker  Lower Body : Moderate assistance  Position Performed: Seated in chair     [address] Gown: Minimum  assistance Functional Transfers  Bathroom Mobility: Minimum assistance; Moderate assistance   Lower Body Dressing Assistance  Protective Undergarmet: Maximum assistance  Socks: Total assistance (dependent) Bed/Mat Mobility  Rolling: Moderate assistance  Supine to Sit: Moderate assistance  Sit to Stand: Minimum assistance  Bed to Chair: Minimum assistance;Assist x2  Scooting: Minimum assistance; Additional time       Most Recent Physical Functioning:   Gross Assessment:                  Posture:  Posture (WDL): Exceptions to WDL  Posture Assessment: Rounded shoulders  Balance:  Sitting: Impaired  Sitting - Static: Good (unsupported)  Sitting - Dynamic: Fair (occasional)  Standing: Impaired; With support  Standing - Static: Fair  Standing - Dynamic : Fair Bed Mobility:  Rolling: Moderate assistance  Supine to Sit: Moderate assistance  Scooting: Minimum assistance; Additional time  Wheelchair Mobility:     Transfers:  Sit to Stand: Minimum assistance  Bed to Chair: Minimum assistance;Assist x2            Patient Vitals for the past 6 hrs:   BP BP Patient Position SpO2 O2 Flow Rate (L/min) Pulse   19 0827 99/64 At rest 100 % 3 l/min 64   19 1147 121/64    75   19 1222 130/75 Sitting 100 % 3 l/min 67       Mental Status  Neurologic State: Alert  Orientation Level: Oriented to person  Cognition: Decreased attention/concentration, Decreased command following, Memory loss  Perception: Appears intact  Perseveration: No perseveration noted  Safety/Judgement: Fall prevention, Home safety                          Physical Skills Involved:  1. Balance  2. Strength  3. Activity Tolerance  4. Edema Cognitive Skills Affected (resulting in the inability to perform in a timely and safe manner):  1. Executive Function  2. Short Term Recall  3. Long Term Memory  4. Sustained Attention  5. Divided Attention  6. Comprehension Psychosocial Skills Affected:  1. Habits/Routines  2. Environmental Adaptation  3.  Self-Awareness   Number of elements that affect the Plan of Care: 5+:  HIGH COMPLEXITY   CLINICAL DECISION MAKIN [facility] [place] [address] AM-PAC 0 Clicks   Daily Activity Inpatient Short Form  How much help from another person does the patient currently need. .. Total A Lot A Little None   1. Putting on and taking off regular lower body clothing? [] 1   [] 2   [x] 3   [] 4   2. Bathing (including washing, rinsing, drying)? [] 1   [] 2   [x] 3   [] 4   3. Toileting, which includes using toilet, bedpan or urinal?   [] 1   [] 2   [x] 3   [] 4   4. Putting on and taking off regular upper body clothing? [] 1   [] 2   [x] 3   [] 4   5. Taking care of personal grooming such as brushing teeth? [] 1   [] 2   [x] 3   [] 4   6. Eating meals? [] 1   [] 2   [] 3   [x] 4   © 2007, Trustees of [address] [place] [address], under license to [employer]. All rights reserved      Score:  Initial: 19 Most Recent: X (Date: -- )    Interpretation of Tool:  Represents activities that are increasingly more difficult (i.e. Bed mobility, Transfers, Gait). Medical Necessity:     · Patient demonstrates good rehab potential due to higher previous functional level. Reason for Services/Other Comments:  · Patient continues to require present interventions due to patient's inability to independently complete ADLs. Use of outcome tool(s) and clinical judgement create a POC that gives a: MODERATE COMPLEXITY         TREATMENT:   (In addition to Assessment/Re-Assessment sessions the following treatments were rendered)     Pre-treatment Symptoms/Complaints:    Pain: Initial:   Pain Intensity 1: 10  Pain Location 1: Generalized  Pain Intervention(s) 1: Emotional support  Post Session:  0     Self Care: (30 minutes): Procedure(s) (per grid) utilized to improve and/or restore self-care/home management as related to dressing, bathing and grooming. Required moderate visual, verbal, manual and tactile cueing to facilitate activities of daily living skills and compensatory activities. Therapeutic Activity: (    15):   Therapeutic activities including Bed transfers, Chair transfers, Ambulation on level ground and standing tolerance for ADL to improve mobility, strength, balance and coordination. Required moderate assistance   to promote static and dynamic balance in standing.     n/a       Date:  2/26/19 Date:   Date:     Activity/Exercise Parameters Parameters Parameters   Shoulder flexion/extension  10 reps      Shoulder horizontal abduction/adduction  10 reps      Elbow flexion/extension  10 reps     Digit flexion/extensio  10 reps      Wrist flexion/extension  10 reps                       Braces/Orthotics/Lines/Etc:   · [place] catheter  · O2 Device: Nasal cannula  Treatment/Session Assessment:    · Response to Treatment:  Tolerated ok   · Interdisciplinary Collaboration:   o Occupational Therapist  o Registered Nurse  · After treatment position/precautions:   o Up in chair  o Bed/Chair-wheels locked  o Call light within reach  o RN notified   · Compliance with Program/Exercises: Will assess as treatment progresses. · Recommendations/Intent for next treatment session: \"Next visit will focus on advancements to more challenging activities and reduction in assistance provided\".   Total Treatment Duration:  OT Patient Time In/Time Out  Time In: 1056  Time Out: 501 Keisha Mendez, OT

## 2019-03-06 NOTE — PROGRESS NOTES
Problem: Nutrition Deficit  Goal: *Optimize nutritional status  Nutrition follow-up:   Assessment  DIET REGULAR 2 GM NA (House Low NA)  DIET NUTRITIONAL SUPPLEMENTS All Meals; Nepro   Food,Nutrition, and Pertinent History: No urgent dialysis need per Nephrology attempting to switch to po diuresis. Sitting up in chair at RD visit, wife at bedside. Pt with slight delay in response to questions, slowly self feeding lunch. Wife reports his appetite and intake have improved over the past 2-3 days. She reports consuming majority of meals over this period. Multiple Nepro at bedside unopened. Anthropometrics: Height: 5' 10\" (177.8 cm), Weight Source: Estimated(patient unable to stand) Wt: 101.8 kg (2/24 standing scale) Weight: (Pt unable to stand, RN Enriqueta Zuniga aware), Body mass index is 32.23 kg/m². BMI class of overweight for age >65 years. Edema: generalized anasarca  Macronutrient Needs:  · EER:  6435-7543 kcal /day (20-25 kcal/kg standard BW of 80 kg)  · EPR:   grams protein/day (1.2-1.3 grams/kg SBW for 80 kg)(GFR 15)-CKD with HD  Intake/Comparative Standards: Per RD meal rounds: eating lunch with ~25% consumed with pt reporting he will eat all of the entree, wife recalls % of meals for 2+ days, one meal recorded 100%. Potentially meeting ~75% estimated EEN and EPN needs based on recall.       Intervention:   Meals and snacks: Continue current diet. Supplementation: Change to Nepro once daily. Coordination of care: Alda Askew RN. Discharge nutrition plan: Too soon to determine.      [place], [address], [address]

## 2019-03-07 LAB
ALBUMIN SERPL-MCNC: 2.6 G/DL (ref 3.2–4.6)
ANION GAP SERPL CALC-SCNC: 13 MMOL/L (ref 7–16)
BASOPHILS # BLD: 0.1 K/UL (ref 0–0.2)
BASOPHILS NFR BLD: 1 % (ref 0–2)
BUN SERPL-MCNC: 79 MG/DL (ref 8–23)
CALCIUM SERPL-MCNC: 8.2 MG/DL (ref 8.3–10.4)
CHLORIDE SERPL-SCNC: 101 MMOL/L (ref 98–107)
CO2 SERPL-SCNC: 25 MMOL/L (ref 21–32)
CREAT SERPL-MCNC: 5.34 MG/DL (ref 0.8–1.5)
DIFFERENTIAL METHOD BLD: ABNORMAL
EOSINOPHIL # BLD: 0.1 K/UL (ref 0–0.8)
EOSINOPHIL NFR BLD: 2 % (ref 0.5–7.8)
ERYTHROCYTE [DISTWIDTH] IN BLOOD BY AUTOMATED COUNT: 19.5 % (ref 11.9–14.6)
GLUCOSE SERPL-MCNC: 92 MG/DL (ref 65–100)
HCT VFR BLD AUTO: 35.1 % (ref 41.1–50.3)
HGB BLD-MCNC: 11.8 G/DL (ref 13.6–17.2)
IMM GRANULOCYTES # BLD AUTO: 0 K/UL (ref 0–0.5)
IMM GRANULOCYTES NFR BLD AUTO: 0 % (ref 0–5)
LYMPHOCYTES # BLD: 1.4 K/UL (ref 0.5–4.6)
LYMPHOCYTES NFR BLD: 18 % (ref 13–44)
MCH RBC QN AUTO: 27.4 PG (ref 26.1–32.9)
MCHC RBC AUTO-ENTMCNC: 33.6 G/DL (ref 31.4–35)
MCV RBC AUTO: 81.6 FL (ref 79.6–97.8)
MONOCYTES # BLD: 1.3 K/UL (ref 0.1–1.3)
MONOCYTES NFR BLD: 16 % (ref 4–12)
NEUTS SEG # BLD: 4.9 K/UL (ref 1.7–8.2)
NEUTS SEG NFR BLD: 63 % (ref 43–78)
NRBC # BLD: 0 K/UL (ref 0–0.2)
PHOSPHATE SERPL-MCNC: 5.3 MG/DL (ref 2.3–3.7)
PLATELET # BLD AUTO: 107 K/UL (ref 150–450)
PMV BLD AUTO: 12.2 FL (ref 9.4–12.3)
POTASSIUM SERPL-SCNC: 3.3 MMOL/L (ref 3.5–5.1)
RBC # BLD AUTO: 4.3 M/UL (ref 4.23–5.6)
SODIUM SERPL-SCNC: 139 MMOL/L (ref 136–145)
WBC # BLD AUTO: 7.8 K/UL (ref 4.3–11.1)

## 2019-03-07 PROCEDURE — 65270000029 HC RM PRIVATE

## 2019-03-07 PROCEDURE — 74011250637 HC RX REV CODE- 250/637: Performed by: INTERNAL MEDICINE

## 2019-03-07 PROCEDURE — 36415 COLL VENOUS BLD VENIPUNCTURE: CPT

## 2019-03-07 PROCEDURE — 74011250637 HC RX REV CODE- 250/637: Performed by: UROLOGY

## 2019-03-07 PROCEDURE — 85025 COMPLETE CBC W/AUTO DIFF WBC: CPT

## 2019-03-07 PROCEDURE — 74011250637 HC RX REV CODE- 250/637: Performed by: HOSPITALIST

## 2019-03-07 PROCEDURE — 80069 RENAL FUNCTION PANEL: CPT

## 2019-03-07 RX ORDER — POTASSIUM CHLORIDE 1.5 G/1.77G
20 POWDER, FOR SOLUTION ORAL 2 TIMES DAILY WITH MEALS
Status: DISCONTINUED | OUTPATIENT
Start: 2019-03-07 | End: 2019-03-07

## 2019-03-07 RX ORDER — POTASSIUM CHLORIDE 20 MEQ/1
40 TABLET, EXTENDED RELEASE ORAL
Status: COMPLETED | OUTPATIENT
Start: 2019-03-07 | End: 2019-03-07

## 2019-03-07 RX ADMIN — LACTULOSE: 20 SOLUTION ORAL at 18:23

## 2019-03-07 RX ADMIN — APIXABAN 5 MG: 2.5 TABLET, FILM COATED ORAL at 09:55

## 2019-03-07 RX ADMIN — NYSTATIN: 100000 POWDER TOPICAL at 10:05

## 2019-03-07 RX ADMIN — HYDROCODONE BITARTRATE AND ACETAMINOPHEN 1 TABLET: 5; 325 TABLET ORAL at 19:55

## 2019-03-07 RX ADMIN — Medication 10 ML: at 21:57

## 2019-03-07 RX ADMIN — FINASTERIDE 5 MG: 5 TABLET, FILM COATED ORAL at 09:00

## 2019-03-07 RX ADMIN — PANTOPRAZOLE SODIUM 40 MG: 40 TABLET, DELAYED RELEASE ORAL at 05:48

## 2019-03-07 RX ADMIN — Medication 10 ML: at 13:57

## 2019-03-07 RX ADMIN — VITAMIN D, TAB 1000IU (100/BT) 2000 UNITS: 25 TAB at 09:55

## 2019-03-07 RX ADMIN — TORSEMIDE 100 MG: 100 TABLET ORAL at 18:23

## 2019-03-07 RX ADMIN — Medication 10 ML: at 05:55

## 2019-03-07 RX ADMIN — METOLAZONE 10 MG: 5 TABLET ORAL at 18:23

## 2019-03-07 RX ADMIN — MIDODRINE HYDROCHLORIDE 15 MG: 5 TABLET ORAL at 18:23

## 2019-03-07 RX ADMIN — NYSTATIN: 100000 POWDER TOPICAL at 21:57

## 2019-03-07 RX ADMIN — METOLAZONE 10 MG: 5 TABLET ORAL at 09:56

## 2019-03-07 RX ADMIN — MIDODRINE HYDROCHLORIDE 15 MG: 5 TABLET ORAL at 13:55

## 2019-03-07 RX ADMIN — TORSEMIDE 100 MG: 100 TABLET ORAL at 09:56

## 2019-03-07 RX ADMIN — MIDODRINE HYDROCHLORIDE 15 MG: 5 TABLET ORAL at 09:56

## 2019-03-07 RX ADMIN — FLUTICASONE PROPIONATE 2 SPRAY: 50 SPRAY, METERED NASAL at 09:56

## 2019-03-07 RX ADMIN — Medication 1 TABLET: at 09:55

## 2019-03-07 RX ADMIN — POTASSIUM CHLORIDE 40 MEQ: 20 TABLET, EXTENDED RELEASE ORAL at 09:56

## 2019-03-07 RX ADMIN — APIXABAN 5 MG: 2.5 TABLET, FILM COATED ORAL at 21:57

## 2019-03-07 NOTE — PROGRESS NOTES
Hospitalist Progress Note     Admit Date:  2019 11:19 AM   Name:  Eliezer Albarran. Age:  66 y.o.  :  1945   MRN:  [id number]   PCP:  Israel Vizcarra MD  Treatment Team: Attending Provider: Kwame Soares DO; Consulting Provider: Marisol Cesar MD; Utilization Review: Petey Mcgregor RN; Consulting Provider: Marlin Bradley MD; Care Manager: Vannessa Ballesteros RN    Subjective:     From previous notes: \"66year-old gentleman with multiple medical problems including CHF, A. fib, CKD stage IV,, chronic COPD, obstructive sleep apnea who is noncompliant with CPAP, who is currently in a rehab facility, admitted on  for acute on chronic CHF in view of worsening bilateral LE swelling, hypoxic respiratory failure and hematuria. was brought into the emergency room with complaints of hematuria, leg swellings and shortness of breath. He was also having shortness of breath, moderate in severity, progressively getting worse, worse with minimal exertion, not resolved with rest.  Tunneled cath placed  and HD started but could not tolerate his session due to hypotension. Midodrine was restarted and he has remained intolerated of HD due to BP issues. \" [facility] recommended hospice care on 3/1/19 so the wife requested a second opinion. 3/7/19:   Clinically slowly improving. Awaiting Ltach placement as recovery if continues will be prolonged process. He has responded to aggressive loop diuretic with metolazone priming. but still edematous. And cardiorenal syndrome with likely poor prognosis. Daughter and patient aware.          Objective:     Patient Vitals for the past 24 hrs:   Temp Pulse Resp BP SpO2   19 1547 97.9 °F (36.6 °C) 66 18 115/77 96 %   19 0716 98.1 °F (36.7 °C) 67 18 112/70 95 %   19 0541 98.1 °F (36.7 °C) 66 18 110/68 94 %   19 2341 98 °F (36.7 °C) 65 18 95/68 100 %   19 1939 98 °F (36.7 °C) 62 18 125/82 100 %     Oxygen Therapy  O2 Sat (%): 96 % (03/07/19 1547)  Pulse via Oximetry: 70 beats per minute (03/04/19 0831)  O2 Device: Nasal cannula (03/06/19 1627)  O2 Flow Rate (L/min): 3 l/min (03/06/19 1627)  ETCO2 (mmHg): 21 mmHg (02/25/19 1347)    Intake/Output Summary (Last 24 hours) at 3/7/2019 1808  Last data filed at 3/7/2019 1500  Gross per 24 hour   Intake    Output 2300 ml   Net -2300 ml         Physical Examination:  Physical Exam   Constitutional: No distress. HENT:   Head: Atraumatic. Mouth/Throat: No oropharyngeal exudate. Eyes: EOM are normal. No scleral icterus. Neck: No tracheal deviation present. Cardiovascular: Normal heart sounds. Exam reveals no friction rub. Abdominal: There is no rebound and no guarding. Musculoskeletal: He exhibits edema. He exhibits no deformity. Lymphadenopathy:     He has no cervical adenopathy. Neurological: He is alert. He has normal reflexes. Coordination normal.   Skin: Skin is dry. No rash noted. He is not diaphoretic. Psychiatric: Affect normal.       Data Review:  I have reviewed all labs, meds, telemetry events, and studies from the last 24 hours.     Recent Results (from the past 24 hour(s))   RENAL FUNCTION PANEL    Collection Time: 03/07/19  6:16 AM   Result Value Ref Range    Sodium 139 136 - 145 mmol/L    Potassium 3.3 (L) 3.5 - 5.1 mmol/L    Chloride 101 98 - 107 mmol/L    CO2 25 21 - 32 mmol/L    Anion gap 13 7 - 16 mmol/L    Glucose 92 65 - 100 mg/dL    BUN 79 (H) 8 - 23 MG/DL    Creatinine 5.34 (H) 0.8 - 1.5 MG/DL    GFR est AA 14 (L) >60 ml/min/1.73m2    GFR est non-AA 11 (L) >60 ml/min/1.73m2    Calcium 8.2 (L) 8.3 - 10.4 MG/DL    Phosphorus 5.3 (H) 2.3 - 3.7 MG/DL    Albumin 2.6 (L) 3.2 - 4.6 g/dL   CBC WITH AUTOMATED DIFF    Collection Time: 03/07/19  6:16 AM   Result Value Ref Range    WBC 7.8 4.3 - 11.1 K/uL    RBC 4.30 4.23 - 5.6 M/uL    HGB 11.8 (L) 13.6 - 17.2 g/dL    HCT 35.1 (L) 41.1 - 50.3 %    MCV 81.6 79.6 - 97.8 FL    MCH 27.4 26.1 - 32.9 PG    MCHC 33.6 31.4 - 35.0 g/dL    RDW 19.5 (H) 11.9 - 14.6 %    PLATELET 423 (L) 143 - 450 K/uL    MPV 12.2 9.4 - 12.3 FL    ABSOLUTE NRBC 0.00 0.0 - 0.2 K/uL    DF AUTOMATED      NEUTROPHILS 63 43 - 78 %    LYMPHOCYTES 18 13 - 44 %    MONOCYTES 16 (H) 4.0 - 12.0 %    EOSINOPHILS 2 0.5 - 7.8 %    BASOPHILS 1 0.0 - 2.0 %    IMMATURE GRANULOCYTES 0 0.0 - 5.0 %    ABS. NEUTROPHILS 4.9 1.7 - 8.2 K/UL    ABS. LYMPHOCYTES 1.4 0.5 - 4.6 K/UL    ABS. MONOCYTES 1.3 0.1 - 1.3 K/UL    ABS. EOSINOPHILS 0.1 0.0 - 0.8 K/UL    ABS. BASOPHILS 0.1 0.0 - 0.2 K/UL    ABS. IMM. GRANS. 0.0 0.0 - 0.5 K/UL        All Micro Results     Procedure Component Value Units Date/Time    CULTURE, BLOOD [[id number]] Collected:  02/22/19 1740    Order Status:  Completed Specimen:  Blood Updated:  02/27/19 0837     Special Requests: --        RIGHT  HAND       Culture result: NO GROWTH 5 DAYS       CULTURE, BLOOD [[id number]] Collected:  02/22/19 1753    Order Status:  Completed Specimen:  Blood Updated:  02/27/19 0837     Special Requests: --        RIGHT  FOREARM       Culture result: NO GROWTH 5 DAYS       CULTURE, BLOOD [[id number]] Collected:  02/19/19 1140    Order Status:  Completed Specimen:  Blood Updated:  02/24/19 0654     Special Requests: --        RIGHT  Antecubital       Culture result: NO GROWTH 5 DAYS       CULTURE, BLOOD [[id number]]  (Abnormal) Collected:  02/19/19 1252    Order Status:  Completed Specimen:  Blood Updated:  02/23/19 0747     Special Requests: --        RIGHT  HAND       GRAM STAIN       GRAM POSITIVE COCCI IN CHAINS                  AEROBIC AND ANAEROBIC BOTTLES                  RESULTS VERIFIED, PHONED TO AND READ BACK BY EAMON GONZALEZ @[id number] 02/20/2019 [facility]           Culture result:       STAPHYLOCOCCUS SPECIES, COAGULASE NEGATIVE                  ALPHA STREPTOCOCCUS, NOT S. PNEUMONIAE                  THIS ORGANISM MAY BE INDICATIVE OF CULTURE CONTAMINATION, HOWEVER, CLINICAL CORRELATION NEEDS TO BE EVALUATED, AS EACH CASE IS UNIQUE. CULTURE, URINE [[id number]]  (Abnormal)  (Susceptibility) Collected:  02/19/19 1220    Order Status:  Completed Specimen:  Cath Urine Updated:  02/22/19 3134     Special Requests: NO SPECIAL REQUESTS        Culture result:       1000 COLONIES/mL STAPHYLOCOCCUS HAEMOLYTICUS                Current Meds:  Current Facility-Administered Medications   Medication Dose Route Frequency    torsemide (DEMADEX) tablet 100 mg  100 mg Oral BID    potassium chloride (K-DUR, KLOR-CON) SR tablet 20 mEq  20 mEq Oral DAILY    cholecalciferol (VITAMIN D3) tablet 2,000 Units  2,000 Units Oral DAILY    B complex-vitamin C-folic acid (NEPHRO-JANEL) 0.8 mg tab  1 Tab Oral DAILY    lactulose (CHRONULAC) solution 10 g  10 g Oral BID    nystatin (MYCOSTATIN) 100,000 unit/gram powder   Topical BID    metOLazone (ZAROXOLYN) tablet 10 mg  10 mg Oral BID    midodrine (PROAMITINE) tablet 15 mg  15 mg Oral TID WITH MEALS    digoxin (LANOXIN) tablet 0.125 mg  0.125 mg Oral Q MON, WED & FRI    mineral oil-hydrophil petrolat (AQUAPHOR) ointment   Topical PRN    apixaban (ELIQUIS) tablet 5 mg  5 mg Oral BID    lidocaine (XYLOCAINE) 20 mg/mL (2 %) injection  mg  1-20 mL IntraDERMal Multiple    albuterol-ipratropium (DUO-NEB) 2.5 MG-0.5 MG/3 ML  3 mL Nebulization Q4H PRN    fluticasone (FLONASE) 50 mcg/actuation nasal spray 2 Spray  2 Spray Both Nostrils DAILY    pantoprazole (PROTONIX) tablet 40 mg  40 mg Oral ACB    sodium chloride (NS) flush 5-40 mL  5-40 mL IntraVENous Q8H    sodium chloride (NS) flush 5-40 mL  5-40 mL IntraVENous PRN    acetaminophen (TYLENOL) tablet 650 mg  650 mg Oral Q4H PRN    HYDROcodone-acetaminophen (NORCO) 5-325 mg per tablet 1 Tab  1 Tab Oral Q4H PRN    morphine injection 2 mg  2 mg IntraVENous Q4H PRN    naloxone (NARCAN) injection 0.4 mg  0.4 mg IntraVENous PRN    diphenhydrAMINE (BENADRYL) capsule 25 mg  25 mg Oral Q4H PRN    ondansetron (ZOFRAN) injection 4 mg  4 mg IntraVENous Q4H PRN    finasteride (PROSCAR) tablet 5 mg  5 mg Oral DAILY       Diet:  DIET REGULAR  DIET NUTRITIONAL SUPPLEMENTS    Other Studies (last 24 hours):  No results found.     Assessment and Plan:     Hospital Problems as of 3/7/2019 Date Reviewed: 12/11/2018          Codes Class Noted - Resolved POA    ESRD (end stage renal disease) on dialysis [facility]) ICD-10-CM: N18.6, Z99.2  ICD-9-CM: 585.6, V45.11  3/2/2019 - Present Yes        Acute pulmonary edema ([facility] ICD-10-CM: J81.0  ICD-9-CM: 518.4  3/2/2019 - Present Yes        Hemodialysis-associated hypotension ICD-10-CM: I95.3  ICD-9-CM: 458.21  3/2/2019 - Present Yes        Syncope ICD-10-CM: R55  ICD-9-CM: 780.2  3/2/2019 - Present No        Thrombocytopenia (HCC) ICD-10-CM: D69.6  ICD-9-CM: 287.5  3/2/2019 - Present Yes        Hematuria ICD-10-CM: R31.9  ICD-9-CM: 599.70  3/2/2019 - Present Yes        Anemia due to chronic kidney disease ICD-10-CM: N18.9, D63.1  ICD-9-CM: 285.21  3/2/2019 - Present Yes        CKD (chronic kidney disease) stage 4, GFR 15-29 ml/min (HCC) ICD-10-CM: N18.4  ICD-9-CM: 585.4  2/20/2019 - Present         Lactic acidosis ICD-10-CM: E87.2  ICD-9-CM: 276.2  2/20/2019 - Present Yes        Tobacco abuse ICD-10-CM: Z72.0  ICD-9-CM: 305.1  1/22/2019 - Present Yes        Atrial fibrillation ([facility] ICD-10-CM: I48.91  ICD-9-CM: 427.31  11/27/2018 - Present Yes        Acute on chronic combined systolic and diastolic CHF (congestive heart failure) ([facility] ICD-10-CM: I50.43  ICD-9-CM: 428.43, 428.0  11/26/2018 - Present Yes        Pulmonary hypertension (HCC) (Chronic) ICD-10-CM: I27.20  ICD-9-CM: 416.8  10/20/2018 - Present Yes        Type 2 diabetes with nephropathy ([facility] ICD-10-CM: E11.21  ICD-9-CM: 250.40, 583.81  10/8/2018 - Present Yes        * (Principal) Acute respiratory failure with hypoxia (HCC) ICD-10-CM: J96.01  ICD-9-CM: 518.81  5/24/2018 - Present Yes        TAZ (obstructive sleep apnea) ICD-10-CM: E18.06  ICD-9-CM: 327.23  10/2/2017 - Present Yes    Overview Signed 12/11/2018  1:24 PM by Kiya Pierce NP     Intolerant of CPAP             CKD (chronic kidney disease) stage 3, GFR 30-59 ml/min (HCC) (Chronic) ICD-10-CM: N18.3  ICD-9-CM: 585.3  9/29/2017 - Present         Coronary atherosclerosis of native coronary vessel (Chronic) ICD-10-CM: I25.10  ICD-9-CM: 414.01  9/29/2017 - Present Yes        Hypertension (Chronic) ICD-10-CM: I10  ICD-9-CM: 401.9  9/29/2017 - Present         CHF (congestive heart failure) (HCC) ICD-10-CM: I50.9  ICD-9-CM: 428.0  9/27/2017 - Present         Mixed hyperlipidemia (Chronic) ICD-10-CM: C86.7  ICD-9-CM: 272.2  9/28/2016 - Present Yes              A/P:    Principal Problem:    Acute respiratory failure with hypoxia ([facility] (5/24/2018)-improved  - Due to acute pulmonary edema  - cardiorenal syndrome--  lasix gtt/metolazone, increase midodrine/dig--  Has responded so far but unclear if will prove effective long term. Needs placement         chronic combined systolic and diastolic CHF (congestive heart failure) ([facility] (11/26/2018)  - EF 25%-30%  - No ACE or BB due to hypotension  Cardiology signed off       ESRD (end stage renal disease) on dialysis [facility]) (3/2/2019)  - New start HD --> did not tolerate as he had hypotension and syncope during first 2 attempts  - above, second opinion nephrology dr Tad Crews and pt.  Responding thus far           Thrombocytopenia ([facility] (3/2/2019)  - resolving likey due to acute illness on admit       Anemia due to chronic kidney disease (3/2/2019)     Hx cad       Type 2 diabetes with nephropathy ([address] (10/8/2018)       Pulmonary hypertension ([address] (10/20/2018)       Atrial fibrillation ([address] (11/27/2018)  -  periods of paroxysmal afib  - Continue Eliquis cva prophylaxis  - No HR/BP meds due to hypotension--this remains unchanged       Mixed hyperlipidemia (9/28/2016)       TAZ (obstructive sleep apnea) (10/2/2017)  - Non-compliant with CPAP--intolerant per daughter       Tobacco abuse (1/22/2019)     Dispo - .  Discharge to [facility] when approved If continues to improve he may be able to go to snf    DIET REGULAR 2 GM NA (House Low NA)  DIET NUTRITIONAL SUPPLEMENTS All Meals; Nepro     DVT Prophylaxis: Eliquis

## 2019-03-07 NOTE — PROGRESS NOTES
Attempted to tx. Pt refused and unable to get straight answer as to why. Pt gets distracted before finishing sentence. Daughter present and trying to encourage, other family member present stating that his feet are too swollen and needs to rest. Will attempt again next visit.

## 2019-03-07 NOTE — PROGRESS NOTES
RENAL PROGRESS NOTE    Subjective:     Cc - I was asked to see patient for second opinion    Patient is a 65 y/o AAM with Acute Kidney Injury on top of Chronic Kidney Disease stage 3 had attempts at dialysis x three days, but each time needed to be taken off dialysis due to hemodynamic instability. He also has CHF, A. fib, obstructive sleep apnea who was admitted from a rehab facility on 2/19 for acute on chronic CHF with worsening bilateral LE swelling, hypoxic respiratory failure and hematuria. He did not respond to medical management and a tunneled cath placed 2/25 and HD started. He could not tolerate his session due to hypotension even after midodrine treatment. [facility] recommended hospice care on 3/1/19 so the wife requested a second opinion. The patient is uncomfortable due to dyspnea despite O2 by NC and is thirsty. His wife is at bedside. I discussed with patient and wife that dialysis is a good treatment for kidney failure, but that his main problem is heart failure and that heart transplant is a treatment for heart failure, however, he is not a candidate for it due to co-morbid conditions and advanced age. I  Discussed that in order to attempt dialysis again he would need to improve hemodynamic stability and respond favorably to medication changes, but that the likelihood of success was not good as he has not responded well to medical therapy so far.     3/3/19 - feels and breathes better - in better spirit - no longer in respiratory distress - edema improving   3/4/19 - gradually progressing - no labs available yet - will continue to try medical therapy  3/5/19 - alert and communicating well this am, but wife states she has noted some confusion - no new tremor, no overt uremic symptoms - respiratory status improving  3/6/19 - feels and looks better - tolerates RA oxygen - edema improving - fair urine output - will try to switch to PO diuresis with Torsemide and Metolazone - discussed with  Cyril and RN and wife  3/7/19 - he continues to breath better - edema improving - adequate urine output on Torsemide and Metolazone PO - for possible [facility]        Past Medical History:   Diagnosis Date    Acute CHF (congestive heart failure) ([facility] [address] 4/25/2015    Acute combined systolic and diastolic congestive heart failure ([facility] [address] 4/28/2015    De Quervain's tenosynovitis, right 4/28/2015    Dyspnea on exertion 6/28/2015    Elevated serum creatinine 4/25/2015    Elevated troponin 6/28/2015    History of coronary artery disease     MI at 27 yo    History of right knee surgery     cartilage removal    History of shingles     Malignant hypertension 4/25/2015    MI [facility])       Past Surgical History:   Procedure Laterality Date    HX HEART CATHETERIZATION  6/29/2015    no intervention    HX KNEE ARTHROSCOPY Right     removal of cartilage    IR INSERT TUNL CVC W/O PORT OVER 5 YR  2/25/2019      Prior to Admission medications    Medication Sig Start Date End Date Taking? Authorizing Provider   omeprazole (PRILOSEC) 20 mg capsule Take 1 Cap by mouth daily. 1/24/19   Mark Houston MD   potassium chloride (K-DUR, KLOR-CON) 20 mEq tablet Take 1 Tab by mouth daily.  1/23/19   Reinaldo Carlos MD   Blood-Glucose Meter (ACCU-CHEK YOSEF PLUS METER) misc USE TO CHECK BLOOD SUGARS DAILY DX: E11.9 12/27/18   Mark Houston MD   Blood Glucose Control High&Low (ACCU-CHEK YOSEF CONTROL SOLN) soln USE AS DIRECTED 12/27/18   Mark Houston MD   glucose blood VI test strips (ACCU-CHEK YOSEF PLUS TEST STRP) strip USE TO CHECK BLOOD SUGARS DAILY DX: E11.9 12/27/18   Mark Houston MD   alcohol swabs (BD SINGLE USE SWABS REGULAR) padm USE AS DIRECTED DAILY DX. E11.9 12/27/18   Mark Houston MD   lancets (ACCU-CHEK SOFTCLIX LANCETS) misc USE TO CHECK BLOOD SUGARS DAILY DX: E11.9 12/27/18   Mark Houston MD   carvedilol (COREG) 6.25 mg tablet Take 1 Tab by mouth two (2) times daily (with meals). 12/3/18   Tiki Parks MD   apixaban (ELIQUIS) 5 mg tablet Take 1 Tab by mouth every twelve (12) hours. 12/3/18   Tiki Parks MD   aspirin 81 mg chewable tablet Take 1 Tab by mouth daily. 11/5/18   Kyle Marie MD   atorvastatin (LIPITOR) 20 mg tablet Take 1 Tab by mouth nightly. 10/4/18   Sudeep Pino MD   allopurinol (ZYLOPRIM) 100 mg tablet TAKE 1 TABLET BY MOUTH EVERY DAY 9/25/18   Sudeep Pino MD   fluticasone [facility]) 50 mcg/actuation nasal spray 2 Sprays by Both Nostrils route daily. 3/29/18   Obdulia Mata MD   melatonin 3 mg tablet Take 3 mg by mouth nightly. Provider, Historical   albuterol (VENTOLIN HFA) 90 mcg/actuation inhaler Take 2 Puffs by inhalation four (4) times daily. 2/6/18   Eloise English NP   polyethylene glycol (MIRALAX) 17 gram packet Take 1 Packet by mouth daily. Patient taking differently: Take 17 g by mouth daily as needed. 1/13/18   Kendra MERLOS NP   albuterol-ipratropium (DUO-NEB) 2.5 mg-0.5 mg/3 ml nebu 3 mL by Nebulization route four (4) times daily.  Diaignosis--J44.9 12/13/17   Eloise English NP     Allergies   Allergen Reactions    Ativan [Lorazepam] Other (comments)     Confusion- mild but long lasting- days    Pcn [Penicillins] Other (comments)     \"makes my heart stop\"      Social History     Tobacco Use    Smoking status: Former Smoker     Packs/day: 0.25     Years: 20.00     Pack years: 5.00     Types: Cigarettes     Last attempt to quit: 4/5/2015     Years since quitting: 3.9    Smokeless tobacco: Never Used   Substance Use Topics    Alcohol use: No     Alcohol/week: 0.0 oz      Family History   Problem Relation Age of Onset    Hypertension Mother     No Known Problems Father           Review of Systems    Constitutional: no fever, rested better   Eyes: fair vision,    Ears, nose, mouth, throat, and face:fair hearing,    Respiratory: no asthma,    Cardiovascular:no chest pain,    Gastrointestinal:no diarrhea,    Genitourinary: he has had hematuria and dysuria,   Hematologic/lymphatic: no bleeding tendency,    Neurological: no seizures,    Behvioral/Psych: no psych hospitalization    Endocrine: no goiter,       Objective:       Visit Vitals  /70   Pulse 67   Temp 98.1 °F (36.7 °C)   Resp 18   Ht 5' 10\" (1.778 m)   Wt 103.6 kg (228 lb 4.8 oz)   SpO2 95%   BMI 32.76 kg/m²       No intake/output data recorded. 03/05 1901 - 03/07 0700  In: -   Out: 5191 [Urine:2825]    General:  Alert, cooperative, no respiratory distress on RA, appears stated age. Head:  Normocephalic, without obvious abnormality, atraumatic. Eyes:  Conjunctivae/corneas clear. EOMs intact. Throat: Lips, mucosa, and tongue normal. Teeth and gums normal.   Neck: Supple, symmetrical, trachea midline, no adenopathy, pos for JVD. Lungs:   Clear to auscultation bilaterally. Heart:  Regular rate and rhythm, S1, S2 normal, no  rub. Abdomen:   Soft, non-tender. Bowel sounds normal. No masses,  No organomegaly. No renal bruit. Abdominal wall is bulging and ascites is present   Extremities: Extremities normal, atraumatic, no cyanosis, 3+ edema, improving. Skin: Skin color, texture, turgor normal. No rashes or lesions. Neurologic: Grossly intact. No asterixis. Data Review:     Results for Kacey Ny (MRN [id number]) as of 3/5/2019 09:51   Ref.  Range 3/3/2019 10:53 3/4/2019 06:42 3/5/2019 05:58   Sodium Latest Ref Range: 136 - 145 mmol/L 136 136 137   Potassium Latest Ref Range: 3.5 - 5.1 mmol/L 3.5 3.1 (L) 2.8 (LL)   Chloride Latest Ref Range: 98 - 107 mmol/L 99 98 99   CO2 Latest Ref Range: 21 - 32 mmol/L 26 28 25   Anion gap Latest Ref Range: 7 - 16 mmol/L 11 10 13   Glucose Latest Ref Range: 65 - 100 mg/dL 154 (H) 90 85   BUN Latest Ref Range: 8 - 23 MG/DL 73 (H) 72 (H) 77 (H)   Creatinine Latest Ref Range: 0.8 - 1.5 MG/DL 5.97 (H) 5.87 (H) 5.81 (H)   Calcium Latest Ref Range: 8.3 - 10.4 MG/DL 8.3 8.0 (L) 8.0 (L) Phosphorus Latest Ref Range: 2.3 - 3.7 MG/DL 5.2 (H) 5.3 (H) 5.6 (H)   GFR est non-AA Latest Ref Range: >60 ml/min/1.73m2 10 (L) 10 (L) 10 (L)   GFR est AA Latest Ref Range: >60 ml/min/1.73m2 12 (L) 12 (L) 12 (L)   Albumin Latest Ref Range: 3.2 - 4.6 g/dL 2.7 (L) 2.6 (L) 2.7 (L)       Recent Results (from the past 24 hour(s))   RENAL FUNCTION PANEL    Collection Time: 03/07/19  6:16 AM   Result Value Ref Range    Sodium 139 136 - 145 mmol/L    Potassium 3.3 (L) 3.5 - 5.1 mmol/L    Chloride 101 98 - 107 mmol/L    CO2 25 21 - 32 mmol/L    Anion gap 13 7 - 16 mmol/L    Glucose 92 65 - 100 mg/dL    BUN 79 (H) 8 - 23 MG/DL    Creatinine 5.34 (H) 0.8 - 1.5 MG/DL    GFR est AA 14 (L) >60 ml/min/1.73m2    GFR est non-AA 11 (L) >60 ml/min/1.73m2    Calcium 8.2 (L) 8.3 - 10.4 MG/DL    Phosphorus 5.3 (H) 2.3 - 3.7 MG/DL    Albumin 2.6 (L) 3.2 - 4.6 g/dL   CBC WITH AUTOMATED DIFF    Collection Time: 03/07/19  6:16 AM   Result Value Ref Range    WBC 7.8 4.3 - 11.1 K/uL    RBC 4.30 4.23 - 5.6 M/uL    HGB 11.8 (L) 13.6 - 17.2 g/dL    HCT 35.1 (L) 41.1 - 50.3 %    MCV 81.6 79.6 - 97.8 FL    MCH 27.4 26.1 - 32.9 PG    MCHC 33.6 31.4 - 35.0 g/dL    RDW 19.5 (H) 11.9 - 14.6 %    PLATELET 308 (L) 762 - 450 K/uL    MPV 12.2 9.4 - 12.3 FL    ABSOLUTE NRBC 0.00 0.0 - 0.2 K/uL    DF AUTOMATED      NEUTROPHILS 63 43 - 78 %    LYMPHOCYTES 18 13 - 44 %    MONOCYTES 16 (H) 4.0 - 12.0 %    EOSINOPHILS 2 0.5 - 7.8 %    BASOPHILS 1 0.0 - 2.0 %    IMMATURE GRANULOCYTES 0 0.0 - 5.0 %    ABS. NEUTROPHILS 4.9 1.7 - 8.2 K/UL    ABS. LYMPHOCYTES 1.4 0.5 - 4.6 K/UL    ABS. MONOCYTES 1.3 0.1 - 1.3 K/UL    ABS. EOSINOPHILS 0.1 0.0 - 0.8 K/UL    ABS. BASOPHILS 0.1 0.0 - 0.2 K/UL    ABS. IMM. GRANS. 0.0 0.0 - 0.5 K/UL       CXR shows massive cardiomegaly and fluid excess      ECHO 2/21/19 -  SUMMARY:  -  Left ventricle: Systolic function was markedly reduced. Ejection fraction  was estimated in the range of 25 % to 30 %.  There was severe diffuse  hypokinesis with distinct regional wall motion abnormalities. There was   severeglobal hypokinesis. There was severe concentric hypertrophy. The myocardial  specular pattern appeared moderately abnormal. Consider infiltrative  cardiomyopathy if clinically indicated. -  Right ventricle: The ventricle was mildly dilated. Systolic function was  moderately to markedly reduced. There was mild pulmonary artery hypertension.  -  Left atrium: The atrium was moderately to markedly dilated. -  Right atrium: The atrium was markedly dilated. -  Inferior vena cava, hepatic veins: The inferior vena cava was moderately  dilated. The respirophasic change in diameter was less than 50%. -  Aortic valve: The valve was trileaflet. Leaflets exhibited moderate  sclerosis. The left coronary cusp demonstrated immobility. There was mild  regurgitation. -  Mitral valve: There was mild to moderate regurgitation. -  Tricuspid valve: There was mild to moderate regurgitation. -  Pulmonic valve: There was moderate regurgitation.       Principal Problem:    Acute respiratory failure with hypoxia ([address] (5/24/2018)    Active Problems:    Coronary atherosclerosis of native coronary vessel (9/29/2017)      Mixed hyperlipidemia (9/28/2016)      TAZ (obstructive sleep apnea) (10/2/2017)      Overview: Intolerant of CPAP      Type 2 diabetes with nephropathy ([address] (10/8/2018)      Pulmonary hypertension ([address] (10/20/2018)      Acute on chronic combined systolic and diastolic CHF (congestive heart failure) ([address] (11/26/2018)      Atrial fibrillation ([address] (11/27/2018)      Tobacco abuse (1/22/2019)      Lactic acidosis (2/20/2019)      ESRD (end stage renal disease) on dialysis ([address] (3/2/2019)      Acute pulmonary edema ([address] (3/2/2019)      Hemodialysis-associated hypotension (3/2/2019)      Syncope (3/2/2019)      Thrombocytopenia ([address] (3/2/2019)      Hematuria (3/2/2019)      Anemia due to chronic kidney disease (3/2/2019)        Assessment:     1. Acute Kidney Injury on top of Chronic Kidney Disease stage 3 -  - mainly due to cardio-renal syndrome  - marked hemodynamic instability with inadequate response to medical therapy and inability to tolerate dialysis despite Midodrine  - responded to medical therapy including addition of Digoxin  and increasing Midodrine  - his wife understands that this is not likely to succeed to get him stabilize for dialysis in the next days and that this is likely and end of life situation  - further improvement with medical management appears to be present  - no urgent dialysis needed  - adequate response to PO diuresis     2. Acute on chronic Combined systolic and diastolic heart failure decompensation  - responding to intensified diuresis and medical therapy as above      3. A fib with RVR   - on no antihypertensive  - clinically responding to Digoxin     4. UTI  -  on ceftriaxone     5. Respiratory failure -  - on oxygen therapy per primary team  - improving and tolerating nasal cannula     6. Urinary retention  - S/P Schulz with urology involved     7. Hypermagnesemia -  - hoping for consistently improved hemodynamics with decreasing Magnesium level  - avoid magnesium containing meds, Magnesium citrate discontinued    8. Anemia -  - adequate control    9. Acid/base -  - bicarbonate level is WNL    10. Hypokalemia -  - treat with KCL 20 mEq PO daily and another now dose of 40 mEq   - off Fludrocortisone  - monitor response    11.  Hypocalcemia -  - mostly due to hypoalbuminemia  - on added Vitamin D    Plan:     As above    Cha Elmore M.D.

## 2019-03-07 NOTE — PROGRESS NOTES
Interdisciplinary Rounds completed 03/07/19. Nursing, Case Management, Physician and PT present. Plan of care reviewed and updated. Pt now off IV lasix. Up in chair. Per md note breathing better. Schulz in place d/t obstruction/retention. Urology following outpt. [facility].

## 2019-03-08 LAB
ALBUMIN SERPL-MCNC: 2.6 G/DL (ref 3.2–4.6)
ANION GAP SERPL CALC-SCNC: 13 MMOL/L (ref 7–16)
BUN SERPL-MCNC: 81 MG/DL (ref 8–23)
CALCIUM SERPL-MCNC: 8.6 MG/DL (ref 8.3–10.4)
CHLORIDE SERPL-SCNC: 100 MMOL/L (ref 98–107)
CO2 SERPL-SCNC: 26 MMOL/L (ref 21–32)
CREAT SERPL-MCNC: 5.06 MG/DL (ref 0.8–1.5)
ERYTHROCYTE [DISTWIDTH] IN BLOOD BY AUTOMATED COUNT: 19.4 % (ref 11.9–14.6)
GLUCOSE SERPL-MCNC: 101 MG/DL (ref 65–100)
HCT VFR BLD AUTO: 34.2 % (ref 41.1–50.3)
HGB BLD-MCNC: 11.4 G/DL (ref 13.6–17.2)
MCH RBC QN AUTO: 27 PG (ref 26.1–32.9)
MCHC RBC AUTO-ENTMCNC: 33.3 G/DL (ref 31.4–35)
MCV RBC AUTO: 80.9 FL (ref 79.6–97.8)
NRBC # BLD: 0 K/UL (ref 0–0.2)
PHOSPHATE SERPL-MCNC: 5.3 MG/DL (ref 2.3–3.7)
PLATELET # BLD AUTO: 134 K/UL (ref 150–450)
PMV BLD AUTO: 11.2 FL (ref 9.4–12.3)
POTASSIUM SERPL-SCNC: 3.1 MMOL/L (ref 3.5–5.1)
RBC # BLD AUTO: 4.23 M/UL (ref 4.23–5.6)
SODIUM SERPL-SCNC: 139 MMOL/L (ref 136–145)
WBC # BLD AUTO: 6.9 K/UL (ref 4.3–11.1)

## 2019-03-08 PROCEDURE — 74011250637 HC RX REV CODE- 250/637: Performed by: HOSPITALIST

## 2019-03-08 PROCEDURE — 74011250637 HC RX REV CODE- 250/637: Performed by: INTERNAL MEDICINE

## 2019-03-08 PROCEDURE — 65270000029 HC RM PRIVATE

## 2019-03-08 PROCEDURE — 85027 COMPLETE CBC AUTOMATED: CPT

## 2019-03-08 PROCEDURE — 77010033678 HC OXYGEN DAILY

## 2019-03-08 PROCEDURE — 36415 COLL VENOUS BLD VENIPUNCTURE: CPT

## 2019-03-08 PROCEDURE — 74011250637 HC RX REV CODE- 250/637: Performed by: UROLOGY

## 2019-03-08 PROCEDURE — 94760 N-INVAS EAR/PLS OXIMETRY 1: CPT

## 2019-03-08 PROCEDURE — 80069 RENAL FUNCTION PANEL: CPT

## 2019-03-08 RX ORDER — POTASSIUM CHLORIDE 20 MEQ/1
20 TABLET, EXTENDED RELEASE ORAL EVERY 12 HOURS
Status: DISCONTINUED | OUTPATIENT
Start: 2019-03-08 | End: 2019-03-10

## 2019-03-08 RX ORDER — POTASSIUM CHLORIDE 20 MEQ/1
40 TABLET, EXTENDED RELEASE ORAL
Status: DISCONTINUED | OUTPATIENT
Start: 2019-03-08 | End: 2019-03-08

## 2019-03-08 RX ORDER — POTASSIUM CHLORIDE 20 MEQ/1
40 TABLET, EXTENDED RELEASE ORAL
Status: COMPLETED | OUTPATIENT
Start: 2019-03-08 | End: 2019-03-08

## 2019-03-08 RX ADMIN — POTASSIUM CHLORIDE 20 MEQ: 20 TABLET, EXTENDED RELEASE ORAL at 21:54

## 2019-03-08 RX ADMIN — METOLAZONE 10 MG: 5 TABLET ORAL at 09:12

## 2019-03-08 RX ADMIN — Medication 10 ML: at 05:56

## 2019-03-08 RX ADMIN — Medication 10 ML: at 13:13

## 2019-03-08 RX ADMIN — FINASTERIDE 5 MG: 5 TABLET, FILM COATED ORAL at 09:12

## 2019-03-08 RX ADMIN — PANTOPRAZOLE SODIUM 40 MG: 40 TABLET, DELAYED RELEASE ORAL at 05:56

## 2019-03-08 RX ADMIN — DIGOXIN 0.12 MG: 125 TABLET ORAL at 09:13

## 2019-03-08 RX ADMIN — TORSEMIDE 100 MG: 100 TABLET ORAL at 17:17

## 2019-03-08 RX ADMIN — APIXABAN 5 MG: 2.5 TABLET, FILM COATED ORAL at 21:53

## 2019-03-08 RX ADMIN — POTASSIUM CHLORIDE 40 MEQ: 20 TABLET, EXTENDED RELEASE ORAL at 17:17

## 2019-03-08 RX ADMIN — METOLAZONE 10 MG: 5 TABLET ORAL at 17:17

## 2019-03-08 RX ADMIN — LACTULOSE 10 G: 20 SOLUTION ORAL at 17:16

## 2019-03-08 RX ADMIN — VITAMIN D, TAB 1000IU (100/BT) 2000 UNITS: 25 TAB at 09:12

## 2019-03-08 RX ADMIN — Medication 1 TABLET: at 09:12

## 2019-03-08 RX ADMIN — NYSTATIN: 100000 POWDER TOPICAL at 09:00

## 2019-03-08 RX ADMIN — POTASSIUM CHLORIDE 20 MEQ: 20 TABLET, EXTENDED RELEASE ORAL at 09:12

## 2019-03-08 RX ADMIN — LACTULOSE 10 G: 20 SOLUTION ORAL at 09:12

## 2019-03-08 RX ADMIN — TORSEMIDE 100 MG: 100 TABLET ORAL at 09:12

## 2019-03-08 RX ADMIN — MIDODRINE HYDROCHLORIDE 15 MG: 5 TABLET ORAL at 13:13

## 2019-03-08 RX ADMIN — MIDODRINE HYDROCHLORIDE 15 MG: 5 TABLET ORAL at 17:16

## 2019-03-08 RX ADMIN — APIXABAN 5 MG: 2.5 TABLET, FILM COATED ORAL at 09:12

## 2019-03-08 RX ADMIN — FLUTICASONE PROPIONATE 2 SPRAY: 50 SPRAY, METERED NASAL at 09:00

## 2019-03-08 RX ADMIN — Medication 10 ML: at 21:54

## 2019-03-08 RX ADMIN — MIDODRINE HYDROCHLORIDE 15 MG: 5 TABLET ORAL at 09:12

## 2019-03-08 RX ADMIN — NYSTATIN: 100000 POWDER TOPICAL at 18:00

## 2019-03-08 NOTE — PROGRESS NOTES
I&O as follows: Intake 220ml, Output 800ml of yellow/straw clear urine. BLE edema noted, BP in 90s-100s/60s. Meds had been crushed with applesauce. Patient tolerated well. Family at bedside. Hourly rounds performed;all pt needs met. Bed in low position and call light/ personal items within reach. Will continue to monitor and give bedside shift report to oncoming day shift nurse.

## 2019-03-08 NOTE — PROGRESS NOTES
Per [facility], patient was declined initially to facility. Family notified and asked for appeal. Appeal process started. Awaiting response.

## 2019-03-08 NOTE — PROGRESS NOTES
RENAL PROGRESS NOTE    Subjective:     Cc - I was asked to see patient for second opinion    Patient is a 65 y/o AAM with Acute Kidney Injury on top of Chronic Kidney Disease stage 3 had attempts at dialysis x three days, but each time needed to be taken off dialysis due to hemodynamic instability. He also has CHF, A. fib, obstructive sleep apnea who was admitted from a rehab facility on 2/19 for acute on chronic CHF with worsening bilateral LE swelling, hypoxic respiratory failure and hematuria. He did not respond to medical management and a tunneled cath placed 2/25 and HD started. He could not tolerate his session due to hypotension even after midodrine treatment. [facility] recommended hospice care on 3/1/19 so the wife requested a second opinion. The patient is uncomfortable due to dyspnea despite O2 by NC and is thirsty. His wife is at bedside. I discussed with patient and wife that dialysis is a good treatment for kidney failure, but that his main problem is heart failure and that heart transplant is a treatment for heart failure, however, he is not a candidate for it due to co-morbid conditions and advanced age. I  Discussed that in order to attempt dialysis again he would need to improve hemodynamic stability and respond favorably to medication changes, but that the likelihood of success was not good as he has not responded well to medical therapy so far.     3/3/19 - feels and breathes better - in better spirit - no longer in respiratory distress - edema improving   3/4/19 - gradually progressing - no labs available yet - will continue to try medical therapy  3/5/19 - alert and communicating well this am, but wife states she has noted some confusion - no new tremor, no overt uremic symptoms - respiratory status improving  3/6/19 - feels and looks better - tolerates RA oxygen - edema improving - fair urine output - will try to switch to PO diuresis with Torsemide and Metolazone - discussed with  Cyril and RN and wife  3/7/19 - he continues to breath better - edema improving - adequate urine output on Torsemide and Metolazone PO - for possible [facility] placement  3/8/19 -         Past Medical History:   Diagnosis Date    Acute CHF (congestive heart failure) ([facility] [address] 4/25/2015    Acute combined systolic and diastolic congestive heart failure ([facility] [address] 4/28/2015    De Quervain's tenosynovitis, right 4/28/2015    Dyspnea on exertion 6/28/2015    Elevated serum creatinine 4/25/2015    Elevated troponin 6/28/2015    History of coronary artery disease     MI at 25 yo    History of right knee surgery     cartilage removal    History of shingles     Malignant hypertension 4/25/2015    MI (myocardial infarction) [facility])       Past Surgical History:   Procedure Laterality Date    HX HEART CATHETERIZATION  6/29/2015    no intervention    HX KNEE ARTHROSCOPY Right     removal of cartilage    IR INSERT TUNL CVC W/O PORT OVER 5 YR  2/25/2019      Prior to Admission medications    Medication Sig Start Date End Date Taking? Authorizing Provider   omeprazole (PRILOSEC) 20 mg capsule Take 1 Cap by mouth daily. 1/24/19   Janice Martin MD   potassium chloride (K-DUR, KLOR-CON) 20 mEq tablet Take 1 Tab by mouth daily.  1/23/19   Eris Arreola MD   Blood-Glucose Meter (ACCU-CHEK YOSEF PLUS METER) misc USE TO CHECK BLOOD SUGARS DAILY DX: E11.9 12/27/18   Janice Martin MD   Blood Glucose Control High&Low (ACCU-CHEK YOSEF CONTROL SOLN) soln USE AS DIRECTED 12/27/18   Janice Martin MD   glucose blood VI test strips (ACCU-CHEK YOSEF PLUS TEST STRP) strip USE TO CHECK BLOOD SUGARS DAILY DX: E11.9 12/27/18   Janice Martin MD   alcohol swabs (BD SINGLE USE SWABS REGULAR) padm USE AS DIRECTED DAILY DX. E11.9 12/27/18   Janice Martin MD   lancets (ACCU-CHEK SOFTCLIX LANCETS) misc USE TO CHECK BLOOD SUGARS DAILY DX: E11.9 12/27/18   Janice Martin MD   carvedilol (COREG) 6.25 mg tablet Take 1 Tab by mouth two (2) times daily (with meals). 12/3/18   Javier Alvarado MD   apixaban (ELIQUIS) 5 mg tablet Take 1 Tab by mouth every twelve (12) hours. 12/3/18   Javier Alvarado MD   aspirin 81 mg chewable tablet Take 1 Tab by mouth daily. 11/5/18   Maryse Marie MD   atorvastatin (LIPITOR) 20 mg tablet Take 1 Tab by mouth nightly. 10/4/18   Zana Ross MD   allopurinol (ZYLOPRIM) 100 mg tablet TAKE 1 TABLET BY MOUTH EVERY DAY 9/25/18   Zana Ross MD   fluticasone [facility]) 50 mcg/actuation nasal spray 2 Sprays by Both Nostrils route daily. 3/29/18   Davidson Mata MD   melatonin 3 mg tablet Take 3 mg by mouth nightly. Provider, Historical   albuterol (VENTOLIN HFA) 90 mcg/actuation inhaler Take 2 Puffs by inhalation four (4) times daily. 2/6/18   Becca Lua NP   polyethylene glycol (MIRALAX) 17 gram packet Take 1 Packet by mouth daily. Patient taking differently: Take 17 g by mouth daily as needed. 1/13/18   Rey MERLOS NP   albuterol-ipratropium (DUO-NEB) 2.5 mg-0.5 mg/3 ml nebu 3 mL by Nebulization route four (4) times daily.  Diaignosis--J44.9 12/13/17   Becca Lua NP     Allergies   Allergen Reactions    Ativan [Lorazepam] Other (comments)     Confusion- mild but long lasting- days    Pcn [Penicillins] Other (comments)     \"makes my heart stop\"      Social History     Tobacco Use    Smoking status: Former Smoker     Packs/day: 0.25     Years: 20.00     Pack years: 5.00     Types: Cigarettes     Last attempt to quit: 4/5/2015     Years since quitting: 3.9    Smokeless tobacco: Never Used   Substance Use Topics    Alcohol use: No     Alcohol/week: 0.0 oz      Family History   Problem Relation Age of Onset    Hypertension Mother     No Known Problems Father           Review of Systems    Constitutional: no fever, rested better   Eyes: fair vision,    Ears, nose, mouth, throat, and face:fair hearing,    Respiratory: no asthma,    Cardiovascular:no chest pain, Gastrointestinal:no diarrhea,    Genitourinary: he has had hematuria and dysuria,   Hematologic/lymphatic: no bleeding tendency,    Neurological: no seizures,    Behvioral/Psych: no psych hospitalization    Endocrine: no goiter,       Objective:       Visit Vitals  /69 (BP 1 Location: Left arm, BP Patient Position: At rest)   Pulse 67   Temp 97.4 °F (36.3 °C)   Resp 22   Ht 5' 10\" (1.778 m)   Wt 95.7 kg (211 lb)   SpO2 100%   BMI 30.28 kg/m²       03/08 0701 - 03/08 1900  In: 240 [P.O.:240]  Out: 1200 [Urine:1200]  03/06 1901 - 03/08 0700  In: 120 [P.O.:120]  Out: 3100 [Urine:3100]    General:  Alert, cooperative, no respiratory distress on RA, appears stated age. Head:  Normocephalic, without obvious abnormality, atraumatic. Eyes:  Conjunctivae/corneas clear. EOMs intact. Throat: Lips, mucosa, and tongue normal. Teeth and gums normal.   Neck: Supple, symmetrical, trachea midline, no adenopathy, pos for JVD. Lungs:   Clear to auscultation bilaterally. Heart:  Regular rate and rhythm, S1, S2 normal, no  rub. Abdomen:   Soft, non-tender. Bowel sounds normal. No masses,  No organomegaly. No renal bruit. Abdominal wall is bulging and ascites is present   Extremities: Extremities normal, atraumatic, no cyanosis, 3+ edema, improving. Skin: Skin color, texture, turgor normal. No rashes or lesions. Neurologic: Grossly intact. No asterixis. Data Review:     Results for Tom Thomas (MRN [id number]) as of 3/8/2019 15:11   Ref.  Range 3/4/2019 06:42 3/5/2019 05:58 3/6/2019 05:40 3/7/2019 06:16 3/8/2019 05:16   Sodium Latest Ref Range: 136 - 145 mmol/L 136 137 137 139 139   Potassium Latest Ref Range: 3.5 - 5.1 mmol/L 3.1 (L) 2.8 (LL) 3.2 (L) 3.3 (L) 3.1 (L)   Chloride Latest Ref Range: 98 - 107 mmol/L 98 99 101 101 100   CO2 Latest Ref Range: 21 - 32 mmol/L 28 25 24 25 26   Anion gap Latest Ref Range: 7 - 16 mmol/L 10 13 12 13 13   Glucose Latest Ref Range: 65 - 100 mg/dL 90 85 97 92 101 (H)   BUN Latest Ref Range: 8 - 23 MG/DL 72 (H) 77 (H) 79 (H) 79 (H) 81 (H)   Creatinine Latest Ref Range: 0.8 - 1.5 MG/DL 5.87 (H) 5.81 (H) 5.64 (H) 5.34 (H) 5.06 (H)   Calcium Latest Ref Range: 8.3 - 10.4 MG/DL 8.0 (L) 8.0 (L) 8.0 (L) 8.2 (L) 8.6   Phosphorus Latest Ref Range: 2.3 - 3.7 MG/DL 5.3 (H) 5.6 (H) 5.4 (H) 5.3 (H) 5.3 (H)   GFR est non-AA Latest Ref Range: >60 ml/min/1.73m2 10 (L) 10 (L) 10 (L) 11 (L) 12 (L)   GFR est AA Latest Ref Range: >60 ml/min/1.73m2 12 (L) 12 (L) 13 (L) 14 (L) 14 (L)   Albumin Latest Ref Range: 3.2 - 4.6 g/dL 2.6 (L) 2.7 (L) 2.5 (L) 2.6 (L) 2.6 (L)       Recent Results (from the past 24 hour(s))   RENAL FUNCTION PANEL    Collection Time: 03/08/19  5:16 AM   Result Value Ref Range    Sodium 139 136 - 145 mmol/L    Potassium 3.1 (L) 3.5 - 5.1 mmol/L    Chloride 100 98 - 107 mmol/L    CO2 26 21 - 32 mmol/L    Anion gap 13 7 - 16 mmol/L    Glucose 101 (H) 65 - 100 mg/dL    BUN 81 (H) 8 - 23 MG/DL    Creatinine 5.06 (H) 0.8 - 1.5 MG/DL    GFR est AA 14 (L) >60 ml/min/1.73m2    GFR est non-AA 12 (L) >60 ml/min/1.73m2    Calcium 8.6 8.3 - 10.4 MG/DL    Phosphorus 5.3 (H) 2.3 - 3.7 MG/DL    Albumin 2.6 (L) 3.2 - 4.6 g/dL   CBC W/O DIFF    Collection Time: 03/08/19  5:16 AM   Result Value Ref Range    WBC 6.9 4.3 - 11.1 K/uL    RBC 4.23 4.23 - 5.6 M/uL    HGB 11.4 (L) 13.6 - 17.2 g/dL    HCT 34.2 (L) 41.1 - 50.3 %    MCV 80.9 79.6 - 97.8 FL    MCH 27.0 26.1 - 32.9 PG    MCHC 33.3 31.4 - 35.0 g/dL    RDW 19.4 (H) 11.9 - 14.6 %    PLATELET 933 (L) 067 - 450 K/uL    MPV 11.2 9.4 - 12.3 FL    ABSOLUTE NRBC 0.00 0.0 - 0.2 K/uL       CXR shows massive cardiomegaly and fluid excess      ECHO 2/21/19 -  SUMMARY:  -  Left ventricle: Systolic function was markedly reduced. Ejection fraction  was estimated in the range of 25 % to 30 %. There was severe diffuse  hypokinesis with distinct regional wall motion abnormalities. There was   severeglobal hypokinesis. There was severe concentric hypertrophy. The myocardial  specular pattern appeared moderately abnormal. Consider infiltrative  cardiomyopathy if clinically indicated. -  Right ventricle: The ventricle was mildly dilated. Systolic function was  moderately to markedly reduced. There was mild pulmonary artery hypertension.  -  Left atrium: The atrium was moderately to markedly dilated. -  Right atrium: The atrium was markedly dilated. -  Inferior vena cava, hepatic veins: The inferior vena cava was moderately  dilated. The respirophasic change in diameter was less than 50%. -  Aortic valve: The valve was trileaflet. Leaflets exhibited moderate  sclerosis. The left coronary cusp demonstrated immobility. There was mild  regurgitation. -  Mitral valve: There was mild to moderate regurgitation. -  Tricuspid valve: There was mild to moderate regurgitation. -  Pulmonic valve: There was moderate regurgitation. Principal Problem:    Acute respiratory failure with hypoxia ([address] (5/24/2018)    Active Problems:    Coronary atherosclerosis of native coronary vessel (9/29/2017)      Mixed hyperlipidemia (9/28/2016)      TAZ (obstructive sleep apnea) (10/2/2017)      Overview: Intolerant of CPAP      Type 2 diabetes with nephropathy ([address] (10/8/2018)      Pulmonary hypertension ([address] (10/20/2018)      Acute on chronic combined systolic and diastolic CHF (congestive heart failure) ([address] (11/26/2018)      Atrial fibrillation ([address] (11/27/2018)      Tobacco abuse (1/22/2019)      Lactic acidosis (2/20/2019)      ESRD (end stage renal disease) on dialysis ([address] (3/2/2019)      Acute pulmonary edema ([address] (3/2/2019)      Hemodialysis-associated hypotension (3/2/2019)      Syncope (3/2/2019)      Thrombocytopenia ([address] (3/2/2019)      Hematuria (3/2/2019)      Anemia due to chronic kidney disease (3/2/2019)        Assessment:     1.  Acute Kidney Injury on top of Chronic Kidney Disease stage 3 -  - mainly due to cardio-renal syndrome  - marked hemodynamic instability with inadequate response to medical therapy and inability to tolerate dialysis despite Midodrine  - responded to medical therapy including addition of Digoxin  and increasing Midodrine  - his wife understands that this is not likely to succeed to get him stabilize for dialysis in the next days and that this is likely and end of life situation  - further improvement with medical management appears to be present  - no dialysis needed  - renal function indices gradually improving  - adequate response to PO diuresis     2. Acute on chronic Combined systolic and diastolic heart failure decompensation  - responding to intensified diuresis and medical therapy as above      3. A fib with RVR   - on no antihypertensive  - clinically responding to Digoxin     4. UTI  -  on ceftriaxone     5. Respiratory failure -  - on oxygen therapy per primary team  - improving and tolerating nasal cannula     6. Urinary retention  - S/P Schulz with urology involved     7. Hypermagnesemia -  - hoping for consistently improved hemodynamics with decreasing Magnesium level  - avoid magnesium containing meds, Magnesium citrate discontinued    8. Anemia -  - adequate control    9. Acid/base -  - bicarbonate level is WNL    10. Hypokalemia -  - treat with KCL 20 mEq PO daily and another now dose of 40 mEq   - off Fludrocortisone  - monitor response    11.  Hypocalcemia -  - mostly due to hypoalbuminemia  - on added Vitamin D    Plan:     As above    Sharath Sr M.D.

## 2019-03-08 NOTE — PROGRESS NOTES
Hospitalist Progress Note     Admit Date:  2019 11:19 AM   Name:  Jonah Lugo. Age:  66 y.o.  :  1945   MRN:  [id number]   PCP:  Mike Pettit MD  Treatment Team: Attending Provider: Noam Ballesteros DO; Consulting Provider: Immanuel Mancuso MD; Utilization Review: Sheron Goodell, RN; Consulting Provider: Candis Lefort, MD; Care Manager: Thai Huff RN; Charge Nurse: Breana Montano; Physical Therapist: Lissette Brewer DPT; Occupational Therapy Assistant: Francia Pinto    Subjective:       From previous notes: \"66year-old gentleman with multiple medical problems including CHF, A. fib, CKD stage IV,, chronic COPD, obstructive sleep apnea who is noncompliant with CPAP, who is currently in a rehab facility, admitted on  for acute on chronic CHF in view of worsening bilateral LE swelling, hypoxic respiratory failure and hematuria. was brought into the emergency room with complaints of hematuria, leg swellings and shortness of breath. He was also having shortness of breath, moderate in severity, progressively getting worse, worse with minimal exertion, not resolved with rest.  Tunneled cath placed  and HD started but could not tolerate his session due to hypotension. Midodrine was restarted and he has remained intolerated of HD due to BP issues. \" [facility] recommended hospice care on 3/1/19 so the wife requested a second opinion.     3/7/19:   Clinically slowly improving. Awaiting [facility] placement as recovery if continues will be prolonged process. He has responded to aggressive loop diuretic with metolazone priming. but still edematous. And cardiorenal syndrome with likely poor prognosis. Daughter and patient aware. 3/8/19  Creatinine improved now 5.0 slowly. Still edematous. Nocturnal confusion last night. No dyspnea at rest. plt count improved.        Objective:     Patient Vitals for the past 24 hrs:   Temp Pulse Resp BP SpO2 03/08/19 1138     100 %   03/08/19 1102 97.4 °F (36.3 °C) 67 22 107/69 91 %   03/08/19 0705 97.5 °F (36.4 °C) 62 20 111/76 91 %   03/08/19 0412 97.6 °F (36.4 °C) 61 20 97/64 94 %   03/07/19 2342 98.7 °F (37.1 °C) 64 20 102/68 90 %   03/07/19 1941 98.4 °F (36.9 °C) 64 20 104/64 100 %   03/07/19 1547 97.9 °F (36.6 °C) 66 18 115/77 96 %     Oxygen Therapy  O2 Sat (%): 100 % (03/08/19 1138)  Pulse via Oximetry: 70 beats per minute (03/04/19 0831)  O2 Device: Nasal cannula (03/08/19 1138)  O2 Flow Rate (L/min): 4 l/min(wears 4L at home) (03/08/19 1138)  ETCO2 (mmHg): 21 mmHg (02/25/19 1347)    Intake/Output Summary (Last 24 hours) at 3/8/2019 1512  Last data filed at 3/8/2019 1300  Gross per 24 hour   Intake 360 ml   Output 2000 ml   Net -1640 ml         Physical Examination:  Constitutional: alert and oriented at time of my exam  HENT: jvd, no facial asymmetry  Head: Atraumatic. Mouth/Throat: No oropharyngeal exudate. Eyes: EOM are normal. No scleral icterus. Neck: No tracheal deviation present. Cardiovascular: Normal heart sounds. Exam reveals no friction rub. Lungs: no rales   Abdominal: There is no rebound and no guarding. Musculoskeletal: He exhibits edema. He exhibits no deformity. Lymphadenopathy: no tender lymphadenopathy   Neurological: He is alert. He has normal reflexes. Coordination normal.   Skin: Skin is dry. No rash noted. He is not diaphoretic. Psychiatric: Affect normal.         Data Review:  I have reviewed all labs, meds, telemetry events, and studies from the last 24 hours.     Recent Results (from the past 24 hour(s))   RENAL FUNCTION PANEL    Collection Time: 03/08/19  5:16 AM   Result Value Ref Range    Sodium 139 136 - 145 mmol/L    Potassium 3.1 (L) 3.5 - 5.1 mmol/L    Chloride 100 98 - 107 mmol/L    CO2 26 21 - 32 mmol/L    Anion gap 13 7 - 16 mmol/L    Glucose 101 (H) 65 - 100 mg/dL    BUN 81 (H) 8 - 23 MG/DL    Creatinine 5.06 (H) 0.8 - 1.5 MG/DL    GFR est AA 14 (L) >60 ml/min/1.73m2    GFR est non-AA 12 (L) >60 ml/min/1.73m2    Calcium 8.6 8.3 - 10.4 MG/DL    Phosphorus 5.3 (H) 2.3 - 3.7 MG/DL    Albumin 2.6 (L) 3.2 - 4.6 g/dL   CBC W/O DIFF    Collection Time: 03/08/19  5:16 AM   Result Value Ref Range    WBC 6.9 4.3 - 11.1 K/uL    RBC 4.23 4.23 - 5.6 M/uL    HGB 11.4 (L) 13.6 - 17.2 g/dL    HCT 34.2 (L) 41.1 - 50.3 %    MCV 80.9 79.6 - 97.8 FL    MCH 27.0 26.1 - 32.9 PG    MCHC 33.3 31.4 - 35.0 g/dL    RDW 19.4 (H) 11.9 - 14.6 %    PLATELET 600 (L) 789 - 450 K/uL    MPV 11.2 9.4 - 12.3 FL    ABSOLUTE NRBC 0.00 0.0 - 0.2 K/uL        All Micro Results     Procedure Component Value Units Date/Time    CULTURE, BLOOD [[id number]] Collected:  02/22/19 1740    Order Status:  Completed Specimen:  Blood Updated:  02/27/19 0837     Special Requests: --        RIGHT  HAND       Culture result: NO GROWTH 5 DAYS       CULTURE, BLOOD [[id number]] Collected:  02/22/19 1753    Order Status:  Completed Specimen:  Blood Updated:  02/27/19 0837     Special Requests: --        RIGHT  FOREARM       Culture result: NO GROWTH 5 DAYS       CULTURE, BLOOD [[id number]] Collected:  02/19/19 1140    Order Status:  Completed Specimen:  Blood Updated:  02/24/19 0654     Special Requests: --        RIGHT  Antecubital       Culture result: NO GROWTH 5 DAYS       CULTURE, BLOOD [[id number]]  (Abnormal) Collected:  02/19/19 1252    Order Status:  Completed Specimen:  Blood Updated:  02/23/19 0747     Special Requests: --        RIGHT  HAND       GRAM STAIN       GRAM POSITIVE COCCI IN CHAINS                  AEROBIC AND ANAEROBIC BOTTLES                  RESULTS VERIFIED, PHONED TO AND READ BACK BY EAMON Quevedo @8267 02/20/2019 [facility]           Culture result:       STAPHYLOCOCCUS SPECIES, COAGULASE NEGATIVE                  ALPHA STREPTOCOCCUS, NOT S. PNEUMONIAE                  THIS ORGANISM MAY BE INDICATIVE OF CULTURE CONTAMINATION, HOWEVER, CLINICAL CORRELATION NEEDS TO BE EVALUATED, AS EACH CASE IS UNIQUE. CULTURE, URINE [[id number]]  (Abnormal)  (Susceptibility) Collected:  02/19/19 1220    Order Status:  Completed Specimen:  Cath Urine Updated:  02/22/19 2224     Special Requests: NO SPECIAL REQUESTS        Culture result:       1000 COLONIES/mL STAPHYLOCOCCUS HAEMOLYTICUS                Current Meds:  Current Facility-Administered Medications   Medication Dose Route Frequency    potassium chloride (K-DUR, KLOR-CON) SR tablet 40 mEq  40 mEq Oral NOW    potassium chloride (K-DUR, KLOR-CON) SR tablet 20 mEq  20 mEq Oral Q12H    torsemide (DEMADEX) tablet 100 mg  100 mg Oral BID    cholecalciferol (VITAMIN D3) tablet 2,000 Units  2,000 Units Oral DAILY    B complex-vitamin C-folic acid (NEPHRO-JANEL) 0.8 mg tab  1 Tab Oral DAILY    lactulose (CHRONULAC) solution 10 g  10 g Oral BID    nystatin (MYCOSTATIN) 100,000 unit/gram powder   Topical BID    metOLazone (ZAROXOLYN) tablet 10 mg  10 mg Oral BID    midodrine (PROAMITINE) tablet 15 mg  15 mg Oral TID WITH MEALS    digoxin (LANOXIN) tablet 0.125 mg  0.125 mg Oral Q MON, WED & FRI    mineral oil-hydrophil petrolat (AQUAPHOR) ointment   Topical PRN    apixaban (ELIQUIS) tablet 5 mg  5 mg Oral BID    lidocaine (XYLOCAINE) 20 mg/mL (2 %) injection  mg  1-20 mL IntraDERMal Multiple    albuterol-ipratropium (DUO-NEB) 2.5 MG-0.5 MG/3 ML  3 mL Nebulization Q4H PRN    fluticasone (FLONASE) 50 mcg/actuation nasal spray 2 Spray  2 Spray Both Nostrils DAILY    pantoprazole (PROTONIX) tablet 40 mg  40 mg Oral ACB    sodium chloride (NS) flush 5-40 mL  5-40 mL IntraVENous Q8H    sodium chloride (NS) flush 5-40 mL  5-40 mL IntraVENous PRN    acetaminophen (TYLENOL) tablet 650 mg  650 mg Oral Q4H PRN    HYDROcodone-acetaminophen (NORCO) 5-325 mg per tablet 1 Tab  1 Tab Oral Q4H PRN    morphine injection 2 mg  2 mg IntraVENous Q4H PRN    naloxone (NARCAN) injection 0.4 mg  0.4 mg IntraVENous PRN    diphenhydrAMINE (BENADRYL) capsule 25 mg  25 mg Oral Q4H PRN    ondansetron (ZOFRAN) injection 4 mg  4 mg IntraVENous Q4H PRN    finasteride (PROSCAR) tablet 5 mg  5 mg Oral DAILY       Diet:  DIET REGULAR  DIET NUTRITIONAL SUPPLEMENTS    Other Studies (last 24 hours):  No results found.     Assessment and Plan:     Hospital Problems as of 3/8/2019 Date Reviewed: 12/11/2018          Codes Class Noted - Resolved POA    ESRD (end stage renal disease) on dialysis [facility]) ICD-10-CM: N18.6, Z99.2  ICD-9-CM: 585.6, V45.11  3/2/2019 - Present Yes        Acute pulmonary edema ([facility] ICD-10-CM: J81.0  ICD-9-CM: 518.4  3/2/2019 - Present Yes        Hemodialysis-associated hypotension ICD-10-CM: I95.3  ICD-9-CM: 458.21  3/2/2019 - Present Yes        Syncope ICD-10-CM: R55  ICD-9-CM: 780.2  3/2/2019 - Present No        Thrombocytopenia (HCC) ICD-10-CM: D69.6  ICD-9-CM: 287.5  3/2/2019 - Present Yes        Hematuria ICD-10-CM: R31.9  ICD-9-CM: 599.70  3/2/2019 - Present Yes        Anemia due to chronic kidney disease ICD-10-CM: N18.9, D63.1  ICD-9-CM: 285.21  3/2/2019 - Present Yes        CKD (chronic kidney disease) stage 4, GFR 15-29 ml/min (HCC) ICD-10-CM: N18.4  ICD-9-CM: 585.4  2/20/2019 - Present         Lactic acidosis ICD-10-CM: E87.2  ICD-9-CM: 276.2  2/20/2019 - Present Yes        Tobacco abuse ICD-10-CM: Z72.0  ICD-9-CM: 305.1  1/22/2019 - Present Yes        Atrial fibrillation ([facility] ICD-10-CM: I48.91  ICD-9-CM: 427.31  11/27/2018 - Present Yes        Acute on chronic combined systolic and diastolic CHF (congestive heart failure) ([facility] ICD-10-CM: I50.43  ICD-9-CM: 428.43, 428.0  11/26/2018 - Present Yes        Pulmonary hypertension (HCC) (Chronic) ICD-10-CM: I27.20  ICD-9-CM: 416.8  10/20/2018 - Present Yes        Type 2 diabetes with nephropathy ([facility] ICD-10-CM: E11.21  ICD-9-CM: 250.40, 583.81  10/8/2018 - Present Yes        * (Principal) Acute respiratory failure with hypoxia ([facility] ICD-10-CM: J96.01  ICD-9-CM: 518.81  5/24/2018 - Present Yes        TAZ (obstructive sleep apnea) ICD-10-CM: G47.33  ICD-9-CM: 327.23  10/2/2017 - Present Yes    Overview Signed 12/11/2018  1:24 PM by Kailyn Mantilla NP     Intolerant of CPAP             CKD (chronic kidney disease) stage 3, GFR 30-59 ml/min (HCC) (Chronic) ICD-10-CM: N18.3  ICD-9-CM: 585.3  9/29/2017 - Present         Coronary atherosclerosis of native coronary vessel (Chronic) ICD-10-CM: I25.10  ICD-9-CM: 414.01  9/29/2017 - Present Yes        Hypertension (Chronic) ICD-10-CM: I10  ICD-9-CM: 401.9  9/29/2017 - Present         CHF (congestive heart failure) (HCC) ICD-10-CM: I50.9  ICD-9-CM: 428.0  9/27/2017 - Present         Mixed hyperlipidemia (Chronic) ICD-10-CM: M60.2  ICD-9-CM: 272.2  9/28/2016 - Present Yes              A/P:    Principal Problem:    Acute respiratory failure with hypoxia ([address] (5/24/2018)-improved  - Due to acute pulmonary edema  - cardiorenal syndrome-- now high dose torsemide/metolazone, increased midodrine/dig--  Has responded so far but unclear if will prove effective long term. Needs placement-This is unchanged    Hypokalemia -            Secondary to above and encouraging that cr clear >20, need increased dosing, recheck mg         chronic combined systolic and diastolic CHF (congestive heart failure) ([address] (11/26/2018)  - EF 25%-30%  Consider coreg if bp improves/remains stable       ESRD (end stage renal disease) on dialysis ([address] (3/2/2019)  - New start HD --> did not tolerate as he had hypotension and syncope during first 2 attempts  .  Responding thus far to aggressive diuretic program            Thrombocytopenia ([address] (3/2/2019)  - resolved        Anemia due to chronic kidney disease (3/2/2019)     Hx cad       Type 2 diabetes with nephropathy ([address] (10/8/2018)       Pulmonary hypertension ([address] (10/20/2018)       Atrial fibrillation ([address] (11/27/2018)  -  periods of paroxysmal afib  - Continue Eliquis cva prophylaxis         Mixed hyperlipidemia (9/28/2016)       TAZ (obstructive sleep apnea) (10/2/2017)  - Non-compliant with CPAP--intolerant per daughter       Tobacco abuse (1/22/2019)     Dis[facility] - .  [facility] If continues to improve he may be able to go to snf     DIET REGULAR 2 GM NA (House Low NA)  DIET NUTRITIONAL SUPPLEMENTS All Meals; Nepro     DVT Prophylaxis: Eliquis

## 2019-03-08 NOTE — PROGRESS NOTES
Interdisciplinary Rounds completed 03/08/19. Nursing, Case Management, Physician and PT present. Plan of care reviewed and updated.

## 2019-03-09 LAB — GLUCOSE BLD STRIP.AUTO-MCNC: 91 MG/DL (ref 65–100)

## 2019-03-09 PROCEDURE — 94760 N-INVAS EAR/PLS OXIMETRY 1: CPT

## 2019-03-09 PROCEDURE — 74011250637 HC RX REV CODE- 250/637: Performed by: INTERNAL MEDICINE

## 2019-03-09 PROCEDURE — 77010033678 HC OXYGEN DAILY

## 2019-03-09 PROCEDURE — 82962 GLUCOSE BLOOD TEST: CPT

## 2019-03-09 PROCEDURE — 74011250636 HC RX REV CODE- 250/636: Performed by: HOSPITALIST

## 2019-03-09 PROCEDURE — 74011250636 HC RX REV CODE- 250/636: Performed by: INTERNAL MEDICINE

## 2019-03-09 PROCEDURE — 74011250637 HC RX REV CODE- 250/637: Performed by: HOSPITALIST

## 2019-03-09 PROCEDURE — 65270000029 HC RM PRIVATE

## 2019-03-09 PROCEDURE — 77030020256 HC SOL INJ NACL 0.9%  500ML

## 2019-03-09 PROCEDURE — 74011250637 HC RX REV CODE- 250/637: Performed by: UROLOGY

## 2019-03-09 RX ORDER — POTASSIUM CHLORIDE 14.9 MG/ML
20 INJECTION INTRAVENOUS ONCE
Status: COMPLETED | OUTPATIENT
Start: 2019-03-09 | End: 2019-03-10

## 2019-03-09 RX ADMIN — TORSEMIDE 100 MG: 100 TABLET ORAL at 09:34

## 2019-03-09 RX ADMIN — MIDODRINE HYDROCHLORIDE 15 MG: 5 TABLET ORAL at 12:17

## 2019-03-09 RX ADMIN — TORSEMIDE 100 MG: 100 TABLET ORAL at 18:04

## 2019-03-09 RX ADMIN — Medication 10 ML: at 05:27

## 2019-03-09 RX ADMIN — LACTULOSE 10 G: 20 SOLUTION ORAL at 09:35

## 2019-03-09 RX ADMIN — NYSTATIN: 100000 POWDER TOPICAL at 09:33

## 2019-03-09 RX ADMIN — MIDODRINE HYDROCHLORIDE 15 MG: 5 TABLET ORAL at 09:34

## 2019-03-09 RX ADMIN — PANTOPRAZOLE SODIUM 40 MG: 40 TABLET, DELAYED RELEASE ORAL at 05:27

## 2019-03-09 RX ADMIN — APIXABAN 5 MG: 2.5 TABLET, FILM COATED ORAL at 22:25

## 2019-03-09 RX ADMIN — FINASTERIDE 5 MG: 5 TABLET, FILM COATED ORAL at 09:33

## 2019-03-09 RX ADMIN — Medication 10 ML: at 22:26

## 2019-03-09 RX ADMIN — METOLAZONE 10 MG: 5 TABLET ORAL at 09:33

## 2019-03-09 RX ADMIN — Medication 1 TABLET: at 09:35

## 2019-03-09 RX ADMIN — NYSTATIN: 100000 POWDER TOPICAL at 18:04

## 2019-03-09 RX ADMIN — MIDODRINE HYDROCHLORIDE 15 MG: 5 TABLET ORAL at 18:04

## 2019-03-09 RX ADMIN — Medication 10 ML: at 14:00

## 2019-03-09 RX ADMIN — VITAMIN D, TAB 1000IU (100/BT) 2000 UNITS: 25 TAB at 09:34

## 2019-03-09 RX ADMIN — MORPHINE SULFATE 2 MG: 2 INJECTION, SOLUTION INTRAMUSCULAR; INTRAVENOUS at 13:54

## 2019-03-09 RX ADMIN — METOLAZONE 10 MG: 5 TABLET ORAL at 18:04

## 2019-03-09 RX ADMIN — APIXABAN 5 MG: 2.5 TABLET, FILM COATED ORAL at 09:35

## 2019-03-09 RX ADMIN — POTASSIUM CHLORIDE 20 MEQ: 20 TABLET, EXTENDED RELEASE ORAL at 09:33

## 2019-03-09 RX ADMIN — POTASSIUM CHLORIDE 20 MEQ: 14.9 INJECTION, SOLUTION INTRAVENOUS at 22:25

## 2019-03-09 RX ADMIN — FLUTICASONE PROPIONATE 2 SPRAY: 50 SPRAY, METERED NASAL at 09:32

## 2019-03-09 NOTE — PROGRESS NOTES
Hospitalist Progress Note     Admit Date:  2019 11:19 AM   Name:  Ayan Gamboa. Age:  66 y.o.  :  1945   MRN:  [id number]   PCP:  Zana Ross MD  Treatment Team: Attending Provider: Jessica Snell DO; Consulting Provider: Dominique Vega MD; Utilization Review: Yodit Gongora RN; Consulting Provider: Jorge L Alicia MD; Care Manager: Dori Mcclure RN; Charge Nurse: Clint Crystal RN    Subjective:       From previous notes: \"66year-old gentleman with multiple medical problems including CHF, A. fib, CKD stage IV,, chronic COPD, obstructive sleep apnea who is noncompliant with CPAP, who is currently in a rehab facility, admitted on  for acute on chronic CHF in view of worsening bilateral LE swelling, hypoxic respiratory failure and hematuria. was brought into the emergency room with complaints of hematuria, leg swellings and shortness of breath. He was also having shortness of breath, moderate in severity, progressively getting worse, worse with minimal exertion, not resolved with rest.  Tunneled cath placed  and HD started but could not tolerate his session due to hypotension. Midodrine was restarted and he has remained intolerated of HD due to BP issues. \" [facility] recommended hospice care on 3/1/19 so the wife requested a second opinion.     3/9/19:   Clinically continues slow improvement urine outpt. Chasing k and may need hihger fixed dose. Can stefania  Discontinue lacutlose and will follow up ammonia in am re: stop lautlose. cardiorenal syndrome with likely poor prognosis.  Daughter and patient aware.          Objective:     Patient Vitals for the past 24 hrs:   Temp Pulse Resp BP SpO2   19 1150 97.6 °F (36.4 °C) 68 18 121/82 99 %   19 0759     100 %   19 0745 98.1 °F (36.7 °C) 73 18 135/81 100 %   19 0421 97.5 °F (36.4 °C) 67 18 115/72 99 %   19 2317 96.7 °F (35.9 °C) 68 18 118/78 100 %   19 1908 97.4 °F (36.3 °C) 63 18 131/79 100 %   03/08/19 1542 97.8 °F (36.6 °C) 68 18 111/73 99 %     Oxygen Therapy  O2 Sat (%): 99 % (03/09/19 1150)  Pulse via Oximetry: 70 beats per minute (03/04/19 0831)  O2 Device: Nasal cannula (03/09/19 0759)  O2 Flow Rate (L/min): 4 l/min(wears 4L at home) (03/09/19 0759)  ETCO2 (mmHg): 21 mmHg (02/25/19 1347)    Intake/Output Summary (Last 24 hours) at 3/9/2019 1246  Last data filed at 3/9/2019 1150  Gross per 24 hour   Intake 480 ml   Output 3750 ml   Net -3270 ml         Physical Examination:  Physical Exam   Constitutional: No distress. HENT:   Head: Atraumatic. Mouth/Throat: Oropharynx is clear and moist.   Eyes: EOM are normal. No scleral icterus. Neck: No tracheal deviation present. No thyromegaly present. Cardiovascular: Normal rate. Exam reveals no gallop. Pulmonary/Chest: He has no wheezes. He has no rales. Abdominal: He exhibits no distension. There is no tenderness. Musculoskeletal: He exhibits edema. He exhibits no deformity. Neurological: He is alert. Intermittent confusion and nocturnal confusion    Skin: Skin is dry. He is not diaphoretic. No pallor. Data Review:  I have reviewed all labs, meds, telemetry events, and studies from the last 24 hours. No results found for this or any previous visit (from the past 24 hour(s)).      All Micro Results     Procedure Component Value Units Date/Time    CULTURE, BLOOD [[id number]] Collected:  02/22/19 1740    Order Status:  Completed Specimen:  Blood Updated:  02/27/19 0837     Special Requests: --        RIGHT  HAND       Culture result: NO GROWTH 5 DAYS       CULTURE, BLOOD [[id number]] Collected:  02/22/19 1753    Order Status:  Completed Specimen:  Blood Updated:  02/27/19 0837     Special Requests: --        RIGHT  FOREARM       Culture result: NO GROWTH 5 DAYS       CULTURE, BLOOD [[id number]] Collected:  02/19/19 1140    Order Status:  Completed Specimen:  Blood Updated:  02/24/19 0654     Special Requests: --        RIGHT  Antecubital       Culture result: NO GROWTH 5 DAYS       CULTURE, BLOOD [[id number]]  (Abnormal) Collected:  02/19/19 1252    Order Status:  Completed Specimen:  Blood Updated:  02/23/19 0730     Special Requests: --        RIGHT  HAND       GRAM STAIN       GRAM POSITIVE COCCI IN CHAINS                  AEROBIC AND ANAEROBIC BOTTLES                  RESULTS VERIFIED, PHONED TO AND READ BACK BY EAMON GONZALEZ @4261 02/20/2019 [facility]           Culture result:       STAPHYLOCOCCUS SPECIES, COAGULASE NEGATIVE                  ALPHA STREPTOCOCCUS, NOT S. PNEUMONIAE                  THIS ORGANISM MAY BE INDICATIVE OF CULTURE CONTAMINATION, HOWEVER, CLINICAL CORRELATION NEEDS TO BE EVALUATED, AS EACH CASE IS UNIQUE.           CULTURE, URINE [[id number]]  (Abnormal)  (Susceptibility) Collected:  02/19/19 1220    Order Status:  Completed Specimen:  Cath Urine Updated:  02/22/19 0601     Special Requests: NO SPECIAL REQUESTS        Culture result:       1000 COLONIES/mL STAPHYLOCOCCUS HAEMOLYTICUS                Current Meds:  Current Facility-Administered Medications   Medication Dose Route Frequency    potassium chloride (K-DUR, KLOR-CON) SR tablet 20 mEq  20 mEq Oral Q12H    torsemide (DEMADEX) tablet 100 mg  100 mg Oral BID    cholecalciferol (VITAMIN D3) tablet 2,000 Units  2,000 Units Oral DAILY    B complex-vitamin C-folic acid (NEPHRO-JANEL) 0.8 mg tab  1 Tab Oral DAILY    nystatin (MYCOSTATIN) 100,000 unit/gram powder   Topical BID    metOLazone (ZAROXOLYN) tablet 10 mg  10 mg Oral BID    midodrine (PROAMITINE) tablet 15 mg  15 mg Oral TID WITH MEALS    digoxin (LANOXIN) tablet 0.125 mg  0.125 mg Oral Q MON, WED & FRI    mineral oil-hydrophil petrolat (AQUAPHOR) ointment   Topical PRN    apixaban (ELIQUIS) tablet 5 mg  5 mg Oral BID    lidocaine (XYLOCAINE) 20 mg/mL (2 %) injection  mg  1-20 mL IntraDERMal Multiple    albuterol-ipratropium (DUO-NEB) 2.5 MG-0.5 MG/3 ML  3 mL Nebulization Q4H PRN    fluticasone (FLONASE) 50 mcg/actuation nasal spray 2 Spray  2 Spray Both Nostrils DAILY    pantoprazole (PROTONIX) tablet 40 mg  40 mg Oral ACB    sodium chloride (NS) flush 5-40 mL  5-40 mL IntraVENous Q8H    sodium chloride (NS) flush 5-40 mL  5-40 mL IntraVENous PRN    acetaminophen (TYLENOL) tablet 650 mg  650 mg Oral Q4H PRN    HYDROcodone-acetaminophen (NORCO) 5-325 mg per tablet 1 Tab  1 Tab Oral Q4H PRN    morphine injection 2 mg  2 mg IntraVENous Q4H PRN    naloxone (NARCAN) injection 0.4 mg  0.4 mg IntraVENous PRN    diphenhydrAMINE (BENADRYL) capsule 25 mg  25 mg Oral Q4H PRN    ondansetron (ZOFRAN) injection 4 mg  4 mg IntraVENous Q4H PRN    finasteride (PROSCAR) tablet 5 mg  5 mg Oral DAILY       Diet:  DIET REGULAR  DIET NUTRITIONAL SUPPLEMENTS    Other Studies (last 24 hours):  No results found.     Assessment and Plan:     Hospital Problems as of 3/9/2019 Date Reviewed: 12/11/2018          Codes Class Noted - Resolved POA    ESRD (end stage renal disease) on dialysis [facility]) ICD-10-CM: N18.6, Z99.2  ICD-9-CM: 585.6, V45.11  3/2/2019 - Present Yes        Acute pulmonary edema ([facility] ICD-10-CM: J81.0  ICD-9-CM: 518.4  3/2/2019 - Present Yes        Hemodialysis-associated hypotension ICD-10-CM: I95.3  ICD-9-CM: 458.21  3/2/2019 - Present Yes        Syncope ICD-10-CM: R55  ICD-9-CM: 780.2  3/2/2019 - Present No        Thrombocytopenia ([facility] ICD-10-CM: D69.6  ICD-9-CM: 287.5  3/2/2019 - Present Yes        Hematuria ICD-10-CM: R31.9  ICD-9-CM: 599.70  3/2/2019 - Present Yes        Anemia due to chronic kidney disease ICD-10-CM: N18.9, D63.1  ICD-9-CM: 285.21  3/2/2019 - Present Yes        CKD (chronic kidney disease) stage 4, GFR 15-29 ml/min ([facility]) ICD-10-CM: N18.4  ICD-9-CM: 585.4  2/20/2019 - Present         Lactic acidosis ICD-10-CM: E87.2  ICD-9-CM: 276.2  2/20/2019 - Present Yes        Tobacco abuse ICD-10-CM: Z72.0  ICD-9-CM: 305.1  1/22/2019 - Present Yes        Atrial fibrillation [facility]) ICD-10-CM: I48.91  ICD-9-CM: 427.31  11/27/2018 - Present Yes        Acute on chronic combined systolic and diastolic CHF (congestive heart failure) ([facility] ICD-10-CM: I50.43  ICD-9-CM: 428.43, 428.0  11/26/2018 - Present Yes        Pulmonary hypertension (HCC) (Chronic) ICD-10-CM: I27.20  ICD-9-CM: 416.8  10/20/2018 - Present Yes        Type 2 diabetes with nephropathy ([facility] ICD-10-CM: E11.21  ICD-9-CM: 250.40, 583.81  10/8/2018 - Present Yes        * (Principal) Acute respiratory failure with hypoxia (HCC) ICD-10-CM: J96.01  ICD-9-CM: 518.81  5/24/2018 - Present Yes        TAZ (obstructive sleep apnea) ICD-10-CM: G47.33  ICD-9-CM: 327.23  10/2/2017 - Present Yes    Overview Signed 12/11/2018  1:24 PM by Dwight Ling NP     Intolerant of CPAP             CKD (chronic kidney disease) stage 3, GFR 30-59 ml/min (HCC) (Chronic) ICD-10-CM: N18.3  ICD-9-CM: 585.3  9/29/2017 - Present         Coronary atherosclerosis of native coronary vessel (Chronic) ICD-10-CM: I25.10  ICD-9-CM: 414.01  9/29/2017 - Present Yes        Hypertension (Chronic) ICD-10-CM: I10  ICD-9-CM: 401.9  9/29/2017 - Present         CHF (congestive heart failure) (HCC) ICD-10-CM: I50.9  ICD-9-CM: 428.0  9/27/2017 - Present         Mixed hyperlipidemia (Chronic) ICD-10-CM: Y46.7  ICD-9-CM: 272.2  9/28/2016 - Present Yes              A/P:      Acute respiratory failure with hypoxia ([facility] (5/24/2018)-improved  - Due to acute pulmonary edema  - cardiorenal syndrome-- now high dose torsemide/metolazone, increased midodrine/dig--  Has responded so far but unclear if will prove effective long term.  Needs placement-This is unchanged     Hypokalemia -                                  recalcitrant, mg and may need higher fixed dose k         chronic combined systolic and diastolic CHF (congestive heart failure) ([address] (11/26/2018)  - EF 25%-30%  Consider coreg if bp improves/remains stable       ESRD (end stage renal disease) on dialysis ([address] (3/2/2019)  - New start HD --> did not tolerate as he had hypotension and syncope during first 2 attempts  . Responding thus far to aggressive diuretic program             Thrombocytopenia ([address] (3/2/2019)  -[place]        Anemia due to chronic kidney disease (3/2/2019)     Hx cad       Type 2 diabetes with nephropathy ([address] (10/8/2018)       Pulmonary hypertension ([address] (10/20/2018)       Atrial fibrillation ([address] (11/27/2018)  -  periods of paroxysmal afib  - Continue Eliquis cva prophylaxis          Mixed hyperlipidemia (9/28/2016)       TAZ (obstructive sleep apnea) (10/2/2017)  - Non-compliant with CPAP--intolerant per daughter       Tobacco abuse (1/22/2019)     Dispo - .  Discharge to [facility] when approved If continues to improve he may be able to go to snf     DIET REGULAR 2 GM NA (House Low NA)  DIET NUTRITIONAL SUPPLEMENTS All Meals; Nepro     DVT Prophylaxis: Eliquis

## 2019-03-09 NOTE — PROGRESS NOTES
RENAL PROGRESS NOTE    Subjective:     Cc - I was asked to see patient for second opinion    Patient is a 67 y/o AAM with Acute Kidney Injury on top of Chronic Kidney Disease stage 3 had attempts at dialysis x three days, but each time needed to be taken off dialysis due to hemodynamic instability. He also has CHF, A. fib, obstructive sleep apnea who was admitted from a rehab facility on 2/19 for acute on chronic CHF with worsening bilateral LE swelling, hypoxic respiratory failure and hematuria. He did not respond to medical management and a tunneled cath placed 2/25 and HD started. He could not tolerate his session due to hypotension even after midodrine treatment. [facility] recommended hospice care on 3/1/19 so the wife requested a second opinion. The patient is uncomfortable due to dyspnea despite O2 by NC and is thirsty. His wife is at bedside. I discussed with patient and wife that dialysis is a good treatment for kidney failure, but that his main problem is heart failure and that heart transplant is a treatment for heart failure, however, he is not a candidate for it due to co-morbid conditions and advanced age. I  Discussed that in order to attempt dialysis again he would need to improve hemodynamic stability and respond favorably to medication changes, but that the likelihood of success was not good as he has not responded well to medical therapy so far.     3/3/19 - feels and breathes better - in better spirit - no longer in respiratory distress - edema improving   3/4/19 - gradually progressing - no labs available yet - will continue to try medical therapy  3/5/19 - alert and communicating well this am, but wife states she has noted some confusion - no new tremor, no overt uremic symptoms - respiratory status improving  3/6/19 - feels and looks better - tolerates RA oxygen - edema improving - fair urine output - will try to switch to PO diuresis with Torsemide and Metolazone - discussed with  Cyril and RN and wife  3/7/19 - he continues to breath better - edema improving - adequate urine output on Torsemide and Metolazone PO - for possible [facility] placement  3/8/19 -   3/9/19- no c/o dyspnea, no CP, no N/V;  edema improving; discussed renal function is gradually improving        Past Medical History:   Diagnosis Date    Acute CHF (congestive heart failure) ([facility] [address] 4/25/2015    Acute combined systolic and diastolic congestive heart failure ([facility] [address] 4/28/2015    De Quervain's tenosynovitis, right 4/28/2015    Dyspnea on exertion 6/28/2015    Elevated serum creatinine 4/25/2015    Elevated troponin 6/28/2015    History of coronary artery disease     MI at 25 yo    History of right knee surgery     cartilage removal    History of shingles     Malignant hypertension 4/25/2015    MI ([facility]ocardial infarction[facility])       Past Surgical History:   Procedure Laterality Date    HX HEART CATHETERIZATION  6/29/2015    no intervention    HX KNEE ARTHROSCOPY Right     removal of cartilage    IR INSERT TUNL CVC W/O PORT OVER 5 YR  2/25/2019      Prior to Admission medications    Medication Sig Start Date End Date Taking? Authorizing Provider   omeprazole (PRILOSEC) 20 mg capsule Take 1 Cap by mouth daily. 1/24/19   Caio Roberto MD   potassium chloride (K-DUR, KLOR-CON) 20 mEq tablet Take 1 Tab by mouth daily.  1/23/19   Seb Berman MD   Blood-Glucose Meter (ACCU-CHEK YOSEF PLUS METER) misc USE TO CHECK BLOOD SUGARS DAILY DX: E11.9 12/27/18   Caio Roberto MD   Blood Glucose Control High&Low (ACCU-CHEK YOSEF CONTROL SOLN) soln USE AS DIRECTED 12/27/18   Caio Roberto MD   glucose blood VI test strips (ACCU-CHEK YOSEF PLUS TEST STRP) strip USE TO CHECK BLOOD SUGARS DAILY DX: E11.9 12/27/18   Caio Roberto MD   alcohol swabs (BD SINGLE USE SWABS REGULAR) padm USE AS DIRECTED DAILY DX. E11.9 12/27/18   Caio Roberto MD   lancets (ACCU-CHEK SOFTCLIX LANCETS) misc USE TO CHECK BLOOD SUGARS DAILY DX: E11.9 12/27/18   Lorraine Ulrich MD   carvedilol (COREG) 6.25 mg tablet Take 1 Tab by mouth two (2) times daily (with meals). 12/3/18   Samira Arceo MD   apixaban (ELIQUIS) 5 mg tablet Take 1 Tab by mouth every twelve (12) hours. 12/3/18   Samira Arceo MD   aspirin 81 mg chewable tablet Take 1 Tab by mouth daily. 11/5/18   Mary Marie MD   atorvastatin (LIPITOR) 20 mg tablet Take 1 Tab by mouth nightly. 10/4/18   Lorraine Ulrich MD   allopurinol (ZYLOPRIM) 100 mg tablet TAKE 1 TABLET BY MOUTH EVERY DAY 9/25/18   Lorraine Ulrich MD   fluticasone [facility]) 50 mcg/actuation nasal spray 2 Sprays by Both Nostrils route daily. 3/29/18   Jose Luis Mata MD   melatonin 3 mg tablet Take 3 mg by mouth nightly. Provider, Historical   albuterol (VENTOLIN HFA) 90 mcg/actuation inhaler Take 2 Puffs by inhalation four (4) times daily. 2/6/18   Fredy Chaidez NP   polyethylene glycol (MIRALAX) 17 gram packet Take 1 Packet by mouth daily. Patient taking differently: Take 17 g by mouth daily as needed. 1/13/18   Cha MERLOS NP   albuterol-ipratropium (DUO-NEB) 2.5 mg-0.5 mg/3 ml nebu 3 mL by Nebulization route four (4) times daily.  Diaignosis--J44.9 12/13/17   Fredy Chaidez NP     Allergies   Allergen Reactions    Ativan [Lorazepam] Other (comments)     Confusion- mild but long lasting- days    Pcn [Penicillins] Other (comments)     \"makes my heart stop\"      Social History     Tobacco Use    Smoking status: Former Smoker     Packs/day: 0.25     Years: 20.00     Pack years: 5.00     Types: Cigarettes     Last attempt to quit: 4/5/2015     Years since quitting: 3.9    Smokeless tobacco: Never Used   Substance Use Topics    Alcohol use: No     Alcohol/week: 0.0 oz      Family History   Problem Relation Age of Onset    Hypertension Mother     No Known Problems Father           Review of Systems    Constitutional: no fever, rested better   Eyes: fair vision,    Ears, nose, mouth, throat, and face:fair hearing,    Respiratory: no asthma,    Cardiovascular:no chest pain,    Gastrointestinal:no diarrhea,    Genitourinary: he has had hematuria and dysuria,   Hematologic/lymphatic: no bleeding tendency,    Neurological: no seizures,    Behvioral/Psych: no psych hospitalization    Endocrine: no goiter,       Objective:       Visit Vitals  /81   Pulse 73   Temp 98.1 °F (36.7 °C)   Resp 18   Ht 5' 10\" (1.778 m)   Wt 87.5 kg (193 lb)   SpO2 100%   BMI 27.69 kg/m²       No intake/output data recorded. 03/07 1901 - 03/09 0700  In: 360 [P.O.:360]  Out: 4550 [Urine:4550]    General:  Alert, cooperative, no respiratory distress on RA, appears stated age. Head:  Normocephalic, without obvious abnormality, atraumatic. Eyes:  Conjunctivae/corneas clear. EOMs intact. Throat: Lips, mucosa, and tongue normal. Teeth and gums normal.   Neck: Supple, symmetrical, trachea midline, no adenopathy, pos for JVD. Lungs:   Clear to auscultation bilaterally. Heart:  Regular rate and rhythm, S1, S2 normal, no  rub. Abdomen:   Soft, non-tender. Bowel sounds normal. No masses,  No organomegaly. No renal bruit. Abdominal wall is bulging and ascites is present   Extremities: Extremities normal, atraumatic, no cyanosis, 2+ edema, improving. Skin: Skin color, texture, turgor normal. No rashes or lesions. Neurologic: Grossly intact. No asterixis. Data Review:     Results for Jannette Ford (MRN [id number]) as of 3/8/2019 15:11   Ref.  Range 3/4/2019 06:42 3/5/2019 05:58 3/6/2019 05:40 3/7/2019 06:16 3/8/2019 05:16   Sodium Latest Ref Range: 136 - 145 mmol/L 136 137 137 139 139   Potassium Latest Ref Range: 3.5 - 5.1 mmol/L 3.1 (L) 2.8 (LL) 3.2 (L) 3.3 (L) 3.1 (L)   Chloride Latest Ref Range: 98 - 107 mmol/L 98 99 101 101 100   CO2 Latest Ref Range: 21 - 32 mmol/L 28 25 24 25 26   Anion gap Latest Ref Range: 7 - 16 mmol/L 10 13 12 13 13   Glucose Latest Ref Range: 65 - 100 mg/dL 90 85 97 92 101 (H)   BUN Latest Ref Range: 8 - 23 MG/DL 72 (H) 77 (H) 79 (H) 79 (H) 81 (H)   Creatinine Latest Ref Range: 0.8 - 1.5 MG/DL 5.87 (H) 5.81 (H) 5.64 (H) 5.34 (H) 5.06 (H)   Calcium Latest Ref Range: 8.3 - 10.4 MG/DL 8.0 (L) 8.0 (L) 8.0 (L) 8.2 (L) 8.6   Phosphorus Latest Ref Range: 2.3 - 3.7 MG/DL 5.3 (H) 5.6 (H) 5.4 (H) 5.3 (H) 5.3 (H)   GFR est non-AA Latest Ref Range: >60 ml/min/1.73m2 10 (L) 10 (L) 10 (L) 11 (L) 12 (L)   GFR est AA Latest Ref Range: >60 ml/min/1.73m2 12 (L) 12 (L) 13 (L) 14 (L) 14 (L)   Albumin Latest Ref Range: 3.2 - 4.6 g/dL 2.6 (L) 2.7 (L) 2.5 (L) 2.6 (L) 2.6 (L)       No results found for this or any previous visit (from the past 24 hour(s)). CXR shows massive cardiomegaly and fluid excess      ECHO 2/21/19 -  SUMMARY:  -  Left ventricle: Systolic function was markedly reduced. Ejection fraction  was estimated in the range of 25 % to 30 %. There was severe diffuse  hypokinesis with distinct regional wall motion abnormalities. There was   severeglobal hypokinesis. There was severe concentric hypertrophy. The myocardial  specular pattern appeared moderately abnormal. Consider infiltrative  cardiomyopathy if clinically indicated. -  Right ventricle: The ventricle was mildly dilated. Systolic function was  moderately to markedly reduced. There was mild pulmonary artery hypertension.  -  Left atrium: The atrium was moderately to markedly dilated. -  Right atrium: The atrium was markedly dilated. -  Inferior vena cava, hepatic veins: The inferior vena cava was moderately  dilated. The respirophasic change in diameter was less than 50%. -  Aortic valve: The valve was trileaflet. Leaflets exhibited moderate  sclerosis. The left coronary cusp demonstrated immobility. There was mild  regurgitation. -  Mitral valve: There was mild to moderate regurgitation. -  Tricuspid valve: There was mild to moderate regurgitation. -  Pulmonic valve: There was moderate regurgitation.       Principal Problem: Acute respiratory failure with hypoxia ([address] (5/24/2018)    Active Problems:    Coronary atherosclerosis of native coronary vessel (9/29/2017)      Mixed hyperlipidemia (9/28/2016)      TAZ (obstructive sleep apnea) (10/2/2017)      Overview: Intolerant of CPAP      Type 2 diabetes with nephropathy ([address] (10/8/2018)      Pulmonary hypertension ([address] (10/20/2018)      Acute on chronic combined systolic and diastolic CHF (congestive heart failure) ([address] (11/26/2018)      Atrial fibrillation ([address] (11/27/2018)      Tobacco abuse (1/22/2019)      Lactic acidosis (2/20/2019)      ESRD (end stage renal disease) on dialysis ([address] (3/2/2019)      Acute pulmonary edema ([address] (3/2/2019)      Hemodialysis-associated hypotension (3/2/2019)      Syncope (3/2/2019)      Thrombocytopenia ([address] (3/2/2019)      Hematuria (3/2/2019)      Anemia due to chronic kidney disease (3/2/2019)        Assessment:     1. Acute Kidney Injury on top of Chronic Kidney Disease stage 3 -  - mainly due to cardio-renal syndrome  - marked hemodynamic instability with inadequate response to medical therapy and inability to tolerate dialysis despite Midodrine  - responded to medical therapy including addition of Digoxin  and increasing Midodrine  - his wife understands that this is not likely to succeed to get him stabilize for dialysis in the next days and that this is likely and end of life situation  - further improvement with medical management appears to be present  - no dialysis needed  - renal function indices gradually improving  - adequate response to PO diuresis     2. Acute on chronic Combined systolic and diastolic heart failure decompensation  - responding to intensified diuresis and medical therapy as above      3. A fib with RVR   - on no antihypertensive  - clinically responding to Digoxin     4. UTI  -  on ceftriaxone     5. Respiratory failure -  - on oxygen therapy per primary team  - improving and tolerating nasal cannula     6. Urinary retention  - S/P Schulz with urology involved     7. Hypermagnesemia -  - hoping for consistently improved hemodynamics with decreasing Magnesium level  - avoid magnesium containing meds, Magnesium citrate discontinued    8. Anemia -  - adequate control    9. Acid/base -  - bicarbonate level is WNL    10. Hypokalemia -  - treat with KCL 20 mEq PO daily and another now dose of 40 mEq   - off Fludrocortisone  - monitor response    11.  Hypocalcemia -  - mostly due to hypoalbuminemia  - on added Vitamin D    Plan:     As above    Jose Luis Patriica, NP

## 2019-03-09 NOTE — PROGRESS NOTES
UOP 2000ml for this shift. Urine yellow/straw clear. Patient had periodic confusion, and was emotional with tears at times. Denied pain upon check. Hourly rounds performed;all pt needs met. Bed in low position and call light/ personal items within reach. Will continue to monitor and give bedside shift report to oncoming day shift nurse.

## 2019-03-10 LAB
ALBUMIN SERPL-MCNC: 2.7 G/DL (ref 3.2–4.6)
ANION GAP SERPL CALC-SCNC: 9 MMOL/L (ref 7–16)
BUN SERPL-MCNC: 82 MG/DL (ref 8–23)
CALCIUM SERPL-MCNC: 9 MG/DL (ref 8.3–10.4)
CHLORIDE SERPL-SCNC: 99 MMOL/L (ref 98–107)
CO2 SERPL-SCNC: 32 MMOL/L (ref 21–32)
CREAT SERPL-MCNC: 4.02 MG/DL (ref 0.8–1.5)
ERYTHROCYTE [DISTWIDTH] IN BLOOD BY AUTOMATED COUNT: 20.3 % (ref 11.9–14.6)
GLUCOSE SERPL-MCNC: 92 MG/DL (ref 65–100)
HCT VFR BLD AUTO: 37.5 % (ref 41.1–50.3)
HGB BLD-MCNC: 12.4 G/DL (ref 13.6–17.2)
MCH RBC QN AUTO: 27.1 PG (ref 26.1–32.9)
MCHC RBC AUTO-ENTMCNC: 33.1 G/DL (ref 31.4–35)
MCV RBC AUTO: 82.1 FL (ref 79.6–97.8)
NRBC # BLD: 0 K/UL (ref 0–0.2)
PHOSPHATE SERPL-MCNC: 4.6 MG/DL (ref 2.3–3.7)
PLATELET # BLD AUTO: 155 K/UL (ref 150–450)
PMV BLD AUTO: 11.4 FL (ref 9.4–12.3)
POTASSIUM SERPL-SCNC: 3.3 MMOL/L (ref 3.5–5.1)
RBC # BLD AUTO: 4.57 M/UL (ref 4.23–5.6)
SODIUM SERPL-SCNC: 140 MMOL/L (ref 136–145)
WBC # BLD AUTO: 7 K/UL (ref 4.3–11.1)

## 2019-03-10 PROCEDURE — 85027 COMPLETE CBC AUTOMATED: CPT

## 2019-03-10 PROCEDURE — 80069 RENAL FUNCTION PANEL: CPT

## 2019-03-10 PROCEDURE — 74011250637 HC RX REV CODE- 250/637: Performed by: INTERNAL MEDICINE

## 2019-03-10 PROCEDURE — 65270000029 HC RM PRIVATE

## 2019-03-10 PROCEDURE — 77010033678 HC OXYGEN DAILY

## 2019-03-10 PROCEDURE — 74011250637 HC RX REV CODE- 250/637: Performed by: UROLOGY

## 2019-03-10 PROCEDURE — 94760 N-INVAS EAR/PLS OXIMETRY 1: CPT

## 2019-03-10 PROCEDURE — 36415 COLL VENOUS BLD VENIPUNCTURE: CPT

## 2019-03-10 PROCEDURE — 74011250636 HC RX REV CODE- 250/636: Performed by: HOSPITALIST

## 2019-03-10 PROCEDURE — 74011250637 HC RX REV CODE- 250/637: Performed by: HOSPITALIST

## 2019-03-10 RX ORDER — POLYETHYLENE GLYCOL 3350 17 G/17G
17 POWDER, FOR SOLUTION ORAL
Status: DISCONTINUED | OUTPATIENT
Start: 2019-03-10 | End: 2019-03-12 | Stop reason: HOSPADM

## 2019-03-10 RX ORDER — POTASSIUM CHLORIDE 20 MEQ/1
40 TABLET, EXTENDED RELEASE ORAL EVERY 12 HOURS
Status: DISCONTINUED | OUTPATIENT
Start: 2019-03-10 | End: 2019-03-12 | Stop reason: HOSPADM

## 2019-03-10 RX ORDER — POTASSIUM CHLORIDE 20 MEQ/1
40 TABLET, EXTENDED RELEASE ORAL
Status: COMPLETED | OUTPATIENT
Start: 2019-03-10 | End: 2019-03-10

## 2019-03-10 RX ADMIN — MIDODRINE HYDROCHLORIDE 15 MG: 5 TABLET ORAL at 17:53

## 2019-03-10 RX ADMIN — METOLAZONE 10 MG: 5 TABLET ORAL at 17:53

## 2019-03-10 RX ADMIN — Medication 10 ML: at 13:08

## 2019-03-10 RX ADMIN — NYSTATIN: 100000 POWDER TOPICAL at 17:58

## 2019-03-10 RX ADMIN — TORSEMIDE 100 MG: 100 TABLET ORAL at 09:30

## 2019-03-10 RX ADMIN — VITAMIN D, TAB 1000IU (100/BT) 2000 UNITS: 25 TAB at 09:30

## 2019-03-10 RX ADMIN — METOLAZONE 10 MG: 5 TABLET ORAL at 09:30

## 2019-03-10 RX ADMIN — POTASSIUM CHLORIDE 40 MEQ: 20 TABLET, EXTENDED RELEASE ORAL at 12:53

## 2019-03-10 RX ADMIN — APIXABAN 5 MG: 2.5 TABLET, FILM COATED ORAL at 22:24

## 2019-03-10 RX ADMIN — PANTOPRAZOLE SODIUM 40 MG: 40 TABLET, DELAYED RELEASE ORAL at 05:17

## 2019-03-10 RX ADMIN — MIDODRINE HYDROCHLORIDE 15 MG: 5 TABLET ORAL at 12:53

## 2019-03-10 RX ADMIN — TORSEMIDE 100 MG: 100 TABLET ORAL at 17:53

## 2019-03-10 RX ADMIN — Medication 1 TABLET: at 09:30

## 2019-03-10 RX ADMIN — MORPHINE SULFATE 2 MG: 2 INJECTION, SOLUTION INTRAMUSCULAR; INTRAVENOUS at 14:28

## 2019-03-10 RX ADMIN — Medication 10 ML: at 22:26

## 2019-03-10 RX ADMIN — MIDODRINE HYDROCHLORIDE 15 MG: 5 TABLET ORAL at 09:30

## 2019-03-10 RX ADMIN — APIXABAN 5 MG: 2.5 TABLET, FILM COATED ORAL at 09:30

## 2019-03-10 RX ADMIN — FLUTICASONE PROPIONATE 2 SPRAY: 50 SPRAY, METERED NASAL at 09:30

## 2019-03-10 RX ADMIN — POTASSIUM CHLORIDE 40 MEQ: 20 TABLET, EXTENDED RELEASE ORAL at 22:24

## 2019-03-10 RX ADMIN — POTASSIUM CHLORIDE 20 MEQ: 20 TABLET, EXTENDED RELEASE ORAL at 09:30

## 2019-03-10 RX ADMIN — Medication 10 ML: at 05:21

## 2019-03-10 RX ADMIN — POLYETHYLENE GLYCOL 3350 17 G: 17 POWDER, FOR SOLUTION ORAL at 12:53

## 2019-03-10 RX ADMIN — FINASTERIDE 5 MG: 5 TABLET, FILM COATED ORAL at 09:30

## 2019-03-10 RX ADMIN — NYSTATIN: 100000 POWDER TOPICAL at 09:30

## 2019-03-10 NOTE — PROGRESS NOTES
Hospitalist Progress Note     Admit Date:  2019 11:19 AM   Name:  Nohelia Hughes. Age:  66 y.o.  :  1945   MRN:  [id number]   PCP:  Shayy Kirby MD  Treatment Team: Attending Provider: Nicole Wood DO; Consulting Provider: Katie Schmidt MD; Utilization Review: Marquis Murray RN; Consulting Provider: Maryann Castillo MD; Care Manager: Sonia Ramos RN; Charge Nurse: Alexandria Cervantes RN    Subjective:     From previous notes: \"66year-old gentleman with multiple medical problems including CHF, A. fib, CKD stage IV,, chronic COPD, obstructive sleep apnea who is noncompliant with CPAP, who is currently in a rehab facility, admitted on  for acute on chronic CHF in view of worsening bilateral LE swelling, hypoxic respiratory failure and hematuria. was brought into the emergency room with complaints of hematuria, leg swellings and shortness of breath. He was also having shortness of breath, moderate in severity, progressively getting worse, worse with minimal exertion, not resolved with rest.  Tunneled cath placed  and HD started but could not tolerate his session due to hypotension. Midodrine was restarted and he has remained intolerated of HD due to BP issues. \" [facility] recommended hospice care on 3/1/19 so the wife requested a second opinion.     3/10/19:   Renal fxn continues to improve. k still low. Now constipated.  miralax and lactulose discontinued.          Objective:     Patient Vitals for the past 24 hrs:   Temp Pulse Resp BP SpO2   03/10/19 1107 97.4 °F (36.3 °C) 60 18 110/66 95 %   03/10/19 0741 97.7 °F (36.5 °C) 66 18 116/68 94 %   03/10/19 0506 97.4 °F (36.3 °C) 62 18 114/69 99 %   19 2356 97.3 °F (36.3 °C) 60 18 96/63 100 %   19 1956 97.5 °F (36.4 °C) 61 18 101/69 100 %   19 1804  76  121/72    19 1535 98.7 °F (37.1 °C) (!) 53 17 130/73 97 %     Oxygen Therapy  O2 Sat (%): 95 % (03/10/19 1107)  Pulse via Oximetry: 70 beats per minute (03/04/19 0831)  O2 Device: Nasal cannula (03/09/19 0759)  O2 Flow Rate (L/min): 4 l/min(wears 4L at home) (03/09/19 0759)  ETCO2 (mmHg): 21 mmHg (02/25/19 1347)    Intake/Output Summary (Last 24 hours) at 3/10/2019 1226  Last data filed at 3/10/2019 0512  Gross per 24 hour   Intake 240 ml   Output 2750 ml   Net -2510 ml         Physical Examination:  Physical Exam   Constitutional: He is oriented to person, place, and time. No distress. HENT:   Head: Normocephalic. Mouth/Throat: Oropharynx is clear and moist.   Eyes: Conjunctivae are normal. No scleral icterus. Neck: Normal range of motion. Neck supple. Cardiovascular: Normal rate. No murmur heard. Pulmonary/Chest: Breath sounds normal. No respiratory distress. Abdominal: Soft. Musculoskeletal: He exhibits no edema or tenderness. Neurological: He is alert and oriented to person, place, and time. Skin: Skin is warm. No rash noted. He is not diaphoretic. Psychiatric: Affect normal.       Data Review:  I have reviewed all labs, meds, telemetry events, and studies from the last 24 hours.     Recent Results (from the past 24 hour(s))   GLUCOSE, POC    Collection Time: 03/09/19  4:05 PM   Result Value Ref Range    Glucose (POC) 91 65 - 100 mg/dL   RENAL FUNCTION PANEL    Collection Time: 03/10/19  9:03 AM   Result Value Ref Range    Sodium 140 136 - 145 mmol/L    Potassium 3.3 (L) 3.5 - 5.1 mmol/L    Chloride 99 98 - 107 mmol/L    CO2 32 21 - 32 mmol/L    Anion gap 9 7 - 16 mmol/L    Glucose 92 65 - 100 mg/dL    BUN 82 (H) 8 - 23 MG/DL    Creatinine 4.02 (H) 0.8 - 1.5 MG/DL    GFR est AA 19 (L) >60 ml/min/1.73m2    GFR est non-AA 16 (L) >60 ml/min/1.73m2    Calcium 9.0 8.3 - 10.4 MG/DL    Phosphorus 4.6 (H) 2.3 - 3.7 MG/DL    Albumin 2.7 (L) 3.2 - 4.6 g/dL   CBC W/O DIFF    Collection Time: 03/10/19  9:03 AM   Result Value Ref Range    WBC 7.0 4.3 - 11.1 K/uL    RBC 4.57 4.23 - 5.6 M/uL    HGB 12.4 (L) 13.6 - 17.2 g/dL    HCT 37.5 (L) 41.1 - 50.3 %    MCV 82.1 79.6 - 97.8 FL    MCH 27.1 26.1 - 32.9 PG    MCHC 33.1 31.4 - 35.0 g/dL    RDW 20.3 (H) 11.9 - 14.6 %    PLATELET 598 523 - 570 K/uL    MPV 11.4 9.4 - 12.3 FL    ABSOLUTE NRBC 0.00 0.0 - 0.2 K/uL        All Micro Results     Procedure Component Value Units Date/Time    CULTURE, BLOOD [[id number]] Collected:  02/22/19 1740    Order Status:  Completed Specimen:  Blood Updated:  02/27/19 0837     Special Requests: --        RIGHT  HAND       Culture result: NO GROWTH 5 DAYS       CULTURE, BLOOD [[id number]] Collected:  02/22/19 1753    Order Status:  Completed Specimen:  Blood Updated:  02/27/19 0837     Special Requests: --        RIGHT  FOREARM       Culture result: NO GROWTH 5 DAYS       CULTURE, BLOOD [[id number]] Collected:  02/19/19 1140    Order Status:  Completed Specimen:  Blood Updated:  02/24/19 0654     Special Requests: --        RIGHT  Antecubital       Culture result: NO GROWTH 5 DAYS       CULTURE, BLOOD [[id number]]  (Abnormal) Collected:  02/19/19 1252    Order Status:  Completed Specimen:  Blood Updated:  02/23/19 0747     Special Requests: --        RIGHT  HAND       GRAM STAIN       GRAM POSITIVE COCCI IN CHAINS                  AEROBIC AND ANAEROBIC BOTTLES                  RESULTS VERIFIED, PHONED TO AND READ BACK BY EAMON GONZALEZ @9229 02/20/2019 [facility], [place]           Culture result:       STAPHYLOCOCCUS SPECIES, COAGULASE NEGATIVE                  ALPHA STREPTOCOCCUS, NOT S. PNEUMONIAE                  THIS ORGANISM MAY BE INDICATIVE OF CULTURE CONTAMINATION, HOWEVER, CLINICAL CORRELATION NEEDS TO BE EVALUATED, AS EACH CASE IS UNIQUE.           CULTURE, URINE [[id number]]  (Abnormal)  (Susceptibility) Collected:  02/19/19 1220    Order Status:  Completed Specimen:  Cath Urine Updated:  02/22/19 0645     Special Requests: NO SPECIAL REQUESTS        Culture result:       1000 COLONIES/mL STAPHYLOCOCCUS HAEMOLYTICUS                Current Meds:  Current Facility-Administered Medications   Medication Dose Route Frequency    potassium chloride (K-DUR, KLOR-CON) SR tablet 40 mEq  40 mEq Oral Q12H    potassium chloride (K-DUR, KLOR-CON) SR tablet 40 mEq  40 mEq Oral NOW    torsemide (DEMADEX) tablet 100 mg  100 mg Oral BID    cholecalciferol (VITAMIN D3) tablet 2,000 Units  2,000 Units Oral DAILY    B complex-vitamin C-folic acid (NEPHRO-JANEL) 0.8 mg tab  1 Tab Oral DAILY    nystatin (MYCOSTATIN) 100,000 unit/gram powder   Topical BID    metOLazone (ZAROXOLYN) tablet 10 mg  10 mg Oral BID    midodrine (PROAMITINE) tablet 15 mg  15 mg Oral TID WITH MEALS    digoxin (LANOXIN) tablet 0.125 mg  0.125 mg Oral Q MON, WED & FRI    mineral oil-hydrophil petrolat (AQUAPHOR) ointment   Topical PRN    apixaban (ELIQUIS) tablet 5 mg  5 mg Oral BID    lidocaine (XYLOCAINE) 20 mg/mL (2 %) injection  mg  1-20 mL IntraDERMal Multiple    albuterol-ipratropium (DUO-NEB) 2.5 MG-0.5 MG/3 ML  3 mL Nebulization Q4H PRN    fluticasone (FLONASE) 50 mcg/actuation nasal spray 2 Spray  2 Spray Both Nostrils DAILY    pantoprazole (PROTONIX) tablet 40 mg  40 mg Oral ACB    sodium chloride (NS) flush 5-40 mL  5-40 mL IntraVENous Q8H    sodium chloride (NS) flush 5-40 mL  5-40 mL IntraVENous PRN    acetaminophen (TYLENOL) tablet 650 mg  650 mg Oral Q4H PRN    HYDROcodone-acetaminophen (NORCO) 5-325 mg per tablet 1 Tab  1 Tab Oral Q4H PRN    morphine injection 2 mg  2 mg IntraVENous Q4H PRN    naloxone (NARCAN) injection 0.4 mg  0.4 mg IntraVENous PRN    diphenhydrAMINE (BENADRYL) capsule 25 mg  25 mg Oral Q4H PRN    ondansetron (ZOFRAN) injection 4 mg  4 mg IntraVENous Q4H PRN    finasteride (PROSCAR) tablet 5 mg  5 mg Oral DAILY       Diet:  DIET REGULAR  DIET NUTRITIONAL SUPPLEMENTS    Other Studies (last 24 hours):  No results found.     Assessment and Plan:     Hospital Problems as of 3/10/2019 Date Reviewed: 12/11/2018          Codes Class Noted - Resolved POA    ESRD (end stage renal disease) on dialysis [facility]) ICD-10-CM: N18.6, Z99.2  ICD-9-CM: 585.6, V45.11  3/2/2019 - Present Yes        Acute pulmonary edema ([facility] ICD-10-CM: J81.0  ICD-9-CM: 518.4  3/2/2019 - Present Yes        Hemodialysis-associated hypotension ICD-10-CM: I95.3  ICD-9-CM: 458.21  3/2/2019 - Present Yes        Syncope ICD-10-CM: R55  ICD-9-CM: 780.2  3/2/2019 - Present No        Thrombocytopenia (HCC) ICD-10-CM: D69.6  ICD-9-CM: 287.5  3/2/2019 - Present Yes        Hematuria ICD-10-CM: R31.9  ICD-9-CM: 599.70  3/2/2019 - Present Yes        Anemia due to chronic kidney disease ICD-10-CM: N18.9, D63.1  ICD-9-CM: 285.21  3/2/2019 - Present Yes        CKD (chronic kidney disease) stage 4, GFR 15-29 ml/min (HCC) ICD-10-CM: N18.4  ICD-9-CM: 585.4  2/20/2019 - Present         Lactic acidosis ICD-10-CM: E87.2  ICD-9-CM: 276.2  2/20/2019 - Present Yes        Tobacco abuse ICD-10-CM: Z72.0  ICD-9-CM: 305.1  1/22/2019 - Present Yes        Atrial fibrillation ([facility] ICD-10-CM: I48.91  ICD-9-CM: 427.31  11/27/2018 - Present Yes        Acute on chronic combined systolic and diastolic CHF (congestive heart failure) ([facility] ICD-10-CM: I50.43  ICD-9-CM: 428.43, 428.0  11/26/2018 - Present Yes        Pulmonary hypertension (HCC) (Chronic) ICD-10-CM: I27.20  ICD-9-CM: 416.8  10/20/2018 - Present Yes        Type 2 diabetes with nephropathy ([facility] ICD-10-CM: E11.21  ICD-9-CM: 250.40, 583.81  10/8/2018 - Present Yes        * (Principal) Acute respiratory failure with hypoxia ([address] ICD-10-CM: J96.01  ICD-9-CM: 518.81  5/24/2018 - Present Yes        TAZ (obstructive sleep apnea) ICD-10-CM: G47.33  ICD-9-CM: 327.23  10/2/2017 - Present Yes    Overview Signed 12/11/2018  1:24 PM by Tian Shah NP     Intolerant of CPAP             CKD (chronic kidney disease) stage 3, GFR 30-59 ml/min (HCC) (Chronic) ICD-10-CM: N18.3  ICD-9-CM: 585.3  9/29/2017 - Present         Coronary atherosclerosis of native coronary vessel (Chronic) ICD-10-CM: I25.10  ICD-9-CM: 414.01  9/29/2017 - Present Yes        Hypertension (Chronic) ICD-10-CM: I10  ICD-9-CM: 401.9  9/29/2017 - Present         CHF (congestive heart failure) (HCC) ICD-10-CM: I50.9  ICD-9-CM: 428.0  9/27/2017 - Present         Mixed hyperlipidemia (Chronic) ICD-10-CM: G33.7  ICD-9-CM: 272.2  9/28/2016 - Present Yes              A/P:      Acute respiratory failure with hypoxia ([facility] (5/24/2018)-improved  - Due to acute pulmonary edema  - cardiorenal syndrome-- prognosis felt to be grim--second opinion nephrology and pt now improving with aggressive diuresis loop and metolazone prime  Creat improved from 5--yesterday now 4.      Hypokalemia -                                  recalcitrant, additional, increase today.          chronic combined systolic and diastolic CHF (congestive heart failure) ([facility] (11/26/2018)  - EF 25%-30%  Consider coreg if bp improves/remains stable--this is unchanged       ESRD (end stage renal disease) on dialysis ([facility] (3/2/2019)  - New start HD --> did not tolerate as he had hypotension and syncope during first 2 attempts  . Continues to respond to aggressive diuretic program             Thrombocytopenia ([facility] (3/2/2019)  -Carolyn Spears       Anemia due to chronic kidney disease (3/2/2019)     Hx cad       Type 2 diabetes with nephropathy ([facility] (10/8/2018)       Pulmonary hypertension ([facility] (10/20/2018)       Atrial fibrillation ([facility] (11/27/2018)  -  periods of paroxysmal afib  - Continue Eliquis cva prophylaxis          Mixed hyperlipidemia (9/28/2016)       TAZ (obstructive sleep apnea) (10/2/2017)  - Non-compliant with CPAP--intolerant per daughter       Tobacco abuse (1/22/2019)     Dispo - .  Discharge to [facility] when approved If continues to improve he may be able to go to snf     DIET REGULAR 2 GM NA (House Low NA)  DIET NUTRITIONAL SUPPLEMENTS All Meals; Nepro     DVT Prophylaxis: Eliquis

## 2019-03-10 NOTE — PROGRESS NOTES
RENAL PROGRESS NOTE    Subjective:     Cc - I was asked to see patient for second opinion    Patient is a 65 y/o AAM with Acute Kidney Injury on top of Chronic Kidney Disease stage 3 had attempts at dialysis x three days, but each time needed to be taken off dialysis due to hemodynamic instability. He also has CHF, A. fib, obstructive sleep apnea who was admitted from a rehab facility on 2/19 for acute on chronic CHF with worsening bilateral LE swelling, hypoxic respiratory failure and hematuria. He did not respond to medical management and a tunneled cath placed 2/25 and HD started. He could not tolerate his session due to hypotension even after midodrine treatment. [facility] recommended hospice care on 3/1/19 so the wife requested a second opinion. The patient is uncomfortable due to dyspnea despite O2 by NC and is thirsty. His wife is at bedside. I discussed with patient and wife that dialysis is a good treatment for kidney failure, but that his main problem is heart failure and that heart transplant is a treatment for heart failure, however, he is not a candidate for it due to co-morbid conditions and advanced age. I  Discussed that in order to attempt dialysis again he would need to improve hemodynamic stability and respond favorably to medication changes, but that the likelihood of success was not good as he has not responded well to medical therapy so far.     3/3/19 - feels and breathes better - in better spirit - no longer in respiratory distress - edema improving   3/4/19 - gradually progressing - no labs available yet - will continue to try medical therapy  3/5/19 - alert and communicating well this am, but wife states she has noted some confusion - no new tremor, no overt uremic symptoms - respiratory status improving  3/6/19 - feels and looks better - tolerates RA oxygen - edema improving - fair urine output - will try to switch to PO diuresis with Torsemide and Metolazone - discussed with  Cyril and RN and wife  3/7/19 - he continues to breath better - edema improving - adequate urine output on Torsemide and Metolazone PO - for possible [facility] rehab placement  3/8/19 - stable on breathing better - awaiting transfer to [facility]  3/9/19 - comfortable - adequate urine output - no uremic symptoms         Past Medical History:   Diagnosis Date    Acute CHF (congestive heart failure) ([address] 4/25/2015    Acute combined systolic and diastolic congestive heart failure ([address] 4/28/2015    De Quervain's tenosynovitis, right 4/28/2015    Dyspnea on exertion 6/28/2015    Elevated serum creatinine 4/25/2015    Elevated troponin 6/28/2015    History of coronary artery disease     MI at 27 yo    History of right knee surgery     cartilage removal    History of shingles     Malignant hypertension 4/25/2015    MI (myocardial infarction) [facility])       Past Surgical History:   Procedure Laterality Date    HX HEART CATHETERIZATION  6/29/2015    no intervention    HX KNEE ARTHROSCOPY Right     removal of cartilage    IR INSERT TUNL CVC W/O PORT OVER 5 YR  2/25/2019      Prior to Admission medications    Medication Sig Start Date End Date Taking? Authorizing Provider   omeprazole (PRILOSEC) 20 mg capsule Take 1 Cap by mouth daily. 1/24/19   Urvashi Gore MD   potassium chloride (K-DUR, KLOR-CON) 20 mEq tablet Take 1 Tab by mouth daily.  1/23/19   Enrique Gentile MD   Blood-Glucose Meter (ACCU-CHEK YOSEF PLUS METER) misc USE TO CHECK BLOOD SUGARS DAILY DX: E11.9 12/27/18   Urvashi Gore MD   Blood Glucose Control High&Low (ACCU-CHEK YOSEF CONTROL SOLN) soln USE AS DIRECTED 12/27/18   Urvashi Gore MD   glucose blood VI test strips (ACCU-CHEK YOSEF PLUS TEST STRP) strip USE TO CHECK BLOOD SUGARS DAILY DX: E11.9 12/27/18   Urvashi Gore MD   alcohol swabs (BD SINGLE USE SWABS REGULAR) padm USE AS DIRECTED DAILY DX. E11.9 12/27/18   Urvashi Gore MD   lancets (ACCU-CHEK SOFTCLIX LANCETS) misc USE TO CHECK BLOOD SUGARS DAILY DX: E11.9 12/27/18   Sidra Somers MD   carvedilol (COREG) 6.25 mg tablet Take 1 Tab by mouth two (2) times daily (with meals). 12/3/18   Jose Majano MD   apixaban (ELIQUIS) 5 mg tablet Take 1 Tab by mouth every twelve (12) hours. 12/3/18   Jose Majano MD   aspirin 81 mg chewable tablet Take 1 Tab by mouth daily. 11/5/18   Leyla Marie MD   atorvastatin (LIPITOR) 20 mg tablet Take 1 Tab by mouth nightly. 10/4/18   Sidra Somers MD   allopurinol (ZYLOPRIM) 100 mg tablet TAKE 1 TABLET BY MOUTH EVERY DAY 9/25/18   Sidra Somers MD   fluticasone [facility]) 50 mcg/actuation nasal spray 2 Sprays by Both Nostrils route daily. 3/29/18   Lula Mata MD   melatonin 3 mg tablet Take 3 mg by mouth nightly. Provider, Historical   albuterol (VENTOLIN HFA) 90 mcg/actuation inhaler Take 2 Puffs by inhalation four (4) times daily. 2/6/18   Pepper Melendrez NP   polyethylene glycol (MIRALAX) 17 gram packet Take 1 Packet by mouth daily. Patient taking differently: Take 17 g by mouth daily as needed. 1/13/18   Tato MERLOS NP   albuterol-ipratropium (DUO-NEB) 2.5 mg-0.5 mg/3 ml nebu 3 mL by Nebulization route four (4) times daily.  Diaignosis--J44.9 12/13/17   Pepper MARCI Melendrez     Allergies   Allergen Reactions    Ativan [Lorazepam] Other (comments)     Confusion- mild but long lasting- days    Pcn [Penicillins] Other (comments)     \"makes my heart stop\"      Social History     Tobacco Use    Smoking status: Former Smoker     Packs/day: 0.25     Years: 20.00     Pack years: 5.00     Types: Cigarettes     Last attempt to quit: 4/5/2015     Years since quitting: 3.9    Smokeless tobacco: Never Used   Substance Use Topics    Alcohol use: No     Alcohol/week: 0.0 oz      Family History   Problem Relation Age of Onset    Hypertension Mother     No Known Problems Father           Review of Systems    Constitutional: no fever, rested better   Eyes: fair vision,    Ears, nose, mouth, throat, and face:fair hearing,    Respiratory: no asthma,    Cardiovascular:no chest pain,    Gastrointestinal:no diarrhea,    Genitourinary: he has had hematuria and dysuria,   Hematologic/lymphatic: no bleeding tendency,    Neurological: no seizures,    Behvioral/Psych: no psych hospitalization    Endocrine: no goiter,       Objective:       Visit Vitals  /72   Pulse 76   Temp 98.7 °F (37.1 °C)   Resp 17   Ht 5' 10\" (1.778 m)   Wt 87.5 kg (193 lb)   SpO2 97%   BMI 27.69 kg/m²       No intake/output data recorded. 03/08 0701 - 03/09 1900  In: 720 [P.O.:720]  Out: 4950 [Urine:4950]    General:  Alert, cooperative, no respiratory distress on RA, appears stated age. Head:  Normocephalic, without obvious abnormality, atraumatic. Eyes:  Conjunctivae/corneas clear. EOMs intact. Throat: Lips, mucosa, and tongue normal. Teeth and gums normal.   Neck: Supple, symmetrical, trachea midline, no adenopathy, pos for JVD. Lungs:   Clear to auscultation bilaterally. Heart:  Regular rate and rhythm, S1, S2 normal, no  rub. Abdomen:   Soft, non-tender. Bowel sounds normal. No masses,  No organomegaly. No renal bruit. Abdominal wall is bulging and ascites is present   Extremities: Extremities normal, atraumatic, no cyanosis, 3+ edema, improving. Skin: Skin color, texture, turgor normal. No rashes or lesions. Neurologic: Grossly intact. No asterixis. Data Review:     Results for Jean Landaverde (MRN [id number]) as of 3/8/2019 15:11   Ref.  Range 3/4/2019 06:42 3/5/2019 05:58 3/6/2019 05:40 3/7/2019 06:16 3/8/2019 05:16   Sodium Latest Ref Range: 136 - 145 mmol/L 136 137 137 139 139   Potassium Latest Ref Range: 3.5 - 5.1 mmol/L 3.1 (L) 2.8 (LL) 3.2 (L) 3.3 (L) 3.1 (L)   Chloride Latest Ref Range: 98 - 107 mmol/L 98 99 101 101 100   CO2 Latest Ref Range: 21 - 32 mmol/L 28 25 24 25 26   Anion gap Latest Ref Range: 7 - 16 mmol/L 10 13 12 13 13   Glucose Latest Ref Range: 65 - 100 mg/dL 90 85 97 92 101 (H)   BUN Latest Ref Range: 8 - 23 MG/DL 72 (H) 77 (H) 79 (H) 79 (H) 81 (H)   Creatinine Latest Ref Range: 0.8 - 1.5 MG/DL 5.87 (H) 5.81 (H) 5.64 (H) 5.34 (H) 5.06 (H)   Calcium Latest Ref Range: 8.3 - 10.4 MG/DL 8.0 (L) 8.0 (L) 8.0 (L) 8.2 (L) 8.6   Phosphorus Latest Ref Range: 2.3 - 3.7 MG/DL 5.3 (H) 5.6 (H) 5.4 (H) 5.3 (H) 5.3 (H)   GFR est non-AA Latest Ref Range: >60 ml/min/1.73m2 10 (L) 10 (L) 10 (L) 11 (L) 12 (L)   GFR est AA Latest Ref Range: >60 ml/min/1.73m2 12 (L) 12 (L) 13 (L) 14 (L) 14 (L)   Albumin Latest Ref Range: 3.2 - 4.6 g/dL 2.6 (L) 2.7 (L) 2.5 (L) 2.6 (L) 2.6 (L)       Recent Results (from the past 24 hour(s))   GLUCOSE, POC    Collection Time: 03/09/19  4:05 PM   Result Value Ref Range    Glucose (POC) 91 65 - 100 mg/dL       CXR shows massive cardiomegaly and fluid excess      ECHO 2/21/19 -  SUMMARY:  -  Left ventricle: Systolic function was markedly reduced. Ejection fraction  was estimated in the range of 25 % to 30 %. There was severe diffuse  hypokinesis with distinct regional wall motion abnormalities. There was   severeglobal hypokinesis. There was severe concentric hypertrophy. The myocardial  specular pattern appeared moderately abnormal. Consider infiltrative  cardiomyopathy if clinically indicated. -  Right ventricle: The ventricle was mildly dilated. Systolic function was  moderately to markedly reduced. There was mild pulmonary artery hypertension.  -  Left atrium: The atrium was moderately to markedly dilated. -  Right atrium: The atrium was markedly dilated. -  Inferior vena cava, hepatic veins: The inferior vena cava was moderately  dilated. The respirophasic change in diameter was less than 50%. -  Aortic valve: The valve was trileaflet. Leaflets exhibited moderate  sclerosis. The left coronary cusp demonstrated immobility. There was mild  regurgitation. -  Mitral valve: There was mild to moderate regurgitation. -  Tricuspid valve:  There was mild to moderate regurgitation. -  Pulmonic valve: There was moderate regurgitation. Principal Problem:    Acute respiratory failure with hypoxia ([address] (5/24/2018)    Active Problems:    Coronary atherosclerosis of native coronary vessel (9/29/2017)      Mixed hyperlipidemia (9/28/2016)      TAZ (obstructive sleep apnea) (10/2/2017)      Overview: Intolerant of CPAP      Type 2 diabetes with nephropathy ([address] (10/8/2018)      Pulmonary hypertension ([address] (10/20/2018)      Acute on chronic combined systolic and diastolic CHF (congestive heart failure) ([address] (11/26/2018)      Atrial fibrillation ([address] (11/27/2018)      Tobacco abuse (1/22/2019)      Lactic acidosis (2/20/2019)      ESRD (end stage renal disease) on dialysis ([address] (3/2/2019)      Acute pulmonary edema ([address] (3/2/2019)      Hemodialysis-associated hypotension (3/2/2019)      Syncope (3/2/2019)      Thrombocytopenia ([address] (3/2/2019)      Hematuria (3/2/2019)      Anemia due to chronic kidney disease (3/2/2019)        Assessment:     1. Acute Kidney Injury on top of Chronic Kidney Disease stage 3 -  - mainly due to cardio-renal syndrome  - marked hemodynamic instability with inadequate response to medical therapy and inability to tolerate dialysis despite Midodrine  - responded to medical therapy including addition of Digoxin  and increasing Midodrine  - his wife understands that this is not likely to succeed to get him stabilize for dialysis in the next days and that this is likely and end of life situation  - further improvement with medical management appears to be present  - no dialysis needed  - renal function indices gradually improving  - adequate response to PO diuresis     2. Acute on chronic Combined systolic and diastolic heart failure decompensation  - responding to intensified diuresis and medical therapy as above      3. A fib with RVR   - on no antihypertensive  - clinically responding to Digoxin     4. UTI  -  Treated with ceftriaxone     5. Respiratory failure -  - on oxygen therapy per primary team  - improving and tolerating nasal cannula     6. Urinary retention  - S/P Schulz with urology involved     7. Hypermagnesemia -  - hoping for consistently improved hemodynamics with decreasing Magnesium level  - avoid magnesium containing meds, Magnesium citrate discontinued    8. Anemia -  - adequate control    9. Acid/base -  - bicarbonate level is WNL    10. Hypokalemia -  - off Fludrocortisone  - monitor response to replacement    11.  Hypocalcemia -  - mostly due to hypoalbuminemia  - on added Vitamin D    Plan:     As above    Anay Crisostomo M.D.

## 2019-03-10 NOTE — PROGRESS NOTES
UOP 2750ml for this shift, urine yellow/straw clear. Meds had been crushed with applesauce since patient had trouble swallowing pills. MD called to switch PO potassium as it could not be crushed. Ordered one time dose of IV potassium 20 mEq. Patient tolerated well. Will make day shift nurse aware. HD cath dressing changed. Hourly rounds performed;all pt needs met. Bed in low position and call light/ personal items within reach. Will continue to monitor and give bedside shift report to oncoming day shift nurse.

## 2019-03-10 NOTE — PROGRESS NOTES
Pt's family reported pt scratched himself on his bottom while family removed pt's hand mittens. Pt had small bleeding from scratch and cleaned off. Mittens put back on pt's hands. Will continue to monitor.

## 2019-03-10 NOTE — PROGRESS NOTES
Hourly rounds completed throughout this shift. Pain medicine given per MAR. Family at bed side. Pt resting in bed; denies needs at this time. Will continue to monitor and report to oncoming night shift nurse.

## 2019-03-11 PROBLEM — K72.90 LIVER FAILURE (HCC): Status: ACTIVE | Noted: 2019-03-11

## 2019-03-11 LAB
ALBUMIN SERPL-MCNC: 2.7 G/DL (ref 3.2–4.6)
ALBUMIN SERPL-MCNC: 2.8 G/DL (ref 3.2–4.6)
ALBUMIN/GLOB SERPL: 0.7 {RATIO} (ref 1.2–3.5)
ALP SERPL-CCNC: 101 U/L (ref 50–136)
ALT SERPL-CCNC: 16 U/L (ref 12–65)
AMMONIA PLAS-SCNC: 72 UMOL/L (ref 11–32)
ANION GAP SERPL CALC-SCNC: 9 MMOL/L (ref 7–16)
AST SERPL-CCNC: 28 U/L (ref 15–37)
BILIRUB DIRECT SERPL-MCNC: 0.9 MG/DL
BILIRUB SERPL-MCNC: 1.7 MG/DL (ref 0.2–1.1)
BUN SERPL-MCNC: 83 MG/DL (ref 8–23)
CALCIUM SERPL-MCNC: 9.3 MG/DL (ref 8.3–10.4)
CHLORIDE SERPL-SCNC: 101 MMOL/L (ref 98–107)
CO2 SERPL-SCNC: 32 MMOL/L (ref 21–32)
CREAT SERPL-MCNC: 3.81 MG/DL (ref 0.8–1.5)
ERYTHROCYTE [DISTWIDTH] IN BLOOD BY AUTOMATED COUNT: 20.6 % (ref 11.9–14.6)
GLOBULIN SER CALC-MCNC: 4.2 G/DL (ref 2.3–3.5)
GLUCOSE SERPL-MCNC: 94 MG/DL (ref 65–100)
HCT VFR BLD AUTO: 37.3 % (ref 41.1–50.3)
HGB BLD-MCNC: 12.3 G/DL (ref 13.6–17.2)
INR PPP: 2
MAGNESIUM SERPL-MCNC: 2.2 MG/DL (ref 1.8–2.4)
MCH RBC QN AUTO: 27.2 PG (ref 26.1–32.9)
MCHC RBC AUTO-ENTMCNC: 33 G/DL (ref 31.4–35)
MCV RBC AUTO: 82.5 FL (ref 79.6–97.8)
NRBC # BLD: 0 K/UL (ref 0–0.2)
PHOSPHATE SERPL-MCNC: 4.4 MG/DL (ref 2.3–3.7)
PLATELET # BLD AUTO: 148 K/UL (ref 150–450)
PMV BLD AUTO: 10.5 FL (ref 9.4–12.3)
POTASSIUM SERPL-SCNC: 3.9 MMOL/L (ref 3.5–5.1)
PROT SERPL-MCNC: 7 G/DL (ref 6.3–8.2)
PROTHROMBIN TIME: 22.4 SEC (ref 11.7–14.5)
RBC # BLD AUTO: 4.52 M/UL (ref 4.23–5.6)
SODIUM SERPL-SCNC: 142 MMOL/L (ref 136–145)
WBC # BLD AUTO: 7.2 K/UL (ref 4.3–11.1)

## 2019-03-11 PROCEDURE — 74011250637 HC RX REV CODE- 250/637: Performed by: INTERNAL MEDICINE

## 2019-03-11 PROCEDURE — 83735 ASSAY OF MAGNESIUM: CPT

## 2019-03-11 PROCEDURE — 85027 COMPLETE CBC AUTOMATED: CPT

## 2019-03-11 PROCEDURE — 74011250637 HC RX REV CODE- 250/637: Performed by: UROLOGY

## 2019-03-11 PROCEDURE — 65270000029 HC RM PRIVATE

## 2019-03-11 PROCEDURE — 82140 ASSAY OF AMMONIA: CPT

## 2019-03-11 PROCEDURE — 74011250636 HC RX REV CODE- 250/636: Performed by: HOSPITALIST

## 2019-03-11 PROCEDURE — 80076 HEPATIC FUNCTION PANEL: CPT

## 2019-03-11 PROCEDURE — 36415 COLL VENOUS BLD VENIPUNCTURE: CPT

## 2019-03-11 PROCEDURE — 85610 PROTHROMBIN TIME: CPT

## 2019-03-11 PROCEDURE — 74011250637 HC RX REV CODE- 250/637: Performed by: HOSPITALIST

## 2019-03-11 PROCEDURE — 80074 ACUTE HEPATITIS PANEL: CPT

## 2019-03-11 PROCEDURE — 80069 RENAL FUNCTION PANEL: CPT

## 2019-03-11 RX ADMIN — MORPHINE SULFATE 2 MG: 2 INJECTION, SOLUTION INTRAMUSCULAR; INTRAVENOUS at 21:15

## 2019-03-11 RX ADMIN — FINASTERIDE 5 MG: 5 TABLET, FILM COATED ORAL at 08:48

## 2019-03-11 RX ADMIN — POTASSIUM CHLORIDE 40 MEQ: 20 TABLET, EXTENDED RELEASE ORAL at 21:00

## 2019-03-11 RX ADMIN — Medication 1 TABLET: at 08:47

## 2019-03-11 RX ADMIN — Medication 10 ML: at 14:00

## 2019-03-11 RX ADMIN — METOLAZONE 10 MG: 5 TABLET ORAL at 18:06

## 2019-03-11 RX ADMIN — LACTULOSE 20 G: 20 SOLUTION ORAL at 21:15

## 2019-03-11 RX ADMIN — NYSTATIN: 100000 POWDER TOPICAL at 09:00

## 2019-03-11 RX ADMIN — MIDODRINE HYDROCHLORIDE 15 MG: 5 TABLET ORAL at 18:06

## 2019-03-11 RX ADMIN — VITAMIN D, TAB 1000IU (100/BT) 2000 UNITS: 25 TAB at 08:47

## 2019-03-11 RX ADMIN — LACTULOSE 20 G: 20 SOLUTION ORAL at 10:24

## 2019-03-11 RX ADMIN — NYSTATIN: 100000 POWDER TOPICAL at 18:00

## 2019-03-11 RX ADMIN — APIXABAN 5 MG: 2.5 TABLET, FILM COATED ORAL at 21:15

## 2019-03-11 RX ADMIN — FLUTICASONE PROPIONATE 2 SPRAY: 50 SPRAY, METERED NASAL at 09:00

## 2019-03-11 RX ADMIN — METOLAZONE 10 MG: 5 TABLET ORAL at 08:47

## 2019-03-11 RX ADMIN — Medication 10 ML: at 05:02

## 2019-03-11 RX ADMIN — TORSEMIDE 100 MG: 100 TABLET ORAL at 08:48

## 2019-03-11 RX ADMIN — ONDANSETRON 4 MG: 2 INJECTION INTRAMUSCULAR; INTRAVENOUS at 21:40

## 2019-03-11 RX ADMIN — POTASSIUM CHLORIDE 40 MEQ: 20 TABLET, EXTENDED RELEASE ORAL at 08:47

## 2019-03-11 RX ADMIN — PANTOPRAZOLE SODIUM 40 MG: 40 TABLET, DELAYED RELEASE ORAL at 05:02

## 2019-03-11 RX ADMIN — APIXABAN 5 MG: 2.5 TABLET, FILM COATED ORAL at 08:48

## 2019-03-11 RX ADMIN — Medication 10 ML: at 21:08

## 2019-03-11 RX ADMIN — MIDODRINE HYDROCHLORIDE 15 MG: 5 TABLET ORAL at 11:55

## 2019-03-11 RX ADMIN — DIPHENHYDRAMINE HYDROCHLORIDE 25 MG: 25 CAPSULE ORAL at 23:44

## 2019-03-11 RX ADMIN — LACTULOSE 20 G: 20 SOLUTION ORAL at 18:06

## 2019-03-11 RX ADMIN — MIDODRINE HYDROCHLORIDE 15 MG: 5 TABLET ORAL at 08:47

## 2019-03-11 RX ADMIN — TORSEMIDE 100 MG: 100 TABLET ORAL at 18:06

## 2019-03-11 RX ADMIN — DIGOXIN 0.12 MG: 125 TABLET ORAL at 08:47

## 2019-03-11 RX ADMIN — MORPHINE SULFATE 2 MG: 2 INJECTION, SOLUTION INTRAMUSCULAR; INTRAVENOUS at 02:40

## 2019-03-11 RX ADMIN — MORPHINE SULFATE 2 MG: 2 INJECTION, SOLUTION INTRAMUSCULAR; INTRAVENOUS at 11:55

## 2019-03-11 NOTE — PROGRESS NOTES
[facility] PROGRESS NOTE    Subjective:     Cc - I was asked to see patient for second opinion    Patient is a 65 y/o AAM with Acute Kidney Injury on top of Chronic Kidney Disease stage 3 had attempts at dialysis x three days, but each time needed to be taken off dialysis due to hemodynamic instability. He also has CHF, A. fib, obstructive sleep apnea who was admitted from a rehab facility on 2/19 for acute on chronic CHF with worsening bilateral LE swelling, hypoxic respiratory failure and hematuria. He did not respond to medical management and a tunneled cath placed 2/25 and HD started. He could not tolerate his session due to hypotension even after midodrine treatment. [facility] recommended hospice care on 3/1/19 so the wife requested a second opinion. The patient is uncomfortable due to dyspnea despite O2 by NC and is thirsty. His wife is at bedside. I discussed with patient and wife that dialysis is a good treatment for kidney failure, but that his main problem is heart failure and that heart transplant is a treatment for heart failure, however, he is not a candidate for it due to co-morbid conditions and advanced age. I  Discussed that in order to attempt dialysis again he would need to improve hemodynamic stability and respond favorably to medication changes, but that the likelihood of success was not good as he has not responded well to medical therapy so far.     3/3/19 - feels and breathes better - in better spirit - no longer in respiratory distress - edema improving   3/4/19 - gradually progressing - no labs available yet - will continue to try medical therapy  3/5/19 - alert and communicating well this am, but wife states she has noted some confusion - no new tremor, no overt uremic symptoms - respiratory status improving  3/6/19 - feels and looks better - tolerates RA oxygen - edema improving - fair urine output - will try to switch to PO diuresis with Torsemide and Metolazone - discussed with  Cyril and RN and wife  3/7/19 - he continues to breath better - edema improving - adequate urine output on Torsemide and Metolazone PO - for possible [facility] rehab placement  3/8/19 - stable on breathing better - awaiting transfer to [facility]  3/9/19 - comfortable - adequate urine output - no uremic symptoms   3/10/19 - some confusion - no dyspnea, no CP, no N/V - he is restless and has some hematuria from [facility] trauma - communicates some - no uremia   3/11/19 - communicating coherently this am - no CP, no N/V - feels better - no new tremor        Past Medical History:   Diagnosis Date    Acute CHF (congestive heart failure) ([facility] [address] 4/25/2015    Acute combined systolic and diastolic congestive heart failure ([facility] [address] 4/28/2015    De Quervain's tenosynovitis, right 4/28/2015    Dyspnea on exertion 6/28/2015    Elevated serum creatinine 4/25/2015    Elevated troponin 6/28/2015    History of coronary artery disease     MI at 25 yo    History of right knee surgery     cartilage removal    History of shingles     Malignant hypertension 4/25/2015    MI [facility]ocardial [facility])       Past Surgical History:   Procedure Laterality Date    HX HEART CATHETERIZATION  6/29/2015    no intervention    HX KNEE ARTHROSCOPY Right     removal of cartilage    IR INSERT TUNL CVC W/O PORT OVER 5 YR  2/25/2019      Prior to Admission medications    Medication Sig Start Date End Date Taking? Authorizing Provider   omeprazole (PRILOSEC) 20 mg capsule Take 1 Cap by mouth daily. 1/24/19   Marialuisa Flores MD   potassium chloride (K-DUR, KLOR-CON) 20 mEq tablet Take 1 Tab by mouth daily.  1/23/19   Ellen Dykes MD   Blood-Glucose Meter (ACCU-CHEK YOSEF PLUS METER) misc USE TO CHECK BLOOD SUGARS DAILY DX: E11.9 12/27/18   Marialuisa Flores MD   Blood Glucose Control High&Low (ACCU-CHEK YOSEF CONTROL SOLN) soln USE AS DIRECTED 12/27/18   Marialuisa Flores MD   glucose blood VI test strips (ACCU-CHEK YOSEF PLUS TEST STRP) strip USE TO CHECK BLOOD SUGARS DAILY DX: E11.9 12/27/18   Paula Bro MD   alcohol swabs (BD SINGLE USE SWABS REGULAR) padm USE AS DIRECTED DAILY DX. E11.9 12/27/18   Paula Bro MD   lancets (ACCU-CHEK SOFTCLIX LANCETS) misc USE TO CHECK BLOOD SUGARS DAILY DX: E11.9 12/27/18   Paula Bro MD   carvedilol (COREG) 6.25 mg tablet Take 1 Tab by mouth two (2) times daily (with meals). 12/3/18   Merlin Daub, MD   apixaban (ELIQUIS) 5 mg tablet Take 1 Tab by mouth every twelve (12) hours. 12/3/18   Merlin Daub, MD   aspirin 81 mg chewable tablet Take 1 Tab by mouth daily. 11/5/18   Heri Marie MD   atorvastatin (LIPITOR) 20 mg tablet Take 1 Tab by mouth nightly. 10/4/18   Paula Bro MD   allopurinol (ZYLOPRIM) 100 mg tablet TAKE 1 TABLET BY MOUTH EVERY DAY 9/25/18   Paula Bro MD   fluticasone [facility]) 50 mcg/actuation nasal spray 2 Sprays by Both Nostrils route daily. 3/29/18   Willis Mata MD   melatonin 3 mg tablet Take 3 mg by mouth nightly. Provider, Historical   albuterol (VENTOLIN HFA) 90 mcg/actuation inhaler Take 2 Puffs by inhalation four (4) times daily. 2/6/18   Dwight Ling NP   polyethylene glycol (MIRALAX) 17 gram packet Take 1 Packet by mouth daily. Patient taking differently: Take 17 g by mouth daily as needed. 1/13/18   Laz MERLOS NP   albuterol-ipratropium (DUO-NEB) 2.5 mg-0.5 mg/3 ml nebu 3 mL by Nebulization route four (4) times daily.  Diaignosis--J44.9 12/13/17   Dwight Ling NP     Allergies   Allergen Reactions    Ativan [Lorazepam] Other (comments)     Confusion- mild but long lasting- days    Pcn [Penicillins] Other (comments)     \"makes my heart stop\"      Social History     Tobacco Use    Smoking status: Former Smoker     Packs/day: 0.25     Years: 20.00     Pack years: 5.00     Types: Cigarettes     Last attempt to quit: 4/5/2015     Years since quitting: 3.9    Smokeless tobacco: Never Used   Substance Use Topics    Alcohol use: No     Alcohol/week: 0.0 oz      Family History   Problem Relation Age of Onset    Hypertension Mother     No Known Problems Father           Review of Systems    Constitutional: no fever, rested better   Eyes: fair vision,    Ears, nose, mouth, throat, and face:fair hearing,    Respiratory: no asthma,    Cardiovascular:no chest pain,    Gastrointestinal:no diarrhea,    Genitourinary: he has had hematuria and dysuria,   Hematologic/lymphatic: no bleeding tendency,    Neurological: no seizures,    Behvioral/Psych: no psych hospitalization    Endocrine: no goiter,       Objective:       Visit Vitals  /89 (BP 1 Location: Left arm, BP Patient Position: At rest)   Pulse 85   Temp 99.5 °F (37.5 °C)   Resp 20   Ht 5' 10\" (1.778 m)   Wt 81 kg (178 lb 9.6 oz)   SpO2 95%   BMI 25.63 kg/m²       03/10 1901 - 03/11 0700  In: -   Out: 900 [Urine:900]  03/09 0701 - 03/10 1900  In: 840 [P.O.:840]  Out: 5150 [Urine:5150]    General:  Alert, cooperative, no respiratory distress on RA, appears stated age. Head:  Normocephalic, without obvious abnormality, atraumatic. Eyes:  Conjunctivae/corneas clear. EOMs intact. Throat: Lips, mucosa, and tongue normal. Teeth and gums normal.   Neck: Supple, symmetrical, trachea midline, no adenopathy, pos for JVD. Lungs:   Clear to auscultation bilaterally. Heart:  Regular rate and rhythm, S1, S2 normal, no  rub. Abdomen:   Soft, non-tender. Bowel sounds normal. No masses,  No organomegaly. No renal bruit. Abdominal wall is bulging and ascites is present   Extremities: Extremities normal, atraumatic, no cyanosis, 3+ edema, improving. Skin: Skin color, texture, turgor normal. No rashes or lesions. Neurologic: Grossly intact. No asterixis. Data Review:     Results for Giovanny Emerson (MRN [id number]) as of 3/11/2019 06:38   Ref.  Range 3/6/2019 05:40 3/7/2019 06:16 3/8/2019 05:16 3/10/2019 09:03   Sodium Latest Ref Range: 136 - 145 mmol/L 137 139 139 140 Potassium Latest Ref Range: 3.5 - 5.1 mmol/L 3.2 (L) 3.3 (L) 3.1 (L) 3.3 (L)   Chloride Latest Ref Range: 98 - 107 mmol/L 101 101 100 99   CO2 Latest Ref Range: 21 - 32 mmol/L 24 25 26 32   Anion gap Latest Ref Range: 7 - 16 mmol/L 12 13 13 9   Glucose Latest Ref Range: 65 - 100 mg/dL 97 92 101 (H) 92   BUN Latest Ref Range: 8 - 23 MG/DL 79 (H) 79 (H) 81 (H) 82 (H)   Creatinine Latest Ref Range: 0.8 - 1.5 MG/DL 5.64 (H) 5.34 (H) 5.06 (H) 4.02 (H)   Calcium Latest Ref Range: 8.3 - 10.4 MG/DL 8.0 (L) 8.2 (L) 8.6 9.0   Phosphorus Latest Ref Range: 2.3 - 3.7 MG/DL 5.4 (H) 5.3 (H) 5.3 (H) 4.6 (H)   GFR est non-AA Latest Ref Range: >60 ml/min/1.73m2 10 (L) 11 (L) 12 (L) 16 (L)   GFR est AA Latest Ref Range: >60 ml/min/1.73m2 13 (L) 14 (L) 14 (L) 19 (L)   Albumin Latest Ref Range: 3.2 - 4.6 g/dL 2.5 (L) 2.6 (L) 2.6 (L) 2.7 (L)         Recent Results (from the past 24 hour(s))   RENAL FUNCTION PANEL    Collection Time: 03/10/19  9:03 AM   Result Value Ref Range    Sodium 140 136 - 145 mmol/L    Potassium 3.3 (L) 3.5 - 5.1 mmol/L    Chloride 99 98 - 107 mmol/L    CO2 32 21 - 32 mmol/L    Anion gap 9 7 - 16 mmol/L    Glucose 92 65 - 100 mg/dL    BUN 82 (H) 8 - 23 MG/DL    Creatinine 4.02 (H) 0.8 - 1.5 MG/DL    GFR est AA 19 (L) >60 ml/min/1.73m2    GFR est non-AA 16 (L) >60 ml/min/1.73m2    Calcium 9.0 8.3 - 10.4 MG/DL    Phosphorus 4.6 (H) 2.3 - 3.7 MG/DL    Albumin 2.7 (L) 3.2 - 4.6 g/dL   CBC W/O DIFF    Collection Time: 03/10/19  9:03 AM   Result Value Ref Range    WBC 7.0 4.3 - 11.1 K/uL    RBC 4.57 4.23 - 5.6 M/uL    HGB 12.4 (L) 13.6 - 17.2 g/dL    HCT 37.5 (L) 41.1 - 50.3 %    MCV 82.1 79.6 - 97.8 FL    MCH 27.1 26.1 - 32.9 PG    MCHC 33.1 31.4 - 35.0 g/dL    RDW 20.3 (H) 11.9 - 14.6 %    PLATELET 974 475 - 401 K/uL    MPV 11.4 9.4 - 12.3 FL    ABSOLUTE NRBC 0.00 0.0 - 0.2 K/uL   CBC W/O DIFF    Collection Time: 03/11/19  6:01 AM   Result Value Ref Range    WBC 7.2 4.3 - 11.1 K/uL    RBC 4.52 4.23 - 5.6 M/uL    HGB 12.3 (L) 13.6 - 17.2 g/dL    HCT 37.3 (L) 41.1 - 50.3 %    MCV 82.5 79.6 - 97.8 FL    MCH 27.2 26.1 - 32.9 PG    MCHC 33.0 31.4 - 35.0 g/dL    RDW 20.6 (H) 11.9 - 14.6 %    PLATELET 927 (L) 681 - 450 K/uL    MPV 10.5 9.4 - 12.3 FL    ABSOLUTE NRBC 0.00 0.0 - 0.2 K/uL   AMMONIA    Collection Time: 03/11/19  6:01 AM   Result Value Ref Range    Ammonia 72 (H) 11 - 32 UMOL/L       CXR shows massive cardiomegaly and fluid excess      ECHO 2/21/19 -  SUMMARY:  -  Left ventricle: Systolic function was markedly reduced. Ejection fraction  was estimated in the range of 25 % to 30 %. There was severe diffuse  hypokinesis with distinct regional wall motion abnormalities. There was   severeglobal hypokinesis. There was severe concentric hypertrophy. The myocardial  specular pattern appeared moderately abnormal. Consider infiltrative  cardiomyopathy if clinically indicated. -  Right ventricle: The ventricle was mildly dilated. Systolic function was  moderately to markedly reduced. There was mild pulmonary artery hypertension.  -  Left atrium: The atrium was moderately to markedly dilated. -  Right atrium: The atrium was markedly dilated. -  Inferior vena cava, hepatic veins: The inferior vena cava was moderately  dilated. The respirophasic change in diameter was less than 50%. -  Aortic valve: The valve was trileaflet. Leaflets exhibited moderate  sclerosis. The left coronary cusp demonstrated immobility. There was mild  regurgitation. -  Mitral valve: There was mild to moderate regurgitation. -  Tricuspid valve: There was mild to moderate regurgitation. -  Pulmonic valve: There was moderate regurgitation.       Principal Problem:    Acute respiratory failure with hypoxia ([address] (5/24/2018)    Active Problems:    Coronary atherosclerosis of native coronary vessel (9/29/2017)      Mixed hyperlipidemia (9/28/2016)      TAZ (obstructive sleep apnea) (10/2/2017)      Overview: Intolerant of CPAP      Type 2 diabetes with nephropathy ([address] (10/8/2018)      Pulmonary hypertension ([address] (10/20/2018)      Acute on chronic combined systolic and diastolic CHF (congestive heart failure) ([address] (11/26/2018)      Atrial fibrillation ([address] (11/27/2018)      Tobacco abuse (1/22/2019)      Lactic acidosis (2/20/2019)      ESRD (end stage renal disease) on dialysis ([address] (3/2/2019)      Acute pulmonary edema ([address] (3/2/2019)      Hemodialysis-associated hypotension (3/2/2019)      Syncope (3/2/2019)      Thrombocytopenia ([address] (3/2/2019)      Hematuria (3/2/2019)      Anemia due to chronic kidney disease (3/2/2019)        Assessment:     1. Acute Kidney Injury on top of Chronic Kidney Disease stage 3 -  - mainly due to cardio-renal syndrome  - marked hemodynamic instability with inadequate response to medical therapy and inability to tolerate dialysis despite Midodrine  - responded to medical therapy including addition of Digoxin  and increasing Midodrine  - his wife understands that this is not likely to succeed to get him stabilize for dialysis in the next days and that this is likely and end of life situation  - further improvement with medical management is indicated by improvement in renal function indices  - no dialysis needed  - renal function indices gradually improving  - adequate response to PO diuresis     2. Acute on chronic Combined systolic and diastolic heart failure decompensation  - responding to intensified diuresis and medical therapy as above      3. A fib with RVR   - on no antihypertensive  - clinically responding to Digoxin     4. UTI  -  Treated with ceftriaxone     5. Respiratory failure -  - on oxygen therapy per primary team  - improving and tolerating nasal cannula     6. Urinary retention  - S/P [facility]z with urology involved     7. Hypermagnesemia -  - hoping for consistently improved hemodynamics with decreasing Magnesium level  - avoid magnesium containing meds, Magnesium citrate discontinued    8.  Anemia -  - adequate control    9. Acid/base -  - bicarbonate level is WNL    10. Hypokalemia -  - off Fludrocortisone  - monitor response to replacement    11.  Hypocalcemia -  - mostly due to hypoalbuminemia  - on added Vitamin D    Plan:     As above    Ronny Lazar M.D.

## 2019-03-11 NOTE — PROGRESS NOTES
3/11/2019  OT NOTE: Attempted to see pt for OT treatment today, however nurse suggests holding due to elevated ammonia levels. Will re-attempt as time permits.    Thank you,  Sergio Steele, OT

## 2019-03-11 NOTE — PROGRESS NOTES
Interdisciplinary Rounds completed 03/11/19. Nursing, Case Management, Physician and PT present. Plan of care reviewed and updated. Needs closer follow  Up than rehab can provide. Waiting on [facility].

## 2019-03-11 NOTE — PROGRESS NOTES
RENAL PROGRESS NOTE    Subjective:     Cc - I was asked to see patient for second opinion    Patient is a 67 y/o AAM with Acute Kidney Injury on top of Chronic Kidney Disease stage 3 had attempts at dialysis x three days, but each time needed to be taken off dialysis due to hemodynamic instability. He also has CHF, A. fib, obstructive sleep apnea who was admitted from a rehab facility on 2/19 for acute on chronic CHF with worsening bilateral LE swelling, hypoxic respiratory failure and hematuria. He did not respond to medical management and a tunneled cath placed 2/25 and HD started. He could not tolerate his session due to hypotension even after midodrine treatment. [facility] recommended hospice care on 3/1/19 so the wife requested a second opinion. The patient is uncomfortable due to dyspnea despite O2 by NC and is thirsty. His wife is at bedside. I discussed with patient and wife that dialysis is a good treatment for kidney failure, but that his main problem is heart failure and that heart transplant is a treatment for heart failure, however, he is not a candidate for it due to co-morbid conditions and advanced age. I  Discussed that in order to attempt dialysis again he would need to improve hemodynamic stability and respond favorably to medication changes, but that the likelihood of success was not good as he has not responded well to medical therapy so far.     3/3/19 - feels and breathes better - in better spirit - no longer in respiratory distress - edema improving   3/4/19 - gradually progressing - no labs available yet - will continue to try medical therapy  3/5/19 - alert and communicating well this am, but wife states she has noted some confusion - no new tremor, no overt uremic symptoms - respiratory status improving  3/6/19 - feels and looks better - tolerates RA oxygen - edema improving - fair urine output - will try to switch to PO diuresis with Torsemide and Metolazone - discussed with  Cyril and RN and wife  3/7/19 - he continues to breath better - edema improving - adequate urine output on Torsemide and Metolazone PO - for possible [facility] rehab placement  3/8/19 - stable on breathing better - awaiting transfer to [facility]  3/9/19 - comfortable - adequate urine output - no uremic symptoms   3/10/19- pt resting in bed, moaning; family at bedside inquiring about attemtping dialysis again, education given r/t gradual improvement in renal function; no uremic symptoms        Past Medical History:   Diagnosis Date    Acute CHF (congestive heart failure) ([facility] [address] 4/25/2015    Acute combined systolic and diastolic congestive heart failure ([facility] [address] 4/28/2015    De Quervain's tenosynovitis, right 4/28/2015    Dyspnea on exertion 6/28/2015    Elevated serum creatinine 4/25/2015    Elevated troponin 6/28/2015    History of coronary artery disease     MI at 25 yo    History of right knee surgery     cartilage removal    History of shingles     Malignant hypertension 4/25/2015    MI (myocardial infarction) [facility])       Past Surgical History:   Procedure Laterality Date    HX HEART CATHETERIZATION  6/29/2015    no intervention    HX KNEE ARTHROSCOPY Right     removal of cartilage    IR INSERT TUNL CVC W/O PORT OVER 5 YR  2/25/2019      Prior to Admission medications    Medication Sig Start Date End Date Taking? Authorizing Provider   omeprazole (PRILOSEC) 20 mg capsule Take 1 Cap by mouth daily. 1/24/19   Carlin Lewis MD   potassium chloride (K-DUR, KLOR-CON) 20 mEq tablet Take 1 Tab by mouth daily.  1/23/19   Prema Bro MD   Blood-Glucose Meter (ACCU-CHEK YOSEF PLUS METER) misc USE TO CHECK BLOOD SUGARS DAILY DX: E11.9 12/27/18   Carlin Lewis MD   Blood Glucose Control High&Low (ACCU-CHEK YOSEF CONTROL SOLN) soln USE AS DIRECTED 12/27/18   Carlin Lewis MD   glucose blood VI test strips (ACCU-CHEK YOSEF PLUS TEST STRP) strip USE TO CHECK BLOOD SUGARS DAILY DX: E11.9 12/27/18   Dell Almonte Deondre Vora MD   alcohol swabs (BD SINGLE USE SWABS REGULAR) padm USE AS DIRECTED DAILY DX. E11.9 12/27/18   Tameka Ames MD   lancets (ACCU-CHEK SOFTCLIX LANCETS) misc USE TO CHECK BLOOD SUGARS DAILY DX: E11.9 12/27/18   Tameka Ames MD   carvedilol (COREG) 6.25 mg tablet Take 1 Tab by mouth two (2) times daily (with meals). 12/3/18   Tr Monson MD   apixaban (ELIQUIS) 5 mg tablet Take 1 Tab by mouth every twelve (12) hours. 12/3/18   Tr Monson MD   aspirin 81 mg chewable tablet Take 1 Tab by mouth daily. 11/5/18   Dottie Marie MD   atorvastatin (LIPITOR) 20 mg tablet Take 1 Tab by mouth nightly. 10/4/18   Tameka Ames MD   allopurinol (ZYLOPRIM) 100 mg tablet TAKE 1 TABLET BY MOUTH EVERY DAY 9/25/18   Tameka Ames MD   fluticasone [facility]) 50 mcg/actuation nasal spray 2 Sprays by Both Nostrils route daily. 3/29/18   Clarissa Mata MD   melatonin 3 mg tablet Take 3 mg by mouth nightly. Provider, Historical   albuterol (VENTOLIN HFA) 90 mcg/actuation inhaler Take 2 Puffs by inhalation four (4) times daily. 2/6/18   Luanne Caballero NP   polyethylene glycol (MIRALAX) 17 gram packet Take 1 Packet by mouth daily. Patient taking differently: Take 17 g by mouth daily as needed. 1/13/18   Julissa MERLOS NP   albuterol-ipratropium (DUO-NEB) 2.5 mg-0.5 mg/3 ml nebu 3 mL by Nebulization route four (4) times daily.  Diaignosis--J44.9 12/13/17   Luanne Caballero NP     Allergies   Allergen Reactions    Ativan [Lorazepam] Other (comments)     Confusion- mild but long lasting- days    Pcn [Penicillins] Other (comments)     \"makes my heart stop\"      Social History     Tobacco Use    Smoking status: Former Smoker     Packs/day: 0.25     Years: 20.00     Pack years: 5.00     Types: Cigarettes     Last attempt to quit: 4/5/2015     Years since quitting: 3.9    Smokeless tobacco: Never Used   Substance Use Topics    Alcohol use: No     Alcohol/week: 0.0 oz      Family History   Problem Relation Age of Onset    Hypertension Mother     No Known Problems Father           Review of Systems    Constitutional: no fever, rested better   Eyes: fair vision,    Ears, nose, mouth, throat, and face:fair hearing,    Respiratory: no asthma,    Cardiovascular:no chest pain,    Gastrointestinal:no diarrhea,    Genitourinary: he has had hematuria and dysuria,   Hematologic/lymphatic: no bleeding tendency,    Neurological: no seizures,    Behvioral/Psych: no psych hospitalization    Endocrine: no goiter,       Objective:       Visit Vitals  /72 (BP 1 Location: Left arm, BP Patient Position: At rest)   Pulse 75   Temp 98.1 °F (36.7 °C)   Resp 18   Ht 5' 10\" (1.778 m)   Wt 104 kg (229 lb 4.8 oz)   SpO2 100%   BMI 32.90 kg/m²       No intake/output data recorded. 03/09 0701 - 03/10 1900  In: 840 [P.O.:840]  Out: 5150 [Urine:5150]    General:  Alert, cooperative, no respiratory distress on NC, appears stated age. Head:  Normocephalic, without obvious abnormality, atraumatic. Eyes:  Conjunctivae/corneas clear. EOMs intact. Throat: Lips, mucosa, and tongue normal. Teeth and gums normal.   Neck: Supple, symmetrical, trachea midline, no adenopathy, pos for JVD. Lungs:   Clear to auscultation bilaterally. Heart:  Regular rate and rhythm, S1, S2 normal, no  rub. Abdomen:   Soft, non-tender. Bowel sounds normal. No masses,  No organomegaly. No renal bruit. Abdominal wall is bulging and ascites is present   Extremities: Extremities normal, atraumatic, no cyanosis, 3+ edema, improving. Skin: Skin color, texture, turgor normal. No rashes or lesions. Neurologic: Grossly intact. No asterixis. Data Review:   Results for Nadine Smalls (MRN [id number]) as of 3/10/2019 20:03   Ref.  Range 3/4/2019 06:42 3/5/2019 05:58 3/6/2019 05:40 3/7/2019 06:16 3/8/2019 05:16 3/10/2019 09:03   Sodium Latest Ref Range: 136 - 145 mmol/L 136 137 137 139 139 140   Potassium Latest Ref Range: 3.5 - 5.1 mmol/L 3.1 (L) 2.8 (LL) 3.2 (L) 3.3 (L) 3.1 (L) 3.3 (L)   Chloride Latest Ref Range: 98 - 107 mmol/L 98 99 101 101 100 99   CO2 Latest Ref Range: 21 - 32 mmol/L 28 25 24 25 26 32   Anion gap Latest Ref Range: 7 - 16 mmol/L 10 13 12 13 13 9   Glucose Latest Ref Range: 65 - 100 mg/dL 90 85 97 92 101 (H) 92   BUN Latest Ref Range: 8 - 23 MG/DL 72 (H) 77 (H) 79 (H) 79 (H) 81 (H) 82 (H)   Creatinine Latest Ref Range: 0.8 - 1.5 MG/DL 5.87 (H) 5.81 (H) 5.64 (H) 5.34 (H) 5.06 (H) 4.02 (H)   Calcium Latest Ref Range: 8.3 - 10.4 MG/DL 8.0 (L) 8.0 (L) 8.0 (L) 8.2 (L) 8.6 9.0   Phosphorus Latest Ref Range: 2.3 - 3.7 MG/DL 5.3 (H) 5.6 (H) 5.4 (H) 5.3 (H) 5.3 (H) 4.6 (H)   GFR est non-AA Latest Ref Range: >60 ml/min/1.73m2 10 (L) 10 (L) 10 (L) 11 (L) 12 (L) 16 (L)   GFR est AA Latest Ref Range: >60 ml/min/1.73m2 12 (L) 12 (L) 13 (L) 14 (L) 14 (L) 19 (L)   Albumin Latest Ref Range: 3.2 - 4.6 g/dL 2.6 (L) 2.7 (L) 2.5 (L) 2.6 (L) 2.6 (L) 2.7 (L)           Recent Results (from the past 24 hour(s))   RENAL FUNCTION PANEL    Collection Time: 03/10/19  9:03 AM   Result Value Ref Range    Sodium 140 136 - 145 mmol/L    Potassium 3.3 (L) 3.5 - 5.1 mmol/L    Chloride 99 98 - 107 mmol/L    CO2 32 21 - 32 mmol/L    Anion gap 9 7 - 16 mmol/L    Glucose 92 65 - 100 mg/dL    BUN 82 (H) 8 - 23 MG/DL    Creatinine 4.02 (H) 0.8 - 1.5 MG/DL    GFR est AA 19 (L) >60 ml/min/1.73m2    GFR est non-AA 16 (L) >60 ml/min/1.73m2    Calcium 9.0 8.3 - 10.4 MG/DL    Phosphorus 4.6 (H) 2.3 - 3.7 MG/DL    Albumin 2.7 (L) 3.2 - 4.6 g/dL   CBC W/O DIFF    Collection Time: 03/10/19  9:03 AM   Result Value Ref Range    WBC 7.0 4.3 - 11.1 K/uL    RBC 4.57 4.23 - 5.6 M/uL    HGB 12.4 (L) 13.6 - 17.2 g/dL    HCT 37.5 (L) 41.1 - 50.3 %    MCV 82.1 79.6 - 97.8 FL    MCH 27.1 26.1 - 32.9 PG    MCHC 33.1 31.4 - 35.0 g/dL    RDW 20.3 (H) 11.9 - 14.6 %    PLATELET 645 711 - 030 K/uL    MPV 11.4 9.4 - 12.3 FL    ABSOLUTE NRBC 0.00 0.0 - 0.2 K/uL       CXR shows massive cardiomegaly and fluid excess      ECHO 2/21/19 -  SUMMARY:  -  Left ventricle: Systolic function was markedly reduced. Ejection fraction  was estimated in the range of 25 % to 30 %. There was severe diffuse  hypokinesis with distinct regional wall motion abnormalities. There was   severeglobal hypokinesis. There was severe concentric hypertrophy. The myocardial  specular pattern appeared moderately abnormal. Consider infiltrative  cardiomyopathy if clinically indicated. -  Right ventricle: The ventricle was mildly dilated. Systolic function was  moderately to markedly reduced. There was mild pulmonary artery hypertension.  -  Left atrium: The atrium was moderately to markedly dilated. -  Right atrium: The atrium was markedly dilated. -  Inferior vena cava, hepatic veins: The inferior vena cava was moderately  dilated. The respirophasic change in diameter was less than 50%. -  Aortic valve: The valve was trileaflet. Leaflets exhibited moderate  sclerosis. The left coronary cusp demonstrated immobility. There was mild  regurgitation. -  Mitral valve: There was mild to moderate regurgitation. -  Tricuspid valve: There was mild to moderate regurgitation. -  Pulmonic valve: There was moderate regurgitation.       Principal Problem:    Acute respiratory failure with hypoxia ([address] (5/24/2018)    Active Problems:    Coronary atherosclerosis of native coronary vessel (9/29/2017)      Mixed hyperlipidemia (9/28/2016)      TAZ (obstructive sleep apnea) (10/2/2017)      Overview: Intolerant of CPAP      Type 2 diabetes with nephropathy ([address] (10/8/2018)      Pulmonary hypertension ([address] (10/20/2018)      Acute on chronic combined systolic and diastolic CHF (congestive heart failure) ([address] (11/26/2018)      Atrial fibrillation ([address] (11/27/2018)      Tobacco abuse (1/22/2019)      Lactic acidosis (2/20/2019)      ESRD (end stage renal disease) on dialysis ([address] (3/2/2019)      Acute pulmonary edema ([address] (3/2/2019) Hemodialysis-associated hypotension (3/2/2019)      Syncope (3/2/2019)      Thrombocytopenia ([facility] [address] (3/2/2019)      Hematuria (3/2/2019)      Anemia due to chronic kidney disease (3/2/2019)        Assessment:     1. Acute Kidney Injury on top of Chronic Kidney Disease stage 3 -  - mainly due to cardio-renal syndrome  - marked hemodynamic instability with inadequate response to medical therapy and inability to tolerate dialysis despite Midodrine  - responded to medical therapy including addition of Digoxin  and increasing Midodrine  - his wife understands that this is not likely to succeed to get him stabilize for dialysis in the next days and that this is likely and end of life situation  - further improvement with medical management appears to be present  - no dialysis needed  - renal function indices gradually improving  - adequate response to PO diuresis     2. Acute on chronic Combined systolic and diastolic heart failure decompensation  - responding to intensified diuresis and medical therapy as above      3. A fib with RVR   - on no antihypertensive  - clinically responding to Digoxin     4. UTI  -  Treated with ceftriaxone     5. Respiratory failure -  - on oxygen therapy per primary team  - improving and tolerating nasal cannula     6. Urinary retention  - S/P Schulz with urology involved     7. Hypermagnesemia -  - hoping for consistently improved hemodynamics with decreasing Magnesium level  - avoid magnesium containing meds, Magnesium citrate discontinued    8. Anemia -  - adequate control    9. Acid/base -  - bicarbonate level is WNL    10. Hypokalemia -  - off Fludrocortisone  - monitor response to replacement    11.  Hypocalcemia -  - mostly due to hypoalbuminemia  - on added Vitamin D    Plan:     As above    Tiffanie Guillaume NP

## 2019-03-11 NOTE — PROGRESS NOTES
PT NOTE: Attempted to see pt for therapy treatment today, however nurse suggests holding due to elevated ammonia levels. Will re-attempt as time permits.    Thank you,  Tracy Mukherjee, JUANT

## 2019-03-11 NOTE — PROGRESS NOTES
Hospitalist Progress Note     Admit Date:  2019 11:19 AM   Name:  King Cartagena. Age:  66 y.o.  :  1945   MRN:  [id number]   PCP:  Frank Borrero MD  Treatment Team: Attending Provider: Nory Hooper DO; Consulting Provider: Jose Luis Guajardo MD; Utilization Review: Earle Euceda RN; Consulting Provider: Shonda Adams MD; Care Manager: Abram Smith RN; Physical Therapist: Samia Sotomayor DPT; Occupational Therapist: Libby Fischer OT    Subjective:     From previous notes: \"66year-old gentleman with multiple medical problems including CHF, A. fib, CKD stage IV,, chronic COPD, obstructive sleep apnea who is noncompliant with CPAP, who is currently in a rehab facility, admitted on  for acute on chronic CHF in view of worsening bilateral LE swelling, hypoxic respiratory failure and hematuria. was brought into the emergency room with complaints of hematuria, leg swellings and shortness of breath. He was also having shortness of breath, moderate in severity, progressively getting worse, worse with minimal exertion, not resolved with rest.  Tunneled cath placed  and HD started but could not tolerate his session due to hypotension. Midodrine was restarted and he has remained intolerated of HD due to BP issues. \" [facility] recommended hospice care on 3/1/19 so the wife requested a second opinion.     3/11/19:   Renal fxn continues to improve. More confused and ammonia level 72. Liver panel, coag and acute hepatitis panel ordered. Suspect cirrhosis secondary to chronic passive congestion liver given history of cardiorenal with low lvef. Itching likely multifactorial and on mso4 for pain. No known workup for liver disease and pt was not expected to survive. Tolerated hd poorly and fortunately has responded to loop diuresis with metolazone priming. Lytes ok. Awaiting LTACH placement as needs close monitoring and medication adjustment daily.  Could not tolerate syst dysfxn bp meds due to hypotension prior. Today creat 3.81 and azotemia persists. Objective:     Patient Vitals for the past 24 hrs:   Temp Pulse Resp BP SpO2   03/11/19 1036 98.7 °F (37.1 °C) 86 20 121/79 100 %   03/11/19 0725 98.4 °F (36.9 °C) 72 22 91/56 99 %   03/11/19 0515 99.5 °F (37.5 °C) 85 20 128/89 95 %   03/10/19 2352 98.3 °F (36.8 °C) 96 18 97/53 100 %   03/1945 98.1 °F (36.7 °C) 75 18 106/72 100 %   03/10/19 1753  73  119/75      Oxygen Therapy  O2 Sat (%): 100 % (03/11/19 1036)  Pulse via Oximetry: 70 beats per minute (03/04/19 0831)  O2 Device: Nasal cannula (03/10/19 0915)  O2 Flow Rate (L/min): 4 l/min (03/10/19 0915)  ETCO2 (mmHg): 21 mmHg (02/25/19 1347)    Intake/Output Summary (Last 24 hours) at 3/11/2019 1333  Last data filed at 3/11/2019 0514  Gross per 24 hour   Intake 360 ml   Output 2100 ml   Net -1740 ml         Physical Examination:  Physical Exam   Constitutional:   Encephalopathic (serum ammonia 72)   HENT:   Nose: Nose normal.   Mouth/Throat: No oropharyngeal exudate. Eyes: Right eye exhibits no discharge. Left eye exhibits no discharge. No scleral icterus. Neck: Neck supple. No tracheal deviation present. Cardiovascular: Regular rhythm. Exam reveals no friction rub. Pulmonary/Chest: No respiratory distress. He has no rales. Abdominal: There is no tenderness. There is no rebound. Musculoskeletal: He exhibits edema. He exhibits no deformity. Neurological: He exhibits normal muscle tone. Encephalopathy no focal motor weakness   Skin: Skin is dry. No rash noted. He is not diaphoretic. Data Review:  I have reviewed all labs, meds, telemetry events, and studies from the last 24 hours.     Recent Results (from the past 24 hour(s))   RENAL FUNCTION PANEL    Collection Time: 03/11/19  6:01 AM   Result Value Ref Range    Sodium 142 136 - 145 mmol/L    Potassium 3.9 3.5 - 5.1 mmol/L    Chloride 101 98 - 107 mmol/L    CO2 32 21 - 32 mmol/L Anion gap 9 7 - 16 mmol/L    Glucose 94 65 - 100 mg/dL    BUN 83 (H) 8 - 23 MG/DL    Creatinine 3.81 (H) 0.8 - 1.5 MG/DL    GFR est AA 20 (L) >60 ml/min/1.73m2    GFR est non-AA 16 (L) >60 ml/min/1.73m2    Calcium 9.3 8.3 - 10.4 MG/DL    Phosphorus 4.4 (H) 2.3 - 3.7 MG/DL    Albumin 2.7 (L) 3.2 - 4.6 g/dL   CBC W/O DIFF    Collection Time: 03/11/19  6:01 AM   Result Value Ref Range    WBC 7.2 4.3 - 11.1 K/uL    RBC 4.52 4.23 - 5.6 M/uL    HGB 12.3 (L) 13.6 - 17.2 g/dL    HCT 37.3 (L) 41.1 - 50.3 %    MCV 82.5 79.6 - 97.8 FL    MCH 27.2 26.1 - 32.9 PG    MCHC 33.0 31.4 - 35.0 g/dL    RDW 20.6 (H) 11.9 - 14.6 %    PLATELET 713 (L) 444 - 450 K/uL    MPV 10.5 9.4 - 12.3 FL    ABSOLUTE NRBC 0.00 0.0 - 0.2 K/uL   AMMONIA    Collection Time: 03/11/19  6:01 AM   Result Value Ref Range    Ammonia 72 (H) 11 - 32 UMOL/L   MAGNESIUM    Collection Time: 03/11/19  6:01 AM   Result Value Ref Range    Magnesium 2.2 1.8 - 2.4 mg/dL   HEPATIC FUNCTION PANEL    Collection Time: 03/11/19 10:00 AM   Result Value Ref Range    Protein, total 7.0 6.3 - 8.2 g/dL    Albumin 2.8 (L) 3.2 - 4.6 g/dL    Globulin 4.2 (H) 2.3 - 3.5 g/dL    A-G Ratio 0.7 (L) 1.2 - 3.5      Bilirubin, total 1.7 (H) 0.2 - 1.1 MG/DL    Bilirubin, direct 0.9 (H) <0.4 MG/DL    Alk.  phosphatase 101 50 - 136 U/L    AST (SGOT) 28 15 - 37 U/L    ALT (SGPT) 16 12 - 65 U/L   PROTHROMBIN TIME + INR    Collection Time: 03/11/19 10:00 AM   Result Value Ref Range    Prothrombin time 22.4 (H) 11.7 - 14.5 sec    INR 2.0          All Micro Results     Procedure Component Value Units Date/Time    CULTURE, BLOOD [[id number]] Collected:  02/22/19 1740    Order Status:  Completed Specimen:  Blood Updated:  02/27/19 0837     Special Requests: --        RIGHT  HAND       Culture result: NO GROWTH 5 DAYS       CULTURE, BLOOD [[id number]] Collected:  02/22/19 1753    Order Status:  Completed Specimen:  Blood Updated:  02/27/19 0837     Special Requests: --        RIGHT  FOREARM       Culture result: NO GROWTH 5 DAYS       CULTURE, BLOOD [[id number]] Collected:  02/19/19 1140    Order Status:  Completed Specimen:  Blood Updated:  02/24/19 0654     Special Requests: --        RIGHT  Antecubital       Culture result: NO GROWTH 5 DAYS       CULTURE, BLOOD [[id number]]  (Abnormal) Collected:  02/19/19 1252    Order Status:  Completed Specimen:  Blood Updated:  02/23/19 0747     Special Requests: --        RIGHT  HAND       GRAM STAIN       GRAM POSITIVE COCCI IN CHAINS                  AEROBIC AND ANAEROBIC BOTTLES                  RESULTS VERIFIED, PHONED TO AND READ BACK BY EAMON Jyoti Powers @4670 02/20/2019 [facility]           Culture result:       STAPHYLOCOCCUS SPECIES, COAGULASE NEGATIVE                  ALPHA STREPTOCOCCUS, NOT S. PNEUMONIAE                  THIS ORGANISM MAY BE INDICATIVE OF CULTURE CONTAMINATION, HOWEVER, CLINICAL CORRELATION NEEDS TO BE EVALUATED, AS EACH CASE IS UNIQUE.           CULTURE, URINE [[id number]]  (Abnormal)  (Susceptibility) Collected:  02/19/19 1220    Order Status:  Completed Specimen:  Cath Urine Updated:  02/22/19 0645     Special Requests: NO SPECIAL REQUESTS        Culture result:       1000 COLONIES/mL STAPHYLOCOCCUS HAEMOLYTICUS                Current Meds:  Current Facility-Administered Medications   Medication Dose Route Frequency    lactulose (CHRONULAC) solution 20 g  30 mL Oral TID    potassium chloride (K-DUR, KLOR-CON) SR tablet 40 mEq  40 mEq Oral Q12H    polyethylene glycol (MIRALAX) packet 17 g  17 g Oral TID PRN    torsemide (DEMADEX) tablet 100 mg  100 mg Oral BID    cholecalciferol (VITAMIN D3) tablet 2,000 Units  2,000 Units Oral DAILY    B complex-vitamin C-folic acid (NEPHRO-JANEL) 0.8 mg tab  1 Tab Oral DAILY    nystatin (MYCOSTATIN) 100,000 unit/gram powder   Topical BID    metOLazone (ZAROXOLYN) tablet 10 mg  10 mg Oral BID    midodrine (PROAMITINE) tablet 15 mg  15 mg Oral TID WITH MEALS    digoxin (LANOXIN) tablet 0.125 mg  0.125 mg Oral Q MON, WED & FRI    mineral oil-hydrophil petrolat (AQUAPHOR) ointment   Topical PRN    apixaban (ELIQUIS) tablet 5 mg  5 mg Oral BID    lidocaine (XYLOCAINE) 20 mg/mL (2 %) injection  mg  1-20 mL IntraDERMal Multiple    albuterol-ipratropium (DUO-NEB) 2.5 MG-0.5 MG/3 ML  3 mL Nebulization Q4H PRN    fluticasone (FLONASE) 50 mcg/actuation nasal spray 2 Spray  2 Spray Both Nostrils DAILY    pantoprazole (PROTONIX) tablet 40 mg  40 mg Oral ACB    sodium chloride (NS) flush 5-40 mL  5-40 mL IntraVENous Q8H    sodium chloride (NS) flush 5-40 mL  5-40 mL IntraVENous PRN    acetaminophen (TYLENOL) tablet 650 mg  650 mg Oral Q4H PRN    HYDROcodone-acetaminophen (NORCO) 5-325 mg per tablet 1 Tab  1 Tab Oral Q4H PRN    morphine injection 2 mg  2 mg IntraVENous Q4H PRN    naloxone (NARCAN) injection 0.4 mg  0.4 mg IntraVENous PRN    diphenhydrAMINE (BENADRYL) capsule 25 mg  25 mg Oral Q4H PRN    ondansetron (ZOFRAN) injection 4 mg  4 mg IntraVENous Q4H PRN    finasteride (PROSCAR) tablet 5 mg  5 mg Oral DAILY       Diet:  DIET REGULAR  DIET NUTRITIONAL SUPPLEMENTS    Other Studies (last 24 hours):  No results found.     Assessment and Plan:     Hospital Problems as of 3/11/2019 Date Reviewed: 12/11/2018          Codes Class Noted - Resolved POA    Liver failure ([facility] ICD-10-CM: K72.90  ICD-9-CM: 572.8  3/11/2019 - Present Unknown        ESRD (end stage renal disease) on dialysis [facility]) ICD-10-CM: N18.6, Z99.2  ICD-9-CM: 585.6, V45.11  3/2/2019 - Present Yes        Acute pulmonary edema ([facility] ICD-10-CM: J81.0  ICD-9-CM: 518.4  3/2/2019 - Present Yes        Hemodialysis-associated hypotension ICD-10-CM: I95.3  ICD-9-CM: 458.21  3/2/2019 - Present Yes        Syncope ICD-10-CM: R55  ICD-9-CM: 780.2  3/2/2019 - Present No        Thrombocytopenia (HCC) ICD-10-CM: D69.6  ICD-9-CM: 287.5  3/2/2019 - Present Yes        Hematuria ICD-10-CM: R31.9  ICD-9-CM: 599.70  3/2/2019 - Present Yes        Anemia due to chronic kidney disease ICD-10-CM: N18.9, D63.1  ICD-9-CM: 285.21  3/2/2019 - Present Yes        CKD (chronic kidney disease) stage 4, GFR 15-29 ml/min (HCC) ICD-10-CM: N18.4  ICD-9-CM: 585.4  2/20/2019 - Present         Lactic acidosis ICD-10-CM: E87.2  ICD-9-CM: 276.2  2/20/2019 - Present Yes        Tobacco abuse ICD-10-CM: Z72.0  ICD-9-CM: 305.1  1/22/2019 - Present Yes        Atrial fibrillation ([facility] ICD-10-CM: I48.91  ICD-9-CM: 427.31  11/27/2018 - Present Yes        Acute on chronic combined systolic and diastolic CHF (congestive heart failure) ([facility] ICD-10-CM: I50.43  ICD-9-CM: 428.43, 428.0  11/26/2018 - Present Yes        Pulmonary hypertension (HCC) (Chronic) ICD-10-CM: I27.20  ICD-9-CM: 416.8  10/20/2018 - Present Yes        Type 2 diabetes with nephropathy ([facility] ICD-10-CM: E11.21  ICD-9-CM: 250.40, 583.81  10/8/2018 - Present Yes        * (Principal) Acute respiratory failure with hypoxia ([facility] ICD-10-CM: J96.01  ICD-9-CM: 518.81  5/24/2018 - Present Yes        TAZ (obstructive sleep apnea) ICD-10-CM: G47.33  ICD-9-CM: 327.23  10/2/2017 - Present Yes    Overview Signed 12/11/2018  1:24 PM by Wilbert Perdomo NP     Intolerant of CPAP             CKD (chronic kidney disease) stage 3, GFR 30-59 ml/min (HCC) (Chronic) ICD-10-CM: N18.3  ICD-9-CM: 585.3  9/29/2017 - Present         Coronary atherosclerosis of native coronary vessel (Chronic) ICD-10-CM: I25.10  ICD-9-CM: 414.01  9/29/2017 - Present Yes        Hypertension (Chronic) ICD-10-CM: I10  ICD-9-CM: 401.9  9/29/2017 - Present         CHF (congestive heart failure) (HCC) ICD-10-CM: I50.9  ICD-9-CM: 428.0  9/27/2017 - Present         Mixed hyperlipidemia (Chronic) ICD-10-CM: Y36.7  ICD-9-CM: 272.2  9/28/2016 - Present Yes              A/P:    Acute respiratory failure with hypoxia ([facility] (5/24/2018)-improved  - Due to acute pulmonary edema--resolved/resolving  - cardiorenal syndrome-- prognosis felt to be grim--second opinion nephrology and pt now improving with aggressive diuresis loop and metolazone prime  Creat improved from 5--3/9/19 now 3.8    Acute hepatic encephalopathy--   Suspect liver failure secondary to \"cardiac cirrhosis\"    Coag INR, liver panel , acute hepatitis panel   Lactulose tid follow ammonia level      Hypokalemia -                              better today -- has been difficult to keep at goal with current diuresis regimine         chronic combined systolic and diastolic CHF (congestive heart failure) ([facility] (11/26/2018)  - EF 25%-30%  Consider coreg if bp improves/remains stable--this remains unchanged       ESRD (end stage renal disease) on dialysis ([address] (3/2/2019)  - New start HD --> did not tolerate as he had hypotension and syncope during first 2 attempts  Responding to  aggressive diuretic program         Anemia due to chronic kidney disease (3/2/2019)     Hx cad       Type 2 diabetes with nephropathy ([address] (10/8/2018)       Pulmonary hypertension ([facility] (10/20/2018)       Atrial fibrillation ([facility] (11/27/2018)  -  periods of paroxysmal afib  - Continue Eliquis cva prophylaxis          Mixed hyperlipidemia (9/28/2016)       TAZ (obstructive sleep apnea) (10/2/2017)  - Non-compliant with CPAP--intolerant per wife       Tobacco abuse (1/22/2019)     Dispo - .  [facility] when [facility] available/ approved     DIET REGULAR 2 GM NA (House Low NA)  DIET NUTRITIONAL SUPPLEMENTS All Meals; Nepro     DVT Prophylaxis: Eliquis

## 2019-03-11 NOTE — PROGRESS NOTES
RENAL PROGRESS NOTE    Subjective:     Cc - I was asked to see patient for second opinion    Patient is a 67 y/o AAM with Acute Kidney Injury on top of Chronic Kidney Disease stage 3 had attempts at dialysis x three days, but each time needed to be taken off dialysis due to hemodynamic instability. He also has CHF, A. fib, obstructive sleep apnea who was admitted from a rehab facility on 2/19 for acute on chronic CHF with worsening bilateral LE swelling, hypoxic respiratory failure and hematuria. He did not respond to medical management and a tunneled cath placed 2/25 and HD started. He could not tolerate his session due to hypotension even after midodrine treatment. [facility] recommended hospice care on 3/1/19 so the wife requested a second opinion. The patient is uncomfortable due to dyspnea despite O2 by NC and is thirsty. His wife is at bedside. I discussed with patient and wife that dialysis is a good treatment for kidney failure, but that his main problem is heart failure and that heart transplant is a treatment for heart failure, however, he is not a candidate for it due to co-morbid conditions and advanced age. I  Discussed that in order to attempt dialysis again he would need to improve hemodynamic stability and respond favorably to medication changes, but that the likelihood of success was not good as he has not responded well to medical therapy so far.     3/3/19 - feels and breathes better - in better spirit - no longer in respiratory distress - edema improving   3/4/19 - gradually progressing - no labs available yet - will continue to try medical therapy  3/5/19 - alert and communicating well this am, but wife states she has noted some confusion - no new tremor, no overt uremic symptoms - respiratory status improving  3/6/19 - feels and looks better - tolerates RA oxygen - edema improving - fair urine output - will try to switch to PO diuresis with Torsemide and Metolazone - discussed with  Cyril and RN and wife  3/7/19 - he continues to breath better - edema improving - adequate urine output on Torsemide and Metolazone PO - for possible [facility] rehab placement  3/8/19 - stable on breathing better - awaiting transfer to [facility]  3/9/19 - comfortable - adequate urine output - no uremic symptoms   3/10/19 - some confusion - no dyspnea, no CP, no N/V - he is restless and has some hematuria from [facility] trauma - communicates some - no uremia         Past Medical History:   Diagnosis Date    Acute CHF (congestive heart failure) ([facility] [address] 4/25/2015    Acute combined systolic and diastolic congestive heart failure ([facility] [address] 4/28/2015    De Quervain's tenosynovitis, right 4/28/2015    Dyspnea on exertion 6/28/2015    Elevated serum creatinine 4/25/2015    Elevated troponin 6/28/2015    History of coronary artery disease     MI at 25 yo    History of right knee surgery     cartilage removal    History of shingles     Malignant hypertension 4/25/2015    MI (myocardial infarction) [facility])       Past Surgical History:   Procedure Laterality Date    HX HEART CATHETERIZATION  6/29/2015    no intervention    HX KNEE ARTHROSCOPY Right     removal of cartilage    IR INSERT TUNL CVC W/O PORT OVER 5 YR  2/25/2019      Prior to Admission medications    Medication Sig Start Date End Date Taking? Authorizing Provider   omeprazole (PRILOSEC) 20 mg capsule Take 1 Cap by mouth daily. 1/24/19   Lorraine Ulrich MD   potassium chloride (K-DUR, KLOR-CON) 20 mEq tablet Take 1 Tab by mouth daily.  1/23/19   Rosy Rose MD   Blood-Glucose Meter (ACCU-CHEK YOSEF PLUS METER) misc USE TO CHECK BLOOD SUGARS DAILY DX: E11.9 12/27/18   Lorraine Ulrich MD   Blood Glucose Control High&Low (ACCU-CHEK YOSEF CONTROL SOLN) soln USE AS DIRECTED 12/27/18   Lorraine Ulrich MD   glucose blood VI test strips (ACCU-CHEK YOSEF PLUS TEST STRP) strip USE TO CHECK BLOOD SUGARS DAILY DX: E11.9 12/27/18   Lorraine Ulrich MD   alcohol swabs (BD SINGLE USE SWABS REGULAR) padm USE AS DIRECTED DAILY DX. E11.9 12/27/18   Merline Carlson MD   lancets (ACCU-CHEK SOFTCLIX LANCETS) misc USE TO CHECK BLOOD SUGARS DAILY DX: E11.9 12/27/18   Merline Carlson MD   carvedilol (COREG) 6.25 mg tablet Take 1 Tab by mouth two (2) times daily (with meals). 12/3/18   Karthik Simmons MD   apixaban (ELIQUIS) 5 mg tablet Take 1 Tab by mouth every twelve (12) hours. 12/3/18   Karthik Simmons MD   aspirin 81 mg chewable tablet Take 1 Tab by mouth daily. 11/5/18   Marcelina Marie MD   atorvastatin (LIPITOR) 20 mg tablet Take 1 Tab by mouth nightly. 10/4/18   Merline Carlson MD   allopurinol (ZYLOPRIM) 100 mg tablet TAKE 1 TABLET BY MOUTH EVERY DAY 9/25/18   Merline Carlson MD   fluticasone [facility]) 50 mcg/actuation nasal spray 2 Sprays by Both Nostrils route daily. 3/29/18   Ernesto Mata MD   melatonin 3 mg tablet Take 3 mg by mouth nightly. Provider, Historical   albuterol (VENTOLIN HFA) 90 mcg/actuation inhaler Take 2 Puffs by inhalation four (4) times daily. 2/6/18   Wilbert Perdomo NP   polyethylene glycol (MIRALAX) 17 gram packet Take 1 Packet by mouth daily. Patient taking differently: Take 17 g by mouth daily as needed. 1/13/18   James MERLOS NP   albuterol-ipratropium (DUO-NEB) 2.5 mg-0.5 mg/3 ml nebu 3 mL by Nebulization route four (4) times daily.  Diaignosis--J44.9 12/13/17   Wilbert Perdomo NP     Allergies   Allergen Reactions    Ativan [Lorazepam] Other (comments)     Confusion- mild but long lasting- days    Pcn [Penicillins] Other (comments)     \"makes my heart stop\"      Social History     Tobacco Use    Smoking status: Former Smoker     Packs/day: 0.25     Years: 20.00     Pack years: 5.00     Types: Cigarettes     Last attempt to quit: 4/5/2015     Years since quitting: 3.9    Smokeless tobacco: Never Used   Substance Use Topics    Alcohol use: No     Alcohol/week: 0.0 oz      Family History   Problem Relation Age of Onset    Hypertension Mother     No Known Problems Father           Review of Systems    Constitutional: no fever, rested better   Eyes: fair vision,    Ears, nose, mouth, throat, and face:fair hearing,    Respiratory: no asthma,    Cardiovascular:no chest pain,    Gastrointestinal:no diarrhea,    Genitourinary: he has had hematuria and dysuria,   Hematologic/lymphatic: no bleeding tendency,    Neurological: no seizures,    Behvioral/Psych: no psych hospitalization    Endocrine: no goiter,       Objective:       Visit Vitals  /72 (BP 1 Location: Left arm, BP Patient Position: At rest)   Pulse 75   Temp 98.1 °F (36.7 °C)   Resp 18   Ht 5' 10\" (1.778 m)   Wt 104 kg (229 lb 4.8 oz)   SpO2 100%   BMI 32.90 kg/m²       No intake/output data recorded. 03/09 0701 - 03/10 1900  In: 840 [P.O.:840]  Out: 5150 [Urine:5150]    General:  Alert, cooperative, no respiratory distress on RA, appears stated age. Head:  Normocephalic, without obvious abnormality, atraumatic. Eyes:  Conjunctivae/corneas clear. EOMs intact. Throat: Lips, mucosa, and tongue normal. Teeth and gums normal.   Neck: Supple, symmetrical, trachea midline, no adenopathy, pos for JVD. Lungs:   Clear to auscultation bilaterally. Heart:  Regular rate and rhythm, S1, S2 normal, no  rub. Abdomen:   Soft, non-tender. Bowel sounds normal. No masses,  No organomegaly. No renal bruit. Abdominal wall is bulging and ascites is present   Extremities: Extremities normal, atraumatic, no cyanosis, 3+ edema, improving. Skin: Skin color, texture, turgor normal. No rashes or lesions. Neurologic: Grossly intact. No asterixis. Data Review:     Results for Dawn Bales (MRN [id number]) as of 3/8/2019 15:11   Ref.  Range 3/4/2019 06:42 3/5/2019 05:58 3/6/2019 05:40 3/7/2019 06:16 3/8/2019 05:16   Sodium Latest Ref Range: 136 - 145 mmol/L 136 137 137 139 139   Potassium Latest Ref Range: 3.5 - 5.1 mmol/L 3.1 (L) 2.8 (LL) 3.2 (L) 3.3 (L) 3.1 (L) Chloride Latest Ref Range: 98 - 107 mmol/L 98 99 101 101 100   CO2 Latest Ref Range: 21 - 32 mmol/L 28 25 24 25 26   Anion gap Latest Ref Range: 7 - 16 mmol/L 10 13 12 13 13   Glucose Latest Ref Range: 65 - 100 mg/dL 90 85 97 92 101 (H)   BUN Latest Ref Range: 8 - 23 MG/DL 72 (H) 77 (H) 79 (H) 79 (H) 81 (H)   Creatinine Latest Ref Range: 0.8 - 1.5 MG/DL 5.87 (H) 5.81 (H) 5.64 (H) 5.34 (H) 5.06 (H)   Calcium Latest Ref Range: 8.3 - 10.4 MG/DL 8.0 (L) 8.0 (L) 8.0 (L) 8.2 (L) 8.6   Phosphorus Latest Ref Range: 2.3 - 3.7 MG/DL 5.3 (H) 5.6 (H) 5.4 (H) 5.3 (H) 5.3 (H)   GFR est non-AA Latest Ref Range: >60 ml/min/1.73m2 10 (L) 10 (L) 10 (L) 11 (L) 12 (L)   GFR est AA Latest Ref Range: >60 ml/min/1.73m2 12 (L) 12 (L) 13 (L) 14 (L) 14 (L)   Albumin Latest Ref Range: 3.2 - 4.6 g/dL 2.6 (L) 2.7 (L) 2.5 (L) 2.6 (L) 2.6 (L)       Recent Results (from the past 24 hour(s))   RENAL FUNCTION PANEL    Collection Time: 03/10/19  9:03 AM   Result Value Ref Range    Sodium 140 136 - 145 mmol/L    Potassium 3.3 (L) 3.5 - 5.1 mmol/L    Chloride 99 98 - 107 mmol/L    CO2 32 21 - 32 mmol/L    Anion gap 9 7 - 16 mmol/L    Glucose 92 65 - 100 mg/dL    BUN 82 (H) 8 - 23 MG/DL    Creatinine 4.02 (H) 0.8 - 1.5 MG/DL    GFR est AA 19 (L) >60 ml/min/1.73m2    GFR est non-AA 16 (L) >60 ml/min/1.73m2    Calcium 9.0 8.3 - 10.4 MG/DL    Phosphorus 4.6 (H) 2.3 - 3.7 MG/DL    Albumin 2.7 (L) 3.2 - 4.6 g/dL   CBC W/O DIFF    Collection Time: 03/10/19  9:03 AM   Result Value Ref Range    WBC 7.0 4.3 - 11.1 K/uL    RBC 4.57 4.23 - 5.6 M/uL    HGB 12.4 (L) 13.6 - 17.2 g/dL    HCT 37.5 (L) 41.1 - 50.3 %    MCV 82.1 79.6 - 97.8 FL    MCH 27.1 26.1 - 32.9 PG    MCHC 33.1 31.4 - 35.0 g/dL    RDW 20.3 (H) 11.9 - 14.6 %    PLATELET 356 332 - 164 K/uL    MPV 11.4 9.4 - 12.3 FL    ABSOLUTE NRBC 0.00 0.0 - 0.2 K/uL       CXR shows massive cardiomegaly and fluid excess      ECHO 2/21/19 -  SUMMARY:  -  Left ventricle: Systolic function was markedly reduced. Ejection fraction  was estimated in the range of 25 % to 30 %. There was severe diffuse  hypokinesis with distinct regional wall motion abnormalities. There was   severeglobal hypokinesis. There was severe concentric hypertrophy. The myocardial  specular pattern appeared moderately abnormal. Consider infiltrative  cardiomyopathy if clinically indicated. -  Right ventricle: The ventricle was mildly dilated. Systolic function was  moderately to markedly reduced. There was mild pulmonary artery hypertension.  -  Left atrium: The atrium was moderately to markedly dilated. -  Right atrium: The atrium was markedly dilated. -  Inferior vena cava, hepatic veins: The inferior vena cava was moderately  dilated. The respirophasic change in diameter was less than 50%. -  Aortic valve: The valve was trileaflet. Leaflets exhibited moderate  sclerosis. The left coronary cusp demonstrated immobility. There was mild  regurgitation. -  Mitral valve: There was mild to moderate regurgitation. -  Tricuspid valve: There was mild to moderate regurgitation. -  Pulmonic valve: There was moderate regurgitation.       Principal Problem:    Acute respiratory failure with hypoxia ([address] (5/24/2018)    Active Problems:    Coronary atherosclerosis of native coronary vessel (9/29/2017)      Mixed hyperlipidemia (9/28/2016)      TAZ (obstructive sleep apnea) (10/2/2017)      Overview: Intolerant of CPAP      Type 2 diabetes with nephropathy ([address] (10/8/2018)      Pulmonary hypertension ([address] (10/20/2018)      Acute on chronic combined systolic and diastolic CHF (congestive heart failure) ([address] (11/26/2018)      Atrial fibrillation ([address] (11/27/2018)      Tobacco abuse (1/22/2019)      Lactic acidosis (2/20/2019)      ESRD (end stage renal disease) on dialysis ([address] (3/2/2019)      Acute pulmonary edema ([address] (3/2/2019)      Hemodialysis-associated hypotension (3/2/2019)      Syncope (3/2/2019)      Thrombocytopenia ([address] (3/2/2019) Hematuria (3/2/2019)      Anemia due to chronic kidney disease (3/2/2019)        Assessment:     1. Acute Kidney Injury on top of Chronic Kidney Disease stage 3 -  - mainly due to cardio-renal syndrome  - marked hemodynamic instability with inadequate response to medical therapy and inability to tolerate dialysis despite Midodrine  - responded to medical therapy including addition of Digoxin  and increasing Midodrine  - his wife understands that this is not likely to succeed to get him stabilize for dialysis in the next days and that this is likely and end of life situation  - further improvement with medical management is indicated by improvement in renal function indices  - no dialysis needed  - renal function indices gradually improving  - adequate response to PO diuresis     2. Acute on chronic Combined systolic and diastolic heart failure decompensation  - responding to intensified diuresis and medical therapy as above      3. A fib with RVR   - on no antihypertensive  - clinically responding to Digoxin     4. UTI  -  Treated with ceftriaxone     5. Respiratory failure -  - on oxygen therapy per primary team  - improving and tolerating nasal cannula     6. Urinary retention  - S/P [facility] with urology involved     7. Hypermagnesemia -  - hoping for consistently improved hemodynamics with decreasing Magnesium level  - avoid magnesium containing meds, Magnesium citrate discontinued    8. Anemia -  - adequate control    9. Acid/base -  - bicarbonate level is WNL    10. Hypokalemia -  - off Fludrocortisone  - monitor response to replacement    11.  Hypocalcemia -  - mostly due to hypoalbuminemia  - on added Vitamin D    Plan:     As above    Meryl Barnes M.D.

## 2019-03-11 NOTE — PROGRESS NOTES
Hourly rounds completed this shift. All needs met. Bed low/locked. No c/o pain. Pt alert with mild confusion. No c/o SOB. Wife at bedside. Call light in reach. Will continue to monitor pt and give report to oncoming RN.      Peripheral IV 03/06/19 Left Antecubital (Active)   Site Assessment Clean, dry, & intact 3/11/2019  2:14 AM   Phlebitis Assessment 0 3/11/2019  2:14 AM   Infiltration Assessment 0 3/11/2019  2:14 AM   Dressing Status Clean, dry, & intact 3/11/2019  2:14 AM   Dressing Type Transparent;Tape 3/11/2019  2:14 AM   Hub Color/Line Status Pink;Flushed;Patent 3/11/2019  2:14 AM   Alcohol Cap Used No 3/10/2019  7:20 AM

## 2019-03-11 NOTE — PROGRESS NOTES
Follow up visit to build relationship of connectedness, care & emotional / spiritual support. Pt nonresponsive; cared for bedside family members     Interventions used:    ___x___    active listening/ guided questions     __x____    exploration of hope & the presence of God     ___x____  nonanxious presence, prayer / blessings to convey peace & lessen anxiety     Additional  Comments / interventions:     Family members state pt is often confused when awake. Family waiting together with pt.     Cydney Fischer MDiv, M, PhD  Carolan Dandy

## 2019-03-12 ENCOUNTER — HOSPITAL ENCOUNTER (OUTPATIENT)
Age: 74
Discharge: NURSING, REHAB OR LONG-TERM CARE | End: 2019-03-30
Attending: INTERNAL MEDICINE | Admitting: INTERNAL MEDICINE

## 2019-03-12 VITALS
RESPIRATION RATE: 18 BRPM | SYSTOLIC BLOOD PRESSURE: 138 MMHG | WEIGHT: 179.9 LBS | OXYGEN SATURATION: 96 % | HEIGHT: 70 IN | TEMPERATURE: 98.3 F | DIASTOLIC BLOOD PRESSURE: 71 MMHG | HEART RATE: 63 BPM | BODY MASS INDEX: 25.75 KG/M2

## 2019-03-12 LAB
ALBUMIN SERPL-MCNC: 3.1 G/DL (ref 3.2–4.6)
ALBUMIN/GLOB SERPL: 0.7 {RATIO} (ref 1.2–3.5)
ALP SERPL-CCNC: 108 U/L (ref 50–136)
ALT SERPL-CCNC: 16 U/L (ref 12–65)
AMMONIA PLAS-SCNC: 69 UMOL/L (ref 11–32)
ANION GAP SERPL CALC-SCNC: 11 MMOL/L (ref 7–16)
AST SERPL-CCNC: 30 U/L (ref 15–37)
BASOPHILS # BLD: 0.1 K/UL (ref 0–0.2)
BASOPHILS NFR BLD: 1 % (ref 0–2)
BILIRUB DIRECT SERPL-MCNC: 1.2 MG/DL
BILIRUB SERPL-MCNC: 1.9 MG/DL (ref 0.2–1.1)
BUN SERPL-MCNC: 80 MG/DL (ref 8–23)
CALCIUM SERPL-MCNC: 9.7 MG/DL (ref 8.3–10.4)
CHLORIDE SERPL-SCNC: 100 MMOL/L (ref 98–107)
CO2 SERPL-SCNC: 33 MMOL/L (ref 21–32)
CREAT SERPL-MCNC: 4.13 MG/DL (ref 0.8–1.5)
DIFFERENTIAL METHOD BLD: ABNORMAL
EOSINOPHIL # BLD: 0.1 K/UL (ref 0–0.8)
EOSINOPHIL NFR BLD: 1 % (ref 0.5–7.8)
ERYTHROCYTE [DISTWIDTH] IN BLOOD BY AUTOMATED COUNT: 21.6 % (ref 11.9–14.6)
GLOBULIN SER CALC-MCNC: 4.4 G/DL (ref 2.3–3.5)
GLUCOSE SERPL-MCNC: 97 MG/DL (ref 65–100)
HAV IGM SERPL QL IA: NEGATIVE
HBV CORE IGM SERPL QL IA: NEGATIVE
HBV SURFACE AG SERPL QL IA: NEGATIVE
HCT VFR BLD AUTO: 40.3 % (ref 41.1–50.3)
HCV AB S/CO SERPL IA: <0.1 S/CO RATIO (ref 0–0.9)
HGB BLD-MCNC: 13.1 G/DL (ref 13.6–17.2)
IMM GRANULOCYTES # BLD AUTO: 0 K/UL (ref 0–0.5)
IMM GRANULOCYTES NFR BLD AUTO: 0 % (ref 0–5)
INR PPP: 2.1
LYMPHOCYTES # BLD: 2 K/UL (ref 0.5–4.6)
LYMPHOCYTES NFR BLD: 25 % (ref 13–44)
MCH RBC QN AUTO: 27.5 PG (ref 26.1–32.9)
MCHC RBC AUTO-ENTMCNC: 32.5 G/DL (ref 31.4–35)
MCV RBC AUTO: 84.5 FL (ref 79.6–97.8)
MONOCYTES # BLD: 0.7 K/UL (ref 0.1–1.3)
MONOCYTES NFR BLD: 9 % (ref 4–12)
NEUTS SEG # BLD: 5.3 K/UL (ref 1.7–8.2)
NEUTS SEG NFR BLD: 64 % (ref 43–78)
NRBC # BLD: 0 K/UL (ref 0–0.2)
PLATELET # BLD AUTO: 155 K/UL (ref 150–450)
PMV BLD AUTO: 10.9 FL (ref 9.4–12.3)
POTASSIUM SERPL-SCNC: 4.3 MMOL/L (ref 3.5–5.1)
PROT SERPL-MCNC: 7.5 G/DL (ref 6.3–8.2)
PROTHROMBIN TIME: 22.9 SEC (ref 11.7–14.5)
RBC # BLD AUTO: 4.77 M/UL (ref 4.23–5.6)
SODIUM SERPL-SCNC: 144 MMOL/L (ref 136–145)
WBC # BLD AUTO: 8.3 K/UL (ref 4.3–11.1)

## 2019-03-12 PROCEDURE — 74011250637 HC RX REV CODE- 250/637: Performed by: INTERNAL MEDICINE

## 2019-03-12 PROCEDURE — 85025 COMPLETE CBC W/AUTO DIFF WBC: CPT

## 2019-03-12 PROCEDURE — 74011250637 HC RX REV CODE- 250/637: Performed by: HOSPITALIST

## 2019-03-12 PROCEDURE — 77010033678 HC OXYGEN DAILY

## 2019-03-12 PROCEDURE — 36415 COLL VENOUS BLD VENIPUNCTURE: CPT

## 2019-03-12 PROCEDURE — 85610 PROTHROMBIN TIME: CPT

## 2019-03-12 PROCEDURE — 82248 BILIRUBIN DIRECT: CPT

## 2019-03-12 PROCEDURE — 74011250637 HC RX REV CODE- 250/637: Performed by: UROLOGY

## 2019-03-12 PROCEDURE — 82140 ASSAY OF AMMONIA: CPT

## 2019-03-12 PROCEDURE — 80053 COMPREHEN METABOLIC PANEL: CPT

## 2019-03-12 RX ORDER — TORSEMIDE 100 MG/1
100 TABLET ORAL 2 TIMES DAILY
Qty: 60 TAB | Refills: 0 | Status: SHIPPED
Start: 2019-03-12

## 2019-03-12 RX ORDER — PETROLATUM 42 G/100G
OINTMENT TOPICAL AS NEEDED
Qty: 100 G | Refills: 0 | Status: SHIPPED
Start: 2019-03-12

## 2019-03-12 RX ORDER — METOLAZONE 10 MG/1
10 TABLET ORAL 2 TIMES DAILY
Qty: 60 TAB | Refills: 0 | Status: SHIPPED
Start: 2019-03-12 | End: 2019-04-16

## 2019-03-12 RX ORDER — DIGOXIN 125 MCG
0.12 TABLET ORAL
Qty: 30 TAB | Refills: 0 | Status: SHIPPED
Start: 2019-03-13

## 2019-03-12 RX ORDER — FINASTERIDE 5 MG/1
5 TABLET, FILM COATED ORAL DAILY
Qty: 30 TAB | Refills: 0 | Status: SHIPPED
Start: 2019-03-13

## 2019-03-12 RX ORDER — ACETAMINOPHEN 325 MG/1
650 TABLET ORAL
Qty: 30 TAB | Refills: 0 | Status: SHIPPED
Start: 2019-03-12

## 2019-03-12 RX ORDER — NYSTATIN 100000 [USP'U]/G
POWDER TOPICAL 2 TIMES DAILY
Qty: 1 BOTTLE | Refills: 0 | Status: SHIPPED
Start: 2019-03-12

## 2019-03-12 RX ORDER — MIDODRINE HYDROCHLORIDE 5 MG/1
15 TABLET ORAL
Qty: 270 TAB | Refills: 0 | Status: SHIPPED
Start: 2019-03-12 | End: 2019-04-11

## 2019-03-12 RX ORDER — CHOLECALCIFEROL (VITAMIN D3) 125 MCG
2000 CAPSULE ORAL DAILY
Qty: 30 TAB | Refills: 0 | Status: SHIPPED
Start: 2019-03-13

## 2019-03-12 RX ADMIN — TORSEMIDE 100 MG: 100 TABLET ORAL at 08:52

## 2019-03-12 RX ADMIN — TORSEMIDE 100 MG: 100 TABLET ORAL at 16:59

## 2019-03-12 RX ADMIN — MIDODRINE HYDROCHLORIDE 15 MG: 5 TABLET ORAL at 08:51

## 2019-03-12 RX ADMIN — NYSTATIN: 100000 POWDER TOPICAL at 08:56

## 2019-03-12 RX ADMIN — NYSTATIN: 100000 POWDER TOPICAL at 17:00

## 2019-03-12 RX ADMIN — METOLAZONE 10 MG: 5 TABLET ORAL at 08:52

## 2019-03-12 RX ADMIN — FLUTICASONE PROPIONATE 2 SPRAY: 50 SPRAY, METERED NASAL at 08:56

## 2019-03-12 RX ADMIN — Medication 1 TABLET: at 08:52

## 2019-03-12 RX ADMIN — LACTULOSE 20 G: 20 SOLUTION ORAL at 08:52

## 2019-03-12 RX ADMIN — FINASTERIDE 5 MG: 5 TABLET, FILM COATED ORAL at 08:52

## 2019-03-12 RX ADMIN — Medication 10 ML: at 05:11

## 2019-03-12 RX ADMIN — LACTULOSE 20 G: 20 SOLUTION ORAL at 16:57

## 2019-03-12 RX ADMIN — APIXABAN 5 MG: 2.5 TABLET, FILM COATED ORAL at 08:52

## 2019-03-12 RX ADMIN — Medication 10 ML: at 16:21

## 2019-03-12 RX ADMIN — POTASSIUM CHLORIDE 40 MEQ: 20 TABLET, EXTENDED RELEASE ORAL at 08:52

## 2019-03-12 RX ADMIN — MIDODRINE HYDROCHLORIDE 15 MG: 5 TABLET ORAL at 16:20

## 2019-03-12 RX ADMIN — METOLAZONE 10 MG: 5 TABLET ORAL at 16:58

## 2019-03-12 RX ADMIN — VITAMIN D, TAB 1000IU (100/BT) 2000 UNITS: 25 TAB at 08:52

## 2019-03-12 RX ADMIN — PANTOPRAZOLE SODIUM 40 MG: 40 TABLET, DELAYED RELEASE ORAL at 04:35

## 2019-03-12 NOTE — PROGRESS NOTES
Interdisciplinary Rounds completed 03/12/19. Nursing, Case Management, Physician and PT present. Plan of care reviewed and updated.     Waiting on pre-cert for [facility]

## 2019-03-12 NOTE — PROGRESS NOTES
Received call from [facility] stating Namrata Bowling has been approved. Patient can discharge to [facility] today. Report line, [id number] given to RN. Yvonne Vaughan, wife notified and would like for patient to go to [facility] after she arrives.

## 2019-03-12 NOTE — DISCHARGE SUMMARY
Discharge Summary     Patient: Shana Goodwin MRN: [id number]  SSN: [id number]    YOB: 1945  Age: 66 y.o.   Sex: male       Admit Date: 2/19/2019    Discharge Date: 3/12/2019      Admission Diagnoses: CHF (congestive heart failure) ([facility] [I50.9]    Discharge Diagnoses:   Problem List as of 3/12/2019 Date Reviewed: 12/11/2018          Codes Class Noted - Resolved    Liver failure ([facility] ICD-10-CM: K72.90  ICD-9-CM: 572.8  3/11/2019 - Present        ESRD (end stage renal disease) on dialysis [facility]) ICD-10-CM: N18.6, Z99.2  ICD-9-CM: 585.6, V45.11  3/2/2019 - Present        Acute pulmonary edema ([facility] ICD-10-CM: J81.0  ICD-9-CM: 518.4  3/2/2019 - Present        Hemodialysis-associated hypotension ICD-10-CM: I95.3  ICD-9-CM: 458.21  3/2/2019 - Present        Syncope ICD-10-CM: R55  ICD-9-CM: 780.2  3/2/2019 - Present        Thrombocytopenia ([facility] ICD-10-CM: D69.6  ICD-9-CM: 287.5  3/2/2019 - Present        Hematuria ICD-10-CM: R31.9  ICD-9-CM: 599.70  3/2/2019 - Present        Anemia due to chronic kidney disease ICD-10-CM: N18.9, D63.1  ICD-9-CM: 285.21  3/2/2019 - Present        CKD (chronic kidney disease) stage 4, GFR 15-29 ml/min (HCC) ICD-10-CM: N18.4  ICD-9-CM: 585.4  2/20/2019 - Present        Lactic acidosis ICD-10-CM: E87.2  ICD-9-CM: 276.2  2/20/2019 - Present        Tobacco abuse ICD-10-CM: Z72.0  ICD-9-CM: 305.1  1/22/2019 - Present        Hypertension, essential ICD-10-CM: I10  ICD-9-CM: 401.9  1/22/2019 - Present        Type 2 diabetes mellitus without complication ([facility]-47-CF: E11.9  ICD-9-CM: 250.00  1/22/2019 - Present        Left atrial thrombus ICD-10-CM: I51.3  ICD-9-CM: 429.89  1/10/2019 - Present        Atrial fibrillation ([facility] ICD-10-CM: I48.91  ICD-9-CM: 427.31  11/27/2018 - Present        Acute on chronic combined systolic and diastolic CHF (congestive heart failure) ([facility] ICD-10-CM: I50.43  ICD-9-CM: 428.43, 428.0  11/26/2018 - Present        Transient cerebral ischemia ICD-10-CM: G45.9  ICD-9-CM: 435.9  11/14/2018 - Present        Seizure disorder ([facility] ICD-10-CM: G40.909  ICD-9-CM: 345.90  11/14/2018 - Present        Stroke (cerebrum) ([facility] ICD-10-CM: I63.9  ICD-9-CM: 434.91  11/2/2018 - Present        Seizure ([facility] ICD-10-CM: R56.9  ICD-9-CM: 780.39  10/20/2018 - Present        Prolonged Q-T interval on ECG ICD-10-CM: R94.31  ICD-9-CM: 794.31  10/20/2018 - Present        Elevated troponin ICD-10-CM: R74.8  ICD-9-CM: 790.6  10/20/2018 - Present        Pulmonary hypertension (HCC) (Chronic) ICD-10-CM: I27.20  ICD-9-CM: 416.8  10/20/2018 - Present        Stage 2 chronic kidney disease ICD-10-CM: N18.2  ICD-9-CM: 585.2  10/17/2018 - Present        Type 2 diabetes with nephropathy ([facility] ICD-10-CM: E11.21  ICD-9-CM: 250.40, 583.81  10/8/2018 - Present        Chronic systolic heart failure ([facility] ICD-10-CM: I50.22  ICD-9-CM: 428.22  10/8/2018 - Present        Shortness of breath ICD-10-CM: R06.02  ICD-9-CM: 786.05  10/8/2018 - Present        Controlled type 2 diabetes mellitus without complication, without long-term current use of insulin ([facility] ICD-10-CM: E11.9  ICD-9-CM: 250.00  10/4/2018 - Present        Acute on chronic systolic heart failure ([facility] ICD-10-CM: I50.23  ICD-9-CM: 428.23  5/24/2018 - Present    Overview Signed 12/4/2015  9:28 AM by Maame Riojas     EF 40%             * (Principal) Acute respiratory failure with hypoxia ([facility] ICD-10-CM: J96.01  ICD-9-CM: 518.81  5/24/2018 - Present        Erysipelas ICD-10-CM: A46  ICD-9-CM: 035  3/27/2018 - Present        Preseptal cellulitis ICD-10-CM: Y27.413  ICD-9-CM: 373.13  3/27/2018 - Present        Personal history of tobacco use (Chronic) ICD-10-CM: Y98.750  ICD-9-CM: V15.82  2/6/2018 - Present        Atherosclerosis of native coronary artery of native heart with stable angina pectoris (HCC) ICD-10-CM: I25.118  ICD-9-CM: 414.01, 413.9  10/13/2017 - Present        TAZ (obstructive sleep apnea) ICD-10-CM: G47.33  ICD-9-CM: 327.23  10/2/2017 - Present    Overview Signed 12/11/2018  1:24 PM by Chapincito Han NP     Intolerant of CPAP             CKD (chronic kidney disease) stage 3, GFR 30-59 ml/min (HCC) (Chronic) ICD-10-CM: N18.3  ICD-9-CM: 585.3  9/29/2017 - Present        Coronary atherosclerosis of native coronary vessel (Chronic) ICD-10-CM: I25.10  ICD-9-CM: 414.01  9/29/2017 - Present        Hypertension (Chronic) ICD-10-CM: I10  ICD-9-CM: 401.9  9/29/2017 - Present        Mild persistent asthma without complication [facility]: F54.46  ICD-9-CM: 493.90  9/29/2017 - Present    Overview Addendum 12/11/2018  1:22 PM by Chapincito Han NP     Complete PFTs:  3/20/18--Spirometry is consistent with a moderate  restrictive lung disease.  Lung volumes were decreased consistent with restriction.  The diffusion capacity is decreased even when corrected for alveolar volume  suggesting of loss of alveolar capillary unit.  This is study is consistent with a diagnosis of ILD.                 High triglycerides (Chronic) ICD-10-CM: E78.1  ICD-9-CM: 272.1  9/29/2017 - Present        Gastroesophageal reflux disease without esophagitis (Chronic) ICD-10-CM: K21.9  ICD-9-CM: 530.81  9/29/2017 - Present        CHF (congestive heart failure) (HCC) ICD-10-CM: I50.9  ICD-9-CM: 428.0  9/27/2017 - Present        Mixed hyperlipidemia (Chronic) ICD-10-CM: E78.2  ICD-9-CM: 272.2  9/28/2016 - Present        Essential hypertension ICD-10-CM: I10  ICD-9-CM: 401.9  9/28/2016 - Present        Arthritis (Chronic) ICD-10-CM: M19.90  ICD-9-CM: 716.90  Unknown - Present        RESOLVED: COPD exacerbation ([facility] ICD-10-CM: J44.1  ICD-9-CM: 491.21  11/26/2018 - 12/11/2018        RESOLVED: Pleural effusion, right ICD-10-CM: J90  ICD-9-CM: 511.9  11/3/2018 - 12/11/2018        RESOLVED: Stage 2 chronic kidney disease ICD-10-CM: N18.2  ICD-9-CM: 585.2  1/30/2018 - 2/6/2018        RESOLVED: Dyspnea ICD-10-CM: R06.00  ICD-9-CM: 786.09  12/26/2017 - 2/6/2018 RESOLVED: Acute on chronic systolic (congestive) heart failure ICD-10-CM: I50.23  ICD-9-CM: 428.23, 428.0  9/29/2017 - 12/13/2017        RESOLVED: Vomiting ICD-10-CM: R11.10  ICD-9-CM: 787.03  9/29/2017 - 2/6/2018        RESOLVED: NINO (acute kidney injury) ([facility] ICD-10-CM: N17.9  ICD-9-CM: 584.9  9/29/2017 - 2/6/2018        RESOLVED: Chronic obstructive pulmonary disease ([facility] ICD-10-CM: J44.9  ICD-9-CM: 306  8/4/2016 - 12/13/2017        RESOLVED: Acute on chronic renal failure ([facility].) ICD-10-CM: N17.9, N18.9  ICD-9-CM: 584.9, 585.9  12/28/2015 - 1/19/2016        RESOLVED: Leukocytosis ICD-10-CM: D72.829  ICD-9-CM: 288.60  12/28/2015 - 1/19/2016        RESOLVED: SOB (shortness of breath) ICD-10-CM: R06.02  ICD-9-CM: 786.05  12/27/2015 - 1/19/2016        RESOLVED: Acute on chronic diastolic heart failure ([facility] ICD-10-CM: I50.33  ICD-9-CM: 428.33  12/27/2015 - 1/19/2016        RESOLVED: Malignant hypertensive heart disease (Chronic) ICD-10-CM: I11.9  ICD-9-CM: 402.00  12/4/2015 - 1/19/2016    Overview Signed 12/4/2015  9:27 AM by Fatemeh Davalos             RESOLVED: Tobacco abuse (Chronic) ICD-10-CM: Z72.0  ICD-9-CM: 305.1  7/2/2015 - 2/6/2018        RESOLVED: Dyspnea on exertion (Chronic) ICD-10-CM: R06.09  ICD-9-CM: 786.09  6/28/2015 - 2/6/2018        RESOLVED: Oletha Mayelin Quervain's tenosynovitis, right ICD-10-CM: M65.4  ICD-9-CM: 727.04  4/28/2015 - 12/27/2015        RESOLVED: Malignant hypertension ICD-10-CM: I10  ICD-9-CM: 401.0  4/25/2015 - 1/19/2016               Discharge Condition: Stable    Hospital Course:   Mr Gian Ordaz is a 57-year-old gentleman with a PMH of chronic systolic CHF, A. fib, CKD stage IV, chronic COPD, obstructive sleep apnea noncompliant with CPAP, who is currently in a rehab facility, admitted on 2/19 for acute on chronic CHF in view of worsening bilateral LE swelling, hypoxic respiratory failure and hematuria.  Tunneled cath placed on 2/25 and HD started but could not tolerate his session due to hypotension. Midodrine was restarted and he has remained intolerated of HD due to BP issues. [facility] recommended hospice care on 3/1/19, but his wife would like patient to continue to be full code. His clinical status was complicated by metabolic encephalopathy due to elevated ammonia levels, and lactulose started. His LFTs were noted increased with suspicion of congestive hepatopathy in view of possible cardio-renal syndrome. Renal on board. The patient tolerated diuretics well and his cr has remained stable. His mental status is much better. He is stable to continue his care at [facility]. His prognosis is poor. Physical Exam   Constitutional: alert, mild drowsy, able to answer questions   Nose: Nose normal.   Mouth/Throat: No oropharyngeal exudate. Eyes: No scleral icterus. Neck: Neck supple. No tracheal deviation present. Cardiovascular: Regular rhythm. Exam reveals no friction rub. Pulmonary/Chest: No respiratory distress. He has no rales. Abdominal: There is no tenderness. There is no rebound. No masses, normal BS  Musculoskeletal: pedal edema   Neurological: He exhibits normal muscle tone. Encephalopathy no focal motor weakness   Skin: Skin is dry. No rash noted.  He is not diaphoretic    Consults: nephrology , cardiology, palliative care    Significant Diagnostic Studies: see note     Disposition: LTAC     Discharge Medications:     Current Facility-Administered Medications:     lactulose (CHRONULAC) solution 20 g, 30 mL, Oral, TID, Deborah Nam Clonts, DO, 20 g at 03/12/19 0852    potassium chloride (K-DUR, KLOR-CON) SR tablet 40 mEq, 40 mEq, Oral, Q12H, Cyril Thehermelindo Clonts, DO, 40 mEq at 03/12/19 0852    polyethylene glycol (MIRALAX) packet 17 g, 17 g, Oral, TID PRN, Riay Wells, DO, 17 g at 03/10/19 1253    torsemide (DEMADEX) tablet 100 mg, 100 mg, Oral, BID, Artie Malagon MD, 100 mg at 03/12/19 0852    cholecalciferol (VITAMIN D3) tablet 2,000 Units, 2,000 Units, Oral, DAILY, Jose Miguel Malagon MD, 2,000 Units at 03/12/19 0852    B complex-vitamin C-folic acid (NEPHRO-JANEL) 0.8 mg tab, 1 Tab, Oral, DAILY, Jose Miguel Malagon MD, 1 Tab at 03/12/19 5942    nystatin (MYCOSTATIN) 100,000 unit/gram powder, , Topical, BID, Selina Salinas E, DO    metOLazone (ZAROXOLYN) tablet 10 mg, 10 mg, Oral, BID, Jose Miguel Malagon MD, 10 mg at 03/12/19 2124    midodrine (PROAMITINE) tablet 15 mg, 15 mg, Oral, TID WITH MEALS, Jose Miguel Malagon MD, 15 mg at 03/12/19 0851    digoxin (LANOXIN) tablet 0.125 mg, 0.125 mg, Oral, Q MON, WED & FRI, Jose Miguel Malagon MD, 0.125 mg at 03/11/19 0847    mineral oil-hydrophil petrolat (AQUAPHOR) ointment, , Topical, PRN, Sine, MD Erika    apixaban (ELIQUIS) tablet 5 mg, 5 mg, Oral, BID, Cate Baker MD, 5 mg at 03/12/19 0852    lidocaine (XYLOCAINE) 20 mg/mL (2 %) injection  mg, 1-20 mL, IntraDERMal, Multiple, FredrickKyra long MD, 10 mL at 02/25/19 1332    albuterol-ipratropium (DUO-NEB) 2.5 MG-0.5 MG/3 ML, 3 mL, Nebulization, Q4H PRN, Rickie August MD, 3 mL at 02/22/19 2327    fluticasone (FLONASE) 50 mcg/actuation nasal spray 2 Spray, 2 Spray, Both Nostrils, DAILY, Rickie August MD, 2 [place] at 03/12/19 0856    pantoprazole (PROTONIX) tablet 40 mg, 40 mg, Oral, ACB, Rickie August MD, 40 mg at 03/12/19 0435    sodium chloride (NS) flush 5-40 mL, 5-40 mL, IntraVENous, Q8H, Adryan Urrutia MD, 10 mL at 03/12/19 0511    sodium chloride (NS) flush 5-40 mL, 5-40 mL, IntraVENous, PRN, Rickie August MD    acetaminophen (TYLENOL) tablet 650 mg, 650 mg, Oral, Q4H PRN, Rickie August MD, 650 mg at 03/06/19 1306    HYDROcodone-acetaminophen (NORCO) 5-325 mg per tablet 1 Tab, 1 Tab, Oral, Q4H PRN, Rickie August MD, 1 Tab at 03/07/19 1955    morphine injection 2 mg, 2 mg, IntraVENous, Q4H PRN, Rickie August MD, 2 mg at 03/11/19 2115    naloxone (NARCAN) injection 0.4 mg, 0.4 mg, IntraVENous, PRN, Marlys Conti, Marylou Luis MD    diphenhydrAMINE (BENADRYL) capsule 25 mg, 25 mg, Oral, Q4H PRN, Kvng Daniels MD, 25 mg at 03/11/19 2344    ondansetron [facility]) injection 4 mg, 4 mg, IntraVENous, Q4H PRN, Kvng Daniels MD, 4 mg at 03/11/19 2140    finasteride (PROSCAR) tablet 5 mg, 5 mg, Oral, DAILY, Holger Trujillo MD, 5 mg at 03/12/19 9206      Activity: Activity as tolerated  Diet: Renal Diet  Wound Care: None needed    No orders of the defined types were placed in this encounter.       Signed By: Buck Rajput MD     March 12, 2019

## 2019-03-12 NOTE — PROGRESS NOTES
PT note: Treatment attempted this morning however pt appears drowsy and declines to participate with treatment or mobility/repositioning despite encouragement. Will check back as schedule allows.      Ladan Hogue DPT

## 2019-03-12 NOTE — PROGRESS NOTES
Hourly rounds completed this shift. All needs met. Bed low/locked. No c/o pain. Pt alert/confusion. Still attempting to get OOB frequently, redirecting has minimal effect. Wife stating she thinks the pt is too sedated, and that the morphine is causing him to be this way. Pt was given morphine last night, did not have much of an effect on the pt. Pt scratching body often, mittens applied/reapplied many times. No bowel movements during the shift-ammonia 69 this am. Schulz patent and draining. Call light in reach. Will continue to monitor pt and give report to oncoming RN.      Peripheral IV 03/11/19 Anterior;Distal;Right Forearm (Active)   Site Assessment Clean, dry, & intact 3/12/2019  2:20 AM   Phlebitis Assessment 0 3/12/2019  2:20 AM   Infiltration Assessment 0 3/12/2019  2:20 AM   Dressing Status Clean, dry, & intact 3/12/2019  2:20 AM   Dressing Type Transparent;Tape 3/12/2019  2:20 AM   Hub Color/Line Status Blue;Flushed;Patent 3/12/2019  2:20 AM

## 2019-03-13 ENCOUNTER — PATIENT OUTREACH (OUTPATIENT)
Dept: CASE MANAGEMENT | Age: 74
End: 2019-03-13

## 2019-03-13 LAB
ALBUMIN SERPL-MCNC: 2.8 G/DL (ref 3.2–4.6)
ALBUMIN/GLOB SERPL: 0.7 {RATIO} (ref 1.2–3.5)
ALP SERPL-CCNC: 92 U/L (ref 50–136)
ALT SERPL-CCNC: 19 U/L (ref 12–65)
AMMONIA PLAS-SCNC: 73 UMOL/L (ref 11–32)
ANION GAP SERPL CALC-SCNC: 9 MMOL/L (ref 7–16)
AST SERPL-CCNC: 27 U/L (ref 15–37)
BILIRUB SERPL-MCNC: 2.3 MG/DL (ref 0.2–1.1)
BNP SERPL-MCNC: 1826 PG/ML
BUN SERPL-MCNC: 79 MG/DL (ref 8–23)
CALCIUM SERPL-MCNC: 9.7 MG/DL (ref 8.3–10.4)
CHLORIDE SERPL-SCNC: 101 MMOL/L (ref 98–107)
CO2 SERPL-SCNC: 34 MMOL/L (ref 21–32)
CREAT SERPL-MCNC: 3.89 MG/DL (ref 0.8–1.5)
DIGOXIN SERPL-MCNC: 0.7 NG/ML (ref 0.9–2.1)
ERYTHROCYTE [DISTWIDTH] IN BLOOD BY AUTOMATED COUNT: 21.2 % (ref 11.9–14.6)
GLOBULIN SER CALC-MCNC: 3.9 G/DL (ref 2.3–3.5)
GLUCOSE SERPL-MCNC: 86 MG/DL (ref 65–100)
HCT VFR BLD AUTO: 36.6 % (ref 41.1–50.3)
HGB BLD-MCNC: 11.9 G/DL (ref 13.6–17.2)
MAGNESIUM SERPL-MCNC: 2 MG/DL (ref 1.8–2.4)
MCH RBC QN AUTO: 27.4 PG (ref 26.1–32.9)
MCHC RBC AUTO-ENTMCNC: 32.5 G/DL (ref 31.4–35)
MCV RBC AUTO: 84.1 FL (ref 79.6–97.8)
NRBC # BLD: 0 K/UL (ref 0–0.2)
PHOSPHATE SERPL-MCNC: 4.3 MG/DL (ref 2.3–3.7)
PLATELET # BLD AUTO: 135 K/UL (ref 150–450)
PMV BLD AUTO: 11.1 FL (ref 9.4–12.3)
POTASSIUM SERPL-SCNC: 4.3 MMOL/L (ref 3.5–5.1)
PROT SERPL-MCNC: 6.7 G/DL (ref 6.3–8.2)
RBC # BLD AUTO: 4.35 M/UL (ref 4.23–5.6)
SODIUM SERPL-SCNC: 144 MMOL/L (ref 136–145)
WBC # BLD AUTO: 7.5 K/UL (ref 4.3–11.1)

## 2019-03-13 PROCEDURE — 36415 COLL VENOUS BLD VENIPUNCTURE: CPT

## 2019-03-13 PROCEDURE — 80162 ASSAY OF DIGOXIN TOTAL: CPT

## 2019-03-13 PROCEDURE — 84100 ASSAY OF PHOSPHORUS: CPT

## 2019-03-13 PROCEDURE — 83735 ASSAY OF MAGNESIUM: CPT

## 2019-03-13 PROCEDURE — 80053 COMPREHEN METABOLIC PANEL: CPT

## 2019-03-13 PROCEDURE — 82140 ASSAY OF AMMONIA: CPT

## 2019-03-13 PROCEDURE — 85027 COMPLETE CBC AUTOMATED: CPT

## 2019-03-13 PROCEDURE — 83880 ASSAY OF NATRIURETIC PEPTIDE: CPT

## 2019-03-13 NOTE — PROGRESS NOTES
TRESSA call postponed. Patient has been admitted to [facility]. This note will not be viewable in [address]

## 2019-03-14 ENCOUNTER — PATIENT OUTREACH (OUTPATIENT)
Dept: CASE MANAGEMENT | Age: 74
End: 2019-03-14

## 2019-03-14 NOTE — PROGRESS NOTES
reached out to pt and LVM. Left call back # and encourage to call CC. nurse will continue to reach out and provide care as needed.

## 2019-03-18 LAB
ALBUMIN SERPL-MCNC: 3.3 G/DL (ref 3.2–4.6)
ALBUMIN/GLOB SERPL: 0.7 {RATIO} (ref 1.2–3.5)
ALP SERPL-CCNC: 116 U/L (ref 50–136)
ALT SERPL-CCNC: 23 U/L (ref 12–65)
ANION GAP SERPL CALC-SCNC: 11 MMOL/L (ref 7–16)
AST SERPL-CCNC: 42 U/L (ref 15–37)
BASOPHILS # BLD: 0.1 K/UL (ref 0–0.2)
BASOPHILS NFR BLD: 1 % (ref 0–2)
BILIRUB SERPL-MCNC: 2 MG/DL (ref 0.2–1.1)
BUN SERPL-MCNC: 75 MG/DL (ref 8–23)
CALCIUM SERPL-MCNC: 9.6 MG/DL (ref 8.3–10.4)
CHLORIDE SERPL-SCNC: 94 MMOL/L (ref 98–107)
CO2 SERPL-SCNC: 35 MMOL/L (ref 21–32)
CREAT SERPL-MCNC: 3.09 MG/DL (ref 0.8–1.5)
DIFFERENTIAL METHOD BLD: ABNORMAL
EOSINOPHIL # BLD: 0.1 K/UL (ref 0–0.8)
EOSINOPHIL NFR BLD: 1 % (ref 0.5–7.8)
ERYTHROCYTE [DISTWIDTH] IN BLOOD BY AUTOMATED COUNT: 21.6 % (ref 11.9–14.6)
GLOBULIN SER CALC-MCNC: 4.7 G/DL (ref 2.3–3.5)
GLUCOSE SERPL-MCNC: 101 MG/DL (ref 65–100)
HCT VFR BLD AUTO: 42 % (ref 41.1–50.3)
HGB BLD-MCNC: 13.6 G/DL (ref 13.6–17.2)
IMM GRANULOCYTES # BLD AUTO: 0 K/UL (ref 0–0.5)
IMM GRANULOCYTES NFR BLD AUTO: 0 % (ref 0–5)
LYMPHOCYTES # BLD: 2 K/UL (ref 0.5–4.6)
LYMPHOCYTES NFR BLD: 27 % (ref 13–44)
MCH RBC QN AUTO: 26.9 PG (ref 26.1–32.9)
MCHC RBC AUTO-ENTMCNC: 32.4 G/DL (ref 31.4–35)
MCV RBC AUTO: 83.2 FL (ref 79.6–97.8)
MONOCYTES # BLD: 0.9 K/UL (ref 0.1–1.3)
MONOCYTES NFR BLD: 13 % (ref 4–12)
NEUTS SEG # BLD: 4.1 K/UL (ref 1.7–8.2)
NEUTS SEG NFR BLD: 57 % (ref 43–78)
NRBC # BLD: 0 K/UL (ref 0–0.2)
PHOSPHATE SERPL-MCNC: 4.1 MG/DL (ref 2.3–3.7)
PLATELET # BLD AUTO: 158 K/UL (ref 150–450)
PMV BLD AUTO: 12.6 FL (ref 9.4–12.3)
POTASSIUM SERPL-SCNC: 3 MMOL/L (ref 3.5–5.1)
PROT SERPL-MCNC: 8 G/DL (ref 6.3–8.2)
RBC # BLD AUTO: 5.05 M/UL (ref 4.23–5.6)
SODIUM SERPL-SCNC: 140 MMOL/L (ref 136–145)
WBC # BLD AUTO: 7.2 K/UL (ref 4.3–11.1)

## 2019-03-18 PROCEDURE — 80053 COMPREHEN METABOLIC PANEL: CPT

## 2019-03-18 PROCEDURE — 36415 COLL VENOUS BLD VENIPUNCTURE: CPT

## 2019-03-18 PROCEDURE — 85025 COMPLETE CBC W/AUTO DIFF WBC: CPT

## 2019-03-18 PROCEDURE — 84100 ASSAY OF PHOSPHORUS: CPT

## 2019-03-19 LAB
AMMONIA PLAS-SCNC: 72 UMOL/L (ref 11–32)
ANION GAP SERPL CALC-SCNC: 11 MMOL/L (ref 7–16)
BUN SERPL-MCNC: 78 MG/DL (ref 8–23)
CALCIUM SERPL-MCNC: 10.2 MG/DL (ref 8.3–10.4)
CHLORIDE SERPL-SCNC: 95 MMOL/L (ref 98–107)
CO2 SERPL-SCNC: 35 MMOL/L (ref 21–32)
CREAT SERPL-MCNC: 3.74 MG/DL (ref 0.8–1.5)
GLUCOSE SERPL-MCNC: 101 MG/DL (ref 65–100)
MAGNESIUM SERPL-MCNC: 2 MG/DL (ref 1.8–2.4)
POTASSIUM SERPL-SCNC: 3.4 MMOL/L (ref 3.5–5.1)
SODIUM SERPL-SCNC: 141 MMOL/L (ref 136–145)

## 2019-03-19 PROCEDURE — 83735 ASSAY OF MAGNESIUM: CPT

## 2019-03-19 PROCEDURE — 80048 BASIC METABOLIC PNL TOTAL CA: CPT

## 2019-03-19 PROCEDURE — 36415 COLL VENOUS BLD VENIPUNCTURE: CPT

## 2019-03-19 PROCEDURE — 82140 ASSAY OF AMMONIA: CPT

## 2019-03-25 LAB
ALBUMIN SERPL-MCNC: 3.3 G/DL (ref 3.2–4.6)
ALBUMIN/GLOB SERPL: 0.6 {RATIO} (ref 1.2–3.5)
ALP SERPL-CCNC: 138 U/L (ref 50–136)
ALT SERPL-CCNC: 38 U/L (ref 12–65)
ANION GAP SERPL CALC-SCNC: 10 MMOL/L (ref 7–16)
AST SERPL-CCNC: 53 U/L (ref 15–37)
BILIRUB SERPL-MCNC: 1.6 MG/DL (ref 0.2–1.1)
BUN SERPL-MCNC: 76 MG/DL (ref 8–23)
CALCIUM SERPL-MCNC: 9.7 MG/DL (ref 8.3–10.4)
CHLORIDE SERPL-SCNC: 95 MMOL/L (ref 98–107)
CO2 SERPL-SCNC: 30 MMOL/L (ref 21–32)
CREAT SERPL-MCNC: 2.81 MG/DL (ref 0.8–1.5)
ERYTHROCYTE [DISTWIDTH] IN BLOOD BY AUTOMATED COUNT: 20.9 % (ref 11.9–14.6)
GLOBULIN SER CALC-MCNC: 5.1 G/DL (ref 2.3–3.5)
GLUCOSE SERPL-MCNC: 90 MG/DL (ref 65–100)
HCT VFR BLD AUTO: 47.8 % (ref 41.1–50.3)
HGB BLD-MCNC: 15.6 G/DL (ref 13.6–17.2)
MCH RBC QN AUTO: 27.2 PG (ref 26.1–32.9)
MCHC RBC AUTO-ENTMCNC: 32.6 G/DL (ref 31.4–35)
MCV RBC AUTO: 83.3 FL (ref 79.6–97.8)
NRBC # BLD: 0 K/UL (ref 0–0.2)
PLATELET # BLD AUTO: 176 K/UL (ref 150–450)
PMV BLD AUTO: 11.4 FL (ref 9.4–12.3)
POTASSIUM SERPL-SCNC: 4 MMOL/L (ref 3.5–5.1)
PROT SERPL-MCNC: 8.4 G/DL (ref 6.3–8.2)
RBC # BLD AUTO: 5.74 M/UL (ref 4.23–5.6)
SODIUM SERPL-SCNC: 135 MMOL/L (ref 136–145)
WBC # BLD AUTO: 6.7 K/UL (ref 4.3–11.1)

## 2019-03-25 PROCEDURE — 36415 COLL VENOUS BLD VENIPUNCTURE: CPT

## 2019-03-25 PROCEDURE — 85027 COMPLETE CBC AUTOMATED: CPT

## 2019-03-25 PROCEDURE — 80053 COMPREHEN METABOLIC PANEL: CPT

## 2019-03-29 ENCOUNTER — APPOINTMENT (OUTPATIENT)
Dept: GENERAL RADIOLOGY | Age: 74
End: 2019-03-29
Attending: INTERNAL MEDICINE

## 2019-03-29 PROCEDURE — 71045 X-RAY EXAM CHEST 1 VIEW: CPT

## 2019-04-02 ENCOUNTER — HOSPITAL ENCOUNTER (OUTPATIENT)
Dept: LAB | Age: 74
Discharge: HOME | End: 2019-04-02

## 2019-04-02 LAB
AMMONIA PLAS-SCNC: 77 UMOL/L (ref 11–32)
ANION GAP SERPL CALC-SCNC: 8 MMOL/L (ref 7–16)
BUN SERPL-MCNC: 53 MG/DL (ref 8–23)
CALCIUM SERPL-MCNC: 9.1 MG/DL (ref 8.3–10.4)
CHLORIDE SERPL-SCNC: 105 MMOL/L (ref 98–107)
CO2 SERPL-SCNC: 25 MMOL/L (ref 21–32)
CREAT SERPL-MCNC: 2.22 MG/DL (ref 0.8–1.5)
GLUCOSE SERPL-MCNC: 107 MG/DL (ref 65–100)
MAGNESIUM SERPL-MCNC: 2.1 MG/DL (ref 1.8–2.4)
POTASSIUM SERPL-SCNC: 4.5 MMOL/L (ref 3.5–5.1)
SODIUM SERPL-SCNC: 138 MMOL/L (ref 136–145)

## 2019-04-02 PROCEDURE — 80048 BASIC METABOLIC PNL TOTAL CA: CPT

## 2019-04-02 PROCEDURE — 82140 ASSAY OF AMMONIA: CPT

## 2019-04-02 PROCEDURE — 83735 ASSAY OF MAGNESIUM: CPT

## 2019-04-05 ENCOUNTER — PATIENT OUTREACH (OUTPATIENT)
Dept: CASE MANAGEMENT | Age: 74
End: 2019-04-05

## 2019-04-12 ENCOUNTER — HOSPITAL ENCOUNTER (OUTPATIENT)
Dept: LAB | Age: 74
Discharge: HOME | End: 2019-04-12

## 2019-04-12 LAB — AMMONIA PLAS-SCNC: 35 UMOL/L (ref 11–32)

## 2019-04-12 PROCEDURE — 82140 ASSAY OF AMMONIA: CPT

## 2019-04-16 ENCOUNTER — HOSPITAL ENCOUNTER (OUTPATIENT)
Dept: INTERVENTIONAL RADIOLOGY/VASCULAR | Age: 74
Discharge: HOME | End: 2019-04-16
Attending: INTERNAL MEDICINE
Payer: MEDICARE

## 2019-04-16 ENCOUNTER — PATIENT OUTREACH (OUTPATIENT)
Dept: CASE MANAGEMENT | Age: 74
End: 2019-04-16

## 2019-04-16 VITALS
SYSTOLIC BLOOD PRESSURE: 128 MMHG | OXYGEN SATURATION: 96 % | TEMPERATURE: 97.6 F | HEART RATE: 83 BPM | RESPIRATION RATE: 18 BRPM | DIASTOLIC BLOOD PRESSURE: 76 MMHG

## 2019-04-16 DIAGNOSIS — R18.8 ASCITES: ICD-10-CM

## 2019-04-16 PROCEDURE — 76705 ECHO EXAM OF ABDOMEN: CPT

## 2019-04-16 RX ORDER — LIDOCAINE HYDROCHLORIDE 20 MG/ML
1-10 INJECTION, SOLUTION INFILTRATION; PERINEURAL ONCE
Status: ACTIVE | OUTPATIENT
Start: 2019-04-16 | End: 2019-04-16

## 2019-04-16 NOTE — PROGRESS NOTES
Left multiple Vm with pt. nurse left call back # and encourage to call CC. according to [facility], pt will be seeing Pulmonologist on the 25 th of April and Cardiologist on the 30 th of April. Reached out to PCP, verbalized that they've been trying to get in contact with but was unable to establish communication. nurse encourage to continue to reach out in hopes to get pt in within 7 days after discharge. nurse will continue to monitor and provide care as needed.

## 2019-04-18 ENCOUNTER — PATIENT OUTREACH (OUTPATIENT)
Dept: CASE MANAGEMENT | Age: 74
End: 2019-04-18

## 2019-04-18 NOTE — PROGRESS NOTES
LVM with pt. Left call back # and encourage to call CC. Nurse will continue to reach out and provide care as needed.

## 2019-04-23 ENCOUNTER — PATIENT OUTREACH (OUTPATIENT)
Dept: CASE MANAGEMENT | Age: 74
End: 2019-04-23

## 2019-04-23 NOTE — PROGRESS NOTES
Spoke with pts wife. Verbalized that they are currently receiving care from [facility] hosp[facility]. Hospice nurse coming out multiple times weekly. Laz and FNP from hospice coming out to see pt. pt spend most of his time in bed or chair. According to wife, pt is declining. nurse will make this his last phone call since hospice is caring for pt.

## 2019-04-29 LAB
ALBUMIN SERPL BCP-MCNC: 3.9 G/DL (ref 3.4–5)
ANION GAP SERPL CALCULATED.3IONS-SCNC: 10 MMOL/L (ref 8–16)
BASOPHILS # BLD AUTO: 0 X10'3 (ref 0–0.2)
BASOPHILS NFR BLD AUTO: 0.6 % (ref 0–1)
BUN SERPL-MCNC: 20 MG/DL (ref 7–18)
BUN/CREAT SERPL: 23.3 (ref 5.4–32)
CALCIUM SERPL-MCNC: 9.5 MG/DL (ref 8.5–10.1)
CHLORIDE SERPL-SCNC: 105 MMOL/L (ref 99–107)
CO2 SERPL-SCNC: 23.3 MMOL/L (ref 24–32)
CREAT SERPL-MCNC: 0.86 MG/DL (ref 0.6–1.1)
EOSINOPHIL # BLD AUTO: 0.1 X10'3 (ref 0–0.9)
EOSINOPHIL NFR BLD AUTO: 1.2 % (ref 0–6)
ERYTHROCYTE [DISTWIDTH] IN BLOOD BY AUTOMATED COUNT: 14.4 % (ref 11.5–14.5)
GFR SERPL CREATININE-BSD FRML MDRD: 86 ML/MIN
GLUCOSE SERPL-MCNC: 85 MG/DL (ref 70–104)
HCT VFR BLD AUTO: 47.1 % (ref 42–52)
HGB BLD-MCNC: 15.8 G/DL (ref 14–17.9)
INR PPP: 1.1 INR
LYMPHOCYTES # BLD AUTO: 1.3 X10'3 (ref 1.1–4.8)
LYMPHOCYTES NFR BLD AUTO: 18.7 % (ref 21–51)
MCH RBC QN AUTO: 30.8 PG (ref 27–31)
MCHC RBC AUTO-ENTMCNC: 33.5 G/DL (ref 33–36.5)
MCV RBC AUTO: 91.8 FL (ref 78–98)
MONOCYTES # BLD AUTO: 0.6 X10'3 (ref 0–0.9)
MONOCYTES NFR BLD AUTO: 8.6 % (ref 2–12)
NEUTROPHILS # BLD AUTO: 5 X10'3 (ref 1.8–7.7)
NEUTROPHILS NFR BLD AUTO: 70.9 % (ref 42–75)
PLATELET # BLD AUTO: 205 X10'3 (ref 140–440)
PMV BLD AUTO: 7.6 FL (ref 7.4–10.4)
POTASSIUM SERPL-SCNC: 4.2 MMOL/L (ref 3.5–5.1)
RBC # BLD AUTO: 5.13 X10'6 (ref 4.7–6.1)
SODIUM SERPL-SCNC: 138 MMOL/L (ref 135–145)
WBC # BLD AUTO: 7 X10'3 (ref 4.5–11)

## 2019-04-30 ENCOUNTER — HOSPITAL ENCOUNTER (OUTPATIENT)
Dept: HOSPITAL 94 - SSTAY O | Age: 74
Discharge: HOME | End: 2019-04-30
Attending: INTERNAL MEDICINE
Payer: MEDICARE

## 2019-04-30 VITALS — SYSTOLIC BLOOD PRESSURE: 101 MMHG | DIASTOLIC BLOOD PRESSURE: 64 MMHG

## 2019-04-30 VITALS — SYSTOLIC BLOOD PRESSURE: 98 MMHG | DIASTOLIC BLOOD PRESSURE: 57 MMHG

## 2019-04-30 VITALS — SYSTOLIC BLOOD PRESSURE: 88 MMHG | DIASTOLIC BLOOD PRESSURE: 57 MMHG

## 2019-04-30 VITALS — SYSTOLIC BLOOD PRESSURE: 105 MMHG | DIASTOLIC BLOOD PRESSURE: 66 MMHG

## 2019-04-30 VITALS — DIASTOLIC BLOOD PRESSURE: 51 MMHG | SYSTOLIC BLOOD PRESSURE: 90 MMHG

## 2019-04-30 VITALS — DIASTOLIC BLOOD PRESSURE: 56 MMHG | SYSTOLIC BLOOD PRESSURE: 95 MMHG

## 2019-04-30 VITALS — SYSTOLIC BLOOD PRESSURE: 109 MMHG | DIASTOLIC BLOOD PRESSURE: 61 MMHG

## 2019-04-30 VITALS — DIASTOLIC BLOOD PRESSURE: 55 MMHG | SYSTOLIC BLOOD PRESSURE: 96 MMHG

## 2019-04-30 VITALS — SYSTOLIC BLOOD PRESSURE: 132 MMHG | DIASTOLIC BLOOD PRESSURE: 70 MMHG

## 2019-04-30 VITALS — SYSTOLIC BLOOD PRESSURE: 111 MMHG | DIASTOLIC BLOOD PRESSURE: 66 MMHG

## 2019-04-30 VITALS — BODY MASS INDEX: 26.39 KG/M2 | HEIGHT: 70 IN | WEIGHT: 184.31 LBS

## 2019-04-30 VITALS — SYSTOLIC BLOOD PRESSURE: 121 MMHG | DIASTOLIC BLOOD PRESSURE: 68 MMHG

## 2019-04-30 VITALS — SYSTOLIC BLOOD PRESSURE: 102 MMHG | DIASTOLIC BLOOD PRESSURE: 59 MMHG

## 2019-04-30 VITALS — SYSTOLIC BLOOD PRESSURE: 77 MMHG | DIASTOLIC BLOOD PRESSURE: 39 MMHG

## 2019-04-30 VITALS — DIASTOLIC BLOOD PRESSURE: 56 MMHG | SYSTOLIC BLOOD PRESSURE: 98 MMHG

## 2019-04-30 VITALS — DIASTOLIC BLOOD PRESSURE: 66 MMHG | SYSTOLIC BLOOD PRESSURE: 124 MMHG

## 2019-04-30 VITALS — SYSTOLIC BLOOD PRESSURE: 91 MMHG | DIASTOLIC BLOOD PRESSURE: 60 MMHG

## 2019-04-30 VITALS — DIASTOLIC BLOOD PRESSURE: 82 MMHG | SYSTOLIC BLOOD PRESSURE: 124 MMHG

## 2019-04-30 VITALS — DIASTOLIC BLOOD PRESSURE: 54 MMHG | SYSTOLIC BLOOD PRESSURE: 90 MMHG

## 2019-04-30 VITALS — DIASTOLIC BLOOD PRESSURE: 57 MMHG | SYSTOLIC BLOOD PRESSURE: 101 MMHG

## 2019-04-30 VITALS — DIASTOLIC BLOOD PRESSURE: 67 MMHG | SYSTOLIC BLOOD PRESSURE: 106 MMHG

## 2019-04-30 VITALS — DIASTOLIC BLOOD PRESSURE: 55 MMHG | SYSTOLIC BLOOD PRESSURE: 89 MMHG

## 2019-04-30 VITALS — DIASTOLIC BLOOD PRESSURE: 57 MMHG | SYSTOLIC BLOOD PRESSURE: 102 MMHG

## 2019-04-30 VITALS — SYSTOLIC BLOOD PRESSURE: 106 MMHG | DIASTOLIC BLOOD PRESSURE: 67 MMHG

## 2019-04-30 VITALS — SYSTOLIC BLOOD PRESSURE: 111 MMHG | DIASTOLIC BLOOD PRESSURE: 78 MMHG

## 2019-04-30 VITALS — DIASTOLIC BLOOD PRESSURE: 69 MMHG | SYSTOLIC BLOOD PRESSURE: 110 MMHG

## 2019-04-30 VITALS — DIASTOLIC BLOOD PRESSURE: 59 MMHG | SYSTOLIC BLOOD PRESSURE: 109 MMHG

## 2019-04-30 VITALS — SYSTOLIC BLOOD PRESSURE: 124 MMHG | DIASTOLIC BLOOD PRESSURE: 79 MMHG

## 2019-04-30 VITALS — DIASTOLIC BLOOD PRESSURE: 66 MMHG | SYSTOLIC BLOOD PRESSURE: 113 MMHG

## 2019-04-30 VITALS — DIASTOLIC BLOOD PRESSURE: 69 MMHG | SYSTOLIC BLOOD PRESSURE: 119 MMHG

## 2019-04-30 VITALS — DIASTOLIC BLOOD PRESSURE: 59 MMHG | SYSTOLIC BLOOD PRESSURE: 105 MMHG

## 2019-04-30 VITALS — DIASTOLIC BLOOD PRESSURE: 86 MMHG | SYSTOLIC BLOOD PRESSURE: 127 MMHG

## 2019-04-30 VITALS — DIASTOLIC BLOOD PRESSURE: 81 MMHG | SYSTOLIC BLOOD PRESSURE: 155 MMHG

## 2019-04-30 VITALS — SYSTOLIC BLOOD PRESSURE: 107 MMHG | DIASTOLIC BLOOD PRESSURE: 62 MMHG

## 2019-04-30 VITALS — DIASTOLIC BLOOD PRESSURE: 60 MMHG | SYSTOLIC BLOOD PRESSURE: 116 MMHG

## 2019-04-30 VITALS — DIASTOLIC BLOOD PRESSURE: 60 MMHG | SYSTOLIC BLOOD PRESSURE: 114 MMHG

## 2019-04-30 VITALS — DIASTOLIC BLOOD PRESSURE: 54 MMHG | SYSTOLIC BLOOD PRESSURE: 100 MMHG

## 2019-04-30 VITALS — DIASTOLIC BLOOD PRESSURE: 63 MMHG | SYSTOLIC BLOOD PRESSURE: 92 MMHG

## 2019-04-30 VITALS — DIASTOLIC BLOOD PRESSURE: 55 MMHG | SYSTOLIC BLOOD PRESSURE: 90 MMHG

## 2019-04-30 VITALS — DIASTOLIC BLOOD PRESSURE: 62 MMHG | SYSTOLIC BLOOD PRESSURE: 107 MMHG

## 2019-04-30 VITALS — SYSTOLIC BLOOD PRESSURE: 134 MMHG | DIASTOLIC BLOOD PRESSURE: 72 MMHG

## 2019-04-30 VITALS — SYSTOLIC BLOOD PRESSURE: 100 MMHG | DIASTOLIC BLOOD PRESSURE: 57 MMHG

## 2019-04-30 VITALS — SYSTOLIC BLOOD PRESSURE: 91 MMHG | DIASTOLIC BLOOD PRESSURE: 57 MMHG

## 2019-04-30 VITALS — SYSTOLIC BLOOD PRESSURE: 99 MMHG | DIASTOLIC BLOOD PRESSURE: 55 MMHG

## 2019-04-30 VITALS — SYSTOLIC BLOOD PRESSURE: 87 MMHG | DIASTOLIC BLOOD PRESSURE: 52 MMHG

## 2019-04-30 VITALS — SYSTOLIC BLOOD PRESSURE: 132 MMHG | DIASTOLIC BLOOD PRESSURE: 76 MMHG

## 2019-04-30 VITALS — SYSTOLIC BLOOD PRESSURE: 113 MMHG | DIASTOLIC BLOOD PRESSURE: 69 MMHG

## 2019-04-30 VITALS — DIASTOLIC BLOOD PRESSURE: 49 MMHG | SYSTOLIC BLOOD PRESSURE: 78 MMHG

## 2019-04-30 VITALS — SYSTOLIC BLOOD PRESSURE: 96 MMHG | DIASTOLIC BLOOD PRESSURE: 63 MMHG

## 2019-04-30 VITALS — SYSTOLIC BLOOD PRESSURE: 92 MMHG | DIASTOLIC BLOOD PRESSURE: 60 MMHG

## 2019-04-30 VITALS — SYSTOLIC BLOOD PRESSURE: 115 MMHG | DIASTOLIC BLOOD PRESSURE: 70 MMHG

## 2019-04-30 VITALS — DIASTOLIC BLOOD PRESSURE: 75 MMHG | SYSTOLIC BLOOD PRESSURE: 114 MMHG

## 2019-04-30 VITALS — DIASTOLIC BLOOD PRESSURE: 69 MMHG | SYSTOLIC BLOOD PRESSURE: 118 MMHG

## 2019-04-30 VITALS — DIASTOLIC BLOOD PRESSURE: 61 MMHG | SYSTOLIC BLOOD PRESSURE: 112 MMHG

## 2019-04-30 VITALS — SYSTOLIC BLOOD PRESSURE: 106 MMHG | DIASTOLIC BLOOD PRESSURE: 78 MMHG

## 2019-04-30 DIAGNOSIS — E78.5: ICD-10-CM

## 2019-04-30 DIAGNOSIS — I49.5: ICD-10-CM

## 2019-04-30 DIAGNOSIS — Z86.73: ICD-10-CM

## 2019-04-30 DIAGNOSIS — I48.0: Primary | ICD-10-CM

## 2019-04-30 DIAGNOSIS — I11.0: ICD-10-CM

## 2019-04-30 DIAGNOSIS — I48.91: ICD-10-CM

## 2019-04-30 DIAGNOSIS — I50.9: ICD-10-CM

## 2019-04-30 DIAGNOSIS — I42.9: ICD-10-CM

## 2019-04-30 PROCEDURE — 80048 BASIC METABOLIC PNL TOTAL CA: CPT

## 2019-04-30 PROCEDURE — 85025 COMPLETE CBC W/AUTO DIFF WBC: CPT

## 2019-04-30 PROCEDURE — 36415 COLL VENOUS BLD VENIPUNCTURE: CPT

## 2019-04-30 PROCEDURE — 93005 ELECTROCARDIOGRAM TRACING: CPT

## 2019-04-30 PROCEDURE — 92960 CARDIOVERSION ELECTRIC EXT: CPT

## 2019-04-30 PROCEDURE — 85610 PROTHROMBIN TIME: CPT

## 2019-04-30 NOTE — NUR
Problems reprioritized. Patient report given, questions answered & plan of care reviewed 
with ELLE Fowler.

## 2019-04-30 NOTE — NUR
Patient in room . I have received report from  Shannon VALLE and had the opportunity to ask 
questions and assume patient care. Pt sitting up in bed visiting with Wife and family 
friend. Pt denies CP, denies SOB. Pt refuses anything to eat or drink at this time. Call 
light is at bedside. Will continue to monitor

## 2019-04-30 NOTE — NUR
Called MD regarding PT's blood pressure and heart rate. MD stated to start a Dopamine gtt at 
5 mcg/kg/hr and will reassess this afternoon. Educated PT and PT's wife.

## 2019-05-07 ENCOUNTER — PATIENT OUTREACH (OUTPATIENT)
Dept: CASE MANAGEMENT | Age: 74
End: 2019-05-07

## 2019-07-16 LAB
ALBUMIN SERPL BCP-MCNC: 3.7 G/DL (ref 3.4–5)
ANION GAP SERPL CALCULATED.3IONS-SCNC: 7 MMOL/L (ref 8–16)
APTT PPP: 31 SECONDS (ref 22–32)
BASOPHILS # BLD AUTO: 0 X10'3 (ref 0–0.2)
BASOPHILS NFR BLD AUTO: 0.5 % (ref 0–1)
BUN SERPL-MCNC: 20 MG/DL (ref 7–18)
BUN/CREAT SERPL: 21.5 (ref 5.4–32)
CALCIUM SERPL-MCNC: 8.9 MG/DL (ref 8.5–10.1)
CHLORIDE SERPL-SCNC: 104 MMOL/L (ref 99–107)
CO2 SERPL-SCNC: 24.9 MMOL/L (ref 24–32)
CREAT SERPL-MCNC: 0.93 MG/DL (ref 0.6–1.1)
EOSINOPHIL # BLD AUTO: 0.1 X10'3 (ref 0–0.9)
EOSINOPHIL NFR BLD AUTO: 0.9 % (ref 0–6)
ERYTHROCYTE [DISTWIDTH] IN BLOOD BY AUTOMATED COUNT: 13.4 % (ref 11.5–14.5)
GFR SERPL CREATININE-BSD FRML MDRD: 79 ML/MIN
GLUCOSE SERPL-MCNC: 84 MG/DL (ref 70–104)
HCT VFR BLD AUTO: 44.1 % (ref 42–52)
HGB BLD-MCNC: 14.9 G/DL (ref 14–17.9)
LYMPHOCYTES # BLD AUTO: 1.1 X10'3 (ref 1.1–4.8)
LYMPHOCYTES NFR BLD AUTO: 16.4 % (ref 21–51)
MCH RBC QN AUTO: 32.1 PG (ref 27–31)
MCHC RBC AUTO-ENTMCNC: 33.8 G/DL (ref 33–36.5)
MCV RBC AUTO: 95.1 FL (ref 78–98)
MONOCYTES # BLD AUTO: 0.6 X10'3 (ref 0–0.9)
MONOCYTES NFR BLD AUTO: 8.3 % (ref 2–12)
NEUTROPHILS # BLD AUTO: 5.1 X10'3 (ref 1.8–7.7)
NEUTROPHILS NFR BLD AUTO: 73.9 % (ref 42–75)
PLATELET # BLD AUTO: 204 X10'3 (ref 140–440)
PMV BLD AUTO: 7.2 FL (ref 7.4–10.4)
POTASSIUM SERPL-SCNC: 4.1 MMOL/L (ref 3.5–5.1)
RBC # BLD AUTO: 4.63 X10'6 (ref 4.7–6.1)
SODIUM SERPL-SCNC: 136 MMOL/L (ref 135–145)
WBC # BLD AUTO: 6.9 X10'3 (ref 4.5–11)

## 2019-07-17 ENCOUNTER — HOSPITAL ENCOUNTER (OUTPATIENT)
Dept: HOSPITAL 94 - SSTAY O | Age: 74
Discharge: HOME | End: 2019-07-17
Attending: INTERNAL MEDICINE
Payer: MEDICARE

## 2019-07-17 VITALS — DIASTOLIC BLOOD PRESSURE: 78 MMHG | SYSTOLIC BLOOD PRESSURE: 130 MMHG

## 2019-07-17 VITALS — SYSTOLIC BLOOD PRESSURE: 147 MMHG | DIASTOLIC BLOOD PRESSURE: 57 MMHG

## 2019-07-17 VITALS — SYSTOLIC BLOOD PRESSURE: 119 MMHG | DIASTOLIC BLOOD PRESSURE: 67 MMHG

## 2019-07-17 VITALS — DIASTOLIC BLOOD PRESSURE: 67 MMHG | SYSTOLIC BLOOD PRESSURE: 114 MMHG

## 2019-07-17 VITALS — DIASTOLIC BLOOD PRESSURE: 72 MMHG | SYSTOLIC BLOOD PRESSURE: 110 MMHG

## 2019-07-17 VITALS — SYSTOLIC BLOOD PRESSURE: 113 MMHG | DIASTOLIC BLOOD PRESSURE: 70 MMHG

## 2019-07-17 VITALS — WEIGHT: 182.98 LBS | BODY MASS INDEX: 26.2 KG/M2 | HEIGHT: 70 IN

## 2019-07-17 VITALS — DIASTOLIC BLOOD PRESSURE: 70 MMHG | SYSTOLIC BLOOD PRESSURE: 112 MMHG

## 2019-07-17 VITALS — SYSTOLIC BLOOD PRESSURE: 136 MMHG | DIASTOLIC BLOOD PRESSURE: 67 MMHG

## 2019-07-17 VITALS — DIASTOLIC BLOOD PRESSURE: 75 MMHG | SYSTOLIC BLOOD PRESSURE: 107 MMHG

## 2019-07-17 VITALS — SYSTOLIC BLOOD PRESSURE: 118 MMHG | DIASTOLIC BLOOD PRESSURE: 76 MMHG

## 2019-07-17 VITALS — DIASTOLIC BLOOD PRESSURE: 73 MMHG | SYSTOLIC BLOOD PRESSURE: 110 MMHG

## 2019-07-17 VITALS — DIASTOLIC BLOOD PRESSURE: 71 MMHG | SYSTOLIC BLOOD PRESSURE: 111 MMHG

## 2019-07-17 DIAGNOSIS — E78.5: ICD-10-CM

## 2019-07-17 DIAGNOSIS — I42.9: ICD-10-CM

## 2019-07-17 DIAGNOSIS — I49.5: Primary | ICD-10-CM

## 2019-07-17 DIAGNOSIS — I42.0: ICD-10-CM

## 2019-07-17 DIAGNOSIS — I10: ICD-10-CM

## 2019-07-17 DIAGNOSIS — I48.91: ICD-10-CM

## 2019-07-17 DIAGNOSIS — Z86.73: ICD-10-CM

## 2019-07-17 PROCEDURE — 71046 X-RAY EXAM CHEST 2 VIEWS: CPT

## 2019-07-17 PROCEDURE — 99153 MOD SED SAME PHYS/QHP EA: CPT

## 2019-07-17 PROCEDURE — 36415 COLL VENOUS BLD VENIPUNCTURE: CPT

## 2019-07-17 PROCEDURE — 85730 THROMBOPLASTIN TIME PARTIAL: CPT

## 2019-07-17 PROCEDURE — 33208 INSRT HEART PM ATRIAL & VENT: CPT

## 2019-07-17 PROCEDURE — 85025 COMPLETE CBC W/AUTO DIFF WBC: CPT

## 2019-07-17 PROCEDURE — 80048 BASIC METABOLIC PNL TOTAL CA: CPT

## 2019-07-17 PROCEDURE — 93005 ELECTROCARDIOGRAM TRACING: CPT

## 2019-07-17 PROCEDURE — 99152 MOD SED SAME PHYS/QHP 5/>YRS: CPT

## 2019-07-17 PROCEDURE — 85610 PROTHROMBIN TIME: CPT

## 2020-02-17 ENCOUNTER — HOSPITAL ENCOUNTER (OUTPATIENT)
Dept: HOSPITAL 94 - VAS | Age: 75
Discharge: HOME | End: 2020-02-17
Attending: INTERNAL MEDICINE
Payer: MEDICARE

## 2020-02-17 DIAGNOSIS — I65.23: Primary | ICD-10-CM

## 2020-02-17 PROCEDURE — 93880 EXTRACRANIAL BILAT STUDY: CPT

## 2020-06-04 NOTE — PROGRESS NOTES
CCM services discontinued. Patient has been in [address] [place] [address] and the hospital in the past 90 days. Unable to reach. Left a voicemail with my contact information. This note will not be viewable in [address] change of breathing pattern/oral hygiene/position upright (90Y)/fever/pneumonia/throat clearing/upper respiratory infection/cough/gurgly voice

## 2020-09-10 NOTE — ED NOTES
OT Discharge    Today's date: 9/10/2020  Patient name: Rosangela Ramos  : 2016  MRN: [id number]  Referring provider: No ref  provider found  Dx:   Encounter Diagnosis     ICD-10-CM    1  Autism spectrum disorder  F84 0      Following established CDC and hospital protocols Rupinder Olmedo 'sTemperature was taken and assured afebrile status upon their arrival  Confirmed that Rupinderpascual Olmedo was wearing an appropriate mask or face covering (PPE) OR Child was not able to wear facemask due to age/condition  Therapist was wearing the appropriate PPE consisting of surgical mask, KN95 mask, glasses, or face shield depending on patients masking status  The mandatory travel, community and communication screening was completed prior to entering the clinic and documented by the therapist, with the result of no illness or risk present or suspected  Rupinder Olmedo  was accompanied directly into a disinfected and clean therapy gym using social distancing with other staff/peers  Subjective: Grandmother reported that Father would like for Rupinder Olmedo to be discharged from all outpatient services as he will be transitioning to school based services  Therapist verbalized concerns with discharge to grandmother  Unable to contact father via telephone  The following objective information is from Randall's last therapy session on 2020  Objective:   1  Rupinder Honorio will demonstrate improved midline crossing skills evidenced by the ability to establish consistent hand preference across 80% of opportunities  Goal has been met  2  Rupinder Honorio will demonstrate improved bilateral integration evidenced by using one hand as a manipulator and the other as a stabilizer with no more than minimal verbal prompting across 80% of opportunities  Goal not met  3  Rupinder Honorio will demonstrate improved motor memory and grasp patterns evidenced by the ability to consistently obtain a functional tripod grasp on writing implements   across 80% of opportunities     : unable to britebill  Face to Face Encounter    Patients Name: Courtney Corral    YOB: 1945    Primary Diagnosis: CHF, Gouty Arthritis    Date of Face to Face:   4/4/2017                                  Face to Face Encounter findings are related to primary reason for home care:   yes. 1. I certify that the patient needs intermittent care as follows: physical therapy: gait/stair training and pt/caregiver education    2. I certify that this patient is homebound, that is: 1) patient requires the use of a walker device, special transportation, or assistance of another to leave the home; or 2) patient's condition makes leaving the home medically contraindicated; and 3) patient has a normal inability to leave the home and leaving the home requires considerable and taxing effort. Patient may leave the home for infrequent and short duration for medical reasons, and occasional absences for non-medical reasons. Homebound status is due to the following functional limitations: Patient's ambulation limited secondary to severe pain and requires the use of an assistive device and the assistance of a caregiver for safe completion. Patient with strength and ROM deficits limiting ambulation endurance requiring the use of an assistive device and the assistance of a caregiver. Patient deemed temporarily homebound secondary to increased risk for infection when leaving home and going out into the community. 3. I certify that this patient is under my care and that I, or a nurse practitioner or  [id number], or clinical nurse specialist, or certified nurse midwife, working with me, had a Face-to-Face Encounter that meets the physician Face-to-Face Encounter requirements.   The following are the clinical findings from the [address] [place] [address] encounter that support the need for skilled services and is a summary of the encounter:     See discharge summary      Fior Jimenes MD  4/4/2017      THE FOLLOWING TO BE COMPLETED BY THE COMMUNITY PHYSICIAN:    I concur with the findings described above from the F2F encounter that this patient is homebound and in need of a skilled service.     Certifying Physician: _____________________________________      Printed Certifying Physician Name: _____________________________________    Date: _________________ obtain/maintain functional grasp  Leola Sorto prefers a gross grasp on implements  Utilized a band around marker and wrist to facilitate grasp with implement in webspace  8/20: therapist provided physical assistance fading to verbal prompting for Leola Sorto to use a tripod grasp  Observed difficulty with hand separation  Tended to use fixed 5 finger grasp which prohibited distal finger movements while writing   8/27: significant ongoing deficits with hand strength and grasp patterns  Leola Sorto requires maximal assistance to obtain and maintain a functional 4 or 5 finger grasp on writing implements  4  Leola Sorto will demonstrate improved visual motor abilities evidenced by the ability to draw simple pictures with >80% accuracy across 80% of opportunities  8/20: not addressed in session  8/27: imitated formation of square, train tracks  Independently beronica happy face, lollipop and square    5  Leola Sorto will demonstrate improved visual motor and bilateral coordination evidenced by the ability to 3" cut simple shapes (Jena, square, triangle) remaining on the line for >80% of the shape across 4/5 opportunities  7/14Patrick Goodwin had difficulty using 2 hands in a coordinated manner to cut and stabilize/turn paper  Moderate physical prompting to use right hand as a dynamic stabilizer  Cutting took extended time however all shapes were cut on the lines without deviations  8/20: Randall cut square with accuracy, some difficulty with cutting round edges of circles  8/27: Randall cut square with accuracy and appropriately used right and left hands in coordinated manner  Leola Sorto required initial hand over hand to use right hand as dynamic stabilizer to cut square  Faded to verbal prompting "cut and turn, cut and turn"  Cut Jena on boundary with accuracy       10  Leola Sorto will demonstrate improved visual motor integration skills evidenced by the ability to write his name with correct letter formation and orientation to paper across 80% of opportunities  Goal not met  7  Radha Rizzo will demonstrate decreased oral sensory aversion evidenced by adding one new nutritional food to his diet  Minimal progress secondary to family's decreased compliance with providing food in sessions  Assessment: Radha Rizzo is a 9year old male receiving occupational therapy services for a general delay in skills  Radha Rizzo has made improvements with his ability to established a preferred hand (left) however has ongoing deficits with bilateral coordination, midline crossing, grasp patterns and handwriting  Radha Rizzo continues to present with deficits that are impacting functional performance  It is therefore recommended that he continue to receive services 1x/week  However, per parent request, Radha Rizzo is being discharged at this time          Plan: Discharge

## 2021-03-22 NOTE — ED PROVIDER NOTES
HPI Comments: 66-year-old gentleman with a history of congestive heart failure who presents with continuing concerns about leg swelling. Patient says that both legs are also very painful. Patient was seen by me a few days ago for similar symptoms. At that time he had not been taking his medicine regularly. Therefore, we gave him some IV Lasix and encouraged him to take his medicines at home. The daughter who was also with him at that first visit says that since going home he has been taking his medicines regularly but has not seemed to help him. Therefore, they came back for reevaluation as instructed. Patient says that he is not having any chest pain but he does have some mild paroxysmal nocturnal dyspnea and some mild orthopnea. Overall he says that his legs ache with an 8 out of 10 pain. Elements of this note were created using speech recognition software. As such, errors of speech recognition may be present. Patient is a 74 y.o. male presenting with lower extremity edema. The history is provided by the patient.    Leg Swelling           Past Medical History:   Diagnosis Date    Acute CHF (congestive heart failure) ([facility] [address] 4/25/2015    Acute combined systolic and diastolic congestive heart failure ([address] 4/28/2015    Chronic obstructive pulmonary disease (HCC)     De Quervain's tenosynovitis, right 4/28/2015    Dyspnea on exertion 6/28/2015    Elevated serum creatinine 4/25/2015    Elevated troponin 6/28/2015    History of coronary artery disease     MI at 27 yo    History of right knee surgery     cartilage removal    History of shingles     Malignant hypertension 4/25/2015    MI ([facility]ocardial infarction[facility])        Past Surgical History:   Procedure Laterality Date    HX HEART CATHETERIZATION  6/29/2015    no intervention    HX KNEE ARTHROSCOPY Right     removal of cartilage         Family History:   Problem Relation Age of Onset    Hypertension Mother     No Known Problems Father Social History     Social History    Marital status:      Spouse name: N/A    Number of children: N/A    Years of education: N/A     Occupational History    retired      Social History Main Topics    Smoking status: Former Smoker     Packs/day: 0.25     Years: 20.00     Types: Cigarettes     Quit date: 4/5/2015    Smokeless tobacco: Never Used    Alcohol use No    Drug use: No    Sexual activity: Not on file     Other Topics Concern     Service No    Blood Transfusions No     no issues with receiving    Caffeine Concern No    Special Diet No    Exercise No    Seat Belt Yes     Social History Narrative    Lives with his wife         ALLERGIES: Pcn [penicillins]    Review of Systems   Constitutional: Negative for chills, diaphoresis and fever. HENT: Negative for congestion, rhinorrhea and sore throat. Eyes: Negative for redness and visual disturbance. Respiratory: Positive for shortness of breath. Negative for cough, chest tightness and wheezing. Cardiovascular: Positive for leg swelling. Negative for chest pain and palpitations. Gastrointestinal: Negative for abdominal pain, blood in stool, diarrhea, nausea and vomiting. Endocrine: Negative for polydipsia and polyuria. Genitourinary: Negative for dysuria and hematuria. Musculoskeletal: Negative for arthralgias, myalgias and neck stiffness. Skin: Negative for rash. Allergic/Immunologic: Negative for environmental allergies and food allergies. Neurological: Negative for dizziness, weakness and headaches. Hematological: Negative for adenopathy. Does not bruise/bleed easily. Psychiatric/Behavioral: Negative for confusion and sleep disturbance. The patient is not nervous/anxious.         Vitals:    04/04/17 1132   BP: 133/84   Pulse: 71   Resp: 17   Temp: 97.3 °F (36.3 °C)   SpO2: 92%   Weight: 99.8 kg (220 lb)   Height: 5' 10\" (1.778 m)            Physical Exam   Constitutional: He is oriented to person, place, and time. He appears well-developed and well-nourished. HENT:   Head: Normocephalic and atraumatic. Eyes: Conjunctivae and EOM are normal. Pupils are equal, round, and reactive to light. Neck: Normal range of motion. Cardiovascular: Normal rate and regular rhythm. Pulmonary/Chest: Effort normal and breath sounds normal. No respiratory distress. He has no wheezes. He has no rales. He exhibits no tenderness. Abdominal: Soft. Bowel sounds are normal. There is no rebound and no guarding. Musculoskeletal: Normal range of motion. He exhibits edema. He exhibits no tenderness. 3+ bilateral pitting edema   Lymphadenopathy:     He has no cervical adenopathy. Neurological: He is alert and oriented to person, place, and time. Skin: Skin is warm and dry. Psychiatric: He has a normal mood and affect. Nursing note and vitals reviewed. MDM  Number of Diagnoses or Management Options  Acute gouty arthritis:   Decreased ambulation status:   Peripheral edema:   Diagnosis management comments: I will recheck some of his basic lab work and give another small dose of IV Lasix. Patient's lab work is all improved from one was a few days ago. Therefore, we'll get an ultrasound of both lower legs to rule out a DVT.     ED Course       Procedures PAST MEDICAL HISTORY:  Diabetic peripheral neuropathy severe    GERD (gastroesophageal reflux disease)     History of DVT (deep vein thrombosis) 2014    History of pulmonary embolism 2014 x 2 -1 in march, 1 in July - on coumadin    Hyperlipidemia     Hypertension     Hypothyroidism     Iron deficiency anemia     Menorrhagia improved    Morbid obesity with BMI of 60.0-69.9, adult BMI 62    YVONNE on CPAP severe, not using CPAP    Osteoarthritis     Type 2 diabetes mellitus Hg A1C 4.9 %  ( 9/12/17), diagnosed in 2011    UTI (urinary tract infection) 9/1/17 positive urine cx, was treatead with  pencillin from 9/8/17-9/15/17    Vertigo chronic, without changes. Patient was evaluated in the past, etiology unknown.

## 2021-03-24 NOTE — PROGRESS NOTES
Dr Doc Ross please advise. TRANSFER - IN REPORT:    Verbal report received from [facility] (name) on Jonathan Kuo.  being received from [facility]) for routine post - op      Report consisted of patients Situation, Background, Assessment and   Recommendations(SBAR). Information from the following report(s) SBAR was reviewed with the receiving nurse. Opportunity for questions and clarification was provided. Assessment completed upon patients arrival to unit and care assumed.

## 2021-06-28 NOTE — PROCEDURES
Department of Interventional Radiology  [phone]        Interventional Radiology Brief Procedure Note    Patient: Fior Rowland MRN: [id number]  SSN: [id number]    YOB: 1945  Age: 74 y.o.   Sex: male      Date of Procedure: 1/5/2018    Pre-Procedure Diagnosis: Amyloidosis    Post-Procedure Diagnosis: SAME    Procedure(s): Image Guided Biopsy    Brief Description of Procedure: US guided abdominal fat pad core biopsy    Performed By: Sammy Burden PA-C     Assistants: None    Anesthesia:Lidocaine    Estimated Blood Loss: None    Specimens: 5 16 gauge core specimens in formalin, 2 cores fresh    Implants: None    Findings: no post biopsy complications    Complications: None    Recommendations: bedrest 1 hour, hold Lovenox today     Follow Up: referring MD    Signed By: Sammy Burden PA-C     January 5, 2018 No

## 2021-08-03 PROBLEM — I10 HYPERTENSION: Status: RESOLVED | Noted: 2017-09-29 | Resolved: 2021-08-03

## 2021-09-30 NOTE — ED TRIAGE NOTES
Patient with bilateral leg swelling. Reports SOB. Wife states that he was here last week and increased lasix but he is not getting better. Z Plasty Text: The lesion was extirpated to the level of the fat with a #15 scalpel blade.  Given the location of the defect, shape of the defect and the proximity to free margins a Z-plasty was deemed most appropriate for repair.  Using a sterile surgical marker, the appropriate transposition arms of the Z-plasty were drawn incorporating the defect and placing the expected incisions within the relaxed skin tension lines where possible.    The area thus outlined was incised deep to adipose tissue with a #15 scalpel blade.  The skin margins were undermined to an appropriate distance in all directions utilizing iris scissors.  The opposing transposition arms were then transposed into place in opposite direction and anchored with interrupted buried subcutaneous sutures.

## 2022-07-17 NOTE — PROGRESS NOTES
Patient identified as HRRP. In hospital visit with patient this am. Dicussed RN CCM services. Patient in agreement for RN to contact upon discharge. RRAT: 45-4 admits since 10/20/18 PCP: Eduard Loco PMH: CKD,CHF, CAD, Pulmonary HTN, HTN, Seizure D/O A-Fib, TAZ, Arthritis Plan: Discharge home today with HHA/PT/OT. Link with Eamon RINCNO CCM for GIOVANI JIMENEZ call and follow up. This note will not be viewable in [address]
Not applicable

## 2023-10-18 NOTE — PROGRESS NOTES
Verbal bedside report given to [facility], oncoming RN. Patient's situation, background, assessment and recommendations provided. Opportunity for questions provided. Oncoming RN assumed care of patient. Pt is on 24 hour urine collection. Pt brought to the ED by father for esophageal obstruction. Father states that 24 hours ago, pt got something caught in his esophagus and he has been trying to cough it up for the past 24 hours unsuccessfully. Pt with hx of food bolus obstruction in the past resulting in abscess.

## 2024-02-16 NOTE — PROGRESS NOTES
[facility] CARDIOLOGY PROGRESS NOTE           1/9/2018 8:56 AM    Admit Date: 12/26/2017      Subjective:   New sCHF and LHC showed mild nonobstructive CAD. Denies any chest pain or SOB. No wheezing. Now with (+) PPD and ID has cleared from active TB. Normal CXR    ROS:  Cardiovascular:  As noted above    Objective:      Vitals:    01/08/18 2118 01/09/18 0030 01/09/18 0451 01/09/18 0720   BP: 97/60 93/63 96/61    Pulse: 61 67 64    Resp: 20 18 18    Temp: 97.7 °F (36.5 °C) 98.2 °F (36.8 °C) 97.5 °F (36.4 °C)    SpO2: 98% 98% 97% 95%   Weight:   93.5 kg (206 lb 3.2 oz)        Physical Exam:  General-No Acute Distress  Neck- supple, no JVD  CV- regular rate and rhythm no MRG  Lung- clear bilaterally  Abd- soft, nontender, nondistended  Ext- no edema bilaterally. Skin- warm and dry    Data Review:   Recent Labs      01/09/18   0552  01/08/18   0500   NA  142  141   K  3.9  4.8   BUN  29*  26*   CREA  1.72*  1.64*   GLU  91  98   WBC  9.7  10.2   HGB  12.5*  14.0   HCT  38.3*  42.3   PLT  231  244       Assessment/Plan:     Principal Problem:    Dyspnea (12/26/2017)    Likely multifactorial including COPD, untreated TAZ, LV dysfunction. Diuresed well since admission. Stable on RA    Active Problems:    CKD (chronic kidney disease) stage 3, GFR 30-59 ml/min (9/29/2017)    Nephrology saw patient, labs are stable       Chronic systolic heart failure ([address] (12/4/2015)    Diuresing well on po lasix. DC hydralazine and start lisinopril, Imdur, coreg, lasix but no ACE-I/ARB with CKD. Reduced lasix to 40mg BID. SPEP and UPEP (-).   S/P fat pad biopsy (-)      Coronary atherosclerosis of native coronary vessel (9/29/2017)    S/p LHC showing mild nonobstructive CAD      Hypertension (9/29/2017)    continue meds      COPD, moderate ([address] (9/29/2017)    Per pulmonary       TAZ (obstructive sleep apnea) (10/2/2017)  Per pulmonary working with [employer] to help with CPAP equipment        Stable to DC radiology results pending home or STR if needed    Lawrence Rojas MD  1/9/2018 8:56 AM

## 2024-03-04 NOTE — PROGRESS NOTES
Interdisciplinary Rounds completed 07/13/18. Nursing, Case Management, Physician and PT present. Plan of care reviewed and updated. Discharge to SNF with ready. 52

## (undated) DEVICE — DRAPE,TOP,102X53,STERILE: Brand: MEDLINE

## (undated) DEVICE — JELLY LUBRICATING 10GM PREFIL SYR LUBE

## (undated) DEVICE — BUTTON SWITCH PENCIL BLADE ELECTRODE, HOLSTER: Brand: EDGE

## (undated) DEVICE — SHEET, DRAPE, SPLIT, STERILE: Brand: MEDLINE

## (undated) DEVICE — ABDOMINAL PAD: Brand: DERMACEA

## (undated) DEVICE — REM POLYHESIVE ADULT PATIENT RETURN ELECTRODE: Brand: VALLEYLAB

## (undated) DEVICE — SOLUTION IRRIG 1000ML H2O STRL BLT

## (undated) DEVICE — AMD ANTIMICROBIAL GAUZE SPONGES,12 PLY USP TYPE VII, 0.2% POLYHEXAMETHYLENE BIGUANIDE HCI (PHMB): Brand: CURITY

## (undated) DEVICE — SIGMOIDOSCOPE MED L25CM DIA20MM W/ OBT DISP KLEENSPEC

## (undated) DEVICE — SYR 10ML LUER LOK 1/5ML GRAD --

## (undated) DEVICE — STANDARD HYPODERMIC NEEDLE,POLYPROPYLENE HUB: Brand: MONOJECT

## (undated) DEVICE — SURGICAL PROCEDURE PACK BASIC ST FRANCIS